# Patient Record
Sex: FEMALE | Race: WHITE | NOT HISPANIC OR LATINO
[De-identification: names, ages, dates, MRNs, and addresses within clinical notes are randomized per-mention and may not be internally consistent; named-entity substitution may affect disease eponyms.]

---

## 2017-01-03 ENCOUNTER — LABORATORY RESULT (OUTPATIENT)
Age: 82
End: 2017-01-03

## 2017-01-04 ENCOUNTER — APPOINTMENT (OUTPATIENT)
Dept: FAMILY MEDICINE | Facility: CLINIC | Age: 82
End: 2017-01-04

## 2017-01-11 ENCOUNTER — APPOINTMENT (OUTPATIENT)
Dept: FAMILY MEDICINE | Facility: CLINIC | Age: 82
End: 2017-01-11

## 2017-01-11 VITALS
OXYGEN SATURATION: 98 % | WEIGHT: 150 LBS | HEART RATE: 90 BPM | HEIGHT: 62 IN | RESPIRATION RATE: 18 BRPM | DIASTOLIC BLOOD PRESSURE: 76 MMHG | TEMPERATURE: 98.4 F | SYSTOLIC BLOOD PRESSURE: 130 MMHG | BODY MASS INDEX: 27.6 KG/M2

## 2017-01-17 ENCOUNTER — LABORATORY RESULT (OUTPATIENT)
Age: 82
End: 2017-01-17

## 2017-01-22 ENCOUNTER — INPATIENT (INPATIENT)
Facility: HOSPITAL | Age: 82
LOS: 8 days | Discharge: NURSING, REHAB OR LONG-TERM CARE | DRG: 481 | End: 2017-01-31
Attending: ORTHOPAEDIC SURGERY | Admitting: FAMILY MEDICINE
Payer: MEDICARE

## 2017-01-22 DIAGNOSIS — Z79.01 LONG TERM (CURRENT) USE OF ANTICOAGULANTS: ICD-10-CM

## 2017-01-22 DIAGNOSIS — I25.10 ATHEROSCLEROTIC HEART DISEASE OF NATIVE CORONARY ARTERY WITHOUT ANGINA PECTORIS: ICD-10-CM

## 2017-01-22 DIAGNOSIS — I95.9 HYPOTENSION, UNSPECIFIED: ICD-10-CM

## 2017-01-22 DIAGNOSIS — F51.04 PSYCHOPHYSIOLOGIC INSOMNIA: ICD-10-CM

## 2017-01-22 DIAGNOSIS — M80.852A OTHER OSTEOPOROSIS WITH CURRENT PATHOLOGICAL FRACTURE, LEFT FEMUR, INITIAL ENCOUNTER FOR FRACTURE: ICD-10-CM

## 2017-01-22 DIAGNOSIS — E87.6 HYPOKALEMIA: ICD-10-CM

## 2017-01-22 DIAGNOSIS — E87.1 HYPO-OSMOLALITY AND HYPONATREMIA: ICD-10-CM

## 2017-01-22 DIAGNOSIS — E03.9 HYPOTHYROIDISM, UNSPECIFIED: ICD-10-CM

## 2017-01-22 DIAGNOSIS — D72.829 ELEVATED WHITE BLOOD CELL COUNT, UNSPECIFIED: ICD-10-CM

## 2017-01-22 DIAGNOSIS — F41.9 ANXIETY DISORDER, UNSPECIFIED: ICD-10-CM

## 2017-01-22 DIAGNOSIS — S72.142A DISPLACED INTERTROCHANTERIC FRACTURE OF LEFT FEMUR, INITIAL ENCOUNTER FOR CLOSED FRACTURE: ICD-10-CM

## 2017-01-22 DIAGNOSIS — I48.0 PAROXYSMAL ATRIAL FIBRILLATION: ICD-10-CM

## 2017-01-22 DIAGNOSIS — M06.9 RHEUMATOID ARTHRITIS, UNSPECIFIED: ICD-10-CM

## 2017-01-22 DIAGNOSIS — I10 ESSENTIAL (PRIMARY) HYPERTENSION: ICD-10-CM

## 2017-01-22 DIAGNOSIS — F32.9 MAJOR DEPRESSIVE DISORDER, SINGLE EPISODE, UNSPECIFIED: ICD-10-CM

## 2017-01-22 PROCEDURE — 71010: CPT | Mod: 26

## 2017-01-22 PROCEDURE — 93010 ELECTROCARDIOGRAM REPORT: CPT

## 2017-01-22 PROCEDURE — 99223 1ST HOSP IP/OBS HIGH 75: CPT

## 2017-01-22 PROCEDURE — 73552 X-RAY EXAM OF FEMUR 2/>: CPT | Mod: 26,LT

## 2017-01-22 PROCEDURE — 72170 X-RAY EXAM OF PELVIS: CPT | Mod: 26

## 2017-01-23 PROCEDURE — 99233 SBSQ HOSP IP/OBS HIGH 50: CPT

## 2017-01-23 PROCEDURE — 99222 1ST HOSP IP/OBS MODERATE 55: CPT

## 2017-01-23 PROCEDURE — 93306 TTE W/DOPPLER COMPLETE: CPT | Mod: 26

## 2017-01-24 PROCEDURE — 99233 SBSQ HOSP IP/OBS HIGH 50: CPT

## 2017-01-25 ENCOUNTER — APPOINTMENT (OUTPATIENT)
Dept: FAMILY MEDICINE | Facility: CLINIC | Age: 82
End: 2017-01-25

## 2017-01-25 PROCEDURE — 99233 SBSQ HOSP IP/OBS HIGH 50: CPT

## 2017-01-26 PROCEDURE — 99232 SBSQ HOSP IP/OBS MODERATE 35: CPT

## 2017-01-27 PROCEDURE — 99233 SBSQ HOSP IP/OBS HIGH 50: CPT

## 2017-01-27 PROCEDURE — 99232 SBSQ HOSP IP/OBS MODERATE 35: CPT

## 2017-01-28 PROCEDURE — 99232 SBSQ HOSP IP/OBS MODERATE 35: CPT

## 2017-01-29 PROCEDURE — 99233 SBSQ HOSP IP/OBS HIGH 50: CPT

## 2017-01-29 PROCEDURE — 99232 SBSQ HOSP IP/OBS MODERATE 35: CPT

## 2017-01-30 PROCEDURE — 99232 SBSQ HOSP IP/OBS MODERATE 35: CPT

## 2017-01-31 ENCOUNTER — INPATIENT (INPATIENT)
Facility: HOSPITAL | Age: 82
LOS: 14 days | Discharge: HOME | DRG: 949 | End: 2017-02-15
Attending: PSYCHIATRY & NEUROLOGY | Admitting: PSYCHIATRY & NEUROLOGY
Payer: MEDICARE

## 2017-01-31 DIAGNOSIS — F32.9 MAJOR DEPRESSIVE DISORDER, SINGLE EPISODE, UNSPECIFIED: ICD-10-CM

## 2017-01-31 DIAGNOSIS — Z51.89 ENCOUNTER FOR OTHER SPECIFIED AFTERCARE: ICD-10-CM

## 2017-01-31 DIAGNOSIS — Z79.01 LONG TERM (CURRENT) USE OF ANTICOAGULANTS: ICD-10-CM

## 2017-01-31 DIAGNOSIS — R60.9 EDEMA, UNSPECIFIED: ICD-10-CM

## 2017-01-31 DIAGNOSIS — D72.829 ELEVATED WHITE BLOOD CELL COUNT, UNSPECIFIED: ICD-10-CM

## 2017-01-31 DIAGNOSIS — E03.9 HYPOTHYROIDISM, UNSPECIFIED: ICD-10-CM

## 2017-01-31 DIAGNOSIS — M80.052A AGE-RELATED OSTEOPOROSIS WITH CURRENT PATHOLOGICAL FRACTURE, LEFT FEMUR, INITIAL ENCOUNTER FOR FRACTURE: ICD-10-CM

## 2017-01-31 DIAGNOSIS — M80.052D AGE-RELATED OSTEOPOROSIS WITH CURRENT PATHOLOGICAL FRACTURE, LEFT FEMUR, SUBSEQUENT ENCOUNTER FOR FRACTURE WITH ROUTINE HEALING: ICD-10-CM

## 2017-01-31 DIAGNOSIS — I10 ESSENTIAL (PRIMARY) HYPERTENSION: ICD-10-CM

## 2017-01-31 DIAGNOSIS — Z91.81 HISTORY OF FALLING: ICD-10-CM

## 2017-01-31 DIAGNOSIS — E87.1 HYPO-OSMOLALITY AND HYPONATREMIA: ICD-10-CM

## 2017-01-31 DIAGNOSIS — F41.9 ANXIETY DISORDER, UNSPECIFIED: ICD-10-CM

## 2017-01-31 DIAGNOSIS — I48.91 UNSPECIFIED ATRIAL FIBRILLATION: ICD-10-CM

## 2017-01-31 PROCEDURE — 81003 URINALYSIS AUTO W/O SCOPE: CPT

## 2017-01-31 PROCEDURE — 97165 OT EVAL LOW COMPLEX 30 MIN: CPT

## 2017-01-31 PROCEDURE — 73552 X-RAY EXAM OF FEMUR 2/>: CPT

## 2017-01-31 PROCEDURE — 96376 TX/PRO/DX INJ SAME DRUG ADON: CPT | Mod: XU

## 2017-01-31 PROCEDURE — C1889: CPT

## 2017-01-31 PROCEDURE — 97162 PT EVAL MOD COMPLEX 30 MIN: CPT

## 2017-01-31 PROCEDURE — 96375 TX/PRO/DX INJ NEW DRUG ADDON: CPT | Mod: XU

## 2017-01-31 PROCEDURE — 97116 GAIT TRAINING THERAPY: CPT

## 2017-01-31 PROCEDURE — 87040 BLOOD CULTURE FOR BACTERIA: CPT

## 2017-01-31 PROCEDURE — 72170 X-RAY EXAM OF PELVIS: CPT

## 2017-01-31 PROCEDURE — 86922 COMPATIBILITY TEST ANTIGLOB: CPT

## 2017-01-31 PROCEDURE — 51702 INSERT TEMP BLADDER CATH: CPT

## 2017-01-31 PROCEDURE — 76000 FLUOROSCOPY <1 HR PHYS/QHP: CPT

## 2017-01-31 PROCEDURE — 80069 RENAL FUNCTION PANEL: CPT

## 2017-01-31 PROCEDURE — 85610 PROTHROMBIN TIME: CPT

## 2017-01-31 PROCEDURE — 96374 THER/PROPH/DIAG INJ IV PUSH: CPT | Mod: XU

## 2017-01-31 PROCEDURE — 83735 ASSAY OF MAGNESIUM: CPT

## 2017-01-31 PROCEDURE — 97530 THERAPEUTIC ACTIVITIES: CPT

## 2017-01-31 PROCEDURE — 87086 URINE CULTURE/COLONY COUNT: CPT

## 2017-01-31 PROCEDURE — 80048 BASIC METABOLIC PNL TOTAL CA: CPT

## 2017-01-31 PROCEDURE — 99232 SBSQ HOSP IP/OBS MODERATE 35: CPT

## 2017-01-31 PROCEDURE — 86850 RBC ANTIBODY SCREEN: CPT

## 2017-01-31 PROCEDURE — 85027 COMPLETE CBC AUTOMATED: CPT

## 2017-01-31 PROCEDURE — 97110 THERAPEUTIC EXERCISES: CPT

## 2017-01-31 PROCEDURE — 85025 COMPLETE CBC W/AUTO DIFF WBC: CPT

## 2017-01-31 PROCEDURE — 83036 HEMOGLOBIN GLYCOSYLATED A1C: CPT

## 2017-01-31 PROCEDURE — 86900 BLOOD TYPING SEROLOGIC ABO: CPT

## 2017-01-31 PROCEDURE — 99285 EMERGENCY DEPT VISIT HI MDM: CPT | Mod: 25

## 2017-01-31 PROCEDURE — 93005 ELECTROCARDIOGRAM TRACING: CPT

## 2017-01-31 PROCEDURE — 85730 THROMBOPLASTIN TIME PARTIAL: CPT

## 2017-01-31 PROCEDURE — 71045 X-RAY EXAM CHEST 1 VIEW: CPT

## 2017-01-31 PROCEDURE — 84100 ASSAY OF PHOSPHORUS: CPT

## 2017-01-31 PROCEDURE — 93306 TTE W/DOPPLER COMPLETE: CPT

## 2017-01-31 PROCEDURE — C1713: CPT

## 2017-02-01 PROCEDURE — 93010 ELECTROCARDIOGRAM REPORT: CPT

## 2017-02-01 PROCEDURE — 99232 SBSQ HOSP IP/OBS MODERATE 35: CPT

## 2017-02-01 PROCEDURE — 99223 1ST HOSP IP/OBS HIGH 75: CPT

## 2017-02-02 PROCEDURE — 99232 SBSQ HOSP IP/OBS MODERATE 35: CPT

## 2017-02-02 PROCEDURE — 93971 EXTREMITY STUDY: CPT | Mod: 26,LT

## 2017-02-03 PROCEDURE — 99232 SBSQ HOSP IP/OBS MODERATE 35: CPT

## 2017-02-04 PROCEDURE — 99232 SBSQ HOSP IP/OBS MODERATE 35: CPT

## 2017-02-05 PROCEDURE — 99232 SBSQ HOSP IP/OBS MODERATE 35: CPT

## 2017-02-06 PROCEDURE — 99232 SBSQ HOSP IP/OBS MODERATE 35: CPT

## 2017-02-07 PROCEDURE — 99232 SBSQ HOSP IP/OBS MODERATE 35: CPT

## 2017-02-08 PROCEDURE — 99232 SBSQ HOSP IP/OBS MODERATE 35: CPT

## 2017-02-09 PROCEDURE — 99232 SBSQ HOSP IP/OBS MODERATE 35: CPT

## 2017-02-10 PROCEDURE — 99232 SBSQ HOSP IP/OBS MODERATE 35: CPT

## 2017-02-11 PROCEDURE — 99232 SBSQ HOSP IP/OBS MODERATE 35: CPT | Mod: GC

## 2017-02-12 PROCEDURE — 99232 SBSQ HOSP IP/OBS MODERATE 35: CPT | Mod: GC

## 2017-02-13 PROCEDURE — 99232 SBSQ HOSP IP/OBS MODERATE 35: CPT

## 2017-02-14 PROCEDURE — 99232 SBSQ HOSP IP/OBS MODERATE 35: CPT

## 2017-02-14 PROCEDURE — 90792 PSYCH DIAG EVAL W/MED SRVCS: CPT

## 2017-02-15 PROCEDURE — 99238 HOSP IP/OBS DSCHRG MGMT 30/<: CPT

## 2017-02-16 ENCOUNTER — LABORATORY RESULT (OUTPATIENT)
Age: 82
End: 2017-02-16

## 2017-02-22 ENCOUNTER — APPOINTMENT (OUTPATIENT)
Dept: FAMILY MEDICINE | Facility: CLINIC | Age: 82
End: 2017-02-22

## 2017-02-22 VITALS
BODY MASS INDEX: 27.05 KG/M2 | TEMPERATURE: 98.7 F | HEART RATE: 78 BPM | RESPIRATION RATE: 15 BRPM | HEIGHT: 62 IN | DIASTOLIC BLOOD PRESSURE: 78 MMHG | WEIGHT: 147 LBS | SYSTOLIC BLOOD PRESSURE: 120 MMHG | OXYGEN SATURATION: 98 %

## 2017-02-27 PROCEDURE — 80053 COMPREHEN METABOLIC PANEL: CPT

## 2017-02-27 PROCEDURE — 97163 PT EVAL HIGH COMPLEX 45 MIN: CPT

## 2017-02-27 PROCEDURE — 93971 EXTREMITY STUDY: CPT

## 2017-02-27 PROCEDURE — 76000 FLUOROSCOPY <1 HR PHYS/QHP: CPT

## 2017-02-27 PROCEDURE — 97530 THERAPEUTIC ACTIVITIES: CPT

## 2017-02-27 PROCEDURE — 80048 BASIC METABOLIC PNL TOTAL CA: CPT

## 2017-02-27 PROCEDURE — 85610 PROTHROMBIN TIME: CPT

## 2017-02-27 PROCEDURE — 97535 SELF CARE MNGMENT TRAINING: CPT

## 2017-02-27 PROCEDURE — 97167 OT EVAL HIGH COMPLEX 60 MIN: CPT

## 2017-02-27 PROCEDURE — 80069 RENAL FUNCTION PANEL: CPT

## 2017-02-27 PROCEDURE — 85027 COMPLETE CBC AUTOMATED: CPT

## 2017-02-27 PROCEDURE — 93005 ELECTROCARDIOGRAM TRACING: CPT

## 2017-02-27 PROCEDURE — 97110 THERAPEUTIC EXERCISES: CPT

## 2017-02-27 PROCEDURE — 85025 COMPLETE CBC W/AUTO DIFF WBC: CPT

## 2017-02-27 PROCEDURE — 97116 GAIT TRAINING THERAPY: CPT

## 2017-02-28 ENCOUNTER — LABORATORY RESULT (OUTPATIENT)
Age: 82
End: 2017-02-28

## 2017-02-28 ENCOUNTER — OTHER (OUTPATIENT)
Age: 82
End: 2017-02-28

## 2017-03-01 ENCOUNTER — RX RENEWAL (OUTPATIENT)
Age: 82
End: 2017-03-01

## 2017-03-08 ENCOUNTER — RX RENEWAL (OUTPATIENT)
Age: 82
End: 2017-03-08

## 2017-03-09 ENCOUNTER — APPOINTMENT (OUTPATIENT)
Dept: CARDIOLOGY | Facility: CLINIC | Age: 82
End: 2017-03-09

## 2017-03-09 ENCOUNTER — NON-APPOINTMENT (OUTPATIENT)
Age: 82
End: 2017-03-09

## 2017-03-09 VITALS
HEART RATE: 67 BPM | SYSTOLIC BLOOD PRESSURE: 121 MMHG | HEIGHT: 62 IN | DIASTOLIC BLOOD PRESSURE: 73 MMHG | WEIGHT: 144 LBS | RESPIRATION RATE: 17 BRPM | OXYGEN SATURATION: 98 % | BODY MASS INDEX: 26.5 KG/M2

## 2017-03-09 LAB
INR PPP: 2.3 RATIO
POCT-PROTHROMBIN TIME: 27.8 SECS
QUALITY CONTROL: YES

## 2017-03-16 ENCOUNTER — APPOINTMENT (OUTPATIENT)
Dept: PHYSICAL MEDICINE AND REHAB | Facility: CLINIC | Age: 82
End: 2017-03-16

## 2017-03-16 ENCOUNTER — LABORATORY RESULT (OUTPATIENT)
Age: 82
End: 2017-03-16

## 2017-03-16 VITALS
DIASTOLIC BLOOD PRESSURE: 81 MMHG | OXYGEN SATURATION: 98 % | RESPIRATION RATE: 17 BRPM | HEIGHT: 62 IN | SYSTOLIC BLOOD PRESSURE: 124 MMHG | BODY MASS INDEX: 26.87 KG/M2 | HEART RATE: 51 BPM | WEIGHT: 146 LBS

## 2017-03-20 ENCOUNTER — RX RENEWAL (OUTPATIENT)
Age: 82
End: 2017-03-20

## 2017-03-26 ENCOUNTER — EMERGENCY (EMERGENCY)
Facility: HOSPITAL | Age: 82
LOS: 1 days | Discharge: HOME | End: 2017-03-26
Admitting: EMERGENCY MEDICINE
Payer: MEDICARE

## 2017-03-26 DIAGNOSIS — R60.0 LOCALIZED EDEMA: ICD-10-CM

## 2017-03-26 DIAGNOSIS — E78.00 PURE HYPERCHOLESTEROLEMIA, UNSPECIFIED: ICD-10-CM

## 2017-03-26 DIAGNOSIS — R50.9 FEVER, UNSPECIFIED: ICD-10-CM

## 2017-03-26 DIAGNOSIS — Z79.01 LONG TERM (CURRENT) USE OF ANTICOAGULANTS: ICD-10-CM

## 2017-03-26 DIAGNOSIS — Z88.0 ALLERGY STATUS TO PENICILLIN: ICD-10-CM

## 2017-03-26 DIAGNOSIS — I10 ESSENTIAL (PRIMARY) HYPERTENSION: ICD-10-CM

## 2017-03-26 DIAGNOSIS — R06.00 DYSPNEA, UNSPECIFIED: ICD-10-CM

## 2017-03-26 DIAGNOSIS — Z79.899 OTHER LONG TERM (CURRENT) DRUG THERAPY: ICD-10-CM

## 2017-03-26 PROCEDURE — 93010 ELECTROCARDIOGRAM REPORT: CPT

## 2017-03-26 PROCEDURE — 99285 EMERGENCY DEPT VISIT HI MDM: CPT

## 2017-03-26 PROCEDURE — 71020: CPT | Mod: 26

## 2017-03-27 ENCOUNTER — APPOINTMENT (OUTPATIENT)
Dept: CARDIOLOGY | Facility: CLINIC | Age: 82
End: 2017-03-27

## 2017-03-27 ENCOUNTER — NON-APPOINTMENT (OUTPATIENT)
Age: 82
End: 2017-03-27

## 2017-03-27 VITALS
BODY MASS INDEX: 26.87 KG/M2 | DIASTOLIC BLOOD PRESSURE: 85 MMHG | RESPIRATION RATE: 17 BRPM | WEIGHT: 146 LBS | HEART RATE: 85 BPM | HEIGHT: 62 IN | SYSTOLIC BLOOD PRESSURE: 137 MMHG | OXYGEN SATURATION: 92 %

## 2017-03-27 PROCEDURE — 84484 ASSAY OF TROPONIN QUANT: CPT

## 2017-03-27 PROCEDURE — 83880 ASSAY OF NATRIURETIC PEPTIDE: CPT

## 2017-03-27 PROCEDURE — 99284 EMERGENCY DEPT VISIT MOD MDM: CPT | Mod: 25

## 2017-03-27 PROCEDURE — 87040 BLOOD CULTURE FOR BACTERIA: CPT

## 2017-03-27 PROCEDURE — 71046 X-RAY EXAM CHEST 2 VIEWS: CPT

## 2017-03-27 PROCEDURE — 93005 ELECTROCARDIOGRAM TRACING: CPT

## 2017-03-27 PROCEDURE — 81003 URINALYSIS AUTO W/O SCOPE: CPT

## 2017-03-27 PROCEDURE — 96374 THER/PROPH/DIAG INJ IV PUSH: CPT

## 2017-03-27 PROCEDURE — 85027 COMPLETE CBC AUTOMATED: CPT

## 2017-03-27 PROCEDURE — 80048 BASIC METABOLIC PNL TOTAL CA: CPT

## 2017-03-28 LAB
INR PPP: 1.5 RATIO
QUALITY CONTROL: YES

## 2017-03-29 ENCOUNTER — APPOINTMENT (OUTPATIENT)
Dept: FAMILY MEDICINE | Facility: CLINIC | Age: 82
End: 2017-03-29

## 2017-03-29 VITALS
DIASTOLIC BLOOD PRESSURE: 60 MMHG | HEART RATE: 58 BPM | OXYGEN SATURATION: 92 % | SYSTOLIC BLOOD PRESSURE: 90 MMHG | TEMPERATURE: 98.2 F

## 2017-03-30 ENCOUNTER — APPOINTMENT (OUTPATIENT)
Dept: PULMONOLOGY | Facility: CLINIC | Age: 82
End: 2017-03-30

## 2017-03-30 VITALS
HEIGHT: 62 IN | DIASTOLIC BLOOD PRESSURE: 60 MMHG | SYSTOLIC BLOOD PRESSURE: 108 MMHG | OXYGEN SATURATION: 96 % | HEART RATE: 64 BPM | BODY MASS INDEX: 26.87 KG/M2 | WEIGHT: 146 LBS

## 2017-04-05 ENCOUNTER — RX RENEWAL (OUTPATIENT)
Age: 82
End: 2017-04-05

## 2017-04-06 ENCOUNTER — APPOINTMENT (OUTPATIENT)
Dept: CARDIOLOGY | Facility: CLINIC | Age: 82
End: 2017-04-06

## 2017-04-06 ENCOUNTER — RX RENEWAL (OUTPATIENT)
Age: 82
End: 2017-04-06

## 2017-04-06 LAB
INR PPP: 2.3 RATIO
POCT-PROTHROMBIN TIME: 27.7 SECS
QUALITY CONTROL: YES

## 2017-04-11 ENCOUNTER — LABORATORY RESULT (OUTPATIENT)
Age: 82
End: 2017-04-11

## 2017-04-18 ENCOUNTER — LABORATORY RESULT (OUTPATIENT)
Age: 82
End: 2017-04-18

## 2017-05-08 ENCOUNTER — APPOINTMENT (OUTPATIENT)
Dept: PULMONOLOGY | Facility: CLINIC | Age: 82
End: 2017-05-08

## 2017-05-08 VITALS
SYSTOLIC BLOOD PRESSURE: 110 MMHG | WEIGHT: 147 LBS | BODY MASS INDEX: 27.05 KG/M2 | OXYGEN SATURATION: 98 % | HEART RATE: 74 BPM | DIASTOLIC BLOOD PRESSURE: 78 MMHG | HEIGHT: 62 IN

## 2017-05-09 ENCOUNTER — LABORATORY RESULT (OUTPATIENT)
Age: 82
End: 2017-05-09

## 2017-05-10 ENCOUNTER — APPOINTMENT (OUTPATIENT)
Dept: FAMILY MEDICINE | Facility: CLINIC | Age: 82
End: 2017-05-10

## 2017-05-10 VITALS
SYSTOLIC BLOOD PRESSURE: 138 MMHG | BODY MASS INDEX: 27.05 KG/M2 | WEIGHT: 147 LBS | HEART RATE: 68 BPM | TEMPERATURE: 98.2 F | HEIGHT: 62 IN | DIASTOLIC BLOOD PRESSURE: 80 MMHG | OXYGEN SATURATION: 97 %

## 2017-05-10 RX ORDER — PREDNISONE 10 MG/1
10 TABLET ORAL
Qty: 21 | Refills: 0 | Status: COMPLETED | COMMUNITY
Start: 2017-03-29 | End: 2017-05-10

## 2017-05-10 RX ORDER — TEMAZEPAM 7.5 MG/1
7.5 CAPSULE ORAL
Qty: 30 | Refills: 0 | Status: COMPLETED | COMMUNITY
Start: 2017-03-20 | End: 2017-05-10

## 2017-05-10 RX ORDER — LEVOFLOXACIN 500 MG/1
500 TABLET, FILM COATED ORAL DAILY
Qty: 7 | Refills: 1 | Status: COMPLETED | COMMUNITY
Start: 2017-03-29 | End: 2017-05-10

## 2017-05-10 RX ORDER — AZELASTINE HYDROCHLORIDE 0.5 MG/ML
0.05 SOLUTION/ DROPS OPHTHALMIC
Qty: 6 | Refills: 0 | Status: COMPLETED | COMMUNITY
Start: 2017-01-10 | End: 2017-05-10

## 2017-05-10 RX ORDER — VALSARTAN 320 MG/1
320 TABLET ORAL
Refills: 0 | Status: COMPLETED | COMMUNITY
End: 2017-05-10

## 2017-05-10 RX ORDER — TEMAZEPAM 22.5 MG/1
CAPSULE ORAL
Refills: 0 | Status: COMPLETED | COMMUNITY
End: 2017-05-10

## 2017-05-10 RX ORDER — MIRTAZAPINE 15 MG/1
15 TABLET, FILM COATED ORAL
Refills: 0 | Status: COMPLETED | COMMUNITY
Start: 2017-01-11 | End: 2017-05-10

## 2017-05-10 RX ORDER — PROMETHAZINE HYDROCHLORIDE AND DEXTROMETHORPHAN HYDROBROMIDE ORAL SOLUTION 15; 6.25 MG/5ML; MG/5ML
6.25-15 SOLUTION ORAL
Qty: 240 | Refills: 1 | Status: COMPLETED | COMMUNITY
Start: 2017-03-29 | End: 2017-05-10

## 2017-05-16 ENCOUNTER — LABORATORY RESULT (OUTPATIENT)
Age: 82
End: 2017-05-16

## 2017-05-18 ENCOUNTER — RX RENEWAL (OUTPATIENT)
Age: 82
End: 2017-05-18

## 2017-05-23 ENCOUNTER — LABORATORY RESULT (OUTPATIENT)
Age: 82
End: 2017-05-23

## 2017-05-29 ENCOUNTER — FORM ENCOUNTER (OUTPATIENT)
Age: 82
End: 2017-05-29

## 2017-05-30 ENCOUNTER — OUTPATIENT (OUTPATIENT)
Dept: OUTPATIENT SERVICES | Facility: HOSPITAL | Age: 82
LOS: 1 days | End: 2017-05-30
Payer: MEDICARE

## 2017-05-30 ENCOUNTER — APPOINTMENT (OUTPATIENT)
Dept: PHYSICAL MEDICINE AND REHAB | Facility: CLINIC | Age: 82
End: 2017-05-30

## 2017-05-30 VITALS
BODY MASS INDEX: 28.52 KG/M2 | HEIGHT: 62 IN | HEART RATE: 64 BPM | DIASTOLIC BLOOD PRESSURE: 91 MMHG | OXYGEN SATURATION: 99 % | SYSTOLIC BLOOD PRESSURE: 149 MMHG | RESPIRATION RATE: 17 BRPM | WEIGHT: 155 LBS

## 2017-05-30 DIAGNOSIS — S72.002A FRACTURE OF UNSPECIFIED PART OF NECK OF LEFT FEMUR, INITIAL ENCOUNTER FOR CLOSED FRACTURE: ICD-10-CM

## 2017-05-30 PROCEDURE — 73502 X-RAY EXAM HIP UNI 2-3 VIEWS: CPT

## 2017-05-30 PROCEDURE — 73502 X-RAY EXAM HIP UNI 2-3 VIEWS: CPT | Mod: 26,LT

## 2017-05-31 ENCOUNTER — RX RENEWAL (OUTPATIENT)
Age: 82
End: 2017-05-31

## 2017-05-31 ENCOUNTER — LABORATORY RESULT (OUTPATIENT)
Age: 82
End: 2017-05-31

## 2017-06-08 ENCOUNTER — LABORATORY RESULT (OUTPATIENT)
Age: 82
End: 2017-06-08

## 2017-06-13 ENCOUNTER — LABORATORY RESULT (OUTPATIENT)
Age: 82
End: 2017-06-13

## 2017-06-14 ENCOUNTER — RX RENEWAL (OUTPATIENT)
Age: 82
End: 2017-06-14

## 2017-06-21 ENCOUNTER — LABORATORY RESULT (OUTPATIENT)
Age: 82
End: 2017-06-21

## 2017-06-21 ENCOUNTER — MEDICATION RENEWAL (OUTPATIENT)
Age: 82
End: 2017-06-21

## 2017-06-21 ENCOUNTER — APPOINTMENT (OUTPATIENT)
Dept: FAMILY MEDICINE | Facility: CLINIC | Age: 82
End: 2017-06-21

## 2017-06-21 VITALS — TEMPERATURE: 98 F | RESPIRATION RATE: 16 BRPM

## 2017-06-21 VITALS — OXYGEN SATURATION: 98 % | SYSTOLIC BLOOD PRESSURE: 130 MMHG | HEART RATE: 61 BPM | DIASTOLIC BLOOD PRESSURE: 94 MMHG

## 2017-06-22 ENCOUNTER — LABORATORY RESULT (OUTPATIENT)
Age: 82
End: 2017-06-22

## 2017-06-23 LAB
ALBUMIN SERPL ELPH-MCNC: 4.3 G/DL
ALP BLD-CCNC: 96 U/L
ALT SERPL-CCNC: 21 U/L
ANION GAP SERPL CALC-SCNC: 14 MMOL/L
AST SERPL-CCNC: 25 U/L
BASOPHILS # BLD AUTO: 0.15 K/UL
BASOPHILS NFR BLD AUTO: 1.7 %
BILIRUB SERPL-MCNC: 0.5 MG/DL
BUN SERPL-MCNC: 13 MG/DL
CALCIUM SERPL-MCNC: 9.6 MG/DL
CHLORIDE SERPL-SCNC: 95 MMOL/L
CHOLEST SERPL-MCNC: 148 MG/DL
CHOLEST/HDLC SERPL: 2.2 RATIO
CO2 SERPL-SCNC: 28 MMOL/L
CREAT SERPL-MCNC: 0.61 MG/DL
CRP SERPL-MCNC: 0.2 MG/DL
EOSINOPHIL # BLD AUTO: 0.3 K/UL
EOSINOPHIL NFR BLD AUTO: 3.5 %
GLUCOSE SERPL-MCNC: 114 MG/DL
HBA1C MFR BLD HPLC: 5.9 %
HCT VFR BLD CALC: 41.7 %
HDLC SERPL-MCNC: 67 MG/DL
HGB BLD-MCNC: 13.3 G/DL
LDLC SERPL CALC-MCNC: 56 MG/DL
LYMPHOCYTES # BLD AUTO: 0.97 K/UL
LYMPHOCYTES NFR BLD AUTO: 11.3 %
MAN DIFF?: NORMAL
MCHC RBC-ENTMCNC: 26.8 PG
MCHC RBC-ENTMCNC: 31.9 GM/DL
MCV RBC AUTO: 83.9 FL
MONOCYTES # BLD AUTO: 0.89 K/UL
MONOCYTES NFR BLD AUTO: 10.4 %
NEUTROPHILS # BLD AUTO: 6.21 K/UL
NEUTROPHILS NFR BLD AUTO: 72.2 %
PLATELET # BLD AUTO: 264 K/UL
POTASSIUM SERPL-SCNC: 3.7 MMOL/L
PROT SERPL-MCNC: 6.6 G/DL
RBC # BLD: 4.97 M/UL
RBC # FLD: 16.2 %
SODIUM SERPL-SCNC: 137 MMOL/L
TRIGL SERPL-MCNC: 127 MG/DL
WBC # FLD AUTO: 8.6 K/UL

## 2017-06-27 ENCOUNTER — LABORATORY RESULT (OUTPATIENT)
Age: 82
End: 2017-06-27

## 2017-06-28 ENCOUNTER — RX RENEWAL (OUTPATIENT)
Age: 82
End: 2017-06-28

## 2017-06-28 ENCOUNTER — OUTPATIENT (OUTPATIENT)
Dept: OUTPATIENT SERVICES | Facility: HOSPITAL | Age: 82
LOS: 1 days | End: 2017-06-28
Payer: MEDICARE

## 2017-06-28 DIAGNOSIS — M79.641 PAIN IN RIGHT HAND: ICD-10-CM

## 2017-06-28 DIAGNOSIS — M79.642 PAIN IN LEFT HAND: ICD-10-CM

## 2017-07-01 ENCOUNTER — OTHER (OUTPATIENT)
Age: 82
End: 2017-07-01

## 2017-07-06 ENCOUNTER — LABORATORY RESULT (OUTPATIENT)
Age: 82
End: 2017-07-06

## 2017-07-11 ENCOUNTER — LABORATORY RESULT (OUTPATIENT)
Age: 82
End: 2017-07-11

## 2017-07-17 ENCOUNTER — RX RENEWAL (OUTPATIENT)
Age: 82
End: 2017-07-17

## 2017-07-18 ENCOUNTER — LABORATORY RESULT (OUTPATIENT)
Age: 82
End: 2017-07-18

## 2017-07-24 ENCOUNTER — APPOINTMENT (OUTPATIENT)
Dept: CARDIOLOGY | Facility: CLINIC | Age: 82
End: 2017-07-24

## 2017-07-24 VITALS — OXYGEN SATURATION: 96 % | HEART RATE: 60 BPM | DIASTOLIC BLOOD PRESSURE: 85 MMHG | SYSTOLIC BLOOD PRESSURE: 130 MMHG

## 2017-07-25 LAB
INR PPP: 2.4 RATIO
POCT-PROTHROMBIN TIME: 28.4 SECS
QUALITY CONTROL: YES

## 2017-08-03 ENCOUNTER — APPOINTMENT (OUTPATIENT)
Dept: CARDIOLOGY | Facility: CLINIC | Age: 82
End: 2017-08-03
Payer: MEDICARE

## 2017-08-03 ENCOUNTER — NON-APPOINTMENT (OUTPATIENT)
Age: 82
End: 2017-08-03

## 2017-08-03 VITALS
OXYGEN SATURATION: 97 % | SYSTOLIC BLOOD PRESSURE: 147 MMHG | DIASTOLIC BLOOD PRESSURE: 85 MMHG | BODY MASS INDEX: 28.34 KG/M2 | HEIGHT: 62 IN | RESPIRATION RATE: 17 BRPM | WEIGHT: 154 LBS | HEART RATE: 66 BPM

## 2017-08-03 LAB
INR PPP: 1.3 RATIO
POCT-PROTHROMBIN TIME: 15.3 SECS
QUALITY CONTROL: YES

## 2017-08-03 PROCEDURE — 93000 ELECTROCARDIOGRAM COMPLETE: CPT

## 2017-08-03 PROCEDURE — 85610 PROTHROMBIN TIME: CPT | Mod: QW

## 2017-08-03 PROCEDURE — 99214 OFFICE O/P EST MOD 30 MIN: CPT

## 2017-08-08 ENCOUNTER — LABORATORY RESULT (OUTPATIENT)
Age: 82
End: 2017-08-08

## 2017-08-14 ENCOUNTER — RX RENEWAL (OUTPATIENT)
Age: 82
End: 2017-08-14

## 2017-08-15 ENCOUNTER — LABORATORY RESULT (OUTPATIENT)
Age: 82
End: 2017-08-15

## 2017-08-17 PROCEDURE — G8984: CPT | Mod: CK

## 2017-08-17 PROCEDURE — 97124 MASSAGE THERAPY: CPT

## 2017-08-17 PROCEDURE — 97110 THERAPEUTIC EXERCISES: CPT

## 2017-08-17 PROCEDURE — 97140 MANUAL THERAPY 1/> REGIONS: CPT

## 2017-08-17 PROCEDURE — G8986: CPT | Mod: CK

## 2017-08-17 PROCEDURE — G8985: CPT | Mod: CJ

## 2017-08-17 PROCEDURE — 97166 OT EVAL MOD COMPLEX 45 MIN: CPT

## 2017-08-17 PROCEDURE — 97535 SELF CARE MNGMENT TRAINING: CPT

## 2017-08-17 PROCEDURE — 97530 THERAPEUTIC ACTIVITIES: CPT

## 2017-08-24 ENCOUNTER — APPOINTMENT (OUTPATIENT)
Dept: FAMILY MEDICINE | Facility: CLINIC | Age: 82
End: 2017-08-24
Payer: MEDICARE

## 2017-08-24 VITALS
BODY MASS INDEX: 28.52 KG/M2 | RESPIRATION RATE: 16 BRPM | WEIGHT: 155 LBS | TEMPERATURE: 98 F | HEART RATE: 68 BPM | SYSTOLIC BLOOD PRESSURE: 130 MMHG | OXYGEN SATURATION: 97 % | DIASTOLIC BLOOD PRESSURE: 70 MMHG | HEIGHT: 62 IN

## 2017-08-24 PROCEDURE — 99214 OFFICE O/P EST MOD 30 MIN: CPT

## 2017-08-25 ENCOUNTER — LABORATORY RESULT (OUTPATIENT)
Age: 82
End: 2017-08-25

## 2017-08-28 ENCOUNTER — RX RENEWAL (OUTPATIENT)
Age: 82
End: 2017-08-28

## 2017-08-30 ENCOUNTER — LABORATORY RESULT (OUTPATIENT)
Age: 82
End: 2017-08-30

## 2017-08-30 ENCOUNTER — RX RENEWAL (OUTPATIENT)
Age: 82
End: 2017-08-30

## 2017-09-05 ENCOUNTER — LABORATORY RESULT (OUTPATIENT)
Age: 82
End: 2017-09-05

## 2017-09-06 ENCOUNTER — APPOINTMENT (OUTPATIENT)
Dept: ORTHOPEDIC SURGERY | Facility: CLINIC | Age: 82
End: 2017-09-06
Payer: MEDICARE

## 2017-09-06 ENCOUNTER — RX RENEWAL (OUTPATIENT)
Age: 82
End: 2017-09-06

## 2017-09-06 VITALS
HEIGHT: 60 IN | WEIGHT: 158 LBS | BODY MASS INDEX: 31.02 KG/M2 | DIASTOLIC BLOOD PRESSURE: 77 MMHG | SYSTOLIC BLOOD PRESSURE: 133 MMHG | HEART RATE: 48 BPM

## 2017-09-06 DIAGNOSIS — M79.641 PAIN IN RIGHT HAND: ICD-10-CM

## 2017-09-06 DIAGNOSIS — M79.642 PAIN IN RIGHT HAND: ICD-10-CM

## 2017-09-06 DIAGNOSIS — F41.8 OTHER SPECIFIED ANXIETY DISORDERS: ICD-10-CM

## 2017-09-06 DIAGNOSIS — Z00.00 ENCOUNTER FOR GENERAL ADULT MEDICAL EXAMINATION W/OUT ABNORMAL FINDINGS: ICD-10-CM

## 2017-09-06 DIAGNOSIS — I34.0 NONRHEUMATIC MITRAL (VALVE) INSUFFICIENCY: ICD-10-CM

## 2017-09-06 DIAGNOSIS — M65.30 TRIGGER FINGER, UNSPECIFIED FINGER: ICD-10-CM

## 2017-09-06 PROCEDURE — 99214 OFFICE O/P EST MOD 30 MIN: CPT | Mod: 25

## 2017-09-06 PROCEDURE — 73110 X-RAY EXAM OF WRIST: CPT | Mod: LT

## 2017-09-06 PROCEDURE — 73130 X-RAY EXAM OF HAND: CPT | Mod: LT

## 2017-09-06 PROCEDURE — 99204 OFFICE O/P NEW MOD 45 MIN: CPT | Mod: 25

## 2017-09-06 PROCEDURE — 20612 ASPIRATE/INJ GANGLION CYST: CPT | Mod: RT

## 2017-09-12 ENCOUNTER — RX RENEWAL (OUTPATIENT)
Age: 82
End: 2017-09-12

## 2017-09-12 ENCOUNTER — LABORATORY RESULT (OUTPATIENT)
Age: 82
End: 2017-09-12

## 2017-09-13 ENCOUNTER — MEDICATION RENEWAL (OUTPATIENT)
Age: 82
End: 2017-09-13

## 2017-09-13 ENCOUNTER — RX RENEWAL (OUTPATIENT)
Age: 82
End: 2017-09-13

## 2017-09-19 ENCOUNTER — LABORATORY RESULT (OUTPATIENT)
Age: 82
End: 2017-09-19

## 2017-09-27 ENCOUNTER — FORM ENCOUNTER (OUTPATIENT)
Age: 82
End: 2017-09-27

## 2017-09-27 ENCOUNTER — RX RENEWAL (OUTPATIENT)
Age: 82
End: 2017-09-27

## 2017-09-27 ENCOUNTER — APPOINTMENT (OUTPATIENT)
Dept: FAMILY MEDICINE | Facility: CLINIC | Age: 82
End: 2017-09-27
Payer: MEDICARE

## 2017-09-27 VITALS
TEMPERATURE: 99.5 F | SYSTOLIC BLOOD PRESSURE: 153 MMHG | WEIGHT: 154 LBS | HEART RATE: 69 BPM | HEIGHT: 60 IN | DIASTOLIC BLOOD PRESSURE: 97 MMHG | BODY MASS INDEX: 30.23 KG/M2

## 2017-09-27 PROCEDURE — 99214 OFFICE O/P EST MOD 30 MIN: CPT

## 2017-09-27 RX ORDER — IPRATROPIUM BROMIDE AND ALBUTEROL SULFATE 2.5; .5 MG/3ML; MG/3ML
0.5-2.5 (3) SOLUTION RESPIRATORY (INHALATION) 4 TIMES DAILY
Qty: 1 | Refills: 0 | Status: ACTIVE | COMMUNITY
Start: 2017-09-27 | End: 1900-01-01

## 2017-09-28 ENCOUNTER — APPOINTMENT (OUTPATIENT)
Dept: ULTRASOUND IMAGING | Facility: HOSPITAL | Age: 82
End: 2017-09-28
Payer: MEDICARE

## 2017-09-28 ENCOUNTER — OUTPATIENT (OUTPATIENT)
Dept: OUTPATIENT SERVICES | Facility: HOSPITAL | Age: 82
LOS: 1 days | End: 2017-09-28
Payer: MEDICARE

## 2017-09-28 ENCOUNTER — RX RENEWAL (OUTPATIENT)
Age: 82
End: 2017-09-28

## 2017-09-28 DIAGNOSIS — M79.89 OTHER SPECIFIED SOFT TISSUE DISORDERS: ICD-10-CM

## 2017-09-28 DIAGNOSIS — L53.9 ERYTHEMATOUS CONDITION, UNSPECIFIED: ICD-10-CM

## 2017-09-28 PROCEDURE — 93971 EXTREMITY STUDY: CPT

## 2017-09-28 PROCEDURE — 93971 EXTREMITY STUDY: CPT | Mod: 26,LT

## 2017-10-04 ENCOUNTER — APPOINTMENT (OUTPATIENT)
Dept: FAMILY MEDICINE | Facility: CLINIC | Age: 82
End: 2017-10-04
Payer: MEDICARE

## 2017-10-04 VITALS
WEIGHT: 154 LBS | DIASTOLIC BLOOD PRESSURE: 78 MMHG | BODY MASS INDEX: 30.23 KG/M2 | SYSTOLIC BLOOD PRESSURE: 116 MMHG | HEART RATE: 64 BPM | HEIGHT: 60 IN

## 2017-10-04 PROCEDURE — 99214 OFFICE O/P EST MOD 30 MIN: CPT

## 2017-10-05 ENCOUNTER — LABORATORY RESULT (OUTPATIENT)
Age: 82
End: 2017-10-05

## 2017-10-08 ENCOUNTER — FORM ENCOUNTER (OUTPATIENT)
Age: 82
End: 2017-10-08

## 2017-10-09 ENCOUNTER — APPOINTMENT (OUTPATIENT)
Dept: PULMONOLOGY | Facility: CLINIC | Age: 82
End: 2017-10-09
Payer: MEDICARE

## 2017-10-09 ENCOUNTER — APPOINTMENT (OUTPATIENT)
Dept: RADIOLOGY | Facility: HOSPITAL | Age: 82
End: 2017-10-09
Payer: MEDICARE

## 2017-10-09 ENCOUNTER — LABORATORY RESULT (OUTPATIENT)
Age: 82
End: 2017-10-09

## 2017-10-09 ENCOUNTER — OUTPATIENT (OUTPATIENT)
Dept: OUTPATIENT SERVICES | Facility: HOSPITAL | Age: 82
LOS: 1 days | End: 2017-10-09
Payer: MEDICARE

## 2017-10-09 ENCOUNTER — RX RENEWAL (OUTPATIENT)
Age: 82
End: 2017-10-09

## 2017-10-09 VITALS
HEIGHT: 60 IN | WEIGHT: 151 LBS | HEART RATE: 65 BPM | OXYGEN SATURATION: 97 % | SYSTOLIC BLOOD PRESSURE: 126 MMHG | DIASTOLIC BLOOD PRESSURE: 78 MMHG | BODY MASS INDEX: 29.64 KG/M2

## 2017-10-09 DIAGNOSIS — R05 COUGH: ICD-10-CM

## 2017-10-09 DIAGNOSIS — J98.8 OTHER SPECIFIED RESPIRATORY DISORDERS: ICD-10-CM

## 2017-10-09 LAB
INR PPP: 1.97 RATIO
PT BLD: 22.6 SEC

## 2017-10-09 PROCEDURE — 71046 X-RAY EXAM CHEST 2 VIEWS: CPT

## 2017-10-09 PROCEDURE — 99214 OFFICE O/P EST MOD 30 MIN: CPT

## 2017-10-09 PROCEDURE — 71020: CPT | Mod: 26

## 2017-10-10 ENCOUNTER — RX RENEWAL (OUTPATIENT)
Age: 82
End: 2017-10-10

## 2017-10-10 ENCOUNTER — LABORATORY RESULT (OUTPATIENT)
Age: 82
End: 2017-10-10

## 2017-10-12 ENCOUNTER — NON-APPOINTMENT (OUTPATIENT)
Age: 82
End: 2017-10-12

## 2017-10-12 ENCOUNTER — APPOINTMENT (OUTPATIENT)
Dept: CARDIOLOGY | Facility: CLINIC | Age: 82
End: 2017-10-12
Payer: MEDICARE

## 2017-10-12 VITALS
SYSTOLIC BLOOD PRESSURE: 160 MMHG | DIASTOLIC BLOOD PRESSURE: 100 MMHG | RESPIRATION RATE: 17 BRPM | WEIGHT: 154 LBS | HEART RATE: 93 BPM | HEIGHT: 60 IN | BODY MASS INDEX: 30.23 KG/M2 | OXYGEN SATURATION: 96 %

## 2017-10-12 VITALS — SYSTOLIC BLOOD PRESSURE: 180 MMHG | DIASTOLIC BLOOD PRESSURE: 100 MMHG

## 2017-10-12 PROCEDURE — 93306 TTE W/DOPPLER COMPLETE: CPT

## 2017-10-12 PROCEDURE — 99214 OFFICE O/P EST MOD 30 MIN: CPT

## 2017-10-12 PROCEDURE — 93000 ELECTROCARDIOGRAM COMPLETE: CPT

## 2017-10-13 LAB
25(OH)D3 SERPL-MCNC: 27.9 NG/ML
ALBUMIN SERPL ELPH-MCNC: 4.1 G/DL
ALP BLD-CCNC: 101 U/L
ALT SERPL-CCNC: 31 U/L
ANION GAP SERPL CALC-SCNC: 14 MMOL/L
APPEARANCE: CLEAR
AST SERPL-CCNC: 39 U/L
BASOPHILS # BLD AUTO: 0.08 K/UL
BASOPHILS NFR BLD AUTO: 0.9 %
BILIRUB SERPL-MCNC: 0.4 MG/DL
BILIRUBIN URINE: NEGATIVE
BLOOD URINE: ABNORMAL
BUN SERPL-MCNC: 12 MG/DL
CALCIUM SERPL-MCNC: 9.2 MG/DL
CHLORIDE SERPL-SCNC: 97 MMOL/L
CHOLEST SERPL-MCNC: 159 MG/DL
CHOLEST/HDLC SERPL: 2.5 RATIO
CO2 SERPL-SCNC: 26 MMOL/L
COLOR: YELLOW
CREAT SERPL-MCNC: 0.52 MG/DL
CREAT SPEC-SCNC: 74 MG/DL
EOSINOPHIL # BLD AUTO: 0.08 K/UL
EOSINOPHIL NFR BLD AUTO: 0.9 %
FERRITIN SERPL-MCNC: 106 NG/ML
FOLATE SERPL-MCNC: 12.3 NG/ML
GLUCOSE QUALITATIVE U: NEGATIVE MG/DL
GLUCOSE SERPL-MCNC: 74 MG/DL
HBA1C MFR BLD HPLC: 6 %
HCT VFR BLD CALC: 39.7 %
HDLC SERPL-MCNC: 64 MG/DL
HGB BLD-MCNC: 12.9 G/DL
IRON SATN MFR SERPL: 21 %
IRON SERPL-MCNC: 68 UG/DL
KETONES URINE: NEGATIVE
LDLC SERPL CALC-MCNC: 67 MG/DL
LEUKOCYTE ESTERASE URINE: NEGATIVE
LYMPHOCYTES # BLD AUTO: 1.09 K/UL
LYMPHOCYTES NFR BLD AUTO: 12.1 %
MAGNESIUM SERPL-MCNC: 1.8 MG/DL
MAN DIFF?: NORMAL
MCHC RBC-ENTMCNC: 27 PG
MCHC RBC-ENTMCNC: 32.5 GM/DL
MCV RBC AUTO: 83.1 FL
MICROALBUMIN 24H UR DL<=1MG/L-MCNC: 25.2 MG/DL
MICROALBUMIN/CREAT 24H UR-RTO: 341 MG/G
MONOCYTES # BLD AUTO: 1.32 K/UL
MONOCYTES NFR BLD AUTO: 14.7 %
NEUTROPHILS # BLD AUTO: 6.41 K/UL
NEUTROPHILS NFR BLD AUTO: 71.4 %
NITRITE URINE: NEGATIVE
NT-PROBNP SERPL-MCNC: 1476 PG/ML
PH URINE: 7
PLATELET # BLD AUTO: 329 K/UL
POTASSIUM SERPL-SCNC: 3.9 MMOL/L
PROT SERPL-MCNC: 7.1 G/DL
PROTEIN URINE: 30 MG/DL
RBC # BLD: 4.78 M/UL
RBC # FLD: 15.9 %
SODIUM SERPL-SCNC: 137 MMOL/L
SPECIFIC GRAVITY URINE: 1.01
TIBC SERPL-MCNC: 317 UG/DL
TRIGL SERPL-MCNC: 142 MG/DL
TSH SERPL-ACNC: 1.86 UIU/ML
UIBC SERPL-MCNC: 249 UG/DL
URATE SERPL-MCNC: 4.2 MG/DL
UROBILINOGEN URINE: NEGATIVE MG/DL
VIT B12 SERPL-MCNC: 476 PG/ML
WBC # FLD AUTO: 8.98 K/UL

## 2017-10-17 ENCOUNTER — RX RENEWAL (OUTPATIENT)
Age: 82
End: 2017-10-17

## 2017-10-17 ENCOUNTER — LABORATORY RESULT (OUTPATIENT)
Age: 82
End: 2017-10-17

## 2017-10-18 ENCOUNTER — RX RENEWAL (OUTPATIENT)
Age: 82
End: 2017-10-18

## 2017-10-24 ENCOUNTER — APPOINTMENT (OUTPATIENT)
Dept: HEMATOLOGY ONCOLOGY | Facility: CLINIC | Age: 82
End: 2017-10-24
Payer: MEDICARE

## 2017-10-24 ENCOUNTER — LABORATORY RESULT (OUTPATIENT)
Age: 82
End: 2017-10-24

## 2017-10-24 VITALS
SYSTOLIC BLOOD PRESSURE: 144 MMHG | HEART RATE: 60 BPM | TEMPERATURE: 98.9 F | WEIGHT: 160 LBS | BODY MASS INDEX: 31.25 KG/M2 | DIASTOLIC BLOOD PRESSURE: 80 MMHG

## 2017-10-24 DIAGNOSIS — D72.810 LYMPHOCYTOPENIA: ICD-10-CM

## 2017-10-24 DIAGNOSIS — Z63.4 DISAPPEARANCE AND DEATH OF FAMILY MEMBER: ICD-10-CM

## 2017-10-24 PROCEDURE — 99205 OFFICE O/P NEW HI 60 MIN: CPT

## 2017-10-24 SDOH — SOCIAL STABILITY - SOCIAL INSECURITY: DISSAPEARANCE AND DEATH OF FAMILY MEMBER: Z63.4

## 2017-10-25 ENCOUNTER — RX RENEWAL (OUTPATIENT)
Age: 82
End: 2017-10-25

## 2017-10-26 PROBLEM — Z63.4 WIDOWED: Status: ACTIVE | Noted: 2017-10-26

## 2017-11-01 ENCOUNTER — LABORATORY RESULT (OUTPATIENT)
Age: 82
End: 2017-11-01

## 2017-11-01 ENCOUNTER — RX RENEWAL (OUTPATIENT)
Age: 82
End: 2017-11-01

## 2017-11-06 ENCOUNTER — APPOINTMENT (OUTPATIENT)
Dept: PULMONOLOGY | Facility: CLINIC | Age: 82
End: 2017-11-06
Payer: MEDICARE

## 2017-11-06 VITALS
BODY MASS INDEX: 30.63 KG/M2 | WEIGHT: 156 LBS | HEIGHT: 60 IN | OXYGEN SATURATION: 97 % | HEART RATE: 77 BPM | DIASTOLIC BLOOD PRESSURE: 80 MMHG | SYSTOLIC BLOOD PRESSURE: 130 MMHG

## 2017-11-06 PROCEDURE — 99213 OFFICE O/P EST LOW 20 MIN: CPT

## 2017-11-09 ENCOUNTER — LABORATORY RESULT (OUTPATIENT)
Age: 82
End: 2017-11-09

## 2017-11-13 ENCOUNTER — RX RENEWAL (OUTPATIENT)
Age: 82
End: 2017-11-13

## 2017-11-13 ENCOUNTER — OUTPATIENT (OUTPATIENT)
Dept: OUTPATIENT SERVICES | Facility: HOSPITAL | Age: 82
LOS: 1 days | End: 2017-11-13
Payer: MEDICARE

## 2017-11-13 ENCOUNTER — APPOINTMENT (OUTPATIENT)
Dept: ULTRASOUND IMAGING | Facility: HOSPITAL | Age: 82
End: 2017-11-13
Payer: MEDICARE

## 2017-11-13 DIAGNOSIS — N28.1 CYST OF KIDNEY, ACQUIRED: ICD-10-CM

## 2017-11-13 DIAGNOSIS — N32.89 OTHER SPECIFIED DISORDERS OF BLADDER: ICD-10-CM

## 2017-11-13 DIAGNOSIS — R80.9 PROTEINURIA, UNSPECIFIED: ICD-10-CM

## 2017-11-13 PROCEDURE — 76770 US EXAM ABDO BACK WALL COMP: CPT

## 2017-11-13 PROCEDURE — 76770 US EXAM ABDO BACK WALL COMP: CPT | Mod: 26

## 2017-11-14 ENCOUNTER — LABORATORY RESULT (OUTPATIENT)
Age: 82
End: 2017-11-14

## 2017-11-16 ENCOUNTER — RX RENEWAL (OUTPATIENT)
Age: 82
End: 2017-11-16

## 2017-11-21 ENCOUNTER — LABORATORY RESULT (OUTPATIENT)
Age: 82
End: 2017-11-21

## 2017-11-28 ENCOUNTER — LABORATORY RESULT (OUTPATIENT)
Age: 82
End: 2017-11-28

## 2017-11-28 ENCOUNTER — RX RENEWAL (OUTPATIENT)
Age: 82
End: 2017-11-28

## 2017-11-29 ENCOUNTER — RX RENEWAL (OUTPATIENT)
Age: 82
End: 2017-11-29

## 2017-12-04 ENCOUNTER — APPOINTMENT (OUTPATIENT)
Dept: FAMILY MEDICINE | Facility: CLINIC | Age: 82
End: 2017-12-04
Payer: MEDICARE

## 2017-12-04 VITALS
OXYGEN SATURATION: 98 % | HEART RATE: 68 BPM | TEMPERATURE: 98.4 F | RESPIRATION RATE: 16 BRPM | SYSTOLIC BLOOD PRESSURE: 128 MMHG | DIASTOLIC BLOOD PRESSURE: 72 MMHG

## 2017-12-04 VITALS
BODY MASS INDEX: 30.63 KG/M2 | WEIGHT: 156 LBS | SYSTOLIC BLOOD PRESSURE: 118 MMHG | HEART RATE: 51 BPM | DIASTOLIC BLOOD PRESSURE: 72 MMHG | HEIGHT: 60 IN | OXYGEN SATURATION: 90 %

## 2017-12-04 DIAGNOSIS — E55.9 VITAMIN D DEFICIENCY, UNSPECIFIED: ICD-10-CM

## 2017-12-04 PROCEDURE — G0008: CPT

## 2017-12-04 PROCEDURE — 99214 OFFICE O/P EST MOD 30 MIN: CPT | Mod: 25

## 2017-12-04 PROCEDURE — 90688 IIV4 VACCINE SPLT 0.5 ML IM: CPT

## 2017-12-04 RX ORDER — LEVOFLOXACIN 250 MG/1
250 TABLET, FILM COATED ORAL DAILY
Qty: 10 | Refills: 0 | Status: COMPLETED | COMMUNITY
Start: 2017-09-27 | End: 2017-12-04

## 2017-12-05 ENCOUNTER — LABORATORY RESULT (OUTPATIENT)
Age: 82
End: 2017-12-05

## 2017-12-07 ENCOUNTER — APPOINTMENT (OUTPATIENT)
Dept: CARDIOLOGY | Facility: CLINIC | Age: 82
End: 2017-12-07
Payer: MEDICARE

## 2017-12-07 ENCOUNTER — NON-APPOINTMENT (OUTPATIENT)
Age: 82
End: 2017-12-07

## 2017-12-07 VITALS
DIASTOLIC BLOOD PRESSURE: 83 MMHG | WEIGHT: 159 LBS | OXYGEN SATURATION: 94 % | BODY MASS INDEX: 31.22 KG/M2 | HEART RATE: 56 BPM | HEIGHT: 60 IN | SYSTOLIC BLOOD PRESSURE: 133 MMHG | RESPIRATION RATE: 17 BRPM

## 2017-12-07 DIAGNOSIS — K21.9 GASTRO-ESOPHAGEAL REFLUX DISEASE W/OUT ESOPHAGITIS: ICD-10-CM

## 2017-12-07 DIAGNOSIS — M81.0 AGE-RELATED OSTEOPOROSIS W/OUT CURRENT PATHOLOGICAL FRACTURE: ICD-10-CM

## 2017-12-07 PROCEDURE — 99215 OFFICE O/P EST HI 40 MIN: CPT | Mod: 25

## 2017-12-07 PROCEDURE — 93000 ELECTROCARDIOGRAM COMPLETE: CPT | Mod: 59

## 2017-12-07 PROCEDURE — 93224 XTRNL ECG REC UP TO 48 HRS: CPT

## 2017-12-12 ENCOUNTER — LABORATORY RESULT (OUTPATIENT)
Age: 82
End: 2017-12-12

## 2017-12-13 ENCOUNTER — RX RENEWAL (OUTPATIENT)
Age: 82
End: 2017-12-13

## 2017-12-19 ENCOUNTER — LABORATORY RESULT (OUTPATIENT)
Age: 82
End: 2017-12-19

## 2017-12-19 ENCOUNTER — NON-APPOINTMENT (OUTPATIENT)
Age: 82
End: 2017-12-19

## 2017-12-21 ENCOUNTER — RX RENEWAL (OUTPATIENT)
Age: 82
End: 2017-12-21

## 2017-12-26 ENCOUNTER — LABORATORY RESULT (OUTPATIENT)
Age: 82
End: 2017-12-26

## 2018-01-01 ENCOUNTER — RX RENEWAL (OUTPATIENT)
Age: 83
End: 2018-01-01

## 2018-01-01 ENCOUNTER — APPOINTMENT (OUTPATIENT)
Dept: FAMILY MEDICINE | Facility: CLINIC | Age: 83
End: 2018-01-01
Payer: MEDICARE

## 2018-01-01 ENCOUNTER — OUTPATIENT (OUTPATIENT)
Dept: OUTPATIENT SERVICES | Facility: HOSPITAL | Age: 83
LOS: 1 days | End: 2018-01-01
Payer: MEDICARE

## 2018-01-01 ENCOUNTER — FORM ENCOUNTER (OUTPATIENT)
Age: 83
End: 2018-01-01

## 2018-01-01 ENCOUNTER — APPOINTMENT (OUTPATIENT)
Dept: HEMATOLOGY ONCOLOGY | Facility: CLINIC | Age: 83
End: 2018-01-01

## 2018-01-01 ENCOUNTER — APPOINTMENT (OUTPATIENT)
Dept: SURGERY | Facility: CLINIC | Age: 83
End: 2018-01-01
Payer: MEDICARE

## 2018-01-01 ENCOUNTER — APPOINTMENT (OUTPATIENT)
Dept: CARDIOLOGY | Facility: CLINIC | Age: 83
End: 2018-01-01
Payer: MEDICARE

## 2018-01-01 ENCOUNTER — LABORATORY RESULT (OUTPATIENT)
Age: 83
End: 2018-01-01

## 2018-01-01 ENCOUNTER — MEDICATION RENEWAL (OUTPATIENT)
Age: 83
End: 2018-01-01

## 2018-01-01 ENCOUNTER — APPOINTMENT (OUTPATIENT)
Dept: CT IMAGING | Facility: HOSPITAL | Age: 83
End: 2018-01-01
Payer: MEDICARE

## 2018-01-01 ENCOUNTER — APPOINTMENT (OUTPATIENT)
Dept: HEMATOLOGY ONCOLOGY | Facility: CLINIC | Age: 83
End: 2018-01-01
Payer: MEDICARE

## 2018-01-01 ENCOUNTER — APPOINTMENT (OUTPATIENT)
Dept: SURGERY | Facility: CLINIC | Age: 83
End: 2018-01-01
Payer: MEDICAID

## 2018-01-01 ENCOUNTER — TRANSCRIPTION ENCOUNTER (OUTPATIENT)
Age: 83
End: 2018-01-01

## 2018-01-01 ENCOUNTER — APPOINTMENT (OUTPATIENT)
Dept: PULMONOLOGY | Facility: CLINIC | Age: 83
End: 2018-01-01
Payer: MEDICARE

## 2018-01-01 VITALS
SYSTOLIC BLOOD PRESSURE: 171 MMHG | HEART RATE: 71 BPM | OXYGEN SATURATION: 94 % | RESPIRATION RATE: 14 BRPM | DIASTOLIC BLOOD PRESSURE: 85 MMHG | HEIGHT: 61.25 IN | WEIGHT: 165.35 LBS | TEMPERATURE: 98 F

## 2018-01-01 VITALS
OXYGEN SATURATION: 95 % | WEIGHT: 158 LBS | BODY MASS INDEX: 31.02 KG/M2 | HEIGHT: 60 IN | DIASTOLIC BLOOD PRESSURE: 111 MMHG | SYSTOLIC BLOOD PRESSURE: 163 MMHG | TEMPERATURE: 97.7 F | HEART RATE: 68 BPM | RESPIRATION RATE: 16 BRPM

## 2018-01-01 VITALS
SYSTOLIC BLOOD PRESSURE: 128 MMHG | WEIGHT: 154 LBS | DIASTOLIC BLOOD PRESSURE: 64 MMHG | BODY MASS INDEX: 30.08 KG/M2 | RESPIRATION RATE: 16 BRPM | TEMPERATURE: 98 F | HEART RATE: 64 BPM

## 2018-01-01 VITALS
HEIGHT: 60 IN | BODY MASS INDEX: 30.23 KG/M2 | DIASTOLIC BLOOD PRESSURE: 80 MMHG | SYSTOLIC BLOOD PRESSURE: 130 MMHG | HEART RATE: 75 BPM | WEIGHT: 154 LBS | OXYGEN SATURATION: 96 % | TEMPERATURE: 98.3 F

## 2018-01-01 VITALS
TEMPERATURE: 98.2 F | RESPIRATION RATE: 16 BRPM | OXYGEN SATURATION: 97 % | DIASTOLIC BLOOD PRESSURE: 80 MMHG | HEIGHT: 63 IN | BODY MASS INDEX: 29.77 KG/M2 | WEIGHT: 168 LBS | HEART RATE: 72 BPM | SYSTOLIC BLOOD PRESSURE: 142 MMHG

## 2018-01-01 VITALS
RESPIRATION RATE: 17 BRPM | WEIGHT: 168 LBS | OXYGEN SATURATION: 96 % | SYSTOLIC BLOOD PRESSURE: 154 MMHG | HEIGHT: 63 IN | DIASTOLIC BLOOD PRESSURE: 100 MMHG | BODY MASS INDEX: 29.77 KG/M2 | HEART RATE: 70 BPM

## 2018-01-01 VITALS
RESPIRATION RATE: 16 BRPM | SYSTOLIC BLOOD PRESSURE: 141 MMHG | OXYGEN SATURATION: 98 % | TEMPERATURE: 97 F | HEART RATE: 60 BPM | DIASTOLIC BLOOD PRESSURE: 81 MMHG

## 2018-01-01 VITALS — RESPIRATION RATE: 16 BRPM

## 2018-01-01 VITALS
TEMPERATURE: 99 F | OXYGEN SATURATION: 98 % | HEIGHT: 61 IN | RESPIRATION RATE: 16 BRPM | DIASTOLIC BLOOD PRESSURE: 74 MMHG | HEART RATE: 66 BPM | WEIGHT: 165.35 LBS | SYSTOLIC BLOOD PRESSURE: 159 MMHG

## 2018-01-01 VITALS
SYSTOLIC BLOOD PRESSURE: 132 MMHG | HEIGHT: 60 IN | WEIGHT: 154 LBS | DIASTOLIC BLOOD PRESSURE: 82 MMHG | TEMPERATURE: 98.1 F | OXYGEN SATURATION: 96 % | HEART RATE: 61 BPM | BODY MASS INDEX: 30.23 KG/M2

## 2018-01-01 VITALS — WEIGHT: 154 LBS | BODY MASS INDEX: 27.29 KG/M2 | HEIGHT: 63 IN

## 2018-01-01 VITALS — WEIGHT: 165.35 LBS | HEIGHT: 61 IN

## 2018-01-01 DIAGNOSIS — Z96.642 PRESENCE OF LEFT ARTIFICIAL HIP JOINT: Chronic | ICD-10-CM

## 2018-01-01 DIAGNOSIS — Z01.818 ENCOUNTER FOR OTHER PREPROCEDURAL EXAMINATION: ICD-10-CM

## 2018-01-01 DIAGNOSIS — K40.90 UNILATERAL INGUINAL HERNIA, WITHOUT OBSTRUCTION OR GANGRENE, NOT SPECIFIED AS RECURRENT: ICD-10-CM

## 2018-01-01 DIAGNOSIS — R31.29 OTHER MICROSCOPIC HEMATURIA: ICD-10-CM

## 2018-01-01 DIAGNOSIS — Z96.653 PRESENCE OF ARTIFICIAL KNEE JOINT, BILATERAL: Chronic | ICD-10-CM

## 2018-01-01 DIAGNOSIS — R10.9 UNSPECIFIED ABDOMINAL PAIN: ICD-10-CM

## 2018-01-01 DIAGNOSIS — J45.21 MILD INTERMITTENT ASTHMA WITH (ACUTE) EXACERBATION: ICD-10-CM

## 2018-01-01 LAB
ALBUMIN SERPL ELPH-MCNC: 4.2 G/DL
ALP BLD-CCNC: 87 U/L
ALP BLD-CCNC: 95 U/L
ALP BLD-CCNC: 99 U/L
ALT SERPL-CCNC: 16 U/L
ALT SERPL-CCNC: 18 U/L
ALT SERPL-CCNC: 25 U/L
AMYLASE/CREAT SERPL: 41 U/L
ANION GAP SERPL CALC-SCNC: 13 MMOL/L
ANION GAP SERPL CALC-SCNC: 14 MMOL/L
ANION GAP SERPL CALC-SCNC: 15 MMOL/L
APPEARANCE: CLEAR
AST SERPL-CCNC: 23 U/L
AST SERPL-CCNC: 26 U/L
AST SERPL-CCNC: 32 U/L
BACTERIA UR CULT: NORMAL
BACTERIA: NEGATIVE
BASOPHILS # BLD AUTO: 0.04 K/UL
BASOPHILS # BLD AUTO: 0.05 K/UL
BASOPHILS # BLD AUTO: 0.06 K/UL
BASOPHILS NFR BLD AUTO: 0.5 %
BASOPHILS NFR BLD AUTO: 0.5 %
BASOPHILS NFR BLD AUTO: 0.6 %
BILIRUB SERPL-MCNC: 0.4 MG/DL
BILIRUB SERPL-MCNC: 0.6 MG/DL
BILIRUB SERPL-MCNC: 0.7 MG/DL
BILIRUBIN URINE: NEGATIVE
BLOOD URINE: ABNORMAL
BUN SERPL-MCNC: 10 MG/DL
BUN SERPL-MCNC: 9 MG/DL
BUN SERPL-MCNC: 9 MG/DL
CALCIUM SERPL-MCNC: 9.1 MG/DL
CALCIUM SERPL-MCNC: 9.2 MG/DL
CALCIUM SERPL-MCNC: 9.5 MG/DL
CHLORIDE SERPL-SCNC: 95 MMOL/L
CHLORIDE SERPL-SCNC: 96 MMOL/L
CHLORIDE SERPL-SCNC: 97 MMOL/L
CHOLEST SERPL-MCNC: 146 MG/DL
CHOLEST/HDLC SERPL: 2.1 RATIO
CO2 SERPL-SCNC: 28 MMOL/L
COLOR: YELLOW
CREAT SERPL-MCNC: 0.55 MG/DL
CREAT SERPL-MCNC: 0.55 MG/DL
CREAT SERPL-MCNC: 0.61 MG/DL
EOSINOPHIL # BLD AUTO: 0.1 K/UL
EOSINOPHIL # BLD AUTO: 0.14 K/UL
EOSINOPHIL # BLD AUTO: 0.14 K/UL
EOSINOPHIL NFR BLD AUTO: 1.1 %
EOSINOPHIL NFR BLD AUTO: 1.4 %
EOSINOPHIL NFR BLD AUTO: 1.4 %
GLUCOSE QUALITATIVE U: NEGATIVE MG/DL
GLUCOSE SERPL-MCNC: 111 MG/DL
GLUCOSE SERPL-MCNC: 165 MG/DL
GLUCOSE SERPL-MCNC: 96 MG/DL
HCT VFR BLD CALC: 39.2 %
HCT VFR BLD CALC: 39.6 %
HCT VFR BLD CALC: 42.3 %
HDLC SERPL-MCNC: 68 MG/DL
HGB BLD-MCNC: 12.1 G/DL
HGB BLD-MCNC: 12.2 G/DL
HGB BLD-MCNC: 13.4 G/DL
HYALINE CASTS: 0 /LPF
IMM GRANULOCYTES NFR BLD AUTO: 0.2 %
IMM GRANULOCYTES NFR BLD AUTO: 0.3 %
IMM GRANULOCYTES NFR BLD AUTO: 0.3 %
INR BLD: 1.48 RATIO — HIGH (ref 0.88–1.16)
INR BLD: 2.71 RATIO — HIGH (ref 0.88–1.16)
KETONES URINE: NEGATIVE
LDLC SERPL CALC-MCNC: 58 MG/DL
LEUKOCYTE ESTERASE URINE: NEGATIVE
LPL SERPL-CCNC: 22 U/L
LYMPHOCYTES # BLD AUTO: 0.9 K/UL
LYMPHOCYTES # BLD AUTO: 0.95 K/UL
LYMPHOCYTES # BLD AUTO: 1.62 K/UL
LYMPHOCYTES NFR BLD AUTO: 10.9 %
LYMPHOCYTES NFR BLD AUTO: 16 %
LYMPHOCYTES NFR BLD AUTO: 8.7 %
MAN DIFF?: NORMAL
MCHC RBC-ENTMCNC: 25.9 PG
MCHC RBC-ENTMCNC: 26.1 PG
MCHC RBC-ENTMCNC: 26.1 PG
MCHC RBC-ENTMCNC: 30.8 GM/DL
MCHC RBC-ENTMCNC: 30.9 GM/DL
MCHC RBC-ENTMCNC: 31.7 GM/DL
MCV RBC AUTO: 81.8 FL
MCV RBC AUTO: 84.5 FL
MCV RBC AUTO: 84.6 FL
MICROSCOPIC-UA: NORMAL
MONOCYTES # BLD AUTO: 1.6 K/UL
MONOCYTES # BLD AUTO: 1.7 K/UL
MONOCYTES # BLD AUTO: 1.9 K/UL
MONOCYTES NFR BLD AUTO: 16.8 %
MONOCYTES NFR BLD AUTO: 18.3 %
MONOCYTES NFR BLD AUTO: 18.4 %
NEUTROPHILS # BLD AUTO: 6.03 K/UL
NEUTROPHILS # BLD AUTO: 6.57 K/UL
NEUTROPHILS # BLD AUTO: 7.29 K/UL
NEUTROPHILS NFR BLD AUTO: 65 %
NEUTROPHILS NFR BLD AUTO: 68.9 %
NEUTROPHILS NFR BLD AUTO: 70.7 %
NITRITE URINE: NEGATIVE
PH URINE: 8
PLATELET # BLD AUTO: 259 K/UL
PLATELET # BLD AUTO: 269 K/UL
PLATELET # BLD AUTO: 317 K/UL
POTASSIUM SERPL-SCNC: 3.7 MMOL/L
POTASSIUM SERPL-SCNC: 4 MMOL/L
POTASSIUM SERPL-SCNC: 4 MMOL/L
PROT SERPL-MCNC: 7.1 G/DL
PROT SERPL-MCNC: 7.1 G/DL
PROT SERPL-MCNC: 7.5 G/DL
PROTEIN URINE: 30 MG/DL
PROTHROM AB SERPL-ACNC: 16.8 SEC — HIGH (ref 10–12.9)
PROTHROM AB SERPL-ACNC: 31.3 SEC — HIGH (ref 10–12.9)
RBC # BLD: 4.64 M/UL
RBC # BLD: 4.68 M/UL
RBC # BLD: 5.17 M/UL
RBC # FLD: 15.6 %
RBC # FLD: 16.1 %
RBC # FLD: 16.5 %
RED BLOOD CELLS URINE: 6 /HPF
SODIUM SERPL-SCNC: 136 MMOL/L
SODIUM SERPL-SCNC: 139 MMOL/L
SODIUM SERPL-SCNC: 139 MMOL/L
SPECIFIC GRAVITY URINE: 1.03
SQUAMOUS EPITHELIAL CELLS: 1 /HPF
TRIGL SERPL-MCNC: 100 MG/DL
UROBILINOGEN URINE: NEGATIVE MG/DL
WBC # FLD AUTO: 10.11 K/UL
WBC # FLD AUTO: 10.31 K/UL
WBC # FLD AUTO: 8.75 K/UL
WHITE BLOOD CELLS URINE: 1 /HPF

## 2018-01-01 PROCEDURE — 90670 PCV13 VACCINE IM: CPT

## 2018-01-01 PROCEDURE — 99214 OFFICE O/P EST MOD 30 MIN: CPT

## 2018-01-01 PROCEDURE — 85610 PROTHROMBIN TIME: CPT

## 2018-01-01 PROCEDURE — G0463: CPT

## 2018-01-01 PROCEDURE — 49507 PRP I/HERN INIT BLOCK >5 YR: CPT | Mod: RT

## 2018-01-01 PROCEDURE — 99203 OFFICE O/P NEW LOW 30 MIN: CPT

## 2018-01-01 PROCEDURE — 99215 OFFICE O/P EST HI 40 MIN: CPT | Mod: 25

## 2018-01-01 PROCEDURE — G0008: CPT

## 2018-01-01 PROCEDURE — 74177 CT ABD & PELVIS W/CONTRAST: CPT

## 2018-01-01 PROCEDURE — 36415 COLL VENOUS BLD VENIPUNCTURE: CPT

## 2018-01-01 PROCEDURE — 90686 IIV4 VACC NO PRSV 0.5 ML IM: CPT

## 2018-01-01 PROCEDURE — 93000 ELECTROCARDIOGRAM COMPLETE: CPT

## 2018-01-01 PROCEDURE — 99215 OFFICE O/P EST HI 40 MIN: CPT

## 2018-01-01 PROCEDURE — C1781: CPT

## 2018-01-01 PROCEDURE — 99214 OFFICE O/P EST MOD 30 MIN: CPT | Mod: 25

## 2018-01-01 PROCEDURE — G0009: CPT

## 2018-01-01 PROCEDURE — 74177 CT ABD & PELVIS W/CONTRAST: CPT | Mod: 26

## 2018-01-01 PROCEDURE — 49505 PRP I/HERN INIT REDUC >5 YR: CPT | Mod: AS

## 2018-01-01 PROCEDURE — 49505 PRP I/HERN INIT REDUC >5 YR: CPT

## 2018-01-01 PROCEDURE — 99024 POSTOP FOLLOW-UP VISIT: CPT

## 2018-01-01 RX ORDER — OXYCODONE 5 MG/1
5 TABLET ORAL
Qty: 60 | Refills: 0 | Status: DISCONTINUED | COMMUNITY
Start: 2017-02-22 | End: 2018-01-01

## 2018-01-01 RX ORDER — OMEPRAZOLE 40 MG/1
40 CAPSULE, DELAYED RELEASE ORAL
Qty: 30 | Refills: 3 | Status: DISCONTINUED | COMMUNITY
Start: 2018-07-16 | End: 2018-01-01

## 2018-01-01 RX ORDER — SODIUM CHLORIDE 9 MG/ML
1000 INJECTION, SOLUTION INTRAVENOUS
Qty: 0 | Refills: 0 | Status: DISCONTINUED | OUTPATIENT
Start: 2018-01-01 | End: 2018-01-01

## 2018-01-01 RX ORDER — HYDROMORPHONE HYDROCHLORIDE 2 MG/ML
0.2 INJECTION INTRAMUSCULAR; INTRAVENOUS; SUBCUTANEOUS
Qty: 0 | Refills: 0 | Status: DISCONTINUED | OUTPATIENT
Start: 2018-01-01 | End: 2018-01-01

## 2018-01-01 RX ORDER — MONTELUKAST 4 MG/1
0.5 TABLET, CHEWABLE ORAL
Qty: 0 | Refills: 0 | COMMUNITY

## 2018-01-01 RX ORDER — ONDANSETRON 8 MG/1
4 TABLET, FILM COATED ORAL ONCE
Qty: 0 | Refills: 0 | Status: DISCONTINUED | OUTPATIENT
Start: 2018-01-01 | End: 2018-01-01

## 2018-01-01 RX ORDER — TRAMADOL HYDROCHLORIDE 50 MG/1
50 TABLET ORAL EVERY 4 HOURS
Qty: 0 | Refills: 0 | Status: DISCONTINUED | OUTPATIENT
Start: 2018-01-01 | End: 2018-01-01

## 2018-01-01 RX ORDER — ASPIRIN/CALCIUM CARB/MAGNESIUM 324 MG
1 TABLET ORAL
Qty: 0 | Refills: 0 | COMMUNITY

## 2018-01-01 RX ORDER — TRAMADOL HYDROCHLORIDE 50 MG/1
1 TABLET ORAL
Qty: 15 | Refills: 0
Start: 2018-01-01

## 2018-01-01 RX ORDER — ACETAMINOPHEN 500 MG
650 TABLET ORAL EVERY 6 HOURS
Qty: 0 | Refills: 0 | Status: DISCONTINUED | OUTPATIENT
Start: 2018-01-01 | End: 2018-01-01

## 2018-01-01 RX ADMIN — SODIUM CHLORIDE 50 MILLILITER(S): 9 INJECTION, SOLUTION INTRAVENOUS at 07:07

## 2018-01-01 RX ADMIN — TRAMADOL HYDROCHLORIDE 50 MILLIGRAM(S): 50 TABLET ORAL at 12:31

## 2018-01-01 RX ADMIN — TRAMADOL HYDROCHLORIDE 50 MILLIGRAM(S): 50 TABLET ORAL at 13:01

## 2018-01-02 ENCOUNTER — LABORATORY RESULT (OUTPATIENT)
Age: 83
End: 2018-01-02

## 2018-01-09 ENCOUNTER — LABORATORY RESULT (OUTPATIENT)
Age: 83
End: 2018-01-09

## 2018-01-11 ENCOUNTER — RX RENEWAL (OUTPATIENT)
Age: 83
End: 2018-01-11

## 2018-01-16 ENCOUNTER — LABORATORY RESULT (OUTPATIENT)
Age: 83
End: 2018-01-16

## 2018-01-16 ENCOUNTER — RX RENEWAL (OUTPATIENT)
Age: 83
End: 2018-01-16

## 2018-01-23 ENCOUNTER — LABORATORY RESULT (OUTPATIENT)
Age: 83
End: 2018-01-23

## 2018-02-01 ENCOUNTER — LABORATORY RESULT (OUTPATIENT)
Age: 83
End: 2018-02-01

## 2018-02-07 ENCOUNTER — LABORATORY RESULT (OUTPATIENT)
Age: 83
End: 2018-02-07

## 2018-02-12 ENCOUNTER — APPOINTMENT (OUTPATIENT)
Dept: CARDIOLOGY | Facility: CLINIC | Age: 83
End: 2018-02-12
Payer: MEDICARE

## 2018-02-12 ENCOUNTER — MEDICATION RENEWAL (OUTPATIENT)
Age: 83
End: 2018-02-12

## 2018-02-12 ENCOUNTER — NON-APPOINTMENT (OUTPATIENT)
Age: 83
End: 2018-02-12

## 2018-02-12 VITALS
DIASTOLIC BLOOD PRESSURE: 100 MMHG | OXYGEN SATURATION: 94 % | HEART RATE: 83 BPM | HEIGHT: 60 IN | RESPIRATION RATE: 17 BRPM | BODY MASS INDEX: 30.82 KG/M2 | SYSTOLIC BLOOD PRESSURE: 164 MMHG | WEIGHT: 157 LBS

## 2018-02-12 PROCEDURE — 93000 ELECTROCARDIOGRAM COMPLETE: CPT

## 2018-02-12 PROCEDURE — 99214 OFFICE O/P EST MOD 30 MIN: CPT

## 2018-02-13 ENCOUNTER — LABORATORY RESULT (OUTPATIENT)
Age: 83
End: 2018-02-13

## 2018-02-14 ENCOUNTER — RX RENEWAL (OUTPATIENT)
Age: 83
End: 2018-02-14

## 2018-02-20 ENCOUNTER — LABORATORY RESULT (OUTPATIENT)
Age: 83
End: 2018-02-20

## 2018-02-20 ENCOUNTER — RX RENEWAL (OUTPATIENT)
Age: 83
End: 2018-02-20

## 2018-02-21 ENCOUNTER — APPOINTMENT (OUTPATIENT)
Dept: PULMONOLOGY | Facility: CLINIC | Age: 83
End: 2018-02-21
Payer: MEDICARE

## 2018-02-21 VITALS
DIASTOLIC BLOOD PRESSURE: 92 MMHG | TEMPERATURE: 99 F | OXYGEN SATURATION: 97 % | BODY MASS INDEX: 29.45 KG/M2 | WEIGHT: 150 LBS | HEART RATE: 70 BPM | SYSTOLIC BLOOD PRESSURE: 136 MMHG | HEIGHT: 60 IN

## 2018-02-21 PROCEDURE — 99213 OFFICE O/P EST LOW 20 MIN: CPT

## 2018-02-22 ENCOUNTER — RX RENEWAL (OUTPATIENT)
Age: 83
End: 2018-02-22

## 2018-02-23 ENCOUNTER — RX RENEWAL (OUTPATIENT)
Age: 83
End: 2018-02-23

## 2018-02-27 ENCOUNTER — LABORATORY RESULT (OUTPATIENT)
Age: 83
End: 2018-02-27

## 2018-03-05 ENCOUNTER — RX RENEWAL (OUTPATIENT)
Age: 83
End: 2018-03-05

## 2018-03-05 ENCOUNTER — APPOINTMENT (OUTPATIENT)
Dept: FAMILY MEDICINE | Facility: CLINIC | Age: 83
End: 2018-03-05
Payer: MEDICARE

## 2018-03-05 VITALS
BODY MASS INDEX: 29.45 KG/M2 | SYSTOLIC BLOOD PRESSURE: 132 MMHG | WEIGHT: 150 LBS | HEART RATE: 65 BPM | DIASTOLIC BLOOD PRESSURE: 80 MMHG | TEMPERATURE: 98.2 F | HEIGHT: 60 IN | OXYGEN SATURATION: 90 %

## 2018-03-05 VITALS — RESPIRATION RATE: 16 BRPM

## 2018-03-05 PROCEDURE — 99213 OFFICE O/P EST LOW 20 MIN: CPT

## 2018-03-06 ENCOUNTER — LABORATORY RESULT (OUTPATIENT)
Age: 83
End: 2018-03-06

## 2018-03-12 ENCOUNTER — OUTPATIENT (OUTPATIENT)
Dept: OUTPATIENT SERVICES | Facility: HOSPITAL | Age: 83
LOS: 1 days | End: 2018-03-12
Payer: MEDICARE

## 2018-03-12 DIAGNOSIS — J45.909 UNSPECIFIED ASTHMA, UNCOMPLICATED: ICD-10-CM

## 2018-03-12 PROCEDURE — 94729 DIFFUSING CAPACITY: CPT

## 2018-03-12 PROCEDURE — 94060 EVALUATION OF WHEEZING: CPT | Mod: 26

## 2018-03-12 PROCEDURE — 94726 PLETHYSMOGRAPHY LUNG VOLUMES: CPT

## 2018-03-12 PROCEDURE — 94729 DIFFUSING CAPACITY: CPT | Mod: 26

## 2018-03-12 PROCEDURE — 94726 PLETHYSMOGRAPHY LUNG VOLUMES: CPT | Mod: 26

## 2018-03-12 PROCEDURE — 94060 EVALUATION OF WHEEZING: CPT

## 2018-03-13 ENCOUNTER — LABORATORY RESULT (OUTPATIENT)
Age: 83
End: 2018-03-13

## 2018-03-14 ENCOUNTER — NON-APPOINTMENT (OUTPATIENT)
Age: 83
End: 2018-03-14

## 2018-03-14 ENCOUNTER — APPOINTMENT (OUTPATIENT)
Dept: CARDIOLOGY | Facility: CLINIC | Age: 83
End: 2018-03-14
Payer: MEDICARE

## 2018-03-14 VITALS
DIASTOLIC BLOOD PRESSURE: 99 MMHG | HEIGHT: 60 IN | OXYGEN SATURATION: 98 % | HEART RATE: 72 BPM | SYSTOLIC BLOOD PRESSURE: 159 MMHG | BODY MASS INDEX: 30.43 KG/M2 | RESPIRATION RATE: 17 BRPM | WEIGHT: 155 LBS

## 2018-03-14 VITALS — DIASTOLIC BLOOD PRESSURE: 110 MMHG | SYSTOLIC BLOOD PRESSURE: 180 MMHG

## 2018-03-14 PROCEDURE — 99215 OFFICE O/P EST HI 40 MIN: CPT

## 2018-03-14 PROCEDURE — 93000 ELECTROCARDIOGRAM COMPLETE: CPT

## 2018-03-16 ENCOUNTER — MED ADMIN CHARGE (OUTPATIENT)
Age: 83
End: 2018-03-16

## 2018-03-16 ENCOUNTER — CLINICAL ADVICE (OUTPATIENT)
Age: 83
End: 2018-03-16

## 2018-03-16 ENCOUNTER — APPOINTMENT (OUTPATIENT)
Dept: CARDIOLOGY | Facility: CLINIC | Age: 83
End: 2018-03-16
Payer: MEDICARE

## 2018-03-16 VITALS — DIASTOLIC BLOOD PRESSURE: 78 MMHG | SYSTOLIC BLOOD PRESSURE: 138 MMHG | OXYGEN SATURATION: 98 % | HEART RATE: 68 BPM

## 2018-03-16 PROCEDURE — 99211 OFF/OP EST MAY X REQ PHY/QHP: CPT

## 2018-03-28 ENCOUNTER — APPOINTMENT (OUTPATIENT)
Dept: PULMONOLOGY | Facility: CLINIC | Age: 83
End: 2018-03-28
Payer: MEDICARE

## 2018-03-28 ENCOUNTER — LABORATORY RESULT (OUTPATIENT)
Age: 83
End: 2018-03-28

## 2018-03-28 VITALS
HEIGHT: 60 IN | SYSTOLIC BLOOD PRESSURE: 130 MMHG | BODY MASS INDEX: 30.43 KG/M2 | HEART RATE: 72 BPM | TEMPERATURE: 98.1 F | DIASTOLIC BLOOD PRESSURE: 80 MMHG | OXYGEN SATURATION: 98 % | WEIGHT: 155 LBS

## 2018-03-28 DIAGNOSIS — R07.9 CHEST PAIN, UNSPECIFIED: ICD-10-CM

## 2018-03-28 PROCEDURE — 99213 OFFICE O/P EST LOW 20 MIN: CPT

## 2018-03-28 RX ORDER — CALCIPOTRIENE 50 UG/G
0.01 CREAM TOPICAL
Qty: 270 | Refills: 0 | Status: DISCONTINUED | COMMUNITY
Start: 2018-01-10 | End: 2018-03-28

## 2018-03-28 RX ORDER — OXYCODONE AND ACETAMINOPHEN 5; 325 MG/1; MG/1
5-325 TABLET ORAL
Refills: 0 | Status: DISCONTINUED | COMMUNITY
End: 2018-03-28

## 2018-03-28 RX ORDER — CHROMIUM 200 MCG
1000 TABLET ORAL DAILY
Qty: 30 | Refills: 3 | Status: DISCONTINUED | COMMUNITY
Start: 2017-10-17 | End: 2018-03-28

## 2018-03-28 RX ORDER — FUROSEMIDE 20 MG/1
20 TABLET ORAL DAILY
Qty: 5 | Refills: 0 | Status: DISCONTINUED | COMMUNITY
Start: 2017-09-29 | End: 2018-03-28

## 2018-03-28 RX ORDER — WHEAT DEXTRIN 3 G/4 G
POWDER (GRAM) ORAL DAILY
Qty: 1 | Refills: 0 | Status: DISCONTINUED | COMMUNITY
Start: 2017-02-22 | End: 2018-03-28

## 2018-03-28 RX ORDER — MOMETASONE 50 UG/1
50 SPRAY, METERED NASAL
Qty: 1 | Refills: 2 | Status: DISCONTINUED | COMMUNITY
Start: 2017-10-11 | End: 2018-03-28

## 2018-03-28 RX ORDER — PROMETHAZINE HYDROCHLORIDE AND DEXTROMETHORPHAN HYDROBROMIDE ORAL SOLUTION 15; 6.25 MG/5ML; MG/5ML
6.25-15 SOLUTION ORAL
Qty: 50 | Refills: 0 | Status: DISCONTINUED | COMMUNITY
Start: 2017-09-27 | End: 2018-03-28

## 2018-03-28 RX ORDER — OLOPATADINE HYDROCHLORIDE 2 MG/ML
0.2 SOLUTION OPHTHALMIC
Qty: 2 | Refills: 0 | Status: DISCONTINUED | COMMUNITY
Start: 2017-11-08 | End: 2018-03-28

## 2018-03-28 RX ORDER — BUDESONIDE 0.5 MG/2ML
0.5 INHALANT ORAL TWICE DAILY
Qty: 1 | Refills: 0 | Status: DISCONTINUED | COMMUNITY
Start: 2017-09-27 | End: 2018-03-28

## 2018-04-04 ENCOUNTER — LABORATORY RESULT (OUTPATIENT)
Age: 83
End: 2018-04-04

## 2018-04-05 ENCOUNTER — EMERGENCY (EMERGENCY)
Facility: HOSPITAL | Age: 83
LOS: 1 days | Discharge: HOME | End: 2018-04-05
Admitting: EMERGENCY MEDICINE
Payer: MEDICARE

## 2018-04-05 DIAGNOSIS — S09.90XA UNSPECIFIED INJURY OF HEAD, INITIAL ENCOUNTER: ICD-10-CM

## 2018-04-05 DIAGNOSIS — I48.91 UNSPECIFIED ATRIAL FIBRILLATION: ICD-10-CM
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
## 2018-04-05 DIAGNOSIS — Z79.899 OTHER LONG TERM (CURRENT) DRUG THERAPY: ICD-10-CM

## 2018-04-05 DIAGNOSIS — Z79.01 LONG TERM (CURRENT) USE OF ANTICOAGULANTS: ICD-10-CM
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
## 2018-04-05 DIAGNOSIS — Z88.0 ALLERGY STATUS TO PENICILLIN: ICD-10-CM

## 2018-04-05 DIAGNOSIS — R51 HEADACHE: ICD-10-CM

## 2018-04-05 DIAGNOSIS — S00.83XA CONTUSION OF OTHER PART OF HEAD, INITIAL ENCOUNTER: ICD-10-CM

## 2018-04-05 DIAGNOSIS — I10 ESSENTIAL (PRIMARY) HYPERTENSION: ICD-10-CM
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
## 2018-04-05 DIAGNOSIS — E78.00 PURE HYPERCHOLESTEROLEMIA, UNSPECIFIED: ICD-10-CM

## 2018-04-05 PROCEDURE — 99284 EMERGENCY DEPT VISIT MOD MDM: CPT

## 2018-04-05 PROCEDURE — 70450 CT HEAD/BRAIN W/O DYE: CPT | Mod: 26

## 2018-04-05 PROCEDURE — 99284 EMERGENCY DEPT VISIT MOD MDM: CPT | Mod: 25

## 2018-04-05 PROCEDURE — 70450 CT HEAD/BRAIN W/O DYE: CPT

## 2018-04-10 ENCOUNTER — LABORATORY RESULT (OUTPATIENT)
Age: 83
End: 2018-04-10

## 2018-04-11 ENCOUNTER — NON-APPOINTMENT (OUTPATIENT)
Age: 83
End: 2018-04-11

## 2018-04-11 ENCOUNTER — APPOINTMENT (OUTPATIENT)
Dept: CARDIOLOGY | Facility: CLINIC | Age: 83
End: 2018-04-11
Payer: MEDICARE

## 2018-04-11 ENCOUNTER — RX RENEWAL (OUTPATIENT)
Age: 83
End: 2018-04-11

## 2018-04-11 VITALS
WEIGHT: 159 LBS | RESPIRATION RATE: 16 BRPM | DIASTOLIC BLOOD PRESSURE: 87 MMHG | SYSTOLIC BLOOD PRESSURE: 136 MMHG | BODY MASS INDEX: 31.22 KG/M2 | HEART RATE: 67 BPM | OXYGEN SATURATION: 97 % | HEIGHT: 60 IN

## 2018-04-11 PROCEDURE — 99215 OFFICE O/P EST HI 40 MIN: CPT

## 2018-04-11 PROCEDURE — 93000 ELECTROCARDIOGRAM COMPLETE: CPT

## 2018-04-16 ENCOUNTER — LABORATORY RESULT (OUTPATIENT)
Age: 83
End: 2018-04-16

## 2018-04-16 ENCOUNTER — OUTPATIENT (OUTPATIENT)
Dept: OUTPATIENT SERVICES | Facility: HOSPITAL | Age: 83
LOS: 1 days | End: 2018-04-16
Payer: MEDICARE

## 2018-04-16 ENCOUNTER — APPOINTMENT (OUTPATIENT)
Dept: MRI IMAGING | Facility: HOSPITAL | Age: 83
End: 2018-04-16
Payer: MEDICARE

## 2018-04-16 DIAGNOSIS — M79.81 NONTRAUMATIC HEMATOMA OF SOFT TISSUE: ICD-10-CM

## 2018-04-16 DIAGNOSIS — R22.1 LOCALIZED SWELLING, MASS AND LUMP, NECK: ICD-10-CM

## 2018-04-16 DIAGNOSIS — Z00.8 ENCOUNTER FOR OTHER GENERAL EXAMINATION: ICD-10-CM

## 2018-04-16 PROCEDURE — A9579: CPT

## 2018-04-16 PROCEDURE — 70543 MRI ORBT/FAC/NCK W/O &W/DYE: CPT

## 2018-04-16 PROCEDURE — 70543 MRI ORBT/FAC/NCK W/O &W/DYE: CPT | Mod: 26

## 2018-04-17 ENCOUNTER — RX RENEWAL (OUTPATIENT)
Age: 83
End: 2018-04-17

## 2018-04-17 LAB
INR PPP: 1.46 RATIO
PT BLD: 16.6 SEC

## 2018-04-18 ENCOUNTER — RX RENEWAL (OUTPATIENT)
Age: 83
End: 2018-04-18

## 2018-04-24 ENCOUNTER — APPOINTMENT (OUTPATIENT)
Dept: HEMATOLOGY ONCOLOGY | Facility: CLINIC | Age: 83
End: 2018-04-24
Payer: MEDICARE

## 2018-04-24 VITALS
SYSTOLIC BLOOD PRESSURE: 136 MMHG | DIASTOLIC BLOOD PRESSURE: 80 MMHG | WEIGHT: 159 LBS | TEMPERATURE: 98.1 F | BODY MASS INDEX: 31.05 KG/M2 | HEART RATE: 60 BPM

## 2018-04-24 PROCEDURE — 99214 OFFICE O/P EST MOD 30 MIN: CPT

## 2018-04-26 ENCOUNTER — LABORATORY RESULT (OUTPATIENT)
Age: 83
End: 2018-04-26

## 2018-05-01 ENCOUNTER — LABORATORY RESULT (OUTPATIENT)
Age: 83
End: 2018-05-01

## 2018-05-06 ENCOUNTER — FORM ENCOUNTER (OUTPATIENT)
Age: 83
End: 2018-05-06

## 2018-05-07 ENCOUNTER — OUTPATIENT (OUTPATIENT)
Dept: OUTPATIENT SERVICES | Facility: HOSPITAL | Age: 83
LOS: 1 days | End: 2018-05-07
Payer: MEDICARE

## 2018-05-07 ENCOUNTER — APPOINTMENT (OUTPATIENT)
Dept: RADIOLOGY | Facility: HOSPITAL | Age: 83
End: 2018-05-07
Payer: MEDICARE

## 2018-05-07 DIAGNOSIS — M85.9 DISORDER OF BONE DENSITY AND STRUCTURE, UNSPECIFIED: ICD-10-CM

## 2018-05-07 DIAGNOSIS — M25.78 OSTEOPHYTE, VERTEBRAE: ICD-10-CM

## 2018-05-07 DIAGNOSIS — Z00.8 ENCOUNTER FOR OTHER GENERAL EXAMINATION: ICD-10-CM

## 2018-05-07 PROCEDURE — 77074 RADEX OSSEOUS SURVEY LMTD: CPT

## 2018-05-07 PROCEDURE — 77074 RADEX OSSEOUS SURVEY LMTD: CPT | Mod: 26

## 2018-05-08 ENCOUNTER — LABORATORY RESULT (OUTPATIENT)
Age: 83
End: 2018-05-08

## 2018-05-14 ENCOUNTER — APPOINTMENT (OUTPATIENT)
Dept: FAMILY MEDICINE | Facility: CLINIC | Age: 83
End: 2018-05-14
Payer: MEDICARE

## 2018-05-14 VITALS
DIASTOLIC BLOOD PRESSURE: 80 MMHG | TEMPERATURE: 98.2 F | HEIGHT: 60 IN | SYSTOLIC BLOOD PRESSURE: 130 MMHG | WEIGHT: 159 LBS | HEART RATE: 77 BPM | BODY MASS INDEX: 31.22 KG/M2 | OXYGEN SATURATION: 97 %

## 2018-05-14 DIAGNOSIS — G89.29 PAIN IN UNSPECIFIED SHOULDER: ICD-10-CM

## 2018-05-14 DIAGNOSIS — M25.519 PAIN IN UNSPECIFIED SHOULDER: ICD-10-CM

## 2018-05-14 PROCEDURE — 99214 OFFICE O/P EST MOD 30 MIN: CPT

## 2018-05-14 RX ORDER — TRAMADOL HYDROCHLORIDE 50 MG/1
50 TABLET, COATED ORAL
Refills: 0 | Status: DISCONTINUED | COMMUNITY
End: 2018-05-14

## 2018-05-15 ENCOUNTER — LABORATORY RESULT (OUTPATIENT)
Age: 83
End: 2018-05-15

## 2018-05-15 ENCOUNTER — APPOINTMENT (OUTPATIENT)
Dept: HEMATOLOGY ONCOLOGY | Facility: CLINIC | Age: 83
End: 2018-05-15
Payer: MEDICARE

## 2018-05-15 VITALS — DIASTOLIC BLOOD PRESSURE: 76 MMHG | TEMPERATURE: 98.2 F | SYSTOLIC BLOOD PRESSURE: 130 MMHG

## 2018-05-15 PROCEDURE — 38222 DX BONE MARROW BX & ASPIR: CPT

## 2018-05-16 ENCOUNTER — FORM ENCOUNTER (OUTPATIENT)
Age: 83
End: 2018-05-16

## 2018-05-16 ENCOUNTER — RX RENEWAL (OUTPATIENT)
Age: 83
End: 2018-05-16

## 2018-05-17 ENCOUNTER — OUTPATIENT (OUTPATIENT)
Dept: OUTPATIENT SERVICES | Facility: HOSPITAL | Age: 83
LOS: 1 days | End: 2018-05-17
Payer: MEDICARE

## 2018-05-17 ENCOUNTER — APPOINTMENT (OUTPATIENT)
Dept: ULTRASOUND IMAGING | Facility: HOSPITAL | Age: 83
End: 2018-05-17
Payer: MEDICARE

## 2018-05-17 DIAGNOSIS — Z00.8 ENCOUNTER FOR OTHER GENERAL EXAMINATION: ICD-10-CM

## 2018-05-17 DIAGNOSIS — M79.632 PAIN IN LEFT FOREARM: ICD-10-CM

## 2018-05-17 DIAGNOSIS — M79.89 OTHER SPECIFIED SOFT TISSUE DISORDERS: ICD-10-CM

## 2018-05-17 PROCEDURE — 93971 EXTREMITY STUDY: CPT | Mod: 26

## 2018-05-17 PROCEDURE — 93971 EXTREMITY STUDY: CPT

## 2018-05-18 ENCOUNTER — RESULT REVIEW (OUTPATIENT)
Age: 83
End: 2018-05-18

## 2018-05-18 ENCOUNTER — LABORATORY RESULT (OUTPATIENT)
Age: 83
End: 2018-05-18

## 2018-05-18 ENCOUNTER — MESSAGE (OUTPATIENT)
Age: 83
End: 2018-05-18

## 2018-05-21 ENCOUNTER — APPOINTMENT (OUTPATIENT)
Dept: CARDIOLOGY | Facility: CLINIC | Age: 83
End: 2018-05-21
Payer: MEDICARE

## 2018-05-21 LAB — INR PPP: 1.7 RATIO

## 2018-05-21 PROCEDURE — 99211 OFF/OP EST MAY X REQ PHY/QHP: CPT

## 2018-05-21 PROCEDURE — 85610 PROTHROMBIN TIME: CPT | Mod: QW

## 2018-05-23 ENCOUNTER — RESULT REVIEW (OUTPATIENT)
Age: 83
End: 2018-05-23

## 2018-05-24 ENCOUNTER — APPOINTMENT (OUTPATIENT)
Dept: HEMATOLOGY ONCOLOGY | Facility: CLINIC | Age: 83
End: 2018-05-24
Payer: MEDICARE

## 2018-05-24 ENCOUNTER — APPOINTMENT (OUTPATIENT)
Dept: CARDIOLOGY | Facility: CLINIC | Age: 83
End: 2018-05-24
Payer: MEDICARE

## 2018-05-24 VITALS — DIASTOLIC BLOOD PRESSURE: 92 MMHG | HEART RATE: 81 BPM | OXYGEN SATURATION: 96 % | SYSTOLIC BLOOD PRESSURE: 138 MMHG

## 2018-05-24 VITALS
DIASTOLIC BLOOD PRESSURE: 70 MMHG | HEART RATE: 80 BPM | RESPIRATION RATE: 16 BRPM | TEMPERATURE: 98.5 F | SYSTOLIC BLOOD PRESSURE: 128 MMHG

## 2018-05-24 DIAGNOSIS — E88.09 OTHER DISORDERS OF PLASMA-PROTEIN METABOLISM, NOT ELSEWHERE CLASSIFIED: ICD-10-CM

## 2018-05-24 DIAGNOSIS — D72.821 MONOCYTOSIS (SYMPTOMATIC): ICD-10-CM

## 2018-05-24 LAB
INR PPP: 1.5 RATIO
POCT-PROTHROMBIN TIME: 17.9 SECS
QUALITY CONTROL: YES

## 2018-05-24 PROCEDURE — 85610 PROTHROMBIN TIME: CPT | Mod: QW

## 2018-05-24 PROCEDURE — 99211 OFF/OP EST MAY X REQ PHY/QHP: CPT

## 2018-05-24 PROCEDURE — 99215 OFFICE O/P EST HI 40 MIN: CPT

## 2018-05-29 ENCOUNTER — LABORATORY RESULT (OUTPATIENT)
Age: 83
End: 2018-05-29

## 2018-05-29 ENCOUNTER — RESULT REVIEW (OUTPATIENT)
Age: 83
End: 2018-05-29

## 2018-05-31 ENCOUNTER — APPOINTMENT (OUTPATIENT)
Dept: CARDIOLOGY | Facility: CLINIC | Age: 83
End: 2018-05-31

## 2018-06-05 ENCOUNTER — LABORATORY RESULT (OUTPATIENT)
Age: 83
End: 2018-06-05

## 2018-06-11 ENCOUNTER — APPOINTMENT (OUTPATIENT)
Dept: CARDIOLOGY | Facility: CLINIC | Age: 83
End: 2018-06-11

## 2018-06-12 ENCOUNTER — LABORATORY RESULT (OUTPATIENT)
Age: 83
End: 2018-06-12

## 2018-06-20 ENCOUNTER — LABORATORY RESULT (OUTPATIENT)
Age: 83
End: 2018-06-20

## 2018-06-26 ENCOUNTER — LABORATORY RESULT (OUTPATIENT)
Age: 83
End: 2018-06-26

## 2018-07-03 ENCOUNTER — LABORATORY RESULT (OUTPATIENT)
Age: 83
End: 2018-07-03

## 2018-07-10 ENCOUNTER — INPATIENT (INPATIENT)
Facility: HOSPITAL | Age: 83
LOS: 1 days | Discharge: HOME | DRG: 555 | End: 2018-07-12
Attending: INTERNAL MEDICINE | Admitting: INTERNAL MEDICINE
Payer: MEDICARE

## 2018-07-10 VITALS
HEART RATE: 77 BPM | RESPIRATION RATE: 20 BRPM | SYSTOLIC BLOOD PRESSURE: 167 MMHG | TEMPERATURE: 98 F | WEIGHT: 130.07 LBS | DIASTOLIC BLOOD PRESSURE: 98 MMHG

## 2018-07-10 LAB
ALBUMIN SERPL ELPH-MCNC: 3.8 G/DL — SIGNIFICANT CHANGE UP (ref 3.3–5)
ALP SERPL-CCNC: 111 U/L — SIGNIFICANT CHANGE UP (ref 40–120)
ALT FLD-CCNC: 37 U/L DA — SIGNIFICANT CHANGE UP (ref 10–45)
ANION GAP SERPL CALC-SCNC: 9 MMOL/L — SIGNIFICANT CHANGE UP (ref 5–17)
AST SERPL-CCNC: 35 U/L — SIGNIFICANT CHANGE UP (ref 10–40)
BASOPHILS # BLD AUTO: 0.1 K/UL — SIGNIFICANT CHANGE UP (ref 0–0.2)
BASOPHILS NFR BLD AUTO: 1.3 % — SIGNIFICANT CHANGE UP (ref 0–2)
BILIRUB SERPL-MCNC: 0.5 MG/DL — SIGNIFICANT CHANGE UP (ref 0.2–1.2)
BUN SERPL-MCNC: 10 MG/DL — SIGNIFICANT CHANGE UP (ref 7–23)
CALCIUM SERPL-MCNC: 9.6 MG/DL — SIGNIFICANT CHANGE UP (ref 8.4–10.5)
CHLORIDE SERPL-SCNC: 96 MMOL/L — SIGNIFICANT CHANGE UP (ref 96–108)
CO2 SERPL-SCNC: 27 MMOL/L — SIGNIFICANT CHANGE UP (ref 22–31)
CREAT SERPL-MCNC: 0.49 MG/DL — LOW (ref 0.5–1.3)
EOSINOPHIL # BLD AUTO: 0.1 K/UL — SIGNIFICANT CHANGE UP (ref 0–0.5)
EOSINOPHIL NFR BLD AUTO: 0.9 % — SIGNIFICANT CHANGE UP (ref 0–6)
GLUCOSE SERPL-MCNC: 114 MG/DL — HIGH (ref 70–99)
HCT VFR BLD CALC: 40.8 % — SIGNIFICANT CHANGE UP (ref 34.5–45)
HGB BLD-MCNC: 13.2 G/DL — SIGNIFICANT CHANGE UP (ref 11.5–15.5)
LYMPHOCYTES # BLD AUTO: 1.3 K/UL — SIGNIFICANT CHANGE UP (ref 1–3.3)
LYMPHOCYTES # BLD AUTO: 12.1 % — LOW (ref 13–44)
MCHC RBC-ENTMCNC: 26.9 PG — LOW (ref 27–34)
MCHC RBC-ENTMCNC: 32.4 GM/DL — SIGNIFICANT CHANGE UP (ref 32–36)
MCV RBC AUTO: 83.2 FL — SIGNIFICANT CHANGE UP (ref 80–100)
MONOCYTES # BLD AUTO: 1.6 K/UL — HIGH (ref 0–0.9)
MONOCYTES NFR BLD AUTO: 15.2 % — HIGH (ref 1–9)
NEUTROPHILS # BLD AUTO: 7.4 K/UL — SIGNIFICANT CHANGE UP (ref 1.8–7.4)
NEUTROPHILS NFR BLD AUTO: 70.6 % — SIGNIFICANT CHANGE UP (ref 43–77)
NT-PROBNP SERPL-SCNC: 1287 PG/ML — HIGH (ref 0–300)
PLATELET # BLD AUTO: 271 K/UL — SIGNIFICANT CHANGE UP (ref 150–400)
POTASSIUM SERPL-MCNC: 3.3 MMOL/L — LOW (ref 3.5–5.3)
POTASSIUM SERPL-SCNC: 3.3 MMOL/L — LOW (ref 3.5–5.3)
PROT SERPL-MCNC: 8 G/DL — SIGNIFICANT CHANGE UP (ref 6–8.3)
RBC # BLD: 4.91 M/UL — SIGNIFICANT CHANGE UP (ref 3.8–5.2)
RBC # FLD: 14.1 % — SIGNIFICANT CHANGE UP (ref 10.3–14.5)
SODIUM SERPL-SCNC: 132 MMOL/L — LOW (ref 135–145)
WBC # BLD: 10.5 K/UL — SIGNIFICANT CHANGE UP (ref 3.8–10.5)
WBC # FLD AUTO: 10.5 K/UL — SIGNIFICANT CHANGE UP (ref 3.8–10.5)

## 2018-07-10 PROCEDURE — 73502 X-RAY EXAM HIP UNI 2-3 VIEWS: CPT | Mod: 26,LT

## 2018-07-10 PROCEDURE — 93970 EXTREMITY STUDY: CPT | Mod: 26

## 2018-07-10 PROCEDURE — 71045 X-RAY EXAM CHEST 1 VIEW: CPT | Mod: 26

## 2018-07-10 PROCEDURE — 99285 EMERGENCY DEPT VISIT HI MDM: CPT

## 2018-07-10 PROCEDURE — 99223 1ST HOSP IP/OBS HIGH 75: CPT

## 2018-07-10 PROCEDURE — 93010 ELECTROCARDIOGRAM REPORT: CPT

## 2018-07-10 RX ORDER — SERTRALINE 25 MG/1
50 TABLET, FILM COATED ORAL DAILY
Qty: 0 | Refills: 0 | Status: DISCONTINUED | OUTPATIENT
Start: 2018-07-10 | End: 2018-07-12

## 2018-07-10 RX ORDER — MONTELUKAST 4 MG/1
5 TABLET, CHEWABLE ORAL DAILY
Qty: 0 | Refills: 0 | Status: DISCONTINUED | OUTPATIENT
Start: 2018-07-10 | End: 2018-07-12

## 2018-07-10 RX ORDER — FUROSEMIDE 40 MG
20 TABLET ORAL ONCE
Qty: 0 | Refills: 0 | Status: COMPLETED | OUTPATIENT
Start: 2018-07-10 | End: 2018-07-10

## 2018-07-10 RX ORDER — VALSARTAN 80 MG/1
320 TABLET ORAL DAILY
Qty: 0 | Refills: 0 | Status: DISCONTINUED | OUTPATIENT
Start: 2018-07-10 | End: 2018-07-12

## 2018-07-10 RX ORDER — POTASSIUM CHLORIDE 20 MEQ
40 PACKET (EA) ORAL ONCE
Qty: 0 | Refills: 0 | Status: COMPLETED | OUTPATIENT
Start: 2018-07-10 | End: 2018-07-10

## 2018-07-10 RX ORDER — ATORVASTATIN CALCIUM 80 MG/1
10 TABLET, FILM COATED ORAL AT BEDTIME
Qty: 0 | Refills: 0 | Status: DISCONTINUED | OUTPATIENT
Start: 2018-07-10 | End: 2018-07-12

## 2018-07-10 RX ORDER — HYDROCHLOROTHIAZIDE 25 MG
25 TABLET ORAL DAILY
Qty: 0 | Refills: 0 | Status: DISCONTINUED | OUTPATIENT
Start: 2018-07-10 | End: 2018-07-12

## 2018-07-10 RX ORDER — MORPHINE SULFATE 50 MG/1
2 CAPSULE, EXTENDED RELEASE ORAL ONCE
Qty: 0 | Refills: 0 | Status: DISCONTINUED | OUTPATIENT
Start: 2018-07-10 | End: 2018-07-10

## 2018-07-10 RX ORDER — ENOXAPARIN SODIUM 100 MG/ML
40 INJECTION SUBCUTANEOUS EVERY 24 HOURS
Qty: 0 | Refills: 0 | Status: DISCONTINUED | OUTPATIENT
Start: 2018-07-10 | End: 2018-07-12

## 2018-07-10 RX ORDER — HYDRALAZINE HCL 50 MG
25 TABLET ORAL
Qty: 0 | Refills: 0 | Status: DISCONTINUED | OUTPATIENT
Start: 2018-07-10 | End: 2018-07-12

## 2018-07-10 RX ORDER — HYDRALAZINE HCL 50 MG
50 TABLET ORAL
Qty: 0 | Refills: 0 | Status: DISCONTINUED | OUTPATIENT
Start: 2018-07-10 | End: 2018-07-12

## 2018-07-10 RX ORDER — ATENOLOL 25 MG/1
25 TABLET ORAL DAILY
Qty: 0 | Refills: 0 | Status: DISCONTINUED | OUTPATIENT
Start: 2018-07-10 | End: 2018-07-12

## 2018-07-10 RX ADMIN — MORPHINE SULFATE 2 MILLIGRAM(S): 50 CAPSULE, EXTENDED RELEASE ORAL at 20:26

## 2018-07-10 RX ADMIN — MORPHINE SULFATE 2 MILLIGRAM(S): 50 CAPSULE, EXTENDED RELEASE ORAL at 20:59

## 2018-07-10 RX ADMIN — Medication 20 MILLIGRAM(S): at 20:26

## 2018-07-10 NOTE — H&P ADULT - NSHPPHYSICALEXAM_GEN_ALL_CORE
Vital Signs Last 24 Hrs  T(C): 36.9 (10 Jul 2018 19:16), Max: 36.9 (10 Jul 2018 19:16)  T(F): 98.5 (10 Jul 2018 19:16), Max: 98.5 (10 Jul 2018 19:16)  HR: 77 (10 Jul 2018 19:16) (77 - 77)  BP: 167/98 (10 Jul 2018 19:16) (167/98 - 167/98)  BP(mean): --  RR: 20 (10 Jul 2018 19:16) (20 - 20)  SpO2: --      GENERAL: NAD  HEAD:  Normocephalic  EYES: EOMI, PERRLA, conjunctiva and sclera clear  ENMT: No tonsillar erythema, exudates, or enlargement; Moist mucous membranes, No lesions  NECK: Supple, + JVD, no bruit, normal thyroid  NERVOUS SYSTEM:  Alert & Oriented X3, grossly moves all fours including L hip.   CHEST/LUNG: bibasilar rales L>R. no rhonchi, wheezing, or rubs  HEART: irRegular rate and rhythm; +systolic murmur, rubs, or gallops  ABDOMEN: Soft, Nontender, Nondistended; Bowel sounds present  EXTREMITIES:  2+ Peripheral Pulses, No clubbing, cyanosis, or edema, 2+ edema bl LE  LYMPH: No lymphadenopathy noted  SKIN: No rashes or lesions Vital Signs Last 24 Hrs  T(C): 36.9 (10 Jul 2018 19:16), Max: 36.9 (10 Jul 2018 19:16)  T(F): 98.5 (10 Jul 2018 19:16), Max: 98.5 (10 Jul 2018 19:16)  HR: 77 (10 Jul 2018 19:16) (77 - 77)  BP: 167/98 (10 Jul 2018 19:16) (167/98 - 167/98)  BP(mean): --  RR: 20 (10 Jul 2018 19:16) (20 - 20)  SpO2: 97% on room air (my reading)      GENERAL: NAD  HEAD:  Normocephalic  EYES: EOMI, PERRLA, conjunctiva and sclera clear  ENMT: No tonsillar erythema, exudates, or enlargement; Moist mucous membranes, No lesions  NECK: Supple, + JVD, no bruit, normal thyroid  NERVOUS SYSTEM:  Alert & Oriented X3, grossly moves all fours including L hip.   CHEST/LUNG: bibasilar rales L>R. no rhonchi, wheezing, or rubs  HEART: irRegular rate and rhythm; +systolic murmur, rubs, or gallops  ABDOMEN: Soft, Nontender, Nondistended; Bowel sounds present  EXTREMITIES:  2+ Peripheral Pulses, No clubbing, cyanosis, or edema, 2+ edema bl LE  LYMPH: No lymphadenopathy noted  SKIN: No rashes or lesions

## 2018-07-10 NOTE — H&P ADULT - ASSESSMENT
83F hx of afib on coumadin, HTN, HLD pw L hip pain and with chronic SOB and worsening LE edema with no reported hx of CHF in the past.     - CHF  check ECHO, card consult  trops x 2 to be complete.   IV diuresis    - chronic afib  check PT/INR stat, dose coumadin, cont diltiazem and atenolol for rate control with pulse parameters    - HTN  cont clonidine, hydralazine, HCTZ, valsartan    - HLD  cont statin.     - L hip pain  check Xray of L hip

## 2018-07-10 NOTE — ED PROVIDER NOTE - OBJECTIVE STATEMENT
84 yo f c/o b/l LE swelling, left upper thigh pain and worsening SOB 82 yo f c/o b/l LE swelling, left upper thigh pain and worsening SOB  pt on coumadin but pt not sure why. pt has afib on EKG 84 yo f c/o b/l LE swelling, left upper thigh pain and worsening SOB  pt on coumadin but pt not sure why. pt has afib on EKG  pt declined  services. told me hx in English and son filled in the rest

## 2018-07-10 NOTE — ED PROVIDER NOTE - CARE PLAN
Principal Discharge DX:	Bilateral leg edema  Secondary Diagnosis:	Acute congestive heart failure, unspecified heart failure type

## 2018-07-10 NOTE — H&P ADULT - HISTORY OF PRESENT ILLNESS
83F hx of chronic afib on coumadin, HTN, HLD pw acute onset of worsening L hip pain in the absence of any injury. Related has chronic L hip pain since L hip sx a year ago but worse today. Also relates chronic bl lower extremity swelling for several months and associated SOB for about a year. Bl LE swelling may be slightly worse over last several days as well and has more pain in the lower calves and ankle regions.  (was to follow up with pulm for workup, no hx of CHF). Denied any chest pain. SOB about baseline. No fevers, no chills, no cough, no NVD no dysuria.

## 2018-07-10 NOTE — H&P ADULT - NSHPLABSRESULTS_GEN_ALL_CORE
13.2   10.5  )-----------( 271      ( 10 Jul 2018 20:20 )             40.8       07-10    132<L>  |  96  |  10  ----------------------------<  114<H>  3.3<L>   |  27  |  0.49<L>    Ca    9.6      10 Jul 2018 20:20    TPro  8.0  /  Alb  3.8  /  TBili  0.5  /  DBili  x   /  AST  35  /  ALT  37  /  AlkPhos  111  07-10      LIVER FUNCTIONS - ( 10 Jul 2018 20:20 )  Alb: 3.8 g/dL / Pro: 8.0 g/dL / ALK PHOS: 111 U/L / ALT: 37 U/L DA / AST: 35 U/L / GGT: x         Serum Pro-Brain Natriuretic Peptide: 1287 pg/mL (07-10-18 @ 20:20)      EKG: afib at 74bpm, inc RBBB, unchanged from 2017      < from: US Duplex Venous Lower Ext Complete, Bilateral (07.10.18 @ 22:57) >    IMPRESSION:     No evidence of bilateral lower extremity deep venous thrombosis.    < end of copied text >  < from: Xray Chest 1 View- PORTABLE-Routine (07.10.18 @ 20:07) >    The lungs are clear of infiltrates and effusions. There is moderate   cardiomegaly The cardiac and mediastinal contours appear otherwise   unremarkable.     Impression:  1. No evidence of acute pulmonary disease.          < end of copied text >

## 2018-07-10 NOTE — H&P ADULT - NSHPREVIEWOFSYSTEMS_GEN_ALL_CORE
CONSTITUTIONAL: No fever, weight loss, or fatigue  EYES: No eye pain, visual disturbances, or discharge  ENMT:  No difficulty hearing, tinnitus, vertigo; No sinus or throat pain  NECK: No pain or stiffness  RESPIRATORY: No cough, wheezing, chills or hemoptysis; + chronic shortness of breath  CARDIOVASCULAR: No chest pain, palpitations, dizziness, +chronic leg swelling bl   GASTROINTESTINAL: No abdominal or epigastric pain. No nausea, vomiting, or hematemesis; No diarrhea or constipation. No melena or hematochezia.  GENITOURINARY: No dysuria, frequency, hematuria, or incontinence  NEUROLOGICAL: No headaches, memory loss, loss of strength, numbness, or tremors  SKIN: No itching, burning, rashes, or lesions   LYMPH NODES: No enlarged glands  ENDOCRINE: No heat or cold intolerance; No hair loss  MUSCULOSKELETAL: + L hip  pain , bl LE swelling and pain .   PSYCHIATRIC: No depression, anxiety, mood swings, or difficulty sleeping  HEME/LYMPH: No easy bruising, or bleeding gums  ALLERY AND IMMUNOLOGIC: No hives or eczema

## 2018-07-10 NOTE — ED PROVIDER NOTE - ATTENDING CONTRIBUTION TO CARE
I have personally seen and examined this patient. I have fully participated in the care of this patient. I have reviewed all pertinent clinical information, including history physical exam, plan and the Resident's note and agree except as noted  HPi: elderly pt with H/O HTN , BIB family c/o worsening leg edema, and pain for weeks. No chest pain or SOB  HEENt : WNL,   RS: bi basilar rale   CVS: S1S2  Ext : severe edema of both legs. pedal pulse 2+  A/P leg edema like dependant edema  check renal function , r/o dvt and admit

## 2018-07-11 ENCOUNTER — APPOINTMENT (OUTPATIENT)
Dept: CARDIOLOGY | Facility: CLINIC | Age: 83
End: 2018-07-11

## 2018-07-11 DIAGNOSIS — I10 ESSENTIAL (PRIMARY) HYPERTENSION: ICD-10-CM

## 2018-07-11 DIAGNOSIS — E78.5 HYPERLIPIDEMIA, UNSPECIFIED: ICD-10-CM

## 2018-07-11 DIAGNOSIS — Z79.01 LONG TERM (CURRENT) USE OF ANTICOAGULANTS: ICD-10-CM

## 2018-07-11 DIAGNOSIS — I48.2 CHRONIC ATRIAL FIBRILLATION: ICD-10-CM

## 2018-07-11 DIAGNOSIS — I50.9 HEART FAILURE, UNSPECIFIED: ICD-10-CM

## 2018-07-11 DIAGNOSIS — M25.552 PAIN IN LEFT HIP: ICD-10-CM

## 2018-07-11 LAB
ALBUMIN SERPL ELPH-MCNC: 3.5 G/DL — SIGNIFICANT CHANGE UP (ref 3.3–5)
ALP SERPL-CCNC: 108 U/L — SIGNIFICANT CHANGE UP (ref 40–120)
ALT FLD-CCNC: 31 U/L DA — SIGNIFICANT CHANGE UP (ref 10–45)
ANION GAP SERPL CALC-SCNC: 6 MMOL/L — SIGNIFICANT CHANGE UP (ref 5–17)
AST SERPL-CCNC: 31 U/L — SIGNIFICANT CHANGE UP (ref 10–40)
BILIRUB SERPL-MCNC: 0.8 MG/DL — SIGNIFICANT CHANGE UP (ref 0.2–1.2)
BUN SERPL-MCNC: 11 MG/DL — SIGNIFICANT CHANGE UP (ref 7–23)
CALCIUM SERPL-MCNC: 9.1 MG/DL — SIGNIFICANT CHANGE UP (ref 8.4–10.5)
CHLORIDE SERPL-SCNC: 97 MMOL/L — SIGNIFICANT CHANGE UP (ref 96–108)
CO2 SERPL-SCNC: 34 MMOL/L — HIGH (ref 22–31)
CREAT SERPL-MCNC: 0.5 MG/DL — SIGNIFICANT CHANGE UP (ref 0.5–1.3)
D DIMER BLD IA.RAPID-MCNC: 221 NG/ML DDU — SIGNIFICANT CHANGE UP
GLUCOSE SERPL-MCNC: 105 MG/DL — HIGH (ref 70–99)
HCT VFR BLD CALC: 40.6 % — SIGNIFICANT CHANGE UP (ref 34.5–45)
HGB BLD-MCNC: 13.5 G/DL — SIGNIFICANT CHANGE UP (ref 11.5–15.5)
INR BLD: 4.17 RATIO — HIGH (ref 0.88–1.16)
MAGNESIUM SERPL-MCNC: 1.7 MG/DL — SIGNIFICANT CHANGE UP (ref 1.6–2.6)
MCHC RBC-ENTMCNC: 27.8 PG — SIGNIFICANT CHANGE UP (ref 27–34)
MCHC RBC-ENTMCNC: 33.3 GM/DL — SIGNIFICANT CHANGE UP (ref 32–36)
MCV RBC AUTO: 83.4 FL — SIGNIFICANT CHANGE UP (ref 80–100)
NT-PROBNP SERPL-SCNC: 2120 PG/ML — HIGH (ref 0–300)
PLATELET # BLD AUTO: 265 K/UL — SIGNIFICANT CHANGE UP (ref 150–400)
POTASSIUM SERPL-MCNC: 3.1 MMOL/L — LOW (ref 3.5–5.3)
POTASSIUM SERPL-SCNC: 3.1 MMOL/L — LOW (ref 3.5–5.3)
PROT SERPL-MCNC: 7.5 G/DL — SIGNIFICANT CHANGE UP (ref 6–8.3)
PROTHROM AB SERPL-ACNC: 47.7 SEC — HIGH (ref 9.8–12.7)
RBC # BLD: 4.87 M/UL — SIGNIFICANT CHANGE UP (ref 3.8–5.2)
RBC # FLD: 14.2 % — SIGNIFICANT CHANGE UP (ref 10.3–14.5)
SODIUM SERPL-SCNC: 137 MMOL/L — SIGNIFICANT CHANGE UP (ref 135–145)
TROPONIN I SERPL-MCNC: <.017 NG/ML — LOW (ref 0.02–0.06)
TSH SERPL-MCNC: 2.5 UIU/ML — SIGNIFICANT CHANGE UP (ref 0.27–4.2)
WBC # BLD: 11.4 K/UL — HIGH (ref 3.8–10.5)
WBC # FLD AUTO: 11.4 K/UL — HIGH (ref 3.8–10.5)

## 2018-07-11 PROCEDURE — 93306 TTE W/DOPPLER COMPLETE: CPT | Mod: 26

## 2018-07-11 PROCEDURE — 99222 1ST HOSP IP/OBS MODERATE 55: CPT

## 2018-07-11 PROCEDURE — 99233 SBSQ HOSP IP/OBS HIGH 50: CPT

## 2018-07-11 RX ORDER — ZOLPIDEM TARTRATE 10 MG/1
5 TABLET ORAL AT BEDTIME
Qty: 0 | Refills: 0 | Status: DISCONTINUED | OUTPATIENT
Start: 2018-07-11 | End: 2018-07-12

## 2018-07-11 RX ORDER — HYDRALAZINE HCL 50 MG
1 TABLET ORAL
Qty: 0 | Refills: 0 | COMMUNITY

## 2018-07-11 RX ORDER — ALPRAZOLAM 0.25 MG
0.25 TABLET ORAL ONCE
Qty: 0 | Refills: 0 | Status: DISCONTINUED | OUTPATIENT
Start: 2018-07-11 | End: 2018-07-11

## 2018-07-11 RX ORDER — POTASSIUM CHLORIDE 20 MEQ
40 PACKET (EA) ORAL EVERY 4 HOURS
Qty: 0 | Refills: 0 | Status: COMPLETED | OUTPATIENT
Start: 2018-07-11 | End: 2018-07-11

## 2018-07-11 RX ORDER — HYDRALAZINE HCL 50 MG
25 TABLET ORAL ONCE
Qty: 0 | Refills: 0 | Status: COMPLETED | OUTPATIENT
Start: 2018-07-11 | End: 2018-07-11

## 2018-07-11 RX ORDER — FUROSEMIDE 40 MG
20 TABLET ORAL DAILY
Qty: 0 | Refills: 0 | Status: DISCONTINUED | OUTPATIENT
Start: 2018-07-11 | End: 2018-07-12

## 2018-07-11 RX ADMIN — Medication 50 MILLIGRAM(S): at 08:05

## 2018-07-11 RX ADMIN — Medication 50 MILLIGRAM(S): at 14:33

## 2018-07-11 RX ADMIN — VALSARTAN 320 MILLIGRAM(S): 80 TABLET ORAL at 06:49

## 2018-07-11 RX ADMIN — Medication 0.1 MILLIGRAM(S): at 06:23

## 2018-07-11 RX ADMIN — Medication 20 MILLIGRAM(S): at 06:23

## 2018-07-11 RX ADMIN — Medication 0.1 MILLIGRAM(S): at 21:09

## 2018-07-11 RX ADMIN — Medication 40 MILLIEQUIVALENT(S): at 21:10

## 2018-07-11 RX ADMIN — Medication 40 MILLIEQUIVALENT(S): at 00:25

## 2018-07-11 RX ADMIN — ENOXAPARIN SODIUM 40 MILLIGRAM(S): 100 INJECTION SUBCUTANEOUS at 06:49

## 2018-07-11 RX ADMIN — ZOLPIDEM TARTRATE 5 MILLIGRAM(S): 10 TABLET ORAL at 01:33

## 2018-07-11 RX ADMIN — SERTRALINE 50 MILLIGRAM(S): 25 TABLET, FILM COATED ORAL at 14:34

## 2018-07-11 RX ADMIN — Medication 25 MILLIGRAM(S): at 01:34

## 2018-07-11 RX ADMIN — Medication 0.25 MILLIGRAM(S): at 00:25

## 2018-07-11 RX ADMIN — Medication 25 MILLIGRAM(S): at 06:22

## 2018-07-11 RX ADMIN — Medication 25 MILLIGRAM(S): at 21:09

## 2018-07-11 RX ADMIN — ATENOLOL 25 MILLIGRAM(S): 25 TABLET ORAL at 06:22

## 2018-07-11 RX ADMIN — ATORVASTATIN CALCIUM 10 MILLIGRAM(S): 80 TABLET, FILM COATED ORAL at 21:09

## 2018-07-11 RX ADMIN — Medication 0.1 MILLIGRAM(S): at 14:33

## 2018-07-11 RX ADMIN — Medication 40 MILLIEQUIVALENT(S): at 18:51

## 2018-07-11 NOTE — PHYSICAL THERAPY INITIAL EVALUATION ADULT - ADDITIONAL COMMENTS
pt lives in private house w/son, 3 steps w/handrail to enter, 12 steps to bathroom. pt uses rw to ambulate, has home health aide 6 hrs per day, 5 days per wk

## 2018-07-11 NOTE — CONSULT NOTE ADULT - ASSESSMENT
83 year old woman admitted with complaints of lower extremity edema and shortness of breath.  She has permanent AF and is on long term anticoagulation with warfarin.

## 2018-07-11 NOTE — CONSULT NOTE ADULT - SUBJECTIVE AND OBJECTIVE BOX
[facility] Consultation    CHIEF COMPLAINT: Patient is a 83y old  Female who presents with a chief complaint of L hip pain and new onset CHF (10 Jul 2018 23:47)  complaints of left hip pain and bilateral lower extremity edema    HPI:  The patient is an 83 year old woman presented to ED with complaints of increasing bilateral lower extremtity edema and left hip pain.  She has been having mild dyspnea, especially with acitvity.  No complaints of chest pain or chest pressure.  No orthopnea      PAST MEDICAL & SURGICAL HISTORY:  HTN (hypertension)  AF, on anticoagulation  No CAD, MI   denies previous CHF    SOCIAL HISTORY:  non smoker     FAMILY HISTORY:  non contributory     MEDICATIONS  (STANDING):  ATENolol  Tablet 25 milliGRAM(s) Oral daily  atorvastatin 10 milliGRAM(s) Oral at bedtime  cloNIDine 0.1 milliGRAM(s) Oral three times a day  diltiazem    Tablet 30 milliGRAM(s) Oral three times a day  enoxaparin Injectable 40 milliGRAM(s) SubCutaneous every 24 hours  furosemide   Injectable 20 milliGRAM(s) IV Push daily  hydrALAZINE 50 milliGRAM(s) Oral <User Schedule>  hydrALAZINE 25 milliGRAM(s) Oral <User Schedule>  hydrochlorothiazide 25 milliGRAM(s) Oral daily  montelukast 5 milliGRAM(s) Oral daily  sertraline 50 milliGRAM(s) Oral daily  valsartan 320 milliGRAM(s) Oral daily    MEDICATIONS  (PRN):  zolpidem 5 milliGRAM(s) Oral at bedtime PRN Insomnia      Allergies:  penicillin (Rash)    REVIEW OF SYSTEMS:    CONSTITUTIONAL: fatigue, weakness   EYES: No visual changes, No diplopia  ENMT: No throat pain , No exudate  NECK: No pain or stiffness  RESPIRATORY: No cough, wheezing, hemoptysis  CARDIOVASCULAR: No chest pain or chest pressure.    No palpitations, dizziness, light headedness, syncope or near syncope.  No orthopnea.   GASTROINTESTINAL: No abdominal pain. No nausea, vomiting, or hematemesis; No diarrhea or constipation. No melena or hematochezia.  GENITOURINARY: No dysuria, frequency or hematuria  NEUROLOGICAL: No numbness or weakness  SKIN: No itching or rash  All other review of systems is negative unless indicated above    VITAL SIGNS:   Vital Signs Last 24 Hrs  T(C): 36.6 (11 Jul 2018 08:07), Max: 37.2 (11 Jul 2018 01:00)  T(F): 97.8 (11 Jul 2018 08:07), Max: 98.9 (11 Jul 2018 01:00)  HR: 70 (11 Jul 2018 08:07) (70 - 89)  BP: 126/55 (11 Jul 2018 08:07) (126/55 - 178/98)  BP(mean): --  RR: 14 (11 Jul 2018 08:07) (14 - 20)  SpO2: 100% (11 Jul 2018 08:07) (94% - 100%)    I&O's Summary    10 Jul 2018 07:01  -  11 Jul 2018 07:00  --------------------------------------------------------  IN: 0 mL / OUT: 700 mL / NET: -700 mL        PHYSICAL EXAM:    Constitutional: elderly woman NAD, awake and alert, well-developed  Eyes:  EOMI,  Pupils round, no lesions  ENMT: no exudate or erythema  Pulmonary: Non-labored, breath sounds are clear bilaterally, No wheezing, rales or rhonchi  Cardiovascular: PMI not palpable non-displaced irregular S1 and S2. no S3. II/VI systolic murmur.   Gastrointestinal: Bowel Sounds present, soft, nontender.   Lymph: mild LE edema   Neurological: Alert, no focal deficits  Skin: No rashes. Changes of chronic venous stasis. No cyanosis.  Psych:  Mood & affect appropriate    LABS: All Labs Reviewed:                        13.5   11.4  )-----------( 265      ( 11 Jul 2018 07:05 )             40.6                         13.2   10.5  )-----------( 271      ( 10 Jul 2018 20:20 )             40.8     11 Jul 2018 07:05    137    |  97     |  11     ----------------------------<  105    3.1     |  34     |  0.50   10 Jul 2018 20:20    132    |  96     |  10     ----------------------------<  114    3.3     |  27     |  0.49     Ca    9.1        11 Jul 2018 07:05  Ca    9.6        10 Jul 2018 20:20  Mg     1.7       11 Jul 2018 07:05    TPro  7.5    /  Alb  3.5    /  TBili  0.8    /  DBili  x      /  AST  31     /  ALT  31     /  AlkPhos  108    11 Jul 2018 07:05  TPro  8.0    /  Alb  3.8    /  TBili  0.5    /  DBili  x      /  AST  35     /  ALT  37     /  AlkPhos  111    10 Jul 2018 20:20    PT/INR - ( 11 Jul 2018 01:15 )   PT: 47.7 sec;   INR: 4.17 ratio           CARDIAC MARKERS ( 11 Jul 2018 00:10 )  <.017 ng/mL / x     / x     / x     / x            07-11 @ 07:05  Pro Bnp 2120  07-10 @ 20:20  Pro Bnp 1287        RADIOLOGY:  < from: Xray Chest 1 View- PORTABLE-Routine (07.10.18 @ 20:07) >  No evidence of acute pulmonary disease.      < end of copied text >      EKG:  < from: 12 Lead ECG (07.10.18 @ 20:12) >  Atrial fibrillation  Incomplete right bundle branch block  Nonspecific ST and T wave abnormality  Abnormal ECG  When compared with ECG of 26-MAR-2017 21:30,  there does not appear to be a significant interval change     < end of copied text >

## 2018-07-11 NOTE — CONSULT NOTE ADULT - PROBLEM SELECTOR RECOMMENDATION 9
Continue diuresis.  Follow up echocardiogram done earlier today.... if significant LV dysfunction, will need to consider ACE-I or ARB.

## 2018-07-11 NOTE — PROGRESS NOTE ADULT - SUBJECTIVE AND OBJECTIVE BOX
Patient is a 83y old  Female who presents with a chief complaint of L hip pain and new onset CHF (10 Jul 2018 23:47)      Patient seen and examined at bedside.  pt offers no acute complaints    ALLERGIES:  penicillin (Rash)        Vital Signs Last 24 Hrs  T(F): 97.8 (11 Jul 2018 08:07), Max: 98.9 (11 Jul 2018 01:00)  HR: 70 (11 Jul 2018 08:07) (70 - 89)  BP: 126/55 (11 Jul 2018 08:07) (126/55 - 178/98)  RR: 14 (11 Jul 2018 08:07) (14 - 20)  SpO2: 100% (11 Jul 2018 08:07) (94% - 100%)  I&O's Summary    10 Jul 2018 07:01  -  11 Jul 2018 07:00  --------------------------------------------------------  IN: 0 mL / OUT: 700 mL / NET: -700 mL      MEDICATIONS:  ATENolol  Tablet 25 milliGRAM(s) Oral daily  atorvastatin 10 milliGRAM(s) Oral at bedtime  cloNIDine 0.1 milliGRAM(s) Oral three times a day  diltiazem    Tablet 30 milliGRAM(s) Oral three times a day  enoxaparin Injectable 40 milliGRAM(s) SubCutaneous every 24 hours  furosemide   Injectable 20 milliGRAM(s) IV Push daily  hydrALAZINE 50 milliGRAM(s) Oral <User Schedule>  hydrALAZINE 25 milliGRAM(s) Oral <User Schedule>  hydrochlorothiazide 25 milliGRAM(s) Oral daily  montelukast 5 milliGRAM(s) Oral daily  sertraline 50 milliGRAM(s) Oral daily  valsartan 320 milliGRAM(s) Oral daily  zolpidem 5 milliGRAM(s) Oral at bedtime PRN      PHYSICAL EXAM:  General: NAD, A/O x 3  ENT: MMM  Neck: Supple, No JVD  Lungs: rales on left  Cardio: S1S2 irregular +murmur  Abdomen: Soft, Nontender, Nondistended; Bowel sounds present  Extremities: +1 bilateral lower extremity edema    LABS:                        13.5   11.4  )-----------( 265      ( 11 Jul 2018 07:05 )             40.6     07-11    137  |  97  |  11  ----------------------------<  105  3.1   |  34  |  0.50    Ca    9.1      11 Jul 2018 07:05  Mg     1.7     07-11    TPro  7.5  /  Alb  3.5  /  TBili  0.8  /  DBili  x   /  AST  31  /  ALT  31  /  AlkPhos  108  07-11    eGFR if Non African American: 90 mL/min/1.73M2 (07-11-18 @ 07:05)  eGFR if African American: 104 mL/min/1.73M2 (07-11-18 @ 07:05)    PT/INR - ( 11 Jul 2018 01:15 )   PT: 47.7 sec;   INR: 4.17 ratio             CARDIAC MARKERS ( 11 Jul 2018 00:10 )  <.017 ng/mL / x     / x     / x     / x                  CAPILLARY BLOOD GLUCOSE                RADIOLOGY & ADDITIONAL TESTS:    Care Discussed with Consultants/Other Providers:

## 2018-07-11 NOTE — PROGRESS NOTE ADULT - PROBLEM SELECTOR PLAN 1
echo done  cards following  trops negative  continue diuresis await echo results   cards following  trops negative  continue diuresis for now

## 2018-07-11 NOTE — PROGRESS NOTE ADULT - ASSESSMENT
83F hx of afib on coumadin, HTN, HLD pw L hip pain and with chronic SOB and worsening LE edema with no reported hx of CHF in the past.

## 2018-07-12 ENCOUNTER — TRANSCRIPTION ENCOUNTER (OUTPATIENT)
Age: 83
End: 2018-07-12

## 2018-07-12 VITALS — WEIGHT: 164.46 LBS

## 2018-07-12 PROBLEM — I10 ESSENTIAL (PRIMARY) HYPERTENSION: Chronic | Status: ACTIVE | Noted: 2018-07-10

## 2018-07-12 LAB
ALBUMIN SERPL ELPH-MCNC: 3.1 G/DL — LOW (ref 3.3–5)
ALP SERPL-CCNC: 94 U/L — SIGNIFICANT CHANGE UP (ref 40–120)
ALT FLD-CCNC: 27 U/L DA — SIGNIFICANT CHANGE UP (ref 10–45)
ANION GAP SERPL CALC-SCNC: 5 MMOL/L — SIGNIFICANT CHANGE UP (ref 5–17)
AST SERPL-CCNC: 26 U/L — SIGNIFICANT CHANGE UP (ref 10–40)
BILIRUB SERPL-MCNC: 0.8 MG/DL — SIGNIFICANT CHANGE UP (ref 0.2–1.2)
BUN SERPL-MCNC: 20 MG/DL — SIGNIFICANT CHANGE UP (ref 7–23)
CALCIUM SERPL-MCNC: 8.7 MG/DL — SIGNIFICANT CHANGE UP (ref 8.4–10.5)
CHLORIDE SERPL-SCNC: 99 MMOL/L — SIGNIFICANT CHANGE UP (ref 96–108)
CO2 SERPL-SCNC: 33 MMOL/L — HIGH (ref 22–31)
CREAT SERPL-MCNC: 0.52 MG/DL — SIGNIFICANT CHANGE UP (ref 0.5–1.3)
GLUCOSE SERPL-MCNC: 100 MG/DL — HIGH (ref 70–99)
HCT VFR BLD CALC: 37.4 % — SIGNIFICANT CHANGE UP (ref 34.5–45)
HGB BLD-MCNC: 12.6 G/DL — SIGNIFICANT CHANGE UP (ref 11.5–15.5)
INR BLD: 2.78 RATIO — HIGH (ref 0.88–1.16)
MCHC RBC-ENTMCNC: 28.2 PG — SIGNIFICANT CHANGE UP (ref 27–34)
MCHC RBC-ENTMCNC: 33.6 GM/DL — SIGNIFICANT CHANGE UP (ref 32–36)
MCV RBC AUTO: 83.8 FL — SIGNIFICANT CHANGE UP (ref 80–100)
PLATELET # BLD AUTO: 225 K/UL — SIGNIFICANT CHANGE UP (ref 150–400)
POTASSIUM SERPL-MCNC: 4 MMOL/L — SIGNIFICANT CHANGE UP (ref 3.5–5.3)
POTASSIUM SERPL-SCNC: 4 MMOL/L — SIGNIFICANT CHANGE UP (ref 3.5–5.3)
PROT SERPL-MCNC: 6.8 G/DL — SIGNIFICANT CHANGE UP (ref 6–8.3)
PROTHROM AB SERPL-ACNC: 31.5 SEC — HIGH (ref 9.8–12.7)
RBC # BLD: 4.47 M/UL — SIGNIFICANT CHANGE UP (ref 3.8–5.2)
RBC # FLD: 14.2 % — SIGNIFICANT CHANGE UP (ref 10.3–14.5)
SODIUM SERPL-SCNC: 137 MMOL/L — SIGNIFICANT CHANGE UP (ref 135–145)
WBC # BLD: 10.7 K/UL — HIGH (ref 3.8–10.5)
WBC # FLD AUTO: 10.7 K/UL — HIGH (ref 3.8–10.5)

## 2018-07-12 PROCEDURE — 97162 PT EVAL MOD COMPLEX 30 MIN: CPT

## 2018-07-12 PROCEDURE — 71045 X-RAY EXAM CHEST 1 VIEW: CPT

## 2018-07-12 PROCEDURE — 85027 COMPLETE CBC AUTOMATED: CPT

## 2018-07-12 PROCEDURE — 84484 ASSAY OF TROPONIN QUANT: CPT

## 2018-07-12 PROCEDURE — 83735 ASSAY OF MAGNESIUM: CPT

## 2018-07-12 PROCEDURE — 96375 TX/PRO/DX INJ NEW DRUG ADDON: CPT

## 2018-07-12 PROCEDURE — 83880 ASSAY OF NATRIURETIC PEPTIDE: CPT

## 2018-07-12 PROCEDURE — 80053 COMPREHEN METABOLIC PANEL: CPT

## 2018-07-12 PROCEDURE — 93970 EXTREMITY STUDY: CPT

## 2018-07-12 PROCEDURE — 93306 TTE W/DOPPLER COMPLETE: CPT

## 2018-07-12 PROCEDURE — 93005 ELECTROCARDIOGRAM TRACING: CPT

## 2018-07-12 PROCEDURE — 73502 X-RAY EXAM HIP UNI 2-3 VIEWS: CPT

## 2018-07-12 PROCEDURE — 99232 SBSQ HOSP IP/OBS MODERATE 35: CPT

## 2018-07-12 PROCEDURE — 84443 ASSAY THYROID STIM HORMONE: CPT

## 2018-07-12 PROCEDURE — 99285 EMERGENCY DEPT VISIT HI MDM: CPT | Mod: 25

## 2018-07-12 PROCEDURE — 85610 PROTHROMBIN TIME: CPT

## 2018-07-12 PROCEDURE — 96374 THER/PROPH/DIAG INJ IV PUSH: CPT

## 2018-07-12 PROCEDURE — 99239 HOSP IP/OBS DSCHRG MGMT >30: CPT

## 2018-07-12 PROCEDURE — 85379 FIBRIN DEGRADATION QUANT: CPT

## 2018-07-12 RX ADMIN — ATENOLOL 25 MILLIGRAM(S): 25 TABLET ORAL at 06:02

## 2018-07-12 RX ADMIN — ZOLPIDEM TARTRATE 5 MILLIGRAM(S): 10 TABLET ORAL at 00:23

## 2018-07-12 RX ADMIN — Medication 0.1 MILLIGRAM(S): at 06:01

## 2018-07-12 RX ADMIN — Medication 20 MILLIGRAM(S): at 06:01

## 2018-07-12 RX ADMIN — Medication 50 MILLIGRAM(S): at 11:11

## 2018-07-12 RX ADMIN — Medication 25 MILLIGRAM(S): at 06:01

## 2018-07-12 RX ADMIN — MONTELUKAST 5 MILLIGRAM(S): 4 TABLET, CHEWABLE ORAL at 11:10

## 2018-07-12 RX ADMIN — ENOXAPARIN SODIUM 40 MILLIGRAM(S): 100 INJECTION SUBCUTANEOUS at 06:02

## 2018-07-12 RX ADMIN — SERTRALINE 50 MILLIGRAM(S): 25 TABLET, FILM COATED ORAL at 11:11

## 2018-07-12 RX ADMIN — VALSARTAN 320 MILLIGRAM(S): 80 TABLET ORAL at 06:01

## 2018-07-12 NOTE — DISCHARGE NOTE ADULT - PATIENT PORTAL LINK FT
You can access the [email or web address] Patient Portal, offered by [facility], by registering with the following website: [email or web address]

## 2018-07-12 NOTE — DISCHARGE NOTE ADULT - CARE PROVIDER_API CALL
Jonathan Salazar), Medicine  [address]  [address]  [place], [place] [id number]  Phone: [phone]  Fax: [phone]    Etienne Dobbins), Cardiovascular Disease  [address]  [address]  [place] [address]  Phone: [phone]  Fax: [phone]

## 2018-07-12 NOTE — PROGRESS NOTE ADULT - SUBJECTIVE AND OBJECTIVE BOX
Follow up for heart failure     SUBJ:  no chest pains or shortness of breath    PMH  HTN (hypertension)  No pertinent past medical history      MEDICATIONS  (STANDING):  ATENolol  Tablet 25 milliGRAM(s) Oral daily  atorvastatin 10 milliGRAM(s) Oral at bedtime  cloNIDine 0.1 milliGRAM(s) Oral three times a day  diltiazem    Tablet 30 milliGRAM(s) Oral three times a day  enoxaparin Injectable 40 milliGRAM(s) SubCutaneous every 24 hours  furosemide   Injectable 20 milliGRAM(s) IV Push daily  hydrALAZINE 50 milliGRAM(s) Oral <User Schedule>  hydrALAZINE 25 milliGRAM(s) Oral <User Schedule>  hydrochlorothiazide 25 milliGRAM(s) Oral daily  montelukast 5 milliGRAM(s) Oral daily  sertraline 50 milliGRAM(s) Oral daily  valsartan 320 milliGRAM(s) Oral daily    MEDICATIONS  (PRN):  zolpidem 5 milliGRAM(s) Oral at bedtime PRN Insomnia        PHYSICAL EXAM:  Vital Signs Last 24 Hrs  T(C): 36.8 (12 Jul 2018 10:02), Max: 36.9 (11 Jul 2018 12:36)  T(F): 98.3 (12 Jul 2018 10:02), Max: 98.4 (11 Jul 2018 12:36)  HR: 64 (12 Jul 2018 10:02) (64 - 86)  BP: 110/67 (12 Jul 2018 10:02) (92/63 - 151/89)  BP(mean): --  RR: 16 (12 Jul 2018 10:02) (14 - 16)  SpO2: 99% (12 Jul 2018 10:02) (95% - 99%)    GENERAL: NAD, elderly woman appears stated age  HEAD:  Atraumatic, Normocephalic  EYES: EOMI, PERRLA, conjunctiva and sclera clear  ENT: Moist mucous membranes,  NECK: Supple, No JVD, no bruits  CHEST/LUNG: Clear to percussion bilaterally; No rales, rhonchi, wheezing, or rubs  HEART: Regular rate and rhythm; 11/vi murmur no rubs, or gallops PMI non displaced.  ABDOMEN: Soft, Nontender, Nondistended; Bowel sounds present  EXTREMITIES:  2+ Peripheral Pulses, No clubbing, cyanosis, or edema  SKIN: No rashes or lesions  NERVOUS SYSTEM:  Cranial Nerves II-XII intact      TELEMETRY:  afib    ECG:    < from: 12 Lead ECG (07.10.18 @ 20:12) >  Ventricular Rate 74 BPM    Atrial Rate 90 BPM    QRS Duration 110 ms    Q-T Interval 434 ms    QTC Calculation(Bezet) 481 ms    R Axis -14 degrees    T Axis -10 degrees    Diagnosis Line Atrial fibrillation  Incomplete right bundle branch block  Nonspecific ST and T wave abnormality  Abnormal ECG  When compared with ECG of 26-MAR-2017 21:30,  there does not appear to be a significant interval change   Confirmed by ALEKSANDR PLUMMER, JONI ANDERSON ([id number]) on 7/11/2018 8:18:46 AM    < end of copied text >    ECHO:    < from: TTE Echo Complete w/Doppler (07.11.18 @ 10:19) >  Summary:   1. Left ventricular ejection fraction, by visual estimation, is 60 to   65%.   2. Normal global left ventricular systolic function.   3. Spectral Doppler shows restrictive pattern of left ventricular   myocardial filling (Grade III diastolic dysfunction).   4. Moderately enlarged right ventricle.   5. Mild mitral annular calcification.   6. Thickening and calcification of the anterior and posterior mitral   valve leaflets.   7. Moderate tricuspid regurgitation.   8. Mild aortic regurgitation.   9. Sclerotic aortic valve with normal opening.  10. Estimated pulmonary artery systolic pressure is 58.4 mmHg assuming a   right atrial pressure of 10 mmHg, which is consistent with moderate   pulmonary hypertension.  11. LA volume Index is 66.0 ml/m² ml/m2.  12. The aortic valve mean gradient is 7.6 mmHg consistent with normally   opening aortic valve.    [id number] Joni Woody MD, [facility] , Electronically signed on 7/11/2018 at   3:00:05 PM    < end of copied text >      LABS:                        12.6   10.7  )-----------( 225      ( 12 Jul 2018 05:50 )             37.4     07-12    137  |  99  |  20  ----------------------------<  100<H>  4.0   |  33<H>  |  0.52    Ca    8.7      12 Jul 2018 05:50  Mg     1.7     07-11    TPro  6.8  /  Alb  3.1<L>  /  TBili  0.8  /  DBili  x   /  AST  26  /  ALT  27  /  AlkPhos  94  07-12    CARDIAC MARKERS ( 11 Jul 2018 00:10 )  <.017 ng/mL / x     / x     / x     / x          PT/INR - ( 12 Jul 2018 05:50 )   PT: 31.5 sec;   INR: 2.78 ratio             I&O's Summary    11 Jul 2018 07:01  -  12 Jul 2018 07:00  --------------------------------------------------------  IN: 900 mL / OUT: 306 mL / NET: 594 mL    12 Jul 2018 07:01  -  12 Jul 2018 11:22  --------------------------------------------------------  IN: 300 mL / OUT: 200 mL / NET: 100 mL      BNP    RADIOLOGY & ADDITIONAL STUDIES:    < from: Xray Chest 1 View- PORTABLE-Routine (07.10.18 @ 20:07) >  EXAM:  XR CHEST PORTABLE ROUTINE 1V      PROCEDURE DATE:  07/10/2018        INTERPRETATION:  Views:1  Comparison: 10/09/17  History: Rales, shortness of breath    The lungs are clear of infiltrates and effusions. There is moderate   cardiomegaly The cardiac and mediastinal contours appear otherwise   unremarkable.     Impression:  1. No evidence of acute pulmonary disease    < end of copied text >

## 2018-07-12 NOTE — PROGRESS NOTE ADULT - ASSESSMENT
chf  acute on chronic diastolic heart failure is compensated with non cardiogenic contribution as well with a borderline low albumin 3.1.   ACE ARB ARNI long acting beta blocker and aldactone  are discretionary with preserved left ventricular function.  patient is clinically stable and may be considered for discharge from a cardiac standpoint . discussed with dr peterson.

## 2018-07-12 NOTE — DISCHARGE NOTE ADULT - MEDICATION SUMMARY - MEDICATIONS TO TAKE
I will START or STAY ON the medications listed below when I get home from the hospital:    valsartan 320 mg oral tablet  -- 1 tab(s) by mouth once a day  -- Indication: For HTN (hypertension)    cloNIDine 0.1 mg oral tablet  -- 1 tab(s) by mouth 3 times a day  -- Indication: For HTN (hypertension)    dilTIAZem 30 mg oral tablet  -- 1 tab(s) by mouth 4 times a day but only takes it TID.   -- Indication: For Chronic atrial fibrillation    Coumadin 5 mg oral tablet  -- 1 tab(s) by mouth once a day (at bedtime)  -- Indication: For Chronic atrial fibrillation    sertraline 50 mg oral tablet  -- 1 tab(s) by mouth once a day  -- Indication: For depression    Lipitor 10 mg oral tablet  -- 1 tab(s) by mouth once a day  -- Indication: For Hyperlipidemia    ALPRAZolam 0.25 mg oral tablet  -- 1 tab(s) by mouth once a day, As Needed  -- Indication: For Anxiety    zolpidem 5 mg oral tablet  -- 1 tab(s) by mouth once a day (at bedtime), As Needed  -- Indication: For sleep    atenolol 25 mg oral tablet  -- 1 tab(s) by mouth once a day  -- Indication: For HTN (hypertension)    hydroCHLOROthiazide 25 mg oral tablet  -- 1 tab(s) by mouth once a day  -- Indication: For HTN (hypertension)    Singulair 10 mg oral tablet  -- 0.5 tab(s) by mouth once a day  -- Indication: For Allergy    hydrALAZINE 25 mg oral tablet  -- 2 tab(s) by mouth 2 times a day  -- Indication: For HTN (hypertension)    hydrALAZINE 25 mg oral tablet  -- 1 tab(s) by mouth once a day (at bedtime)  -- Indication: For HTN (hypertension)

## 2018-07-12 NOTE — DISCHARGE NOTE ADULT - CARE PLAN
Principal Discharge DX:	Left hip pain  Goal:	Pt at home  Assessment and plan of treatment:	PT at home  pain control  no fracture  Secondary Diagnosis:	Chronic diastolic congestive heart failure  Secondary Diagnosis:	Chronic atrial fibrillation  Secondary Diagnosis:	Essential hypertension  Secondary Diagnosis:	Pure hypercholesterolemia  Secondary Diagnosis:	Anticoagulated

## 2018-07-12 NOTE — DISCHARGE NOTE ADULT - SECONDARY DIAGNOSIS.
Chronic diastolic congestive heart failure Chronic atrial fibrillation Essential hypertension Pure hypercholesterolemia Anticoagulated

## 2018-07-16 ENCOUNTER — RX RENEWAL (OUTPATIENT)
Age: 83
End: 2018-07-16

## 2018-07-16 ENCOUNTER — APPOINTMENT (OUTPATIENT)
Dept: FAMILY MEDICINE | Facility: CLINIC | Age: 83
End: 2018-07-16
Payer: MEDICARE

## 2018-07-16 VITALS
RESPIRATION RATE: 16 BRPM | SYSTOLIC BLOOD PRESSURE: 110 MMHG | WEIGHT: 155 LBS | HEIGHT: 60 IN | BODY MASS INDEX: 30.43 KG/M2 | HEART RATE: 61 BPM | OXYGEN SATURATION: 97 % | DIASTOLIC BLOOD PRESSURE: 60 MMHG

## 2018-07-16 DIAGNOSIS — K21.9 GASTRO-ESOPHAGEAL REFLUX DISEASE W/OUT ESOPHAGITIS: ICD-10-CM

## 2018-07-16 PROCEDURE — 99496 TRANSJ CARE MGMT HIGH F2F 7D: CPT

## 2018-07-16 NOTE — HEALTH RISK ASSESSMENT
[No falls in past year] : Patient reported no falls in the past year [0] : 2) Feeling down, depressed, or hopeless: Not at all (0) [] : No [[id number]] : 0

## 2018-07-16 NOTE — ASSESSMENT
[FreeTextEntry1] : Assessment and plan:\par \par Patient is on multiple medications reviewed entire list of medications we will continue present medical management without change. I will add omeprazole 40 mg daily for reflux, physical therapy to evaluate and treat regarding hip pain. Patient with chronic renal insufficiency. Therefore, no medication that is metabolized by kidney therefore, no renal toxic drugs should the patient take. For the severe hip pain. We will attempt oxycodone 5 mg to take only twice a day only as needed for severe pain. Side effects discussed with patient and caretaker since the patient is high risk of falls.

## 2018-07-16 NOTE — REVIEW OF SYSTEMS
[Fatigue] : fatigue [Hearing Loss] : hearing loss [Palpitations] : palpitations [Lower Ext Edema] : lower extremity edema [Dyspnea on Exertion] : dyspnea on exertion [Joint Pain] : joint pain [Joint Stiffness] : joint stiffness [Muscle Weakness] : muscle weakness [Muscle Pain] : muscle pain [Back Pain] : back pain [Insomnia] : insomnia [Anxiety] : anxiety [Depression] : depression [Negative] : Heme/Lymph [Earache] : no earache [Nosebleed] : no nosebleeds [Hoarseness] : no hoarseness [Nasal Discharge] : no nasal discharge [Sore Throat] : no sore throat [Shortness Of Breath] : no shortness of breath [Wheezing] : no wheezing [Cough] : no cough [Joint Swelling] : no joint swelling [Headache] : no headache [Fainting] : no fainting [Confusion] : no confusion [Memory Loss] : no memory loss [Suicidal] : not suicidal [Easy Bleeding] : no easy bleeding [Easy Bruising] : no easy bruising [Swollen Glands] : no swollen glands

## 2018-07-16 NOTE — PHYSICAL EXAM
[No Acute Distress] : no acute distress [Well Nourished] : well nourished [Well Developed] : well developed [Well-Appearing] : well-appearing [Normal Voice/Communication] : normal voice/communication [Normal Sclera/Conjunctiva] : normal sclera/conjunctiva [PERRL] : pupils equal round and reactive to light [EOMI] : extraocular movements intact [Normal Outer Ear/Nose] : the outer ears and nose were normal in appearance [Normal Oropharynx] : the oropharynx was normal [No JVD] : no jugular venous distention [Supple] : supple [No Lymphadenopathy] : no lymphadenopathy [Thyroid Normal, No Nodules] : the thyroid was normal and there were no nodules present [No Respiratory Distress] : no respiratory distress  [Clear to Auscultation] : lungs were clear to auscultation bilaterally [No Accessory Muscle Use] : no accessory muscle use [Normal Rate] : normal rate  [No Murmur] : no murmur heard [No Carotid Bruits] : no carotid bruits [No Abdominal Bruit] : a ~M bruit was not heard ~T in the abdomen [No Varicosities] : no varicosities [Pedal Pulses Present] : the pedal pulses are present [No Extremity Clubbing/Cyanosis] : no extremity clubbing/cyanosis [No Palpable Aorta] : no palpable aorta [Soft] : abdomen soft [Non Tender] : non-tender [Non-distended] : non-distended [No Masses] : no abdominal mass palpated [No HSM] : no HSM [Normal Bowel Sounds] : normal bowel sounds [Normal Posterior Cervical Nodes] : no posterior cervical lymphadenopathy [Normal Anterior Cervical Nodes] : no anterior cervical lymphadenopathy [No CVA Tenderness] : no CVA  tenderness [No Spinal Tenderness] : no spinal tenderness [No Joint Swelling] : no joint swelling [Grossly Normal Strength/Tone] : grossly normal strength/tone [No Rash] : no rash [Coordination Grossly Intact] : coordination grossly intact [No Focal Deficits] : no focal deficits [Normal Affect] : the affect was normal [Normal Insight/Judgement] : insight and judgment were intact [High Complexity requires an extensive number of possible diagnoses and/or the management options, extensive complexity of the medical data (tests, etc.) to be reviewed, and a high risk of significant complications, morbidity, and/or mortality as well as c] : High Complexity  [de-identified] : Impacted cerumen bilaterally [de-identified] : irregularly irregular [de-identified] : Trace pretibial edema [de-identified] : Unsteady gait secondary to severe joint pain,Patient ambulates with cane

## 2018-07-16 NOTE — HISTORY OF PRESENT ILLNESS
[Post-hospitalization from ___ Hospital] : Post-hospitalization from [unfilled] Hospital [Admitted on: ___] : The patient was admitted on [unfilled] [Discharged on ___] : discharged on [unfilled] [Discharge Summary] : discharge summary [Pertinent Labs] : pertinent labs [Radiology Findings] : radiology findings [Discharge Med List] : discharge medication list [Med Reconciliation] : medication reconciliation has been completed [Patient Contacted By: ____] : and contacted by [unfilled] [FreeTextEntry2] : Patient is an 83-year-old female, who presents today for followup status post admission to hospital. Patient had presented to the hospital on July 10 with the chief complaint of left hip pain. Subsequently , the patient was found to be in congestive heart failure. Patient has history of chronic diastolic congestive heart failure, chronic atrial fibrillation, essential hypertension, hyperlipidemia, and chronic anticoagulation. Patient was discharged on July 12 on valsartan 320 mg daily, clonidine 0.1 mg 3 times a day, diltiazem, 30 mg p.o. 4 times a day, Coumadin, 5 mg p.o. daily, sertraline 50 mg p.o. daily, Lipitor 10 mg p.o. daily, alprazolam 0.25 mg as needed, Ambien 5 mg at bedtime, atenolol 25 mg daily, hydrochlorothiazide 25 mg daily, and hydralazine 25 mg p.o. twice a day, and Singulair 10 mg p.o. daily.\par \par Patient presently complaining of heartburn described by the patient as reflux. She she does be an acidity going from the epigastric area to her throat to

## 2018-07-23 ENCOUNTER — LABORATORY RESULT (OUTPATIENT)
Age: 83
End: 2018-07-23

## 2018-07-23 ENCOUNTER — APPOINTMENT (OUTPATIENT)
Dept: PULMONOLOGY | Facility: CLINIC | Age: 83
End: 2018-07-23
Payer: MEDICARE

## 2018-07-23 VITALS
RESPIRATION RATE: 16 BRPM | DIASTOLIC BLOOD PRESSURE: 90 MMHG | BODY MASS INDEX: 29.45 KG/M2 | SYSTOLIC BLOOD PRESSURE: 130 MMHG | HEIGHT: 60 IN | OXYGEN SATURATION: 97 % | WEIGHT: 150 LBS | TEMPERATURE: 100.6 F | HEART RATE: 79 BPM

## 2018-07-23 PROCEDURE — 99205 OFFICE O/P NEW HI 60 MIN: CPT | Mod: 25

## 2018-07-23 PROCEDURE — 36415 COLL VENOUS BLD VENIPUNCTURE: CPT

## 2018-07-24 LAB
25(OH)D3 SERPL-MCNC: 28 NG/ML
A1AT SERPL-MCNC: 162 MG/DL
ALBUMIN SERPL ELPH-MCNC: 4.6 G/DL
ALP BLD-CCNC: 108 U/L
ALT SERPL-CCNC: 26 U/L
ANION GAP SERPL CALC-SCNC: 17 MMOL/L
APTT BLD: 70.6 SEC
AST SERPL-CCNC: 35 U/L
BASOPHILS # BLD AUTO: 0 K/UL
BASOPHILS NFR BLD AUTO: 0 %
BILIRUB SERPL-MCNC: 0.5 MG/DL
BUN SERPL-MCNC: 7 MG/DL
CALCIUM SERPL-MCNC: 10.2 MG/DL
CALCIUM SERPL-MCNC: 10.2 MG/DL
CARDIOLIPIN AB SER IA-ACNC: POSITIVE
CHLORIDE SERPL-SCNC: 91 MMOL/L
CO2 SERPL-SCNC: 27 MMOL/L
CREAT SERPL-MCNC: 0.63 MG/DL
DEPRECATED KAPPA LC FREE/LAMBDA SER: 0.75 RATIO
EOSINOPHIL # BLD AUTO: 0.18 K/UL
EOSINOPHIL NFR BLD AUTO: 1.7 %
ERYTHROCYTE [SEDIMENTATION RATE] IN BLOOD BY WESTERGREN METHOD: 48 MM/HR
GLUCOSE SERPL-MCNC: 94 MG/DL
HCT VFR BLD CALC: 44.4 %
HGB BLD-MCNC: 14.3 G/DL
IGA SER QL IEP: 365 MG/DL
IGG SER QL IEP: 1161 MG/DL
IGM SER QL IEP: 333 MG/DL
KAPPA LC CSF-MCNC: 1.9 MG/DL
KAPPA LC SERPL-MCNC: 1.42 MG/DL
LYMPHOCYTES # BLD AUTO: 0.97 K/UL
LYMPHOCYTES NFR BLD AUTO: 9.1 %
MAN DIFF?: NORMAL
MCHC RBC-ENTMCNC: 27.2 PG
MCHC RBC-ENTMCNC: 32.2 GM/DL
MCV RBC AUTO: 84.6 FL
MONOCYTES # BLD AUTO: 1.23 K/UL
MONOCYTES NFR BLD AUTO: 11.6 %
NEUTROPHILS # BLD AUTO: 8.23 K/UL
NEUTROPHILS NFR BLD AUTO: 77.6 %
NT-PROBNP SERPL-MCNC: 1668 PG/ML
PARATHYROID HORMONE INTACT: 69 PG/ML
PHOSPHATE SERPL-MCNC: 3.9 MG/DL
PLATELET # BLD AUTO: 332 K/UL
POTASSIUM SERPL-SCNC: 3.7 MMOL/L
PROT SERPL-MCNC: 8.2 G/DL
RBC # BLD: 5.25 M/UL
RBC # FLD: 16.3 %
RHEUMATOID FACT SER QL: <10 IU/ML
SODIUM SERPL-SCNC: 135 MMOL/L
TSH SERPL-ACNC: 1.64 UIU/ML
URATE SERPL-MCNC: 4 MG/DL
VIT B12 SERPL-MCNC: 417 PG/ML
WBC # FLD AUTO: 10.61 K/UL

## 2018-07-25 LAB
ADJUSTED MITOGEN: 0.19 IU/ML
ADJUSTED TB AG: 0.04 IU/ML
CCP AB SER IA-ACNC: <8 UNITS
INR PPP: 3.04 RATIO
M TB IFN-G BLD-IMP: ABNORMAL
PT BLD: 35.1 SEC
QUANTIFERON GOLD NIL: 0.02 IU/ML
RF+CCP IGG SER-IMP: NEGATIVE
TOTAL IGE SMQN RAST: 13 KU/L

## 2018-07-26 LAB
ANA PAT FLD IF-IMP: ABNORMAL
ANACR T: ABNORMAL
ENA SCL70 IGG SER IA-ACNC: <0.2 AL
ENA SS-A AB SER IA-ACNC: <0.2 AL
ENA SS-B AB SER IA-ACNC: <0.2 AL

## 2018-07-30 ENCOUNTER — APPOINTMENT (OUTPATIENT)
Dept: PULMONOLOGY | Facility: CLINIC | Age: 83
End: 2018-07-30
Payer: MEDICARE

## 2018-07-30 PROCEDURE — 94729 DIFFUSING CAPACITY: CPT

## 2018-07-30 PROCEDURE — 94010 BREATHING CAPACITY TEST: CPT | Mod: 59

## 2018-08-01 ENCOUNTER — RX RENEWAL (OUTPATIENT)
Age: 83
End: 2018-08-01

## 2018-08-01 ENCOUNTER — NON-APPOINTMENT (OUTPATIENT)
Age: 83
End: 2018-08-01

## 2018-08-01 ENCOUNTER — APPOINTMENT (OUTPATIENT)
Dept: CARDIOLOGY | Facility: CLINIC | Age: 83
End: 2018-08-01
Payer: MEDICARE

## 2018-08-01 VITALS
WEIGHT: 158 LBS | BODY MASS INDEX: 31.02 KG/M2 | HEIGHT: 60 IN | HEART RATE: 64 BPM | DIASTOLIC BLOOD PRESSURE: 85 MMHG | SYSTOLIC BLOOD PRESSURE: 126 MMHG | RESPIRATION RATE: 17 BRPM | OXYGEN SATURATION: 94 %

## 2018-08-01 LAB
INR PPP: 5.1 RATIO
POCT-PROTHROMBIN TIME: 60.6 SECS
QUALITY CONTROL: YES

## 2018-08-01 PROCEDURE — 85610 PROTHROMBIN TIME: CPT | Mod: QW

## 2018-08-01 PROCEDURE — 93000 ELECTROCARDIOGRAM COMPLETE: CPT

## 2018-08-01 PROCEDURE — 99214 OFFICE O/P EST MOD 30 MIN: CPT

## 2018-08-13 ENCOUNTER — APPOINTMENT (OUTPATIENT)
Dept: FAMILY MEDICINE | Facility: CLINIC | Age: 83
End: 2018-08-13
Payer: MEDICARE

## 2018-08-13 VITALS
SYSTOLIC BLOOD PRESSURE: 132 MMHG | OXYGEN SATURATION: 95 % | HEART RATE: 75 BPM | WEIGHT: 158 LBS | DIASTOLIC BLOOD PRESSURE: 82 MMHG | HEIGHT: 60 IN | BODY MASS INDEX: 31.02 KG/M2 | TEMPERATURE: 98.3 F

## 2018-08-13 VITALS — RESPIRATION RATE: 16 BRPM

## 2018-08-13 PROCEDURE — 99214 OFFICE O/P EST MOD 30 MIN: CPT

## 2018-08-13 NOTE — ASSESSMENT
[FreeTextEntry1] : Assessment and plan:\par \par Patient does have polypharmacy, but she does have multiple medical problems. Presently, 6 of the medications, that she's taken and if we include Coumadin 7 of the medications are wall for cardiac reasons. Our recommendations are to continue present medical management would not change serial PT/INR to make certain that the Coumadin is therapeutic. Continue alprazolam for anxiety and Ambien for insomnia. We have attempted to stop Ambien, which was almost impossible. The patient will not sleep unless she takes Ambien. The family and the patient is aware that Ambien can cause false so she has to be extremely careful especially at night and it can cause nightmares, which the patient is not complaining of.\par \par Comprehensive followup blood work will be drawn at next visit. Cardiology is adjusting Coumadin.

## 2018-08-13 NOTE — HEALTH RISK ASSESSMENT
[No falls in past year] : Patient reported no falls in the past year [0] : 2) Feeling down, depressed, or hopeless: Not at all (0) [] : No [[id number]] : 0

## 2018-08-13 NOTE — PHYSICAL EXAM
[No Acute Distress] : no acute distress [Well Nourished] : well nourished [Well Developed] : well developed [Well-Appearing] : well-appearing [Normal Voice/Communication] : normal voice/communication [Normal Sclera/Conjunctiva] : normal sclera/conjunctiva [PERRL] : pupils equal round and reactive to light [EOMI] : extraocular movements intact [Normal Outer Ear/Nose] : the outer ears and nose were normal in appearance [Normal Oropharynx] : the oropharynx was normal [No JVD] : no jugular venous distention [Supple] : supple [No Lymphadenopathy] : no lymphadenopathy [Thyroid Normal, No Nodules] : the thyroid was normal and there were no nodules present [No Respiratory Distress] : no respiratory distress  [Clear to Auscultation] : lungs were clear to auscultation bilaterally [No Accessory Muscle Use] : no accessory muscle use [Normal Rate] : normal rate  [No Murmur] : no murmur heard [No Carotid Bruits] : no carotid bruits [No Abdominal Bruit] : a ~M bruit was not heard ~T in the abdomen [No Varicosities] : no varicosities [Pedal Pulses Present] : the pedal pulses are present [No Extremity Clubbing/Cyanosis] : no extremity clubbing/cyanosis [No Palpable Aorta] : no palpable aorta [Soft] : abdomen soft [Non Tender] : non-tender [Non-distended] : non-distended [No Masses] : no abdominal mass palpated [No HSM] : no HSM [Normal Bowel Sounds] : normal bowel sounds [Normal Posterior Cervical Nodes] : no posterior cervical lymphadenopathy [Normal Anterior Cervical Nodes] : no anterior cervical lymphadenopathy [No CVA Tenderness] : no CVA  tenderness [No Spinal Tenderness] : no spinal tenderness [No Joint Swelling] : no joint swelling [Grossly Normal Strength/Tone] : grossly normal strength/tone [No Rash] : no rash [Coordination Grossly Intact] : coordination grossly intact [No Focal Deficits] : no focal deficits [Normal Affect] : the affect was normal [Normal Insight/Judgement] : insight and judgment were intact [de-identified] : Impacted cerumen bilaterally [de-identified] : irregularly irregular [de-identified] : Trace pretibial edema [de-identified] : Unsteady gait secondary to severe joint pain,Patient ambulates with cane

## 2018-08-13 NOTE — HISTORY OF PRESENT ILLNESS
[Congestive Heart Failure] : Congestive Heart Failure [Coronary Artery Disease] : Coronary Artery Disease [Hyperlipidemia] : Hyperlipidemia [Hypertension] : Hypertension [Other: ___] : [unfilled] [Checks BP Sporadically] : The patient checks ~his/her~ blood pressure sporadically [<130/90] : Target blood pressure is  <130/90 [Target goal met] : BP target goal met [Doing Well] : Patient is doing well [Moderate Intensity Therapy] : Patient is currently on moderate intensity statin  therapy [Systolic] : systolic [Weight Gain] : weight gain [Edema] : edema [With minimal physical activity] : Shortness of breath with minimal physical activity [Checks Weight Regularly] : The patient checks ~his/her~ weight regularly [Stable] : The condition is stable [Stable] : Overall status has been stable [FreeTextEntry6] : Patient is an 83-year-old female, who presents today for followup disease. Management, accompanied by her aide. Patient has multiple medical problems and also polypharmacy. Medical history is significant for chronic congestive heart failure, presently well controlled, chronic renal insufficiency, anemia, due to chronic kidney disease, anemia, hypertension, and chronic insomnia.\par \par Patient is followed very closely by hematology, oncology, for marginal zone B-cell lymphoma, and MGUS. Patient's bone pain is also secondary to osteoporosis.\par \par Patient presently is awake, alert, and oriented x3 in no acute distress. She is calm and cooperative. [Symptoms Limit Activities] : Symptoms do not limit ~his/her~ activities

## 2018-08-13 NOTE — COUNSELING
[Weight management counseling provided] : Weight management [Healthy eating counseling provided] : healthy eating [Activity counseling provided] : activity [Disease Management counseling provided] : disease management  [Behavioral health counseling provided] : behavioral health  [Take medications as directed] : Take medications as directed [Maintain symptoms  log] : Maintain symptoms log [Take your weight daily] : Take your weight daily [Check BP at home ___ x/week] : Check blood pressure at home [unfilled] times per week [Keep BP log to bring to next visit] : Keep BP log to bring to next visit [Avoid Social Isolation] : Avoid social isolation [Sleep ___ hours/day] : Sleep [unfilled] hours/day [Engage in a relaxing activity] : Engage in a relaxing activity [Plan in advance] : Plan in advance [None] : None [Good understanding] : Patient has a good understanding of lifestyle changes and the steps needed to achieve self management goals

## 2018-08-14 LAB
INR PPP: 1.08
PT BLD: 12.2

## 2018-08-15 ENCOUNTER — RX RENEWAL (OUTPATIENT)
Age: 83
End: 2018-08-15

## 2018-08-16 ENCOUNTER — APPOINTMENT (OUTPATIENT)
Dept: CT IMAGING | Facility: HOSPITAL | Age: 83
End: 2018-08-16
Payer: MEDICARE

## 2018-08-16 ENCOUNTER — OUTPATIENT (OUTPATIENT)
Dept: OUTPATIENT SERVICES | Facility: HOSPITAL | Age: 83
LOS: 1 days | End: 2018-08-16
Payer: MEDICARE

## 2018-08-16 DIAGNOSIS — R06.09 OTHER FORMS OF DYSPNEA: ICD-10-CM

## 2018-08-16 PROCEDURE — 71250 CT THORAX DX C-: CPT | Mod: 26

## 2018-08-16 PROCEDURE — 71250 CT THORAX DX C-: CPT

## 2018-08-17 LAB — INR PPP: 1.07

## 2018-08-20 ENCOUNTER — RX RENEWAL (OUTPATIENT)
Age: 83
End: 2018-08-20

## 2018-08-22 LAB
INR PPP: 1.69
PT BLD: 19.3

## 2018-08-30 LAB — INR PPP: 4.5

## 2018-09-07 LAB
INR PPP: 1.7
PT BLD: 19.4

## 2018-09-26 ENCOUNTER — APPOINTMENT (OUTPATIENT)
Dept: PULMONOLOGY | Facility: CLINIC | Age: 83
End: 2018-09-26

## 2018-09-27 LAB
INR PPP: 1.47
PT BLD: 16.8

## 2018-09-28 ENCOUNTER — RX RENEWAL (OUTPATIENT)
Age: 83
End: 2018-09-28

## 2018-10-04 ENCOUNTER — RX RENEWAL (OUTPATIENT)
Age: 83
End: 2018-10-04

## 2018-10-04 LAB
INR PPP: 2.89
PT BLD: 33.4

## 2018-10-11 LAB
INR PPP: 3.36
PT BLD: 38.9

## 2018-11-06 NOTE — PLAN
[FreeTextEntry1] : spoke with aide who was present during encounter and spoke to daughter Karlee over the phone. abdominal imaging an labs. if worsening s/s, go to ER.

## 2018-11-06 NOTE — PHYSICAL EXAM
[No Acute Distress] : no acute distress [Well Nourished] : well nourished [Well Developed] : well developed [Well-Appearing] : well-appearing [Normal Sclera/Conjunctiva] : normal sclera/conjunctiva [PERRL] : pupils equal round and reactive to light [EOMI] : extraocular movements intact [Normal Outer Ear/Nose] : the outer ears and nose were normal in appearance [Normal Oropharynx] : the oropharynx was normal [No JVD] : no jugular venous distention [Supple] : supple [No Lymphadenopathy] : no lymphadenopathy [Thyroid Normal, No Nodules] : the thyroid was normal and there were no nodules present [No Respiratory Distress] : no respiratory distress  [Clear to Auscultation] : lungs were clear to auscultation bilaterally [No Accessory Muscle Use] : no accessory muscle use [Normal Rate] : normal rate  [Regular Rhythm] : with a regular rhythm [Normal S1, S2] : normal S1 and S2 [No Murmur] : no murmur heard [No Extremity Clubbing/Cyanosis] : no extremity clubbing/cyanosis [Soft] : abdomen soft [Non Tender] : non-tender [Non-distended] : non-distended [No Masses] : no abdominal mass palpated [No HSM] : no HSM [Normal Bowel Sounds] : normal bowel sounds [Normal Posterior Cervical Nodes] : no posterior cervical lymphadenopathy [Normal Anterior Cervical Nodes] : no anterior cervical lymphadenopathy [No CVA Tenderness] : no CVA  tenderness [No Spinal Tenderness] : no spinal tenderness [No Joint Swelling] : no joint swelling [Grossly Normal Strength/Tone] : grossly normal strength/tone [No Rash] : no rash [Normal Gait] : normal gait [Coordination Grossly Intact] : coordination grossly intact [No Focal Deficits] : no focal deficits [Deep Tendon Reflexes (DTR)] : deep tendon reflexes were 2+ and symmetric [Normal Affect] : the affect was normal [Normal Insight/Judgement] : insight and judgment were intact [de-identified] : pedal edema 1-2 plus

## 2018-11-06 NOTE — HEALTH RISK ASSESSMENT
[No falls in past year] : Patient reported no falls in the past year [0] : 2) Feeling down, depressed, or hopeless: Not at all (0) [] : No [[id number]] : 0

## 2018-11-06 NOTE — HISTORY OF PRESENT ILLNESS
[Moderate] : moderate [___ Weeks ago] : [unfilled] weeks ago [Constant] : constant [Nausea] : nausea [Vomiting] : vomiting [Abdominal Pain] : abdominal pain [At Night] : at night [Worsening] : worsening [Diarrhea] : no diarrhea [Constipation] : no constipation [Anorexia] : no anorexia [Sore Throat] : no sore throat [FreeTextEntry7] : lower abdomen non specific location [FreeTextEntry8] : Here for evaluation of abdominal pain and vomiting for 2 weeks, no sick contacts, recent travel, no usual food consumed, no blood in stool.

## 2018-11-06 NOTE — COUNSELING
[Activity counseling provided] : activity [Behavioral health counseling provided] : behavioral health  [Engage in a relaxing activity] : Engage in a relaxing activity [None] : None [Needs reinforcement, provided] : Patient needs reinforcement on understanding lifestyle changes and  the steps needed to achieve self management goals and reinforcement was provided

## 2018-11-14 NOTE — PHYSICAL EXAM
[Capable of only limited self care, confined to bed or chair more than 50% of waking hours] : Status 3- Capable of only limited self care, confined to bed or chair more than 50% of waking hours [Normal] : well developed, well nourished, in no acute distress [de-identified] : frail appearinf elderly female

## 2018-11-14 NOTE — ASSESSMENT
[FreeTextEntry1] : Ms. BRANHAM and his son 's questions were answered to their satisfaction. She  expressed her  understanding and willingness to comply with the above recommendations, and  will return to the office in 6 months.\par \par \par

## 2018-11-14 NOTE — REVIEW OF SYSTEMS
[Fatigue] : fatigue [Joint Pain] : joint pain [Negative] : Integumentary [Night Sweats] : no night sweats [Recent Change In Weight] : ~T no recent weight change [Abdominal Pain] : abdominal pain [FreeTextEntry7] : lower abdominal pain with palpation. [FreeTextEntry9] : left hip discomfort with weight bearing

## 2018-11-14 NOTE — HISTORY OF PRESENT ILLNESS
[de-identified] : 84 years old female referred with normochromic, normocytic anemia, and abnormal serum protein electrophoresis. [FreeTextEntry1] : Bone marrow - May 15, 2018 [de-identified] : Patient returning for followup 6 months after bone marrow studies (May 15, 2018) which showed MGUS and possible marginal zone lymphoma. In addition she underwent CT of the abdomen on November 8, 2018 due to abdominal pain; results were unremarkable for lymphadenopathy.She was evaluated by GI (Dr. Ward) today; possible endoscopic studies to be done in near future. Patient is feeling weaker, appetite and weight preserved. Medication list reviewed. Patient denies falls, syncopal episodes.Accompanied by provate aid.

## 2018-11-14 NOTE — REASON FOR VISIT
[Follow-Up Visit] : a follow-up visit for [Formal Caregiver] : formal caregiver [FreeTextEntry2] : plasma cell dyscrasia

## 2018-11-15 PROBLEM — R31.29 MICROSCOPIC HEMATURIA: Status: ACTIVE | Noted: 2018-01-01

## 2018-11-15 NOTE — PHYSICAL EXAM
[No Acute Distress] : no acute distress [Well Nourished] : well nourished [Well Developed] : well developed [Well-Appearing] : well-appearing [Normal Voice/Communication] : normal voice/communication [Normal Sclera/Conjunctiva] : normal sclera/conjunctiva [PERRL] : pupils equal round and reactive to light [EOMI] : extraocular movements intact [Normal Outer Ear/Nose] : the outer ears and nose were normal in appearance [Normal Oropharynx] : the oropharynx was normal [No JVD] : no jugular venous distention [Supple] : supple [No Lymphadenopathy] : no lymphadenopathy [Thyroid Normal, No Nodules] : the thyroid was normal and there were no nodules present [No Respiratory Distress] : no respiratory distress  [Clear to Auscultation] : lungs were clear to auscultation bilaterally [No Accessory Muscle Use] : no accessory muscle use [Normal Rate] : normal rate  [No Murmur] : no murmur heard [No Carotid Bruits] : no carotid bruits [No Abdominal Bruit] : a ~M bruit was not heard ~T in the abdomen [No Varicosities] : no varicosities [Pedal Pulses Present] : the pedal pulses are present [No Extremity Clubbing/Cyanosis] : no extremity clubbing/cyanosis [No Palpable Aorta] : no palpable aorta [Soft] : abdomen soft [Non Tender] : non-tender [Non-distended] : non-distended [No Masses] : no abdominal mass palpated [Normal Bowel Sounds] : normal bowel sounds [Normal Posterior Cervical Nodes] : no posterior cervical lymphadenopathy [Normal Anterior Cervical Nodes] : no anterior cervical lymphadenopathy [No CVA Tenderness] : no CVA  tenderness [No Spinal Tenderness] : no spinal tenderness [No Joint Swelling] : no joint swelling [Grossly Normal Strength/Tone] : grossly normal strength/tone [No Rash] : no rash [Coordination Grossly Intact] : coordination grossly intact [No Focal Deficits] : no focal deficits [Normal Affect] : the affect was normal [Normal Insight/Judgement] : insight and judgment were intact [de-identified] : irregularly irregular [de-identified] : no guarding,Right lower cautery pain, secondary to inguinal hernia [de-identified] : Unsteady gait secondary to severe joint pain,Patient ambulates with cane

## 2018-11-15 NOTE — HISTORY OF PRESENT ILLNESS
[Formal Caregiver] : formal caregiver [FreeTextEntry1] : See history of present illness [de-identified] : Patient is an 84-year-old female, who presents today for followup appointment. Chief complaint is abdominal discomfort. Recent CT scan shows that she has a right inguinal hernia. She was seen by gastroenterology is scheduled for colonoscopy and also scheduled with surgery to have right inguinal hernia repair. Recently patient was also diagnosed with microscopic hematuria. Referral for urology will be given. Patient does have multiple medical problems which include hypertension, hyperlipidemia, rheumatoid arthritis, chronic renal insufficiency with anemia of chronic disease, insomnia, and anxiety.\par \par Patient presently denies any chest pain, but it is a recurrent problem and admits to exertional dyspnea. Patient denies any palpitations. She has a history of atrial fibrillation and rate is well controlled.

## 2018-11-15 NOTE — HEALTH RISK ASSESSMENT
[Patient declined PAP Smear] : Patient declined PAP Smear [HIV test declined] : HIV test declined [None] : None [With Family] : lives with family [Retired] : retired [Less Than High School] : less than high school [] :  [Feels Safe at Home] : Feels safe at home [Independent] : using telephone [Full assistance needed] : managing finances [No falls in past year] : Patient reported no falls in the past year [0] : 2) Feeling down, depressed, or hopeless: Not at all (0) [Change in mental status noted] : No change in mental status noted [Language] : denies difficulty with language [Behavior] : denies difficulty with behavior [Learning/Retaining New Information] : denies difficulty learning/retaining new information [Handling Complex Tasks] : denies difficulty handling complex tasks [Reasoning] : denies difficulty with reasoning [Spatial Ability and Orientation] : denies difficulty with spatial ability and orientation [Sexually Active] : not sexually active [High Risk Behavior] : no high risk behavior [ColonoscopyComments] : Scheduled [] : No [[id number]] : 0

## 2018-11-15 NOTE — REVIEW OF SYSTEMS
[Fatigue] : fatigue [Hearing Loss] : hearing loss [Palpitations] : palpitations [Lower Ext Edema] : lower extremity edema [Dyspnea on Exertion] : dyspnea on exertion [Abdominal Pain] : abdominal pain [Joint Pain] : joint pain [Joint Stiffness] : joint stiffness [Muscle Weakness] : muscle weakness [Muscle Pain] : muscle pain [Back Pain] : back pain [Insomnia] : insomnia [Anxiety] : anxiety [Depression] : depression [Negative] : Heme/Lymph [Earache] : no earache [Nosebleed] : no nosebleeds [Hoarseness] : no hoarseness [Nasal Discharge] : no nasal discharge [Sore Throat] : no sore throat [Shortness Of Breath] : no shortness of breath [Wheezing] : no wheezing [Cough] : no cough [Joint Swelling] : no joint swelling [Headache] : no headache [Fainting] : no fainting [Confusion] : no confusion [Memory Loss] : no memory loss [Suicidal] : not suicidal [Easy Bleeding] : no easy bleeding [Easy Bruising] : no easy bruising [Swollen Glands] : no swollen glands

## 2018-11-15 NOTE — ASSESSMENT
[FreeTextEntry1] : Assessment and plan:\par \par 1. Continue present medical management without change.\par \par 2. Microscopic hematuria. Urology referral.\par \par 3. Abdominal pain, secondary to right inguinal hernia. Followup with gastroenterology for colonoscopy and general surgeon for possible repair of hernia.\par \par 4. Health maintenance issues addressed influenza vaccine administered, and Prevnar 13.

## 2018-11-22 PROBLEM — J45.21 MILD INTERMITTENT ASTHMATIC BRONCHITIS WITH ACUTE EXACERBATION: Status: RESOLVED | Noted: 2017-03-30 | Resolved: 2018-01-01

## 2018-12-05 NOTE — CARDIOLOGY SUMMARY
[___] : [unfilled] [No Ischemia] : no Ischemia [None] : normal LV function [Moderate] : moderate pulmonary hypertension [LVEF ___%] : LVEF [unfilled]%

## 2018-12-05 NOTE — DISCUSSION/SUMMARY
[Non-specific ECG Changes] : abnormal ECG [Coronary Artery Disease] : coronary artery disease [Echocardiogram] : an echocardiogram [Atrial Fibrillation] : atrial fibrillation [Controlled Ventricular Response] : controlled ventricular response [Rate Control] : rate control [Anticoagulation] : anticoagulation [Continue] : continuing beta blockers [Hyperlipidemia] : hyperlipidemia [Lipids Test Panel] : a fasting lipid profile [Hypertension] : hypertension [Not Responding to Treatment] : not responding to treatment [Outpatient Evaluation] : outpatient evaluation [Ambulatory BP Monitoring] : ambulatory blood pressure monitoring [Medication Changes Per Orders] : as documented in orders [Sodium Restriction] : sodium restriction [Pulmonary Hypertension (PH)] : pulmonary hypertension (PH) [COPD] : chronic obstructive pulmonary disease [Stable] : stable [Venous Insufficiency] : venous insufficiency [None] : none [de-identified] : 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 Would favor cardiac monitoring intra-operatively.pt will stop coumadin 3 days prior per surgery, prefer asa 81mg bridge periop.

## 2018-12-05 NOTE — PHYSICAL EXAM
[General Appearance - Well Developed] : well developed [Normal Appearance] : normal appearance [Well Groomed] : well groomed [General Appearance - Well Nourished] : well nourished [No Deformities] : no deformities [General Appearance - In No Acute Distress] : no acute distress [Normal Conjunctiva] : the conjunctiva exhibited no abnormalities [Eyelids - No Xanthelasma] : the eyelids demonstrated no xanthelasmas [Normal Oral Mucosa] : normal oral mucosa [No Oral Pallor] : no oral pallor [No Oral Cyanosis] : no oral cyanosis [Normal Jugular Venous A Waves Present] : normal jugular venous A waves present [Normal Jugular Venous V Waves Present] : normal jugular venous V waves present [No Jugular Venous Flores A Waves] : no jugular venous flores A waves [Respiration, Rhythm And Depth] : normal respiratory rhythm and effort [Exaggerated Use Of Accessory Muscles For Inspiration] : no accessory muscle use [Auscultation Breath Sounds / Voice Sounds] : lungs were clear to auscultation bilaterally [Abdomen Soft] : soft [Abdomen Tenderness] : non-tender [Abdomen Mass (___ Cm)] : no abdominal mass palpated [Abnormal Walk] : normal gait [Gait - Sufficient For Exercise Testing] : the gait was sufficient for exercise testing [Nail Clubbing] : no clubbing of the fingernails [Cyanosis, Localized] : no localized cyanosis [Petechial Hemorrhages (___cm)] : no petechial hemorrhages [Skin Color & Pigmentation] : normal skin color and pigmentation [] : no rash [No Venous Stasis] : no venous stasis [Skin Lesions] : no skin lesions [No Skin Ulcers] : no skin ulcer [No Xanthoma] : no  xanthoma was observed [Oriented To Time, Place, And Person] : oriented to person, place, and time [Affect] : the affect was normal [Mood] : the mood was normal [No Anxiety] : not feeling anxious [Normal Rate] : normal [Normal S1] : normal S1 [Normal S2] : normal S2 [No Murmur] : no murmurs heard [2+] : left 2+ [No Abnormalities] : the abdominal aorta was not enlarged and no bruit was heard [S3] : no S3 [S4] : no S4 [Right Carotid Bruit] : no bruit heard over the right carotid [Left Carotid Bruit] : no bruit heard over the left carotid [Right Femoral Bruit] : no bruit heard over the right femoral artery [Left Femoral Bruit] : no bruit heard over the left femoral artery [___ +] : bilateral [unfilled]U+ pitting edema to the ankles

## 2018-12-05 NOTE — REASON FOR VISIT
[Follow-Up - Clinic] : a clinic follow-up of [Atrial Fibrillation] : atrial fibrillation [Hypertension] : hypertension [FreeTextEntry2] : pt c/o sob,hogan with minimal activity, no sscp [FreeTextEntry1] : no cp, or palps.or sob

## 2018-12-06 NOTE — H&P PST ADULT - PMH
AF (atrial fibrillation)    Anemia in CKD (chronic kidney disease)    CHF (congestive heart failure)    Chronic GERD    Chronic kidney disease    H/O lymphoma    HTN (hypertension)    Hyperlipidemia    Mitral regurgitation AF (atrial fibrillation)    Anemia in CKD (chronic kidney disease)    CHF (congestive heart failure)    Chronic GERD    Chronic kidney disease  proteinuria  COPD (chronic obstructive pulmonary disease)    H/O lymphoma    HTN (hypertension)    Hyperlipidemia    Mitral regurgitation    Osteoarthritis    Peripheral vascular disease    Pulmonary hypertension    Rheumatoid arthritis

## 2018-12-06 NOTE — H&P PST ADULT - ASSESSMENT
85y/o female with muliple medical issues c/o increasing RLQ abdominal discomfort with visible bulge, increased with cough and movement; denies N/V, diarrhea/constipation, fever or chills; found to have right inguinal hernia; pt scheduled for right inguinal hernia repair with mesh to be performed by   MAINE Carrillo M.D. on 12/10/18.    Cleared by Cardiology (Dr. Dobbins) on 12/05/18;advised pt to D/C Coumadin 12/07/18 and start bridge with ASA 81mg po daily until day of surgery 12/10/18; this was discussed with and approved by Dr. Carrillo; pt scheduled for Medical clearance 12/07/18 with Dr. Salazar. 85y/o female with muliple medical issues c/o increasing RLQ abdominal discomfort with visible bulge, increased with cough and movement; denies N/V, diarrhea/constipation, fever or chills; found to have right inguinal hernia; pt scheduled for right inguinal hernia repair with mesh to be performed by   MAINE Carrillo M.D. on 12/10/18.    Cleared by Cardiology (Dr. Dobbins) on 12/05/18;advised pt to D/C Coumadin 12/07/18 and start bridge with ASA 81mg po daily until day of surgery 12/10/18; this was discussed with and approved by Dr. Carrillo; pt scheduled for Medical clearance 12/07/18 with Dr. Salazar.   STAT PT/INR to be drawn morning of surgery.

## 2018-12-06 NOTE — H&P PST ADULT - NSANTHOSAYNRD_GEN_A_CORE
No. YARED screening performed.  STOP BANG Legend: 0-2 = LOW Risk; 3-4 = INTERMEDIATE Risk; 5-8 = HIGH Risk

## 2018-12-07 PROBLEM — N18.9 CHRONIC KIDNEY DISEASE, UNSPECIFIED: Chronic | Status: ACTIVE | Noted: 2018-01-01

## 2018-12-07 NOTE — HISTORY OF PRESENT ILLNESS
[Atrial Fibrillation] : atrial fibrillation [Coronary Artery Disease] : coronary artery disease [Chronic Anticoagulation] : chronic anticoagulation [Recent Myocardial Infarction] : no recent myocardial infarction [Implantable Device/Pacemaker] : no implantable device/pacemaker [Asthma] : no asthma [COPD] : no COPD [Sleep Apnea] : no sleep apnea [Smoker] : not a smoker [Family Member] : no family member with adverse anesthesia reaction/sudden death [Self] : no previous adverse anesthesia reaction [Chronic Kidney Disease] : no chronic kidney disease [Diabetes] : no diabetes [FreeTextEntry1] : Right inguinal hernia repair [FreeTextEntry2] : December 10, 2018 [FreeTextEntry3] : Dr. Mauri Carrillo [FreeTextEntry4] : Patient is an 84-year-old female, who presents today for preoperative clearance, repair of right inguinal hernia. Patient is symptomatic and in pain. Patient has multiple medical problems which include atrial fibrillation, rheumatoid arthritis, hypertension, hyperlipidemia, peripheral vascular disease, chronic lower extremity edema. Patient presently awake, alert, and oriented x3. She is accompanied by her son presently denies any chest pain, shortness of breath, nausea, or vomiting, or only complaint is right lower quadrant pain radiating down right leg. [FreeTextEntry6] : Patient [place] recently seen by cardiology, Dr. Etienne Dobbins who has cleared. Patient cardiac-wise for procedure recommendations hold Coumadin 3 days prior to surgery and the bridge with aspirin 81 mg daily.

## 2018-12-07 NOTE — ASSESSMENT
[Patient Optimized for Surgery] : Patient optimized for surgery [No Further Testing Recommended] : no further testing recommended [FreeTextEntry4] : Patient is at increased risk, patient has not had an acute cardiac event, patient is in nature, fibrillation, cleared by cardiology, Coumadin on hold for 3 days breech with aspirin. There is no medical contraindication for the proposed procedure.

## 2018-12-07 NOTE — PHYSICAL EXAM
[No Acute Distress] : no acute distress [Well Nourished] : well nourished [Well Developed] : well developed [Well-Appearing] : well-appearing [Normal Voice/Communication] : normal voice/communication [Normal Sclera/Conjunctiva] : normal sclera/conjunctiva [PERRL] : pupils equal round and reactive to light [EOMI] : extraocular movements intact [Normal Outer Ear/Nose] : the outer ears and nose were normal in appearance [Normal Oropharynx] : the oropharynx was normal [No JVD] : no jugular venous distention [Supple] : supple [No Lymphadenopathy] : no lymphadenopathy [Thyroid Normal, No Nodules] : the thyroid was normal and there were no nodules present [No Respiratory Distress] : no respiratory distress  [Clear to Auscultation] : lungs were clear to auscultation bilaterally [No Accessory Muscle Use] : no accessory muscle use [Normal Rate] : normal rate  [No Murmur] : no murmur heard [No Carotid Bruits] : no carotid bruits [No Abdominal Bruit] : a ~M bruit was not heard ~T in the abdomen [No Varicosities] : no varicosities [Pedal Pulses Present] : the pedal pulses are present [No Extremity Clubbing/Cyanosis] : no extremity clubbing/cyanosis [No Palpable Aorta] : no palpable aorta [Soft] : abdomen soft [Non Tender] : non-tender [Non-distended] : non-distended [No Masses] : no abdominal mass palpated [Normal Bowel Sounds] : normal bowel sounds [Normal Posterior Cervical Nodes] : no posterior cervical lymphadenopathy [Normal Anterior Cervical Nodes] : no anterior cervical lymphadenopathy [No CVA Tenderness] : no CVA  tenderness [No Spinal Tenderness] : no spinal tenderness [No Joint Swelling] : no joint swelling [Grossly Normal Strength/Tone] : grossly normal strength/tone [No Rash] : no rash [Coordination Grossly Intact] : coordination grossly intact [No Focal Deficits] : no focal deficits [Normal Affect] : the affect was normal [Normal Insight/Judgement] : insight and judgment were intact [de-identified] : irregularly irregular [de-identified] : Bilateral lower extremity edema, chronic [de-identified] : no guarding,Right lower quadrant  pain, secondary to inguinal hernia [de-identified] : Unsteady gait secondary to severe joint pain,Patient ambulates with cane

## 2018-12-07 NOTE — RESULTS/DATA
[] : results reviewed [de-identified] : Most recent PT/INR 31.3, PT, INR 2.71 patient's Coumadin on hold for 3 days prior to surgery.Patient's blood work is stable for patient

## 2018-12-07 NOTE — PLAN
[FreeTextEntry1] : Assessment and plan:\par \par Cardiac risk index class II, procedure, low risk. There is no medical contraindication for the proposed procedure.

## 2018-12-10 NOTE — BRIEF OPERATIVE NOTE - PROCEDURE
<<-----Click on this checkbox to enter Procedure Repair of right inguinal hernia with mesh  12/10/2018    Active  [id number]

## 2018-12-10 NOTE — ASU PATIENT PROFILE, ADULT - PMH
AF (atrial fibrillation)    Anemia in CKD (chronic kidney disease)    CHF (congestive heart failure)    Chronic GERD    Chronic kidney disease  proteinuria  COPD (chronic obstructive pulmonary disease)    H/O lymphoma    HTN (hypertension)    Hyperlipidemia    Mitral regurgitation    Osteoarthritis    Peripheral vascular disease    Pulmonary hypertension    Rheumatoid arthritis

## 2018-12-10 NOTE — ASU DISCHARGE PLAN (ADULT/PEDIATRIC). - SPECIAL INSTRUCTIONS
Tylenol (325mg) 2 tabs by mouth every 6hours if needed for mild pain (over the counter)  Tramadol (50mg) 1 tab by mouth every 6hrs if needed for moderate-severe pain (Rx sent to pharmacy)  Metamucil 1 packet by mouth daily x 2 weeks (over the counter)  Start COUMADIN tonight; follow-up with PMD (Dr. Salazar) or Cardiologist (Dr. Dobbins) for INR as advised Tylenol (325mg) 2 tabs by mouth every 6hours if needed for mild pain (over the counter)  Tramadol (50mg) 1 tab by mouth every 6hrs if needed for moderate-severe pain (Rx sent to [facility])  Metamucil 1 packet by mouth daily x 2 weeks (over the counter)  Start COUMADIN tonight; follow-up with PMD (Dr. Salazar) or Cardiologist (Dr. Dobbins) for INR as advised

## 2018-12-10 NOTE — ASU DISCHARGE PLAN (ADULT/PEDIATRIC). - NOTIFY
Pain not relieved by Medications/Persistent Nausea and Vomiting/Unable to Urinate/Bleeding that does not stop/Inability to Tolerate Liquids or Foods/Fever greater than 101

## 2018-12-10 NOTE — ASU DISCHARGE PLAN (ADULT/PEDIATRIC). - NURSING INSTRUCTIONS
all discharge safety follow up care to MD. Care of OP site. proper hand hygiene and pain management.

## 2018-12-10 NOTE — ASU DISCHARGE PLAN (ADULT/PEDIATRIC). - MEDICATION SUMMARY - MEDICATIONS TO TAKE
I will START or STAY ON the medications listed below when I get home from the hospital:    valsartan 320 mg oral tablet  -- 1 tab(s) by mouth once a day  -- Indication: For HTN    cloNIDine 0.1 mg oral tablet  -- 1 tab(s) by mouth 3 times a day  -- Indication: For Anxiety    dilTIAZem 30 mg oral tablet  -- 1 tab(s) by mouth 4 times a day but only takes it TID.   -- Indication: For HTN/heart rate control    Coumadin 5 mg oral tablet  -- 1 tab(s) by mouth once a day (at bedtime)  -- Indication: For Blood thinner    sertraline 50 mg oral tablet  -- 1 tab(s) by mouth once a day  -- Indication: For Anti-depressant    Lipitor 10 mg oral tablet  -- 1 tab(s) by mouth once a day  -- Indication: For Cholesterol    ALPRAZolam 0.25 mg oral tablet  -- 1 tab(s) by mouth once a day, As Needed  -- Indication: For Anxiety    zolpidem 5 mg oral tablet  -- 1 tab(s) by mouth once a day (at bedtime), As Needed  -- Indication: For Sleep aid    atenolol 25 mg oral tablet  -- 1 tab(s) by mouth once a day  -- Indication: For HTN/ heart rate control    hydroCHLOROthiazide 25 mg oral tablet  -- 1 tab(s) by mouth once a day  -- Indication: For HTN/diuretic    hydrALAZINE 25 mg oral tablet  -- 2 tab(s) by mouth 2 times a day  -- Indication: For HTN    hydrALAZINE 25 mg oral tablet  -- 1 tab(s) by mouth once a day (at bedtime)  -- Indication: For HTN

## 2018-12-10 NOTE — ASU DISCHARGE PLAN (ADULT/PEDIATRIC). - FOLLOWUP APPOINTMENT CLINIC/PHYSICIAN
Follow-up Dr. Carrillo in 2 weeks; call office for appointment  Follow-up Dr. Salazar and/or Dr. Dobbins for PT/INR this week

## 2018-12-18 PROBLEM — M06.9 RHEUMATOID ARTHRITIS, UNSPECIFIED: Chronic | Status: ACTIVE | Noted: 2018-01-01

## 2018-12-18 PROBLEM — I34.0 NONRHEUMATIC MITRAL (VALVE) INSUFFICIENCY: Chronic | Status: ACTIVE | Noted: 2018-01-01

## 2018-12-18 PROBLEM — I27.20 PULMONARY HYPERTENSION, UNSPECIFIED: Chronic | Status: ACTIVE | Noted: 2018-01-01

## 2018-12-18 PROBLEM — I48.91 UNSPECIFIED ATRIAL FIBRILLATION: Chronic | Status: ACTIVE | Noted: 2018-01-01

## 2018-12-18 PROBLEM — J44.9 CHRONIC OBSTRUCTIVE PULMONARY DISEASE, UNSPECIFIED: Chronic | Status: ACTIVE | Noted: 2018-01-01

## 2018-12-18 PROBLEM — K21.9 GASTRO-ESOPHAGEAL REFLUX DISEASE WITHOUT ESOPHAGITIS: Chronic | Status: ACTIVE | Noted: 2018-01-01

## 2018-12-18 PROBLEM — M19.90 UNSPECIFIED OSTEOARTHRITIS, UNSPECIFIED SITE: Chronic | Status: ACTIVE | Noted: 2018-01-01

## 2018-12-18 PROBLEM — I50.9 HEART FAILURE, UNSPECIFIED: Chronic | Status: ACTIVE | Noted: 2018-01-01

## 2018-12-18 PROBLEM — Z85.79 PERSONAL HISTORY OF OTHER MALIGNANT NEOPLASMS OF LYMPHOID, HEMATOPOIETIC AND RELATED TISSUES: Chronic | Status: ACTIVE | Noted: 2018-01-01

## 2018-12-18 PROBLEM — I73.9 PERIPHERAL VASCULAR DISEASE, UNSPECIFIED: Chronic | Status: ACTIVE | Noted: 2018-01-01

## 2018-12-18 PROBLEM — E78.5 HYPERLIPIDEMIA, UNSPECIFIED: Chronic | Status: ACTIVE | Noted: 2018-01-01

## 2018-12-18 PROBLEM — N18.9 CHRONIC KIDNEY DISEASE, UNSPECIFIED: Chronic | Status: ACTIVE | Noted: 2018-01-01

## 2019-01-01 ENCOUNTER — LABORATORY RESULT (OUTPATIENT)
Age: 84
End: 2019-01-01

## 2019-01-01 ENCOUNTER — APPOINTMENT (OUTPATIENT)
Dept: FAMILY MEDICINE | Facility: CLINIC | Age: 84
End: 2019-01-01

## 2019-01-01 ENCOUNTER — APPOINTMENT (OUTPATIENT)
Dept: RHEUMATOLOGY | Facility: CLINIC | Age: 84
End: 2019-01-01

## 2019-01-01 ENCOUNTER — INPATIENT (INPATIENT)
Facility: HOSPITAL | Age: 84
LOS: 11 days | Discharge: HOME HEALTH | End: 2019-08-25
Attending: HOSPITALIST | Admitting: HOSPITALIST
Payer: MEDICARE

## 2019-01-01 ENCOUNTER — OUTPATIENT (OUTPATIENT)
Dept: OUTPATIENT SERVICES | Facility: HOSPITAL | Age: 84
LOS: 1 days | Discharge: HOME | End: 2019-01-01

## 2019-01-01 ENCOUNTER — INPATIENT (INPATIENT)
Facility: HOSPITAL | Age: 84
LOS: 0 days | Discharge: HOME | DRG: 394 | End: 2019-09-06
Attending: INTERNAL MEDICINE | Admitting: INTERNAL MEDICINE
Payer: COMMERCIAL

## 2019-01-01 ENCOUNTER — MED ADMIN CHARGE (OUTPATIENT)
Age: 84
End: 2019-01-01

## 2019-01-01 ENCOUNTER — APPOINTMENT (OUTPATIENT)
Dept: CARDIOLOGY | Facility: CLINIC | Age: 84
End: 2019-01-01

## 2019-01-01 ENCOUNTER — APPOINTMENT (OUTPATIENT)
Dept: SLEEP CENTER | Facility: CLINIC | Age: 84
End: 2019-01-01
Payer: MEDICAID

## 2019-01-01 ENCOUNTER — RESULT REVIEW (OUTPATIENT)
Age: 84
End: 2019-01-01

## 2019-01-01 ENCOUNTER — APPOINTMENT (OUTPATIENT)
Dept: PULMONOLOGY | Facility: CLINIC | Age: 84
End: 2019-01-01
Payer: MEDICARE

## 2019-01-01 ENCOUNTER — APPOINTMENT (OUTPATIENT)
Dept: CARDIOLOGY | Facility: CLINIC | Age: 84
End: 2019-01-01
Payer: MEDICARE

## 2019-01-01 ENCOUNTER — RX RENEWAL (OUTPATIENT)
Age: 84
End: 2019-01-01

## 2019-01-01 ENCOUNTER — MEDICATION RENEWAL (OUTPATIENT)
Age: 84
End: 2019-01-01

## 2019-01-01 ENCOUNTER — APPOINTMENT (OUTPATIENT)
Dept: HEMATOLOGY ONCOLOGY | Facility: CLINIC | Age: 84
End: 2019-01-01
Payer: MEDICARE

## 2019-01-01 ENCOUNTER — APPOINTMENT (OUTPATIENT)
Dept: FAMILY MEDICINE | Facility: CLINIC | Age: 84
End: 2019-01-01
Payer: MEDICARE

## 2019-01-01 ENCOUNTER — OTHER (OUTPATIENT)
Age: 84
End: 2019-01-01

## 2019-01-01 ENCOUNTER — INPATIENT (INPATIENT)
Facility: HOSPITAL | Age: 84
LOS: 11 days | DRG: 871 | End: 2019-10-15
Attending: FAMILY MEDICINE | Admitting: HOSPITALIST
Payer: MEDICARE

## 2019-01-01 ENCOUNTER — OUTPATIENT (OUTPATIENT)
Dept: OUTPATIENT SERVICES | Facility: HOSPITAL | Age: 84
LOS: 1 days | End: 2019-01-01
Payer: MEDICARE

## 2019-01-01 ENCOUNTER — APPOINTMENT (OUTPATIENT)
Dept: ULTRASOUND IMAGING | Facility: HOSPITAL | Age: 84
End: 2019-01-01

## 2019-01-01 ENCOUNTER — APPOINTMENT (OUTPATIENT)
Dept: SURGERY | Facility: CLINIC | Age: 84
End: 2019-01-01
Payer: MEDICAID

## 2019-01-01 ENCOUNTER — APPOINTMENT (OUTPATIENT)
Dept: ULTRASOUND IMAGING | Facility: HOSPITAL | Age: 84
End: 2019-01-01
Payer: MEDICARE

## 2019-01-01 ENCOUNTER — CHART COPY (OUTPATIENT)
Age: 84
End: 2019-01-01

## 2019-01-01 ENCOUNTER — TRANSCRIPTION ENCOUNTER (OUTPATIENT)
Age: 84
End: 2019-01-01

## 2019-01-01 ENCOUNTER — NON-APPOINTMENT (OUTPATIENT)
Age: 84
End: 2019-01-01

## 2019-01-01 ENCOUNTER — APPOINTMENT (OUTPATIENT)
Dept: ORTHOPEDIC SURGERY | Facility: CLINIC | Age: 84
End: 2019-01-01

## 2019-01-01 ENCOUNTER — INBOUND DOCUMENT (OUTPATIENT)
Age: 84
End: 2019-01-01

## 2019-01-01 ENCOUNTER — APPOINTMENT (OUTPATIENT)
Dept: RHEUMATOLOGY | Facility: CLINIC | Age: 84
End: 2019-01-01
Payer: MEDICARE

## 2019-01-01 ENCOUNTER — APPOINTMENT (OUTPATIENT)
Dept: RADIOLOGY | Facility: HOSPITAL | Age: 84
End: 2019-01-01
Payer: MEDICARE

## 2019-01-01 ENCOUNTER — APPOINTMENT (OUTPATIENT)
Dept: HEMATOLOGY ONCOLOGY | Facility: CLINIC | Age: 84
End: 2019-01-01

## 2019-01-01 ENCOUNTER — INPATIENT (INPATIENT)
Facility: HOSPITAL | Age: 84
LOS: 20 days | Discharge: HOME | DRG: 813 | End: 2019-09-27
Attending: HOSPITALIST | Admitting: SURGERY
Payer: MEDICARE

## 2019-01-01 ENCOUNTER — INPATIENT (INPATIENT)
Facility: HOSPITAL | Age: 84
LOS: 2 days | Discharge: ANOTHER HOSPITAL | DRG: 871 | End: 2019-08-13
Attending: INTERNAL MEDICINE | Admitting: INTERNAL MEDICINE
Payer: MEDICARE

## 2019-01-01 ENCOUNTER — EMERGENCY (EMERGENCY)
Facility: HOSPITAL | Age: 84
LOS: 1 days | Discharge: HOME | End: 2019-01-01
Attending: EMERGENCY MEDICINE
Payer: MEDICARE

## 2019-01-01 VITALS
OXYGEN SATURATION: 96 % | HEART RATE: 95 BPM | TEMPERATURE: 98.2 F | RESPIRATION RATE: 16 BRPM | SYSTOLIC BLOOD PRESSURE: 112 MMHG | DIASTOLIC BLOOD PRESSURE: 70 MMHG | DIASTOLIC BLOOD PRESSURE: 71 MMHG | HEIGHT: 63 IN | SYSTOLIC BLOOD PRESSURE: 125 MMHG | HEART RATE: 63 BPM

## 2019-01-01 VITALS
DIASTOLIC BLOOD PRESSURE: 85 MMHG | HEART RATE: 78 BPM | SYSTOLIC BLOOD PRESSURE: 134 MMHG | RESPIRATION RATE: 14 BRPM | OXYGEN SATURATION: 99 %

## 2019-01-01 VITALS
RESPIRATION RATE: 17 BRPM | HEIGHT: 63 IN | HEART RATE: 95 BPM | OXYGEN SATURATION: 97 % | BODY MASS INDEX: 26.58 KG/M2 | DIASTOLIC BLOOD PRESSURE: 68 MMHG | SYSTOLIC BLOOD PRESSURE: 108 MMHG | WEIGHT: 150 LBS

## 2019-01-01 VITALS
RESPIRATION RATE: 14 BRPM | SYSTOLIC BLOOD PRESSURE: 130 MMHG | HEART RATE: 71 BPM | OXYGEN SATURATION: 98 % | DIASTOLIC BLOOD PRESSURE: 80 MMHG | TEMPERATURE: 98.2 F

## 2019-01-01 VITALS
OXYGEN SATURATION: 95 % | TEMPERATURE: 98 F | SYSTOLIC BLOOD PRESSURE: 102 MMHG | DIASTOLIC BLOOD PRESSURE: 73 MMHG | RESPIRATION RATE: 17 BRPM | HEART RATE: 76 BPM

## 2019-01-01 VITALS
HEART RATE: 108 BPM | HEIGHT: 60 IN | WEIGHT: 149.25 LBS | DIASTOLIC BLOOD PRESSURE: 68 MMHG | SYSTOLIC BLOOD PRESSURE: 118 MMHG | RESPIRATION RATE: 25 BRPM | TEMPERATURE: 99 F | OXYGEN SATURATION: 92 %

## 2019-01-01 VITALS
WEIGHT: 160 LBS | RESPIRATION RATE: 16 BRPM | HEIGHT: 63 IN | BODY MASS INDEX: 28.35 KG/M2 | HEART RATE: 85 BPM | DIASTOLIC BLOOD PRESSURE: 86 MMHG | TEMPERATURE: 98.1 F | SYSTOLIC BLOOD PRESSURE: 150 MMHG

## 2019-01-01 VITALS
WEIGHT: 150.8 LBS | TEMPERATURE: 99 F | HEIGHT: 63 IN | OXYGEN SATURATION: 100 % | HEART RATE: 87 BPM | RESPIRATION RATE: 22 BRPM | SYSTOLIC BLOOD PRESSURE: 153 MMHG | DIASTOLIC BLOOD PRESSURE: 113 MMHG

## 2019-01-01 VITALS
HEART RATE: 98 BPM | TEMPERATURE: 98 F | RESPIRATION RATE: 17 BRPM | DIASTOLIC BLOOD PRESSURE: 86 MMHG | SYSTOLIC BLOOD PRESSURE: 122 MMHG | OXYGEN SATURATION: 94 %

## 2019-01-01 VITALS
TEMPERATURE: 98.2 F | BODY MASS INDEX: 26.58 KG/M2 | HEIGHT: 63 IN | SYSTOLIC BLOOD PRESSURE: 154 MMHG | WEIGHT: 150 LBS | DIASTOLIC BLOOD PRESSURE: 87 MMHG | RESPIRATION RATE: 16 BRPM | HEART RATE: 78 BPM

## 2019-01-01 VITALS
RESPIRATION RATE: 28 BRPM | HEART RATE: 111 BPM | DIASTOLIC BLOOD PRESSURE: 84 MMHG | SYSTOLIC BLOOD PRESSURE: 123 MMHG | OXYGEN SATURATION: 98 %

## 2019-01-01 VITALS
WEIGHT: 157 LBS | HEIGHT: 63 IN | DIASTOLIC BLOOD PRESSURE: 90 MMHG | BODY MASS INDEX: 27.82 KG/M2 | TEMPERATURE: 97.8 F | RESPIRATION RATE: 18 BRPM | HEART RATE: 98 BPM | SYSTOLIC BLOOD PRESSURE: 160 MMHG | OXYGEN SATURATION: 97 %

## 2019-01-01 VITALS
TEMPERATURE: 98.2 F | HEART RATE: 90 BPM | OXYGEN SATURATION: 95 % | DIASTOLIC BLOOD PRESSURE: 72 MMHG | SYSTOLIC BLOOD PRESSURE: 128 MMHG | RESPIRATION RATE: 14 BRPM

## 2019-01-01 VITALS
BODY MASS INDEX: 26.93 KG/M2 | HEIGHT: 63 IN | TEMPERATURE: 98.3 F | DIASTOLIC BLOOD PRESSURE: 64 MMHG | HEART RATE: 83 BPM | WEIGHT: 152 LBS | SYSTOLIC BLOOD PRESSURE: 115 MMHG

## 2019-01-01 VITALS
SYSTOLIC BLOOD PRESSURE: 140 MMHG | TEMPERATURE: 98.8 F | HEART RATE: 88 BPM | DIASTOLIC BLOOD PRESSURE: 100 MMHG | RESPIRATION RATE: 16 BRPM | BODY MASS INDEX: 26.93 KG/M2 | WEIGHT: 152 LBS | HEIGHT: 63 IN

## 2019-01-01 VITALS
HEART RATE: 85 BPM | RESPIRATION RATE: 22 BRPM | TEMPERATURE: 99 F | SYSTOLIC BLOOD PRESSURE: 153 MMHG | WEIGHT: 150.8 LBS | OXYGEN SATURATION: 99 % | DIASTOLIC BLOOD PRESSURE: 113 MMHG

## 2019-01-01 VITALS
SYSTOLIC BLOOD PRESSURE: 133 MMHG | HEART RATE: 80 BPM | RESPIRATION RATE: 14 BRPM | OXYGEN SATURATION: 99 % | TEMPERATURE: 97.9 F | DIASTOLIC BLOOD PRESSURE: 90 MMHG

## 2019-01-01 VITALS
RESPIRATION RATE: 20 BRPM | HEART RATE: 91 BPM | TEMPERATURE: 98 F | WEIGHT: 149.91 LBS | SYSTOLIC BLOOD PRESSURE: 179 MMHG | DIASTOLIC BLOOD PRESSURE: 107 MMHG | HEIGHT: 64 IN | OXYGEN SATURATION: 97 %

## 2019-01-01 VITALS
WEIGHT: 100.09 LBS | TEMPERATURE: 99 F | HEART RATE: 114 BPM | SYSTOLIC BLOOD PRESSURE: 111 MMHG | RESPIRATION RATE: 20 BRPM | DIASTOLIC BLOOD PRESSURE: 76 MMHG | HEIGHT: 66 IN | OXYGEN SATURATION: 74 %

## 2019-01-01 VITALS
DIASTOLIC BLOOD PRESSURE: 85 MMHG | TEMPERATURE: 98 F | SYSTOLIC BLOOD PRESSURE: 124 MMHG | OXYGEN SATURATION: 95 % | HEART RATE: 112 BPM | RESPIRATION RATE: 20 BRPM

## 2019-01-01 VITALS
BODY MASS INDEX: 27.11 KG/M2 | HEART RATE: 80 BPM | SYSTOLIC BLOOD PRESSURE: 130 MMHG | HEIGHT: 63 IN | OXYGEN SATURATION: 96 % | TEMPERATURE: 98.6 F | DIASTOLIC BLOOD PRESSURE: 74 MMHG | WEIGHT: 153 LBS

## 2019-01-01 VITALS
SYSTOLIC BLOOD PRESSURE: 142 MMHG | OXYGEN SATURATION: 97 % | HEART RATE: 97 BPM | DIASTOLIC BLOOD PRESSURE: 88 MMHG | TEMPERATURE: 98.2 F | RESPIRATION RATE: 14 BRPM

## 2019-01-01 VITALS
OXYGEN SATURATION: 98 % | TEMPERATURE: 98.8 F | DIASTOLIC BLOOD PRESSURE: 80 MMHG | HEIGHT: 63 IN | SYSTOLIC BLOOD PRESSURE: 130 MMHG | WEIGHT: 150 LBS | RESPIRATION RATE: 15 BRPM | BODY MASS INDEX: 26.58 KG/M2 | HEART RATE: 64 BPM

## 2019-01-01 VITALS
OXYGEN SATURATION: 94 % | SYSTOLIC BLOOD PRESSURE: 140 MMHG | BODY MASS INDEX: 26.58 KG/M2 | RESPIRATION RATE: 14 BRPM | HEART RATE: 84 BPM | DIASTOLIC BLOOD PRESSURE: 70 MMHG | WEIGHT: 150 LBS | HEIGHT: 63 IN

## 2019-01-01 VITALS
SYSTOLIC BLOOD PRESSURE: 127 MMHG | HEART RATE: 79 BPM | TEMPERATURE: 97.4 F | DIASTOLIC BLOOD PRESSURE: 92 MMHG | OXYGEN SATURATION: 98 % | RESPIRATION RATE: 14 BRPM

## 2019-01-01 VITALS
RESPIRATION RATE: 14 BRPM | TEMPERATURE: 97.6 F | SYSTOLIC BLOOD PRESSURE: 150 MMHG | DIASTOLIC BLOOD PRESSURE: 88 MMHG | SYSTOLIC BLOOD PRESSURE: 130 MMHG | OXYGEN SATURATION: 98 % | HEART RATE: 80 BPM | DIASTOLIC BLOOD PRESSURE: 85 MMHG

## 2019-01-01 VITALS — BODY MASS INDEX: 29.23 KG/M2 | WEIGHT: 165 LBS

## 2019-01-01 VITALS
SYSTOLIC BLOOD PRESSURE: 152 MMHG | HEART RATE: 81 BPM | RESPIRATION RATE: 18 BRPM | DIASTOLIC BLOOD PRESSURE: 98 MMHG | OXYGEN SATURATION: 95 %

## 2019-01-01 VITALS — RESPIRATION RATE: 16 BRPM

## 2019-01-01 VITALS
HEART RATE: 81 BPM | HEIGHT: 63 IN | BODY MASS INDEX: 28.7 KG/M2 | OXYGEN SATURATION: 94 % | WEIGHT: 162 LBS | SYSTOLIC BLOOD PRESSURE: 177 MMHG | DIASTOLIC BLOOD PRESSURE: 95 MMHG | RESPIRATION RATE: 14 BRPM

## 2019-01-01 VITALS
HEART RATE: 120 BPM | TEMPERATURE: 99 F | RESPIRATION RATE: 20 BRPM | OXYGEN SATURATION: 92 % | SYSTOLIC BLOOD PRESSURE: 109 MMHG | DIASTOLIC BLOOD PRESSURE: 77 MMHG | WEIGHT: 151.9 LBS

## 2019-01-01 DIAGNOSIS — Z96.642 PRESENCE OF LEFT ARTIFICIAL HIP JOINT: Chronic | ICD-10-CM

## 2019-01-01 DIAGNOSIS — K92.2 GASTROINTESTINAL HEMORRHAGE, UNSPECIFIED: ICD-10-CM

## 2019-01-01 DIAGNOSIS — D50.0 IRON DEFICIENCY ANEMIA SECONDARY TO BLOOD LOSS (CHRONIC): ICD-10-CM

## 2019-01-01 DIAGNOSIS — Z87.898 PERSONAL HISTORY OF OTHER SPECIFIED CONDITIONS: ICD-10-CM

## 2019-01-01 DIAGNOSIS — J96.90 RESPIRATORY FAILURE, UNSPECIFIED, UNSPECIFIED WHETHER WITH HYPOXIA OR HYPERCAPNIA: ICD-10-CM

## 2019-01-01 DIAGNOSIS — E87.3 ALKALOSIS: ICD-10-CM

## 2019-01-01 DIAGNOSIS — I48.91 UNSPECIFIED ATRIAL FIBRILLATION: ICD-10-CM

## 2019-01-01 DIAGNOSIS — G47.00 INSOMNIA, UNSPECIFIED: ICD-10-CM

## 2019-01-01 DIAGNOSIS — I27.20 PULMONARY HYPERTENSION, UNSPECIFIED: ICD-10-CM

## 2019-01-01 DIAGNOSIS — N17.9 ACUTE KIDNEY FAILURE, UNSPECIFIED: ICD-10-CM

## 2019-01-01 DIAGNOSIS — N18.9 CHRONIC KIDNEY DISEASE, UNSPECIFIED: ICD-10-CM

## 2019-01-01 DIAGNOSIS — E87.70 FLUID OVERLOAD, UNSPECIFIED: ICD-10-CM

## 2019-01-01 DIAGNOSIS — R35.0 FREQUENCY OF MICTURITION: ICD-10-CM

## 2019-01-01 DIAGNOSIS — I77.6 ARTERITIS, UNSPECIFIED: ICD-10-CM

## 2019-01-01 DIAGNOSIS — R39.15 URGENCY OF URINATION: ICD-10-CM

## 2019-01-01 DIAGNOSIS — B19.10 UNSPECIFIED VIRAL HEPATITIS B WITHOUT HEPATIC COMA: ICD-10-CM

## 2019-01-01 DIAGNOSIS — J18.9 PNEUMONIA, UNSPECIFIED ORGANISM: ICD-10-CM

## 2019-01-01 DIAGNOSIS — Z12.11 ENCOUNTER FOR SCREENING FOR MALIGNANT NEOPLASM OF COLON: ICD-10-CM

## 2019-01-01 DIAGNOSIS — R53.81 OTHER MALAISE: ICD-10-CM

## 2019-01-01 DIAGNOSIS — R60.0 LOCALIZED EDEMA: ICD-10-CM

## 2019-01-01 DIAGNOSIS — D64.9 ANEMIA, UNSPECIFIED: ICD-10-CM

## 2019-01-01 DIAGNOSIS — Z87.09 PERSONAL HISTORY OF OTHER DISEASES OF THE RESPIRATORY SYSTEM: ICD-10-CM

## 2019-01-01 DIAGNOSIS — R60.9 EDEMA, UNSPECIFIED: ICD-10-CM

## 2019-01-01 DIAGNOSIS — Z01.818 ENCOUNTER FOR OTHER PREPROCEDURAL EXAMINATION: ICD-10-CM

## 2019-01-01 DIAGNOSIS — D72.829 ELEVATED WHITE BLOOD CELL COUNT, UNSPECIFIED: ICD-10-CM

## 2019-01-01 DIAGNOSIS — M79.81 NONTRAUMATIC HEMATOMA OF SOFT TISSUE: ICD-10-CM

## 2019-01-01 DIAGNOSIS — E11.9 TYPE 2 DIABETES MELLITUS WITHOUT COMPLICATIONS: ICD-10-CM

## 2019-01-01 DIAGNOSIS — J44.9 CHRONIC OBSTRUCTIVE PULMONARY DISEASE, UNSPECIFIED: ICD-10-CM

## 2019-01-01 DIAGNOSIS — Z96.653 PRESENCE OF ARTIFICIAL KNEE JOINT, BILATERAL: Chronic | ICD-10-CM

## 2019-01-01 DIAGNOSIS — E78.5 HYPERLIPIDEMIA, UNSPECIFIED: ICD-10-CM

## 2019-01-01 DIAGNOSIS — M05.20 RHEUMATOID VASCULITIS WITH RHEUMATOID ARTHRITIS OF UNSPECIFIED SITE: ICD-10-CM

## 2019-01-01 DIAGNOSIS — Z87.19 PERSONAL HISTORY OF OTHER DISEASES OF THE DIGESTIVE SYSTEM: ICD-10-CM

## 2019-01-01 DIAGNOSIS — Z00.8 ENCOUNTER FOR OTHER GENERAL EXAMINATION: ICD-10-CM

## 2019-01-01 DIAGNOSIS — J96.91 RESPIRATORY FAILURE, UNSPECIFIED WITH HYPOXIA: ICD-10-CM

## 2019-01-01 DIAGNOSIS — J06.9 ACUTE UPPER RESPIRATORY INFECTION, UNSPECIFIED: ICD-10-CM

## 2019-01-01 DIAGNOSIS — M25.559 PAIN IN UNSPECIFIED HIP: ICD-10-CM

## 2019-01-01 DIAGNOSIS — F41.9 ANXIETY DISORDER, UNSPECIFIED: ICD-10-CM

## 2019-01-01 DIAGNOSIS — B97.89 ACUTE UPPER RESPIRATORY INFECTION, UNSPECIFIED: ICD-10-CM

## 2019-01-01 DIAGNOSIS — J98.8 OTHER SPECIFIED RESPIRATORY DISORDERS: ICD-10-CM

## 2019-01-01 DIAGNOSIS — R78.81 BACTEREMIA: ICD-10-CM

## 2019-01-01 DIAGNOSIS — Z29.9 ENCOUNTER FOR PROPHYLACTIC MEASURES, UNSPECIFIED: ICD-10-CM

## 2019-01-01 DIAGNOSIS — D47.2 MONOCLONAL GAMMOPATHY: ICD-10-CM

## 2019-01-01 DIAGNOSIS — M79.89 OTHER SPECIFIED SOFT TISSUE DISORDERS: ICD-10-CM

## 2019-01-01 DIAGNOSIS — R58 HEMORRHAGE, NOT ELSEWHERE CLASSIFIED: ICD-10-CM

## 2019-01-01 DIAGNOSIS — H61.21 IMPACTED CERUMEN, RIGHT EAR: ICD-10-CM

## 2019-01-01 DIAGNOSIS — I50.9 HEART FAILURE, UNSPECIFIED: ICD-10-CM

## 2019-01-01 DIAGNOSIS — M79.601 PAIN IN RIGHT ARM: ICD-10-CM

## 2019-01-01 DIAGNOSIS — Z87.81 PERSONAL HISTORY OF (HEALED) TRAUMATIC FRACTURE: ICD-10-CM

## 2019-01-01 DIAGNOSIS — Z23 ENCOUNTER FOR IMMUNIZATION: ICD-10-CM

## 2019-01-01 DIAGNOSIS — R73.9 HYPERGLYCEMIA, UNSPECIFIED: ICD-10-CM

## 2019-01-01 DIAGNOSIS — R50.9 FEVER, UNSPECIFIED: ICD-10-CM

## 2019-01-01 DIAGNOSIS — Z86.69 PERSONAL HISTORY OF OTHER DISEASES OF THE NERVOUS SYSTEM AND SENSE ORGANS: ICD-10-CM

## 2019-01-01 DIAGNOSIS — Z12.31 ENCOUNTER FOR SCREENING MAMMOGRAM FOR MALIGNANT NEOPLASM OF BREAST: ICD-10-CM

## 2019-01-01 DIAGNOSIS — N05.9 UNSPECIFIED NEPHRITIC SYNDROME WITH UNSPECIFIED MORPHOLOGIC CHANGES: ICD-10-CM

## 2019-01-01 DIAGNOSIS — I50.33 ACUTE ON CHRONIC DIASTOLIC (CONGESTIVE) HEART FAILURE: ICD-10-CM

## 2019-01-01 DIAGNOSIS — R79.1 ABNORMAL COAGULATION PROFILE: ICD-10-CM

## 2019-01-01 DIAGNOSIS — R53.2 FUNCTIONAL QUADRIPLEGIA: ICD-10-CM

## 2019-01-01 DIAGNOSIS — I10 ESSENTIAL (PRIMARY) HYPERTENSION: ICD-10-CM

## 2019-01-01 DIAGNOSIS — Z71.89 OTHER SPECIFIED COUNSELING: ICD-10-CM

## 2019-01-01 DIAGNOSIS — A41.9 SEPSIS, UNSPECIFIED ORGANISM: ICD-10-CM

## 2019-01-01 DIAGNOSIS — R06.09 OTHER FORMS OF DYSPNEA: ICD-10-CM

## 2019-01-01 DIAGNOSIS — C85.88 OTHER SPECIFIED TYPES OF NON-HODGKIN LYMPHOMA, LYMPH NODES OF MULTIPLE SITES: ICD-10-CM

## 2019-01-01 DIAGNOSIS — J96.01 ACUTE RESPIRATORY FAILURE WITH HYPOXIA: ICD-10-CM
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
## 2019-01-01 DIAGNOSIS — J45.909 UNSPECIFIED ASTHMA, UNCOMPLICATED: ICD-10-CM

## 2019-01-01 DIAGNOSIS — C85.80 OTHER SPECIFIED TYPES OF NON-HODGKIN LYMPHOMA, UNSPECIFIED SITE: ICD-10-CM

## 2019-01-01 DIAGNOSIS — D63.1 CHRONIC KIDNEY DISEASE, UNSPECIFIED: ICD-10-CM

## 2019-01-01 DIAGNOSIS — Z51.5 ENCOUNTER FOR PALLIATIVE CARE: ICD-10-CM

## 2019-01-01 DIAGNOSIS — L53.8 OTHER SPECIFIED ERYTHEMATOUS CONDITIONS: ICD-10-CM

## 2019-01-01 DIAGNOSIS — T14.8XXA OTHER INJURY OF UNSPECIFIED BODY REGION, INITIAL ENCOUNTER: ICD-10-CM

## 2019-01-01 DIAGNOSIS — D62 ACUTE POSTHEMORRHAGIC ANEMIA: ICD-10-CM

## 2019-01-01 DIAGNOSIS — N39.0 URINARY TRACT INFECTION, SITE NOT SPECIFIED: ICD-10-CM

## 2019-01-01 DIAGNOSIS — B34.8 OTHER VIRAL INFECTIONS OF UNSPECIFIED SITE: ICD-10-CM

## 2019-01-01 DIAGNOSIS — M67.431 GANGLION, RIGHT WRIST: ICD-10-CM

## 2019-01-01 DIAGNOSIS — K40.90 UNILATERAL INGUINAL HERNIA, W/OUT OBSTRUCTION OR GANGRENE, NOT SPECIFIED AS RECURRENT: ICD-10-CM

## 2019-01-01 DIAGNOSIS — D68.9 COAGULATION DEFECT, UNSPECIFIED: ICD-10-CM

## 2019-01-01 DIAGNOSIS — A41.51 SEPSIS DUE TO ESCHERICHIA COLI [E. COLI]: ICD-10-CM

## 2019-01-01 DIAGNOSIS — G93.49 OTHER ENCEPHALOPATHY: ICD-10-CM

## 2019-01-01 DIAGNOSIS — R26.81 UNSTEADINESS ON FEET: ICD-10-CM

## 2019-01-01 DIAGNOSIS — Z86.79 PERSONAL HISTORY OF OTHER DISEASES OF THE CIRCULATORY SYSTEM: ICD-10-CM

## 2019-01-01 DIAGNOSIS — L53.9 ERYTHEMATOUS CONDITION, UNSPECIFIED: ICD-10-CM

## 2019-01-01 DIAGNOSIS — Z87.440 PERSONAL HISTORY OF URINARY (TRACT) INFECTIONS: ICD-10-CM

## 2019-01-01 LAB
% ALBUMIN: 47.6 % — SIGNIFICANT CHANGE UP
% ALPHA 1: 8.2 % — SIGNIFICANT CHANGE UP
% ALPHA 2: 10.7 % — SIGNIFICANT CHANGE UP
% BETA: 14.8 % — SIGNIFICANT CHANGE UP
% GAMMA: 18.7 % — SIGNIFICANT CHANGE UP
% M SPIKE: SIGNIFICANT CHANGE UP
-  AMIKACIN: SIGNIFICANT CHANGE UP
-  AMPICILLIN/SULBACTAM: SIGNIFICANT CHANGE UP
-  AMPICILLIN: SIGNIFICANT CHANGE UP
-  AZTREONAM: SIGNIFICANT CHANGE UP
-  CEFAZOLIN: SIGNIFICANT CHANGE UP
-  CEFEPIME: SIGNIFICANT CHANGE UP
-  CEFOXITIN: SIGNIFICANT CHANGE UP
-  CEFTRIAXONE: SIGNIFICANT CHANGE UP
-  CIPROFLOXACIN: SIGNIFICANT CHANGE UP
-  COAGULASE NEGATIVE STAPHYLOCOCCUS: SIGNIFICANT CHANGE UP
-  ERTAPENEM: SIGNIFICANT CHANGE UP
-  ERTAPENEM: SIGNIFICANT CHANGE UP
-  GENTAMICIN: SIGNIFICANT CHANGE UP
-  IMIPENEM: SIGNIFICANT CHANGE UP
-  LEVOFLOXACIN: SIGNIFICANT CHANGE UP
-  MEROPENEM: SIGNIFICANT CHANGE UP
-  NITROFURANTOIN: SIGNIFICANT CHANGE UP
-  PIPERACILLIN/TAZOBACTAM: SIGNIFICANT CHANGE UP
-  TIGECYCLINE: SIGNIFICANT CHANGE UP
-  TOBRAMYCIN: SIGNIFICANT CHANGE UP
-  TRIMETHOPRIM/SULFAMETHOXAZOLE: SIGNIFICANT CHANGE UP
24R-OH-CALCIDIOL SERPL-MCNC: 23.3 NG/ML — LOW (ref 30–80)
4/8 RATIO: 6.44 CELLS/UL — HIGH (ref 1.69–2.84)
ABS CD8: 35 CELLS/UL — LOW (ref 291–576)
ALBUMIN MFR SERPL ELPH: 46.3 %
ALBUMIN SERPL ELPH-MCNC: 1.8 G/DL — LOW (ref 3.3–5)
ALBUMIN SERPL ELPH-MCNC: 2.1 G/DL — LOW (ref 3.3–5)
ALBUMIN SERPL ELPH-MCNC: 2.4 G/DL — LOW (ref 3.3–5)
ALBUMIN SERPL ELPH-MCNC: 2.5 G/DL — LOW (ref 3.3–5)
ALBUMIN SERPL ELPH-MCNC: 2.7 G/DL — LOW (ref 3.3–5)
ALBUMIN SERPL ELPH-MCNC: 2.7 G/DL — LOW (ref 3.3–5)
ALBUMIN SERPL ELPH-MCNC: 2.8 G/DL — LOW (ref 3.3–5)
ALBUMIN SERPL ELPH-MCNC: 2.9 G/DL — LOW (ref 3.3–5)
ALBUMIN SERPL ELPH-MCNC: 2.9 G/DL — LOW (ref 3.3–5)
ALBUMIN SERPL ELPH-MCNC: 3 G/DL — LOW (ref 3.6–5.5)
ALBUMIN SERPL ELPH-MCNC: 3.1 G/DL — LOW (ref 3.3–5)
ALBUMIN SERPL ELPH-MCNC: 3.3 G/DL
ALBUMIN SERPL ELPH-MCNC: 3.3 G/DL — SIGNIFICANT CHANGE UP (ref 3.3–5)
ALBUMIN SERPL ELPH-MCNC: 3.5 G/DL — SIGNIFICANT CHANGE UP (ref 3.3–5)
ALBUMIN SERPL ELPH-MCNC: 3.5 G/DL — SIGNIFICANT CHANGE UP (ref 3.3–5)
ALBUMIN SERPL ELPH-MCNC: 3.7 G/DL — SIGNIFICANT CHANGE UP (ref 3.3–5)
ALBUMIN SERPL ELPH-MCNC: 3.8 G/DL
ALBUMIN SERPL ELPH-MCNC: 4 G/DL
ALBUMIN SERPL-MCNC: 3.6 G/DL
ALBUMIN/GLOB SERPL ELPH: 0.9 RATIO — SIGNIFICANT CHANGE UP
ALBUMIN/GLOB SERPL: 0.9 RATIO
ALP BLD-CCNC: 114 U/L
ALP BLD-CCNC: 92 U/L
ALP BLD-CCNC: 95 U/L
ALP SERPL-CCNC: 104 U/L — SIGNIFICANT CHANGE UP (ref 40–120)
ALP SERPL-CCNC: 105 U/L — SIGNIFICANT CHANGE UP (ref 40–120)
ALP SERPL-CCNC: 106 U/L — SIGNIFICANT CHANGE UP (ref 40–120)
ALP SERPL-CCNC: 59 U/L — SIGNIFICANT CHANGE UP (ref 40–120)
ALP SERPL-CCNC: 61 U/L — SIGNIFICANT CHANGE UP (ref 40–120)
ALP SERPL-CCNC: 61 U/L — SIGNIFICANT CHANGE UP (ref 40–120)
ALP SERPL-CCNC: 64 U/L — SIGNIFICANT CHANGE UP (ref 40–120)
ALP SERPL-CCNC: 68 U/L — SIGNIFICANT CHANGE UP (ref 40–120)
ALP SERPL-CCNC: 68 U/L — SIGNIFICANT CHANGE UP (ref 40–120)
ALP SERPL-CCNC: 73 U/L — SIGNIFICANT CHANGE UP (ref 40–120)
ALP SERPL-CCNC: 75 U/L — SIGNIFICANT CHANGE UP (ref 40–120)
ALP SERPL-CCNC: 78 U/L — SIGNIFICANT CHANGE UP (ref 40–120)
ALP SERPL-CCNC: 82 U/L — SIGNIFICANT CHANGE UP (ref 40–120)
ALP SERPL-CCNC: 82 U/L — SIGNIFICANT CHANGE UP (ref 40–120)
ALP SERPL-CCNC: 85 U/L — SIGNIFICANT CHANGE UP (ref 40–120)
ALP SERPL-CCNC: 92 U/L — SIGNIFICANT CHANGE UP (ref 40–120)
ALP SERPL-CCNC: 93 U/L — SIGNIFICANT CHANGE UP (ref 40–120)
ALPHA1 GLOB MFR SERPL ELPH: 4.6 %
ALPHA1 GLOB SERPL ELPH-MCNC: 0.4 G/DL
ALPHA1 GLOB SERPL ELPH-MCNC: 0.5 G/DL — HIGH (ref 0.1–0.4)
ALPHA2 GLOB MFR SERPL ELPH: 11.3 %
ALPHA2 GLOB SERPL ELPH-MCNC: 0.7 G/DL — SIGNIFICANT CHANGE UP (ref 0.5–1)
ALPHA2 GLOB SERPL ELPH-MCNC: 0.9 G/DL
ALT FLD-CCNC: 18 U/L — SIGNIFICANT CHANGE UP (ref 10–45)
ALT FLD-CCNC: 18 U/L — SIGNIFICANT CHANGE UP (ref 10–45)
ALT FLD-CCNC: 18 U/L — SIGNIFICANT CHANGE UP (ref 4–33)
ALT FLD-CCNC: 18 U/L — SIGNIFICANT CHANGE UP (ref 4–33)
ALT FLD-CCNC: 19 U/L — SIGNIFICANT CHANGE UP (ref 4–33)
ALT FLD-CCNC: 22 U/L — SIGNIFICANT CHANGE UP (ref 10–45)
ALT FLD-CCNC: 22 U/L — SIGNIFICANT CHANGE UP (ref 10–45)
ALT FLD-CCNC: 23 U/L — SIGNIFICANT CHANGE UP (ref 4–33)
ALT FLD-CCNC: 24 U/L — SIGNIFICANT CHANGE UP (ref 10–45)
ALT FLD-CCNC: 25 U/L — SIGNIFICANT CHANGE UP (ref 10–45)
ALT FLD-CCNC: 25 U/L — SIGNIFICANT CHANGE UP (ref 10–45)
ALT FLD-CCNC: 28 U/L — SIGNIFICANT CHANGE UP (ref 10–45)
ALT FLD-CCNC: 29 U/L DA — SIGNIFICANT CHANGE UP (ref 10–45)
ALT FLD-CCNC: 31 U/L — SIGNIFICANT CHANGE UP (ref 4–33)
ALT FLD-CCNC: 33 U/L — SIGNIFICANT CHANGE UP (ref 4–33)
ALT FLD-CCNC: 34 U/L — HIGH (ref 4–33)
ALT FLD-CCNC: 7 U/L DA — LOW (ref 10–45)
ALT SERPL-CCNC: 15 U/L
ALT SERPL-CCNC: 16 U/L
ALT SERPL-CCNC: 27 U/L
ANA PAT FLD IF-IMP: ABNORMAL
ANA TITR SER: ABNORMAL
ANION GAP SERPL CALC-SCNC: 10 MMO/L — SIGNIFICANT CHANGE UP (ref 7–14)
ANION GAP SERPL CALC-SCNC: 10 MMO/L — SIGNIFICANT CHANGE UP (ref 7–14)
ANION GAP SERPL CALC-SCNC: 10 MMOL/L — SIGNIFICANT CHANGE UP (ref 5–17)
ANION GAP SERPL CALC-SCNC: 11 MMO/L — SIGNIFICANT CHANGE UP (ref 7–14)
ANION GAP SERPL CALC-SCNC: 11 MMO/L — SIGNIFICANT CHANGE UP (ref 7–14)
ANION GAP SERPL CALC-SCNC: 11 MMOL/L — SIGNIFICANT CHANGE UP (ref 5–17)
ANION GAP SERPL CALC-SCNC: 12 MMO/L — SIGNIFICANT CHANGE UP (ref 7–14)
ANION GAP SERPL CALC-SCNC: 12 MMOL/L — SIGNIFICANT CHANGE UP (ref 5–17)
ANION GAP SERPL CALC-SCNC: 13 MMO/L — SIGNIFICANT CHANGE UP (ref 7–14)
ANION GAP SERPL CALC-SCNC: 13 MMOL/L
ANION GAP SERPL CALC-SCNC: 13 MMOL/L — SIGNIFICANT CHANGE UP (ref 5–17)
ANION GAP SERPL CALC-SCNC: 14 MMO/L — SIGNIFICANT CHANGE UP (ref 7–14)
ANION GAP SERPL CALC-SCNC: 14 MMOL/L
ANION GAP SERPL CALC-SCNC: 14 MMOL/L — SIGNIFICANT CHANGE UP (ref 5–17)
ANION GAP SERPL CALC-SCNC: 15 MMO/L — HIGH (ref 7–14)
ANION GAP SERPL CALC-SCNC: 15 MMOL/L
ANION GAP SERPL CALC-SCNC: 15 MMOL/L — SIGNIFICANT CHANGE UP (ref 5–17)
ANION GAP SERPL CALC-SCNC: 16 MMOL/L — SIGNIFICANT CHANGE UP (ref 5–17)
ANION GAP SERPL CALC-SCNC: 16 MMOL/L — SIGNIFICANT CHANGE UP (ref 5–17)
ANION GAP SERPL CALC-SCNC: 17 MMOL/L — SIGNIFICANT CHANGE UP (ref 5–17)
ANION GAP SERPL CALC-SCNC: 4 MMOL/L — LOW (ref 5–17)
ANION GAP SERPL CALC-SCNC: 6 MMOL/L — SIGNIFICANT CHANGE UP (ref 5–17)
ANION GAP SERPL CALC-SCNC: 7 MMOL/L — SIGNIFICANT CHANGE UP (ref 5–17)
ANION GAP SERPL CALC-SCNC: 8 MMOL/L — SIGNIFICANT CHANGE UP (ref 5–17)
ANION GAP SERPL CALC-SCNC: 8 MMOL/L — SIGNIFICANT CHANGE UP (ref 5–17)
ANION GAP SERPL CALC-SCNC: 9 MMO/L — SIGNIFICANT CHANGE UP (ref 7–14)
ANION GAP SERPL CALC-SCNC: 9 MMOL/L — SIGNIFICANT CHANGE UP (ref 5–17)
ANISOCYTOSIS BLD QL: SLIGHT — SIGNIFICANT CHANGE UP
APPEARANCE UR: ABNORMAL
APPEARANCE UR: ABNORMAL
APPEARANCE UR: CLEAR — SIGNIFICANT CHANGE UP
APPEARANCE: ABNORMAL
APPEARANCE: CLEAR
APTT BLD: 28.8 SEC — SIGNIFICANT CHANGE UP (ref 27.5–36.3)
APTT BLD: 29.3 SEC — SIGNIFICANT CHANGE UP (ref 27.5–36.3)
APTT BLD: 29.5 SEC — SIGNIFICANT CHANGE UP (ref 27.5–36.3)
APTT BLD: 32.3 SEC — SIGNIFICANT CHANGE UP (ref 27.5–36.3)
APTT BLD: 32.5 SEC — SIGNIFICANT CHANGE UP (ref 27.5–36.3)
APTT BLD: 32.7 SEC — SIGNIFICANT CHANGE UP (ref 27.5–36.3)
APTT BLD: 34.9 SEC — SIGNIFICANT CHANGE UP (ref 27.5–36.3)
APTT BLD: 35.6 SEC — SIGNIFICANT CHANGE UP (ref 27.5–36.3)
APTT BLD: 35.8 SEC — SIGNIFICANT CHANGE UP (ref 27.5–36.3)
APTT BLD: 38 SEC — HIGH (ref 27.5–36.3)
APTT BLD: 38.4 SEC — HIGH (ref 27.5–36.3)
APTT BLD: 38.9 SEC — HIGH (ref 27.5–36.3)
APTT BLD: 41.9 SEC — HIGH (ref 27.5–36.3)
APTT BLD: 42.2 SEC — HIGH (ref 27.5–36.3)
APTT BLD: 42.6 SEC — HIGH (ref 27.5–36.3)
APTT BLD: 42.8 SEC — HIGH (ref 27.5–36.3)
APTT BLD: 43.5 SEC — HIGH (ref 27.5–36.3)
APTT BLD: 45.9 SEC — HIGH (ref 27.5–36.3)
APTT BLD: 46.5 SEC — HIGH (ref 27.5–36.3)
APTT BLD: 61.3 SEC — HIGH (ref 27.5–36.3)
APTT BLD: 63 SEC — HIGH (ref 27.5–36.3)
AST SERPL-CCNC: 12 U/L — SIGNIFICANT CHANGE UP (ref 4–32)
AST SERPL-CCNC: 13 U/L — SIGNIFICANT CHANGE UP (ref 10–40)
AST SERPL-CCNC: 14 U/L — SIGNIFICANT CHANGE UP (ref 10–40)
AST SERPL-CCNC: 14 U/L — SIGNIFICANT CHANGE UP (ref 10–40)
AST SERPL-CCNC: 14 U/L — SIGNIFICANT CHANGE UP (ref 4–32)
AST SERPL-CCNC: 16 U/L — SIGNIFICANT CHANGE UP (ref 4–32)
AST SERPL-CCNC: 17 U/L — SIGNIFICANT CHANGE UP (ref 10–40)
AST SERPL-CCNC: 19 U/L — SIGNIFICANT CHANGE UP (ref 10–40)
AST SERPL-CCNC: 21 U/L — SIGNIFICANT CHANGE UP (ref 10–40)
AST SERPL-CCNC: 22 U/L
AST SERPL-CCNC: 23 U/L
AST SERPL-CCNC: 23 U/L — SIGNIFICANT CHANGE UP (ref 4–32)
AST SERPL-CCNC: 24 U/L — SIGNIFICANT CHANGE UP (ref 4–32)
AST SERPL-CCNC: 25 U/L
AST SERPL-CCNC: 29 U/L — SIGNIFICANT CHANGE UP (ref 10–40)
AST SERPL-CCNC: 32 U/L — SIGNIFICANT CHANGE UP (ref 10–40)
AST SERPL-CCNC: 34 U/L — HIGH (ref 4–32)
AST SERPL-CCNC: 35 U/L — HIGH (ref 4–32)
AST SERPL-CCNC: 46 U/L — HIGH (ref 10–40)
AST SERPL-CCNC: 59 U/L — HIGH (ref 10–40)
AUTO DIFF PNL BLD: NEGATIVE — SIGNIFICANT CHANGE UP
B-GLOBULIN MFR SERPL ELPH: 10.9 %
B-GLOBULIN SERPL ELPH-MCNC: 0.8 G/DL
B-GLOBULIN SERPL ELPH-MCNC: 0.9 G/DL — SIGNIFICANT CHANGE UP (ref 0.5–1)
B2 GLYCOPROT1 AB SER QL: POSITIVE — SIGNIFICANT CHANGE UP
B2 MICROGLOB SERPL-MCNC: 3.5 MG/L
BACTERIA # UR AUTO: ABNORMAL
BACTERIA # UR AUTO: ABNORMAL
BACTERIA # UR AUTO: ABNORMAL /HPF
BACTERIA # UR AUTO: NEGATIVE — SIGNIFICANT CHANGE UP
BACTERIA # UR AUTO: NEGATIVE — SIGNIFICANT CHANGE UP
BACTERIA UR CULT: ABNORMAL
BACTERIA: ABNORMAL
BASE EXCESS BLDV CALC-SCNC: 0.6 MMOL/L — SIGNIFICANT CHANGE UP
BASE EXCESS BLDV CALC-SCNC: 2.7 MMOL/L — HIGH (ref -2–2)
BASE EXCESS BLDV CALC-SCNC: 4.7 MMOL/L — HIGH (ref -2–2)
BASOPHILS # BLD AUTO: 0 K/UL — SIGNIFICANT CHANGE UP (ref 0–0.2)
BASOPHILS # BLD AUTO: 0.01 K/UL — SIGNIFICANT CHANGE UP (ref 0–0.2)
BASOPHILS # BLD AUTO: 0.01 K/UL — SIGNIFICANT CHANGE UP (ref 0–0.2)
BASOPHILS # BLD AUTO: 0.02 K/UL — SIGNIFICANT CHANGE UP (ref 0–0.2)
BASOPHILS # BLD AUTO: 0.03 K/UL — SIGNIFICANT CHANGE UP (ref 0–0.2)
BASOPHILS # BLD AUTO: 0.04 K/UL
BASOPHILS # BLD AUTO: 0.04 K/UL — SIGNIFICANT CHANGE UP (ref 0–0.2)
BASOPHILS # BLD AUTO: 0.08 K/UL
BASOPHILS # BLD AUTO: 0.09 K/UL — SIGNIFICANT CHANGE UP (ref 0–0.2)
BASOPHILS # BLD AUTO: 0.15 K/UL — SIGNIFICANT CHANGE UP (ref 0–0.2)
BASOPHILS NFR BLD AUTO: 0 % — SIGNIFICANT CHANGE UP (ref 0–2)
BASOPHILS NFR BLD AUTO: 0.1 % — SIGNIFICANT CHANGE UP (ref 0–2)
BASOPHILS NFR BLD AUTO: 0.2 %
BASOPHILS NFR BLD AUTO: 0.2 % — SIGNIFICANT CHANGE UP (ref 0–2)
BASOPHILS NFR BLD AUTO: 0.4 % — SIGNIFICANT CHANGE UP (ref 0–2)
BASOPHILS NFR BLD AUTO: 0.7 %
BASOPHILS NFR SPEC: 0 % — SIGNIFICANT CHANGE UP (ref 0–2)
BILIRUB SERPL-MCNC: 0.3 MG/DL
BILIRUB SERPL-MCNC: 0.3 MG/DL — SIGNIFICANT CHANGE UP (ref 0.2–1.2)
BILIRUB SERPL-MCNC: 0.4 MG/DL — SIGNIFICANT CHANGE UP (ref 0.2–1.2)
BILIRUB SERPL-MCNC: 0.5 MG/DL
BILIRUB SERPL-MCNC: 0.5 MG/DL — SIGNIFICANT CHANGE UP (ref 0.2–1.2)
BILIRUB SERPL-MCNC: 0.6 MG/DL
BILIRUB SERPL-MCNC: 0.6 MG/DL — SIGNIFICANT CHANGE UP (ref 0.2–1.2)
BILIRUB SERPL-MCNC: 0.6 MG/DL — SIGNIFICANT CHANGE UP (ref 0.2–1.2)
BILIRUB SERPL-MCNC: 0.7 MG/DL — SIGNIFICANT CHANGE UP (ref 0.2–1.2)
BILIRUB SERPL-MCNC: 1 MG/DL — SIGNIFICANT CHANGE UP (ref 0.2–1.2)
BILIRUB SERPL-MCNC: 1.1 MG/DL — SIGNIFICANT CHANGE UP (ref 0.2–1.2)
BILIRUB SERPL-MCNC: 1.2 MG/DL — SIGNIFICANT CHANGE UP (ref 0.2–1.2)
BILIRUB SERPL-MCNC: 1.4 MG/DL — HIGH (ref 0.2–1.2)
BILIRUB SERPL-MCNC: 1.5 MG/DL — HIGH (ref 0.2–1.2)
BILIRUB UR-MCNC: NEGATIVE — SIGNIFICANT CHANGE UP
BILIRUBIN URINE: NEGATIVE
BILIRUBIN URINE: NEGATIVE
BLASTS # FLD: 0 % — SIGNIFICANT CHANGE UP (ref 0–0)
BLD GP AB SCN SERPL QL: NEGATIVE — SIGNIFICANT CHANGE UP
BLD GP AB SCN SERPL QL: SIGNIFICANT CHANGE UP
BLD GP AB SCN SERPL QL: SIGNIFICANT CHANGE UP
BLOOD GAS COMMENTS ARTERIAL: SIGNIFICANT CHANGE UP
BLOOD UR QL VISUAL: HIGH
BLOOD URINE: ABNORMAL
BLOOD URINE: ABNORMAL
BUN SERPL-MCNC: 11 MG/DL
BUN SERPL-MCNC: 16 MG/DL
BUN SERPL-MCNC: 33 MG/DL — HIGH (ref 7–23)
BUN SERPL-MCNC: 33 MG/DL — HIGH (ref 7–23)
BUN SERPL-MCNC: 36 MG/DL — HIGH (ref 7–23)
BUN SERPL-MCNC: 37 MG/DL
BUN SERPL-MCNC: 37 MG/DL — HIGH (ref 7–23)
BUN SERPL-MCNC: 38 MG/DL — HIGH (ref 7–23)
BUN SERPL-MCNC: 39 MG/DL — HIGH (ref 7–23)
BUN SERPL-MCNC: 39 MG/DL — HIGH (ref 7–23)
BUN SERPL-MCNC: 40 MG/DL — HIGH (ref 7–23)
BUN SERPL-MCNC: 41 MG/DL — HIGH (ref 7–23)
BUN SERPL-MCNC: 41 MG/DL — HIGH (ref 7–23)
BUN SERPL-MCNC: 42 MG/DL — HIGH (ref 7–23)
BUN SERPL-MCNC: 43 MG/DL — HIGH (ref 7–23)
BUN SERPL-MCNC: 43 MG/DL — HIGH (ref 7–23)
BUN SERPL-MCNC: 44 MG/DL — HIGH (ref 7–23)
BUN SERPL-MCNC: 46 MG/DL — HIGH (ref 7–23)
BUN SERPL-MCNC: 47 MG/DL — HIGH (ref 7–23)
BUN SERPL-MCNC: 48 MG/DL — HIGH (ref 7–23)
BUN SERPL-MCNC: 50 MG/DL — HIGH (ref 7–23)
BUN SERPL-MCNC: 51 MG/DL — HIGH (ref 7–23)
BUN SERPL-MCNC: 52 MG/DL — HIGH (ref 7–23)
BUN SERPL-MCNC: 53 MG/DL — HIGH (ref 7–23)
BUN SERPL-MCNC: 53 MG/DL — HIGH (ref 7–23)
BUN SERPL-MCNC: 54 MG/DL — HIGH (ref 7–23)
BUN SERPL-MCNC: 54 MG/DL — HIGH (ref 7–23)
BUN SERPL-MCNC: 57 MG/DL — HIGH (ref 7–23)
BUN SERPL-MCNC: 58 MG/DL — HIGH (ref 7–23)
BUN SERPL-MCNC: 59 MG/DL — HIGH (ref 7–23)
BUN SERPL-MCNC: 61 MG/DL — HIGH (ref 7–23)
BUN SERPL-MCNC: 62 MG/DL — HIGH (ref 7–23)
BUN SERPL-MCNC: 62 MG/DL — HIGH (ref 7–23)
BUN SERPL-MCNC: 65 MG/DL — HIGH (ref 7–23)
BUN SERPL-MCNC: 65 MG/DL — HIGH (ref 7–23)
C-ANCA SER-ACNC: NEGATIVE — SIGNIFICANT CHANGE UP
C3 SERPL-MCNC: 76 MG/DL — LOW (ref 81–157)
C4 SERPL-MCNC: 10 MG/DL — LOW (ref 13–39)
CA-I SERPL-SCNC: 1.08 MMOL/L — LOW (ref 1.12–1.3)
CA-I SERPL-SCNC: 1.14 MMOL/L — SIGNIFICANT CHANGE UP (ref 1.12–1.3)
CALCIUM SERPL-MCNC: 7.6 MG/DL — LOW (ref 8.4–10.5)
CALCIUM SERPL-MCNC: 7.7 MG/DL — LOW (ref 8.4–10.5)
CALCIUM SERPL-MCNC: 7.8 MG/DL — LOW (ref 8.4–10.5)
CALCIUM SERPL-MCNC: 7.9 MG/DL — LOW (ref 8.4–10.5)
CALCIUM SERPL-MCNC: 8 MG/DL — LOW (ref 8.4–10.5)
CALCIUM SERPL-MCNC: 8 MG/DL — LOW (ref 8.4–10.5)
CALCIUM SERPL-MCNC: 8.1 MG/DL — LOW (ref 8.4–10.5)
CALCIUM SERPL-MCNC: 8.2 MG/DL — LOW (ref 8.4–10.5)
CALCIUM SERPL-MCNC: 8.3 MG/DL — LOW (ref 8.4–10.5)
CALCIUM SERPL-MCNC: 8.4 MG/DL — SIGNIFICANT CHANGE UP (ref 8.4–10.5)
CALCIUM SERPL-MCNC: 8.5 MG/DL — SIGNIFICANT CHANGE UP (ref 8.4–10.5)
CALCIUM SERPL-MCNC: 8.6 MG/DL — SIGNIFICANT CHANGE UP (ref 8.4–10.5)
CALCIUM SERPL-MCNC: 8.7 MG/DL — SIGNIFICANT CHANGE UP (ref 8.4–10.5)
CALCIUM SERPL-MCNC: 8.8 MG/DL — SIGNIFICANT CHANGE UP (ref 8.4–10.5)
CALCIUM SERPL-MCNC: 8.9 MG/DL — SIGNIFICANT CHANGE UP (ref 8.4–10.5)
CALCIUM SERPL-MCNC: 9 MG/DL
CALCIUM SERPL-MCNC: 9 MG/DL — SIGNIFICANT CHANGE UP (ref 8.4–10.5)
CALCIUM SERPL-MCNC: 9 MG/DL — SIGNIFICANT CHANGE UP (ref 8.4–10.5)
CALCIUM SERPL-MCNC: 9.1 MG/DL
CALCIUM SERPL-MCNC: 9.1 MG/DL — SIGNIFICANT CHANGE UP (ref 8.4–10.5)
CALCIUM SERPL-MCNC: 9.2 MG/DL — SIGNIFICANT CHANGE UP (ref 8.4–10.5)
CALCIUM SERPL-MCNC: 9.3 MG/DL
CALCIUM SERPL-MCNC: 9.3 MG/DL — SIGNIFICANT CHANGE UP (ref 8.4–10.5)
CALCIUM SERPL-MCNC: 9.3 MG/DL — SIGNIFICANT CHANGE UP (ref 8.4–10.5)
CALCIUM SERPL-MCNC: 9.4 MG/DL — SIGNIFICANT CHANGE UP (ref 8.4–10.5)
CALCIUM SERPL-MCNC: 9.4 MG/DL — SIGNIFICANT CHANGE UP (ref 8.4–10.5)
CALCIUM SERPL-MCNC: 9.7 MG/DL — SIGNIFICANT CHANGE UP (ref 8.4–10.5)
CARDIOLIPIN IGA SER-MCNC: SIGNIFICANT CHANGE UP
CARDIOLIPIN IGM SER-MCNC: 15.8 MPL — HIGH (ref 0–11)
CARDIOLIPIN IGM SER-MCNC: 15.8 MPL — HIGH (ref 0–11)
CARDIOLIPIN IGM SER-MCNC: 5.78 GPL — SIGNIFICANT CHANGE UP (ref 0–23)
CARDIOLIPIN IGM SER-MCNC: 5.78 GPL — SIGNIFICANT CHANGE UP (ref 0–23)
CD16+CD56+ CELLS NFR BLD: 10 % — SIGNIFICANT CHANGE UP (ref 6–22)
CD16+CD56+ CELLS NFR SPEC: 36 CELL/UL — LOW (ref 59–453)
CD19 BLASTS SPEC-ACNC: 20 % — SIGNIFICANT CHANGE UP (ref 8–22)
CD19 BLASTS SPEC-ACNC: 75 CELL/UL — LOW (ref 105–430)
CD3 BLASTS SPEC-ACNC: 262 CELLS/UL — LOW (ref 678–2144)
CD3 BLASTS SPEC-ACNC: 70 % — SIGNIFICANT CHANGE UP (ref 62–83)
CD4 %: 60 % — HIGH (ref 43–52)
CD8 %: 9 % — LOW (ref 17–28)
CENTROMERE AB SER-ACNC: <0.2 — SIGNIFICANT CHANGE UP
CHLORIDE BLDV-SCNC: 101 MMOL/L — SIGNIFICANT CHANGE UP (ref 96–108)
CHLORIDE BLDV-SCNC: 103 MMOL/L — SIGNIFICANT CHANGE UP (ref 96–108)
CHLORIDE SERPL-SCNC: 100 MMOL/L — SIGNIFICANT CHANGE UP (ref 96–108)
CHLORIDE SERPL-SCNC: 100 MMOL/L — SIGNIFICANT CHANGE UP (ref 98–107)
CHLORIDE SERPL-SCNC: 101 MMOL/L — SIGNIFICANT CHANGE UP (ref 96–108)
CHLORIDE SERPL-SCNC: 101 MMOL/L — SIGNIFICANT CHANGE UP (ref 96–108)
CHLORIDE SERPL-SCNC: 102 MMOL/L — SIGNIFICANT CHANGE UP (ref 96–108)
CHLORIDE SERPL-SCNC: 102 MMOL/L — SIGNIFICANT CHANGE UP (ref 98–107)
CHLORIDE SERPL-SCNC: 103 MMOL/L — SIGNIFICANT CHANGE UP (ref 96–108)
CHLORIDE SERPL-SCNC: 103 MMOL/L — SIGNIFICANT CHANGE UP (ref 96–108)
CHLORIDE SERPL-SCNC: 105 MMOL/L — SIGNIFICANT CHANGE UP (ref 96–108)
CHLORIDE SERPL-SCNC: 105 MMOL/L — SIGNIFICANT CHANGE UP (ref 96–108)
CHLORIDE SERPL-SCNC: 92 MMOL/L — LOW (ref 96–108)
CHLORIDE SERPL-SCNC: 92 MMOL/L — LOW (ref 98–107)
CHLORIDE SERPL-SCNC: 93 MMOL/L — LOW (ref 96–108)
CHLORIDE SERPL-SCNC: 93 MMOL/L — LOW (ref 98–107)
CHLORIDE SERPL-SCNC: 94 MMOL/L
CHLORIDE SERPL-SCNC: 94 MMOL/L — LOW (ref 96–108)
CHLORIDE SERPL-SCNC: 94 MMOL/L — LOW (ref 98–107)
CHLORIDE SERPL-SCNC: 95 MMOL/L — LOW (ref 96–108)
CHLORIDE SERPL-SCNC: 95 MMOL/L — LOW (ref 98–107)
CHLORIDE SERPL-SCNC: 95 MMOL/L — LOW (ref 98–107)
CHLORIDE SERPL-SCNC: 96 MMOL/L
CHLORIDE SERPL-SCNC: 96 MMOL/L — LOW (ref 98–107)
CHLORIDE SERPL-SCNC: 96 MMOL/L — LOW (ref 98–107)
CHLORIDE SERPL-SCNC: 96 MMOL/L — SIGNIFICANT CHANGE UP (ref 96–108)
CHLORIDE SERPL-SCNC: 97 MMOL/L
CHLORIDE SERPL-SCNC: 97 MMOL/L — SIGNIFICANT CHANGE UP (ref 96–108)
CHLORIDE SERPL-SCNC: 98 MMOL/L — SIGNIFICANT CHANGE UP (ref 96–108)
CHLORIDE SERPL-SCNC: 98 MMOL/L — SIGNIFICANT CHANGE UP (ref 98–107)
CHLORIDE SERPL-SCNC: 99 MMOL/L — SIGNIFICANT CHANGE UP (ref 96–108)
CHLORIDE SERPL-SCNC: 99 MMOL/L — SIGNIFICANT CHANGE UP (ref 98–107)
CHLORIDE SERPL-SCNC: 99 MMOL/L — SIGNIFICANT CHANGE UP (ref 98–107)
CHOLEST SERPL-MCNC: 115 MG/DL
CHOLEST/HDLC SERPL: 2.2 RATIO
CO2 BLDA-SCNC: 22 MMOL/L — SIGNIFICANT CHANGE UP (ref 22–30)
CO2 BLDA-SCNC: 23 MMOL/L — SIGNIFICANT CHANGE UP (ref 22–30)
CO2 BLDA-SCNC: 23 MMOL/L — SIGNIFICANT CHANGE UP (ref 22–30)
CO2 BLDV-SCNC: 29 MMOL/L — SIGNIFICANT CHANGE UP (ref 22–30)
CO2 BLDV-SCNC: 34 MMOL/L — HIGH (ref 22–30)
CO2 SERPL-SCNC: 20 MMOL/L — LOW (ref 22–31)
CO2 SERPL-SCNC: 21 MMOL/L — LOW (ref 22–31)
CO2 SERPL-SCNC: 23 MMOL/L — SIGNIFICANT CHANGE UP (ref 22–31)
CO2 SERPL-SCNC: 23 MMOL/L — SIGNIFICANT CHANGE UP (ref 22–31)
CO2 SERPL-SCNC: 24 MMOL/L — SIGNIFICANT CHANGE UP (ref 22–31)
CO2 SERPL-SCNC: 25 MMOL/L
CO2 SERPL-SCNC: 25 MMOL/L — SIGNIFICANT CHANGE UP (ref 22–31)
CO2 SERPL-SCNC: 26 MMOL/L — SIGNIFICANT CHANGE UP (ref 22–31)
CO2 SERPL-SCNC: 27 MMOL/L
CO2 SERPL-SCNC: 27 MMOL/L — SIGNIFICANT CHANGE UP (ref 22–31)
CO2 SERPL-SCNC: 28 MMOL/L — SIGNIFICANT CHANGE UP (ref 22–31)
CO2 SERPL-SCNC: 28 MMOL/L — SIGNIFICANT CHANGE UP (ref 22–31)
CO2 SERPL-SCNC: 29 MMOL/L — SIGNIFICANT CHANGE UP (ref 22–31)
CO2 SERPL-SCNC: 30 MMOL/L
CO2 SERPL-SCNC: 30 MMOL/L — SIGNIFICANT CHANGE UP (ref 22–31)
CO2 SERPL-SCNC: 30 MMOL/L — SIGNIFICANT CHANGE UP (ref 22–31)
CO2 SERPL-SCNC: 31 MMOL/L — SIGNIFICANT CHANGE UP (ref 22–31)
CO2 SERPL-SCNC: 32 MMOL/L — HIGH (ref 22–31)
CO2 SERPL-SCNC: 33 MMOL/L — HIGH (ref 22–31)
CO2 SERPL-SCNC: 34 MMOL/L — HIGH (ref 22–31)
CO2 SERPL-SCNC: 35 MMOL/L — HIGH (ref 22–31)
CO2 SERPL-SCNC: 37 MMOL/L — HIGH (ref 22–31)
CO2 SERPL-SCNC: 38 MMOL/L — HIGH (ref 22–31)
CO2 SERPL-SCNC: 39 MMOL/L — HIGH (ref 22–31)
CO2 SERPL-SCNC: 40 MMOL/L — HIGH (ref 22–31)
CO2 SERPL-SCNC: 42 MMOL/L — HIGH (ref 22–31)
CO2 SERPL-SCNC: 43 MMOL/L — HIGH (ref 22–31)
COLOR SPEC: SIGNIFICANT CHANGE UP
COLOR SPEC: YELLOW — SIGNIFICANT CHANGE UP
COLOR: ABNORMAL
COLOR: YELLOW
CREAT ?TM UR-MCNC: 35.6 MG/DL — SIGNIFICANT CHANGE UP
CREAT SERPL-MCNC: 0.51 MG/DL
CREAT SERPL-MCNC: 0.65 MG/DL — SIGNIFICANT CHANGE UP (ref 0.5–1.3)
CREAT SERPL-MCNC: 0.66 MG/DL
CREAT SERPL-MCNC: 0.68 MG/DL — SIGNIFICANT CHANGE UP (ref 0.5–1.3)
CREAT SERPL-MCNC: 0.7 MG/DL — SIGNIFICANT CHANGE UP (ref 0.5–1.3)
CREAT SERPL-MCNC: 0.72 MG/DL — SIGNIFICANT CHANGE UP (ref 0.5–1.3)
CREAT SERPL-MCNC: 0.82 MG/DL — SIGNIFICANT CHANGE UP (ref 0.5–1.3)
CREAT SERPL-MCNC: 0.82 MG/DL — SIGNIFICANT CHANGE UP (ref 0.5–1.3)
CREAT SERPL-MCNC: 0.84 MG/DL — SIGNIFICANT CHANGE UP (ref 0.5–1.3)
CREAT SERPL-MCNC: 0.84 MG/DL — SIGNIFICANT CHANGE UP (ref 0.5–1.3)
CREAT SERPL-MCNC: 0.88 MG/DL — SIGNIFICANT CHANGE UP (ref 0.5–1.3)
CREAT SERPL-MCNC: 0.93 MG/DL — SIGNIFICANT CHANGE UP (ref 0.5–1.3)
CREAT SERPL-MCNC: 0.93 MG/DL — SIGNIFICANT CHANGE UP (ref 0.5–1.3)
CREAT SERPL-MCNC: 0.94 MG/DL — SIGNIFICANT CHANGE UP (ref 0.5–1.3)
CREAT SERPL-MCNC: 1.01 MG/DL — SIGNIFICANT CHANGE UP (ref 0.5–1.3)
CREAT SERPL-MCNC: 1.05 MG/DL — SIGNIFICANT CHANGE UP (ref 0.5–1.3)
CREAT SERPL-MCNC: 1.06 MG/DL — SIGNIFICANT CHANGE UP (ref 0.5–1.3)
CREAT SERPL-MCNC: 1.07 MG/DL — SIGNIFICANT CHANGE UP (ref 0.5–1.3)
CREAT SERPL-MCNC: 1.1 MG/DL — SIGNIFICANT CHANGE UP (ref 0.5–1.3)
CREAT SERPL-MCNC: 1.11 MG/DL — SIGNIFICANT CHANGE UP (ref 0.5–1.3)
CREAT SERPL-MCNC: 1.11 MG/DL — SIGNIFICANT CHANGE UP (ref 0.5–1.3)
CREAT SERPL-MCNC: 1.14 MG/DL
CREAT SERPL-MCNC: 1.14 MG/DL — SIGNIFICANT CHANGE UP (ref 0.5–1.3)
CREAT SERPL-MCNC: 1.15 MG/DL — SIGNIFICANT CHANGE UP (ref 0.5–1.3)
CREAT SERPL-MCNC: 1.16 MG/DL — SIGNIFICANT CHANGE UP (ref 0.5–1.3)
CREAT SERPL-MCNC: 1.18 MG/DL — SIGNIFICANT CHANGE UP (ref 0.5–1.3)
CREAT SERPL-MCNC: 1.18 MG/DL — SIGNIFICANT CHANGE UP (ref 0.5–1.3)
CREAT SERPL-MCNC: 1.2 MG/DL — SIGNIFICANT CHANGE UP (ref 0.5–1.3)
CREAT SERPL-MCNC: 1.2 MG/DL — SIGNIFICANT CHANGE UP (ref 0.5–1.3)
CREAT SERPL-MCNC: 1.23 MG/DL — SIGNIFICANT CHANGE UP (ref 0.5–1.3)
CREAT SERPL-MCNC: 1.28 MG/DL — SIGNIFICANT CHANGE UP (ref 0.5–1.3)
CREAT SERPL-MCNC: 1.28 MG/DL — SIGNIFICANT CHANGE UP (ref 0.5–1.3)
CREAT SERPL-MCNC: 1.31 MG/DL — HIGH (ref 0.5–1.3)
CREAT SERPL-MCNC: 1.31 MG/DL — HIGH (ref 0.5–1.3)
CREAT SERPL-MCNC: 1.37 MG/DL — HIGH (ref 0.5–1.3)
CREAT SERPL-MCNC: 1.39 MG/DL — HIGH (ref 0.5–1.3)
CREAT SERPL-MCNC: 1.4 MG/DL — HIGH (ref 0.5–1.3)
CREAT SERPL-MCNC: 1.42 MG/DL — HIGH (ref 0.5–1.3)
CREAT SERPL-MCNC: 1.44 MG/DL — HIGH (ref 0.5–1.3)
CREAT SERPL-MCNC: 1.48 MG/DL — HIGH (ref 0.5–1.3)
CREAT SERPL-MCNC: 1.51 MG/DL — HIGH (ref 0.5–1.3)
CREAT SERPL-MCNC: 1.6 MG/DL — HIGH (ref 0.5–1.3)
CREAT SERPL-MCNC: 1.66 MG/DL — HIGH (ref 0.5–1.3)
CREAT SERPL-MCNC: 1.68 MG/DL — HIGH (ref 0.5–1.3)
CREAT SERPL-MCNC: 1.69 MG/DL — HIGH (ref 0.5–1.3)
CREAT SERPL-MCNC: 1.7 MG/DL — HIGH (ref 0.5–1.3)
CREAT SERPL-MCNC: 1.71 MG/DL — HIGH (ref 0.5–1.3)
CREAT SERPL-MCNC: 1.72 MG/DL — HIGH (ref 0.5–1.3)
CREAT SERPL-MCNC: 1.73 MG/DL — HIGH (ref 0.5–1.3)
CREAT SERPL-MCNC: 1.74 MG/DL — HIGH (ref 0.5–1.3)
CREAT SERPL-MCNC: 1.74 MG/DL — HIGH (ref 0.5–1.3)
CREAT SERPL-MCNC: 1.76 MG/DL — HIGH (ref 0.5–1.3)
CREAT SERPL-MCNC: 1.76 MG/DL — HIGH (ref 0.5–1.3)
CREAT SERPL-MCNC: 1.81 MG/DL — HIGH (ref 0.5–1.3)
CREAT SERPL-MCNC: 1.82 MG/DL — HIGH (ref 0.5–1.3)
CREAT SERPL-MCNC: 1.85 MG/DL — HIGH (ref 0.5–1.3)
CREAT SERPL-MCNC: 1.89 MG/DL — HIGH (ref 0.5–1.3)
CREAT SERPL-MCNC: 1.9 MG/DL — HIGH (ref 0.5–1.3)
CREAT SERPL-MCNC: 1.95 MG/DL — HIGH (ref 0.5–1.3)
CREAT SERPL-MCNC: 1.95 MG/DL — HIGH (ref 0.5–1.3)
CREAT SERPL-MCNC: 2.03 MG/DL — HIGH (ref 0.5–1.3)
CREAT SERPL-MCNC: 2.07 MG/DL — HIGH (ref 0.5–1.3)
CREAT SERPL-MCNC: 2.16 MG/DL — HIGH (ref 0.5–1.3)
CRP SERPL-MCNC: 18.14 MG/DL — HIGH (ref 0–0.4)
CRYOFIB BLD-MCNC: 0 — SIGNIFICANT CHANGE UP
CRYOGLOB SERPL-MCNC: 0 — SIGNIFICANT CHANGE UP
CRYPTOC AG FLD QL: NEGATIVE — SIGNIFICANT CHANGE UP
CULTURE RESULTS: NO GROWTH — SIGNIFICANT CHANGE UP
CULTURE RESULTS: SIGNIFICANT CHANGE UP
D DIMER BLD IA.RAPID-MCNC: 611 NG/ML DDU — HIGH
DEPRECATED KAPPA LC FREE/LAMBDA SER: 1.56 RATIO
DEPRECATED KAPPA LC FREE/LAMBDA SER: 1.56 RATIO
DIFF PNL FLD: ABNORMAL
DIGOXIN SERPL-MCNC: 1.4 NG/ML — SIGNIFICANT CHANGE UP (ref 0.8–2)
DIGOXIN SERPL-MCNC: 1.9 NG/ML — SIGNIFICANT CHANGE UP (ref 0.8–2)
DRVVT SCREEN TO CONFIRM RATIO: 1.46 — HIGH (ref 0–1.2)
DSDNA AB FLD-ACNC: <0.2 — SIGNIFICANT CHANGE UP
DSDNA AB SER-ACNC: 435 IU/ML — HIGH
E COLI DNA BLD POS QL NAA+NON-PROBE: SIGNIFICANT CHANGE UP
E COLI DNA BLD POS QL NAA+NON-PROBE: SIGNIFICANT CHANGE UP
ENA RNP IGG SER-ACNC: < 0.2 — SIGNIFICANT CHANGE UP
ENA SCL70 AB SER-ACNC: 0.3 — SIGNIFICANT CHANGE UP
ENA SM AB TITR SER: < 0.2 — SIGNIFICANT CHANGE UP
ENA SS-A AB FLD IA-ACNC: <0.2 — SIGNIFICANT CHANGE UP
EOSINOPHIL # BLD AUTO: 0 K/UL
EOSINOPHIL # BLD AUTO: 0 K/UL — SIGNIFICANT CHANGE UP (ref 0–0.5)
EOSINOPHIL # BLD AUTO: 0.01 K/UL — SIGNIFICANT CHANGE UP (ref 0–0.5)
EOSINOPHIL # BLD AUTO: 0.02 K/UL — SIGNIFICANT CHANGE UP (ref 0–0.5)
EOSINOPHIL # BLD AUTO: 0.03 K/UL — SIGNIFICANT CHANGE UP (ref 0–0.5)
EOSINOPHIL # BLD AUTO: 0.04 K/UL — SIGNIFICANT CHANGE UP (ref 0–0.5)
EOSINOPHIL # BLD AUTO: 0.1 K/UL — SIGNIFICANT CHANGE UP (ref 0–0.5)
EOSINOPHIL # BLD AUTO: 0.1 K/UL — SIGNIFICANT CHANGE UP (ref 0–0.5)
EOSINOPHIL # BLD AUTO: 0.12 K/UL — SIGNIFICANT CHANGE UP (ref 0–0.5)
EOSINOPHIL # BLD AUTO: 0.17 K/UL — SIGNIFICANT CHANGE UP (ref 0–0.5)
EOSINOPHIL # BLD AUTO: 0.19 K/UL
EOSINOPHIL NFR BLD AUTO: 0 %
EOSINOPHIL NFR BLD AUTO: 0 % — SIGNIFICANT CHANGE UP (ref 0–6)
EOSINOPHIL NFR BLD AUTO: 0.1 % — SIGNIFICANT CHANGE UP (ref 0–6)
EOSINOPHIL NFR BLD AUTO: 0.1 % — SIGNIFICANT CHANGE UP (ref 0–6)
EOSINOPHIL NFR BLD AUTO: 0.6 % — SIGNIFICANT CHANGE UP (ref 0–6)
EOSINOPHIL NFR BLD AUTO: 0.7 % — SIGNIFICANT CHANGE UP (ref 0–6)
EOSINOPHIL NFR BLD AUTO: 1 % — SIGNIFICANT CHANGE UP (ref 0–6)
EOSINOPHIL NFR BLD AUTO: 1 % — SIGNIFICANT CHANGE UP (ref 0–6)
EOSINOPHIL NFR BLD AUTO: 1.6 %
EOSINOPHIL NFR FLD: 0 % — SIGNIFICANT CHANGE UP (ref 0–6)
EOSINOPHIL NFR FLD: 0 % — SIGNIFICANT CHANGE UP (ref 0–6)
EOSINOPHIL NFR FLD: 0.9 % — SIGNIFICANT CHANGE UP (ref 0–6)
EPI CELLS # UR: 0 /HPF — SIGNIFICANT CHANGE UP
EPI CELLS # UR: ABNORMAL
ERYTHROCYTE [SEDIMENTATION RATE] IN BLOOD: 96 MM/HR — HIGH (ref 0–20)
ESTIMATED AVERAGE GLUCOSE: 117 MG/DL
FACT II CIRC INHIB PPP QL: 13.8 SEC — HIGH (ref 9.8–13.1)
FACT II CIRC INHIB PPP QL: 42.5 SEC — HIGH (ref 27.5–37.4)
FERRITIN SERPL-MCNC: 687 NG/ML — HIGH (ref 15–150)
FERRITIN SERPL-MCNC: 75 NG/ML
FERRITIN SERPL-MCNC: 87 NG/ML
FIBRINOGEN PPP-MCNC: 680 MG/DL — HIGH (ref 350–510)
FLUAV SPEC QL CULT: NEGATIVE — SIGNIFICANT CHANGE UP
FLUBV AG SPEC QL IA: NEGATIVE — SIGNIFICANT CHANGE UP
FOLATE SERPL-MCNC: 10.4 NG/ML
FOLATE SERPL-MCNC: 6.7 NG/ML — SIGNIFICANT CHANGE UP
GAMMA GLOB FLD ELPH-MCNC: 2.1 G/DL
GAMMA GLOB MFR SERPL ELPH: 26.9 %
GAMMA GLOBULIN: 1.2 G/DL — SIGNIFICANT CHANGE UP (ref 0.6–1.6)
GAMMA INTERFERON BACKGROUND BLD IA-ACNC: 0.01 IU/ML — SIGNIFICANT CHANGE UP
GAS PNL BLDA: SIGNIFICANT CHANGE UP
GAS PNL BLDMV: SIGNIFICANT CHANGE UP
GAS PNL BLDV: 130 MMOL/L — LOW (ref 135–145)
GAS PNL BLDV: 133 MMOL/L — LOW (ref 136–146)
GAS PNL BLDV: 137 MMOL/L — SIGNIFICANT CHANGE UP (ref 135–145)
GAS PNL BLDV: SIGNIFICANT CHANGE UP
GBM IGG SER-ACNC: <1 AI — SIGNIFICANT CHANGE UP
GBM IGG SER-ACNC: <1 — SIGNIFICANT CHANGE UP
GIANT PLATELETS BLD QL SMEAR: PRESENT — SIGNIFICANT CHANGE UP
GLUCOSE BLDC GLUCOMTR-MCNC: 100 MG/DL — HIGH (ref 70–99)
GLUCOSE BLDC GLUCOMTR-MCNC: 104 MG/DL — HIGH (ref 70–99)
GLUCOSE BLDC GLUCOMTR-MCNC: 110 MG/DL — HIGH (ref 70–99)
GLUCOSE BLDC GLUCOMTR-MCNC: 112 MG/DL — HIGH (ref 70–99)
GLUCOSE BLDC GLUCOMTR-MCNC: 115 MG/DL — HIGH (ref 70–99)
GLUCOSE BLDC GLUCOMTR-MCNC: 115 MG/DL — HIGH (ref 70–99)
GLUCOSE BLDC GLUCOMTR-MCNC: 118 MG/DL — HIGH (ref 70–99)
GLUCOSE BLDC GLUCOMTR-MCNC: 118 MG/DL — HIGH (ref 70–99)
GLUCOSE BLDC GLUCOMTR-MCNC: 119 MG/DL — HIGH (ref 70–99)
GLUCOSE BLDC GLUCOMTR-MCNC: 121 MG/DL — HIGH (ref 70–99)
GLUCOSE BLDC GLUCOMTR-MCNC: 122 MG/DL — HIGH (ref 70–99)
GLUCOSE BLDC GLUCOMTR-MCNC: 123 MG/DL — HIGH (ref 70–99)
GLUCOSE BLDC GLUCOMTR-MCNC: 123 MG/DL — HIGH (ref 70–99)
GLUCOSE BLDC GLUCOMTR-MCNC: 124 MG/DL — HIGH (ref 70–99)
GLUCOSE BLDC GLUCOMTR-MCNC: 124 MG/DL — HIGH (ref 70–99)
GLUCOSE BLDC GLUCOMTR-MCNC: 126 MG/DL — HIGH (ref 70–99)
GLUCOSE BLDC GLUCOMTR-MCNC: 127 MG/DL — HIGH (ref 70–99)
GLUCOSE BLDC GLUCOMTR-MCNC: 131 MG/DL — HIGH (ref 70–99)
GLUCOSE BLDC GLUCOMTR-MCNC: 133 MG/DL — HIGH (ref 70–99)
GLUCOSE BLDC GLUCOMTR-MCNC: 133 MG/DL — HIGH (ref 70–99)
GLUCOSE BLDC GLUCOMTR-MCNC: 135 MG/DL — HIGH (ref 70–99)
GLUCOSE BLDC GLUCOMTR-MCNC: 136 MG/DL — HIGH (ref 70–99)
GLUCOSE BLDC GLUCOMTR-MCNC: 136 MG/DL — HIGH (ref 70–99)
GLUCOSE BLDC GLUCOMTR-MCNC: 138 MG/DL — HIGH (ref 70–99)
GLUCOSE BLDC GLUCOMTR-MCNC: 141 MG/DL — HIGH (ref 70–99)
GLUCOSE BLDC GLUCOMTR-MCNC: 142 MG/DL — HIGH (ref 70–99)
GLUCOSE BLDC GLUCOMTR-MCNC: 142 MG/DL — HIGH (ref 70–99)
GLUCOSE BLDC GLUCOMTR-MCNC: 143 MG/DL — HIGH (ref 70–99)
GLUCOSE BLDC GLUCOMTR-MCNC: 144 MG/DL — HIGH (ref 70–99)
GLUCOSE BLDC GLUCOMTR-MCNC: 144 MG/DL — HIGH (ref 70–99)
GLUCOSE BLDC GLUCOMTR-MCNC: 146 MG/DL — HIGH (ref 70–99)
GLUCOSE BLDC GLUCOMTR-MCNC: 147 MG/DL — HIGH (ref 70–99)
GLUCOSE BLDC GLUCOMTR-MCNC: 147 MG/DL — HIGH (ref 70–99)
GLUCOSE BLDC GLUCOMTR-MCNC: 148 MG/DL — HIGH (ref 70–99)
GLUCOSE BLDC GLUCOMTR-MCNC: 151 MG/DL — HIGH (ref 70–99)
GLUCOSE BLDC GLUCOMTR-MCNC: 156 MG/DL — HIGH (ref 70–99)
GLUCOSE BLDC GLUCOMTR-MCNC: 158 MG/DL — HIGH (ref 70–99)
GLUCOSE BLDC GLUCOMTR-MCNC: 162 MG/DL — HIGH (ref 70–99)
GLUCOSE BLDC GLUCOMTR-MCNC: 162 MG/DL — HIGH (ref 70–99)
GLUCOSE BLDC GLUCOMTR-MCNC: 163 MG/DL — HIGH (ref 70–99)
GLUCOSE BLDC GLUCOMTR-MCNC: 163 MG/DL — HIGH (ref 70–99)
GLUCOSE BLDC GLUCOMTR-MCNC: 164 MG/DL — HIGH (ref 70–99)
GLUCOSE BLDC GLUCOMTR-MCNC: 165 MG/DL — HIGH (ref 70–99)
GLUCOSE BLDC GLUCOMTR-MCNC: 166 MG/DL — HIGH (ref 70–99)
GLUCOSE BLDC GLUCOMTR-MCNC: 166 MG/DL — HIGH (ref 70–99)
GLUCOSE BLDC GLUCOMTR-MCNC: 167 MG/DL — HIGH (ref 70–99)
GLUCOSE BLDC GLUCOMTR-MCNC: 167 MG/DL — HIGH (ref 70–99)
GLUCOSE BLDC GLUCOMTR-MCNC: 168 MG/DL — HIGH (ref 70–99)
GLUCOSE BLDC GLUCOMTR-MCNC: 169 MG/DL — HIGH (ref 70–99)
GLUCOSE BLDC GLUCOMTR-MCNC: 170 MG/DL — HIGH (ref 70–99)
GLUCOSE BLDC GLUCOMTR-MCNC: 170 MG/DL — HIGH (ref 70–99)
GLUCOSE BLDC GLUCOMTR-MCNC: 171 MG/DL — HIGH (ref 70–99)
GLUCOSE BLDC GLUCOMTR-MCNC: 175 MG/DL — HIGH (ref 70–99)
GLUCOSE BLDC GLUCOMTR-MCNC: 176 MG/DL — HIGH (ref 70–99)
GLUCOSE BLDC GLUCOMTR-MCNC: 176 MG/DL — HIGH (ref 70–99)
GLUCOSE BLDC GLUCOMTR-MCNC: 177 MG/DL — HIGH (ref 70–99)
GLUCOSE BLDC GLUCOMTR-MCNC: 178 MG/DL — HIGH (ref 70–99)
GLUCOSE BLDC GLUCOMTR-MCNC: 183 MG/DL — HIGH (ref 70–99)
GLUCOSE BLDC GLUCOMTR-MCNC: 187 MG/DL — HIGH (ref 70–99)
GLUCOSE BLDC GLUCOMTR-MCNC: 188 MG/DL — HIGH (ref 70–99)
GLUCOSE BLDC GLUCOMTR-MCNC: 188 MG/DL — HIGH (ref 70–99)
GLUCOSE BLDC GLUCOMTR-MCNC: 189 MG/DL — HIGH (ref 70–99)
GLUCOSE BLDC GLUCOMTR-MCNC: 191 MG/DL — HIGH (ref 70–99)
GLUCOSE BLDC GLUCOMTR-MCNC: 193 MG/DL — HIGH (ref 70–99)
GLUCOSE BLDC GLUCOMTR-MCNC: 193 MG/DL — HIGH (ref 70–99)
GLUCOSE BLDC GLUCOMTR-MCNC: 194 MG/DL — HIGH (ref 70–99)
GLUCOSE BLDC GLUCOMTR-MCNC: 195 MG/DL — HIGH (ref 70–99)
GLUCOSE BLDC GLUCOMTR-MCNC: 196 MG/DL — HIGH (ref 70–99)
GLUCOSE BLDC GLUCOMTR-MCNC: 197 MG/DL — HIGH (ref 70–99)
GLUCOSE BLDC GLUCOMTR-MCNC: 197 MG/DL — HIGH (ref 70–99)
GLUCOSE BLDC GLUCOMTR-MCNC: 200 MG/DL — HIGH (ref 70–99)
GLUCOSE BLDC GLUCOMTR-MCNC: 201 MG/DL — HIGH (ref 70–99)
GLUCOSE BLDC GLUCOMTR-MCNC: 202 MG/DL — HIGH (ref 70–99)
GLUCOSE BLDC GLUCOMTR-MCNC: 202 MG/DL — HIGH (ref 70–99)
GLUCOSE BLDC GLUCOMTR-MCNC: 204 MG/DL — HIGH (ref 70–99)
GLUCOSE BLDC GLUCOMTR-MCNC: 206 MG/DL — HIGH (ref 70–99)
GLUCOSE BLDC GLUCOMTR-MCNC: 207 MG/DL — HIGH (ref 70–99)
GLUCOSE BLDC GLUCOMTR-MCNC: 209 MG/DL — HIGH (ref 70–99)
GLUCOSE BLDC GLUCOMTR-MCNC: 209 MG/DL — HIGH (ref 70–99)
GLUCOSE BLDC GLUCOMTR-MCNC: 210 MG/DL — HIGH (ref 70–99)
GLUCOSE BLDC GLUCOMTR-MCNC: 213 MG/DL — HIGH (ref 70–99)
GLUCOSE BLDC GLUCOMTR-MCNC: 215 MG/DL — HIGH (ref 70–99)
GLUCOSE BLDC GLUCOMTR-MCNC: 216 MG/DL — HIGH (ref 70–99)
GLUCOSE BLDC GLUCOMTR-MCNC: 216 MG/DL — HIGH (ref 70–99)
GLUCOSE BLDC GLUCOMTR-MCNC: 219 MG/DL — HIGH (ref 70–99)
GLUCOSE BLDC GLUCOMTR-MCNC: 220 MG/DL — HIGH (ref 70–99)
GLUCOSE BLDC GLUCOMTR-MCNC: 221 MG/DL — HIGH (ref 70–99)
GLUCOSE BLDC GLUCOMTR-MCNC: 221 MG/DL — HIGH (ref 70–99)
GLUCOSE BLDC GLUCOMTR-MCNC: 222 MG/DL — HIGH (ref 70–99)
GLUCOSE BLDC GLUCOMTR-MCNC: 223 MG/DL — HIGH (ref 70–99)
GLUCOSE BLDC GLUCOMTR-MCNC: 224 MG/DL — HIGH (ref 70–99)
GLUCOSE BLDC GLUCOMTR-MCNC: 225 MG/DL — HIGH (ref 70–99)
GLUCOSE BLDC GLUCOMTR-MCNC: 227 MG/DL — HIGH (ref 70–99)
GLUCOSE BLDC GLUCOMTR-MCNC: 228 MG/DL — HIGH (ref 70–99)
GLUCOSE BLDC GLUCOMTR-MCNC: 228 MG/DL — HIGH (ref 70–99)
GLUCOSE BLDC GLUCOMTR-MCNC: 232 MG/DL — HIGH (ref 70–99)
GLUCOSE BLDC GLUCOMTR-MCNC: 233 MG/DL — HIGH (ref 70–99)
GLUCOSE BLDC GLUCOMTR-MCNC: 233 MG/DL — HIGH (ref 70–99)
GLUCOSE BLDC GLUCOMTR-MCNC: 234 MG/DL — HIGH (ref 70–99)
GLUCOSE BLDC GLUCOMTR-MCNC: 236 MG/DL — HIGH (ref 70–99)
GLUCOSE BLDC GLUCOMTR-MCNC: 238 MG/DL — HIGH (ref 70–99)
GLUCOSE BLDC GLUCOMTR-MCNC: 239 MG/DL — HIGH (ref 70–99)
GLUCOSE BLDC GLUCOMTR-MCNC: 240 MG/DL — HIGH (ref 70–99)
GLUCOSE BLDC GLUCOMTR-MCNC: 242 MG/DL — HIGH (ref 70–99)
GLUCOSE BLDC GLUCOMTR-MCNC: 244 MG/DL — HIGH (ref 70–99)
GLUCOSE BLDC GLUCOMTR-MCNC: 245 MG/DL — HIGH (ref 70–99)
GLUCOSE BLDC GLUCOMTR-MCNC: 247 MG/DL — HIGH (ref 70–99)
GLUCOSE BLDC GLUCOMTR-MCNC: 249 MG/DL — HIGH (ref 70–99)
GLUCOSE BLDC GLUCOMTR-MCNC: 250 MG/DL — HIGH (ref 70–99)
GLUCOSE BLDC GLUCOMTR-MCNC: 252 MG/DL — HIGH (ref 70–99)
GLUCOSE BLDC GLUCOMTR-MCNC: 252 MG/DL — HIGH (ref 70–99)
GLUCOSE BLDC GLUCOMTR-MCNC: 253 MG/DL — HIGH (ref 70–99)
GLUCOSE BLDC GLUCOMTR-MCNC: 254 MG/DL — HIGH (ref 70–99)
GLUCOSE BLDC GLUCOMTR-MCNC: 256 MG/DL — HIGH (ref 70–99)
GLUCOSE BLDC GLUCOMTR-MCNC: 262 MG/DL — HIGH (ref 70–99)
GLUCOSE BLDC GLUCOMTR-MCNC: 263 MG/DL — HIGH (ref 70–99)
GLUCOSE BLDC GLUCOMTR-MCNC: 265 MG/DL — HIGH (ref 70–99)
GLUCOSE BLDC GLUCOMTR-MCNC: 266 MG/DL — HIGH (ref 70–99)
GLUCOSE BLDC GLUCOMTR-MCNC: 272 MG/DL — HIGH (ref 70–99)
GLUCOSE BLDC GLUCOMTR-MCNC: 273 MG/DL — HIGH (ref 70–99)
GLUCOSE BLDC GLUCOMTR-MCNC: 273 MG/DL — HIGH (ref 70–99)
GLUCOSE BLDC GLUCOMTR-MCNC: 274 MG/DL — HIGH (ref 70–99)
GLUCOSE BLDC GLUCOMTR-MCNC: 275 MG/DL — HIGH (ref 70–99)
GLUCOSE BLDC GLUCOMTR-MCNC: 275 MG/DL — HIGH (ref 70–99)
GLUCOSE BLDC GLUCOMTR-MCNC: 280 MG/DL — HIGH (ref 70–99)
GLUCOSE BLDC GLUCOMTR-MCNC: 302 MG/DL — HIGH (ref 70–99)
GLUCOSE BLDC GLUCOMTR-MCNC: 303 MG/DL — HIGH (ref 70–99)
GLUCOSE BLDC GLUCOMTR-MCNC: 317 MG/DL — HIGH (ref 70–99)
GLUCOSE BLDC GLUCOMTR-MCNC: 327 MG/DL — HIGH (ref 70–99)
GLUCOSE BLDC GLUCOMTR-MCNC: 348 MG/DL — HIGH (ref 70–99)
GLUCOSE BLDC GLUCOMTR-MCNC: 357 MG/DL — HIGH (ref 70–99)
GLUCOSE BLDC GLUCOMTR-MCNC: 388 MG/DL — HIGH (ref 70–99)
GLUCOSE BLDC GLUCOMTR-MCNC: 413 MG/DL — HIGH (ref 70–99)
GLUCOSE BLDC GLUCOMTR-MCNC: 468 MG/DL — CRITICAL HIGH (ref 70–99)
GLUCOSE BLDC GLUCOMTR-MCNC: 83 MG/DL — SIGNIFICANT CHANGE UP (ref 70–99)
GLUCOSE BLDC GLUCOMTR-MCNC: 84 MG/DL — SIGNIFICANT CHANGE UP (ref 70–99)
GLUCOSE BLDC GLUCOMTR-MCNC: 87 MG/DL — SIGNIFICANT CHANGE UP (ref 70–99)
GLUCOSE BLDC GLUCOMTR-MCNC: 87 MG/DL — SIGNIFICANT CHANGE UP (ref 70–99)
GLUCOSE BLDC GLUCOMTR-MCNC: 95 MG/DL — SIGNIFICANT CHANGE UP (ref 70–99)
GLUCOSE BLDV-MCNC: 117 MG/DL — HIGH (ref 70–99)
GLUCOSE BLDV-MCNC: 151 MG/DL — HIGH (ref 70–99)
GLUCOSE BLDV-MCNC: 182 MG/DL — HIGH (ref 70–99)
GLUCOSE QUALITATIVE U: NEGATIVE
GLUCOSE QUALITATIVE U: NEGATIVE MG/DL
GLUCOSE SERPL-MCNC: 106 MG/DL — HIGH (ref 70–99)
GLUCOSE SERPL-MCNC: 107 MG/DL
GLUCOSE SERPL-MCNC: 108 MG/DL — HIGH (ref 70–99)
GLUCOSE SERPL-MCNC: 113 MG/DL — HIGH (ref 70–99)
GLUCOSE SERPL-MCNC: 116 MG/DL — HIGH (ref 70–99)
GLUCOSE SERPL-MCNC: 117 MG/DL — HIGH (ref 70–99)
GLUCOSE SERPL-MCNC: 117 MG/DL — HIGH (ref 70–99)
GLUCOSE SERPL-MCNC: 119 MG/DL — HIGH (ref 70–99)
GLUCOSE SERPL-MCNC: 119 MG/DL — HIGH (ref 70–99)
GLUCOSE SERPL-MCNC: 120 MG/DL — HIGH (ref 70–99)
GLUCOSE SERPL-MCNC: 121 MG/DL — HIGH (ref 70–99)
GLUCOSE SERPL-MCNC: 122 MG/DL — HIGH (ref 70–99)
GLUCOSE SERPL-MCNC: 125 MG/DL — HIGH (ref 70–99)
GLUCOSE SERPL-MCNC: 127 MG/DL — HIGH (ref 70–99)
GLUCOSE SERPL-MCNC: 128 MG/DL — HIGH (ref 70–99)
GLUCOSE SERPL-MCNC: 128 MG/DL — HIGH (ref 70–99)
GLUCOSE SERPL-MCNC: 132 MG/DL
GLUCOSE SERPL-MCNC: 134 MG/DL — HIGH (ref 70–99)
GLUCOSE SERPL-MCNC: 137 MG/DL — HIGH (ref 70–99)
GLUCOSE SERPL-MCNC: 143 MG/DL — HIGH (ref 70–99)
GLUCOSE SERPL-MCNC: 146 MG/DL — HIGH (ref 70–99)
GLUCOSE SERPL-MCNC: 146 MG/DL — HIGH (ref 70–99)
GLUCOSE SERPL-MCNC: 147 MG/DL — HIGH (ref 70–99)
GLUCOSE SERPL-MCNC: 152 MG/DL — HIGH (ref 70–99)
GLUCOSE SERPL-MCNC: 155 MG/DL — HIGH (ref 70–99)
GLUCOSE SERPL-MCNC: 161 MG/DL — HIGH (ref 70–99)
GLUCOSE SERPL-MCNC: 164 MG/DL — HIGH (ref 70–99)
GLUCOSE SERPL-MCNC: 167 MG/DL — HIGH (ref 70–99)
GLUCOSE SERPL-MCNC: 168 MG/DL — HIGH (ref 70–99)
GLUCOSE SERPL-MCNC: 168 MG/DL — HIGH (ref 70–99)
GLUCOSE SERPL-MCNC: 170 MG/DL — HIGH (ref 70–99)
GLUCOSE SERPL-MCNC: 173 MG/DL — HIGH (ref 70–99)
GLUCOSE SERPL-MCNC: 173 MG/DL — HIGH (ref 70–99)
GLUCOSE SERPL-MCNC: 176 MG/DL — HIGH (ref 70–99)
GLUCOSE SERPL-MCNC: 179 MG/DL — HIGH (ref 70–99)
GLUCOSE SERPL-MCNC: 182 MG/DL — HIGH (ref 70–99)
GLUCOSE SERPL-MCNC: 183 MG/DL — HIGH (ref 70–99)
GLUCOSE SERPL-MCNC: 185 MG/DL — HIGH (ref 70–99)
GLUCOSE SERPL-MCNC: 186 MG/DL — HIGH (ref 70–99)
GLUCOSE SERPL-MCNC: 191 MG/DL — HIGH (ref 70–99)
GLUCOSE SERPL-MCNC: 196 MG/DL — HIGH (ref 70–99)
GLUCOSE SERPL-MCNC: 196 MG/DL — HIGH (ref 70–99)
GLUCOSE SERPL-MCNC: 202 MG/DL — HIGH (ref 70–99)
GLUCOSE SERPL-MCNC: 204 MG/DL — HIGH (ref 70–99)
GLUCOSE SERPL-MCNC: 209 MG/DL — HIGH (ref 70–99)
GLUCOSE SERPL-MCNC: 216 MG/DL — HIGH (ref 70–99)
GLUCOSE SERPL-MCNC: 224 MG/DL — HIGH (ref 70–99)
GLUCOSE SERPL-MCNC: 244 MG/DL — HIGH (ref 70–99)
GLUCOSE SERPL-MCNC: 265 MG/DL — HIGH (ref 70–99)
GLUCOSE SERPL-MCNC: 324 MG/DL — HIGH (ref 70–99)
GLUCOSE SERPL-MCNC: 333 MG/DL — HIGH (ref 70–99)
GLUCOSE SERPL-MCNC: 68 MG/DL — LOW (ref 70–99)
GLUCOSE SERPL-MCNC: 72 MG/DL — SIGNIFICANT CHANGE UP (ref 70–99)
GLUCOSE SERPL-MCNC: 75 MG/DL — SIGNIFICANT CHANGE UP (ref 70–99)
GLUCOSE SERPL-MCNC: 77 MG/DL — SIGNIFICANT CHANGE UP (ref 70–99)
GLUCOSE SERPL-MCNC: 77 MG/DL — SIGNIFICANT CHANGE UP (ref 70–99)
GLUCOSE SERPL-MCNC: 80 MG/DL — SIGNIFICANT CHANGE UP (ref 70–99)
GLUCOSE SERPL-MCNC: 82 MG/DL — SIGNIFICANT CHANGE UP (ref 70–99)
GLUCOSE SERPL-MCNC: 90 MG/DL
GLUCOSE SERPL-MCNC: 93 MG/DL — SIGNIFICANT CHANGE UP (ref 70–99)
GLUCOSE SERPL-MCNC: 93 MG/DL — SIGNIFICANT CHANGE UP (ref 70–99)
GLUCOSE UR QL: ABNORMAL
GLUCOSE UR QL: NEGATIVE — SIGNIFICANT CHANGE UP
GLUCOSE UR-MCNC: 50 — SIGNIFICANT CHANGE UP
GRAM STN FLD: SIGNIFICANT CHANGE UP
H CAPSUL AG SPEC-ACNC: SIGNIFICANT CHANGE UP
H CAPSUL AG UR QL IA: SIGNIFICANT CHANGE UP
HAPTOGLOB SERPL-MCNC: 131 MG/DL — SIGNIFICANT CHANGE UP (ref 34–200)
HAV IGM SER-ACNC: NONREACTIVE — SIGNIFICANT CHANGE UP
HBA1C BLD-MCNC: 5.6 % — SIGNIFICANT CHANGE UP (ref 4–5.6)
HBA1C BLD-MCNC: 6 % — HIGH (ref 4–5.6)
HBA1C BLD-MCNC: 6.2 % — HIGH (ref 4–5.6)
HBA1C MFR BLD HPLC: 5.7 %
HBV CORE AB SER-ACNC: REACTIVE
HBV CORE AB SER-ACNC: REACTIVE — SIGNIFICANT CHANGE UP
HBV CORE IGM SER-ACNC: NONREACTIVE — SIGNIFICANT CHANGE UP
HBV DNA # SERPL NAA+PROBE: SIGNIFICANT CHANGE UP IU/ML
HBV DNA SERPL NAA+PROBE-LOG#: SIGNIFICANT CHANGE UP LOGIU/ML
HBV SURFACE AB SER-ACNC: ABNORMAL
HBV SURFACE AG SER-ACNC: NONREACTIVE — SIGNIFICANT CHANGE UP
HBV SURFACE AG SER-ACNC: SIGNIFICANT CHANGE UP
HCO3 BLDV-SCNC: 25 MMOL/L — SIGNIFICANT CHANGE UP (ref 20–27)
HCO3 BLDV-SCNC: 27 MMOL/L — SIGNIFICANT CHANGE UP (ref 21–29)
HCO3 BLDV-SCNC: 32 MMOL/L — HIGH (ref 21–29)
HCT VFR BLD CALC: 20 % — CRITICAL LOW (ref 34.5–45)
HCT VFR BLD CALC: 22.7 % — LOW (ref 34.5–45)
HCT VFR BLD CALC: 22.9 % — LOW (ref 34.5–45)
HCT VFR BLD CALC: 23 % — LOW (ref 34.5–45)
HCT VFR BLD CALC: 23.6 % — LOW (ref 34.5–45)
HCT VFR BLD CALC: 24.2 % — LOW (ref 34.5–45)
HCT VFR BLD CALC: 25.1 % — LOW (ref 34.5–45)
HCT VFR BLD CALC: 25.9 % — LOW (ref 34.5–45)
HCT VFR BLD CALC: 26.1 % — LOW (ref 34.5–45)
HCT VFR BLD CALC: 26.4 % — LOW (ref 34.5–45)
HCT VFR BLD CALC: 26.5 % — LOW (ref 34.5–45)
HCT VFR BLD CALC: 26.5 % — LOW (ref 34.5–45)
HCT VFR BLD CALC: 26.7 % — LOW (ref 34.5–45)
HCT VFR BLD CALC: 27 % — LOW (ref 34.5–45)
HCT VFR BLD CALC: 27.1 % — LOW (ref 34.5–45)
HCT VFR BLD CALC: 27.3 % — LOW (ref 34.5–45)
HCT VFR BLD CALC: 27.7 % — LOW (ref 34.5–45)
HCT VFR BLD CALC: 27.7 % — LOW (ref 34.5–45)
HCT VFR BLD CALC: 27.9 % — LOW (ref 34.5–45)
HCT VFR BLD CALC: 28.2 % — LOW (ref 34.5–45)
HCT VFR BLD CALC: 28.3 % — LOW (ref 34.5–45)
HCT VFR BLD CALC: 28.6 % — LOW (ref 34.5–45)
HCT VFR BLD CALC: 28.9 % — LOW (ref 34.5–45)
HCT VFR BLD CALC: 29 % — LOW (ref 34.5–45)
HCT VFR BLD CALC: 29.6 % — LOW (ref 34.5–45)
HCT VFR BLD CALC: 29.7 % — LOW (ref 34.5–45)
HCT VFR BLD CALC: 29.9 % — LOW (ref 34.5–45)
HCT VFR BLD CALC: 29.9 % — LOW (ref 34.5–45)
HCT VFR BLD CALC: 30 % — LOW (ref 34.5–45)
HCT VFR BLD CALC: 30 % — LOW (ref 34.5–45)
HCT VFR BLD CALC: 30.1 % — LOW (ref 34.5–45)
HCT VFR BLD CALC: 30.2 % — LOW (ref 34.5–45)
HCT VFR BLD CALC: 30.2 % — LOW (ref 34.5–45)
HCT VFR BLD CALC: 30.4 % — LOW (ref 34.5–45)
HCT VFR BLD CALC: 30.5 % — LOW (ref 34.5–45)
HCT VFR BLD CALC: 30.5 % — LOW (ref 34.5–45)
HCT VFR BLD CALC: 30.7 % — LOW (ref 34.5–45)
HCT VFR BLD CALC: 30.9 % — LOW (ref 34.5–45)
HCT VFR BLD CALC: 31 % — LOW (ref 34.5–45)
HCT VFR BLD CALC: 31 % — LOW (ref 34.5–45)
HCT VFR BLD CALC: 31.2 % — LOW (ref 34.5–45)
HCT VFR BLD CALC: 31.3 % — LOW (ref 34.5–45)
HCT VFR BLD CALC: 31.4 % — LOW (ref 34.5–45)
HCT VFR BLD CALC: 31.5 % — LOW (ref 34.5–45)
HCT VFR BLD CALC: 31.6 % — LOW (ref 34.5–45)
HCT VFR BLD CALC: 31.8 % — LOW (ref 34.5–45)
HCT VFR BLD CALC: 31.8 % — LOW (ref 34.5–45)
HCT VFR BLD CALC: 31.9 % — LOW (ref 34.5–45)
HCT VFR BLD CALC: 32 % — LOW (ref 34.5–45)
HCT VFR BLD CALC: 32.1 % — LOW (ref 34.5–45)
HCT VFR BLD CALC: 32.4 % — LOW (ref 34.5–45)
HCT VFR BLD CALC: 32.6 % — LOW (ref 34.5–45)
HCT VFR BLD CALC: 33.1 % — LOW (ref 34.5–45)
HCT VFR BLD CALC: 33.3 % — LOW (ref 34.5–45)
HCT VFR BLD CALC: 33.4 % — LOW (ref 34.5–45)
HCT VFR BLD CALC: 33.4 % — LOW (ref 34.5–45)
HCT VFR BLD CALC: 34.1 %
HCT VFR BLD CALC: 34.2 % — LOW (ref 34.5–45)
HCT VFR BLD CALC: 35.2 % — SIGNIFICANT CHANGE UP (ref 34.5–45)
HCT VFR BLD CALC: 35.4 % — SIGNIFICANT CHANGE UP (ref 34.5–45)
HCT VFR BLD CALC: 35.8 % — SIGNIFICANT CHANGE UP (ref 34.5–45)
HCT VFR BLD CALC: 36 % — SIGNIFICANT CHANGE UP (ref 34.5–45)
HCT VFR BLD CALC: 36.4 % — SIGNIFICANT CHANGE UP (ref 34.5–45)
HCT VFR BLD CALC: 37.1 %
HCT VFR BLD CALC: 39.6 %
HCT VFR BLDA CALC: 23 % — LOW (ref 39–50)
HCT VFR BLDA CALC: 28 % — LOW (ref 39–50)
HCT VFR BLDV CALC: 26.7 % — LOW (ref 34.5–45)
HCV AB S/CO SERPL IA: 0.28 S/CO — SIGNIFICANT CHANGE UP (ref 0–0.99)
HCV AB SERPL-IMP: SIGNIFICANT CHANGE UP
HDLC SERPL-MCNC: 52 MG/DL
HGB BLD CALC-MCNC: 7.3 G/DL — LOW (ref 11.5–15.5)
HGB BLD CALC-MCNC: 9 G/DL — LOW (ref 11.5–15.5)
HGB BLD-MCNC: 10 G/DL — LOW (ref 11.5–15.5)
HGB BLD-MCNC: 10 G/DL — LOW (ref 11.5–15.5)
HGB BLD-MCNC: 10.1 G/DL — LOW (ref 11.5–15.5)
HGB BLD-MCNC: 10.2 G/DL — LOW (ref 11.5–15.5)
HGB BLD-MCNC: 10.2 G/DL — LOW (ref 11.5–15.5)
HGB BLD-MCNC: 10.3 G/DL — LOW (ref 11.5–15.5)
HGB BLD-MCNC: 10.3 G/DL — LOW (ref 11.5–15.5)
HGB BLD-MCNC: 10.6 G/DL — LOW (ref 11.5–15.5)
HGB BLD-MCNC: 10.8 G/DL
HGB BLD-MCNC: 10.8 G/DL — LOW (ref 11.5–15.5)
HGB BLD-MCNC: 11 G/DL
HGB BLD-MCNC: 11.1 G/DL — LOW (ref 11.5–15.5)
HGB BLD-MCNC: 11.2 G/DL — LOW (ref 11.5–15.5)
HGB BLD-MCNC: 11.8 G/DL
HGB BLD-MCNC: 6.2 G/DL — CRITICAL LOW (ref 11.5–15.5)
HGB BLD-MCNC: 7.2 G/DL — LOW (ref 11.5–15.5)
HGB BLD-MCNC: 7.3 G/DL — LOW (ref 11.5–15.5)
HGB BLD-MCNC: 7.4 G/DL — LOW (ref 11.5–15.5)
HGB BLD-MCNC: 7.6 G/DL — LOW (ref 11.5–15.5)
HGB BLD-MCNC: 7.9 G/DL — LOW (ref 11.5–15.5)
HGB BLD-MCNC: 8.1 G/DL — LOW (ref 11.5–15.5)
HGB BLD-MCNC: 8.2 G/DL — LOW (ref 11.5–15.5)
HGB BLD-MCNC: 8.3 G/DL — LOW (ref 11.5–15.5)
HGB BLD-MCNC: 8.3 G/DL — LOW (ref 11.5–15.5)
HGB BLD-MCNC: 8.4 G/DL — LOW (ref 11.5–15.5)
HGB BLD-MCNC: 8.5 G/DL — LOW (ref 11.5–15.5)
HGB BLD-MCNC: 8.5 G/DL — LOW (ref 11.5–15.5)
HGB BLD-MCNC: 8.6 G/DL — LOW (ref 11.5–15.5)
HGB BLD-MCNC: 8.7 G/DL — LOW (ref 11.5–15.5)
HGB BLD-MCNC: 8.8 G/DL — LOW (ref 11.5–15.5)
HGB BLD-MCNC: 8.8 G/DL — LOW (ref 11.5–15.5)
HGB BLD-MCNC: 8.9 G/DL — LOW (ref 11.5–15.5)
HGB BLD-MCNC: 9 G/DL — LOW (ref 11.5–15.5)
HGB BLD-MCNC: 9 G/DL — LOW (ref 11.5–15.5)
HGB BLD-MCNC: 9.1 G/DL — LOW (ref 11.5–15.5)
HGB BLD-MCNC: 9.2 G/DL — LOW (ref 11.5–15.5)
HGB BLD-MCNC: 9.2 G/DL — LOW (ref 11.5–15.5)
HGB BLD-MCNC: 9.3 G/DL — LOW (ref 11.5–15.5)
HGB BLD-MCNC: 9.3 G/DL — LOW (ref 11.5–15.5)
HGB BLD-MCNC: 9.4 G/DL — LOW (ref 11.5–15.5)
HGB BLD-MCNC: 9.5 G/DL — LOW (ref 11.5–15.5)
HGB BLD-MCNC: 9.5 G/DL — LOW (ref 11.5–15.5)
HGB BLD-MCNC: 9.6 G/DL — LOW (ref 11.5–15.5)
HGB BLD-MCNC: 9.7 G/DL — LOW (ref 11.5–15.5)
HGB BLD-MCNC: 9.7 G/DL — LOW (ref 11.5–15.5)
HGB BLD-MCNC: 9.8 G/DL — LOW (ref 11.5–15.5)
HGB BLD-MCNC: 9.9 G/DL — LOW (ref 11.5–15.5)
HGB BLDV-MCNC: 8.6 G/DL — LOW (ref 11.5–15.5)
HISTONE AB SER-ACNC: SIGNIFICANT CHANGE UP
HIV 1 & 2 AB SERPL IA.RAPID: SIGNIFICANT CHANGE UP
HOROWITZ INDEX BLDA+IHG-RTO: SIGNIFICANT CHANGE UP
HOROWITZ INDEX BLDV+IHG-RTO: 21 — SIGNIFICANT CHANGE UP
HPIV1 RNA SPEC QL NAA+PROBE: DETECTED
HYALINE CASTS # UR AUTO: 1 /LPF — SIGNIFICANT CHANGE UP (ref 0–2)
HYALINE CASTS # UR AUTO: 3 /LPF — HIGH (ref 0–2)
HYALINE CASTS # UR AUTO: 3 /LPF — HIGH (ref 0–2)
HYALINE CASTS # UR AUTO: NEGATIVE — SIGNIFICANT CHANGE UP
HYALINE CASTS: 12 /LPF
HYPOCHROMIA BLD QL: SLIGHT — SIGNIFICANT CHANGE UP
IGA FLD-MCNC: 171 MG/DL — SIGNIFICANT CHANGE UP (ref 70–400)
IGA SER QL IEP: 345 MG/DL
IGG FLD-MCNC: 947 MG/DL — SIGNIFICANT CHANGE UP (ref 700–1600)
IGG SER QL IEP: 1630 MG/DL
IGM SER QL IEP: 356 MG/DL
IGM SERPL-MCNC: 263 MG/DL — HIGH (ref 40–230)
IMM GRANULOCYTES NFR BLD AUTO: 0.4 %
IMM GRANULOCYTES NFR BLD AUTO: 0.9 % — SIGNIFICANT CHANGE UP (ref 0–1.5)
IMM GRANULOCYTES NFR BLD AUTO: 1 % — SIGNIFICANT CHANGE UP (ref 0–1.5)
IMM GRANULOCYTES NFR BLD AUTO: 1.1 % — SIGNIFICANT CHANGE UP (ref 0–1.5)
IMM GRANULOCYTES NFR BLD AUTO: 1.2 % — SIGNIFICANT CHANGE UP (ref 0–1.5)
IMM GRANULOCYTES NFR BLD AUTO: 1.4 % — SIGNIFICANT CHANGE UP (ref 0–1.5)
IMM GRANULOCYTES NFR BLD AUTO: 1.5 % — SIGNIFICANT CHANGE UP (ref 0–1.5)
IMM GRANULOCYTES NFR BLD AUTO: 1.6 % — HIGH (ref 0–1.5)
IMM GRANULOCYTES NFR BLD AUTO: 1.8 % — HIGH (ref 0–1.5)
IMM GRANULOCYTES NFR BLD AUTO: 2.1 % — HIGH (ref 0–1.5)
IMM GRANULOCYTES NFR BLD AUTO: 2.2 % — HIGH (ref 0–1.5)
IMM GRANULOCYTES NFR BLD AUTO: 2.3 %
IMM GRANULOCYTES NFR BLD AUTO: 2.3 % — HIGH (ref 0–1.5)
IMM GRANULOCYTES NFR BLD AUTO: 2.4 % — HIGH (ref 0–1.5)
IMM GRANULOCYTES NFR BLD AUTO: 2.4 % — HIGH (ref 0–1.5)
IMM GRANULOCYTES NFR BLD AUTO: 3.4 % — HIGH (ref 0–1.5)
INR BLD: 0.92 RATIO — SIGNIFICANT CHANGE UP (ref 0.88–1.16)
INR BLD: 0.95 RATIO — SIGNIFICANT CHANGE UP (ref 0.88–1.16)
INR BLD: 0.96 RATIO — SIGNIFICANT CHANGE UP (ref 0.88–1.16)
INR BLD: 0.98 RATIO — SIGNIFICANT CHANGE UP (ref 0.88–1.16)
INR BLD: 0.99 RATIO — SIGNIFICANT CHANGE UP (ref 0.88–1.16)
INR BLD: 1.08 RATIO — SIGNIFICANT CHANGE UP (ref 0.88–1.16)
INR BLD: 1.08 RATIO — SIGNIFICANT CHANGE UP (ref 0.88–1.16)
INR BLD: 1.08 — SIGNIFICANT CHANGE UP (ref 0.88–1.17)
INR BLD: 1.12 RATIO — SIGNIFICANT CHANGE UP (ref 0.88–1.16)
INR BLD: 1.13 RATIO — SIGNIFICANT CHANGE UP (ref 0.88–1.16)
INR BLD: 1.13 RATIO — SIGNIFICANT CHANGE UP (ref 0.88–1.16)
INR BLD: 1.14 RATIO — SIGNIFICANT CHANGE UP (ref 0.88–1.16)
INR BLD: 1.14 RATIO — SIGNIFICANT CHANGE UP (ref 0.88–1.16)
INR BLD: 1.15 — SIGNIFICANT CHANGE UP (ref 0.88–1.17)
INR BLD: 1.17 RATIO — HIGH (ref 0.88–1.16)
INR BLD: 1.17 — SIGNIFICANT CHANGE UP (ref 0.88–1.17)
INR BLD: 1.24 — HIGH (ref 0.88–1.17)
INR BLD: 1.37 — HIGH (ref 0.88–1.17)
INR BLD: 1.54 RATIO — HIGH (ref 0.88–1.16)
INR BLD: 1.56 — HIGH (ref 0.88–1.17)
INR BLD: 1.67 — HIGH (ref 0.88–1.17)
INR BLD: 1.67 — HIGH (ref 0.88–1.17)
INR BLD: 1.73 RATIO — HIGH (ref 0.88–1.16)
INR BLD: 1.75 RATIO — HIGH (ref 0.88–1.16)
INR BLD: 2.12 — HIGH (ref 0.88–1.17)
INR BLD: 2.18 — HIGH (ref 0.88–1.17)
INR BLD: 2.18 — HIGH (ref 0.88–1.17)
INR BLD: 2.23 — HIGH (ref 0.88–1.17)
INR BLD: 2.25 RATIO — HIGH (ref 0.88–1.16)
INR BLD: 3.11 — HIGH (ref 0.88–1.17)
INR BLD: 3.37 RATIO — HIGH (ref 0.88–1.16)
INR BLD: 3.64 — HIGH (ref 0.88–1.17)
INR BLD: 4.03 RATIO — HIGH (ref 0.88–1.16)
INR BLD: 6.18 RATIO — CRITICAL HIGH (ref 0.88–1.16)
INR BLD: 6.28 RATIO — CRITICAL HIGH (ref 0.88–1.16)
INR PPP: 1.47
INR PPP: 1.79 RATIO
INTERPRETATION SERPL IEP-IMP: NORMAL
INTERPRETATION SERPL IFE-IMP: SIGNIFICANT CHANGE UP
IRON SATN MFR SERPL: 14 %
IRON SATN MFR SERPL: 25 UG/DL — LOW (ref 30–160)
IRON SATN MFR SERPL: 8 % — LOW (ref 14–50)
IRON SERPL-MCNC: 42 UG/DL
KAPPA FREE LIGHT CHAINS, SERUM: 2.67 MG/DL — HIGH (ref 0.33–1.94)
KAPPA LC CSF-MCNC: 3.82 MG/DL
KAPPA LC CSF-MCNC: 3.82 MG/DL
KAPPA LC SERPL-MCNC: 5.95 MG/DL
KAPPA LC SERPL-MCNC: 5.95 MG/DL
KAPPA/LAMBDA FREE LIGHT CHAIN RATIO, SERUM: 0.97 — SIGNIFICANT CHANGE UP
KETONES UR-MCNC: NEGATIVE — SIGNIFICANT CHANGE UP
KETONES URINE: NEGATIVE
KETONES URINE: NEGATIVE
LACTATE BLDV-MCNC: 1.8 MMOL/L — SIGNIFICANT CHANGE UP (ref 0.7–2)
LACTATE BLDV-MCNC: 1.8 MMOL/L — SIGNIFICANT CHANGE UP (ref 0.7–2)
LACTATE BLDV-MCNC: 2 MMOL/L — SIGNIFICANT CHANGE UP (ref 0.7–2)
LACTATE BLDV-MCNC: 2.1 MMOL/L — HIGH (ref 0.7–2)
LACTATE BLDV-MCNC: 3 MMOL/L — HIGH (ref 0.7–2)
LACTATE BLDV-MCNC: 3.4 MMOL/L — HIGH (ref 0.7–2)
LACTATE BLDV-MCNC: 4.2 MMOL/L — CRITICAL HIGH (ref 0.7–2)
LACTATE SERPL-SCNC: 1.9 MMOL/L — SIGNIFICANT CHANGE UP (ref 0.7–2)
LAMBDA FREE LIGHT CHAINS, SERUM: 2.76 MG/DL — HIGH (ref 0.57–2.63)
LDH SERPL L TO P-CCNC: 614 U/L — HIGH (ref 50–242)
LDH SERPL-CCNC: 177 U/L
LDLC SERPL CALC-MCNC: 44 MG/DL
LEGIONELLA AG UR QL: NEGATIVE — SIGNIFICANT CHANGE UP
LEGIONELLA AG UR QL: NEGATIVE — SIGNIFICANT CHANGE UP
LEUKOCYTE ESTERASE UR-ACNC: ABNORMAL
LEUKOCYTE ESTERASE UR-ACNC: NEGATIVE — SIGNIFICANT CHANGE UP
LEUKOCYTE ESTERASE UR-ACNC: NEGATIVE — SIGNIFICANT CHANGE UP
LEUKOCYTE ESTERASE URINE: ABNORMAL
LEUKOCYTE ESTERASE URINE: ABNORMAL
LYMPHOCYTES # BLD AUTO: 0.22 K/UL — LOW (ref 1–3.3)
LYMPHOCYTES # BLD AUTO: 0.28 K/UL — LOW (ref 1–3.3)
LYMPHOCYTES # BLD AUTO: 0.32 K/UL — LOW (ref 1–3.3)
LYMPHOCYTES # BLD AUTO: 0.42 K/UL — LOW (ref 1–3.3)
LYMPHOCYTES # BLD AUTO: 0.43 K/UL — LOW (ref 1–3.3)
LYMPHOCYTES # BLD AUTO: 0.45 K/UL — LOW (ref 1–3.3)
LYMPHOCYTES # BLD AUTO: 0.47 K/UL — LOW (ref 1–3.3)
LYMPHOCYTES # BLD AUTO: 0.56 K/UL — LOW (ref 1–3.3)
LYMPHOCYTES # BLD AUTO: 0.58 K/UL — LOW (ref 1–3.3)
LYMPHOCYTES # BLD AUTO: 0.66 K/UL — LOW (ref 1–3.3)
LYMPHOCYTES # BLD AUTO: 0.79 K/UL — LOW (ref 1–3.3)
LYMPHOCYTES # BLD AUTO: 0.8 % — LOW (ref 13–44)
LYMPHOCYTES # BLD AUTO: 0.8 K/UL — LOW (ref 1–3.3)
LYMPHOCYTES # BLD AUTO: 0.91 K/UL — LOW (ref 1–3.3)
LYMPHOCYTES # BLD AUTO: 1.02 K/UL — SIGNIFICANT CHANGE UP (ref 1–3.3)
LYMPHOCYTES # BLD AUTO: 1.08 K/UL — SIGNIFICANT CHANGE UP (ref 1–3.3)
LYMPHOCYTES # BLD AUTO: 1.18 K/UL
LYMPHOCYTES # BLD AUTO: 1.22 K/UL
LYMPHOCYTES # BLD AUTO: 1.3 K/UL — SIGNIFICANT CHANGE UP (ref 1–3.3)
LYMPHOCYTES # BLD AUTO: 1.56 K/UL — SIGNIFICANT CHANGE UP (ref 1–3.3)
LYMPHOCYTES # BLD AUTO: 1.64 K/UL — SIGNIFICANT CHANGE UP (ref 1–3.3)
LYMPHOCYTES # BLD AUTO: 1.7 % — LOW (ref 13–44)
LYMPHOCYTES # BLD AUTO: 1.9 % — LOW (ref 13–44)
LYMPHOCYTES # BLD AUTO: 2 % — LOW (ref 13–44)
LYMPHOCYTES # BLD AUTO: 2.1 % — LOW (ref 13–44)
LYMPHOCYTES # BLD AUTO: 2.2 K/UL — SIGNIFICANT CHANGE UP (ref 1–3.3)
LYMPHOCYTES # BLD AUTO: 2.4 % — LOW (ref 13–44)
LYMPHOCYTES # BLD AUTO: 3.2 % — LOW (ref 13–44)
LYMPHOCYTES # BLD AUTO: 3.3 % — LOW (ref 13–44)
LYMPHOCYTES # BLD AUTO: 3.5 % — LOW (ref 13–44)
LYMPHOCYTES # BLD AUTO: 3.8 % — LOW (ref 13–44)
LYMPHOCYTES # BLD AUTO: 3.8 % — LOW (ref 13–44)
LYMPHOCYTES # BLD AUTO: 3.9 % — LOW (ref 13–44)
LYMPHOCYTES # BLD AUTO: 4 % — LOW (ref 13–44)
LYMPHOCYTES # BLD AUTO: 5 % — LOW (ref 13–44)
LYMPHOCYTES # BLD AUTO: 5.2 % — LOW (ref 13–44)
LYMPHOCYTES # BLD AUTO: 6.9 % — LOW (ref 13–44)
LYMPHOCYTES # BLD AUTO: 7.7 % — LOW (ref 13–44)
LYMPHOCYTES # BLD AUTO: 8.1 % — LOW (ref 13–44)
LYMPHOCYTES # BLD AUTO: 9 % — LOW (ref 13–44)
LYMPHOCYTES NFR BLD AUTO: 7.3 %
LYMPHOCYTES NFR BLD AUTO: 9.7 %
LYMPHOCYTES NFR SPEC AUTO: 2.6 % — LOW (ref 13–44)
LYMPHOCYTES NFR SPEC AUTO: 2.6 % — LOW (ref 13–44)
LYMPHOCYTES NFR SPEC AUTO: 8.8 % — LOW (ref 13–44)
M PNEUMO IGG SER IA-ACNC: 0.46 INDEX — SIGNIFICANT CHANGE UP
M PNEUMO IGG SER IA-ACNC: NEGATIVE — SIGNIFICANT CHANGE UP
M PNEUMO IGM SER-ACNC: <20 UNITS/ML — SIGNIFICANT CHANGE UP
M PROTEIN MFR SERPL ELPH: 2.3 %
M PROTEIN SPEC IFE-MCNC: NORMAL
M TB IFN-G BLD-IMP: SIGNIFICANT CHANGE UP
M TB IFN-G CD4+ BCKGRND COR BLD-ACNC: 0 IU/ML — SIGNIFICANT CHANGE UP
M TB IFN-G CD4+CD8+ BCKGRND COR BLD-ACNC: 0 IU/ML — SIGNIFICANT CHANGE UP
M-SPIKE: SIGNIFICANT CHANGE UP (ref 0–0)
MACROCYTES BLD QL: SLIGHT — SIGNIFICANT CHANGE UP
MAGNESIUM SERPL-MCNC: 1.5 MG/DL — LOW (ref 1.6–2.6)
MAGNESIUM SERPL-MCNC: 1.5 MG/DL — LOW (ref 1.6–2.6)
MAGNESIUM SERPL-MCNC: 1.6 MG/DL — SIGNIFICANT CHANGE UP (ref 1.6–2.6)
MAGNESIUM SERPL-MCNC: 1.7 MG/DL — SIGNIFICANT CHANGE UP (ref 1.6–2.6)
MAGNESIUM SERPL-MCNC: 1.8 MG/DL — SIGNIFICANT CHANGE UP (ref 1.6–2.6)
MAGNESIUM SERPL-MCNC: 1.9 MG/DL — SIGNIFICANT CHANGE UP (ref 1.6–2.6)
MAGNESIUM SERPL-MCNC: 2 MG/DL — SIGNIFICANT CHANGE UP (ref 1.6–2.6)
MAGNESIUM SERPL-MCNC: 2.1 MG/DL — SIGNIFICANT CHANGE UP (ref 1.6–2.6)
MAGNESIUM SERPL-MCNC: 2.2 MG/DL — SIGNIFICANT CHANGE UP (ref 1.6–2.6)
MAGNESIUM SERPL-MCNC: 2.2 MG/DL — SIGNIFICANT CHANGE UP (ref 1.6–2.6)
MAGNESIUM SERPL-MCNC: 2.3 MG/DL — SIGNIFICANT CHANGE UP (ref 1.6–2.6)
MAGNESIUM SERPL-MCNC: 2.3 MG/DL — SIGNIFICANT CHANGE UP (ref 1.6–2.6)
MAGNESIUM SERPL-MCNC: 2.4 MG/DL — SIGNIFICANT CHANGE UP (ref 1.6–2.6)
MAGNESIUM SERPL-MCNC: 2.4 MG/DL — SIGNIFICANT CHANGE UP (ref 1.6–2.6)
MAN DIFF?: NORMAL
MAN DIFF?: NORMAL
MANUAL SMEAR VERIFICATION: SIGNIFICANT CHANGE UP
MCHC RBC-ENTMCNC: 23.8 PG
MCHC RBC-ENTMCNC: 24.6 PG
MCHC RBC-ENTMCNC: 24.7 PG — LOW (ref 27–34)
MCHC RBC-ENTMCNC: 25 PG — LOW (ref 27–34)
MCHC RBC-ENTMCNC: 25.1 PG — LOW (ref 27–34)
MCHC RBC-ENTMCNC: 25.1 PG — LOW (ref 27–34)
MCHC RBC-ENTMCNC: 25.2 PG — LOW (ref 27–34)
MCHC RBC-ENTMCNC: 25.3 PG — LOW (ref 27–34)
MCHC RBC-ENTMCNC: 25.4 PG — LOW (ref 27–34)
MCHC RBC-ENTMCNC: 25.5 PG — LOW (ref 27–34)
MCHC RBC-ENTMCNC: 25.6 PG — LOW (ref 27–34)
MCHC RBC-ENTMCNC: 25.8 PG — LOW (ref 27–34)
MCHC RBC-ENTMCNC: 25.9 PG — LOW (ref 27–34)
MCHC RBC-ENTMCNC: 26.2 PG — LOW (ref 27–34)
MCHC RBC-ENTMCNC: 26.2 PG — LOW (ref 27–34)
MCHC RBC-ENTMCNC: 26.4 PG — LOW (ref 27–34)
MCHC RBC-ENTMCNC: 26.4 PG — LOW (ref 27–34)
MCHC RBC-ENTMCNC: 26.5 PG — LOW (ref 27–34)
MCHC RBC-ENTMCNC: 26.5 PG — LOW (ref 27–34)
MCHC RBC-ENTMCNC: 26.8 PG — LOW (ref 27–34)
MCHC RBC-ENTMCNC: 26.9 PG — LOW (ref 27–34)
MCHC RBC-ENTMCNC: 26.9 PG — LOW (ref 27–34)
MCHC RBC-ENTMCNC: 27 PG — SIGNIFICANT CHANGE UP (ref 27–34)
MCHC RBC-ENTMCNC: 27 PG — SIGNIFICANT CHANGE UP (ref 27–34)
MCHC RBC-ENTMCNC: 27.3 PG
MCHC RBC-ENTMCNC: 27.3 PG — SIGNIFICANT CHANGE UP (ref 27–34)
MCHC RBC-ENTMCNC: 27.4 PG — SIGNIFICANT CHANGE UP (ref 27–34)
MCHC RBC-ENTMCNC: 27.4 PG — SIGNIFICANT CHANGE UP (ref 27–34)
MCHC RBC-ENTMCNC: 27.5 PG — SIGNIFICANT CHANGE UP (ref 27–34)
MCHC RBC-ENTMCNC: 27.6 PG — SIGNIFICANT CHANGE UP (ref 27–34)
MCHC RBC-ENTMCNC: 27.7 PG — SIGNIFICANT CHANGE UP (ref 27–34)
MCHC RBC-ENTMCNC: 27.8 PG — SIGNIFICANT CHANGE UP (ref 27–34)
MCHC RBC-ENTMCNC: 27.9 PG — SIGNIFICANT CHANGE UP (ref 27–34)
MCHC RBC-ENTMCNC: 27.9 PG — SIGNIFICANT CHANGE UP (ref 27–34)
MCHC RBC-ENTMCNC: 28.2 PG — SIGNIFICANT CHANGE UP (ref 27–34)
MCHC RBC-ENTMCNC: 28.4 PG — SIGNIFICANT CHANGE UP (ref 27–34)
MCHC RBC-ENTMCNC: 28.5 PG — SIGNIFICANT CHANGE UP (ref 27–34)
MCHC RBC-ENTMCNC: 28.8 PG — SIGNIFICANT CHANGE UP (ref 27–34)
MCHC RBC-ENTMCNC: 28.9 PG — SIGNIFICANT CHANGE UP (ref 27–34)
MCHC RBC-ENTMCNC: 29 GM/DL — LOW (ref 32–36)
MCHC RBC-ENTMCNC: 29 PG — SIGNIFICANT CHANGE UP (ref 27–34)
MCHC RBC-ENTMCNC: 29 PG — SIGNIFICANT CHANGE UP (ref 27–34)
MCHC RBC-ENTMCNC: 29.1 PG — SIGNIFICANT CHANGE UP (ref 27–34)
MCHC RBC-ENTMCNC: 29.2 PG — SIGNIFICANT CHANGE UP (ref 27–34)
MCHC RBC-ENTMCNC: 29.3 GM/DL — LOW (ref 32–36)
MCHC RBC-ENTMCNC: 29.4 % — LOW (ref 32–36)
MCHC RBC-ENTMCNC: 29.4 % — LOW (ref 32–36)
MCHC RBC-ENTMCNC: 29.4 GM/DL — LOW (ref 32–36)
MCHC RBC-ENTMCNC: 29.5 GM/DL — LOW (ref 32–36)
MCHC RBC-ENTMCNC: 29.6 % — LOW (ref 32–36)
MCHC RBC-ENTMCNC: 29.6 GM/DL
MCHC RBC-ENTMCNC: 29.7 GM/DL — LOW (ref 32–36)
MCHC RBC-ENTMCNC: 29.7 GM/DL — LOW (ref 32–36)
MCHC RBC-ENTMCNC: 29.8 % — LOW (ref 32–36)
MCHC RBC-ENTMCNC: 29.8 GM/DL
MCHC RBC-ENTMCNC: 29.8 GM/DL — LOW (ref 32–36)
MCHC RBC-ENTMCNC: 29.8 PG — SIGNIFICANT CHANGE UP (ref 27–34)
MCHC RBC-ENTMCNC: 29.8 PG — SIGNIFICANT CHANGE UP (ref 27–34)
MCHC RBC-ENTMCNC: 29.9 % — LOW (ref 32–36)
MCHC RBC-ENTMCNC: 29.9 GM/DL — LOW (ref 32–36)
MCHC RBC-ENTMCNC: 30 % — LOW (ref 32–36)
MCHC RBC-ENTMCNC: 30 % — LOW (ref 32–36)
MCHC RBC-ENTMCNC: 30.1 GM/DL — LOW (ref 32–36)
MCHC RBC-ENTMCNC: 30.2 % — LOW (ref 32–36)
MCHC RBC-ENTMCNC: 30.3 % — LOW (ref 32–36)
MCHC RBC-ENTMCNC: 30.3 % — LOW (ref 32–36)
MCHC RBC-ENTMCNC: 30.3 GM/DL — LOW (ref 32–36)
MCHC RBC-ENTMCNC: 30.3 GM/DL — LOW (ref 32–36)
MCHC RBC-ENTMCNC: 30.4 GM/DL — LOW (ref 32–36)
MCHC RBC-ENTMCNC: 30.5 % — LOW (ref 32–36)
MCHC RBC-ENTMCNC: 30.5 GM/DL — LOW (ref 32–36)
MCHC RBC-ENTMCNC: 30.6 % — LOW (ref 32–36)
MCHC RBC-ENTMCNC: 30.6 GM/DL — LOW (ref 32–36)
MCHC RBC-ENTMCNC: 30.6 GM/DL — LOW (ref 32–36)
MCHC RBC-ENTMCNC: 30.7 % — LOW (ref 32–36)
MCHC RBC-ENTMCNC: 30.7 % — LOW (ref 32–36)
MCHC RBC-ENTMCNC: 30.7 GM/DL — LOW (ref 32–36)
MCHC RBC-ENTMCNC: 30.8 % — LOW (ref 32–36)
MCHC RBC-ENTMCNC: 30.8 GM/DL — LOW (ref 32–36)
MCHC RBC-ENTMCNC: 30.9 GM/DL — LOW (ref 32–36)
MCHC RBC-ENTMCNC: 30.9 GM/DL — LOW (ref 32–36)
MCHC RBC-ENTMCNC: 31 GM/DL — LOW (ref 32–36)
MCHC RBC-ENTMCNC: 31.1 GM/DL — LOW (ref 32–36)
MCHC RBC-ENTMCNC: 31.3 GM/DL — LOW (ref 32–36)
MCHC RBC-ENTMCNC: 31.4 GM/DL — LOW (ref 32–36)
MCHC RBC-ENTMCNC: 31.5 GM/DL — LOW (ref 32–36)
MCHC RBC-ENTMCNC: 31.6 GM/DL
MCHC RBC-ENTMCNC: 31.7 GM/DL — LOW (ref 32–36)
MCHC RBC-ENTMCNC: 31.8 GM/DL — LOW (ref 32–36)
MCHC RBC-ENTMCNC: 31.9 GM/DL — LOW (ref 32–36)
MCHC RBC-ENTMCNC: 32.1 GM/DL — SIGNIFICANT CHANGE UP (ref 32–36)
MCHC RBC-ENTMCNC: 32.2 GM/DL — SIGNIFICANT CHANGE UP (ref 32–36)
MCHC RBC-ENTMCNC: 32.2 GM/DL — SIGNIFICANT CHANGE UP (ref 32–36)
MCHC RBC-ENTMCNC: 32.4 GM/DL — SIGNIFICANT CHANGE UP (ref 32–36)
MCHC RBC-ENTMCNC: 32.5 GM/DL — SIGNIFICANT CHANGE UP (ref 32–36)
MCHC RBC-ENTMCNC: 32.5 GM/DL — SIGNIFICANT CHANGE UP (ref 32–36)
MCHC RBC-ENTMCNC: 32.6 GM/DL — SIGNIFICANT CHANGE UP (ref 32–36)
MCHC RBC-ENTMCNC: 32.8 GM/DL — SIGNIFICANT CHANGE UP (ref 32–36)
MCHC RBC-ENTMCNC: 33.2 GM/DL — SIGNIFICANT CHANGE UP (ref 32–36)
MCHC RBC-ENTMCNC: 33.5 GM/DL — SIGNIFICANT CHANGE UP (ref 32–36)
MCHC RBC-ENTMCNC: 33.8 GM/DL — SIGNIFICANT CHANGE UP (ref 32–36)
MCHC RBC-ENTMCNC: 34 GM/DL — SIGNIFICANT CHANGE UP (ref 32–36)
MCV RBC AUTO: 75.5 FL
MCV RBC AUTO: 79.7 FL — LOW (ref 80–100)
MCV RBC AUTO: 81.2 FL — SIGNIFICANT CHANGE UP (ref 80–100)
MCV RBC AUTO: 82.4 FL — SIGNIFICANT CHANGE UP (ref 80–100)
MCV RBC AUTO: 82.5 FL — SIGNIFICANT CHANGE UP (ref 80–100)
MCV RBC AUTO: 82.7 FL — SIGNIFICANT CHANGE UP (ref 80–100)
MCV RBC AUTO: 82.9 FL — SIGNIFICANT CHANGE UP (ref 80–100)
MCV RBC AUTO: 82.9 FL — SIGNIFICANT CHANGE UP (ref 80–100)
MCV RBC AUTO: 83 FL
MCV RBC AUTO: 83 FL — SIGNIFICANT CHANGE UP (ref 80–100)
MCV RBC AUTO: 83.1 FL — SIGNIFICANT CHANGE UP (ref 80–100)
MCV RBC AUTO: 83.3 FL — SIGNIFICANT CHANGE UP (ref 80–100)
MCV RBC AUTO: 83.5 FL — SIGNIFICANT CHANGE UP (ref 80–100)
MCV RBC AUTO: 83.6 FL — SIGNIFICANT CHANGE UP (ref 80–100)
MCV RBC AUTO: 84.1 FL — SIGNIFICANT CHANGE UP (ref 80–100)
MCV RBC AUTO: 84.1 FL — SIGNIFICANT CHANGE UP (ref 80–100)
MCV RBC AUTO: 84.2 FL — SIGNIFICANT CHANGE UP (ref 80–100)
MCV RBC AUTO: 84.3 FL — SIGNIFICANT CHANGE UP (ref 80–100)
MCV RBC AUTO: 84.6 FL — SIGNIFICANT CHANGE UP (ref 80–100)
MCV RBC AUTO: 84.7 FL — SIGNIFICANT CHANGE UP (ref 80–100)
MCV RBC AUTO: 84.9 FL — SIGNIFICANT CHANGE UP (ref 80–100)
MCV RBC AUTO: 85.4 FL — SIGNIFICANT CHANGE UP (ref 80–100)
MCV RBC AUTO: 85.4 FL — SIGNIFICANT CHANGE UP (ref 80–100)
MCV RBC AUTO: 85.5 FL — SIGNIFICANT CHANGE UP (ref 80–100)
MCV RBC AUTO: 85.5 FL — SIGNIFICANT CHANGE UP (ref 80–100)
MCV RBC AUTO: 85.6 FL — SIGNIFICANT CHANGE UP (ref 80–100)
MCV RBC AUTO: 85.6 FL — SIGNIFICANT CHANGE UP (ref 80–100)
MCV RBC AUTO: 86.6 FL — SIGNIFICANT CHANGE UP (ref 80–100)
MCV RBC AUTO: 86.9 FL — SIGNIFICANT CHANGE UP (ref 80–100)
MCV RBC AUTO: 87.3 FL — SIGNIFICANT CHANGE UP (ref 80–100)
MCV RBC AUTO: 87.6 FL — SIGNIFICANT CHANGE UP (ref 80–100)
MCV RBC AUTO: 87.6 FL — SIGNIFICANT CHANGE UP (ref 80–100)
MCV RBC AUTO: 87.7 FL — SIGNIFICANT CHANGE UP (ref 80–100)
MCV RBC AUTO: 88 FL — SIGNIFICANT CHANGE UP (ref 80–100)
MCV RBC AUTO: 88.2 FL — SIGNIFICANT CHANGE UP (ref 80–100)
MCV RBC AUTO: 88.5 FL — SIGNIFICANT CHANGE UP (ref 80–100)
MCV RBC AUTO: 88.6 FL — SIGNIFICANT CHANGE UP (ref 80–100)
MCV RBC AUTO: 88.8 FL — SIGNIFICANT CHANGE UP (ref 80–100)
MCV RBC AUTO: 88.9 FL — SIGNIFICANT CHANGE UP (ref 80–100)
MCV RBC AUTO: 89.2 FL — SIGNIFICANT CHANGE UP (ref 80–100)
MCV RBC AUTO: 89.3 FL — SIGNIFICANT CHANGE UP (ref 80–100)
MCV RBC AUTO: 89.3 FL — SIGNIFICANT CHANGE UP (ref 80–100)
MCV RBC AUTO: 89.4 FL — SIGNIFICANT CHANGE UP (ref 80–100)
MCV RBC AUTO: 89.6 FL — SIGNIFICANT CHANGE UP (ref 80–100)
MCV RBC AUTO: 89.7 FL — SIGNIFICANT CHANGE UP (ref 80–100)
MCV RBC AUTO: 89.8 FL — SIGNIFICANT CHANGE UP (ref 80–100)
MCV RBC AUTO: 90 FL — SIGNIFICANT CHANGE UP (ref 80–100)
MCV RBC AUTO: 90.1 FL — SIGNIFICANT CHANGE UP (ref 80–100)
MCV RBC AUTO: 90.2 FL — SIGNIFICANT CHANGE UP (ref 80–100)
MCV RBC AUTO: 90.2 FL — SIGNIFICANT CHANGE UP (ref 80–100)
MCV RBC AUTO: 90.3 FL — SIGNIFICANT CHANGE UP (ref 80–100)
MCV RBC AUTO: 90.3 FL — SIGNIFICANT CHANGE UP (ref 80–100)
MCV RBC AUTO: 90.6 FL — SIGNIFICANT CHANGE UP (ref 80–100)
MCV RBC AUTO: 90.7 FL — SIGNIFICANT CHANGE UP (ref 80–100)
MCV RBC AUTO: 90.9 FL — SIGNIFICANT CHANGE UP (ref 80–100)
MCV RBC AUTO: 91.2 FL — SIGNIFICANT CHANGE UP (ref 80–100)
MCV RBC AUTO: 91.3 FL — SIGNIFICANT CHANGE UP (ref 80–100)
MCV RBC AUTO: 91.4 FL — SIGNIFICANT CHANGE UP (ref 80–100)
MCV RBC AUTO: 91.4 FL — SIGNIFICANT CHANGE UP (ref 80–100)
MCV RBC AUTO: 91.5 FL — SIGNIFICANT CHANGE UP (ref 80–100)
MCV RBC AUTO: 91.7 FL
MCV RBC AUTO: 91.9 FL — SIGNIFICANT CHANGE UP (ref 80–100)
MCV RBC AUTO: 92.3 FL — SIGNIFICANT CHANGE UP (ref 80–100)
MCV RBC AUTO: 92.9 FL — SIGNIFICANT CHANGE UP (ref 80–100)
MCV RBC AUTO: 93.3 FL — SIGNIFICANT CHANGE UP (ref 80–100)
METAMYELOCYTES # FLD: 0 % — SIGNIFICANT CHANGE UP (ref 0–1)
METHOD TYPE: SIGNIFICANT CHANGE UP
MICROCYTES BLD QL: SLIGHT — SIGNIFICANT CHANGE UP
MICROSCOPIC-UA: NORMAL
MONOCLON BAND OBS SERPL: 0.2 G/DL
MONOCYTES # BLD AUTO: 0.35 K/UL — SIGNIFICANT CHANGE UP (ref 0–0.9)
MONOCYTES # BLD AUTO: 0.64 K/UL — SIGNIFICANT CHANGE UP (ref 0–0.9)
MONOCYTES # BLD AUTO: 1.11 K/UL — HIGH (ref 0–0.9)
MONOCYTES # BLD AUTO: 1.21 K/UL — HIGH (ref 0–0.9)
MONOCYTES # BLD AUTO: 1.21 K/UL — HIGH (ref 0–0.9)
MONOCYTES # BLD AUTO: 1.26 K/UL — HIGH (ref 0–0.9)
MONOCYTES # BLD AUTO: 1.75 K/UL — HIGH (ref 0–0.9)
MONOCYTES # BLD AUTO: 2.12 K/UL — HIGH (ref 0–0.9)
MONOCYTES # BLD AUTO: 2.29 K/UL
MONOCYTES # BLD AUTO: 2.5 K/UL — HIGH (ref 0–0.9)
MONOCYTES # BLD AUTO: 2.5 K/UL — HIGH (ref 0–0.9)
MONOCYTES # BLD AUTO: 2.56 K/UL
MONOCYTES # BLD AUTO: 2.57 K/UL — HIGH (ref 0–0.9)
MONOCYTES # BLD AUTO: 2.69 K/UL — HIGH (ref 0–0.9)
MONOCYTES # BLD AUTO: 3.36 K/UL — HIGH (ref 0–0.9)
MONOCYTES # BLD AUTO: 3.86 K/UL — HIGH (ref 0–0.9)
MONOCYTES # BLD AUTO: 4.92 K/UL — HIGH (ref 0–0.9)
MONOCYTES # BLD AUTO: 4.93 K/UL — HIGH (ref 0–0.9)
MONOCYTES # BLD AUTO: 5.36 K/UL — HIGH (ref 0–0.9)
MONOCYTES # BLD AUTO: 6.79 K/UL — HIGH (ref 0–0.9)
MONOCYTES # BLD AUTO: 7.09 K/UL — HIGH (ref 0–0.9)
MONOCYTES NFR BLD AUTO: 10.2 % — SIGNIFICANT CHANGE UP (ref 2–14)
MONOCYTES NFR BLD AUTO: 10.5 % — SIGNIFICANT CHANGE UP (ref 2–14)
MONOCYTES NFR BLD AUTO: 11.3 % — SIGNIFICANT CHANGE UP (ref 2–14)
MONOCYTES NFR BLD AUTO: 11.8 % — SIGNIFICANT CHANGE UP (ref 2–14)
MONOCYTES NFR BLD AUTO: 13.2 % — SIGNIFICANT CHANGE UP (ref 2–14)
MONOCYTES NFR BLD AUTO: 13.7 %
MONOCYTES NFR BLD AUTO: 14.3 % — HIGH (ref 2–14)
MONOCYTES NFR BLD AUTO: 14.4 % — HIGH (ref 2–14)
MONOCYTES NFR BLD AUTO: 16.2 % — HIGH (ref 2–14)
MONOCYTES NFR BLD AUTO: 20.7 % — HIGH (ref 2–14)
MONOCYTES NFR BLD AUTO: 21 %
MONOCYTES NFR BLD AUTO: 23.7 % — HIGH (ref 2–14)
MONOCYTES NFR BLD AUTO: 23.7 % — HIGH (ref 2–14)
MONOCYTES NFR BLD AUTO: 24.9 % — HIGH (ref 2–14)
MONOCYTES NFR BLD AUTO: 3 % — SIGNIFICANT CHANGE UP (ref 2–14)
MONOCYTES NFR BLD AUTO: 4.3 % — SIGNIFICANT CHANGE UP (ref 2–14)
MONOCYTES NFR BLD AUTO: 4.6 % — SIGNIFICANT CHANGE UP (ref 2–14)
MONOCYTES NFR BLD AUTO: 5.8 % — SIGNIFICANT CHANGE UP (ref 2–14)
MONOCYTES NFR BLD AUTO: 6.1 % — SIGNIFICANT CHANGE UP (ref 2–14)
MONOCYTES NFR BLD AUTO: 8 % — SIGNIFICANT CHANGE UP (ref 2–14)
MONOCYTES NFR BLD AUTO: 9 % — SIGNIFICANT CHANGE UP (ref 2–14)
MONOCYTES NFR BLD: 12.4 % — HIGH (ref 2–9)
MONOCYTES NFR BLD: 12.4 % — HIGH (ref 2–9)
MONOCYTES NFR BLD: 15.8 % — HIGH (ref 2–9)
MYCOPLASMA AG SPEC QL: NEGATIVE — SIGNIFICANT CHANGE UP
MYELOCYTES NFR BLD: 0 % — SIGNIFICANT CHANGE UP (ref 0–0)
MYELOCYTES NFR BLD: 0.9 % — HIGH (ref 0–0)
MYELOCYTES NFR BLD: 0.9 % — HIGH (ref 0–0)
MYELOPEROXIDASE AB SER-ACNC: 96.2 UNITS — HIGH
MYELOPEROXIDASE CELLS FLD QL: POSITIVE
NEUTROPHIL AB SER-ACNC: 72.8 % — SIGNIFICANT CHANGE UP (ref 43–77)
NEUTROPHIL AB SER-ACNC: 85 % — HIGH (ref 43–77)
NEUTROPHIL AB SER-ACNC: 85 % — HIGH (ref 43–77)
NEUTROPHILS # BLD AUTO: 10.58 K/UL — HIGH (ref 1.8–7.4)
NEUTROPHILS # BLD AUTO: 11.03 K/UL — HIGH (ref 1.8–7.4)
NEUTROPHILS # BLD AUTO: 12.44 K/UL — HIGH (ref 1.8–7.4)
NEUTROPHILS # BLD AUTO: 12.73 K/UL
NEUTROPHILS # BLD AUTO: 13.4 K/UL — HIGH (ref 1.8–7.4)
NEUTROPHILS # BLD AUTO: 14.59 K/UL — HIGH (ref 1.8–7.4)
NEUTROPHILS # BLD AUTO: 15.42 K/UL — HIGH (ref 1.8–7.4)
NEUTROPHILS # BLD AUTO: 16.01 K/UL — HIGH (ref 1.8–7.4)
NEUTROPHILS # BLD AUTO: 17.63 K/UL — HIGH (ref 1.8–7.4)
NEUTROPHILS # BLD AUTO: 17.76 K/UL — HIGH (ref 1.8–7.4)
NEUTROPHILS # BLD AUTO: 18.11 K/UL — HIGH (ref 1.8–7.4)
NEUTROPHILS # BLD AUTO: 18.79 K/UL — HIGH (ref 1.8–7.4)
NEUTROPHILS # BLD AUTO: 18.82 K/UL — HIGH (ref 1.8–7.4)
NEUTROPHILS # BLD AUTO: 19.17 K/UL — HIGH (ref 1.8–7.4)
NEUTROPHILS # BLD AUTO: 20.71 K/UL — HIGH (ref 1.8–7.4)
NEUTROPHILS # BLD AUTO: 25.6 K/UL — HIGH (ref 1.8–7.4)
NEUTROPHILS # BLD AUTO: 26.42 K/UL — HIGH (ref 1.8–7.4)
NEUTROPHILS # BLD AUTO: 28.25 K/UL — HIGH (ref 1.8–7.4)
NEUTROPHILS # BLD AUTO: 33.91 K/UL — HIGH (ref 1.8–7.4)
NEUTROPHILS # BLD AUTO: 8.13 K/UL
NEUTROPHILS # BLD AUTO: 9.1 K/UL — HIGH (ref 1.8–7.4)
NEUTROPHILS NFR BLD AUTO: 65 % — SIGNIFICANT CHANGE UP (ref 43–77)
NEUTROPHILS NFR BLD AUTO: 65.4 % — SIGNIFICANT CHANGE UP (ref 43–77)
NEUTROPHILS NFR BLD AUTO: 66.6 %
NEUTROPHILS NFR BLD AUTO: 68.3 % — SIGNIFICANT CHANGE UP (ref 43–77)
NEUTROPHILS NFR BLD AUTO: 70.1 % — SIGNIFICANT CHANGE UP (ref 43–77)
NEUTROPHILS NFR BLD AUTO: 73.9 % — SIGNIFICANT CHANGE UP (ref 43–77)
NEUTROPHILS NFR BLD AUTO: 76.5 %
NEUTROPHILS NFR BLD AUTO: 77.4 % — HIGH (ref 43–77)
NEUTROPHILS NFR BLD AUTO: 77.7 % — HIGH (ref 43–77)
NEUTROPHILS NFR BLD AUTO: 81.9 % — HIGH (ref 43–77)
NEUTROPHILS NFR BLD AUTO: 82 % — HIGH (ref 43–77)
NEUTROPHILS NFR BLD AUTO: 82.7 % — HIGH (ref 43–77)
NEUTROPHILS NFR BLD AUTO: 82.9 % — HIGH (ref 43–77)
NEUTROPHILS NFR BLD AUTO: 83.6 % — HIGH (ref 43–77)
NEUTROPHILS NFR BLD AUTO: 85.1 % — HIGH (ref 43–77)
NEUTROPHILS NFR BLD AUTO: 85.2 % — HIGH (ref 43–77)
NEUTROPHILS NFR BLD AUTO: 87 % — HIGH (ref 43–77)
NEUTROPHILS NFR BLD AUTO: 88 % — HIGH (ref 43–77)
NEUTROPHILS NFR BLD AUTO: 89.8 % — HIGH (ref 43–77)
NEUTROPHILS NFR BLD AUTO: 90.2 % — HIGH (ref 43–77)
NEUTROPHILS NFR BLD AUTO: 94 % — HIGH (ref 43–77)
NEUTS BAND # BLD: 0 % — SIGNIFICANT CHANGE UP (ref 0–6)
NITRITE UR-MCNC: NEGATIVE — SIGNIFICANT CHANGE UP
NITRITE UR-MCNC: POSITIVE
NITRITE URINE: NEGATIVE
NITRITE URINE: POSITIVE
NORMALIZED SCT PPP-RTO: 1 — SIGNIFICANT CHANGE UP (ref 0.85–1.33)
NRBC # BLD: 0 /100 WBCS — SIGNIFICANT CHANGE UP (ref 0–0)
NRBC # FLD: 0 K/UL — SIGNIFICANT CHANGE UP (ref 0–0)
NRBC # FLD: 0.02 K/UL — SIGNIFICANT CHANGE UP (ref 0–0)
NRBC # FLD: 0.02 K/UL — SIGNIFICANT CHANGE UP (ref 0–0)
NRBC # FLD: 0.03 K/UL — SIGNIFICANT CHANGE UP (ref 0–0)
NRBC # FLD: 0.05 K/UL — SIGNIFICANT CHANGE UP (ref 0–0)
NRBC # FLD: 0.07 K/UL — SIGNIFICANT CHANGE UP (ref 0–0)
NRBC # FLD: 0.08 K/UL — SIGNIFICANT CHANGE UP (ref 0–0)
NT-PROBNP SERPL-MCNC: 6761 PG/ML
NT-PROBNP SERPL-SCNC: HIGH PG/ML (ref 0–300)
OB PNL STL: NEGATIVE — SIGNIFICANT CHANGE UP
ORGANISM # SPEC MICROSCOPIC CNT: SIGNIFICANT CHANGE UP
OTHER - HEMATOLOGY %: 0 — SIGNIFICANT CHANGE UP
OTHER CELLS CSF MANUAL: 12 ML/DL — LOW (ref 18–22)
OTHER CELLS CSF MANUAL: 6 ML/DL — LOW (ref 18–22)
OVALOCYTES BLD QL SMEAR: SLIGHT — SIGNIFICANT CHANGE UP
P-ANCA SER-ACNC: ABNORMAL
P-ANCA SER-ACNC: POSITIVE
PCO2 BLDA: 34 MMHG — SIGNIFICANT CHANGE UP (ref 32–46)
PCO2 BLDA: 37 MMHG — SIGNIFICANT CHANGE UP (ref 32–46)
PCO2 BLDA: 39 MMHG — SIGNIFICANT CHANGE UP (ref 32–46)
PCO2 BLDV: 44 MMHG — SIGNIFICANT CHANGE UP (ref 41–51)
PCO2 BLDV: 46 MMHG — SIGNIFICANT CHANGE UP (ref 35–50)
PCO2 BLDV: 66 MMHG — HIGH (ref 35–50)
PH BLDA: 7.38 — SIGNIFICANT CHANGE UP (ref 7.35–7.45)
PH BLDA: 7.38 — SIGNIFICANT CHANGE UP (ref 7.35–7.45)
PH BLDA: 7.42 — SIGNIFICANT CHANGE UP (ref 7.35–7.45)
PH BLDV: 7.3 — LOW (ref 7.35–7.45)
PH BLDV: 7.38 PH — SIGNIFICANT CHANGE UP (ref 7.32–7.43)
PH BLDV: 7.39 — SIGNIFICANT CHANGE UP (ref 7.35–7.45)
PH UR: 5.5 — SIGNIFICANT CHANGE UP (ref 5–8)
PH UR: 5.5 — SIGNIFICANT CHANGE UP (ref 5–8)
PH UR: 6 — SIGNIFICANT CHANGE UP (ref 5–8)
PH UR: 6.5 — SIGNIFICANT CHANGE UP (ref 5–8)
PH URINE: 6.5
PH URINE: 7
PHOSPHATE SERPL-MCNC: 2.8 MG/DL — SIGNIFICANT CHANGE UP (ref 2.5–4.5)
PHOSPHATE SERPL-MCNC: 2.9 MG/DL — SIGNIFICANT CHANGE UP (ref 2.5–4.5)
PHOSPHATE SERPL-MCNC: 3 MG/DL — SIGNIFICANT CHANGE UP (ref 2.5–4.5)
PHOSPHATE SERPL-MCNC: 3.1 MG/DL — SIGNIFICANT CHANGE UP (ref 2.5–4.5)
PHOSPHATE SERPL-MCNC: 3.2 MG/DL — SIGNIFICANT CHANGE UP (ref 2.5–4.5)
PHOSPHATE SERPL-MCNC: 3.3 MG/DL — SIGNIFICANT CHANGE UP (ref 2.5–4.5)
PHOSPHATE SERPL-MCNC: 3.3 MG/DL — SIGNIFICANT CHANGE UP (ref 2.5–4.5)
PHOSPHATE SERPL-MCNC: 3.4 MG/DL — SIGNIFICANT CHANGE UP (ref 2.5–4.5)
PHOSPHATE SERPL-MCNC: 3.5 MG/DL — SIGNIFICANT CHANGE UP (ref 2.5–4.5)
PHOSPHATE SERPL-MCNC: 3.5 MG/DL — SIGNIFICANT CHANGE UP (ref 2.5–4.5)
PHOSPHATE SERPL-MCNC: 3.6 MG/DL — SIGNIFICANT CHANGE UP (ref 2.5–4.5)
PHOSPHATE SERPL-MCNC: 3.7 MG/DL — SIGNIFICANT CHANGE UP (ref 2.5–4.5)
PHOSPHATE SERPL-MCNC: 3.8 MG/DL — SIGNIFICANT CHANGE UP (ref 2.5–4.5)
PHOSPHATE SERPL-MCNC: 3.8 MG/DL — SIGNIFICANT CHANGE UP (ref 2.5–4.5)
PHOSPHATE SERPL-MCNC: 3.9 MG/DL — SIGNIFICANT CHANGE UP (ref 2.5–4.5)
PHOSPHATE SERPL-MCNC: 4 MG/DL — SIGNIFICANT CHANGE UP (ref 2.5–4.5)
PHOSPHATE SERPL-MCNC: 4 MG/DL — SIGNIFICANT CHANGE UP (ref 2.5–4.5)
PHOSPHATE SERPL-MCNC: 4.1 MG/DL — SIGNIFICANT CHANGE UP (ref 2.5–4.5)
PHOSPHATE SERPL-MCNC: 4.2 MG/DL — SIGNIFICANT CHANGE UP (ref 2.5–4.5)
PHOSPHATE SERPL-MCNC: 4.2 MG/DL — SIGNIFICANT CHANGE UP (ref 2.5–4.5)
PHOSPHATE SERPL-MCNC: 4.3 MG/DL — SIGNIFICANT CHANGE UP (ref 2.5–4.5)
PHOSPHATE SERPL-MCNC: 4.4 MG/DL — SIGNIFICANT CHANGE UP (ref 2.5–4.5)
PHOSPHATE SERPL-MCNC: 4.4 MG/DL — SIGNIFICANT CHANGE UP (ref 2.5–4.5)
PHOSPHATE SERPL-MCNC: 4.5 MG/DL — SIGNIFICANT CHANGE UP (ref 2.5–4.5)
PHOSPHATE SERPL-MCNC: 4.5 MG/DL — SIGNIFICANT CHANGE UP (ref 2.5–4.5)
PHOSPHATE SERPL-MCNC: 4.6 MG/DL — HIGH (ref 2.5–4.5)
PHOSPHATE SERPL-MCNC: 4.7 MG/DL — HIGH (ref 2.5–4.5)
PHOSPHATE SERPL-MCNC: 4.8 MG/DL — HIGH (ref 2.5–4.5)
PHOSPHATE SERPL-MCNC: 5 MG/DL — HIGH (ref 2.5–4.5)
PHOSPHATE SERPL-MCNC: 5 MG/DL — HIGH (ref 2.5–4.5)
PHOSPHATE SERPL-MCNC: 5.3 MG/DL — HIGH (ref 2.5–4.5)
PHOSPHATE SERPL-MCNC: 5.5 MG/DL — HIGH (ref 2.5–4.5)
PHOSPHATE SERPL-MCNC: 5.9 MG/DL — HIGH (ref 2.5–4.5)
PLAT MORPH BLD: NORMAL — SIGNIFICANT CHANGE UP
PLATELET # BLD AUTO: 137 K/UL — LOW (ref 150–400)
PLATELET # BLD AUTO: 139 K/UL — LOW (ref 150–400)
PLATELET # BLD AUTO: 144 K/UL — LOW (ref 150–400)
PLATELET # BLD AUTO: 151 K/UL — SIGNIFICANT CHANGE UP (ref 150–400)
PLATELET # BLD AUTO: 153 K/UL — SIGNIFICANT CHANGE UP (ref 150–400)
PLATELET # BLD AUTO: 156 K/UL — SIGNIFICANT CHANGE UP (ref 150–400)
PLATELET # BLD AUTO: 161 K/UL — SIGNIFICANT CHANGE UP (ref 150–400)
PLATELET # BLD AUTO: 163 K/UL — SIGNIFICANT CHANGE UP (ref 150–400)
PLATELET # BLD AUTO: 173 K/UL — SIGNIFICANT CHANGE UP (ref 150–400)
PLATELET # BLD AUTO: 173 K/UL — SIGNIFICANT CHANGE UP (ref 150–400)
PLATELET # BLD AUTO: 174 K/UL — SIGNIFICANT CHANGE UP (ref 150–400)
PLATELET # BLD AUTO: 192 K/UL — SIGNIFICANT CHANGE UP (ref 150–400)
PLATELET # BLD AUTO: 193 K/UL — SIGNIFICANT CHANGE UP (ref 150–400)
PLATELET # BLD AUTO: 195 K/UL — SIGNIFICANT CHANGE UP (ref 150–400)
PLATELET # BLD AUTO: 200 K/UL — SIGNIFICANT CHANGE UP (ref 150–400)
PLATELET # BLD AUTO: 206 K/UL — SIGNIFICANT CHANGE UP (ref 150–400)
PLATELET # BLD AUTO: 207 K/UL — SIGNIFICANT CHANGE UP (ref 150–400)
PLATELET # BLD AUTO: 207 K/UL — SIGNIFICANT CHANGE UP (ref 150–400)
PLATELET # BLD AUTO: 210 K/UL — SIGNIFICANT CHANGE UP (ref 150–400)
PLATELET # BLD AUTO: 217 K/UL — SIGNIFICANT CHANGE UP (ref 150–400)
PLATELET # BLD AUTO: 222 K/UL — SIGNIFICANT CHANGE UP (ref 150–400)
PLATELET # BLD AUTO: 231 K/UL — SIGNIFICANT CHANGE UP (ref 150–400)
PLATELET # BLD AUTO: 233 K/UL — SIGNIFICANT CHANGE UP (ref 150–400)
PLATELET # BLD AUTO: 234 K/UL — SIGNIFICANT CHANGE UP (ref 150–400)
PLATELET # BLD AUTO: 234 K/UL — SIGNIFICANT CHANGE UP (ref 150–400)
PLATELET # BLD AUTO: 242 K/UL — SIGNIFICANT CHANGE UP (ref 150–400)
PLATELET # BLD AUTO: 242 K/UL — SIGNIFICANT CHANGE UP (ref 150–400)
PLATELET # BLD AUTO: 247 K/UL — SIGNIFICANT CHANGE UP (ref 150–400)
PLATELET # BLD AUTO: 250 K/UL — SIGNIFICANT CHANGE UP (ref 150–400)
PLATELET # BLD AUTO: 251 K/UL — SIGNIFICANT CHANGE UP (ref 150–400)
PLATELET # BLD AUTO: 251 K/UL — SIGNIFICANT CHANGE UP (ref 150–400)
PLATELET # BLD AUTO: 253 K/UL — SIGNIFICANT CHANGE UP (ref 150–400)
PLATELET # BLD AUTO: 259 K/UL — SIGNIFICANT CHANGE UP (ref 150–400)
PLATELET # BLD AUTO: 263 K/UL — SIGNIFICANT CHANGE UP (ref 150–400)
PLATELET # BLD AUTO: 266 K/UL — SIGNIFICANT CHANGE UP (ref 150–400)
PLATELET # BLD AUTO: 267 K/UL — SIGNIFICANT CHANGE UP (ref 150–400)
PLATELET # BLD AUTO: 271 K/UL — SIGNIFICANT CHANGE UP (ref 150–400)
PLATELET # BLD AUTO: 273 K/UL — SIGNIFICANT CHANGE UP (ref 150–400)
PLATELET # BLD AUTO: 273 K/UL — SIGNIFICANT CHANGE UP (ref 150–400)
PLATELET # BLD AUTO: 274 K/UL — SIGNIFICANT CHANGE UP (ref 150–400)
PLATELET # BLD AUTO: 275 K/UL — SIGNIFICANT CHANGE UP (ref 150–400)
PLATELET # BLD AUTO: 286 K/UL
PLATELET # BLD AUTO: 286 K/UL — SIGNIFICANT CHANGE UP (ref 150–400)
PLATELET # BLD AUTO: 288 K/UL — SIGNIFICANT CHANGE UP (ref 150–400)
PLATELET # BLD AUTO: 293 K/UL — SIGNIFICANT CHANGE UP (ref 150–400)
PLATELET # BLD AUTO: 299 K/UL — SIGNIFICANT CHANGE UP (ref 150–400)
PLATELET # BLD AUTO: 303 K/UL — SIGNIFICANT CHANGE UP (ref 150–400)
PLATELET # BLD AUTO: 303 K/UL — SIGNIFICANT CHANGE UP (ref 150–400)
PLATELET # BLD AUTO: 304 K/UL
PLATELET # BLD AUTO: 306 K/UL — SIGNIFICANT CHANGE UP (ref 150–400)
PLATELET # BLD AUTO: 306 K/UL — SIGNIFICANT CHANGE UP (ref 150–400)
PLATELET # BLD AUTO: 309 K/UL — SIGNIFICANT CHANGE UP (ref 150–400)
PLATELET # BLD AUTO: 314 K/UL — SIGNIFICANT CHANGE UP (ref 150–400)
PLATELET # BLD AUTO: 316 K/UL — SIGNIFICANT CHANGE UP (ref 150–400)
PLATELET # BLD AUTO: 317 K/UL — SIGNIFICANT CHANGE UP (ref 150–400)
PLATELET # BLD AUTO: 317 K/UL — SIGNIFICANT CHANGE UP (ref 150–400)
PLATELET # BLD AUTO: 326 K/UL — SIGNIFICANT CHANGE UP (ref 150–400)
PLATELET # BLD AUTO: 340 K/UL — SIGNIFICANT CHANGE UP (ref 150–400)
PLATELET # BLD AUTO: 341 K/UL — SIGNIFICANT CHANGE UP (ref 150–400)
PLATELET # BLD AUTO: 342 K/UL — SIGNIFICANT CHANGE UP (ref 150–400)
PLATELET # BLD AUTO: 343 K/UL — SIGNIFICANT CHANGE UP (ref 150–400)
PLATELET # BLD AUTO: 349 K/UL
PLATELET # BLD AUTO: 357 K/UL — SIGNIFICANT CHANGE UP (ref 150–400)
PLATELET # BLD AUTO: 364 K/UL — SIGNIFICANT CHANGE UP (ref 150–400)
PLATELET # BLD AUTO: 380 K/UL — SIGNIFICANT CHANGE UP (ref 150–400)
PLATELET # BLD AUTO: 386 K/UL — SIGNIFICANT CHANGE UP (ref 150–400)
PLATELET # BLD AUTO: 389 K/UL — SIGNIFICANT CHANGE UP (ref 150–400)
PLATELET # BLD AUTO: 390 K/UL — SIGNIFICANT CHANGE UP (ref 150–400)
PLATELET # BLD AUTO: 399 K/UL — SIGNIFICANT CHANGE UP (ref 150–400)
PLATELET # BLD AUTO: 408 K/UL — HIGH (ref 150–400)
PLATELET # BLD AUTO: 414 K/UL — HIGH (ref 150–400)
PLATELET COUNT - ESTIMATE: NORMAL — SIGNIFICANT CHANGE UP
PMV BLD: 10.1 FL — SIGNIFICANT CHANGE UP (ref 7–13)
PMV BLD: 10.4 FL — SIGNIFICANT CHANGE UP (ref 7–13)
PMV BLD: 10.5 FL — SIGNIFICANT CHANGE UP (ref 7–13)
PMV BLD: 10.6 FL — SIGNIFICANT CHANGE UP (ref 7–13)
PMV BLD: 10.7 FL — SIGNIFICANT CHANGE UP (ref 7–13)
PMV BLD: 10.9 FL — SIGNIFICANT CHANGE UP (ref 7–13)
PMV BLD: 10.9 FL — SIGNIFICANT CHANGE UP (ref 7–13)
PMV BLD: 11 FL — SIGNIFICANT CHANGE UP (ref 7–13)
PMV BLD: 11 FL — SIGNIFICANT CHANGE UP (ref 7–13)
PMV BLD: 11.1 FL — SIGNIFICANT CHANGE UP (ref 7–13)
PO2 BLDA: 114 MMHG — HIGH (ref 74–108)
PO2 BLDA: 413 MMHG — HIGH (ref 74–108)
PO2 BLDA: <47 MMHG — CRITICAL LOW (ref 74–108)
PO2 BLDV: 163 MMHG — HIGH (ref 25–45)
PO2 BLDV: 35 MMHG — SIGNIFICANT CHANGE UP (ref 25–45)
PO2 BLDV: 40 MMHG — SIGNIFICANT CHANGE UP (ref 35–40)
POIKILOCYTOSIS BLD QL AUTO: SLIGHT — SIGNIFICANT CHANGE UP
POLYCHROMASIA BLD QL SMEAR: SLIGHT — SIGNIFICANT CHANGE UP
POTASSIUM BLDV-SCNC: 3.7 MMOL/L — SIGNIFICANT CHANGE UP (ref 3.5–5.3)
POTASSIUM BLDV-SCNC: 3.9 MMOL/L — SIGNIFICANT CHANGE UP (ref 3.4–4.5)
POTASSIUM BLDV-SCNC: 4.5 MMOL/L — SIGNIFICANT CHANGE UP (ref 3.5–5.3)
POTASSIUM SERPL-MCNC: 3.4 MMOL/L — LOW (ref 3.5–5.3)
POTASSIUM SERPL-MCNC: 3.6 MMOL/L — SIGNIFICANT CHANGE UP (ref 3.5–5.3)
POTASSIUM SERPL-MCNC: 3.7 MMOL/L — SIGNIFICANT CHANGE UP (ref 3.5–5.3)
POTASSIUM SERPL-MCNC: 3.7 MMOL/L — SIGNIFICANT CHANGE UP (ref 3.5–5.3)
POTASSIUM SERPL-MCNC: 3.8 MMOL/L — SIGNIFICANT CHANGE UP (ref 3.5–5.3)
POTASSIUM SERPL-MCNC: 3.9 MMOL/L — SIGNIFICANT CHANGE UP (ref 3.5–5.3)
POTASSIUM SERPL-MCNC: 4 MMOL/L — SIGNIFICANT CHANGE UP (ref 3.5–5.3)
POTASSIUM SERPL-MCNC: 4.1 MMOL/L — SIGNIFICANT CHANGE UP (ref 3.5–5.3)
POTASSIUM SERPL-MCNC: 4.2 MMOL/L — SIGNIFICANT CHANGE UP (ref 3.5–5.3)
POTASSIUM SERPL-MCNC: 4.2 MMOL/L — SIGNIFICANT CHANGE UP (ref 3.5–5.3)
POTASSIUM SERPL-MCNC: 4.3 MMOL/L — SIGNIFICANT CHANGE UP (ref 3.5–5.3)
POTASSIUM SERPL-MCNC: 4.4 MMOL/L — SIGNIFICANT CHANGE UP (ref 3.5–5.3)
POTASSIUM SERPL-MCNC: 4.5 MMOL/L — SIGNIFICANT CHANGE UP (ref 3.5–5.3)
POTASSIUM SERPL-MCNC: 4.6 MMOL/L — SIGNIFICANT CHANGE UP (ref 3.5–5.3)
POTASSIUM SERPL-MCNC: 4.6 MMOL/L — SIGNIFICANT CHANGE UP (ref 3.5–5.3)
POTASSIUM SERPL-MCNC: 4.7 MMOL/L — SIGNIFICANT CHANGE UP (ref 3.5–5.3)
POTASSIUM SERPL-MCNC: 4.8 MMOL/L — SIGNIFICANT CHANGE UP (ref 3.5–5.3)
POTASSIUM SERPL-MCNC: 4.9 MMOL/L — SIGNIFICANT CHANGE UP (ref 3.5–5.3)
POTASSIUM SERPL-MCNC: 5.1 MMOL/L — SIGNIFICANT CHANGE UP (ref 3.5–5.3)
POTASSIUM SERPL-MCNC: 5.2 MMOL/L — SIGNIFICANT CHANGE UP (ref 3.5–5.3)
POTASSIUM SERPL-MCNC: 5.3 MMOL/L — SIGNIFICANT CHANGE UP (ref 3.5–5.3)
POTASSIUM SERPL-MCNC: 5.4 MMOL/L — HIGH (ref 3.5–5.3)
POTASSIUM SERPL-MCNC: 6.6 MMOL/L — CRITICAL HIGH (ref 3.5–5.3)
POTASSIUM SERPL-SCNC: 3.3 MMOL/L
POTASSIUM SERPL-SCNC: 3.4 MMOL/L — LOW (ref 3.5–5.3)
POTASSIUM SERPL-SCNC: 3.6 MMOL/L — SIGNIFICANT CHANGE UP (ref 3.5–5.3)
POTASSIUM SERPL-SCNC: 3.7 MMOL/L — SIGNIFICANT CHANGE UP (ref 3.5–5.3)
POTASSIUM SERPL-SCNC: 3.7 MMOL/L — SIGNIFICANT CHANGE UP (ref 3.5–5.3)
POTASSIUM SERPL-SCNC: 3.8 MMOL/L — SIGNIFICANT CHANGE UP (ref 3.5–5.3)
POTASSIUM SERPL-SCNC: 3.9 MMOL/L — SIGNIFICANT CHANGE UP (ref 3.5–5.3)
POTASSIUM SERPL-SCNC: 4 MMOL/L
POTASSIUM SERPL-SCNC: 4 MMOL/L — SIGNIFICANT CHANGE UP (ref 3.5–5.3)
POTASSIUM SERPL-SCNC: 4.1 MMOL/L — SIGNIFICANT CHANGE UP (ref 3.5–5.3)
POTASSIUM SERPL-SCNC: 4.2 MMOL/L — SIGNIFICANT CHANGE UP (ref 3.5–5.3)
POTASSIUM SERPL-SCNC: 4.2 MMOL/L — SIGNIFICANT CHANGE UP (ref 3.5–5.3)
POTASSIUM SERPL-SCNC: 4.3 MMOL/L
POTASSIUM SERPL-SCNC: 4.3 MMOL/L — SIGNIFICANT CHANGE UP (ref 3.5–5.3)
POTASSIUM SERPL-SCNC: 4.4 MMOL/L — SIGNIFICANT CHANGE UP (ref 3.5–5.3)
POTASSIUM SERPL-SCNC: 4.5 MMOL/L — SIGNIFICANT CHANGE UP (ref 3.5–5.3)
POTASSIUM SERPL-SCNC: 4.6 MMOL/L — SIGNIFICANT CHANGE UP (ref 3.5–5.3)
POTASSIUM SERPL-SCNC: 4.6 MMOL/L — SIGNIFICANT CHANGE UP (ref 3.5–5.3)
POTASSIUM SERPL-SCNC: 4.7 MMOL/L — SIGNIFICANT CHANGE UP (ref 3.5–5.3)
POTASSIUM SERPL-SCNC: 4.8 MMOL/L — SIGNIFICANT CHANGE UP (ref 3.5–5.3)
POTASSIUM SERPL-SCNC: 4.9 MMOL/L — SIGNIFICANT CHANGE UP (ref 3.5–5.3)
POTASSIUM SERPL-SCNC: 5.1 MMOL/L — SIGNIFICANT CHANGE UP (ref 3.5–5.3)
POTASSIUM SERPL-SCNC: 5.2 MMOL/L — SIGNIFICANT CHANGE UP (ref 3.5–5.3)
POTASSIUM SERPL-SCNC: 5.3 MMOL/L — SIGNIFICANT CHANGE UP (ref 3.5–5.3)
POTASSIUM SERPL-SCNC: 5.4 MMOL/L — HIGH (ref 3.5–5.3)
POTASSIUM SERPL-SCNC: 6.6 MMOL/L — CRITICAL HIGH (ref 3.5–5.3)
PROMYELOCYTES # FLD: 0 % — SIGNIFICANT CHANGE UP (ref 0–0)
PROT PATTERN SERPL ELPH-IMP: SIGNIFICANT CHANGE UP
PROT SERPL-MCNC: 5.2 G/DL
PROT SERPL-MCNC: 5.3 G/DL — LOW (ref 6–8.3)
PROT SERPL-MCNC: 5.4 G/DL — LOW (ref 6–8.3)
PROT SERPL-MCNC: 5.6 G/DL — LOW (ref 6–8.3)
PROT SERPL-MCNC: 5.6 G/DL — LOW (ref 6–8.3)
PROT SERPL-MCNC: 5.7 G/DL — LOW (ref 6–8.3)
PROT SERPL-MCNC: 6.1 G/DL — SIGNIFICANT CHANGE UP (ref 6–8.3)
PROT SERPL-MCNC: 6.4 G/DL — SIGNIFICANT CHANGE UP (ref 6–8.3)
PROT SERPL-MCNC: 6.9 G/DL — SIGNIFICANT CHANGE UP (ref 6–8.3)
PROT SERPL-MCNC: 7 G/DL — SIGNIFICANT CHANGE UP (ref 6–8.3)
PROT SERPL-MCNC: 7 G/DL — SIGNIFICANT CHANGE UP (ref 6–8.3)
PROT SERPL-MCNC: 7.2 G/DL
PROT SERPL-MCNC: 7.3 G/DL — SIGNIFICANT CHANGE UP (ref 6–8.3)
PROT SERPL-MCNC: 7.5 G/DL — SIGNIFICANT CHANGE UP (ref 6–8.3)
PROT SERPL-MCNC: 7.6 G/DL — SIGNIFICANT CHANGE UP (ref 6–8.3)
PROT SERPL-MCNC: 7.7 G/DL
PROT UR-MCNC: 100
PROT UR-MCNC: 100 — SIGNIFICANT CHANGE UP
PROT UR-MCNC: 200 — HIGH
PROT UR-MCNC: 201.6 MG/DL — SIGNIFICANT CHANGE UP
PROT UR-MCNC: ABNORMAL
PROTEIN URINE: 100 MG/DL
PROTEIN URINE: ABNORMAL
PROTEINASE3 AB FLD-ACNC: 5.1 UNITS — SIGNIFICANT CHANGE UP
PROTEINASE3 AB SER-ACNC: NEGATIVE — SIGNIFICANT CHANGE UP
PROTHROM AB SERPL-ACNC: 10.6 SEC — SIGNIFICANT CHANGE UP (ref 10–12.9)
PROTHROM AB SERPL-ACNC: 10.9 SEC — SIGNIFICANT CHANGE UP (ref 10–13.1)
PROTHROM AB SERPL-ACNC: 10.9 SEC — SIGNIFICANT CHANGE UP (ref 10–13.1)
PROTHROM AB SERPL-ACNC: 11.2 SEC — SIGNIFICANT CHANGE UP (ref 10–13.1)
PROTHROM AB SERPL-ACNC: 11.2 SEC — SIGNIFICANT CHANGE UP (ref 10–13.1)
PROTHROM AB SERPL-ACNC: 12.3 SEC — SIGNIFICANT CHANGE UP (ref 10–12.9)
PROTHROM AB SERPL-ACNC: 12.4 SEC — SIGNIFICANT CHANGE UP (ref 10–12.9)
PROTHROM AB SERPL-ACNC: 12.4 SEC — SIGNIFICANT CHANGE UP (ref 9.8–13.1)
PROTHROM AB SERPL-ACNC: 12.8 SEC — SIGNIFICANT CHANGE UP (ref 9.8–13.1)
PROTHROM AB SERPL-ACNC: 12.9 SEC — SIGNIFICANT CHANGE UP (ref 10–12.9)
PROTHROM AB SERPL-ACNC: 12.9 SEC — SIGNIFICANT CHANGE UP (ref 10–13.1)
PROTHROM AB SERPL-ACNC: 13 SEC — HIGH (ref 10–12.9)
PROTHROM AB SERPL-ACNC: 13 SEC — HIGH (ref 10–12.9)
PROTHROM AB SERPL-ACNC: 13 SEC — SIGNIFICANT CHANGE UP (ref 10–13.1)
PROTHROM AB SERPL-ACNC: 13 SEC — SIGNIFICANT CHANGE UP (ref 9.8–13.1)
PROTHROM AB SERPL-ACNC: 13.5 SEC — HIGH (ref 10–12.9)
PROTHROM AB SERPL-ACNC: 14.2 SEC — HIGH (ref 9.8–13.1)
PROTHROM AB SERPL-ACNC: 15.3 SEC — HIGH (ref 9.8–13.1)
PROTHROM AB SERPL-ACNC: 17.5 SEC — HIGH (ref 10–12.9)
PROTHROM AB SERPL-ACNC: 17.6 SEC — HIGH (ref 9.8–13.1)
PROTHROM AB SERPL-ACNC: 19.4 SEC — HIGH (ref 9.8–13.1)
PROTHROM AB SERPL-ACNC: 19.4 SEC — HIGH (ref 9.8–13.1)
PROTHROM AB SERPL-ACNC: 19.7 SEC — HIGH (ref 10–12.9)
PROTHROM AB SERPL-ACNC: 20 SEC — HIGH (ref 10–12.9)
PROTHROM AB SERPL-ACNC: 24.1 SEC — HIGH (ref 9.8–13.1)
PROTHROM AB SERPL-ACNC: 24.8 SEC — HIGH (ref 9.8–13.1)
PROTHROM AB SERPL-ACNC: 25.4 SEC — HIGH (ref 9.8–13.1)
PROTHROM AB SERPL-ACNC: 25.4 SEC — HIGH (ref 9.8–13.1)
PROTHROM AB SERPL-ACNC: 25.8 SEC — HIGH (ref 10–12.9)
PROTHROM AB SERPL-ACNC: 36.7 SEC — HIGH (ref 9.8–13.1)
PROTHROM AB SERPL-ACNC: 39.2 SEC — HIGH (ref 10–12.9)
PROTHROM AB SERPL-ACNC: 42.1 SEC — HIGH (ref 9.8–13.1)
PROTHROM AB SERPL-ACNC: 47.1 SEC — HIGH (ref 10–12.9)
PROTHROM AB SERPL-ACNC: 73.2 SEC — HIGH (ref 10–12.9)
PROTHROM AB SERPL-ACNC: 74.5 SEC — HIGH (ref 10–12.9)
PROTHROMBIN TIME/NOMAL: 11.8 SEC — SIGNIFICANT CHANGE UP (ref 9.8–13.1)
PROTHROMBIN TIME/NOMAL: 35.5 SEC — SIGNIFICANT CHANGE UP (ref 27.5–37.4)
PT BLD: 17
PT BLD: 20.6 SEC
PT INHIB SC 2 HR: 15.2 SEC — HIGH (ref 9.8–13.1)
PTT INHIB SC 2 HR: 47.9 SEC — HIGH (ref 27.5–37.4)
QUANT TB PLUS MITOGEN MINUS NIL: 0 IU/ML — SIGNIFICANT CHANGE UP
R RICKETTSI AB SER-ACNC: NEGATIVE — SIGNIFICANT CHANGE UP
R RICKETTSI IGM SER-ACNC: 2.91 INDEX — HIGH (ref 0–0.89)
RAPID RVP RESULT: DETECTED
RAPID RVP RESULT: SIGNIFICANT CHANGE UP
RBC # BLD: 2.51 M/UL — LOW (ref 3.8–5.2)
RBC # BLD: 2.6 M/UL — LOW (ref 3.8–5.2)
RBC # BLD: 2.72 M/UL — LOW (ref 3.8–5.2)
RBC # BLD: 2.73 M/UL — LOW (ref 3.8–5.2)
RBC # BLD: 2.76 M/UL — LOW (ref 3.8–5.2)
RBC # BLD: 2.82 M/UL — LOW (ref 3.8–5.2)
RBC # BLD: 2.86 M/UL — LOW (ref 3.8–5.2)
RBC # BLD: 2.9 M/UL — LOW (ref 3.8–5.2)
RBC # BLD: 2.96 M/UL — LOW (ref 3.8–5.2)
RBC # BLD: 2.98 M/UL — LOW (ref 3.8–5.2)
RBC # BLD: 3 M/UL — LOW (ref 3.8–5.2)
RBC # BLD: 3.03 M/UL — LOW (ref 3.8–5.2)
RBC # BLD: 3.03 M/UL — LOW (ref 3.8–5.2)
RBC # BLD: 3.05 M/UL — LOW (ref 3.8–5.2)
RBC # BLD: 3.07 M/UL — LOW (ref 3.8–5.2)
RBC # BLD: 3.11 M/UL — LOW (ref 3.8–5.2)
RBC # BLD: 3.16 M/UL — LOW (ref 3.8–5.2)
RBC # BLD: 3.17 M/UL — LOW (ref 3.8–5.2)
RBC # BLD: 3.22 M/UL — LOW (ref 3.8–5.2)
RBC # BLD: 3.23 M/UL — LOW (ref 3.8–5.2)
RBC # BLD: 3.26 M/UL — LOW (ref 3.8–5.2)
RBC # BLD: 3.29 M/UL — LOW (ref 3.8–5.2)
RBC # BLD: 3.3 M/UL — LOW (ref 3.8–5.2)
RBC # BLD: 3.31 M/UL — LOW (ref 3.8–5.2)
RBC # BLD: 3.33 M/UL — LOW (ref 3.8–5.2)
RBC # BLD: 3.34 M/UL — LOW (ref 3.8–5.2)
RBC # BLD: 3.36 M/UL — LOW (ref 3.8–5.2)
RBC # BLD: 3.38 M/UL — LOW (ref 3.8–5.2)
RBC # BLD: 3.39 M/UL — LOW (ref 3.8–5.2)
RBC # BLD: 3.4 M/UL — LOW (ref 3.8–5.2)
RBC # BLD: 3.41 M/UL — LOW (ref 3.8–5.2)
RBC # BLD: 3.43 M/UL — LOW (ref 3.8–5.2)
RBC # BLD: 3.44 M/UL — LOW (ref 3.8–5.2)
RBC # BLD: 3.45 M/UL — LOW (ref 3.8–5.2)
RBC # BLD: 3.45 M/UL — LOW (ref 3.8–5.2)
RBC # BLD: 3.46 M/UL — LOW (ref 3.8–5.2)
RBC # BLD: 3.46 M/UL — LOW (ref 3.8–5.2)
RBC # BLD: 3.48 M/UL — LOW (ref 3.8–5.2)
RBC # BLD: 3.5 M/UL — LOW (ref 3.8–5.2)
RBC # BLD: 3.5 M/UL — LOW (ref 3.8–5.2)
RBC # BLD: 3.52 M/UL — LOW (ref 3.8–5.2)
RBC # BLD: 3.52 M/UL — LOW (ref 3.8–5.2)
RBC # BLD: 3.55 M/UL — LOW (ref 3.8–5.2)
RBC # BLD: 3.56 M/UL — LOW (ref 3.8–5.2)
RBC # BLD: 3.58 M/UL — LOW (ref 3.8–5.2)
RBC # BLD: 3.58 M/UL — LOW (ref 3.8–5.2)
RBC # BLD: 3.6 M/UL — LOW (ref 3.8–5.2)
RBC # BLD: 3.61 M/UL — LOW (ref 3.8–5.2)
RBC # BLD: 3.62 M/UL — LOW (ref 3.8–5.2)
RBC # BLD: 3.62 M/UL — LOW (ref 3.8–5.2)
RBC # BLD: 3.64 M/UL — LOW (ref 3.8–5.2)
RBC # BLD: 3.64 M/UL — LOW (ref 3.8–5.2)
RBC # BLD: 3.65 M/UL — LOW (ref 3.8–5.2)
RBC # BLD: 3.65 M/UL — LOW (ref 3.8–5.2)
RBC # BLD: 3.67 M/UL — LOW (ref 3.8–5.2)
RBC # BLD: 3.7 M/UL — LOW (ref 3.8–5.2)
RBC # BLD: 3.71 M/UL — LOW (ref 3.8–5.2)
RBC # BLD: 3.74 M/UL — LOW (ref 3.8–5.2)
RBC # BLD: 3.81 M/UL — SIGNIFICANT CHANGE UP (ref 3.8–5.2)
RBC # BLD: 3.88 M/UL — SIGNIFICANT CHANGE UP (ref 3.8–5.2)
RBC # BLD: 3.93 M/UL — SIGNIFICANT CHANGE UP (ref 3.8–5.2)
RBC # BLD: 3.94 M/UL — SIGNIFICANT CHANGE UP (ref 3.8–5.2)
RBC # BLD: 4.01 M/UL — SIGNIFICANT CHANGE UP (ref 3.8–5.2)
RBC # BLD: 4.02 M/UL — SIGNIFICANT CHANGE UP (ref 3.8–5.2)
RBC # BLD: 4.03 M/UL — SIGNIFICANT CHANGE UP (ref 3.8–5.2)
RBC # BLD: 4.04 M/UL — SIGNIFICANT CHANGE UP (ref 3.8–5.2)
RBC # BLD: 4.21 M/UL — SIGNIFICANT CHANGE UP (ref 3.8–5.2)
RBC # BLD: 4.32 M/UL
RBC # BLD: 4.47 M/UL
RBC # BLD: 4.51 M/UL
RBC # FLD: 15.6 %
RBC # FLD: 16.4 % — HIGH (ref 10.3–14.5)
RBC # FLD: 16.4 % — HIGH (ref 10.3–14.5)
RBC # FLD: 16.6 % — HIGH (ref 10.3–14.5)
RBC # FLD: 16.6 % — HIGH (ref 10.3–14.5)
RBC # FLD: 16.8 % — HIGH (ref 10.3–14.5)
RBC # FLD: 16.9 % — HIGH (ref 10.3–14.5)
RBC # FLD: 17 % — HIGH (ref 10.3–14.5)
RBC # FLD: 17.1 % — HIGH (ref 10.3–14.5)
RBC # FLD: 17.1 % — HIGH (ref 10.3–14.5)
RBC # FLD: 17.2 % — HIGH (ref 10.3–14.5)
RBC # FLD: 17.2 % — HIGH (ref 10.3–14.5)
RBC # FLD: 17.3 % — HIGH (ref 10.3–14.5)
RBC # FLD: 17.4 % — HIGH (ref 10.3–14.5)
RBC # FLD: 17.5 %
RBC # FLD: 17.5 % — HIGH (ref 10.3–14.5)
RBC # FLD: 17.6 % — HIGH (ref 10.3–14.5)
RBC # FLD: 17.7 % — HIGH (ref 10.3–14.5)
RBC # FLD: 17.7 % — HIGH (ref 10.3–14.5)
RBC # FLD: 17.8 % — HIGH (ref 10.3–14.5)
RBC # FLD: 17.9 % — HIGH (ref 10.3–14.5)
RBC # FLD: 18 % — HIGH (ref 10.3–14.5)
RBC # FLD: 18.1 % — HIGH (ref 10.3–14.5)
RBC # FLD: 18.1 % — HIGH (ref 10.3–14.5)
RBC # FLD: 18.2 % — HIGH (ref 10.3–14.5)
RBC # FLD: 18.3 % — HIGH (ref 10.3–14.5)
RBC # FLD: 18.4 % — HIGH (ref 10.3–14.5)
RBC # FLD: 18.4 % — HIGH (ref 10.3–14.5)
RBC # FLD: 18.5 %
RBC # FLD: 18.5 % — HIGH (ref 10.3–14.5)
RBC # FLD: 18.6 % — HIGH (ref 10.3–14.5)
RBC # FLD: 18.7 % — HIGH (ref 10.3–14.5)
RBC # FLD: 18.9 % — HIGH (ref 10.3–14.5)
RBC # FLD: 18.9 % — HIGH (ref 10.3–14.5)
RBC # FLD: 19.1 % — HIGH (ref 10.3–14.5)
RBC # FLD: 19.2 % — HIGH (ref 10.3–14.5)
RBC # FLD: 19.3 % — HIGH (ref 10.3–14.5)
RBC # FLD: 19.6 % — HIGH (ref 10.3–14.5)
RBC # FLD: 19.7 % — HIGH (ref 10.3–14.5)
RBC # FLD: 19.7 % — HIGH (ref 10.3–14.5)
RBC # FLD: 19.8 % — HIGH (ref 10.3–14.5)
RBC # FLD: 19.9 % — HIGH (ref 10.3–14.5)
RBC # FLD: 20 % — HIGH (ref 10.3–14.5)
RBC # FLD: 20.1 % — HIGH (ref 10.3–14.5)
RBC # FLD: 20.1 % — HIGH (ref 10.3–14.5)
RBC # FLD: 20.3 % — HIGH (ref 10.3–14.5)
RBC # FLD: 20.4 % — HIGH (ref 10.3–14.5)
RBC # FLD: 20.8 % — HIGH (ref 10.3–14.5)
RBC BLD AUTO: ABNORMAL
RBC BLD AUTO: SIGNIFICANT CHANGE UP
RBC BLD AUTO: SIGNIFICANT CHANGE UP
RBC CASTS # UR COMP ASSIST: 22 /HPF — HIGH (ref 0–4)
RBC CASTS # UR COMP ASSIST: 36 /HPF — HIGH (ref 0–4)
RBC CASTS # UR COMP ASSIST: 51 /HPF — HIGH (ref 0–4)
RBC CASTS # UR COMP ASSIST: >50 /HPF (ref 0–4)
RBC CASTS # UR COMP ASSIST: >50 — HIGH (ref 0–?)
RED BLOOD CELLS URINE: 136 /HPF
RETICS #: 127.9 K/UL — HIGH (ref 25–125)
RETICS/RBC NFR: 3.7 % — HIGH (ref 0.5–2.5)
RH IG SCN BLD-IMP: POSITIVE — SIGNIFICANT CHANGE UP
RICK SF IGG TITR SER IF: NEGATIVE — SIGNIFICANT CHANGE UP
RICK SF IGM TITR SER IF: 2.91 INDEX — HIGH (ref 0–0.89)
S PNEUM AG SER QL: SIGNIFICANT CHANGE UP
SAO2 % BLDA: 80 % — LOW (ref 92–96)
SAO2 % BLDA: 99 % — HIGH (ref 92–96)
SAO2 % BLDA: >99 % — HIGH (ref 92–96)
SAO2 % BLDV: 60 % — LOW (ref 67–88)
SAO2 % BLDV: 73.1 % — SIGNIFICANT CHANGE UP (ref 60–85)
SAO2 % BLDV: 99 % — HIGH (ref 67–88)
SCHISTOCYTES BLD QL AUTO: SLIGHT — SIGNIFICANT CHANGE UP
SMUDGE CELLS # BLD: PRESENT — SIGNIFICANT CHANGE UP
SODIUM SERPL-SCNC: 131 MMOL/L — LOW (ref 135–145)
SODIUM SERPL-SCNC: 134 MMOL/L
SODIUM SERPL-SCNC: 134 MMOL/L — LOW (ref 135–145)
SODIUM SERPL-SCNC: 135 MMOL/L — SIGNIFICANT CHANGE UP (ref 135–145)
SODIUM SERPL-SCNC: 136 MMOL/L — SIGNIFICANT CHANGE UP (ref 135–145)
SODIUM SERPL-SCNC: 137 MMOL/L
SODIUM SERPL-SCNC: 137 MMOL/L — SIGNIFICANT CHANGE UP (ref 135–145)
SODIUM SERPL-SCNC: 138 MMOL/L — SIGNIFICANT CHANGE UP (ref 135–145)
SODIUM SERPL-SCNC: 139 MMOL/L — SIGNIFICANT CHANGE UP (ref 135–145)
SODIUM SERPL-SCNC: 140 MMOL/L
SODIUM SERPL-SCNC: 140 MMOL/L — SIGNIFICANT CHANGE UP (ref 135–145)
SODIUM SERPL-SCNC: 141 MMOL/L — SIGNIFICANT CHANGE UP (ref 135–145)
SODIUM SERPL-SCNC: 142 MMOL/L — SIGNIFICANT CHANGE UP (ref 135–145)
SODIUM SERPL-SCNC: 142 MMOL/L — SIGNIFICANT CHANGE UP (ref 135–145)
SODIUM SERPL-SCNC: 143 MMOL/L — SIGNIFICANT CHANGE UP (ref 135–145)
SODIUM SERPL-SCNC: 143 MMOL/L — SIGNIFICANT CHANGE UP (ref 135–145)
SODIUM SERPL-SCNC: 144 MMOL/L — SIGNIFICANT CHANGE UP (ref 135–145)
SODIUM SERPL-SCNC: 145 MMOL/L — SIGNIFICANT CHANGE UP (ref 135–145)
SODIUM SERPL-SCNC: 145 MMOL/L — SIGNIFICANT CHANGE UP (ref 135–145)
SP GR SPEC: 1.01 — SIGNIFICANT CHANGE UP (ref 1.01–1.02)
SP GR SPEC: 1.02 — SIGNIFICANT CHANGE UP (ref 1.01–1.02)
SP GR SPEC: 1.02 — SIGNIFICANT CHANGE UP (ref 1–1.04)
SPECIFIC GRAVITY URINE: 1.01
SPECIFIC GRAVITY URINE: 1.02
SPECIMEN SOURCE: SIGNIFICANT CHANGE UP
SQUAMOUS # UR AUTO: SIGNIFICANT CHANGE UP
SQUAMOUS EPITHELIAL CELLS: 2 /HPF
T-CELL CD4 SUBSET PNL BLD: 227 CELL/UL — LOW (ref 431–1434)
TARGETS BLD QL SMEAR: SLIGHT — SIGNIFICANT CHANGE UP
THROMBIN TIME: 29.3 SEC — HIGH (ref 16–25)
TIBC SERPL-MCNC: 300 UG/DL
TIBC SERPL-MCNC: 302 UG/DL — SIGNIFICANT CHANGE UP (ref 220–430)
TRIGL SERPL-MCNC: 94 MG/DL
TSH SERPL-ACNC: 1.55 UIU/ML
UIBC SERPL-MCNC: 258 UG/DL
UIBC SERPL-MCNC: 277 UG/DL — SIGNIFICANT CHANGE UP (ref 110–370)
UROBILINOGEN FLD QL: NEGATIVE — SIGNIFICANT CHANGE UP
UROBILINOGEN FLD QL: NORMAL — SIGNIFICANT CHANGE UP
UROBILINOGEN URINE: 1 MG/DL
UROBILINOGEN URINE: NORMAL
VARIANT LYMPHS # BLD: 0 % — SIGNIFICANT CHANGE UP
VARIANT LYMPHS # BLD: 0 % — SIGNIFICANT CHANGE UP
VARIANT LYMPHS # BLD: 0.9 % — SIGNIFICANT CHANGE UP (ref 0–6)
VARIANT LYMPHS # BLD: 1.7 % — SIGNIFICANT CHANGE UP
VIT B12 SERPL-MCNC: 297 PG/ML
VIT B12 SERPL-MCNC: 404 PG/ML — SIGNIFICANT CHANGE UP (ref 232–1245)
WBC # BLD: 10.89 K/UL — HIGH (ref 3.8–10.5)
WBC # BLD: 11.63 K/UL — HIGH (ref 3.8–10.5)
WBC # BLD: 13.29 K/UL — HIGH (ref 3.8–10.5)
WBC # BLD: 13.79 K/UL — HIGH (ref 3.8–10.5)
WBC # BLD: 15.1 K/UL — HIGH (ref 3.8–10.5)
WBC # BLD: 15.4 K/UL — HIGH (ref 3.8–10.5)
WBC # BLD: 15.7 K/UL — HIGH (ref 3.8–10.5)
WBC # BLD: 16 K/UL — HIGH (ref 3.8–10.5)
WBC # BLD: 16.06 K/UL — HIGH (ref 3.8–10.5)
WBC # BLD: 16.13 K/UL — HIGH (ref 3.8–10.5)
WBC # BLD: 16.38 K/UL — HIGH (ref 3.8–10.5)
WBC # BLD: 16.59 K/UL — HIGH (ref 3.8–10.5)
WBC # BLD: 16.77 K/UL — HIGH (ref 3.8–10.5)
WBC # BLD: 16.83 K/UL — HIGH (ref 3.8–10.5)
WBC # BLD: 17.3 K/UL — HIGH (ref 3.8–10.5)
WBC # BLD: 17.93 K/UL — HIGH (ref 3.8–10.5)
WBC # BLD: 18.3 K/UL — HIGH (ref 3.8–10.5)
WBC # BLD: 18.3 K/UL — HIGH (ref 3.8–10.5)
WBC # BLD: 18.35 K/UL — HIGH (ref 3.8–10.5)
WBC # BLD: 18.5 K/UL — HIGH (ref 3.8–10.5)
WBC # BLD: 18.6 K/UL — HIGH (ref 3.8–10.5)
WBC # BLD: 18.79 K/UL — HIGH (ref 3.8–10.5)
WBC # BLD: 19 K/UL — HIGH (ref 3.8–10.5)
WBC # BLD: 19.48 K/UL — HIGH (ref 3.8–10.5)
WBC # BLD: 19.7 K/UL — HIGH (ref 3.8–10.5)
WBC # BLD: 19.8 K/UL — HIGH (ref 3.8–10.5)
WBC # BLD: 19.83 K/UL — HIGH (ref 3.8–10.5)
WBC # BLD: 20 K/UL — HIGH (ref 3.8–10.5)
WBC # BLD: 20.2 K/UL — HIGH (ref 3.8–10.5)
WBC # BLD: 20.6 K/UL — HIGH (ref 3.8–10.5)
WBC # BLD: 20.8 K/UL — HIGH (ref 3.8–10.5)
WBC # BLD: 20.8 K/UL — HIGH (ref 3.8–10.5)
WBC # BLD: 20.87 K/UL — HIGH (ref 3.8–10.5)
WBC # BLD: 20.92 K/UL — HIGH (ref 3.8–10.5)
WBC # BLD: 21.22 K/UL — HIGH (ref 3.8–10.5)
WBC # BLD: 21.41 K/UL — HIGH (ref 3.8–10.5)
WBC # BLD: 21.47 K/UL — HIGH (ref 3.8–10.5)
WBC # BLD: 22.05 K/UL — HIGH (ref 3.8–10.5)
WBC # BLD: 22.06 K/UL — HIGH (ref 3.8–10.5)
WBC # BLD: 22.24 K/UL — HIGH (ref 3.8–10.5)
WBC # BLD: 22.53 K/UL — HIGH (ref 3.8–10.5)
WBC # BLD: 22.6 K/UL — HIGH (ref 3.8–10.5)
WBC # BLD: 22.6 K/UL — HIGH (ref 3.8–10.5)
WBC # BLD: 22.7 K/UL — HIGH (ref 3.8–10.5)
WBC # BLD: 22.78 K/UL — HIGH (ref 3.8–10.5)
WBC # BLD: 22.8 K/UL — HIGH (ref 3.8–10.5)
WBC # BLD: 22.98 K/UL — HIGH (ref 3.8–10.5)
WBC # BLD: 23.25 K/UL — HIGH (ref 3.8–10.5)
WBC # BLD: 23.38 K/UL — HIGH (ref 3.8–10.5)
WBC # BLD: 23.6 K/UL — HIGH (ref 3.8–10.5)
WBC # BLD: 23.86 K/UL — HIGH (ref 3.8–10.5)
WBC # BLD: 24 K/UL — HIGH (ref 3.8–10.5)
WBC # BLD: 24.37 K/UL — HIGH (ref 3.8–10.5)
WBC # BLD: 24.64 K/UL — HIGH (ref 3.8–10.5)
WBC # BLD: 24.95 K/UL — HIGH (ref 3.8–10.5)
WBC # BLD: 27.06 K/UL — HIGH (ref 3.8–10.5)
WBC # BLD: 27.22 K/UL — HIGH (ref 3.8–10.5)
WBC # BLD: 27.34 K/UL — HIGH (ref 3.8–10.5)
WBC # BLD: 28.11 K/UL — HIGH (ref 3.8–10.5)
WBC # BLD: 28.9 K/UL — HIGH (ref 3.8–10.5)
WBC # BLD: 29.62 K/UL — HIGH (ref 3.8–10.5)
WBC # BLD: 30.97 K/UL — HIGH (ref 3.8–10.5)
WBC # BLD: 31.91 K/UL — HIGH (ref 3.8–10.5)
WBC # BLD: 33.16 K/UL — HIGH (ref 3.8–10.5)
WBC # BLD: 34.46 K/UL — HIGH (ref 3.8–10.5)
WBC # BLD: 36.1 K/UL — HIGH (ref 3.8–10.5)
WBC # BLD: 38.01 K/UL — HIGH (ref 3.8–10.5)
WBC # BLD: 43.66 K/UL — CRITICAL HIGH (ref 3.8–10.5)
WBC # FLD AUTO: 10.89 K/UL — HIGH (ref 3.8–10.5)
WBC # FLD AUTO: 11.63 K/UL — HIGH (ref 3.8–10.5)
WBC # FLD AUTO: 12.19 K/UL
WBC # FLD AUTO: 13.1 K/UL
WBC # FLD AUTO: 13.29 K/UL — HIGH (ref 3.8–10.5)
WBC # FLD AUTO: 13.79 K/UL — HIGH (ref 3.8–10.5)
WBC # FLD AUTO: 15.1 K/UL — HIGH (ref 3.8–10.5)
WBC # FLD AUTO: 15.4 K/UL — HIGH (ref 3.8–10.5)
WBC # FLD AUTO: 15.7 K/UL — HIGH (ref 3.8–10.5)
WBC # FLD AUTO: 16 K/UL — HIGH (ref 3.8–10.5)
WBC # FLD AUTO: 16.06 K/UL — HIGH (ref 3.8–10.5)
WBC # FLD AUTO: 16.13 K/UL — HIGH (ref 3.8–10.5)
WBC # FLD AUTO: 16.38 K/UL — HIGH (ref 3.8–10.5)
WBC # FLD AUTO: 16.59 K/UL — HIGH (ref 3.8–10.5)
WBC # FLD AUTO: 16.66 K/UL
WBC # FLD AUTO: 16.77 K/UL — HIGH (ref 3.8–10.5)
WBC # FLD AUTO: 16.83 K/UL — HIGH (ref 3.8–10.5)
WBC # FLD AUTO: 17.3 K/UL — HIGH (ref 3.8–10.5)
WBC # FLD AUTO: 17.93 K/UL — HIGH (ref 3.8–10.5)
WBC # FLD AUTO: 18.3 K/UL — HIGH (ref 3.8–10.5)
WBC # FLD AUTO: 18.3 K/UL — HIGH (ref 3.8–10.5)
WBC # FLD AUTO: 18.35 K/UL — HIGH (ref 3.8–10.5)
WBC # FLD AUTO: 18.5 K/UL — HIGH (ref 3.8–10.5)
WBC # FLD AUTO: 18.6 K/UL — HIGH (ref 3.8–10.5)
WBC # FLD AUTO: 18.79 K/UL — HIGH (ref 3.8–10.5)
WBC # FLD AUTO: 19 K/UL — HIGH (ref 3.8–10.5)
WBC # FLD AUTO: 19.48 K/UL — HIGH (ref 3.8–10.5)
WBC # FLD AUTO: 19.7 K/UL — HIGH (ref 3.8–10.5)
WBC # FLD AUTO: 19.8 K/UL — HIGH (ref 3.8–10.5)
WBC # FLD AUTO: 19.83 K/UL — HIGH (ref 3.8–10.5)
WBC # FLD AUTO: 20 K/UL — HIGH (ref 3.8–10.5)
WBC # FLD AUTO: 20.2 K/UL — HIGH (ref 3.8–10.5)
WBC # FLD AUTO: 20.6 K/UL — HIGH (ref 3.8–10.5)
WBC # FLD AUTO: 20.8 K/UL — HIGH (ref 3.8–10.5)
WBC # FLD AUTO: 20.8 K/UL — HIGH (ref 3.8–10.5)
WBC # FLD AUTO: 20.87 K/UL — HIGH (ref 3.8–10.5)
WBC # FLD AUTO: 20.92 K/UL — HIGH (ref 3.8–10.5)
WBC # FLD AUTO: 21.22 K/UL — HIGH (ref 3.8–10.5)
WBC # FLD AUTO: 21.41 K/UL — HIGH (ref 3.8–10.5)
WBC # FLD AUTO: 21.47 K/UL — HIGH (ref 3.8–10.5)
WBC # FLD AUTO: 22.05 K/UL — HIGH (ref 3.8–10.5)
WBC # FLD AUTO: 22.06 K/UL — HIGH (ref 3.8–10.5)
WBC # FLD AUTO: 22.24 K/UL — HIGH (ref 3.8–10.5)
WBC # FLD AUTO: 22.53 K/UL — HIGH (ref 3.8–10.5)
WBC # FLD AUTO: 22.6 K/UL — HIGH (ref 3.8–10.5)
WBC # FLD AUTO: 22.6 K/UL — HIGH (ref 3.8–10.5)
WBC # FLD AUTO: 22.7 K/UL — HIGH (ref 3.8–10.5)
WBC # FLD AUTO: 22.78 K/UL — HIGH (ref 3.8–10.5)
WBC # FLD AUTO: 22.8 K/UL — HIGH (ref 3.8–10.5)
WBC # FLD AUTO: 22.98 K/UL — HIGH (ref 3.8–10.5)
WBC # FLD AUTO: 23.25 K/UL — HIGH (ref 3.8–10.5)
WBC # FLD AUTO: 23.38 K/UL — HIGH (ref 3.8–10.5)
WBC # FLD AUTO: 23.6 K/UL — HIGH (ref 3.8–10.5)
WBC # FLD AUTO: 23.86 K/UL — HIGH (ref 3.8–10.5)
WBC # FLD AUTO: 24 K/UL — HIGH (ref 3.8–10.5)
WBC # FLD AUTO: 24.37 K/UL — HIGH (ref 3.8–10.5)
WBC # FLD AUTO: 24.64 K/UL — HIGH (ref 3.8–10.5)
WBC # FLD AUTO: 24.95 K/UL — HIGH (ref 3.8–10.5)
WBC # FLD AUTO: 27.06 K/UL — HIGH (ref 3.8–10.5)
WBC # FLD AUTO: 27.22 K/UL — HIGH (ref 3.8–10.5)
WBC # FLD AUTO: 27.34 K/UL — HIGH (ref 3.8–10.5)
WBC # FLD AUTO: 28.11 K/UL — HIGH (ref 3.8–10.5)
WBC # FLD AUTO: 28.9 K/UL — HIGH (ref 3.8–10.5)
WBC # FLD AUTO: 29.62 K/UL — HIGH (ref 3.8–10.5)
WBC # FLD AUTO: 30.97 K/UL — HIGH (ref 3.8–10.5)
WBC # FLD AUTO: 31.91 K/UL — HIGH (ref 3.8–10.5)
WBC # FLD AUTO: 33.16 K/UL — HIGH (ref 3.8–10.5)
WBC # FLD AUTO: 34.46 K/UL — HIGH (ref 3.8–10.5)
WBC # FLD AUTO: 36.1 K/UL — HIGH (ref 3.8–10.5)
WBC # FLD AUTO: 38.01 K/UL — HIGH (ref 3.8–10.5)
WBC # FLD AUTO: 43.66 K/UL — CRITICAL HIGH (ref 3.8–10.5)
WBC UR QL: 37 /HPF — HIGH (ref 0–5)
WBC UR QL: 39 /HPF — HIGH (ref 0–5)
WBC UR QL: 6 /HPF — HIGH (ref 0–5)
WBC UR QL: ABNORMAL /HPF (ref 0–5)
WBC UR QL: HIGH (ref 0–?)
WHITE BLOOD CELLS URINE: 49 /HPF

## 2019-01-01 PROCEDURE — 99233 SBSQ HOSP IP/OBS HIGH 50: CPT

## 2019-01-01 PROCEDURE — 99232 SBSQ HOSP IP/OBS MODERATE 35: CPT | Mod: GC

## 2019-01-01 PROCEDURE — 73020 X-RAY EXAM OF SHOULDER: CPT | Mod: 26,59,RT

## 2019-01-01 PROCEDURE — 83615 LACTATE (LD) (LDH) ENZYME: CPT

## 2019-01-01 PROCEDURE — 93005 ELECTROCARDIOGRAM TRACING: CPT

## 2019-01-01 PROCEDURE — 84155 ASSAY OF PROTEIN SERUM: CPT

## 2019-01-01 PROCEDURE — 99233 SBSQ HOSP IP/OBS HIGH 50: CPT | Mod: GC

## 2019-01-01 PROCEDURE — 82803 BLOOD GASES ANY COMBINATION: CPT

## 2019-01-01 PROCEDURE — 93010 ELECTROCARDIOGRAM REPORT: CPT

## 2019-01-01 PROCEDURE — 82272 OCCULT BLD FECES 1-3 TESTS: CPT

## 2019-01-01 PROCEDURE — 99223 1ST HOSP IP/OBS HIGH 75: CPT

## 2019-01-01 PROCEDURE — 70450 CT HEAD/BRAIN W/O DYE: CPT | Mod: 26

## 2019-01-01 PROCEDURE — 86922 COMPATIBILITY TEST ANTIGLOB: CPT

## 2019-01-01 PROCEDURE — 97161 PT EVAL LOW COMPLEX 20 MIN: CPT

## 2019-01-01 PROCEDURE — 85027 COMPLETE CBC AUTOMATED: CPT

## 2019-01-01 PROCEDURE — 71045 X-RAY EXAM CHEST 1 VIEW: CPT | Mod: 26

## 2019-01-01 PROCEDURE — 86403 PARTICLE AGGLUT ANTBDY SCRN: CPT

## 2019-01-01 PROCEDURE — P9016: CPT

## 2019-01-01 PROCEDURE — 87186 SC STD MICRODIL/AGAR DIL: CPT

## 2019-01-01 PROCEDURE — 86901 BLOOD TYPING SEROLOGIC RH(D): CPT

## 2019-01-01 PROCEDURE — 74176 CT ABD & PELVIS W/O CONTRAST: CPT

## 2019-01-01 PROCEDURE — 71045 X-RAY EXAM CHEST 1 VIEW: CPT

## 2019-01-01 PROCEDURE — 99223 1ST HOSP IP/OBS HIGH 75: CPT | Mod: GC

## 2019-01-01 PROCEDURE — 99232 SBSQ HOSP IP/OBS MODERATE 35: CPT

## 2019-01-01 PROCEDURE — 36430 TRANSFUSION BLD/BLD COMPNT: CPT

## 2019-01-01 PROCEDURE — 86335 IMMUNFIX E-PHORSIS/URINE/CSF: CPT | Mod: 26

## 2019-01-01 PROCEDURE — 86140 C-REACTIVE PROTEIN: CPT

## 2019-01-01 PROCEDURE — 88305 TISSUE EXAM BY PATHOLOGIST: CPT | Mod: 26

## 2019-01-01 PROCEDURE — 36415 COLL VENOUS BLD VENIPUNCTURE: CPT

## 2019-01-01 PROCEDURE — 86036 ANCA SCREEN EACH ANTIBODY: CPT

## 2019-01-01 PROCEDURE — 99231 SBSQ HOSP IP/OBS SF/LOW 25: CPT | Mod: GC

## 2019-01-01 PROCEDURE — 99024 POSTOP FOLLOW-UP VISIT: CPT

## 2019-01-01 PROCEDURE — 74177 CT ABD & PELVIS W/CONTRAST: CPT | Mod: 26

## 2019-01-01 PROCEDURE — 73000 X-RAY EXAM OF COLLAR BONE: CPT | Mod: 26,RT

## 2019-01-01 PROCEDURE — 76700 US EXAM ABDOM COMPLETE: CPT | Mod: 26

## 2019-01-01 PROCEDURE — 84295 ASSAY OF SERUM SODIUM: CPT

## 2019-01-01 PROCEDURE — 82746 ASSAY OF FOLIC ACID SERUM: CPT

## 2019-01-01 PROCEDURE — 94729 DIFFUSING CAPACITY: CPT

## 2019-01-01 PROCEDURE — 81001 URINALYSIS AUTO W/SCOPE: CPT

## 2019-01-01 PROCEDURE — 86902 BLOOD TYPE ANTIGEN DONOR EA: CPT

## 2019-01-01 PROCEDURE — 99497 ADVNCD CARE PLAN 30 MIN: CPT

## 2019-01-01 PROCEDURE — ZZZZZ: CPT

## 2019-01-01 PROCEDURE — 97110 THERAPEUTIC EXERCISES: CPT

## 2019-01-01 PROCEDURE — 72125 CT NECK SPINE W/O DYE: CPT

## 2019-01-01 PROCEDURE — 86334 IMMUNOFIX E-PHORESIS SERUM: CPT

## 2019-01-01 PROCEDURE — 80048 BASIC METABOLIC PNL TOTAL CA: CPT

## 2019-01-01 PROCEDURE — 86334 IMMUNOFIX E-PHORESIS SERUM: CPT | Mod: 26

## 2019-01-01 PROCEDURE — 96375 TX/PRO/DX INJ NEW DRUG ADDON: CPT

## 2019-01-01 PROCEDURE — 77080 DXA BONE DENSITY AXIAL: CPT

## 2019-01-01 PROCEDURE — 87581 M.PNEUMON DNA AMP PROBE: CPT

## 2019-01-01 PROCEDURE — 94640 AIRWAY INHALATION TREATMENT: CPT

## 2019-01-01 PROCEDURE — 86900 BLOOD TYPING SEROLOGIC ABO: CPT

## 2019-01-01 PROCEDURE — 96367 TX/PROPH/DG ADDL SEQ IV INF: CPT

## 2019-01-01 PROCEDURE — 96360 HYDRATION IV INFUSION INIT: CPT

## 2019-01-01 PROCEDURE — 92526 ORAL FUNCTION THERAPY: CPT

## 2019-01-01 PROCEDURE — 85384 FIBRINOGEN ACTIVITY: CPT

## 2019-01-01 PROCEDURE — 88350 IMFLUOR EA ADDL 1ANTB STN PX: CPT | Mod: 26

## 2019-01-01 PROCEDURE — 93010 ELECTROCARDIOGRAM REPORT: CPT | Mod: GC

## 2019-01-01 PROCEDURE — 36569 INSJ PICC 5 YR+ W/O IMAGING: CPT

## 2019-01-01 PROCEDURE — 12345: CPT | Mod: NC,GC

## 2019-01-01 PROCEDURE — 74176 CT ABD & PELVIS W/O CONTRAST: CPT | Mod: 26

## 2019-01-01 PROCEDURE — 36415 COLL VENOUS BLD VENIPUNCTURE: CPT | Mod: PD

## 2019-01-01 PROCEDURE — 99285 EMERGENCY DEPT VISIT HI MDM: CPT | Mod: 25

## 2019-01-01 PROCEDURE — 36600 WITHDRAWAL OF ARTERIAL BLOOD: CPT

## 2019-01-01 PROCEDURE — 72125 CT NECK SPINE W/O DYE: CPT | Mod: 26

## 2019-01-01 PROCEDURE — 99222 1ST HOSP IP/OBS MODERATE 55: CPT

## 2019-01-01 PROCEDURE — 86923 COMPATIBILITY TEST ELECTRIC: CPT

## 2019-01-01 PROCEDURE — 96415 CHEMO IV INFUSION ADDL HR: CPT

## 2019-01-01 PROCEDURE — 86850 RBC ANTIBODY SCREEN: CPT

## 2019-01-01 PROCEDURE — 84166 PROTEIN E-PHORESIS/URINE/CSF: CPT | Mod: 26

## 2019-01-01 PROCEDURE — 99231 SBSQ HOSP IP/OBS SF/LOW 25: CPT

## 2019-01-01 PROCEDURE — 99215 OFFICE O/P EST HI 40 MIN: CPT

## 2019-01-01 PROCEDURE — 86757 RICKETTSIA ANTIBODY: CPT

## 2019-01-01 PROCEDURE — 76770 US EXAM ABDO BACK WALL COMP: CPT | Mod: 26

## 2019-01-01 PROCEDURE — 99239 HOSP IP/OBS DSCHRG MGMT >30: CPT

## 2019-01-01 PROCEDURE — 88346 IMFLUOR 1ST 1ANTB STAIN PX: CPT | Mod: 26

## 2019-01-01 PROCEDURE — 73502 X-RAY EXAM HIP UNI 2-3 VIEWS: CPT

## 2019-01-01 PROCEDURE — 99213 OFFICE O/P EST LOW 20 MIN: CPT

## 2019-01-01 PROCEDURE — 71045 X-RAY EXAM CHEST 1 VIEW: CPT | Mod: 26,77

## 2019-01-01 PROCEDURE — 83735 ASSAY OF MAGNESIUM: CPT

## 2019-01-01 PROCEDURE — 83550 IRON BINDING TEST: CPT

## 2019-01-01 PROCEDURE — 80053 COMPREHEN METABOLIC PANEL: CPT

## 2019-01-01 PROCEDURE — 99214 OFFICE O/P EST MOD 30 MIN: CPT

## 2019-01-01 PROCEDURE — 99291 CRITICAL CARE FIRST HOUR: CPT | Mod: 25

## 2019-01-01 PROCEDURE — 93306 TTE W/DOPPLER COMPLETE: CPT | Mod: 26

## 2019-01-01 PROCEDURE — 82947 ASSAY GLUCOSE BLOOD QUANT: CPT

## 2019-01-01 PROCEDURE — 82435 ASSAY OF BLOOD CHLORIDE: CPT

## 2019-01-01 PROCEDURE — 73030 X-RAY EXAM OF SHOULDER: CPT | Mod: 26,RT

## 2019-01-01 PROCEDURE — 82330 ASSAY OF CALCIUM: CPT

## 2019-01-01 PROCEDURE — 82607 VITAMIN B-12: CPT

## 2019-01-01 PROCEDURE — 83036 HEMOGLOBIN GLYCOSYLATED A1C: CPT

## 2019-01-01 PROCEDURE — 99215 OFFICE O/P EST HI 40 MIN: CPT | Mod: 25

## 2019-01-01 PROCEDURE — 70450 CT HEAD/BRAIN W/O DYE: CPT

## 2019-01-01 PROCEDURE — 85652 RBC SED RATE AUTOMATED: CPT

## 2019-01-01 PROCEDURE — 99222 1ST HOSP IP/OBS MODERATE 55: CPT | Mod: GC

## 2019-01-01 PROCEDURE — 88312 SPECIAL STAINS GROUP 1: CPT | Mod: 26

## 2019-01-01 PROCEDURE — 70486 CT MAXILLOFACIAL W/O DYE: CPT | Mod: 26

## 2019-01-01 PROCEDURE — 83516 IMMUNOASSAY NONANTIBODY: CPT

## 2019-01-01 PROCEDURE — 50200 RENAL BIOPSY PERQ: CPT | Mod: LT

## 2019-01-01 PROCEDURE — P9059: CPT

## 2019-01-01 PROCEDURE — 99223 1ST HOSP IP/OBS HIGH 75: CPT | Mod: AI

## 2019-01-01 PROCEDURE — C8929: CPT

## 2019-01-01 PROCEDURE — 84100 ASSAY OF PHOSPHORUS: CPT

## 2019-01-01 PROCEDURE — 93306 TTE W/DOPPLER COMPLETE: CPT

## 2019-01-01 PROCEDURE — 85045 AUTOMATED RETICULOCYTE COUNT: CPT

## 2019-01-01 PROCEDURE — 84165 PROTEIN E-PHORESIS SERUM: CPT | Mod: 26

## 2019-01-01 PROCEDURE — 84165 PROTEIN E-PHORESIS SERUM: CPT

## 2019-01-01 PROCEDURE — 87150 DNA/RNA AMPLIFIED PROBE: CPT

## 2019-01-01 PROCEDURE — 85610 PROTHROMBIN TIME: CPT

## 2019-01-01 PROCEDURE — 94660 CPAP INITIATION&MGMT: CPT

## 2019-01-01 PROCEDURE — 99221 1ST HOSP IP/OBS SF/LOW 40: CPT

## 2019-01-01 PROCEDURE — 87040 BLOOD CULTURE FOR BACTERIA: CPT

## 2019-01-01 PROCEDURE — 86703 HIV-1/HIV-2 1 RESULT ANTBDY: CPT

## 2019-01-01 PROCEDURE — 71250 CT THORAX DX C-: CPT

## 2019-01-01 PROCEDURE — 76775 US EXAM ABDO BACK WALL LIM: CPT

## 2019-01-01 PROCEDURE — 93970 EXTREMITY STUDY: CPT

## 2019-01-01 PROCEDURE — 85379 FIBRIN DEGRADATION QUANT: CPT

## 2019-01-01 PROCEDURE — 96365 THER/PROPH/DIAG IV INF INIT: CPT

## 2019-01-01 PROCEDURE — 87633 RESP VIRUS 12-25 TARGETS: CPT

## 2019-01-01 PROCEDURE — 87798 DETECT AGENT NOS DNA AMP: CPT

## 2019-01-01 PROCEDURE — 73060 X-RAY EXAM OF HUMERUS: CPT | Mod: 26,RT

## 2019-01-01 PROCEDURE — 99496 TRANSJ CARE MGMT HIGH F2F 7D: CPT | Mod: PD

## 2019-01-01 PROCEDURE — 83605 ASSAY OF LACTIC ACID: CPT

## 2019-01-01 PROCEDURE — 86038 ANTINUCLEAR ANTIBODIES: CPT

## 2019-01-01 PROCEDURE — 86160 COMPLEMENT ANTIGEN: CPT

## 2019-01-01 PROCEDURE — 87449 NOS EACH ORGANISM AG IA: CPT

## 2019-01-01 PROCEDURE — 94760 N-INVAS EAR/PLS OXIMETRY 1: CPT

## 2019-01-01 PROCEDURE — 82962 GLUCOSE BLOOD TEST: CPT

## 2019-01-01 PROCEDURE — 76604 US EXAM CHEST: CPT | Mod: 26

## 2019-01-01 PROCEDURE — 93308 TTE F-UP OR LMTD: CPT | Mod: 26

## 2019-01-01 PROCEDURE — 76775 US EXAM ABDO BACK WALL LIM: CPT | Mod: 26

## 2019-01-01 PROCEDURE — 96413 CHEMO IV INFUSION 1 HR: CPT

## 2019-01-01 PROCEDURE — 84132 ASSAY OF SERUM POTASSIUM: CPT

## 2019-01-01 PROCEDURE — 76770 US EXAM ABDO BACK WALL COMP: CPT

## 2019-01-01 PROCEDURE — 99285 EMERGENCY DEPT VISIT HI MDM: CPT

## 2019-01-01 PROCEDURE — 99284 EMERGENCY DEPT VISIT MOD MDM: CPT | Mod: GC

## 2019-01-01 PROCEDURE — 87517 HEPATITIS B DNA QUANT: CPT

## 2019-01-01 PROCEDURE — 87486 CHLMYD PNEUM DNA AMP PROBE: CPT

## 2019-01-01 PROCEDURE — 93010 ELECTROCARDIOGRAM REPORT: CPT | Mod: 76

## 2019-01-01 PROCEDURE — 82728 ASSAY OF FERRITIN: CPT

## 2019-01-01 PROCEDURE — 88313 SPECIAL STAINS GROUP 2: CPT | Mod: 26

## 2019-01-01 PROCEDURE — 97116 GAIT TRAINING THERAPY: CPT

## 2019-01-01 PROCEDURE — 76377 3D RENDER W/INTRP POSTPROCES: CPT | Mod: 26

## 2019-01-01 PROCEDURE — 94010 BREATHING CAPACITY TEST: CPT

## 2019-01-01 PROCEDURE — 88348 ELECTRON MICROSCOPY DX: CPT | Mod: 26

## 2019-01-01 PROCEDURE — 76942 ECHO GUIDE FOR BIOPSY: CPT | Mod: 26

## 2019-01-01 PROCEDURE — 93970 EXTREMITY STUDY: CPT | Mod: 26

## 2019-01-01 PROCEDURE — 83010 ASSAY OF HAPTOGLOBIN QUANT: CPT

## 2019-01-01 PROCEDURE — 87899 AGENT NOS ASSAY W/OPTIC: CPT

## 2019-01-01 PROCEDURE — 85730 THROMBOPLASTIN TIME PARTIAL: CPT

## 2019-01-01 PROCEDURE — 99285 EMERGENCY DEPT VISIT HI MDM: CPT | Mod: GC

## 2019-01-01 PROCEDURE — 72170 X-RAY EXAM OF PELVIS: CPT | Mod: 26

## 2019-01-01 PROCEDURE — 73552 X-RAY EXAM OF FEMUR 2/>: CPT | Mod: 26,LT

## 2019-01-01 PROCEDURE — 83880 ASSAY OF NATRIURETIC PEPTIDE: CPT

## 2019-01-01 PROCEDURE — 83540 ASSAY OF IRON: CPT

## 2019-01-01 PROCEDURE — 99291 CRITICAL CARE FIRST HOUR: CPT

## 2019-01-01 PROCEDURE — C1751: CPT

## 2019-01-01 PROCEDURE — 76700 US EXAM ABDOM COMPLETE: CPT

## 2019-01-01 PROCEDURE — 87086 URINE CULTURE/COLONY COUNT: CPT

## 2019-01-01 PROCEDURE — 99215 OFFICE O/P EST HI 40 MIN: CPT | Mod: 25,PD

## 2019-01-01 PROCEDURE — 94726 PLETHYSMOGRAPHY LUNG VOLUMES: CPT

## 2019-01-01 PROCEDURE — 74176 CT ABD & PELVIS W/O CONTRAST: CPT | Mod: 26,77

## 2019-01-01 PROCEDURE — 86738 MYCOPLASMA ANTIBODY: CPT

## 2019-01-01 PROCEDURE — 73552 X-RAY EXAM OF FEMUR 2/>: CPT

## 2019-01-01 PROCEDURE — 73502 X-RAY EXAM HIP UNI 2-3 VIEWS: CPT | Mod: 26,LT

## 2019-01-01 PROCEDURE — 93000 ELECTROCARDIOGRAM COMPLETE: CPT

## 2019-01-01 PROCEDURE — 77080 DXA BONE DENSITY AXIAL: CPT | Mod: 26

## 2019-01-01 PROCEDURE — 85014 HEMATOCRIT: CPT

## 2019-01-01 PROCEDURE — 92610 EVALUATE SWALLOWING FUNCTION: CPT

## 2019-01-01 PROCEDURE — 99223 1ST HOSP IP/OBS HIGH 75: CPT | Mod: GC,AI

## 2019-01-01 PROCEDURE — 87400 INFLUENZA A/B EACH AG IA: CPT

## 2019-01-01 PROCEDURE — 87385 HISTOPLASMA CAPSUL AG IA: CPT

## 2019-01-01 PROCEDURE — 71250 CT THORAX DX C-: CPT | Mod: 26

## 2019-01-01 RX ORDER — DEXTROSE 50 % IN WATER 50 %
25 SYRINGE (ML) INTRAVENOUS ONCE
Refills: 0 | Status: DISCONTINUED | OUTPATIENT
Start: 2019-01-01 | End: 2019-01-01

## 2019-01-01 RX ORDER — PIPERACILLIN AND TAZOBACTAM 4; .5 G/20ML; G/20ML
3.38 INJECTION, POWDER, LYOPHILIZED, FOR SOLUTION INTRAVENOUS ONCE
Refills: 0 | Status: COMPLETED | OUTPATIENT
Start: 2019-01-01 | End: 2019-01-01

## 2019-01-01 RX ORDER — POLYETHYLENE GLYCOL 3350 17 G/17G
17 POWDER, FOR SOLUTION ORAL DAILY
Refills: 0 | Status: DISCONTINUED | OUTPATIENT
Start: 2019-01-01 | End: 2019-01-01

## 2019-01-01 RX ORDER — ZOLPIDEM TARTRATE 10 MG/1
1 TABLET ORAL
Qty: 0 | Refills: 0 | DISCHARGE

## 2019-01-01 RX ORDER — FUROSEMIDE 40 MG
40 TABLET ORAL EVERY 24 HOURS
Refills: 0 | Status: DISCONTINUED | OUTPATIENT
Start: 2019-01-01 | End: 2019-01-01

## 2019-01-01 RX ORDER — FLUTICASONE PROPIONATE 220 MCG
1 AEROSOL WITH ADAPTER (GRAM) INHALATION
Qty: 0 | Refills: 0 | DISCHARGE

## 2019-01-01 RX ORDER — ENTECAVIR 0.5 MG/1
0.5 TABLET ORAL
Refills: 0 | Status: DISCONTINUED | OUTPATIENT
Start: 2019-01-01 | End: 2019-01-01

## 2019-01-01 RX ORDER — PHYTONADIONE (VIT K1) 5 MG
2.5 TABLET ORAL ONCE
Refills: 0 | Status: COMPLETED | OUTPATIENT
Start: 2019-01-01 | End: 2019-01-01

## 2019-01-01 RX ORDER — FUROSEMIDE 40 MG
20 TABLET ORAL DAILY
Refills: 0 | Status: DISCONTINUED | OUTPATIENT
Start: 2019-01-01 | End: 2019-01-01

## 2019-01-01 RX ORDER — ACETAMINOPHEN 500 MG
650 TABLET ORAL EVERY 6 HOURS
Refills: 0 | Status: DISCONTINUED | OUTPATIENT
Start: 2019-01-01 | End: 2019-01-01

## 2019-01-01 RX ORDER — SENNA PLUS 8.6 MG/1
2 TABLET ORAL AT BEDTIME
Refills: 0 | Status: DISCONTINUED | OUTPATIENT
Start: 2019-01-01 | End: 2019-01-01

## 2019-01-01 RX ORDER — ERTAPENEM SODIUM 1 G/1
1000 INJECTION, POWDER, LYOPHILIZED, FOR SOLUTION INTRAMUSCULAR; INTRAVENOUS EVERY 24 HOURS
Refills: 0 | Status: DISCONTINUED | OUTPATIENT
Start: 2019-01-01 | End: 2019-01-01

## 2019-01-01 RX ORDER — METOPROLOL TARTRATE 50 MG
25 TABLET ORAL
Refills: 0 | Status: DISCONTINUED | OUTPATIENT
Start: 2019-01-01 | End: 2019-01-01

## 2019-01-01 RX ORDER — FUROSEMIDE 40 MG
40 TABLET ORAL ONCE
Refills: 0 | Status: COMPLETED | OUTPATIENT
Start: 2019-01-01 | End: 2019-01-01

## 2019-01-01 RX ORDER — FUROSEMIDE 40 MG
20 TABLET ORAL
Refills: 0 | Status: DISCONTINUED | OUTPATIENT
Start: 2019-01-01 | End: 2019-01-01

## 2019-01-01 RX ORDER — INSULIN LISPRO 100/ML
VIAL (ML) SUBCUTANEOUS
Refills: 0 | Status: DISCONTINUED | OUTPATIENT
Start: 2019-01-01 | End: 2019-01-01

## 2019-01-01 RX ORDER — MORPHINE SULFATE 50 MG/1
2 CAPSULE, EXTENDED RELEASE ORAL ONCE
Refills: 0 | Status: DISCONTINUED | OUTPATIENT
Start: 2019-01-01 | End: 2019-01-01

## 2019-01-01 RX ORDER — FUROSEMIDE 40 MG
80 TABLET ORAL ONCE
Refills: 0 | Status: COMPLETED | OUTPATIENT
Start: 2019-01-01 | End: 2019-01-01

## 2019-01-01 RX ORDER — ATENOLOL 25 MG/1
1 TABLET ORAL
Qty: 0 | Refills: 0 | DISCHARGE

## 2019-01-01 RX ORDER — PANTOPRAZOLE SODIUM 20 MG/1
40 TABLET, DELAYED RELEASE ORAL
Refills: 0 | Status: DISCONTINUED | OUTPATIENT
Start: 2019-01-01 | End: 2019-01-01

## 2019-01-01 RX ORDER — INSULIN GLARGINE 100 [IU]/ML
5 INJECTION, SOLUTION SUBCUTANEOUS AT BEDTIME
Refills: 0 | Status: DISCONTINUED | OUTPATIENT
Start: 2019-01-01 | End: 2019-01-01

## 2019-01-01 RX ORDER — APIXABAN 2.5 MG/1
1 TABLET, FILM COATED ORAL
Qty: 60 | Refills: 0
Start: 2019-01-01 | End: 2019-10-26

## 2019-01-01 RX ORDER — HYDROMORPHONE HYDROCHLORIDE 2 MG/ML
0.2 INJECTION INTRAMUSCULAR; INTRAVENOUS; SUBCUTANEOUS
Refills: 0 | Status: DISCONTINUED | OUTPATIENT
Start: 2019-01-01 | End: 2019-01-01

## 2019-01-01 RX ORDER — RITUXIMAB 10 MG/ML
500 INJECTION, SOLUTION INTRAVENOUS
Qty: 0 | Refills: 0 | Status: COMPLETED | OUTPATIENT
Start: 2019-01-01

## 2019-01-01 RX ORDER — METOPROLOL TARTRATE 50 MG
5 TABLET ORAL ONCE
Refills: 0 | Status: COMPLETED | OUTPATIENT
Start: 2019-01-01 | End: 2019-01-01

## 2019-01-01 RX ORDER — HYDRALAZINE HCL 50 MG
50 TABLET ORAL
Refills: 0 | Status: DISCONTINUED | OUTPATIENT
Start: 2019-01-01 | End: 2019-01-01

## 2019-01-01 RX ORDER — MEPERIDINE HYDROCHLORIDE 50 MG/ML
25 INJECTION INTRAMUSCULAR; INTRAVENOUS; SUBCUTANEOUS ONCE
Refills: 0 | Status: DISCONTINUED | OUTPATIENT
Start: 2019-01-01 | End: 2019-01-01

## 2019-01-01 RX ORDER — ACETAMINOPHEN 500 MG
650 TABLET ORAL ONCE
Refills: 0 | Status: COMPLETED | OUTPATIENT
Start: 2019-01-01 | End: 2019-01-01

## 2019-01-01 RX ORDER — METOPROLOL TARTRATE 50 MG
37.5 TABLET ORAL
Refills: 0 | Status: DISCONTINUED | OUTPATIENT
Start: 2019-01-01 | End: 2019-01-01

## 2019-01-01 RX ORDER — LIDOCAINE 5 G/100G
5 OINTMENT TOPICAL
Qty: 360 | Refills: 0 | Status: DISCONTINUED | COMMUNITY
Start: 2018-03-29 | End: 2019-01-01

## 2019-01-01 RX ORDER — RITUXIMAB 10 MG/ML
600 INJECTION, SOLUTION INTRAVENOUS ONCE
Refills: 0 | Status: COMPLETED | OUTPATIENT
Start: 2019-01-01 | End: 2019-01-01

## 2019-01-01 RX ORDER — NITROFURANTOIN (MONOHYDRATE/MACROCRYSTALS) 25; 75 MG/1; MG/1
100 CAPSULE ORAL
Qty: 10 | Refills: 0 | Status: DISCONTINUED | COMMUNITY
Start: 2019-01-01 | End: 2019-01-01

## 2019-01-01 RX ORDER — MAGNESIUM SULFATE 500 MG/ML
2 VIAL (ML) INJECTION ONCE
Refills: 0 | Status: COMPLETED | OUTPATIENT
Start: 2019-01-01 | End: 2019-01-01

## 2019-01-01 RX ORDER — MAGNESIUM SULFATE 500 MG/ML
1 VIAL (ML) INJECTION ONCE
Refills: 0 | Status: COMPLETED | OUTPATIENT
Start: 2019-01-01 | End: 2019-01-01

## 2019-01-01 RX ORDER — DEXTROSE 50 % IN WATER 50 %
25 SYRINGE (ML) INTRAVENOUS
Refills: 0 | Status: DISCONTINUED | OUTPATIENT
Start: 2019-01-01 | End: 2019-01-01

## 2019-01-01 RX ORDER — LANOLIN ALCOHOL/MO/W.PET/CERES
3 CREAM (GRAM) TOPICAL ONCE
Refills: 0 | Status: DISCONTINUED | OUTPATIENT
Start: 2019-01-01 | End: 2019-01-01

## 2019-01-01 RX ORDER — ZOLPIDEM TARTRATE 5 MG/1
5 TABLET ORAL
Qty: 30 | Refills: 1 | Status: ACTIVE | COMMUNITY
Start: 2018-05-14 | End: 1900-01-01

## 2019-01-01 RX ORDER — METOPROLOL TARTRATE 50 MG
1 TABLET ORAL
Qty: 60 | Refills: 0
Start: 2019-01-01 | End: 2019-10-26

## 2019-01-01 RX ORDER — INSULIN LISPRO 100/ML
VIAL (ML) SUBCUTANEOUS AT BEDTIME
Refills: 0 | Status: DISCONTINUED | OUTPATIENT
Start: 2019-01-01 | End: 2019-01-01

## 2019-01-01 RX ORDER — ATORVASTATIN CALCIUM 80 MG/1
10 TABLET, FILM COATED ORAL AT BEDTIME
Refills: 0 | Status: DISCONTINUED | OUTPATIENT
Start: 2019-01-01 | End: 2019-01-01

## 2019-01-01 RX ORDER — ATENOLOL 25 MG/1
25 TABLET ORAL
Qty: 90 | Refills: 0 | Status: ACTIVE | COMMUNITY
Start: 2018-02-14 | End: 1900-01-01

## 2019-01-01 RX ORDER — INSULIN LISPRO 100/ML
12 VIAL (ML) SUBCUTANEOUS
Refills: 0 | Status: DISCONTINUED | OUTPATIENT
Start: 2019-01-01 | End: 2019-01-01

## 2019-01-01 RX ORDER — CIPROFLOXACIN HYDROCHLORIDE 250 MG/1
250 TABLET, FILM COATED ORAL
Qty: 10 | Refills: 1 | Status: DISCONTINUED | COMMUNITY
Start: 2019-01-01 | End: 2019-01-01

## 2019-01-01 RX ORDER — CLONIDINE HYDROCHLORIDE 0.1 MG/1
0.1 TABLET ORAL
Qty: 90 | Refills: 0 | Status: ACTIVE | COMMUNITY
Start: 2019-01-01 | End: 1900-01-01

## 2019-01-01 RX ORDER — PHYTONADIONE (VIT K1) 5 MG
5 TABLET ORAL ONCE
Refills: 0 | Status: COMPLETED | OUTPATIENT
Start: 2019-01-01 | End: 2019-01-01

## 2019-01-01 RX ORDER — OXYCODONE AND ACETAMINOPHEN 5; 325 MG/1; MG/1
1 TABLET ORAL ONCE
Refills: 0 | Status: DISCONTINUED | OUTPATIENT
Start: 2019-01-01 | End: 2019-01-01

## 2019-01-01 RX ORDER — POLYETHYLENE GLYCOL 3350 17 G/17G
17 POWDER, FOR SOLUTION ORAL ONCE
Refills: 0 | Status: COMPLETED | OUTPATIENT
Start: 2019-01-01 | End: 2019-01-01

## 2019-01-01 RX ORDER — INSULIN LISPRO 100/ML
6 VIAL (ML) SUBCUTANEOUS
Refills: 0 | Status: DISCONTINUED | OUTPATIENT
Start: 2019-01-01 | End: 2019-01-01

## 2019-01-01 RX ORDER — LANOLIN ALCOHOL/MO/W.PET/CERES
5 CREAM (GRAM) TOPICAL AT BEDTIME
Refills: 0 | Status: DISCONTINUED | OUTPATIENT
Start: 2019-01-01 | End: 2019-01-01

## 2019-01-01 RX ORDER — ERTAPENEM SODIUM 1 G/1
1000 INJECTION, POWDER, LYOPHILIZED, FOR SOLUTION INTRAMUSCULAR; INTRAVENOUS ONCE
Refills: 0 | Status: COMPLETED | OUTPATIENT
Start: 2019-01-01 | End: 2019-01-01

## 2019-01-01 RX ORDER — MOMETASONE 50 UG/1
50 SPRAY, METERED NASAL
Qty: 1 | Refills: 2 | Status: COMPLETED | COMMUNITY
Start: 2019-01-01 | End: 2019-01-01

## 2019-01-01 RX ORDER — SERTRALINE 25 MG/1
1 TABLET, FILM COATED ORAL
Qty: 0 | Refills: 0 | DISCHARGE

## 2019-01-01 RX ORDER — ACETAMINOPHEN 500 MG
1000 TABLET ORAL ONCE
Refills: 0 | Status: COMPLETED | OUTPATIENT
Start: 2019-01-01 | End: 2019-01-01

## 2019-01-01 RX ORDER — SERTRALINE HYDROCHLORIDE 50 MG/1
50 TABLET, FILM COATED ORAL
Qty: 30 | Refills: 4 | Status: ACTIVE | COMMUNITY
Start: 2017-01-11 | End: 1900-01-01

## 2019-01-01 RX ORDER — DEXTROSE 50 % IN WATER 50 %
12.5 SYRINGE (ML) INTRAVENOUS ONCE
Refills: 0 | Status: DISCONTINUED | OUTPATIENT
Start: 2019-01-01 | End: 2019-01-01

## 2019-01-01 RX ORDER — VALSARTAN 80 MG/1
1 TABLET ORAL
Qty: 0 | Refills: 0 | DISCHARGE

## 2019-01-01 RX ORDER — METHYLPREDNISOLONE 125 MG/2ML
125 INJECTION, POWDER, LYOPHILIZED, FOR SOLUTION INTRAMUSCULAR; INTRAVENOUS
Qty: 0 | Refills: 0 | Status: COMPLETED | OUTPATIENT
Start: 2019-01-01

## 2019-01-01 RX ORDER — SODIUM CHLORIDE 9 MG/ML
1000 INJECTION, SOLUTION INTRAVENOUS
Refills: 0 | Status: DISCONTINUED | OUTPATIENT
Start: 2019-01-01 | End: 2019-01-01

## 2019-01-01 RX ORDER — WARFARIN SODIUM 2.5 MG/1
5 TABLET ORAL ONCE
Refills: 0 | Status: COMPLETED | OUTPATIENT
Start: 2019-01-01 | End: 2019-01-01

## 2019-01-01 RX ORDER — IPRATROPIUM/ALBUTEROL SULFATE 18-103MCG
3 AEROSOL WITH ADAPTER (GRAM) INHALATION ONCE
Refills: 0 | Status: COMPLETED | OUTPATIENT
Start: 2019-01-01 | End: 2019-01-01

## 2019-01-01 RX ORDER — HYDRALAZINE HCL 50 MG
2 TABLET ORAL
Qty: 0 | Refills: 0 | DISCHARGE

## 2019-01-01 RX ORDER — DEXTROSE 50 % IN WATER 50 %
15 SYRINGE (ML) INTRAVENOUS ONCE
Refills: 0 | Status: DISCONTINUED | OUTPATIENT
Start: 2019-01-01 | End: 2019-01-01

## 2019-01-01 RX ORDER — OXYCODONE AND ACETAMINOPHEN 5; 325 MG/1; MG/1
1 TABLET ORAL EVERY 6 HOURS
Refills: 0 | Status: DISCONTINUED | OUTPATIENT
Start: 2019-01-01 | End: 2019-01-01

## 2019-01-01 RX ORDER — IPRATROPIUM/ALBUTEROL SULFATE 18-103MCG
3 AEROSOL WITH ADAPTER (GRAM) INHALATION
Qty: 0 | Refills: 0 | DISCHARGE
Start: 2019-01-01

## 2019-01-01 RX ORDER — PHENAZOPYRIDINE HCL 95 MG
95 TABLET ORAL 3 TIMES DAILY
Qty: 1 | Refills: 0 | Status: DISCONTINUED | COMMUNITY
Start: 2019-01-01 | End: 2019-01-01

## 2019-01-01 RX ORDER — SERTRALINE 25 MG/1
25 TABLET, FILM COATED ORAL DAILY
Refills: 0 | Status: DISCONTINUED | OUTPATIENT
Start: 2019-01-01 | End: 2019-01-01

## 2019-01-01 RX ORDER — INSULIN ASPART 100 [IU]/ML
8 INJECTION, SOLUTION SUBCUTANEOUS
Qty: 1 | Refills: 0
Start: 2019-01-01 | End: 2019-10-26

## 2019-01-01 RX ORDER — POTASSIUM CHLORIDE 20 MEQ
40 PACKET (EA) ORAL ONCE
Refills: 0 | Status: COMPLETED | OUTPATIENT
Start: 2019-01-01 | End: 2019-01-01

## 2019-01-01 RX ORDER — HYDROCHLOROTHIAZIDE 25 MG/1
25 TABLET ORAL
Qty: 90 | Refills: 3 | Status: ACTIVE | COMMUNITY
Start: 2017-03-20 | End: 1900-01-01

## 2019-01-01 RX ORDER — METOPROLOL TARTRATE 50 MG
50 TABLET ORAL
Refills: 0 | Status: DISCONTINUED | OUTPATIENT
Start: 2019-01-01 | End: 2019-01-01

## 2019-01-01 RX ORDER — SERTRALINE 25 MG/1
50 TABLET, FILM COATED ORAL DAILY
Refills: 0 | Status: DISCONTINUED | OUTPATIENT
Start: 2019-01-01 | End: 2019-01-01

## 2019-01-01 RX ORDER — HYDRALAZINE HCL 50 MG
25 TABLET ORAL
Refills: 0 | Status: DISCONTINUED | OUTPATIENT
Start: 2019-01-01 | End: 2019-01-01

## 2019-01-01 RX ORDER — HYDRALAZINE HCL 50 MG
0 TABLET ORAL
Qty: 0 | Refills: 0 | DISCHARGE

## 2019-01-01 RX ORDER — DIGOXIN 250 MCG
0.06 TABLET ORAL DAILY
Refills: 0 | Status: DISCONTINUED | OUTPATIENT
Start: 2019-01-01 | End: 2019-01-01

## 2019-01-01 RX ORDER — ATENOLOL 25 MG/1
25 TABLET ORAL DAILY
Refills: 0 | Status: DISCONTINUED | OUTPATIENT
Start: 2019-01-01 | End: 2019-01-01

## 2019-01-01 RX ORDER — DOCUSATE SODIUM 100 MG
100 CAPSULE ORAL THREE TIMES A DAY
Refills: 0 | Status: DISCONTINUED | OUTPATIENT
Start: 2019-01-01 | End: 2019-01-01

## 2019-01-01 RX ORDER — FUROSEMIDE 40 MG
80 TABLET ORAL DAILY
Refills: 0 | Status: DISCONTINUED | OUTPATIENT
Start: 2019-01-01 | End: 2019-01-01

## 2019-01-01 RX ORDER — INSULIN GLARGINE 100 [IU]/ML
10 INJECTION, SOLUTION SUBCUTANEOUS AT BEDTIME
Refills: 0 | Status: DISCONTINUED | OUTPATIENT
Start: 2019-01-01 | End: 2019-01-01

## 2019-01-01 RX ORDER — ZOLPIDEM TARTRATE 10 MG/1
5 TABLET ORAL AT BEDTIME
Refills: 0 | Status: DISCONTINUED | OUTPATIENT
Start: 2019-01-01 | End: 2019-01-01

## 2019-01-01 RX ORDER — HYDROCORTISONE 20 MG
75 TABLET ORAL THREE TIMES A DAY
Refills: 0 | Status: DISCONTINUED | OUTPATIENT
Start: 2019-01-01 | End: 2019-01-01

## 2019-01-01 RX ORDER — FUROSEMIDE 40 MG
60 TABLET ORAL ONCE
Refills: 0 | Status: DISCONTINUED | OUTPATIENT
Start: 2019-01-01 | End: 2019-01-01

## 2019-01-01 RX ORDER — HYDRALAZINE HCL 50 MG
1 TABLET ORAL
Qty: 0 | Refills: 0 | DISCHARGE

## 2019-01-01 RX ORDER — WARFARIN SODIUM 2.5 MG/1
1 TABLET ORAL
Qty: 0 | Refills: 0 | DISCHARGE

## 2019-01-01 RX ORDER — RITUXIMAB 10 MG/ML
100 INJECTION, SOLUTION INTRAVENOUS
Qty: 0 | Refills: 0 | Status: COMPLETED | OUTPATIENT
Start: 2019-01-01

## 2019-01-01 RX ORDER — MEROPENEM 1 G/30ML
1000 INJECTION INTRAVENOUS EVERY 12 HOURS
Refills: 0 | Status: DISCONTINUED | OUTPATIENT
Start: 2019-01-01 | End: 2019-01-01

## 2019-01-01 RX ORDER — MONTELUKAST 4 MG/1
10 TABLET, CHEWABLE ORAL DAILY
Refills: 0 | Status: DISCONTINUED | OUTPATIENT
Start: 2019-01-01 | End: 2019-01-01

## 2019-01-01 RX ORDER — INSULIN LISPRO 100/ML
10 VIAL (ML) SUBCUTANEOUS
Refills: 0 | Status: DISCONTINUED | OUTPATIENT
Start: 2019-01-01 | End: 2019-01-01

## 2019-01-01 RX ORDER — MORPHINE SULFATE 50 MG/1
4 CAPSULE, EXTENDED RELEASE ORAL ONCE
Refills: 0 | Status: DISCONTINUED | OUTPATIENT
Start: 2019-01-01 | End: 2019-01-01

## 2019-01-01 RX ORDER — RITUXIMAB 10 MG/ML
100 INJECTION, SOLUTION INTRAVENOUS
Qty: 6 | Refills: 0 | Status: ACTIVE | COMMUNITY
Start: 2019-01-01

## 2019-01-01 RX ORDER — MEROPENEM 1 G/30ML
500 INJECTION INTRAVENOUS EVERY 12 HOURS
Refills: 0 | Status: DISCONTINUED | OUTPATIENT
Start: 2019-01-01 | End: 2019-01-01

## 2019-01-01 RX ORDER — ONDANSETRON 8 MG/1
4 TABLET, FILM COATED ORAL ONCE
Refills: 0 | Status: COMPLETED | OUTPATIENT
Start: 2019-01-01 | End: 2019-01-01

## 2019-01-01 RX ORDER — DILTIAZEM HCL 120 MG
1 CAPSULE, EXT RELEASE 24 HR ORAL
Qty: 30 | Refills: 0
Start: 2019-01-01 | End: 2019-10-26

## 2019-01-01 RX ORDER — PANTOPRAZOLE SODIUM 20 MG/1
40 TABLET, DELAYED RELEASE ORAL DAILY
Refills: 0 | Status: DISCONTINUED | OUTPATIENT
Start: 2019-01-01 | End: 2019-01-01

## 2019-01-01 RX ORDER — ACETAMINOPHEN 500 MG
975 TABLET ORAL ONCE
Refills: 0 | Status: DISCONTINUED | OUTPATIENT
Start: 2019-01-01 | End: 2019-01-01

## 2019-01-01 RX ORDER — ALPRAZOLAM 0.25 MG
1 TABLET ORAL
Qty: 0 | Refills: 0 | DISCHARGE
Start: 2019-01-01

## 2019-01-01 RX ORDER — ISOPROPYL ALCOHOL, BENZOCAINE .7; .06 ML/ML; ML/ML
1 SWAB TOPICAL
Qty: 100 | Refills: 1
Start: 2019-01-01 | End: 2019-01-01

## 2019-01-01 RX ORDER — MEROPENEM 1 G/30ML
1000 INJECTION INTRAVENOUS EVERY 12 HOURS
Refills: 0 | Status: COMPLETED | OUTPATIENT
Start: 2019-01-01 | End: 2019-01-01

## 2019-01-01 RX ORDER — BLOOD SUGAR DIAGNOSTIC
STRIP MISCELLANEOUS
Qty: 30 | Refills: 5 | Status: ACTIVE | COMMUNITY
Start: 2019-01-01 | End: 1900-01-01

## 2019-01-01 RX ORDER — HEPARIN SODIUM 5000 [USP'U]/ML
5000 INJECTION INTRAVENOUS; SUBCUTANEOUS EVERY 12 HOURS
Refills: 0 | Status: DISCONTINUED | OUTPATIENT
Start: 2019-01-01 | End: 2019-01-01

## 2019-01-01 RX ORDER — PHYTONADIONE (VIT K1) 5 MG
5 TABLET ORAL ONCE
Refills: 0 | Status: DISCONTINUED | OUTPATIENT
Start: 2019-01-01 | End: 2019-01-01

## 2019-01-01 RX ORDER — SPIRONOLACTONE 25 MG/1
1 TABLET, FILM COATED ORAL
Qty: 0 | Refills: 0 | DISCHARGE

## 2019-01-01 RX ORDER — NIFEDIPINE 30 MG
30 TABLET, EXTENDED RELEASE 24 HR ORAL DAILY
Refills: 0 | Status: DISCONTINUED | OUTPATIENT
Start: 2019-01-01 | End: 2019-01-01

## 2019-01-01 RX ORDER — DIGOXIN 250 MCG
0.25 TABLET ORAL EVERY 8 HOURS
Refills: 0 | Status: COMPLETED | OUTPATIENT
Start: 2019-01-01 | End: 2019-01-01

## 2019-01-01 RX ORDER — AZITHROMYCIN 250 MG/1
250 TABLET, FILM COATED ORAL
Qty: 1 | Refills: 0 | Status: DISCONTINUED | COMMUNITY
Start: 2019-01-01 | End: 2019-01-01

## 2019-01-01 RX ORDER — CHLORHEXIDINE GLUCONATE 213 G/1000ML
1 SOLUTION TOPICAL EVERY 24 HOURS
Refills: 0 | Status: DISCONTINUED | OUTPATIENT
Start: 2019-01-01 | End: 2019-01-01

## 2019-01-01 RX ORDER — IPRATROPIUM/ALBUTEROL SULFATE 18-103MCG
3 AEROSOL WITH ADAPTER (GRAM) INHALATION EVERY 6 HOURS
Refills: 0 | Status: DISCONTINUED | OUTPATIENT
Start: 2019-01-01 | End: 2019-01-01

## 2019-01-01 RX ORDER — MONTELUKAST 10 MG/1
10 TABLET, FILM COATED ORAL
Qty: 30 | Refills: 3 | Status: ACTIVE | COMMUNITY
Start: 2017-10-09 | End: 1900-01-01

## 2019-01-01 RX ORDER — MIDODRINE HYDROCHLORIDE 2.5 MG/1
10 TABLET ORAL THREE TIMES A DAY
Refills: 0 | Status: DISCONTINUED | OUTPATIENT
Start: 2019-01-01 | End: 2019-01-01

## 2019-01-01 RX ORDER — ALPRAZOLAM 0.25 MG
0.5 TABLET ORAL
Refills: 0 | Status: DISCONTINUED | OUTPATIENT
Start: 2019-01-01 | End: 2019-01-01

## 2019-01-01 RX ORDER — INSULIN LISPRO 100/ML
VIAL (ML) SUBCUTANEOUS EVERY 6 HOURS
Refills: 0 | Status: DISCONTINUED | OUTPATIENT
Start: 2019-01-01 | End: 2019-01-01

## 2019-01-01 RX ORDER — SENNA PLUS 8.6 MG/1
2 TABLET ORAL
Qty: 0 | Refills: 0 | DISCHARGE
Start: 2019-01-01

## 2019-01-01 RX ORDER — VANCOMYCIN HCL 1 G
1000 VIAL (EA) INTRAVENOUS ONCE
Refills: 0 | Status: COMPLETED | OUTPATIENT
Start: 2019-01-01 | End: 2019-01-01

## 2019-01-01 RX ORDER — FUROSEMIDE 40 MG
20 TABLET ORAL ONCE
Refills: 0 | Status: COMPLETED | OUTPATIENT
Start: 2019-01-01 | End: 2019-01-01

## 2019-01-01 RX ORDER — POTASSIUM CHLORIDE 20 MEQ
20 PACKET (EA) ORAL ONCE
Refills: 0 | Status: COMPLETED | OUTPATIENT
Start: 2019-01-01 | End: 2019-01-01

## 2019-01-01 RX ORDER — SODIUM ZIRCONIUM CYCLOSILICATE 10 G/10G
10 POWDER, FOR SUSPENSION ORAL ONCE
Refills: 0 | Status: COMPLETED | OUTPATIENT
Start: 2019-01-01 | End: 2019-01-01

## 2019-01-01 RX ORDER — FUROSEMIDE 40 MG
1 TABLET ORAL
Qty: 30 | Refills: 0
Start: 2019-01-01 | End: 2019-10-26

## 2019-01-01 RX ORDER — SODIUM CHLORIDE 9 MG/ML
500 INJECTION INTRAMUSCULAR; INTRAVENOUS; SUBCUTANEOUS ONCE
Refills: 0 | Status: COMPLETED | OUTPATIENT
Start: 2019-01-01 | End: 2019-01-01

## 2019-01-01 RX ORDER — INSULIN GLARGINE 100 [IU]/ML
8 INJECTION, SOLUTION SUBCUTANEOUS AT BEDTIME
Refills: 0 | Status: DISCONTINUED | OUTPATIENT
Start: 2019-01-01 | End: 2019-01-01

## 2019-01-01 RX ORDER — ALBUTEROL 90 UG/1
2 AEROSOL, METERED ORAL EVERY 6 HOURS
Refills: 0 | Status: DISCONTINUED | OUTPATIENT
Start: 2019-01-01 | End: 2019-01-01

## 2019-01-01 RX ORDER — CHLORHEXIDINE GLUCONATE 213 G/1000ML
1 SOLUTION TOPICAL
Refills: 0 | Status: DISCONTINUED | OUTPATIENT
Start: 2019-01-01 | End: 2019-01-01

## 2019-01-01 RX ORDER — DIGOXIN 250 MCG
0.25 TABLET ORAL ONCE
Refills: 0 | Status: DISCONTINUED | OUTPATIENT
Start: 2019-01-01 | End: 2019-01-01

## 2019-01-01 RX ORDER — MONTELUKAST 4 MG/1
1 TABLET, CHEWABLE ORAL
Qty: 0 | Refills: 0 | DISCHARGE
Start: 2019-01-01

## 2019-01-01 RX ORDER — ALBUTEROL 90 UG/1
1.25 AEROSOL, METERED ORAL EVERY 6 HOURS
Refills: 0 | Status: DISCONTINUED | OUTPATIENT
Start: 2019-01-01 | End: 2019-01-01

## 2019-01-01 RX ORDER — ALPRAZOLAM 0.25 MG
0.5 TABLET ORAL ONCE
Refills: 0 | Status: DISCONTINUED | OUTPATIENT
Start: 2019-01-01 | End: 2019-01-01

## 2019-01-01 RX ORDER — ENOXAPARIN SODIUM 100 MG/ML
70 INJECTION SUBCUTANEOUS DAILY
Refills: 0 | Status: DISCONTINUED | OUTPATIENT
Start: 2019-01-01 | End: 2019-01-01

## 2019-01-01 RX ORDER — METOPROLOL TARTRATE 50 MG
12.5 TABLET ORAL
Refills: 0 | Status: DISCONTINUED | OUTPATIENT
Start: 2019-01-01 | End: 2019-01-01

## 2019-01-01 RX ORDER — POLYETHYLENE GLYCOL 3350 17 G/17G
17 POWDER, FOR SOLUTION ORAL
Qty: 0 | Refills: 0 | DISCHARGE
Start: 2019-01-01

## 2019-01-01 RX ORDER — FUROSEMIDE 20 MG/1
20 TABLET ORAL
Qty: 12 | Refills: 1 | Status: ACTIVE | COMMUNITY
Start: 2019-01-01 | End: 1900-01-01

## 2019-01-01 RX ORDER — LANOLIN ALCOHOL/MO/W.PET/CERES
3 CREAM (GRAM) TOPICAL AT BEDTIME
Refills: 0 | Status: DISCONTINUED | OUTPATIENT
Start: 2019-01-01 | End: 2019-01-01

## 2019-01-01 RX ORDER — ENTECAVIR 0.5 MG/1
0.5 TABLET ORAL DAILY
Refills: 0 | Status: DISCONTINUED | OUTPATIENT
Start: 2019-01-01 | End: 2019-01-01

## 2019-01-01 RX ORDER — INSULIN LISPRO 100/ML
8 VIAL (ML) SUBCUTANEOUS ONCE
Refills: 0 | Status: COMPLETED | OUTPATIENT
Start: 2019-01-01 | End: 2019-01-01

## 2019-01-01 RX ORDER — CHLORHEXIDINE GLUCONATE 213 G/1000ML
1 SOLUTION TOPICAL DAILY
Refills: 0 | Status: DISCONTINUED | OUTPATIENT
Start: 2019-01-01 | End: 2019-01-01

## 2019-01-01 RX ORDER — ZOLPIDEM TARTRATE 10 MG/1
5 TABLET ORAL ONCE
Refills: 0 | Status: DISCONTINUED | OUTPATIENT
Start: 2019-01-01 | End: 2019-01-01

## 2019-01-01 RX ORDER — FUROSEMIDE 40 MG
40 TABLET ORAL DAILY
Refills: 0 | Status: DISCONTINUED | OUTPATIENT
Start: 2019-01-01 | End: 2019-01-01

## 2019-01-01 RX ORDER — TIOTROPIUM BROMIDE 18 UG/1
1 CAPSULE ORAL; RESPIRATORY (INHALATION) DAILY
Refills: 0 | Status: DISCONTINUED | OUTPATIENT
Start: 2019-01-01 | End: 2019-01-01

## 2019-01-01 RX ORDER — PANTOPRAZOLE SODIUM 20 MG/1
1 TABLET, DELAYED RELEASE ORAL
Qty: 0 | Refills: 0 | DISCHARGE
Start: 2019-01-01

## 2019-01-01 RX ORDER — IPRATROPIUM/ALBUTEROL SULFATE 18-103MCG
3 AEROSOL WITH ADAPTER (GRAM) INHALATION
Qty: 0 | Refills: 0 | DISCHARGE

## 2019-01-01 RX ORDER — INSULIN LISPRO 100/ML
8 VIAL (ML) SUBCUTANEOUS
Refills: 0 | Status: DISCONTINUED | OUTPATIENT
Start: 2019-01-01 | End: 2019-01-01

## 2019-01-01 RX ORDER — INSULIN HUMAN 100 [IU]/ML
6 INJECTION, SOLUTION SUBCUTANEOUS
Qty: 0 | Refills: 0 | DISCHARGE
Start: 2019-01-01

## 2019-01-01 RX ORDER — DIPHENHYDRAMINE HCL 50 MG
50 CAPSULE ORAL ONCE
Refills: 0 | Status: DISCONTINUED | OUTPATIENT
Start: 2019-01-01 | End: 2019-01-01

## 2019-01-01 RX ORDER — ENTECAVIR 0.5 MG/1
1 TABLET ORAL
Qty: 30 | Refills: 0
Start: 2019-01-01 | End: 2019-01-01

## 2019-01-01 RX ORDER — BLOOD-GLUCOSE METER
W/DEVICE EACH MISCELLANEOUS
Qty: 1 | Refills: 1 | Status: ACTIVE | COMMUNITY
Start: 2019-01-01 | End: 1900-01-01

## 2019-01-01 RX ORDER — LANOLIN ALCOHOL/MO/W.PET/CERES
3 CREAM (GRAM) TOPICAL ONCE
Refills: 0 | Status: COMPLETED | OUTPATIENT
Start: 2019-01-01 | End: 2019-01-01

## 2019-01-01 RX ORDER — ENOXAPARIN SODIUM 100 MG/ML
30 INJECTION SUBCUTANEOUS DAILY
Refills: 0 | Status: DISCONTINUED | OUTPATIENT
Start: 2019-01-01 | End: 2019-01-01

## 2019-01-01 RX ORDER — APIXABAN 2.5 MG/1
2.5 TABLET, FILM COATED ORAL
Refills: 0 | Status: DISCONTINUED | OUTPATIENT
Start: 2019-01-01 | End: 2019-01-01

## 2019-01-01 RX ORDER — VALSARTAN 320 MG/1
320 TABLET, COATED ORAL DAILY
Qty: 1 | Refills: 3 | Status: ACTIVE | COMMUNITY
Start: 2017-03-20 | End: 1900-01-01

## 2019-01-01 RX ORDER — HYDROCORTISONE 20 MG
100 TABLET ORAL EVERY 8 HOURS
Refills: 0 | Status: DISCONTINUED | OUTPATIENT
Start: 2019-01-01 | End: 2019-01-01

## 2019-01-01 RX ORDER — DIPHENHYDRAMINE HYDROCHLORIDE 50 MG/ML
50 INJECTION, SOLUTION INTRAMUSCULAR; INTRAVENOUS
Qty: 1 | Refills: 0 | Status: COMPLETED | OUTPATIENT
Start: 2019-01-01

## 2019-01-01 RX ORDER — AZTREONAM 2 G
2000 VIAL (EA) INJECTION ONCE
Refills: 0 | Status: COMPLETED | OUTPATIENT
Start: 2019-01-01 | End: 2019-01-01

## 2019-01-01 RX ORDER — WARFARIN SODIUM 5 MG/1
5 TABLET ORAL
Refills: 0 | Status: DISCONTINUED | COMMUNITY
Start: 2017-05-10 | End: 2019-01-01

## 2019-01-01 RX ORDER — DILTIAZEM HCL 120 MG
180 CAPSULE, EXT RELEASE 24 HR ORAL DAILY
Refills: 0 | Status: DISCONTINUED | OUTPATIENT
Start: 2019-01-01 | End: 2019-01-01

## 2019-01-01 RX ORDER — RITUXIMAB 10 MG/ML
500 INJECTION, SOLUTION INTRAVENOUS
Qty: 0 | Refills: 0 | Status: COMPLETED | OUTPATIENT
Start: 2019-01-01 | End: 1900-01-01

## 2019-01-01 RX ORDER — SERTRALINE 25 MG/1
1 TABLET, FILM COATED ORAL
Qty: 0 | Refills: 0 | DISCHARGE
Start: 2019-01-01

## 2019-01-01 RX ORDER — ALPRAZOLAM 0.25 MG
0.5 TABLET ORAL THREE TIMES A DAY
Refills: 0 | Status: DISCONTINUED | OUTPATIENT
Start: 2019-01-01 | End: 2019-01-01

## 2019-01-01 RX ORDER — PIPERACILLIN AND TAZOBACTAM 4; .5 G/20ML; G/20ML
3.38 INJECTION, POWDER, LYOPHILIZED, FOR SOLUTION INTRAVENOUS EVERY 12 HOURS
Refills: 0 | Status: DISCONTINUED | OUTPATIENT
Start: 2019-01-01 | End: 2019-01-01

## 2019-01-01 RX ORDER — HYDROCORTISONE 20 MG
50 TABLET ORAL EVERY 8 HOURS
Refills: 0 | Status: DISCONTINUED | OUTPATIENT
Start: 2019-01-01 | End: 2019-01-01

## 2019-01-01 RX ORDER — ATORVASTATIN CALCIUM 80 MG/1
1 TABLET, FILM COATED ORAL
Qty: 0 | Refills: 0 | DISCHARGE
Start: 2019-01-01

## 2019-01-01 RX ORDER — ACETAMINOPHEN 500 MG
2 TABLET ORAL
Qty: 0 | Refills: 0 | DISCHARGE
Start: 2019-01-01

## 2019-01-01 RX ORDER — ATORVASTATIN CALCIUM 80 MG/1
1 TABLET, FILM COATED ORAL
Qty: 0 | Refills: 0 | DISCHARGE

## 2019-01-01 RX ORDER — GLUCAGON INJECTION, SOLUTION 0.5 MG/.1ML
1 INJECTION, SOLUTION SUBCUTANEOUS ONCE
Refills: 0 | Status: DISCONTINUED | OUTPATIENT
Start: 2019-01-01 | End: 2019-01-01

## 2019-01-01 RX ORDER — DEXMEDETOMIDINE HYDROCHLORIDE IN 0.9% SODIUM CHLORIDE 4 UG/ML
0.5 INJECTION INTRAVENOUS
Qty: 200 | Refills: 0 | Status: DISCONTINUED | OUTPATIENT
Start: 2019-01-01 | End: 2019-01-01

## 2019-01-01 RX ORDER — AZTREONAM 2 G
VIAL (EA) INJECTION
Refills: 0 | Status: DISCONTINUED | OUTPATIENT
Start: 2019-01-01 | End: 2019-01-01

## 2019-01-01 RX ORDER — INSULIN ASPART 100 [IU]/ML
6 INJECTION, SOLUTION SUBCUTANEOUS
Qty: 0 | Refills: 0 | DISCHARGE

## 2019-01-01 RX ORDER — RITUXIMAB 10 MG/ML
100 INJECTION, SOLUTION INTRAVENOUS
Qty: 0 | Refills: 0 | Status: COMPLETED | OUTPATIENT
Start: 2019-01-01 | End: 1900-01-01

## 2019-01-01 RX ORDER — WARFARIN SODIUM 2.5 MG/1
3 TABLET ORAL
Refills: 0 | Status: COMPLETED | OUTPATIENT
Start: 2019-01-01 | End: 2019-01-01

## 2019-01-01 RX ORDER — ERTAPENEM SODIUM 1 G/1
500 INJECTION, POWDER, LYOPHILIZED, FOR SOLUTION INTRAMUSCULAR; INTRAVENOUS EVERY 24 HOURS
Refills: 0 | Status: DISCONTINUED | OUTPATIENT
Start: 2019-01-01 | End: 2019-01-01

## 2019-01-01 RX ORDER — METOPROLOL TARTRATE 50 MG
5 TABLET ORAL EVERY 4 HOURS
Refills: 0 | Status: DISCONTINUED | OUTPATIENT
Start: 2019-01-01 | End: 2019-01-01

## 2019-01-01 RX ORDER — PROMETHAZINE HYDROCHLORIDE AND DEXTROMETHORPHAN HYDROBROMIDE ORAL SOLUTION 15; 6.25 MG/5ML; MG/5ML
6.25-15 SOLUTION ORAL
Qty: 1 | Refills: 0 | Status: ACTIVE | COMMUNITY
Start: 2019-01-01 | End: 1900-01-01

## 2019-01-01 RX ORDER — SODIUM CHLORIDE 9 MG/ML
500 INJECTION, SOLUTION INTRAVENOUS ONCE
Refills: 0 | Status: COMPLETED | OUTPATIENT
Start: 2019-01-01 | End: 2019-01-01

## 2019-01-01 RX ORDER — DILTIAZEM HCL 120 MG
1 CAPSULE, EXT RELEASE 24 HR ORAL
Qty: 0 | Refills: 0 | DISCHARGE
Start: 2019-01-01

## 2019-01-01 RX ORDER — INSULIN LISPRO 100/ML
6 VIAL (ML) SUBCUTANEOUS
Qty: 3 | Refills: 0
Start: 2019-01-01 | End: 2019-01-01

## 2019-01-01 RX ORDER — METOPROLOL TARTRATE 50 MG
25 TABLET ORAL ONCE
Refills: 0 | Status: DISCONTINUED | OUTPATIENT
Start: 2019-01-01 | End: 2019-01-01

## 2019-01-01 RX ORDER — ENOXAPARIN SODIUM 100 MG/ML
70 INJECTION SUBCUTANEOUS
Refills: 0 | Status: DISCONTINUED | OUTPATIENT
Start: 2019-01-01 | End: 2019-01-01

## 2019-01-01 RX ORDER — HYDROMORPHONE HYDROCHLORIDE 2 MG/ML
0.2 INJECTION INTRAMUSCULAR; INTRAVENOUS; SUBCUTANEOUS EVERY 6 HOURS
Refills: 0 | Status: DISCONTINUED | OUTPATIENT
Start: 2019-01-01 | End: 2019-01-01

## 2019-01-01 RX ORDER — MONTELUKAST 4 MG/1
1 TABLET, CHEWABLE ORAL
Qty: 0 | Refills: 0 | DISCHARGE

## 2019-01-01 RX ORDER — HEPARIN SODIUM 5000 [USP'U]/ML
5000 INJECTION INTRAVENOUS; SUBCUTANEOUS EVERY 12 HOURS
Refills: 0 | Status: COMPLETED | OUTPATIENT
Start: 2019-01-01 | End: 2019-01-01

## 2019-01-01 RX ORDER — DILTIAZEM HCL 120 MG
30 CAPSULE, EXT RELEASE 24 HR ORAL EVERY 6 HOURS
Refills: 0 | Status: DISCONTINUED | OUTPATIENT
Start: 2019-01-01 | End: 2019-01-01

## 2019-01-01 RX ORDER — METOPROLOL TARTRATE 50 MG
50 TABLET ORAL ONCE
Refills: 0 | Status: COMPLETED | OUTPATIENT
Start: 2019-01-01 | End: 2019-01-01

## 2019-01-01 RX ORDER — FUROSEMIDE 40 MG
1 TABLET ORAL
Qty: 0 | Refills: 0 | DISCHARGE

## 2019-01-01 RX ORDER — METHYLPREDNISOLONE 125 MG/2ML
125 INJECTION, POWDER, LYOPHILIZED, FOR SOLUTION INTRAMUSCULAR; INTRAVENOUS
Qty: 0 | Refills: 0 | Status: COMPLETED | OUTPATIENT
Start: 2019-01-01 | End: 1900-01-01

## 2019-01-01 RX ORDER — SPIRONOLACTONE 25 MG/1
25 TABLET ORAL
Qty: 12 | Refills: 3 | Status: ACTIVE | COMMUNITY
Start: 2019-01-01 | End: 1900-01-01

## 2019-01-01 RX ORDER — PHENAZOPYRIDINE HYDROCHLORIDE 100 MG/1
100 TABLET ORAL 3 TIMES DAILY
Qty: 15 | Refills: 0 | Status: COMPLETED | COMMUNITY
Start: 2019-01-01 | End: 2019-01-01

## 2019-01-01 RX ORDER — ASCORBIC ACID 60 MG
500 TABLET,CHEWABLE ORAL DAILY
Refills: 0 | Status: DISCONTINUED | OUTPATIENT
Start: 2019-01-01 | End: 2019-01-01

## 2019-01-01 RX ORDER — ATENOLOL 25 MG/1
1 TABLET ORAL
Qty: 0 | Refills: 0 | DISCHARGE
Start: 2019-01-01

## 2019-01-01 RX ORDER — APIXABAN 2.5 MG/1
5 TABLET, FILM COATED ORAL
Refills: 0 | Status: DISCONTINUED | OUTPATIENT
Start: 2019-01-01 | End: 2019-01-01

## 2019-01-01 RX ORDER — MAGNESIUM OXIDE 400 MG ORAL TABLET 241.3 MG
400 TABLET ORAL
Refills: 0 | Status: COMPLETED | OUTPATIENT
Start: 2019-01-01 | End: 2019-01-01

## 2019-01-01 RX ORDER — DOCUSATE SODIUM 100 MG
1 CAPSULE ORAL
Qty: 0 | Refills: 0 | DISCHARGE
Start: 2019-01-01

## 2019-01-01 RX ORDER — METOPROLOL TARTRATE 50 MG
1 TABLET ORAL
Qty: 0 | Refills: 0 | DISCHARGE

## 2019-01-01 RX ORDER — DEXTROSE 50 % IN WATER 50 %
15 SYRINGE (ML) INTRAVENOUS ONCE
Refills: 0 | Status: COMPLETED | OUTPATIENT
Start: 2019-01-01 | End: 2019-01-01

## 2019-01-01 RX ORDER — PROTHROMBIN COMPLEX CONCENTRATE (HUMAN) 25.5; 16.5; 24; 22; 22; 26 [IU]/ML; [IU]/ML; [IU]/ML; [IU]/ML; [IU]/ML; [IU]/ML
1500 POWDER, FOR SOLUTION INTRAVENOUS ONCE
Refills: 0 | Status: COMPLETED | OUTPATIENT
Start: 2019-01-01 | End: 2019-01-01

## 2019-01-01 RX ORDER — ALPRAZOLAM 0.25 MG
1 TABLET ORAL
Qty: 0 | Refills: 0 | DISCHARGE

## 2019-01-01 RX ORDER — INSULIN LISPRO 100/ML
0 VIAL (ML) SUBCUTANEOUS
Qty: 0 | Refills: 0 | DISCHARGE

## 2019-01-01 RX ORDER — DILTIAZEM HCL 120 MG
30 CAPSULE, EXT RELEASE 24 HR ORAL EVERY 8 HOURS
Refills: 0 | Status: DISCONTINUED | OUTPATIENT
Start: 2019-01-01 | End: 2019-01-01

## 2019-01-01 RX ORDER — FUROSEMIDE 20 MG/1
20 TABLET ORAL
Qty: 12 | Refills: 2 | Status: ACTIVE | COMMUNITY
Start: 1900-01-01 | End: 1900-01-01

## 2019-01-01 RX ORDER — FUROSEMIDE 40 MG
1 TABLET ORAL
Qty: 0 | Refills: 0 | DISCHARGE
Start: 2019-01-01

## 2019-01-01 RX ORDER — SODIUM CHLORIDE 9 MG/ML
1400 INJECTION INTRAMUSCULAR; INTRAVENOUS; SUBCUTANEOUS ONCE
Refills: 0 | Status: COMPLETED | OUTPATIENT
Start: 2019-01-01 | End: 2019-01-01

## 2019-01-01 RX ORDER — SODIUM CHLORIDE 9 MG/ML
1000 INJECTION, SOLUTION INTRAVENOUS ONCE
Refills: 0 | Status: COMPLETED | OUTPATIENT
Start: 2019-01-01 | End: 2019-01-01

## 2019-01-01 RX ORDER — PANTOPRAZOLE SODIUM 20 MG/1
40 TABLET, DELAYED RELEASE ORAL ONCE
Refills: 0 | Status: COMPLETED | OUTPATIENT
Start: 2019-01-01 | End: 2019-01-01

## 2019-01-01 RX ORDER — DILTIAZEM HCL 120 MG
1 CAPSULE, EXT RELEASE 24 HR ORAL
Qty: 0 | Refills: 0 | DISCHARGE

## 2019-01-01 RX ORDER — MEROPENEM 1 G/30ML
500 INJECTION INTRAVENOUS
Qty: 0 | Refills: 0 | DISCHARGE
Start: 2019-01-01

## 2019-01-01 RX ORDER — METOPROLOL TARTRATE 50 MG
100 TABLET ORAL
Refills: 0 | Status: DISCONTINUED | OUTPATIENT
Start: 2019-01-01 | End: 2019-01-01

## 2019-01-01 RX ORDER — AZTREONAM 2 G
500 VIAL (EA) INJECTION ONCE
Refills: 0 | Status: DISCONTINUED | OUTPATIENT
Start: 2019-01-01 | End: 2019-01-01

## 2019-01-01 RX ORDER — METOPROLOL TARTRATE 50 MG
5 TABLET ORAL EVERY 6 HOURS
Refills: 0 | Status: DISCONTINUED | OUTPATIENT
Start: 2019-01-01 | End: 2019-01-01

## 2019-01-01 RX ORDER — ALPRAZOLAM 0.25 MG
0.25 TABLET ORAL ONCE
Refills: 0 | Status: DISCONTINUED | OUTPATIENT
Start: 2019-01-01 | End: 2019-01-01

## 2019-01-01 RX ORDER — FUROSEMIDE 40 MG
60 TABLET ORAL ONCE
Refills: 0 | Status: COMPLETED | OUTPATIENT
Start: 2019-01-01 | End: 2019-01-01

## 2019-01-01 RX ORDER — INSULIN ASPART 100 [IU]/ML
12 INJECTION, SOLUTION SUBCUTANEOUS
Qty: 3 | Refills: 0
Start: 2019-01-01 | End: 2019-01-01

## 2019-01-01 RX ORDER — HYDRALAZINE HCL 50 MG
10 TABLET ORAL ONCE
Refills: 0 | Status: COMPLETED | OUTPATIENT
Start: 2019-01-01 | End: 2019-01-01

## 2019-01-01 RX ORDER — ACETAMINOPHEN 325 MG/1
325 TABLET ORAL
Qty: 0 | Refills: 0 | Status: COMPLETED | OUTPATIENT
Start: 2019-01-01

## 2019-01-01 RX ORDER — INSULIN LISPRO 100/ML
3 VIAL (ML) SUBCUTANEOUS
Refills: 0 | Status: DISCONTINUED | OUTPATIENT
Start: 2019-01-01 | End: 2019-01-01

## 2019-01-01 RX ORDER — INSULIN HUMAN 100 [IU]/ML
6 INJECTION, SOLUTION SUBCUTANEOUS
Qty: 100 | Refills: 0 | Status: DISCONTINUED | OUTPATIENT
Start: 2019-01-01 | End: 2019-01-01

## 2019-01-01 RX ORDER — FUROSEMIDE 40 MG
0 TABLET ORAL
Qty: 0 | Refills: 0 | DISCHARGE

## 2019-01-01 RX ORDER — METOPROLOL TARTRATE 50 MG
50 TABLET ORAL EVERY 12 HOURS
Refills: 0 | Status: DISCONTINUED | OUTPATIENT
Start: 2019-01-01 | End: 2019-01-01

## 2019-01-01 RX ORDER — NITROFURANTOIN MACROCRYSTALS 100 MG/1
100 CAPSULE ORAL
Qty: 10 | Refills: 0 | Status: COMPLETED | COMMUNITY
End: 2019-01-01

## 2019-01-01 RX ORDER — FLUTICASONE PROPIONATE 50 UG/1
50 SPRAY, METERED NASAL TWICE DAILY
Qty: 1 | Refills: 3 | Status: ACTIVE | COMMUNITY
Start: 2019-01-01 | End: 1900-01-01

## 2019-01-01 RX ORDER — POTASSIUM CHLORIDE 750 MG/1
10 TABLET, FILM COATED, EXTENDED RELEASE ORAL DAILY
Qty: 30 | Refills: 2 | Status: ACTIVE | COMMUNITY
Start: 2019-01-01 | End: 1900-01-01

## 2019-01-01 RX ORDER — GLYCERIN ADULT
1 SUPPOSITORY, RECTAL RECTAL ONCE
Refills: 0 | Status: COMPLETED | OUTPATIENT
Start: 2019-01-01 | End: 2019-01-01

## 2019-01-01 RX ORDER — PANTOPRAZOLE SODIUM 20 MG/1
40 TABLET, DELAYED RELEASE ORAL EVERY 12 HOURS
Refills: 0 | Status: DISCONTINUED | OUTPATIENT
Start: 2019-01-01 | End: 2019-01-01

## 2019-01-01 RX ORDER — DEXTROSE 50 % IN WATER 50 %
50 SYRINGE (ML) INTRAVENOUS
Refills: 0 | Status: DISCONTINUED | OUTPATIENT
Start: 2019-01-01 | End: 2019-01-01

## 2019-01-01 RX ORDER — INSULIN GLARGINE 100 [IU]/ML
12 INJECTION, SOLUTION SUBCUTANEOUS AT BEDTIME
Refills: 0 | Status: DISCONTINUED | OUTPATIENT
Start: 2019-01-01 | End: 2019-01-01

## 2019-01-01 RX ORDER — DILTIAZEM HCL 120 MG
120 CAPSULE, EXT RELEASE 24 HR ORAL DAILY
Refills: 0 | Status: DISCONTINUED | OUTPATIENT
Start: 2019-01-01 | End: 2019-01-01

## 2019-01-01 RX ORDER — HYDROCORTISONE 20 MG
100 TABLET ORAL ONCE
Refills: 0 | Status: DISCONTINUED | OUTPATIENT
Start: 2019-01-01 | End: 2019-01-01

## 2019-01-01 RX ORDER — ALPRAZOLAM 0.25 MG
1 TABLET ORAL
Qty: 10 | Refills: 0
Start: 2019-01-01 | End: 2019-01-01

## 2019-01-01 RX ORDER — FUROSEMIDE 40 MG
80 TABLET ORAL ONCE
Refills: 0 | Status: DISCONTINUED | OUTPATIENT
Start: 2019-01-01 | End: 2019-01-01

## 2019-01-01 RX ORDER — DILTIAZEM HCL 120 MG
10 CAPSULE, EXT RELEASE 24 HR ORAL ONCE
Refills: 0 | Status: COMPLETED | OUTPATIENT
Start: 2019-01-01 | End: 2019-01-01

## 2019-01-01 RX ORDER — AZTREONAM 2 G
500 VIAL (EA) INJECTION EVERY 8 HOURS
Refills: 0 | Status: DISCONTINUED | OUTPATIENT
Start: 2019-01-01 | End: 2019-01-01

## 2019-01-01 RX ORDER — TRIAMCINOLONE ACETONIDE 1 MG/G
0.1 CREAM TOPICAL 3 TIMES DAILY
Qty: 1 | Refills: 1 | Status: ACTIVE | COMMUNITY
Start: 2019-01-01 | End: 1900-01-01

## 2019-01-01 RX ORDER — DIGOXIN 250 MCG
0.25 TABLET ORAL ONCE
Refills: 0 | Status: COMPLETED | OUTPATIENT
Start: 2019-01-01 | End: 2019-01-01

## 2019-01-01 RX ORDER — DIPHENHYDRAMINE HCL 50 MG
50 CAPSULE ORAL ONCE
Refills: 0 | Status: COMPLETED | OUTPATIENT
Start: 2019-01-01 | End: 2019-01-01

## 2019-01-01 RX ORDER — DIPHENHYDRAMINE HYDROCHLORIDE 50 MG/ML
50 INJECTION, SOLUTION INTRAMUSCULAR; INTRAVENOUS
Qty: 1 | Refills: 0 | Status: COMPLETED | OUTPATIENT
Start: 2019-01-01 | End: 1900-01-01

## 2019-01-01 RX ORDER — METOPROLOL TARTRATE 50 MG
1 TABLET ORAL
Qty: 60 | Refills: 0
Start: 2019-01-01 | End: 2019-01-01

## 2019-01-01 RX ORDER — SODIUM CHLORIDE 9 MG/ML
1000 INJECTION INTRAMUSCULAR; INTRAVENOUS; SUBCUTANEOUS ONCE
Refills: 0 | Status: COMPLETED | OUTPATIENT
Start: 2019-01-01 | End: 2019-01-01

## 2019-01-01 RX ORDER — ACETAMINOPHEN 325 MG/1
325 TABLET ORAL
Qty: 0 | Refills: 0 | Status: COMPLETED | OUTPATIENT
Start: 2019-01-01 | End: 1900-01-01

## 2019-01-01 RX ORDER — ALPRAZOLAM 0.25 MG
0.5 TABLET ORAL EVERY 12 HOURS
Refills: 0 | Status: DISCONTINUED | OUTPATIENT
Start: 2019-01-01 | End: 2019-01-01

## 2019-01-01 RX ORDER — MONTELUKAST 4 MG/1
10 TABLET, CHEWABLE ORAL AT BEDTIME
Refills: 0 | Status: DISCONTINUED | OUTPATIENT
Start: 2019-01-01 | End: 2019-01-01

## 2019-01-01 RX ORDER — FAMOTIDINE 10 MG/ML
20 INJECTION INTRAVENOUS ONCE
Refills: 0 | Status: COMPLETED | OUTPATIENT
Start: 2019-01-01 | End: 2019-01-01

## 2019-01-01 RX ORDER — ATORVASTATIN CALCIUM 20 MG/1
20 TABLET, FILM COATED ORAL
Refills: 0 | Status: DISCONTINUED | COMMUNITY
End: 2019-01-01

## 2019-01-01 RX ADMIN — Medication 50 MILLIGRAM(S): at 17:21

## 2019-01-01 RX ADMIN — Medication 0.2 MILLIGRAM(S): at 22:16

## 2019-01-01 RX ADMIN — Medication 3 MILLILITER(S): at 18:42

## 2019-01-01 RX ADMIN — Medication 20 MILLIGRAM(S): at 23:16

## 2019-01-01 RX ADMIN — ALBUTEROL 2 PUFF(S): 90 AEROSOL, METERED ORAL at 23:59

## 2019-01-01 RX ADMIN — Medication 100 MILLIGRAM(S): at 05:06

## 2019-01-01 RX ADMIN — Medication 4: at 17:32

## 2019-01-01 RX ADMIN — Medication 0.5 MILLIGRAM(S): at 02:29

## 2019-01-01 RX ADMIN — Medication 100 MILLIGRAM(S): at 18:20

## 2019-01-01 RX ADMIN — TIOTROPIUM BROMIDE 1 CAPSULE(S): 18 CAPSULE ORAL; RESPIRATORY (INHALATION) at 12:59

## 2019-01-01 RX ADMIN — SERTRALINE 50 MILLIGRAM(S): 25 TABLET, FILM COATED ORAL at 11:51

## 2019-01-01 RX ADMIN — Medication 50 MILLIGRAM(S): at 17:20

## 2019-01-01 RX ADMIN — Medication 25 MILLIGRAM(S): at 18:14

## 2019-01-01 RX ADMIN — Medication 5 MILLIGRAM(S): at 22:05

## 2019-01-01 RX ADMIN — ATORVASTATIN CALCIUM 10 MILLIGRAM(S): 80 TABLET, FILM COATED ORAL at 21:48

## 2019-01-01 RX ADMIN — Medication 10: at 12:53

## 2019-01-01 RX ADMIN — MONTELUKAST 10 MILLIGRAM(S): 4 TABLET, CHEWABLE ORAL at 11:28

## 2019-01-01 RX ADMIN — Medication 500 MILLIGRAM(S): at 12:58

## 2019-01-01 RX ADMIN — Medication 3 MILLILITER(S): at 08:13

## 2019-01-01 RX ADMIN — Medication 2: at 18:32

## 2019-01-01 RX ADMIN — INSULIN GLARGINE 10 UNIT(S): 100 INJECTION, SOLUTION SUBCUTANEOUS at 22:36

## 2019-01-01 RX ADMIN — Medication 3 MILLILITER(S): at 21:41

## 2019-01-01 RX ADMIN — Medication 6 UNIT(S): at 12:09

## 2019-01-01 RX ADMIN — INSULIN GLARGINE 8 UNIT(S): 100 INJECTION, SOLUTION SUBCUTANEOUS at 21:34

## 2019-01-01 RX ADMIN — Medication 650 MILLIGRAM(S): at 12:00

## 2019-01-01 RX ADMIN — Medication 50 MILLIGRAM(S): at 06:04

## 2019-01-01 RX ADMIN — MONTELUKAST 10 MILLIGRAM(S): 4 TABLET, CHEWABLE ORAL at 12:37

## 2019-01-01 RX ADMIN — Medication 40 MILLIGRAM(S): at 12:23

## 2019-01-01 RX ADMIN — Medication 100 MILLIGRAM(S): at 13:04

## 2019-01-01 RX ADMIN — INSULIN GLARGINE 12 UNIT(S): 100 INJECTION, SOLUTION SUBCUTANEOUS at 21:59

## 2019-01-01 RX ADMIN — ENTECAVIR 0.5 MILLIGRAM(S): 0.5 TABLET ORAL at 06:10

## 2019-01-01 RX ADMIN — Medication 6: at 12:54

## 2019-01-01 RX ADMIN — Medication 0.5 MILLIGRAM(S): at 15:57

## 2019-01-01 RX ADMIN — PANTOPRAZOLE SODIUM 40 MILLIGRAM(S): 20 TABLET, DELAYED RELEASE ORAL at 09:44

## 2019-01-01 RX ADMIN — Medication 0.5 MILLIGRAM(S): at 23:01

## 2019-01-01 RX ADMIN — ENTECAVIR 0.5 MILLIGRAM(S): 0.5 TABLET ORAL at 18:13

## 2019-01-01 RX ADMIN — ATENOLOL 25 MILLIGRAM(S): 25 TABLET ORAL at 05:17

## 2019-01-01 RX ADMIN — SERTRALINE 50 MILLIGRAM(S): 25 TABLET, FILM COATED ORAL at 12:17

## 2019-01-01 RX ADMIN — Medication 120 MILLIGRAM(S): at 02:30

## 2019-01-01 RX ADMIN — Medication 8 UNIT(S): at 12:32

## 2019-01-01 RX ADMIN — ZOLPIDEM TARTRATE 5 MILLIGRAM(S): 10 TABLET ORAL at 22:08

## 2019-01-01 RX ADMIN — Medication 50 MILLIGRAM(S): at 05:02

## 2019-01-01 RX ADMIN — SERTRALINE 50 MILLIGRAM(S): 25 TABLET, FILM COATED ORAL at 11:28

## 2019-01-01 RX ADMIN — CHLORHEXIDINE GLUCONATE 1 APPLICATION(S): 213 SOLUTION TOPICAL at 12:18

## 2019-01-01 RX ADMIN — Medication 6: at 18:08

## 2019-01-01 RX ADMIN — ENTECAVIR 0.5 MILLIGRAM(S): 0.5 TABLET ORAL at 12:00

## 2019-01-01 RX ADMIN — Medication 5 MILLIGRAM(S): at 02:55

## 2019-01-01 RX ADMIN — ALBUTEROL 2 PUFF(S): 90 AEROSOL, METERED ORAL at 11:16

## 2019-01-01 RX ADMIN — TIOTROPIUM BROMIDE 1 CAPSULE(S): 18 CAPSULE ORAL; RESPIRATORY (INHALATION) at 11:18

## 2019-01-01 RX ADMIN — Medication 50 MILLIGRAM(S): at 17:43

## 2019-01-01 RX ADMIN — Medication 8 UNIT(S): at 17:30

## 2019-01-01 RX ADMIN — ERTAPENEM SODIUM 120 MILLIGRAM(S): 1 INJECTION, POWDER, LYOPHILIZED, FOR SOLUTION INTRAMUSCULAR; INTRAVENOUS at 17:55

## 2019-01-01 RX ADMIN — ALBUTEROL 2 PUFF(S): 90 AEROSOL, METERED ORAL at 06:10

## 2019-01-01 RX ADMIN — Medication 50 MILLIGRAM(S): at 18:34

## 2019-01-01 RX ADMIN — Medication 4: at 17:06

## 2019-01-01 RX ADMIN — Medication 8 UNIT(S): at 12:41

## 2019-01-01 RX ADMIN — Medication 110 MILLIGRAM(S): at 11:50

## 2019-01-01 RX ADMIN — Medication 3: at 12:57

## 2019-01-01 RX ADMIN — TIOTROPIUM BROMIDE 1 CAPSULE(S): 18 CAPSULE ORAL; RESPIRATORY (INHALATION) at 12:16

## 2019-01-01 RX ADMIN — ALBUTEROL 2 PUFF(S): 90 AEROSOL, METERED ORAL at 06:31

## 2019-01-01 RX ADMIN — ATORVASTATIN CALCIUM 10 MILLIGRAM(S): 80 TABLET, FILM COATED ORAL at 21:35

## 2019-01-01 RX ADMIN — Medication 30 MILLIGRAM(S): at 23:04

## 2019-01-01 RX ADMIN — Medication 0.1 MILLIGRAM(S): at 18:27

## 2019-01-01 RX ADMIN — Medication 100 MILLIGRAM(S): at 15:29

## 2019-01-01 RX ADMIN — Medication 100 MILLIGRAM(S): at 22:43

## 2019-01-01 RX ADMIN — Medication 100 MILLIGRAM(S): at 11:10

## 2019-01-01 RX ADMIN — RITUXIMAB 600 MILLIGRAM(S): 10 INJECTION, SOLUTION INTRAVENOUS at 14:39

## 2019-01-01 RX ADMIN — Medication 40 MILLIGRAM(S): at 05:16

## 2019-01-01 RX ADMIN — ATORVASTATIN CALCIUM 10 MILLIGRAM(S): 80 TABLET, FILM COATED ORAL at 23:00

## 2019-01-01 RX ADMIN — Medication 6 UNIT(S): at 09:48

## 2019-01-01 RX ADMIN — Medication 30 MILLIGRAM(S): at 05:48

## 2019-01-01 RX ADMIN — Medication 6 UNIT(S): at 14:18

## 2019-01-01 RX ADMIN — Medication 50 MILLIGRAM(S): at 13:42

## 2019-01-01 RX ADMIN — Medication 2: at 12:48

## 2019-01-01 RX ADMIN — SERTRALINE 50 MILLIGRAM(S): 25 TABLET, FILM COATED ORAL at 12:46

## 2019-01-01 RX ADMIN — Medication 0.1 MILLIGRAM(S): at 17:36

## 2019-01-01 RX ADMIN — ENOXAPARIN SODIUM 30 MILLIGRAM(S): 100 INJECTION SUBCUTANEOUS at 11:00

## 2019-01-01 RX ADMIN — Medication 0.1 MILLIGRAM(S): at 06:04

## 2019-01-01 RX ADMIN — Medication 100 MILLIGRAM(S): at 05:08

## 2019-01-01 RX ADMIN — Medication 3 MILLILITER(S): at 11:03

## 2019-01-01 RX ADMIN — Medication 3 MILLILITER(S): at 06:37

## 2019-01-01 RX ADMIN — Medication 60 MILLIGRAM(S): at 06:26

## 2019-01-01 RX ADMIN — Medication 8 UNIT(S): at 17:17

## 2019-01-01 RX ADMIN — Medication 8 UNIT(S): at 18:12

## 2019-01-01 RX ADMIN — ENTECAVIR 0.5 MILLIGRAM(S): 0.5 TABLET ORAL at 11:17

## 2019-01-01 RX ADMIN — Medication 200 MILLIGRAM(S): at 22:05

## 2019-01-01 RX ADMIN — ZOLPIDEM TARTRATE 5 MILLIGRAM(S): 10 TABLET ORAL at 23:05

## 2019-01-01 RX ADMIN — ATORVASTATIN CALCIUM 10 MILLIGRAM(S): 80 TABLET, FILM COATED ORAL at 22:05

## 2019-01-01 RX ADMIN — Medication 6 UNIT(S): at 18:40

## 2019-01-01 RX ADMIN — Medication 0.5 MILLIGRAM(S): at 10:45

## 2019-01-01 RX ADMIN — MEROPENEM 100 MILLIGRAM(S): 1 INJECTION INTRAVENOUS at 05:49

## 2019-01-01 RX ADMIN — Medication 1 TABLET(S): at 11:26

## 2019-01-01 RX ADMIN — Medication 1 TABLET(S): at 08:49

## 2019-01-01 RX ADMIN — SERTRALINE 50 MILLIGRAM(S): 25 TABLET, FILM COATED ORAL at 11:26

## 2019-01-01 RX ADMIN — Medication 0.5 MILLIGRAM(S): at 09:47

## 2019-01-01 RX ADMIN — Medication 8 UNIT(S): at 08:47

## 2019-01-01 RX ADMIN — ENTECAVIR 0.5 MILLIGRAM(S): 0.5 TABLET ORAL at 11:34

## 2019-01-01 RX ADMIN — SENNA PLUS 2 TABLET(S): 8.6 TABLET ORAL at 23:15

## 2019-01-01 RX ADMIN — ENTECAVIR 0.5 MILLIGRAM(S): 0.5 TABLET ORAL at 11:26

## 2019-01-01 RX ADMIN — INSULIN HUMAN 6 UNIT(S)/HR: 100 INJECTION, SOLUTION SUBCUTANEOUS at 12:51

## 2019-01-01 RX ADMIN — ERTAPENEM SODIUM 120 MILLIGRAM(S): 1 INJECTION, POWDER, LYOPHILIZED, FOR SOLUTION INTRAMUSCULAR; INTRAVENOUS at 13:47

## 2019-01-01 RX ADMIN — Medication 70 MILLIGRAM(S): at 06:36

## 2019-01-01 RX ADMIN — WARFARIN SODIUM 5 MILLIGRAM(S): 2.5 TABLET ORAL at 17:44

## 2019-01-01 RX ADMIN — Medication 0.1 MILLIGRAM(S): at 05:17

## 2019-01-01 RX ADMIN — Medication 0.1 MILLIGRAM(S): at 05:05

## 2019-01-01 RX ADMIN — Medication 4: at 17:38

## 2019-01-01 RX ADMIN — Medication 100 MILLIGRAM(S): at 17:06

## 2019-01-01 RX ADMIN — Medication 50 MILLIGRAM(S): at 07:01

## 2019-01-01 RX ADMIN — Medication 100 MILLIGRAM(S): at 06:39

## 2019-01-01 RX ADMIN — PIPERACILLIN AND TAZOBACTAM 200 GRAM(S): 4; .5 INJECTION, POWDER, LYOPHILIZED, FOR SOLUTION INTRAVENOUS at 20:28

## 2019-01-01 RX ADMIN — Medication 0.5 MILLIGRAM(S): at 21:29

## 2019-01-01 RX ADMIN — Medication 40 MILLIGRAM(S): at 13:40

## 2019-01-01 RX ADMIN — Medication 100 MILLIGRAM(S): at 11:49

## 2019-01-01 RX ADMIN — Medication 2: at 08:30

## 2019-01-01 RX ADMIN — Medication 100 MILLIGRAM(S): at 14:54

## 2019-01-01 RX ADMIN — Medication 0.5 MILLIGRAM(S): at 22:03

## 2019-01-01 RX ADMIN — MAGNESIUM OXIDE 400 MG ORAL TABLET 400 MILLIGRAM(S): 241.3 TABLET ORAL at 09:33

## 2019-01-01 RX ADMIN — Medication 4: at 12:38

## 2019-01-01 RX ADMIN — Medication 8 UNIT(S): at 08:43

## 2019-01-01 RX ADMIN — Medication 60 MILLIGRAM(S): at 05:23

## 2019-01-01 RX ADMIN — HEPARIN SODIUM 5000 UNIT(S): 5000 INJECTION INTRAVENOUS; SUBCUTANEOUS at 17:43

## 2019-01-01 RX ADMIN — TIOTROPIUM BROMIDE 1 CAPSULE(S): 18 CAPSULE ORAL; RESPIRATORY (INHALATION) at 11:36

## 2019-01-01 RX ADMIN — Medication 60 MILLIGRAM(S): at 04:26

## 2019-01-01 RX ADMIN — Medication 5 MILLIGRAM(S): at 21:22

## 2019-01-01 RX ADMIN — MONTELUKAST 10 MILLIGRAM(S): 4 TABLET, CHEWABLE ORAL at 11:57

## 2019-01-01 RX ADMIN — Medication 40 MILLIGRAM(S): at 05:08

## 2019-01-01 RX ADMIN — Medication 100 MILLIGRAM(S): at 13:06

## 2019-01-01 RX ADMIN — Medication 108 MILLIGRAM(S): at 12:51

## 2019-01-01 RX ADMIN — PANTOPRAZOLE SODIUM 40 MILLIGRAM(S): 20 TABLET, DELAYED RELEASE ORAL at 05:21

## 2019-01-01 RX ADMIN — Medication 0.5 MILLIGRAM(S): at 05:12

## 2019-01-01 RX ADMIN — Medication 8 UNIT(S): at 08:59

## 2019-01-01 RX ADMIN — Medication 100 MILLIGRAM(S): at 23:15

## 2019-01-01 RX ADMIN — MIDODRINE HYDROCHLORIDE 10 MILLIGRAM(S): 2.5 TABLET ORAL at 18:11

## 2019-01-01 RX ADMIN — MONTELUKAST 10 MILLIGRAM(S): 4 TABLET, CHEWABLE ORAL at 11:32

## 2019-01-01 RX ADMIN — Medication 75 MILLIGRAM(S): at 15:50

## 2019-01-01 RX ADMIN — Medication 6 UNIT(S): at 08:19

## 2019-01-01 RX ADMIN — ATORVASTATIN CALCIUM 10 MILLIGRAM(S): 80 TABLET, FILM COATED ORAL at 21:15

## 2019-01-01 RX ADMIN — SERTRALINE 50 MILLIGRAM(S): 25 TABLET, FILM COATED ORAL at 12:58

## 2019-01-01 RX ADMIN — Medication 500 MILLIGRAM(S): at 11:26

## 2019-01-01 RX ADMIN — Medication 0.1 MILLIGRAM(S): at 00:14

## 2019-01-01 RX ADMIN — Medication 60 MILLIGRAM(S): at 06:16

## 2019-01-01 RX ADMIN — Medication 30 MILLIGRAM(S): at 21:10

## 2019-01-01 RX ADMIN — Medication 0.5 MILLIGRAM(S): at 11:34

## 2019-01-01 RX ADMIN — Medication 3 MILLILITER(S): at 09:47

## 2019-01-01 RX ADMIN — Medication 50 MILLIGRAM(S): at 05:08

## 2019-01-01 RX ADMIN — ENOXAPARIN SODIUM 30 MILLIGRAM(S): 100 INJECTION SUBCUTANEOUS at 12:10

## 2019-01-01 RX ADMIN — ATORVASTATIN CALCIUM 10 MILLIGRAM(S): 80 TABLET, FILM COATED ORAL at 21:54

## 2019-01-01 RX ADMIN — Medication 1 TABLET(S): at 13:15

## 2019-01-01 RX ADMIN — Medication 650 MILLIGRAM(S): at 21:35

## 2019-01-01 RX ADMIN — Medication 0.06 MILLIGRAM(S): at 05:27

## 2019-01-01 RX ADMIN — MONTELUKAST 10 MILLIGRAM(S): 4 TABLET, CHEWABLE ORAL at 21:34

## 2019-01-01 RX ADMIN — MEROPENEM 100 MILLIGRAM(S): 1 INJECTION INTRAVENOUS at 12:31

## 2019-01-01 RX ADMIN — Medication 15 GRAM(S): at 06:54

## 2019-01-01 RX ADMIN — Medication 600 MILLIGRAM(S): at 00:38

## 2019-01-01 RX ADMIN — Medication 650 MILLIGRAM(S): at 00:04

## 2019-01-01 RX ADMIN — Medication 30 MILLIGRAM(S): at 21:23

## 2019-01-01 RX ADMIN — Medication 100 MILLIGRAM(S): at 05:34

## 2019-01-01 RX ADMIN — Medication 100 MILLIGRAM(S): at 21:15

## 2019-01-01 RX ADMIN — Medication 1000 MILLIGRAM(S): at 23:30

## 2019-01-01 RX ADMIN — Medication 50 MILLIGRAM(S): at 07:14

## 2019-01-01 RX ADMIN — Medication 3 MILLILITER(S): at 15:40

## 2019-01-01 RX ADMIN — Medication 0.5 MILLIGRAM(S): at 22:14

## 2019-01-01 RX ADMIN — APIXABAN 2.5 MILLIGRAM(S): 2.5 TABLET, FILM COATED ORAL at 06:39

## 2019-01-01 RX ADMIN — Medication 8 UNIT(S): at 12:54

## 2019-01-01 RX ADMIN — Medication 100 MILLIGRAM(S): at 22:00

## 2019-01-01 RX ADMIN — INSULIN GLARGINE 10 UNIT(S): 100 INJECTION, SOLUTION SUBCUTANEOUS at 21:33

## 2019-01-01 RX ADMIN — Medication 100 MILLIGRAM(S): at 15:15

## 2019-01-01 RX ADMIN — Medication 100 MILLIGRAM(S): at 05:13

## 2019-01-01 RX ADMIN — Medication 110 MILLIGRAM(S): at 17:16

## 2019-01-01 RX ADMIN — PANTOPRAZOLE SODIUM 40 MILLIGRAM(S): 20 TABLET, DELAYED RELEASE ORAL at 05:23

## 2019-01-01 RX ADMIN — Medication 30 MILLIGRAM(S): at 23:05

## 2019-01-01 RX ADMIN — Medication 0.25 MILLIGRAM(S): at 19:54

## 2019-01-01 RX ADMIN — Medication 250 MILLIGRAM(S): at 12:10

## 2019-01-01 RX ADMIN — Medication 100 MILLIGRAM(S): at 17:50

## 2019-01-01 RX ADMIN — Medication 0.5 MILLIGRAM(S): at 11:05

## 2019-01-01 RX ADMIN — Medication 650 MILLIGRAM(S): at 11:15

## 2019-01-01 RX ADMIN — Medication 4: at 11:56

## 2019-01-01 RX ADMIN — Medication 1 TABLET(S): at 12:58

## 2019-01-01 RX ADMIN — Medication 0.25 MILLIGRAM(S): at 12:38

## 2019-01-01 RX ADMIN — ALBUTEROL 2 PUFF(S): 90 AEROSOL, METERED ORAL at 05:42

## 2019-01-01 RX ADMIN — Medication 60 MILLIGRAM(S): at 05:47

## 2019-01-01 RX ADMIN — Medication 100 MILLIGRAM(S): at 11:27

## 2019-01-01 RX ADMIN — Medication 2: at 17:07

## 2019-01-01 RX ADMIN — ENOXAPARIN SODIUM 70 MILLIGRAM(S): 100 INJECTION SUBCUTANEOUS at 09:05

## 2019-01-01 RX ADMIN — Medication 3 MILLIGRAM(S): at 21:29

## 2019-01-01 RX ADMIN — Medication 100 MILLIGRAM(S): at 13:31

## 2019-01-01 RX ADMIN — Medication 0.5 MILLIGRAM(S): at 23:31

## 2019-01-01 RX ADMIN — Medication 110 MILLIGRAM(S): at 12:49

## 2019-01-01 RX ADMIN — Medication 0.1 MILLIGRAM(S): at 17:18

## 2019-01-01 RX ADMIN — Medication 2: at 14:29

## 2019-01-01 RX ADMIN — Medication 4: at 18:13

## 2019-01-01 RX ADMIN — Medication 37.5 MILLIGRAM(S): at 05:42

## 2019-01-01 RX ADMIN — Medication 0.5 MILLIGRAM(S): at 23:00

## 2019-01-01 RX ADMIN — Medication 180 MILLIGRAM(S): at 06:26

## 2019-01-01 RX ADMIN — Medication 650 MILLIGRAM(S): at 12:32

## 2019-01-01 RX ADMIN — Medication 30 MILLIGRAM(S): at 05:08

## 2019-01-01 RX ADMIN — Medication 650 MILLIGRAM(S): at 12:15

## 2019-01-01 RX ADMIN — PANTOPRAZOLE SODIUM 40 MILLIGRAM(S): 20 TABLET, DELAYED RELEASE ORAL at 05:13

## 2019-01-01 RX ADMIN — Medication 650 MILLIGRAM(S): at 05:08

## 2019-01-01 RX ADMIN — INSULIN GLARGINE 5 UNIT(S): 100 INJECTION, SOLUTION SUBCUTANEOUS at 22:01

## 2019-01-01 RX ADMIN — Medication 2: at 09:18

## 2019-01-01 RX ADMIN — Medication 60 MILLIGRAM(S): at 06:39

## 2019-01-01 RX ADMIN — PANTOPRAZOLE SODIUM 40 MILLIGRAM(S): 20 TABLET, DELAYED RELEASE ORAL at 05:30

## 2019-01-01 RX ADMIN — SERTRALINE 50 MILLIGRAM(S): 25 TABLET, FILM COATED ORAL at 13:32

## 2019-01-01 RX ADMIN — Medication 50 MILLIGRAM(S): at 18:17

## 2019-01-01 RX ADMIN — Medication 3: at 13:04

## 2019-01-01 RX ADMIN — Medication 4: at 17:20

## 2019-01-01 RX ADMIN — ALBUTEROL 2 PUFF(S): 90 AEROSOL, METERED ORAL at 05:44

## 2019-01-01 RX ADMIN — Medication 60 MILLIGRAM(S): at 05:13

## 2019-01-01 RX ADMIN — Medication 8: at 12:43

## 2019-01-01 RX ADMIN — Medication 50 GRAM(S): at 01:49

## 2019-01-01 RX ADMIN — MONTELUKAST 10 MILLIGRAM(S): 4 TABLET, CHEWABLE ORAL at 13:31

## 2019-01-01 RX ADMIN — Medication 500 MILLIGRAM(S): at 11:17

## 2019-01-01 RX ADMIN — APIXABAN 2.5 MILLIGRAM(S): 2.5 TABLET, FILM COATED ORAL at 05:13

## 2019-01-01 RX ADMIN — ENOXAPARIN SODIUM 30 MILLIGRAM(S): 100 INJECTION SUBCUTANEOUS at 12:23

## 2019-01-01 RX ADMIN — Medication 40 MILLIGRAM(S): at 05:46

## 2019-01-01 RX ADMIN — Medication 500 MILLIGRAM(S): at 12:16

## 2019-01-01 RX ADMIN — MEROPENEM 100 MILLIGRAM(S): 1 INJECTION INTRAVENOUS at 17:54

## 2019-01-01 RX ADMIN — Medication 0.5 MILLIGRAM(S): at 03:09

## 2019-01-01 RX ADMIN — Medication 2: at 08:35

## 2019-01-01 RX ADMIN — Medication 3 MILLILITER(S): at 09:44

## 2019-01-01 RX ADMIN — MONTELUKAST 10 MILLIGRAM(S): 4 TABLET, CHEWABLE ORAL at 21:12

## 2019-01-01 RX ADMIN — Medication 4: at 12:01

## 2019-01-01 RX ADMIN — CHLORHEXIDINE GLUCONATE 1 APPLICATION(S): 213 SOLUTION TOPICAL at 11:47

## 2019-01-01 RX ADMIN — SENNA PLUS 2 TABLET(S): 8.6 TABLET ORAL at 22:05

## 2019-01-01 RX ADMIN — MIDODRINE HYDROCHLORIDE 10 MILLIGRAM(S): 2.5 TABLET ORAL at 05:33

## 2019-01-01 RX ADMIN — Medication 80 MILLIGRAM(S): at 13:38

## 2019-01-01 RX ADMIN — ALBUTEROL 2 PUFF(S): 90 AEROSOL, METERED ORAL at 17:37

## 2019-01-01 RX ADMIN — Medication 3 MILLILITER(S): at 11:58

## 2019-01-01 RX ADMIN — Medication 0.5 MILLIGRAM(S): at 22:39

## 2019-01-01 RX ADMIN — Medication 40 MILLIGRAM(S): at 12:45

## 2019-01-01 RX ADMIN — Medication 1: at 08:38

## 2019-01-01 RX ADMIN — SERTRALINE 50 MILLIGRAM(S): 25 TABLET, FILM COATED ORAL at 12:50

## 2019-01-01 RX ADMIN — Medication 2: at 12:41

## 2019-01-01 RX ADMIN — Medication 50 MILLIGRAM(S): at 17:35

## 2019-01-01 RX ADMIN — Medication 8 UNIT(S): at 08:18

## 2019-01-01 RX ADMIN — ENOXAPARIN SODIUM 30 MILLIGRAM(S): 100 INJECTION SUBCUTANEOUS at 12:38

## 2019-01-01 RX ADMIN — Medication 20 MILLIGRAM(S): at 18:33

## 2019-01-01 RX ADMIN — Medication 4: at 13:26

## 2019-01-01 RX ADMIN — Medication 2: at 13:54

## 2019-01-01 RX ADMIN — ERTAPENEM SODIUM 120 MILLIGRAM(S): 1 INJECTION, POWDER, LYOPHILIZED, FOR SOLUTION INTRAMUSCULAR; INTRAVENOUS at 13:38

## 2019-01-01 RX ADMIN — INSULIN GLARGINE 5 UNIT(S): 100 INJECTION, SOLUTION SUBCUTANEOUS at 21:55

## 2019-01-01 RX ADMIN — MEROPENEM 100 MILLIGRAM(S): 1 INJECTION INTRAVENOUS at 18:35

## 2019-01-01 RX ADMIN — Medication 6 UNIT(S): at 17:48

## 2019-01-01 RX ADMIN — SERTRALINE 25 MILLIGRAM(S): 25 TABLET, FILM COATED ORAL at 12:35

## 2019-01-01 RX ADMIN — Medication 100 MILLIGRAM(S): at 17:55

## 2019-01-01 RX ADMIN — Medication 200 MILLIGRAM(S): at 22:01

## 2019-01-01 RX ADMIN — Medication 650 MILLIGRAM(S): at 06:20

## 2019-01-01 RX ADMIN — ERTAPENEM SODIUM 120 MILLIGRAM(S): 1 INJECTION, POWDER, LYOPHILIZED, FOR SOLUTION INTRAMUSCULAR; INTRAVENOUS at 11:59

## 2019-01-01 RX ADMIN — Medication 100 MILLIGRAM(S): at 17:36

## 2019-01-01 RX ADMIN — Medication 50 MILLIGRAM(S): at 17:11

## 2019-01-01 RX ADMIN — Medication 4: at 12:41

## 2019-01-01 RX ADMIN — Medication 4: at 12:53

## 2019-01-01 RX ADMIN — TIOTROPIUM BROMIDE 1 CAPSULE(S): 18 CAPSULE ORAL; RESPIRATORY (INHALATION) at 11:27

## 2019-01-01 RX ADMIN — Medication 400 MILLIGRAM(S): at 23:16

## 2019-01-01 RX ADMIN — Medication 0.5 MILLIGRAM(S): at 21:50

## 2019-01-01 RX ADMIN — Medication 40 MILLIGRAM(S): at 06:25

## 2019-01-01 RX ADMIN — Medication 3 MILLILITER(S): at 06:10

## 2019-01-01 RX ADMIN — ALBUTEROL 2 PUFF(S): 90 AEROSOL, METERED ORAL at 18:15

## 2019-01-01 RX ADMIN — MIDODRINE HYDROCHLORIDE 10 MILLIGRAM(S): 2.5 TABLET ORAL at 17:37

## 2019-01-01 RX ADMIN — MONTELUKAST 10 MILLIGRAM(S): 4 TABLET, CHEWABLE ORAL at 12:24

## 2019-01-01 RX ADMIN — ALBUTEROL 2 PUFF(S): 90 AEROSOL, METERED ORAL at 05:13

## 2019-01-01 RX ADMIN — MEROPENEM 100 MILLIGRAM(S): 1 INJECTION INTRAVENOUS at 01:05

## 2019-01-01 RX ADMIN — SERTRALINE 50 MILLIGRAM(S): 25 TABLET, FILM COATED ORAL at 14:00

## 2019-01-01 RX ADMIN — HEPARIN SODIUM 5000 UNIT(S): 5000 INJECTION INTRAVENOUS; SUBCUTANEOUS at 17:11

## 2019-01-01 RX ADMIN — OXYCODONE AND ACETAMINOPHEN 1 TABLET(S): 5; 325 TABLET ORAL at 14:35

## 2019-01-01 RX ADMIN — Medication 3 UNIT(S): at 09:13

## 2019-01-01 RX ADMIN — SENNA PLUS 2 TABLET(S): 8.6 TABLET ORAL at 21:15

## 2019-01-01 RX ADMIN — ALBUTEROL 2 PUFF(S): 90 AEROSOL, METERED ORAL at 12:58

## 2019-01-01 RX ADMIN — ERTAPENEM SODIUM 100 MILLIGRAM(S): 1 INJECTION, POWDER, LYOPHILIZED, FOR SOLUTION INTRAMUSCULAR; INTRAVENOUS at 13:39

## 2019-01-01 RX ADMIN — Medication 0.5 MILLIGRAM(S): at 05:08

## 2019-01-01 RX ADMIN — Medication 100 MILLIGRAM(S): at 17:24

## 2019-01-01 RX ADMIN — Medication 650 MILLIGRAM(S): at 13:15

## 2019-01-01 RX ADMIN — Medication 50 MILLIGRAM(S): at 11:49

## 2019-01-01 RX ADMIN — FAMOTIDINE 104 MILLIGRAM(S): 10 INJECTION INTRAVENOUS at 13:23

## 2019-01-01 RX ADMIN — MEROPENEM 100 MILLIGRAM(S): 1 INJECTION INTRAVENOUS at 17:16

## 2019-01-01 RX ADMIN — Medication 2: at 18:09

## 2019-01-01 RX ADMIN — Medication 0.1 MILLIGRAM(S): at 04:42

## 2019-01-01 RX ADMIN — Medication 0.1 MILLIGRAM(S): at 17:42

## 2019-01-01 RX ADMIN — Medication 0.5 MILLIGRAM(S): at 05:21

## 2019-01-01 RX ADMIN — MONTELUKAST 10 MILLIGRAM(S): 4 TABLET, CHEWABLE ORAL at 11:44

## 2019-01-01 RX ADMIN — ATORVASTATIN CALCIUM 10 MILLIGRAM(S): 80 TABLET, FILM COATED ORAL at 22:00

## 2019-01-01 RX ADMIN — Medication 650 MILLIGRAM(S): at 13:23

## 2019-01-01 RX ADMIN — PANTOPRAZOLE SODIUM 40 MILLIGRAM(S): 20 TABLET, DELAYED RELEASE ORAL at 05:42

## 2019-01-01 RX ADMIN — ZOLPIDEM TARTRATE 5 MILLIGRAM(S): 10 TABLET ORAL at 22:15

## 2019-01-01 RX ADMIN — SERTRALINE 50 MILLIGRAM(S): 25 TABLET, FILM COATED ORAL at 11:22

## 2019-01-01 RX ADMIN — Medication 80 MILLIGRAM(S): at 14:00

## 2019-01-01 RX ADMIN — Medication 50 MILLIGRAM(S): at 05:46

## 2019-01-01 RX ADMIN — MONTELUKAST 10 MILLIGRAM(S): 4 TABLET, CHEWABLE ORAL at 12:08

## 2019-01-01 RX ADMIN — Medication 101 MILLIGRAM(S): at 15:24

## 2019-01-01 RX ADMIN — Medication 650 MILLIGRAM(S): at 23:04

## 2019-01-01 RX ADMIN — SODIUM CHLORIDE 1000 MILLILITER(S): 9 INJECTION INTRAMUSCULAR; INTRAVENOUS; SUBCUTANEOUS at 03:30

## 2019-01-01 RX ADMIN — Medication 5 MILLIGRAM(S): at 22:00

## 2019-01-01 RX ADMIN — Medication 2: at 12:54

## 2019-01-01 RX ADMIN — Medication 5 MILLIGRAM(S): at 03:19

## 2019-01-01 RX ADMIN — APIXABAN 2.5 MILLIGRAM(S): 2.5 TABLET, FILM COATED ORAL at 18:10

## 2019-01-01 RX ADMIN — Medication 6 UNIT(S): at 17:31

## 2019-01-01 RX ADMIN — Medication 0.5 MILLIGRAM(S): at 10:39

## 2019-01-01 RX ADMIN — MEROPENEM 100 MILLIGRAM(S): 1 INJECTION INTRAVENOUS at 06:34

## 2019-01-01 RX ADMIN — SODIUM CHLORIDE 75 MILLILITER(S): 9 INJECTION, SOLUTION INTRAVENOUS at 05:15

## 2019-01-01 RX ADMIN — Medication 3 MILLILITER(S): at 17:36

## 2019-01-01 RX ADMIN — Medication 8 UNIT(S): at 08:41

## 2019-01-01 RX ADMIN — ALBUTEROL 2 PUFF(S): 90 AEROSOL, METERED ORAL at 17:54

## 2019-01-01 RX ADMIN — Medication 3 MILLILITER(S): at 12:07

## 2019-01-01 RX ADMIN — CHLORHEXIDINE GLUCONATE 1 APPLICATION(S): 213 SOLUTION TOPICAL at 07:51

## 2019-01-01 RX ADMIN — Medication 4: at 18:26

## 2019-01-01 RX ADMIN — MEROPENEM 100 MILLIGRAM(S): 1 INJECTION INTRAVENOUS at 19:39

## 2019-01-01 RX ADMIN — Medication 6 UNIT(S): at 17:44

## 2019-01-01 RX ADMIN — Medication 180 MILLIGRAM(S): at 06:38

## 2019-01-01 RX ADMIN — APIXABAN 2.5 MILLIGRAM(S): 2.5 TABLET, FILM COATED ORAL at 17:13

## 2019-01-01 RX ADMIN — Medication 200 MILLIGRAM(S): at 22:00

## 2019-01-01 RX ADMIN — Medication 3 MILLILITER(S): at 05:34

## 2019-01-01 RX ADMIN — Medication 25 MILLIGRAM(S): at 21:34

## 2019-01-01 RX ADMIN — ENTECAVIR 0.5 MILLIGRAM(S): 0.5 TABLET ORAL at 12:35

## 2019-01-01 RX ADMIN — Medication 40 MILLIGRAM(S): at 21:34

## 2019-01-01 RX ADMIN — Medication 100 MILLIGRAM(S): at 06:16

## 2019-01-01 RX ADMIN — PANTOPRAZOLE SODIUM 40 MILLIGRAM(S): 20 TABLET, DELAYED RELEASE ORAL at 06:37

## 2019-01-01 RX ADMIN — Medication 1: at 08:49

## 2019-01-01 RX ADMIN — Medication 500 MILLIGRAM(S): at 13:43

## 2019-01-01 RX ADMIN — PANTOPRAZOLE SODIUM 40 MILLIGRAM(S): 20 TABLET, DELAYED RELEASE ORAL at 04:24

## 2019-01-01 RX ADMIN — Medication 4: at 08:43

## 2019-01-01 RX ADMIN — Medication 2: at 12:30

## 2019-01-01 RX ADMIN — Medication 3 MILLILITER(S): at 17:31

## 2019-01-01 RX ADMIN — Medication 8 UNIT(S): at 13:24

## 2019-01-01 RX ADMIN — MEROPENEM 100 MILLIGRAM(S): 1 INJECTION INTRAVENOUS at 05:17

## 2019-01-01 RX ADMIN — Medication 8 UNIT(S): at 17:04

## 2019-01-01 RX ADMIN — PANTOPRAZOLE SODIUM 40 MILLIGRAM(S): 20 TABLET, DELAYED RELEASE ORAL at 06:18

## 2019-01-01 RX ADMIN — Medication 0.5 MILLIGRAM(S): at 16:17

## 2019-01-01 RX ADMIN — MONTELUKAST 10 MILLIGRAM(S): 4 TABLET, CHEWABLE ORAL at 12:23

## 2019-01-01 RX ADMIN — Medication 0.5 MILLIGRAM(S): at 16:45

## 2019-01-01 RX ADMIN — Medication 100 MILLIGRAM(S): at 05:35

## 2019-01-01 RX ADMIN — Medication 100 MILLIGRAM(S): at 13:03

## 2019-01-01 RX ADMIN — Medication 40 MILLIGRAM(S): at 05:43

## 2019-01-01 RX ADMIN — ENTECAVIR 0.5 MILLIGRAM(S): 0.5 TABLET ORAL at 11:41

## 2019-01-01 RX ADMIN — Medication 30 MILLIGRAM(S): at 06:24

## 2019-01-01 RX ADMIN — Medication 12.5 MILLIGRAM(S): at 17:37

## 2019-01-01 RX ADMIN — MONTELUKAST 10 MILLIGRAM(S): 4 TABLET, CHEWABLE ORAL at 12:32

## 2019-01-01 RX ADMIN — MIDODRINE HYDROCHLORIDE 10 MILLIGRAM(S): 2.5 TABLET ORAL at 12:04

## 2019-01-01 RX ADMIN — Medication 50 MILLIGRAM(S): at 04:24

## 2019-01-01 RX ADMIN — MIDODRINE HYDROCHLORIDE 10 MILLIGRAM(S): 2.5 TABLET ORAL at 11:35

## 2019-01-01 RX ADMIN — MONTELUKAST 10 MILLIGRAM(S): 4 TABLET, CHEWABLE ORAL at 14:01

## 2019-01-01 RX ADMIN — SERTRALINE 50 MILLIGRAM(S): 25 TABLET, FILM COATED ORAL at 13:17

## 2019-01-01 RX ADMIN — Medication 0.1 MILLIGRAM(S): at 19:57

## 2019-01-01 RX ADMIN — PANTOPRAZOLE SODIUM 40 MILLIGRAM(S): 20 TABLET, DELAYED RELEASE ORAL at 06:10

## 2019-01-01 RX ADMIN — Medication 100 MILLIGRAM(S): at 21:56

## 2019-01-01 RX ADMIN — Medication 0.5 MILLIGRAM(S): at 19:47

## 2019-01-01 RX ADMIN — MONTELUKAST 10 MILLIGRAM(S): 4 TABLET, CHEWABLE ORAL at 12:46

## 2019-01-01 RX ADMIN — Medication 3 MILLILITER(S): at 22:31

## 2019-01-01 RX ADMIN — MONTELUKAST 10 MILLIGRAM(S): 4 TABLET, CHEWABLE ORAL at 11:56

## 2019-01-01 RX ADMIN — Medication 180 MILLIGRAM(S): at 05:08

## 2019-01-01 RX ADMIN — Medication 6: at 12:11

## 2019-01-01 RX ADMIN — MAGNESIUM OXIDE 400 MG ORAL TABLET 400 MILLIGRAM(S): 241.3 TABLET ORAL at 09:02

## 2019-01-01 RX ADMIN — ALBUTEROL 2 PUFF(S): 90 AEROSOL, METERED ORAL at 11:17

## 2019-01-01 RX ADMIN — Medication 0.5 MILLIGRAM(S): at 17:36

## 2019-01-01 RX ADMIN — Medication 3 MILLILITER(S): at 00:25

## 2019-01-01 RX ADMIN — Medication 200 MILLIGRAM(S): at 22:08

## 2019-01-01 RX ADMIN — Medication 0.5 MILLIGRAM(S): at 20:58

## 2019-01-01 RX ADMIN — SODIUM CHLORIDE 1400 MILLILITER(S): 9 INJECTION INTRAMUSCULAR; INTRAVENOUS; SUBCUTANEOUS at 13:30

## 2019-01-01 RX ADMIN — ALBUTEROL 2 PUFF(S): 90 AEROSOL, METERED ORAL at 20:30

## 2019-01-01 RX ADMIN — Medication 650 MILLIGRAM(S): at 12:08

## 2019-01-01 RX ADMIN — PANTOPRAZOLE SODIUM 40 MILLIGRAM(S): 20 TABLET, DELAYED RELEASE ORAL at 06:26

## 2019-01-01 RX ADMIN — Medication 200 MILLIGRAM(S): at 23:00

## 2019-01-01 RX ADMIN — Medication 0.5 MILLIGRAM(S): at 21:21

## 2019-01-01 RX ADMIN — Medication 1 TABLET(S): at 13:24

## 2019-01-01 RX ADMIN — Medication 50 MILLIGRAM(S): at 06:24

## 2019-01-01 RX ADMIN — SERTRALINE 50 MILLIGRAM(S): 25 TABLET, FILM COATED ORAL at 13:26

## 2019-01-01 RX ADMIN — ATORVASTATIN CALCIUM 10 MILLIGRAM(S): 80 TABLET, FILM COATED ORAL at 22:13

## 2019-01-01 RX ADMIN — PANTOPRAZOLE SODIUM 40 MILLIGRAM(S): 20 TABLET, DELAYED RELEASE ORAL at 00:30

## 2019-01-01 RX ADMIN — Medication 0.2 MILLIGRAM(S): at 21:48

## 2019-01-01 RX ADMIN — Medication 8 UNIT(S): at 12:44

## 2019-01-01 RX ADMIN — Medication 100 MILLIGRAM(S): at 17:30

## 2019-01-01 RX ADMIN — ATORVASTATIN CALCIUM 10 MILLIGRAM(S): 80 TABLET, FILM COATED ORAL at 21:33

## 2019-01-01 RX ADMIN — MEROPENEM 100 MILLIGRAM(S): 1 INJECTION INTRAVENOUS at 05:35

## 2019-01-01 RX ADMIN — Medication 125 MILLIGRAM(S): at 20:28

## 2019-01-01 RX ADMIN — Medication 50 MILLIGRAM(S): at 18:32

## 2019-01-01 RX ADMIN — PANTOPRAZOLE SODIUM 40 MILLIGRAM(S): 20 TABLET, DELAYED RELEASE ORAL at 05:08

## 2019-01-01 RX ADMIN — Medication 3 MILLIGRAM(S): at 21:16

## 2019-01-01 RX ADMIN — Medication 3 MILLILITER(S): at 17:14

## 2019-01-01 RX ADMIN — Medication 4: at 17:13

## 2019-01-01 RX ADMIN — Medication 0.1 MILLIGRAM(S): at 18:45

## 2019-01-01 RX ADMIN — Medication 650 MILLIGRAM(S): at 11:32

## 2019-01-01 RX ADMIN — Medication 0.2 MILLIGRAM(S): at 07:01

## 2019-01-01 RX ADMIN — Medication 100 MILLIGRAM(S): at 05:22

## 2019-01-01 RX ADMIN — WARFARIN SODIUM 3 MILLIGRAM(S): 2.5 TABLET ORAL at 18:13

## 2019-01-01 RX ADMIN — Medication 100 MILLIGRAM(S): at 00:38

## 2019-01-01 RX ADMIN — MONTELUKAST 10 MILLIGRAM(S): 4 TABLET, CHEWABLE ORAL at 13:45

## 2019-01-01 RX ADMIN — ATORVASTATIN CALCIUM 10 MILLIGRAM(S): 80 TABLET, FILM COATED ORAL at 21:36

## 2019-01-01 RX ADMIN — Medication 0.5 MILLIGRAM(S): at 18:30

## 2019-01-01 RX ADMIN — Medication 3 MILLILITER(S): at 12:01

## 2019-01-01 RX ADMIN — Medication 50 MILLIGRAM(S): at 17:12

## 2019-01-01 RX ADMIN — ENTECAVIR 0.5 MILLIGRAM(S): 0.5 TABLET ORAL at 21:30

## 2019-01-01 RX ADMIN — Medication 2: at 12:44

## 2019-01-01 RX ADMIN — Medication 3 MILLIGRAM(S): at 21:51

## 2019-01-01 RX ADMIN — ATORVASTATIN CALCIUM 10 MILLIGRAM(S): 80 TABLET, FILM COATED ORAL at 21:51

## 2019-01-01 RX ADMIN — MONTELUKAST 10 MILLIGRAM(S): 4 TABLET, CHEWABLE ORAL at 11:18

## 2019-01-01 RX ADMIN — ENTECAVIR 0.5 MILLIGRAM(S): 0.5 TABLET ORAL at 14:22

## 2019-01-01 RX ADMIN — HEPARIN SODIUM 5000 UNIT(S): 5000 INJECTION INTRAVENOUS; SUBCUTANEOUS at 18:33

## 2019-01-01 RX ADMIN — SERTRALINE 50 MILLIGRAM(S): 25 TABLET, FILM COATED ORAL at 11:59

## 2019-01-01 RX ADMIN — Medication 20 MILLIGRAM(S): at 06:04

## 2019-01-01 RX ADMIN — Medication 3 MILLILITER(S): at 11:59

## 2019-01-01 RX ADMIN — Medication 8 UNIT(S): at 17:07

## 2019-01-01 RX ADMIN — ALBUTEROL 2 PUFF(S): 90 AEROSOL, METERED ORAL at 11:35

## 2019-01-01 RX ADMIN — SERTRALINE 50 MILLIGRAM(S): 25 TABLET, FILM COATED ORAL at 11:50

## 2019-01-01 RX ADMIN — MONTELUKAST 10 MILLIGRAM(S): 4 TABLET, CHEWABLE ORAL at 13:17

## 2019-01-01 RX ADMIN — HEPARIN SODIUM 5000 UNIT(S): 5000 INJECTION INTRAVENOUS; SUBCUTANEOUS at 05:18

## 2019-01-01 RX ADMIN — Medication 4: at 17:17

## 2019-01-01 RX ADMIN — ERTAPENEM SODIUM 120 MILLIGRAM(S): 1 INJECTION, POWDER, LYOPHILIZED, FOR SOLUTION INTRAMUSCULAR; INTRAVENOUS at 12:07

## 2019-01-01 RX ADMIN — Medication 0.5 MILLIGRAM(S): at 12:57

## 2019-01-01 RX ADMIN — Medication 4: at 18:35

## 2019-01-01 RX ADMIN — ERTAPENEM SODIUM 120 MILLIGRAM(S): 1 INJECTION, POWDER, LYOPHILIZED, FOR SOLUTION INTRAMUSCULAR; INTRAVENOUS at 14:15

## 2019-01-01 RX ADMIN — Medication 8 UNIT(S): at 17:23

## 2019-01-01 RX ADMIN — TIOTROPIUM BROMIDE 1 CAPSULE(S): 18 CAPSULE ORAL; RESPIRATORY (INHALATION) at 12:24

## 2019-01-01 RX ADMIN — ALBUTEROL 2 PUFF(S): 90 AEROSOL, METERED ORAL at 05:47

## 2019-01-01 RX ADMIN — SERTRALINE 50 MILLIGRAM(S): 25 TABLET, FILM COATED ORAL at 12:37

## 2019-01-01 RX ADMIN — Medication 0.1 MILLIGRAM(S): at 17:12

## 2019-01-01 RX ADMIN — Medication 0.5 MILLIGRAM(S): at 08:49

## 2019-01-01 RX ADMIN — Medication 8 UNIT(S): at 12:38

## 2019-01-01 RX ADMIN — Medication 0.5 MILLIGRAM(S): at 05:45

## 2019-01-01 RX ADMIN — Medication 3 MILLIGRAM(S): at 22:02

## 2019-01-01 RX ADMIN — Medication 0.5 MILLIGRAM(S): at 14:14

## 2019-01-01 RX ADMIN — Medication 100 MILLIGRAM(S): at 05:30

## 2019-01-01 RX ADMIN — Medication 1000 MILLIGRAM(S): at 13:10

## 2019-01-01 RX ADMIN — MONTELUKAST 10 MILLIGRAM(S): 4 TABLET, CHEWABLE ORAL at 13:43

## 2019-01-01 RX ADMIN — SENNA PLUS 2 TABLET(S): 8.6 TABLET ORAL at 22:39

## 2019-01-01 RX ADMIN — Medication 180 MILLIGRAM(S): at 06:16

## 2019-01-01 RX ADMIN — Medication 20 MILLIGRAM(S): at 17:40

## 2019-01-01 RX ADMIN — PANTOPRAZOLE SODIUM 40 MILLIGRAM(S): 20 TABLET, DELAYED RELEASE ORAL at 07:47

## 2019-01-01 RX ADMIN — Medication 100 MILLIGRAM(S): at 12:47

## 2019-01-01 RX ADMIN — ATORVASTATIN CALCIUM 10 MILLIGRAM(S): 80 TABLET, FILM COATED ORAL at 22:01

## 2019-01-01 RX ADMIN — Medication 40 MILLIGRAM(S): at 11:32

## 2019-01-01 RX ADMIN — PANTOPRAZOLE SODIUM 40 MILLIGRAM(S): 20 TABLET, DELAYED RELEASE ORAL at 14:28

## 2019-01-01 RX ADMIN — SENNA PLUS 2 TABLET(S): 8.6 TABLET ORAL at 21:59

## 2019-01-01 RX ADMIN — SERTRALINE 50 MILLIGRAM(S): 25 TABLET, FILM COATED ORAL at 13:31

## 2019-01-01 RX ADMIN — Medication 1 TABLET(S): at 12:24

## 2019-01-01 RX ADMIN — POLYETHYLENE GLYCOL 3350 17 GRAM(S): 17 POWDER, FOR SOLUTION ORAL at 17:47

## 2019-01-01 RX ADMIN — Medication 30 MILLIGRAM(S): at 22:12

## 2019-01-01 RX ADMIN — Medication 4: at 17:31

## 2019-01-01 RX ADMIN — ENTECAVIR 0.5 MILLIGRAM(S): 0.5 TABLET ORAL at 11:28

## 2019-01-01 RX ADMIN — INSULIN GLARGINE 12 UNIT(S): 100 INJECTION, SOLUTION SUBCUTANEOUS at 22:44

## 2019-01-01 RX ADMIN — Medication 4: at 18:33

## 2019-01-01 RX ADMIN — MAGNESIUM OXIDE 400 MG ORAL TABLET 400 MILLIGRAM(S): 241.3 TABLET ORAL at 12:35

## 2019-01-01 RX ADMIN — Medication 70 MILLIGRAM(S): at 05:18

## 2019-01-01 RX ADMIN — ERTAPENEM SODIUM 120 MILLIGRAM(S): 1 INJECTION, POWDER, LYOPHILIZED, FOR SOLUTION INTRAMUSCULAR; INTRAVENOUS at 11:43

## 2019-01-01 RX ADMIN — Medication 10 UNIT(S): at 18:45

## 2019-01-01 RX ADMIN — Medication 6: at 13:07

## 2019-01-01 RX ADMIN — Medication 0.1 MILLIGRAM(S): at 05:22

## 2019-01-01 RX ADMIN — Medication 100 MILLIGRAM(S): at 17:31

## 2019-01-01 RX ADMIN — Medication 100 MILLIGRAM(S): at 22:30

## 2019-01-01 RX ADMIN — Medication 0.5 MILLIGRAM(S): at 10:56

## 2019-01-01 RX ADMIN — Medication 100 MILLIGRAM(S): at 21:45

## 2019-01-01 RX ADMIN — Medication 3 MILLILITER(S): at 17:18

## 2019-01-01 RX ADMIN — INSULIN GLARGINE 12 UNIT(S): 100 INJECTION, SOLUTION SUBCUTANEOUS at 21:54

## 2019-01-01 RX ADMIN — HEPARIN SODIUM 5000 UNIT(S): 5000 INJECTION INTRAVENOUS; SUBCUTANEOUS at 06:05

## 2019-01-01 RX ADMIN — Medication 30 MILLIGRAM(S): at 05:43

## 2019-01-01 RX ADMIN — Medication 3 MILLILITER(S): at 23:14

## 2019-01-01 RX ADMIN — Medication 180 MILLIGRAM(S): at 05:34

## 2019-01-01 RX ADMIN — Medication 100 MILLIGRAM(S): at 22:05

## 2019-01-01 RX ADMIN — Medication 3: at 09:00

## 2019-01-01 RX ADMIN — SERTRALINE 50 MILLIGRAM(S): 25 TABLET, FILM COATED ORAL at 22:13

## 2019-01-01 RX ADMIN — Medication 0.25 MILLIGRAM(S): at 21:25

## 2019-01-01 RX ADMIN — Medication 30 MILLIGRAM(S): at 15:22

## 2019-01-01 RX ADMIN — Medication 3 MILLILITER(S): at 05:33

## 2019-01-01 RX ADMIN — Medication 2: at 17:55

## 2019-01-01 RX ADMIN — Medication 3 MILLILITER(S): at 06:18

## 2019-01-01 RX ADMIN — Medication 0.2 MILLIGRAM(S): at 06:59

## 2019-01-01 RX ADMIN — Medication 100 MILLIGRAM(S): at 13:15

## 2019-01-01 RX ADMIN — SERTRALINE 50 MILLIGRAM(S): 25 TABLET, FILM COATED ORAL at 11:57

## 2019-01-01 RX ADMIN — Medication 30 MILLIGRAM(S): at 14:16

## 2019-01-01 RX ADMIN — PANTOPRAZOLE SODIUM 40 MILLIGRAM(S): 20 TABLET, DELAYED RELEASE ORAL at 06:48

## 2019-01-01 RX ADMIN — MONTELUKAST 10 MILLIGRAM(S): 4 TABLET, CHEWABLE ORAL at 11:59

## 2019-01-01 RX ADMIN — Medication 3 MILLILITER(S): at 17:23

## 2019-01-01 RX ADMIN — Medication 50 MILLIGRAM(S): at 05:18

## 2019-01-01 RX ADMIN — Medication 180 MILLIGRAM(S): at 13:39

## 2019-01-01 RX ADMIN — Medication 5 MILLIGRAM(S): at 15:11

## 2019-01-01 RX ADMIN — Medication 1: at 10:07

## 2019-01-01 RX ADMIN — ENTECAVIR 0.5 MILLIGRAM(S): 0.5 TABLET ORAL at 11:18

## 2019-01-01 RX ADMIN — Medication 2: at 13:24

## 2019-01-01 RX ADMIN — ALBUTEROL 2 PUFF(S): 90 AEROSOL, METERED ORAL at 22:06

## 2019-01-01 RX ADMIN — Medication 60 MILLIGRAM(S): at 05:41

## 2019-01-01 RX ADMIN — PANTOPRAZOLE SODIUM 40 MILLIGRAM(S): 20 TABLET, DELAYED RELEASE ORAL at 06:12

## 2019-01-01 RX ADMIN — SERTRALINE 50 MILLIGRAM(S): 25 TABLET, FILM COATED ORAL at 11:16

## 2019-01-01 RX ADMIN — Medication 10 UNIT(S): at 12:44

## 2019-01-01 RX ADMIN — Medication 4: at 17:44

## 2019-01-01 RX ADMIN — Medication 0.1 MILLIGRAM(S): at 06:33

## 2019-01-01 RX ADMIN — Medication 0.1 MILLIGRAM(S): at 05:48

## 2019-01-01 RX ADMIN — Medication 70 MILLIGRAM(S): at 06:21

## 2019-01-01 RX ADMIN — Medication 2: at 08:19

## 2019-01-01 RX ADMIN — Medication 3 MILLILITER(S): at 23:13

## 2019-01-01 RX ADMIN — ALBUTEROL 2 PUFF(S): 90 AEROSOL, METERED ORAL at 12:43

## 2019-01-01 RX ADMIN — INSULIN GLARGINE 5 UNIT(S): 100 INJECTION, SOLUTION SUBCUTANEOUS at 00:01

## 2019-01-01 RX ADMIN — MONTELUKAST 10 MILLIGRAM(S): 4 TABLET, CHEWABLE ORAL at 12:19

## 2019-01-01 RX ADMIN — Medication 40 MILLIGRAM(S): at 05:35

## 2019-01-01 RX ADMIN — Medication 0.1 MILLIGRAM(S): at 06:36

## 2019-01-01 RX ADMIN — ALBUTEROL 2 PUFF(S): 90 AEROSOL, METERED ORAL at 17:55

## 2019-01-01 RX ADMIN — Medication 20 MILLIGRAM(S): at 05:19

## 2019-01-01 RX ADMIN — Medication 60 MILLIGRAM(S): at 06:33

## 2019-01-01 RX ADMIN — Medication 30 MILLIGRAM(S): at 05:17

## 2019-01-01 RX ADMIN — Medication 8 UNIT(S): at 09:20

## 2019-01-01 RX ADMIN — Medication 180 MILLIGRAM(S): at 06:20

## 2019-01-01 RX ADMIN — MEROPENEM 100 MILLIGRAM(S): 1 INJECTION INTRAVENOUS at 05:26

## 2019-01-01 RX ADMIN — Medication 0.2 MILLIGRAM(S): at 14:01

## 2019-01-01 RX ADMIN — TIOTROPIUM BROMIDE 1 CAPSULE(S): 18 CAPSULE ORAL; RESPIRATORY (INHALATION) at 11:28

## 2019-01-01 RX ADMIN — Medication 60 MILLIGRAM(S): at 05:30

## 2019-01-01 RX ADMIN — ENTECAVIR 0.5 MILLIGRAM(S): 0.5 TABLET ORAL at 12:16

## 2019-01-01 RX ADMIN — Medication 650 MILLIGRAM(S): at 13:06

## 2019-01-01 RX ADMIN — ERTAPENEM SODIUM 120 MILLIGRAM(S): 1 INJECTION, POWDER, LYOPHILIZED, FOR SOLUTION INTRAMUSCULAR; INTRAVENOUS at 16:15

## 2019-01-01 RX ADMIN — Medication 60 MILLIGRAM(S): at 06:37

## 2019-01-01 RX ADMIN — Medication 0.1 MILLIGRAM(S): at 18:36

## 2019-01-01 RX ADMIN — SERTRALINE 50 MILLIGRAM(S): 25 TABLET, FILM COATED ORAL at 14:28

## 2019-01-01 RX ADMIN — Medication 75 MILLIGRAM(S): at 05:48

## 2019-01-01 RX ADMIN — Medication 0.2 MILLIGRAM(S): at 17:28

## 2019-01-01 RX ADMIN — PANTOPRAZOLE SODIUM 40 MILLIGRAM(S): 20 TABLET, DELAYED RELEASE ORAL at 05:49

## 2019-01-01 RX ADMIN — Medication 3 MILLILITER(S): at 11:45

## 2019-01-01 RX ADMIN — MONTELUKAST 10 MILLIGRAM(S): 4 TABLET, CHEWABLE ORAL at 12:26

## 2019-01-01 RX ADMIN — ATORVASTATIN CALCIUM 10 MILLIGRAM(S): 80 TABLET, FILM COATED ORAL at 21:34

## 2019-01-01 RX ADMIN — ERTAPENEM SODIUM 1000 MILLIGRAM(S): 1 INJECTION, POWDER, LYOPHILIZED, FOR SOLUTION INTRAMUSCULAR; INTRAVENOUS at 14:45

## 2019-01-01 RX ADMIN — Medication 1: at 08:47

## 2019-01-01 RX ADMIN — Medication 0.5 MILLIGRAM(S): at 19:59

## 2019-01-01 RX ADMIN — PANTOPRAZOLE SODIUM 40 MILLIGRAM(S): 20 TABLET, DELAYED RELEASE ORAL at 06:04

## 2019-01-01 RX ADMIN — Medication 0.1 MILLIGRAM(S): at 06:26

## 2019-01-01 RX ADMIN — Medication 50 MILLIGRAM(S): at 17:07

## 2019-01-01 RX ADMIN — Medication 60 MILLIGRAM(S): at 09:05

## 2019-01-01 RX ADMIN — ENTECAVIR 0.5 MILLIGRAM(S): 0.5 TABLET ORAL at 13:43

## 2019-01-01 RX ADMIN — Medication 5 MILLIGRAM(S): at 21:54

## 2019-01-01 RX ADMIN — Medication 100 MILLIGRAM(S): at 17:16

## 2019-01-01 RX ADMIN — ATORVASTATIN CALCIUM 10 MILLIGRAM(S): 80 TABLET, FILM COATED ORAL at 21:29

## 2019-01-01 RX ADMIN — Medication 20 MILLIGRAM(S): at 06:36

## 2019-01-01 RX ADMIN — SERTRALINE 50 MILLIGRAM(S): 25 TABLET, FILM COATED ORAL at 11:56

## 2019-01-01 RX ADMIN — Medication 6: at 17:17

## 2019-01-01 RX ADMIN — Medication 2: at 09:37

## 2019-01-01 RX ADMIN — CHLORHEXIDINE GLUCONATE 1 APPLICATION(S): 213 SOLUTION TOPICAL at 12:56

## 2019-01-01 RX ADMIN — PANTOPRAZOLE SODIUM 40 MILLIGRAM(S): 20 TABLET, DELAYED RELEASE ORAL at 05:33

## 2019-01-01 RX ADMIN — SODIUM ZIRCONIUM CYCLOSILICATE 10 GRAM(S): 10 POWDER, FOR SUSPENSION ORAL at 19:58

## 2019-01-01 RX ADMIN — MIDODRINE HYDROCHLORIDE 10 MILLIGRAM(S): 2.5 TABLET ORAL at 05:27

## 2019-01-01 RX ADMIN — Medication 70 MILLIGRAM(S): at 06:24

## 2019-01-01 RX ADMIN — Medication 40 MILLIGRAM(S): at 05:13

## 2019-01-01 RX ADMIN — ATORVASTATIN CALCIUM 10 MILLIGRAM(S): 80 TABLET, FILM COATED ORAL at 21:20

## 2019-01-01 RX ADMIN — SERTRALINE 50 MILLIGRAM(S): 25 TABLET, FILM COATED ORAL at 13:52

## 2019-01-01 RX ADMIN — Medication 100 MILLIGRAM(S): at 06:38

## 2019-01-01 RX ADMIN — APIXABAN 2.5 MILLIGRAM(S): 2.5 TABLET, FILM COATED ORAL at 05:30

## 2019-01-01 RX ADMIN — Medication 25 MILLIGRAM(S): at 06:18

## 2019-01-01 RX ADMIN — Medication 0.1 MILLIGRAM(S): at 21:34

## 2019-01-01 RX ADMIN — Medication 0.5 MILLIGRAM(S): at 01:49

## 2019-01-01 RX ADMIN — Medication 400 MILLIGRAM(S): at 16:21

## 2019-01-01 RX ADMIN — Medication 40 MILLIEQUIVALENT(S): at 18:33

## 2019-01-01 RX ADMIN — Medication 3 UNIT(S): at 08:59

## 2019-01-01 RX ADMIN — ALBUTEROL 2 PUFF(S): 90 AEROSOL, METERED ORAL at 17:17

## 2019-01-01 RX ADMIN — Medication 120 MILLIGRAM(S): at 13:13

## 2019-01-01 RX ADMIN — SODIUM CHLORIDE 1000 MILLILITER(S): 9 INJECTION INTRAMUSCULAR; INTRAVENOUS; SUBCUTANEOUS at 02:05

## 2019-01-01 RX ADMIN — HYDROMORPHONE HYDROCHLORIDE 0.2 MILLIGRAM(S): 2 INJECTION INTRAMUSCULAR; INTRAVENOUS; SUBCUTANEOUS at 16:15

## 2019-01-01 RX ADMIN — Medication 30 MILLIGRAM(S): at 12:58

## 2019-01-01 RX ADMIN — ALBUTEROL 2 PUFF(S): 90 AEROSOL, METERED ORAL at 05:32

## 2019-01-01 RX ADMIN — ALBUTEROL 2 PUFF(S): 90 AEROSOL, METERED ORAL at 22:26

## 2019-01-01 RX ADMIN — Medication 0.2 MILLIGRAM(S): at 21:51

## 2019-01-01 RX ADMIN — MONTELUKAST 10 MILLIGRAM(S): 4 TABLET, CHEWABLE ORAL at 18:27

## 2019-01-01 RX ADMIN — Medication 30 MILLIGRAM(S): at 21:33

## 2019-01-01 RX ADMIN — INSULIN GLARGINE 12 UNIT(S): 100 INJECTION, SOLUTION SUBCUTANEOUS at 23:06

## 2019-01-01 RX ADMIN — Medication 100 GRAM(S): at 02:06

## 2019-01-01 RX ADMIN — SERTRALINE 50 MILLIGRAM(S): 25 TABLET, FILM COATED ORAL at 11:34

## 2019-01-01 RX ADMIN — Medication 3 MILLILITER(S): at 12:27

## 2019-01-01 RX ADMIN — Medication 60 MILLIGRAM(S): at 05:05

## 2019-01-01 RX ADMIN — MONTELUKAST 10 MILLIGRAM(S): 4 TABLET, CHEWABLE ORAL at 11:16

## 2019-01-01 RX ADMIN — Medication 1 TABLET(S): at 09:00

## 2019-01-01 RX ADMIN — HYDROMORPHONE HYDROCHLORIDE 0.2 MILLIGRAM(S): 2 INJECTION INTRAMUSCULAR; INTRAVENOUS; SUBCUTANEOUS at 10:45

## 2019-01-01 RX ADMIN — Medication 100 MILLIGRAM(S): at 05:48

## 2019-01-01 RX ADMIN — Medication 100 MILLIGRAM(S): at 05:28

## 2019-01-01 RX ADMIN — Medication 100 MILLIGRAM(S): at 22:39

## 2019-01-01 RX ADMIN — Medication 2: at 13:00

## 2019-01-01 RX ADMIN — Medication 0.5 MILLIGRAM(S): at 13:33

## 2019-01-01 RX ADMIN — Medication 0.5 MILLIGRAM(S): at 22:08

## 2019-01-01 RX ADMIN — SERTRALINE 25 MILLIGRAM(S): 25 TABLET, FILM COATED ORAL at 17:13

## 2019-01-01 RX ADMIN — Medication 6 UNIT(S): at 08:07

## 2019-01-01 RX ADMIN — Medication 8 UNIT(S): at 08:34

## 2019-01-01 RX ADMIN — Medication 60 MILLIGRAM(S): at 05:49

## 2019-01-01 RX ADMIN — Medication 8 UNIT(S): at 12:12

## 2019-01-01 RX ADMIN — PANTOPRAZOLE SODIUM 40 MILLIGRAM(S): 20 TABLET, DELAYED RELEASE ORAL at 05:43

## 2019-01-01 RX ADMIN — PANTOPRAZOLE SODIUM 40 MILLIGRAM(S): 20 TABLET, DELAYED RELEASE ORAL at 06:16

## 2019-01-01 RX ADMIN — Medication 3 MILLILITER(S): at 18:51

## 2019-01-01 RX ADMIN — Medication 0.1 MILLIGRAM(S): at 18:54

## 2019-01-01 RX ADMIN — Medication 6 UNIT(S): at 13:35

## 2019-01-01 RX ADMIN — Medication 30 MILLIGRAM(S): at 18:37

## 2019-01-01 RX ADMIN — Medication 8 UNIT(S): at 08:05

## 2019-01-01 RX ADMIN — Medication 8 UNIT(S): at 08:24

## 2019-01-01 RX ADMIN — SERTRALINE 50 MILLIGRAM(S): 25 TABLET, FILM COATED ORAL at 12:00

## 2019-01-01 RX ADMIN — Medication 3 MILLIGRAM(S): at 21:21

## 2019-01-01 RX ADMIN — SERTRALINE 50 MILLIGRAM(S): 25 TABLET, FILM COATED ORAL at 11:18

## 2019-01-01 RX ADMIN — Medication 6 UNIT(S): at 17:39

## 2019-01-01 RX ADMIN — Medication 0.1 MILLIGRAM(S): at 05:19

## 2019-01-01 RX ADMIN — Medication 0.5 MILLIGRAM(S): at 18:09

## 2019-01-01 RX ADMIN — SENNA PLUS 2 TABLET(S): 8.6 TABLET ORAL at 21:38

## 2019-01-01 RX ADMIN — CHLORHEXIDINE GLUCONATE 1 APPLICATION(S): 213 SOLUTION TOPICAL at 06:27

## 2019-01-01 RX ADMIN — CHLORHEXIDINE GLUCONATE 1 APPLICATION(S): 213 SOLUTION TOPICAL at 12:13

## 2019-01-01 RX ADMIN — MAGNESIUM OXIDE 400 MG ORAL TABLET 400 MILLIGRAM(S): 241.3 TABLET ORAL at 13:25

## 2019-01-01 RX ADMIN — Medication 100 MILLIGRAM(S): at 06:34

## 2019-01-01 RX ADMIN — Medication 0.1 MILLIGRAM(S): at 18:20

## 2019-01-01 RX ADMIN — Medication 6 UNIT(S): at 08:49

## 2019-01-01 RX ADMIN — Medication 8 UNIT(S): at 18:05

## 2019-01-01 RX ADMIN — Medication 4: at 13:02

## 2019-01-01 RX ADMIN — HEPARIN SODIUM 5000 UNIT(S): 5000 INJECTION INTRAVENOUS; SUBCUTANEOUS at 18:45

## 2019-01-01 RX ADMIN — MEROPENEM 100 MILLIGRAM(S): 1 INJECTION INTRAVENOUS at 14:10

## 2019-01-01 RX ADMIN — Medication 3: at 17:53

## 2019-01-01 RX ADMIN — Medication 20 MILLIGRAM(S): at 13:56

## 2019-01-01 RX ADMIN — Medication 75 MILLIGRAM(S): at 22:25

## 2019-01-01 RX ADMIN — HEPARIN SODIUM 5000 UNIT(S): 5000 INJECTION INTRAVENOUS; SUBCUTANEOUS at 05:20

## 2019-01-01 RX ADMIN — Medication 50 MILLIGRAM(S): at 06:36

## 2019-01-01 RX ADMIN — MONTELUKAST 10 MILLIGRAM(S): 4 TABLET, CHEWABLE ORAL at 12:35

## 2019-01-01 RX ADMIN — Medication 8 UNIT(S): at 18:30

## 2019-01-01 RX ADMIN — Medication 650 MILLIGRAM(S): at 12:36

## 2019-01-01 RX ADMIN — Medication 100 MILLIGRAM(S): at 14:01

## 2019-01-01 RX ADMIN — Medication 0.2 MILLIGRAM(S): at 13:52

## 2019-01-01 RX ADMIN — ENTECAVIR 0.5 MILLIGRAM(S): 0.5 TABLET ORAL at 11:59

## 2019-01-01 RX ADMIN — Medication 6: at 12:59

## 2019-01-01 RX ADMIN — Medication 20 MILLIGRAM(S): at 06:44

## 2019-01-01 RX ADMIN — Medication 50 MILLIGRAM(S): at 06:44

## 2019-01-01 RX ADMIN — ENOXAPARIN SODIUM 30 MILLIGRAM(S): 100 INJECTION SUBCUTANEOUS at 13:31

## 2019-01-01 RX ADMIN — Medication 30 MILLIGRAM(S): at 05:02

## 2019-01-01 RX ADMIN — Medication 200 MILLIGRAM(S): at 21:41

## 2019-01-01 RX ADMIN — ATORVASTATIN CALCIUM 10 MILLIGRAM(S): 80 TABLET, FILM COATED ORAL at 21:56

## 2019-01-01 RX ADMIN — HEPARIN SODIUM 5000 UNIT(S): 5000 INJECTION INTRAVENOUS; SUBCUTANEOUS at 17:41

## 2019-01-01 RX ADMIN — Medication 2: at 14:16

## 2019-01-01 RX ADMIN — SENNA PLUS 2 TABLET(S): 8.6 TABLET ORAL at 21:56

## 2019-01-01 RX ADMIN — Medication 6: at 14:18

## 2019-01-01 RX ADMIN — Medication 60 MILLIGRAM(S): at 06:06

## 2019-01-01 RX ADMIN — Medication 0.5 MILLIGRAM(S): at 09:03

## 2019-01-01 RX ADMIN — Medication 3: at 18:44

## 2019-01-01 RX ADMIN — Medication 2: at 17:48

## 2019-01-01 RX ADMIN — Medication 3 MILLILITER(S): at 11:32

## 2019-01-01 RX ADMIN — Medication 0.5 MILLIGRAM(S): at 22:04

## 2019-01-01 RX ADMIN — Medication 3 MILLILITER(S): at 23:16

## 2019-01-01 RX ADMIN — Medication 100 MILLIGRAM(S): at 19:39

## 2019-01-01 RX ADMIN — Medication 1 TABLET(S): at 08:26

## 2019-01-01 RX ADMIN — Medication 180 MILLIGRAM(S): at 06:48

## 2019-01-01 RX ADMIN — ALBUTEROL 2 PUFF(S): 90 AEROSOL, METERED ORAL at 13:53

## 2019-01-01 RX ADMIN — Medication 2: at 08:49

## 2019-01-01 RX ADMIN — Medication 5 MILLIGRAM(S): at 21:27

## 2019-01-01 RX ADMIN — Medication 1 TABLET(S): at 09:34

## 2019-01-01 RX ADMIN — SODIUM CHLORIDE 100 MILLILITER(S): 9 INJECTION, SOLUTION INTRAVENOUS at 05:02

## 2019-01-01 RX ADMIN — OXYCODONE AND ACETAMINOPHEN 1 TABLET(S): 5; 325 TABLET ORAL at 12:19

## 2019-01-01 RX ADMIN — Medication 50 MILLIGRAM(S): at 06:06

## 2019-01-01 RX ADMIN — PIPERACILLIN AND TAZOBACTAM 25 GRAM(S): 4; .5 INJECTION, POWDER, LYOPHILIZED, FOR SOLUTION INTRAVENOUS at 07:47

## 2019-01-01 RX ADMIN — Medication 40 MILLIGRAM(S): at 05:06

## 2019-01-01 RX ADMIN — Medication 20 MILLIGRAM(S): at 21:53

## 2019-01-01 RX ADMIN — Medication 0.5 MILLIGRAM(S): at 22:13

## 2019-01-01 RX ADMIN — MEROPENEM 100 MILLIGRAM(S): 1 INJECTION INTRAVENOUS at 22:25

## 2019-01-01 RX ADMIN — MEROPENEM 100 MILLIGRAM(S): 1 INJECTION INTRAVENOUS at 00:34

## 2019-01-01 RX ADMIN — Medication 1 TABLET(S): at 08:13

## 2019-01-01 RX ADMIN — ENOXAPARIN SODIUM 30 MILLIGRAM(S): 100 INJECTION SUBCUTANEOUS at 13:14

## 2019-01-01 RX ADMIN — Medication 40 MILLIGRAM(S): at 11:46

## 2019-01-01 RX ADMIN — Medication 3 MILLILITER(S): at 00:30

## 2019-01-01 RX ADMIN — HYDROMORPHONE HYDROCHLORIDE 0.2 MILLIGRAM(S): 2 INJECTION INTRAMUSCULAR; INTRAVENOUS; SUBCUTANEOUS at 16:30

## 2019-01-01 RX ADMIN — Medication 650 MILLIGRAM(S): at 22:30

## 2019-01-01 RX ADMIN — Medication 650 MILLIGRAM(S): at 05:39

## 2019-01-01 RX ADMIN — ERTAPENEM SODIUM 120 MILLIGRAM(S): 1 INJECTION, POWDER, LYOPHILIZED, FOR SOLUTION INTRAMUSCULAR; INTRAVENOUS at 12:57

## 2019-01-01 RX ADMIN — Medication 3 MILLILITER(S): at 23:31

## 2019-01-01 RX ADMIN — Medication 0.1 MILLIGRAM(S): at 06:44

## 2019-01-01 RX ADMIN — Medication 3 MILLILITER(S): at 23:04

## 2019-01-01 RX ADMIN — Medication 100 MILLIGRAM(S): at 18:27

## 2019-01-01 RX ADMIN — Medication 8 UNIT(S): at 08:11

## 2019-01-01 RX ADMIN — PANTOPRAZOLE SODIUM 40 MILLIGRAM(S): 20 TABLET, DELAYED RELEASE ORAL at 18:33

## 2019-01-01 RX ADMIN — Medication 50 MILLIGRAM(S): at 13:23

## 2019-01-01 RX ADMIN — Medication 8: at 12:32

## 2019-01-01 RX ADMIN — Medication 6: at 13:34

## 2019-01-01 RX ADMIN — Medication 50 MILLIGRAM(S): at 17:38

## 2019-01-01 RX ADMIN — PANTOPRAZOLE SODIUM 40 MILLIGRAM(S): 20 TABLET, DELAYED RELEASE ORAL at 06:24

## 2019-01-01 RX ADMIN — Medication 100 MILLIGRAM(S): at 22:01

## 2019-01-01 RX ADMIN — PANTOPRAZOLE SODIUM 40 MILLIGRAM(S): 20 TABLET, DELAYED RELEASE ORAL at 05:04

## 2019-01-01 RX ADMIN — SODIUM CHLORIDE 1400 MILLILITER(S): 9 INJECTION INTRAMUSCULAR; INTRAVENOUS; SUBCUTANEOUS at 12:05

## 2019-01-01 RX ADMIN — MONTELUKAST 10 MILLIGRAM(S): 4 TABLET, CHEWABLE ORAL at 13:15

## 2019-01-01 RX ADMIN — Medication 4: at 12:33

## 2019-01-01 RX ADMIN — Medication 0.5 MILLIGRAM(S): at 10:03

## 2019-01-01 RX ADMIN — Medication 40 MILLIGRAM(S): at 06:24

## 2019-01-01 RX ADMIN — ATORVASTATIN CALCIUM 10 MILLIGRAM(S): 80 TABLET, FILM COATED ORAL at 21:53

## 2019-01-01 RX ADMIN — Medication 110 MILLIGRAM(S): at 05:17

## 2019-01-01 RX ADMIN — MORPHINE SULFATE 2 MILLIGRAM(S): 50 CAPSULE, EXTENDED RELEASE ORAL at 08:49

## 2019-01-01 RX ADMIN — PANTOPRAZOLE SODIUM 40 MILLIGRAM(S): 20 TABLET, DELAYED RELEASE ORAL at 06:39

## 2019-01-01 RX ADMIN — Medication 60 MILLIGRAM(S): at 05:35

## 2019-01-01 RX ADMIN — Medication 8 UNIT(S): at 18:32

## 2019-01-01 RX ADMIN — SERTRALINE 50 MILLIGRAM(S): 25 TABLET, FILM COATED ORAL at 13:43

## 2019-01-01 RX ADMIN — Medication 1 TABLET(S): at 08:34

## 2019-01-01 RX ADMIN — MONTELUKAST 10 MILLIGRAM(S): 4 TABLET, CHEWABLE ORAL at 13:32

## 2019-01-01 RX ADMIN — Medication 2.5 MILLIGRAM(S): at 18:32

## 2019-01-01 RX ADMIN — Medication 75 MILLIGRAM(S): at 21:42

## 2019-01-01 RX ADMIN — Medication 1 SUPPOSITORY(S): at 12:00

## 2019-01-01 RX ADMIN — Medication 6: at 17:36

## 2019-01-01 RX ADMIN — MEROPENEM 100 MILLIGRAM(S): 1 INJECTION INTRAVENOUS at 18:11

## 2019-01-01 RX ADMIN — Medication 30 MILLIGRAM(S): at 05:04

## 2019-01-01 RX ADMIN — Medication 0.1 MILLIGRAM(S): at 18:10

## 2019-01-01 RX ADMIN — Medication 5 MILLIGRAM(S): at 05:15

## 2019-01-01 RX ADMIN — Medication 3 MILLILITER(S): at 15:08

## 2019-01-01 RX ADMIN — MONTELUKAST 10 MILLIGRAM(S): 4 TABLET, CHEWABLE ORAL at 11:00

## 2019-01-01 RX ADMIN — Medication 0.5 MILLIGRAM(S): at 17:34

## 2019-01-01 RX ADMIN — Medication 4: at 22:23

## 2019-01-01 RX ADMIN — ZOLPIDEM TARTRATE 5 MILLIGRAM(S): 10 TABLET ORAL at 23:24

## 2019-01-01 RX ADMIN — Medication 1 TABLET(S): at 09:22

## 2019-01-01 RX ADMIN — Medication 180 MILLIGRAM(S): at 05:05

## 2019-01-01 RX ADMIN — ALBUTEROL 2 PUFF(S): 90 AEROSOL, METERED ORAL at 23:00

## 2019-01-01 RX ADMIN — SODIUM CHLORIDE 1000 MILLILITER(S): 9 INJECTION, SOLUTION INTRAVENOUS at 15:36

## 2019-01-01 RX ADMIN — Medication 100 MILLIGRAM(S): at 05:36

## 2019-01-01 RX ADMIN — Medication 100 MILLIGRAM(S): at 21:38

## 2019-01-01 RX ADMIN — ATORVASTATIN CALCIUM 10 MILLIGRAM(S): 80 TABLET, FILM COATED ORAL at 21:27

## 2019-01-01 RX ADMIN — Medication 0.5 MILLIGRAM(S): at 21:59

## 2019-01-01 RX ADMIN — PROTHROMBIN COMPLEX CONCENTRATE (HUMAN) 400 INTERNATIONAL UNIT(S): 25.5; 16.5; 24; 22; 22; 26 POWDER, FOR SOLUTION INTRAVENOUS at 20:42

## 2019-01-01 RX ADMIN — Medication 6 UNIT(S): at 12:54

## 2019-01-01 RX ADMIN — CHLORHEXIDINE GLUCONATE 1 APPLICATION(S): 213 SOLUTION TOPICAL at 05:02

## 2019-01-01 RX ADMIN — Medication 100 MILLIGRAM(S): at 21:51

## 2019-01-01 RX ADMIN — Medication 0.5 MILLIGRAM(S): at 21:53

## 2019-01-01 RX ADMIN — Medication 40 MILLIGRAM(S): at 13:45

## 2019-01-01 RX ADMIN — Medication 110 MILLIGRAM(S): at 18:37

## 2019-01-01 RX ADMIN — SODIUM CHLORIDE 75 MILLILITER(S): 9 INJECTION, SOLUTION INTRAVENOUS at 00:30

## 2019-01-01 RX ADMIN — Medication 60 MILLIGRAM(S): at 13:26

## 2019-01-01 RX ADMIN — Medication 8 UNIT(S): at 12:37

## 2019-01-01 RX ADMIN — Medication 3 MILLILITER(S): at 13:31

## 2019-01-01 RX ADMIN — Medication 0.1 MILLIGRAM(S): at 06:39

## 2019-01-01 RX ADMIN — Medication 2: at 17:37

## 2019-01-01 RX ADMIN — ZOLPIDEM TARTRATE 5 MILLIGRAM(S): 10 TABLET ORAL at 23:00

## 2019-01-01 RX ADMIN — Medication 8 UNIT(S): at 11:56

## 2019-01-01 RX ADMIN — ENTECAVIR 0.5 MILLIGRAM(S): 0.5 TABLET ORAL at 12:19

## 2019-01-01 RX ADMIN — ALBUTEROL 2 PUFF(S): 90 AEROSOL, METERED ORAL at 05:08

## 2019-01-01 RX ADMIN — ENTECAVIR 0.5 MILLIGRAM(S): 0.5 TABLET ORAL at 18:07

## 2019-01-01 RX ADMIN — TIOTROPIUM BROMIDE 1 CAPSULE(S): 18 CAPSULE ORAL; RESPIRATORY (INHALATION) at 15:00

## 2019-01-01 RX ADMIN — Medication 0.1 MILLIGRAM(S): at 05:08

## 2019-01-01 RX ADMIN — Medication 0.06 MILLIGRAM(S): at 06:18

## 2019-01-01 RX ADMIN — INSULIN GLARGINE 5 UNIT(S): 100 INJECTION, SOLUTION SUBCUTANEOUS at 22:42

## 2019-01-01 RX ADMIN — Medication 20 MILLIGRAM(S): at 13:12

## 2019-01-01 RX ADMIN — Medication 60 MILLIGRAM(S): at 05:08

## 2019-01-01 RX ADMIN — Medication 6 UNIT(S): at 09:34

## 2019-01-01 RX ADMIN — ENOXAPARIN SODIUM 70 MILLIGRAM(S): 100 INJECTION SUBCUTANEOUS at 21:51

## 2019-01-01 RX ADMIN — Medication 0.06 MILLIGRAM(S): at 05:42

## 2019-01-01 RX ADMIN — ATENOLOL 25 MILLIGRAM(S): 25 TABLET ORAL at 06:23

## 2019-01-01 RX ADMIN — SERTRALINE 50 MILLIGRAM(S): 25 TABLET, FILM COATED ORAL at 12:08

## 2019-01-01 RX ADMIN — Medication 100 MILLIGRAM(S): at 05:46

## 2019-01-01 RX ADMIN — Medication 100 MILLIGRAM(S): at 22:13

## 2019-01-01 RX ADMIN — Medication 0.5 MILLIGRAM(S): at 18:36

## 2019-01-01 RX ADMIN — Medication 60 MILLIGRAM(S): at 05:26

## 2019-01-01 RX ADMIN — ENTECAVIR 0.5 MILLIGRAM(S): 0.5 TABLET ORAL at 12:58

## 2019-01-01 RX ADMIN — Medication 30 MILLIGRAM(S): at 04:24

## 2019-01-01 RX ADMIN — Medication 8 UNIT(S): at 07:57

## 2019-01-01 RX ADMIN — Medication 0.1 MILLIGRAM(S): at 06:16

## 2019-01-01 RX ADMIN — Medication 3 MILLILITER(S): at 12:38

## 2019-01-01 RX ADMIN — OXYCODONE AND ACETAMINOPHEN 1 TABLET(S): 5; 325 TABLET ORAL at 15:05

## 2019-01-01 RX ADMIN — Medication 0.5 MILLIGRAM(S): at 22:05

## 2019-01-01 RX ADMIN — INSULIN GLARGINE 5 UNIT(S): 100 INJECTION, SOLUTION SUBCUTANEOUS at 22:54

## 2019-01-01 RX ADMIN — PANTOPRAZOLE SODIUM 40 MILLIGRAM(S): 20 TABLET, DELAYED RELEASE ORAL at 05:46

## 2019-01-01 RX ADMIN — Medication 0.1 MILLIGRAM(S): at 05:34

## 2019-01-01 RX ADMIN — Medication 30 MILLIGRAM(S): at 11:22

## 2019-01-01 RX ADMIN — Medication 30 MILLIGRAM(S): at 14:39

## 2019-01-01 RX ADMIN — ERTAPENEM SODIUM 100 MILLIGRAM(S): 1 INJECTION, POWDER, LYOPHILIZED, FOR SOLUTION INTRAMUSCULAR; INTRAVENOUS at 12:32

## 2019-01-01 RX ADMIN — Medication 3 MILLILITER(S): at 05:31

## 2019-01-01 RX ADMIN — Medication 3 MILLILITER(S): at 04:21

## 2019-01-01 RX ADMIN — MONTELUKAST 10 MILLIGRAM(S): 4 TABLET, CHEWABLE ORAL at 12:07

## 2019-01-01 RX ADMIN — Medication 0.5 MILLIGRAM(S): at 02:38

## 2019-01-01 RX ADMIN — Medication 6 UNIT(S): at 17:56

## 2019-01-01 RX ADMIN — ALBUTEROL 2 PUFF(S): 90 AEROSOL, METERED ORAL at 18:09

## 2019-01-01 RX ADMIN — SENNA PLUS 2 TABLET(S): 8.6 TABLET ORAL at 22:09

## 2019-01-01 RX ADMIN — ZOLPIDEM TARTRATE 5 MILLIGRAM(S): 10 TABLET ORAL at 22:14

## 2019-01-01 RX ADMIN — Medication 1 TABLET(S): at 13:25

## 2019-01-01 RX ADMIN — Medication 100 MILLIGRAM(S): at 06:49

## 2019-01-01 RX ADMIN — Medication 3 MILLILITER(S): at 06:48

## 2019-01-01 RX ADMIN — Medication 100 MILLIGRAM(S): at 18:36

## 2019-01-01 RX ADMIN — Medication 100 MILLIGRAM(S): at 21:20

## 2019-01-01 RX ADMIN — SERTRALINE 50 MILLIGRAM(S): 25 TABLET, FILM COATED ORAL at 18:34

## 2019-01-01 RX ADMIN — ATORVASTATIN CALCIUM 10 MILLIGRAM(S): 80 TABLET, FILM COATED ORAL at 22:15

## 2019-01-01 RX ADMIN — Medication 3 UNIT(S): at 09:18

## 2019-01-01 RX ADMIN — Medication 180 MILLIGRAM(S): at 05:13

## 2019-01-01 RX ADMIN — Medication 20 MILLIGRAM(S): at 05:21

## 2019-01-01 RX ADMIN — Medication 100 MILLIGRAM(S): at 21:59

## 2019-01-01 RX ADMIN — PANTOPRAZOLE SODIUM 40 MILLIGRAM(S): 20 TABLET, DELAYED RELEASE ORAL at 09:19

## 2019-01-01 RX ADMIN — ALBUTEROL 2 PUFF(S): 90 AEROSOL, METERED ORAL at 00:06

## 2019-01-01 RX ADMIN — Medication 500 MILLIGRAM(S): at 15:29

## 2019-01-01 RX ADMIN — WARFARIN SODIUM 5 MILLIGRAM(S): 2.5 TABLET ORAL at 17:10

## 2019-01-01 RX ADMIN — Medication 0.5 MILLIGRAM(S): at 13:13

## 2019-01-01 RX ADMIN — Medication 20 MILLIGRAM(S): at 06:09

## 2019-01-01 RX ADMIN — MONTELUKAST 10 MILLIGRAM(S): 4 TABLET, CHEWABLE ORAL at 12:16

## 2019-01-01 RX ADMIN — Medication 110 MILLIGRAM(S): at 05:47

## 2019-01-01 RX ADMIN — Medication 0.1 MILLIGRAM(S): at 13:24

## 2019-01-01 RX ADMIN — TIOTROPIUM BROMIDE 1 CAPSULE(S): 18 CAPSULE ORAL; RESPIRATORY (INHALATION) at 11:17

## 2019-01-01 RX ADMIN — Medication 0.5 MILLIGRAM(S): at 21:25

## 2019-01-01 RX ADMIN — Medication 50 MILLIGRAM(S): at 18:20

## 2019-01-01 RX ADMIN — ENOXAPARIN SODIUM 30 MILLIGRAM(S): 100 INJECTION SUBCUTANEOUS at 13:17

## 2019-01-01 RX ADMIN — ATORVASTATIN CALCIUM 10 MILLIGRAM(S): 80 TABLET, FILM COATED ORAL at 22:26

## 2019-01-01 RX ADMIN — OXYCODONE AND ACETAMINOPHEN 1 TABLET(S): 5; 325 TABLET ORAL at 16:19

## 2019-01-01 RX ADMIN — Medication 3 MILLILITER(S): at 12:31

## 2019-01-01 RX ADMIN — ENOXAPARIN SODIUM 30 MILLIGRAM(S): 100 INJECTION SUBCUTANEOUS at 13:00

## 2019-01-01 RX ADMIN — Medication 1 TABLET(S): at 11:18

## 2019-01-01 RX ADMIN — Medication 2: at 09:49

## 2019-01-01 RX ADMIN — Medication 3 MILLILITER(S): at 05:35

## 2019-01-01 RX ADMIN — Medication 2: at 17:30

## 2019-01-01 RX ADMIN — MAGNESIUM OXIDE 400 MG ORAL TABLET 400 MILLIGRAM(S): 241.3 TABLET ORAL at 18:33

## 2019-01-01 RX ADMIN — Medication 8 UNIT(S): at 12:01

## 2019-01-01 RX ADMIN — Medication 1 TABLET(S): at 10:26

## 2019-01-01 RX ADMIN — Medication 8 UNIT(S): at 17:35

## 2019-01-01 RX ADMIN — Medication 8 UNIT(S): at 12:57

## 2019-01-01 RX ADMIN — APIXABAN 2.5 MILLIGRAM(S): 2.5 TABLET, FILM COATED ORAL at 17:08

## 2019-01-01 RX ADMIN — Medication 20 MILLIEQUIVALENT(S): at 05:47

## 2019-01-01 RX ADMIN — TIOTROPIUM BROMIDE 1 CAPSULE(S): 18 CAPSULE ORAL; RESPIRATORY (INHALATION) at 13:52

## 2019-01-01 RX ADMIN — ONDANSETRON 4 MILLIGRAM(S): 8 TABLET, FILM COATED ORAL at 08:04

## 2019-01-01 RX ADMIN — SERTRALINE 50 MILLIGRAM(S): 25 TABLET, FILM COATED ORAL at 11:44

## 2019-01-01 RX ADMIN — Medication 650 MILLIGRAM(S): at 19:39

## 2019-01-01 RX ADMIN — Medication 1 TABLET(S): at 15:29

## 2019-01-01 RX ADMIN — MONTELUKAST 10 MILLIGRAM(S): 4 TABLET, CHEWABLE ORAL at 13:26

## 2019-01-01 RX ADMIN — ATORVASTATIN CALCIUM 10 MILLIGRAM(S): 80 TABLET, FILM COATED ORAL at 22:31

## 2019-01-01 RX ADMIN — SERTRALINE 25 MILLIGRAM(S): 25 TABLET, FILM COATED ORAL at 13:25

## 2019-01-01 RX ADMIN — Medication 100 MILLIGRAM(S): at 12:38

## 2019-01-01 RX ADMIN — Medication 5 MILLIGRAM(S): at 03:47

## 2019-01-01 RX ADMIN — Medication 100 MILLIGRAM(S): at 04:13

## 2019-01-01 RX ADMIN — ENOXAPARIN SODIUM 30 MILLIGRAM(S): 100 INJECTION SUBCUTANEOUS at 12:46

## 2019-01-01 RX ADMIN — Medication 0.5 MILLIGRAM(S): at 20:37

## 2019-01-01 RX ADMIN — Medication 0.5 MILLIGRAM(S): at 11:23

## 2019-01-01 RX ADMIN — Medication 30 MILLIGRAM(S): at 21:30

## 2019-01-01 RX ADMIN — Medication 8 UNIT(S): at 12:14

## 2019-01-01 RX ADMIN — Medication 0.5 MILLIGRAM(S): at 06:24

## 2019-01-01 RX ADMIN — Medication 2000 MILLIGRAM(S): at 14:15

## 2019-01-01 RX ADMIN — Medication 100 MILLIGRAM(S): at 17:12

## 2019-01-01 RX ADMIN — ZOLPIDEM TARTRATE 5 MILLIGRAM(S): 10 TABLET ORAL at 21:15

## 2019-01-01 RX ADMIN — Medication 8: at 13:38

## 2019-01-01 RX ADMIN — Medication 500 MILLIGRAM(S): at 13:16

## 2019-01-01 RX ADMIN — ATORVASTATIN CALCIUM 10 MILLIGRAM(S): 80 TABLET, FILM COATED ORAL at 23:06

## 2019-01-01 RX ADMIN — PANTOPRAZOLE SODIUM 40 MILLIGRAM(S): 20 TABLET, DELAYED RELEASE ORAL at 08:49

## 2019-01-01 RX ADMIN — ALBUTEROL 2 PUFF(S): 90 AEROSOL, METERED ORAL at 11:29

## 2019-01-01 RX ADMIN — ATORVASTATIN CALCIUM 10 MILLIGRAM(S): 80 TABLET, FILM COATED ORAL at 22:39

## 2019-01-01 RX ADMIN — MONTELUKAST 10 MILLIGRAM(S): 4 TABLET, CHEWABLE ORAL at 12:50

## 2019-01-01 RX ADMIN — Medication 10 MILLIGRAM(S): at 07:40

## 2019-01-01 RX ADMIN — Medication 100 MILLIGRAM(S): at 06:10

## 2019-01-01 RX ADMIN — ATORVASTATIN CALCIUM 10 MILLIGRAM(S): 80 TABLET, FILM COATED ORAL at 22:09

## 2019-01-01 RX ADMIN — ATORVASTATIN CALCIUM 10 MILLIGRAM(S): 80 TABLET, FILM COATED ORAL at 21:22

## 2019-01-01 RX ADMIN — HEPARIN SODIUM 5000 UNIT(S): 5000 INJECTION INTRAVENOUS; SUBCUTANEOUS at 06:24

## 2019-01-01 RX ADMIN — ENOXAPARIN SODIUM 70 MILLIGRAM(S): 100 INJECTION SUBCUTANEOUS at 12:31

## 2019-01-01 RX ADMIN — Medication 30 MILLIGRAM(S): at 05:13

## 2019-01-01 RX ADMIN — Medication 100 MILLIGRAM(S): at 17:18

## 2019-01-01 RX ADMIN — Medication 40 MILLIGRAM(S): at 06:48

## 2019-01-01 RX ADMIN — MORPHINE SULFATE 2 MILLIGRAM(S): 50 CAPSULE, EXTENDED RELEASE ORAL at 14:02

## 2019-01-01 RX ADMIN — Medication 0.5 MILLIGRAM(S): at 13:08

## 2019-01-01 RX ADMIN — Medication 3 MILLILITER(S): at 04:25

## 2019-01-01 RX ADMIN — ATORVASTATIN CALCIUM 10 MILLIGRAM(S): 80 TABLET, FILM COATED ORAL at 21:59

## 2019-01-01 RX ADMIN — Medication 20 MILLIGRAM(S): at 18:13

## 2019-01-01 RX ADMIN — Medication 50 MILLIGRAM(S): at 05:04

## 2019-01-01 RX ADMIN — Medication 20 MILLIGRAM(S): at 20:09

## 2019-01-01 RX ADMIN — PANTOPRAZOLE SODIUM 40 MILLIGRAM(S): 20 TABLET, DELAYED RELEASE ORAL at 06:06

## 2019-01-01 RX ADMIN — PANTOPRAZOLE SODIUM 40 MILLIGRAM(S): 20 TABLET, DELAYED RELEASE ORAL at 05:06

## 2019-01-01 RX ADMIN — Medication 0.2 MILLIGRAM(S): at 21:59

## 2019-01-01 RX ADMIN — Medication 50 MILLIGRAM(S): at 18:33

## 2019-01-01 RX ADMIN — Medication 0.2 MILLIGRAM(S): at 21:35

## 2019-01-01 RX ADMIN — ATENOLOL 25 MILLIGRAM(S): 25 TABLET ORAL at 06:30

## 2019-01-01 RX ADMIN — ATORVASTATIN CALCIUM 10 MILLIGRAM(S): 80 TABLET, FILM COATED ORAL at 22:12

## 2019-01-01 RX ADMIN — Medication 50 MILLIGRAM(S): at 05:13

## 2019-01-01 RX ADMIN — APIXABAN 2.5 MILLIGRAM(S): 2.5 TABLET, FILM COATED ORAL at 06:26

## 2019-01-01 RX ADMIN — Medication 0.5 MILLIGRAM(S): at 02:05

## 2019-01-01 RX ADMIN — Medication 100 MILLIGRAM(S): at 05:33

## 2019-01-01 RX ADMIN — Medication 110 MILLIGRAM(S): at 00:47

## 2019-01-01 RX ADMIN — SERTRALINE 50 MILLIGRAM(S): 25 TABLET, FILM COATED ORAL at 11:32

## 2019-01-01 RX ADMIN — Medication 0.2 MILLIGRAM(S): at 13:26

## 2019-01-01 RX ADMIN — Medication 200 MILLIGRAM(S): at 21:51

## 2019-01-01 RX ADMIN — Medication 60 MILLIGRAM(S): at 05:02

## 2019-01-01 RX ADMIN — Medication 1 TABLET(S): at 06:48

## 2019-01-01 RX ADMIN — Medication 8 UNIT(S): at 09:08

## 2019-01-01 RX ADMIN — Medication 2: at 09:11

## 2019-01-01 RX ADMIN — Medication 70 MILLIGRAM(S): at 07:01

## 2019-01-01 RX ADMIN — Medication 100 MILLIGRAM(S): at 18:10

## 2019-01-01 RX ADMIN — ALBUTEROL 2 PUFF(S): 90 AEROSOL, METERED ORAL at 00:48

## 2019-01-01 RX ADMIN — Medication 50 MILLIGRAM(S): at 06:59

## 2019-01-01 RX ADMIN — ALBUTEROL 2 PUFF(S): 90 AEROSOL, METERED ORAL at 05:58

## 2019-01-01 RX ADMIN — ERTAPENEM SODIUM 100 MILLIGRAM(S): 1 INJECTION, POWDER, LYOPHILIZED, FOR SOLUTION INTRAMUSCULAR; INTRAVENOUS at 15:21

## 2019-01-01 RX ADMIN — Medication 3 MILLILITER(S): at 06:39

## 2019-01-01 RX ADMIN — PANTOPRAZOLE SODIUM 40 MILLIGRAM(S): 20 TABLET, DELAYED RELEASE ORAL at 06:14

## 2019-01-01 RX ADMIN — Medication 1 TABLET(S): at 11:16

## 2019-01-01 RX ADMIN — Medication 1 TABLET(S): at 12:16

## 2019-01-01 RX ADMIN — MONTELUKAST 10 MILLIGRAM(S): 4 TABLET, CHEWABLE ORAL at 11:35

## 2019-01-01 RX ADMIN — CHLORHEXIDINE GLUCONATE 1 APPLICATION(S): 213 SOLUTION TOPICAL at 11:31

## 2019-01-01 RX ADMIN — Medication 3 MILLILITER(S): at 07:19

## 2019-01-01 RX ADMIN — SERTRALINE 50 MILLIGRAM(S): 25 TABLET, FILM COATED ORAL at 12:32

## 2019-01-01 RX ADMIN — Medication 5 MILLIGRAM(S): at 03:31

## 2019-01-01 RX ADMIN — Medication 120 MILLIGRAM(S): at 06:34

## 2019-01-01 RX ADMIN — Medication 40 MILLIGRAM(S): at 05:34

## 2019-01-01 RX ADMIN — Medication 4: at 17:35

## 2019-01-01 RX ADMIN — Medication 180 MILLIGRAM(S): at 06:39

## 2019-01-01 RX ADMIN — PANTOPRAZOLE SODIUM 40 MILLIGRAM(S): 20 TABLET, DELAYED RELEASE ORAL at 06:23

## 2019-01-01 RX ADMIN — HEPARIN SODIUM 5000 UNIT(S): 5000 INJECTION INTRAVENOUS; SUBCUTANEOUS at 06:36

## 2019-01-01 RX ADMIN — Medication 5 MILLIGRAM(S): at 17:08

## 2019-01-01 RX ADMIN — Medication 0.5 MILLIGRAM(S): at 09:34

## 2019-01-01 RX ADMIN — ZOLPIDEM TARTRATE 5 MILLIGRAM(S): 10 TABLET ORAL at 22:06

## 2019-01-01 RX ADMIN — Medication 6 UNIT(S): at 14:15

## 2019-01-01 RX ADMIN — INSULIN GLARGINE 5 UNIT(S): 100 INJECTION, SOLUTION SUBCUTANEOUS at 22:05

## 2019-01-01 RX ADMIN — Medication 650 MILLIGRAM(S): at 02:24

## 2019-01-01 RX ADMIN — Medication 80 MILLIGRAM(S): at 13:21

## 2019-01-01 RX ADMIN — PANTOPRAZOLE SODIUM 40 MILLIGRAM(S): 20 TABLET, DELAYED RELEASE ORAL at 05:26

## 2019-01-01 RX ADMIN — Medication 3 MILLILITER(S): at 17:08

## 2019-01-01 RX ADMIN — Medication 0.06 MILLIGRAM(S): at 05:46

## 2019-01-01 RX ADMIN — SERTRALINE 50 MILLIGRAM(S): 25 TABLET, FILM COATED ORAL at 12:24

## 2019-01-01 RX ADMIN — Medication 3 MILLILITER(S): at 22:09

## 2019-01-01 RX ADMIN — MONTELUKAST 10 MILLIGRAM(S): 4 TABLET, CHEWABLE ORAL at 12:38

## 2019-01-01 RX ADMIN — Medication 75 MILLIGRAM(S): at 05:57

## 2019-01-01 RX ADMIN — MAGNESIUM OXIDE 400 MG ORAL TABLET 400 MILLIGRAM(S): 241.3 TABLET ORAL at 17:21

## 2019-01-01 RX ADMIN — Medication 40 MILLIEQUIVALENT(S): at 13:24

## 2019-01-01 RX ADMIN — Medication 2: at 07:56

## 2019-01-01 RX ADMIN — Medication 30 MILLIGRAM(S): at 13:42

## 2019-01-01 RX ADMIN — OXYCODONE AND ACETAMINOPHEN 1 TABLET(S): 5; 325 TABLET ORAL at 15:28

## 2019-01-01 RX ADMIN — Medication 40 MILLIGRAM(S): at 06:39

## 2019-01-01 RX ADMIN — SERTRALINE 50 MILLIGRAM(S): 25 TABLET, FILM COATED ORAL at 11:00

## 2019-01-01 RX ADMIN — Medication 20 MILLIGRAM(S): at 06:48

## 2019-01-01 RX ADMIN — SERTRALINE 25 MILLIGRAM(S): 25 TABLET, FILM COATED ORAL at 13:24

## 2019-01-01 RX ADMIN — INSULIN GLARGINE 8 UNIT(S): 100 INJECTION, SOLUTION SUBCUTANEOUS at 21:29

## 2019-01-01 RX ADMIN — MEROPENEM 100 MILLIGRAM(S): 1 INJECTION INTRAVENOUS at 01:48

## 2019-01-01 RX ADMIN — Medication 5 MILLIGRAM(S): at 21:43

## 2019-01-01 RX ADMIN — Medication 50 MILLIGRAM(S): at 05:21

## 2019-01-01 RX ADMIN — MAGNESIUM OXIDE 400 MG ORAL TABLET 400 MILLIGRAM(S): 241.3 TABLET ORAL at 08:59

## 2019-01-01 RX ADMIN — Medication 100 MILLIGRAM(S): at 17:42

## 2019-01-01 RX ADMIN — Medication 180 MILLIGRAM(S): at 05:30

## 2019-01-01 RX ADMIN — ATORVASTATIN CALCIUM 10 MILLIGRAM(S): 80 TABLET, FILM COATED ORAL at 21:52

## 2019-01-01 RX ADMIN — Medication 3 MILLILITER(S): at 17:40

## 2019-01-01 RX ADMIN — Medication 50 MILLIGRAM(S): at 18:45

## 2019-01-01 RX ADMIN — Medication 0.1 MILLIGRAM(S): at 17:55

## 2019-01-01 RX ADMIN — Medication 37.5 MILLIGRAM(S): at 17:42

## 2019-01-01 RX ADMIN — Medication 100 MILLIGRAM(S): at 22:28

## 2019-01-01 RX ADMIN — ENTECAVIR 0.5 MILLIGRAM(S): 0.5 TABLET ORAL at 12:24

## 2019-01-01 RX ADMIN — Medication 1: at 22:38

## 2019-01-01 RX ADMIN — Medication 100 MILLIGRAM(S): at 06:26

## 2019-01-01 RX ADMIN — Medication 0.2 MILLIGRAM(S): at 06:06

## 2019-01-01 RX ADMIN — Medication 0.5 MILLIGRAM(S): at 17:11

## 2019-01-01 RX ADMIN — Medication 4: at 17:39

## 2019-01-01 RX ADMIN — ZOLPIDEM TARTRATE 5 MILLIGRAM(S): 10 TABLET ORAL at 22:13

## 2019-01-01 RX ADMIN — Medication 3 MILLIGRAM(S): at 22:09

## 2019-01-01 RX ADMIN — ATORVASTATIN CALCIUM 10 MILLIGRAM(S): 80 TABLET, FILM COATED ORAL at 21:38

## 2019-01-01 RX ADMIN — ERTAPENEM SODIUM 120 MILLIGRAM(S): 1 INJECTION, POWDER, LYOPHILIZED, FOR SOLUTION INTRAMUSCULAR; INTRAVENOUS at 13:22

## 2019-01-01 RX ADMIN — Medication 40 MILLIGRAM(S): at 05:30

## 2019-01-01 RX ADMIN — MORPHINE SULFATE 2 MILLIGRAM(S): 50 CAPSULE, EXTENDED RELEASE ORAL at 14:17

## 2019-01-01 RX ADMIN — Medication 650 MILLIGRAM(S): at 14:47

## 2019-01-01 RX ADMIN — Medication 500 MILLIGRAM(S): at 12:24

## 2019-01-01 RX ADMIN — INSULIN GLARGINE 5 UNIT(S): 100 INJECTION, SOLUTION SUBCUTANEOUS at 22:04

## 2019-01-01 RX ADMIN — Medication 2: at 11:28

## 2019-01-01 RX ADMIN — Medication 50 MILLIGRAM(S): at 05:47

## 2019-01-01 RX ADMIN — ALBUTEROL 2 PUFF(S): 90 AEROSOL, METERED ORAL at 17:38

## 2019-01-01 RX ADMIN — ENTECAVIR 0.5 MILLIGRAM(S): 0.5 TABLET ORAL at 12:37

## 2019-01-01 RX ADMIN — Medication 200 MILLIGRAM(S): at 22:13

## 2019-01-01 RX ADMIN — MONTELUKAST 10 MILLIGRAM(S): 4 TABLET, CHEWABLE ORAL at 12:57

## 2019-01-01 RX ADMIN — Medication 650 MILLIGRAM(S): at 02:54

## 2019-01-01 RX ADMIN — Medication 650 MILLIGRAM(S): at 14:17

## 2019-01-01 RX ADMIN — Medication 0.25 MILLIGRAM(S): at 05:34

## 2019-01-01 RX ADMIN — Medication 0.5 MILLIGRAM(S): at 23:10

## 2019-01-01 RX ADMIN — Medication 3 MILLILITER(S): at 17:55

## 2019-01-01 RX ADMIN — MEROPENEM 100 MILLIGRAM(S): 1 INJECTION INTRAVENOUS at 05:47

## 2019-01-01 RX ADMIN — MEROPENEM 100 MILLIGRAM(S): 1 INJECTION INTRAVENOUS at 11:28

## 2019-01-01 RX ADMIN — Medication 40 MILLIGRAM(S): at 18:10

## 2019-01-01 RX ADMIN — SENNA PLUS 2 TABLET(S): 8.6 TABLET ORAL at 21:23

## 2019-01-01 RX ADMIN — Medication 0.5 MILLIGRAM(S): at 23:24

## 2019-01-01 RX ADMIN — PANTOPRAZOLE SODIUM 40 MILLIGRAM(S): 20 TABLET, DELAYED RELEASE ORAL at 06:33

## 2019-01-01 RX ADMIN — MAGNESIUM OXIDE 400 MG ORAL TABLET 400 MILLIGRAM(S): 241.3 TABLET ORAL at 13:24

## 2019-01-01 RX ADMIN — Medication 60 MILLIGRAM(S): at 09:03

## 2019-01-01 RX ADMIN — Medication 0.1 MILLIGRAM(S): at 23:03

## 2019-01-01 RX ADMIN — Medication 0.5 MILLIGRAM(S): at 23:13

## 2019-01-01 RX ADMIN — Medication 0.2 MILLIGRAM(S): at 06:24

## 2019-01-01 RX ADMIN — Medication 1: at 22:32

## 2019-01-01 RX ADMIN — Medication 60 MILLIGRAM(S): at 05:33

## 2019-01-01 RX ADMIN — MONTELUKAST 10 MILLIGRAM(S): 4 TABLET, CHEWABLE ORAL at 13:24

## 2019-01-01 RX ADMIN — Medication 50 MILLIGRAM(S): at 05:43

## 2019-01-01 RX ADMIN — Medication 4: at 18:31

## 2019-01-01 RX ADMIN — ALBUTEROL 2 PUFF(S): 90 AEROSOL, METERED ORAL at 18:12

## 2019-01-01 RX ADMIN — Medication 3 MILLILITER(S): at 12:39

## 2019-01-01 RX ADMIN — ATORVASTATIN CALCIUM 10 MILLIGRAM(S): 80 TABLET, FILM COATED ORAL at 21:10

## 2019-01-01 RX ADMIN — ERTAPENEM SODIUM 120 MILLIGRAM(S): 1 INJECTION, POWDER, LYOPHILIZED, FOR SOLUTION INTRAMUSCULAR; INTRAVENOUS at 11:56

## 2019-01-01 RX ADMIN — Medication 8 UNIT(S): at 10:07

## 2019-01-01 RX ADMIN — Medication 12.5 MILLIGRAM(S): at 05:46

## 2019-01-01 RX ADMIN — Medication 0.1 MILLIGRAM(S): at 05:21

## 2019-01-01 RX ADMIN — ATORVASTATIN CALCIUM 10 MILLIGRAM(S): 80 TABLET, FILM COATED ORAL at 22:43

## 2019-01-01 RX ADMIN — Medication 6 UNIT(S): at 13:00

## 2019-01-01 RX ADMIN — Medication 0.1 MILLIGRAM(S): at 15:53

## 2019-01-01 RX ADMIN — Medication 0.2 MILLIGRAM(S): at 07:15

## 2019-01-01 RX ADMIN — WARFARIN SODIUM 5 MILLIGRAM(S): 2.5 TABLET ORAL at 17:41

## 2019-01-01 RX ADMIN — Medication 30 MILLIGRAM(S): at 05:46

## 2019-01-01 RX ADMIN — Medication 0: at 08:24

## 2019-01-01 RX ADMIN — Medication 50 MILLIGRAM(S): at 17:55

## 2019-01-01 RX ADMIN — SERTRALINE 50 MILLIGRAM(S): 25 TABLET, FILM COATED ORAL at 12:35

## 2019-01-01 RX ADMIN — Medication 3 MILLILITER(S): at 18:20

## 2019-01-01 RX ADMIN — Medication 5 MILLIGRAM(S): at 13:19

## 2019-01-01 RX ADMIN — Medication 120 MILLIGRAM(S): at 05:35

## 2019-01-01 RX ADMIN — Medication 6: at 12:14

## 2019-01-01 RX ADMIN — Medication 40 MILLIGRAM(S): at 11:30

## 2019-01-01 RX ADMIN — Medication 4: at 12:10

## 2019-01-01 RX ADMIN — HEPARIN SODIUM 5000 UNIT(S): 5000 INJECTION INTRAVENOUS; SUBCUTANEOUS at 06:04

## 2019-01-01 RX ADMIN — ENTECAVIR 0.5 MILLIGRAM(S): 0.5 TABLET ORAL at 12:07

## 2019-01-01 RX ADMIN — Medication 650 MILLIGRAM(S): at 13:08

## 2019-01-01 RX ADMIN — Medication 0.25 MILLIGRAM(S): at 23:30

## 2019-01-01 RX ADMIN — ALBUTEROL 2 PUFF(S): 90 AEROSOL, METERED ORAL at 12:25

## 2019-01-01 RX ADMIN — APIXABAN 2.5 MILLIGRAM(S): 2.5 TABLET, FILM COATED ORAL at 17:36

## 2019-01-01 RX ADMIN — Medication 110 MILLIGRAM(S): at 23:07

## 2019-01-01 RX ADMIN — Medication 5 MILLIGRAM(S): at 13:32

## 2019-01-01 RX ADMIN — Medication 1 TABLET(S): at 08:22

## 2019-01-01 RX ADMIN — Medication 3 MILLILITER(S): at 05:12

## 2019-01-01 RX ADMIN — SERTRALINE 50 MILLIGRAM(S): 25 TABLET, FILM COATED ORAL at 12:07

## 2019-01-01 RX ADMIN — Medication 4: at 18:12

## 2019-01-01 RX ADMIN — Medication 6 UNIT(S): at 08:20

## 2019-01-01 RX ADMIN — TIOTROPIUM BROMIDE 1 CAPSULE(S): 18 CAPSULE ORAL; RESPIRATORY (INHALATION) at 13:43

## 2019-01-01 RX ADMIN — Medication 58 MILLIGRAM(S): at 20:17

## 2019-01-01 RX ADMIN — Medication 0.1 MILLIGRAM(S): at 07:40

## 2019-01-01 RX ADMIN — Medication 30 MILLIGRAM(S): at 06:23

## 2019-01-01 RX ADMIN — ALBUTEROL 2 PUFF(S): 90 AEROSOL, METERED ORAL at 00:38

## 2019-01-01 RX ADMIN — MONTELUKAST 10 MILLIGRAM(S): 4 TABLET, CHEWABLE ORAL at 13:25

## 2019-01-01 RX ADMIN — ENTECAVIR 0.5 MILLIGRAM(S): 0.5 TABLET ORAL at 13:31

## 2019-01-01 RX ADMIN — Medication 1 TABLET(S): at 13:42

## 2019-01-01 RX ADMIN — Medication 0.5 MILLIGRAM(S): at 21:56

## 2019-01-01 RX ADMIN — Medication 10 UNIT(S): at 09:32

## 2019-01-01 RX ADMIN — ENTECAVIR 0.5 MILLIGRAM(S): 0.5 TABLET ORAL at 13:16

## 2019-01-01 RX ADMIN — Medication 0.1 MILLIGRAM(S): at 05:30

## 2019-01-01 RX ADMIN — Medication 3 MILLILITER(S): at 12:10

## 2019-01-01 RX ADMIN — Medication 650 MILLIGRAM(S): at 06:16

## 2019-01-01 RX ADMIN — PANTOPRAZOLE SODIUM 40 MILLIGRAM(S): 20 TABLET, DELAYED RELEASE ORAL at 05:36

## 2019-01-01 RX ADMIN — Medication 60 MILLIGRAM(S): at 05:04

## 2019-01-01 RX ADMIN — ATORVASTATIN CALCIUM 10 MILLIGRAM(S): 80 TABLET, FILM COATED ORAL at 21:23

## 2019-01-01 RX ADMIN — Medication 30 MILLIGRAM(S): at 11:10

## 2019-01-01 RX ADMIN — Medication 0.2 MILLIGRAM(S): at 23:06

## 2019-01-01 RX ADMIN — DEXMEDETOMIDINE HYDROCHLORIDE IN 0.9% SODIUM CHLORIDE 8.2 MICROGRAM(S)/KG/HR: 4 INJECTION INTRAVENOUS at 23:39

## 2019-01-01 RX ADMIN — Medication 4: at 12:37

## 2019-01-01 RX ADMIN — Medication 200 MILLIGRAM(S): at 21:16

## 2019-01-01 RX ADMIN — MONTELUKAST 10 MILLIGRAM(S): 4 TABLET, CHEWABLE ORAL at 11:26

## 2019-01-01 RX ADMIN — Medication 5 MILLIGRAM(S): at 20:42

## 2019-01-01 RX ADMIN — INSULIN GLARGINE 12 UNIT(S): 100 INJECTION, SOLUTION SUBCUTANEOUS at 21:48

## 2019-01-01 RX ADMIN — Medication 0.5 MILLIGRAM(S): at 16:00

## 2019-01-01 RX ADMIN — ZOLPIDEM TARTRATE 5 MILLIGRAM(S): 10 TABLET ORAL at 00:57

## 2019-01-01 RX ADMIN — Medication 50 MILLIGRAM(S): at 06:22

## 2019-01-01 RX ADMIN — Medication 0.1 MILLIGRAM(S): at 05:33

## 2019-01-01 RX ADMIN — Medication 0.1 MILLIGRAM(S): at 17:06

## 2019-01-01 RX ADMIN — Medication 30 MILLIGRAM(S): at 05:26

## 2019-01-01 RX ADMIN — INSULIN GLARGINE 5 UNIT(S): 100 INJECTION, SOLUTION SUBCUTANEOUS at 22:25

## 2019-01-01 RX ADMIN — ALBUTEROL 2 PUFF(S): 90 AEROSOL, METERED ORAL at 11:27

## 2019-01-01 RX ADMIN — Medication 70 MILLIGRAM(S): at 07:15

## 2019-01-01 RX ADMIN — HEPARIN SODIUM 5000 UNIT(S): 5000 INJECTION INTRAVENOUS; SUBCUTANEOUS at 06:44

## 2019-01-01 RX ADMIN — INSULIN GLARGINE 5 UNIT(S): 100 INJECTION, SOLUTION SUBCUTANEOUS at 22:00

## 2019-01-01 RX ADMIN — ENOXAPARIN SODIUM 30 MILLIGRAM(S): 100 INJECTION SUBCUTANEOUS at 11:32

## 2019-01-01 RX ADMIN — PANTOPRAZOLE SODIUM 40 MILLIGRAM(S): 20 TABLET, DELAYED RELEASE ORAL at 09:33

## 2019-01-01 RX ADMIN — HYDROMORPHONE HYDROCHLORIDE 0.2 MILLIGRAM(S): 2 INJECTION INTRAMUSCULAR; INTRAVENOUS; SUBCUTANEOUS at 10:29

## 2019-01-01 RX ADMIN — Medication 8 UNIT(S): at 17:32

## 2019-01-01 RX ADMIN — Medication 0.1 MILLIGRAM(S): at 17:50

## 2019-01-01 RX ADMIN — MONTELUKAST 10 MILLIGRAM(S): 4 TABLET, CHEWABLE ORAL at 12:58

## 2019-01-01 RX ADMIN — Medication 40 MILLIGRAM(S): at 06:16

## 2019-01-01 RX ADMIN — ENOXAPARIN SODIUM 30 MILLIGRAM(S): 100 INJECTION SUBCUTANEOUS at 21:10

## 2019-01-01 RX ADMIN — Medication 3 MILLILITER(S): at 06:34

## 2019-01-01 RX ADMIN — Medication 8 UNIT(S): at 08:37

## 2019-01-01 RX ADMIN — Medication 3 MILLILITER(S): at 05:22

## 2019-01-01 RX ADMIN — Medication 100 MILLIGRAM(S): at 06:33

## 2019-01-01 RX ADMIN — Medication 0.5 MILLIGRAM(S): at 05:56

## 2019-01-01 RX ADMIN — Medication 3 MILLILITER(S): at 12:22

## 2019-01-01 RX ADMIN — Medication 20 MILLIGRAM(S): at 06:27

## 2019-01-01 RX ADMIN — Medication 0.1 MILLIGRAM(S): at 17:41

## 2019-01-01 RX ADMIN — Medication 0.1 MILLIGRAM(S): at 05:13

## 2019-01-01 RX ADMIN — Medication 0.1 MILLIGRAM(S): at 05:26

## 2019-01-01 RX ADMIN — MEROPENEM 100 MILLIGRAM(S): 1 INJECTION INTRAVENOUS at 18:37

## 2019-01-01 RX ADMIN — HEPARIN SODIUM 5000 UNIT(S): 5000 INJECTION INTRAVENOUS; SUBCUTANEOUS at 18:13

## 2019-01-01 RX ADMIN — Medication 40 MILLIGRAM(S): at 10:10

## 2019-01-01 RX ADMIN — Medication 80 MILLIGRAM(S): at 01:24

## 2019-01-01 RX ADMIN — Medication 4: at 17:30

## 2019-01-01 RX ADMIN — Medication 50 MILLIGRAM(S): at 06:48

## 2019-01-01 RX ADMIN — Medication 0.5 MILLIGRAM(S): at 18:14

## 2019-01-01 RX ADMIN — SODIUM CHLORIDE 2.5 MILLILITER(S): 9 INJECTION, SOLUTION INTRAVENOUS at 19:51

## 2019-01-01 RX ADMIN — Medication 3 UNIT(S): at 09:02

## 2019-01-01 RX ADMIN — Medication 5 MILLIGRAM(S): at 23:41

## 2019-01-01 RX ADMIN — Medication 1 TABLET(S): at 11:28

## 2019-01-01 RX ADMIN — Medication 4: at 12:44

## 2019-01-01 RX ADMIN — Medication 100 MILLIGRAM(S): at 06:23

## 2019-01-01 RX ADMIN — SERTRALINE 50 MILLIGRAM(S): 25 TABLET, FILM COATED ORAL at 18:27

## 2019-01-01 RX ADMIN — Medication 0.5 MILLIGRAM(S): at 17:55

## 2019-01-01 RX ADMIN — Medication 0.1 MILLIGRAM(S): at 17:30

## 2019-01-01 RX ADMIN — Medication 0.25 MILLIGRAM(S): at 04:02

## 2019-01-01 RX ADMIN — Medication 3 MILLILITER(S): at 09:39

## 2019-01-01 RX ADMIN — ATORVASTATIN CALCIUM 10 MILLIGRAM(S): 80 TABLET, FILM COATED ORAL at 21:30

## 2019-01-01 RX ADMIN — PANTOPRAZOLE SODIUM 40 MILLIGRAM(S): 20 TABLET, DELAYED RELEASE ORAL at 05:35

## 2019-01-01 RX ADMIN — ERTAPENEM SODIUM 120 MILLIGRAM(S): 1 INJECTION, POWDER, LYOPHILIZED, FOR SOLUTION INTRAMUSCULAR; INTRAVENOUS at 13:11

## 2019-01-01 RX ADMIN — OXYCODONE AND ACETAMINOPHEN 1 TABLET(S): 5; 325 TABLET ORAL at 11:58

## 2019-01-01 RX ADMIN — Medication 0.5 MILLIGRAM(S): at 12:36

## 2019-01-01 RX ADMIN — Medication 1: at 08:21

## 2019-01-01 RX ADMIN — Medication 40 MILLIGRAM(S): at 17:20

## 2019-01-01 RX ADMIN — Medication 40 MILLIGRAM(S): at 09:13

## 2019-01-01 RX ADMIN — Medication 3 MILLILITER(S): at 17:05

## 2019-01-01 RX ADMIN — Medication 4: at 13:19

## 2019-01-01 RX ADMIN — MAGNESIUM OXIDE 400 MG ORAL TABLET 400 MILLIGRAM(S): 241.3 TABLET ORAL at 17:43

## 2019-01-01 RX ADMIN — Medication 0.1 MILLIGRAM(S): at 17:32

## 2019-01-01 RX ADMIN — ATENOLOL 25 MILLIGRAM(S): 25 TABLET ORAL at 20:28

## 2019-01-01 RX ADMIN — Medication 0.1 MILLIGRAM(S): at 06:48

## 2019-01-01 RX ADMIN — Medication 60 MILLIGRAM(S): at 05:21

## 2019-01-01 RX ADMIN — Medication 0.5 MILLIGRAM(S): at 05:32

## 2019-01-01 RX ADMIN — Medication 100 MILLIGRAM(S): at 19:22

## 2019-01-01 RX ADMIN — Medication 30 MILLIGRAM(S): at 13:18

## 2019-01-01 RX ADMIN — Medication 200 MILLIGRAM(S): at 21:20

## 2019-01-01 RX ADMIN — Medication 1: at 12:35

## 2019-01-01 RX ADMIN — SENNA PLUS 2 TABLET(S): 8.6 TABLET ORAL at 21:20

## 2019-01-01 RX ADMIN — ENOXAPARIN SODIUM 70 MILLIGRAM(S): 100 INJECTION SUBCUTANEOUS at 12:37

## 2019-01-01 RX ADMIN — Medication 50 MILLIGRAM(S): at 13:50

## 2019-01-01 RX ADMIN — Medication 0.1 MILLIGRAM(S): at 18:13

## 2019-01-01 RX ADMIN — SENNA PLUS 2 TABLET(S): 8.6 TABLET ORAL at 21:51

## 2019-01-01 RX ADMIN — APIXABAN 2.5 MILLIGRAM(S): 2.5 TABLET, FILM COATED ORAL at 17:24

## 2019-01-01 RX ADMIN — Medication 3 MILLILITER(S): at 00:35

## 2019-01-01 RX ADMIN — Medication 4: at 12:32

## 2019-01-01 RX ADMIN — Medication 0.5 MILLIGRAM(S): at 18:55

## 2019-01-01 RX ADMIN — Medication 10 MILLIGRAM(S): at 02:05

## 2019-01-01 RX ADMIN — Medication 2: at 12:11

## 2019-01-01 RX ADMIN — ERTAPENEM SODIUM 120 MILLIGRAM(S): 1 INJECTION, POWDER, LYOPHILIZED, FOR SOLUTION INTRAMUSCULAR; INTRAVENOUS at 11:48

## 2019-01-01 RX ADMIN — ALBUTEROL 2 PUFF(S): 90 AEROSOL, METERED ORAL at 18:22

## 2019-01-01 RX ADMIN — ATORVASTATIN CALCIUM 10 MILLIGRAM(S): 80 TABLET, FILM COATED ORAL at 21:42

## 2019-01-01 RX ADMIN — Medication 0.2 MILLIGRAM(S): at 06:21

## 2019-01-01 RX ADMIN — ENTECAVIR 0.5 MILLIGRAM(S): 0.5 TABLET ORAL at 12:26

## 2019-01-01 RX ADMIN — Medication 5 MILLIGRAM(S): at 22:30

## 2019-01-01 RX ADMIN — Medication 100 MILLIGRAM(S): at 22:09

## 2019-01-01 RX ADMIN — SERTRALINE 25 MILLIGRAM(S): 25 TABLET, FILM COATED ORAL at 12:26

## 2019-01-01 RX ADMIN — SERTRALINE 50 MILLIGRAM(S): 25 TABLET, FILM COATED ORAL at 13:15

## 2019-01-01 RX ADMIN — PANTOPRAZOLE SODIUM 40 MILLIGRAM(S): 20 TABLET, DELAYED RELEASE ORAL at 05:12

## 2019-01-01 RX ADMIN — Medication 650 MILLIGRAM(S): at 18:39

## 2019-01-01 RX ADMIN — Medication 2: at 17:34

## 2019-01-01 RX ADMIN — MONTELUKAST 10 MILLIGRAM(S): 4 TABLET, CHEWABLE ORAL at 14:28

## 2019-01-01 RX ADMIN — ERTAPENEM SODIUM 120 MILLIGRAM(S): 1 INJECTION, POWDER, LYOPHILIZED, FOR SOLUTION INTRAMUSCULAR; INTRAVENOUS at 12:31

## 2019-01-01 RX ADMIN — Medication 50 MILLIGRAM(S): at 18:07

## 2019-01-01 RX ADMIN — Medication 80 MILLIGRAM(S): at 22:22

## 2019-01-01 RX ADMIN — Medication 8 UNIT(S): at 17:08

## 2019-01-01 RX ADMIN — Medication 5 MILLIGRAM(S): at 01:48

## 2019-01-01 RX ADMIN — SERTRALINE 25 MILLIGRAM(S): 25 TABLET, FILM COATED ORAL at 12:38

## 2019-01-01 RX ADMIN — Medication 100 MILLIGRAM(S): at 05:32

## 2019-01-01 RX ADMIN — Medication 60 MILLIGRAM(S): at 05:37

## 2019-01-01 RX ADMIN — Medication 101 MILLIGRAM(S): at 04:55

## 2019-01-01 RX ADMIN — Medication 50 MILLIGRAM(S): at 17:28

## 2019-01-01 RX ADMIN — Medication 3 MILLILITER(S): at 05:06

## 2019-01-01 RX ADMIN — HEPARIN SODIUM 5000 UNIT(S): 5000 INJECTION INTRAVENOUS; SUBCUTANEOUS at 07:16

## 2019-01-01 RX ADMIN — Medication 70 MILLIGRAM(S): at 06:06

## 2019-01-01 RX ADMIN — Medication 0.1 MILLIGRAM(S): at 17:24

## 2019-01-01 RX ADMIN — MONTELUKAST 10 MILLIGRAM(S): 4 TABLET, CHEWABLE ORAL at 13:52

## 2019-01-01 RX ADMIN — MEROPENEM 100 MILLIGRAM(S): 1 INJECTION INTRAVENOUS at 11:35

## 2019-01-01 RX ADMIN — Medication 0.25 MILLIGRAM(S): at 12:37

## 2019-01-01 RX ADMIN — Medication 3 MILLILITER(S): at 23:36

## 2019-01-01 RX ADMIN — ATORVASTATIN CALCIUM 10 MILLIGRAM(S): 80 TABLET, FILM COATED ORAL at 23:15

## 2019-01-01 RX ADMIN — Medication 2: at 08:57

## 2019-01-01 RX ADMIN — Medication 100 MILLIGRAM(S): at 13:23

## 2019-01-01 RX ADMIN — Medication 50 MILLIGRAM(S): at 18:13

## 2019-01-01 RX ADMIN — ALBUTEROL 2 PUFF(S): 90 AEROSOL, METERED ORAL at 06:38

## 2019-01-01 RX ADMIN — Medication 3 MILLILITER(S): at 12:45

## 2019-01-01 RX ADMIN — Medication 4: at 12:48

## 2019-01-01 RX ADMIN — Medication 0.5 MILLIGRAM(S): at 15:22

## 2019-01-01 RX ADMIN — Medication 4: at 11:29

## 2019-01-01 RX ADMIN — Medication 0.5 MILLIGRAM(S): at 21:33

## 2019-01-01 RX ADMIN — Medication 20 MILLIGRAM(S): at 12:04

## 2019-01-01 RX ADMIN — Medication 3 MILLILITER(S): at 00:44

## 2019-01-01 RX ADMIN — Medication 40 MILLIEQUIVALENT(S): at 09:33

## 2019-01-01 RX ADMIN — ALBUTEROL 2 PUFF(S): 90 AEROSOL, METERED ORAL at 13:42

## 2019-01-01 RX ADMIN — Medication 8 UNIT(S): at 08:48

## 2019-01-01 RX ADMIN — Medication 70 MILLIGRAM(S): at 06:44

## 2019-01-01 RX ADMIN — Medication 80 MILLIGRAM(S): at 12:17

## 2019-01-01 RX ADMIN — Medication 100 MILLIGRAM(S): at 18:55

## 2019-01-01 RX ADMIN — Medication 6 UNIT(S): at 09:33

## 2019-01-01 RX ADMIN — Medication 650 MILLIGRAM(S): at 05:05

## 2019-01-01 RX ADMIN — SODIUM CHLORIDE 500 MILLILITER(S): 9 INJECTION INTRAMUSCULAR; INTRAVENOUS; SUBCUTANEOUS at 15:49

## 2019-01-01 RX ADMIN — MONTELUKAST 10 MILLIGRAM(S): 4 TABLET, CHEWABLE ORAL at 11:50

## 2019-01-01 RX ADMIN — Medication 100 GRAM(S): at 09:05

## 2019-01-01 RX ADMIN — Medication 30 MILLIGRAM(S): at 17:17

## 2019-01-01 RX ADMIN — Medication 3 MILLILITER(S): at 06:25

## 2019-01-01 RX ADMIN — Medication 100 MILLIGRAM(S): at 15:57

## 2019-01-01 RX ADMIN — ERTAPENEM SODIUM 120 MILLIGRAM(S): 1 INJECTION, POWDER, LYOPHILIZED, FOR SOLUTION INTRAMUSCULAR; INTRAVENOUS at 12:38

## 2019-01-01 RX ADMIN — Medication 8 UNIT(S): at 12:30

## 2019-01-01 RX ADMIN — ERTAPENEM SODIUM 120 MILLIGRAM(S): 1 INJECTION, POWDER, LYOPHILIZED, FOR SOLUTION INTRAMUSCULAR; INTRAVENOUS at 13:30

## 2019-01-01 RX ADMIN — Medication 20 MILLIGRAM(S): at 10:03

## 2019-01-01 RX ADMIN — HEPARIN SODIUM 5000 UNIT(S): 5000 INJECTION INTRAVENOUS; SUBCUTANEOUS at 17:20

## 2019-01-01 RX ADMIN — SENNA PLUS 2 TABLET(S): 8.6 TABLET ORAL at 22:43

## 2019-01-01 RX ADMIN — Medication 60 MILLIGRAM(S): at 06:48

## 2019-01-01 RX ADMIN — ATORVASTATIN CALCIUM 10 MILLIGRAM(S): 80 TABLET, FILM COATED ORAL at 21:12

## 2019-01-01 RX ADMIN — MONTELUKAST 10 MILLIGRAM(S): 4 TABLET, CHEWABLE ORAL at 11:51

## 2019-01-01 RX ADMIN — SERTRALINE 50 MILLIGRAM(S): 25 TABLET, FILM COATED ORAL at 12:23

## 2019-01-01 RX ADMIN — PANTOPRAZOLE SODIUM 40 MILLIGRAM(S): 20 TABLET, DELAYED RELEASE ORAL at 06:36

## 2019-01-01 RX ADMIN — Medication 0.5 MILLIGRAM(S): at 12:23

## 2019-01-01 RX ADMIN — ALBUTEROL 2 PUFF(S): 90 AEROSOL, METERED ORAL at 14:59

## 2019-01-01 RX ADMIN — ERTAPENEM SODIUM 100 MILLIGRAM(S): 1 INJECTION, POWDER, LYOPHILIZED, FOR SOLUTION INTRAMUSCULAR; INTRAVENOUS at 13:32

## 2019-01-01 RX ADMIN — Medication 48 MILLIGRAM(S): at 05:16

## 2019-01-01 RX ADMIN — Medication 4: at 17:28

## 2019-01-01 RX ADMIN — Medication 1: at 13:11

## 2019-01-01 RX ADMIN — Medication 0.1 MILLIGRAM(S): at 05:35

## 2019-01-01 RX ADMIN — Medication 500 MILLIGRAM(S): at 11:28

## 2019-01-01 RX ADMIN — MONTELUKAST 10 MILLIGRAM(S): 4 TABLET, CHEWABLE ORAL at 22:13

## 2019-01-10 NOTE — HISTORY OF PRESENT ILLNESS
[de-identified] : The surgery area is less swollen and continues to ooze  old blood. The patient denies fever or chills

## 2019-01-10 NOTE — PHYSICAL EXAM
[Abdominal Masses] : No abdominal masses [Abdomen Tenderness] : ~T ~M No abdominal tenderness [de-identified] : decrease hematoma RIH site

## 2019-01-24 PROBLEM — K40.90 RIGHT INGUINAL HERNIA: Status: ACTIVE | Noted: 2018-01-01

## 2019-01-24 NOTE — PLAN
[FreeTextEntry1] : Increase fluids. \par Cranberry juice.\par Will fu with daughter. \par \par If symptoms persist- follow up in office.

## 2019-01-24 NOTE — PHYSICAL EXAM
[Abdominal Masses] : No abdominal masses [Abdomen Tenderness] : ~T ~M No abdominal tenderness [de-identified] : healing RIH incision

## 2019-01-24 NOTE — HISTORY OF PRESENT ILLNESS
[FreeTextEntry8] : Noted burning with urination since Monday. Denies frequency, nocturia or noted odor to urine. Denies fever, chills nausea or vomiting. Denies abdominal pain. Reports chronic back pain.\par \par No recent UTI

## 2019-02-04 NOTE — PHYSICAL EXAM
[General Appearance - Well Developed] : well developed [Normal Appearance] : normal appearance [Well Groomed] : well groomed [General Appearance - Well Nourished] : well nourished [No Deformities] : no deformities [General Appearance - In No Acute Distress] : no acute distress [Normal Conjunctiva] : the conjunctiva exhibited no abnormalities [Eyelids - No Xanthelasma] : the eyelids demonstrated no xanthelasmas [Normal Oral Mucosa] : normal oral mucosa [No Oral Pallor] : no oral pallor [No Oral Cyanosis] : no oral cyanosis [Normal Jugular Venous A Waves Present] : normal jugular venous A waves present [Normal Jugular Venous V Waves Present] : normal jugular venous V waves present [No Jugular Venous Flores A Waves] : no jugular venous flores A waves [Respiration, Rhythm And Depth] : normal respiratory rhythm and effort [Exaggerated Use Of Accessory Muscles For Inspiration] : no accessory muscle use [Auscultation Breath Sounds / Voice Sounds] : lungs were clear to auscultation bilaterally [Abdomen Soft] : soft [Abdomen Tenderness] : non-tender [Abdomen Mass (___ Cm)] : no abdominal mass palpated [Abnormal Walk] : normal gait [Gait - Sufficient For Exercise Testing] : the gait was sufficient for exercise testing [Nail Clubbing] : no clubbing of the fingernails [Cyanosis, Localized] : no localized cyanosis [Petechial Hemorrhages (___cm)] : no petechial hemorrhages [Skin Color & Pigmentation] : normal skin color and pigmentation [] : no rash [No Venous Stasis] : no venous stasis [Skin Lesions] : no skin lesions [No Skin Ulcers] : no skin ulcer [No Xanthoma] : no  xanthoma was observed [Oriented To Time, Place, And Person] : oriented to person, place, and time [Affect] : the affect was normal [Mood] : the mood was normal [No Anxiety] : not feeling anxious [Normal Rate] : normal [Normal S1] : normal S1 [Normal S2] : normal S2 [No Murmur] : no murmurs heard [2+] : left 2+ [No Abnormalities] : the abdominal aorta was not enlarged and no bruit was heard [___ +] : bilateral [unfilled]U+ pitting edema to the ankles [S3] : no S3 [S4] : no S4 [Right Carotid Bruit] : no bruit heard over the right carotid [Left Carotid Bruit] : no bruit heard over the left carotid [Right Femoral Bruit] : no bruit heard over the right femoral artery [Left Femoral Bruit] : no bruit heard over the left femoral artery

## 2019-02-04 NOTE — REASON FOR VISIT
[Follow-Up - Clinic] : a clinic follow-up of [Atrial Fibrillation] : atrial fibrillation [Hypertension] : hypertension [FreeTextEntry2] : pt c/o sob,hogan with minimal activity, andf when eating, no sscp [FreeTextEntry1] : no cp, or palps.or sob

## 2019-02-04 NOTE — DISCUSSION/SUMMARY
[Non-specific ECG Changes] : abnormal ECG [Coronary Artery Disease] : coronary artery disease [Echocardiogram] : an echocardiogram [Atrial Fibrillation] : atrial fibrillation [Controlled Ventricular Response] : controlled ventricular response [Rate Control] : rate control [Anticoagulation] : anticoagulation [Continue] : continuing beta blockers [Hyperlipidemia] : hyperlipidemia [Lipids Test Panel] : a fasting lipid profile [Hypertension] : hypertension [Outpatient Evaluation] : outpatient evaluation [Ambulatory BP Monitoring] : ambulatory blood pressure monitoring [Sodium Restriction] : sodium restriction [Venous Insufficiency] : venous insufficiency [Pulmonary Hypertension (PH)] : pulmonary hypertension (PH) [COPD] : chronic obstructive pulmonary disease [Stable] : stable [Responding to Treatment] : responding to treatment [None] : none [de-identified] : 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

## 2019-02-04 NOTE — CARDIOLOGY SUMMARY
[___] : [unfilled] [No Ischemia] : no Ischemia [LVEF ___%] : LVEF [unfilled]% [None] : normal LV function [Moderate] : moderate pulmonary hypertension

## 2019-02-11 NOTE — PHYSICAL THERAPY INITIAL EVALUATION ADULT - GAIT DISTANCE, PT EVAL
PROVIDER:[TOKEN:[[id number]]],PROVIDER:[TOKEN:[[id number]]] 50 feet PROVIDER:[TOKEN:[[id number]]],PROVIDER:[TOKEN:[[id number]]],PROVIDER:[TOKEN:[[id number]]] PROVIDER:[TOKEN:[[id number]]],PROVIDER:[TOKEN:[[id number]]],PROVIDER:[TOKEN:[[id number]]],PROVIDER:[TOKEN:[[id number]]]

## 2019-02-14 PROBLEM — R26.81 UNSTEADY GAIT: Status: ACTIVE | Noted: 2017-06-21

## 2019-02-14 NOTE — REVIEW OF SYSTEMS
[Fatigue] : fatigue [Earache] : no earache [Hearing Loss] : hearing loss [Nosebleed] : no nosebleeds [Hoarseness] : no hoarseness [Nasal Discharge] : no nasal discharge [Sore Throat] : no sore throat [Palpitations] : palpitations [Lower Ext Edema] : lower extremity edema [Shortness Of Breath] : no shortness of breath [Wheezing] : no wheezing [Cough] : no cough [Dyspnea on Exertion] : dyspnea on exertion [Joint Pain] : joint pain [Joint Stiffness] : joint stiffness [Joint Swelling] : no joint swelling [Muscle Weakness] : muscle weakness [Muscle Pain] : muscle pain [Back Pain] : back pain [Headache] : no headache [Fainting] : no fainting [Confusion] : no confusion [Memory Loss] : no memory loss [Suicidal] : not suicidal [Insomnia] : insomnia [Anxiety] : anxiety [Depression] : depression [Easy Bleeding] : no easy bleeding [Easy Bruising] : no easy bruising [Swollen Glands] : no swollen glands [Negative] : Heme/Lymph

## 2019-02-14 NOTE — PHYSICAL EXAM
[No Acute Distress] : no acute distress [Well Nourished] : well nourished [Well Developed] : well developed [Well-Appearing] : well-appearing [Normal Voice/Communication] : normal voice/communication [Normal Sclera/Conjunctiva] : normal sclera/conjunctiva [PERRL] : pupils equal round and reactive to light [EOMI] : extraocular movements intact [Normal Outer Ear/Nose] : the outer ears and nose were normal in appearance [Normal Oropharynx] : the oropharynx was normal [No JVD] : no jugular venous distention [Supple] : supple [No Lymphadenopathy] : no lymphadenopathy [Thyroid Normal, No Nodules] : the thyroid was normal and there were no nodules present [No Respiratory Distress] : no respiratory distress  [Clear to Auscultation] : lungs were clear to auscultation bilaterally [No Accessory Muscle Use] : no accessory muscle use [Normal Rate] : normal rate  [No Murmur] : no murmur heard [No Carotid Bruits] : no carotid bruits [No Abdominal Bruit] : a ~M bruit was not heard ~T in the abdomen [No Varicosities] : no varicosities [Pedal Pulses Present] : the pedal pulses are present [No Extremity Clubbing/Cyanosis] : no extremity clubbing/cyanosis [No Palpable Aorta] : no palpable aorta [Soft] : abdomen soft [Non Tender] : non-tender [Non-distended] : non-distended [No Masses] : no abdominal mass palpated [Normal Bowel Sounds] : normal bowel sounds [Normal Posterior Cervical Nodes] : no posterior cervical lymphadenopathy [Normal Anterior Cervical Nodes] : no anterior cervical lymphadenopathy [No CVA Tenderness] : no CVA  tenderness [No Spinal Tenderness] : no spinal tenderness [No Joint Swelling] : no joint swelling [Grossly Normal Strength/Tone] : grossly normal strength/tone [No Rash] : no rash [Coordination Grossly Intact] : coordination grossly intact [No Focal Deficits] : no focal deficits [Memory Grossly Normal] : memory grossly normal [Normal Affect] : the affect was normal [Alert and Oriented x3] : oriented to person, place, and time [Normal Mood] : the mood was normal [Normal Insight/Judgement] : insight and judgment were intact [de-identified] : irregularly irregular [de-identified] : Bilateral lower extremity edema, chronic [de-identified] : no guarding,Right lower quadrant  pain, secondary to inguinal hernia [de-identified] : Unsteady gait secondary to severe joint pain,Patient ambulates with cane

## 2019-02-14 NOTE — HISTORY OF PRESENT ILLNESS
[Formal Caregiver] : formal caregiver [FreeTextEntry1] : Please see history of present illness [de-identified] : Patient is an 84-year-old female, who presents today for followup appointment with multiple medical problems. Patient is accompanied by her caretaker. Presently, patient is awake, alert, and oriented x3. She is in no acute distress. Medical history is long and complicated with history of atrial fibrillation, diastolic congestive heart failure, chronic renal insufficiency, anemia of chronic disease, hyperlipidemia, hypertension, patient has marginal zone B-cell lymphoma, but doing well.\par \par Patient presently denies any chest pain admits to diffuse joint discomfort and also complaining of neck pain secondary to degenerative joint disease. Patient also has history of rheumatoid arthritis.

## 2019-02-14 NOTE — ASSESSMENT
[FreeTextEntry1] : Assessment and plan:\par \par 1. Hypertension excellent blood pressure control. Today, compared to previous. No change in present medical management. Patient is on multiple medications.\par \par 2. Hyperlipidemia. Continue atorvastatin 10 mg p.o. at bedtime.\par \par 3. Diastolic congestive heart failure. Continue present medical management presently stable without exacerbation.\par \par 4. Insomnia. Ambien as needed.\par \par 5. Anxiety well controlled with sertraline 50 mg p.o. daily.\par \par 6. Atrial fibrillation. Continue warfarin continue present dose of 5mg

## 2019-03-11 NOTE — HISTORY OF PRESENT ILLNESS
[FreeTextEntry1] : This letter  is regarding your patient  who  attended pulmonary out patient office today.  I have reviewed  patient's  past history, social history, family history and medication list. I also  reviewed nurse practitioners/ and fellows  notes and assessment and agree with it.  \par The patient was referred by , 83 yo w/CHF- requiring recent admission. c/o CHILEL and edema of extremities (on diuretics), CHF [ BIVENTRICULAR FAILURE] , HTN,  afib- on coumadin- managed by Dr Dobbins. 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 Renal dys/elevated urine protein follows renal. PMH HTN, Anemia\par \par ------No history of , fever, chills , rigors, chest pain, or hemoptysis. Questionable history of Raynaud's phenomenon. No h/o significant weight loss in last few months. No history of liver dysfunction , collagen vascular disorder or chronic thromboembolic disease. I would classify the patient's dyspnea as WHO  FUNCTIONAL CLASS II--------NON SMOKER\par \par ----Echo  date------july 2018\par Summary:  1. Left ventricular ejection fraction, by visual estimation, is 60 to  65%.  2. Normal global left ventricular systolic function.  3. Spectral Doppler shows restrictive pattern of left ventricular  myocardial filling (Grade III diastolic dysfunction).  4. Moderately enlarged right ventricle.  5. Mild mitral annular calcification.  6. Thickening and calcification of the anterior and posterior mitral  valve leaflets.  7. Moderate tricuspid regurgitation.  8. Mild aortic regurgitation.  9. Sclerotic aortic valve with normal opening. 10. Estimated pulmonary artery systolic pressure is 58.4 mmHg assuming a  right atrial pressure of 10 mmHg, which is consistent with moderate  pulmonary hypertension. 11. LA volume Index is 66.0 ml/m? ml/m2. 12. The aortic valve mean gradient is 7.6 mmHg consistent with normally  opening aortic valve.\par \par \par ----JAN 2019-------DEXA SCAN----Findings are osteopenia in the lumbar spine and osteopenia in the right hip and osteoporosis in the left forearm. \par \par ----Pft date---MAR 2019------- FVC 63%, FEV 1 69, TLC 75, DLCO 69---- [RESTRICTIVE DEFECT ]\par \par ---cxr c/w cardiomegaly----\par \par CT CHEST 2018   CLEAR LUNGS \par ---\par oct 2018----FEELS BETTER---\par \par ------MAR 2019------accompanied by HHA-----feels better------no shortness of breath------on coumadin for Afib-----unable to complete a sleep study---- as per pt she is complaint to meds  --is on furosemide --\par \par \par \par

## 2019-03-11 NOTE — DISCUSSION/SUMMARY
[FreeTextEntry1] : ---Assessment plan----------The patient has been referred here for further opinion regarding pulmonary problem, -----  83 yo w/CHF- biventricular failure- . c/o CHILEL and edema of extremities (on diuretics), CHF [ BIVENTRICULAR FAILURE] , HTN,  afib- on coumadin- managed by Dr Dobbins. Lifelong nonsmoker --HAS MARGINAL ZONE  B CELL LYMPHOMA-- --F/U BY HEME\par \par \par I DOUBT SHE HAS SIGNIFICANT PULMONARY ISSUES--------pulmonary hypertension secondary to her left heart dysfunction--[ DIASTOLIC DYSFUNCTION, A FIB] -- ---WE CAN CERTAINLY DO RIGHT HEART CATH  BUT  I FEEL SHE IS  NOT A CANDIDATE FOR ANY SELECTIVE PULMONARY VASODILATOR TREATMENT  IN VIEW OF HER LEFT HEART DISEASE ]--\par \par 1 ] DYSPNEA  MUTIFACTORIAL DUE TO UNDERLYING CARDIAC CONDITION AND ANEMIA\par --PT ALREADY F/U WITH CARDIOLOGY AND HEMATOLOGY \par \par 2) she has features of YARED including snoring- -- UNABLE TO DO SABRINA  HOME SLEEP STUDY  TO r/o YARED\par \par 4) osteoporosis---------she will need to follow up with PCP [Dr. Salazar] ---\par 5-OBTAIN PFT\par 6 HAD DETAILED DISCISSION WITH PMD DR SALAZAR----  explained the issues of her secondary pulmonary hypertension-IN THE SETTING OF RENAL INSUFFICIENCY --will keep her euvolemic [ f/u with nephrology  dr edy reagan] --- \par 7- -   MARGINAL ZONE  B CELL LYMPHOMA --  F/U ONCOLOGY\par \par -f/u in 3 months-----------Thanks for allowing  me to participate  in the care of this patient.  Patient at this time  will follow  the above mentioned recommendations and return back for follow up visit. If you have any questions  I can be reached  at #[phone] (beeper)  or  [phone] (office).\par \par Lian Whaley MD, FCCP \par Director, Pulmonary Hypertension Program \par [facility] \par Division of Pulmonary, Critical Care and Sleep Medicine \par  Professor of Medicine \par [facility]\par

## 2019-03-11 NOTE — PHYSICAL EXAM
[General Appearance - Well Developed] : well developed [Normal Appearance] : normal appearance [Well Groomed] : well groomed [General Appearance - Well Nourished] : well nourished [No Deformities] : no deformities [General Appearance - In No Acute Distress] : no acute distress [Normal Conjunctiva] : the conjunctiva exhibited no abnormalities [Eyelids - No Xanthelasma] : the eyelids demonstrated no xanthelasmas [Normal Oropharynx] : normal oropharynx [Neck Appearance] : the appearance of the neck was normal [Neck Cervical Mass (___cm)] : no neck mass was observed [Jugular Venous Distention Increased] : there was no jugular-venous distention [Thyroid Diffuse Enlargement] : the thyroid was not enlarged [Thyroid Nodule] : there were no palpable thyroid nodules [Heart Rate And Rhythm] : heart rate and rhythm were normal [Heart Sounds] : normal S1 and S2 [Murmurs] : no murmurs present [Respiration, Rhythm And Depth] : normal respiratory rhythm and effort [Exaggerated Use Of Accessory Muscles For Inspiration] : no accessory muscle use [Auscultation Breath Sounds / Voice Sounds] : lungs were clear to auscultation bilaterally [Tenderness: ____] : tenderness [unfilled] [Abdomen Soft] : soft [Abdomen Tenderness] : non-tender [Abdomen Mass (___ Cm)] : no abdominal mass palpated [Cyanosis, Localized] : no localized cyanosis [1+ Pitting] : 1+  pitting [Deep Tendon Reflexes (DTR)] : deep tendon reflexes were 2+ and symmetric [Sensation] : the sensory exam was normal to light touch and pinprick [No Focal Deficits] : no focal deficits [Oriented To Time, Place, And Person] : oriented to person, place, and time [Impaired Insight] : insight and judgment were intact [Affect] : the affect was normal [Skin Color & Pigmentation] : normal skin color and pigmentation [Skin Turgor] : normal skin turgor [] : no rash [FreeTextEntry1] : uses cane

## 2019-04-16 NOTE — REVIEW OF SYSTEMS
[Shortness Of Breath] : shortness of breath [Joint Pain] : joint pain [Joint Stiffness] : joint stiffness [Back Pain] : back pain [Muscle Pain] : muscle pain [Headache] : headache [Negative] : Heme/Lymph [Chills] : no chills [Fever] : no fever [Fatigue] : no fatigue [Vision Problems] : no vision problems [Nasal Discharge] : no nasal discharge [Chest Pain] : no chest pain [Palpitations] : no palpitations [Wheezing] : no wheezing [Cough] : no cough [Dyspnea on Exertion] : no dyspnea on exertion [Abdominal Pain] : no abdominal pain [Nausea] : no nausea [Constipation] : no constipation [Diarrhea] : diarrhea [Vomiting] : no vomiting [Dysuria] : no dysuria [Incontinence] : no incontinence [Frequency] : no frequency [Itching] : no itching [Dizziness] : no dizziness [Fainting] : no fainting [Insomnia] : no insomnia [Suicidal] : not suicidal [Anxiety] : no anxiety [Depression] : no depression

## 2019-04-16 NOTE — HEALTH RISK ASSESSMENT
[No falls in past year] : Patient reported no falls in the past year [0] : 2) Feeling down, depressed, or hopeless: Not at all (0) [] : No [[id number]] : 0

## 2019-04-16 NOTE — HISTORY OF PRESENT ILLNESS
[FreeTextEntry8] : cc: leg swelling b/l and pain\par \par Patient is an 83 yo female, who presents today with caretaker and son for issues of b/l leg swelling and pain at night b/l. Pt has multiple medical problems. Medical history is long and complicated with history of atrial fibrillation, diastolic congestive heart failure, chronic renal insufficiency, anemia of chronic disease, hyperlipidemia, hypertension, patient has marginal zone B-cell lymphoma. \par \par Pt's caretaker states her legs have been swollen more than before. Pt also having pain in b/l legs at night. Pt denies any fever, chills, nausea, vomiting, diarrhea. Pt currently Lasix 40mg daily on Mon, Wed, Friday, and 20mg rest of the week. Pt also takes HCTZ for HTN, which she takes 50mg in AM, 50mg in PM, 25mg at bedtime. Pt does use compression stocking. \par \par

## 2019-04-16 NOTE — REVIEW OF SYSTEMS
[Chills] : chills [Fatigue] : fatigue [Nasal Discharge] : nasal discharge [Postnasal Drip] : postnasal drip [Shortness Of Breath] : shortness of breath [Cough] : cough [Dyspnea on Exertion] : dyspnea on exertion [Headache] : headache [Fever] : no fever [Vision Problems] : no vision problems [Earache] : no earache [Hearing Loss] : no hearing loss [Sore Throat] : no sore throat [Chest Pain] : no chest pain [Palpitations] : no palpitations [Wheezing] : no wheezing [Abdominal Pain] : no abdominal pain [Nausea] : no nausea [Dysuria] : no dysuria [Incontinence] : no incontinence [Frequency] : no frequency [Joint Pain] : no joint pain [Skin Rash] : no skin rash [Dizziness] : no dizziness

## 2019-04-16 NOTE — HEALTH RISK ASSESSMENT
[No falls in past year] : Patient reported no falls in the past year [0] : 2) Feeling down, depressed, or hopeless: Not at all (0) [] : No [[id number]] : 0

## 2019-04-16 NOTE — PHYSICAL EXAM
[Ill-Appearing] : ill-appearing [PERRL] : pupils equal round and reactive to light [EOMI] : extraocular movements intact [Normal Oropharynx] : the oropharynx was normal [Supple] : supple [No Accessory Muscle Use] : no accessory muscle use [Normal Rate] : normal rate  [Normal S1, S2] : normal S1 and S2 [No Edema] : there was no peripheral edema [Soft] : abdomen soft [Non Tender] : non-tender [Normal Anterior Cervical Nodes] : no anterior cervical lymphadenopathy [No Joint Swelling] : no joint swelling [No Rash] : no rash [Normal Gait] : normal gait [de-identified] : fine crackles left lower lung

## 2019-05-03 PROBLEM — Z01.818 PREOPERATIVE CLEARANCE: Status: RESOLVED | Noted: 2018-01-01 | Resolved: 2019-01-01

## 2019-05-03 PROBLEM — Z87.19 HISTORY OF CONSTIPATION: Status: RESOLVED | Noted: 2017-02-22 | Resolved: 2019-01-01

## 2019-05-03 PROBLEM — J45.909 ASTHMATIC BRONCHITIS: Status: RESOLVED | Noted: 2017-03-27 | Resolved: 2019-01-01

## 2019-05-03 PROBLEM — M79.89 LEFT ARM SWELLING: Status: RESOLVED | Noted: 2018-05-14 | Resolved: 2019-01-01

## 2019-05-03 PROBLEM — L53.9 FOOT ERYTHEMA: Status: RESOLVED | Noted: 2017-09-27 | Resolved: 2019-01-01

## 2019-05-03 PROBLEM — N39.0 ACUTE UTI: Status: RESOLVED | Noted: 2019-01-01 | Resolved: 2019-01-01

## 2019-05-03 PROBLEM — J06.9 VIRAL URI WITH COUGH: Status: RESOLVED | Noted: 2019-01-01 | Resolved: 2019-01-01

## 2019-05-03 PROBLEM — M79.601 PAIN OF RIGHT UPPER EXTREMITY: Status: RESOLVED | Noted: 2017-01-04 | Resolved: 2019-01-01

## 2019-05-03 PROBLEM — Z87.898 HISTORY OF SHORTNESS OF BREATH: Status: RESOLVED | Noted: 2018-04-11 | Resolved: 2019-01-01

## 2019-05-03 PROBLEM — Z87.09 HISTORY OF NASAL CONGESTION: Status: RESOLVED | Noted: 2019-01-01 | Resolved: 2019-01-01

## 2019-05-03 PROBLEM — Z87.898 HISTORY OF VOMITING: Status: RESOLVED | Noted: 2018-01-01 | Resolved: 2019-01-01

## 2019-05-03 PROBLEM — R60.0 LOWER LEG EDEMA: Status: RESOLVED | Noted: 2017-06-21 | Resolved: 2019-01-01

## 2019-05-03 PROBLEM — Z86.79 HISTORY OF ABNORMAL ELECTROCARDIOGRAPHY: Status: RESOLVED | Noted: 2017-03-09 | Resolved: 2019-01-01

## 2019-05-03 PROBLEM — Z87.09 HISTORY OF ACUTE BRONCHITIS: Status: RESOLVED | Noted: 2019-01-01 | Resolved: 2019-01-01

## 2019-05-03 PROBLEM — Z12.11 SCREEN FOR COLON CANCER: Status: RESOLVED | Noted: 2017-10-04 | Resolved: 2019-01-01

## 2019-05-03 PROBLEM — M67.431 GANGLION OF RIGHT WRIST: Status: RESOLVED | Noted: 2017-08-24 | Resolved: 2019-01-01

## 2019-05-03 PROBLEM — Z87.81 HISTORY OF FRACTURE OF LEFT HIP: Status: RESOLVED | Noted: 2017-02-22 | Resolved: 2019-01-01

## 2019-05-03 PROBLEM — Z12.31 SCREENING FOR BREAST CANCER: Status: RESOLVED | Noted: 2017-10-04 | Resolved: 2019-01-01

## 2019-05-03 PROBLEM — M79.89 LEFT LEG SWELLING: Status: RESOLVED | Noted: 2017-09-27 | Resolved: 2019-01-01

## 2019-05-09 NOTE — HISTORY OF PRESENT ILLNESS
[de-identified] : 84 years old female referred with normochromic, normocytic anemia, and abnormal serum protein electrophoresis. [FreeTextEntry1] : Bone marrow - May 15, 2018 [de-identified] : Returning for follow up; last seen in office in November 2018. 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 Since last visit patient was treated for UTI, was admitted at hospital for CHF exacerbation. She is feeling weak. Appears non-toxic.Continues treatment with Coumadin; medication list reviewed. Appetite and weight unchanged.

## 2019-05-09 NOTE — PHYSICAL EXAM
[Capable of only limited self care, confined to bed or chair more than 50% of waking hours] : Status 3- Capable of only limited self care, confined to bed or chair more than 50% of waking hours [Normal] : normal appearance, no rash, nodules, vesicles, ulcers, erythema [de-identified] : frail appearinf elderly female

## 2019-05-09 NOTE — REVIEW OF SYSTEMS
[Fatigue] : fatigue [Abdominal Pain] : abdominal pain [Joint Pain] : joint pain [Negative] : Integumentary [Night Sweats] : no night sweats [Recent Change In Weight] : ~T no recent weight change [FreeTextEntry7] : lower abdominal pain with palpation. [FreeTextEntry9] : left hip discomfort with weight bearing

## 2019-05-09 NOTE — REASON FOR VISIT
[Formal Caregiver] : formal caregiver [Follow-Up Visit] : a follow-up visit for [FreeTextEntry2] : plasma cell dyscrasia

## 2019-05-16 PROBLEM — R35.0 URINARY FREQUENCY: Status: ACTIVE | Noted: 2019-01-01

## 2019-05-16 PROBLEM — R39.15 URINARY URGENCY: Status: ACTIVE | Noted: 2019-01-01

## 2019-05-16 NOTE — ASSESSMENT
[FreeTextEntry1] : Assessment and plan:\par \par 1. It appears that the patient has urinary tract infection. I will treat empirically until cultures and sensitivities are back. Ciprofloxacin 250 mg twice a day for 5 days and peridium 100 mg 3 times a day in the intern. Patient will also increase fluid intake.\par \par 2. Atrial fibrillation. Rate is well-controlled. Continue diltiazem and add anticoagulation.\par \par 3. Hypertension. Continue atenolol 25 mg p.o. daily, clonidine 0.1 mg 3 times a day, diltiazem, 30 mg every 6 hours furosemide 20 mg p.o. twice a day.\par \par 4. Generalized anxiety/depression. Continue sertraline 50 mg p.o. daily.\par \par Urine dipstick shows blood, leukocytes, nitrates.Detailed discussion with patient and caregiver regarding Coumadin and antibiotics.

## 2019-05-16 NOTE — REVIEW OF SYSTEMS
[Fatigue] : fatigue [Hearing Loss] : hearing loss [Palpitations] : palpitations [Lower Ext Edema] : lower extremity edema [Dyspnea on Exertion] : dyspnea on exertion [Dysuria] : dysuria [Nocturia] : nocturia [Frequency] : frequency [Joint Pain] : joint pain [Joint Stiffness] : joint stiffness [Muscle Pain] : muscle pain [Back Pain] : back pain [Muscle Weakness] : muscle weakness [Insomnia] : insomnia [Anxiety] : anxiety [Depression] : depression [Negative] : Heme/Lymph [Earache] : no earache [Nosebleed] : no nosebleeds [Hoarseness] : no hoarseness [Nasal Discharge] : no nasal discharge [Sore Throat] : no sore throat [Shortness Of Breath] : no shortness of breath [Wheezing] : no wheezing [Cough] : no cough [Incontinence] : no incontinence [Hematuria] : no hematuria [Vaginal Discharge] : no vaginal discharge [Headache] : no headache [Joint Swelling] : no joint swelling [Fainting] : no fainting [Confusion] : no confusion [Suicidal] : not suicidal [Memory Loss] : no memory loss [Easy Bleeding] : no easy bleeding [Easy Bruising] : no easy bruising [Swollen Glands] : no swollen glands

## 2019-05-16 NOTE — HISTORY OF PRESENT ILLNESS
[Formal Caregiver] : formal caregiver [FreeTextEntry1] : Urinary frequency, burning, painful urination, and urgency ongoing for the past 3 days, worsening each day [de-identified] : Patient is an 84-year-old female, who presents today for followup appointment. Patient has multiple medical problems, which include chronic renal insufficiency, hyperlipidemia, hypertension, chronic insomnia, atrial fibrillation.\par \par Patient does have a chief complaint urinary frequency, urgency, painful urination and burning. She was seen by urology yesterday. Urinalysis showed bacteriuria. Unfortunately, the patient was not placed on any antibiotic. Presently, the patient is in mild to moderate distress secondary to  urinary symptoms\par \par Patient is accompanied by her formal caretaker.

## 2019-05-16 NOTE — PHYSICAL EXAM
[No Acute Distress] : no acute distress [Well Nourished] : well nourished [Well-Appearing] : well-appearing [Well Developed] : well developed [Normal Voice/Communication] : normal voice/communication [Normal Sclera/Conjunctiva] : normal sclera/conjunctiva [PERRL] : pupils equal round and reactive to light [EOMI] : extraocular movements intact [Normal Outer Ear/Nose] : the outer ears and nose were normal in appearance [Normal Oropharynx] : the oropharynx was normal [No JVD] : no jugular venous distention [Supple] : supple [No Lymphadenopathy] : no lymphadenopathy [Thyroid Normal, No Nodules] : the thyroid was normal and there were no nodules present [No Respiratory Distress] : no respiratory distress  [Clear to Auscultation] : lungs were clear to auscultation bilaterally [No Accessory Muscle Use] : no accessory muscle use [Normal Rate] : normal rate  [No Murmur] : no murmur heard [No Carotid Bruits] : no carotid bruits [No Abdominal Bruit] : a ~M bruit was not heard ~T in the abdomen [No Varicosities] : no varicosities [No Extremity Clubbing/Cyanosis] : no extremity clubbing/cyanosis [Pedal Pulses Present] : the pedal pulses are present [No Palpable Aorta] : no palpable aorta [Soft] : abdomen soft [Non Tender] : non-tender [No Masses] : no abdominal mass palpated [Non-distended] : non-distended [Normal Bowel Sounds] : normal bowel sounds [Normal Posterior Cervical Nodes] : no posterior cervical lymphadenopathy [Normal Anterior Cervical Nodes] : no anterior cervical lymphadenopathy [No CVA Tenderness] : no CVA  tenderness [No Spinal Tenderness] : no spinal tenderness [No Joint Swelling] : no joint swelling [Grossly Normal Strength/Tone] : grossly normal strength/tone [No Rash] : no rash [Coordination Grossly Intact] : coordination grossly intact [No Focal Deficits] : no focal deficits [Memory Grossly Normal] : memory grossly normal [Normal Affect] : the affect was normal [Alert and Oriented x3] : oriented to person, place, and time [Normal Mood] : the mood was normal [Normal Insight/Judgement] : insight and judgment were intact [de-identified] : irregularly irregular [de-identified] : Bilateral lower extremity edema, chronic [de-identified] : Suprapubic discomfort with deep palpation [de-identified] : Unsteady gait secondary to severe joint pain,Patient ambulates with cane

## 2019-06-11 PROBLEM — R06.09 DYSPNEA ON EFFORT: Status: ACTIVE | Noted: 2018-02-21

## 2019-06-11 PROBLEM — N18.9 ANEMIA DUE TO CHRONIC KIDNEY DISEASE: Status: ACTIVE | Noted: 2017-10-24

## 2019-06-11 NOTE — HISTORY OF PRESENT ILLNESS
[FreeTextEntry1] : This letter  is regarding your patient  who  attended pulmonary out patient office today.  I have reviewed  patient's  past history, social history, family history and medication list. I also  reviewed nurse practitioners/ and fellows  notes and assessment and agree with it.  \par The patient was referred by , 83 yo w/CHF- requiring recent admission. c/o CHILEL and edema of extremities (on diuretics), CHF [ BIVENTRICULAR FAILURE] , HTN,  afib- on coumadin- managed by Dr Dobbins. 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 Renal dys/elevated urine protein follows renal. PMH HTN, Anemia\par \par ------No history of , fever, chills , rigors, chest pain, or hemoptysis. Questionable history of Raynaud's phenomenon. No h/o significant weight loss in last few months. No history of liver dysfunction , collagen vascular disorder or chronic thromboembolic disease. I would classify the patient's dyspnea as WHO  FUNCTIONAL CLASS II--------NON SMOKER\par \par ----Echo  date------july 2018\par Summary:  1. Left ventricular ejection fraction, by visual estimation, is 60 to  65%.  2. Normal global left ventricular systolic function.  3. Spectral Doppler shows restrictive pattern of left ventricular  myocardial filling (Grade III diastolic dysfunction).  4. Moderately enlarged right ventricle.  5. Mild mitral annular calcification.  6. Thickening and calcification of the anterior and posterior mitral  valve leaflets.  7. Moderate tricuspid regurgitation.  8. Mild aortic regurgitation.  9. Sclerotic aortic valve with normal opening. 10. Estimated pulmonary artery systolic pressure is 58.4 mmHg assuming a  right atrial pressure of 10 mmHg, which is consistent with moderate  pulmonary hypertension. 11. LA volume Index is 66.0 ml/m? ml/m2. 12. The aortic valve mean gradient is 7.6 mmHg consistent with normally  opening aortic valve.\par \par \par ----JAN 2019-------DEXA SCAN----Findings are osteopenia in the lumbar spine and osteopenia in the right hip and osteoporosis in the left forearm. \par \par ----Pft date---MAR 2019------- FVC 63%, FEV 1 69, TLC 75, DLCO 69---- [RESTRICTIVE DEFECT ]\par \par ---cxr c/w cardiomegaly----\par \par CT CHEST 2018   CLEAR LUNGS \par ---\par oct 2018----FEELS BETTER---\par \par june 2019--accompanied by HHA-----feels better------no shortness of breath------on coumadin for Afib-----unable to complete a sleep study---- as per pt she is complaint to meds  --is on furosemide --\par \par \par \par

## 2019-06-11 NOTE — DISCUSSION/SUMMARY
[FreeTextEntry1] : ---Assessment plan----------The patient has been referred here for further opinion regarding pulmonary problem, -----  85 yo w/CHF- biventricular failure- . c/o CHILEL and edema of extremities (on diuretics), CHF [ BIVENTRICULAR FAILURE] , HTN,  afib- on coumadin- managed by Dr Dobbins. Lifelong nonsmoker --HAS MARGINAL ZONE  B CELL LYMPHOMA-- --F/U BY HEME\par \par \par I DOUBT SHE HAS SIGNIFICANT PULMONARY ISSUES--------pulmonary hypertension secondary to her left heart dysfunction--[ DIASTOLIC DYSFUNCTION, A FIB] -- ---WE CAN CERTAINLY DO RIGHT HEART CATH  BUT  I FEEL SHE IS  NOT A CANDIDATE FOR ANY SELECTIVE PULMONARY VASODILATOR TREATMENT  IN VIEW OF HER LEFT HEART DISEASE ]--\par \par 1 ] DYSPNEA  MUTIFACTORIAL DUE TO UNDERLYING CARDIAC CONDITION AND ANEMIA\par --PT ALREADY F/U WITH CARDIOLOGY AND HEMATOLOGY \par \par 2) she has features of YARED including snoring- -- UNABLE TO DO SABRINA  HOME SLEEP STUDY  TO r/o YARED\par \par 4) osteoporosis---------she will need to follow up with PCP [Dr. Salazar] ---\par 5-OBTAIN PFT\par 6 secondary pulmonary hypertension-IN THE SETTING OF RENAL INSUFFICIENCY --will keep her euvolemic [ f/u with nephrology  dr edy reagan] --Lasix 20mg 3x week w/aldactone 25mg 3x weekly. - Consider cardiac cath w/Dr Kati Zuniga\par 7- -   MARGINAL ZONE  B CELL LYMPHOMA --  F/U ONCOLOGY\par \par -f/u in 3 months-----------Thanks for allowing  me to participate  in the care of this patient.  Patient at this time  will follow  the above mentioned recommendations and return back for follow up visit. If you have any questions  I can be reached  at #[phone] (beeper)  or  [phone] (office).\par \par Lian Whaley MD, FCCP \par Director, Pulmonary Hypertension Program \par [facility] \par Division of Pulmonary, Critical Care and Sleep Medicine \par  Professor of Medicine \par [facility]\par

## 2019-06-11 NOTE — PHYSICAL EXAM
[Normal Appearance] : normal appearance [General Appearance - Well Developed] : well developed [General Appearance - Well Nourished] : well nourished [No Deformities] : no deformities [Well Groomed] : well groomed [Eyelids - No Xanthelasma] : the eyelids demonstrated no xanthelasmas [Normal Conjunctiva] : the conjunctiva exhibited no abnormalities [General Appearance - In No Acute Distress] : no acute distress [Normal Oropharynx] : normal oropharynx [Neck Appearance] : the appearance of the neck was normal [Jugular Venous Distention Increased] : there was no jugular-venous distention [Neck Cervical Mass (___cm)] : no neck mass was observed [Thyroid Diffuse Enlargement] : the thyroid was not enlarged [Thyroid Nodule] : there were no palpable thyroid nodules [Heart Sounds] : normal S1 and S2 [Heart Rate And Rhythm] : heart rate and rhythm were normal [Murmurs] : no murmurs present [Respiration, Rhythm And Depth] : normal respiratory rhythm and effort [Auscultation Breath Sounds / Voice Sounds] : lungs were clear to auscultation bilaterally [Tenderness: ____] : tenderness [unfilled] [Exaggerated Use Of Accessory Muscles For Inspiration] : no accessory muscle use [Abdomen Tenderness] : non-tender [Abdomen Soft] : soft [Abdomen Mass (___ Cm)] : no abdominal mass palpated [Cyanosis, Localized] : no localized cyanosis [1+ Pitting] : 1+  pitting [Sensation] : the sensory exam was normal to light touch and pinprick [Deep Tendon Reflexes (DTR)] : deep tendon reflexes were 2+ and symmetric [No Focal Deficits] : no focal deficits [Oriented To Time, Place, And Person] : oriented to person, place, and time [Impaired Insight] : insight and judgment were intact [Affect] : the affect was normal [] : no rash [Skin Color & Pigmentation] : normal skin color and pigmentation [Skin Turgor] : normal skin turgor [FreeTextEntry1] : uses cane

## 2019-06-13 NOTE — REASON FOR VISIT
[Atrial Fibrillation] : atrial fibrillation [Follow-Up - Clinic] : a clinic follow-up of [Hypertension] : hypertension [FreeTextEntry2] : pt c/o sob,[place] with minimal activity, andf when eating, no sscp, saw dr nguyen [FreeTextEntry1] : no cp, or palps.or sob

## 2019-06-13 NOTE — DISCUSSION/SUMMARY
[Non-specific ECG Changes] : abnormal ECG [Coronary Artery Disease] : coronary artery disease [Echocardiogram] : an echocardiogram [Atrial Fibrillation] : atrial fibrillation [Controlled Ventricular Response] : controlled ventricular response [Rate Control] : rate control [Anticoagulation] : anticoagulation [Continue] : continuing beta blockers [Hyperlipidemia] : hyperlipidemia [Lipids Test Panel] : a fasting lipid profile [Hypertension] : hypertension [Outpatient Evaluation] : outpatient evaluation [Responding to Treatment] : responding to treatment [Ambulatory BP Monitoring] : ambulatory blood pressure monitoring [Sodium Restriction] : sodium restriction [Venous Insufficiency] : venous insufficiency [COPD] : chronic obstructive pulmonary disease [Pulmonary Hypertension (PH)] : pulmonary hypertension (PH) [Stable] : stable [None] : none [de-identified] : 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

## 2019-06-13 NOTE — PHYSICAL EXAM
[General Appearance - Well Developed] : well developed [Normal Appearance] : normal appearance [General Appearance - Well Nourished] : well nourished [Well Groomed] : well groomed [No Deformities] : no deformities [Normal Conjunctiva] : the conjunctiva exhibited no abnormalities [General Appearance - In No Acute Distress] : no acute distress [Eyelids - No Xanthelasma] : the eyelids demonstrated no xanthelasmas [Normal Oral Mucosa] : normal oral mucosa [No Oral Pallor] : no oral pallor [No Oral Cyanosis] : no oral cyanosis [Normal Jugular Venous A Waves Present] : normal jugular venous A waves present [Normal Jugular Venous V Waves Present] : normal jugular venous V waves present [No Jugular Venous Flores A Waves] : no jugular venous flores A waves [Respiration, Rhythm And Depth] : normal respiratory rhythm and effort [Exaggerated Use Of Accessory Muscles For Inspiration] : no accessory muscle use [Auscultation Breath Sounds / Voice Sounds] : lungs were clear to auscultation bilaterally [Abdomen Soft] : soft [Abdomen Tenderness] : non-tender [Abdomen Mass (___ Cm)] : no abdominal mass palpated [Abnormal Walk] : normal gait [Gait - Sufficient For Exercise Testing] : the gait was sufficient for exercise testing [Nail Clubbing] : no clubbing of the fingernails [Cyanosis, Localized] : no localized cyanosis [Petechial Hemorrhages (___cm)] : no petechial hemorrhages [Skin Color & Pigmentation] : normal skin color and pigmentation [] : no rash [No Venous Stasis] : no venous stasis [Skin Lesions] : no skin lesions [No Skin Ulcers] : no skin ulcer [No Xanthoma] : no  xanthoma was observed [Oriented To Time, Place, And Person] : oriented to person, place, and time [Affect] : the affect was normal [Mood] : the mood was normal [No Anxiety] : not feeling anxious [Normal Rate] : normal [Normal S1] : normal S1 [Normal S2] : normal S2 [No Murmur] : no murmurs heard [No Abnormalities] : the abdominal aorta was not enlarged and no bruit was heard [___ +] : bilateral [unfilled]U+ pitting edema to the ankles [2+] : left 2+ [S3] : no S3 [S4] : no S4 [Right Carotid Bruit] : no bruit heard over the right carotid [Left Carotid Bruit] : no bruit heard over the left carotid [Right Femoral Bruit] : no bruit heard over the right femoral artery [Left Femoral Bruit] : no bruit heard over the left femoral artery

## 2019-07-10 NOTE — ED ADULT NURSE NOTE - [id number]_ED_A_ED
No Ear Wedge Repair Text: A wedge excision was completed by carrying down an excision through the full thickness of the ear and cartilage with an inward facing Burow's triangle. The wound was then closed in a layered fashion.

## 2019-07-12 PROBLEM — C85.80 MARGINAL ZONE B-CELL LYMPHOMA: Status: ACTIVE | Noted: 2018-05-24

## 2019-07-12 PROBLEM — L53.8 MACULAR ERYTHEMATOUS RASH: Status: ACTIVE | Noted: 2019-01-01

## 2019-07-12 NOTE — HISTORY OF PRESENT ILLNESS
[FreeTextEntry8] : cc: rash on back\par Ms Melissa Slater is a 83 yo female present with son for a rash on the back that was discovered yesterday. Pt states rash is itchy, however, non painful, non blistering. Pt states its has some redness but unsure of spread. Pt states this is the first time occurrence. Pt has multiple medical comorbidities as listed below, and also take Coumadin for A.fib for which she follows up with cardiologist. Pt advised can also be attributed due to microvascular bleeding under skin.

## 2019-07-12 NOTE — PHYSICAL EXAM
[No Acute Distress] : no acute distress [Normal Oropharynx] : the oropharynx was normal [EOMI] : extraocular movements intact [PERRL] : pupils equal round and reactive to light [Supple] : supple [Clear to Auscultation] : lungs were clear to auscultation bilaterally [Soft] : abdomen soft [Normal S1, S2] : normal S1 and S2 [Non-distended] : non-distended [Non Tender] : non-tender [Normal Bowel Sounds] : normal bowel sounds [de-identified] : elderly female [Normal Affect] : the affect was normal [de-identified] : irregular rate [de-identified] : peripheral edema b/l lower ext [de-identified] : erythematous and areas of hyperpigmented macular rash on right upper back [de-identified] : ambulates with cane

## 2019-08-10 NOTE — ED PROVIDER NOTE - CARE PLAN
Principal Discharge DX:	Multifocal pneumonia  Secondary Diagnosis:	Anemia  Secondary Diagnosis:	Anemia in CKD (chronic kidney disease) Principal Discharge DX:	Multifocal pneumonia  Secondary Diagnosis:	Anemia  Secondary Diagnosis:	Acute renal failure, unspecified acute renal failure type  Secondary Diagnosis:	Coagulopathy

## 2019-08-10 NOTE — H&P ADULT - PROBLEM SELECTOR PLAN 5
will hold standing diuretic for now  lasix between each unit PRBC  cardiology consult  TTE  careful hydration if necessary

## 2019-08-10 NOTE — ED PROVIDER NOTE - OBJECTIVE STATEMENT
Pt is 85 y/o female with Pmhx of HTN, a-fib (on Coumadin), HTN, HLD, CHF (preserved EF), COPD (not on home oxygen) here for eval of generalized weakness and  shortness of breath. Hx via pt's son. As per son pt has been feeling "very weak and out of it" for the past 2 wks, (+) non-productive cough which pt has been using nebulizer for (ALbuterol? - family not sure), today pt in obvious resp distress, upon arrival of ambulance pt was noted to hypoxic with the O2 sat in 70's, also upon arrival to the ER, sating in low 90's on 5L of oxygen. (+) chills but no fever at home, febrile to 100.9 in the ER. PCP - Dr walls, cardio - Dr Dobbins;

## 2019-08-10 NOTE — ED ADULT TRIAGE NOTE - CHIEF COMPLAINT QUOTE
Pt present with c/o weakness/shivers/decreased appetite for 2 weeks.  Pt O2 sat found to be 74%-taken to room immediately and placed on oxygen.

## 2019-08-10 NOTE — PROGRESS NOTE ADULT - ASSESSMENT
83 y/o female PMHx AFib on Coumadin, dHFpEF, mod pHTN, HLD, HTN, COPD, anxiety, depression, admitted with acute hypoxemic respiratory failure and severe sepsis secondary to multifocal pneumonia, also found to have KANIKA secondary to acute blood loss anemia in the setting of coagulopathy.    Neuro  - Mentation improving with resuscitation, continue to monitor closely 83 y/o female PMHx AFib on Coumadin, dHFpEF, mod pHTN, HLD, HTN, COPD, anxiety, depression, admitted with acute hypoxemic respiratory failure and severe sepsis secondary to multifocal pneumonia, also found to have end-organ damage as evidenced by KANIKA secondary to severe sepsis vs hypovolemia due to acute blood loss anemia in the setting of coagulopathy.    Neuro  - Mentation improving with resuscitation, continue to monitor closely    Pulm  - BiPAP weaned to 8/4 for comfort and FiO2 30%  - Started Precedex gtt for compliance/medical management and anxiety  - If patient fails BiPAP therapy she will require urgent intubation  - Will continue to augment BiPAP as needed to support her tenuous respiratory status    Cardiac  - AFib rate controlled at present, continue regimen  - Diuresis as needed with administration of blood products to prevent component of decompensated dHF from contributing to respiratory failure.    GI  - NPO while on BiPAP    Renal  - KANIKA related to sepsis vs ? hypovolemia  - Will perform bedside POCUS to further assess volume status  - Monitor renal function with resuscitation  - Monitor electrolytes, replace as needed to maintain K >4, Mg >2, Phos >3 for optimal arrhythmia suppression    ID  - Empiric antibiotics for CAP on Levaquin  - Send Legionella Ag and Strep pneumo Ag  - Influenza negative, pending RVP  - Blood cultures pending    Heme  - Supratherapeutic INR on Coumadin >6. Given acute anemia with possibility of bleeding, received Vitamin K for reversal. No overt signs of active hemorrhage to warrant more urgent reversal. Will repeat INR and treat coagulopathy as indicated  - Unclear etiology of anemia, FOB negative. No other signs of active bleeding. Receiving 2u PRBC. Will continue to monitor closely, trend H/H q6h. If continues to drop, will need CT abd/pelv to r/o RP bleed  - SCDs for DVT ppx

## 2019-08-10 NOTE — ED PROVIDER NOTE - CRITICAL CARE PROVIDED
consultation with other physicians/additional history taking/conducted a detailed discussion of DNR status/direct patient care (not related to procedure)/documentation/consult w/ pt's family directly relating to pts condition/interpretation of diagnostic studies

## 2019-08-10 NOTE — ED ADULT NURSE REASSESSMENT NOTE - NS ED NURSE REASSESS COMMENT FT1
MD hernandez at bedside to admit pt to ICU. Pt currently receiving aztreonam as per MD mckeon orders. MD hernandez stated pt should go up to ICU as soon as possible and that the blood transfusion and other orders medications should be started in ICU after pt is sent up. Type and screen drawn and sent prior to pt being brought to ICU. ICU RN rosi made aware on report. pt with son and aide at bedside. Pt son took pt wheelchair and pt clothes to put in his car to bring home. Pt sent up to ICU with aid at bedside but no belongings as son took them home.

## 2019-08-10 NOTE — H&P ADULT - PROBLEM SELECTOR PLAN 3
continue beta blocker and ca channel blocker with holds for low BP  hold coumadin for now  trend INR

## 2019-08-10 NOTE — ED PROVIDER NOTE - ATTENDING CONTRIBUTION TO CARE
Dr. Harvey: I performed a face to face bedside interview with patient regarding history of present illness, review of symptoms and past medical history. I completed an independent physical exam.  I have discussed patient's plan of care with PA.   I agree with note as stated above, having amended the EMR as needed to reflect my findings.   This includes HISTORY OF PRESENT ILLNESS, HIV, PAST MEDICAL/SURGICAL/FAMILY/SOCIAL HISTORY, ALLERGIES AND HOME MEDICATIONS, REVIEW OF SYSTEMS, PHYSICAL EXAM, and any PROGRESS NOTES during the time I functioned as the attending physician for this patient.    see mdm

## 2019-08-10 NOTE — ED PROVIDER NOTE - CLINICAL SUMMARY MEDICAL DECISION MAKING FREE TEXT BOX
Dr. Harvey: 84F h/o Afib on coumadin, HTN, HLD, CHF, COPD not on home O2 brought ni by son for 2 weeks of progressive AMS, diff breathing. Pt has h/o rash over BLE. On exam pt appears pale, extremely ill appearing, hypoxic to 80% on RA, +diffuse rhonchi, irreg irreg, abdo soft/nt/nd, +purpura on BLE. Had detailed GOC with pt and son and pt is full code. Labs and imaging c/w multifocal PNA, broad spectrum abx given, PRBCs ordered for anemia and vitamin K for coagulopathy. Pt not actively bleeding. TTP was in the differential however unlikely given no thrombocytopenia. Pt admitted to ICU. Dr. Harvey: 84F h/o Afib on coumadin, HTN, HLD, CHF, COPD not on home O2 brought ni by son for 2 weeks of progressive AMS, diff breathing. Pt has h/o rash over BLE. On exam pt appears pale, extremely ill appearing, hypoxic to 80% on RA, +diffuse rhonchi, irreg irreg, abdo soft/nt/nd, +purpura on BLE. Had detailed GOC with pt and son and pt is full code. Labs and imaging c/w multifocal PNA, broad spectrum abx given, PRBCs ordered for anemia and vitamin K for coagulopathy. Pt not actively bleeding. Pt required BIPAP. TTP was in the differential however unlikely given no thrombocytopenia. Pt admitted to ICU.

## 2019-08-10 NOTE — H&P ADULT - HISTORY OF PRESENT ILLNESS
83 y/o female   PMHx: A fib 9on coumadin), CHF, HLD, HTN, COPD, anxiety, depression From home with 2 week history of increasing weakness, decreased appetite (family states 9 lb weight loss in last 2 months), some nausea and dizziness. Today she seemed even weaker so family called EMS. For EMS O2 sat was in 70s she received albuterol in route and in ED O2 sat was 75%. In ED she was initially placed on 5 L with increase in saturation to low 90's. However work of breathing continued to be increased so she was placed on bi-pap. Work up in ED revealed multifocal PNA, supratherapeutic INR and KANIKA, stool for occult blood was negative despite low h/h

## 2019-08-10 NOTE — CHART NOTE - NSCHARTNOTEFT_GEN_A_CORE
patient seen and examined in ED and ICU  case d/w patient and son in [facility].    son states patient complain of 2 weeks of illness, sob, weakness, dizziness not herself.  this am did not take her daily coffee so came into ED for further management.      patient on NIV comfortable  CT scan reviewed consistent with diffuse multifocal pna vs CHF.       LABS:                        6.2    34.46 )-----------( 414      ( 10 Aug 2019 12:00 )             20.0     08-10    134<L>  |  100  |  36<H>  ----------------------------<  161<H>  4.2   |  25  |  1.95<H>    Ca    7.7<L>      10 Aug 2019 15:04    TPro  7.5  /  Alb  2.9<L>  /  TBili  1.5<H>  /  DBili  x   /  AST  21  /  ALT  7<L>  /  AlkPhos  68  08-10            PT/INR - ( 10 Aug 2019 15:04 )   PT: 74.5 sec;   INR: 6.28 ratio         PTT - ( 10 Aug 2019 15:04 )  PTT:63.0 sec            CULTURES: (if applicable)        ABG - ( 10 Aug 2019 12:37 )  pH, Arterial: 7.42  pH, Blood: x     /  pCO2: 34    /  pO2: 413   / HCO3: x     / Base Excess: x     /  SaO2: >99               CAPILLARY BLOOD GLUCOSE          RADIOLOGY  CXR:      CT:  < from: CT Chest No Cont (08.10.19 @ 13:08) >    IMPRESSION:     Diffuse bilateral peribronchial airspace consolidation may reflect   multilobar bronchopneumonia.    Small right-sided pleural effusion.    < end of copied text >    < from: CT Head No Cont (08.10.19 @ 13:08) >    IMPRESSION:    1)  involutional changes and volume loss. No acute infarct or hemorrhage   is suggested.  2)  clear sinuses and mastoids..        < end of copied text >          ECHO:      VITALS:  T(C): 37.3 (08-10-19 @ 13:55), Max: 38.3 (08-10-19 @ 11:50)  T(F): 99.1 (08-10-19 @ 13:55), Max: 100.9 (08-10-19 @ 11:50)  HR: 89 (08-10-19 @ 15:00) (88 - 114)  BP: 123/81 (08-10-19 @ 15:00) (110/68 - 134/92)  BP(mean): 93 (08-10-19 @ 15:00) (76 - 106)  ABP: --  ABP(mean): --  RR: 30 (08-10-19 @ 15:00) (18 - 30)  SpO2: 96% (08-10-19 @ 15:00) (74% - 100%)  CVP(mm Hg): --  CVP(cm H2O): --    Ins and Outs     08-10-19 @ 07:01  -  08-10-19 @ 15:43  --------------------------------------------------------  IN: 300 mL / OUT: 0 mL / NET: 300 mL        Height (cm): 167.64 (08-10-19 @ 11:44)  Weight (kg): 65.6 (08-10-19 @ 13:55)  BMI (kg/m2): 23.3 (08-10-19 @ 13:55)        I&O's Detail    10 Aug 2019 07:01  -  10 Aug 2019 15:43  --------------------------------------------------------  IN:    Solution: 250 mL    Solution: 50 mL  Total IN: 300 mL    OUT:  Total OUT: 0 mL    Total NET: 300 mL          Physical Examination:  GENERAL:               Alert, Oriented, Mild distress.    HEENT:                    No JVD, dry MM  PULM:                     Bilateral air entry,  no significant sputum production, diffuse Rales, No Rhonchi, No Wheezing  CVS:                         S1, S2,  +Murmur  ABD:                        Soft, nondistended, nontender, normoactive bowel sounds,   EXT:                         + LE mild edema, nontender, No Cyanosis or Clubbing   Vascular:                Warm Extremities, Normal Capillary refill, Normal Distal Pulses  SKIN:                      multiple patriciate that present in LE   NEURO:                  Alert, oriented, interactive, nonfocal, follows commands  PSYC:                      Calm, + Insight.        Assessment  Severe Sepsis due to PNA multifocal with respiratory distress and KANIKA and coagulopathy currently on NIV   Anemia stool occult negative   KANIKA with normal baseline  Coagulopathy likely due to chronic coumadin use +/- coumadin use  Chronic Diastolic chf with pulmonary htn  HTN  RA  PVD    Plan  Admit to ICU  bipap for support  taper fio2 as tolerated  Abx change to levaquin due to allergy and coming from home will treat for cap  transfuse prbc repeat labs  lasix after prbc  vitamin k to revers coagulopathy  no active bleeding suspected at this time based on vitals, will monitor labs  trend labs  ID eval  Cardio f/u - d/w PMD Mu  case d/w family        PMD:		Dr. Saalzar		                   Notified(Date):  PMD:		Dr. Dobbins		                   Notified(Date): 8/10  Family Updated: 		Son                                 Date:  8/10      Sedation & Analgesia:	N/a  Diet/Nutrition:		NPO while on niv  GI PPx:			PPI BID    DVT Ppx:		Venodynes due to coagulopathy  	RISK                                                          Points  	[ ] Previous VTE                                           	3  	[ ] Thrombophilia                                        	2  	[ ] Lower limb paralysis                              	2   	[ ] Current Cancer                                       	2   	[ ] Immobilization > 24 hrs                        	1  	[ x] ICU/CCU stay > 24 hours                       	1  	[ x] Age > 60                                                   	1  		Total:[ 2]      Activity:		        Bedrest   Head of Bed:               35-45  Glycemic Control:        n/a    Lines: PIV  CENTRAL LINE: 	[ ] YES [ x] NO	                    LOCATION:   	                       DATE INSERTED:   	                    REMOVE:  [ ] YES [ ] NO    A-LINE:  	                [ ] YES [x ] NO                      LOCATION:   	                       DATE INSERTED: 		            REMOVE:  [ ] YES [ ] NO    MOMIN: 		        [ ] YES [x ] NO  		                                       DATE INSERTED:		            REMOVE:  [ ] YES [ ] NO      Restraints were deemed necessary to prevent removal of life-sustaining devices [  ] YES   [  x  ]  NO     Disposition: ICU monitoring    Goals of Care: Full code d/w Son patient seen and examined in ED and ICU  case d/w patient and son in [facility].    son states patient complain of 2 weeks of illness, sob, weakness, dizziness not herself.  this am did not take her daily coffee so came into ED for further management.      patient on NIV comfortable  CT scan reviewed consistent with diffuse multifocal pna vs CHF.       LABS:                        6.2    34.46 )-----------( 414      ( 10 Aug 2019 12:00 )             20.0     08-10    134<L>  |  100  |  36<H>  ----------------------------<  161<H>  4.2   |  25  |  1.95<H>    Ca    7.7<L>      10 Aug 2019 15:04    TPro  7.5  /  Alb  2.9<L>  /  TBili  1.5<H>  /  DBili  x   /  AST  21  /  ALT  7<L>  /  AlkPhos  68  08-10            PT/INR - ( 10 Aug 2019 15:04 )   PT: 74.5 sec;   INR: 6.28 ratio         PTT - ( 10 Aug 2019 15:04 )  PTT:63.0 sec            CULTURES: (if applicable)        ABG - ( 10 Aug 2019 12:37 )  pH, Arterial: 7.42  pH, Blood: x     /  pCO2: 34    /  pO2: 413   / HCO3: x     / Base Excess: x     /  SaO2: >99               CAPILLARY BLOOD GLUCOSE          RADIOLOGY  CXR:      CT:  < from: CT Chest No Cont (08.10.19 @ 13:08) >    IMPRESSION:     Diffuse bilateral peribronchial airspace consolidation may reflect   multilobar bronchopneumonia.    Small right-sided pleural effusion.    < end of copied text >    < from: CT Head No Cont (08.10.19 @ 13:08) >    IMPRESSION:    1)  involutional changes and volume loss. No acute infarct or hemorrhage   is suggested.  2)  clear sinuses and mastoids..        < end of copied text >          ECHO:      VITALS:  T(C): 37.3 (08-10-19 @ 13:55), Max: 38.3 (08-10-19 @ 11:50)  T(F): 99.1 (08-10-19 @ 13:55), Max: 100.9 (08-10-19 @ 11:50)  HR: 89 (08-10-19 @ 15:00) (88 - 114)  BP: 123/81 (08-10-19 @ 15:00) (110/68 - 134/92)  BP(mean): 93 (08-10-19 @ 15:00) (76 - 106)  ABP: --  ABP(mean): --  RR: 30 (08-10-19 @ 15:00) (18 - 30)  SpO2: 96% (08-10-19 @ 15:00) (74% - 100%)  CVP(mm Hg): --  CVP(cm H2O): --    Ins and Outs     08-10-19 @ 07:01  -  08-10-19 @ 15:43  --------------------------------------------------------  IN: 300 mL / OUT: 0 mL / NET: 300 mL        Height (cm): 167.64 (08-10-19 @ 11:44)  Weight (kg): 65.6 (08-10-19 @ 13:55)  BMI (kg/m2): 23.3 (08-10-19 @ 13:55)        I&O's Detail    10 Aug 2019 07:01  -  10 Aug 2019 15:43  --------------------------------------------------------  IN:    Solution: 250 mL    Solution: 50 mL  Total IN: 300 mL    OUT:  Total OUT: 0 mL    Total NET: 300 mL          Physical Examination:  GENERAL:               Alert, Oriented, Mild distress.    HEENT:                    No JVD, dry MM  PULM:                     Bilateral air entry,  no significant sputum production, diffuse Rales, No Rhonchi, No Wheezing  CVS:                         S1, S2,  +Murmur  ABD:                        Soft, nondistended, nontender, normoactive bowel sounds,   EXT:                         + LE mild edema, nontender, No Cyanosis or Clubbing   Vascular:                Warm Extremities, Normal Capillary refill, Normal Distal Pulses  SKIN:                      multiple patriciate that present in LE   NEURO:                  Alert, oriented, interactive, nonfocal, follows commands  PSYC:                      Calm, + Insight.        Assessment  Severe Sepsis due to PNA multifocal with respiratory distress and KANIKA and coagulopathy currently on NIV   Anemia stool occult negative   KANIKA with normal baseline  Coagulopathy likely due to chronic coumadin use +/- coumadin use  Afib on coumadin  Chronic Diastolic chf with pulmonary htn  HTN  RA  PVD    Plan  Admit to ICU  bipap for support  taper fio2 as tolerated  Abx change to levaquin due to allergy and coming from home will treat for cap  transfuse prbc repeat labs  lasix after prbc  vitamin k to revers coagulopathy  no active bleeding suspected at this time based on vitals, will monitor labs  trend labs  ID clementinaal  Cardio f/u - d/w PMD Mu  case d/w family        PMD:		Dr. Salazar		                   Notified(Date):  PMD:		Dr. Dobbins		                   Notified(Date): 8/10  Family Updated: 		Son                                 Date:  8/10      Sedation & Analgesia:	N/a  Diet/Nutrition:		NPO while on niv  GI PPx:			PPI BID    DVT Ppx:		Venodynes due to coagulopathy  	RISK                                                          Points  	[ ] Previous VTE                                           	3  	[ ] Thrombophilia                                        	2  	[ ] Lower limb paralysis                              	2   	[ ] Current Cancer                                       	2   	[ ] Immobilization > 24 hrs                        	1  	[ x] ICU/CCU stay > 24 hours                       	1  	[ x] Age > 60                                                   	1  		Total:[ 2]      Activity:		        Bedrest   Head of Bed:               35-45  Glycemic Control:        n/a    Lines: PIV  CENTRAL LINE: 	[ ] YES [ x] NO	                    LOCATION:   	                       DATE INSERTED:   	                    REMOVE:  [ ] YES [ ] NO    A-LINE:  	                [ ] YES [x ] NO                      LOCATION:   	                       DATE INSERTED: 		            REMOVE:  [ ] YES [ ] NO    MOMIN: 		        [ ] YES [x ] NO  		                                       DATE INSERTED:		            REMOVE:  [ ] YES [ ] NO      Restraints were deemed necessary to prevent removal of life-sustaining devices [  ] YES   [  x  ]  NO     Disposition: ICU monitoring    Goals of Care: Full code d/w Son patient seen and examined in ED and ICU  case d/w patient and son in [facility].    son states patient complain of 2 weeks of illness, sob, weakness, dizziness not herself.  this am did not take her daily coffee so came into ED for further management.      patient on NIV comfortable  CT scan reviewed consistent with diffuse multifocal pna vs CHF.       LABS:                        6.2    34.46 )-----------( 414      ( 10 Aug 2019 12:00 )             20.0     08-10    134<L>  |  100  |  36<H>  ----------------------------<  161<H>  4.2   |  25  |  1.95<H>    Ca    7.7<L>      10 Aug 2019 15:04    TPro  7.5  /  Alb  2.9<L>  /  TBili  1.5<H>  /  DBili  x   /  AST  21  /  ALT  7<L>  /  AlkPhos  68  08-10            PT/INR - ( 10 Aug 2019 15:04 )   PT: 74.5 sec;   INR: 6.28 ratio         PTT - ( 10 Aug 2019 15:04 )  PTT:63.0 sec            CULTURES: (if applicable)        ABG - ( 10 Aug 2019 12:37 )  pH, Arterial: 7.42  pH, Blood: x     /  pCO2: 34    /  pO2: 413   / HCO3: x     / Base Excess: x     /  SaO2: >99               CAPILLARY BLOOD GLUCOSE          RADIOLOGY  CXR:      CT:  < from: CT Chest No Cont (08.10.19 @ 13:08) >    IMPRESSION:     Diffuse bilateral peribronchial airspace consolidation may reflect   multilobar bronchopneumonia.    Small right-sided pleural effusion.    < end of copied text >    < from: CT Head No Cont (08.10.19 @ 13:08) >    IMPRESSION:    1)  involutional changes and volume loss. No acute infarct or hemorrhage   is suggested.  2)  clear sinuses and mastoids..        < end of copied text >          ECHO:      VITALS:  T(C): 37.3 (08-10-19 @ 13:55), Max: 38.3 (08-10-19 @ 11:50)  T(F): 99.1 (08-10-19 @ 13:55), Max: 100.9 (08-10-19 @ 11:50)  HR: 89 (08-10-19 @ 15:00) (88 - 114)  BP: 123/81 (08-10-19 @ 15:00) (110/68 - 134/92)  BP(mean): 93 (08-10-19 @ 15:00) (76 - 106)  ABP: --  ABP(mean): --  RR: 30 (08-10-19 @ 15:00) (18 - 30)  SpO2: 96% (08-10-19 @ 15:00) (74% - 100%)  CVP(mm Hg): --  CVP(cm H2O): --    Ins and Outs     08-10-19 @ 07:01  -  08-10-19 @ 15:43  --------------------------------------------------------  IN: 300 mL / OUT: 0 mL / NET: 300 mL        Height (cm): 167.64 (08-10-19 @ 11:44)  Weight (kg): 65.6 (08-10-19 @ 13:55)  BMI (kg/m2): 23.3 (08-10-19 @ 13:55)        I&O's Detail    10 Aug 2019 07:01  -  10 Aug 2019 15:43  --------------------------------------------------------  IN:    Solution: 250 mL    Solution: 50 mL  Total IN: 300 mL    OUT:  Total OUT: 0 mL    Total NET: 300 mL          Physical Examination:  GENERAL:               Alert, Oriented, Mild distress.    HEENT:                    No JVD, dry MM  PULM:                     Bilateral air entry,  no significant sputum production, diffuse Rales, No Rhonchi, No Wheezing  CVS:                         S1, S2,  +Murmur  ABD:                        Soft, nondistended, nontender, normoactive bowel sounds,   EXT:                         + LE mild edema, nontender, No Cyanosis or Clubbing   Vascular:                Warm Extremities, Normal Capillary refill, Normal Distal Pulses  SKIN:                      multiple patriciate that present in LE   NEURO:                  Alert, oriented, interactive, nonfocal, follows commands  PSYC:                      Calm, + Insight.        Assessment  Severe Sepsis due to PNA multifocal with respiratory distress and KANIKA and coagulopathy currently on NIV   Anemia stool occult negative   KANIKA with normal baseline  Coagulopathy likely due to chronic coumadin use +/- coumadin use  Afib on coumadin  Chronic Diastolic chf with pulmonary htn  HTN  RA  PVD    Plan  Admit to ICU  bipap for support  taper fio2 as tolerated  Abx change to levaquin due to allergy and coming from home will treat for cap  transfuse prbc repeat labs  lasix after prbc  vitamin k to revers coagulopathy  no active bleeding suspected at this time based on vitals, will monitor labs  trend labs  ID eval  Cardio f/u - d/w PMD Mu  case d/w family  full H&P to follow   > 35 min spent in eval / management of patient    PMD:		Dr. Salazar		                   Notified(Date):  PMD:		Dr. Dobbins		                   Notified(Date): 8/10  Family Updated: 		Son                                 Date:  8/10      Sedation & Analgesia:	N/a  Diet/Nutrition:		NPO while on niv  GI PPx:			PPI BID    DVT Ppx:		Venodynes due to coagulopathy  	RISK                                                          Points  	[ ] Previous VTE                                           	3  	[ ] Thrombophilia                                        	2  	[ ] Lower limb paralysis                              	2   	[ ] Current Cancer                                       	2   	[ ] Immobilization > 24 hrs                        	1  	[ x] ICU/CCU stay > 24 hours                       	1  	[ x] Age > 60                                                   	1  		Total:[ 2]      Activity:		        Bedrest   Head of Bed:               35-45  Glycemic Control:        n/a    Lines: PIV  CENTRAL LINE: 	[ ] YES [ x] NO	                    LOCATION:   	                       DATE INSERTED:   	                    REMOVE:  [ ] YES [ ] NO    A-LINE:  	                [ ] YES [x ] NO                      LOCATION:   	                       DATE INSERTED: 		            REMOVE:  [ ] YES [ ] NO    MOMIN: 		        [ ] YES [x ] NO  		                                       DATE INSERTED:		            REMOVE:  [ ] YES [ ] NO      Restraints were deemed necessary to prevent removal of life-sustaining devices [  ] YES   [  x  ]  NO     Disposition: ICU monitoring    Goals of Care: Full code d/w Son

## 2019-08-10 NOTE — H&P ADULT - NSICDXPASTMEDICALHX_GEN_ALL_CORE_FT
PAST MEDICAL HISTORY:  AF (atrial fibrillation)     Anemia in CKD (chronic kidney disease)     CHF (congestive heart failure)     Chronic GERD     Chronic kidney disease proteinuria    COPD (chronic obstructive pulmonary disease)     H/O lymphoma     HTN (hypertension)     Hyperlipidemia     Mitral regurgitation     Osteoarthritis     Peripheral vascular disease     Pulmonary hypertension     Rheumatoid arthritis

## 2019-08-10 NOTE — ED ADULT NURSE NOTE - OBJECTIVE STATEMENT
Pt BIB son from home for complaints of weakness, chills, decreased appetite for 2 weeks, difficulty breathing for 2 days. As per pt son pt with some change in mental status also for the past 2 days.  Pt O2 sat found to be 74% on room air, pt immediately placed on O2 5LNC. pt presents as alert and orientedx4 and denies any oxygen use at home. Pt with PMH of afib. Pt denies any n/v/d, chest pain, SOB, blurred vision, headache, dizziness, fever, chills or any other complaint at this time.

## 2019-08-10 NOTE — H&P ADULT - NSHPREVIEWOFSYSTEMS_GEN_ALL_CORE
Review of Systems:  CONSTITUTIONAL: No fever, chills, or fatigue  EYES: No eye pain, visual disturbances, or discharge  ENMT:  No difficulty hearing, tinnitus, vertigo; No sinus or throat pain  NECK: No pain or stiffness  RESPIRATORY: No SOB, cough  CARDIOVASCULAR:  + dizziness,  mild leg swelling No chest pain, palpitations  GASTROINTESTINAL: + nauseaNo abdominal or epigastric pain.  no vomiting, or hematemesis; No diarrhea or constipation. No melena or hematochezia.  GENITOURINARY: No dysuria, frequency, hematuria, or incontinence  NEUROLOGICAL:  = generalized weaknessNo headaches, memory loss, numbness, or tremors  SKIN: No itching, burning, rashes, or lesions   MUSCULOSKELETAL: No joint pain or swelling; No muscle, back, or extremity pain  PSYCHIATRIC: No depression, anxiety, mood swings, or difficulty sleeping

## 2019-08-10 NOTE — PROGRESS NOTE ADULT - SUBJECTIVE AND OBJECTIVE BOX
Patient is a 84y old  Female who presents with a chief complaint of Weakness (10 Aug 2019 16:01)      BRIEF HOSPITAL COURSE: 83 y/o female PMHx AFib on Coumadin, dHFpEF, mod pHTN, HLD, HTN, COPD, anxiety, depression, who resides at home with [facility], who presented with 2 week history of increasing weakness, decreased appetite (family states 9 lb weight loss in last 2 months), some nausea and dizziness. Today she seemed even weaker so family called EMS. Upon EMS arrival, SpO2 70s she received albuterol in route. Upon arrival to ED, SpO2 75% and was placed on 5lpm NC with improvement in SpO2 to low 90's. She subsequently developed increased work of breathing and was started on NIV. CT chest revealed multifocal PNA. Labs remarkable for supratherapeutic INR 6.18, leukocytosis WBC 34.5, thrombocytosis, H/H 6.2/20, and KANIKA with Cr 2.03. GUAIAC negative.      Events last 24 hours: s/p transfusion of 1u PRBC thus far. Remains on NIV weaned to 8/4, and FiO2 weaned to 30%.       PAST MEDICAL & SURGICAL HISTORY:  COPD (chronic obstructive pulmonary disease)  Peripheral vascular disease  Pulmonary hypertension  Rheumatoid arthritis  Osteoarthritis  Mitral regurgitation  H/O lymphoma  Hyperlipidemia  Chronic GERD  Chronic kidney disease: proteinuria  AF (atrial fibrillation)  Anemia in CKD (chronic kidney disease)  CHF (congestive heart failure)  HTN (hypertension)  History of total hip replacement, left  History of total knee replacement, bilateral      SOCIAL HISTORY: , lives alone w/aide, son close by and very involved in care      Review of Systems: Only complains of discomfort due to BiPAP mask. Due to BiPAP/lethargy, subjective information was not able to be obtained from the patient. History was obtained, to the extent possible, from review of the chart and collateral sources of information.      Medications:  levoFLOXacin IVPB      ATENolol  Tablet 25 milliGRAM(s) Oral daily  cloNIDine 0.1 milliGRAM(s) Oral every 8 hours  diltiazem    Tablet 30 milliGRAM(s) Oral every 6 hours  furosemide   Injectable 20 milliGRAM(s) IV Push Once  hydrALAZINE 50 milliGRAM(s) Oral <User Schedule>  hydrALAZINE 50 milliGRAM(s) Oral <User Schedule>  hydrALAZINE 25 milliGRAM(s) Oral <User Schedule>  montelukast 10 milliGRAM(s) Oral at bedtime  atorvastatin 10 milliGRAM(s) Oral at bedtime  chlorhexidine 4% Liquid 1 Application(s) Topical <User Schedule>      ICU Vital Signs Last 24 Hrs  T(C): 37.3 (10 Aug 2019 16:30), Max: 38.3 (10 Aug 2019 11:50)  T(F): 99.1 (10 Aug 2019 16:30), Max: 100.9 (10 Aug 2019 11:50)  HR: 84 (10 Aug 2019 19:32) (79 - 114)  BP: 129/86 (10 Aug 2019 19:32) (110/68 - 134/92)  BP(mean): 98 (10 Aug 2019 19:32) (76 - 106)  ABP: --  ABP(mean): --  RR: 35 (10 Aug 2019 19:32) (18 - 35)  SpO2: 100% (10 Aug 2019 19:32) (74% - 100%)      ABG - ( 10 Aug 2019 12:37 )  pH, Arterial: 7.42  pH, Blood: x     /  pCO2: 34    /  pO2: 413   / HCO3: x     / Base Excess: x     /  SaO2: >99         I&O's Detail    10 Aug 2019 07:01  -  10 Aug 2019 19:54  --------------------------------------------------------  IN:    Packed Red Blood Cells: 320 mL    Solution: 250 mL    Solution: 50 mL  Total IN: 620 mL    OUT:  Total OUT: 0 mL    Total NET: 620 mL            LABS:                        6.2    34.46 )-----------( 414      ( 10 Aug 2019 12:00 )             20.0     08-10    134<L>  |  100  |  36<H>  ----------------------------<  161<H>  4.2   |  25  |  1.95<H>    Ca    7.7<L>      10 Aug 2019 15:04    TPro  7.5  /  Alb  2.9<L>  /  TBili  1.5<H>  /  DBili  x   /  AST  21  /  ALT  7<L>  /  AlkPhos  68  08-10          CAPILLARY BLOOD GLUCOSE        PT/INR - ( 10 Aug 2019 15:04 )   PT: 74.5 sec;   INR: 6.28 ratio         PTT - ( 10 Aug 2019 15:04 )  PTT:63.0 sec    CULTURES:        Physical Examination:    General: No acute distress.      HEENT: Pupils equal, reactive to light.  Symmetric.    PULM: On BiPAP. Crackles left base, no significant sputum production    CVS: Regular rate and rhythm, no murmurs, rubs, or gallops    ABD: Soft, nondistended, nontender, hypoactive bowel sounds    EXT: No edema, nontender    SKIN: Warm and well perfused. Nonblanching erythematous plaques to bilateral LE    NEURO: Alert, interactive, nonfocal        RADIOLOGY: < from: CT Chest No Cont (08.10.19 @ 13:08) >  EXAM:  CT CHEST      PROCEDURE DATE:  08/10/2019      INTERPRETATION:  CLINICAL INFORMATION: Shortness of breath.    COMPARISON: Chest radiograph 8/10/2019 and CT scan chest 8/16/2018.    PROCEDURE:   CT of the Chest was performed without intravenous contrast.  Sagittal and coronal reformats were performed.    FINDINGS:    LUNGS AND AIRWAYS: PLEURA:   There is diffuse bilateral peribronchial airspace consolidation involving   both upper and lower lung zones.  The central airways remain patent.    There is a small calcified granuloma right upper lobe.    There is a very small right-sided pleural effusion.    MEDIASTINUM AND MEHREEN: No enlarged mediastinal lymphadenopathy.  Calcified right paratracheal lymph node.    VESSELS: Atherosclerotic calcification of the thoracic aorta. Coronary   artery calcifications.    HEART: The heart is enlarged.   No pericardial effusion.    CHEST WALL AND LOWER NECK: Heterogeneous appearance of the bilateral   lobes of the thyroid gland, stable in appearance.    VISUALIZED UPPER ABDOMEN: Evaluation is limited due to streak artifact.    BONES: Multilevel degenerative changes of thoracic spine.  Chronic compression deformity of the T8 and T12 vertebral body.    IMPRESSION:     Diffuse bilateral peribronchial airspace consolidation may reflect   multilobar bronchopneumonia.    Small right-sided pleural effusion.    PRASHANTH AZEVEDO M.D., ATTENDING RADIOLOGIST  This document has been electronically signed. Aug 10 2019  1:31PM          INVASIVE LINES: X   INDWELLING MOMIN: X   VTE PROPHYLAXIS: N/A   CAM ICU: -   CODE STATUS: FULL    TIME SPENT: 42 minutes spent performing frequent bedside reassessments and augmenting plan of care to address problems of acute critical illness that pose high probability of life threatening deterioration and/or end organ damage/dysfunction and discussing goals of care with patient's family, non-inclusive of time spent on procedures performed. Patient is a 84y old  Female who presents with a chief complaint of Weakness (10 Aug 2019 16:01)      BRIEF HOSPITAL COURSE: 83 y/o female PMHx AFib on Coumadin, dHFpEF, mod pHTN, HLD, HTN, COPD, anxiety, depression, who resides at home with [facility], who presented with 2 week history of increasing weakness, decreased appetite (family states 9 lb weight loss in last 2 months), some nausea and dizziness. Today she seemed even weaker so family called EMS. Upon EMS arrival, SpO2 70s she received albuterol in route. Upon arrival to ED, SpO2 75% and was placed on 5lpm NC with improvement in SpO2 to low 90's. She subsequently developed increased work of breathing and was started on NIV. CT chest revealed multifocal PNA. Labs remarkable for supratherapeutic INR 6.18, leukocytosis WBC 34.5, thrombocytosis, H/H 6.2/20, and KANIKA with Cr 2.03. GUAIAC negative.      Events last 24 hours: s/p transfusion of 1u PRBC thus far. Remains on NIV weaned to 8/4, FiO2 weaned to 30%, noted to have high leak changed mask size. Patient became acutely anxious, removed her BiPAP, was tachypneic with RR 40's and increased work of breathing, dyspneic to one word sentences. BiPAP was emergently replaced, given Ativan 0.5mg IV and started on Precedex gtt. Discussed with family at bedside, she is high risk for acute decompensation given her tenuous respiratory status and inability to comply with BiPAP and may necessitate urgent intubation. They wish for her to remain as full code.      PAST MEDICAL & SURGICAL HISTORY:  COPD (chronic obstructive pulmonary disease)  Peripheral vascular disease  Pulmonary hypertension  Rheumatoid arthritis  Osteoarthritis  Mitral regurgitation  H/O lymphoma  Hyperlipidemia  Chronic GERD  Chronic kidney disease: proteinuria  AF (atrial fibrillation)  Anemia in CKD (chronic kidney disease)  CHF (congestive heart failure)  HTN (hypertension)  History of total hip replacement, left  History of total knee replacement, bilateral      SOCIAL HISTORY: , lives alone w/aide, son close by and very involved in care      Review of Systems: Only complains of discomfort due to BiPAP mask. Due to BiPAP/lethargy, subjective information was not able to be obtained from the patient. History was obtained, to the extent possible, from review of the chart and collateral sources of information.      Medications:  levoFLOXacin IVPB      ATENolol  Tablet 25 milliGRAM(s) Oral daily  cloNIDine 0.1 milliGRAM(s) Oral every 8 hours  diltiazem    Tablet 30 milliGRAM(s) Oral every 6 hours  furosemide   Injectable 20 milliGRAM(s) IV Push Once  hydrALAZINE 50 milliGRAM(s) Oral <User Schedule>  hydrALAZINE 50 milliGRAM(s) Oral <User Schedule>  hydrALAZINE 25 milliGRAM(s) Oral <User Schedule>  montelukast 10 milliGRAM(s) Oral at bedtime  atorvastatin 10 milliGRAM(s) Oral at bedtime  chlorhexidine 4% Liquid 1 Application(s) Topical <User Schedule>      ICU Vital Signs Last 24 Hrs  T(C): 37.3 (10 Aug 2019 16:30), Max: 38.3 (10 Aug 2019 11:50)  T(F): 99.1 (10 Aug 2019 16:30), Max: 100.9 (10 Aug 2019 11:50)  HR: 84 (10 Aug 2019 19:32) (79 - 114)  BP: 129/86 (10 Aug 2019 19:32) (110/68 - 134/92)  BP(mean): 98 (10 Aug 2019 19:32) (76 - 106)  ABP: --  ABP(mean): --  RR: 35 (10 Aug 2019 19:32) (18 - 35)  SpO2: 100% (10 Aug 2019 19:32) (74% - 100%)      ABG - ( 10 Aug 2019 12:37 )  pH, Arterial: 7.42  pH, Blood: x     /  pCO2: 34    /  pO2: 413   / HCO3: x     / Base Excess: x     /  SaO2: >99         I&O's Detail    10 Aug 2019 07:01  -  10 Aug 2019 19:54  --------------------------------------------------------  IN:    Packed Red Blood Cells: 320 mL    Solution: 250 mL    Solution: 50 mL  Total IN: 620 mL    OUT:  Total OUT: 0 mL    Total NET: 620 mL            LABS:                        6.2    34.46 )-----------( 414      ( 10 Aug 2019 12:00 )             20.0     08-10    134<L>  |  100  |  36<H>  ----------------------------<  161<H>  4.2   |  25  |  1.95<H>    Ca    7.7<L>      10 Aug 2019 15:04    TPro  7.5  /  Alb  2.9<L>  /  TBili  1.5<H>  /  DBili  x   /  AST  21  /  ALT  7<L>  /  AlkPhos  68  08-10          CAPILLARY BLOOD GLUCOSE        PT/INR - ( 10 Aug 2019 15:04 )   PT: 74.5 sec;   INR: 6.28 ratio         PTT - ( 10 Aug 2019 15:04 )  PTT:63.0 sec    CULTURES:        Physical Examination:    General: No acute distress.      HEENT: Pupils equal, reactive to light.  Symmetric.    PULM: On BiPAP. Crackles left base, no significant sputum production    CVS: Regular rate and rhythm, no murmurs, rubs, or gallops    ABD: Soft, nondistended, nontender, hypoactive bowel sounds    EXT: No edema, nontender    SKIN: Warm and well perfused. Nonblanching erythematous plaques to bilateral LE    NEURO: Alert, interactive, nonfocal        RADIOLOGY: < from: CT Chest No Cont (08.10.19 @ 13:08) >  EXAM:  CT CHEST      PROCEDURE DATE:  08/10/2019      INTERPRETATION:  CLINICAL INFORMATION: Shortness of breath.    COMPARISON: Chest radiograph 8/10/2019 and CT scan chest 8/16/2018.    PROCEDURE:   CT of the Chest was performed without intravenous contrast.  Sagittal and coronal reformats were performed.    FINDINGS:    LUNGS AND AIRWAYS: PLEURA:   There is diffuse bilateral peribronchial airspace consolidation involving   both upper and lower lung zones.  The central airways remain patent.    There is a small calcified granuloma right upper lobe.    There is a very small right-sided pleural effusion.    MEDIASTINUM AND MEHREEN: No enlarged mediastinal lymphadenopathy.  Calcified right paratracheal lymph node.    VESSELS: Atherosclerotic calcification of the thoracic aorta. Coronary   artery calcifications.    HEART: The heart is enlarged.   No pericardial effusion.    CHEST WALL AND LOWER NECK: Heterogeneous appearance of the bilateral   lobes of the thyroid gland, stable in appearance.    VISUALIZED UPPER ABDOMEN: Evaluation is limited due to streak artifact.    BONES: Multilevel degenerative changes of thoracic spine.  Chronic compression deformity of the T8 and T12 vertebral body.    IMPRESSION:     Diffuse bilateral peribronchial airspace consolidation may reflect   multilobar bronchopneumonia.    Small right-sided pleural effusion.    PRASHANTH AZEVEDO M.D., ATTENDING RADIOLOGIST  This document has been electronically signed. Aug 10 2019  1:31PM          INVASIVE LINES: X   INDWELLING MOMIN: X   VTE PROPHYLAXIS: N/A   CAM ICU: -   CODE STATUS: FULL    CRITICAL CARE TIME SPENT: 42 minutes spent performing frequent bedside reassessments and augmenting plan of care to address problems of acute critical illness that pose high probability of life threatening deterioration and/or end organ damage/dysfunction and discussing goals of care with patient's family, non-inclusive of time spent on procedures performed.

## 2019-08-10 NOTE — ED PROVIDER NOTE - SECONDARY DIAGNOSIS.
Anemia Anemia in CKD (chronic kidney disease) Acute renal failure, unspecified acute renal failure type Coagulopathy

## 2019-08-10 NOTE — ED PROVIDER NOTE - PROGRESS NOTE DETAILS
ALEKSANDRA Vazquez - seen by icu Dr Baig, accepted to ICU, abx, transfusion started, Vit k given to reverse supratherapeutic INR;

## 2019-08-10 NOTE — ED PROVIDER NOTE - PHYSICAL EXAMINATION
pt in resp distress - very sick appearing, hypoxic on 5 L, using all accessory muscles to breath; resp called immediately and placed on bipap

## 2019-08-10 NOTE — H&P ADULT - NSICDXPASTSURGICALHX_GEN_ALL_CORE_FT
PAST SURGICAL HISTORY:  History of total hip replacement, left     History of total knee replacement, bilateral

## 2019-08-10 NOTE — H&P ADULT - NSHPPHYSICALEXAM_GEN_ALL_CORE
GENERAL:   Frail, Well developed, well groomed    NEURO:  A X O x 2 PERR  PULM:   B/L rales  CVS: irr S1, S2,  no murmurs/rub   +  pulses X4 cap refill < 3 sec  GI:  Soft, nondistended, nontender,  +bowel sounds x4    EXT:  1+ edema b/l lower extremities, cyanosis or clubbing     : Voiding  SKIN: Intact no rashes, bruising noted   PSYC: Calm, answers simple questions

## 2019-08-10 NOTE — H&P ADULT - ASSESSMENT
85 y/o female   PMHx: A fib 9on coumadin), CHF, HLD, HTN, COPD, anxiety, depression From home with 2 week history of increasing weakness, decreased appetite (family states 9 lb weight loss in last 2 months), some nausea and dizziness. Today she seemed even weaker so family called EMS. For EMS O2 sat was in 70s she received albuterol in route and in ED O2 sat was 75%. In ED she was initially placed on 5 L with increase in saturation to low 90's. However work of breathing continued to be increased so she was placed on bi-pap. Work up in ED revealed multifocal PNA, supratherapeutic INR and KANIKA, stool for occult blood was negative despite low h/h    Will admit to ICU, plan 2 units PRBC, Vit K 5 mg, abx for CAP PNA, trend H/H, and BMP, wean bipap after transfused. Will consult cardiology and ID, check RVP and TTE

## 2019-08-11 NOTE — DIETITIAN INITIAL EVALUATION ADULT. - ADD RECOMMEND
Advance diet as tolerated + supplements. Provide 1:1 feeding assistance. Trend weights, labs, po intake

## 2019-08-11 NOTE — CHART NOTE - NSCHARTNOTEFT_GEN_A_CORE
Upon Nutritional Assessment by the Registered Dietitian your patient was determined to meet criteria / has evidence of the following diagnosis/diagnoses:          [ ]  Mild Protein Calorie Malnutrition        [X]  Moderate Protein Calorie Malnutrition        [ ] Severe Protein Calorie Malnutrition        [ ] Unspecified Protein Calorie Malnutrition        [ ] Underweight / BMI <19        [ ] Morbid Obesity / BMI > 40      Findings as based on:  [X] Comprehensive nutrition assessment   [X] Nutrition Focused Physical Exam: moderate/severe muscle (temporal, clavicle, shoulder) and fat depletion (orbital) per NFPE  [X] Other: 5% weight loss within 2 months       Nutrition Plan/Recommendations:  When medically feasible, recommend advance diet to mechanical soft c thins, low Na + Ensure Enlive 8oz BID        PROVIDER Section:     By signing this assessment you are acknowledging and agree with the diagnosis/diagnoses assigned by the Registered Dietitian    Comments:

## 2019-08-11 NOTE — DIETITIAN INITIAL EVALUATION ADULT. - DIET TYPE
When medically feasible, recommend advance diet to mechanical soft c thins, low Na + Ensure Enlive 8oz BID (700kcals, 40g protein)

## 2019-08-11 NOTE — PROGRESS NOTE ADULT - ASSESSMENT
Physical Examination:  GENERAL:               Alert, Oriented, No distress.    HEENT:                    No JVD, dry MM  PULM:                     Bilateral air entry,  no significant sputum production, diffuse Rales, No Rhonchi, No Wheezing  CVS:                         S1, S2,  +Murmur  ABD:                        Soft, nondistended, nontender, normoactive bowel sounds,   EXT:                         + LE mild edema, nontender, No Cyanosis or Clubbing   Vascular:                Warm Extremities, Normal Capillary refill, Normal Distal Pulses  SKIN:                      multiple patriciate that present in LE   NEURO:                  Alert, oriented, interactive, nonfocal, follows commands  PSYC:                      Calm, + Insight.        Assessment  Severe Sepsis due to PNA multifocal   Hypoxic respiratory failure requiring NIV/High flow o2  Coagulopathy improved s/p vit k  Anemia with downtrending h/h, stool occult negative   KANIKA with normal baseline  Coagulopathy likely due to chronic coumadin use +/- coumadin use  Afib on coumadin  Chronic Diastolic chf with pulmonary htn  HTN  RA  PVD    Plan  High flow alternate with bipap  ID Eval   bipap for support  taper fio2 as tolerated  Abx change to levaquin due to allergy and coming from home will treat for cap  transfuse prbc repeat labs  lasix after prbc  vitamin k to revers coagulopathy  no active bleeding suspected at this time based on vitals, will monitor labs  trend labs  ID eval  Cardio f/u - d/w PMD Mu  case d/w family  full H&P to follow   > 35 min spent in eval / management of patient    PMD:		Dr. Salazar		                   Notified(Date):  PMD:		Dr. Dobbins		                   Notified(Date): 8/10  Family Updated: 		Son                                 Date:  8/10      Sedation & Analgesia:	N/a  Diet/Nutrition:		NPO while on niv  GI PPx:			PPI BID    DVT Ppx:		Venodynes due to coagulopathy  	RISK                                                          Points  	[ ] Previous VTE                                           	3  	[ ] Thrombophilia                                        	2  	[ ] Lower limb paralysis                              	2   	[ ] Current Cancer                                       	2   	[ ] Immobilization > 24 hrs                        	1  	[ x] ICU/CCU stay > 24 hours                       	1  	[ x] Age > 60                                                   	1  		Total:[ 2]      Activity:		        Bedrest   Head of Bed:               35-45  Glycemic Control:        n/a    Lines: PIV  CENTRAL LINE: 	[ ] YES [ x] NO	                    LOCATION:   	                       DATE INSERTED:   	                    REMOVE:  [ ] YES [ ] NO    A-LINE:  	                [ ] YES [x ] NO                      LOCATION:   	                       DATE INSERTED: 		            REMOVE:  [ ] YES [ ] NO    MOMIN: 		        [ ] YES [x ] NO  		                                       DATE INSERTED:		            REMOVE:  [ ] YES [ ] NO      Restraints were deemed necessary to prevent removal of life-sustaining devices [  ] YES   [  x  ]  NO     Disposition: ICU monitoring    Goals of Care: Full code d/w Son. Physical Examination:  GENERAL:               Alert, Oriented, No distress.    HEENT:                    No JVD, dry MM  PULM:                     Bilateral air entry,  no significant sputum production, diffuse Rales, No Rhonchi, No Wheezing  CVS:                         S1, S2,  +Murmur  ABD:                        Soft, nondistended, nontender, normoactive bowel sounds,   EXT:                         + LE mild edema, nontender, No Cyanosis or Clubbing   Vascular:                Warm Extremities, Normal Capillary refill, Normal Distal Pulses  SKIN:                      multiple patriciate that present in LE   NEURO:                  Alert, oriented, interactive, nonfocal, follows commands  PSYC:                      Calm, + Insight.        Assessment  Severe Sepsis due to PNA multifocal   Hypoxic respiratory failure requiring NIV/High flow o2  Coagulopathy improved s/p vit k  Anemia with downtrending h/h, stool occult negative   KANIKA with normal baseline  Coagulopathy likely due to chronic coumadin use +/- coumadin use  Afib on coumadin  Chronic Diastolic chf with pulmonary htn  HTN  RA  PVD    Plan  High flow alternate with bipap  D/W ID will escalate abx to doxy and julissa  f/u cultures    Heme Eval for anemia  Renal Eval for KANIKA   taper fio2 as tolerated  transfuse 1 U prbc, 1 FFP    trend labs  ID eval  Cardio f/u - d/w PMD Mu  case d/w family  full H&P to follow   > 35 min spent in eval / management of patient    PMD:		Dr. Salazar		                   Notified(Date):  PMD:		Dr. Dobbins		                   Notified(Date): 8/10  Family Updated: 		Son                                 Date:  8/10      Sedation & Analgesia:	N/a  Diet/Nutrition:		NPO while on niv  GI PPx:			PPI BID    DVT Ppx:		Venodynes due to coagulopathy  	RISK                                                          Points  	[ ] Previous VTE                                           	3  	[ ] Thrombophilia                                        	2  	[ ] Lower limb paralysis                              	2   	[ ] Current Cancer                                       	2   	[ ] Immobilization > 24 hrs                        	1  	[ x] ICU/CCU stay > 24 hours                       	1  	[ x] Age > 60                                                   	1  		Total:[ 2]      Activity:		        Bedrest   Head of Bed:               35-45  Glycemic Control:        n/a    Lines: PIV  CENTRAL LINE: 	[ ] YES [ x] NO	                    LOCATION:   	                       DATE INSERTED:   	                    REMOVE:  [ ] YES [ ] NO    A-LINE:  	                [ ] YES [x ] NO                      LOCATION:   	                       DATE INSERTED: 		            REMOVE:  [ ] YES [ ] NO    MOMIN: 		        [ ] YES [x ] NO  		                                       DATE INSERTED:		            REMOVE:  [ ] YES [ ] NO      Restraints were deemed necessary to prevent removal of life-sustaining devices [  ] YES   [  x  ]  NO     Disposition: ICU monitoring    Goals of Care: Full code d/w Son. Physical Examination:  GENERAL:               Alert, Oriented, No distress.    HEENT:                    No JVD, dry MM  PULM:                     Bilateral air entry,  no significant sputum production, diffuse Rales, No Rhonchi, No Wheezing  CVS:                         S1, S2,  +Murmur  ABD:                        Soft, nondistended, nontender, normoactive bowel sounds,   EXT:                         + LE mild edema, nontender, No Cyanosis or Clubbing   Vascular:                Warm Extremities, Normal Capillary refill, Normal Distal Pulses  SKIN:                      multiple patriciate that present in LE   NEURO:                  Alert, oriented, interactive, nonfocal, follows commands  PSYC:                      Calm, + Insight.        Assessment  Severe Sepsis due to PNA multifocal   Hypoxic respiratory failure requiring NIV/High flow o2  Coagulopathy improved s/p vit k  Anemia with downtrending h/h, stool occult negative   KANIKA with normal baseline  Coagulopathy likely due to chronic coumadin use +/- coumadin use  Afib on coumadin  Chronic Diastolic chf with pulmonary htn  HTN  RA  PVD    Plan  High flow alternate with bipap  D/W ID will escalate abx to doxy and julissa  f/u cultures    Heme Eval for anemia  Renal Eval for AKNIKA   taper fio2 as tolerated  transfuse 1 U prbc, 1 FFP  vasculitis workup   trend labs  ID eval  Cardio f/u - d/w PMD Mu  case d/w family    PMD:		Dr. Salazar		                   Notified(Date): 8/11 - state family history of Inclusion body myositis   PMD:		Dr. Dobbins		                   Notified(Date): 8/10  Family Updated: 		Son                                 Date:  8/11      Sedation & Analgesia:	N/a  Diet/Nutrition:		Advance diet  GI PPx:			PPI BID    DVT Ppx:		Venodynes due to coagulopathy    Activity:		        Bedrest   Head of Bed:               35-45  Glycemic Control:        n/a    Lines: PIV  CENTRAL LINE: 	[ ] YES [ x] NO	                    LOCATION:   	                       DATE INSERTED:   	                    REMOVE:  [ ] YES [ ] NO    A-LINE:  	                [ ] YES [x ] NO                      LOCATION:   	                       DATE INSERTED: 		            REMOVE:  [ ] YES [ ] NO    MOMIN: 		        [ ] YES [x ] NO  		                                       DATE INSERTED:		            REMOVE:  [ ] YES [ ] NO      Restraints were deemed necessary to prevent removal of life-sustaining devices [  ] YES   [  x  ]  NO     Disposition: ICU monitoring    Goals of Care:DNR/I d/w Son. MOLST form to be filled today.

## 2019-08-11 NOTE — PROGRESS NOTE ADULT - SUBJECTIVE AND OBJECTIVE BOX
85 y/o female PMHx AFib on Coumadin, dHFpEF, mod pHTN, HLD, HTN, COPD, anxiety, depression, who resides at home with A, who presented with 2 week history of increasing weakness, decreased appetite (family states 9 lb weight loss in last 2 months), some nausea and dizziness. Today she seemed even weaker so family called EMS. Upon EMS arrival, SpO2 70s she received albuterol in route. Upon arrival to ED, SpO2 75% and was placed on 5lpm NC with improvement in SpO2 to low 90's. She subsequently developed increased work of breathing and was started on NIV. CT chest revealed multifocal PNA. Labs remarkable for supratherapeutic INR 6.18, leukocytosis WBC 34.5, thrombocytosis, H/H 6.2/20, and KANIKA with Cr 2.03. GUAIAC negative.    Events:  Severely anxious removing BiPAP  Off BiPAP becomes severely tachypneic with increased work of breathing  Requiring Precedex for BiPAP compliance/medical management  Worsening hypoxemia overnight, SpO2 78-88% on FiO2 35%, though intermittently poor waveform  ABG obtained, paO2 <47  BiPAP titrated to 12/7 and FiO2 titrated up to 70%  SpO2 improved to 100%    Concerns for impending intubation discussed with patient's son at bedside, who stated he is not sure if he would want her intubated.  For now, remains full code    Plan:  Continue NIV  Will repeat ABG and augment NIV settings to maintain pH >7.25 and paO2 >70  Add steroids and bronchodilators  Empiric antibiotics, cultures pending  Ongoing GOC conversation    CRITICAL CARE TIME SPENT: 30 minutes assessing presenting problems of acute illness requiring frequent bedside reassessments and adjustments to plan of care, which pose high probability of life threatening deterioration or end organ damage/dysfunction, as well as medical decision making including initiating plan of care, reviewing data, reviewing radiologic exams, discussing with multidisciplinary team, non-inclusive of procedures performed, discussing goals of care with patient's family. 83 y/o female PMHx AFib on Coumadin, dHFpEF, mod pHTN, HLD, HTN, COPD, anxiety, depression, who resides at home with [facility], who presented with 2 week history of increasing weakness, decreased appetite (family states 9 lb weight loss in last 2 months), some nausea and dizziness. Today she seemed even weaker so family called EMS. Upon EMS arrival, SpO2 70s she received albuterol in route. Upon arrival to ED, SpO2 75% and was placed on 5lpm NC with improvement in SpO2 to low 90's. She subsequently developed increased work of breathing and was started on NIV. CT chest revealed multifocal PNA. Labs remarkable for supratherapeutic INR 6.18, leukocytosis WBC 34.5, thrombocytosis, H/H 6.2/20, and KANIKA with Cr 2.03. GUAIAC negative.     Events:  Severely anxious removing BiPAP  Off BiPAP becomes severely tachypneic with increased work of breathing  Requiring Precedex for BiPAP compliance/medical management  Worsening hypoxemia overnight, SpO2 78-88% on FiO2 35%, though intermittently poor waveform  ABG obtained, paO2 <47  BiPAP titrated to 12/7 and FiO2 titrated up to 70%  SpO2 improved to 100%    Concerns for impending intubation discussed with patient's son at bedside, who stated he is not sure if he would want her intubated.  For now, remains full code    Assessment:  83 y/o female PMHx AFib on Coumadin, dHFpEF, mod pHTN, HLD, HTN, COPD, anxiety, depression, admitted with acute hypoxemic respiratory failure and severe sepsis secondary to multifocal pneumonia, also found to have end-organ damage as evidenced by KANIKA secondary to severe sepsis vs hypovolemia due to acute blood loss anemia in the setting of coagulopathy.    Plan:  Continue NIV  Will repeat ABG and augment NIV settings to maintain pH >7.25 and paO2 >70  Add steroids and bronchodilators  Empiric antibiotics, cultures pending  H/H with precipitous drop this AM, awaiting INR, will transfuse 1u PRBC and repeat H/H post-transfusion  Ongoing GOC conversation    CRITICAL CARE TIME SPENT: 30 minutes assessing presenting problems of acute illness requiring frequent bedside reassessments and adjustments to plan of care, which pose high probability of life threatening deterioration or end organ damage/dysfunction, as well as medical decision making including initiating plan of care, reviewing data, reviewing radiologic exams, discussing with multidisciplinary team, non-inclusive of procedures performed, discussing goals of care with patient's family.

## 2019-08-11 NOTE — DIETITIAN INITIAL EVALUATION ADULT. - OTHER INFO
83 y/o female   PMHx: A fib 9on coumadin), CHF, HLD, HTN, COPD, anxiety, depression From home with 2 week history of increasing weakness, decreased appetite (family states 9 lb weight loss in last 2 months), some nausea and dizziness. Work up in ED revealed multifocal PNA, supratherapeutic INR and KANIKA, stool for occult blood was negative despite low h/h.   Pt on NIV. Spoke with family at bedside- they report that pt was tolerating a regular diet PTA. Decreased appetite with ~9lbs weight loss over the last 2 months. NKFA. No c/o GI distress. Discussed with MD- plan to advance diet. Pt missing upper dentition. Recommend mechanical soft + Ensure Enlive 8oz BID.

## 2019-08-11 NOTE — PROVIDER CONTACT NOTE (CRITICAL VALUE NOTIFICATION) - ACTION/TREATMENT ORDERED:
Monitor for possible intubation
Vitamin K, Blood transfusion of 2 units of PRBC's.  Repeat CBC w/ diff and APTT after blood transfusion given

## 2019-08-11 NOTE — DIETITIAN INITIAL EVALUATION ADULT. - ENERGY NEEDS
Height: 5'6", Weight: (8/10) 144.6lbs, BMI: 23.3  Skin: no pressure ulcers, Edema: +2 generalized edema  IBW range: 117-143lbs  Last BM: 8/9

## 2019-08-11 NOTE — DIETITIAN INITIAL EVALUATION ADULT. - PHYSICAL APPEARANCE
Pt with evidence of moderate/severe muscle depletion (temporal, clavicle, shoulder) + subcutaneous fat loss (orbital)

## 2019-08-11 NOTE — PROGRESS NOTE ADULT - SUBJECTIVE AND OBJECTIVE BOX
Follow-up Critical Care Progress Note  Chief Complaint : Pneumonia    pt remains on NIV, episodes of hypoxia, o2 requirments increased  required precedex for agitation. while on niv    this am now on 60% high flow  son bedside state pt is strong but would not want intubation or resuscitation    bedside Echo tech present, evaluated IVC, noted collapsibility , Note severe pulm htn        Allergies :Keflex (Unknown)  penicillin (Rash)      PAST MEDICAL & SURGICAL HISTORY:  COPD (chronic obstructive pulmonary disease)  Peripheral vascular disease  Pulmonary hypertension  Rheumatoid arthritis  Osteoarthritis  Mitral regurgitation  H/O lymphoma  Hyperlipidemia  Chronic GERD  Chronic kidney disease: proteinuria  AF (atrial fibrillation)  Anemia in CKD (chronic kidney disease)  CHF (congestive heart failure)  HTN (hypertension)  History of total hip replacement, left  History of total knee replacement, bilateral      Medications:  MEDICATIONS  (STANDING):  ALBUTerol/ipratropium for Nebulization 3 milliLiter(s) Nebulizer every 6 hours  ATENolol  Tablet 25 milliGRAM(s) Oral daily  atorvastatin 10 milliGRAM(s) Oral at bedtime  chlorhexidine 4% Liquid 1 Application(s) Topical <User Schedule>  cloNIDine 0.1 milliGRAM(s) Oral every 8 hours  dexmedetomidine Infusion 0.5 MICROgram(s)/kG/Hr (8.2 mL/Hr) IV Continuous <Continuous>  diltiazem    Tablet 30 milliGRAM(s) Oral every 6 hours  hydrALAZINE 50 milliGRAM(s) Oral <User Schedule>  hydrALAZINE 50 milliGRAM(s) Oral <User Schedule>  hydrALAZINE 25 milliGRAM(s) Oral <User Schedule>  levoFLOXacin IVPB 250 milliGRAM(s) IV Intermittent every 24 hours  levoFLOXacin IVPB      methylPREDNISolone sodium succinate Injectable 40 milliGRAM(s) IV Push every 8 hours  montelukast 10 milliGRAM(s) Oral at bedtime    MEDICATIONS  (PRN):      LABS:                        7.3    33.16 )-----------( 326      ( 11 Aug 2019 05:15 )             22.9     08-11    137  |  105  |  38<H>  ----------------------------<  125<H>  4.0   |  25  |  1.66<H>    Ca    7.8<L>      11 Aug 2019 05:15  Phos  3.9     08-11  Mg     1.8     08    TPro  7.5  /  Alb  2.9<L>  /  TBili  1.5<H>  /  DBili  x   /  AST  21  /  ALT  7<L>  /  AlkPhos  68  08-10    HIT ab -- -11 @ 05:15  HIT ab EIA --  D Dimer -611          PT/INR - ( 11 Aug 2019 05:15 )   PT: 20.0 sec;   INR: 1.75 ratio         PTT - ( 10 Aug 2019 15:04 )  PTT:63.0 sec            CULTURES: (if applicable)        ABG - ( 11 Aug 2019 06:28 )  pH, Arterial: 7.38  pH, Blood: x     /  pCO2: 39    /  pO2: 114   / HCO3: x     / Base Excess: x     /  SaO2: 99          WBC Trend  19 @ 05:15   -  33.16<H>  08-10-19 @ 23:00   -  30.97<H>  08-10-19 @ 12:00   -  34.46<H>    Trend Bun/Cr  19 @ 05:15  BUN/CR -  38<H> / 1.66<H>  08-10-19 @ 23:00  BUN/CR -  40<H> / 1.72<H>  08-10-19 @ 15:04  BUN/CR -  36<H> / 1.95<H>  08-10-19 @ 12:00  BUN/CR -  48<H> / 2.03<H>    H/H Trend  19 @ 05:15   -  7.3<L> / 22.9<L>  08-10-19 @ 23:00   -  8.8<L> / 28.3<L>  08-10-19 @ 12:00   -  6.2<LL> / 20.0<LL>    Platelet Trend  19 @ 05:15   -  326  08-10-19 @ 23:00   -  386  08-10-19 @ 12:00   -  414<H>    ABG Trend  19 @ 06:28   - 7.38/39/114<H>/99<H>  19 @ 00:15   - 7.38/37/<47<LL>/80<L>  08-10-19 @ 12:37   - 7.42/34/413<H>/>99<H>      CAPILLARY BLOOD GLUCOSE          RADIOLOGY  CXR:  unchanged from yesterday      ECHO:  pending official results    VITALS:  T(C): 36.8 (19 @ 04:00), Max: 38.3 (08-10-19 @ 11:50)  T(F): 98.2 (19 @ 04:00), Max: 100.9 (08-10-19 @ 11:50)  HR: 85 (19 07:00) (75 - 114)  BP: 99/69 (19 07:00) (95/63 - 138/91)  BP(mean): 79 (19 07:00) (74 - 106)  ABP: --  ABP(mean): --  RR: 23 (19 07:00) (18 - 35)  SpO2: 99% (19:00) (74% - 100%)  CVP(mm Hg): --  CVP(cm H2O): --    Ins and Outs     08-10-19 @ 07:01  -  19 @ 07:00  --------------------------------------------------------  IN: 904.8 mL / OUT: 1390 mL / NET: -485.2 mL        Height (cm): 167.64 (08-10-19 @ 11:44)  Weight (kg): 65.6 (08-10-19 @ 13:55)  BMI (kg/m2): 23.3 (08-10-19 @ 13:55)        I&O's Detail    10 Aug 2019 07:01  -  11 Aug 2019 07:00  --------------------------------------------------------  IN:    dexmedetomidine Infusion: 8.8 mL    Packed Red Blood Cells: 596 mL    Solution: 250 mL    Solution: 50 mL  Total IN: 904.8 mL    OUT:    Incontinent per Collection Ba mL  Total OUT: 1390 mL    Total NET: -485.2 mL

## 2019-08-12 NOTE — PROGRESS NOTE ADULT - SUBJECTIVE AND OBJECTIVE BOX
Seen in ICU, on high flow O2, denies CP, + cough, SOB, no N/V/D/C, dysuria    Vital Signs Last 24 Hrs  T(C): 36.6 (19 @ 18:00), Max: 37.1 (19 @ 07:28)  T(F): 97.8 (19 @ 18:00), Max: 98.7 (19 @ 07:28)  HR: 95 (19 @ 18:00) (72 - 111)  BP: 144/84 (19 @ 18:00) (81/52 - 144/84)  BP(mean): 102 (19 @ 18:00) (62 - 108)  RR: 35 (19 @ 18:00) (11 - 35)  SpO2: 97% (19 @ 18:00) (73% - 99%)    PERRLA, pink conjunctivae, no ptosis  Neck non tender, supple  Lungs b/l rhonchi  Heart S1S2  Abdomen soft, NT, ND  Appropriate affect, AO x 3                        8.3    31.91 )-----------( 340      ( 12 Aug 2019 05:45 )             26.4     12 Aug 2019 05:45    136    |  103    |  50     ----------------------------<  209    4.1     |  25     |  1.70     Ca    8.4        12 Aug 2019 05:45  Phos  3.9       12 Aug 2019 05:45  Mg     2.0       12 Aug 2019 05:45    PT/INR - ( 12 Aug 2019 05:45 )   PT: 17.5 sec;   INR: 1.54 ratio      Urinalysis Basic - ( 11 Aug 2019 08:30 )    Color: Yellow / Appearance: Clear / S.010 / pH: x  Gluc: x / Ketone: Negative  / Bili: Negative / Urobili: Negative   Blood: x / Protein: 100 / Nitrite: Positive   Leuk Esterase: Moderate / RBC: >50 /HPF / WBC 11-25 /HPF   Sq Epi: x / Non Sq Epi: Moderate / Bacteria: Moderate /HPF    acetaminophen   Tablet .. 650 milliGRAM(s) Oral every 6 hours PRN  ALBUTerol/ipratropium for Nebulization 3 milliLiter(s) Nebulizer every 6 hours  ALPRAZolam 0.5 milliGRAM(s) Oral two times a day PRN  ATENolol  Tablet 25 milliGRAM(s) Oral daily  atorvastatin 10 milliGRAM(s) Oral at bedtime  chlorhexidine 4% Liquid 1 Application(s) Topical <User Schedule>  diltiazem    Tablet 30 milliGRAM(s) Oral every 6 hours  doxycycline IVPB 100 milliGRAM(s) IV Intermittent every 12 hours  meropenem  IVPB 500 milliGRAM(s) IV Intermittent every 12 hours  methylPREDNISolone sodium succinate IVPB 500 milliGRAM(s) IV Intermittent every 12 hours  montelukast 10 milliGRAM(s) Oral at bedtime  zolpidem 5 milliGRAM(s) Oral at bedtime PRN    ASSESSMENT/PLAN:    Resp distress, rash, KANIKA, low complements  R/o GN/vasculitis/inf process  Continue steroids  Abx per ID  Will follow serologies  Favor derm eval and skin bx, as pt presently is not a candidate for renal biopsy due to tenuous resp status  F/u CBC, BMP, UO  Prognosis is guarded

## 2019-08-12 NOTE — PROGRESS NOTE ADULT - SUBJECTIVE AND OBJECTIVE BOX
CC:  Patient is a 84y old  Female who presents with a chief complaint of Weakness (12 Aug 2019 19:10)      HPI/BRIEF HOSPITAL COURSE: ***    Events last 24 hours: ***    PAST MEDICAL & SURGICAL HISTORY:  COPD (chronic obstructive pulmonary disease)  Peripheral vascular disease  Pulmonary hypertension  Rheumatoid arthritis  Osteoarthritis  Mitral regurgitation  H/O lymphoma  Hyperlipidemia  Chronic GERD  Chronic kidney disease: proteinuria  AF (atrial fibrillation)  Anemia in CKD (chronic kidney disease)  CHF (congestive heart failure)  HTN (hypertension)  History of total hip replacement, left  History of total knee replacement, bilateral    Allergies    Keflex (Unknown)  penicillin (Rash)    Intolerances      FAMILY HISTORY:  Family history unknown      Review of Systems:  CONSTITUTIONAL: No fever, chills, or fatigue  EYES: No eye pain, visual disturbances, or discharge  ENMT:  No difficulty hearing, tinnitus, vertigo; No sinus or throat pain  NECK: No pain or stiffness  RESPIRATORY: No cough, wheezing, chills or hemoptysis; No shortness of breath  CARDIOVASCULAR: No chest pain, palpitations, dizziness, or leg swelling  GASTROINTESTINAL: No abdominal or epigastric pain. No nausea, vomiting, or hematemesis; No diarrhea or constipation. No melena or hematochezia.  GENITOURINARY: No dysuria, frequency, hematuria, or incontinence  NEUROLOGICAL: No headaches, memory loss, loss of strength, numbness, or tremors  SKIN: No itching, burning, rashes, or lesions   MUSCULOSKELETAL: No joint pain or swelling; No muscle, back, or extremity pain  PSYCHIATRIC: No depression, anxiety, mood swings, or difficulty sleeping      Medications:  doxycycline IVPB 100 milliGRAM(s) IV Intermittent every 12 hours  meropenem  IVPB 500 milliGRAM(s) IV Intermittent every 12 hours    ATENolol  Tablet 25 milliGRAM(s) Oral daily  diltiazem    Tablet 30 milliGRAM(s) Oral every 6 hours    ALBUTerol/ipratropium for Nebulization 3 milliLiter(s) Nebulizer every 6 hours  montelukast 10 milliGRAM(s) Oral at bedtime    acetaminophen   Tablet .. 650 milliGRAM(s) Oral every 6 hours PRN  ALPRAZolam 0.5 milliGRAM(s) Oral two times a day PRN  zolpidem 5 milliGRAM(s) Oral at bedtime PRN            atorvastatin 10 milliGRAM(s) Oral at bedtime  methylPREDNISolone sodium succinate IVPB 500 milliGRAM(s) IV Intermittent every 12 hours        chlorhexidine 4% Liquid 1 Application(s) Topical <User Schedule>            ICU Vital Signs Last 24 Hrs  T(C): 36.3 (12 Aug 2019 21:00), Max: 37.1 (12 Aug 2019 07:28)  T(F): 97.4 (12 Aug 2019 21:00), Max: 98.7 (12 Aug 2019 07:28)  HR: 83 (12 Aug 2019 22:00) (72 - 111)  BP: 125/83 (12 Aug 2019 22:00) (81/52 - 172/83)  BP(mean): 97 (12 Aug 2019 22:00) (62 - 108)  ABP: --  ABP(mean): --  RR: 18 (12 Aug 2019 22:00) (11 - 35)  SpO2: 100% (12 Aug 2019 22:00) (73% - 100%)    Vital Signs Last 24 Hrs  T(C): 36.3 (12 Aug 2019 21:00), Max: 37.1 (12 Aug 2019 07:28)  T(F): 97.4 (12 Aug 2019 21:00), Max: 98.7 (12 Aug 2019 07:28)  HR: 83 (12 Aug 2019 22:00) (72 - 111)  BP: 125/83 (12 Aug 2019 22:00) (81/52 - 172/83)  BP(mean): 97 (12 Aug 2019 22:00) (62 - 108)  RR: 18 (12 Aug 2019 22:00) (11 - 35)  SpO2: 100% (12 Aug 2019 22:00) (73% - 100%)    ABG - ( 11 Aug 2019 06:28 )  pH, Arterial: 7.38  pH, Blood: x     /  pCO2: 39    /  pO2: 114   / HCO3: x     / Base Excess: x     /  SaO2: 99                  I&O's Detail    11 Aug 2019 07:01  -  12 Aug 2019 07:00  --------------------------------------------------------  IN:    dexmedetomidine Infusion: 8.2 mL    Packed Red Blood Cells: 280 mL    Plasma: 283 mL    Solution: 50 mL    Solution: 200 mL  Total IN: 821.2 mL    OUT:    Incontinent per Collection Ba mL  Total OUT: 300 mL    Total NET: 521.2 mL      12 Aug 2019 07:01  -  12 Aug 2019 22:22  --------------------------------------------------------  IN:    Oral Fluid: 340 mL    Solution: 100 mL    Solution: 50 mL    Solution: 100 mL  Total IN: 590 mL    OUT:    Incontinent per Collection Ba mL  Total OUT: 350 mL    Total NET: 240 mL            LABS:                        8.3    31.91 )-----------( 340      ( 12 Aug 2019 05:45 )             26.4         136  |  103  |  50<H>  ----------------------------<  209<H>  4.1   |  25  |  1.70<H>    Ca    8.4      12 Aug 2019 05:45  Phos  3.9       Mg     2.0                 CAPILLARY BLOOD GLUCOSE      POCT Blood Glucose.: 228 mg/dL (12 Aug 2019 21:17)    PT/INR - ( 12 Aug 2019 05:45 )   PT: 17.5 sec;   INR: 1.54 ratio           Urinalysis Basic - ( 11 Aug 2019 08:30 )    Color: Yellow / Appearance: Clear / S.010 / pH: x  Gluc: x / Ketone: Negative  / Bili: Negative / Urobili: Negative   Blood: x / Protein: 100 / Nitrite: Positive   Leuk Esterase: Moderate / RBC: >50 /HPF / WBC 11-25 /HPF   Sq Epi: x / Non Sq Epi: Moderate / Bacteria: Moderate /HPF      CULTURES:  Culture Results:   No growth to date. (08-10 @ 12:00)  Culture Results:   No growth to date. (08-10 @ 12:00)    doxycycline IVPB 100 milliGRAM(s) IV Intermittent every 12 hours  meropenem  IVPB 500 milliGRAM(s) IV Intermittent every 12 hours      Physical Examination:  General: No acute distress.  Alert, oriented, interactive, nonfocal  NEURO: A&O X3, motor function 5/5 BL UE/LE  HEENT: Pupils equal, reactive to light.  Symmetric.  PULM: CTA BL, no significant sputum production, no wheezes, rales, rhonchi  CVS: Regular rate and rhythm, no murmurs, rubs, or gallops  ABD: Soft, nondistended, nontender, normoactive bowel sounds, no masses  EXT: No edema, nontender  SKIN: Warm and well perfused, no rashes noted      EKG: ***    RADIOLOGY: ***    POCUS:          LUNG:               (+/-) A-line  (+/-) B-line predominantly anteriorly              (+/-) Effusions, (+/-) infiltrate, (+/-) atelectasis, (+/-) B-lines at bases, (+/-) curtain's sign               (+/-)  Lung sliding          CARDIAC:               LV contractility/EF WNL, (+/-)LVH              (+/-) atrial enlargement               parasternal short view concentric w/o wall motion abnormality              (+/-) signs of RV strain, (+/-) septal flattening/D'ing, (+/-) Day's sign               (+/-) pericardial effusion               (+/-) valvular dz or notable vegetations               5 chamber VTI ***              Subcostal view IVC ***cm             ABDOMEN:              (+/-) abdominal ascites              (+/-) hydronephrosis          DVT STUDY: (+/-) noted thrombus [X] common femoral [X] deep femoral [X] popliteal       CENTRAL LINE: N          DATE INSERTED:                  MOMIN: N                        DATE INSERTED:                  A-LINE: N                       DATE INSERTED:                  GLOBAL ISSUE/BEST PRACTICE:  Analgesia: N/A  Sedation: N/A  HOB elevation: yes  Stress ulcer prophylaxis: protonix   VTE prophylaxis: SCD/Heparin SQ/Lovenox SQ  Glycemic control: N/A  Nutrition: NPO/Diet/TF CC:  Patient is a 84y old  Female who presents with a chief complaint of Weakness (12 Aug 2019 19:10)      HPI/BRIEF HOSPITAL COURSE:  85 y/o female PMHx AFib on Coumadin, dHFpEF, mod pHTN, HLD, HTN, COPD, anxiety, depression, who resides at home with [facility], who presented with 2 week history of increasing weakness, decreased appetite (family states 9 lb weight loss in last 2 months), some nausea and dizziness. Today she seemed even weaker so family called EMS. Upon EMS arrival, SpO2 70s she received albuterol in route. Upon arrival to ED, SpO2 75% and was placed on 5lpm NC with improvement in SpO2 to low 90's. She subsequently developed increased work of breathing and was started on NIV. CT chest revealed multifocal PNA. Labs remarkable for supratherapeutic INR 6.18, leukocytosis WBC 34.5, thrombocytosis, H/H 6.2/20, and KANIKA with Cr 2.03. GUAIAC negative.     Events last 24 hours:   Pt off BiPAP ON and on HFNC, FiO2 titrated down ON from 50 to 40. Pt has pending serologies, in the setting of multifocal PNA vs. vasculitis given underlying anemia, KANIKA, rash and hemoptysis. Started on high dose steroids     PAST MEDICAL & SURGICAL HISTORY:  COPD (chronic obstructive pulmonary disease)  Peripheral vascular disease  Pulmonary hypertension  Rheumatoid arthritis  Osteoarthritis  Mitral regurgitation  H/O lymphoma  Hyperlipidemia  Chronic GERD  Chronic kidney disease: proteinuria  AF (atrial fibrillation)  Anemia in CKD (chronic kidney disease)  CHF (congestive heart failure)  HTN (hypertension)  History of total hip replacement, left  History of total knee replacement, bilateral    Allergies    Keflex (Unknown)  penicillin (Rash)    Intolerances      FAMILY HISTORY:  Family history unknown      Review of Systems:  CONSTITUTIONAL: No fever, chills, or fatigue  EYES: No eye pain, visual disturbances, or discharge  ENMT:  No difficulty hearing, tinnitus, vertigo; No sinus or throat pain  NECK: No pain or stiffness  RESPIRATORY: No cough, wheezing, chills or hemoptysis; No shortness of breath  CARDIOVASCULAR: No chest pain, palpitations, dizziness, or leg swelling  GASTROINTESTINAL: No abdominal or epigastric pain. No nausea, vomiting, or hematemesis; No diarrhea or constipation. No melena or hematochezia.  GENITOURINARY: No dysuria, frequency, hematuria, or incontinence  NEUROLOGICAL: No headaches, memory loss, loss of strength, numbness, or tremors  SKIN: No itching, burning, rashes, or lesions   MUSCULOSKELETAL: No joint pain or swelling; No muscle, back, or extremity pain  PSYCHIATRIC: No depression, anxiety, mood swings, or difficulty sleeping      Medications:  doxycycline IVPB 100 milliGRAM(s) IV Intermittent every 12 hours  meropenem  IVPB 500 milliGRAM(s) IV Intermittent every 12 hours  ATENolol  Tablet 25 milliGRAM(s) Oral daily  diltiazem    Tablet 30 milliGRAM(s) Oral every 6 hours  ALBUTerol/ipratropium for Nebulization 3 milliLiter(s) Nebulizer every 6 hours  montelukast 10 milliGRAM(s) Oral at bedtime  acetaminophen   Tablet .. 650 milliGRAM(s) Oral every 6 hours PRN  ALPRAZolam 0.5 milliGRAM(s) Oral two times a day PRN  zolpidem 5 milliGRAM(s) Oral at bedtime PRN  atorvastatin 10 milliGRAM(s) Oral at bedtime  methylPREDNISolone sodium succinate IVPB 500 milliGRAM(s) IV Intermittent every 12 hours  chlorhexidine 4% Liquid 1 Application(s) Topical <User Schedule>            ICU Vital Signs Last 24 Hrs  T(C): 36.3 (12 Aug 2019 21:00), Max: 37.1 (12 Aug 2019 07:28)  T(F): 97.4 (12 Aug 2019 21:00), Max: 98.7 (12 Aug 2019 07:28)  HR: 83 (12 Aug 2019 22:00) (72 - 111)  BP: 125/83 (12 Aug 2019 22:00) (81/52 - 172/83)  BP(mean): 97 (12 Aug 2019 22:00) (62 - 108)  ABP: --  ABP(mean): --  RR: 18 (12 Aug 2019 22:00) (11 - 35)  SpO2: 100% (12 Aug 2019 22:00) (73% - 100%)    Vital Signs Last 24 Hrs  T(C): 36.3 (12 Aug 2019 21:00), Max: 37.1 (12 Aug 2019 07:28)  T(F): 97.4 (12 Aug 2019 21:00), Max: 98.7 (12 Aug 2019 07:28)  HR: 83 (12 Aug 2019 22:00) (72 - 111)  BP: 125/83 (12 Aug 2019 22:00) (81/52 - 172/83)  BP(mean): 97 (12 Aug 2019 22:00) (62 - 108)  RR: 18 (12 Aug 2019 22:00) (11 - 35)  SpO2: 100% (12 Aug 2019 22:00) (73% - 100%)    ABG - ( 11 Aug 2019 06:28 )  pH, Arterial: 7.38  pH, Blood: x     /  pCO2: 39    /  pO2: 114   / HCO3: x     / Base Excess: x     /  SaO2: 99                  I&O's Detail    11 Aug 2019 07:01  -  12 Aug 2019 07:00  --------------------------------------------------------  IN:    dexmedetomidine Infusion: 8.2 mL    Packed Red Blood Cells: 280 mL    Plasma: 283 mL    Solution: 50 mL    Solution: 200 mL  Total IN: 821.2 mL    OUT:    Incontinent per Collection Ba mL  Total OUT: 300 mL    Total NET: 521.2 mL      12 Aug 2019 07:01  -  12 Aug 2019 22:22  --------------------------------------------------------  IN:    Oral Fluid: 340 mL    Solution: 100 mL    Solution: 50 mL    Solution: 100 mL  Total IN: 590 mL    OUT:    Incontinent per Collection Ba mL  Total OUT: 350 mL    Total NET: 240 mL            LABS:                        8.3    31.91 )-----------( 340      ( 12 Aug 2019 05:45 )             26.4     08-12    136  |  103  |  50<H>  ----------------------------<  209<H>  4.1   |  25  |  1.70<H>    Ca    8.4      12 Aug 2019 05:45  Phos  3.9     08-12  Mg     2.0     08-12            CAPILLARY BLOOD GLUCOSE      POCT Blood Glucose.: 228 mg/dL (12 Aug 2019 21:17)    PT/INR - ( 12 Aug 2019 05:45 )   PT: 17.5 sec;   INR: 1.54 ratio           Urinalysis Basic - ( 11 Aug 2019 08:30 )    Color: Yellow / Appearance: Clear / S.010 / pH: x  Gluc: x / Ketone: Negative  / Bili: Negative / Urobili: Negative   Blood: x / Protein: 100 / Nitrite: Positive   Leuk Esterase: Moderate / RBC: >50 /HPF / WBC 11-25 /HPF   Sq Epi: x / Non Sq Epi: Moderate / Bacteria: Moderate /HPF      CULTURES:  Culture Results:   No growth to date. (08-10 @ 12:00)  Culture Results:   No growth to date. (08-10 @ 12:00)    doxycycline IVPB 100 milliGRAM(s) IV Intermittent every 12 hours  meropenem  IVPB 500 milliGRAM(s) IV Intermittent every 12 hours      Physical Examination:  General: No acute distress.  Alert, oriented, interactive, nonfocal  NEURO: A&O X3, motor function 5/5 BL UE/LE  HEENT: Pupils equal, reactive to light.  Symmetric.  PULM: CTA BL, no significant sputum production, no wheezes, rales, rhonchi  CVS: Regular rate and rhythm, no murmurs, rubs, or gallops  ABD: Soft, nondistended, nontender, normoactive bowel sounds, no masses  EXT: No edema, nontender  SKIN: Warm and well perfused, no rashes noted      EKG: ***    RADIOLOGY: ***    POCUS:          LUNG:               (+/-) A-line  (+/-) B-line predominantly anteriorly              (+/-) Effusions, (+/-) infiltrate, (+/-) atelectasis, (+/-) B-lines at bases, (+/-) curtain's sign               (+/-)  Lung sliding          CARDIAC:               LV contractility/EF WNL, (+/-)LVH              (+/-) atrial enlargement               parasternal short view concentric w/o wall motion abnormality              (+/-) signs of RV strain, (+/-) septal flattening/D'ing, (+/-) Day's sign               (+/-) pericardial effusion               (+/-) valvular dz or notable vegetations               5 chamber VTI ***              Subcostal view IVC ***cm             ABDOMEN:              (+/-) abdominal ascites              (+/-) hydronephrosis          DVT STUDY: (+/-) noted thrombus [X] common femoral [X] deep femoral [X] popliteal       CENTRAL LINE: N          DATE INSERTED:                  MOMIN: N                        DATE INSERTED:                  A-LINE: N                       DATE INSERTED:                  GLOBAL ISSUE/BEST PRACTICE:  Analgesia: N/A  Sedation: N/A  HOB elevation: yes  Stress ulcer prophylaxis: protonix   VTE prophylaxis: SCD/Heparin SQ/Lovenox SQ  Glycemic control: N/A  Nutrition: NPO/Diet/TF CC:  Patient is a 84y old  Female who presents with a chief complaint of Weakness (12 Aug 2019 19:10)      HPI/BRIEF HOSPITAL COURSE:  83 y/o female PMHx AFib on Coumadin, dHFpEF, mod pHTN, HLD, HTN, COPD, anxiety, depression, who resides at home with [facility], who presented with 2 week history of increasing weakness, decreased appetite (family states 9 lb weight loss in last 2 months), some nausea and dizziness. Today she seemed even weaker so family called EMS. Upon EMS arrival, SpO2 70s she received albuterol in route. Upon arrival to ED, SpO2 75% and was placed on 5lpm NC with improvement in SpO2 to low 90's. She subsequently developed increased work of breathing and was started on NIV. CT chest revealed multifocal PNA. Labs remarkable for supratherapeutic INR 6.18, leukocytosis WBC 34.5, thrombocytosis, H/H 6.2/20, and KANIKA with Cr 2.03. GUAIAC negative.     Events last 24 hours:   Pt off BiPAP ON and on HFNC, FiO2 titrated down ON from 50 to 40. Pt has pending serologies, in the setting of multifocal PNA vs. vasculitis given underlying anemia, KANIKA, rash and hemoptysis. Started on high dose steroids     PAST MEDICAL & SURGICAL HISTORY:  COPD (chronic obstructive pulmonary disease)  Peripheral vascular disease  Pulmonary hypertension  Rheumatoid arthritis  Osteoarthritis  Mitral regurgitation  H/O lymphoma  Hyperlipidemia  Chronic GERD  Chronic kidney disease: proteinuria  AF (atrial fibrillation)  Anemia in CKD (chronic kidney disease)  CHF (congestive heart failure)  HTN (hypertension)  History of total hip replacement, left  History of total knee replacement, bilateral    Allergies    Keflex (Unknown)  penicillin (Rash)    Intolerances      FAMILY HISTORY:  Family history unknown      Review of Systems:  CONSTITUTIONAL: No fever, chills, or fatigue  EYES: No eye pain, visual disturbances, or discharge  ENMT:  No difficulty hearing, tinnitus, vertigo; No sinus or throat pain  NECK: No pain or stiffness  RESPIRATORY: No cough, wheezing, chills or hemoptysis; No shortness of breath  CARDIOVASCULAR: No chest pain, palpitations, dizziness, or leg swelling  GASTROINTESTINAL: No abdominal or epigastric pain. No nausea, vomiting, or hematemesis; No diarrhea or constipation. No melena or hematochezia.  GENITOURINARY: No dysuria, frequency, hematuria, or incontinence  NEUROLOGICAL: No headaches, memory loss, loss of strength, numbness, or tremors  SKIN: No itching, burning, rashes, or lesions   MUSCULOSKELETAL: No joint pain or swelling; No muscle, back, or extremity pain  PSYCHIATRIC: No depression, anxiety, mood swings, or difficulty sleeping      Medications:  doxycycline IVPB 100 milliGRAM(s) IV Intermittent every 12 hours  meropenem  IVPB 500 milliGRAM(s) IV Intermittent every 12 hours  ATENolol  Tablet 25 milliGRAM(s) Oral daily  diltiazem    Tablet 30 milliGRAM(s) Oral every 6 hours  ALBUTerol/ipratropium for Nebulization 3 milliLiter(s) Nebulizer every 6 hours  montelukast 10 milliGRAM(s) Oral at bedtime  acetaminophen   Tablet .. 650 milliGRAM(s) Oral every 6 hours PRN  ALPRAZolam 0.5 milliGRAM(s) Oral two times a day PRN  zolpidem 5 milliGRAM(s) Oral at bedtime PRN  atorvastatin 10 milliGRAM(s) Oral at bedtime  methylPREDNISolone sodium succinate IVPB 500 milliGRAM(s) IV Intermittent every 12 hours  chlorhexidine 4% Liquid 1 Application(s) Topical <User Schedule>            ICU Vital Signs Last 24 Hrs  T(C): 36.3 (12 Aug 2019 21:00), Max: 37.1 (12 Aug 2019 07:28)  T(F): 97.4 (12 Aug 2019 21:00), Max: 98.7 (12 Aug 2019 07:28)  HR: 83 (12 Aug 2019 22:00) (72 - 111)  BP: 125/83 (12 Aug 2019 22:00) (81/52 - 172/83)  BP(mean): 97 (12 Aug 2019 22:00) (62 - 108)  ABP: --  ABP(mean): --  RR: 18 (12 Aug 2019 22:00) (11 - 35)  SpO2: 100% (12 Aug 2019 22:00) (73% - 100%)    Vital Signs Last 24 Hrs  T(C): 36.3 (12 Aug 2019 21:00), Max: 37.1 (12 Aug 2019 07:28)  T(F): 97.4 (12 Aug 2019 21:00), Max: 98.7 (12 Aug 2019 07:28)  HR: 83 (12 Aug 2019 22:00) (72 - 111)  BP: 125/83 (12 Aug 2019 22:00) (81/52 - 172/83)  BP(mean): 97 (12 Aug 2019 22:00) (62 - 108)  RR: 18 (12 Aug 2019 22:00) (11 - 35)  SpO2: 100% (12 Aug 2019 22:00) (73% - 100%)    ABG - ( 11 Aug 2019 06:28 )  pH, Arterial: 7.38  pH, Blood: x     /  pCO2: 39    /  pO2: 114   / HCO3: x     / Base Excess: x     /  SaO2: 99                  I&O's Detail    11 Aug 2019 07:01  -  12 Aug 2019 07:00  --------------------------------------------------------  IN:    dexmedetomidine Infusion: 8.2 mL    Packed Red Blood Cells: 280 mL    Plasma: 283 mL    Solution: 50 mL    Solution: 200 mL  Total IN: 821.2 mL    OUT:    Incontinent per Collection Ba mL  Total OUT: 300 mL    Total NET: 521.2 mL      12 Aug 2019 07:01  -  12 Aug 2019 22:22  --------------------------------------------------------  IN:    Oral Fluid: 340 mL    Solution: 100 mL    Solution: 50 mL    Solution: 100 mL  Total IN: 590 mL    OUT:    Incontinent per Collection Ba mL  Total OUT: 350 mL    Total NET: 240 mL            LABS:                        8.3    31.91 )-----------( 340      ( 12 Aug 2019 05:45 )             26.4     08-12    136  |  103  |  50<H>  ----------------------------<  209<H>  4.1   |  25  |  1.70<H>    Ca    8.4      12 Aug 2019 05:45  Phos  3.9     08-12  Mg     2.0     08-12            CAPILLARY BLOOD GLUCOSE      POCT Blood Glucose.: 228 mg/dL (12 Aug 2019 21:17)    PT/INR - ( 12 Aug 2019 05:45 )   PT: 17.5 sec;   INR: 1.54 ratio           Urinalysis Basic - ( 11 Aug 2019 08:30 )    Color: Yellow / Appearance: Clear / S.010 / pH: x  Gluc: x / Ketone: Negative  / Bili: Negative / Urobili: Negative   Blood: x / Protein: 100 / Nitrite: Positive   Leuk Esterase: Moderate / RBC: >50 /HPF / WBC 11-25 /HPF   Sq Epi: x / Non Sq Epi: Moderate / Bacteria: Moderate /HPF      CULTURES:  Culture Results:   No growth to date. (08-10 @ 12:00)  Culture Results:   No growth to date. (08-10 @ 12:00)    doxycycline IVPB 100 milliGRAM(s) IV Intermittent every 12 hours  meropenem  IVPB 500 milliGRAM(s) IV Intermittent every 12 hours      Physical Examination:  General: No acute distress.  Alert, oriented, interactive, nonfocal  NEURO: A&O X3, motor function 5/5 BL UE/LE  HEENT: Pupils equal, reactive to light.  Symmetric.  PULM: coarse BS throughout anterior lung fields and at bases   CVS: Regular rate and rhythm, no murmurs, rubs, or gallops  ABD: Soft, nondistended, nontender, normoactive bowel sounds, no masses  EXT: No edema, nontender  SKIN: Warm and well perfused, no rashes noted      EKG: Afib HR 86     RADIOLOGY:   < from: Xray Chest 1 View- PORTABLE-Urgent (19 @ 08:44) >    IMPRESSION:    Persistent severe bilateral nodular airspace infiltrates.      < end of copied text >        CENTRAL LINE: N          DATE INSERTED:                  MOMIN: N                        DATE INSERTED:                  A-LINE: N                       DATE INSERTED:                  GLOBAL ISSUE/BEST PRACTICE:  Analgesia: N/A  Sedation: N/A  HOB elevation: yes  Stress ulcer prophylaxis: protonix   VTE prophylaxis: SCD  Glycemic control: N/A  Nutrition: Diet CC:  Patient is a 84y old  Female who presents with a chief complaint of Weakness (12 Aug 2019 19:10)      HPI/BRIEF HOSPITAL COURSE:  85 y/o female PMHx AFib on Coumadin, dHFpEF, mod pHTN, HLD, HTN, COPD, anxiety, CKD, depression, who resides at home with [facility], who presented with 2 week history of increasing weakness, decreased appetite (family states 9 lb weight loss in last 2 months), some nausea and dizziness. Today she seemed even weaker so family called EMS. Upon EMS arrival, SpO2 70s she received albuterol in route. Upon arrival to ED, SpO2 75% and was placed on 5lpm NC with improvement in SpO2 to low 90's. She subsequently developed increased work of breathing and was started on NIV. CT chest revealed multifocal PNA. Labs remarkable for supratherapeutic INR 6.18, leukocytosis WBC 34.5, thrombocytosis, H/H 6.2/20, and KANIKA with Cr 2.03. GUAIAC negative. Pt s/p 3PRBC and 1 FFP on  8/10-    Events last 24 hours:   Pt off BiPAP ON and on HFNC, FiO2 titrated down ON from 50 and 60%. Pt has pending serologies, in the setting of multifocal PNA vs. vasculitis given underlying anemia, KANIKA, rash and ?hemoptysis. Started on pulse dose steroids. H/H stable at 8.4/26.7 this AM. WBC slowly trending down, afebrile ON, although CXr this AM appears slightly with worsening infiltrates  Bcx, RVP, influenza, legionella NTD.  Day 2 of 10 day course of meropenem and doxy.  Mycoplasma titers, serologies pending. KANIKA slightly worse this AM Cr increase from 1.7 to 1.85    PAST MEDICAL & SURGICAL HISTORY:  COPD (chronic obstructive pulmonary disease)  Peripheral vascular disease  Pulmonary hypertension  Rheumatoid arthritis  Osteoarthritis  Mitral regurgitation  H/O lymphoma  Hyperlipidemia  Chronic GERD  Chronic kidney disease: proteinuria  AF (atrial fibrillation)  Anemia in CKD (chronic kidney disease)  CHF (congestive heart failure)  HTN (hypertension)  History of total hip replacement, left  History of total knee replacement, bilateral    Allergies    Keflex (Unknown)  penicillin (Rash)    Intolerances      FAMILY HISTORY:  Family history unknown      Review of Systems:  CONSTITUTIONAL: No fever, chills, or fatigue  EYES: No eye pain, visual disturbances, or discharge  ENMT:  No difficulty hearing, tinnitus, vertigo; No sinus or throat pain  NECK: No pain or stiffness  RESPIRATORY: No cough, wheezing, chills or hemoptysis; No shortness of breath  CARDIOVASCULAR: No chest pain, palpitations, dizziness, or leg swelling  GASTROINTESTINAL: No abdominal or epigastric pain. No nausea, vomiting, or hematemesis; No diarrhea or constipation. No melena or hematochezia.  GENITOURINARY: No dysuria, frequency, hematuria, or incontinence  NEUROLOGICAL: No headaches, memory loss, loss of strength, numbness, or tremors  SKIN: No itching, burning, rashes, or lesions   MUSCULOSKELETAL: No joint pain or swelling; No muscle, back, or extremity pain  PSYCHIATRIC: No depression, anxiety, mood swings, or difficulty sleeping      Medications:  doxycycline IVPB 100 milliGRAM(s) IV Intermittent every 12 hours  meropenem  IVPB 500 milliGRAM(s) IV Intermittent every 12 hours  ATENolol  Tablet 25 milliGRAM(s) Oral daily  diltiazem    Tablet 30 milliGRAM(s) Oral every 6 hours  ALBUTerol/ipratropium for Nebulization 3 milliLiter(s) Nebulizer every 6 hours  montelukast 10 milliGRAM(s) Oral at bedtime  acetaminophen   Tablet .. 650 milliGRAM(s) Oral every 6 hours PRN  ALPRAZolam 0.5 milliGRAM(s) Oral two times a day PRN  zolpidem 5 milliGRAM(s) Oral at bedtime PRN  atorvastatin 10 milliGRAM(s) Oral at bedtime  methylPREDNISolone sodium succinate IVPB 500 milliGRAM(s) IV Intermittent every 12 hours  chlorhexidine 4% Liquid 1 Application(s) Topical <User Schedule>            ICU Vital Signs Last 24 Hrs  T(C): 36.3 (12 Aug 2019 21:00), Max: 37.1 (12 Aug 2019 07:28)  T(F): 97.4 (12 Aug 2019 21:00), Max: 98.7 (12 Aug 2019 07:28)  HR: 83 (12 Aug 2019 22:00) (72 - 111)  BP: 125/83 (12 Aug 2019 22:00) (81/52 - 172/83)  BP(mean): 97 (12 Aug 2019 22:00) (62 - 108)  ABP: --  ABP(mean): --  RR: 18 (12 Aug 2019 22:00) (11 - 35)  SpO2: 100% (12 Aug 2019 22:00) (73% - 100%)    Vital Signs Last 24 Hrs  T(C): 36.3 (12 Aug 2019 21:00), Max: 37.1 (12 Aug 2019 07:28)  T(F): 97.4 (12 Aug 2019 21:00), Max: 98.7 (12 Aug 2019 07:28)  HR: 83 (12 Aug 2019 22:00) (72 - 111)  BP: 125/83 (12 Aug 2019 22:00) (81/52 - 172/83)  BP(mean): 97 (12 Aug 2019 22:00) (62 - 108)  RR: 18 (12 Aug 2019 22:00) (11 - 35)  SpO2: 100% (12 Aug 2019 22:00) (73% - 100%)    ABG - ( 11 Aug 2019 06:28 )  pH, Arterial: 7.38  pH, Blood: x     /  pCO2: 39    /  pO2: 114   / HCO3: x     / Base Excess: x     /  SaO2: 99                  I&O's Detail    11 Aug 2019 07:01  -  12 Aug 2019 07:00  --------------------------------------------------------  IN:    dexmedetomidine Infusion: 8.2 mL    Packed Red Blood Cells: 280 mL    Plasma: 283 mL    Solution: 50 mL    Solution: 200 mL  Total IN: 821.2 mL    OUT:    Incontinent per Collection Ba mL  Total OUT: 300 mL    Total NET: 521.2 mL      12 Aug 2019 07:01  -  12 Aug 2019 22:22  --------------------------------------------------------  IN:    Oral Fluid: 340 mL    Solution: 100 mL    Solution: 50 mL    Solution: 100 mL  Total IN: 590 mL    OUT:    Incontinent per Collection Ba mL  Total OUT: 350 mL    Total NET: 240 mL            LABS:                        8.3    31.91 )-----------( 340      ( 12 Aug 2019 05:45 )             26.4     08-12    136  |  103  |  50<H>  ----------------------------<  209<H>  4.1   |  25  |  1.70<H>    Ca    8.4      12 Aug 2019 05:45  Phos  3.9       Mg     2.0     12            CAPILLARY BLOOD GLUCOSE      POCT Blood Glucose.: 228 mg/dL (12 Aug 2019 21:17)    PT/INR - ( 12 Aug 2019 05:45 )   PT: 17.5 sec;   INR: 1.54 ratio           Urinalysis Basic - ( 11 Aug 2019 08:30 )    Color: Yellow / Appearance: Clear / S.010 / pH: x  Gluc: x / Ketone: Negative  / Bili: Negative / Urobili: Negative   Blood: x / Protein: 100 / Nitrite: Positive   Leuk Esterase: Moderate / RBC: >50 /HPF / WBC 11-25 /HPF   Sq Epi: x / Non Sq Epi: Moderate / Bacteria: Moderate /HPF      CULTURES:  Culture Results:   No growth to date. (08-10 @ 12:00)  Culture Results:   No growth to date. (08-10 @ 12:00)    doxycycline IVPB 100 milliGRAM(s) IV Intermittent every 12 hours  meropenem  IVPB 500 milliGRAM(s) IV Intermittent every 12 hours      Physical Examination:  General: No acute distress.  Alert, oriented, interactive, nonfocal  NEURO: A&O X3, motor function 5/5 BL UE/LE  HEENT: Pupils equal, reactive to light.  Symmetric.  PULM: coarse BS throughout anterior lung fields and at bases, breathing comfortably on HFNC   CVS: Regular rate and rhythm, no murmurs, rubs, or gallops  ABD: Soft, nondistended, nontender, normoactive bowel sounds, no masses  EXT: No edema, nontender  SKIN: Warm and well perfused, no rashes noted      EKG: Afib HR 86     RADIOLOGY:   < from: Xray Chest 1 View- PORTABLE-Urgent (19 @ 08:44) >    IMPRESSION:    Persistent severe bilateral nodular airspace infiltrates.      < end of copied text >        CENTRAL LINE: N          DATE INSERTED:                  MOMIN: N                        DATE INSERTED:                  A-LINE: N                       DATE INSERTED:                  GLOBAL ISSUE/BEST PRACTICE:  Analgesia: N/A  Sedation: N/A  HOB elevation: yes  Stress ulcer prophylaxis: protonix   VTE prophylaxis: SCD  Glycemic control: N/A  Nutrition: Diet

## 2019-08-12 NOTE — CONSULT NOTE ADULT - PROBLEM SELECTOR RECOMMENDATION 9
May have chronic disease/renal disease component to anemia. No overt blood loss noted clinically at present. Check iron studies, B12/folate. Haptoglobin within normal limits.  Consider lower extremity skin lesion biopsy-assess further for vasculitis. May have chronic disease/renal disease component to anemia. No gross blood loss noted clinically at present. Check iron studies, B12/folate. Haptoglobin within normal limits.   Consider CT imaging A/P if anemia refractory-r/o possible source of blood loss.  Consider lower extremity skin lesion biopsy-assess further for vasculitis.

## 2019-08-12 NOTE — SWALLOW BEDSIDE ASSESSMENT ADULT - PHARYNGEAL PHASE
Delayed pharyngeal swallow/Multiple swallows/Reduced coordination of respiration and swallowing Delayed pharyngeal swallow/reduced coordination of respiration and swallowing Reduced coordination of respiration and swallowing/Delayed pharyngeal swallow

## 2019-08-12 NOTE — PROGRESS NOTE ADULT - SUBJECTIVE AND OBJECTIVE BOX
Follow-up Critical Care Progress Note  Chief Complaint : Pneumonia      pt states she is feeling better  less sob  fio2 req decreasing    d/w pt in [facility]  d/w family bedside.      Allergies :Keflex (Unknown)  penicillin (Rash)      PAST MEDICAL & SURGICAL HISTORY:  COPD (chronic obstructive pulmonary disease)  Peripheral vascular disease  Pulmonary hypertension  Rheumatoid arthritis  Osteoarthritis  Mitral regurgitation  H/O lymphoma  Hyperlipidemia  Chronic GERD  Chronic kidney disease: proteinuria  AF (atrial fibrillation)  Anemia in CKD (chronic kidney disease)  CHF (congestive heart failure)  HTN (hypertension)  History of total hip replacement, left  History of total knee replacement, bilateral      Medications:  MEDICATIONS  (STANDING):  ALBUTerol/ipratropium for Nebulization 3 milliLiter(s) Nebulizer every 6 hours  ATENolol  Tablet 25 milliGRAM(s) Oral daily  atorvastatin 10 milliGRAM(s) Oral at bedtime  chlorhexidine 4% Liquid 1 Application(s) Topical <User Schedule>  cloNIDine 0.1 milliGRAM(s) Oral every 8 hours  diltiazem    Tablet 30 milliGRAM(s) Oral every 6 hours  doxycycline IVPB 100 milliGRAM(s) IV Intermittent every 12 hours  hydrALAZINE 50 milliGRAM(s) Oral <User Schedule>  hydrALAZINE 50 milliGRAM(s) Oral <User Schedule>  hydrALAZINE 25 milliGRAM(s) Oral <User Schedule>  meropenem  IVPB 500 milliGRAM(s) IV Intermittent every 12 hours  methylPREDNISolone sodium succinate IVPB 500 milliGRAM(s) IV Intermittent every 12 hours  montelukast 10 milliGRAM(s) Oral at bedtime    MEDICATIONS  (PRN):  acetaminophen   Tablet .. 650 milliGRAM(s) Oral every 6 hours PRN Moderate Pain (4 - 6)  ALPRAZolam 0.5 milliGRAM(s) Oral two times a day PRN anxiety  zolpidem 5 milliGRAM(s) Oral at bedtime PRN Insomnia      LABS:                        8.3    31.91 )-----------( 340      ( 12 Aug 2019 05:45 )             26.4     08-12    136  |  103  |  50<H>  ----------------------------<  209<H>  4.1   |  25  |  1.70<H>    Ca    8.4      12 Aug 2019 05:45  Phos  3.9     08-12  Mg     2.0         TPro  7.5  /  Alb  2.9<L>  /  TBili  1.5<H>  /  DBili  x   /  AST  21  /  ALT  7<L>  /  AlkPhos  68  08-10    HIT ab --  @ 05:15  HIT ab EIA --  D Dimer -611          PT/INR - ( 12 Aug 2019 05:45 )   PT: 17.5 sec;   INR: 1.54 ratio         PTT - ( 10 Aug 2019 15:04 )  PTT:63.0 sec  Urinalysis Basic - ( 11 Aug 2019 08:30 )    Color: Yellow / Appearance: Clear / S.010 / pH: x  Gluc: x / Ketone: Negative  / Bili: Negative / Urobili: Negative   Blood: x / Protein: 100 / Nitrite: Positive   Leuk Esterase: Moderate / RBC: >50 /HPF / WBC 11-25 /HPF   Sq Epi: x / Non Sq Epi: Moderate / Bacteria: Moderate /HPF      WBC Trend  19 @ 05:45   -  31.91<H>  19 @ 16:15   -  36.10<H>  19 @ 05:15   -  33.16<H>  08-10-19 @ 23:00   -  30.97<H>  08-10-19 @ 12:00   -  34.46<H>      Platelet Trend  19 @ 05:45   -  340  19 @ 16:15   -  364  19 @ 05:15   -  326  08-10-19 @ 23:00   -  386  08-10-19 @ 12:00   -  414<H>      H/H Trend  19 @ 05:45   -  8.3<L> / 26.4<L>  19 @ 16:15   -  9.1<L> / 28.6<L>  19 @ 05:15   -  7.3<L> / 22.9<L>  08-10-19 @ 23:00   -  8.8<L> / 28.3<L>  08-10-19 @ 12:00   -  6.2<LL> / 20.0<LL>      Sedimentation Rate, Erythrocyte (19 @ 16:15)    Sedimentation Rate, Erythrocyte: 96 mm/hr    C-Reactive Protein, Serum (19 @ 16:15)    C-Reactive Protein, Serum: 18.14 mg/dL          CULTURES: (if applicable)    Culture - Blood (collected 08-10-19 @ 12:00)  Source: .Blood Blood-Peripheral  Preliminary Report (19 @ 02:02):    No growth to date.    Culture - Blood (collected 08-10-19 @ 12:00)  Source: .Blood Blood-Peripheral  Preliminary Report (19 @ 02:02):    No growth to date.          ABG - ( 11 Aug 2019 06:28 )  pH, Arterial: 7.38  pH, Blood: x     /  pCO2: 39    /  pO2: 114   / HCO3: x     / Base Excess: x     /  SaO2: 99                CAPILLARY BLOOD GLUCOSE          RADIOLOGY  CXR:  < from: Xray Chest 1 View- PORTABLE-Urgent (19 @ 08:44) >    IMPRESSION:    Persistent severe bilateral nodular airspace infiltrates.        < end of copied text >   Worsening infiltrates    CT:    ECHO:  < from: TTE Echo w/Cont Complete (19 @ 07:48) >  Summary:   1. Normal global left ventricular systolic function.   2. Spectral Doppler shows restrictive pattern of left ventricular myocardial filling (Grade III diastolic dysfunction).   3. Mild mitral annular calcification.   4. Mild thickening of the anterior and posterior mitral valve leaflets.   5. Moderate-severe tricuspid regurgitation.   6. Mild aortic regurgitation.   7. Estimated pulmonary artery systolic pressure is 72.0 mmHg assuming a right atrial pressure of 10 mmHg, which is consistent with severe pulmonary hypertension.   8. LA volume Index is 67.2 ml/m² ml/m2.   9. Peak transaortic gradient equals 27.9 mmHg, mean transaortic gradient equals 15.2 mmHg, the calculated aortic valve area equals 1.50 cm² by the continuity equation consistent with mild aortic stenosis.    < end of copied text >      VITALS:  T(C): 37.1 (19 @ 07:28), Max: 37.1 (19 @ 07:28)  T(F): 98.7 (19 @ 07:28), Max: 98.7 (19 @ 07:28)  HR: 77 (19 @ 09:39) (72 - 111)  BP: 96/60 (19 @ 09:00) (81/52 - 141/87)  BP(mean): 71 (19 @ 09:00) (62 - 108)  ABP: --  ABP(mean): --  RR: 15 (19 @ 09:00) (11 - 40)  SpO2: 94% (19 @ 09:39) (73% - 99%)  CVP(mm Hg): --  CVP(cm H2O): --    Ins and Outs     19 @ 07:01  -  19 @ 07:00  --------------------------------------------------------  IN: 821.2 mL / OUT: 300 mL / NET: 521.2 mL        Height (cm): 167.64 (08-10-19 @ 11:44)  Weight (kg): 65.6 (08-10-19 @ 13:55)  BMI (kg/m2): 23.3 (08-10-19 @ 13:55)        I&O's Detail    11 Aug 2019 07:01  -  12 Aug 2019 07:00  --------------------------------------------------------  IN:    dexmedetomidine Infusion: 8.2 mL    Packed Red Blood Cells: 280 mL    Plasma: 283 mL    Solution: 50 mL    Solution: 200 mL  Total IN: 821.2 mL    OUT:    Incontinent per Collection Ba mL  Total OUT: 300 mL    Total NET: 521.2 mL

## 2019-08-12 NOTE — PROGRESS NOTE ADULT - ASSESSMENT
Physical Examination:  GENERAL:               Alert, Oriented, No distress.    HEENT:                    No JVD, dry MM  PULM:                     Bilateral air entry,  no significant sputum production, diffuse Rales, No Rhonchi, No Wheezing  CVS:                         S1, S2,  +Murmur  ABD:                        Soft, nondistended, nontender, normoactive bowel sounds,   EXT:                         + LE mild edema, nontender, No Cyanosis or Clubbing   Vascular:                Warm Extremities, Normal Capillary refill, Normal Distal Pulses  SKIN:                      multiple patriciate that present in LE   NEURO:                  Alert, oriented, interactive, nonfocal, follows commands  PSYC:                      Calm, + Insight.        Assessment  Multifocal PNA vs Vasculitis / Pulmonary renal syndrome  as pt has high crp/esr/rash/kanika  Hypoxic respiratory failure requiring NIV/High flow o2  Coagulopathy improved s/p vit k  Anemia with downtrending h/h, stool occult negative, hemolytic workup negative   KANIKA with normal baseline  Afib on coumadin ; a/c held due to anemia  Chronic Diastolic chf with severe pulmonary htn  HTN  RA  PVD    Plan  High flow alternate with bipap PRN  Despite pt stating improvement, worsening of cxr continued luekocytois  and patient too unstable for lung biopsy and or bronch at this time, will treat with pulse dose steroids  Will get skin biopsy of leg rash d/w Dr. Chance  Rheum eval   Renal eval appreciated  heme eval appreciated  ID eval appreciated    f/u cultures    taper fio2 as tolerated  case d/w family    if respiratory status worsens low threshold for intubation.    PMD:		Dr. Salazar		                   Notified(Date): 8/11 - state family history of Inclusion body myositis   PMD:		Dr. Dobbins		                   Notified(Date): 8/10  Family Updated: 		Son                                 Date:  8/12      Sedation & Analgesia:	N/a  Diet/Nutrition:		Advance diet  GI PPx:			PPI BID    DVT Ppx:		Venodynes due to coagulopathy    Activity:		        Bedrest   Head of Bed:               35-45  Glycemic Control:        n/a    Lines: PIV  Restraints were deemed necessary to prevent removal of life-sustaining devices [  ] YES   [  x  ]  NO     Disposition: ICU monitoring    Goals of Care:Full code.

## 2019-08-12 NOTE — SWALLOW BEDSIDE ASSESSMENT ADULT - SLP PERTINENT HISTORY OF CURRENT PROBLEM
Patient with h/o CHF, COPD, A-fib which has been progressively worsening over the last few weeks and admitted to ED with hypoxia and placed on BiPAP. Patient diagnosed with PNA, r/o aspiration. Family reports the patient has been unintentionally losing weight over the last few weeks-months and is frequently tired after eating meals.

## 2019-08-12 NOTE — SWALLOW BEDSIDE ASSESSMENT ADULT - SWALLOW EVAL: RECOMMENDED FEEDING/EATING TECHNIQUES
allow for swallow between intakes/position upright (90 degrees)/crush medication (when feasible)/maintain upright posture during/after eating for 30 mins/Slow rate of PO intake

## 2019-08-12 NOTE — SWALLOW BEDSIDE ASSESSMENT ADULT - COMMENTS
Patient presents with oral-pharyngeal dysphagia secondary to reduced respiratory status and decreased coordination between respiration and swallowing. Oral grading was WNL, with intermittent oral holding, at time patient needs to inhale while bolus still in oral cavity. Suspected swallow delay, laryngeal excursion palpated to be WNL. No clinical s/s penetration or aspiration and O2 remained above 97% during all PO trials. Patient is at increased risk however due to reduced respiratory status

## 2019-08-12 NOTE — PROGRESS NOTE ADULT - SUBJECTIVE AND OBJECTIVE BOX
CC: f/u for pneumonia    Patient reports feeling better, although remains on nasal high flow; Pulse steroids started    REVIEW OF SYSTEMS:  All other review of systems negative (Comprehensive ROS)    Antimicrobials Day #  2  doxycycline IVPB 100 milliGRAM(s) IV Intermittent every 12 hours  meropenem  IVPB 500 milliGRAM(s) IV Intermittent every 12 hours    Other Medications Reviewed    T(F): 97.8 (19 @ 18:00), Max: 98.7 (19 @ 07:28)  HR: 95 (19 @ 18:00)  BP: 144/84 (19 @ 18:00)  RR: 35 (19 @ 18:00)  SpO2: 97% (19 @ 18:00)  Wt(kg): --    PHYSICAL EXAM:  General: alert, no acute distress  Eyes:  anicteric, no conjunctival injection, no discharge  Oropharynx: no lesions or injection 	  Neck: supple, without adenopathy  Lungs: coarse ant BSs  Heart: regular rate and rhythm; no murmur, rubs or gallops  Abdomen: soft, nondistended, nontender, without mass or organomegaly  Skin: nontender, flat papular/petechial lesions legs  Extremities: no edema  Neurologic: alert, oriented, moves all extremities    LAB RESULTS:                        8.3    31.91 )-----------( 340      ( 12 Aug 2019 05:45 )             26.4         136  |  103  |  50<H>  ----------------------------<  209<H>  4.1   |  25  |  1.70<H>    Ca    8.4      12 Aug 2019 05:45  Phos  3.9       Mg     2.0       Urinalysis Basic - ( 11 Aug 2019 08:30 )    Color: Yellow / Appearance: Clear / S.010 / pH: x  Gluc: x / Ketone: Negative  / Bili: Negative / Urobili: Negative   Blood: x / Protein: 100 / Nitrite: Positive   Leuk Esterase: Moderate / RBC: >50 /HPF / WBC 11-25 /HPF   Sq Epi: x / Non Sq Epi: Moderate / Bacteria: Moderate /HPF      MICROBIOLOGY:  RECENT CULTURES:  08-10 @ 12:00 .Blood Blood-Peripheral     No growth to date.    RADIOLOGY REVIEWED:  Xray Chest 1 View- PORTABLE-Urgent (19 @ 08:44) >  Persistent severe bilateral nodular airspace infiltrates.

## 2019-08-12 NOTE — PROGRESS NOTE ADULT - ASSESSMENT
83 yo F, Afib, COPD, RA, h/o lymphoma  Here with acute resp failure, diffuse infiltrates, KANIKA, petechial rash  Broad differential, including primary pneumonia and vasculitis  Afebrile, alert, no distress, remains on increased O2 requirement, persistent severe infiltrates on CXR  Bld Cxs negative thus far  Pulse steroids initiated with plans for skin Bx noted    Plan:  Will follow on [place] and Doxy  Check full RVP to be complete  Check Mycoplasma titers, urine Leg Ag  Follow temps and CBC/diff   O2 support as outlined  D/w PA  D/w pt and family at bedside

## 2019-08-12 NOTE — SWALLOW BEDSIDE ASSESSMENT ADULT - ASR SWALLOW ASPIRATION MONITOR
change of breathing pattern/cough/throat clearing/gurgly voice/pneumonia/upper respiratory infection/fever

## 2019-08-12 NOTE — PROGRESS NOTE ADULT - ASSESSMENT
ASSESSMENT   1.   2.   3.   4.   5.     PLAN     NEURO:     PULM:      CV:     GI:      :     ENDO:     ID:     HEME/DVT PPX:     ETHICS        CODE STATUS: Full Code  GO discussion: Y    Critical Care time: 40 mins assessing presenting problems of acute illness that poses high probability of life threatening deterioration or end organ damage/dysfunction.  Medical decision making including Initiating plan of care, reviewing data, reviewing radiology, direct patient bedside evaluation and interpretation of vital signs, any necessary ventilator management , discussion with multidisciplinary team, discussing goals of care with patient/family, all non inclusive of procedures     Care discussed with bedside provider and eICU attending. If any additional assistance in care/management of patient required by bedside team, provider/RN/family member advised to push "e-alert" button in patient's room, and details will be discussed at that time ASSESSMENT       1.   2.   3.   4.   5.     PLAN     NEURO:     PULM:      CV:     GI:      :     ENDO:     ID:     HEME/DVT PPX:     ETHICS        CODE STATUS: Full Code  GO discussion: Y    Critical Care time: 40 mins assessing presenting problems of acute illness that poses high probability of life threatening deterioration or end organ damage/dysfunction.  Medical decision making including Initiating plan of care, reviewing data, reviewing radiology, direct patient bedside evaluation and interpretation of vital signs, any necessary ventilator management , discussion with multidisciplinary team, discussing goals of care with patient/family, all non inclusive of procedures     Care discussed with bedside provider and eICU attending. If any additional assistance in care/management of patient required by bedside team, provider/RN/family member advised to push "e-alert" button in patient's room, and details will be discussed at that time ASSESSMENT   83 y/o F PMH AFib on Coumadin, dHFpEF, mod pHTN, HLD, HTN, COPD, /h/o lymphoma, anxiety, depression, who resides at home with HHA, admitted to the ICU for acute hypoxemic respiratory failure and severe sepsis 2/2 multifocal pneumonia, acute on chronic kidney injury 2/2 to ischemic ATN in the setting of severe sepsis vs. GN/vasculitis, anemia with hbg 6.2/20 2/2 ?chronic dz vs. hemolytic vs. ABL however no acute blood loss noted, s/p 3 units PRBC (8/10-8/11), thrombocytosis, coagulopathy wiht INR 6.18 s/p 1 unit FFP  (8/10-8/11, petechial rash, ?vasculitis     1. Acute hypoxemic respiratory failure  2. Multifocal PNA   3. Severe sepsis   4. Acute on chronic kidney injury 2/2 to ischemic ATN in the setting of severe sepsis vs. GN/vasculitis,  5. Anemia with hbg 6.2/20 2/2 ?chronic dz vs. hemolytic vs. ABL  6. Coagulopathy  7. ?vasculitis   8. Afib     PLAN     NEURO:   -continue xanax and zoloft    PULM:    -Pt on HFNC ON on 50/60, not requiring BiPAP  -Pt may require lung bx eventually   -vasculitis workup and serologies pending   -Continue singular  -CXR seemingly worse this AM   -Continue pulse dose steroids     CV:   -Continue antihypertensives atenolol and Cardizem for Afib     GI:    -Dyphasia diet   -Protonix daily     :   -Pt incontinent X3 ON,   -KANIKA slightly worse this AM Cr increase from 1.7 to 1.85    ENDO:   -COnt pulse dose steroids     ID:     HEME/DVT PPX:     ETHICS        CODE STATUS: Full Code  GOC discussion: Y    Critical Care time: 40 mins assessing presenting problems of acute illness that poses high probability of life threatening deterioration or end organ damage/dysfunction.  Medical decision making including Initiating plan of care, reviewing data, reviewing radiology, direct patient bedside evaluation and interpretation of vital signs, any necessary ventilator management , discussion with multidisciplinary team, discussing goals of care with patient/family, all non inclusive of procedures     Care discussed with bedside provider and eICU attending. If any additional assistance in care/management of patient required by bedside team, provider/RN/family member advised to push "e-alert" button in patient's room, and details will be discussed at that time ASSESSMENT   83 y/o F PMH AFib on Coumadin, dHFpEF, mod pHTN, HLD, HTN, COPD, /h/o lymphoma, anxiety, depression, who resides at home with HHA, admitted to the ICU for acute hypoxemic respiratory failure and severe sepsis 2/2 multifocal pneumonia, acute on chronic kidney injury 2/2 to ischemic ATN in the setting of severe sepsis vs. GN/vasculitis, anemia with hbg 6.2/20 2/2 ?chronic dz vs. hemolytic vs. ABL however no acute blood loss noted, s/p 3 units PRBC (8/10-8/11), thrombocytosis, coagulopathy wiht INR 6.18 s/p 1 unit FFP  (8/10-8/11, petechial rash, ?vasculitis     1. Acute hypoxemic respiratory failure  2. Multifocal PNA   3. Severe sepsis   4. Acute on chronic kidney injury 2/2 to ischemic ATN in the setting of severe sepsis vs. GN/vasculitis,  5. Anemia with hbg 6.2/20 2/2 ?chronic dz vs. hemolytic vs. ABL  6. Coagulopathy  7. ?vasculitis   8. Afib     PLAN     NEURO:   -continue xanax and zoloft    PULM:    -Pt on HFNC ON on 50/60, not requiring BiPAP  -Pt may require lung bx eventually   -vasculitis workup and serologies pending   -Continue singular  -CXR seemingly worse this AM   -Continue pulse dose steroids     CV:   -Continue antihypertensives atenolol and Cardizem for Afib     GI:    -Dyphasia diet   -Protonix daily     :   -Pt incontinent X3 ON,   -KANIKA slightly worse this AM Cr increase from 1.7 to 1.85    ENDO:   -COnt pulse dose steroids     ID:   -WBC slowly trending down,    Day 2 of 10 day course of meropenem and doxy.   -afebrile ON, although CXR this AM appears slightly worse with worsening infiltrates   -Bcx, RVP, influenza, legionella NTD.  Mycoplasma titers, serologies pending.     HEME/DVT PPX:     ETHICS        CODE STATUS: Full Code  GOC discussion: Y    Critical Care time: 40 mins assessing presenting problems of acute illness that poses high probability of life threatening deterioration or end organ damage/dysfunction.  Medical decision making including Initiating plan of care, reviewing data, reviewing radiology, direct patient bedside evaluation and interpretation of vital signs, any necessary ventilator management , discussion with multidisciplinary team, discussing goals of care with patient/family, all non inclusive of procedures     Care discussed with bedside provider and eICU attending. If any additional assistance in care/management of patient required by bedside team, provider/RN/family member advised to push "e-alert" button in patient's room, and details will be discussed at that time

## 2019-08-12 NOTE — PROGRESS NOTE ADULT - SUBJECTIVE AND OBJECTIVE BOX
Follow up for  SUBJ:  PMH  COPD (chronic obstructive pulmonary disease)  Peripheral vascular disease  Pulmonary hypertension  Rheumatoid arthritis  Osteoarthritis  Mitral regurgitation  H/O lymphoma  Hyperlipidemia  Chronic GERD  Chronic kidney disease  AF (atrial fibrillation)  Anemia in CKD (chronic kidney disease)  CHF (congestive heart failure)  HTN (hypertension)  No pertinent past medical history      MEDICATIONS  (STANDING):  ALBUTerol/ipratropium for Nebulization 3 milliLiter(s) Nebulizer every 6 hours  ATENolol  Tablet 25 milliGRAM(s) Oral daily  atorvastatin 10 milliGRAM(s) Oral at bedtime  chlorhexidine 4% Liquid 1 Application(s) Topical <User Schedule>  diltiazem    Tablet 30 milliGRAM(s) Oral every 6 hours  doxycycline IVPB 100 milliGRAM(s) IV Intermittent every 12 hours  meropenem  IVPB 500 milliGRAM(s) IV Intermittent every 12 hours  methylPREDNISolone sodium succinate IVPB 500 milliGRAM(s) IV Intermittent every 12 hours  montelukast 10 milliGRAM(s) Oral at bedtime    MEDICATIONS  (PRN):  acetaminophen   Tablet .. 650 milliGRAM(s) Oral every 6 hours PRN Moderate Pain (4 - 6)  ALPRAZolam 0.5 milliGRAM(s) Oral two times a day PRN anxiety  zolpidem 5 milliGRAM(s) Oral at bedtime PRN Insomnia        PHYSICAL EXAM:  Vital Signs Last 24 Hrs  T(C): 37.1 (12 Aug 2019 12:00), Max: 37.1 (12 Aug 2019 07:28)  T(F): 98.7 (12 Aug 2019 12:00), Max: 98.7 (12 Aug 2019 07:28)  HR: 85 (12 Aug 2019 15:08) (72 - 111)  BP: 111/71 (12 Aug 2019 14:00) (81/52 - 128/81)  BP(mean): 85 (12 Aug 2019 14:00) (62 - 108)  RR: 22 (12 Aug 2019 14:00) (11 - 33)  SpO2: 95% (12 Aug 2019 15:08) (73% - 99%)    GENERAL: NAD, well-groomed, well-developed  HEAD:  Atraumatic, Normocephalic  EYES: EOMI, PERRLA, conjunctiva and sclera clear  ENT: Moist mucous membranes,  NECK: Supple, No JVD, no bruits  CHEST/LUNG: scattered rhonchi  HEART: irregular  ABDOMEN: Soft, Nontender, Nondistended; Bowel sounds present  EXTREMITIES:  2+ Peripheral Pulses, No clubbing, cyanosis, or edema  SKIN: No rashes or lesions  NERVOUS SYSTEM:  Alert & Oriented X3, Good concentration; Motor Strength 5/5 B/L upper and lower extremities; DTRs 2+ intact and symmetric      TELEMETRY: af with moderate response    ECG:    LABS:                        8.3    31.91 )-----------( 340      ( 12 Aug 2019 05:45 )             26.4     08-12    136  |  103  |  50<H>  ----------------------------<  209<H>  4.1   |  25  |  1.70<H>    Ca    8.4      12 Aug 2019 05:45  Phos  3.9     08-12  Mg     2.0     08-12          PT/INR - ( 12 Aug 2019 05:45 )   PT: 17.5 sec;   INR: 1.54 ratio             I&O's Summary    11 Aug 2019 07:01  -  12 Aug 2019 07:00  --------------------------------------------------------  IN: 821.2 mL / OUT: 300 mL / NET: 521.2 mL      BNP    RADIOLOGY & ADDITIONAL STUDIES:cxr- bilateral infiltrates unchanged    ECHO:

## 2019-08-13 NOTE — PROGRESS NOTE ADULT - ASSESSMENT
Physical Examination:  GENERAL:               Alert, Oriented, No distress.    HEENT:                    No JVD, dry MM  PULM:                     Bilateral air entry,  no significant sputum production, diffuse Rales, No Rhonchi, No Wheezing  CVS:                         S1, S2,  +Murmur  ABD:                        Soft, nondistended, nontender, normoactive bowel sounds,   EXT:                         + LE mild edema, nontender, No Cyanosis or Clubbing   Vascular:                Warm Extremities, Normal Capillary refill, Normal Distal Pulses  SKIN:                      multiple patriciate that present in LE   NEURO:                  Alert, oriented, interactive, nonfocal, follows commands  PSYC:                      Calm, + Insight.        Assessment  Multifocal PNA vs Vasculitis / Pulmonary renal syndrome  / r/o diffuse alveolar Hemorrhage as pt has high crp/esr/rash/kanika  Hypoxic respiratory failure requiring NIV/High flow o2  Coagulopathy improved s/p vit k  Anemia with downtrending h/h, stool occult negative, hemolytic workup negative   KANIKA with normal baseline  Afib on coumadin ; a/c held due to anemia  Chronic Diastolic chf with severe pulmonary htn  HTN  RA  PVD    Plan  High flow alternate with bipap PRN  Despite pt stating improvement, worsening of cxr continued luekocytois  and patient too unstable for lung biopsy and or bronch at this time, will continue with pulse dose steroids  Will get skin biopsy of leg rash d/w Dr. Chance - planned for today,   Rheum eval Pending  Renal eval appreciated  heme eval appreciated  ID eval appreciated  f/u cultures    taper fio2 as tolerated  case d/w family    if respiratory status worsens low threshold for intubation.   as clinically unchanged case d/w Transfer Center at [facility] for closer monitoring and possible lung biopsy.    case d/w CC Fellow Dr. Ramirez who will talk to his attending Dr. Ennis and get back to me    PMD:		Dr. Salazar		                   Notified(Date): 8/11 - state family history of Inclusion body myositis   PMD:		Dr. Dobbins		                   Notified(Date): 8/11  Family Updated: 		Son                                 Date:  8/13      Sedation & Analgesia:	N/a  Diet/Nutrition:		Advance diet  GI PPx:			PPI BID    DVT Ppx:		Venodynes due to coagulopathy    Activity:		        Bedrest   Head of Bed:               35-45  Glycemic Control:        n/a    Lines: PIV  Restraints were deemed necessary to prevent removal of life-sustaining devices [  ] YES   [  x  ]  NO     Disposition: ICU monitoring, possible transfer to [facility] once bed is available.     Goals of Care:Full code.

## 2019-08-13 NOTE — CONSULT NOTE ADULT - ASSESSMENT
84-year-old female with history of atrial fibrillation/CHF/ COPD, who presented with a two-week history of increasing weakness and decreased p.o. intake. She was found to be hypoxemic on admission and is being treated for pneumonia.  Hematology consulted for anemia.
Assessment and Plan:    Assessment:  · Assessment		  83 y/o female   PMHx: A fib 9on coumadin), CHF, HLD, HTN, COPD, anxiety, depression From home with 2 week history of increasing weakness, decreased appetite (family states 9 lb weight loss in last 2 months), some nausea and dizziness. on admission day  she seemed even weaker so family called EMS. For EMS O2 sat was in 70s she received albuterol in route and in ED O2 sat was 75%. In ED she was initially placed on 5 L with increase in saturation to low 90's. However work of breathing continued to be increased so she was placed on bi-pap. Work up in ED revealed multifocal PNA, supratherapeutic INR and KANIKA, stool for occult blood was negative despite low h/h    admitted to ICU, plan 2 units PRBC, Vit K 5 mg, abx for CAP PNA, trend H/H, and BMP, wean bipap after transfused.          Problem/Plan - 1:  ·  Problem: Multifocal pneumonia.  Plan: Start levoquin  f/u RVP  CXR in AM   continue bi-pap with weaning after transfusion  ID consult.      Problem/Plan - 2:  ·  Problem: Anemia.  Plan: stool occult blood neg  2 units PRBC  vit K for increased INR  trend INR.      Problem/Plan - 3:  ·  Problem: AF (atrial fibrillation).  Plan: continue beta blocker and ca channel blocker with holds for low BP  hold coumadin for now  trend INR. echo, icu monitor     Problem/Plan - 4:  ·  Problem: HTN (hypertension).  Plan: continue home meds with hold for low BP   hold ARB for now.      Problem/Plan - 5:  ·  Problem: CHF (congestive heart failure).  Plan: will hold standing diuretic for now  lasix between each unit PRBC  TTE  careful hydration if necessary.      Problem/Plan - 6:  Problem: KANIKA (acute kidney injury). Plan: trend labs  hold ARB for now.    discussed with icu staff, pt amd family
a/p    Dr. Baig would like skin biospy to establish a causative etiology ? vasculitis.      patient is too unstable to transport to OR.    I will come back later this afternoon and perform skin biopsy with local anesthesia after informed consent.      thank you    dr. kris edward  cell#[phone]
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 Etiology of presentation unclear but includes severe pulmonary infection vs inflammatory process vs malignancy though does not have lymphocytic predominance. Inflammatory ddx includes vasculitis (GPA, MPA highest on differential) vs SLE with possible DAH vs RA lung disease. No evidence of myositis on exam and rash not typical of DM rash.   -	Agree with pulse steroids for now, and transfer to [facility] for possible bronch/ lung bx as etiology of lung pathology unclear at present – infection vs bleeding  -	Trend CBC and CMP to monitor counts  -	F/u pending IVAN, ANCAs, GBM – C3/4 low. Please add dsDNA, JAN, sjogrens, RF, CCP, quantitative immunoglobulins, CPK. Check Hepatitis B/C, Quantiferon as pre-immunosuppression labs in case pt requires further immunosuppressive treatment   -	Can repeat Cardiolipin and B2 glycoprotein Antibodies as + in 2018   -	Repeat UA, check protein/Cr ratio  -	Discussed case with Heme-Onc, to get peripheral flow cytometry to evaluate if possibly 2/2 prior ?B cell lymphoma  -	Upon transfer to [facility] please consult [facility] rheumatology team for coverage  -	To have skin bx – please send for both EM and immunofluorescent testing as pt not stable enough to obtain lung or renal bx at present.

## 2019-08-13 NOTE — H&P ADULT - NSHPLABSRESULTS_GEN_ALL_CORE
8.4    27.34 )-----------( 380      ( 13 Aug 2019 05:30 )             26.7       08-13    136  |  103  |  65<H>  ----------------------------<  265<H>  4.4   |  21<L>  |  1.85<H>    Ca    8.6      13 Aug 2019 05:30  Phos  4.1     08-13  Mg     2.3     08-13    TPro  6.4  /  Alb  2.8<L>  /  TBili  1.4<H>  /  DBili  x   /  AST  46<H>  /  ALT  29  /  AlkPhos  78  08-13            PT/INR - ( 12 Aug 2019 05:45 )   PT: 17.5 sec;   INR: 1.54 ratio         Lactate Trend  08-10 @ 12:00 Lactate:1.9       CAPILLARY BLOOD GLUCOSE      POCT Blood Glucose.: 317 mg/dL (13 Aug 2019 14:58)    Culture Results:   No growth to date. (08-10 @ 12:00)  Culture Results:   No growth to date. (08-10 @ 12:00) 8.4    27.34 )-----------( 380      ( 13 Aug 2019 05:30 )             26.7       08-13    136  |  103  |  65<H>  ----------------------------<  265<H>  4.4   |  21<L>  |  1.85<H>    Ca    8.6      13 Aug 2019 05:30  Phos  4.1     08-13  Mg     2.3     08-13    TPro  6.4  /  Alb  2.8<L>  /  TBili  1.4<H>  /  DBili  x   /  AST  46<H>  /  ALT  29  /  AlkPhos  78  08-13            PT/INR - ( 12 Aug 2019 05:45 )   PT: 17.5 sec;   INR: 1.54 ratio         Lactate Trend  08-10 @ 12:00 Lactate:1.9       CAPILLARY BLOOD GLUCOSE      POCT Blood Glucose.: 317 mg/dL (13 Aug 2019 14:58)    Culture Results:   No growth to date. (08-10 @ 12:00)  Culture Results:   No growth to date. (08-10 @ 12:00)    < from: CT Chest No Cont (08.10.19 @ 13:08) >    IMPRESSION:     Diffuse bilateral peribronchial airspace consolidation may reflect   multilobar bronchopneumonia.    Small right-sided pleural effusion.    < end of copied text >

## 2019-08-13 NOTE — H&P ADULT - ASSESSMENT
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 Admitted for management of multifocal PNA vs. vasculitis vs. possible diffuse alveolar hemorrhage.     #Neuro:  AAOx4, no active issues    #CV:  Diastolic heart failure  - TTE 8/11 with mod to severe MR, and mild diastolic dysfunction. EF 61%. pulm HTN up [id number]  - is not in active HF    Afib  - on home warfarin but presented with elevated INR now revered  - hold warfarin in the setting of possible bleed  - [id number] score 5    #Pulm:  - hypoxia in the setting of PNA vs. alveolar hemorrhage from underlying vasculitis  - currently on high flow, with good saturations  - management of vasculitis and infection as below    #GI:  - no active issues  - PPI    #Renal:  KANIKA  - baseline 0.5, now elevated up to 1.8 -> unclear etiology, possibly 2/2 vasculitis vs. prerenal in the setting of sepsis from PNA  - renally dose medications   - daily Cr-  - c/w steroid for now  - positive UA - already on abx for PNA    #Rheum:  - given KANIKA, elevated ESR/CRP, fever at initial admission and LE rash -> ? vasculitis picture  - c/w steroid for now  - formally consult rheum in AM  - possible derm biopsy of the vasculitis in AM    #Endo:  Steroid induced hyperglycemia:  - currently on 1unit/hr insulin ggt  - given that patient will be on steroid, will start weight based & premeals + ISS    #ID:  Multifocal PNA  - c/w  - legionella, RVP and HIV negative  - positive UA at OSH, no UCx sent  - blood culture from 8/10 NGTD    #Heme:  Anemia  - low hg 6.2 (baseline 12-13) at admission s/p 3 unit prbc, unclear source, ?lung hemorrhage vs anemia of chronic dx  - stool occult negative, hemolytic workup negative   - active type and screen  - daily CBC    Supratherapeutic INR  - on warfarin for Afib  - likely 2/2 over-medication  - s/p FFP and vitamin K -> revered to INR 1.54  - daily PT/PTT    #DVT: on hold given recent anemia and supratherapeutic INR as well as concern for alveolar hemorrhage 83 yo F with PMH of diastolic CHF, pulm HTN class II, A fib on coumadin, HLD, HTN, COPD, 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 Admitted for management of multifocal PNA vs. vasculitis vs. possible diffuse alveolar hemorrhage.     #Neuro:  AAOx4, no active issues    #CV:  Diastolic heart failure  - TTE 8/11 with mod to severe MR, and mild diastolic dysfunction. EF 61%. pulm HTN up [id number]  - is not in active HF    Afib  - on home warfarin but presented with elevated INR now reversed  - hold warfarin in the setting of anemia  - [id number] score 5    #Pulm:  - hypoxia in the setting of PNA vs. alveolar hemorrhage from underlying vasculitis  - currently on high flow, with good saturations  - management of vasculitis and infection as below    #GI:  - no active issues  - mechanical soft diet  - PPI    #Renal:  KANIKA  - baseline 0.5, now elevated up to 1.8 -> unclear etiology, possibly 2/2 poor PO over the last 2 months vs. prerenal in the setting of sepsis from PNA vs. vasculitis  - renally dose medications   - daily Cr-  - one more dose of steroid today, will f/u rheum recs in AM  - positive UA - already on abx for PNA    #Rheum:  - given KANIKA, elevated ESR/CRP, fever at initial admission and LE rash -> ? vasculitis picture  - was on 500mg BID solumedrol at OSH  - to give 1 more dose of steroid for now  - formally consult rheum in AM about steroid dosing  - to consult derm for biopsy in AM    #Endo:  Steroid induced hyperglycemia:  - currently on 1unit/hr insulin ggt, last glucose 197  - will start ISS    #ID:  Multifocal PNA  - c/w zosyn for now  - legionella, RVP and HIV negative  - positive UA at OSH, no UCx sent  - blood culture from 8/10 NGTD    #Heme:  Anemia  - low hg 6.2 (baseline 12-13) at admission s/p 3 unit prbc, unclear source, ?lung hemorrhage vs anemia of chronic dx  - stool occult negative, hemolytic workup negative   - active type and screen  - daily CBC    Supratherapeutic INR  - on warfarin for Afib  - likely 2/2 over-medication  - s/p FFP and vitamin K -> revered to INR 1.54  - daily PT/PTT    #DVT: on hold given recent anemia and supratherapeutic INR as well as concern for alveolar hemorrhage 85 yo F with PMH of diastolic CHF, pulm HTN class II, A fib on coumadin, HLD, HTN, COPD, 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 Admitted for management of multifocal PNA vs. vasculitis vs. possible diffuse alveolar hemorrhage.     #Neuro:  AAOx4, no active issues    #CV:  Diastolic heart failure  - TTE 8/11 with mod to severe MR, and mild diastolic dysfunction. EF 61%. pulm HTN up [id number]  - is not in active HF  - c/w home HF meds - see below    Afib  - on home warfarin but presented with elevated INR now reversed  - hold warfarin in the setting of anemia  - [id number] score 5    HTN:  - per outpatient record, patient has all of the medications below:  atenolol, clonidine, HCTZ, valsartan, spironolactone, Lasix and hydralazine  - given relatively well controlled BP, will start Atenolol, and start hydralazine IV PRN  - c/w home statin    #Pulm:  - hypoxia in the setting of PNA vs. alveolar hemorrhage from underlying vasculitis  - currently on high flow, with good saturations  - management of vasculitis and infection as below    #GI:  - no active issues  - mechanical soft diet  - PPI    #Renal:  KANIKA  - baseline 0.5, now elevated up to 1.8 -> unclear etiology, possibly 2/2 poor PO over the last 2 months vs. prerenal in the setting of sepsis from PNA vs. vasculitis  - renally dose medications   - daily Cr-  - one more dose of steroid today, will f/u rheum recs in AM  - positive UA - already on abx for PNA    #Rheum:  - given KANIKA, elevated ESR/CRP, fever at initial admission and LE rash -> ? vasculitis picture  - was on 500mg BID solumedrol at OSH  - to give 1 more dose of steroid for now  - formally consult rheum in AM about steroid dosing  - to consult derm for biopsy in AM    #Endo:  Steroid induced hyperglycemia:  - currently on 1unit/hr insulin ggt, last glucose 197  - will start ISS    #ID:  Multifocal PNA  - c/w zosyn for now  - legionella, RVP and HIV negative  - positive UA at OSH, no UCx sent  - blood culture from 8/10 TD    #Heme:  Anemia  - low hg 6.2 (baseline 12-13) at admission s/p 3 unit prbc, unclear source, ?lung hemorrhage vs anemia of chronic dx  - stool occult negative, hemolytic workup negative   - active type and screen  - daily CBC    Supratherapeutic INR  - on warfarin for Afib  - likely 2/2 over-medication  - s/p FFP and vitamin K -> revered to INR 1.54  - daily PT/PTT    #DVT: on hold given recent anemia and supratherapeutic INR as well as concern for alveolar hemorrhage 83 yo F with PMH of diastolic CHF, pulm HTN class II, A fib on coumadin, HLD, HTN, COPD, 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 Admitted for management of multifocal PNA vs. vasculitis vs. possible diffuse alveolar hemorrhage.     #Neuro:  AAOx4, no active issues    #CV:  Diastolic heart failure  - TTE 8/11 with mod to severe MR, and mild diastolic dysfunction. EF 61%. pulm HTN up [id number]  - is not in active HF  - c/w home HF meds - see below    Afib  - on home warfarin but presented with elevated INR now reversed  - hold warfarin in the setting of anemia  - [id number] score 5    HTN:  - per outpatient record, patient has all of the medications below:  atenolol, clonidine, HCTZ, valsartan, spironolactone, Lasix and hydralazine  - given relatively well controlled BP, will start Atenolol, and start hydralazine IV PRN  - c/w home statin    #Pulm:  - hypoxia in the setting of PNA vs. alveolar hemorrhage from underlying vasculitis  - currently on high flow, with good saturations  - management of vasculitis and infection as below    #GI:  - no active issues  - mechanical soft diet  - PPI    #Renal:  KANIKA  - baseline 0.5, now elevated up to 1.8 -> unclear etiology, possibly 2/2 poor PO over the last 2 months vs. prerenal in the setting of sepsis from PNA vs. vasculitis  - renally dose medications   - daily Cr-  - one more dose of steroid today, will f/u rheum recs in AM  - positive UA - already on abx for PNA    #Rheum:  - given KANIKA, elevated ESR/CRP, fever at initial admission and LE rash -> ? vasculitis picture  - was on 500mg BID solumedrol at OSH  - to give 1 more dose of steroid for now  - formally consult rheum in AM about steroid dosing  - to consult derm for biopsy in AM    #Endo:  Steroid induced hyperglycemia:  - currently on 1unit/hr insulin ggt, last glucose 197  - will start ISS    #ID:  Multifocal PNA  - c/w zosyn for now  - legionella, RVP and HIV negative  - positive UA at OSH, no UCx sent  - blood culture from 8/10 NGTD    #Heme:  Anemia  - low hg 6.2 (baseline 12-13) at admission s/p 3 unit prbc, unclear source, ?lung hemorrhage vs anemia of chronic dx  - stool occult negative, hemolytic workup negative   - active type and screen  - daily CBC    Supratherapeutic INR  - on warfarin for Afib  - likely 2/2 over-medication  - s/p FFP and vitamin K -> revered to INR 1.54  - daily PT/PTT    #DVT: on hold given recent anemia and supratherapeutic INR as well as concern for alveolar hemorrhage    [place]:  Reference #: [id number]     Patient Name:	Melissa Blanchard	YOB: 1934	  Address:	[address] [place] [address]	Sex:	Female	    Rx Written	Rx Dispensed	Drug	Quantity	Days Supply	Prescriber Name			  06/17/2019	07/22/2019	zolpidem tartrate 5 mg tablet 	30	30	Jonathan Salazar MD			  06/17/2019	06/17/2019	zolpidem tartrate 5 mg tablet 	30	30	Jonathan Salazar MD			  04/01/2019	05/14/2019	zolpidem tartrate 5 mg tablet 	30	30	Jonathan Salazar MD

## 2019-08-13 NOTE — DISCHARGE NOTE NURSING/CASE MANAGEMENT/SOCIAL WORK - NSDCDPATPORTLINK_GEN_ALL_CORE
You can access the [facility] Patient Portal, offered by [facility], by registering with the following website: [email or web address]

## 2019-08-13 NOTE — DISCHARGE NOTE NURSING/CASE MANAGEMENT/SOCIAL WORK - NSDCPEWEB_GEN_ALL_CORE
[facility] for Tobacco Control website --- [email or web address] website --- [email or web address]

## 2019-08-13 NOTE — DISCHARGE NOTE PROVIDER - NSDCFUSCHEDAPPT_GEN_ALL_CORE_FT
TONIA BRANHAM ; 09/16/2019 ; NPP Med Pulm [address] [facility]  TONIA BRANHAM ; 10/10/2019 ; NPP Cardio [facility] TONIA BRANHAM ; 09/16/2019 ; NPP Med Pulm [facility]  TONIA BRANHAM ; 10/10/2019 ; NPP Cardio [facility] TONIA BRANHAM ; 09/16/2019 ; NPP Med Pulm [facility]  TONIA BRANHAM ; 10/10/2019 ; NPP Cardio [facility]

## 2019-08-13 NOTE — H&P ADULT - HISTORY OF PRESENT ILLNESS
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 Associated symptoms include generalized malaise, nausea, and pruritic rash on back s/p resolution with topical steroids. Admitted on 8/10.  In the ED at OSH, patient was hypoxic, put on BIPAP and admitted to the MICU. CT chest showing diffuse opacities, possibly due to multifocal PNA. Started on Doxy and [place]. There, also noted to have Hg of 6 s/p 3 units prbc as well as supratherapeutic INR 6.28 s/p 1uFFP & vitamin K, now reversed. Also found to have KANIKA (baseline 0.5).  though appears similar to OP UA when she had a UTI in the past. Pt has chronic ESR elevation to 40s as OP, elevated to 96 here. Also with leukocytosis in 30s, prior baseline was normal. Since admission, pt has also had nontender purpuric rash on b/l LE from knee down to dorsum of feet, sparing soles. No other areas of rash, prior rash on back resolved. Since admission, pt has been on Abx and steroids, now started on pulse solumedrol on 8/12/19. 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 Associated symptoms include generalized malaise, nausea, poor PO with associated weight loss of 10lb over the last two months.   In the ED at OSH, patient was hypoxic, put on BIPAP and admitted to the MICU on 8/10. CT chest showing diffuse opacities, possibly due to multifocal PNA. Started on Doxy and [place]. There, also noted to have Hg of 6 s/p 3 units prbc as well as supratherapeutic INR 6.28 s/p 1uFFP & vitamin K, now reversed. Also found to have KANIKA (baseline 0.5).  Of note, patient had h/o elevated IVAN and dsDNA without SLE symptoms last year. Given concern for possible vasculitis with KANIKA, rash, elevated ESR/CRP, started on Solumedrol 500mg q12, started on 8/12. Patient then became hyperglycemia, and insulin ggt.   Patient was transferred on high flow to [facility] for rheumatologic and derm workup.

## 2019-08-13 NOTE — PROGRESS NOTE ADULT - SUBJECTIVE AND OBJECTIVE BOX
Follow-up Critical Care Progress Note  Chief Complaint : Pneumonia      pt more figity today  increasing today  more confused  Denies secretions, states feeling better  RVP negative no secretions       Allergies :Keflex (Unknown)  penicillin (Rash)      PAST MEDICAL & SURGICAL HISTORY:  COPD (chronic obstructive pulmonary disease)  Peripheral vascular disease  Pulmonary hypertension  Rheumatoid arthritis  Osteoarthritis  Mitral regurgitation  H/O lymphoma  Hyperlipidemia  Chronic GERD  Chronic kidney disease: proteinuria  AF (atrial fibrillation)  Anemia in CKD (chronic kidney disease)  CHF (congestive heart failure)  HTN (hypertension)  History of total hip replacement, left  History of total knee replacement, bilateral      Medications:  MEDICATIONS  (STANDING):  ALBUTerol/ipratropium for Nebulization 3 milliLiter(s) Nebulizer every 6 hours  ATENolol  Tablet 25 milliGRAM(s) Oral daily  atorvastatin 10 milliGRAM(s) Oral at bedtime  dextrose 5%. 1000 milliLiter(s) (50 mL/Hr) IV Continuous <Continuous>  dextrose 50% Injectable 12.5 Gram(s) IV Push once  dextrose 50% Injectable 25 Gram(s) IV Push once  dextrose 50% Injectable 25 Gram(s) IV Push once  diltiazem    Tablet 30 milliGRAM(s) Oral every 6 hours  doxycycline IVPB 100 milliGRAM(s) IV Intermittent every 12 hours  furosemide   Injectable 40 milliGRAM(s) IV Push once  insulin lispro (HumaLOG) corrective regimen sliding scale   SubCutaneous Before meals and at bedtime  meropenem  IVPB 500 milliGRAM(s) IV Intermittent every 12 hours  methylPREDNISolone sodium succinate IVPB 500 milliGRAM(s) IV Intermittent every 12 hours  montelukast 10 milliGRAM(s) Oral at bedtime  pantoprazole    Tablet 40 milliGRAM(s) Oral before breakfast    MEDICATIONS  (PRN):  acetaminophen   Tablet .. 650 milliGRAM(s) Oral every 6 hours PRN Moderate Pain (4 - 6)  ALPRAZolam 0.5 milliGRAM(s) Oral two times a day PRN anxiety  dextrose 40% Gel 15 Gram(s) Oral once PRN Blood Glucose LESS THAN 70 milliGRAM(s)/deciliter      LABS:                        8.4    27.34 )-----------( 380      ( 13 Aug 2019 05:30 )             26.7     08-13    136  |  103  |  65<H>  ----------------------------<  265<H>  4.4   |  21<L>  |  1.85<H>    Ca    8.6      13 Aug 2019 05:30  Phos  4.1       Mg     2.3         TPro  7.3  /  Alb  2.8<L>  /  TBili  1.4<H>  /  DBili  x   /  AST  46<H>  /  ALT  29  /  AlkPhos  78      HIT ab --  @ 05:15  HIT ab EIA --  D Dimer -611          PT/INR - ( 12 Aug 2019 05:45 )   PT: 17.5 sec;   INR: 1.54 ratio       Trend Bun/Cr  19 @ 05:30  BUN/CR -  65<H> / 1.85<H>  19 @ 05:45  BUN/CR -  50<H> / 1.70<H>  19 @ 05:15  BUN/CR -  38<H> / 1.66<H>  08-10-19 @ 23:00  BUN/CR -  40<H> / 1.72<H>  08-10-19 @ 15:04  BUN/CR -  36<H> / 1.95<H>  08-10-19 @ 12:00  BUN/CR -  48<H> / 2.03<H>    H/H Trend  19 @ 05:30   -  8.4<L> / 26.7<L>  19 @ 05:45   -  8.3<L> / 26.4<L>  19 @ 16:15   -  9.1<L> / 28.6<L>  19 @ 05:15   -  7.3<L> / 22.9<L>  08-10-19 @ 23:00   -  8.8<L> / 28.3<L>  08-10-19 @ 12:00   -  6.2<LL> / 20.0<LL>      Glucose Trend  19 @ 07:52   -  -- -- 273<H>  19 @ 05:30   -  -- 265<H> --  19 @ 21:17   -  -- -- 228<H>  19 @ 17:14   -  -- -- 244<H>  19 @ 12:17   -  -- -- 256<H>  19 @ 05:45   -  -- 209<H> --  19 @ 05:15   -  -- 125<H> --  08-10-19 @ 23:00   -  -- 119<H> --  08-10-19 @ 15:04   -  -- 161<H> --  08-10-19 @ 12:00   -  -- 146<H> --            CULTURES: (if applicable)    Culture - Blood (collected 08-10-19 @ 12:00)  Source: .Blood Blood-Peripheral  Preliminary Report (19 @ 02:02):    No growth to date.    Culture - Blood (collected 08-10-19 @ 12:00)  Source: .Blood Blood-Peripheral  Preliminary Report (19 @ 02:02):    No growth to date.      Rapid RVP Result: NotDetec (19 @ 20:54)        CAPILLARY BLOOD GLUCOSE      POCT Blood Glucose.: 273 mg/dL (13 Aug 2019 07:52)      RADIOLOGY  CXR:  < from: Xray Chest 1 View- PORTABLE-Routine (19 @ 06:22) >  IMPRESSION: Unchanged advanced infiltrates.    < end of copied text >        VITALS:  T(C): 36.4 (19 @ 06:00), Max: 37.1 (19 @ 12:00)  T(F): 97.5 (19 @ 06:00), Max: 98.7 (19 @ 12:00)  HR: 87 (19 @ 08:00) (74 - 105)  BP: 129/82 (19 @ 08:00) (102/71 - 172/83)  BP(mean): 97 (19 @ 08:00) (81 - 120)  ABP: --  ABP(mean): --  RR: 41 (19 @ 08:00) (13 - 41)  SpO2: 96% (19 @ 08:00) (75% - 100%)  CVP(mm Hg): --  CVP(cm H2O): --    Ins and Outs     19 @ 07:01  -  19 @ 07:00  --------------------------------------------------------  IN: 790 mL / OUT: 350 mL / NET: 440 mL        Height (cm): 167.64 (08-10-19 @ 11:44)  Weight (kg): 65.6 (08-10-19 @ 13:55)  BMI (kg/m2): 23.3 (08-10-19 @ 13:55)        I&O's Detail    12 Aug 2019 07:  -  13 Aug 2019 07:00  --------------------------------------------------------  IN:    Oral Fluid: 340 mL    Solution: 200 mL    Solution: 100 mL    Solution: 150 mL  Total IN: 790 mL    OUT:    Incontinent per Collection Ba mL  Total OUT: 350 mL    Total NET: 440 mL

## 2019-08-13 NOTE — PROGRESS NOTE ADULT - ASSESSMENT
84-year-old female with history of atrial fibrillation/CHF/ COPD, who presented with a two-week history of increasing weakness and decreased p.o. intake. She was found to be hypoxemic on admission and is being treated for pneumonia.  Hematology consulted for anemia.

## 2019-08-13 NOTE — DISCHARGE NOTE NURSING/CASE MANAGEMENT/SOCIAL WORK - NSDCPEEMAIL_GEN_ALL_CORE
[facility] for Tobacco Control email [email or web address]

## 2019-08-13 NOTE — DISCHARGE NOTE PROVIDER - HOSPITAL COURSE
84 year old female with PMH COPD, peripheral vascular disease, pulmonary hypertension, RA/OA, mitral regurgitation, hyperlipidemia, GERD, CKD, afib on AC, diastolic heart failure, HTN admitted from home 8/10 with 2 week history of increasing weakness, decreased appetite, 9 pound weight loss over the past two months, nausea and dizziness. On day of admission she seemed even weaker so family called EMS. For EMS O2 sat was in 70s she received albuterol in route and in ED O2 sat was 75%. In ED she was initially placed on 5 L with increase in saturation to low 90's. However work of breathing continued to be increased so she was placed on bi-pap.         Work up in ED revealed multifocal PNA, supratherapeutic INR and KANIKA, and guiac negative anemia.  Transferred to ICU on Bipap for hypoxic respiratory failure.  Coumadin anticoaguation held, transfused total of 3 units PRBC's and one unit FFP for anemia.        CT chest with dense bilateral consolidation.  Started high dose steroids and weaned to high flow NC.  Subsequently started on IV insulin for hyperglycemia. Rheumatology consulted with concern for vasculitis, renal consulted for KANIKA on CKD          Now for transfer to [facility].  Despite patient stating improvement, worsening of CXR and continued leukocytosis concerning.  For possible bronchoscopy and/or lung biopsy. 84 year old female with PMH COPD, peripheral vascular disease, pulmonary hypertension, RA/OA, mitral regurgitation, hyperlipidemia, GERD, CKD, afib on AC, diastolic heart failure, HTN admitted from home 8/10 with 2 week history of increasing weakness, decreased appetite, 9 pound weight loss over the past two months, nausea and dizziness. On day of admission she seemed even weaker so family called EMS. For EMS O2 sat was in 70s she received albuterol in route and in ED O2 sat was 75%. In ED she was initially placed on 5 L with increase in saturation to low 90's. However work of breathing continued to be increased so she was placed on bi-pap.         Work up in ED revealed multifocal PNA, supratherapeutic INR and KANIKA, and guiac negative anemia.  Transferred to ICU on Bipap for hypoxic respiratory failure.  Coumadin anticoaguation held, transfused total of 3 units PRBC's and one unit FFP for anemia.        CT chest with dense bilateral consolidation.  Started high dose steroids and weaned to high flow NC.  Subsequently started on IV insulin for hyperglycemia. Rheumatology consulted with concern for vasculitis, renal consulted for KANIKA on CKD          Now for transfer to [facility].  Despite patient stating improvement, worsening of CXR and continued leukocytosis concerning.  For possible bronchoscopy and/or lung biopsy.        This is only a summary of events during admission, please refer to full medical record for details of hospital stay. 84 year old female with PMH COPD, peripheral vascular disease, pulmonary hypertension, RA/OA, mitral regurgitation, hyperlipidemia, GERD, CKD, afib on AC, diastolic heart failure, HTN admitted from home 8/10 with 2 week history of increasing weakness, decreased appetite, 9 pound weight loss over the past two months, nausea and dizziness. On day of admission she seemed even weaker so family called EMS. For EMS O2 sat was in 70s she received albuterol in route and in ED O2 sat was 75%. In ED she was initially placed on 5 L with increase in saturation to low 90's. However work of breathing continued to be increased so she was placed on bi-pap.         Work up in ED revealed multifocal PNA, supratherapeutic INR and KANIKA, and guiac negative anemia.  Transferred to ICU on Bipap for hypoxic respiratory failure.  Coumadin anticoaguation held, transfused total of 3 units PRBC's and one unit FFP for anemia and received vit k.        CT chest with dense bilateral consolidation.  Started high dose steroids and weaned to high flow NC.  Subsequently started on IV insulin for hyperglycemia. Rheumatology consulted with concern for vasculitis, renal consulted for KANIKA         Now for transfer to [facility].  Despite patient stating improvement, worsening of CXR and continued leukocytosis concerning.  For possible bronchoscopy and/or lung biopsy.        This is only a summary of events during admission, please refer to full medical record for details of hospital stay.

## 2019-08-13 NOTE — PROGRESS NOTE ADULT - PROVIDER SPECIALTY LIST ADULT
Cardiology
Critical Care
Heme/Onc
Infectious Disease
Nephrology
Critical Care
Nephrology
Critical Care
Critical Care

## 2019-08-13 NOTE — H&P ADULT - ATTENDING COMMENTS
Patient examined and care reviewed in detail on bedside rounds  Critically ill on NIV with PNA/possible rheum disorder  Frequent bedside visits with therapy change today.   I have personally provided 35+ minutes of critical care time concurrently with the resident/fellow; this excludes time spent on separate procedures.

## 2019-08-13 NOTE — PROGRESS NOTE ADULT - PROBLEM SELECTOR PLAN 1
May have chronic disease/renal disease component to anemia. No gross blood loss noted clinically at present. Hemoglobin currently stable. Iron studies, B12/folate pending. Haptoglobin within normal limits.   Consider CT imaging A/P if anemia refractory-r/o possible source of blood loss.  For lower extremity skin lesion biopsy-assess further for vasculitis.  Son's questions answered at this time.
as above

## 2019-08-13 NOTE — PROGRESS NOTE ADULT - SUBJECTIVE AND OBJECTIVE BOX
Seen in [facility] earlier    Vital Signs Last 24 Hrs  T(C): 37.1 (08-13-19 @ 17:30), Max: 37.1 (08-13-19 @ 17:29)  T(F): 98.8 (08-13-19 @ 17:30), Max: 98.8 (08-13-19 @ 17:29)  HR: 93 (08-13-19 @ 19:00) (74 - 105)  BP: 156/89 (08-13-19 @ 19:00) (114/97 - 156/89)  BP(mean): 107 (08-13-19 @ 19:00) (91 - 143)  RR: 20 (08-13-19 @ 19:00) (13 - 41)  SpO2: 99% (08-13-19 @ 19:00) (75% - 100%)    Lungs b/l rhonchi  Heart S1S2  Abdomen soft, NT, ND  Extr + edema                                 8.7    24.37 )-----------( 408      ( 13 Aug 2019 18:30 )             29.0     13 Aug 2019 18:30    138    |  100    |  65     ----------------------------<  196    4.3     |  24     |  1.60     Ca    9.3        13 Aug 2019 18:30  Phos  3.3       13 Aug 2019 18:30  Mg     2.1       13 Aug 2019 18:30    TPro  7.6    /  Alb  3.7    /  TBili  1.1    /  DBili  x      /  AST  35     /  ALT  31     /  AlkPhos  82     13 Aug 2019 18:30    LIVER FUNCTIONS - ( 13 Aug 2019 18:30 )  Alb: 3.7 g/dL / Pro: 7.6 g/dL / ALK PHOS: 82 u/L / ALT: 31 u/L / AST: 35 u/L / GGT: x           PT/INR - ( 13 Aug 2019 18:30 )   PT: 25.4 SEC;   INR: 2.18       acetaminophen   Tablet .. 650 milliGRAM(s) Oral every 6 hours PRN  ALBUTerol/ipratropium for Nebulization 3 milliLiter(s) Nebulizer every 6 hours  ALPRAZolam 0.5 milliGRAM(s) Oral every 12 hours PRN  ATENolol  Tablet 25 milliGRAM(s) Oral daily  atorvastatin 10 milliGRAM(s) Oral at bedtime  dextrose 50% Injectable 50 milliLiter(s) IV Push every 15 minutes  dextrose 50% Injectable 25 milliLiter(s) IV Push every 15 minutes  diltiazem    Tablet 30 milliGRAM(s) Oral every 6 hours  doxycycline IVPB 100 milliGRAM(s) IV Intermittent every 12 hours  insulin regular Infusion 6 Unit(s)/Hr IV Continuous <Continuous>  meropenem  IVPB 500 milliGRAM(s) IV Intermittent every 12 hours  methylPREDNISolone sodium succinate IVPB 500 milliGRAM(s) IV Intermittent every 12 hours  montelukast 10 milliGRAM(s) Oral at bedtime  pantoprazole    Tablet 40 milliGRAM(s) Oral every 12 hours  sertraline 50 milliGRAM(s) Oral daily    ASSESSMENT/PLAN:    Resp distress, rash, KANIKA, low complements  R/o pulm-renal syndrome/GN/vasculitis/inf process  Continue steroids  Abx per ID  F/u serologies, Cx  F/u CBC, BMP, UO  Tx to [facility] for further w/up/management  Prognosis is guarded

## 2019-08-13 NOTE — PROGRESS NOTE ADULT - SUBJECTIVE AND OBJECTIVE BOX
TONIA BRANHAM   84y   Female    Admitting: DINORA Pylezebarnol  HPI:  84-year-old female with history of atrial fibrillation/CHF/ COPD, who presented with a two-week history of increasing weakness and decreased p.o. intake. She was found to be hypoxemic on admission and is being treated for pneumonia.  Hematology consulted for anemia.    PAST MEDICAL & SURGICAL HISTORY:  COPD (chronic obstructive pulmonary disease)  Peripheral vascular disease  Pulmonary hypertension  Rheumatoid arthritis  Osteoarthritis  Mitral regurgitation  H/O lymphoma  Hyperlipidemia  Chronic GERD  Chronic kidney disease: proteinuria  AF (atrial fibrillation)  Anemia in CKD (chronic kidney disease)  CHF (congestive heart failure)  HTN (hypertension)  History of total hip replacement, left  History of total knee replacement, bilateral    HEALTH ISSUES - PROBLEM Dx:  Anemia, unspecified type: Anemia, unspecified type  Coagulopathy: Coagulopathy  Acute blood loss anemia: Acute blood loss anemia  Severe sepsis: Severe sepsis  Pulmonary hypertension: Pulmonary hypertension  Acute on chronic diastolic congestive heart failure: Acute on chronic diastolic congestive heart failure  Acute respiratory failure with hypoxia: Acute respiratory failure with hypoxia  KANIKA (acute kidney injury): KANIKA (acute kidney injury)  CHF (congestive heart failure): CHF (congestive heart failure)  HTN (hypertension): HTN (hypertension)  AF (atrial fibrillation): AF (atrial fibrillation)  Anemia: Anemia  Multifocal pneumonia: Multifocal pneumonia        MEDICATIONS  (STANDING):  ALBUTerol/ipratropium for Nebulization 3 milliLiter(s) Nebulizer every 6 hours  ATENolol  Tablet 25 milliGRAM(s) Oral daily  atorvastatin 10 milliGRAM(s) Oral at bedtime  diltiazem    Tablet 30 milliGRAM(s) Oral every 6 hours  doxycycline IVPB 100 milliGRAM(s) IV Intermittent every 12 hours  furosemide   Injectable 40 milliGRAM(s) IV Push once  meropenem  IVPB 500 milliGRAM(s) IV Intermittent every 12 hours  methylPREDNISolone sodium succinate IVPB 500 milliGRAM(s) IV Intermittent every 12 hours  montelukast 10 milliGRAM(s) Oral at bedtime  pantoprazole    Tablet 40 milliGRAM(s) Oral before breakfast    MEDICATIONS  (PRN):  acetaminophen   Tablet .. 650 milliGRAM(s) Oral every 6 hours PRN Moderate Pain (4 - 6)  ALPRAZolam 0.5 milliGRAM(s) Oral two times a day PRN anxiety    Allergies    Keflex (Unknown)  penicillin (Rash)    INTERVAL HPI/OVERNIGHT EVENTS:  Patient S&E at bedside. +Intermittent agitation, dyspnea. Son at bedside.     VITAL SIGNS:  T(F): 97.5 (08-13-19 @ 06:00)  HR: 86 (08-13-19 @ 07:00)  BP: 138/78 (08-13-19 @ 07:00)  RR: 16 (08-13-19 @ 07:00)  SpO2: 100% (08-13-19 @ 07:00)    PHYSICAL EXAM:  Constitutional: intermittently agitated  Eyes: sclera non-icteric  Neck: no JVD  Respiratory: decreased BS bases, scattered crackles ant.  Cardiovascular: RRR, no M/R/G  Gastrointestinal: soft, NTND, no masses palpable, no hepatosplenomegaly appreciated  Extremities: no calf tenderness  Neurological: Awake, alert.  Skin: LE purpuric lesions appear slightly less erythematous.    Labs:             8.4    27.34 )-----------( 380      ( 08-13 @ 05:30 )             26.7                8.3    31.91 )-----------( 340      ( 08-12 @ 05:45 )             26.4                9.1    36.10 )-----------( 364      ( 08-11 @ 16:15 )             28.6                7.3    33.16 )-----------( 326      ( 08-11 @ 05:15 )             22.9                8.8    30.97 )-----------( 386      ( 08-10 @ 23:00 )             28.3                6.2    34.46 )-----------( 414      ( 08-10 @ 12:00 )             20.0       08-13    136  |  103  |  65<H>  ----------------------------<  265<H>  4.4   |  21<L>  |  1.85<H>    Ca    8.6      13 Aug 2019 05:30  Phos  4.1     08-13  Mg     2.3     08-13    PT/INR - ( 12 Aug 2019 05:45 )   PT: 17.5 sec;   INR: 1.54 ratio      Haptoglobin, Serum: 131 mg/dL (08-11-19 @ 16:15)  Absolute Reticulocytes: 127.9 K/uL (08-11-19 @ 16:15)      Occult Blood, Feces: Negative (08-10-19 @ 12:15)    RADIOLOGY & ADDITIONAL TESTS:  < from: Xray Chest 1 View- PORTABLE-Urgent (08.12.19 @ 08:44) >  EXAM:  XR CHEST PORTABLE URGENT 1V      PROCEDURE DATE:  08/12/2019        INTERPRETATION:  CLINICAL INDICATION: 84 years  Female with Shortness of   Breath.    COMPARISON: 8/11/2019    AP view of the chest is rotated to the right demonstrates persistent   severe bilateral nodular airspace infiltrates.. There is no pleural   effusion. There is no pneumothorax.    The heart, mediastinum and jesus cannot be assessed.    The pulmonary vasculature is obscured.     Moderate thoracic degenerative changes are present.    IMPRESSION:    Persistent severe bilateral nodular airspace infiltrates.      SYLVIA LERNER M.D., ATTENDING RADIOLOGIST  This document has been electronically signed. Aug 12 2019  9:19AM    < end of copied text >

## 2019-08-13 NOTE — H&P ADULT - NSHPREVIEWOFSYSTEMS_GEN_ALL_CORE
REVIEW OF SYSTEMS:    CONSTITUTIONAL: No weakness, fevers or chills  EYES: No vertigo or throat pain  ENT: No visual changes, eye pain  MOUTH: moist alli mucosal, no mouth ulcers  NECK: No pain or stiffness  RESPIRATORY: No cough, wheezing, hemoptysis; No shortness of breath  CARDIOVASCULAR: No chest pain or palpitations  GASTROINTESTINAL: No abdominal or epigastric pain. No nausea, vomiting, or hematemesis; No diarrhea or constipation. No melena or hematochezia.  GENITOURINARY: No dysuria, frequency or hematuria  NEUROLOGICAL: No numbness or weakness  SKIN: No itching, rashes REVIEW OF SYSTEMS:    CONSTITUTIONAL: + weakness, fevers. No chills  EYES: No vertigo or throat pain  ENT: No visual changes, eye pain  MOUTH: moist alli mucosal, no mouth ulcers  NECK: No pain or stiffness  RESPIRATORY: No cough, wheezing, hemoptysis; + shortness of breath  CARDIOVASCULAR: No chest pain or palpitations  GASTROINTESTINAL: No abdominal or epigastric pain. No nausea, vomiting, or hematemesis; No diarrhea or constipation. No melena or hematochezia.  GENITOURINARY: No dysuria, frequency or hematuria  NEUROLOGICAL: No numbness or weakness  SKIN: No itching, rashes

## 2019-08-13 NOTE — DISCHARGE NOTE PROVIDER - NSDCCPCAREPLAN_GEN_ALL_CORE_FT
PRINCIPAL DISCHARGE DIAGNOSIS  Diagnosis: Multifocal pneumonia  Assessment and Plan of Treatment:       SECONDARY DISCHARGE DIAGNOSES  Diagnosis: Coagulopathy  Assessment and Plan of Treatment:     Diagnosis: Acute renal failure, unspecified acute renal failure type  Assessment and Plan of Treatment:     Diagnosis: Anemia  Assessment and Plan of Treatment:

## 2019-08-13 NOTE — H&P ADULT - NSHPPHYSICALEXAM_GEN_ALL_CORE
Vital Signs Last 24 Hrs  T(C): 36.4 (13 Aug 2019 10:00), Max: 36.6 (12 Aug 2019 18:00)  T(F): 97.6 (13 Aug 2019 10:00), Max: 97.8 (12 Aug 2019 18:00)  HR: 94 (13 Aug 2019 15:00) (74 - 105)  BP: 144/91 (13 Aug 2019 15:00) (114/97 - 172/83)  BP(mean): 108 (13 Aug 2019 15:00) (91 - 120)  RR: 24 (13 Aug 2019 15:00) (13 - 41)  SpO2: 98% (13 Aug 2019 15:00) (75% - 100%)    PHYSICAL EXAM:  GENERAL: NAD, well-developed  HEAD:  Atraumatic, Normocephalic  EYES: EOMI, PERRLA, conjunctiva and sclera clear  NECK: Supple, No JVD  CHEST/LUNG: Clear to auscultation bilaterally; No wheeze  HEART: Regular rate and rhythm; No murmurs, rubs, or gallops  ABDOMEN: Soft, Nontender, Nondistended; Bowel sounds present  EXTREMITIES:  2+ Peripheral Pulses, No clubbing, cyanosis, or edema  PSYCH: AAOx3  NEUROLOGY: non-focal  SKIN: No rashes or lesions Vital Signs Last 24 Hrs  T(C): 36.4 (13 Aug 2019 10:00), Max: 36.6 (12 Aug 2019 18:00)  T(F): 97.6 (13 Aug 2019 10:00), Max: 97.8 (12 Aug 2019 18:00)  HR: 94 (13 Aug 2019 15:00) (74 - 105)  BP: 144/91 (13 Aug 2019 15:00) (114/97 - 172/83)  BP(mean): 108 (13 Aug 2019 15:00) (91 - 120)  RR: 24 (13 Aug 2019 15:00) (13 - 41)  SpO2: 98% (13 Aug 2019 15:00) (75% - 100%)    PHYSICAL EXAM:  GENERAL: NAD, well-developed, appears comfortably on high flow  HEAD:  Atraumatic, Normocephalic  EYES: EOMI, PERRLA, conjunctiva and sclera clear  NECK: Supple  CHEST/LUNG: Clear to auscultation bilaterally; No wheeze  HEART: tachycardic, irregularly irregular No murmurs, rubs, or gallops  ABDOMEN: Soft, Nontender, Nondistended; Bowel sounds present  EXTREMITIES:  2+ Peripheral Pulses, No clubbing, cyanosis, or edema  PSYCH: AAOx3  NEUROLOGY: non-focal  SKIN: bilateral LE non-blanchable patechiea

## 2019-08-13 NOTE — DISCHARGE NOTE NURSING/CASE MANAGEMENT/SOCIAL WORK - NSDCPEPT PROEDHF_GEN_ALL_CORE
Activities as tolerated/Report signs and symptoms to primary care provider/Monitor weight daily/Call primary care provider for follow up after discharge/Low salt diet

## 2019-08-13 NOTE — CONSULT NOTE ADULT - SUBJECTIVE AND OBJECTIVE BOX
Chief Complaint: sob    HPI:   History of Present Illness:  Reason for Admission: Weakness	  History of Present Illness: 	    83 y/o female   PMHx: A fib 9on coumadin), CHF, HLD, HTN, COPD, anxiety, depression From home with 2 week history of increasing weakness, decreased appetite (family states 9 lb weight loss in last 2 months), some nausea and dizziness. Today she seemed even weaker so family called EMS. For EMS O2 sat was in 70s she received albuterol in route and in ED O2 sat was 75%. In ED she was initially placed on 5 L with increase in saturation to low 90's. However work of breathing continued to be increased so she was placed on bi-pap. Work up in ED revealed multifocal PNA, supratherapeutic INR and KANIKA, stool for occult blood was negative despite low h/h      PMH:   COPD (chronic obstructive pulmonary disease)  Peripheral vascular disease  Pulmonary hypertension  Rheumatoid arthritis  Osteoarthritis  Mitral regurgitation  H/O lymphoma  Hyperlipidemia  Chronic GERD  Chronic kidney disease  AF (atrial fibrillation)  Anemia in CKD (chronic kidney disease)  CHF (congestive heart failure)  HTN (hypertension)  No pertinent past medical history    PSH:   History of total hip replacement, left  History of total knee replacement, bilateral    Family History:  FAMILY HISTORY:  Family history unknown      Social History:  Smoking:  Alcohol:  Drugs:    Allergies:  Keflex (Unknown)  penicillin (Rash)      Medications:  ALBUTerol/ipratropium for Nebulization 3 milliLiter(s) Nebulizer every 6 hours  ATENolol  Tablet 25 milliGRAM(s) Oral daily  atorvastatin 10 milliGRAM(s) Oral at bedtime  chlorhexidine 4% Liquid 1 Application(s) Topical <User Schedule>  cloNIDine 0.1 milliGRAM(s) Oral every 8 hours  diltiazem    Tablet 30 milliGRAM(s) Oral every 6 hours  doxycycline IVPB 100 milliGRAM(s) IV Intermittent every 12 hours  hydrALAZINE 50 milliGRAM(s) Oral <User Schedule>  hydrALAZINE 50 milliGRAM(s) Oral <User Schedule>  hydrALAZINE 25 milliGRAM(s) Oral <User Schedule>  meropenem  IVPB 500 milliGRAM(s) IV Intermittent every 12 hours  methylPREDNISolone sodium succinate Injectable 40 milliGRAM(s) IV Push every 8 hours  montelukast 10 milliGRAM(s) Oral at bedtime      REVIEW OF SYSTEMS:  CONSTITUTIONAL: No fever, weight loss, or fatigue  EYES: No eye pain, visual disturbances, or discharge  ENMT:  No difficulty hearing, tinnitus, vertigo; No sinus or throat pain  NECK: No pain or stiffness  BREASTS: No pain, masses, or nipple discharge  RESPIRATORY: No cough, wheezing, chills or hemoptysis; No shortness of breath  CARDIOVASCULAR: No chest pain, palpitations, dizziness, or leg swelling  GASTROINTESTINAL: No abdominal or epigastric pain. No nausea, vomiting, or hematemesis; No diarrhea or constipation. No melena or hematochezia.  GENITOURINARY: No dysuria, frequency, hematuria, or incontinence  NEUROLOGICAL: No headaches, memory loss, loss of strength, numbness, or tremors  SKIN: No itching, burning, rashes, or lesions   LYMPH NODES: No enlarged glands  ENDOCRINE: No heat or cold intolerance; No hair loss  MUSCULOSKELETAL: No joint pain or swelling; No muscle, back, or extremity pain  PSYCHIATRIC: No depression, anxiety, mood swings, or difficulty sleeping  HEME/LYMPH: No easy bruising, or bleeding gums  ALLERY AND IMMUNOLOGIC: No hives or eczema    Physical Exam:  T(C): 37.7 (08-11-19 @ 10:15), Max: 37.7 (08-11-19 @ 08:00)  HR: 91 (08-11-19 @ 13:00) (75 - 100)  BP: 134/81 (08-11-19 @ 12:00) (95/63 - 138/91)  RR: 16 (08-11-19 @ 12:00) (12 - 35)  SpO2: 98% (08-11-19 @ 13:00) (81% - 100%)  Wt(kg): --    GENERAL: NAD, well-groomed, well-developed  HEAD:  Atraumatic, Normocephalic  EYES: EOMI, conjunctiva and sclera clear  ENT: Moist mucous membranes,  NECK: Supple, No JVD, no bruits  CHEST/LUNG: Clear to percussion bilaterally; No rales, rhonchi, wheezing, or rubs  HEART: Regular rate and rhythm; No murmurs, rubs, or gallops PMI non displaced.  ABDOMEN: Soft, Nontender, Nondistended; Bowel sounds present  EXTREMITIES:  2+ Peripheral Pulses, No clubbing, cyanosis, or edema  SKIN: No rashes or lesions  NERVOUS SYSTEM:  Alert & Oriented X3, Good concentration; Motor Strength 5/5 B/L upper and lower extremities; DTRs 2+ intact and symmetric    Cardiovascular Diagnostic Testing:  ECG: < from: 12 Lead ECG (08.10.19 @ 12:23) >  Diagnosis Line Atrial fibrillation  Incomplete right bundle branch block  Nonspecific ST and T wave abnormality  Prolonged QT  Abnormal ECG  When compared with ECG of 10-JUL-2018 20:12,  No significant change was found  Confirmed by CRISTI PLUMMER, LESLIE GARSIA ([id number]) on 8/11/2019 9:52:39 AM    < end of copied text >      Labs:                        7.3    33.16 )-----------( 326      ( 11 Aug 2019 05:15 )             22.9     08-11    137  |  105  |  38<H>  ----------------------------<  125<H>  4.0   |  25  |  1.66<H>    Ca    7.8<L>      11 Aug 2019 05:15  Phos  3.9     08-11  Mg     1.8     08-11    TPro  7.5  /  Alb  2.9<L>  /  TBili  1.5<H>  /  DBili  x   /  AST  21  /  ALT  7<L>  /  AlkPhos  68  08-10    PT/INR - ( 11 Aug 2019 05:15 )   PT: 20.0 sec;   INR: 1.75 ratio         PTT - ( 10 Aug 2019 15:04 )  PTT:63.0 sec                Imaging:  cxr  < from: Xray Chest 1 View- PORTABLE-Routine (08.11.19 @ 06:05) >  IMPRESSION:  Increased interstitial lung markings without significant change.   Bilateral airspace opacities overall improving.    No pleural effusion.    Heart size cannot be accurately assessed in this projection.    < end of copied text >      ASSESMENT AND PLAN:
HPI:   Patient is a 84y female who has sob and poor po intake for about 2 weeks, no fever or cough per family, had refused to come to hospital of md office for evaluation. Yesterday she was confused , much more sob hence family brought her to hospital. Here she had fever to 100.9. She has no recent travel, sick contacts, animal exposures, hospital stays, choking on food, vomiting, bleeding. Patient cannot give us any information right now. Per son she has a rash on her legs recently that she has had before and attributed to coumadin.     REVIEW OF SYSTEMS:  All other review of systems cannot get (Comprehensive ROS)    PAST MEDICAL & SURGICAL HISTORY:  COPD (chronic obstructive pulmonary disease)  Peripheral vascular disease  Pulmonary hypertension  Rheumatoid arthritis  Osteoarthritis  Mitral regurgitation  H/O lymphoma  Hyperlipidemia  Chronic GERD  Chronic kidney disease: proteinuria  AF (atrial fibrillation)  Anemia in CKD (chronic kidney disease)  CHF (congestive heart failure)  HTN (hypertension)  History of total hip replacement, left  History of total knee replacement, bilateral      Allergies    Keflex (Unknown)  penicillin (Rash)    Intolerances        Antimicrobials Day #  :1  doxycycline IVPB 100 milliGRAM(s) IV Intermittent every 12 hours  meropenem  IVPB 500 milliGRAM(s) IV Intermittent every 12 hours    Other Medications:  ALBUTerol/ipratropium for Nebulization 3 milliLiter(s) Nebulizer every 6 hours  ATENolol  Tablet 25 milliGRAM(s) Oral daily  atorvastatin 10 milliGRAM(s) Oral at bedtime  chlorhexidine 4% Liquid 1 Application(s) Topical <User Schedule>  cloNIDine 0.1 milliGRAM(s) Oral every 8 hours  diltiazem    Tablet 30 milliGRAM(s) Oral every 6 hours  hydrALAZINE 50 milliGRAM(s) Oral <User Schedule>  hydrALAZINE 50 milliGRAM(s) Oral <User Schedule>  hydrALAZINE 25 milliGRAM(s) Oral <User Schedule>  methylPREDNISolone sodium succinate Injectable 40 milliGRAM(s) IV Push every 8 hours  montelukast 10 milliGRAM(s) Oral at bedtime      FAMILY HISTORY:  Family history unknown      SOCIAL HISTORY:  Smoking: [ ]Yes [ x]No  ETOH: [ ]Yes [x ]No  Drug Use: [ ]Yes [ x]No   [ x] Single[ ]    T(F): 99.8 (19 @ 08:00), Max: 100.9 (08-10-19 @ 11:50)  HR: 92 (19 @ 09:48)  BP: 116/88 (19 @ 09:00)  RR: 12 (19 @ 09:00)  SpO2: 99% (19 @ 09:48)  Wt(kg): --    PHYSICAL EXAM:  General: agitated in  acute distress on high flow, paradoxical breathing  Eyes:  anicteric, no conjunctival injection, no discharge  Oropharynx: no lesions or injection 	  Neck: supple, without adenopathy  Lungs: diffuse rales to auscultation  Heart: irregular rate and rhythm; no murmur, rubs or gallops  Abdomen: soft, nondistended, nontender, without mass or organomegaly  Skin: macular pertichial and purpuric rash on legs and feet.  Extremities: edema  Neurologic: awake, agitated, moves all extremities    LAB RESULTS:                        7.3    33.16 )-----------( 326      ( 11 Aug 2019 05:15 )             22.9         137  |  105  |  38<H>  ----------------------------<  125<H>  4.0   |  25  |  1.66<H>    Ca    7.8<L>      11 Aug 2019 05:15  Phos  3.9       Mg     1.8         TPro  7.5  /  Alb  2.9<L>  /  TBili  1.5<H>  /  DBili  x   /  AST  21  /  ALT  7<L>  /  AlkPhos  68  08-10    LIVER FUNCTIONS - ( 10 Aug 2019 12:00 )  Alb: 2.9 g/dL / Pro: 7.5 g/dL / ALK PHOS: 68 U/L / ALT: 7 U/L DA / AST: 21 U/L / GGT: x           Urinalysis Basic - ( 11 Aug 2019 08:30 )    Color: Yellow / Appearance: Clear / S.010 / pH: x  Gluc: x / Ketone: Negative  / Bili: Negative / Urobili: Negative   Blood: x / Protein: 100 / Nitrite: Positive   Leuk Esterase: Moderate / RBC: >50 /HPF / WBC 11-25 /HPF   Sq Epi: x / Non Sq Epi: Moderate / Bacteria: Moderate /HPF          RADIOLOGY REVIEWED:    < from: CT Chest No Cont (08.10.19 @ 13:08) >  IMPRESSION:     Diffuse bilateral peribronchial airspace consolidation may reflect   multilobar bronchopneumonia.    Small right-sided pleural effusion.    < end of copied text >  Impression:  Elderly woman with afib on coumadin admitted with 2 weeks of sob, poor po intake, found to be in acute respiratory failure, inr over 6, yasmany, diffuse infiltrates , purpuric patechial rash on legs, She could have a viral pneumonia, severe bacterial pneumonia including atypical that has not been treated for 2 weeks, alveolar hemorrhage, vasculitis or even ricketssial illness. While pneumonia is not a common typical neisseria manifestation, given the rash will do droplet precautions until have further culture data    Recommendations:  cover with meropenem and doxycycline  await rvp, legionella ag  check rmsf titers, histo ag, crypt ag  check anca , esr, oxana , rf  check echo  follow up blood culture  bronch may be helpful but cannot do now unless she is willing to be intubated
Intensivist note:  Assessment  Multifocal PNA vs Vasculitis / Pulmonary renal syndrome  as pt has high crp/esr/rash/kanika  Hypoxic respiratory failure requiring NIV/High flow o2  Coagulopathy improved s/p vit k  Anemia with downtrending h/h, stool occult negative, hemolytic workup negative   KANIKA with normal baseline  Afib on coumadin ; a/c held due to anemia  Chronic Diastolic chf with severe pulmonary htn  HTN  RA  PVD    Plan  High flow alternate with bipap PRN  Despite pt stating improvement, worsening of cxr continued luekocytois  and patient too unstable for lung biopsy and or bronch at this time, will treat with pulse dose steroids  Will get skin biopsy of leg rash d/w Dr. Chance  Rheum eval   Renal eval appreciated  heme eval appreciated  ID eval appreciated          Dr. Baig consulted me to evaluate bilateral lower extremity skin lesions.      patient in ICU on oxygen, very frail appearing      lower extremity-  reddish lesions not raised scattered around lower extremities    no evidence of infection  no evidence of compartment syndrome    labs:  Complete Blood Count in AM (08.13.19 @ 05:30)    Nucleated RBC: 0 /100 WBCs    WBC Count: 27.34 K/uL    RBC Count: 3.17 M/uL    Hemoglobin: 8.4 g/dL    Hematocrit: 26.7 %    Mean Cell Volume: 84.2 fl    Mean Cell Hemoglobin: 26.5 pg    Mean Cell Hemoglobin Conc: 31.5 gm/dL    Red Cell Distrib Width: 19.9 %    Platelet Count - Automated: 380 K/uL      Comprehensive Metabolic Panel in AM (08.13.19 @ 05:30)    Sodium, Serum: 136 mmol/L    Potassium, Serum: 4.4 mmol/L    Chloride, Serum: 103 mmol/L    Carbon Dioxide, Serum: 21 mmol/L    Anion Gap, Serum: 12 mmol/L    Blood Urea Nitrogen, Serum: 65 mg/dL    Creatinine, Serum: 1.85 mg/dL    Glucose, Serum: 265 mg/dL    Calcium, Total Serum: 8.6 mg/dL    Protein Total, Serum: 7.3 g/dL    Albumin, Serum: 2.8 g/dL    Bilirubin Total, Serum: 1.4 mg/dL    Alkaline Phosphatase, Serum: 78 U/L    Aspartate Aminotransferase (AST/SGOT): 46 U/L    Alanine Aminotransferase (ALT/SGPT): 29 U/L DA
NEPHROLOGY CONSULTATION    CHIEF COMPLAINT:  KANIKA    HPI:  Presented with several days of dyspnea, cough, weakness and found in severe respiratory failure requiring high flow oxygen and KANIKA with baseline Cr 0.50 -> 2 but this has improved in 24 hrs with a lot of volume.  She also had 1 fever spike and working dx is pneumonia.  She has history of resistant HTN on many medications as well as autoimmunity (rheumatoid arthritis).      ROS:  denies nausea, vomiting, abdominal pain, paresthesias, visual changes, hemoptysis or much sputum      PAST MEDICAL & SURGICAL HISTORY:  COPD (chronic obstructive pulmonary disease)  Peripheral vascular disease  Pulmonary hypertension  Rheumatoid arthritis  Osteoarthritis  Mitral regurgitation  H/O lymphoma  Hyperlipidemia  Chronic GERD  Chronic kidney disease: proteinuria  AF (atrial fibrillation)  Anemia in CKD (chronic kidney disease)  CHF (congestive heart failure)  HTN (hypertension)  History of total hip replacement, left  History of total knee replacement, bilateral      SOCIAL HISTORY:  NA    FAMILY HISTORY:  no renal disease      MEDICATIONS  (STANDING):  ALBUTerol/ipratropium for Nebulization 3 milliLiter(s) Nebulizer every 6 hours  ATENolol  Tablet 25 milliGRAM(s) Oral daily  atorvastatin 10 milliGRAM(s) Oral at bedtime  chlorhexidine 4% Liquid 1 Application(s) Topical <User Schedule>  cloNIDine 0.1 milliGRAM(s) Oral every 8 hours  diltiazem    Tablet 30 milliGRAM(s) Oral every 6 hours  doxycycline IVPB 100 milliGRAM(s) IV Intermittent every 12 hours  hydrALAZINE 50 milliGRAM(s) Oral <User Schedule>  hydrALAZINE 50 milliGRAM(s) Oral <User Schedule>  hydrALAZINE 25 milliGRAM(s) Oral <User Schedule>  meropenem  IVPB 500 milliGRAM(s) IV Intermittent every 12 hours  methylPREDNISolone sodium succinate Injectable 40 milliGRAM(s) IV Push every 8 hours  montelukast 10 milliGRAM(s) Oral at bedtime      PHYSICAL EXAMINATION:  T(F): 99.8 (19 @ 10:15)  HR: 91 (19 @ 13:00)  BP: 141/87 (19 @ 12:45)  RR: 23 (19 @ 12:45)  SpO2: 98% (19 @ 13:00)  Conversant, no apparent distress  PERRLA, pink conjunctivae, no ptosis  Good dentition, no pharyngeal erythema  Neck non tender, no mass, no thyromegaly or nodules  Normal respiratory effort, lungs clear to auscultation  Heart with RRR, no murmurs or rubs, no peripheral edema  Abdomen soft, no masses, no organomegaly  Skin has vasculitic appearing rash on shins and feetl  no ulcers or lesions, normal turgor and temperature  Appropriate affect, AO x 3    LABS:                        7.3    33.16 )-----------( 326      ( 11 Aug 2019 05:15 )             22.9     08-    137  |  105  |  38<H>  ----------------------------<  125<H>  4.0   |  25  |  1.66<H>    Ca    7.8<L>      11 Aug 2019 05:15  Phos  3.9       Mg     1.8         TPro  7.5  /  Alb  2.9<L>  /  TBili  1.5<H>  /  DBili  x   /  AST  21  /  ALT  7<L>  /  AlkPhos  68  08-10    Urinalysis Basic - ( 11 Aug 2019 08:30 )    Color: Yellow / Appearance: Clear / S.010 / pH: x  Gluc: x / Ketone: Negative  / Bili: Negative / Urobili: Negative   Blood: x / Protein: 100 / Nitrite: Positive   Leuk Esterase: Moderate / RBC: >50 /HPF / WBC 11-25 /HPF   Sq Epi: x / Non Sq Epi: Moderate / Bacteria: Moderate /HPF      RADIOLOGY:  Chest X-Ray personally reviewed and shows diffuse bilateral air space disease      ASSESSMENT:  1.  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
TONIA BRANHAM  84y  Female  Admitting: DINORA Baig    HPI:  84-year-old female with history of atrial fibrillation/CHF/ COPD, who presented with a two-week history of increasing weakness and decreased p.o. intake. She was found to be hypoxemic on admission and is being treated for pneumonia.  Hematology consulted for anemia.    PAST MEDICAL & SURGICAL HISTORY:  COPD (chronic obstructive pulmonary disease)  Peripheral vascular disease  Pulmonary hypertension  Rheumatoid arthritis  Osteoarthritis  Mitral regurgitation  H/O lymphoma  Hyperlipidemia  Chronic GERD  Chronic kidney disease: proteinuria  AF (atrial fibrillation)  Anemia in CKD (chronic kidney disease)  CHF (congestive heart failure)  HTN (hypertension)  History of total hip replacement, left  History of total knee replacement, bilateral    HEALTH ISSUES - PROBLEM Dx:  Coagulopathy: Coagulopathy  Acute blood loss anemia: Acute blood loss anemia  Severe sepsis: Severe sepsis  Pulmonary hypertension: Pulmonary hypertension  Acute on chronic diastolic congestive heart failure: Acute on chronic diastolic congestive heart failure  Acute respiratory failure with hypoxia: Acute respiratory failure with hypoxia  KANIKA (acute kidney injury): KANIKA (acute kidney injury)  CHF (congestive heart failure): CHF (congestive heart failure)  HTN (hypertension): HTN (hypertension)  AF (atrial fibrillation): AF (atrial fibrillation)  Anemia: Anemia  Multifocal pneumonia: Multifocal pneumonia    MEDICATIONS  (STANDING):  ALBUTerol/ipratropium for Nebulization 3 milliLiter(s) Nebulizer every 6 hours  ATENolol  Tablet 25 milliGRAM(s) Oral daily  atorvastatin 10 milliGRAM(s) Oral at bedtime  chlorhexidine 4% Liquid 1 Application(s) Topical <User Schedule>  cloNIDine 0.1 milliGRAM(s) Oral every 8 hours  diltiazem    Tablet 30 milliGRAM(s) Oral every 6 hours  doxycycline IVPB 100 milliGRAM(s) IV Intermittent every 12 hours  hydrALAZINE 50 milliGRAM(s) Oral <User Schedule>  hydrALAZINE 50 milliGRAM(s) Oral <User Schedule>  hydrALAZINE 25 milliGRAM(s) Oral <User Schedule>  meropenem  IVPB 500 milliGRAM(s) IV Intermittent every 12 hours  methylPREDNISolone sodium succinate Injectable 40 milliGRAM(s) IV Push every 8 hours  montelukast 10 milliGRAM(s) Oral at bedtime    MEDICATIONS  (PRN):  acetaminophen   Tablet .. 650 milliGRAM(s) Oral every 6 hours PRN Moderate Pain (4 - 6)  ALPRAZolam 0.5 milliGRAM(s) Oral two times a day PRN anxiety  zolpidem 5 milliGRAM(s) Oral at bedtime PRN Insomnia    Allergies    Keflex (Unknown)  penicillin (Rash)    FAMILY HISTORY:  Family history-non-contributory in first degree relatives      SOCIAL HISTORY: No EtOH, no tobacco    REVIEW OF SYSTEMS:    CONSTITUTIONAL: +weakness  EYES/ENT: No visual changes;  No vertigo or throat pain   NECK: No pain or stiffness  RESPIRATORY: +dyspnea, cough  CARDIOVASCULAR: No chest pain or palpitations  GASTROINTESTINAL: No abdominal or epigastric pain. No nausea, vomiting, or hematemesis; No melena or hematochezia.  GENITOURINARY: No dysuria, frequency or hematuria  NEUROLOGICAL: No numbness   SKIN: +rash on legs   All other review of systems is negative unless indicated above.    T(F): 98.7 (08-12-19 @ 07:28), Max: 99.8 (08-11-19 @ 08:00)  HR: 80 (08-12-19 @ 07:28)  BP: 104/66 (08-12-19 @ 07:28)  RR: 15 (08-12-19 @ 07:28)  SpO2: 94% (08-12-19 @ 07:28)    GENERAL: WDWN  HEAD:  Atraumatic, Normocephalic  EYES: EOMI, PERRLA, conjunctiva and sclera clear  NECK: Supple, No JVD  CHEST/LUNG: decreased BS, scattered crackles ant.  HEART: Regular rate and rhythm; No murmurs, rubs, or gallops  ABDOMEN: Soft, Nontender, Nondistended; Bowel sounds present  EXTREMITIES:  no calf tenderness  NEUROLOGY: awake and alert  SKIN: scattered purpuric skin lesions LE  oropharynx-clear ant.    Labs:             8.3    31.91 )-----------( 340      ( 08-12 @ 05:45 )             26.4                9.1    36.10 )-----------( 364      ( 08-11 @ 16:15 )             28.6                7.3    33.16 )-----------( 326      ( 08-11 @ 05:15 )             22.9                8.8    30.97 )-----------( 386      ( 08-10 @ 23:00 )             28.3                6.2    34.46 )-----------( 414      ( 08-10 @ 12:00 )             20.0       08-12    136  |  103  |  50<H>  ----------------------------<  209<H>  4.1   |  25  |  1.70<H>    Ca    8.4      12 Aug 2019 05:45  Phos  3.9     08-12  Mg     2.0     08-12    TPro  7.5  /  Alb  2.9<L>  /  TBili  1.5<H>  /  DBili  x   /  AST  21  /  ALT  7<L>  /  AlkPhos  68  08-10    Magnesium, Serum: 2.0 mg/dL [1.6 - 2.6] (08-12 @ 05:45)  Phosphorus Level, Serum: 3.9 mg/dL [2.5 - 4.5] (08-12 @ 05:45)  Lactate Dehydrogenase, Serum: 614 U/L [50 - 242] (08-11 @ 16:15)    PT/INR - ( 12 Aug 2019 05:45 )   PT: 17.5 sec;   INR: 1.54 ratio         PTT - ( 10 Aug 2019 15:04 )  PTT:63.0 sec  Haptoglobin, Serum: 131 mg/dL (08-11-19 @ 16:15)  Absolute Reticulocytes: 127.9 K/uL (08-11-19 @ 16:15)    Occult Blood, Feces: Negative (08-10-19 @ 12:15)      Culture - Blood (collected 10 Aug 2019 12:00)  Source: .Blood Blood-Peripheral  Preliminary Report (12 Aug 2019 02:02):    No growth to date.    Culture - Blood (collected 10 Aug 2019 12:00)  Source: .Blood Blood-Peripheral  Preliminary Report (12 Aug 2019 02:02):    No growth to date.    Radiology and additional tests:  < from: Xray Chest 1 View- PORTABLE-Routine (08.11.19 @ 06:05) >  EXAM:  XR CHEST PORTABLE ROUTINE 1V      PROCEDURE DATE:  08/11/2019        INTERPRETATION:  CLINICAL STATEMENT: Chest Pain.    TECHNIQUE: AP view of the chest.    COMPARISON: 8/10/2019    FINDINGS/  IMPRESSION:  Increased interstitial lung markings without significant change.   Bilateral airspace opacities overall improving.    No pleural effusion.    Heart size cannot be accurately assessed in this projection.      ISAIAH KNUTSON M.D., ATTENDING RADIOLOGIST  This document has beenelectronically signed. Aug 11 2019 10:05AM        < end of copied text >
History obtained from son and daughter at bedside. Pt is primarily Farsi speaking.   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 Initially required BIPAP, since transitioned to high flow nasal cannula and has not been able to be weaned off. CT chest showing diffuse opacities, possibly due to multifocal PNA. Intermittent dry cough but no sputum and no hemoptysis. Also with anemia to 6s in setting of supratherapeutic INR, now reversed and Hgb stabilized s/p pRBC x 3. Elevated Cr with + proteinuria/hematuria though appears similar to OP UA when she had a UTI in the past. Pt has chronic ESR elevation to 40s as OP, elevated to 96 here. Also with leukocytosis in 30s, prior baseline was normal. Since admission, pt has also had nontender purpuric rash on b/l LE from knee down to dorsum of feet, sparing soles. No other areas of rash, prior rash on back resolved. Since admission, pt has been on Abx and steroids, now started on pulse solumedrol on 8/12/19.   Family reports hx of long standing RA, dx approx. 20 years ago. Unclear what meds she was on, but reports she took one medication for a month, developed a lump on her neck so stopped taking. Has not been following with a rheumatologist. Has chronic contractures in b/l hands, R> L. Also with OA in knees, s/p TKR b/l. Per family, pt without significant AM joint stiffness, swollen joints, or extraarticular manifestations of eye inflammation, lung involvement, SQ nodules. From review of chart, pt had + IVAN and dsDNA last year but without SLE symptoms at the time. On SLE ROS today, no hx of alopecia, sicca, oral ulcers, pleuritis/pericarditis, malar/photosensitive rash. + ?Raynauds and CKD of unclear etiology, never had renal bx. No muscle weakness, no paresthesias. No blood clots in past.    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 L hand with 3rd digit flexor tendon thickening and contracture, otherwise without synovitis or deformity. Remainder of joints without active synovitis, contracture, erythema, chronic inflammatory deformity. No SQ nodules appreciated. + well healed surgical scars over b/l knees.   Skin – no rash appreciated on back, + resolving purpuric rash on anterior shins b/l, around heels and scattered lesions over dorsum of b/l feet but spares soles – rash is nontender and non-blanching. No rash on abd, chest, arms, face.   Neuro – no focal deficits, strength exam limited by pt’s mobility in hosp bed but appears 5/5 in all muscle groups tested – distal UE, hand , plantar/dorsiflexion, thighs, distal LE  Labs on admission: + leukocytosis to low 30s (compared to 13.1 in 5/2019), microcytic anemia of 6.2 (compared to 10.8 in 5/2019), INR 6.18, Cr 2.03  Today: mild improvement in WBC, remains at 27. Hgb stabilized in 8s s/p pRBC x 3, INR 1.54 yesterday, Cr 1.85 s/p hydration   C3/C4 low, ESR 96 (compared to 48 in 7/2018), CRP 18  UA - + protein, RBCs, WBCs in setting of ? UTI given luekocytesterase, nitrites, bacteria – amount of protein stable compared to UA in May 2019  Strep negative, Influenza negative, Bcx NGTD, HIV negative  Pending: Ferritin, SPEP, IVAN, ANCA, GBM  OP labs from 7/2018: IVAN 1:640 (homogenous), dsDNA 503, + low titer ACL IgM, + high titer B2 IgA. Negative – sjogrens, Sm/RNP, scleroderma  Imaging:  CT head – no acute changes   CT chest 8/10 - Diffuse bilateral peribronchial airspace consolidation may reflect   multilobar bronchopneumonia. Small right-sided pleural effusion. Compression fx T8, T12    TTE – severe pulm HTN  Renal U/S – no hydronephrosis

## 2019-08-14 NOTE — CONSULT NOTE ADULT - SUBJECTIVE AND OBJECTIVE BOX
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 Associated symptoms include generalized malaise, nausea, poor PO with associated weight loss of 10lb over the last two months.     In the ED at OSH, patient was hypoxic, put on BIPAP and admitted to the MICU on 8/10. CT chest showing diffuse opacities, possibly due to multifocal PNA. Started on Doxy and [place]. There, also noted to have Hg of 6 s/p 3 units prbc as well as supratherapeutic INR 6.28 s/p 1uFFP & vitamin K, now reversed. Also found to have KANIKA (baseline 0.5).    Of note, patient had h/o elevated IVAN and dsDNA without SLE symptoms last year. Given concern for possible vasculitis with KANIKA, rash, elevated ESR/CRP, started on Solumedrol 500mg q12, started on 8/12. Patient then became hyperglycemia, and insulin ggt.     Patient was transferred on high flow to [facility] for rheumatologic and derm workup.     Patient was continued on Solumedrol and Doxycycline with quick improvement of her oxygenation, currently on 2 L nasal cannula during interview. Notable labs include Blood cx NGTD, HIV, legionella -, crypto ag -, P-ANCA positive1:1280, IVAN positive 1:640, C3 76, C4 10, elevated CRP and ESR. RMSF IgM positive.     PAST MEDICAL & SURGICAL HISTORY:  COPD (chronic obstructive pulmonary disease)  Peripheral vascular disease  Pulmonary hypertension  Rheumatoid arthritis  Osteoarthritis  Mitral regurgitation  H/O lymphoma  Hyperlipidemia  Chronic GERD  Chronic kidney disease: proteinuria  AF (atrial fibrillation)  Anemia in CKD (chronic kidney disease)  CHF (congestive heart failure)  HTN (hypertension)  History of total hip replacement, left  History of total knee replacement, bilateral      FAMILY HISTORY:  Family history of inclusion body myositis      SOCIAL HISTORY:  Smoking: [X] Never Smoked [ ] Former Smoker (__ packs x ___ years) [ ] Current Smoker  (__ packs x ___ years)  Substance Use: [X] Never Used [ ] Used ____  EtOH Use: None  Marital Status: [ ] Single [ ]  [ ]  [ ]   Sexual History:   Occupation:  Recent Travel:  Country of Birth:  Advance Directives:    Allergies    Keflex (Unknown)  penicillin (Rash)    Intolerances        HOME MEDICATIONS:  Home Medications:  atenolol 25 mg oral tablet: 1 tab(s) orally once a day (13 Aug 2019 19:10)  atorvastatin 10 mg oral tablet: 1 tab(s) orally once a day (13 Aug 2019 19:10)  cloNIDine 0.1 mg oral tablet: orally every 8 hours (13 Aug 2019 19:10)  fluticasone propionate 55 mcg/inh inhalation powder: 1 puff(s) inhaled every 12 hours, As Needed (13 Aug 2019 19:10)  furosemide 20 mg oral tablet: 1 tab mon, wed and friday (13 Aug 2019 19:10)  hydrALAZINE 25 mg oral tablet: 2 tabs in morning, 2 tabs in lunch, and 1 tab at night (13 Aug 2019 19:10)  hydroCHLOROthiazide 25 mg oral tablet: 1 tab(s) orally once a day (13 Aug 2019 19:10)  ipratropium-albuterol 0.5 mg-2.5 mg/3 mLinhalation solution: 3 milliliter(s) inhaled 4 times a day, As Needed (13 Aug 2019 19:10)  montelukast 10 mg oral tablet: 1 tab(s) orally once a day (13 Aug 2019 19:10)  pantoprazole 40 mg oral delayed release tablet: 1 tab(s) orally every 12 hours (13 Aug 2019 19:10)  sertraline 50 mg oral tablet: 1 tab(s) orally once a day (13 Aug 2019 19:10)  spironolactone 25 mg oral tablet: 1 tab(s) orally Tuesday, Thursday and Saturday (13 Aug 2019 19:10)  valsartan 320 mg oral tablet: 1 tab(s) orally once a day (13 Aug 2019 19:10)  warfarin 5 mg oral tablet: 1 tab(s) orally once a day (13 Aug 2019 19:10)  zolpidem 5 mg oral tablet: 1 tab(s) orally once a day (at bedtime), As Needed (13 Aug 2019 19:10)      REVIEW OF SYSTEMS:  Constitutional: [ ] negative [X] fevers [ ] chills [X] weight loss [ ] weight gain  HEENT: [ ] negative [ ] dry eyes [ ] eye irritation [ ] postnasal drip [ ] nasal congestion  CV: [ ] negative  [ ] chest pain [ ] orthopnea [ ] palpitations [ ] murmur  Resp: [ ] negative [ ] cough [X] shortness of breath [X] dyspnea [ ] wheezing [ ] sputum [ ] hemoptysis  GI: [ ] negative [X] nausea [ ] vomiting [ ] diarrhea [ ] constipation [ ] abd pain [ ] dysphagia   : [ ] negative [ ] dysuria [ ] nocturia [ ] hematuria [ ] increased urinary frequency  Musculoskeletal: [ ] negative [ ] back pain [ ] myalgias [ ] arthralgias [ ] fracture  Skin: [ ] negative [X] rash [ ] itch  Neurological: [ ] negative [ ] headache [ ] dizziness [ ] syncope [ ] weakness [ ] numbness  Psychiatric: [ ] negative [ ] anxiety [ ] depression  Endocrine: [ ] negative [ ] diabetes [ ] thyroid problem  Hematologic/Lymphatic: [ ] negative [ ] anemia [ ] bleeding problem  Allergic/Immunologic: [ ] negative [ ] itchy eyes [ ] nasal discharge [ ] hives [ ] angioedema  [X] All other systems negative  [ ] Unable to assess ROS because ________    OBJECTIVE:  ICU Vital Signs Last 24 Hrs  T(C): 36.2 (14 Aug 2019 12:00), Max: 37.1 (13 Aug 2019 17:29)  T(F): 97.2 (14 Aug 2019 12:00), Max: 98.8 (13 Aug 2019 17:29)  HR: 94 (14 Aug 2019 15:00) (78 - 107)  BP: 151/103 (14 Aug 2019 15:00) (131/99 - 172/102)  BP(mean): 118 (14 Aug 2019 15:00) (100 - 144)  ABP: --  ABP(mean): --  RR: 17 (14 Aug 2019 15:00) (14 - 28)  SpO2: 98% (14 Aug 2019 15:00) (90% - 100%)        08-13 @ 07:01  -  08-14 @ 07:00  --------------------------------------------------------  IN: 100 mL / OUT: 600 mL / NET: -500 mL    08-14 @ 07:01 - 08-14 @ 16:09  --------------------------------------------------------  IN: 200 mL / OUT: 675 mL / NET: -475 mL      CAPILLARY BLOOD GLUCOSE      POCT Blood Glucose.: 234 mg/dL (14 Aug 2019 11:05)      PHYSICAL EXAM:  General: Resting Comfortably, NAD  HEENT: EOMI, PERRLA  Respiratory: Diffuse crackles  Cardiovascular: S1S2, RRR, no murmurs  Abdomen: Soft, NTND, BS+  Extremities: No peripheral edema  Skin: Scattered non blanching macular rash on anterior shins  Neurological: No focal deficits    LINES:     HOSPITAL MEDICATIONS:  Standing Meds:  atorvastatin 10 milliGRAM(s) Oral at bedtime  chlorhexidine 4% Liquid 1 Application(s) Topical <User Schedule>  cloNIDine 0.2 milliGRAM(s) Oral three times a day  dextrose 5%. 1000 milliLiter(s) IV Continuous <Continuous>  dextrose 50% Injectable 12.5 Gram(s) IV Push once  dextrose 50% Injectable 25 Gram(s) IV Push once  dextrose 50% Injectable 25 Gram(s) IV Push once  doxycycline IVPB      doxycycline IVPB 100 milliGRAM(s) IV Intermittent every 12 hours  insulin lispro (HumaLOG) corrective regimen sliding scale   SubCutaneous three times a day before meals  metoprolol tartrate 50 milliGRAM(s) Oral two times a day  montelukast 10 milliGRAM(s) Oral daily  sertraline 50 milliGRAM(s) Oral daily      PRN Meds:  ALBUTerol    90 MICROgram(s) HFA Inhaler 2 Puff(s) Inhalation every 6 hours PRN  dextrose 40% Gel 15 Gram(s) Oral once PRN  glucagon  Injectable 1 milliGRAM(s) IntraMuscular once PRN  zolpidem 5 milliGRAM(s) Oral at bedtime PRN      LABS:                        8.9    20.92 )-----------( 399      ( 14 Aug 2019 04:00 )             29.7     Hgb Trend: 8.9<--, 8.7<--, 8.4<--, 8.3<--, 9.1<--  08-14    137  |  100  |  61<H>  ----------------------------<  168<H>  4.0   |  24  |  1.42<H>    Ca    8.8      14 Aug 2019 04:00  Phos  4.3     08-14  Mg     2.1     08-14    TPro  7.0  /  Alb  3.3  /  TBili  1.2  /  DBili  x   /  AST  34<H>  /  ALT  34<H>  /  AlkPhos  75  08-14    Creatinine Trend: 1.42<--, 1.60<--, 1.85<--, 1.70<--, 1.66<--, 1.72<--  PT/INR - ( 13 Aug 2019 18:30 )   PT: 25.4 SEC;   INR: 2.18          PTT - ( 13 Aug 2019 18:30 )  PTT:42.2 SEC      Venous Blood Gas:  08-13 @ 18:30  7.38/44/40/25/73.1  VBG Lactate: --      MICROBIOLOGY:       RADIOLOGY:  [ ] Reviewed and interpreted by me    PULMONARY FUNCTION TESTS:    EKG:

## 2019-08-14 NOTE — CONSULT NOTE ADULT - SUBJECTIVE AND OBJECTIVE BOX
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 Derm c/s for rash. Noticed over LE x 2 days. Started on 500mg BID IV solumedrol by Rheum at OSH. Chest CT  reported as consistent with multifocal PNA, requiring BiPAP. Started on doxy + julissa. UA w/ evidence of UTI.     Patient reports having more joint pain than usual the past week. Had a fever few days ago. Rash Has been resolving on the lower extremities the last few days.     PAST MEDICAL & SURGICAL HISTORY:  COPD (chronic obstructive pulmonary disease)  Peripheral vascular disease  Pulmonary hypertension  Rheumatoid arthritis  Osteoarthritis  Mitral regurgitation  H/O lymphoma  Hyperlipidemia  Chronic GERD  Chronic kidney disease: proteinuria  AF (atrial fibrillation)  Anemia in CKD (chronic kidney disease)  CHF (congestive heart failure)  HTN (hypertension)  History of total hip replacement, left  History of total knee replacement, bilateral      REVIEW OF SYSTEMS    General: see HPI    Skin/Breast: see HPI  	  Ophthalmologic: no eye pain or change in vision  	  ENMT: no dysphagia or change in hearing    Respiratory and Thorax: +SOB  	  Cardiovascular: no palpitations or chest pain    Gastrointestinal: no abdominal pain or blood in stool     Genitourinary: + blood     Musculoskeletal: + joint pains    Neurological: no weakness, numbness , or tingling    MEDICATIONS  (STANDING):  atorvastatin 10 milliGRAM(s) Oral at bedtime  chlorhexidine 4% Liquid 1 Application(s) Topical <User Schedule>  cloNIDine 0.2 milliGRAM(s) Oral three times a day  dextrose 5%. 1000 milliLiter(s) (50 mL/Hr) IV Continuous <Continuous>  dextrose 50% Injectable 12.5 Gram(s) IV Push once  dextrose 50% Injectable 25 Gram(s) IV Push once  dextrose 50% Injectable 25 Gram(s) IV Push once  doxycycline IVPB      doxycycline IVPB 100 milliGRAM(s) IV Intermittent every 12 hours  insulin lispro (HumaLOG) corrective regimen sliding scale   SubCutaneous three times a day before meals  metoprolol tartrate 50 milliGRAM(s) Oral two times a day  montelukast 10 milliGRAM(s) Oral daily  sertraline 50 milliGRAM(s) Oral daily    MEDICATIONS  (PRN):  ALBUTerol    90 MICROgram(s) HFA Inhaler 2 Puff(s) Inhalation every 6 hours PRN Shortness of Breath and/or Wheezing  dextrose 40% Gel 15 Gram(s) Oral once PRN Blood Glucose LESS THAN 70 milliGRAM(s)/deciliter  glucagon  Injectable 1 milliGRAM(s) IntraMuscular once PRN Glucose LESS THAN 70 milligrams/deciliter  zolpidem 5 milliGRAM(s) Oral at bedtime PRN Insomnia      Allergies    Keflex (Unknown)  penicillin (Rash)    Intolerances        SOCIAL HISTORY:    FAMILY HISTORY:  Family history of inclusion body myositis      Vital Signs Last 24 Hrs  T(C): 36.2 (14 Aug 2019 12:00), Max: 37.1 (13 Aug 2019 17:29)  T(F): 97.2 (14 Aug 2019 12:00), Max: 98.8 (13 Aug 2019 17:29)  HR: 94 (14 Aug 2019 15:00) (78 - 107)  BP: 151/103 (14 Aug 2019 15:00) (131/99 - 172/102)  BP(mean): 118 (14 Aug 2019 15:00) (100 - 144)  RR: 17 (14 Aug 2019 15:00) (14 - 28)  SpO2: 98% (14 Aug 2019 15:00) (90% - 100%)    PHYSICAL EXAM:     The patient was alert and oriented X 3, well nourished, and in no  apparent distress.  OP showed no ulcerations  There was no visible lymphadenopathy.  Conjunctiva were non injected  There was no clubbing or edema of extremities.  The scalp, hair, face, eyebrows, lips, OP, neck, chest, back,   extremities X 4, nails were examined.  There was no hyperhidrosis or bromhidrosis.    Of note on skin exam:     non palpable purpuric macules on the lower extremities      LABS:                        8.9    20.92 )-----------( 399      ( 14 Aug 2019 04:00 )             29.7     08-14    137  |  100  |  61<H>  ----------------------------<  168<H>  4.0   |  24  |  1.42<H>    Ca    8.8      14 Aug 2019 04:00  Phos  4.3     08-14  Mg     2.1     08-14    TPro  7.0  /  Alb  3.3  /  TBili  1.2  /  DBili  x   /  AST  34<H>  /  ALT  34<H>  /  AlkPhos  75  08-14    PT/INR - ( 13 Aug 2019 18:30 )   PT: 25.4 SEC;   INR: 2.18          PTT - ( 13 Aug 2019 18:30 )  PTT:42.2 SEC      RADIOLOGY & ADDITIONAL STUDIES: HPI:    85 y/o F h/o CHF, pHTN, Afib, COPD, 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 Derm c/s for rash. Noticed over LE x 2 days. Started on 500mg BID IV solumedrol by Rheum at OSH. Chest CT  reported as consistent with multifocal PNA, requiring BiPAP. Started on doxy + julissa. UA w/ evidence of UTI.     Patient reports having more joint pain than usual the past week. Had a fever few days ago. Rash Has been resolving on the lower extremities the last few days.     PAST MEDICAL & SURGICAL HISTORY:  COPD (chronic obstructive pulmonary disease)  Peripheral vascular disease  Pulmonary hypertension  Rheumatoid arthritis  Osteoarthritis  Mitral regurgitation  H/O lymphoma  Hyperlipidemia  Chronic GERD  Chronic kidney disease: proteinuria  AF (atrial fibrillation)  Anemia in CKD (chronic kidney disease)  CHF (congestive heart failure)  HTN (hypertension)  History of total hip replacement, left  History of total knee replacement, bilateral      REVIEW OF SYSTEMS    General: see HPI    Skin/Breast: see HPI  	  Ophthalmologic: no eye pain or change in vision  	  ENMT: no dysphagia or change in hearing    Respiratory and Thorax: +SOB  	  Cardiovascular: no palpitations or chest pain    Gastrointestinal: no abdominal pain or blood in stool     Genitourinary: + blood     Musculoskeletal: + joint pains    Neurological: no weakness, numbness , or tingling    MEDICATIONS  (STANDING):  atorvastatin 10 milliGRAM(s) Oral at bedtime  chlorhexidine 4% Liquid 1 Application(s) Topical <User Schedule>  cloNIDine 0.2 milliGRAM(s) Oral three times a day  dextrose 5%. 1000 milliLiter(s) (50 mL/Hr) IV Continuous <Continuous>  dextrose 50% Injectable 12.5 Gram(s) IV Push once  dextrose 50% Injectable 25 Gram(s) IV Push once  dextrose 50% Injectable 25 Gram(s) IV Push once  doxycycline IVPB      doxycycline IVPB 100 milliGRAM(s) IV Intermittent every 12 hours  insulin lispro (HumaLOG) corrective regimen sliding scale   SubCutaneous three times a day before meals  metoprolol tartrate 50 milliGRAM(s) Oral two times a day  montelukast 10 milliGRAM(s) Oral daily  sertraline 50 milliGRAM(s) Oral daily    MEDICATIONS  (PRN):  ALBUTerol    90 MICROgram(s) HFA Inhaler 2 Puff(s) Inhalation every 6 hours PRN Shortness of Breath and/or Wheezing  dextrose 40% Gel 15 Gram(s) Oral once PRN Blood Glucose LESS THAN 70 milliGRAM(s)/deciliter  glucagon  Injectable 1 milliGRAM(s) IntraMuscular once PRN Glucose LESS THAN 70 milligrams/deciliter  zolpidem 5 milliGRAM(s) Oral at bedtime PRN Insomnia      Allergies    Keflex (Unknown)  penicillin (Rash)    Intolerances        SOCIAL HISTORY:    FAMILY HISTORY:  Family history of inclusion body myositis      Vital Signs Last 24 Hrs  T(C): 36.2 (14 Aug 2019 12:00), Max: 37.1 (13 Aug 2019 17:29)  T(F): 97.2 (14 Aug 2019 12:00), Max: 98.8 (13 Aug 2019 17:29)  HR: 94 (14 Aug 2019 15:00) (78 - 107)  BP: 151/103 (14 Aug 2019 15:00) (131/99 - 172/102)  BP(mean): 118 (14 Aug 2019 15:00) (100 - 144)  RR: 17 (14 Aug 2019 15:00) (14 - 28)  SpO2: 98% (14 Aug 2019 15:00) (90% - 100%)    PHYSICAL EXAM:     The patient was alert and oriented X 3, well nourished, and in no  apparent distress.  OP showed no ulcerations  There was no visible lymphadenopathy.  Conjunctiva were non injected  There was no clubbing or edema of extremities.  The scalp, hair, face, eyebrows, lips, OP, neck, chest, back,   extremities X 4, nails were examined.  There was no hyperhidrosis or bromhidrosis.    Of note on skin exam:     resolving non-palpable scattered purpuric macules on the lower extremities      LABS:                        8.9    20.92 )-----------( 399      ( 14 Aug 2019 04:00 )             29.7     08-14    137  |  100  |  61<H>  ----------------------------<  168<H>  4.0   |  24  |  1.42<H>    Ca    8.8      14 Aug 2019 04:00  Phos  4.3     08-14  Mg     2.1     08-14    TPro  7.0  /  Alb  3.3  /  TBili  1.2  /  DBili  x   /  AST  34<H>  /  ALT  34<H>  /  AlkPhos  75  08-14    PT/INR - ( 13 Aug 2019 18:30 )   PT: 25.4 SEC;   INR: 2.18          PTT - ( 13 Aug 2019 18:30 )  PTT:42.2 SEC      RADIOLOGY & ADDITIONAL STUDIES: HPI:    83 y/o F h/o CHF, pHTN, Afib, COPD, 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 Derm c/s for rash. Noticed over LE x 2 days. 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 Of note, labs with IVAN 1:640, p-ANCA >1:1280, C3/C4 mildly decreased. CRP elevated to 18. UA with 100 protein, +nitrites, +LE, large blood, >50 RBCs, mod bacteria, 11-25 WBCs. Cr 1.42. RMSF IgM +, started on doxycycline.     Patient reports having more joint pain than usual the past week. Had a fever few days ago. Rash Has been resolving on the lower extremities the last few days. No hematuria, hematemesis, epistaxis.     PAST MEDICAL & SURGICAL HISTORY:  COPD (chronic obstructive pulmonary disease)  Peripheral vascular disease  Pulmonary hypertension  Rheumatoid arthritis  Osteoarthritis  Mitral regurgitation  H/O lymphoma  Hyperlipidemia  Chronic GERD  Chronic kidney disease: proteinuria  AF (atrial fibrillation)  Anemia in CKD (chronic kidney disease)  CHF (congestive heart failure)  HTN (hypertension)  History of total hip replacement, left  History of total knee replacement, bilateral      REVIEW OF SYSTEMS    General: see HPI    Skin/Breast: see HPI  	  Ophthalmologic: no eye pain or change in vision  	  ENMT: no dysphagia or change in hearing    Respiratory and Thorax: +SOB  	  Cardiovascular: no palpitations or chest pain    Gastrointestinal: no abdominal pain or blood in stool     Genitourinary: + blood     Musculoskeletal: + joint pains    Neurological: no weakness, numbness , or tingling    MEDICATIONS  (STANDING):  atorvastatin 10 milliGRAM(s) Oral at bedtime  chlorhexidine 4% Liquid 1 Application(s) Topical <User Schedule>  cloNIDine 0.2 milliGRAM(s) Oral three times a day  dextrose 5%. 1000 milliLiter(s) (50 mL/Hr) IV Continuous <Continuous>  dextrose 50% Injectable 12.5 Gram(s) IV Push once  dextrose 50% Injectable 25 Gram(s) IV Push once  dextrose 50% Injectable 25 Gram(s) IV Push once  doxycycline IVPB      doxycycline IVPB 100 milliGRAM(s) IV Intermittent every 12 hours  insulin lispro (HumaLOG) corrective regimen sliding scale   SubCutaneous three times a day before meals  metoprolol tartrate 50 milliGRAM(s) Oral two times a day  montelukast 10 milliGRAM(s) Oral daily  sertraline 50 milliGRAM(s) Oral daily    MEDICATIONS  (PRN):  ALBUTerol    90 MICROgram(s) HFA Inhaler 2 Puff(s) Inhalation every 6 hours PRN Shortness of Breath and/or Wheezing  dextrose 40% Gel 15 Gram(s) Oral once PRN Blood Glucose LESS THAN 70 milliGRAM(s)/deciliter  glucagon  Injectable 1 milliGRAM(s) IntraMuscular once PRN Glucose LESS THAN 70 milligrams/deciliter  zolpidem 5 milliGRAM(s) Oral at bedtime PRN Insomnia      Allergies    Keflex (Unknown)  penicillin (Rash)    Intolerances        SOCIAL HISTORY:    FAMILY HISTORY:  Family history of inclusion body myositis      Vital Signs Last 24 Hrs  T(C): 36.2 (14 Aug 2019 12:00), Max: 37.1 (13 Aug 2019 17:29)  T(F): 97.2 (14 Aug 2019 12:00), Max: 98.8 (13 Aug 2019 17:29)  HR: 94 (14 Aug 2019 15:00) (78 - 107)  BP: 151/103 (14 Aug 2019 15:00) (131/99 - 172/102)  BP(mean): 118 (14 Aug 2019 15:00) (100 - 144)  RR: 17 (14 Aug 2019 15:00) (14 - 28)  SpO2: 98% (14 Aug 2019 15:00) (90% - 100%)    PHYSICAL EXAM:     The patient was alert and oriented X 3, well nourished, and in no  apparent distress.  OP showed no ulcerations  There was no visible lymphadenopathy.  Conjunctiva were non injected  There was no clubbing or edema of extremities.  The scalp, hair, face, eyebrows, lips, OP, neck, chest, back,   extremities X 4, nails were examined.  There was no hyperhidrosis or bromhidrosis.    Of note on skin exam:     resolving non-palpable scattered purpuric macules on the lower extremities      LABS:                        8.9    20.92 )-----------( 399      ( 14 Aug 2019 04:00 )             29.7     08-14    137  |  100  |  61<H>  ----------------------------<  168<H>  4.0   |  24  |  1.42<H>    Ca    8.8      14 Aug 2019 04:00  Phos  4.3     08-14  Mg     2.1     08-14    TPro  7.0  /  Alb  3.3  /  TBili  1.2  /  DBili  x   /  AST  34<H>  /  ALT  34<H>  /  AlkPhos  75  08-14    PT/INR - ( 13 Aug 2019 18:30 )   PT: 25.4 SEC;   INR: 2.18          PTT - ( 13 Aug 2019 18:30 )  PTT:42.2 SEC      RADIOLOGY & ADDITIONAL STUDIES:

## 2019-08-14 NOTE — CONSULT NOTE ADULT - ASSESSMENT
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 ID consulted d/t positive RMSF serologies    RMSF IgM positive  -given lack of travel to endemic area, tick exposure, lack of typical symptoms or typical rash pattern, this is likely not acute RMSF. Serology could be false positive secondary to autoimmune disease. However given severity of illness caused by RMSF will treat.  -c/w doxycycline 100mg BID for 7 day course, has been recieving doxy since 8/11 ( for pneumonia at the time), can treat 4 more days.  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

## 2019-08-14 NOTE — CONSULT NOTE ADULT - ASSESSMENT
Small Vessel Vasculitis   -Skin exam findings are consistent with a small vessel vasculitis, this does not exclude a medium vessel vasculitis, but we do not see any manifestations of a medium vessel vasculitis on the skin  -will not perform skin biopsy at this time as the purpuric macules are resolving and biopsy would be low yield   -lower extremity lesions not typical of [place] spotted fever   -management per rheumatology for evaluation of systemic vasculitis     Patient can follow-up at our outpatient office as needed:  [address], [place] [address], phone number for appointment: [phone]    If any questions, please page [phone] Small Vessel Vasculitis   -Skin exam findings are consistent with a small vessel vasculitis, this does not exclude a medium vessel vasculitis, but we do not see any manifestations of a medium vessel vasculitis on the skin  -C3,C4 decreased, P ANCA +, Anti Nuclear factor +, increased Cr, blood in urine in setting of UTI  -will not perform skin biopsy at this time as the purpuric macules are resolving and biopsy would be low yield   -lower extremity lesions not typical of [place] spotted fever   -management per rheumatology for evaluation of systemic vasculitis     Patient can follow-up at our outpatient office as needed:  [address], [place] [address], phone number for appointment: [phone]    If any questions, please page [phone] # Resolving purpura  Skin exam findings and older photos are consistent with a cutaneous small vessel vasculitis (CSSV). This dose not exclude a medium vessel vasculitis such as GPA or MPA, but we currently do not see any manifestations of a medium vessel vasculitis on the skin  - given lesions now resolving on high dose solumedrol, yield from skin bx expected to be low  - unclear significance of RMSF titers given no particular exposures and distal lower extr lesions not typical of RMSF, however agree with course of doxycycline   - management per other specialty services and primary team for evaluation of systemic vasculitis     Patient can follow-up at our outpatient office as needed:  [address], [place] [address], phone number for appointment: [phone]    If any questions, please page [phone] # Resolving purpura  Skin exam findings and older photos are consistent with a cutaneous small vessel vasculitis (CSSV).   This dose not exclude a medium vessel vasculitis such as GPA or MPA, but we currently do not see any manifestations of a medium vessel vasculitis on the skin    Unclear significance of RMSF titers given no particular exposures and distal lower extr lesions not typical of RMSF, however agree with course of doxycycline       - given lesions now resolving on high dose solumedrol, yield from skin bx expected to be low  - management per other specialty services and primary team for evaluation of systemic vasculitis     Patient can follow-up at our outpatient office as needed:  [address], [place] [address], phone number for appointment: [phone]    If any questions, please page [phone]

## 2019-08-14 NOTE — CONSULT NOTE ADULT - ASSESSMENT
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 from presents w hypoxic respiratory failure w persistent consolidations on lung imaging, normocytic anemia, KANIKA, lower extremity purpuric rash  In the setting of positive p-ANCA, IVAN, hypocomplementemia and past positivity for DsDNA, and b2gp IgA, cardiolipin IgM  s/p 1 g of solumedrol 8/12, and 625 mg of solumedrol 8/13; w improvement of respiratory status, rash  ddx: drug induced lupus/vasculitis vs paraneoplastic syndrome vs APLS vs AAV vs lupus vs cryoglobulinemia   drug induced lupus and/or vasculitis should be considered given hx of years of hydralazine use which can lead to severe presentation of pulm-renal syndrome as above  paraneoplastic syndrome should also be considered, given hx of marginal B cell lymphoma found on bone marrow and possible MGUS, which can present as vasculitis mimic.   Pt positive for APLS serologies in past, which can lead to pulm hemorrhage TMA in kidneys, but skin lesions typically more distal and not purpuric in nature  SLE or AAV or cryoglobulinemia should also be considered, but unusual for these conditions to cause multiple ab positivity; not unusual in drug induced vasculitis/lupus or paraneoplastic syndrome    Recommend  Would give another dose of solumedrol 1 g today, and transition to solumedrol 1 mg/kg  Can f/u w PR3, MPO from [place], Would obtain Quant Gold or PPD, Hep B, C, DsDNA, RNP, smith, cryoglobulins, b2gp, anticardiolipin, antihistone, LA, scl-70, centromere, SPEP, UPEP, immunofixation, urine protein/cr ratio, CD 19  Would consult heme given hx of B cell lymphoma for input on possibility of etiology of presentation  Would proceed w bronch vs wedge bx per pulm; if found to have pulm hemorrhage, pt likely to need pharesis  Will follow    Gustavo Iniguez MD PGY5  [id number] 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 from presents w hypoxic respiratory failure w persistent consolidations on lung imaging, normocytic anemia, KANIKA, lower extremity purpuric rash  In the setting of positive p-ANCA, IVAN, hypocomplementemia and past positivity for DsDNA, and b2gp IgA, cardiolipin IgM  s/p 1 g of solumedrol 8/12, and 625 mg of solumedrol 8/13; w improvement of respiratory status, rash  ddx: drug induced lupus/vasculitis vs paraneoplastic syndrome vs APLS vs AAV vs lupus vs cryoglobulinemia   drug induced lupus and/or vasculitis should be considered given hx of years of hydralazine use which can lead to severe presentation of pulm-renal syndrome as above  paraneoplastic syndrome should also be considered, given hx of marginal B cell lymphoma found on bone marrow and possible MGUS, which can present as vasculitis mimic.   Pt positive for APLS serologies in past, which can lead to pulm hemorrhage TMA in kidneys, but skin lesions typically more distal and not purpuric in nature  SLE or AAV or cryoglobulinemia should also be considered, but unusual for these conditions to cause multiple ab positivity; not unusual in drug induced vasculitis/lupus or paraneoplastic syndrome    Recommend  Would give another dose of solumedrol 1 g today, and transition to solumedrol 1 mg/kg  Can f/u w PR3, MPO from [place], Would obtain Quant Gold or PPD, Hep B, C, DsDNA, RNP, smith, cryoglobulins, b2gp, anticardiolipin, antihistone, LA, scl-70, centromere, SPEP, UPEP, immunofixation, anti GBM, urine protein/cr ratio, CD 19  Would consult heme given hx of B cell lymphoma for input on possibility of etiology of presentation  Would proceed w bronch vs wedge bx per pulm; if found to have pulm hemorrhage, pt likely to need pharesis  Will follow    Gustavo Iniguez MD PGY5  [id number] 85 yo F with PMH of diastolic CHF, mod pulm HTN, A fib on coumadin, HLD,  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 from presents w hypoxic respiratory failure w persistent consolidations on lung imaging, normocytic anemia, KANIKA, lower extremity purpuric rash  In the setting of positive p-ANCA, IVAN, hypocomplementemia and past positivity for DsDNA, and b2gp IgA, cardiolipin IgM  s/p 1 g of solumedrol 8/12, and 625 mg of solumedrol 8/13; w improvement of respiratory status, rash  ddx: drug induced lupus/vasculitis vs paraneoplastic syndrome vs APLS vs AAV vs lupus vs cryoglobulinemia   drug induced lupus and/or vasculitis should be considered given hx of years of hydralazine use which can lead to severe presentation of pulm-renal syndrome as above  paraneoplastic syndrome should also be considered, given hx of marginal B cell lymphoma found on bone marrow and possible MGUS, which can present as vasculitis mimic.   Pt positive for APLS serologies in past, which can lead to pulm hemorrhage TMA in kidneys, but skin lesions typically more distal and not purpuric in nature  SLE or AAV or cryoglobulinemia should also be considered, but unusual for these conditions to cause multiple ab positivity; not unusual in drug induced vasculitis/lupus or paraneoplastic syndrome    Recommend  Would give another dose of solumedrol 1 g today, and transition to solumedrol 1 mg/kg  Can f/u w PR3, MPO from [place], Would obtain Quant Gold or PPD, Hep B, C, DsDNA, RNP, smith, cryoglobulins, b2gp, anticardiolipin, antihistone, LA, scl-70, centromere, SPEP, UPEP, immunofixation, anti GBM, SSA, SSB, urine protein/cr ratio, CD 19  Would consult heme given hx of B cell lymphoma for input on possibility of etiology of presentation  Would proceed w bronch vs wedge bx per pulm; if found to have pulm hemorrhage, pt likely to need pharesis  Will follow    Gustavo Iniguez MD PGY5  [id number]

## 2019-08-14 NOTE — PROGRESS NOTE ADULT - ASSESSMENT
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 Admitted for management of multifocal PNA vs. vasculitis vs. possible diffuse alveolar hemorrhage.     #Neuro:  AAOx4, no active issues    #CV:  Diastolic heart failure  - TTE 8/11 with mod to severe MR, and mild diastolic dysfunction. EF 61%. pulm HTN up [id number]  - is not in active HF  - c/w home HF meds - see below    Afib  - on home warfarin but presented with elevated INR now reversed  - hold warfarin in the setting of anemia  - [id number] score 5    HTN:  - per outpatient record, patient has all of the medications below:  atenolol, clonidine, HCTZ, valsartan, spironolactone, Lasix and hydralazine  - given relatively well controlled BP, will start Atenolol, and start hydralazine IV PRN  - c/w home statin    #Pulm:  - hypoxia in the setting of PNA vs. alveolar hemorrhage from underlying vasculitis  - currently on high flow, with good saturations  - management of vasculitis and infection as below    #GI:  - no active issues  - mechanical soft diet  - PPI    #Renal:  KANIKA  - baseline 0.5, now elevated up to 1.8 -> unclear etiology, possibly 2/2 poor PO over the last 2 months vs. prerenal in the setting of sepsis from PNA vs. vasculitis  - renally dose medications   - daily Cr-  - one more dose of steroid today, will f/u rheum recs in AM  - positive UA - already on abx for PNA    #Rheum:  - given KANIKA, elevated ESR/CRP, fever at initial admission and LE rash -> ? vasculitis picture  - was on 500mg BID solumedrol at OSH  - to give 1 more dose of steroid for now  - formally consult rheum in AM about steroid dosing  - to consult derm for biopsy in AM    #Endo:  Steroid induced hyperglycemia:  - currently on 1unit/hr insulin ggt, last glucose 197  - will start ISS    #ID:  Multifocal PNA  - c/w zosyn for now  - legionella, RVP and HIV negative  - positive UA at OSH, no UCx sent  - blood culture from 8/10 NGTD    #Heme:  Anemia  - low hg 6.2 (baseline 12-13) at admission s/p 3 unit prbc, unclear source, ?lung hemorrhage vs anemia of chronic dx  - stool occult negative, hemolytic workup negative   - active type and screen  - daily CBC    Supratherapeutic INR  - on warfarin for Afib  - likely 2/2 over-medication  - s/p FFP and vitamin K -> revered to INR 1.54  - daily PT/PTT    #DVT: on hold given recent anemia and supratherapeutic INR as well as concern for alveolar hemorrhage    [place]:  Reference #: [id number]     Patient Name:	Melissa Blanchard	YOB: 1934	  Address:	[address] [place] [address]	Sex:	Female	    Rx Written	Rx Dispensed	Drug	Quantity	Days Supply	Prescriber Name			  06/17/2019	07/22/2019	zolpidem tartrate 5 mg tablet 	30	30	Jonathan Salazar MD			  06/17/2019	06/17/2019	zolpidem tartrate 5 mg tablet 	30	30	Jonathan Salazar MD			  04/01/2019	05/14/2019	zolpidem tartrate 5 mg tablet 	30	30	Jonathan Salazar MD 85 yo F with PMH of diastolic CHF, pulm HTN class II, A fib on coumadin, HLD, HTN, COPD, 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 Admitted for management of multifocal PNA vs. vasculitis vs. possible diffuse alveolar hemorrhage.     #Neuro:  AAOx4, no active issues    #CV:  Diastolic heart failure  - TTE 8/11 with mod to severe MR, and mild diastolic dysfunction. EF 61%. pulm HTN up [id number]  - is not in active HF  - c/w home HF meds - see below    Afib  - on home warfarin but presented with elevated INR now reversed  - hold warfarin in the setting of anemia and possible alveolar hemorrhage  - [id number] score 5    HTN:  - per outpatient record, patient has all of the medications below:  atenolol, clonidine, HCTZ, valsartan, spironolactone, Lasix and hydralazine  - given relatively well controlled BP, will start metoprolol 50mg BID and increase clonidine to 0.2  - c/w home statin    #Pulm:  - hypoxia in the setting of PNA vs. alveolar hemorrhage from underlying vasculitis  - currently on high flow, with good saturations, wean as tolerated  - management of vasculitis and infection as below    #GI:  - no active issues  - mechanical soft diet  - PPI    #Renal:  KANIKA  - baseline 0.5, now elevated up to 1.8 -> unclear etiology, possibly 2/2 poor PO over the last 2 months vs. prerenal in the setting of sepsis from PNA vs. 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 vasculitis picture  - was on 500mg BID solumedrol at OSH  - to give 1 more dose of steroid for now  - rheum and derm consulted, greg recs    #Endo:  Steroid induced hyperglycemia:  - currently on 1unit/hr insulin ggt, last glucose 197  - c/w start ISS    #ID:  Multifocal PNA?  - legionella, RVP and HIV negative  - positive UA at OSH, no UCx sent  - blood culture from 8/10 NGTD  - s/p doxy and julissa and zosyn  - given posive spotted fever antibody IgM - started on doxycycline 100mg BID    #Heme:  Anemia  - low hg 6.2 (baseline 12-13) at admission s/p 3 unit prbc, unclear source, ?lung hemorrhage vs anemia of chronic dx  - stool occult negative, hemolytic workup negative   - active type and screen  - daily CBC    Supratherapeutic INR  - on warfarin for Afib  - likely 2/2 over-medication  - s/p FFP and vitamin K -> revered to INR 2.01  - daily PT/PTT    #DVT: on hold given recent anemia and supratherapeutic INR as well as concern for alveolar hemorrhage

## 2019-08-14 NOTE — PROGRESS NOTE ADULT - ATTENDING COMMENTS
Patient examined and care reviewed in detail on bedside rounds  Critically ill on NIV with unexplained CXR abnormality  Frequent bedside visits with therapy change today.   I have personally provided 35+ minutes of critical care time concurrently with the resident/fellow; this excludes time spent on separate procedures.

## 2019-08-14 NOTE — PROGRESS NOTE ADULT - SUBJECTIVE AND OBJECTIVE BOX
CHIEF COMPLAINT:    Interval Events:    REVIEW OF SYSTEMS:  Constitutional: [ ] negative [ ] fevers [ ] chills [ ] weight loss [ ] weight gain  HEENT: [ ] negative [ ] dry eyes [ ] eye irritation [ ] postnasal drip [ ] nasal congestion  CV: [ ] negative  [ ] chest pain [ ] orthopnea [ ] palpitations [ ] murmur  Resp: [ ] negative [ ] cough [ ] shortness of breath [ ] dyspnea [ ] wheezing [ ] sputum [ ] hemoptysis  GI: [ ] negative [ ] nausea [ ] vomiting [ ] diarrhea [ ] constipation [ ] abd pain [ ] dysphagia   : [ ] negative [ ] dysuria [ ] nocturia [ ] hematuria [ ] increased urinary frequency  Musculoskeletal: [ ] negative [ ] back pain [ ] myalgias [ ] arthralgias [ ] fracture  Skin: [ ] negative [ ] rash [ ] itch  Neurological: [ ] negative [ ] headache [ ] dizziness [ ] syncope [ ] weakness [ ] numbness  Psychiatric: [ ] negative [ ] anxiety [ ] depression  Endocrine: [ ] negative [ ] diabetes [ ] thyroid problem  Hematologic/Lymphatic: [ ] negative [ ] anemia [ ] bleeding problem  Allergic/Immunologic: [ ] negative [ ] itchy eyes [ ] nasal discharge [ ] hives [ ] angioedema  [ ] All other systems negative  [ ] Unable to assess ROS because ________    OBJECTIVE:  ICU Vital Signs Last 24 Hrs  T(C): 36.1 (14 Aug 2019 04:00), Max: 37.1 (13 Aug 2019 17:29)  T(F): 96.9 (14 Aug 2019 04:00), Max: 98.8 (13 Aug 2019 17:29)  HR: 85 (14 Aug 2019 06:00) (78 - 106)  BP: 138/83 (14 Aug 2019 06:00) (129/82 - 161/124)  BP(mean): 100 (14 Aug 2019 06:00) (97 - 143)  ABP: --  ABP(mean): --  RR: 24 (14 Aug 2019 06:00) (17 - 41)  SpO2: 97% (14 Aug 2019 06:00) (93% - 100%)        08-13 @ 07:01  -  08-14 @ 07:00  --------------------------------------------------------  IN: 100 mL / OUT: 600 mL / NET: -500 mL      CAPILLARY BLOOD GLUCOSE      POCT Blood Glucose.: 249 mg/dL (13 Aug 2019 22:18)      PHYSICAL EXAM:  	GENERAL: NAD, well-developed, appears comfortably on high flow  	HEAD:  Atraumatic, Normocephalic  	EYES: EOMI, PERRLA, conjunctiva and sclera clear  	NECK: Supple  	CHEST/LUNG: Clear to auscultation bilaterally; No wheeze  	HEART: tachycardic, irregularly irregular No murmurs, rubs, or gallops  	ABDOMEN: Soft, Nontender, Nondistended; Bowel sounds present  	EXTREMITIES:  2+ Peripheral Pulses, No clubbing, cyanosis, or edema  	PSYCH: AAOx3  	NEUROLOGY: non-focal  SKIN: bilateral LE non-blanchable patechiea    LINES:    HOSPITAL MEDICATIONS:  Standing Meds:  ATENolol  Tablet 25 milliGRAM(s) Oral daily  atorvastatin 10 milliGRAM(s) Oral at bedtime  chlorhexidine 4% Liquid 1 Application(s) Topical <User Schedule>  cloNIDine 0.1 milliGRAM(s) Oral every 8 hours  dextrose 5%. 1000 milliLiter(s) IV Continuous <Continuous>  dextrose 50% Injectable 12.5 Gram(s) IV Push once  dextrose 50% Injectable 25 Gram(s) IV Push once  dextrose 50% Injectable 25 Gram(s) IV Push once  insulin lispro (HumaLOG) corrective regimen sliding scale   SubCutaneous three times a day before meals  montelukast 10 milliGRAM(s) Oral daily  pantoprazole    Tablet 40 milliGRAM(s) Oral before breakfast  piperacillin/tazobactam IVPB.. 3.375 Gram(s) IV Intermittent every 12 hours  sertraline 50 milliGRAM(s) Oral daily      PRN Meds:  ALBUTerol    90 MICROgram(s) HFA Inhaler 2 Puff(s) Inhalation every 6 hours PRN  dextrose 40% Gel 15 Gram(s) Oral once PRN  glucagon  Injectable 1 milliGRAM(s) IntraMuscular once PRN  zolpidem 5 milliGRAM(s) Oral at bedtime PRN      LABS:                        8.9    20.92 )-----------( 399      ( 14 Aug 2019 04:00 )             29.7     Hgb Trend: 8.9<--, 8.7<--, 8.4<--, 8.3<--, 9.1<--  08-14    137  |  100  |  61<H>  ----------------------------<  168<H>  4.0   |  24  |  1.42<H>    Ca    8.8      14 Aug 2019 04:00  Phos  4.3     08-14  Mg     2.1     08-14    TPro  7.0  /  Alb  3.3  /  TBili  1.2  /  DBili  x   /  AST  34<H>  /  ALT  34<H>  /  AlkPhos  75  08-14    Creatinine Trend: 1.42<--, 1.60<--, 1.85<--, 1.70<--, 1.66<--, 1.72<--  PT/INR - ( 13 Aug 2019 18:30 )   PT: 25.4 SEC;   INR: 2.18          PTT - ( 13 Aug 2019 18:30 )  PTT:42.2 SEC      Venous Blood Gas:  08-13 @ 18:30  7.38/44/40/25/73.1  VBG Lactate: --      MICROBIOLOGY:     RADIOLOGY:  [ ] Reviewed and interpreted by me    EKG: CHIEF COMPLAINT: SOB    Interval Events: no event overnight. Reports to be feeling well this AM. Denies CP, SOB.       OBJECTIVE:  ICU Vital Signs Last 24 Hrs  T(C): 36.1 (14 Aug 2019 04:00), Max: 37.1 (13 Aug 2019 17:29)  T(F): 96.9 (14 Aug 2019 04:00), Max: 98.8 (13 Aug 2019 17:29)  HR: 85 (14 Aug 2019 06:00) (78 - 106)  BP: 138/83 (14 Aug 2019 06:00) (129/82 - 161/124)  BP(mean): 100 (14 Aug 2019 06:00) (97 - 143)  ABP: --  ABP(mean): --  RR: 24 (14 Aug 2019 06:00) (17 - 41)  SpO2: 97% (14 Aug 2019 06:00) (93% - 100%)        08-13 @ 07:01  -  08-14 @ 07:00  --------------------------------------------------------  IN: 100 mL / OUT: 600 mL / NET: -500 mL      CAPILLARY BLOOD GLUCOSE      POCT Blood Glucose.: 249 mg/dL (13 Aug 2019 22:18)      PHYSICAL EXAM:  	GENERAL: NAD, well-developed, appears comfortably on high flow  	HEAD:  Atraumatic, Normocephalic  	EYES: EOMI, PERRLA, conjunctiva and sclera clear  	NECK: Supple  	CHEST/LUNG: Clear to auscultation bilaterally; No wheeze  	HEART: tachycardic, irregularly irregular No murmurs, rubs, or gallops  	ABDOMEN: Soft, Nontender, Nondistended; Bowel sounds present  	EXTREMITIES:  2+ Peripheral Pulses, No clubbing, cyanosis, or edema. 5/5 strength bilaterally upper and lower extremity  	PSYCH: AAOx3  	NEUROLOGY: non-focal  SKIN: bilateral LE non-blanchable patechiea    LINES:    HOSPITAL MEDICATIONS:  Standing Meds:  ATENolol  Tablet 25 milliGRAM(s) Oral daily  atorvastatin 10 milliGRAM(s) Oral at bedtime  chlorhexidine 4% Liquid 1 Application(s) Topical <User Schedule>  cloNIDine 0.1 milliGRAM(s) Oral every 8 hours  dextrose 5%. 1000 milliLiter(s) IV Continuous <Continuous>  dextrose 50% Injectable 12.5 Gram(s) IV Push once  dextrose 50% Injectable 25 Gram(s) IV Push once  dextrose 50% Injectable 25 Gram(s) IV Push once  insulin lispro (HumaLOG) corrective regimen sliding scale   SubCutaneous three times a day before meals  montelukast 10 milliGRAM(s) Oral daily  pantoprazole    Tablet 40 milliGRAM(s) Oral before breakfast  piperacillin/tazobactam IVPB.. 3.375 Gram(s) IV Intermittent every 12 hours  sertraline 50 milliGRAM(s) Oral daily      PRN Meds:  ALBUTerol    90 MICROgram(s) HFA Inhaler 2 Puff(s) Inhalation every 6 hours PRN  dextrose 40% Gel 15 Gram(s) Oral once PRN  glucagon  Injectable 1 milliGRAM(s) IntraMuscular once PRN  zolpidem 5 milliGRAM(s) Oral at bedtime PRN      LABS:                        8.9    20.92 )-----------( 399      ( 14 Aug 2019 04:00 )             29.7     Hgb Trend: 8.9<--, 8.7<--, 8.4<--, 8.3<--, 9.1<--  08-14    137  |  100  |  61<H>  ----------------------------<  168<H>  4.0   |  24  |  1.42<H>    Ca    8.8      14 Aug 2019 04:00  Phos  4.3     08-14  Mg     2.1     08-14    TPro  7.0  /  Alb  3.3  /  TBili  1.2  /  DBili  x   /  AST  34<H>  /  ALT  34<H>  /  AlkPhos  75  08-14    Creatinine Trend: 1.42<--, 1.60<--, 1.85<--, 1.70<--, 1.66<--, 1.72<--  PT/INR - ( 13 Aug 2019 18:30 )   PT: 25.4 SEC;   INR: 2.18          PTT - ( 13 Aug 2019 18:30 )  PTT:42.2 SEC      Venous Blood Gas:  08-13 @ 18:30  7.38/44/40/25/73.1  VBG Lactate: --      MICROBIOLOGY:     RADIOLOGY:  [ ] Reviewed and interpreted by me    EKG:

## 2019-08-14 NOTE — CHART NOTE - NSCHARTNOTEFT_GEN_A_CORE
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 Associated symptoms include generalized malaise, nausea, poor PO with associated weight loss of 10lb over the last two months.   In the ED at OSH, patient was hypoxic, put on BIPAP and admitted to the MICU on 8/10. CT chest showing diffuse opacities, possibly due to multifocal PNA. Started on Doxy and Julissa. There, also noted to have Hg of 6 s/p 3 units prbc as well as supratherapeutic INR 6.28 s/p 1uFFP & vitamin K, now reversed. Also found to have KANIKA (baseline 0.5).  Of note, patient had h/o elevated IVAN and dsDNA without SLE symptoms last year. Given concern for possible vasculitis with KANIKA, rash, elevated ESR/CRP, started on Solumedrol 500mg q12, started on 8/12. Patient then became hyperglycemia, and insulin ggt.   Patient was transferred on high flow to [facility] for rheumatologic and derm workup.     MICU COURSE:  Patient admitted to MICU the night of 8/13 on high flow for observation.  Rheumatology consulted for further vasculitis workup  Derm called for skin (LE) rash biopsy  Renal called for evaluation of pulmonary renal syndrome  ID also called given elevated tres MT spotted fever IgM 2.9, so started on Doxycycline.  Patient was monitored on tele, and appears comfortable on high flow, weaned down to NC 2L, stable to transfer to floor for further workup.        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 Admitted for management of multifocal PNA vs. vasculitis vs. possible diffuse alveolar hemorrhage.     #Neuro:  AAOx4, no active issues    #CV:  Diastolic heart failure  - TTE 8/11 with mod to severe MR, and mild diastolic dysfunction. EF 61%. pulm HTN up [id number]  - is not in active HF  - c/w home HF meds - see below    Afib  - on home warfarin but presented with elevated INR now reversed  - hold warfarin in the setting of anemia and possible alveolar hemorrhage  - [id number] score 5    HTN:  - per outpatient record, patient has all of the medications below:  atenolol, clonidine, HCTZ, valsartan, spironolactone, Lasix and hydralazine  - given relatively well controlled BP, will start metoprolol 50mg BID and increase clonidine to 0.2  - c/w home statin    #Pulm:  - hypoxia in the setting of PNA vs. alveolar hemorrhage from underlying vasculitis  - currently on high flow, with good saturations, wean as tolerated  - management of vasculitis and infection as below    #GI:  - no active issues  - mechanical soft diet  - PPI    #Renal:  KANIKA  - baseline 0.5, now elevated up to 1.8 -> unclear etiology, possibly 2/2 poor PO over the last 2 months vs. prerenal in the setting of sepsis from PNA vs. 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 vasculitis picture  - was on 500mg BID solumedrol at OSH  - to give 1 more dose of steroid for now  - rheum and derm consulted, greg recs    #Endo:  Steroid induced hyperglycemia:  - currently on 1unit/hr insulin ggt, last glucose 197  - c/w start ISS    #ID:  Multifocal PNA?  - legionella, RVP and HIV negative  - positive UA at OSH, no UCx sent  - blood culture from 8/10 NGTD  - s/p doxy and julissa and zosyn  - given posive spotted fever antibody IgM - started on doxycycline 100mg BID    #Heme:  Anemia  - low hg 6.2 (baseline 12-13) at admission s/p 3 unit prbc, unclear source, ?lung hemorrhage vs anemia of chronic dx  - stool occult negative, hemolytic workup negative   - active type and screen  - daily CBC    Supratherapeutic INR  - on warfarin for Afib  - likely 2/2 over-medication  - s/p FFP and vitamin K -> revered to INR 2.01  - daily PT/PTT    #DVT: on hold given recent anemia and supratherapeutic INR as well as concern for alveolar hemorrhage    FOR FOLLOW UP:  - f/u rheum, derm, renal, and ID recs for further workup of vasculitis  - monitor O2 sat on continuos pulse ox  - pulm will also follow patient on floor, given concern for possible alveolar hemorrhage  - monitor sugar on ISS - patient has steroid induced hyperglycemia at [facility] MICU Transfer Note    Transfer from: MICU    Transfer to: (  ) Medicine    ( X ) Telemetry     (   ) RCU        (    ) Palliative         (   ) Stroke Unit          (   ) __________________    Accepting Physician:  Signout given to:   HPI:  85 yo F with PMH of diastolic CHF, pulm HTN class II, A fib on coumadin, HLD, HTN, COPD, 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 Associated symptoms include generalized malaise, nausea, poor PO with associated weight loss of 10lb over the last two months.   In the ED at OSH, patient was hypoxic, put on BIPAP and admitted to the MICU on 8/10. CT chest showing diffuse opacities, possibly due to multifocal PNA. Started on Doxy and Julissa. There, also noted to have Hg of 6 s/p 3 units prbc as well as supratherapeutic INR 6.28 s/p 1uFFP & vitamin K, now reversed. Also found to have KANIKA (baseline 0.5).  Of note, patient had h/o elevated IVAN and dsDNA without SLE symptoms last year. Given concern for possible vasculitis with KANIKA, rash, elevated ESR/CRP, started on Solumedrol 500mg q12, started on 8/12. Patient then became hyperglycemia, and insulin ggt.   Patient was transferred on high flow to [facility] for rheumatologic and derm workup.     MICU COURSE:  Patient admitted to MICU the night of 8/13 on high flow for observation.  Rheumatology consulted for further vasculitis workup  Derm called for skin (LE) rash biopsy  Renal called for evaluation of pulmonary renal syndrome - started patient on solu-medrol 500mg BID x 3 days. Also started weight base insulin + ISS while on steroid  ID also called given elevated tres MT spotted fever IgM 2.9, so started on Doxycycline.  Patient was monitored on tele, and appears comfortable on high flow, weaned down to NC 2L, stable to transfer to floor for further workup.        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 Admitted for management of multifocal PNA vs. vasculitis vs. possible diffuse alveolar hemorrhage.     #Neuro:  AAOx4, no active issues    #CV:  Diastolic heart failure  - TTE 8/11 with mod to severe MR, and mild diastolic dysfunction. EF 61%. pulm HTN up [id number]  - is not in active HF  - c/w home HF meds - see below    Afib  - on home warfarin but presented with elevated INR now reversed  - hold warfarin in the setting of anemia and possible alveolar hemorrhage  - [id number] score 5    HTN:  - per outpatient record, patient has all of the medications below:  atenolol, clonidine, HCTZ, valsartan, spironolactone, Lasix and hydralazine  - given relatively well controlled BP, will start metoprolol 50mg BID and increase clonidine to 0.2  - c/w home statin    #Pulm:  - hypoxia in the setting of PNA vs. alveolar hemorrhage from underlying vasculitis  - currently on high flow, with good saturations, wean as tolerated  - management of vasculitis and infection as below    #GI:  - no active issues  - mechanical soft diet  - PPI    #Renal:  KANIKA  - baseline 0.5, now elevated up to 1.8 -> unclear etiology, possibly 2/2 poor PO over the last 2 months vs. prerenal in the setting of sepsis from PNA vs. 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 vasculitis picture  - was on 500mg BID solumedrol at OSH  - to give 1 more dose of steroid for now  - rheum and derm consulted, greg recs    #Endo:  Steroid induced hyperglycemia:  - currently on 1unit/hr insulin ggt, last glucose 197  - c/w start ISS    #ID:  Multifocal PNA?  - legionella, RVP and HIV negative  - positive UA at OSH, no UCx sent  - blood culture from 8/10 NGTD  - s/p doxy and julissa and zosyn  - given posive spotted fever antibody IgM - started on doxycycline 100mg BID    #Heme:  Anemia  - low hg 6.2 (baseline 12-13) at admission s/p 3 unit prbc, unclear source, ?lung hemorrhage vs anemia of chronic dx  - stool occult negative, hemolytic workup negative   - active type and screen  - daily CBC    Supratherapeutic INR  - on warfarin for Afib  - likely 2/2 over-medication  - s/p FFP and vitamin K -> revered to INR 2.01  - daily PT/PTT    #DVT: on hold given recent anemia and supratherapeutic INR as well as concern for alveolar hemorrhage    FOR FOLLOW UP:  - f/u rheum, derm, renal, pulm and ID recs for further workup of vasculitis  - monitor O2 sat on continuos pulse ox  - monitor sugar - patient had steroid induced hyperglycemia at [facility] MICU Transfer Note    Transfer from: MICU    Transfer to: (  ) Medicine    ( X ) Telemetry     (   ) RCU        (    ) Palliative         (   ) Stroke Unit          (   ) __________________    Accepting Physician: Dr. 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 Associated symptoms include generalized malaise, nausea, poor PO with associated weight loss of 10lb over the last two months.   In the ED at OSH, patient was hypoxic, put on BIPAP and admitted to the MICU on 8/10. CT chest showing diffuse opacities, possibly due to multifocal PNA. Started on Doxy and Julissa. There, also noted to have Hg of 6 s/p 3 units prbc as well as supratherapeutic INR 6.28 s/p 1uFFP & vitamin K, now reversed. Also found to have KANIKA (baseline 0.5).  Of note, patient had h/o elevated IVAN and dsDNA without SLE symptoms last year. Given concern for possible vasculitis with KANIKA, rash, elevated ESR/CRP, started on Solumedrol 500mg q12, started on 8/12. Patient then became hyperglycemia, and insulin ggt.   Patient was transferred on high flow to [facility] for rheumatologic and derm workup.     MICU COURSE:  Patient admitted to MICU the night of 8/13 on high flow for observation.  Rheumatology consulted for further vasculitis workup  Derm called for skin (LE) rash biopsy  Renal called for evaluation of pulmonary renal syndrome - started patient on solu-medrol 500mg BID x 3 days. Also started weight base insulin + ISS while on steroid  ID also called given elevated tres MT spotted fever IgM 2.9, so started on Doxycycline.  Patient was monitored on tele, and appears comfortable on high flow, weaned down to NC 2L, stable to transfer to floor for further workup.        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 Admitted for management of multifocal PNA vs. vasculitis vs. possible diffuse alveolar hemorrhage.     #Neuro:  AAOx4, no active issues    #CV:  Diastolic heart failure  - TTE 8/11 with mod to severe MR, and mild diastolic dysfunction. EF 61%. pulm HTN up [id number]  - is not in active HF  - c/w home HF meds - see below    Afib  - on home warfarin but presented with elevated INR now reversed  - hold warfarin in the setting of anemia and possible alveolar hemorrhage  - [id number] score 5    HTN:  - per outpatient record, patient has all of the medications below:  atenolol, clonidine, HCTZ, valsartan, spironolactone, Lasix and hydralazine  - given relatively well controlled BP, will start metoprolol 50mg BID and increase clonidine to 0.2  - c/w home statin    #Pulm:  - hypoxia in the setting of PNA vs. alveolar hemorrhage from underlying vasculitis  - currently on high flow, with good saturations, wean as tolerated  - management of vasculitis and infection as below    #GI:  - no active issues  - mechanical soft diet  - PPI    #Renal:  KANIKA  - baseline 0.5, now elevated up to 1.8 -> unclear etiology, possibly 2/2 poor PO over the last 2 months vs. prerenal in the setting of sepsis from PNA vs. 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 vasculitis picture  - was on 500mg BID solumedrol at OSH  - to give 1 more dose of steroid for now  - rheum and derm consulted, greg recs    #Endo:  Steroid induced hyperglycemia:  - currently on 1unit/hr insulin ggt, last glucose 197  - c/w start ISS    #ID:  Multifocal PNA?  - legionella, RVP and HIV negative  - positive UA at OSH, no UCx sent  - blood culture from 8/10 NGTD  - s/p doxy and julissa and zosyn  - given posive spotted fever antibody IgM - started on doxycycline 100mg BID    #Heme:  Anemia  - low hg 6.2 (baseline 12-13) at admission s/p 3 unit prbc, unclear source, ?lung hemorrhage vs anemia of chronic dx  - stool occult negative, hemolytic workup negative   - active type and screen  - daily CBC    Supratherapeutic INR  - on warfarin for Afib  - likely 2/2 over-medication  - s/p FFP and vitamin K -> revered to INR 2.01  - daily PT/PTT    #DVT: on hold given recent anemia and supratherapeutic INR as well as concern for alveolar hemorrhage    FOR FOLLOW UP:  - f/u rheum, derm, renal, pulm and ID recs for further workup of vasculitis  - monitor O2 sat on continuos pulse ox  - monitor sugar - patient had steroid induced hyperglycemia at [place]  - Daily INR, dose coumadin PRN

## 2019-08-14 NOTE — CHART NOTE - NSCHARTNOTEFT_GEN_A_CORE
MAR MICU TRANSFER NOTE    Please refer to MICU transfer note for full details.    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 In the ED at OSH, patient was hypoxic, put on BIPAP and admitted to the MICU on 8/10. CT chest showing diffuse opacities, possibly due to multifocal PNA. Started on Doxy and Julissa. There, also noted to have Hg of 6 s/p 3 units prbc as well as supratherapeutic INR 6.28 s/p 1uFFP & vitamin K, now reversed. Also found to have KANIKA (baseline 0.5).  Of note, patient had h/o elevated IVAN and dsDNA without SLE symptoms last year. Given concern for possible vasculitis with KANIKA, rash, elevated ESR/CRP, started on Solumedrol 500mg q12, started on 8/12. Patient then became hyperglycemic, and started on insulin ggt.     Patient was transferred on high flow to [facility] for rheumatologic and derm workup 8/13. Rheumatology consulted for further vasculitis workup, Derm called for skin (LE) rash biopsy. Renal called for evaluation of pulmonary renal syndrome - started patient on solu-medrol 500mg BID x 3 days. Also started weight base insulin + ISS while on steroids. ID also called given elevated tres MT spotted fever IgM 2.9, so started on Doxycycline. Patient was monitored on tele, weaned from high flow to NC 2L, stable to transfer to floor for further workup.    Vital Signs Last 24 Hrs  T(C): 36 (14 Aug 2019 16:00), Max: 37.1 (13 Aug 2019 17:29)  T(F): 96.8 (14 Aug 2019 16:00), Max: 98.8 (13 Aug 2019 17:29)  HR: 89 (14 Aug 2019 16:00) (78 - 107)  BP: 150/97 (14 Aug 2019 16:00) (131/99 - 172/102)  BP(mean): 115 (14 Aug 2019 16:00) (100 - 144)  RR: 18 (14 Aug 2019 16:00) (14 - 28)  SpO2: 98% (14 Aug 2019 16:00) (90% - 100%)  I&O's Summary    13 Aug 2019 07:01  -  14 Aug 2019 07:00  --------------------------------------------------------  IN: 100 mL / OUT: 600 mL / NET: -500 mL    14 Aug 2019 07:01  -  14 Aug 2019 17:03  --------------------------------------------------------  IN: 200 mL / OUT: 675 mL / NET: -475 mL      Allergies    Keflex (Unknown)  penicillin (Rash)    Intolerances      MEDICATIONS  (STANDING):  atorvastatin 10 milliGRAM(s) Oral at bedtime  cloNIDine 0.2 milliGRAM(s) Oral three times a day  dextrose 5%. 1000 milliLiter(s) (50 mL/Hr) IV Continuous <Continuous>  dextrose 50% Injectable 12.5 Gram(s) IV Push once  dextrose 50% Injectable 25 Gram(s) IV Push once  dextrose 50% Injectable 25 Gram(s) IV Push once  doxycycline IVPB      doxycycline IVPB 100 milliGRAM(s) IV Intermittent every 12 hours  insulin glargine Injectable (LANTUS) 10 Unit(s) SubCutaneous at bedtime  insulin lispro (HumaLOG) corrective regimen sliding scale   SubCutaneous three times a day before meals  insulin lispro Injectable (HumaLOG) 3 Unit(s) SubCutaneous before breakfast  methylPREDNISolone sodium succinate IVPB 500 milliGRAM(s) IV Intermittent two times a day  metoprolol tartrate 50 milliGRAM(s) Oral two times a day  montelukast 10 milliGRAM(s) Oral daily  sertraline 50 milliGRAM(s) Oral daily    MEDICATIONS  (PRN):  ALBUTerol    90 MICROgram(s) HFA Inhaler 2 Puff(s) Inhalation every 6 hours PRN Shortness of Breath and/or Wheezing  dextrose 40% Gel 15 Gram(s) Oral once PRN Blood Glucose LESS THAN 70 milliGRAM(s)/deciliter  glucagon  Injectable 1 milliGRAM(s) IntraMuscular once PRN Glucose LESS THAN 70 milligrams/deciliter  zolpidem 5 milliGRAM(s) Oral at bedtime PRN Insomnia                 8.9    20.92 )-----------( 399      ( 14 Aug 2019 04:00 )             29.7     08-14    137  |  100  |  61<H>  ----------------------------<  168<H>  4.0   |  24  |  1.42<H>    Ca    8.8      14 Aug 2019 04:00  Phos  4.3     08-14  Mg     2.1     08-14    TPro  7.0  /  Alb  3.3  /  TBili  1.2  /  DBili  x   /  AST  34<H>  /  ALT  34<H>  /  AlkPhos  75  08-14    PT/INR - ( 13 Aug 2019 18:30 )   PT: 25.4 SEC;   INR: 2.18          PTT - ( 13 Aug 2019 18:30 )  PTT:42.2 SEC      ASSESSMENT & PLAN:   83 yo F with PMH of diastolic CHF, pulm HTN class II, A fib on coumadin, HLD, HTN, COPD, 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 Admitted for management of multifocal PNA vs. vasculitis vs. possible diffuse alveolar hemorrhage.     #Neuro:  AAOx4, no active issues    #CV:  Diastolic heart failure  - TTE 8/11 with mod to severe MR, and mild diastolic dysfunction. EF 61%. pulm HTN up [id number]  - is not in active HF  - c/w home HF meds - see below    Afib  - on home warfarin but presented with elevated INR now reversed  - hold warfarin in the setting of anemia and possible alveolar hemorrhage  - [id number] score 5    HTN:  - per outpatient record, patient has all of the medications below:  atenolol, clonidine, HCTZ, valsartan, spironolactone, Lasix and hydralazine  - given relatively well controlled BP, will start metoprolol 50mg BID and increase clonidine to 0.2  - c/w home statin    #Pulm:  - hypoxia in the setting of PNA vs. alveolar hemorrhage from underlying vasculitis  - currently on high flow, with good saturations, wean as tolerated  - management of vasculitis and infection as below    #GI:  - no active issues  - mechanical soft diet  - PPI    #Renal:  KANIKA  - baseline 0.5, now elevated up to 1.8 -> unclear etiology, possibly 2/2 poor PO over the last 2 months vs. prerenal in the setting of sepsis from PNA vs. 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 vasculitis picture  - was on 500mg BID solumedrol at OSH  - to give 1 more dose of steroid for now  - rheum and derm consulted, greg recs    #Endo:  Steroid induced hyperglycemia:  - currently on 1unit/hr insulin ggt, last glucose 197  - c/w start ISS    #ID:  Multifocal PNA?  - legionella, RVP and HIV negative  - positive UA at OSH, no UCx sent  [facility]  - s/p doxy and julissa and zosyn  - given posive spotted fever antibody IgM - started on doxycycline 100mg BID    #Heme:  Anemia  - low hg 6.2 (baseline 12-13) at admission s/p 3 unit prbc, unclear source, ?lung hemorrhage vs anemia of chronic dx  - stool occult negative, hemolytic workup negative   - active type and screen  - daily CBC    Supratherapeutic INR  - on warfarin for Afib  - likely 2/2 over-medication  - s/p FFP and vitamin K -> revered to INR 2.01  - daily PT/PTT    #DVT: on hold given recent anemia and supratherapeutic INR as well as concern for alveolar hemorrhage    FOR FOLLOW UP:  - f/u rheum, derm, renal, pulm and ID recs for further workup of vasculitis  - monitor O2 sat on continuos pulse ox  - monitor sugar - patient had steroid induced hyperglycemia at [place]  - Daily INR, dose coumadin PRN.    A. Afroz-Hossain PGY-3

## 2019-08-14 NOTE — CONSULT NOTE ADULT - SUBJECTIVE AND OBJECTIVE BOX
[facility] Division of Kidney Diseases & Hypertension  INITIAL CONSULT NOTE  [phone]--------------------------------------------------------------------------------  HPI:  Patient is a 84 year Female with significant history of Diastolic Heart Failure, Pulmonary HTN class II, A fib on coumadin, HTN, RA, COPD presents to [facility] on 8/13/19 after being transferred to [facility] for Hypoxemic respiratory failure. On history patient has been having shortness of breath for the past 2-3 months associated with weakness and poor appetite. She had a 9-10lbs weight loss since then. She presented to [facility] on 8/10/19 after brought in by EMS for SOB and hypoxemia with increasing oxygen requirements requiring Non- Invasive ventilation. She had a fever spike on admission and CT of the chest without contrast on 8/10 showed multilobar pneumonia. She had Supratherapeutic INR of 6.28. Patient was treated as a case of severe sepsis from multilobar PNA vs hypovolemia due to acute blood loss anemia. Started on meropenem, Doxycycline and Solumedrol 40mg every 8 hours. Transfused 3 units PRBC and 1 u FFP and Vitamin K. On 8/11/19 patient developed purpuric rash on bilateral lower extremities.    Serologies were sent which showed low C3/C4, Positive p-ANCA, negative ANCA elevated ESR/CRP. Due to concern of possible vasculitis with multilobar PNA, purpuric rash and KANIKA patient was Started on Solumedrol 500mg every 12 hours on 8/12 and transferred to [facility] for further management on 8/13/19. Nephrology team being consulted for elevated creatinine in the setting of vasculitic rash and hypoxemic respiratory failure.     On presentation at [facility] on 8/10/19, patient had a Serum creatinine of 2.03. On Lab review ate [facility], patient has a baseline Serum Creatinine of 0.5 on July 2018. On 8/11/19 Urinalysis positive for protein 100, hematuria with large blood in urine.  On further lab review patient had positive IVAN and anti-dsDNA on 6/23/2018.   On review of systems patient makes urine and has no subjective complaints of decreased urine output. Has a history of blood in the urine in the entire family, no foaming of the urine, no dysuria. No diarrhea or vomiting. No history of use of NSAIDs.  Patient had started to have cough on 8/13/19 which was non-productive.     Patient seen today at bedside on high flow nasal oxygen with FiO2 35% flow 25L. She is able to speak in phrases but with occasional dry cough. She denies any complaints except for shortness of breath.    PAST HISTORY  --------------------------------------------------------------------------------  PAST MEDICAL & SURGICAL HISTORY:  COPD (chronic obstructive pulmonary disease)  Peripheral vascular disease  Pulmonary hypertension  Rheumatoid arthritis  Osteoarthritis  Mitral regurgitation  H/O lymphoma  Hyperlipidemia  Chronic GERD  Chronic kidney disease: proteinuria  AF (atrial fibrillation)  Anemia in CKD (chronic kidney disease)  CHF (congestive heart failure)  HTN (hypertension)  History of total hip replacement, left  History of total knee replacement, bilateral    FAMILY HISTORY:  Family history of inclusion body myositis    PAST SOCIAL HISTORY:  Non, smoker, non alcoholic drinker. History of hematuria in the family    ALLERGIES & MEDICATIONS  --------------------------------------------------------------------------------  Allergies    Keflex (Unknown)  penicillin (Rash)    Intolerances      Standing Inpatient Medications  atorvastatin 10 milliGRAM(s) Oral at bedtime  chlorhexidine 4% Liquid 1 Application(s) Topical <User Schedule>  cloNIDine 0.2 milliGRAM(s) Oral three times a day  dextrose 5%. 1000 milliLiter(s) IV Continuous <Continuous>  dextrose 50% Injectable 12.5 Gram(s) IV Push once  dextrose 50% Injectable 25 Gram(s) IV Push once  dextrose 50% Injectable 25 Gram(s) IV Push once  doxycycline IVPB      doxycycline IVPB 100 milliGRAM(s) IV Intermittent every 12 hours  insulin lispro (HumaLOG) corrective regimen sliding scale   SubCutaneous three times a day before meals  metoprolol tartrate 50 milliGRAM(s) Oral two times a day  montelukast 10 milliGRAM(s) Oral daily  sertraline 50 milliGRAM(s) Oral daily    PRN Inpatient Medications  ALBUTerol    90 MICROgram(s) HFA Inhaler 2 Puff(s) Inhalation every 6 hours PRN  dextrose 40% Gel 15 Gram(s) Oral once PRN  glucagon  Injectable 1 milliGRAM(s) IntraMuscular once PRN  zolpidem 5 milliGRAM(s) Oral at bedtime PRN      REVIEW OF SYSTEMS  --------------------------------------------------------------------------------  Gen: +fever  Skin: + rash  Head/Eyes/Ears/Mouth: no blurring of vision  Respiratory: +SOB  CV: No chest pain,   GI: No abdominal pain, diarrhea, constipation, nausea, vomiting  : +hematuria, No increased frequency, dysuria, nocturia  MSK: + joint pain    All other systems were reviewed and are negative, except as noted.    VITALS/PHYSICAL EXAM  --------------------------------------------------------------------------------  T(C): 36.2 (08-14-19 @ 12:00), Max: 37.1 (08-13-19 @ 17:29)  HR: 96 (08-14-19 @ 14:15) (78 - 107)  BP: 153/107 (08-14-19 @ 14:00) (131/99 - 172/102)  RR: 20 (08-14-19 @ 14:15) (14 - 28)  SpO2: 100% (08-14-19 @ 14:15) (90% - 100%)  Wt(kg): --  Height (cm): 160 (08-13-19 @ 17:30)  Weight (kg): 68.4 (08-13-19 @ 17:30)  BMI (kg/m2): 26.7 (08-13-19 @ 17:30)  BSA (m2): 1.71 (08-13-19 @ 17:30)      08-13-19 @ 07:01  -  08-14-19 @ 07:00  --------------------------------------------------------  IN: 100 mL / OUT: 600 mL / NET: -500 mL    08-14-19 @ 07:01  -  08-14-19 @ 15:05  --------------------------------------------------------  IN: 200 mL / OUT: 675 mL / NET: -475 mL      Physical Exam:  	Gen: awake, in mild respiratory distress on high flow nasal oxygen at FiO2 35% Flow 25L  	HEENT: supple  no LAD  	Pulm: inspiratory wheezing on the right lung  	CV:  S1S2, no murmurs  	Abd: +BS, soft   	Ext: No B/L Lower ext edema  	Neuro: awake, responsive to commands  	Skin: bilateral healing nonblanching purpuric rash on bilateral lower extremities with chronic venous stasis changes  	    LABS/STUDIES  --------------------------------------------------------------------------------              8.9    20.92 >-----------<  399      [08-14-19 @ 04:00]              29.7     137  |  100  |  61  ----------------------------<  168      [08-14-19 @ 04:00]  4.0   |  24  |  1.42        Ca     8.8     [08-14-19 @ 04:00]      Mg     2.1     [08-14-19 @ 04:00]      Phos  4.3     [08-14-19 @ 04:00]    TPro  7.0  /  Alb  3.3  /  TBili  1.2  /  DBili  x   /  AST  34  /  ALT  34  /  AlkPhos  75  [08-14-19 @ 04:00]    PT/INR: PT 25.4 , INR 2.18       [08-13-19 @ 18:30]  PTT: 42.2       [08-13-19 @ 18:30]      Creatinine Trend:  SCr 1.42 [08-14 @ 04:00]  SCr 1.60 [08-13 @ 18:30]  SCr 1.85 [08-13 @ 05:30]  SCr 1.70 [08-12 @ 05:45]  SCr 1.66 [08-11 @ 05:15]    Urinalysis - [08-11-19 @ 08:30]      Color Yellow / Appearance Clear / SG 1.010 / pH 6.5      Gluc Negative / Ketone Negative  / Bili Negative / Urobili Negative       Blood Large / Protein 100 / Leuk Est Moderate / Nitrite Positive      RBC >50 / WBC 11-25 / Hyaline  / Gran  / Sq Epi  / Non Sq Epi Moderate / Bacteria Moderate      Iron 25, TIBC 302, %sat 8      [08-13-19 @ 05:30]  Ferritin 687      [08-13-19 @ 05:30]  HbA1c 5.6      [08-13-19 @ 05:30]    HIV Nonreact      [08-11-19 @ 16:15]    IVAN: titer 1:640, pattern Homogeneous      [08-11-19 @ 16:15]  C3 Complement 76      [08-12-19 @ 11:00]  C4 Complement 10      [08-12-19 @ 11:00]  ANCA: cANCA Negative, pANCA >1:1280, atypical ANCA Negative      [08-11-19 @ 16:15] [facility] Division of Kidney Diseases & Hypertension  INITIAL CONSULT NOTE  [phone]--------------------------------------------------------------------------------  HPI:  Patient is a 84 year Female with significant history of Diastolic Heart Failure, Pulmonary HTN class II, A fib on coumadin, HTN, RA, COPD presents to [facility] on 8/13/19 after being transferred to [facility] for Hypoxemic respiratory failure. On history patient has been having shortness of breath for the past 2-3 months associated with weakness and poor appetite. She had a 9-10lbs weight loss since then. She presented to [facility] on 8/10/19 after brought in by EMS for SOB and hypoxemia with increasing oxygen requirements requiring Non- Invasive ventilation. She had a fever spike on admission and CT of the chest without contrast on 8/10 showed multilobar pneumonia. She had Supratherapeutic INR of 6.28. Patient was treated as a case of severe sepsis from multilobar PNA vs hypovolemia due to acute blood loss anemia. Started on meropenem, Doxycycline and Solumedrol 40mg every 8 hours. Transfused 3 units PRBC and 1 u FFP and Vitamin K. On 8/11/19 patient developed purpuric rash on bilateral lower extremities.    Serologies were sent which showed low C3/C4, Positive p-ANCA, negative ANCA elevated ESR/CRP. Due to concern of possible vasculitis with multilobar PNA, purpuric rash and KANIKA patient was Started on Solumedrol 500mg every 12 hours on 8/12 and transferred to [facility] for further management on 8/13/19. Nephrology team being consulted for elevated creatinine in the setting of vasculitic rash and hypoxemic respiratory failure.     On presentation at [facility] on 8/10/19, patient had a Serum creatinine of 2.03. On Lab review at [facility], patient has a baseline Serum Creatinine of 0.5 on July 2018. On 8/11/19 Urinalysis positive for protein 100, hematuria with large blood in urine.  On further lab review patient had positive IVAN and anti-dsDNA on 6/23/2018.   On review of systems patient makes urine and has no subjective complaints of decreased urine output. Has a history of blood in the urine in the entire family, no foaming of the urine, no dysuria. No diarrhea or vomiting. No history of use of NSAIDs.  Patient had started to have cough on 8/13/19 which was non-productive.     Patient seen today at bedside on high flow nasal oxygen with FiO2 35% flow 25L. She is able to speak in phrases but with occasional dry cough. She denies any complaints except for shortness of breath.    PAST HISTORY  --------------------------------------------------------------------------------  PAST MEDICAL & SURGICAL HISTORY:  COPD (chronic obstructive pulmonary disease)  Peripheral vascular disease  Pulmonary hypertension  Rheumatoid arthritis  Osteoarthritis  Mitral regurgitation  H/O lymphoma  Hyperlipidemia  Chronic GERD  Chronic kidney disease: proteinuria  AF (atrial fibrillation)  Anemia in CKD (chronic kidney disease)  CHF (congestive heart failure)  HTN (hypertension)  History of total hip replacement, left  History of total knee replacement, bilateral    FAMILY HISTORY:  Family history of inclusion body myositis    PAST SOCIAL HISTORY:  Non, smoker, non alcoholic drinker. History of hematuria in the family    ALLERGIES & MEDICATIONS  --------------------------------------------------------------------------------  Allergies    Keflex (Unknown)  penicillin (Rash)    Intolerances      Standing Inpatient Medications  atorvastatin 10 milliGRAM(s) Oral at bedtime  chlorhexidine 4% Liquid 1 Application(s) Topical <User Schedule>  cloNIDine 0.2 milliGRAM(s) Oral three times a day  dextrose 5%. 1000 milliLiter(s) IV Continuous <Continuous>  dextrose 50% Injectable 12.5 Gram(s) IV Push once  dextrose 50% Injectable 25 Gram(s) IV Push once  dextrose 50% Injectable 25 Gram(s) IV Push once  doxycycline IVPB      doxycycline IVPB 100 milliGRAM(s) IV Intermittent every 12 hours  insulin lispro (HumaLOG) corrective regimen sliding scale   SubCutaneous three times a day before meals  metoprolol tartrate 50 milliGRAM(s) Oral two times a day  montelukast 10 milliGRAM(s) Oral daily  sertraline 50 milliGRAM(s) Oral daily    PRN Inpatient Medications  ALBUTerol    90 MICROgram(s) HFA Inhaler 2 Puff(s) Inhalation every 6 hours PRN  dextrose 40% Gel 15 Gram(s) Oral once PRN  glucagon  Injectable 1 milliGRAM(s) IntraMuscular once PRN  zolpidem 5 milliGRAM(s) Oral at bedtime PRN      REVIEW OF SYSTEMS  --------------------------------------------------------------------------------  Gen: +fever  Skin: + rash  Head/Eyes/Ears/Mouth: no blurring of vision  Respiratory: +SOB  CV: No chest pain,   GI: No abdominal pain, diarrhea, constipation, nausea, vomiting  : +hematuria, No increased frequency, dysuria, nocturia  MSK: + joint pain    All other systems were reviewed and are negative, except as noted.    VITALS/PHYSICAL EXAM  --------------------------------------------------------------------------------  T(C): 36.2 (08-14-19 @ 12:00), Max: 37.1 (08-13-19 @ 17:29)  HR: 96 (08-14-19 @ 14:15) (78 - 107)  BP: 153/107 (08-14-19 @ 14:00) (131/99 - 172/102)  RR: 20 (08-14-19 @ 14:15) (14 - 28)  SpO2: 100% (08-14-19 @ 14:15) (90% - 100%)  Wt(kg): --  Height (cm): 160 (08-13-19 @ 17:30)  Weight (kg): 68.4 (08-13-19 @ 17:30)  BMI (kg/m2): 26.7 (08-13-19 @ 17:30)  BSA (m2): 1.71 (08-13-19 @ 17:30)      08-13-19 @ 07:01  -  08-14-19 @ 07:00  --------------------------------------------------------  IN: 100 mL / OUT: 600 mL / NET: -500 mL    08-14-19 @ 07:01  -  08-14-19 @ 15:05  --------------------------------------------------------  IN: 200 mL / OUT: 675 mL / NET: -475 mL      Physical Exam:  	Gen: awake, in mild respiratory distress on high flow nasal oxygen at FiO2 35% Flow 25L  	HEENT: supple  no LAD  	Pulm: inspiratory wheezing on the right lung  	CV:  S1S2, no murmurs  	Abd: +BS, soft   	Ext: No B/L Lower ext edema  	Neuro: awake, responsive to commands  	Skin: bilateral healing nonblanching purpuric rash on bilateral lower extremities with chronic venous stasis changes  	    LABS/STUDIES  --------------------------------------------------------------------------------              8.9    20.92 >-----------<  399      [08-14-19 @ 04:00]              29.7     137  |  100  |  61  ----------------------------<  168      [08-14-19 @ 04:00]  4.0   |  24  |  1.42        Ca     8.8     [08-14-19 @ 04:00]      Mg     2.1     [08-14-19 @ 04:00]      Phos  4.3     [08-14-19 @ 04:00]    TPro  7.0  /  Alb  3.3  /  TBili  1.2  /  DBili  x   /  AST  34  /  ALT  34  /  AlkPhos  75  [08-14-19 @ 04:00]    PT/INR: PT 25.4 , INR 2.18       [08-13-19 @ 18:30]  PTT: 42.2       [08-13-19 @ 18:30]      Creatinine Trend:  SCr 1.42 [08-14 @ 04:00]  SCr 1.60 [08-13 @ 18:30]  SCr 1.85 [08-13 @ 05:30]  SCr 1.70 [08-12 @ 05:45]  SCr 1.66 [08-11 @ 05:15]    Urinalysis - [08-11-19 @ 08:30]      Color Yellow / Appearance Clear / SG 1.010 / pH 6.5      Gluc Negative / Ketone Negative  / Bili Negative / Urobili Negative       Blood Large / Protein 100 / Leuk Est Moderate / Nitrite Positive      RBC >50 / WBC 11-25 / Hyaline  / Gran  / Sq Epi  / Non Sq Epi Moderate / Bacteria Moderate      Iron 25, TIBC 302, %sat 8      [08-13-19 @ 05:30]  Ferritin 687      [08-13-19 @ 05:30]  HbA1c 5.6      [08-13-19 @ 05:30]    HIV Nonreact      [08-11-19 @ 16:15]    IVAN: titer 1:640, pattern Homogeneous      [08-11-19 @ 16:15]  C3 Complement 76      [08-12-19 @ 11:00]  C4 Complement 10      [08-12-19 @ 11:00]  ANCA: cANCA Negative, pANCA >1:1280, atypical ANCA Negative      [08-11-19 @ 16:15] [facility] Division of Kidney Diseases & Hypertension  INITIAL CONSULT NOTE  [phone]--------------------------------------------------------------------------------  HPI:  Patient is a 84 year Female with significant history of Diastolic Heart Failure, Pulmonary HTN class II, A fib on coumadin, HTN, RA, COPD presents to [facility] on 8/13/19 after being transferred to [facility] for Hypoxemic respiratory failure. On history patient has been having shortness of breath for the past 2-3 months associated with weakness and poor appetite. She had a 9-10lbs weight loss since then. She presented to [facility] on 8/10/19 after brought in by EMS for SOB and hypoxemia with increasing oxygen requirements requiring Non- Invasive ventilation. She had a fever spike on admission and CT of the chest without contrast on 8/10 showed multilobar pneumonia. She had Supratherapeutic INR of 6.28. Patient was treated as a case of severe sepsis from multilobar PNA vs hypovolemia due to acute blood loss anemia. Started on meropenem, Doxycycline and Solumedrol 40mg every 8 hours. Transfused 3 units PRBC and 1 u FFP and Vitamin K. On 8/11/19 patient developed purpuric rash on bilateral lower extremities.    Serologies were sent which showed low C3/C4, Positive p-ANCA, negative c-ANCA elevated ESR/CRP. Due to concern of possible vasculitis with multilobar PNA, purpuric rash and KANIKA patient was Started on Solumedrol 500mg every 12 hours on 8/12 and transferred to [facility] for further management on 8/13/19. Nephrology team being consulted for elevated creatinine in the setting of vasculitic rash and hypoxemic respiratory failure.     On presentation at [facility] on 8/10/19, patient had a Serum creatinine of 2.03. On Lab review at [facility], patient has a baseline Serum Creatinine of 0.5 on July 2018. On 8/11/19 Urinalysis positive for protein 100, hematuria with large blood in urine.  On further lab review patient had positive IVAN and anti-dsDNA on 6/23/2018.   On review of systems patient makes urine and has no subjective complaints of decreased urine output. Has a history of blood in the urine in the entire family, no foaming of the urine, no dysuria. No diarrhea or vomiting. No history of use of NSAIDs.  Patient had started to have cough on 8/13/19 which was non-productive.     Patient seen today at bedside on high flow nasal oxygen with FiO2 35% flow 25L. She is able to speak in phrases but with occasional dry cough. She denies any complaints except for shortness of breath.    PAST HISTORY  --------------------------------------------------------------------------------  PAST MEDICAL & SURGICAL HISTORY:  COPD (chronic obstructive pulmonary disease)  Peripheral vascular disease  Pulmonary hypertension  Rheumatoid arthritis  Osteoarthritis  Mitral regurgitation  H/O lymphoma  Hyperlipidemia  Chronic GERD  Chronic kidney disease: proteinuria  AF (atrial fibrillation)  Anemia in CKD (chronic kidney disease)  CHF (congestive heart failure)  HTN (hypertension)  History of total hip replacement, left  History of total knee replacement, bilateral    FAMILY HISTORY:  Family history of inclusion body myositis    PAST SOCIAL HISTORY:  Non, smoker, non alcoholic drinker. History of hematuria in the family    ALLERGIES & MEDICATIONS  --------------------------------------------------------------------------------  Allergies    Keflex (Unknown)  penicillin (Rash)    Intolerances      Standing Inpatient Medications  atorvastatin 10 milliGRAM(s) Oral at bedtime  chlorhexidine 4% Liquid 1 Application(s) Topical <User Schedule>  cloNIDine 0.2 milliGRAM(s) Oral three times a day  dextrose 5%. 1000 milliLiter(s) IV Continuous <Continuous>  dextrose 50% Injectable 12.5 Gram(s) IV Push once  dextrose 50% Injectable 25 Gram(s) IV Push once  dextrose 50% Injectable 25 Gram(s) IV Push once  doxycycline IVPB      doxycycline IVPB 100 milliGRAM(s) IV Intermittent every 12 hours  insulin lispro (HumaLOG) corrective regimen sliding scale   SubCutaneous three times a day before meals  metoprolol tartrate 50 milliGRAM(s) Oral two times a day  montelukast 10 milliGRAM(s) Oral daily  sertraline 50 milliGRAM(s) Oral daily    PRN Inpatient Medications  ALBUTerol    90 MICROgram(s) HFA Inhaler 2 Puff(s) Inhalation every 6 hours PRN  dextrose 40% Gel 15 Gram(s) Oral once PRN  glucagon  Injectable 1 milliGRAM(s) IntraMuscular once PRN  zolpidem 5 milliGRAM(s) Oral at bedtime PRN      REVIEW OF SYSTEMS  --------------------------------------------------------------------------------  Gen: +fever  Skin: + rash  Head/Eyes/Ears/Mouth: no blurring of vision  Respiratory: +SOB  CV: No chest pain,   GI: No abdominal pain, diarrhea, constipation, nausea, vomiting  : - gross hematuria, No increased frequency, dysuria, nocturia  MSK: + joint pain    All other systems were reviewed and are negative, except as noted.    VITALS/PHYSICAL EXAM  --------------------------------------------------------------------------------  T(C): 36.2 (08-14-19 @ 12:00), Max: 37.1 (08-13-19 @ 17:29)  HR: 96 (08-14-19 @ 14:15) (78 - 107)  BP: 153/107 (08-14-19 @ 14:00) (131/99 - 172/102)  RR: 20 (08-14-19 @ 14:15) (14 - 28)  SpO2: 100% (08-14-19 @ 14:15) (90% - 100%)  Wt(kg): --  Height (cm): 160 (08-13-19 @ 17:30)  Weight (kg): 68.4 (08-13-19 @ 17:30)  BMI (kg/m2): 26.7 (08-13-19 @ 17:30)  BSA (m2): 1.71 (08-13-19 @ 17:30)      08-13-19 @ 07:01  -  08-14-19 @ 07:00  --------------------------------------------------------  IN: 100 mL / OUT: 600 mL / NET: -500 mL    08-14-19 @ 07:01  -  08-14-19 @ 15:05  --------------------------------------------------------  IN: 200 mL / OUT: 675 mL / NET: -475 mL      Physical Exam:  	Gen: awake, in mild respiratory distress on high flow nasal oxygen at FiO2 35% Flow 25L  	HEENT: supple  no LAD  	Pulm: inspiratory wheezing on the right lung  	CV:  S1S2, no murmurs  	Abd: +BS, soft   	Ext: No B/L Lower ext edema  	Neuro: awake, responsive to commands  	Skin: bilateral healing nonblanching purpuric rash on bilateral lower extremities with chronic venous stasis changes              Psych: appropriate affect              Vascular: no cyanosis  	    LABS/STUDIES  --------------------------------------------------------------------------------              8.9    20.92 >-----------<  399      [08-14-19 @ 04:00]              29.7     137  |  100  |  61  ----------------------------<  168      [08-14-19 @ 04:00]  4.0   |  24  |  1.42        Ca     8.8     [08-14-19 @ 04:00]      Mg     2.1     [08-14-19 @ 04:00]      Phos  4.3     [08-14-19 @ 04:00]    TPro  7.0  /  Alb  3.3  /  TBili  1.2  /  DBili  x   /  AST  34  /  ALT  34  /  AlkPhos  75  [08-14-19 @ 04:00]    PT/INR: PT 25.4 , INR 2.18       [08-13-19 @ 18:30]  PTT: 42.2       [08-13-19 @ 18:30]      Creatinine Trend:  SCr 1.42 [08-14 @ 04:00]  SCr 1.60 [08-13 @ 18:30]  SCr 1.85 [08-13 @ 05:30]  SCr 1.70 [08-12 @ 05:45]  SCr 1.66 [08-11 @ 05:15]    Urinalysis - [08-11-19 @ 08:30]      Color Yellow / Appearance Clear / SG 1.010 / pH 6.5      Gluc Negative / Ketone Negative  / Bili Negative / Urobili Negative       Blood Large / Protein 100 / Leuk Est Moderate / Nitrite Positive      RBC >50 / WBC 11-25 / Hyaline  / Gran  / Sq Epi  / Non Sq Epi Moderate / Bacteria Moderate      Iron 25, TIBC 302, %sat 8      [08-13-19 @ 05:30]  Ferritin 687      [08-13-19 @ 05:30]  HbA1c 5.6      [08-13-19 @ 05:30]    HIV Nonreact      [08-11-19 @ 16:15]    IVAN: titer 1:640, pattern Homogeneous      [08-11-19 @ 16:15]  C3 Complement 76      [08-12-19 @ 11:00]  C4 Complement 10      [08-12-19 @ 11:00]  ANCA: cANCA Negative, pANCA >1:1280, atypical ANCA Negative      [08-11-19 @ 16:15]

## 2019-08-14 NOTE — CONSULT NOTE ADULT - PROBLEM SELECTOR RECOMMENDATION 9
Patient with elevated serum creatinine of 2.03 on 8/10/19 Now currently at 1.42. She has a baseline creatinine of 0.5 on July 2018. Serologies with low C3/C4, Positive p-ANCA, negative ANCA elevated ESR/CRP. Urinalysis positive for protein 100, hematuria with large blood in urine. Patient with positive IVAN and anti-dsDNA on 6/23/2018 in the setting of chronic Hydralazine use. She presents with purpuric rash on admission which improved after giving IV steroids. 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 Recommend Continue Solumedrol 1gm per day for at least 3 days. Please send anti-GBM, anti-histone antibodies, repeat urinalysis with urine spot/protein creatinine ratio. AVOID Hydralazine. Avoid giving other nephrotoxic medications such as ACE-I/ARBS and MONTEMAYOR. Continue to trend renal function. Monitor intake and output. Patient would need renal biopsy when clinically stable. Patient with elevated serum creatinine of 2.03 on 8/10/19 Now currently at 1.42. She has a baseline creatinine of 0.5 on July 2018. Serologies with low C3/C4, Positive p-ANCA, negative ANCA elevated ESR/CRP. Urinalysis positive for protein 100, hematuria with large blood in urine. Patient with positive IVAN and anti-dsDNA on 6/23/2018 in the setting of chronic Hydralazine use. She presents with purpuric rash on admission which improved after giving IV steroids. 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  with low complement levels. Recommend Continue Solumedrol 1gm per day for at least 3 days. Please send anti-GBM, anti-histone antibodies, repeat urinalysis with urine spot/protein creatinine ratio. AVOID Hydralazine. Avoid giving other nephrotoxic medications such as ACE-I/ARBS and MONTEMAYOR. Continue to trend renal function. Monitor intake and output. Patient would need renal biopsy when clinically stable.

## 2019-08-14 NOTE — CONSULT NOTE ADULT - SUBJECTIVE AND OBJECTIVE BOX
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 Associated symptoms include generalized malaise, nausea, poor PO with associated weight loss of 10lb over the last two months.   In the ED at OSH, patient was hypoxic, put on BIPAP and admitted to the MICU on 8/10. CT chest showing diffuse opacities, possibly due to multifocal PNA. Started on Doxy and [place]. There, also noted to have Hg of 6 s/p 3 units prbc as well as supratherapeutic INR 6.28 s/p 1uFFP & vitamin K, now reversed. Also found to have KANIKA (baseline 0.5).  Of note, patient had h/o elevated IVAN and dsDNA without SLE symptoms last year. Given concern for possible vasculitis with KANIKA, rash, elevated ESR/CRP, started on Solumedrol 500mg q12, started on . Patient then became hyperglycemia, and insulin ggt.   Patient was transferred on high flow to [facility] for rheumatologic and derm workup.     PAST MEDICAL & SURGICAL HISTORY:  COPD (chronic obstructive pulmonary disease)  Peripheral vascular disease  Pulmonary hypertension  Rheumatoid arthritis  Osteoarthritis  Mitral regurgitation  H/O lymphoma  Hyperlipidemia  Chronic GERD  Chronic kidney disease: proteinuria  AF (atrial fibrillation)  Anemia in CKD (chronic kidney disease)  CHF (congestive heart failure)  HTN (hypertension)  History of total hip replacement, left  History of total knee replacement, bilateral      Review of Systems:  Gen:  No fevers/chills, weight loss  HEENT: No blurry vision, no difficulty swallowing  CVS: No chest pain/palpitations  Resp: No SOB/wheezing  GI: No N/V/C/D/abdominal pain  MSK:  Skin: No new rashes  Neuro: No headaches    MEDICATIONS  (STANDING):  atorvastatin 10 milliGRAM(s) Oral at bedtime  chlorhexidine 4% Liquid 1 Application(s) Topical <User Schedule>  cloNIDine 0.2 milliGRAM(s) Oral three times a day  dextrose 5%. 1000 milliLiter(s) (50 mL/Hr) IV Continuous <Continuous>  dextrose 50% Injectable 12.5 Gram(s) IV Push once  dextrose 50% Injectable 25 Gram(s) IV Push once  dextrose 50% Injectable 25 Gram(s) IV Push once  doxycycline IVPB      doxycycline IVPB 100 milliGRAM(s) IV Intermittent every 12 hours  insulin lispro (HumaLOG) corrective regimen sliding scale   SubCutaneous three times a day before meals  metoprolol tartrate 50 milliGRAM(s) Oral two times a day  montelukast 10 milliGRAM(s) Oral daily  sertraline 50 milliGRAM(s) Oral daily    MEDICATIONS  (PRN):  ALBUTerol    90 MICROgram(s) HFA Inhaler 2 Puff(s) Inhalation every 6 hours PRN Shortness of Breath and/or Wheezing  dextrose 40% Gel 15 Gram(s) Oral once PRN Blood Glucose LESS THAN 70 milliGRAM(s)/deciliter  glucagon  Injectable 1 milliGRAM(s) IntraMuscular once PRN Glucose LESS THAN 70 milligrams/deciliter  zolpidem 5 milliGRAM(s) Oral at bedtime PRN Insomnia      Allergies    Keflex (Unknown)  penicillin (Rash)    Intolerances        PERTINENT MEDICATION HISTORY:  · 	warfarin 5 mg oral tablet: Last Dose Taken:  , 1 tab(s) orally once a day  · 	hydrALAZINE 25 mg oral tablet: Last Dose Taken:  , 2 tabs in morning, 2 tabs in lunch, and 1 tab at night  · 	atorvastatin 10 mg oral tablet: Last Dose Taken:  , 1 tab(s) orally once a day      SOCIAL HISTORY:  OCCUPATION:  TRAVEL HISTORY:    FAMILY HISTORY:  Family history of inclusion body myositis      Vital Signs Last 24 Hrs  T(C): 36.2 (14 Aug 2019 08:00), Max: 37.1 (13 Aug 2019 17:29)  T(F): 97.2 (14 Aug 2019 08:00), Max: 98.8 (13 Aug 2019 17:29)  HR: 94 (14 Aug 2019 11:42) (78 - 106)  BP: 154/100 (14 Aug 2019 11:00) (131/99 - 172/102)  BP(mean): 116 (14 Aug 2019 11:00) (100 - 144)  RR: 20 (14 Aug 2019 11:42) (14 - 28)  SpO2: 95% (14 Aug 2019 11:42) (93% - 100%)    Daily Height in cm: 160.02 (13 Aug 2019 17:30)    Daily Weight in k.2 (14 Aug 2019 04:00)    Physical Exam:  General: No apparent distress  HEENT: EOMI, MMM  CVS: +S1/S2, RRR  Resp: CTA b/l  GI: Soft, NT/ND  MSK:    Neuro: AAOx3  muscle strength RUE LUE ; RLE LLE   Skin: no  rashes    LABS:                        8.9    20.92 )-----------( 399      ( 14 Aug 2019 04:00 )             29.7     Auto Neutrophil #: 18.79 K/uL (19 @ 04:00)    Auto Lymphocyte #: 0.42 K/uL (19 @ 04:00)          137  |  100  |  61<H>  ----------------------------<  168<H>  4.0   |  24  |  1.42<H>    Ca    8.8      14 Aug 2019 04:00  Phos  4.3       Mg     2.1         TPro  7.0  /  Alb  3.3  /  TBili  1.2  /  DBili  x   /  AST  34<H>  /  ALT  34<H>  /  AlkPhos  75      PT/INR - ( 13 Aug 2019 18:30 )   PT: 25.4 SEC;   INR: 2.18          PTT - ( 13 Aug 2019 18:30 )  PTT:42.2 SEC    Urinalysis (19 @ 08:30)    Glucose Qualitative, Urine: Negative    Blood, Urine: Large    pH Urine: 6.5    Color: Yellow    Urine Appearance: Clear    Bilirubin: Negative    Ketone - Urine: Negative    Specific Gravity: 1.010    Protein, Urine: 100    Urobilinogen: Negative    Nitrite: Positive    Leukocyte Esterase Concentration: Moderate    Urine Microscopic-Add On (NC) (19 @ 08:30)    Red Blood Cell - Urine: >50 /HPF    White Blood Cell - Urine: 11-25 /HPF    Bacteria: Moderate /HPF    Epithelial Cells: Moderate    Antineutrophil Cytoplasmic Antibody (19 @ 16:15)    Perinuclear (p-ANCA) Antibody: >1:1280    Cytoplasmic (c-ANCA) Antibody: Negative    Atypical ANCA: Negative    Anti-Nuclear Antibody (19 @ 16:15)    Anti Nuclear Factor Titer: 1:640    IVAN Pattern: Homogeneous    [place] Spotted Fever IgG, Serum (19 @ 16:15)    [place] Spotted Fever Antibody: Negative    [place] Spotted Fever Antibody IgM: 2.91: Negative        <0.90                                   Equivocal 0.90 - 1.10                                   Positive        >1.10    Rapid HIV-1/2 Antibody (19 @ 16:15)    Rapid HIV-1/2 Antibody: Nonreact      Legionella pneumophila Antigen, Urine (19 @ 20:54)    Legionella Antigen, Urine: Negative        RADIOLOGY & ADDITIONAL STUDIES: TONIA [facility]  [id number]    HISTORY OF PRESENT ILLNESS:    85 yo F with PMH of diastolic CHF, pulm HTN class II, A fib on coumadin, HLD, HTN, COPD, 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 Associated symptoms include generalized malaise, nausea, poor PO with associated weight loss of 10lb over the last two months.   In the ED at OSH, patient was hypoxic, put on BIPAP and admitted to the MICU on 8/10. CT chest showing diffuse opacities, possibly due to multifocal PNA. Started on Doxy and [place]. There, also noted to have Hg of 6 s/p 3 units prbc as well as supratherapeutic INR 6.28 s/p 1uFFP & vitamin K, now reversed. Also found to have KANIKA (baseline 0.5).  Of note, patient had h/o elevated IVAN and dsDNA without SLE symptoms last year. Given concern for possible vasculitis with KANIKA, rash, elevated ESR/CRP, started on Solumedrol 500mg q12, started on . Patient then became hyperglycemia, and insulin ggt.   Patient was transferred on high flow to [facility] for rheumatologic and derm workup.     PAST MEDICAL & SURGICAL HISTORY:  COPD (chronic obstructive pulmonary disease)  Peripheral vascular disease  Pulmonary hypertension  Rheumatoid arthritis  Osteoarthritis  Mitral regurgitation  H/O lymphoma  Hyperlipidemia  Chronic GERD  Chronic kidney disease: proteinuria  AF (atrial fibrillation)  Anemia in CKD (chronic kidney disease)  CHF (congestive heart failure)  HTN (hypertension)  History of total hip replacement, left  History of total knee replacement, bilateral      Review of Systems:  Gen:  No fevers/chills, weight loss  HEENT: No blurry vision, no difficulty swallowing  CVS: No chest pain/palpitations  Resp: No SOB/wheezing  GI: No N/V/C/D/abdominal pain  MSK:  Skin: No new rashes  Neuro: No headaches    MEDICATIONS  (STANDING):  atorvastatin 10 milliGRAM(s) Oral at bedtime  chlorhexidine 4% Liquid 1 Application(s) Topical <User Schedule>  cloNIDine 0.2 milliGRAM(s) Oral three times a day  dextrose 5%. 1000 milliLiter(s) (50 mL/Hr) IV Continuous <Continuous>  dextrose 50% Injectable 12.5 Gram(s) IV Push once  dextrose 50% Injectable 25 Gram(s) IV Push once  dextrose 50% Injectable 25 Gram(s) IV Push once  doxycycline IVPB      doxycycline IVPB 100 milliGRAM(s) IV Intermittent every 12 hours  insulin lispro (HumaLOG) corrective regimen sliding scale   SubCutaneous three times a day before meals  metoprolol tartrate 50 milliGRAM(s) Oral two times a day  montelukast 10 milliGRAM(s) Oral daily  sertraline 50 milliGRAM(s) Oral daily    MEDICATIONS  (PRN):  ALBUTerol    90 MICROgram(s) HFA Inhaler 2 Puff(s) Inhalation every 6 hours PRN Shortness of Breath and/or Wheezing  dextrose 40% Gel 15 Gram(s) Oral once PRN Blood Glucose LESS THAN 70 milliGRAM(s)/deciliter  glucagon  Injectable 1 milliGRAM(s) IntraMuscular once PRN Glucose LESS THAN 70 milligrams/deciliter  zolpidem 5 milliGRAM(s) Oral at bedtime PRN Insomnia      Allergies    Keflex (Unknown)  penicillin (Rash)    Intolerances        PERTINENT MEDICATION HISTORY:  · 	warfarin 5 mg oral tablet: Last Dose Taken:  , 1 tab(s) orally once a day  · 	hydrALAZINE 25 mg oral tablet: Last Dose Taken:  , 2 tabs in morning, 2 tabs in lunch, and 1 tab at night  · 	atorvastatin 10 mg oral tablet: Last Dose Taken:  , 1 tab(s) orally once a day      SOCIAL HISTORY:  OCCUPATION:  TRAVEL HISTORY:    FAMILY HISTORY:  Family history of inclusion body myositis      Vital Signs Last 24 Hrs  T(C): 36.2 (14 Aug 2019 08:00), Max: 37.1 (13 Aug 2019 17:29)  T(F): 97.2 (14 Aug 2019 08:00), Max: 98.8 (13 Aug 2019 17:29)  HR: 94 (14 Aug 2019 11:42) (78 - 106)  BP: 154/100 (14 Aug 2019 11:00) (131/99 - 172/102)  BP(mean): 116 (14 Aug 2019 11:00) (100 - 144)  RR: 20 (14 Aug 2019 11:42) (14 - 28)  SpO2: 95% (14 Aug 2019 11:42) (93% - 100%)    Daily Height in cm: 160.02 (13 Aug 2019 17:30)    Daily Weight in k.2 (14 Aug 2019 04:00)    Physical Exam:  General: No apparent distress  HEENT: EOMI, MMM  CVS: +S1/S2, RRR  Resp: CTA b/l  GI: Soft, NT/ND  MSK:    Neuro: AAOx3  muscle strength RUE LUE ; RLE LLE   Skin: no  rashes    LABS:                        8.9    20.92 )-----------( 399      ( 14 Aug 2019 04:00 )             29.7     Auto Neutrophil #: 18.79 K/uL (19 @ 04:00)    Auto Lymphocyte #: 0.42 K/uL (19 @ 04:00)    Haptoglobin, Serum (19 @ 16:15)    Haptoglobin, Serum: 131 mg/dL    Lactate Dehydrogenase, Serum (19 @ 16:15)    Lactate Dehydrogenase, Serum: 614 U/L    Iron with Total Binding Capacity in AM (19 @ 05:30)    Iron - Total Binding Capacity.: 302 ug/dL    % Saturation, Iron: 8 %    Iron Total, Serum: 25 ug/dL    Unsaturated Iron Binding Capacity: 277 ug/dL    Ferritin, Serum in AM (19 @ 05:30)    Ferritin, Serum: 687 ng/mL    Folate, Serum in AM (19 @ 05:30)    Folate, Serum: 6.7 ng/mL    Vitamin B12, Serum in AM (19 @ 05:30)    Vitamin B12, Serum: 404 pg/mL            137  |  100  |  61<H>  ----------------------------<  168<H>  4.0   |  24  |  1.42<H>    Ca    8.8      14 Aug 2019 04:00  Phos  4.3       Mg     2.1         TPro  7.0  /  Alb  3.3  /  TBili  1.2  /  DBili  x   /  AST  34<H>  /  ALT  34<H>  /  AlkPhos  75      PT/INR - ( 13 Aug 2019 18:30 )   PT: 25.4 SEC;   INR: 2.18          PTT - ( 13 Aug 2019 18:30 )  PTT:42.2 SEC    Urinalysis (19 @ 08:30)    Glucose Qualitative, Urine: Negative    Blood, Urine: Large    pH Urine: 6.5    Color: Yellow    Urine Appearance: Clear    Bilirubin: Negative    Ketone - Urine: Negative    Specific Gravity: 1.010    Protein, Urine: 100    Urobilinogen: Negative    Nitrite: Positive    Leukocyte Esterase Concentration: Moderate    Urine Microscopic-Add On (NC) (19 @ 08:30)    Red Blood Cell - Urine: >50 /HPF    White Blood Cell - Urine: 11-25 /HPF    Bacteria: Moderate /HPF    Epithelial Cells: Moderate    Antineutrophil Cytoplasmic Antibody (19 @ 16:15)    Perinuclear (p-ANCA) Antibody: >1:1280    Cytoplasmic (c-ANCA) Antibody: Negative    Atypical ANCA: Negative    Anti-Nuclear Antibody (19 @ 16:15)    Anti Nuclear Factor Titer: 1:640    IVAN Pattern: Homogeneous    [place] Spotted Fever IgG, Serum (19 @ 16:15)    [facility] Spotted Fever Antibody: Negative    [facility] Spotted Fever Antibody IgM: 2.91: Negative        <0.90                                   Equivocal 0.90 - 1.10                                   Positive        >1.10    Rapid HIV-1/2 Antibody (19 @ 16:15)    Rapid HIV-1/2 Antibody: Nonreact      Legionella pneumophila Antigen, Urine (19 @ 20:54)    Legionella Antigen, Urine: Negative    C3 Complement, Serum (19 @ 11:00)    C3 Complement, Serum: 76 mg/dL    C4 Complement, Serum (19 @ 11:00)    C4 Complement, Serum: 10 mg/dL        RADIOLOGY & ADDITIONAL STUDIES:    < from: US Renal (19 @ 09:06) >  EXAM:  US KIDNEY(S)      PROCEDURE DATE:  2019        INTERPRETATION:  CLINICAL STATEMENT: Acute renal failure    TECHNIQUE: Ultrasound of the kidneys.    COMPARISON: CT abdomen and pelvis 2018    FINDINGS:  The right kidney measures 10.8 cm in length .    The left kidney measures 10.8 cm in length .    There is no hydronephrosis. Cysts noted in both kidneys    IMPRESSION:  No hydronephrosis.                   ISAIAH KNUTSON M.D., ATTENDING RADIOLOGIST  This document has been electronically signed. Aug 11 2019 10:21AM                < end of copied text >    < from: CT Chest No Cont (08.10.19 @ 13:08) >  EXAM:  CT CHEST      PROCEDURE DATE:  08/10/2019        INTERPRETATION:  CLINICAL INFORMATION: Shortness of breath.    COMPARISON: Chest radiograph 8/10/2019 and CT scan chest 2018.    PROCEDURE:   CT of the Chest was performed without intravenous contrast.  Sagittal and coronal reformats were performed.      FINDINGS:    LUNGS AND AIRWAYS: PLEURA:   There is diffuse bilateral peribronchial airspace consolidation involving   both upper and lower lung zones.  The central airways remain patent.    There is a small calcified granuloma right upper lobe.    There is a very small right-sided pleural effusion.    MEDIASTINUM AND MEHREEN: No enlarged mediastinal lymphadenopathy.  Calcified right paratracheal lymph node.    VESSELS: Atherosclerotic calcification of the thoracic aorta. Coronary   artery calcifications.    HEART: The heart is enlarged.   No pericardial effusion.    CHEST WALL AND LOWER NECK: Heterogeneous appearance of the bilateral   lobes of the thyroid gland, stable in appearance.    VISUALIZED UPPER ABDOMEN: Evaluation is limited due to streak artifact.    BONES: Multilevel degenerative changes of thoracic spine.  Chronic compression deformity of the T8 and T12 vertebral body.    IMPRESSION:     Diffuse bilateral peribronchial airspace consolidation may reflect   multilobar bronchopneumonia.    Small right-sided pleural effusion.                  PRASHANTH AZEVEDO M.D., ATTENDING RADIOLOGIST  This document has been electronically signed. Aug 10 2019  1:31PM              < end of copied text >    < from: TTE Echo w/Cont Complete (19 @ 07:48) >  EXAM:  ECHO TTE W(TOM)CON COMP [id number]      PROCEDURE DATE:  2019        INTERPRETATION:  REPORT:    TRANSTHORACIC ECHOCARDIOGRAM REPORT         Patient Name:   TONIA BRANHAM Patient Location: [address] Rec #:  [id number]      Accession #:     [id number]  Account #:                    Height:           66.0 in 167.6 cm  YOB: 1934     Weight:           100.1 lb 45.40 kg  Patient Age:    84 years      BSA:              1.49 m²  Patient Gender: F             BP:  96/57 mmHg       Date of Exam:        2019 7:48:56 AM  Sonographer:         Justin Stewart  Referring Physician: MARIA DOLORES    Procedure:     2D Echo/Doppler/Color Doppler Complete.  Indications:   Unspecified atrial fibrillation - I48.91  Diagnosis:     Unspecified atrial fibrillation - I48.91  Study Details: Technically fair study.         2D AND M-MODE MEASUREMENTS (normal ranges within parentheses):  Left Ventricle:                  Normal   Aorta/Left Atrium:              Normal  IVSd (2D):              0.90 cm (0.7-1.1) Aortic Root (2D):    3.20 cm   (2.4-3.7)  LVPWd (2D):             0.81 cm (0.7-1.1) AoV Cusp Separation: 1.60 cm   (1.5-2.6)  LVIDd (2D):             4.00 cm (3.4-5.7) Left Atrium (2D):    4.80 cm   (1.9-4.0)  LVIDs (2D):             2.71 cm  LV FS (2D):             32.2 %   (>25%)  LV EF (2D):              61 %    (>55%)  Relative Wall Thickness  0.40    (<0.42)    LV DIASTOLIC FUNCTION:  MV Peak E: 1.25 m/s Decel Time: 177 msec  MV Peak A: 0.35 m/s  E/A Ratio: 3.53    SPECTRAL DOPPLER ANALYSIS (where applicable):  Mitral Valve:  MV P1/2 Time: 51.42 msec  MV Area, PHT: 4.28 cm²    Aortic Valve: AoV Max Gentry: 2.64 m/s AoV Peak P.9 mmHg AoV Mean PG:   15.2 mmHg    LVOT Vmax: 1.10 m/s LVOT VTI: 0.236 m LVOT Diameter: 2.14 cm    AoV Area, Vmax: 1.50 cm² AoV Area, VTI: 1.50 cm² AoV Area, Vmn: 1.45 cm²  Ao VTI: 0.564  Aortic Insufficiency:  AI Half-time:  479 msec  AI Decel Rate: 2.10 m/s²    Tricuspid Valve and PA/RV Systolic Pressure: TR Max Velocity: 3.94 m/s RA   Pressure: 10 mmHg RVSP/PASP: 72.0 mmHg    Pulmonic Valve:  PV Max Velocity: 1.19 m/s PV Max P.6 mmHg PV Mean PG:       PHYSICIAN INTERPRETATION:  Left Ventricle: The left ventricular internal cavity size is normal. Left   ventricular wall thickness is normal.  Global LV systolic function was normal. Spectral Doppler shows   restrictive pattern of left ventricular myocardial filling (Grade III   diastolic dysfunction).  Right Ventricle: The right ventricular size is mildly enlarged.  Left Atrium: Moderate to severe left atrial enlargement. LA volume Index   is 67.2 ml/m² ml/m2.  Right Atrium: Severely enlarged right atrium.  Pericardium: There is no evidence of pericardial effusion.  Mitral Valve: Mild thickening of the anterior and posterior mitral valve   leaflets. There is mild mitral annular calcification. Mild mitral valve   regurgitation is seen.  Tricuspid Valve: Structurally normal tricuspid valve, with normal leaflet   excursion. Moderate-severe tricuspid regurgitation is visualized.   Estimated pulmonary artery systolic pressure is 72.0 mmHg assuming a   right atrial pressure of 10 mmHg, which is consistent with severe   pulmonary hypertension.  Aortic Valve: Peak transaortic gradient equals 27.9 mmHg, mean   transaortic gradient equals 15.2 mmHg, the calculated aortic valve area   equals 1.50 cm² by the continuity equation consistent with mild aortic   stenosis. Mild aortic valve regurgitation is seen.  Pulmonic Valve: Structurally normal pulmonic valve, with normal leaflet   excursion. Mild pulmonic valve regurgitation.  Aorta: The aortic root is normal in size and structure.       Summary:   1. Normal global left ventricular systolic function.   2. Spectral Doppler shows restrictive pattern of left ventricular   myocardial filling (Grade III diastolic dysfunction).   3. Mild mitral annular calcification.   4. Mild thickening of the anterior and posterior mitral valve leaflets.   5. Moderate-severe tricuspid regurgitation.   6. Mild aortic regurgitation.   7. Estimated pulmonary artery systolic pressure is 72.0 mmHg assuming a   right atrial pressure of 10 mmHg, which is consistent with severe   pulmonary hypertension.   8. LA volume Index is 67.2 ml/m² ml/m2.   9. Peak transaortic gradient equals 27.9 mmHg, mean transaortic gradient   equals 15.2 mmHg, the calculated aortic valve area equals 1.50 cm² by the   continuity equation consistent with mild aortic stenosis.    [id number] Leslie Dobbins MD, FACC , Electronically signed on 2019 at   12:23:09 PM              *** Final ***                    LESLIE DOBBINS   This document has been electronically signed. 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 Associated symptoms include generalized malaise, nausea, poor PO with associated weight loss of 10lb over the last two months.   In the ED at OSH, patient was hypoxic, put on BIPAP and admitted to the MICU on 8/10. CT chest showing diffuse opacities, possibly due to multifocal PNA. Started on Doxy and [place]. There, also noted to have Hg of 6 s/p 3 units prbc as well as supratherapeutic INR 6.28 s/p 1uFFP & vitamin K, now reversed. Also found to have KANIKA (baseline 0.5).  Of note, patient had h/o elevated IVAN and dsDNA without SLE symptoms last year. Given concern for possible vasculitis with KANIKA, rash, elevated ESR/CRP, started on Solumedrol 500mg q12, started on . Patient then became hyperglycemia, and insulin ggt.   Patient was transferred on high flow to [facility] for rheumatologic and derm workup.     Family reports hx of long standing RA, dx approx. 20 years ago. Unclear what meds she was on, but reports she took one medication for a month, developed a lump on her neck so stopped taking. Has not been following with a rheumatologist. Has chronic contractures in b/l hands, R> L. Also with OA in knees, s/p TKR b/l. Per family, pt without significant AM joint stiffness, swollen joints, or extraarticular manifestations of eye inflammation, lung involvement, SQ nodules. From review of chart, pt had + IVAN and dsDNA last year but without SLE symptoms at the time. On SLE ROS today, no hx of alopecia, sicca, oral ulcers, pleuritis/pericarditis, malar/photosensitive rash. + ?Raynauds and CKD of unclear etiology, never had renal bx. No muscle weakness, no paresthesias. No blood clots in past.      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 Had colonoscopy - negative study.Will therefore remain on      expectant surveillance biannually. Repeated blood work including LDH, beta-2      microglobulin. Reviewed results consistent with mild leukocytosis, with normal      differential. Of note patient is not on steroids.  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 Discussed risk of progression to myeloma of approximately 1% per year.    Of note, outpat labs showed [id number] IgA in 100s, and cardiolipin IgM 30s, DsDNA in 500s.     PAST MEDICAL & SURGICAL HISTORY:  COPD (chronic obstructive pulmonary disease)  Peripheral vascular disease  Pulmonary hypertension  Rheumatoid arthritis  Osteoarthritis  Mitral regurgitation  H/O lymphoma  Hyperlipidemia  Chronic GERD  Chronic kidney disease: proteinuria  AF (atrial fibrillation)  Anemia in CKD (chronic kidney disease)  CHF (congestive heart failure)  HTN (hypertension)  History of total hip replacement, left  History of total knee replacement, bilateral      Review of Systems:  Gen:  No fevers/chills, weight loss  HEENT: No blurry vision, no difficulty swallowing  CVS: No chest pain/palpitations  Resp: No SOB/wheezing  GI: No N/V/C/D/abdominal pain  MSK:  Skin: No new rashes  Neuro: No headaches    MEDICATIONS  (STANDING):  atorvastatin 10 milliGRAM(s) Oral at bedtime  chlorhexidine 4% Liquid 1 Application(s) Topical <User Schedule>  cloNIDine 0.2 milliGRAM(s) Oral three times a day  dextrose 5%. 1000 milliLiter(s) (50 mL/Hr) IV Continuous <Continuous>  dextrose 50% Injectable 12.5 Gram(s) IV Push once  dextrose 50% Injectable 25 Gram(s) IV Push once  dextrose 50% Injectable 25 Gram(s) IV Push once  doxycycline IVPB      doxycycline IVPB 100 milliGRAM(s) IV Intermittent every 12 hours  insulin lispro (HumaLOG) corrective regimen sliding scale   SubCutaneous three times a day before meals  metoprolol tartrate 50 milliGRAM(s) Oral two times a day  montelukast 10 milliGRAM(s) Oral daily  sertraline 50 milliGRAM(s) Oral daily    MEDICATIONS  (PRN):  ALBUTerol    90 MICROgram(s) HFA Inhaler 2 Puff(s) Inhalation every 6 hours PRN Shortness of Breath and/or Wheezing  dextrose 40% Gel 15 Gram(s) Oral once PRN Blood Glucose LESS THAN 70 milliGRAM(s)/deciliter  glucagon  Injectable 1 milliGRAM(s) IntraMuscular once PRN Glucose LESS THAN 70 milligrams/deciliter  zolpidem 5 milliGRAM(s) Oral at bedtime PRN Insomnia      Allergies    Keflex (Unknown)  penicillin (Rash)    Intolerances        PERTINENT MEDICATION HISTORY:  · 	warfarin 5 mg oral tablet: Last Dose Taken:  , 1 tab(s) orally once a day  · 	hydrALAZINE 25 mg oral tablet: Last Dose Taken:  , 2 tabs in morning, 2 tabs in lunch, and 1 tab at night  · 	atorvastatin 10 mg oral tablet: Last Dose Taken:  , 1 tab(s) orally once a day      SOCIAL HISTORY:  OCCUPATION:  TRAVEL HISTORY:    FAMILY HISTORY:  Family history of inclusion body myositis      Vital Signs Last 24 Hrs  T(C): 36.2 (14 Aug 2019 08:00), Max: 37.1 (13 Aug 2019 17:29)  T(F): 97.2 (14 Aug 2019 08:00), Max: 98.8 (13 Aug 2019 17:29)  HR: 94 (14 Aug 2019 11:42) (78 - 106)  BP: 154/100 (14 Aug 2019 11:00) (131/99 - 172/102)  BP(mean): 116 (14 Aug 2019 11:00) (100 - 144)  RR: 20 (14 Aug 2019 11:42) (14 - 28)  SpO2: 95% (14 Aug 2019 11:42) (93% - 100%)    Daily Height in cm: 160.02 (13 Aug 2019 17:30)    Daily Weight in k.2 (14 Aug 2019 04:00)    Physical Exam:  General: No apparent distress  HEENT: EOMI, MMM  CVS: +S1/S2, RRR  Resp: CTA b/l  GI: Soft, NT/ND  MSK:    Neuro: AAOx3  muscle strength RUE LUE ; RLE LLE   Skin: no  rashes    LABS:                        8.9    20.92 )-----------( 399      ( 14 Aug 2019 04:00 )             29.7     Auto Neutrophil #: 18.79 K/uL (19 @ 04:00)    Auto Lymphocyte #: 0.42 K/uL (19 @ 04:00)    Haptoglobin, Serum (19 @ 16:15)    Haptoglobin, Serum: 131 mg/dL    Lactate Dehydrogenase, Serum (19 @ 16:15)    Lactate Dehydrogenase, Serum: 614 U/L    Iron with Total Binding Capacity in AM (19 @ 05:30)    Iron - Total Binding Capacity.: 302 ug/dL    % Saturation, Iron: 8 %    Iron Total, Serum: 25 ug/dL    Unsaturated Iron Binding Capacity: 277 ug/dL    Ferritin, Serum in AM (19 @ 05:30)    Ferritin, Serum: 687 ng/mL    Folate, Serum in AM (19 @ :30)    Folate, Serum: 6.7 ng/mL    Vitamin B12, Serum in AM (19 @ 05:30)    Vitamin B12, Serum: 404 pg/mL            137  |  100  |  61<H>  ----------------------------<  168<H>  4.0   |  24  |  1.42<H>    Ca    8.8      14 Aug 2019 04:00  Phos  4.3       Mg     2.1         TPro  7.0  /  Alb  3.3  /  TBili  1.2  /  DBili  x   /  AST  34<H>  /  ALT  34<H>  /  AlkPhos  75      PT/INR - ( 13 Aug 2019 18:30 )   PT: 25.4 SEC;   INR: 2.18          PTT - ( 13 Aug 2019 18:30 )  PTT:42.2 SEC    Urinalysis (19 @ 08:30)    Glucose Qualitative, Urine: Negative    Blood, Urine: Large    pH Urine: 6.5    Color: Yellow    Urine Appearance: Clear    Bilirubin: Negative    Ketone - Urine: Negative    Specific Gravity: 1.010    Protein, Urine: 100    Urobilinogen: Negative    Nitrite: Positive    Leukocyte Esterase Concentration: Moderate    Urine Microscopic-Add On (NC) (19 @ 08:30)    Red Blood Cell - Urine: >50 /HPF    White Blood Cell - Urine: 11-25 /HPF    Bacteria: Moderate /HPF    Epithelial Cells: Moderate    Antineutrophil Cytoplasmic Antibody (19 @ 16:15)    Perinuclear (p-ANCA) Antibody: >1:1280    Cytoplasmic (c-ANCA) Antibody: Negative    Atypical ANCA: Negative    Anti-Nuclear Antibody (19 @ 16:15)    Anti Nuclear Factor Titer: 1:640    IVAN Pattern: Homogeneous    [place] Spotted Fever IgG, Serum (19 @ 16:15)    [place] Spotted Fever Antibody: Negative    [facility] Spotted Fever Antibody IgM: 2.91: Negative        <0.90                                   Equivocal 0.90 - 1.10                                   Positive        >1.10    Rapid HIV-1/2 Antibody (19 @ 16:15)    Rapid HIV-1/2 Antibody: Nonreact      Legionella pneumophila Antigen, Urine (19 @ 20:54)    Legionella Antigen, Urine: Negative    C3 Complement, Serum (19 @ 11:00)    C3 Complement, Serum: 76 mg/dL    C4 Complement, Serum (19 @ 11:00)    C4 Complement, Serum: 10 mg/dL        RADIOLOGY & ADDITIONAL STUDIES:    < from: US Renal (19 @ 09:06) >  EXAM:  US KIDNEY(S)      PROCEDURE DATE:  2019        INTERPRETATION:  CLINICAL STATEMENT: Acute renal failure    TECHNIQUE: Ultrasound of the kidneys.    COMPARISON: CT abdomen and pelvis 2018    FINDINGS:  The right kidney measures 10.8 cm in length .    The left kidney measures 10.8 cm in length .    There is no hydronephrosis. Cysts noted in both kidneys    IMPRESSION:  No hydronephrosis.                   ISAIAH KNUTSON M.D., ATTENDING RADIOLOGIST  This document has been electronically signed. Aug 11 2019 10:21AM                < end of copied text >    < from: CT Chest No Cont (08.10.19 @ 13:08) >  EXAM:  CT CHEST      PROCEDURE DATE:  08/10/2019        INTERPRETATION:  CLINICAL INFORMATION: Shortness of breath.    COMPARISON: Chest radiograph 8/10/2019 and CT scan chest 2018.    PROCEDURE:   CT of the Chest was performed without intravenous contrast.  Sagittal and coronal reformats were performed.      FINDINGS:    LUNGS AND AIRWAYS: PLEURA:   There is diffuse bilateral peribronchial airspace consolidation involving   both upper and lower lung zones.  The central airways remain patent.    There is a small calcified granuloma right upper lobe.    There is a very small right-sided pleural effusion.    MEDIASTINUM AND MEHREEN: No enlarged mediastinal lymphadenopathy.  Calcified right paratracheal lymph node.    VESSELS: Atherosclerotic calcification of the thoracic aorta. Coronary   artery calcifications.    HEART: The heart is enlarged.   No pericardial effusion.    CHEST WALL AND LOWER NECK: Heterogeneous appearance of the bilateral   lobes of the thyroid gland, stable in appearance.    VISUALIZED UPPER ABDOMEN: Evaluation is limited due to streak artifact.    BONES: Multilevel degenerative changes of thoracic spine.  Chronic compression deformity of the T8 and T12 vertebral body.    IMPRESSION:     Diffuse bilateral peribronchial airspace consolidation may reflect   multilobar bronchopneumonia.    Small right-sided pleural effusion.                  PRASHANTH AZEVEDO M.D., ATTENDING RADIOLOGIST  This document has been electronically signed. Aug 10 2019  1:31PM              < end of copied text >    < from: TTE Echo w/Cont Complete (19 @ 07:48) >  EXAM:  ECHO TTE W(WO)CON COMP [id number]      PROCEDURE DATE:  2019        INTERPRETATION:  REPORT:    TRANSTHORACIC ECHOCARDIOGRAM REPORT         Patient Name:   TONIA BRANHAM Patient Location: [address] Rec #:  [id number]      Accession #:     [id number]  Account #:                    Height:           66.0 in 167.6 cm  YOB: 1934     Weight:           100.1 lb 45.40 kg  Patient Age:    84 years      BSA:              1.49 m²  Patient Gender: F             BP:  96/57 mmHg       Date of Exam:        2019 7:48:56 AM  Sonographer:         Justin Stewart  Referring Physician: MARIA DOLORES    Procedure:     2D Echo/Doppler/Color Doppler Complete.  Indications:   Unspecified atrial fibrillation - I48.91  Diagnosis:     Unspecified atrial fibrillation - I48.91  Study Details: Technically fair study.         2D AND M-MODE MEASUREMENTS (normal ranges within parentheses):  Left Ventricle:                  Normal   Aorta/Left Atrium:              Normal  IVSd (2D):              0.90 cm (0.7-1.1) Aortic Root (2D):    3.20 cm   (2.4-3.7)  LVPWd (2D):             0.81 cm (0.7-1.1) AoV Cusp Separation: 1.60 cm   (1.5-2.6)  LVIDd (2D):             4.00 cm (3.4-5.7) Left Atrium (2D):    4.80 cm   (1.9-4.0)  LVIDs (2D):             2.71 cm  LV FS (2D):             32.2 %   (>25%)  LV EF (2D):              61 %    (>55%)  Relative Wall Thickness  0.40    (<0.42)    LV DIASTOLIC FUNCTION:  MV Peak E: 1.25 m/s Decel Time: 177 msec  MV Peak A: 0.35 m/s  E/A Ratio: 3.53    SPECTRAL DOPPLER ANALYSIS (where applicable):  Mitral Valve:  MV P1/2 Time: 51.42 msec  MV Area, PHT: 4.28 cm²    Aortic Valve: AoV Max Gentry: 2.64 m/s AoV Peak P.9 mmHg AoV Mean PG:   15.2 mmHg    LVOT Vmax: 1.10 m/s LVOT VTI: 0.236 m LVOT Diameter: 2.14 cm    AoV Area, Vmax: 1.50 cm² AoV Area, VTI: 1.50 cm² AoV Area, Vmn: 1.45 cm²  Ao VTI: 0.564  Aortic Insufficiency:  AI Half-time:  479 msec  AI Decel Rate: 2.10 m/s²    Tricuspid Valve and PA/RV Systolic Pressure: TR Max Velocity: 3.94 m/s RA   Pressure: 10 mmHg RVSP/PASP: 72.0 mmHg    Pulmonic Valve:  PV Max Velocity: 1.19 m/s PV Max P.6 mmHg PV Mean PG:       PHYSICIAN INTERPRETATION:  Left Ventricle: The left ventricular internal cavity size is normal. Left   ventricular wall thickness is normal.  Global LV systolic function was normal. Spectral Doppler shows   restrictive pattern of left ventricular myocardial filling (Grade III   diastolic dysfunction).  Right Ventricle: The right ventricular size is mildly enlarged.  Left Atrium: Moderate to severe left atrial enlargement. LA volume Index   is 67.2 ml/m² ml/m2.  Right Atrium: Severely enlarged right atrium.  Pericardium: There is no evidence of pericardial effusion.  Mitral Valve: Mild thickening of the anterior and posterior mitral valve   leaflets. There is mild mitral annular calcification. Mild mitral valve   regurgitation is seen.  Tricuspid Valve: Structurally normal tricuspid valve, with normal leaflet   excursion. Moderate-severe tricuspid regurgitation is visualized.   Estimated pulmonary artery systolic pressure is 72.0 mmHg assuming a   right atrial pressure of 10 mmHg, which is consistent with severe   pulmonary hypertension.  Aortic Valve: Peak transaortic gradient equals 27.9 mmHg, mean   transaortic gradient equals 15.2 mmHg, the calculated aortic valve area   equals 1.50 cm² by the continuity equation consistent with mild aortic   stenosis. Mild aortic valve regurgitation is seen.  Pulmonic Valve: Structurally normal pulmonic valve, with normal leaflet   excursion. Mild pulmonic valve regurgitation.  Aorta: The aortic root is normal in size and structure.       Summary:   1. Normal global left ventricular systolic function.   2. Spectral Doppler shows restrictive pattern of left ventricular   myocardial filling (Grade III diastolic dysfunction).   3. Mild mitral annular calcification.   4. Mild thickening of the anterior and posterior mitral valve leaflets.   5. Moderate-severe tricuspid regurgitation.   6. Mild aortic regurgitation.   7. Estimated pulmonary artery systolic pressure is 72.0 mmHg assuming a   right atrial pressure of 10 mmHg, which is consistent with severe   pulmonary hypertension.   8. LA volume Index is 67.2 ml/m² ml/m2.   9. Peak transaortic gradient equals 27.9 mmHg, mean transaortic gradient   equals 15.2 mmHg, the calculated aortic valve area equals 1.50 cm² by the   continuity equation consistent with mild aortic stenosis.    [id number] Leslie Dobbins MD, FACC , Electronically signed on 2019 at   12:23:09 PM              *** Final ***                    LESLIE DOBBINS   This document has been electronically signed. 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 Pt presented to [place] on 8/10 w hypoxic respiratory failure, requiring BiPAP. There she was found to have diffuse opacities on CT chest. Tx as pneumonia w no improvement. Pt was also found to have purpuric rash on lower extremities KANIKA. There, also noted to have Hg of 6 s/p 3 units prbc as well as supratherapeutic INR 6.28 s/p 1uFFP & vitamin K, now reversed. Pt was started on steroids on , given solumedrol 40mg X 1 on , then 1 g on , then 625 mg on . Pt says that after steroids, pt improved as far as strength and respiratory symptoms. Pt was then transferred to [facility].  Pt says that 2 weeks before coming to [place] pt had generalized weakness, low appetite, 9 lb weight loss. Denies any fevers, CP, oral ulcers, hair loss, abdominal pain, n/ diarrhea One episode of vomiting. NBNB. No focal weakness, sensory loss. No reported Raynauds, dysphagia. 2 weeks before hospital, pt developed LE rash no painful.     Of note, patient had h/o elevated IVAN, DsDNA, b2gp, and slight elevation in cardilipin ab. Work up was done given pulmonary hypertension.     Family reports hx of long standing RA, dx approx. 20 years ago. Unclear what meds she was on, but reports she took one oral medication for a month, developed a lump on her neck so stopped taking. Has not been following with a rheumatologist. Daugther said she had joint pain in hands/knees, but no swelling, stiffness.  Also with OA in knees, s/p TKR b/l.       OB hx: 5 normal pregnancies. No complications. No thrombotic events in past.     Taking hydralazine for years    Also given anemia, had bone marrow bx done on , which showed marginal B cell lymphoma, and possible MGUS. Pt saw hematologist on . Pt was only put on surveillence, as pt found to be low risk. Skeletal survery negative on , per heme note.     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 Had colonoscopy - negative study.Will therefore remain on      expectant surveillance biannually. Repeated blood work including LDH, beta-2      microglobulin. Reviewed results consistent with mild leukocytosis, with normal      differential. Of note patient is not on steroids.  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 Discussed risk of progression to myeloma of approximately 1% per year.    Of note, outpat labs showed [id number] IgA in 100s, and cardiolipin IgM 30s, DsDNA in 500s.     PAST MEDICAL & SURGICAL HISTORY:  COPD (chronic obstructive pulmonary disease)  Peripheral vascular disease  Pulmonary hypertension  Rheumatoid arthritis  Osteoarthritis  Mitral regurgitation  H/O lymphoma  Hyperlipidemia  Chronic GERD  Chronic kidney disease: proteinuria  AF (atrial fibrillation)  Anemia in CKD (chronic kidney disease)  CHF (congestive heart failure)  HTN (hypertension)  History of total hip replacement, left  History of total knee replacement, bilateral      Review of Systems:  as per HPI otherwise negative     MEDICATIONS  (STANDING):  atorvastatin 10 milliGRAM(s) Oral at bedtime  chlorhexidine 4% Liquid 1 Application(s) Topical <User Schedule>  cloNIDine 0.2 milliGRAM(s) Oral three times a day  dextrose 5%. 1000 milliLiter(s) (50 mL/Hr) IV Continuous <Continuous>  dextrose 50% Injectable 12.5 Gram(s) IV Push once  dextrose 50% Injectable 25 Gram(s) IV Push once  dextrose 50% Injectable 25 Gram(s) IV Push once  doxycycline IVPB      doxycycline IVPB 100 milliGRAM(s) IV Intermittent every 12 hours  insulin lispro (HumaLOG) corrective regimen sliding scale   SubCutaneous three times a day before meals  metoprolol tartrate 50 milliGRAM(s) Oral two times a day  montelukast 10 milliGRAM(s) Oral daily  sertraline 50 milliGRAM(s) Oral daily    MEDICATIONS  (PRN):  ALBUTerol    90 MICROgram(s) HFA Inhaler 2 Puff(s) Inhalation every 6 hours PRN Shortness of Breath and/or Wheezing  dextrose 40% Gel 15 Gram(s) Oral once PRN Blood Glucose LESS THAN 70 milliGRAM(s)/deciliter  glucagon  Injectable 1 milliGRAM(s) IntraMuscular once PRN Glucose LESS THAN 70 milligrams/deciliter  zolpidem 5 milliGRAM(s) Oral at bedtime PRN Insomnia      Allergies    Keflex (Unknown)  penicillin (Rash)    Intolerances        PERTINENT MEDICATION HISTORY:  · 	warfarin 5 mg oral tablet: Last Dose Taken:  , 1 tab(s) orally once a day  · 	hydrALAZINE 25 mg oral tablet: Last Dose Taken:  , 2 tabs in morning, 2 tabs in lunch, and 1 tab at night  · 	atorvastatin 10 mg oral tablet: Last Dose Taken:  , 1 tab(s) orally once a day      SOCIAL HISTORY:  no toxic habits    FAMILY HISTORY:  Family history of inclusion body myositis      Vital Signs Last 24 Hrs  T(C): 36.2 (14 Aug 2019 08:00), Max: 37.1 (13 Aug 2019 17:29)  T(F): 97.2 (14 Aug 2019 08:00), Max: 98.8 (13 Aug 2019 17:29)  HR: 94 (14 Aug 2019 11:42) (78 - 106)  BP: 154/100 (14 Aug 2019 11:00) (131/99 - 172/102)  BP(mean): 116 (14 Aug 2019 11:00) (100 - 144)  RR: 20 (14 Aug 2019 11:42) (14 - 28)  SpO2: 95% (14 Aug 2019 11:42) (93% - 100%)    Daily Height in cm: 160.02 (13 Aug 2019 17:30)    Daily Weight in k.2 (14 Aug 2019 04:00)    Physical Exam:  General: No apparent distress  HEENT: no oral ulcers  CVS: no m,r,g.  Resp: expiratory wheezing, diffuse  GI: Soft, NT/ND  MSK: no synovitis. b/l hand contractures, but no stigmata/subluxations of RA  Skin: faint macular rash on LE, non blanching. On feet, some mild dusky discoloration, but feet warm.     LABS:                        8.9    20.92 )-----------( 399      ( 14 Aug 2019 04:00 )             29.7     Auto Neutrophil #: 18.79 K/uL (19 @ 04:00)    Auto Lymphocyte #: 0.42 K/uL (19 @ 04:00)    Haptoglobin, Serum (19 @ 16:15)    Haptoglobin, Serum: 131 mg/dL    Lactate Dehydrogenase, Serum (19 @ 16:15)    Lactate Dehydrogenase, Serum: 614 U/L    Iron with Total Binding Capacity in AM (19 @ 05:30)    Iron - Total Binding Capacity.: 302 ug/dL    % Saturation, Iron: 8 %    Iron Total, Serum: 25 ug/dL    Unsaturated Iron Binding Capacity: 277 ug/dL    Ferritin, Serum in AM (19 @ 05:30)    Ferritin, Serum: 687 ng/mL    Folate, Serum in AM (19 @ 05:30)    Folate, Serum: 6.7 ng/mL    Vitamin B12, Serum in AM (19 @ 05:30)    Vitamin B12, Serum: 404 pg/mL            137  |  100  |  61<H>  ----------------------------<  168<H>  4.0   |  24  |  1.42<H>    Ca    8.8      14 Aug 2019 04:00  Phos  4.3       Mg     2.1         TPro  7.0  /  Alb  3.3  /  TBili  1.2  /  DBili  x   /  AST  34<H>  /  ALT  34<H>  /  AlkPhos  75      PT/INR - ( 13 Aug 2019 18:30 )   PT: 25.4 SEC;   INR: 2.18          PTT - ( 13 Aug 2019 18:30 )  PTT:42.2 SEC    Urinalysis (19 @ 08:30)    Glucose Qualitative, Urine: Negative    Blood, Urine: Large    pH Urine: 6.5    Color: Yellow    Urine Appearance: Clear    Bilirubin: Negative    Ketone - Urine: Negative    Specific Gravity: 1.010    Protein, Urine: 100    Urobilinogen: Negative    Nitrite: Positive    Leukocyte Esterase Concentration: Moderate    Urine Microscopic-Add On (NC) (19 @ 08:30)    Red Blood Cell - Urine: >50 /HPF    White Blood Cell - Urine: 11-25 /HPF    Bacteria: Moderate /HPF    Epithelial Cells: Moderate    Antineutrophil Cytoplasmic Antibody (19 @ 16:15)    Perinuclear (p-ANCA) Antibody: >1:1280    Cytoplasmic (c-ANCA) Antibody: Negative    Atypical ANCA: Negative    Anti-Nuclear Antibody (19 @ 16:15)    Anti Nuclear Factor Titer: 1:640    IVAN Pattern: Homogeneous    [place] Spotted Fever IgG, Serum (19 @ 16:15)    [facility] Spotted Fever Antibody: Negative    [facility] Spotted Fever Antibody IgM: 2.91: Negative        <0.90                                   Equivocal 0.90 - 1.10                                   Positive        >1.10    Rapid HIV-1/2 Antibody (19 @ 16:15)    Rapid HIV-1/2 Antibody: Nonreact      Legionella pneumophila Antigen, Urine (19 @ 20:54)    Legionella Antigen, Urine: Negative    C3 Complement, Serum (19 @ 11:00)    C3 Complement, Serum: 76 mg/dL    C4 Complement, Serum (19 @ 11:00)    C4 Complement, Serum: 10 mg/dL        RADIOLOGY & ADDITIONAL STUDIES:    < from: US Renal (19 @ 09:06) >  EXAM:  US KIDNEY(S)      PROCEDURE DATE:  2019        INTERPRETATION:  CLINICAL STATEMENT: Acute renal failure    TECHNIQUE: Ultrasound of the kidneys.    COMPARISON: CT abdomen and pelvis 2018    FINDINGS:  The right kidney measures 10.8 cm in length .    The left kidney measures 10.8 cm in length .    There is no hydronephrosis. Cysts noted in both kidneys    IMPRESSION:  No hydronephrosis.                   ISAIAH KNUTSON M.D., ATTENDING RADIOLOGIST  This document has been electronically signed. Aug 11 2019 10:21AM                < end of copied text >    < from: CT Chest No Cont (08.10.19 @ 13:08) >  EXAM:  CT CHEST      PROCEDURE DATE:  08/10/2019        INTERPRETATION:  CLINICAL INFORMATION: Shortness of breath.    COMPARISON: Chest radiograph 8/10/2019 and CT scan chest 2018.    PROCEDURE:   CT of the Chest was performed without intravenous contrast.  Sagittal and coronal reformats were performed.      FINDINGS:    LUNGS AND AIRWAYS: PLEURA:   There is diffuse bilateral peribronchial airspace consolidation involving   both upper and lower lung zones.  The central airways remain patent.    There is a small calcified granuloma right upper lobe.    There is a very small right-sided pleural effusion.    MEDIASTINUM AND MEHREEN: No enlarged mediastinal lymphadenopathy.  Calcified right paratracheal lymph node.    VESSELS: Atherosclerotic calcification of the thoracic aorta. Coronary   artery calcifications.    HEART: The heart is enlarged.   No pericardial effusion.    CHEST WALL AND LOWER NECK: Heterogeneous appearance of the bilateral   lobes of the thyroid gland, stable in appearance.    VISUALIZED UPPER ABDOMEN: Evaluation is limited due to streak artifact.    BONES: Multilevel degenerative changes of thoracic spine.  Chronic compression deformity of the T8 and T12 vertebral body.    IMPRESSION:     Diffuse bilateral peribronchial airspace consolidation may reflect   multilobar bronchopneumonia.    Small right-sided pleural effusion.                  PRASHANTH AZEVEDO M.D., ATTENDING RADIOLOGIST  This document has been electronically signed. Aug 10 2019  1:31PM              < end of copied text >    < from: TTE Echo w/Cont Complete (19 @ 07:48) >  EXAM:  ECHO TTE W(WO)CON COMP [id number]      PROCEDURE DATE:  2019        INTERPRETATION:  REPORT:    TRANSTHORACIC ECHOCARDIOGRAM REPORT         Patient Name:   TONIA BRANHAM Patient Location: [address] Rec #:  [id number]      Accession #:     [id number]  Account #:                    Height:           66.0 in 167.6 cm  YOB: 1934     Weight:           100.1 lb 45.40 kg  Patient Age:    84 years      BSA:              1.49 m²  Patient Gender: F             BP:  96/57 mmHg       Date of Exam:        2019 7:48:56 AM  Sonographer:         Justin Stewart  Referring Physician: MARIA DOLORES    Procedure:     2D Echo/Doppler/Color Doppler Complete.  Indications:   Unspecified atrial fibrillation - I48.91  Diagnosis:     Unspecified atrial fibrillation - I48.91  Study Details: Technically fair study.         2D AND M-MODE MEASUREMENTS (normal ranges within parentheses):  Left Ventricle:                  Normal   Aorta/Left Atrium:              Normal  IVSd (2D):              0.90 cm (0.7-1.1) Aortic Root (2D):    3.20 cm   (2.4-3.7)  LVPWd (2D):             0.81 cm (0.7-1.1) AoV Cusp Separation: 1.60 cm   (1.5-2.6)  LVIDd (2D):             4.00 cm (3.4-5.7) Left Atrium (2D):    4.80 cm   (1.9-4.0)  LVIDs (2D):             2.71 cm  LV FS (2D):             32.2 %   (>25%)  LV EF (2D):              61 %    (>55%)  Relative Wall Thickness  0.40    (<0.42)    LV DIASTOLIC FUNCTION:  MV Peak E: 1.25 m/s Decel Time: 177 msec  MV Peak A: 0.35 m/s  E/A Ratio: 3.53    SPECTRAL DOPPLER ANALYSIS (where applicable):  Mitral Valve:  MV P1/2 Time: 51.42 msec  MV Area, PHT: 4.28 cm²    Aortic Valve: AoV Max Gentry: 2.64 m/s AoV Peak P.9 mmHg AoV Mean PG:   15.2 mmHg    LVOT Vmax: 1.10 m/s LVOT VTI: 0.236 m LVOT Diameter: 2.14 cm    AoV Area, Vmax: 1.50 cm² AoV Area, VTI: 1.50 cm² AoV Area, Vmn: 1.45 cm²  Ao VTI: 0.564  Aortic Insufficiency:  AI Half-time:  479 msec  AI Decel Rate: 2.10 m/s²    Tricuspid Valve and PA/RV Systolic Pressure: TR Max Velocity: 3.94 m/s RA   Pressure: 10 mmHg RVSP/PASP: 72.0 mmHg    Pulmonic Valve:  PV Max Velocity: 1.19 m/s PV Max P.6 mmHg PV Mean PG:       PHYSICIAN INTERPRETATION:  Left Ventricle: The left ventricular internal cavity size is normal. Left   ventricular wall thickness is normal.  Global LV systolic function was normal. Spectral Doppler shows   restrictive pattern of left ventricular myocardial filling (Grade III   diastolic dysfunction).  Right Ventricle: The right ventricular size is mildly enlarged.  Left Atrium: Moderate to severe left atrial enlargement. LA volume Index   is 67.2 ml/m² ml/m2.  Right Atrium: Severely enlarged right atrium.  Pericardium: There is no evidence of pericardial effusion.  Mitral Valve: Mild thickening of the anterior and posterior mitral valve   leaflets. There is mild mitral annular calcification. Mild mitral valve   regurgitation is seen.  Tricuspid Valve: Structurally normal tricuspid valve, with normal leaflet   excursion. Moderate-severe tricuspid regurgitation is visualized.   Estimated pulmonary artery systolic pressure is 72.0 mmHg assuming a   right atrial pressure of 10 mmHg, which is consistent with severe   pulmonary hypertension.  Aortic Valve: Peak transaortic gradient equals 27.9 mmHg, mean   transaortic gradient equals 15.2 mmHg, the calculated aortic valve area   equals 1.50 cm² by the continuity equation consistent with mild aortic   stenosis. Mild aortic valve regurgitation is seen.  Pulmonic Valve: Structurally normal pulmonic valve, with normal leaflet   excursion. Mild pulmonic valve regurgitation.  Aorta: The aortic root is normal in size and structure.       Summary:   1. Normal global left ventricular systolic function.   2. Spectral Doppler shows restrictive pattern of left ventricular   myocardial filling (Grade III diastolic dysfunction).   3. Mild mitral annular calcification.   4. Mild thickening of the anterior and posterior mitral valve leaflets.   5. Moderate-severe tricuspid regurgitation.   6. Mild aortic regurgitation.   7. Estimated pulmonary artery systolic pressure is 72.0 mmHg assuming a   right atrial pressure of 10 mmHg, which is consistent with severe   pulmonary hypertension.   8. LA volume Index is 67.2 ml/m² ml/m2.   9. Peak transaortic gradient equals 27.9 mmHg, mean transaortic gradient   equals 15.2 mmHg, the calculated aortic valve area equals 1.50 cm² by the   continuity equation consistent with mild aortic stenosis.    [id number] Leslie Dobbins MD, FACC , Electronically signed on 2019 at   12:23:09 PM              *** Final ***                    LESLIE DOBBINS   This document has been electronically signed. Aug 11 2019  7:48AM                < end of copied text >

## 2019-08-14 NOTE — CONSULT NOTE ADULT - SUBJECTIVE AND OBJECTIVE BOX
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 Associated symptoms include generalized malaise, nausea, poor PO with associated weight loss of 10lb over the last two months.   In the ED at OSH, patient was hypoxic, put on BIPAP and admitted to the MICU on 8/10. CT chest showing diffuse opacities, possibly due to multifocal PNA. Started on Doxy and [place]a. There, also noted to have Hg of 6 s/p 3 units prbc as well as supratherapeutic INR 6.28 s/p 1uFFP & vitamin K, now reversed. Also found to have KANIKA (baseline 0.5).  Of note, patient had h/o elevated IVAN and dsDNA without SLE symptoms last year. Given concern for possible vasculitis with KANIKA, rash, elevated ESR/CRP, started on Solumedrol 500mg q12, started on 8/12. Patient then became hyperglycemia, and insulin ggt.   Patient was transferred on high flow to [facility] for rheumatologic and derm workup. (13 Aug 2019 17:27)    HPI and hospital course reviewed. Pt with significant PMHx listed above, has had chronic SOB for 2-3 months acutely worsened starting last monday, approximately 9 days ago. No associated with fevers, productive cough, myalgias, arthralgias, dysuria, N/V. Pt went to [facility] this past saturday, found to have acute hypoxic respiratory failure, imaging c/f possible multifocal pneumonia. Pt was also noted to have macular rash of b/ LEs startint this past friday. Was treated with augusta/doxy d/t PCN allergy. D/t c/f vasculitis pt was treated with solumedrol with improvement in symptoms. Transferred to [facility] MICU on 8/12. She has had leukocytosis > 30, Bcx NTD, HIV, legionella, crypto ag negative. P-ANCA returned positive. UA positive. RMSF IgM positive, pt started on doxycyline and ID consulted for further w/u.    Pt improving since starting steroids, was previously on BIPAP and HFNC, now on NC. Denies cough, fever, dysuria, abdominal pain, N/V. No recent tick or mosquito exposure no travel to [facility] on [facility]     prior hospital charts reviewed [  ]  primary team notes reviewed [  ]  other consultant notes reviewed [  ]  PAST MEDICAL & SURGICAL HISTORY:  COPD (chronic obstructive pulmonary disease)  Peripheral vascular disease  Pulmonary hypertension  Rheumatoid arthritis  Osteoarthritis  Mitral regurgitation  H/O lymphoma  Hyperlipidemia  Chronic GERD  Chronic kidney disease: proteinuria  AF (atrial fibrillation)  Anemia in CKD (chronic kidney disease)  CHF (congestive heart failure)  HTN (hypertension)  History of total hip replacement, left  History of total knee replacement, bilateral    Allergies  Keflex (Unknown)  penicillin (Rash)    ANTIMICROBIALS (past 90 days)  MEDICATIONS  (STANDING):    doxycycline IVPB   110 mL/Hr IV Intermittent (08-14-19 @ 11:50)    piperacillin/tazobactam IVPB.   200 mL/Hr IV Intermittent (08-13-19 @ 20:28)    piperacillin/tazobactam IVPB..   25 mL/Hr IV Intermittent (08-14-19 @ 07:47)      ANTIMICROBIALS:    doxycycline IVPB    doxycycline IVPB 100 every 12 hours    OTHER MEDS: MEDICATIONS  (STANDING):  ALBUTerol    90 MICROgram(s) HFA Inhaler 2 every 6 hours PRN  atorvastatin 10 at bedtime  cloNIDine 0.2 three times a day  dextrose 40% Gel 15 once PRN  dextrose 50% Injectable 12.5 once  dextrose 50% Injectable 25 once  dextrose 50% Injectable 25 once  glucagon  Injectable 1 once PRN  insulin lispro (HumaLOG) corrective regimen sliding scale  three times a day before meals  metoprolol tartrate 50 two times a day  montelukast 10 daily  sertraline 50 daily  zolpidem 5 at bedtime PRN    SOCIAL HISTORY:   hx smoking  non-smoker    FAMILY HISTORY:  Family history of inclusion body myositis    REVIEW OF SYSTEMS  [  ] ROS unobtainable because:    [  ] All other systems negative except as noted below:	    Constitutional:  [ ] fever [ ] chills  [ ] weight loss  [ ] weakness  Skin:  [ ] rash [ ] phlebitis	  Eyes: [ ] icterus [ ] pain  [ ] discharge	  ENMT: [ ] sore throat  [ ] thrush [ ] ulcers [ ] exudates  Respiratory: [ ] dyspnea [ ] hemoptysis [ ] cough [ ] sputum	  Cardiovascular:  [ ] chest pain [ ] palpitations [ ] edema	  Gastrointestinal:  [ ] nausea [ ] vomiting [ ] diarrhea [ ] constipation [ ] pain	  Genitourinary:  [ ] dysuria [ ] frequency [ ] hematuria [ ] discharge [ ] flank pain  [ ] incontinence  Musculoskeletal:  [ ] myalgias [ ] arthralgias [ ] arthritis  [ ] back pain  Neurological:  [ ] headache [ ] seizures  [ ] confusion/altered mental status  Psychiatric:  [ ] anxiety [ ] depression	  Hematology/Lymphatics:  [ ] lymphadenopathy  Endocrine:  [ ] adrenal [ ] thyroid  Allergic/Immunologic:	 [ ] transplant [ ] seasonal    Vital Signs Last 24 Hrs  T(F): 97.2 (08-14-19 @ 12:00), Max: 100.9 (08-10-19 @ 11:50)  Vital Signs Last 24 Hrs  HR: 96 (08-14-19 @ 14:15) (78 - 107)  BP: 153/107 (08-14-19 @ 14:00) (131/99 - 172/102)  RR: 20 (08-14-19 @ 14:15)  SpO2: 100% (08-14-19 @ 14:15) (90% - 100%)  Wt(kg): --    PHYSICAL EXAM:  General: non-toxic  HEAD/EYES: anicteric, PERRL  ENT:  supple  Cardiovascular:   S1, S2  Respiratory:  clear bilaterally  GI:  soft, non-tender, normal bowel sounds  :  no CVA tenderness   Musculoskeletal:  no synovitis  Neurologic:  grossly non-focal  Skin:  no rash  Lymph: no lymphadenopathy  Psychiatric:  appropriate affect  Vascular:  no phlebitis                            8.9    20.92 )-----------( 399      ( 14 Aug 2019 04:00 )             29.7     08-14    137  |  100  |  61<H>  ----------------------------<  168<H>  4.0   |  24  |  1.42<H>    Ca    8.8      14 Aug 2019 04:00  Phos  4.3     08-14  Mg     2.1     08-14    TPro  7.0  /  Alb  3.3  /  TBili  1.2  /  DBili  x   /  AST  34<H>  /  ALT  34<H>  /  AlkPhos  75  08-14    MICROBIOLOGY:  Culture - Blood (collected 10 Aug 2019 12:00)  Source: .Blood Blood-Peripheral  Preliminary Report (12 Aug 2019 02:02):    No growth to date.    Culture - Blood (collected 10 Aug 2019 12:00)  Source: .Blood Blood-Peripheral  Preliminary Report (12 Aug 2019 02:02):    No growth to date.              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 Associated symptoms include generalized malaise, nausea, poor PO with associated weight loss of 10lb over the last two months.   In the ED at OSH, patient was hypoxic, put on BIPAP and admitted to the MICU on 8/10. CT chest showing diffuse opacities, possibly due to multifocal PNA. Started on Doxy and [place]. There, also noted to have Hg of 6 s/p 3 units prbc as well as supratherapeutic INR 6.28 s/p 1uFFP & vitamin K, now reversed. Also found to have KANIKA (baseline 0.5).  Of note, patient had h/o elevated IVAN and dsDNA without SLE symptoms last year. Given concern for possible vasculitis with KANIKA, rash, elevated ESR/CRP, started on Solumedrol 500mg q12, started on 8/12. Patient then became hyperglycemia, and insulin ggt.   Patient was transferred on high flow to [facility] for rheumatologic and derm workup. (13 Aug 2019 17:27)    HPI and hospital course reviewed. Pt with significant PMHx listed above, has had chronic SOB for 2-3 months acutely worsened starting last monday, approximately 9 days ago. No associated with fevers, productive cough, myalgias, arthralgias, dysuria, N/V. Pt went to [facility] this past saturday, found to have acute hypoxic respiratory failure, imaging c/f possible multifocal pneumonia. Pt was also noted to have macular rash of b/ LEs startint this past friday. Was treated with augusta/doxy d/t PCN allergy. D/t c/f vasculitis pt was treated with solumedrol with improvement in symptoms. Transferred to [facility] MICU on 8/12. She has had leukocytosis > 30, Bcx NTD, HIV, legionella, crypto ag negative. P-ANCA returned positive. UA positive. RMSF IgM positive, pt started on doxycyline and ID consulted for further w/u.    Pt improving since starting steroids, was previously on BIPAP and HFNC, now on NC. Denies cough, fever, dysuria, abdominal pain, N/V. No recent tick or mosquito exposure no travel to [facility].     prior hospital charts reviewed [  x]  primary team notes reviewed [ x ]  other consultant notes reviewed [ x ]  PAST MEDICAL & SURGICAL HISTORY:  COPD (chronic obstructive pulmonary disease)  Peripheral vascular disease  Pulmonary hypertension  Rheumatoid arthritis  Osteoarthritis  Mitral regurgitation  H/O lymphoma  Hyperlipidemia  Chronic GERD  Chronic kidney disease: proteinuria  AF (atrial fibrillation)  Anemia in CKD (chronic kidney disease)  CHF (congestive heart failure)  HTN (hypertension)  History of total hip replacement, left  History of total knee replacement, bilateral    Allergies  Keflex (Unknown)  penicillin (Rash)    ANTIMICROBIALS (past 90 days)  MEDICATIONS  (STANDING):    doxycycline IVPB   110 mL/Hr IV Intermittent (08-14-19 @ 11:50)    piperacillin/tazobactam IVPB.   200 mL/Hr IV Intermittent (08-13-19 @ 20:28)    piperacillin/tazobactam IVPB..   25 mL/Hr IV Intermittent (08-14-19 @ 07:47)      ANTIMICROBIALS:    doxycycline IVPB    doxycycline IVPB 100 every 12 hours    OTHER MEDS: MEDICATIONS  (STANDING):  ALBUTerol    90 MICROgram(s) HFA Inhaler 2 every 6 hours PRN  atorvastatin 10 at bedtime  cloNIDine 0.2 three times a day  dextrose 40% Gel 15 once PRN  dextrose 50% Injectable 12.5 once  dextrose 50% Injectable 25 once  dextrose 50% Injectable 25 once  glucagon  Injectable 1 once PRN  insulin lispro (HumaLOG) corrective regimen sliding scale  three times a day before meals  metoprolol tartrate 50 two times a day  montelukast 10 daily  sertraline 50 daily  zolpidem 5 at bedtime PRN    SOCIAL HISTORY:  non-smoker, non-drinker, no recreational drug use    FAMILY HISTORY:  Family history of inclusion body myositis    REVIEW OF SYSTEMS  [  ] ROS unobtainable because:    [  ] All other systems negative except as noted below:	    Constitutional:  [ ] fever [ ] chills  [ ] weight loss  [ ] weakness  Skin:  [ ] rash [ ] phlebitis	  Eyes: [ ] icterus [ ] pain  [ ] discharge	  ENMT: [ ] sore throat  [ ] thrush [ ] ulcers [ ] exudates  Respiratory: [x ] dyspnea [ ] hemoptysis [ ] cough [ ] sputum	  Cardiovascular:  [ ] chest pain [ ] palpitations [ ] edema	  Gastrointestinal:  [ ] nausea [ ] vomiting [ ] diarrhea [ ] constipation [ ] pain	  Genitourinary:  [ ] dysuria [ ] frequency [ ] hematuria [ ] discharge [ ] flank pain  [ ] incontinence  Musculoskeletal:  [ ] myalgias [ ] arthralgias [ ] arthritis  [ ] back pain  Neurological:  [ ] headache [ ] seizures  [ ] confusion/altered mental status  Psychiatric:  [ ] anxiety [ ] depression	  Hematology/Lymphatics:  [ ] lymphadenopathy  Endocrine:  [ ] adrenal [ ] thyroid  Allergic/Immunologic:	 [ ] transplant [ ] seasonal    Vital Signs Last 24 Hrs  T(F): 97.2 (08-14-19 @ 12:00), Max: 100.9 (08-10-19 @ 11:50)  Vital Signs Last 24 Hrs  HR: 96 (08-14-19 @ 14:15) (78 - 107)  BP: 153/107 (08-14-19 @ 14:00) (131/99 - 172/102)  RR: 20 (08-14-19 @ 14:15)  SpO2: 100% (08-14-19 @ 14:15) (90% - 100%)  Wt(kg): --    PHYSICAL EXAM:  General: non-toxic  HEAD/EYES: anicteric, PERRL  ENT:  supple  Cardiovascular:   S1, S2  Respiratory:  diffuse crackles and wheezing  GI:  soft, non-tender, normal bowel sounds  :  no CVA tenderness   Musculoskeletal:  hands contracted  Neurologic:  grossly non-focal  Skin: LE rash fading  Lymph: no lymphadenopathy  Psychiatric:  appropriate affect  Vascular:  no phlebitis                            8.9    20.92 )-----------( 399      ( 14 Aug 2019 04:00 )             29.7     08-14    137  |  100  |  61<H>  ----------------------------<  168<H>  4.0   |  24  |  1.42<H>    Ca    8.8      14 Aug 2019 04:00  Phos  4.3     08-14  Mg     2.1     08-14    TPro  7.0  /  Alb  3.3  /  TBili  1.2  /  DBili  x   /  AST  34<H>  /  ALT  34<H>  /  AlkPhos  75  08-14    MICROBIOLOGY:  Culture - Blood (collected 10 Aug 2019 12:00)  Source: .Blood Blood-Peripheral  Preliminary Report (12 Aug 2019 02:02):    No growth to date.    Culture - Blood (collected 10 Aug 2019 12:00)  Source: .Blood Blood-Peripheral  Preliminary Report (12 Aug 2019 02:02):    No growth to date.              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 Associated symptoms include generalized malaise, nausea, poor PO with associated weight loss of 10lb over the last two months.   In the ED at OSH, patient was hypoxic, put on BIPAP and admitted to the MICU on 8/10. CT chest showing diffuse opacities, possibly due to multifocal PNA. Started on Doxy and [place]a. There, also noted to have Hg of 6 s/p 3 units prbc as well as supratherapeutic INR 6.28 s/p 1uFFP & vitamin K, now reversed. Also found to have KANIKA (baseline 0.5).  Of note, patient had h/o elevated IVAN and dsDNA without SLE symptoms last year. Given concern for possible vasculitis with KANIKA, rash, elevated ESR/CRP, started on Solumedrol 500mg q12, started on 8/12. Patient then became hyperglycemia, and insulin ggt.   Patient was transferred on high flow to [facility] for rheumatologic and derm workup. (13 Aug 2019 17:27)    HPI and hospital course reviewed. Pt with significant PMHx listed above, has had chronic SOB for 2-3 months acutely worsened starting last monday, approximately 9 days ago. No associated with fevers, productive cough, myalgias, arthralgias, dysuria, N/V. Pt went to [facility] this past saturday, found to have acute hypoxic respiratory failure, imaging c/f possible multifocal pneumonia. Pt was also noted to have macular rash of b/ LEs startint this past friday. Was treated with augusta/doxy d/t PCN allergy. D/t c/f vasculitis pt was treated with solumedrol with improvement in symptoms. Transferred to [facility] MICU on 8/12. She has had leukocytosis > 30, Bcx NTD, HIV, legionella, crypto ag negative. P-ANCA returned positive. UA positive. RMSF IgM positive, pt started on doxycyline and ID consulted for further w/u.    Pt improving since starting steroids, was previously on BIPAP and HFNC, now on NC. Denies cough, fever, dysuria, abdominal pain, N/V. No recent tick or mosquito exposure no travel to [facility] on [facility]. Daughter showed picture of rash, macular, isolated to LE's.     prior hospital charts reviewed [  x]  primary team notes reviewed [ x ]  other consultant notes reviewed [ x ]  PAST MEDICAL & SURGICAL HISTORY:  COPD (chronic obstructive pulmonary disease)  Peripheral vascular disease  Pulmonary hypertension  Rheumatoid arthritis  Osteoarthritis  Mitral regurgitation  H/O lymphoma  Hyperlipidemia  Chronic GERD  Chronic kidney disease: proteinuria  AF (atrial fibrillation)  Anemia in CKD (chronic kidney disease)  CHF (congestive heart failure)  HTN (hypertension)  History of total hip replacement, left  History of total knee replacement, bilateral    Allergies  Keflex (Unknown)  penicillin (Rash)    ANTIMICROBIALS (past 90 days)  MEDICATIONS  (STANDING):    doxycycline IVPB   110 mL/Hr IV Intermittent (08-14-19 @ 11:50)    piperacillin/tazobactam IVPB.   200 mL/Hr IV Intermittent (08-13-19 @ 20:28)    piperacillin/tazobactam IVPB..   25 mL/Hr IV Intermittent (08-14-19 @ 07:47)      ANTIMICROBIALS:    doxycycline IVPB    doxycycline IVPB 100 every 12 hours    OTHER MEDS: MEDICATIONS  (STANDING):  ALBUTerol    90 MICROgram(s) HFA Inhaler 2 every 6 hours PRN  atorvastatin 10 at bedtime  cloNIDine 0.2 three times a day  dextrose 40% Gel 15 once PRN  dextrose 50% Injectable 12.5 once  dextrose 50% Injectable 25 once  dextrose 50% Injectable 25 once  glucagon  Injectable 1 once PRN  insulin lispro (HumaLOG) corrective regimen sliding scale  three times a day before meals  metoprolol tartrate 50 two times a day  montelukast 10 daily  sertraline 50 daily  zolpidem 5 at bedtime PRN    SOCIAL HISTORY:  non-smoker, non-drinker, no recreational drug use    FAMILY HISTORY:  Family history of inclusion body myositis    REVIEW OF SYSTEMS  [  ] ROS unobtainable because:    [  ] All other systems negative except as noted below:	    Constitutional:  [ ] fever [ ] chills  [ ] weight loss  [ ] weakness  Skin:  [ ] rash [ ] phlebitis	  Eyes: [ ] icterus [ ] pain  [ ] discharge	  ENMT: [ ] sore throat  [ ] thrush [ ] ulcers [ ] exudates  Respiratory: [x ] dyspnea [ ] hemoptysis [ ] cough [ ] sputum	  Cardiovascular:  [ ] chest pain [ ] palpitations [ ] edema	  Gastrointestinal:  [ ] nausea [ ] vomiting [ ] diarrhea [ ] constipation [ ] pain	  Genitourinary:  [ ] dysuria [ ] frequency [ ] hematuria [ ] discharge [ ] flank pain  [ ] incontinence  Musculoskeletal:  [ ] myalgias [ ] arthralgias [ ] arthritis  [ ] back pain  Neurological:  [ ] headache [ ] seizures  [ ] confusion/altered mental status  Psychiatric:  [ ] anxiety [ ] depression	  Hematology/Lymphatics:  [ ] lymphadenopathy  Endocrine:  [ ] adrenal [ ] thyroid  Allergic/Immunologic:	 [ ] transplant [ ] seasonal    Vital Signs Last 24 Hrs  T(F): 97.2 (08-14-19 @ 12:00), Max: 100.9 (08-10-19 @ 11:50)  Vital Signs Last 24 Hrs  HR: 96 (08-14-19 @ 14:15) (78 - 107)  BP: 153/107 (08-14-19 @ 14:00) (131/99 - 172/102)  RR: 20 (08-14-19 @ 14:15)  SpO2: 100% (08-14-19 @ 14:15) (90% - 100%)  Wt(kg): --    PHYSICAL EXAM:  General: non-toxic  HEAD/EYES: anicteric, PERRL  ENT:  supple  Cardiovascular:   S1, S2  Respiratory:  diffuse crackles and wheezing  GI:  soft, non-tender, normal bowel sounds  :  no CVA tenderness   Musculoskeletal:  hands contracted  Neurologic:  grossly non-focal  Skin: LE rash fading  Lymph: no lymphadenopathy  Psychiatric:  appropriate affect  Vascular:  no phlebitis                            8.9    20.92 )-----------( 399      ( 14 Aug 2019 04:00 )             29.7     08-14    137  |  100  |  61<H>  ----------------------------<  168<H>  4.0   |  24  |  1.42<H>    Ca    8.8      14 Aug 2019 04:00  Phos  4.3     08-14  Mg     2.1     08-14    TPro  7.0  /  Alb  3.3  /  TBili  1.2  /  DBili  x   /  AST  34<H>  /  ALT  34<H>  /  AlkPhos  75  08-14    MICROBIOLOGY:  Culture - Blood (collected 10 Aug 2019 12:00)  Source: .Blood Blood-Peripheral  Preliminary Report (12 Aug 2019 02:02):    No growth to date.    Culture - Blood (collected 10 Aug 2019 12:00)  Source: .Blood Blood-Peripheral  Preliminary Report (12 Aug 2019 02:02):    No growth to date.              Rapid RVP Result: NotDetec (08-12 @ 20:54)      RADIOLOGY:  < from: Xray Chest 1 View- PORTABLE-Routine (08.13.19 @ 06:22) >  IMPRESSION: Unchanged advanced infiltrates.        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 Associated symptoms include generalized malaise, nausea, poor PO with associated weight loss of 10lb over the last two months.   In the ED at OSH, patient was hypoxic, put on BIPAP and admitted to the MICU on 8/10. CT chest showing diffuse opacities, possibly due to multifocal PNA. Started on Doxy and [place]. There, also noted to have Hg of 6 s/p 3 units prbc as well as supratherapeutic INR 6.28 s/p 1uFFP & vitamin K, now reversed. Also found to have KANIKA (baseline 0.5).  Of note, patient had h/o elevated IVAN and dsDNA without SLE symptoms last year. Given concern for possible vasculitis with KANIKA, rash, elevated ESR/CRP, started on Solumedrol 500mg q12, started on 8/12. Patient then became hyperglycemia, and insulin ggt.   Patient was transferred on high flow to [facility] for rheumatologic and derm workup. (13 Aug 2019 17:27)    HPI and hospital course reviewed. Pt with significant PMHx listed above, has had chronic SOB for 2-3 months acutely worsened starting last monday, approximately 9 days ago. No associated with fevers, productive cough, myalgias, arthralgias, dysuria, N/V. Pt went to [facility] this past saturday, found to have acute hypoxic respiratory failure, imaging c/f possible multifocal pneumonia. Pt was also noted to have macular rash of b/ LEs startint this past friday. Was treated with augusta/doxy d/t PCN allergy. D/t c/f vasculitis pt was treated with solumedrol with improvement in symptoms. Transferred to [facility] MICU on 8/12. She has had leukocytosis > 30, Bcx NTD, HIV, legionella, crypto ag negative. P-ANCA returned positive. UA positive. RMSF IgM positive, pt started on doxycyline and ID consulted for further w/u.    Pt improving since starting steroids, was previously on BIPAP and HFNC, now on NC. Denies cough, fever, dysuria, abdominal pain, N/V. No recent tick or mosquito exposure no travel to [facility] on [facility]. Daughter showed picture of rash, macular, isolated to LE's.     prior hospital charts reviewed [  x]  primary team notes reviewed [ x ]  other consultant notes reviewed [ x ]  PAST MEDICAL & SURGICAL HISTORY:  COPD (chronic obstructive pulmonary disease)  Peripheral vascular disease  Pulmonary hypertension  Rheumatoid arthritis  Osteoarthritis  Mitral regurgitation  H/O lymphoma  Hyperlipidemia  Chronic GERD  Chronic kidney disease: proteinuria  AF (atrial fibrillation)  Anemia in CKD (chronic kidney disease)  CHF (congestive heart failure)  HTN (hypertension)  History of total hip replacement, left  History of total knee replacement, bilateral    Allergies  Keflex (Unknown)  penicillin (Rash)    ANTIMICROBIALS (past 90 days)  MEDICATIONS  (STANDING):    doxycycline IVPB   110 mL/Hr IV Intermittent (08-14-19 @ 11:50)    piperacillin/tazobactam IVPB.   200 mL/Hr IV Intermittent (08-13-19 @ 20:28)    piperacillin/tazobactam IVPB..   25 mL/Hr IV Intermittent (08-14-19 @ 07:47)      ANTIMICROBIALS:    doxycycline IVPB    doxycycline IVPB 100 every 12 hours    OTHER MEDS: MEDICATIONS  (STANDING):  ALBUTerol    90 MICROgram(s) HFA Inhaler 2 every 6 hours PRN  atorvastatin 10 at bedtime  cloNIDine 0.2 three times a day  dextrose 40% Gel 15 once PRN  dextrose 50% Injectable 12.5 once  dextrose 50% Injectable 25 once  dextrose 50% Injectable 25 once  glucagon  Injectable 1 once PRN  insulin lispro (HumaLOG) corrective regimen sliding scale  three times a day before meals  metoprolol tartrate 50 two times a day  montelukast 10 daily  sertraline 50 daily  zolpidem 5 at bedtime PRN    SOCIAL HISTORY:  from [place], non-smoker, non-drinker, no recreational drug use    FAMILY HISTORY:  Family history of inclusion body myositis    REVIEW OF SYSTEMS  [  ] ROS unobtainable because:    [  ] All other systems negative except as noted below:	    Constitutional:  [ ] fever [ ] chills  [ ] weight loss  [ ] weakness  Skin:  [ ] rash [ ] phlebitis	  Eyes: [ ] icterus [ ] pain  [ ] discharge	  ENMT: [ ] sore throat  [ ] thrush [ ] ulcers [ ] exudates  Respiratory: [x ] dyspnea [ ] hemoptysis [ ] cough [ ] sputum	  Cardiovascular:  [ ] chest pain [ ] palpitations [ ] edema	  Gastrointestinal:  [ ] nausea [ ] vomiting [ ] diarrhea [ ] constipation [ ] pain	  Genitourinary:  [ ] dysuria [ ] frequency [ ] hematuria [ ] discharge [ ] flank pain  [ ] incontinence  Musculoskeletal:  [ ] myalgias [ ] arthralgias [ ] arthritis  [ ] back pain  Neurological:  [ ] headache [ ] seizures  [ ] confusion/altered mental status  Psychiatric:  [ ] anxiety [ ] depression	  Hematology/Lymphatics:  [ ] lymphadenopathy  Endocrine:  [ ] adrenal [ ] thyroid  Allergic/Immunologic:	 [ ] transplant [ ] seasonal    Vital Signs Last 24 Hrs  T(F): 97.2 (08-14-19 @ 12:00), Max: 100.9 (08-10-19 @ 11:50)  Vital Signs Last 24 Hrs  HR: 96 (08-14-19 @ 14:15) (78 - 107)  BP: 153/107 (08-14-19 @ 14:00) (131/99 - 172/102)  RR: 20 (08-14-19 @ 14:15)  SpO2: 100% (08-14-19 @ 14:15) (90% - 100%)  Wt(kg): --    PHYSICAL EXAM:  General: non-toxic  HEAD: atraumatic, normocephalic  EYES: anicteric, PERRL  ENT:  no oropharyngeal lesions, supple  Cardiovascular:  irregular  S1, S2  Respiratory:  diffuse crackles  GI:  soft, non-tender, normal bowel sounds  :  no CVA tenderness   Musculoskeletal:  no edema  Neurologic:  grossly non-focal  Skin: LE rash fading  Lymph: no lymphadenopathy  Psychiatric:  appropriate affect  Vascular:  no phlebitis                            8.9    20.92 )-----------( 399      ( 14 Aug 2019 04:00 )             29.7     08-14    137  |  100  |  61<H>  ----------------------------<  168<H>  4.0   |  24  |  1.42<H>    Ca    8.8      14 Aug 2019 04:00  Phos  4.3     08-14  Mg     2.1     08-14    TPro  7.0  /  Alb  3.3  /  TBili  1.2  /  DBili  x   /  AST  34<H>  /  ALT  34<H>  /  AlkPhos  75  08-14    MICROBIOLOGY:  Culture - Blood (collected 10 Aug 2019 12:00)  Source: .Blood Blood-Peripheral  Preliminary Report (12 Aug 2019 02:02):    No growth to date.    Culture - Blood (collected 10 Aug 2019 12:00)  Source: .Blood Blood-Peripheral  Preliminary Report (12 Aug 2019 02:02):    No growth to date.              Rapid RVP Result: NotDetec (08-12 @ 20:54)      RADIOLOGY:  < from: Xray Chest 1 View- PORTABLE-Routine (08.13.19 @ 06:22) >  IMPRESSION: Unchanged advanced infiltrates.        < end of copied text >

## 2019-08-14 NOTE — CHART NOTE - NSCHARTNOTEFT_GEN_A_CORE
: MD Arlen    INDICATION: Resp. Failure    PROCEDURE:  [x ] LIMITED ECHO  [ x] LIMITED CHEST  [ ] LIMITED RETROPERITONEAL  [ ] LIMITED ABDOMINAL  [ ] LIMITED DVT  [ ] NEEDLE GUIDANCE VASCULAR  [ ] NEEDLE GUIDANCE THORACENTESIS  [ ] NEEDLE GUIDANCE PARACENTESIS  [ ] NEEDLE GUIDANCE PERICARDIOCENTESIS  [ ] OTHER    FINDINGS:  Lung: Multiple B-line w/ irregular pleural surface throughout  Cardiac:  Normal LVF, No PEF, RV size and function not well visualized    INTERPRETATION: Generalized AIS pattern consistent w/ primary lung injury. No cardiac limitation, however RV not well visualized.

## 2019-08-14 NOTE — CONSULT NOTE ADULT - ASSESSMENT
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 Associated symptoms include generalized malaise, nausea, poor PO with associated weight loss of 10lb over the last two months. Patient has demonstrated marked improvement in respiratory status on Solumedrol and Doxycycline with quick improvement of her oxygenation, currently on 2 L nasal cannula during interview. Notable labs include Blood cx NGTD, HIV, legionella -, crypto ag -, P-ANCA positive1:1280, IVAN positive 1:640, C3 76, C4 10, elevated CRP and ESR. RMSF IgM positive. Concern for underlying rheumatalogic/autoimmune disease process.

## 2019-08-14 NOTE — CONSULT NOTE ADULT - ASSESSMENT
Patient is an 84 year old female presenting with rash, hypoxemia and elevated serum creatinine levels

## 2019-08-15 NOTE — PROGRESS NOTE ADULT - PROBLEM SELECTOR PLAN 1
Diffuse bilateral peribronchial airspace consolidation may reflect multilobar bronchopneumonia. Notable labs include Blood cx NGTD, HIV, legionella -, crypto ag -, P-ANCA positive1:1280, IVAN positive 1:640, C3 76, C4 10, elevated CRP and ESR. RMSF IgM positive. Concern for underlying rheumatalogic/autoimmune disease process. 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

## 2019-08-15 NOTE — PROGRESS NOTE ADULT - PROBLEM SELECTOR PLAN 2
- Improving with steroids and current treatment   - Likely 2/2 rheumatological etiology such as hydralazine induced SLE, small vessel vasculitis, or other complex depositing glomerulopathies  - Will follow-up rheum serologies as above  - Avoid ACEis, ARBS, NSAIDs, and other nephrotoxic drugs  - Will renally dose all meds  - Will discuss with renal about Bx tomorrow as INR still supratherapeutic

## 2019-08-15 NOTE — PROGRESS NOTE ADULT - SUBJECTIVE AND OBJECTIVE BOX
TONIA ADRIANA  [id number]    INTERVAL HPI/OVERNIGHT EVENTS:    Feels better today, on 2L NC oxygen, says her breathing has improved, was able to walk to the bathroom. Seen by pulmonary, possible bronchoscopy tomorrow.    MEDICATIONS  (STANDING):  atorvastatin 10 milliGRAM(s) Oral at bedtime  cloNIDine 0.2 milliGRAM(s) Oral three times a day  dextrose 5%. 1000 milliLiter(s) (50 mL/Hr) IV Continuous <Continuous>  dextrose 50% Injectable 12.5 Gram(s) IV Push once  dextrose 50% Injectable 25 Gram(s) IV Push once  dextrose 50% Injectable 25 Gram(s) IV Push once  doxycycline IVPB      doxycycline IVPB 100 milliGRAM(s) IV Intermittent every 12 hours  insulin glargine Injectable (LANTUS) 10 Unit(s) SubCutaneous at bedtime  insulin lispro (HumaLOG) corrective regimen sliding scale   SubCutaneous at bedtime  insulin lispro (HumaLOG) corrective regimen sliding scale   SubCutaneous three times a day before meals  insulin lispro Injectable (HumaLOG) 3 Unit(s) SubCutaneous before breakfast  methylPREDNISolone sodium succinate IVPB 500 milliGRAM(s) IV Intermittent once  metoprolol tartrate 50 milliGRAM(s) Oral two times a day  montelukast 10 milliGRAM(s) Oral daily  sertraline 50 milliGRAM(s) Oral daily    MEDICATIONS  (PRN):  ALBUTerol    90 MICROgram(s) HFA Inhaler 2 Puff(s) Inhalation every 6 hours PRN Shortness of Breath and/or Wheezing  dextrose 40% Gel 15 Gram(s) Oral once PRN Blood Glucose LESS THAN 70 milliGRAM(s)/deciliter  glucagon  Injectable 1 milliGRAM(s) IntraMuscular once PRN Glucose LESS THAN 70 milligrams/deciliter  zolpidem 5 milliGRAM(s) Oral at bedtime PRN Insomnia      Allergies    Keflex (Unknown)  penicillin (Rash)    Intolerances        Review of Systems:   per HPI      Vital Signs Last 24 Hrs  T(C): 36.9 (15 Aug 2019 10:02), Max: 36.9 (15 Aug 2019 10:02)  T(F): 98.5 (15 Aug 2019 10:02), Max: 98.5 (15 Aug 2019 10:02)  HR: 82 (15 Aug 2019 10:02) (80 - 107)  BP: 136/80 (15 Aug 2019 10:02) (136/80 - 164/101)  BP(mean): 115 (14 Aug 2019 16:00) (115 - 125)  RR: 18 (15 Aug 2019 10:02) (17 - 28)  SpO2: 97% (15 Aug 2019 10:02) (90% - 100%)    Physical Exam:  General: NAD. on 2L NC  HEENT: EOMI, MMM  Cardio: +S1/S2, RRR  Resp: wheezing  GI: +BS, soft, NT/ND  MSK: no synovitis  Neuro: AAOx3  Psych: wnl  Skin: faint rash on LE    LABS:                        9.3    13.79 )-----------( 389      ( 15 Aug 2019 06:45 )             31.4     08-15    140  |  102  |  62<H>  ----------------------------<  191<H>  4.5   |  25  |  1.16    Ca    9.4      15 Aug 2019 06:45  Phos  4.7     08-15  Mg     2.1     08-15    TPro  6.9  /  Alb  3.5  /  TBili  1.0  /  DBili  x   /  AST  23  /  ALT  33  /  AlkPhos  68  0815    PT/INR - ( 15 Aug 2019 06:45 )   PT: 25.4 SEC;   INR: 2.23          PTT - ( 15 Aug 2019 06:45 )  PTT:38.9 SEC  Urinalysis Basic - ( 14 Aug 2019 19:32 )    Color: YELLOW / Appearance: CLEAR / S.019 / pH: 6.0  Gluc: 50 / Ketone: NEGATIVE  / Bili: NEGATIVE / Urobili: NORMAL   Blood: MODERATE / Protein: 200 / Nitrite: NEGATIVE   Leuk Esterase: NEGATIVE / RBC: >50 / WBC 11-25   Sq Epi: OCC / Non Sq Epi: x / Bacteria: NEGATIVE        Urine Microscopic-Add On (NC) (19 @ 08:30)    Red Blood Cell - Urine: >50 /HPF    White Blood Cell - Urine: 11-25 /HPF    Bacteria: Moderate /HPF    Epithelial Cells: Moderate    Antineutrophil Cytoplasmic Antibody (19 @ 16:15)    Perinuclear (p-ANCA) Antibody: >1:1280    Cytoplasmic (c-ANCA) Antibody: Negative    Atypical ANCA: Negative    Myeloperoxidase Antibody Assay (19 @ 16:15)    Myeloperoxidase Antibody Assay: 96.2 Units    Myeloperoxidase Ab Interpretation: Positive: Method: EIA  Interpretation                                     Units                                <= 20.0          Negative                                20.1 - 30.0   Weak Positive                                 > 30.0          Positive    Proteinase 3 Antibody Assay (19 @ 16:15)    Proteinase 3 Antibody Assay: 5.1 Units    Proteinase 3 Antibody Interpretation: Negative: Method: EIA  Interpretation                                     Units                                <= 20.0          Negative                                20.1 - 30.0   Weak Positive                                 > 30.0          Positive      Anti-Nuclear Antibody (19 @ 16:15)    Anti Nuclear Factor Titer: 1:640    IVAN Pattern: Homogeneous    [place] Spotted Fever IgG, Serum (19 @ 16:15)    [facility] Spotted Fever Antibody: Negative    [facility] Spotted Fever Antibody IgM: 2.91: Negative        <0.90                                   Equivocal 0.90 - 1.10                                   Positive        >1.10    Rapid HIV-1/2 Antibody (19 @ 16:15)    Rapid HIV-1/2 Antibody: Nonreact      Legionella pneumophila Antigen, Urine (19 @ 20:54)    Legionella Antigen, Urine: Negative    C3 Complement, Serum (19 @ 11:00)    C3 Complement, Serum: 76 mg/dL    C4 Complement, Serum (19 @ 11:00)    C4 Complement, Serum: 10 mg/dL        RADIOLOGY & ADDITIONAL STUDIES:    < from: US Renal (19 @ 09:06) >  EXAM:  US KIDNEY(S)      PROCEDURE DATE:  2019        INTERPRETATION:  CLINICAL STATEMENT: Acute renal failure    TECHNIQUE: Ultrasound of the kidneys.    COMPARISON: CT abdomen and pelvis 2018    FINDINGS:  The right kidney measures 10.8 cm in length .    The left kidney measures 10.8 cm in length .    There is no hydronephrosis. Cysts noted in both kidneys    IMPRESSION:  No hydronephrosis.                   ISAIAH KNUTSON M.D., ATTENDING RADIOLOGIST  This document has been electronically signed. Aug 11 2019 10:21AM                < end of copied text >    < from: CT Chest No Cont (08.10.19 @ 13:08) >  EXAM:  CT CHEST      PROCEDURE DATE:  08/10/2019        INTERPRETATION:  CLINICAL INFORMATION: Shortness of breath.    COMPARISON: Chest radiograph 8/10/2019 and CT scan chest 2018.    PROCEDURE:   CT of the Chest was performed without intravenous contrast.  Sagittal and coronal reformats were performed.      FINDINGS:    LUNGS AND AIRWAYS: PLEURA:   There is diffuse bilateral peribronchial airspace consolidation involving   both upper and lower lung zones.  The central airways remain patent.    There is a small calcified granuloma right upper lobe.    There is a very small right-sided pleural effusion.    MEDIASTINUM AND MEHREEN: No enlarged mediastinal lymphadenopathy.  Calcified right paratracheal lymph node.    VESSELS: Atherosclerotic calcification of the thoracic aorta. Coronary   artery calcifications.    HEART: The heart is enlarged.   No pericardial effusion.    CHEST WALL AND LOWER NECK: Heterogeneous appearance of the bilateral   lobes of the thyroid gland, stable in appearance.    VISUALIZED UPPER ABDOMEN: Evaluation is limited due to streak artifact.    BONES: Multilevel degenerative changes of thoracic spine.  Chronic compression deformity of the T8 and T12 vertebral body.    IMPRESSION:     Diffuse bilateral peribronchial airspace consolidation may reflect   multilobar bronchopneumonia.    Small right-sided pleural effusion.                  PRASHANTH AZEVEDO M.D., ATTENDING RADIOLOGIST  This document has been electronically signed. Aug 10 2019  1:31PM              < end of copied text >    < from: TTE Echo w/Cont Complete (19 @ 07:48) >  EXAM:  ECHO TTE W(WO)CON COMP [id number]      PROCEDURE DATE:  2019        INTERPRETATION:  REPORT:    TRANSTHORACIC ECHOCARDIOGRAM REPORT         Patient Name:   TONIA BRANHAM Patient Location: [address] Rec #:  [id number]      Accession #:     [id number]  Account #:                    Height:           66.0 in 167.6 cm  YOB: 1934     Weight:           100.1 lb 45.40 kg  Patient Age:    84 years      BSA:              1.49 m²  Patient Gender: F             BP:  96/57 mmHg       Date of Exam:        2019 7:48:56 AM  Sonographer:         Justin Stewart  Referring Physician: MARIA DOLORES    Procedure:     2D Echo/Doppler/Color Doppler Complete.  Indications:   Unspecified atrial fibrillation - I48.91  Diagnosis:     Unspecified atrial fibrillation - I48.91  Study Details: Technically fair study.         2D AND M-MODE MEASUREMENTS (normal ranges within parentheses):  Left Ventricle:                  Normal   Aorta/Left Atrium:              Normal  IVSd (2D):              0.90 cm (0.7-1.1) Aortic Root (2D):    3.20 cm   (2.4-3.7)  LVPWd (2D):             0.81 cm (0.7-1.1) AoV Cusp Separation: 1.60 cm   (1.5-2.6)  LVIDd (2D):             4.00 cm (3.4-5.7) Left Atrium (2D):    4.80 cm   (1.9-4.0)  LVIDs (2D):             2.71 cm  LV FS (2D):             32.2 %   (>25%)  LV EF (2D):              61 %    (>55%)  Relative Wall Thickness  0.40    (<0.42)    LV DIASTOLIC FUNCTION:  MV Peak E: 1.25 m/s Decel Time: 177 msec  MV Peak A: 0.35 m/s  E/A Ratio: 3.53    SPECTRAL DOPPLER ANALYSIS (where applicable):  Mitral Valve:  MV P1/2 Time: 51.42 msec  MV Area, PHT: 4.28 cm²    Aortic Valve: AoV Max Gentry: 2.64 m/s AoV Peak P.9 mmHg AoV Mean PG:   15.2 mmHg    LVOT Vmax: 1.10 m/s LVOT VTI: 0.236 m LVOT Diameter: 2.14 cm    AoV Area, Vmax: 1.50 cm² AoV Area, VTI: 1.50 cm² AoV Area, Vmn: 1.45 cm²  Ao VTI: 0.564  Aortic Insufficiency:  AI Half-time:  479 msec  AI Decel Rate: 2.10 m/s²    Tricuspid Valve and PA/RV Systolic Pressure: TR Max Velocity: 3.94 m/s RA   Pressure: 10 mmHg RVSP/PASP: 72.0 mmHg    Pulmonic Valve:  PV Max Velocity: 1.19 m/s PV Max P.6 mmHg PV Mean PG:       PHYSICIAN INTERPRETATION:  Left Ventricle: The left ventricular internal cavity size is normal. Left   ventricular wall thickness is normal.  Global LV systolic function was normal. Spectral Doppler shows   restrictive pattern of left ventricular myocardial filling (Grade III   diastolic dysfunction).  Right Ventricle: The right ventricular size is mildly enlarged.  Left Atrium: Moderate to severe left atrial enlargement. LA volume Index   is 67.2 ml/m² ml/m2.  Right Atrium: Severely enlarged right atrium.  Pericardium: There is no evidence of pericardial effusion.  Mitral Valve: Mild thickening of the anterior and posterior mitral valve   leaflets. There is mild mitral annular calcification. Mild mitral valve   regurgitation is seen.  Tricuspid Valve: Structurally normal tricuspid valve, with normal leaflet   excursion. Moderate-severe tricuspid regurgitation is visualized.   Estimated pulmonary artery systolic pressure is 72.0 mmHg assuming a   right atrial pressure of 10 mmHg, which is consistent with severe   pulmonary hypertension.  Aortic Valve: Peak transaortic gradient equals 27.9 mmHg, mean   transaortic gradient equals 15.2 mmHg, the calculated aortic valve area   equals 1.50 cm² by the continuity equation consistent with mild aortic   stenosis. Mild aortic valve regurgitation is seen.  Pulmonic Valve: Structurally normal pulmonic valve, with normal leaflet   excursion. Mild pulmonic valve regurgitation.  Aorta: The aortic root is normal in size and structure.       Summary:   1. Normal global left ventricular systolic function.   2. Spectral Doppler shows restrictive pattern of left ventricular   myocardial filling (Grade III diastolic dysfunction).   3. Mild mitral annular calcification.   4. Mild thickening of the anterior and posterior mitral valve leaflets.   5. Moderate-severe tricuspid regurgitation.   6. Mild aortic regurgitation.   7. Estimated pulmonary artery systolic pressure is 72.0 mmHg assuming a   right atrial pressure of 10 mmHg, which is consistent with severe   pulmonary hypertension.   8. LA volume Index is 67.2 ml/m² ml/m2.   9. Peak transaortic gradient equals 27.9 mmHg, mean transaortic gradient   equals 15.2 mmHg, the calculated aortic valve area equals 1.50 cm² by the   continuity equation consistent with mild aortic stenosis.    [id number] Leslie Dobbins MD, [facility] , Electronically signed on 2019 at   12:23:09 PM              *** Final ***                    LESLIE DOBBINS   This document has been electronically signed. Aug 11 2019  7:48AM                < end of copied text > TONIA ADRIANA  [id number]    INTERVAL HPI/OVERNIGHT EVENTS:    Feels better today, on 2L [place] oxygen, says her breathing has improved, was able to walk to the bathroom. Seen by pulmonary, possible bronchoscopy tomorrow.    MEDICATIONS  (STANDING):  atorvastatin 10 milliGRAM(s) Oral at bedtime  cloNIDine 0.2 milliGRAM(s) Oral three times a day  dextrose 5%. 1000 milliLiter(s) (50 mL/Hr) IV Continuous <Continuous>  dextrose 50% Injectable 12.5 Gram(s) IV Push once  dextrose 50% Injectable 25 Gram(s) IV Push once  dextrose 50% Injectable 25 Gram(s) IV Push once  doxycycline IVPB      doxycycline IVPB 100 milliGRAM(s) IV Intermittent every 12 hours  insulin glargine Injectable (LANTUS) 10 Unit(s) SubCutaneous at bedtime  insulin lispro (HumaLOG) corrective regimen sliding scale   SubCutaneous at bedtime  insulin lispro (HumaLOG) corrective regimen sliding scale   SubCutaneous three times a day before meals  insulin lispro Injectable (HumaLOG) 3 Unit(s) SubCutaneous before breakfast  methylPREDNISolone sodium succinate IVPB 500 milliGRAM(s) IV Intermittent once  metoprolol tartrate 50 milliGRAM(s) Oral two times a day  montelukast 10 milliGRAM(s) Oral daily  sertraline 50 milliGRAM(s) Oral daily    MEDICATIONS  (PRN):  ALBUTerol    90 MICROgram(s) HFA Inhaler 2 Puff(s) Inhalation every 6 hours PRN Shortness of Breath and/or Wheezing  dextrose 40% Gel 15 Gram(s) Oral once PRN Blood Glucose LESS THAN 70 milliGRAM(s)/deciliter  glucagon  Injectable 1 milliGRAM(s) IntraMuscular once PRN Glucose LESS THAN 70 milligrams/deciliter  zolpidem 5 milliGRAM(s) Oral at bedtime PRN Insomnia      Allergies    Keflex (Unknown)  penicillin (Rash)    Intolerances        Review of Systems:   per HPI, but pertinents include improving SOB, no chest pain, cough, hemoptysis, hematuria, fevers, joint pains, muscle aches, new worsening rashes.      Vital Signs Last 24 Hrs  T(C): 36.9 (15 Aug 2019 10:02), Max: 36.9 (15 Aug 2019 10:02)  T(F): 98.5 (15 Aug 2019 10:02), Max: 98.5 (15 Aug 2019 10:02)  HR: 82 (15 Aug 2019 10:02) (80 - 107)  BP: 136/80 (15 Aug 2019 10:02) (136/80 - 164/101)  BP(mean): 115 (14 Aug 2019 16:00) (115 - 125)  RR: 18 (15 Aug 2019 10:02) (17 - 28)  SpO2: 97% (15 Aug 2019 10:02) (90% - 100%)    Physical Exam:  General: NAD. on 2L NC  HEENT: EOMI, MMM  Cardio: +S1/S2, RRR  Resp: wheezing  GI: +BS, soft, NT/ND  MSK: no synovitis  Neuro: AAOx3  Psych: wnl  Skin: faint rash on LE    LABS:                        9.3    13.79 )-----------( 389      ( 15 Aug 2019 06:45 )             31.4     08-15    140  |  102  |  62<H>  ----------------------------<  191<H>  4.5   |  25  |  1.16    Ca    9.4      15 Aug 2019 06:45  Phos  4.7     08-15  Mg     2.1     08-15    TPro  6.9  /  Alb  3.5  /  TBili  1.0  /  DBili  x   /  AST  23  /  ALT  33  /  AlkPhos  68  08-15    PT/INR - ( 15 Aug 2019 06:45 )   PT: 25.4 SEC;   INR: 2.23          PTT - ( 15 Aug 2019 06:45 )  PTT:38.9 SEC  Urinalysis Basic - ( 14 Aug 2019 19:32 )    Color: YELLOW / Appearance: CLEAR / S.019 / pH: 6.0  Gluc: 50 / Ketone: NEGATIVE  / Bili: NEGATIVE / Urobili: NORMAL   Blood: MODERATE / Protein: 200 / Nitrite: NEGATIVE   Leuk Esterase: NEGATIVE / RBC: >50 / WBC 11-25   Sq Epi: OCC / Non Sq Epi: x / Bacteria: NEGATIVE        Urine Microscopic-Add On (NC) (19 @ 08:30)    Red Blood Cell - Urine: >50 /HPF    White Blood Cell - Urine: 11-25 /HPF    Bacteria: Moderate /HPF    Epithelial Cells: Moderate    Antineutrophil Cytoplasmic Antibody (19 @ 16:15)    Perinuclear (p-ANCA) Antibody: >1:1280    Cytoplasmic (c-ANCA) Antibody: Negative    Atypical ANCA: Negative    Myeloperoxidase Antibody Assay (19 @ 16:15)    Myeloperoxidase Antibody Assay: 96.2 Units    Myeloperoxidase Ab Interpretation: Positive: Method: EIA  Interpretation                                     Units                                <= 20.0          Negative                                20.1 - 30.0   Weak Positive                                 > 30.0          Positive    Proteinase 3 Antibody Assay (19 @ 16:15)    Proteinase 3 Antibody Assay: 5.1 Units    Proteinase 3 Antibody Interpretation: Negative: Method: EIA  Interpretation                                     Units                                <= 20.0          Negative                                20.1 - 30.0   Weak Positive                                 > 30.0          Positive      Anti-Nuclear Antibody (19 @ 16:15)    Anti Nuclear Factor Titer: 1:640    IVAN Pattern: Homogeneous    [place] Spotted Fever IgG, Serum (19 @ 16:15)    [place] Spotted Fever Antibody: Negative    [facility] Spotted Fever Antibody IgM: 2.91: Negative        <0.90                                   Equivocal 0.90 - 1.10                                   Positive        >1.10    Rapid HIV-1/2 Antibody (19 @ 16:15)    Rapid HIV-1/2 Antibody: Nonreact      Legionella pneumophila Antigen, Urine (19 @ 20:54)    Legionella Antigen, Urine: Negative    C3 Complement, Serum (19 @ 11:00)    C3 Complement, Serum: 76 mg/dL    C4 Complement, Serum (19 @ 11:00)    C4 Complement, Serum: 10 mg/dL        RADIOLOGY & ADDITIONAL STUDIES:    < from: US Renal (19 @ 09:06) >  EXAM:  US KIDNEY(S)      PROCEDURE DATE:  2019        INTERPRETATION:  CLINICAL STATEMENT: Acute renal failure    TECHNIQUE: Ultrasound of the kidneys.    COMPARISON: CT abdomen and pelvis 2018    FINDINGS:  The right kidney measures 10.8 cm in length .    The left kidney measures 10.8 cm in length .    There is no hydronephrosis. Cysts noted in both kidneys    IMPRESSION:  No hydronephrosis.                   ISAIAH KNUTSON M.D., ATTENDING RADIOLOGIST  This document has been electronically signed. Aug 11 2019 10:21AM                < end of copied text >    < from: CT Chest No Cont (08.10.19 @ 13:08) >  EXAM:  CT CHEST      PROCEDURE DATE:  08/10/2019        INTERPRETATION:  CLINICAL INFORMATION: Shortness of breath.    COMPARISON: Chest radiograph 8/10/2019 and CT scan chest 2018.    PROCEDURE:   CT of the Chest was performed without intravenous contrast.  Sagittal and coronal reformats were performed.      FINDINGS:    LUNGS AND AIRWAYS: PLEURA:   There is diffuse bilateral peribronchial airspace consolidation involving   both upper and lower lung zones.  The central airways remain patent.    There is a small calcified granuloma right upper lobe.    There is a very small right-sided pleural effusion.    MEDIASTINUM AND MEHREEN: No enlarged mediastinal lymphadenopathy.  Calcified right paratracheal lymph node.    VESSELS: Atherosclerotic calcification of the thoracic aorta. Coronary   artery calcifications.    HEART: The heart is enlarged.   No pericardial effusion.    CHEST WALL AND LOWER NECK: Heterogeneous appearance of the bilateral   lobes of the thyroid gland, stable in appearance.    VISUALIZED UPPER ABDOMEN: Evaluation is limited due to streak artifact.    BONES: Multilevel degenerative changes of thoracic spine.  Chronic compression deformity of the T8 and T12 vertebral body.    IMPRESSION:     Diffuse bilateral peribronchial airspace consolidation may reflect   multilobar bronchopneumonia.    Small right-sided pleural effusion.                  PRASHANTH AZEVEDO M.D., ATTENDING RADIOLOGIST  This document has been electronically signed. Aug 10 2019  1:31PM              < end of copied text >    < from: TTE Echo w/Cont Complete (19 @ 07:48) >  EXAM:  ECHO TTE W(WO)CON COMP [id number]      PROCEDURE DATE:  2019        INTERPRETATION:  REPORT:    TRANSTHORACIC ECHOCARDIOGRAM REPORT         Patient Name:   TONIA BRANHAM Patient Location: [address] Rec #:  [id number]      Accession #:     [id number]  Account #:                    Height:           66.0 in 167.6 cm  YOB: 1934     Weight:           100.1 lb 45.40 kg  Patient Age:    84 years      BSA:              1.49 m²  Patient Gender: F             BP:  96/57 mmHg       Date of Exam:        2019 7:48:56 AM  Sonographer:         Justin Stewart  Referring Physician: MARIA DOLORES    Procedure:     2D Echo/Doppler/Color Doppler Complete.  Indications:   Unspecified atrial fibrillation - I48.91  Diagnosis:     Unspecified atrial fibrillation - I48.91  Study Details: Technically fair study.         2D AND M-MODE MEASUREMENTS (normal ranges within parentheses):  Left Ventricle:                  Normal   Aorta/Left Atrium:              Normal  IVSd (2D):              0.90 cm (0.7-1.1) Aortic Root (2D):    3.20 cm   (2.4-3.7)  LVPWd (2D):             0.81 cm (0.7-1.1) AoV Cusp Separation: 1.60 cm   (1.5-2.6)  LVIDd (2D):             4.00 cm (3.4-5.7) Left Atrium (2D):    4.80 cm   (1.9-4.0)  LVIDs (2D):             2.71 cm  LV FS (2D):             32.2 %   (>25%)  LV EF (2D):              61 %    (>55%)  Relative Wall Thickness  0.40    (<0.42)    LV DIASTOLIC FUNCTION:  MV Peak E: 1.25 m/s Decel Time: 177 msec  MV Peak A: 0.35 m/s  E/A Ratio: 3.53    SPECTRAL DOPPLER ANALYSIS (where applicable):  Mitral Valve:  MV P1/2 Time: 51.42 msec  MV Area, PHT: 4.28 cm²    Aortic Valve: AoV Max Gentry: 2.64 m/s AoV Peak P.9 mmHg AoV Mean PG:   15.2 mmHg    LVOT Vmax: 1.10 m/s LVOT VTI: 0.236 m LVOT Diameter: 2.14 cm    AoV Area, Vmax: 1.50 cm² AoV Area, VTI: 1.50 cm² AoV Area, Vmn: 1.45 cm²  Ao VTI: 0.564  Aortic Insufficiency:  AI Half-time:  479 msec  AI Decel Rate: 2.10 m/s²    Tricuspid Valve and PA/RV Systolic Pressure: TR Max Velocity: 3.94 m/s RA   Pressure: 10 mmHg RVSP/PASP: 72.0 mmHg    Pulmonic Valve:  PV Max Velocity: 1.19 m/s PV Max P.6 mmHg PV Mean PG:       PHYSICIAN INTERPRETATION:  Left Ventricle: The left ventricular internal cavity size is normal. Left   ventricular wall thickness is normal.  Global LV systolic function was normal. Spectral Doppler shows   restrictive pattern of left ventricular myocardial filling (Grade III   diastolic dysfunction).  Right Ventricle: The right ventricular size is mildly enlarged.  Left Atrium: Moderate to severe left atrial enlargement. LA volume Index   is 67.2 ml/m² ml/m2.  Right Atrium: Severely enlarged right atrium.  Pericardium: There is no evidence of pericardial effusion.  Mitral Valve: Mild thickening of the anterior and posterior mitral valve   leaflets. There is mild mitral annular calcification. Mild mitral valve   regurgitation is seen.  Tricuspid Valve: Structurally normal tricuspid valve, with normal leaflet   excursion. Moderate-severe tricuspid regurgitation is visualized.   Estimated pulmonary artery systolic pressure is 72.0 mmHg assuming a   right atrial pressure of 10 mmHg, which is consistent with severe   pulmonary hypertension.  Aortic Valve: Peak transaortic gradient equals 27.9 mmHg, mean   transaortic gradient equals 15.2 mmHg, the calculated aortic valve area   equals 1.50 cm² by the continuity equation consistent with mild aortic   stenosis. Mild aortic valve regurgitation is seen.  Pulmonic Valve: Structurally normal pulmonic valve, with normal leaflet   excursion. Mild pulmonic valve regurgitation.  Aorta: The aortic root is normal in size and structure.       Summary:   1. Normal global left ventricular systolic function.   2. Spectral Doppler shows restrictive pattern of left ventricular   myocardial filling (Grade III diastolic dysfunction).   3. Mild mitral annular calcification.   4. Mild thickening of the anterior and posterior mitral valve leaflets.   5. Moderate-severe tricuspid regurgitation.   6. Mild aortic regurgitation.   7. Estimated pulmonary artery systolic pressure is 72.0 mmHg assuming a   right atrial pressure of 10 mmHg, which is consistent with severe   pulmonary hypertension.   8. LA volume Index is 67.2 ml/m² ml/m2.   9. Peak transaortic gradient equals 27.9 mmHg, mean transaortic gradient   equals 15.2 mmHg, the calculated aortic valve area equals 1.50 cm² by the   continuity equation consistent with mild aortic stenosis.    [id number] Leslie Dobbins MD, FACC , Electronically signed on 2019 at   12:23:09 PM              *** Final ***                    LESLIE DOBBINS   This document has been electronically signed. Aug 11 2019  7:48AM                < end of copied text >

## 2019-08-15 NOTE — DIETITIAN INITIAL EVALUATION ADULT. - PERTINENT LABORATORY DATA
(8/15) WBC 13.79 H, H/H 9.3/31.4 L, BUN 62 H, Glu 191 H, Albumin 3.5, phosphorus 4.7 H;             (8/13) Albumin 1.4 L, AST 46 H

## 2019-08-15 NOTE — PROGRESS NOTE ADULT - ATTENDING COMMENTS
pt seen and examined by me personally   events/data reviewed   agree with above   continue current dose of steroids

## 2019-08-15 NOTE — PROGRESS NOTE ADULT - SUBJECTIVE AND OBJECTIVE BOX
****Patient seen and examined. Note in progress please follow-up in afternoon for completed note and plan.   Medicine Accept Note    Patient is a 84y old  Female who presents with a chief complaint of MICU transfer - hypoxic respiratory failure (15 Aug 2019 12:15)    INTERVAL HPI/OVERNIGHT EVENTS:  Pt is an 85 y/o F w/ a PMHx of A fib on coumadin, CHF, HLD, HTN, COPD, anxiety, depression who originally presented from home for 2 week history of increasing weakness, decreased appetite and 9 lb jorge loss over the last few months, some nausea and dizziness. Pt states the biggest reason she came to [place] was shortness of breath. Patient was found to be hypoxic on room air to 70s, with KANIKA, and supratherapeutic INR and was admitted to [facility] ICU.     T(C): 36.9 (08-15-19 @ 10:02), Max: 36.9 (08-15-19 @ 10:02)  HR: 82 (08-15-19 @ 10:02) (80 - 94)  BP: 136/80 (08-15-19 @ 10:02) (136/80 - 152/86)  RR: 18 (08-15-19 @ 10:02) (17 - 18)  SpO2: 97% (08-15-19 @ 10:02) (97% - 99%)    14 Aug 2019 07:01  -  15 Aug 2019 07:00  --------------------------------------------------------  IN: 200 mL / OUT: 675 mL / NET: -475 mL    PHYSICAL EXAM:  GENERAL: Lying in bed comfortably in NAD  HEAD:  Atraumatic, Normocephalic  EYES: PERRL, EOMs intact b/l,   ENMT: Moist Mucus membranes  NERVOUS SYSTEM:  A&Ox4, Normal Motor strenght in b/l Upper and lower extremities, gross sensation to light tough intact in b/l upper and lower extremities, CNs II-XII intact b/l w/out focal defcitis, EOMs intact, and PERRL  CHEST/LUNG: Diffuse wheezing  HEART: RRR no m/g/r  ABDOMEN: +BS, soft, NT, ND  EXTREMITIES:  +2 radial pulses b/l, no LE edema  LYMPH: No anterior/Posterior or supraclavicular LAD  SKIN: No new rashes or DTIs, warm, dry, and intact, small circular nonpalpable purpura in b/l LEs    PAST MEDICAL & SURGICAL HISTORY:  COPD (chronic obstructive pulmonary disease)  Peripheral vascular disease  Pulmonary hypertension  Rheumatoid arthritis  Osteoarthritis  Mitral regurgitation  H/O lymphoma  Hyperlipidemia  Chronic GERD  Chronic kidney disease: proteinuria  AF (atrial fibrillation)  Anemia in CKD (chronic kidney disease)  CHF (congestive heart failure)  HTN (hypertension)  History of total hip replacement, left  History of total knee replacement, bilateral    MEDICATIONS  (STANDING):  atorvastatin 10 milliGRAM(s) Oral at bedtime  cloNIDine 0.2 milliGRAM(s) Oral three times a day  dextrose 5%. 1000 milliLiter(s) (50 mL/Hr) IV Continuous <Continuous>  dextrose 50% Injectable 12.5 Gram(s) IV Push once  dextrose 50% Injectable 25 Gram(s) IV Push once  dextrose 50% Injectable 25 Gram(s) IV Push once  doxycycline IVPB      doxycycline IVPB 100 milliGRAM(s) IV Intermittent every 12 hours  insulin glargine Injectable (LANTUS) 10 Unit(s) SubCutaneous at bedtime  insulin lispro (HumaLOG) corrective regimen sliding scale   SubCutaneous at bedtime  insulin lispro (HumaLOG) corrective regimen sliding scale   SubCutaneous three times a day before meals  insulin lispro Injectable (HumaLOG) 3 Unit(s) SubCutaneous before breakfast  metoprolol tartrate 50 milliGRAM(s) Oral two times a day  montelukast 10 milliGRAM(s) Oral daily  sertraline 50 milliGRAM(s) Oral daily    MEDICATIONS  (PRN):  ALBUTerol    90 MICROgram(s) HFA Inhaler 2 Puff(s) Inhalation every 6 hours PRN Shortness of Breath and/or Wheezing  dextrose 40% Gel 15 Gram(s) Oral once PRN Blood Glucose LESS THAN 70 milliGRAM(s)/deciliter  glucagon  Injectable 1 milliGRAM(s) IntraMuscular once PRN Glucose LESS THAN 70 milligrams/deciliter  zolpidem 5 milliGRAM(s) Oral at bedtime PRN Insomnia    REVIEW OF SYSTEMS:  ROS: Denies fevers, chills, CP, Abdominal pain, rhinorrhea, new rashes, new LE edema, new visual changes, confusion, headaches, blood in stools, N/V, dysuria, and hematuria.     LABS:                        9.3    13.79 )-----------( 389      ( 15 Aug 2019 06:45 )             31.4     08-15    140  |  102  |  62<H>  ----------------------------<  191<H>  4.5   |  25  |  1.16    Ca    9.4      15 Aug 2019 06:45  Phos  4.7     08-15  Mg     2.1     08-15    TPro  6.9  /  Alb  3.5  /  TBili  1.0  /  DBili  x   /  AST  23  /  ALT  33  /  AlkPhos  68  08-15    LIVER FUNCTIONS - ( 15 Aug 2019 06:45 )  Alb: 3.5 g/dL / Pro: 6.9 g/dL / ALK PHOS: 68 u/L / ALT: 33 u/L / AST: 23 u/L / GGT: x     / T. Bili 1.0 mg/dL / D. Bili x         PT/INR - ( 15 Aug 2019 06:45 )   PT: 25.4 SEC;   INR: 2.23     PTT - ( 15 Aug 2019 06:45 )  PTT:38.9 SEC    CAPILLARY BLOOD GLUCOSE  POCT Blood Glucose.: 188 mg/dL (15 Aug 2019 12:22)  POCT Blood Glucose.: 189 mg/dL (15 Aug 2019 08:31)  POCT Blood Glucose.: 252 mg/dL (14 Aug 2019 22:21)  POCT Blood Glucose.: 220 mg/dL (14 Aug 2019 16:58)    VBG      Urinalysis Basic - ( 14 Aug 2019 19:32 )  Color: YELLOW / Appearance: CLEAR / S.019 / pH: 6.0  Gluc: 50 / Ketone: NEGATIVE  / Bili: NEGATIVE / Urobili: NORMAL   Blood: MODERATE / Protein: 200 / Nitrite: NEGATIVE   Leuk Esterase: NEGATIVE / RBC: >50 / WBC 11-25   Sq Epi: OCC / Non Sq Epi: x / Bacteria: NEGATIVE    Blood Cx - No growth to date - 8/10  urine Leg - negative  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    Rheum:  +IVAN  +p-anca  -c-anca  C3 -   C4 -     RADIOLOGY & ADDITIONAL TESTS:  Chest xray   Non-Con Chest CT -    Imaging Personally Reviewed:  [x] YES  [ ] NO    Consultant(s) Notes Reviewed:  [x] YES  [ ] NO - Nephro, Pulm, Rheum, & ID    Care Discussed with Consultants/Other Providers [x] YES  [ ] NO Medicine Accept Note    Patient is a 84y old  Female who presents with a chief complaint of MICU transfer - hypoxic respiratory failure (15 Aug 2019 12:15)    INTERVAL HPI/OVERNIGHT EVENTS:  Pt is an 83 y/o F w/ a PMHx of A fib on coumadin, CHF, HLD, HTN, COPD, anxiety, depression who originally presented from home for 2 week history of increasing weakness, decreased appetite and 9 lb jorge loss over the last few months, some nausea and dizziness. Pt states the biggest reason she came to [place] was shortness of breath. Patient was found to be hypoxic on room air to 70s, anemia 20 6 (s/p 3U PRBCs) with KANIKA, and supratherapeutic INR given 1U FFB and Vit K for reversal, and was admitted to [facility] ICU.    Since being at [facility] Patient was on BiPAP transitioned to NC, continued on Doxycycline for Multifocal PNA and +Javed Mountain spotted Fever IgM, given 1g of solumedrol x 2 days now in day 3 and with +IVAN, +p-anca, Myeloperoxidase Ab +, C3 - 76 (L), and C4 - 10 (L) s/p treatment with 1g IV solumedrol with improvement in symptoms and lab work.     T(C): 36.9 (08-15-19 @ 10:02), Max: 36.9 (08-15-19 @ 10:02)  HR: 82 (08-15-19 @ 10:02) (80 - 94)  BP: 136/80 (08-15-19 @ 10:02) (136/80 - 152/86)  RR: 18 (08-15-19 @ 10:02) (17 - 18)  SpO2: 97% (08-15-19 @ 10:02) (97% - 99%)    14 Aug 2019 07:01  -  15 Aug 2019 07:00  --------------------------------------------------------  IN: 200 mL / OUT: 675 mL / NET: -475 mL    PHYSICAL EXAM:  GENERAL: Lying in bed comfortably in NAD  HEAD:  Atraumatic, Normocephalic  EYES: PERRL, EOMs intact b/l,   ENMT: Moist Mucus membranes  NERVOUS SYSTEM:  A&Ox4, Normal Motor strength in b/l Upper and lower extremities, gross sensation to light tough intact in b/l upper and lower extremities, CNs II-XII intact b/l w/out focal defcitis, EOMs intact, and PERRL  CHEST/LUNG: Diffuse wheezing  HEART: RRR no m/g/r  ABDOMEN: +BS, soft, NT, ND  EXTREMITIES:  +2 radial pulses b/l, no LE edema  LYMPH: No anterior/Posterior or supraclavicular LAD  SKIN: No new rashes or DTIs, warm, dry, and intact, small circular nonpalpable purpura in b/l LEs    PAST MEDICAL & SURGICAL HISTORY:  COPD (chronic obstructive pulmonary disease)  Peripheral vascular disease  Pulmonary hypertension  Rheumatoid arthritis  Osteoarthritis  Mitral regurgitation  H/O lymphoma  Hyperlipidemia  Chronic GERD  Chronic kidney disease: proteinuria  AF (atrial fibrillation)  Anemia in CKD (chronic kidney disease)  CHF (congestive heart failure)  HTN (hypertension)  History of total hip replacement, left  History of total knee replacement, bilateral    MEDICATIONS  (STANDING):  atorvastatin 10 milliGRAM(s) Oral at bedtime  cloNIDine 0.2 milliGRAM(s) Oral three times a day  dextrose 5%. 1000 milliLiter(s) (50 mL/Hr) IV Continuous <Continuous>  dextrose 50% Injectable 12.5 Gram(s) IV Push once  dextrose 50% Injectable 25 Gram(s) IV Push once  dextrose 50% Injectable 25 Gram(s) IV Push once  doxycycline IVPB      doxycycline IVPB 100 milliGRAM(s) IV Intermittent every 12 hours  insulin glargine Injectable (LANTUS) 10 Unit(s) SubCutaneous at bedtime  insulin lispro (HumaLOG) corrective regimen sliding scale   SubCutaneous at bedtime  insulin lispro (HumaLOG) corrective regimen sliding scale   SubCutaneous three times a day before meals  insulin lispro Injectable (HumaLOG) 3 Unit(s) SubCutaneous before breakfast  metoprolol tartrate 50 milliGRAM(s) Oral two times a day  montelukast 10 milliGRAM(s) Oral daily  sertraline 50 milliGRAM(s) Oral daily    MEDICATIONS  (PRN):  ALBUTerol    90 MICROgram(s) HFA Inhaler 2 Puff(s) Inhalation every 6 hours PRN Shortness of Breath and/or Wheezing  dextrose 40% Gel 15 Gram(s) Oral once PRN Blood Glucose LESS THAN 70 milliGRAM(s)/deciliter  glucagon  Injectable 1 milliGRAM(s) IntraMuscular once PRN Glucose LESS THAN 70 milligrams/deciliter  zolpidem 5 milliGRAM(s) Oral at bedtime PRN Insomnia    REVIEW OF SYSTEMS:  ROS: Denies fevers, chills, CP, Abdominal pain, rhinorrhea, new rashes, new LE edema, new visual changes, confusion, headaches, blood in stools, N/V, dysuria, and hematuria.     LABS:                        9.3    13.79 )-----------( 389      ( 15 Aug 2019 06:45 )             31.4     08-15    140  |  102  |  62<H>  ----------------------------<  191<H>  4.5   |  25  |  1.16    Ca    9.4      15 Aug 2019 06:45  Phos  4.7     08-15  Mg     2.1     08-15    TPro  6.9  /  Alb  3.5  /  TBili  1.0  /  DBili  x   /  AST  23  /  ALT  33  /  AlkPhos  68  08-15    LIVER FUNCTIONS - ( 15 Aug 2019 06:45 )  Alb: 3.5 g/dL / Pro: 6.9 g/dL / ALK PHOS: 68 u/L / ALT: 33 u/L / AST: 23 u/L / GGT: x     / T. Bili 1.0 mg/dL / D. Bili x         PT/INR - ( 15 Aug 2019 06:45 )   PT: 25.4 SEC;   INR: 2.23     PTT - ( 15 Aug 2019 06:45 )  PTT:38.9 SEC    CAPILLARY BLOOD GLUCOSE  POCT Blood Glucose.: 188 mg/dL (15 Aug 2019 12:22)  POCT Blood Glucose.: 189 mg/dL (15 Aug 2019 08:31)  POCT Blood Glucose.: 252 mg/dL (14 Aug 2019 22:21)  POCT Blood Glucose.: 220 mg/dL (14 Aug 2019 16:58)    VBG      Urinalysis Basic - ( 14 Aug 2019 19:32 )  Color: YELLOW / Appearance: CLEAR / S.019 / pH: 6.0  Gluc: 50 / Ketone: NEGATIVE  / Bili: NEGATIVE / Urobili: NORMAL   Blood: MODERATE / Protein: 200 / Nitrite: NEGATIVE   Leuk Esterase: NEGATIVE / RBC: >50 / WBC 11-25   Sq Epi: OCC / Non Sq Epi: x / Bacteria: NEGATIVE    Blood Cx - No growth to date - 8/10  urine Leg - negative  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 Small right-sided pleural effusion    CT Head - Normal with no acute pathology    Renal US - No hydro    Imaging Personally Reviewed:  [x] YES  [ ] NO    Consultant(s) Notes Reviewed:  [x] YES  [ ] NO - Nephro, Pulm, Rheum, & ID    Care Discussed with Consultants/Other Providers [x] YES  [ ] NO Medicine Accept Note    Patient is a 84y old  Female who presents with a chief complaint of MICU transfer - hypoxic respiratory failure (15 Aug 2019 12:15)    INTERVAL HPI/OVERNIGHT EVENTS:  Pt is an 83 y/o F w/ a PMHx of A fib on coumadin, CHF, HLD, HTN, COPD, anxiety, depression who originally presented from home for 2 week history of increasing weakness, decreased appetite and 9 lb jorge loss over the last few months, some nausea and dizziness. Pt states the biggest reason she came to [place] was shortness of breath. Patient was found to be hypoxic on room air to 70s, anemia 20 6 (s/p 3U PRBCs) with KANIKA, and supratherapeutic INR given 1U FFB and Vit K for reversal, and was admitted to [facility] ICU.    Since being at [facility] Patient was on BiPAP transitioned to NC, continued on Doxycycline for Multifocal PNA and +Javed Mountain spotted Fever IgM, given 1g of solumedrol x 2 days now in day 3 and with +IVAN, +p-anca, Myeloperoxidase Ab +, C3 - 76 (L), and C4 - 10 (L) s/p treatment with 1g IV solumedrol with improvement in symptoms and lab work.     T(C): 36.9 (08-15-19 @ 10:02), Max: 36.9 (08-15-19 @ 10:02)  HR: 82 (08-15-19 @ 10:02) (80 - 94)  BP: 136/80 (08-15-19 @ 10:02) (136/80 - 152/86)  RR: 18 (08-15-19 @ 10:02) (17 - 18)  SpO2: 97% (08-15-19 @ 10:02) (97% - 99%)    14 Aug 2019 07:01  -  15 Aug 2019 07:00  --------------------------------------------------------  IN: 200 mL / OUT: 675 mL / NET: -475 mL    PHYSICAL EXAM:  GENERAL: Lying in bed comfortably in NAD  HEAD:  Atraumatic, Normocephalic  EYES: PERRL, EOMs intact b/l,   ENMT: Moist Mucus membranes  NERVOUS SYSTEM:  A&Ox4, Normal Motor strength in b/l Upper and lower extremities, gross sensation to light tough intact in b/l upper and lower extremities, CNs II-XII intact b/l w/out focal defcitis, EOMs intact, and PERRL  CHEST/LUNG: Diffuse wheezing  HEART: RRR no m/g/r  ABDOMEN: +BS, soft, NT, ND  EXTREMITIES:  +2 radial pulses b/l, no LE edema  LYMPH: No anterior/Posterior or supraclavicular LAD  SKIN: No new rashes or DTIs, warm, dry, and intact, small circular nonpalpable purpura in b/l LEs    PAST MEDICAL & SURGICAL HISTORY:  COPD (chronic obstructive pulmonary disease)  Peripheral vascular disease  Pulmonary hypertension  Rheumatoid arthritis  Osteoarthritis  Mitral regurgitation  H/O lymphoma  Hyperlipidemia  Chronic GERD  Chronic kidney disease: proteinuria  AF (atrial fibrillation)  Anemia in CKD (chronic kidney disease)  CHF (congestive heart failure)  HTN (hypertension)  History of total hip replacement, left  History of total knee replacement, bilateral    MEDICATIONS  (STANDING):  atorvastatin 10 milliGRAM(s) Oral at bedtime  cloNIDine 0.2 milliGRAM(s) Oral three times a day  dextrose 5%. 1000 milliLiter(s) (50 mL/Hr) IV Continuous <Continuous>  dextrose 50% Injectable 12.5 Gram(s) IV Push once  dextrose 50% Injectable 25 Gram(s) IV Push once  dextrose 50% Injectable 25 Gram(s) IV Push once  doxycycline IVPB      doxycycline IVPB 100 milliGRAM(s) IV Intermittent every 12 hours  insulin glargine Injectable (LANTUS) 10 Unit(s) SubCutaneous at bedtime  insulin lispro (HumaLOG) corrective regimen sliding scale   SubCutaneous at bedtime  insulin lispro (HumaLOG) corrective regimen sliding scale   SubCutaneous three times a day before meals  insulin lispro Injectable (HumaLOG) 3 Unit(s) SubCutaneous before breakfast  metoprolol tartrate 50 milliGRAM(s) Oral two times a day  montelukast 10 milliGRAM(s) Oral daily  sertraline 50 milliGRAM(s) Oral daily    MEDICATIONS  (PRN):  ALBUTerol    90 MICROgram(s) HFA Inhaler 2 Puff(s) Inhalation every 6 hours PRN Shortness of Breath and/or Wheezing  dextrose 40% Gel 15 Gram(s) Oral once PRN Blood Glucose LESS THAN 70 milliGRAM(s)/deciliter  glucagon  Injectable 1 milliGRAM(s) IntraMuscular once PRN Glucose LESS THAN 70 milligrams/deciliter  zolpidem 5 milliGRAM(s) Oral at bedtime PRN Insomnia    REVIEW OF SYSTEMS:  ROS: Denies fevers, chills, CP, Abdominal pain, rhinorrhea, new rashes, new LE edema, new visual changes, confusion, headaches, blood in stools, N/V, dysuria, and hematuria.     LABS:                        9.3    13.79 )-----------( 389      ( 15 Aug 2019 06:45 )             31.4     08-15    140  |  102  |  62<H>  ----------------------------<  191<H>  4.5   |  25  |  1.16    Ca    9.4      15 Aug 2019 06:45  Phos  4.7     08-15  Mg     2.1     08-15    TPro  6.9  /  Alb  3.5  /  TBili  1.0  /  DBili  x   /  AST  23  /  ALT  33  /  AlkPhos  68  08-15    LIVER FUNCTIONS - ( 15 Aug 2019 06:45 )  Alb: 3.5 g/dL / Pro: 6.9 g/dL / ALK PHOS: 68 u/L / ALT: 33 u/L / AST: 23 u/L / GGT: x     / T. Bili 1.0 mg/dL / D. Bili x         PT/INR - ( 15 Aug 2019 06:45 )   PT: 25.4 SEC;   INR: 2.23     PTT - ( 15 Aug 2019 06:45 )  PTT:38.9 SEC    CAPILLARY BLOOD GLUCOSE  POCT Blood Glucose.: 188 mg/dL (15 Aug 2019 12:22)  POCT Blood Glucose.: 189 mg/dL (15 Aug 2019 08:31)  POCT Blood Glucose.: 252 mg/dL (14 Aug 2019 22:21)  POCT Blood Glucose.: 220 mg/dL (14 Aug 2019 16:58)    VBG      Urinalysis Basic - ( 14 Aug 2019 19:32 )  Color: YELLOW / Appearance: CLEAR / S.019 / pH: 6.0  Gluc: 50 / Ketone: NEGATIVE  / Bili: NEGATIVE / Urobili: NORMAL   Blood: MODERATE / Protein: 200 / Nitrite: NEGATIVE   Leuk Esterase: NEGATIVE / RBC: >50 / WBC 11-25   Sq Epi: OCC / Non Sq Epi: x / Bacteria: NEGATIVE    Blood Cx - No growth to date - 8/10  urine Leg - negative  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 Small right-sided pleural effusion    CT Head - Normal with no acute pathology    Renal US - No hydro    TTE - Severe PulmHTN with moderate to severe Mitral Regurg; normal LV Function    Imaging Personally Reviewed:  [x] YES  [ ] NO    Consultant(s) Notes Reviewed:  [x] YES  [ ] NO - Nephro, Pulm, Rheum, & ID    Care Discussed with Consultants/Other Providers [x] YES  [ ] NO

## 2019-08-15 NOTE — PROGRESS NOTE ADULT - PROBLEM SELECTOR PLAN 1
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 either immune complex disease such as lupus vs vasculitis such as p-ANCA with good response to IV high dose steroids and improved O2 requirements  - Will get last dose of 1g IV solumedrol today  - Start 1mg/kg Solumedrol tomorrow  - Will follow-up DsDNA, RNP, anticardiolipin Ab, Anti-GBM, HepB, HepC, immunofixation, Anti-histone, Anti-centromere Ab, beta2-glycoprotein  - Will continue Doxy and F/U ID on Tx duration  - Patient will be started on high dose steroids and will likely need started tomorrow on PCP PPx and will clarify with ID and rheum  - AVOID Hydralazine  - Pulm planning for possible bronch tomorrow  - Will need renal biopsy once INR normalized  - Appreciated renal, pulm, rheum, and ID recs

## 2019-08-15 NOTE — PROGRESS NOTE ADULT - ATTENDING COMMENTS
Spoke to Pt and daughter at bedside about plan of care.  Pt scheduled for bronch with lung bx tomorrow, will need INR <1.5 for the procedure. Will give FFP 2 units to be given post MN and check INR in AM.

## 2019-08-15 NOTE — PROGRESS NOTE ADULT - ATTENDING COMMENTS
Discussed case with pulmonary attending.  Most likely will not do BAL or transbronchial biopsy given clinical improvement.  Would plan on renal biopsy early next week but will need to be off anticoagualiation during procedure.  recommend IR consult.  BP needs to be controlled ideally less than 160 systolic prior to renal biopsy.

## 2019-08-15 NOTE — PROGRESS NOTE ADULT - PROBLEM SELECTOR PLAN 1
Patient with elevated serum creatinine of 2.03 on 8/10/19 baseline 0.5.  Now currently improved to 1.16. She presents with purpuric rash on admission which improved after giving IV steroids. Renal function and rash seem to be improving with steroid therapy.  Serologies with low C3/C4, Positive p-ANCA, negative ANCA elevated ESR/CRP. Urinalysis positive for protein 100, hematuria with large blood in urine. Patient with positive IVAN and anti-dsDNA on 6/23/2018 in the setting of chronic Hydralazine use. She has 5gm of proteinuria. KANIKA with RPGN pattern of injury. 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 Agree to continue Solumedrol 1gm per day for 1 day more today. Follow-up anti-GBM, anti-histone antibodies, AVOID Hydralazine. Avoid giving other nephrotoxic medications such as ACE-I/ARBS and MONTEMAYOR. Continue to trend renal function. Monitor intake and output. Patient would need renal biopsy when clinically stable to decide on further treatment course. If patient has significant pulmonary hemorrhage on bronchoscopy, she will need plasma exchange. Patient with elevated serum creatinine of 2.03 on 8/10/19 baseline 0.5.  Now currently improved to 1.16. She presents with purpuric rash on admission which improved after giving IV steroids. Renal function and rash seem to be improving with steroid therapy.  Serologies with low C3/C4, Positive p-ANCA, negative ANCA elevated ESR/CRP. Urinalysis positive for protein 100, hematuria with large blood in urine. Patient with positive IVAN and anti-dsDNA on 6/23/2018 in the setting of chronic Hydralazine use. She has 5gm of proteinuria. 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 Agree to continue Solumedrol 1gm per day for 1 day more today. Follow-up anti-GBM, anti-histone antibodies, AVOID Hydralazine. Avoid giving other nephrotoxic medications such as ACE-I/ARBS and MONTEMAYOR. Continue to trend renal function. Monitor intake and output. Patient would need renal biopsy when clinically stable to decide on further treatment course. If patient has significant pulmonary hemorrhage on bronchoscopy, she will need plasma exchange. Patient with elevated serum creatinine of 2.03 on 8/10/19 baseline 0.5.  Now currently improved to 1.16. She presents with purpuric rash on admission which improved after giving IV steroids. Renal function and rash seem to be improving with steroid therapy.  Serologies with low C3/C4, Positive p-ANCA, negative ANCA elevated ESR/CRP. Urinalysis positive for protein 100, hematuria with large blood in urine. Patient with positive IVAN and anti-dsDNA on 6/23/2018 in the setting of chronic Hydralazine use. She has 5gm of proteinuria. 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 Agree to continue Solumedrol now at 70 mg daily.. Follow-up anti-GBM, anti-histone antibodies, AVOID Hydralazine. Avoid giving other nephrotoxic medications such as ACE-I/ARBS and MONTEMAYOR. Continue to trend renal function. Monitor intake and output. Patient would need renal biopsy when clinically stable to decide on further treatment course. If patient has significant pulmonary hemorrhage on bronchoscopy, she will need plasma exchange.

## 2019-08-15 NOTE — PROGRESS NOTE ADULT - ASSESSMENT
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

## 2019-08-15 NOTE — PROGRESS NOTE ADULT - PROBLEM SELECTOR PLAN 4
- Likely in the setting of chronic disease but difficult to interpret iron studies given done after PRBC transfusions   - No active bleeding  - Transfuse if Hgb<7

## 2019-08-15 NOTE — PROGRESS NOTE ADULT - PROBLEM SELECTOR PLAN 3
- Now in therapeutic range for A-fib after FFP and vit K but not normal (last dose of coumadin 8/12?   - Will hold in anticipation of renal Bx an risk of CVA with Afib in 3 days low  - Will likely need to give vitamin K PO as patient likely nutritionally deficient

## 2019-08-15 NOTE — DIETITIAN INITIAL EVALUATION ADULT. - OTHER INFO
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 At time of visit Pt appears alert, oriented. Of note Pt speaks Farsi; Pt communicates in English as well. No family @ bed side @ time of visit. Per Pt her appetite good now ->> confirmed with Nurse & PCA. Of note Pt passed Swallow Bedside assessment Adult (8/12); SLP rec: Mechanical Soft with Thin Liquids. No report of nausea, vomiting or diarrhea @ this time. Per H & P Pt with poor PO with associated weight loss of 10lb over the last two months. Food preferences discussed; Pt may benefit from adding PO supplement: Ensure Enlive - BID to Pt's diet rx. Case discussed with nurse. RDN remains available, Pt made aware.

## 2019-08-15 NOTE — PROGRESS NOTE ADULT - SUBJECTIVE AND OBJECTIVE BOX
____________________________________________________________                                INTERVAL HISTORY AND SUBJECTIVE   (2d)      ____________________________________________________________                                                         VITALS      I&O's Summary    14 Aug 2019 07:01  -  15 Aug 2019 07:00  --------------------------------------------------------  IN: 200 mL / OUT: 675 mL / NET: -475 mL      T(F): 97.9, Max: 97.9 (- @ 22:13)   HR: 84 (80 - 107)  BP: 152/86 (136/84 - 172/102)     RR: 18 (14 - 28)   SpO2: 99%   Weight (kg): 68.4 (), 65.6 (08-10)    ____________________________________________________________                                             PHYSICAL EXAMINATION  General: Awake, alert, conversant  HEENT: PERRLA, MMM. No cervical/supraclavicular LAD. No scleral icterus. No thyromegaly.  Pulm: LCTAB. No increased work of breathing on [id number]. No cyanosis.   CV: RRR. No m/r/g.   Abdomen: Soft. Non tender. Non distended. No HSM.   : Voiding freely. No CVAT.  Extremities: Symmetric. Warm. No edema. No rashes.   Neuro: Face symmetric.  AOx3. Moves all four extremities on command. Mentating well.   Psych: Linear though process. Normal cognition. Good insight.   ____________________________________________________________                                             LABORATORY STUDIES  WBC Count: 13.79 <H> (08-15 @ 06:45)  WBC Count: 20.92 <H> ( @ 04:00)  WBC Count: 24.37 <H> ( 18:30)  Hemoglobin: 9.3 <L> (08-15 @ 06:45)  Hemoglobin: 8.9 <L> ( 04:00)  Hemoglobin: 8.7 <L> ( 18:30)  Platelet Count - Automated: 389 (08-15 @ 06:45)  Platelet Count - Automated: 399 ( 04:00)  Platelet Count - Automated: 408 <H> ( 18:30)  Sodium, Serum: 140 (08-15 @ 06:45)  Sodium, Serum: 137 ( 04:00)  Sodium, Serum: 138 ( 18:30)  Potassium, Serum: 4.5 (08-15 @ 06:45)  Potassium, Serum: 4.0 ( 04:00)  Chloride, Serum: 102 mmol/L (08-15 @ 06:45)  Chloride, Serum: 100 mmol/L ( 04:00)  Carbon Dioxide, Serum: 25 (08-15 @ 06:45)  Carbon Dioxide, Serum: 24 ( 04:00)  Carbon Dioxide, Serum: 24 ( 18:30    Blood Urea Nitrogen, Serum: 62 <H> (08-15 @ 06:45)  Blood Urea Nitrogen, Serum: 61 <H> ( 04:00)  Blood Urea Nitrogen, Serum: 65 <H> ( 18:30)    Creatinine, Serum: 1.16 (08-15 @ 06:45)  Creatinine, Serum: 1.42 <H> ( 04:00)  Creatinine, Serum: 1.60 <H> ( 18:30)  Albumin, Serum: 3.5 (08-15 @ 06:45)  Albumin, Serum: 3.3 ( 04:00)  Albumin, Serum: 3.7 ( 18:30)  Bilirubin Total, Serum: 1.0 (08-15 @ 06:45)  Bilirubin Total, Serum: 1.2 ( 04:00)  Bilirubin Total, Serum: 1.1 ( 18:30)  Aspartate Aminotransferase (AST/SGOT): 23 (08-15 @ 06:45)  Aspartate Aminotransferase (AST/SGOT): 34 <H> ( 04:00)  Aspartate Aminotransferase (AST/SGOT): 35 <H> ( 18:30)  Alanine Aminotransferase (ALT/SGPT): 33 (08-15 @ 06:45)  Alanine Aminotransferase (ALT/SGPT): 34 <H> ( 04:00)  Alanine Aminotransferase (ALT/SGPT): 31 ( 18:30)    5743 mg/g Urine protein/creatinine ratio    Other Labs      Insulin            08-14  -  08-15  --------------------------------------------------------  IN: 200 mL / NET: -475 mL          insulin glargine Injectable (LANTUS)   10 Unit(s) SubCutaneous ( @ 22:36)    insulin lispro (HumaLOG) corrective regimen sliding scale   1 Unit(s) SubCutaneous ( 22:38)    insulin lispro (HumaLOG) corrective regimen sliding scale   4 Unit(s) SubCutaneous ( @ 17:06)   4 Unit(s) SubCutaneous ( @ 11:29)      POCT Blood Glucose.: 189 <H> (08-15 @ 08:31)  POCT Blood Glucose.: 252 <H> ( @ 22:21)  POCT Blood Glucose.: 220 <H> ( @ 16:58)  POCT Blood Glucose.: 234 <H> ( @ 11:05)    CAPILLARY BLOOD GLUCOSE      POCT Blood Glucose.: 189 mg/dL (15 Aug 2019 08:31)  POCT Blood Glucose.: 252 mg/dL (14 Aug 2019 22:21)  POCT Blood Glucose.: 220 mg/dL (14 Aug 2019 16:58)  POCT Blood Glucose.: 234 mg/dL (14 Aug 2019 11:05)      Micro    Urinalysis Basic - ( 14 Aug 2019 19:32 )    Color: YELLOW / Appearance: CLEAR / S.019 / pH: 6.0  Gluc: 50 / Ketone: NEGATIVE  / Bili: NEGATIVE / Urobili: NORMAL   Blood: MODERATE / Protein: 200 / Nitrite: NEGATIVE   Leuk Esterase: NEGATIVE / RBC: >50 / WBC 11-25   Sq Epi: OCC / Non Sq Epi: x / Bacteria: NEGATIVE      Troponin    Cultures    Culture - Blood (collected 08-10 @ 12:00)  Source: .Blood Blood-Peripheral  Preliminary Report ( @ 02:02):    No growth to date.    Culture - Blood (collected 08-10 @ 12:00)  Source: .Blood Blood-Peripheral  Preliminary Report ( @ 02:02):    No growth to date.     12 Lead ECG:   Ventricular Rate 92 BPM    Atrial Rate 107 BPM    QRS Duration 96 ms    Q-T Interval 398 ms    QTC Calculation(Bezet) 492 ms    R Axis 3 degrees    T Axis 3 degrees    Diagnosis Line Atrial fibrillation  Incomplete right bundle branch block  Nonspecific ST and T wave abnormality  Prolonged QT  Abnormal ECG  When compared with ECG of 10-JUL-2018 20:12,  No significant change was found  Confirmed by CRISTI PLUMMER, LESLIE GARSIA () on 2019 9:52:39 AM (08-10 @ 12:23)        CT CHEST      PROCEDURE DATE:  08/10/2019      IMPRESSION:     Diffuse bilateral peribronchial airspace consolidation may reflect   multilobar bronchopneumonia.    Small right-sided pleural effusion.

## 2019-08-15 NOTE — PROGRESS NOTE ADULT - SUBJECTIVE AND OBJECTIVE BOX
[facility] Division of Kidney Diseases & Hypertension  FOLLOW UP NOTE  [phone]--------------------------------------------------------------------------------  Chief Complaint:Pulmonary hypertension    Charts reviewed. Patient is no on 3L of oxygen via NC. Currently breathing well and states that her condition is overall improved from yesterday. She still appears minimally short of breath compared to yesterday but is now able to speak with more words and phrases. Serum Cr: 1.16 today improved. She has been making good urine output. Planned for bronchoscopy/BAL on 8/16 per pulm pending.      PAST HISTORY  --------------------------------------------------------------------------------  No significant changes to PMH, PSH, FHx, SHx, unless otherwise noted    ALLERGIES & MEDICATIONS  --------------------------------------------------------------------------------  Allergies    Keflex (Unknown)  penicillin (Rash)    Intolerances      Standing Inpatient Medications  atorvastatin 10 milliGRAM(s) Oral at bedtime  cloNIDine 0.2 milliGRAM(s) Oral three times a day  dextrose 5%. 1000 milliLiter(s) IV Continuous <Continuous>  dextrose 50% Injectable 12.5 Gram(s) IV Push once  dextrose 50% Injectable 25 Gram(s) IV Push once  dextrose 50% Injectable 25 Gram(s) IV Push once  doxycycline IVPB      doxycycline IVPB 100 milliGRAM(s) IV Intermittent every 12 hours  insulin glargine Injectable (LANTUS) 10 Unit(s) SubCutaneous at bedtime  insulin lispro (HumaLOG) corrective regimen sliding scale   SubCutaneous at bedtime  insulin lispro (HumaLOG) corrective regimen sliding scale   SubCutaneous three times a day before meals  insulin lispro Injectable (HumaLOG) 3 Unit(s) SubCutaneous before breakfast  methylPREDNISolone sodium succinate IVPB 500 milliGRAM(s) IV Intermittent once  metoprolol tartrate 50 milliGRAM(s) Oral two times a day  montelukast 10 milliGRAM(s) Oral daily  sertraline 50 milliGRAM(s) Oral daily    PRN Inpatient Medications  ALBUTerol    90 MICROgram(s) HFA Inhaler 2 Puff(s) Inhalation every 6 hours PRN  dextrose 40% Gel 15 Gram(s) Oral once PRN  glucagon  Injectable 1 milliGRAM(s) IntraMuscular once PRN  zolpidem 5 milliGRAM(s) Oral at bedtime PRN      REVIEW OF SYSTEMS  --------------------------------------------------------------------------------  Gen: +fever  Skin: + rash  Head/Eyes/Ears/Mouth: no blurring of vision  Respiratory: +SOB  CV: No chest pain,   GI: No abdominal pain, diarrhea, constipation, nausea, vomiting  : - gross hematuria, No increased frequency, dysuria, nocturia  MSK: + joint pain    All other systems were reviewed and are negative, except as noted.    VITALS/PHYSICAL EXAM  --------------------------------------------------------------------------------  T(C): 36.9 (08-15-19 @ 10:02), Max: 36.9 (08-15-19 @ 10:02)  HR: 82 (08-15-19 @ 10:02) (80 - 107)  BP: 136/80 (08-15-19 @ 10:02) (136/80 - 164/101)  RR: 18 (08-15-19 @ 10:02) (17 - 28)  SpO2: 97% (08-15-19 @ 10:02) (90% - 100%)  Wt(kg): --  Height (cm): 160 (08-13-19 @ 17:30)  Weight (kg): 68.4 (08-13-19 @ 17:30)  BMI (kg/m2): 26.7 (08-13-19 @ 17:30)  BSA (m2): 1.71 (08-13-19 @ 17:30)      08-14-19 @ 07:01  -  08-15-19 @ 07:00  --------------------------------------------------------  IN: 200 mL / OUT: 675 mL / NET: -475 mL      Physical Exam:  	Gen: awake, on NC  	HEENT: supple  no LAD  	Pulm: minimal rales bilaterally   	CV:  S1S2, no murmurs  	Abd: +BS, soft   	Ext: No B/L Lower ext edema  	Neuro: awake, responsive to commands  	Skin: bilateral healing nonblanching purpuric rash on bilateral lower extremities with chronic venous stasis changes. Skin lesions appears improved from yesterday              Psych: appropriate affect              Vascular: no cyanosis  	    LABS/STUDIES  --------------------------------------------------------------------------------              9.3    13.79 >-----------<  389      [08-15-19 @ 06:45]              31.4     140  |  102  |  62  ----------------------------<  191      [08-15-19 @ 06:45]  4.5   |  25  |  1.16        Ca     9.4     [08-15-19 @ 06:45]      Mg     2.1     [08-15-19 @ 06:45]      Phos  4.7     [08-15-19 @ 06:45]    TPro  6.9  /  Alb  3.5  /  TBili  1.0  /  DBili  x   /  AST  23  /  ALT  33  /  AlkPhos  68  [08-15-19 @ 06:45]    PT/INR: PT 25.4 , INR 2.23       [08-15-19 @ 06:45]  PTT: 38.9       [08-15-19 @ 06:45]      Creatinine Trend:  SCr 1.16 [08-15 @ 06:45]  SCr 1.42 [08-14 @ 04:00]  SCr 1.60 [08-13 @ 18:30]  SCr 1.85 [08-13 @ 05:30]  SCr 1.70 [08-12 @ 05:45]    Urinalysis - [08-14-19 @ 19:32]      Color YELLOW / Appearance CLEAR / SG 1.019 / pH 6.0      Gluc 50 / Ketone NEGATIVE  / Bili NEGATIVE / Urobili NORMAL       Blood MODERATE / Protein 200 / Leuk Est NEGATIVE / Nitrite NEGATIVE      RBC >50 / WBC 11-25 / Hyaline NEGATIVE / Gran  / Sq Epi OCC / Non Sq Epi  / Bacteria NEGATIVE    Urine Creatinine 35.60      [08-14-19 @ 19:30]  Urine Protein 201.6      [08-14-19 @ 19:30]    Iron 25, TIBC 302, %sat 8      [08-13-19 @ 05:30]  Ferritin 687      [08-13-19 @ 05:30]  HbA1c 5.6      [08-13-19 @ 05:30]    HIV Nonreact      [08-11-19 @ 16:15]    IVAN: titer 1:640, pattern Homogeneous      [08-11-19 @ 16:15]  C3 Complement 76      [08-12-19 @ 11:00]  C4 Complement 10      [08-12-19 @ 11:00]  ANCA: cANCA Negative, pANCA >1:1280, atypical ANCA Negative      [08-11-19 @ 16:15]  MPO-ANCA 96.2, interpretation: Positive      [08-11-19 @ 16:15]  PR3-ANCA 5.1, interpretation: Negative      [08-11-19 @ 16:15] [facility] Division of Kidney Diseases & Hypertension  FOLLOW UP NOTE  [phone]--------------------------------------------------------------------------------  Chief Complaint:Pulmonary hypertension    Charts reviewed. Patient is no on 3L of oxygen via NC. Currently breathing well and states that her condition is overall improved from yesterday. She still appears minimally short of breath compared to yesterday but is now able to speak with more words and phrases. Serum Cr: 1.16 today improved. She has been making good urine output.     PAST HISTORY  --------------------------------------------------------------------------------  No significant changes to PMH, PSH, FHx, SHx, unless otherwise noted    ALLERGIES & MEDICATIONS  --------------------------------------------------------------------------------  Allergies    Keflex (Unknown)  penicillin (Rash)    Intolerances      Standing Inpatient Medications  atorvastatin 10 milliGRAM(s) Oral at bedtime  cloNIDine 0.2 milliGRAM(s) Oral three times a day  dextrose 5%. 1000 milliLiter(s) IV Continuous <Continuous>  dextrose 50% Injectable 12.5 Gram(s) IV Push once  dextrose 50% Injectable 25 Gram(s) IV Push once  dextrose 50% Injectable 25 Gram(s) IV Push once  doxycycline IVPB      doxycycline IVPB 100 milliGRAM(s) IV Intermittent every 12 hours  insulin glargine Injectable (LANTUS) 10 Unit(s) SubCutaneous at bedtime  insulin lispro (HumaLOG) corrective regimen sliding scale   SubCutaneous at bedtime  insulin lispro (HumaLOG) corrective regimen sliding scale   SubCutaneous three times a day before meals  insulin lispro Injectable (HumaLOG) 3 Unit(s) SubCutaneous before breakfast  methylPREDNISolone sodium succinate IVPB 500 milliGRAM(s) IV Intermittent once  metoprolol tartrate 50 milliGRAM(s) Oral two times a day  montelukast 10 milliGRAM(s) Oral daily  sertraline 50 milliGRAM(s) Oral daily    PRN Inpatient Medications  ALBUTerol    90 MICROgram(s) HFA Inhaler 2 Puff(s) Inhalation every 6 hours PRN  dextrose 40% Gel 15 Gram(s) Oral once PRN  glucagon  Injectable 1 milliGRAM(s) IntraMuscular once PRN  zolpidem 5 milliGRAM(s) Oral at bedtime PRN      REVIEW OF SYSTEMS  --------------------------------------------------------------------------------  Gen: +fever  Skin: + rash  Head/Eyes/Ears/Mouth: no blurring of vision  Respiratory: +SOB  CV: No chest pain,   GI: No abdominal pain, diarrhea, constipation, nausea, vomiting  : - gross hematuria, No increased frequency, dysuria, nocturia  MSK: + joint pain    All other systems were reviewed and are negative, except as noted.    VITALS/PHYSICAL EXAM  --------------------------------------------------------------------------------  T(C): 36.9 (08-15-19 @ 10:02), Max: 36.9 (08-15-19 @ 10:02)  HR: 82 (08-15-19 @ 10:02) (80 - 107)  BP: 136/80 (08-15-19 @ 10:02) (136/80 - 164/101)  RR: 18 (08-15-19 @ 10:02) (17 - 28)  SpO2: 97% (08-15-19 @ 10:02) (90% - 100%)  Wt(kg): --  Height (cm): 160 (08-13-19 @ 17:30)  Weight (kg): 68.4 (08-13-19 @ 17:30)  BMI (kg/m2): 26.7 (08-13-19 @ 17:30)  BSA (m2): 1.71 (08-13-19 @ 17:30)      08-14-19 @ 07:01  -  08-15-19 @ 07:00  --------------------------------------------------------  IN: 200 mL / OUT: 675 mL / NET: -475 mL      Physical Exam:  	Gen: awake, on NC  	HEENT: supple  no LAD  	Pulm: minimal rales bilaterally   	CV:  S1S2, no murmurs  	Abd: +BS, soft   	Ext: No B/L Lower ext edema  	Neuro: awake, responsive to commands  	Skin: bilateral healing nonblanching purpuric rash on bilateral lower extremities with chronic venous stasis changes. Skin lesions appears improved from yesterday              Psych: appropriate affect              Vascular: no cyanosis  	    LABS/STUDIES  --------------------------------------------------------------------------------              9.3    13.79 >-----------<  389      [08-15-19 @ 06:45]              31.4     140  |  102  |  62  ----------------------------<  191      [08-15-19 @ 06:45]  4.5   |  25  |  1.16        Ca     9.4     [08-15-19 @ 06:45]      Mg     2.1     [08-15-19 @ 06:45]      Phos  4.7     [08-15-19 @ 06:45]    TPro  6.9  /  Alb  3.5  /  TBili  1.0  /  DBili  x   /  AST  23  /  ALT  33  /  AlkPhos  68  [08-15-19 @ 06:45]    PT/INR: PT 25.4 , INR 2.23       [08-15-19 @ 06:45]  PTT: 38.9       [08-15-19 @ 06:45]      Creatinine Trend:  SCr 1.16 [08-15 @ 06:45]  SCr 1.42 [08-14 @ 04:00]  SCr 1.60 [08-13 @ 18:30]  SCr 1.85 [08-13 @ 05:30]  SCr 1.70 [08-12 @ 05:45]    Urinalysis - [08-14-19 @ 19:32]      Color YELLOW / Appearance CLEAR / SG 1.019 / pH 6.0      Gluc 50 / Ketone NEGATIVE  / Bili NEGATIVE / Urobili NORMAL       Blood MODERATE / Protein 200 / Leuk Est NEGATIVE / Nitrite NEGATIVE      RBC >50 / WBC 11-25 / Hyaline NEGATIVE / Gran  / Sq Epi OCC / Non Sq Epi  / Bacteria NEGATIVE    Urine Creatinine 35.60      [08-14-19 @ 19:30]  Urine Protein 201.6      [08-14-19 @ 19:30]    Iron 25, TIBC 302, %sat 8      [08-13-19 @ 05:30]  Ferritin 687      [08-13-19 @ 05:30]  HbA1c 5.6      [08-13-19 @ 05:30]    HIV Nonreact      [08-11-19 @ 16:15]    IVAN: titer 1:640, pattern Homogeneous      [08-11-19 @ 16:15]  C3 Complement 76      [08-12-19 @ 11:00]  C4 Complement 10      [08-12-19 @ 11:00]  ANCA: cANCA Negative, pANCA >1:1280, atypical ANCA Negative      [08-11-19 @ 16:15]  MPO-ANCA 96.2, interpretation: Positive      [08-11-19 @ 16:15]  PR3-ANCA 5.1, interpretation: Negative      [08-11-19 @ 16:15]

## 2019-08-15 NOTE — DIETITIAN INITIAL EVALUATION ADULT. - ADD RECOMMEND
1. Encourage & assist Pt with meals; Monitor PO diet tolerance; Honor food preferences;           2. Monitor labs, hydration status;

## 2019-08-15 NOTE — PROGRESS NOTE ADULT - ASSESSMENT
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 Associated symptoms include generalized malaise, nausea, poor PO with associated weight loss of 10lb over the last two months. Patient has demonstrated marked improvement in respiratory status and KANIKA on Solumedrol with quick improvement of her oxygenation, currently on 2 L nasal cannula. Notable labs include Blood cx NGTD, HIV, legionella -, crypto ag -, P-ANCA positive1:1280, IVAN positive 1:640, C3 76, C4 10, elevated CRP and ESR. RMSF IgM positive. Concern for underlying rheumatalogic/autoimmune disease process.    Anti-cardiolipin Ab, Beta 2 glycoprotein, Centromere Ab, Scleroderma Ab, immunifixation serum, cryofibronigen serum, JAN Ab, dsDNA Ab, Sjorner’s Ab, Lupus profile.     HFNC 30 LPM       Problem/Plan - 1:  ·  Problem: Respiratory failure.  Plan: Diffuse bilateral peribronchial airspace consolidation may reflect multilobar bronchopneumonia. Notable labs include Blood cx NGTD, HIV, legionella -, crypto ag -, P-ANCA positive1:1280, IVAN positive 1:640, C3 76, C4 10, elevated CRP and ESR. RMSF IgM positive. Concern for underlying rheumatalogic/autoimmune disease process. 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

## 2019-08-15 NOTE — PROGRESS NOTE ADULT - ATTENDING COMMENTS
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 Associated symptoms include generalized malaise, nausea, poor PO with associated weight loss of 10lb over the last two months. Patient has demonstrated marked improvement in respiratory status on Solumedrol and Doxycycline with quick improvement of her oxygenation, currently on 2 L nasal cannula during interview. Notable labs include Blood cx NGTD, HIV, legionella -, crypto ag -, P-ANCA positive1:1280, IVAN positive 1:640, C3 76, C4 10, elevated CRP and ESR. RMSF IgM positive.     Repeat CXR shows improvement of underlying lung pathology and the patient is currently on 2L NC. She continues to have improvement with her speak and breathing and off oxygen she only desaturated down to 86%. Ultrasound shows improvement of B-line patterns bilaterally. Given her improvement and her INR being >2 it is unlikely that we will be able to do the procedure. Please check INR in the AM and if it is still >2 then we will be unable to do the procedure.

## 2019-08-15 NOTE — PROGRESS NOTE ADULT - SUBJECTIVE AND OBJECTIVE BOX
CHIEF COMPLAINT:    Interval Events: No acute events overnight. Patient endorses improved breathing. Endorses improvement in nonproductive cough. Currently comfortable on 2L nasal cannula.    REVIEW OF SYSTEMS:  Constitutional: [X] negative [ ] fevers [ ] chills [ ] weight loss [ ] weight gain  HEENT: [ ] negative [ ] dry eyes [ ] eye irritation [ ] postnasal drip [ ] nasal congestion  CV: [X] negative  [ ] chest pain [ ] orthopnea [ ] palpitations [ ] murmur  Resp: [ ] negative [X] cough [X] shortness of breath [X] dyspnea [ ] wheezing [ ] sputum [ ] hemoptysis  GI: [ ] negative [ ] nausea [ ] vomiting [ ] diarrhea [ ] constipation [ ] abd pain [ ] dysphagia   : [ ] negative [ ] dysuria [ ] nocturia [ ] hematuria [ ] increased urinary frequency  Musculoskeletal: [ ] negative [ ] back pain [ ] myalgias [ ] arthralgias [ ] fracture  Skin: [ ] negative [X] rash [ ] itch  Neurological: [ ] negative [ ] headache [ ] dizziness [ ] syncope [ ] weakness [ ] numbness  Psychiatric: [ ] negative [ ] anxiety [ ] depression  Endocrine: [ ] negative [ ] diabetes [ ] thyroid problem  Hematologic/Lymphatic: [ ] negative [ ] anemia [ ] bleeding problem  Allergic/Immunologic: [ ] negative [ ] itchy eyes [ ] nasal discharge [ ] hives [ ] angioedema  [X] All other systems negative  [ ] Unable to assess ROS because ________    OBJECTIVE:  ICU Vital Signs Last 24 Hrs  T(C): 36.6 (14 Aug 2019 22:13), Max: 36.6 (14 Aug 2019 22:13)  T(F): 97.9 (14 Aug 2019 22:13), Max: 97.9 (14 Aug 2019 22:13)  HR: 84 (14 Aug 2019 22:13) (80 - 107)  BP: 152/86 (14 Aug 2019 22:13) (136/84 - 172/102)  BP(mean): 115 (14 Aug 2019 16:00) (115 - 125)  ABP: --  ABP(mean): --  RR: 18 (14 Aug 2019 22:13) (14 - 28)  SpO2: 99% (14 Aug 2019 22:13) (90% - 100%)        - @ 07:01  -  08-15 @ 07:00  --------------------------------------------------------  IN: 200 mL / OUT: 675 mL / NET: -475 mL      CAPILLARY BLOOD GLUCOSE      POCT Blood Glucose.: 189 mg/dL (15 Aug 2019 08:31)      PHYSICAL EXAM:  General: NAD, resting comfortably on 2L nc  HEENT: EOMI, PERRLA  Respiratory: Diffuse inspiratory crackles  Cardiovascular: S1S2, RRR, no murmurs  Abdomen: Soft, NTND BS+  Extremities: No peripheral edema  Skin: Macular, non blanching rash on shin resolving    HOSPITAL MEDICATIONS:  MEDICATIONS  (STANDING):  atorvastatin 10 milliGRAM(s) Oral at bedtime  cloNIDine 0.2 milliGRAM(s) Oral three times a day  dextrose 5%. 1000 milliLiter(s) (50 mL/Hr) IV Continuous <Continuous>  dextrose 50% Injectable 12.5 Gram(s) IV Push once  dextrose 50% Injectable 25 Gram(s) IV Push once  dextrose 50% Injectable 25 Gram(s) IV Push once  doxycycline IVPB      doxycycline IVPB 100 milliGRAM(s) IV Intermittent every 12 hours  insulin glargine Injectable (LANTUS) 10 Unit(s) SubCutaneous at bedtime  insulin lispro (HumaLOG) corrective regimen sliding scale   SubCutaneous at bedtime  insulin lispro (HumaLOG) corrective regimen sliding scale   SubCutaneous three times a day before meals  insulin lispro Injectable (HumaLOG) 3 Unit(s) SubCutaneous before breakfast  methylPREDNISolone sodium succinate IVPB 500 milliGRAM(s) IV Intermittent once  metoprolol tartrate 50 milliGRAM(s) Oral two times a day  montelukast 10 milliGRAM(s) Oral daily  sertraline 50 milliGRAM(s) Oral daily    MEDICATIONS  (PRN):  ALBUTerol    90 MICROgram(s) HFA Inhaler 2 Puff(s) Inhalation every 6 hours PRN Shortness of Breath and/or Wheezing  dextrose 40% Gel 15 Gram(s) Oral once PRN Blood Glucose LESS THAN 70 milliGRAM(s)/deciliter  glucagon  Injectable 1 milliGRAM(s) IntraMuscular once PRN Glucose LESS THAN 70 milligrams/deciliter  zolpidem 5 milliGRAM(s) Oral at bedtime PRN Insomnia      LABS:                        9.3    13.79 )-----------( 389      ( 15 Aug 2019 06:45 )             31.4     Hgb Trend: 9.3<--, 8.9<--, 8.7<--, 8.4<--, 8.3<--  08-15    140  |  102  |  62<H>  ----------------------------<  191<H>  4.5   |  25  |  1.16    Ca    9.4      15 Aug 2019 06:45  Phos  4.7     08-15  Mg     2.1     08-15    TPro  6.9  /  Alb  3.5  /  TBili  1.0  /  DBili  x   /  AST  23  /  ALT  33  /  AlkPhos  68  08-15    Creatinine Trend: 1.16<--, 1.42<--, 1.60<--, 1.85<--, 1.70<--, 1.66<--  PT/INR - ( 15 Aug 2019 06:45 )   PT: 25.4 SEC;   INR: 2.23          PTT - ( 15 Aug 2019 06:45 )  PTT:38.9 SEC  Urinalysis Basic - ( 14 Aug 2019 19:32 )    Color: YELLOW / Appearance: CLEAR / S.019 / pH: 6.0  Gluc: 50 / Ketone: NEGATIVE  / Bili: NEGATIVE / Urobili: NORMAL   Blood: MODERATE / Protein: 200 / Nitrite: NEGATIVE   Leuk Esterase: NEGATIVE / RBC: >50 / WBC 11-25   Sq Epi: OCC / Non Sq Epi: x / Bacteria: NEGATIVE        Venous Blood Gas:   @ 18:30  7.38/44/40/25/73.1  VBG Lactate: --      MICROBIOLOGY:       RADIOLOGY:  [ ] Reviewed and interpreted by me    PULMONARY FUNCTION TESTS:    EKG:

## 2019-08-15 NOTE — PROGRESS NOTE ADULT - PROBLEM SELECTOR PLAN 5
- C/W metoprolol  - INR now in therapeutic range for A-fib after FFP and vit K but not normal (last dose of coumadin 8/12?  - Will hold in anticipation of renal Bx an risk of CVA with Afib in 3 days low

## 2019-08-15 NOTE — DIETITIAN INITIAL EVALUATION ADULT. - DIET TYPE
PO diet Ensure Enlive 8oz. 2x daily (will provide additional ~700 Kcal, ~40 gm Protein);/mechanical soft/supplement (specify)

## 2019-08-15 NOTE — PROGRESS NOTE ADULT - ASSESSMENT
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 Associated symptoms include generalized malaise, nausea, poor PO with associated weight loss of 10lb over the last two months. Patient has demonstrated marked improvement in respiratory status and KANIKA on Solumedrol with quick improvement of her oxygenation, currently on 2 L nasal cannula. Notable labs include Blood cx NGTD, HIV, legionella -, crypto ag -, P-ANCA positive1:1280, IVAN positive 1:640, C3 76, C4 10, elevated CRP and ESR. RMSF IgM positive. Concern for underlying rheumatalogic/autoimmune disease process.

## 2019-08-15 NOTE — PROGRESS NOTE ADULT - SUBJECTIVE AND OBJECTIVE BOX
CC: f/u for  pneumonitis  Patient reports she is feeling much better today    REVIEW OF SYSTEMS:  All other review of systems negative (Comprehensive ROS)    Antimicrobials Day #  :5  doxycycline IVPB      doxycycline IVPB 100 milliGRAM(s) IV Intermittent every 12 hours    Other Medications Reviewed    T(F): 98.5 (08-15-19 @ 10:02), Max: 98.5 (08-15-19 @ 10:02)  HR: 82 (08-15-19 @ 10:02)  BP: 136/80 (08-15-19 @ 10:02)  RR: 18 (08-15-19 @ 10:02)  SpO2: 97% (08-15-19 @ 10:02)  Wt(kg): --    PHYSICAL EXAM:  General: alert, no acute distress  Eyes:  anicteric, no conjunctival injection, no discharge  Oropharynx: no lesions or injection 	  Neck: supple, without adenopathy  Lungs: rales to auscultation  Heart: regular rate and rhythm; no murmur, rubs or gallops  Abdomen: soft, nondistended, nontender, without mass or organomegaly  Skin: fading purupura  Extremities: no clubbing, cyanosis, or edema  Neurologic: alert, oriented, moves all extremities    LAB RESULTS:                        9.3    13.79 )-----------( 389      ( 15 Aug 2019 06:45 )             31.4     08-15    140  |  102  |  62<H>  ----------------------------<  191<H>  4.5   |  25  |  1.16    Ca    9.4      15 Aug 2019 06:45  Phos  4.7     08-15  Mg     2.1     08-15    TPro  6.9  /  Alb  3.5  /  TBili  1.0  /  DBili  x   /  AST  23  /  ALT  33  /  AlkPhos  68  08-15    LIVER FUNCTIONS - ( 15 Aug 2019 06:45 )  Alb: 3.5 g/dL / Pro: 6.9 g/dL / ALK PHOS: 68 u/L / ALT: 33 u/L / AST: 23 u/L / GGT: x           Urinalysis Basic - ( 14 Aug 2019 19:32 )    Color: YELLOW / Appearance: CLEAR / S.019 / pH: 6.0  Gluc: 50 / Ketone: NEGATIVE  / Bili: NEGATIVE / Urobili: NORMAL   Blood: MODERATE / Protein: 200 / Nitrite: NEGATIVE   Leuk Esterase: NEGATIVE / RBC: >50 / WBC 11-25   Sq Epi: OCC / Non Sq Epi: x / Bacteria: NEGATIVE      MICROBIOLOGY:  RECENT CULTURES:  08-10 @ 12:00 .Blood Blood-Peripheral     No growth to date.          RADIOLOGY REVIEWED:    < from: CT Chest No Cont (08.10.19 @ 13:08) >  EXAM:  CT CHEST      PROCEDURE DATE:  08/10/2019        INTERPRETATION:  CLINICAL INFORMATION: Shortness of breath.    COMPARISON: Chest radiograph 8/10/2019 and CT scan chest 2018.    PROCEDURE:   CT of the Chest was performed without intravenous contrast.  Sagittal and coronal reformats were performed.      FINDINGS:    LUNGS AND AIRWAYS: PLEURA:   There is diffuse bilateral peribronchial airspace consolidation involving   both upper and lower lung zones.  The central airways remain patent.    There is a small calcified granuloma right upper lobe.    There is a very small right-sided pleural effusion.    MEDIASTINUM AND MEHREEN: No enlarged mediastinal lymphadenopathy.  Calcified right paratracheal lymph node.    VESSELS: Atherosclerotic calcification of the thoracic aorta. Coronary   artery calcifications.    HEART: The heart is enlarged.   No pericardial effusion.    CHEST WALL AND LOWER NECK: Heterogeneous appearance of the bilateral   lobes of the thyroid gland, stable in appearance.    VISUALIZED UPPER ABDOMEN: Evaluation is limited due to streak artifact.    BONES: Multilevel degenerative changes of thoracic spine.  Chronic compression deformity of the T8 and T12 vertebral body.    IMPRESSION:     Diffuse bilateral peribronchial airspace consolidation may reflect   multilobar bronchopneumonia.    Small right-sided pleural effusion.    < end of copied text >        Assessment:  Patient with RA on no tx of late, afib on coumadin. mgus/lymphoma just watched admitted to [facility] almost a week ago with 2 weeks history of weakness, sob, poor po intake. She was found to be in respiratory failure with diffuse infiltrates, yasmany, nonpalpable purpuric rash, got meropenem and doxycycline then pulse steroids for concern of vasculitis. Cultures are neg, crypt ag, legionella and rvp are neg. She is highly anca positive. RMSF titer likely just cross reacting antibiodies but will treat just in case. Suspect vasculitis syndrome +/- hemorrhage but all relate  Plan: CC: f/u for  pneumonitis  Patient reports she is feeling much better today    REVIEW OF SYSTEMS:  All other review of systems negative (Comprehensive ROS)    Antimicrobials Day #  :5  doxycycline IVPB      doxycycline IVPB 100 milliGRAM(s) IV Intermittent every 12 hours    Other Medications Reviewed    T(F): 98.5 (08-15-19 @ 10:02), Max: 98.5 (08-15-19 @ 10:02)  HR: 82 (08-15-19 @ 10:02)  BP: 136/80 (08-15-19 @ 10:02)  RR: 18 (08-15-19 @ 10:02)  SpO2: 97% (08-15-19 @ 10:02)  Wt(kg): --    PHYSICAL EXAM:  General: alert, no acute distress  Eyes:  anicteric, no conjunctival injection, no discharge  Oropharynx: no lesions or injection 	  Neck: supple, without adenopathy  Lungs: rales to auscultation  Heart: regular rate and rhythm; no murmur, rubs or gallops  Abdomen: soft, nondistended, nontender, without mass or organomegaly  Skin: fading purupura  Extremities: no clubbing, cyanosis, or edema  Neurologic: alert, oriented, moves all extremities    LAB RESULTS:                        9.3    13.79 )-----------( 389      ( 15 Aug 2019 06:45 )             31.4     08-15    140  |  102  |  62<H>  ----------------------------<  191<H>  4.5   |  25  |  1.16    Ca    9.4      15 Aug 2019 06:45  Phos  4.7     08-15  Mg     2.1     08-15    TPro  6.9  /  Alb  3.5  /  TBili  1.0  /  DBili  x   /  AST  23  /  ALT  33  /  AlkPhos  68  08-15    LIVER FUNCTIONS - ( 15 Aug 2019 06:45 )  Alb: 3.5 g/dL / Pro: 6.9 g/dL / ALK PHOS: 68 u/L / ALT: 33 u/L / AST: 23 u/L / GGT: x           Urinalysis Basic - ( 14 Aug 2019 19:32 )    Color: YELLOW / Appearance: CLEAR / S.019 / pH: 6.0  Gluc: 50 / Ketone: NEGATIVE  / Bili: NEGATIVE / Urobili: NORMAL   Blood: MODERATE / Protein: 200 / Nitrite: NEGATIVE   Leuk Esterase: NEGATIVE / RBC: >50 / WBC 11-25   Sq Epi: OCC / Non Sq Epi: x / Bacteria: NEGATIVE      MICROBIOLOGY:  RECENT CULTURES:  08-10 @ 12:00 .Blood Blood-Peripheral     No growth to date.          RADIOLOGY REVIEWED:    < from: CT Chest No Cont (08.10.19 @ 13:08) >  EXAM:  CT CHEST      PROCEDURE DATE:  08/10/2019        INTERPRETATION:  CLINICAL INFORMATION: Shortness of breath.    COMPARISON: Chest radiograph 8/10/2019 and CT scan chest 2018.    PROCEDURE:   CT of the Chest was performed without intravenous contrast.  Sagittal and coronal reformats were performed.      FINDINGS:    LUNGS AND AIRWAYS: PLEURA:   There is diffuse bilateral peribronchial airspace consolidation involving   both upper and lower lung zones.  The central airways remain patent.    There is a small calcified granuloma right upper lobe.    There is a very small right-sided pleural effusion.    MEDIASTINUM AND MEHREEN: No enlarged mediastinal lymphadenopathy.  Calcified right paratracheal lymph node.    VESSELS: Atherosclerotic calcification of the thoracic aorta. Coronary   artery calcifications.    HEART: The heart is enlarged.   No pericardial effusion.    CHEST WALL AND LOWER NECK: Heterogeneous appearance of the bilateral   lobes of the thyroid gland, stable in appearance.    VISUALIZED UPPER ABDOMEN: Evaluation is limited due to streak artifact.    BONES: Multilevel degenerative changes of thoracic spine.  Chronic compression deformity of the T8 and T12 vertebral body.    IMPRESSION:     Diffuse bilateral peribronchial airspace consolidation may reflect   multilobar bronchopneumonia.    Small right-sided pleural effusion.    < end of copied text >        Assessment:  Patient with RA on no tx of late, afib on coumadin. mgus/lymphoma just watched admitted to [facility] almost a week ago with 2 weeks history of weakness, sob, poor po intake. She was found to be in respiratory failure with diffuse infiltrates, yasmany, nonpalpable purpuric rash, got meropenem and doxycycline then pulse steroids for concern of vasculitis. Cultures are neg, crypt ag, legionella and rvp are neg. She is highly anca positive. RMSF titer likely just cross reacting antibiodies but will treat just in case. Suspect vasculitis syndrome +/- hemorrhage but all relate to autoimmune/inflammatory . Doubt primary infection as cause for pulmonary renal syndrome.   Plan:  continue doxycycline  on pulse steroids.   await planned lung bx  if to stay on high dose steroid will start pcp prophylaxis  suspect quant gold will be indeterminant but could check, hep b ag,coreab and surface ab in prep for possible biologic

## 2019-08-15 NOTE — PROGRESS NOTE ADULT - ASSESSMENT
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 presents w hypoxic respiratory failure w persistent consolidations on lung imaging, normocytic anemia, KANIKA, lower extremity purpuric rash  In the setting of positive p-ANCA, MPO, IVAN, hypocomplementemia and past positivity for DsDNA, and b2gp IgA, cardiolipin IgM  s/p 1 g of solumedrol 8/12, and 625 mg of solumedrol 8/13; w improvement of respiratory status, rash    ddx: drug induced lupus/vasculitis vs paraneoplastic syndrome vs APLS vs AAV vs lupus vs cryoglobulinemia   drug induced lupus and/or vasculitis should be considered given hx of years of hydralazine use which can lead to severe presentation of pulm-renal syndrome as above    paraneoplastic syndrome should also be considered, given hx of marginal B cell lymphoma found on bone marrow and possible MGUS, which can present as vasculitis mimic.   Pt positive for APLS serologies in past, which can lead to pulm hemorrhage TMA in kidneys, but skin lesions typically more distal and not purpuric in nature  SLE or AAV or cryoglobulinemia should also be considered, but unusual for these conditions to cause multiple ab positivity; not unusual in drug induced vasculitis/lupus or paraneoplastic syndrome    Recommend  Would give another dose of solumedrol 1 g today, and transition to solumedrol 1 mg/kg  Can f/u w PR3, MPO from [place], Would obtain Quant Gold or PPD, Hep B, C, DsDNA, RNP, smith, cryoglobulins, b2gp, anticardiolipin, antihistone, LA, scl-70, centromere, SPEP, UPEP, immunofixation, anti GBM, SSA, SSB, urine protein/cr ratio, CD 19  Would consult heme given hx of B cell lymphoma for input on possibility of etiology of presentation  Would proceed w bronch vs wedge bx per pulm; if found to have pulm hemorrhage, pt likely to need pharesis  Will follow 85 yo F with PMH of diastolic CHF, mod pulm HTN, A fib on coumadin, HLD, 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  Improving SOB, decreasing oxygen requirements while on steroids and duiretics.  Also found to have LE purpuric rash, anemia, KANIKA, proteinuria, consolidations on chest imaging.  Serologies + include P-ANCA, MPO, IVAN, hypocomplementemia, prior positive dsDNA, beta-2 glycoprotein IgA, ACl IgM.    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    Pt positive for APLS serologies in past, which can lead to pulm hemorrhage TMA in kidneys, but skin lesions typically more distal and not purpuric in nature  SLE or AAV or cryoglobulinemia should also be considered, but unusual for these conditions to cause multiple ab positivity; not unusual in drug induced vasculitis/lupus or paraneoplastic syndrome    - transition patient to solumedrol 1mg/kg 70mg  - f/u remaining serologies  - heme f/u given hx of lymphoma  - pulmonary considering bronch tomorrow, continue to monitor respiratory status  - nephro on board as well  - will follow with you    Mao Pryor MD  Rheumatology Fellow PGY V 85 yo F with PMH of diastolic CHF, mod pulm HTN, A fib on coumadin, HLD, 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  Improving SOB, decreasing oxygen requirements while on steroids and duiretics.  Also found to have LE purpuric rash, anemia, KANIKA, proteinuria, consolidations on chest imaging.  Serologies + include P-ANCA, MPO, IVAN, hypocomplementemia, prior positive dsDNA, beta-2 glycoprotein IgA, ACl IgM.    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    Pt positive for APLS serologies in past, which can lead to pulm hemorrhage TMA in kidneys, but skin lesions typically more distal and not purpuric in nature  SLE or AAV or cryoglobulinemia should also be considered, but unusual for these conditions to cause multiple ab positivity; not unusual in drug induced vasculitis/lupus or paraneoplastic syndrome    - transition patient to solumedrol 1mg/kg 70mg daily starting tomorrow AM 8/16/19  - f/u remaining serologies  - heme f/u given hx of lymphoma  - pulmonary considering bronch tomorrow, continue to monitor respiratory status. if patient does not end up getting a bronch then will need to consider pursuing a renal biopsy to help differentiate between drug induced SLE vs drug induced vasculitis  - nephro on board as well  - would recommend PT  - please also check vitamin D level  - will follow with you    Mao Pryor MD  Rheumatology Fellow PGY V

## 2019-08-15 NOTE — DIETITIAN INITIAL EVALUATION ADULT. - ENERGY NEEDS
Pt's height: 63"           IBW: 115#+/-10%   Pt's recorded weights: 150.7# (8/13/19);     163.1# (7/11/18)    ->> indicating weight loss of > 12# in > 1 year

## 2019-08-15 NOTE — PROGRESS NOTE ADULT - PROBLEM SELECTOR PLAN 7
- DVT PPx: INR therapeutic but will need HSQ when subtherapeutic  - PT eval pending but likely will need rehab once medically stable    -Sherif Nicole PGY5 EMIM Pager#[phone]

## 2019-08-16 NOTE — PHYSICAL THERAPY INITIAL EVALUATION ADULT - PERTINENT HX OF CURRENT PROBLEM, REHAB EVAL
Patient presents to [facility] with SOB, hypoxic respiratory failure, and KANIKA.

## 2019-08-16 NOTE — PROGRESS NOTE ADULT - ASSESSMENT
85 yo F, Afib, COPD, RA, h/o MGUS/lymphoma  Acute resp failure, diffuse infiltrates, KANIKA, petechial rash- primary pneumonia vs vasculitis  Bld Cxs negative   Crypt Ag, Legionella, RVP negative  Strongly ANCA +  Pulse steroids initiated, prior [place]->Zosyn, ongoing Doxy  CXR improved; resp status improved; vasculitis syndrome, +/- hemorrhage, inflammatory process most likely here; doubt primary infection  RMSF IgM is false +, regardless, > 5 days Doxy sufficient    Plan:  D/c Doxy  Steroids as outlined  Further serology, possible kidney Bx, as outlined  Follow temps and CBC/diff   d/w pt and son at bedside

## 2019-08-16 NOTE — PROGRESS NOTE ADULT - PROBLEM SELECTOR PLAN 1
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 either immune complex disease such as lupus vs vasculitis such as p-ANCA with good response to IV high dose steroids and improved O2 requirements  - Will get last dose of 1g IV solumedrol today  - Start 1mg/kg Solumedrol tomorrow  - Will follow-up DsDNA, RNP, anticardiolipin Ab, Anti-GBM, HepB, HepC, immunofixation, Anti-histone, Anti-centromere Ab, beta2-glycoprotein  - Will continue Doxy and F/U ID on Tx duration  - Patient will be started on high dose steroids and will likely need started tomorrow on PCP PPx and will clarify with ID and rheum  - AVOID Hydralazine  - Pulm planning for possible bronch tomorrow  - Will need renal biopsy once INR normalized  - Appreciated renal, pulm, rheum, and ID recs 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 either immune complex disease such as lupus vs vasculitis such as p-ANCA with good response to IV high dose steroids and improved O2 requirements  - c/w IV methylpred 70mg daily  - f/u DsDNA, RNP, anticardiolipin Ab, Anti-GBM, HepB, HepC, immunofixation, Anti-histone, Anti-centromere Ab, beta2-glycoprotein  - Will continue Doxy and F/U ID on Tx duration given low suspicion for RMSF and likely false positive as patient with no risk factors and no travel or outdoor exposure  - Will need PCP PPx pt on high dose steroids and will clarify with ID and rheum  - AVOID Hydralazine  - Pulm planning for possible bronch today but spoke in AM and will repeat CXR and discuss utility of yield of bronch with biopsy  - If done patient may need 2 more units of FFP as INR is 1.67 today s/p 2U FFP and 2.5mg Vit K last night  - Will need renal biopsy once INR normalized and will discuss with renal today about timing for next week in order to optimize INR  - Appreciated renal, pulm, rheum, and ID recs

## 2019-08-16 NOTE — PHYSICAL THERAPY INITIAL EVALUATION ADULT - DISCHARGE DISPOSITION, PT EVAL
home with skilled home PT for strengthening, gait training, transfer training, and balance training.

## 2019-08-16 NOTE — PHYSICAL THERAPY INITIAL EVALUATION ADULT - ADDITIONAL COMMENTS
Patient lives in a home with a ramp to enter. She has a HHA from 7am-7pm everyday at which point her son is home to care for her. Prior to admission, patient was independent ambulator with a rolling walker.    Pt. was left seated in chair post PT Evaluation, NAD, all lines intact, daughter present. RN made aware of pt. status and participation in PT.

## 2019-08-16 NOTE — PROGRESS NOTE ADULT - SUBJECTIVE AND OBJECTIVE BOX
____________________________________________________________                                INTERVAL HISTORY AND SUBJECTIVE  08-13 (3d)    ____________________________________________________________                                                         VITALS  I&O's Summary    15 Aug 2019 07:01  -  16 Aug 2019 07:00  --------------------------------------------------------  IN: 586 mL / OUT: 400 mL / NET: 186 mL  T(F): 97.8, Max: 98.5 (08-15-19 @ 10:02)   HR: 81 (69 - 88)  BP: 160/104 (136/80 - 160/104)     RR: 16 (16 - 19)   SpO2: 99%   Weight (kg): 68.4 (08-13)  ____________________________________________________________                                             PHYSICAL EXAMINATION  General: Awake, alert, conversant  HEENT: PERRLA, MMM. No cervical/supraclavicular LAD. No scleral icterus. No thyromegaly.  Pulm: LCTAB. No increased work of breathing on [id number]. No cyanosis.   CV: RRR. No m/r/g.   Abdomen: Soft. Non tender. Non distended. No HSM.   : Voiding freely. No CVAT.  Extremities: Symmetric. Warm. No edema. No rashes.   Neuro: Face symmetric.  AOx3. Moves all four extremities on command. Mentating well.   Psych: Linear though process. Normal cognition. Good insight.   ____________________________________________________________                                             LABORATORY STUDIES  INR: 1.67 (08.16.19 @ 06:50)  PT/INR - ( 16 Aug 2019 06:50 )   PT: 19.4 SEC;   INR: 1.67      PTT - ( 16 Aug 2019 06:50 )  PTT:38.0 SEC    WBC Count: 10.89 <H> (08-16 @ 06:50)  WBC Count: 13.79 <H> (08-15 @ 06:45)  WBC Count: 20.92 <H> (08-14 @ 04:00)  Hemoglobin: 8.5 <L> (08-16 @ 06:50)  Hemoglobin: 9.3 <L> (08-15 @ 06:45)  Hemoglobin: 8.9 <L> (08-14 @ 04:00)  Platelet Count - Automated: 306 (08-16 @ 06:50)  Platelet Count - Automated: 389 (08-15 @ 06:45)  Sodium, Serum: 137 (08-16 @ 06:50)  Sodium, Serum: 140 (08-15 @ 06:45)  Sodium, Serum: 137 (08-14 @ 04:00)  Potassium, Serum: 4.1 (08-16 @ 06:50)  Potassium, Serum: 4.5 (08-15 @ 06:45)  Chloride, Serum: 98 mmol/L (08-16 @ 06:50)  Chloride, Serum: 102 mmol/L (08-15 @ 06:45)  Carbon Dioxide, Serum: 26 (08-16 @ 06:50)  Carbon Dioxide, Serum: 25 (08-15 @ 06:45)  Carbon Dioxide, Serum: 24 (08-14 @ 04:00)  Blood Urea Nitrogen, Serum: 62 <H> (08-16 @ 06:50)  Blood Urea Nitrogen, Serum: 62 <H> (08-15 @ 06:45)  Blood Urea Nitrogen, Serum: 61 <H> (08-14 @ 04:00)  Creatinine, Serum: 1.11 (08-16 @ 06:50)  Creatinine, Serum: 1.16 (08-15 @ 06:45)  Creatinine, Serum: 1.42 <H> (08-14 @ 04:00)  Albumin, Serum: 3.5 (08-15 @ 06:45)  Albumin, Serum: 3.3 (08-14 @ 04:00)  Albumin, Serum: 3.7 (08-13 @ 18:30)  Bilirubin Total, Serum: 1.0 (08-15 @ 06:45)  Bilirubin Total, Serum: 1.2 (08-14 @ 04:00)  Bilirubin Total, Serum: 1.1 (08-13 @ 18:30)  Aspartate Aminotransferase (AST/SGOT): 23 (08-15 @ 06:45)  Aspartate Aminotransferase (AST/SGOT): 34 <H> (08-14 @ 04:00)  Aspartate Aminotransferase (AST/SGOT): 35 <H> (08-13 @ 18:30  Alanine Aminotransferase (ALT/SGPT): 33 (08-15 @ 06:45)  Alanine Aminotransferase (ALT/SGPT): 34 <H> (08-14 @ 04:00)  Alanine Aminotransferase (ALT/SGPT): 31 (08-13 @ 18:30)    Other Labs  Mixing Study - PT/APTT (08.16.19 @ 06:50)    Prothrombin Time, Plasma: 19.4 SEC    INR: 1.67    Activated Partial Thromboplastin Time: 38.0: Recommended therapeutic heparin range (full dose) for APTT  is 58-99 seconds.  Recommended therapeutic argatroban range is 1.5 to 3.0 times  the baseline APTT value, not to exceed 100 seconds.  Recommended therapeutic refludan range is 1.5 to 2.5 times  the baseline APTT. SEC    PT Inhib SC 0 Hr: 13.8 SEC    PTT Inhib SC 0 Hr: 42.5 SEC    PT Normal Pool Plasma: 11.8 SEC    PTT Normal Pool Plasma: 35.5 SEC    Acute Hepatitis Panel (08.15.19 @ 06:45)    Hepatitis C Virus Interpretation: Nonreactive Hepatitis C AB  S/CO Ratio                        Interpretation  < 1.00                                   Non-Reactive  1.00 - 4.99                         Weakly-Reactive  >= 5.00                                Reactive  Non-Reactive: Aperson with a non-reactive HCV antibody  result is considered uninfected.  No further action is  needed unless recent infection is suspected.  In these  cases, consider repeat testing later to detect  seroconversion..  Weakly-Reactive: HCV antibody test is abnormal, HCV RNA  Qualitative test will follow.  Reactive: HCV antibody test is abnormal, HCV RNA  Qualitative test will follow.  Note: HCV antibody testing is performed on the Abbott   system.    Hepatitis C Virus S/CO Ratio: 0.28 S/CO    Hepatitis B Core IgM Antibody: Nonreactive    Hepatitis B Surface Antigen: Nonreactive    Hepatitis A IgM Antibody: Nonreactive    Hepatitis B Core Antibody, Total (08.15.19 @ 06:45)    Hepatitis B Core Antibody, Total: Reactive: Test repeated.    Lupus Profile (08.15.19 @ 06:45)    Prothrombin Time, Plasma: 24.8 SEC    INR: 2.18    Activated Partial Thromboplastin Time: 42.8: Recommended therapeutic heparin range (full dose) for APTT  is 58-99 seconds.  Recommended therapeutic argatroban range is 1.5 to 3.0 times  the baseline APTT value, not to exceed 100 seconds.  Recommended therapeutic refludan range is 1.5 to 2.5 times  the baseline APTT. SEC    Thrombin Time Assay: 29.3 SEC    DRVVT S/C Ratio: 1.46: RATIO > 2.0 LA IS STRONGLY PRESENT  RATIO 1.5 - 2.0 LA IS MODERATELY PRESENT  RATIO 1.2 - 1.5 LA IS WEAKLY PRESENT    Silica Clotting Time S/C Ratio: 1.00: NOTE: THE PRESENCE OF DIRECT THROMBIN INHIBITORS (SUCH AS  ARGATROBAN, REFLUDAN), UF HEPARIN >0.5 U/ml OR LMW HEPARIN  >1.0 U/ml MAY AFFECT RESULTS.  RATIO > 1.27 IS POSITIVE FOR LUPUS.  RATIO <= 1.27 IS NEGATIVE FOR LUPUS.          	    Insulin            08-15  -  08-16  --------------------------------------------------------  IN: 586 mL / NET: 186 mL    Cultures    Culture - Blood (collected 08-10 @ 12:00)  Source: .Blood Blood-Peripheral  Final Report (08-16 @ 02:00):    No growth at 5 days.    Culture - Blood (collected 08-10 @ 12:00)  Source: .Blood Blood-Peripheral  Final Report (08-16 @ 02:00):    No growth at 5 days. ____________________________________________________________                                INTERVAL HISTORY AND SUBJECTIVE  08-13 (3d)  Felt anxious and stated "I can't breathe please open the window." Was found to be satting well on RA but with HTN and asked for home benzos.    Otherwise, denies any increase WOB. Continues to have a mild productive cough with clear sputum   Denies hematuria.  Son states that rash on legs are less red.    ____________________________________________________________                                                         VITALS  I&O's Summary    15 Aug 2019 07:01  -  16 Aug 2019 07:00  --------------------------------------------------------  IN: 586 mL / OUT: 400 mL / NET: 186 mL  T(F): 97.8, Max: 98.5 (08-15-19 @ 10:02)   HR: 81 (69 - 88)  BP: 160/104 (136/80 - 160/104)     RR: 16 (16 - 19)   SpO2: 99%   Weight (kg): 68.4 (08-13)  ____________________________________________________________                                             PHYSICAL EXAMINATION  General: Awake, alert, conversant  Pulm: +crackles in anterior lung fields. No wheezing. No increased work of breathing on 2L. No cyanosis.   CV: RRR. No m/r/g.   Abdomen: Soft. Non tender. Non distended. No HSM.   : Voiding freely. No CVAT.  Extremities: Symmetric. Warm. No edema. b/l LE palpable purpura.   ____________________________________________________________                                             LABORATORY STUDIES  INR: 1.67 (08.16.19 @ 06:50)  PT/INR - ( 16 Aug 2019 06:50 )   PT: 19.4 SEC;   INR: 1.67      PTT - ( 16 Aug 2019 06:50 )  PTT:38.0 SEC    WBC Count: 10.89 <H> (08-16 @ 06:50)  WBC Count: 13.79 <H> (08-15 @ 06:45)  WBC Count: 20.92 <H> (08-14 @ 04:00)  Hemoglobin: 8.5 <L> (08-16 @ 06:50)  Hemoglobin: 9.3 <L> (08-15 @ 06:45)  Hemoglobin: 8.9 <L> (08-14 @ 04:00)  Platelet Count - Automated: 306 (08-16 @ 06:50)  Platelet Count - Automated: 389 (08-15 @ 06:45)  Sodium, Serum: 137 (08-16 @ 06:50)  Sodium, Serum: 140 (08-15 @ 06:45)  Sodium, Serum: 137 (08-14 @ 04:00)  Potassium, Serum: 4.1 (08-16 @ 06:50)  Potassium, Serum: 4.5 (08-15 @ 06:45)  Chloride, Serum: 98 mmol/L (08-16 @ 06:50)  Chloride, Serum: 102 mmol/L (08-15 @ 06:45)  Carbon Dioxide, Serum: 26 (08-16 @ 06:50)  Carbon Dioxide, Serum: 25 (08-15 @ 06:45)  Carbon Dioxide, Serum: 24 (08-14 @ 04:00)  Blood Urea Nitrogen, Serum: 62 <H> (08-16 @ 06:50)  Blood Urea Nitrogen, Serum: 62 <H> (08-15 @ 06:45)  Blood Urea Nitrogen, Serum: 61 <H> (08-14 @ 04:00)  Creatinine, Serum: 1.11 (08-16 @ 06:50)  Creatinine, Serum: 1.16 (08-15 @ 06:45)  Creatinine, Serum: 1.42 <H> (08-14 @ 04:00)  Albumin, Serum: 3.5 (08-15 @ 06:45)  Albumin, Serum: 3.3 (08-14 @ 04:00)  Albumin, Serum: 3.7 (08-13 @ 18:30)  Bilirubin Total, Serum: 1.0 (08-15 @ 06:45)  Bilirubin Total, Serum: 1.2 (08-14 @ 04:00)  Bilirubin Total, Serum: 1.1 (08-13 @ 18:30)  Aspartate Aminotransferase (AST/SGOT): 23 (08-15 @ 06:45)  Aspartate Aminotransferase (AST/SGOT): 34 <H> (08-14 @ 04:00)  Aspartate Aminotransferase (AST/SGOT): 35 <H> (08-13 @ 18:30  Alanine Aminotransferase (ALT/SGPT): 33 (08-15 @ 06:45)  Alanine Aminotransferase (ALT/SGPT): 34 <H> (08-14 @ 04:00)  Alanine Aminotransferase (ALT/SGPT): 31 (08-13 @ 18:30)    Other Labs  Mixing Study - PT/APTT (08.16.19 @ 06:50)    Prothrombin Time, Plasma: 19.4 SEC    INR: 1.67    Activated Partial Thromboplastin Time: 38.0: Recommended therapeutic heparin range (full dose) for APTT  is 58-99 seconds.  Recommended therapeutic argatroban range is 1.5 to 3.0 times  the baseline APTT value, not to exceed 100 seconds.  Recommended therapeutic refludan range is 1.5 to 2.5 times  the baseline APTT. SEC    PT Inhib SC 0 Hr: 13.8 SEC    PTT Inhib SC 0 Hr: 42.5 SEC    PT Normal Pool Plasma: 11.8 SEC    PTT Normal Pool Plasma: 35.5 SEC    Acute Hepatitis Panel (08.15.19 @ 06:45)    Hepatitis C Virus Interpretation: Nonreactive Hepatitis C AB  S/CO Ratio                        Interpretation  < 1.00                                   Non-Reactive  1.00 - 4.99                         Weakly-Reactive  >= 5.00                                Reactive  Non-Reactive: Aperson with a non-reactive HCV antibody  result is considered uninfected.  No further action is  needed unless recent infection is suspected.  In these  cases, consider repeat testing later to detect  seroconversion..  Weakly-Reactive: HCV antibody test is abnormal, HCV RNA  Qualitative test will follow.  Reactive: HCV antibody test is abnormal, HCV RNA  Qualitative test will follow.  Note: HCV antibody testing is performed on the Abbott   system.    Hepatitis C Virus S/CO Ratio: 0.28 S/CO    Hepatitis B Core IgM Antibody: Nonreactive    Hepatitis B Surface Antigen: Nonreactive    Hepatitis A IgM Antibody: Nonreactive    Hepatitis B Core Antibody, Total (08.15.19 @ 06:45)    Hepatitis B Core Antibody, Total: Reactive: Test repeated.    Lupus Profile (08.15.19 @ 06:45)    Prothrombin Time, Plasma: 24.8 SEC    INR: 2.18    Activated Partial Thromboplastin Time: 42.8: Recommended therapeutic heparin range (full dose) for APTT  is 58-99 seconds.  Recommended therapeutic argatroban range is 1.5 to 3.0 times  the baseline APTT value, not to exceed 100 seconds.  Recommended therapeutic refludan range is 1.5 to 2.5 times  the baseline APTT. SEC    Thrombin Time Assay: 29.3 SEC    DRVVT S/C Ratio: 1.46: RATIO > 2.0 LA IS STRONGLY PRESENT  RATIO 1.5 - 2.0 LA IS MODERATELY PRESENT  RATIO 1.2 - 1.5 LA IS WEAKLY PRESENT    Silica Clotting Time S/C Ratio: 1.00: NOTE: THE PRESENCE OF DIRECT THROMBIN INHIBITORS (SUCH AS  ARGATROBAN, REFLUDAN), UF HEPARIN >0.5 U/ml OR LMW HEPARIN  >1.0 U/ml MAY AFFECT RESULTS.  RATIO > 1.27 IS POSITIVE FOR LUPUS.  RATIO <= 1.27 IS NEGATIVE FOR LUPUS.          	    Insulin            08-15  -  08-16  --------------------------------------------------------  IN: 586 mL / NET: 186 mL    Cultures    Culture - Blood (collected 08-10 @ 12:00)  Source: .Blood Blood-Peripheral  Final Report (08-16 @ 02:00):    No growth at 5 days.    Culture - Blood (collected 08-10 @ 12:00)  Source: .Blood Blood-Peripheral  Final Report (08-16 @ 02:00):    No growth at 5 days.

## 2019-08-16 NOTE — DISCHARGE NOTE PROVIDER - NSDCFUSCHEDAPPT_GEN_ALL_CORE_FT
TONIA BRANHAM ; 09/16/2019 ; NPP Med Pulm [facility]  TONIA BRANHAM ; 10/10/2019 ; NPP Cardio 70 [facility]  TONIA BRANHAM ; 11/14/2019 ; NPP HemOnc 7[facility] TONIA BRANHAM ; 09/16/2019 ; NPP Med Pulm 410 [facility]  TONIA BRANHAM ; 10/10/2019 ; NPP Cardio 70 [facility]  TONIA BRANHAM ; 11/14/2019 ; NPP HemOnc 75 [facility] TONIA BRANHAM ; 09/16/2019 ; NPP Med Pulm 410 [facility]  TONIA BRANHAM ; 10/10/2019 ; NPP Cardio 70 [facility]  TONIA BRANHAM ; 11/14/2019 ; NPP HemOnc 7[facility] TONIA YOUNG ; 09/06/2019 ; NPP [facility]  TONIA YOUNG ; 09/16/2019 ; NPP Med Pulm 410 [facility]  TONIA YOUNG ; 10/10/2019 ; NPP Cardio 70 [facility]  TONIA YOUNG ; 11/14/2019 ; NPP HemOnc 758 [facility] TONIA YOUNG ; 09/06/2019 ; NPP [facility]  TONIA YOUNG ; 09/16/2019 ; NPP Med Pulm 410 [facility]  TONIA YOUNG ; 10/10/2019 ; NPP Cardio 70 [facility]  TONIA YOUNG ; 11/14/2019 ; NPP HemOnc [facility] TONIA YOUNG ; 09/06/2019 ; NPP [facility]  TONIA YOUNG ; 09/16/2019 ; NPP Med Pulm 410 [facility]  TONIA YOUNG ; 10/10/2019 ; NPP Cardio 70 [facility]  TONIA YOUNG ; 11/14/2019 ; NPP HemOnc 750 [facility] TONIA YOUNG ; 09/06/2019 ; NPP [place] [facility]  TONIA YOUNG ; 09/16/2019 ; NPP Med Pulm [facility]  TONIA YOUNG ; 10/10/2019 ; NPP Cardio 70 [facility]  TONIA YOUNG ; 11/14/2019 ; NPP HemOnc 7[address] [facility] TONIA YOUNG ; 09/06/2019 ; NPP [place] [facility]  TONIA YOUNG ; 09/16/2019 ; NPP Med Pulm 410 [facility]  TONIA YOUNG ; 10/10/2019 ; NPP Cardio 70 [facility]  TONIA YOUNG ; 11/14/2019 ; NPP HemOnc 759 [facility] TONIA YOUNG ; 09/06/2019 ; NPP [place] [facility]  TONIA YOUNG ; 09/16/2019 ; NPP Med Pulm 410 [facility]  TONIA YOUNG ; 10/10/2019 ; NPP Cardio 70 [facility]  TONIA YOUNG ; 11/14/2019 ; NPP HemOnc 753 [facility] TONIA YOUNG ; 09/06/2019 ; NPP [place] [facility]  TONIA YOUNG ; 09/16/2019 ; NPP Med Pulm 410 [facility]  TONIA YOUNG ; 10/10/2019 ; NPP Cardio 70 [facility]  TONIA YOUNG ; 11/14/2019 ; NPP HemOnc 751 [facility] TONIA BRANHAM ; 09/04/2019 ; NPP [facility] [address]  TONIA BRANHAM ; 09/06/2019 ; NPP [facility]  TONIA BRANHAM ; 09/16/2019 ; NPP Med Pulm 410 [facility]  TONIA BRANHAM ; 10/10/2019 ; NPP Cardio 70 [facility]  TONIA BRANHAM ; 11/14/2019 ; NPP HemOnc [address]

## 2019-08-16 NOTE — PROGRESS NOTE ADULT - SUBJECTIVE AND OBJECTIVE BOX
CC: f/u for resp failure, +RMSF IgM    Patient reports weak in general, fair appetite, no SOB at rest    REVIEW OF SYSTEMS:  All other review of systems negative (Comprehensive ROS)    Antimicrobials Day # 6  doxycycline IVPB      doxycycline IVPB 100 milliGRAM(s) IV Intermittent every 12 hours    Other Medications Reviewed    T(F): 97.8 (19 @ 06:19), Max: 98 (08-15-19 @ 21:10)  HR: 81 (19 @ 06:19)  BP: 160/104 (19 @ 06:19)  RR: 16 (19 @ 06:19)  SpO2: 99% (19 @ 06:19)  Wt(kg): --    PHYSICAL EXAM:  General: no acute distress  Eyes:  anicteric, no conjunctival injection, no discharge  Oropharynx: no lesions or injection 	  Neck: supple, without adenopathy  Lungs: clear to auscultation anteriorly  Heart: irregular rhythm; systolic murmur  Abdomen: soft, nondistended, nontender, without mass or organomegaly  Skin: faint papules legs  Extremities: no edema  Neurologic: alert, moves all extremities    LAB RESULTS:                        8.5    10.89 )-----------( 306      ( 16 Aug 2019 06:50 )             27.7     08-16    137  |  98  |  62<H>  ----------------------------<  185<H>  4.1   |  26  |  1.11    Ca    9.7      16 Aug 2019 06:50  Phos  5.0     08-  Mg     1.9     08-16    TPro  6.9  /  Alb  3.5  /  TBili  1.0  /  DBili  x   /  AST  23  /  ALT  33  /  AlkPhos  68  08-15    LIVER FUNCTIONS - ( 15 Aug 2019 06:45 )  Alb: 3.5 g/dL / Pro: 6.9 g/dL / ALK PHOS: 68 u/L / ALT: 33 u/L / AST: 23 u/L / GGT: x           Urinalysis Basic - ( 14 Aug 2019 19:32 )    Color: YELLOW / Appearance: CLEAR / S.019 / pH: 6.0  Gluc: 50 / Ketone: NEGATIVE  / Bili: NEGATIVE / Urobili: NORMAL   Blood: MODERATE / Protein: 200 / Nitrite: NEGATIVE   Leuk Esterase: NEGATIVE / RBC: >50 / WBC 11-25   Sq Epi: OCC / Non Sq Epi: x / Bacteria: NEGATIVE      MICROBIOLOGY:  RECENT CULTURES:  Culture - Blood (08.10.19 @ 12:00)    Specimen Source: .Blood Blood-Peripheral    Culture Results:   No growth at 5 days.    RADIOLOGY REVIEWED:  Xray Chest 1 View- PORTABLE-Urgent (19 @ 16:00) >  ollow-up with resolving bilateral pulmonary opacities   either multifocal pneumonia and/or pulmonary edema.

## 2019-08-16 NOTE — PROGRESS NOTE ADULT - ASSESSMENT
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 Associated symptoms include generalized malaise, nausea, poor PO with associated weight loss of 10lb over the last two months. Patient has demonstrated marked improvement in respiratory status on Solumedrol and Doxycycline with quick improvement of her oxygenation, currently on 2 L nasal cannula during interview. Notable labs include Blood cx NGTD, HIV, legionella -, crypto ag -, P-ANCA positive1:1280, IVAN positive 1:640, C3 76, C4 10, elevated CRP and ESR. RMSF IgM positive.     Repeat CXR shows improvement of underlying lung pathology and the patient is currently on 2L NC. She continues to have improvement with her speak and breathing and off oxygen she only desaturated down to 86%. Ultrasound shows improvement of B-line patterns bilaterally. Given her improvement and her INR being >2 it is unlikely that we will be able to do the procedure. Please check INR in the AM and if it is still >2 then we will be unable to do the procedure.    Serologies + include P-ANCA, MPO, IVAN, hypocomplementemia, prior positive dsDNA, beta-2 glycoprotein IgA, ACl IgM.    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 Associated symptoms include generalized malaise, nausea, poor PO with associated weight loss of 10lb over the last two months. Patient has demonstrated marked improvement in respiratory status on Solumedrol and Doxycycline with quick improvement of her oxygenation, currently on 2 L nasal cannula during interview. Notable labs include Blood cx NGTD, HIV, legionella -, crypto ag -, P-ANCA positive1:1280, IVAN positive 1:640, C3 76, C4 10, elevated CRP and ESR. RMSF IgM positive.     Repeat CXR shows improvement of underlying lung pathology and the patient is currently on 2L NC. She continues to have improvement with her speak and breathing and off oxygen she only desaturated down to 86%. Ultrasound shows improvement of B-line patterns bilaterally. Given her improvement  of pulmonary symptoms, the diagnostic value of a pulmonary biopsy is low     - Continue solumedrol 1mg/kg 70mg daily   - f/u remaining serologies  - Obtain TB Quantiferon

## 2019-08-16 NOTE — PROGRESS NOTE ADULT - PROBLEM SELECTOR PLAN 1
Patient with elevated serum creatinine of 2.03 on 8/10/19 baseline 0.5.  Now currently improved to 1.16. She presents with purpuric rash on admission which improved after giving IV steroids. Renal function and rash seem to be improving with steroid therapy.  Serologies with low C3/C4, Positive p-ANCA, negative ANCA elevated ESR/CRP. Urinalysis positive for protein 100, hematuria with large blood in urine. Patient with positive IVAN and anti-dsDNA on 6/23/2018 in the setting of chronic Hydralazine use. She has 5gm of proteinuria. 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 Continue Solumedrol now at 70 mg daily. AVOID Hydralazine. Avoid giving other nephrotoxic medications such as ACE-I/ARBS and MONTEMAYOR. Continue to trend renal function. Monitor intake and output. Patient would need renal biopsy when clinically stable to decide on further treatment course. Would consult IR regarding renal biopsy possible to be planned by early next week. Patient needs to be off anticoagulation with INR reversed. Would ideally need SBP no higher than 160mmHg and patient is able to tolerate procedure well with minimal oxygen requirements.

## 2019-08-16 NOTE — PROGRESS NOTE ADULT - ATTENDING COMMENTS
Will f/u pulm rec re: broch with bx.  Spoke to Dr Verdin, renal  on the phone about renal biopsy. Plan for renal bx next week if Pulm decided against bronch with bx as per renal rec.. Will consult IR for renal bx.

## 2019-08-16 NOTE — DISCHARGE NOTE PROVIDER - PROVIDER TOKENS
FREE:[LAST:[[place] [facility] [place]ONE:[(   )    -],FAX:[(   )    -]] FREE:[LAST:[[place]. Levin [place] an [place] [facility] [place]PHONE:[(   )    -],FAX:[(   )    -]],PROVIDER:[TOKEN:[[id number]]]

## 2019-08-16 NOTE — PROGRESS NOTE ADULT - PROBLEM SELECTOR PLAN 3
- Now in therapeutic range for A-fib after FFP and vit K but not normal (last dose of coumadin 8/12?   - Will hold in anticipation of renal Bx an risk of CVA with Afib in 3 days low  - Will likely need to give vitamin K PO as patient likely nutritionally deficient - Currently subtherapeutic in prep for possible bronch for A-fib after FFP and vit K  - Will hold coumadin in anticipation of renal Bx and risk of CVA with Afib in 3 days low (CHADS Vasc 5 = 7.2% risk per year = 0.019% risk daily

## 2019-08-16 NOTE — PROGRESS NOTE ADULT - ATTENDING COMMENTS
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 Associated symptoms include generalized malaise, nausea, poor PO with associated weight loss of 10lb over the last two months. Patient has demonstrated marked improvement in respiratory status on Solumedrol and Doxycycline with quick improvement of her oxygenation, currently on 2 L nasal cannula during interview. Notable labs include Blood cx NGTD, HIV, legionella -, crypto ag -, P-ANCA positive1:1280, IVAN positive 1:640, C3 76, C4 10, elevated CRP and ESR. RMSF IgM positive.     The patient continues to exhibit remarkable recovery given high-dose steroids. Chest x-ray today shows almost complete resolution of her pulmonary infiltrates. Evaluation at bedside shows that her oxygen levels are almost back to normal on room air. Given the fact of her remarkable recovery, the yield of  bronchoscopy at this time is low. Therefore the risks do not outweigh the benefits and the patient will not undergo bronchoscopy. The patient should continue to receive immunosuppression as per rheumatology, and perhaps receive a kidney biopsy as per nephrology. Our review of the patient's laboratory findings suggest vasculitis.    Please call if there are further questions over the weekend. The pulmonary consult service will follow on Monday if she is still in the hospital.

## 2019-08-16 NOTE — PROGRESS NOTE ADULT - SUBJECTIVE AND OBJECTIVE BOX
****Patient seen and examined. Note in progress please follow-up in afternoon for completed note and plan.     Patient is a 84y old  Female who presents with a chief complaint of MICU transfer - hypoxic respiratory failure (16 Aug 2019 08:10)    INTERVAL HPI/OVERNIGHT EVENTS:  patient received FFP and Vitamin k overnight in preparation for possible bronchoscopy today. Patient with no other acute events over night. Denies chest pain, feels as iff breathing improved, was NPO after MN last night but otherwise was eating normally yesterday, normal BMS and urinary habits, denies fevers.     T(C): 36.6 (19 @ 06:19), Max: 36.7 (08-15-19 @ 21:10)  HR: 81 (19 @ 06:19) (69 - 88)  BP: 160/104 (19 @ 06:19) (149/88 - 160/104)  RR: 16 (19 @ 06:19) (16 - 19)  SpO2: 99% (19 @ 06:19) (90% - 99%)    15 Aug 2019 07:01  -  16 Aug 2019 07:00  --------------------------------------------------------  IN: 586 mL / OUT: 400 mL / NET: 186 mL    PHYSICAL EXAM:  GENERAL: Lying in bed comfortably in NAD  HEAD:  Atraumatic, Normocephalic  EYES: PERRL, EOMs intact b/l,   ENMT: Moist Mucus membranes  NERVOUS SYSTEM:  A&Ox4, Normal Motor strength in b/l Upper and lower extremities, gross sensation to light tough intact in b/l upper and lower extremities, CNs II-XII intact b/l w/out focal defcitis, EOMs intact, and PERRL  CHEST/LUNG: Diffuse wheezing  HEART: RRR no m/g/r  ABDOMEN: +BS, soft, NT, ND  EXTREMITIES:  +2 radial pulses b/l, no LE edema  LYMPH: No anterior/Posterior or supraclavicular LAD  SKIN: No new rashes or DTIs, warm, dry, and intact, small circular nonpalpable purpura in b/l LEs    PAST MEDICAL & SURGICAL HISTORY:  COPD (chronic obstructive pulmonary disease)  Peripheral vascular disease  Pulmonary hypertension  Rheumatoid arthritis  Osteoarthritis  Mitral regurgitation  H/O lymphoma  Hyperlipidemia  Chronic GERD  Chronic kidney disease: proteinuria  AF (atrial fibrillation)  Anemia in CKD (chronic kidney disease)  CHF (congestive heart failure)  HTN (hypertension)  History of total hip replacement, left  History of total knee replacement, bilateral    MEDICATIONS  (STANDING):  atorvastatin 10 milliGRAM(s) Oral at bedtime  cloNIDine 0.2 milliGRAM(s) Oral three times a day  dextrose 5%. 1000 milliLiter(s) (50 mL/Hr) IV Continuous <Continuous>  dextrose 50% Injectable 12.5 Gram(s) IV Push once  dextrose 50% Injectable 25 Gram(s) IV Push once  dextrose 50% Injectable 25 Gram(s) IV Push once  doxycycline IVPB      doxycycline IVPB 100 milliGRAM(s) IV Intermittent every 12 hours  insulin glargine Injectable (LANTUS) 10 Unit(s) SubCutaneous at bedtime  insulin lispro (HumaLOG) corrective regimen sliding scale   SubCutaneous at bedtime  insulin lispro (HumaLOG) corrective regimen sliding scale   SubCutaneous three times a day before meals  insulin lispro Injectable (HumaLOG) 3 Unit(s) SubCutaneous before breakfast  methylPREDNISolone sodium succinate Injectable 70 milliGRAM(s) IV Push daily  metoprolol tartrate 50 milliGRAM(s) Oral two times a day  montelukast 10 milliGRAM(s) Oral daily  sertraline 50 milliGRAM(s) Oral daily    MEDICATIONS  (PRN):  ALBUTerol    90 MICROgram(s) HFA Inhaler 2 Puff(s) Inhalation every 6 hours PRN Shortness of Breath and/or Wheezing  dextrose 40% Gel 15 Gram(s) Oral once PRN Blood Glucose LESS THAN 70 milliGRAM(s)/deciliter  glucagon  Injectable 1 milliGRAM(s) IntraMuscular once PRN Glucose LESS THAN 70 milligrams/deciliter  zolpidem 5 milliGRAM(s) Oral at bedtime PRN Insomnia    REVIEW OF SYSTEMS:  pt denies fevers, chills, CP, Abdominal pain, rhinorrhea, new rashes, new LE edema, new visual changes, confusion, headaches, blood in stools, N/V, dysuria, and hematuria.     LABS:                        8.5    10.89 )-----------( 306      ( 16 Aug 2019 06:50 )             27.7     08-16    137  |  98  |  62<H>  ----------------------------<  185<H>  4.1   |  26  |  1.11    Ca    9.7      16 Aug 2019 06:50  Phos  5.0     08-16  Mg     1.9     08-16    TPro  6.9  /  Alb  3.5  /  TBili  1.0  /  DBili  x   /  AST  23  /  ALT  33  /  AlkPhos  68  08-15    LIVER FUNCTIONS - ( 15 Aug 2019 06:45 )  Alb: 3.5 g/dL / Pro: 6.9 g/dL / ALK PHOS: 68 u/L / ALT: 33 u/L / AST: 23 u/L / GGT: x     / T. Bili 1.0 mg/dL / D. Bili x         PT/INR - ( 16 Aug 2019 06:50 )   PT: 19.4 SEC;   INR: 1.67     PTT - ( 16 Aug 2019 06:50 )  PTT:38.0 SEC    CAPILLARY BLOOD GLUCOSE  POCT Blood Glucose.: 187 mg/dL (16 Aug 2019 08:55)  POCT Blood Glucose.: 215 mg/dL (15 Aug 2019 21:06)  POCT Blood Glucose.: 207 mg/dL (15 Aug 2019 17:43)  POCT Blood Glucose.: 188 mg/dL (15 Aug 2019 12:22)    Urinalysis Basic - ( 14 Aug 2019 19:32 )  Color: YELLOW / Appearance: CLEAR / S.019 / pH: 6.0  Gluc: 50 / Ketone: NEGATIVE  / Bili: NEGATIVE / Urobili: NORMAL   Blood: MODERATE / Protein: 200 / Nitrite: NEGATIVE   Leuk Esterase: NEGATIVE / RBC: >50 / WBC 11-25   Sq Epi: OCC / Non Sq Epi: x / Bacteria: NEGATIVE    RADIOLOGY & ADDITIONAL TESTS:  None new    Imaging Personally Reviewed:  [x] YES  [ ] NO    Consultant(s) Notes Reviewed:  [x] YES  [ ] NO - Nephro, Rheum, Pulm, & ID    Care Discussed with Consultants/Other Providers [x] YES  [ ] NO - Rheum and Pulm Patient is a 84y old  Female who presents with a chief complaint of MICU transfer - hypoxic respiratory failure (16 Aug 2019 08:10)    INTERVAL HPI/OVERNIGHT EVENTS:  patient received FFP and Vitamin k overnight in preparation for possible bronchoscopy today. Patient with no other acute events over night. Denies chest pain, feels as iff breathing improved, was NPO after MN last night but otherwise was eating normally yesterday, normal BMS and urinary habits, denies fevers.     T(C): 36.6 (19 @ 06:19), Max: 36.7 (08-15-19 @ 21:10)  HR: 81 (19 @ 06:19) (69 - 88)  BP: 160/104 (19 @ 06:19) (149/88 - 160/104)  RR: 16 (19 @ 06:19) (16 - 19)  SpO2: 99% (19 @ 06:19) (90% - 99%)    15 Aug 2019 07:01  -  16 Aug 2019 07:00  --------------------------------------------------------  IN: 586 mL / OUT: 400 mL / NET: 186 mL    PHYSICAL EXAM:  GENERAL: Lying in bed comfortably in NAD  HEAD:  Atraumatic, Normocephalic  EYES: PERRL, EOMs intact b/l,   ENMT: Moist Mucus membranes  NERVOUS SYSTEM:  A&Ox4, Normal Motor strength in b/l Upper and lower extremities, gross sensation to light tough intact in b/l upper and lower extremities, CNs II-XII intact b/l w/out focal defcitis, EOMs intact, and PERRL  CHEST/LUNG: Diffuse wheezing  HEART: RRR no m/g/r  ABDOMEN: +BS, soft, NT, ND  EXTREMITIES:  +2 radial pulses b/l, no LE edema  LYMPH: No anterior/Posterior or supraclavicular LAD  SKIN: No new rashes or DTIs, warm, dry, and intact, small circular nonpalpable purpura in b/l LEs    PAST MEDICAL & SURGICAL HISTORY:  COPD (chronic obstructive pulmonary disease)  Peripheral vascular disease  Pulmonary hypertension  Rheumatoid arthritis  Osteoarthritis  Mitral regurgitation  H/O lymphoma  Hyperlipidemia  Chronic GERD  Chronic kidney disease: proteinuria  AF (atrial fibrillation)  Anemia in CKD (chronic kidney disease)  CHF (congestive heart failure)  HTN (hypertension)  History of total hip replacement, left  History of total knee replacement, bilateral    MEDICATIONS  (STANDING):  atorvastatin 10 milliGRAM(s) Oral at bedtime  cloNIDine 0.2 milliGRAM(s) Oral three times a day  dextrose 5%. 1000 milliLiter(s) (50 mL/Hr) IV Continuous <Continuous>  dextrose 50% Injectable 12.5 Gram(s) IV Push once  dextrose 50% Injectable 25 Gram(s) IV Push once  dextrose 50% Injectable 25 Gram(s) IV Push once  doxycycline IVPB      doxycycline IVPB 100 milliGRAM(s) IV Intermittent every 12 hours  insulin glargine Injectable (LANTUS) 10 Unit(s) SubCutaneous at bedtime  insulin lispro (HumaLOG) corrective regimen sliding scale   SubCutaneous at bedtime  insulin lispro (HumaLOG) corrective regimen sliding scale   SubCutaneous three times a day before meals  insulin lispro Injectable (HumaLOG) 3 Unit(s) SubCutaneous before breakfast  methylPREDNISolone sodium succinate Injectable 70 milliGRAM(s) IV Push daily  metoprolol tartrate 50 milliGRAM(s) Oral two times a day  montelukast 10 milliGRAM(s) Oral daily  sertraline 50 milliGRAM(s) Oral daily    MEDICATIONS  (PRN):  ALBUTerol    90 MICROgram(s) HFA Inhaler 2 Puff(s) Inhalation every 6 hours PRN Shortness of Breath and/or Wheezing  dextrose 40% Gel 15 Gram(s) Oral once PRN Blood Glucose LESS THAN 70 milliGRAM(s)/deciliter  glucagon  Injectable 1 milliGRAM(s) IntraMuscular once PRN Glucose LESS THAN 70 milligrams/deciliter  zolpidem 5 milliGRAM(s) Oral at bedtime PRN Insomnia    REVIEW OF SYSTEMS:  pt denies fevers, chills, CP, Abdominal pain, rhinorrhea, new rashes, new LE edema, new visual changes, confusion, headaches, blood in stools, N/V, dysuria, and hematuria.     LABS:                        8.5    10.89 )-----------( 306      ( 16 Aug 2019 06:50 )             27.7     08-16    137  |  98  |  62<H>  ----------------------------<  185<H>  4.1   |  26  |  1.11    Ca    9.7      16 Aug 2019 06:50  Phos  5.0     08-16  Mg     1.9     08-16    TPro  6.9  /  Alb  3.5  /  TBili  1.0  /  DBili  x   /  AST  23  /  ALT  33  /  AlkPhos  68  08-15    LIVER FUNCTIONS - ( 15 Aug 2019 06:45 )  Alb: 3.5 g/dL / Pro: 6.9 g/dL / ALK PHOS: 68 u/L / ALT: 33 u/L / AST: 23 u/L / GGT: x     / T. Bili 1.0 mg/dL / D. Bili x         PT/INR - ( 16 Aug 2019 06:50 )   PT: 19.4 SEC;   INR: 1.67     PTT - ( 16 Aug 2019 06:50 )  PTT:38.0 SEC    CAPILLARY BLOOD GLUCOSE  POCT Blood Glucose.: 187 mg/dL (16 Aug 2019 08:55)  POCT Blood Glucose.: 215 mg/dL (15 Aug 2019 21:06)  POCT Blood Glucose.: 207 mg/dL (15 Aug 2019 17:43)  POCT Blood Glucose.: 188 mg/dL (15 Aug 2019 12:22)    Urinalysis Basic - ( 14 Aug 2019 19:32 )  Color: YELLOW / Appearance: CLEAR / S.019 / pH: 6.0  Gluc: 50 / Ketone: NEGATIVE  / Bili: NEGATIVE / Urobili: NORMAL   Blood: MODERATE / Protein: 200 / Nitrite: NEGATIVE   Leuk Esterase: NEGATIVE / RBC: >50 / WBC 11-25   Sq Epi: OCC / Non Sq Epi: x / Bacteria: NEGATIVE    RADIOLOGY & ADDITIONAL TESTS:  None new    Imaging Personally Reviewed:  [x] YES  [ ] NO    Consultant(s) Notes Reviewed:  [x] YES  [ ] NO - Nephro, Rheum, Pulm, & ID    Care Discussed with Consultants/Other Providers [x] YES  [ ] NO - Rheum and Pulm

## 2019-08-16 NOTE — PROGRESS NOTE ADULT - SUBJECTIVE AND OBJECTIVE BOX
[facility] Division of Kidney Diseases & Hypertension  FOLLOW UP NOTE  [phone]--------------------------------------------------------------------------------  Chief Complaint:Pulmonary hypertension    Charts reviewed. 24 hour events noted. Patient still remains on oxygen via NC now on 2L of oxygen. She denies any subjective complaints except that she wants to go to the bathroom immediately to pee. She still appears minimally short of breath but appears improved. Serum Cr: 1.1 today.         PAST HISTORY  --------------------------------------------------------------------------------  No significant changes to PMH, PSH, FHx, SHx, unless otherwise noted    ALLERGIES & MEDICATIONS  --------------------------------------------------------------------------------  Allergies    Keflex (Unknown)  penicillin (Rash)    Intolerances      Standing Inpatient Medications  atorvastatin 10 milliGRAM(s) Oral at bedtime  cloNIDine 0.2 milliGRAM(s) Oral three times a day  dextrose 5%. 1000 milliLiter(s) IV Continuous <Continuous>  dextrose 50% Injectable 12.5 Gram(s) IV Push once  dextrose 50% Injectable 25 Gram(s) IV Push once  dextrose 50% Injectable 25 Gram(s) IV Push once  insulin glargine Injectable (LANTUS) 10 Unit(s) SubCutaneous at bedtime  insulin lispro (HumaLOG) corrective regimen sliding scale   SubCutaneous at bedtime  insulin lispro (HumaLOG) corrective regimen sliding scale   SubCutaneous three times a day before meals  insulin lispro Injectable (HumaLOG) 3 Unit(s) SubCutaneous before breakfast  methylPREDNISolone sodium succinate Injectable 70 milliGRAM(s) IV Push daily  metoprolol tartrate 50 milliGRAM(s) Oral two times a day  montelukast 10 milliGRAM(s) Oral daily  sertraline 50 milliGRAM(s) Oral daily    PRN Inpatient Medications  ALBUTerol    90 MICROgram(s) HFA Inhaler 2 Puff(s) Inhalation every 6 hours PRN  dextrose 40% Gel 15 Gram(s) Oral once PRN  glucagon  Injectable 1 milliGRAM(s) IntraMuscular once PRN  zolpidem 5 milliGRAM(s) Oral at bedtime PRN        REVIEW OF SYSTEMS  --------------------------------------------------------------------------------  Gen: +fever  Skin: + rash  Head/Eyes/Ears/Mouth: no blurring of vision  Respiratory: +SOB  CV: No chest pain,   GI: No abdominal pain, diarrhea, constipation, nausea, vomiting  : - gross hematuria, No increased frequency, dysuria, nocturia  MSK: + joint pain    All other systems were reviewed and are negative, except as noted.  VITALS/PHYSICAL EXAM  --------------------------------------------------------------------------------  T(C): 36.6 (08-16-19 @ 06:19), Max: 36.7 (08-15-19 @ 21:10)  HR: 81 (08-16-19 @ 06:19) (69 - 88)  BP: 160/104 (08-16-19 @ 06:19) (149/88 - 160/104)  RR: 16 (08-16-19 @ 06:19) (16 - 19)  SpO2: 99% (08-16-19 @ 06:19) (90% - 99%)  Wt(kg): --        08-15-19 @ 07:01  -  08-16-19 @ 07:00  --------------------------------------------------------  IN: 586 mL / OUT: 400 mL / NET: 186 mL    Physical Exam:  	Gen: awake, on NC  	HEENT: supple  no LAD  	Pulm: inspiratory squeek bilateral lung fields  	CV:  S1S2, no murmurs  	Abd: +BS, soft   	Ext: No B/L Lower ext edema  	Neuro: awake, responsive to commands  	Skin: bilateral healing nonblanching purpuric rash on  lower extremities with chronic venous stasis changes. Skin lesions significantly improving and clearing              Psych: appropriate affect              Vascular: no cyanosis    LABS/STUDIES  --------------------------------------------------------------------------------              8.5    10.89 >-----------<  306      [08-16-19 @ 06:50]              27.7     137  |  98  |  62  ----------------------------<  185      [08-16-19 @ 06:50]  4.1   |  26  |  1.11        Ca     9.7     [08-16-19 @ 06:50]      Mg     1.9     [08-16-19 @ 06:50]      Phos  5.0     [08-16-19 @ 06:50]    TPro  6.9  /  Alb  3.5  /  TBili  1.0  /  DBili  x   /  AST  23  /  ALT  33  /  AlkPhos  68  [08-15-19 @ 06:45]    PT/INR: PT 19.4 , INR 1.67       [08-16-19 @ 06:50]  PTT: 38.0       [08-16-19 @ 06:50]      Creatinine Trend:  SCr 1.11 [08-16 @ 06:50]  SCr 1.16 [08-15 @ 06:45]  SCr 1.42 [08-14 @ 04:00]  SCr 1.60 [08-13 @ 18:30]  SCr 1.85 [08-13 @ 05:30]    Urinalysis - [08-14-19 @ 19:32]      Color YELLOW / Appearance CLEAR / SG 1.019 / pH 6.0      Gluc 50 / Ketone NEGATIVE  / Bili NEGATIVE / Urobili NORMAL       Blood MODERATE / Protein 200 / Leuk Est NEGATIVE / Nitrite NEGATIVE      RBC >50 / WBC 11-25 / Hyaline NEGATIVE / Gran  / Sq Epi OCC / Non Sq Epi  / Bacteria NEGATIVE    Urine Creatinine 35.60      [08-14-19 @ 19:30]  Urine Protein 201.6      [08-14-19 @ 19:30]    Iron 25, TIBC 302, %sat 8      [08-13-19 @ 05:30]  Ferritin 687      [08-13-19 @ 05:30]  HbA1c 5.6      [08-13-19 @ 05:30]    HBsAg Nonreactive      [08-15-19 @ 06:45]  HBcAb Reactive      [08-15-19 @ 06:45]  HCV 0.28, Nonreactive Hepatitis C AB  S/CO Ratio                        Interpretation  < 1.00                                   Non-Reactive  1.00 - 4.99                         Weakly-Reactive  >= 5.00                                Reactive  Non-Reactive: Aperson with a non-reactive HCV antibody  result is considered uninfected.  No further action is  needed unless recent infection is suspected.  In these  cases, consider repeat testing later to detect  seroconversion..  Weakly-Reactive: HCV antibody test is abnormal, HCV RNA  Qualitative test will follow.  Reactive: HCV antibody test is abnormal, HCV RNA  Qualitative test will follow.  Note: HCV antibody testing is performed on the [employer]   system.      [08-15-19 @ 06:45]  HIV Nonreact      [08-11-19 @ 16:15]    IVAN: titer 1:640, pattern Homogeneous      [08-11-19 @ 16:15]  C3 Complement 76      [08-12-19 @ 11:00]  C4 Complement 10      [08-12-19 @ 11:00]  ANCA: cANCA Negative, pANCA >1:1280, atypical ANCA Negative      [08-11-19 @ 16:15]  MPO-ANCA 96.2, interpretation: Positive      [08-11-19 @ 16:15]  PR3-ANCA 5.1, interpretation: Negative      [08-11-19 @ 16:15]  Immunofixation Serum:   Weak IgM Lambda Band Identified    Reference Range: None Detected      [08-13-19 @ 05:30]  SPEP Interpretation: Weak Gamma-Migrating Paraprotein Identified      [08-13-19 @ 05:30]

## 2019-08-16 NOTE — DISCHARGE NOTE PROVIDER - CARE PROVIDER_API CALL
Dr. Levin as an out pt at [facility] [place],   Phone: (   )    -  Fax: (   )    -  Follow Up Time: [place] [facility] [place]: (   )    -  Fax: (   )    -  Follow Up Time:     Luciana Diaz)  Internal Medicine; Rheumatology  [address] [place] [address]  Phone: [phone]  Fax: [phone]  Follow Up Time:

## 2019-08-16 NOTE — PROGRESS NOTE ADULT - PROBLEM SELECTOR PLAN 7
- DVT PPx: INR therapeutic but will need HSQ when subtherapeutic  - PT eval pending but likely will need rehab once medically stable    -Sherif Nicole PGY5 EMIM Pager#[phone] - Will start HSQ after bronch today  - PT eval pending but likely will need rehab once medically stable  - Discussed management plans with daughter at bedside at length about diagnosis, course of treatment, testing (such as biopsies), and further treatment after biopsies. Daughter extremely grateful and I answered all questions as completely as possible.     -Sherif Nicole PGY5 EMIM Pager#[phone]

## 2019-08-16 NOTE — DISCHARGE NOTE PROVIDER - HOSPITAL COURSE
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 lupus). Hospital course c/b KANIKA, anemia, proteinuria, supratherapeutic INR, LE purpuric rash. Admitted for management of multifocal PNA vs. vasculitis vs. possible diffuse alveolar hemorrhage. Of note, pt with FMHx inclusion body myositis.         Respiratory failure with hypoxia.      -Likely 2/2 pulmonary-renal syndrome 2/2 rheumatological etiology (e.g. immune complex disease such as lupus vs vasculitis)    -MPO Ab, p-ANCA, IVAN positive. C3/C4 complement low.     -c-ANCA, proteinase 3 Ab negative.     -Immunofixation with weak IgM lambda band     -IV Solumedrol     -Rheum Cx     -Pulm Cx     -AVOID Hydralazine        PNA     -CT chest 8/10 - diffuse B/L peribronchial airspace consolidation may reflect multilobar bronchopneumonia. Small Rt sided pleural effusion.     -S/P Meropenem/Zosyn (completed 8/13)     -Urine Legionella negative. RVP negative. S. pneumonia negative.         Javed mountain fever    -RM Ab IgM positive 8/11    -Doxycycline 8/11-8/16 (d'alexi 8/16 as per ID)    -ID Cx     -Crypotococcal, Mycoplasma, HIV serology negative.      -HBcAb positive, HBV IgM Ab, HBsAg negative.         KANIKA     -Likely 2/2 rheumatological etiology such as Hydralazine induced SLE, small vessel vasculitis, or other complex depositing glomerulopathies    -Avoid nephrotoxins, renally dose meds     -Renal US 8/11 w/o hydronephrosis         Afib    -CHADSVASC 5    -BB    -HOLD Coumadin (last dose 8/12?) given elevated INR and pending bronchoscopy/renal biopsy        Elevated INR    -S/P vitamin K 5 mg 8/10    -Vitamin K 2.5 mg 8/15 & FFP 2 U 8/15 to optimize for bronchoscopy/renal biopsy         Anemia    -Likely in setting of chronic disease, unable to interpret iron studies - done after RBC transfusion     -S/P 3 U PRBC (last 8/11) 84 F PMHx of COPD, RA, dCHF, pHTN (class II), A-fib (on Coumadin), HLD, HTN, anxiety, depression, 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 lupus). Hospital course c/b KANIKA, anemia, proteinuria, supratherapeutic INR, LE purpuric rash. Admitted for management of respiratory failure with hypoxia         Respiratory failure with hypoxia.      -Likely 2/2 pulmonary-renal syndrome 2/2 rheumatological etiology (e.g. immune complex disease such as lupus vs vasculitis)    -MPO Ab, p-ANCA, IVAN positive. C3/C4 complement low.     -c-ANCA, proteinase 3 Ab negative.     -Immunofixation with weak IgM lambda band     -S/p IV Solumedrol     -PCP PPx Bactrim DS M/W/F started    -Continue PO lasix 20mg BID     -Rheum, Pulmonary, Heme/Onc, and Renal followed         KANIKA     -Likely 2/2 rheumatological etiology such as Hydralazine induced SLE, small vessel vasculitis, or other complex depositing glomerulopathies    -Avoided nephrotoxins, renally dosed meds     -Renal US 8/11 w/o hydronephrosis     -s/p left renal biopsy 8/20-  Active crescentic glomerulonephritis, pauci-immune        Hx marginal zone lymphoma    -last bone marrow in 5/2018    -has history of IgM lambda, SPEP done on 8-13-19 M spike unable to quantify, immunofixation showing weak IgM lambda band    -Plan for follow up with Dr. Levin after discharge        Afib    -CHADSVASC 5    -Monitored INR & dosed coumadin daily    -Continued metoprolol        Elevated INR    -S/P vitamin K 5 mg 8/10, 2.5 mg 8/15    - S/p FFP 2 U on 8/15 to optimize for bronchoscopy/renal biopsy     - coumadin resumed 8/20- dosed daily per INR        Anemia    -Likely in setting of chronic disease    -S/P 3 U PRBC (last 8/11)             HTN    -Decreased clonidine to 0.1mg BID. 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 lupus). Hospital course c/b KANIKA, anemia, proteinuria, supratherapeutic INR, LE purpuric rash. Admitted for management of respiratory failure with hypoxia         Respiratory failure with hypoxia.      -Likely 2/2 pulmonary-renal syndrome 2/2 rheumatological etiology (e.g. immune complex disease such as lupus vs vasculitis)    -MPO Ab, p-ANCA, IVAN positive. C3/C4 complement low.     -c-ANCA, proteinase 3 Ab negative.     -Immunofixation with weak IgM lambda band     -S/p IV Solumedrol. Home PO steriod dose TBD per Rheum-----    -PCP PPx Bactrim DS M/W/F started    -Continue PO lasix 20mg BID     -Rheum, Pulmonary, Heme/Onc, and Renal followed     -S/p Rituxan induction on 8/23- will need weekly dosing for next 4 weeks. Specific details to be given to pt per Rheum-----        KANIKA     -Likely 2/2 rheumatological etiology such as Hydralazine induced SLE, small vessel vasculitis, or other complex depositing glomerulopathies    -Avoided nephrotoxins, renally dosed meds     -Renal US 8/11 w/o hydronephrosis     -s/p left renal biopsy 8/20-  Active crescentic glomerulonephritis, pauci-immune        Hx marginal zone lymphoma    -last bone marrow in 5/2018    -has history of IgM lambda, SPEP done on 8-13-19 M spike unable to quantify, immunofixation showing weak IgM lambda band    -Plan for follow up with Dr. Levin after discharge        [facility]    -[facility]R & dosed coumadin daily    -Continued metoprolol        Elevated INR    -S/P vitamin K 5 mg 8/10, 2.5 mg 8/15    - S/p FFP 2 U on 8/15 to optimize for bronchoscopy/renal biopsy     - coumadin resumed 8/20- dosed daily per INR        Anemia    -Likely in setting of chronic disease    -S/P 3 U PRBC (last 8/11)             HTN    -Decreased clonidine to 0.1mg BID. 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 lupus). Hospital course c/b KANIKA, anemia, proteinuria, supratherapeutic INR, LE purpuric rash. Admitted for management of respiratory failure with hypoxia         Respiratory failure with hypoxia.      -Likely 2/2 pulmonary-renal syndrome 2/2 rheumatological etiology (e.g. immune complex disease such as lupus vs vasculitis)    -MPO Ab, p-ANCA, IVAN positive. C3/C4 complement low.     -c-ANCA, proteinase 3 Ab negative.     -Immunofixation with weak IgM lambda band     -S/p IV Solumedrol. To be discharged with Prednisone 60, further dosing TBD by rheum as OP     -PCP PPx Bactrim DS M/W/F started    -Continue PO lasix 20mg BID     -Rheum, Pulmonary, Heme/Onc, and Renal followed     -S/p Rituxan induction on 8/23- will need weekly dosing for next 4 weeks. Specific details to be given to pt per Rheum as OP        KANIKA     -Likely 2/2 rheumatological etiology such as Hydralazine induced SLE, small vessel vasculitis, or other complex depositing glomerulopathies    -Avoided nephrotoxins, renally dosed meds     -Renal US 8/11 w/o hydronephrosis     -s/p left renal biopsy 8/20-  Active crescentic glomerulonephritis, pauci-immune        Hx marginal zone lymphoma    -last bone marrow in 5/2018    -has history of IgM lambda, SPEP done on 8-13-19 M spike unable to quantify, immunofixation showing weak IgM lambda band    -Plan for follow up with Dr. Levin after discharge        Afib    -CHADSVAS 5    -Monitored INR & dosed coumadin daily    -Continued metoprolol        Elevated INR    -S/P vitamin K 5 mg 8/10, 2.5 mg 8/15    -S/p FFP 2 U on 8/15 to optimize for bronchoscopy/renal biopsy     -coumadin resumed 8/20- dosed daily per INR        Anemia    -Likely in setting of chronic disease    -S/P 3 U PRBC (last 8/11)             HTN    -Decreased clonidine to 0.1mg BID. 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 lupus). Hospital course c/b KANIKA, anemia, proteinuria, supratherapeutic INR, LE purpuric rash. Admitted for management of respiratory failure with hypoxia         Respiratory failure with hypoxia.      -Likely 2/2 pulmonary-renal syndrome 2/2 rheumatological etiology (e.g. immune complex disease such as lupus vs vasculitis)    -MPO Ab, p-ANCA, IVAN positive. C3/C4 complement low.     -c-ANCA, proteinase 3 Ab negative.     -Immunofixation with weak IgM lambda band     -S/p IV Solumedrol. To be discharged with Prednisone 60, further dosing TBD by rheum as OP     -PCP PPx Bactrim DS M/W/F started    -Continue PO lasix 20mg BID     -Rheum, Pulmonary, Heme/Onc, and Renal followed     -S/p Rituxan induction on 8/23- will need weekly dosing for next 4 weeks. Specific details to be given to pt per Rheum as OP        KANIKA     -Likely 2/2 rheumatological etiology such as Hydralazine induced SLE, small vessel vasculitis, or other complex depositing glomerulopathies    -Avoided nephrotoxins, renally dosed meds     -Renal US 8/11 w/o hydronephrosis     -s/p left renal biopsy 8/20-  Active crescentic glomerulonephritis, pauci-immune        Hx marginal zone lymphoma    -last bone marrow in 5/2018    -has history of IgM lambda, SPEP done on 8-13-19 M spike unable to quantify, immunofixation showing weak IgM lambda band    -Plan for follow up with Dr. Levin after discharge        Afib    -CHADSVAS 5    -Monitored INR & dosed coumadin daily    -Continued metoprolol        Elevated INR    -S/P vitamin K 5 mg 8/10, 2.5 mg 8/15    -S/p FFP 2 U on 8/15 to optimize for bronchoscopy/renal biopsy     -coumadin resumed 8/20- dosed daily per INR        Anemia    -Likely in setting of chronic disease    -S/P 3 U PRBC (last 8/11)             HTN    -Decreased clonidine to 0.1mg BID. 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 lupus). Hospital course c/b KANIKA, anemia, proteinuria, supratherapeutic INR, LE purpuric rash. Admitted for management of respiratory failure with hypoxia         Respiratory failure with hypoxia.      -Likely 2/2 pulmonary-renal syndrome 2/2 rheumatological etiology (e.g. immune complex disease such as lupus vs vasculitis)    -MPO Ab, p-ANCA, IVAN positive. C3/C4 complement low.     -c-ANCA, proteinase 3 Ab negative.     -Immunofixation with weak IgM lambda band     -S/p IV Solumedrol. To be discharged with Prednisone 60, further dosing TBD by rheum as OP     -PCP PPx Bactrim DS M/W/F started    -Continue PO lasix 20mg BID     -Rheum, Pulmonary, Heme/Onc, and Renal followed     -S/p Rituxan induction on 8/23- will need weekly dosing for next 4 weeks. Specific details to be given to pt per Rheum as OP        Hyperglycemia    -likely 2/2 steroid usage    -lantus d/c'd due to hypoglycemia    -pt to be discharged with sliding scale         KANIKA     -Likely 2/2 rheumatological etiology such as Hydralazine induced SLE, small vessel vasculitis, or other complex depositing glomerulopathies    -Avoided nephrotoxins, renally dosed meds     -Renal US 8/11 w/o hydronephrosis     -s/p left renal biopsy 8/20-  Active crescentic glomerulonephritis, pauci-immune        Hx marginal zone lymphoma    -last bone marrow in 5/2018    -has history of IgM lambda, SPEP done on 8-13-19 M spike unable to quantify, immunofixation showing weak IgM lambda band    -Plan for follow up with Dr. Levin after discharge        Af    -[facility] 5    -Monitored INR & dosed coumadin daily    -Continued metoprolol        Elevated INR    -S/P vitamin K 5 mg 8/10, 2.5 mg 8/15    -S/p FFP 2 U on 8/15 to optimize for bronchoscopy/renal biopsy     -coumadin resumed 8/20- dosed daily per INR        Anemia    -Likely in setting of chronic disease    -S/P 3 U PRBC (last 8/11)             HTN    -Decreased clonidine to 0.1mg BID. 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 She is now s/p given low s/p renal Bx 8/20/19 showing active crescentic glomerulonephritis, pauci-immune suspected to be 2/2 hydralazine. Now started on rituxan. First dose was given on 8/23/19.            Respiratory failure with hypoxia and KANIKA. -Likely 2/2 pulmonary-renal syndrome 2/2 rheumatological etiology (e.g. immune complex disease such as lupus vs vasculitis). Patient was diuresed during her course with lasix. She is S/p IV Solumedrol then transitioned to Prednisone 60, further dosing TBD by rheum as OP. S/p L renal Bx 8/20/19 showing active crescentic glomerulonephritis, pauci-immune suspected to be 2/2 hydralazine. Now started on rituxan. First dose was given on 8/23/19 w/ weekly doses planned as outpatient for the next 4 weeks. 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 Rheum, Pulmonary, Heme/Onc, ID and Renal followed         -MPO Ab, p-ANCA, IVAN positive. C3/C4 complement low.     -c-ANCA, proteinase 3 Ab negative.     -Immunofixation with weak IgM lambda band         Renal US 8/11 w/o hydronephrosis             Hyperglycemia- Course was c/b hyperglycemia in setting or steroids. She was treated w/ insulin during course. Discharged with premeal insulin as am FS were at goal. Her son who lives with her was given insulin teaching on 8/25.         x marginal zone lymphoma    -last bone marrow in 5/2018    -has history of IgM lambda, SPEP done on 8-13-19 M spike unable to quantify, immunofixation showing weak IgM lambda band    -Plan for follow up with Dr. Levin after discharge        Afib- Initially w/ elevated INR on admission s/p  S/P vitamin K 5 mg 8/10, 2.5 mg and FFP 2 U on 8/15 to optimize for renal biopsy. Coumadin the resumed 8/20. On 8/24 and 8/25 her INR was greater than 3 and her coumadin was held those two days. Pt family arranged for pt to see outpatient doctor on 8/26.         Anemia- Likely in setting of chronic disease. S/P 3 U PRBC (last 8/11)         HTN- Pt on multiple antihypertensive medications at home. During course tried to reduce polypharmacy and her BPs were acceptable. No more hydralazine given likely cause of her vasculitis.  Was on home clonidine which was tapered down to 0.1mg BID. C/w BB. All other home antihypertensives was stopped.         Of Note pt had unwitnessed fall in her room on the am of 8/24. She descreibed it as mechanical and stated she slipped when she was trying to get up out of bed. She denies CP, palpittaions, LOC or dizziness but she did report hitting her head with a subsequential H/A. Her neuro exam was wnl. S/p CTH that was negative. Her H/A resolved.

## 2019-08-16 NOTE — PROGRESS NOTE ADULT - PROBLEM SELECTOR PLAN 5
- C/W metoprolol  - INR now in therapeutic range for A-fib after FFP and vit K but not normal (last dose of coumadin 8/12?  - Will hold in anticipation of renal Bx an risk of CVA with Afib in 3 days low - C/W metoprolol  - Currently subtherapeutic in prep for possible bronch for A-fib after FFP and vit K  - Will hold in anticipation of renal Bx an risk of CVA with Afib in 3 days low

## 2019-08-16 NOTE — PROGRESS NOTE ADULT - PROBLEM SELECTOR PLAN 2
- Improving with steroids and current treatment   - Likely 2/2 rheumatological etiology such as hydralazine induced SLE, small vessel vasculitis, or other complex depositing glomerulopathies  - Will follow-up rheum serologies as above  - Avoid ACEis, ARBS, NSAIDs, and other nephrotoxic drugs  - Will renally dose all meds  - Will discuss with renal about Bx tomorrow as INR still supratherapeutic - Improved and nearing baseline  - Discussed care with Rheum and other disease modifying treatment medications such as Cytoxan or Rituxan will be determined after biopsies and tissue diagnosis. Will continue steroids for now as per rheum.  - Likely 2/2 rheumatological etiology such as hydralazine induced SLE, small vessel vasculitis, or other complex depositing glomerulopathies  - Will follow-up rheum serologies as above  - Avoid ACEis, ARBS, NSAIDs, and other nephrotoxic drugs  - Will renally dose all meds  - Will need renal biopsy once INR normalized and will discuss with renal today about timing for next week in order to optimize INR

## 2019-08-16 NOTE — DISCHARGE NOTE PROVIDER - NSFOLLOWUPCLINICS_GEN_ALL_ED_FT
[facility] [place]  Internal Medicine  [address] [place] [address]  Phone: [phone]  Fax: [phone]  Follow Up Time:

## 2019-08-16 NOTE — PROGRESS NOTE ADULT - SUBJECTIVE AND OBJECTIVE BOX
INTERVAL HPI/OVERNIGHT EVENTS:    MEDICATIONS  (STANDING):  atorvastatin 10 milliGRAM(s) Oral at bedtime  cloNIDine 0.2 milliGRAM(s) Oral three times a day  dextrose 5%. 1000 milliLiter(s) (50 mL/Hr) IV Continuous <Continuous>  dextrose 50% Injectable 12.5 Gram(s) IV Push once  dextrose 50% Injectable 25 Gram(s) IV Push once  dextrose 50% Injectable 25 Gram(s) IV Push once  heparin  Injectable 5000 Unit(s) SubCutaneous every 12 hours  insulin glargine Injectable (LANTUS) 12 Unit(s) SubCutaneous at bedtime  insulin lispro (HumaLOG) corrective regimen sliding scale   SubCutaneous at bedtime  insulin lispro (HumaLOG) corrective regimen sliding scale   SubCutaneous three times a day before meals  insulin lispro Injectable (HumaLOG) 3 Unit(s) SubCutaneous before breakfast  methylPREDNISolone sodium succinate Injectable 70 milliGRAM(s) IV Push daily  metoprolol tartrate 50 milliGRAM(s) Oral two times a day  montelukast 10 milliGRAM(s) Oral daily  sertraline 50 milliGRAM(s) Oral daily    MEDICATIONS  (PRN):  ALBUTerol    90 MICROgram(s) HFA Inhaler 2 Puff(s) Inhalation every 6 hours PRN Shortness of Breath and/or Wheezing  ALPRAZolam 0.5 milliGRAM(s) Oral two times a day PRN Anxiety and Insomnia  dextrose 40% Gel 15 Gram(s) Oral once PRN Blood Glucose LESS THAN 70 milliGRAM(s)/deciliter  glucagon  Injectable 1 milliGRAM(s) IntraMuscular once PRN Glucose LESS THAN 70 milligrams/deciliter      Allergies    Keflex (Unknown)  penicillin (Rash)    Intolerances        REVIEW OF SYSTEMS      General:	    Skin/Breast:  	  Ophthalmologic:  	  ENMT:	    Respiratory and Thorax:  	  Cardiovascular:	    Gastrointestinal:	    Genitourinary:	    Musculoskeletal:	    Neurological:	    Psychiatric:	    Hematology/Lymphatics:	    Endocrine:	    Allergic/Immunologic:	    Vital Signs Last 24 Hrs  T(C): 36.6 (16 Aug 2019 06:19), Max: 36.7 (15 Aug 2019 21:10)  T(F): 97.8 (16 Aug 2019 06:19), Max: 98 (15 Aug 2019 21:10)  HR: 81 (16 Aug 2019 06:19) (69 - 88)  BP: 160/104 (16 Aug 2019 06:19) (149/88 - 160/104)  BP(mean): --  RR: 16 (16 Aug 2019 06:19) (16 - 19)  SpO2: 99% (16 Aug 2019 06:19) (90% - 99%)    PHYSICAL EXAM:      Constitutional:    Eyes:    ENMT:    Neck:    Breasts:    Back:    Respiratory:    Cardiovascular:    Gastrointestinal:    Genitourinary:    Rectal:    Extremities:    Vascular:    Neurological:    Skin:    Lymph Nodes:    Musculoskeletal:    Psychiatric:        LABS:                        8.5    10.89 )-----------( 306      ( 16 Aug 2019 06:50 )             27.7     08-16    137  |  98  |  62<H>  ----------------------------<  185<H>  4.1   |  26  |  1.11    Ca    9.7      16 Aug 2019 06:50  Phos  5.0     08-16  Mg     1.9     08-16    TPro  6.9  /  Alb  3.5  /  TBili  1.0  /  DBili  x   /  AST  23  /  ALT  33  /  AlkPhos  68  08-15    PT/INR - ( 16 Aug 2019 06:50 )   PT: 19.4 SEC;   INR: 1.67          PTT - ( 16 Aug 2019 06:50 )  PTT:38.0 SEC  Urinalysis Basic - ( 14 Aug 2019 19:32 )    Color: YELLOW / Appearance: CLEAR / S.019 / pH: 6.0  Gluc: 50 / Ketone: NEGATIVE  / Bili: NEGATIVE / Urobili: NORMAL   Blood: MODERATE / Protein: 200 / Nitrite: NEGATIVE   Leuk Esterase: NEGATIVE / RBC: >50 / WBC 11-25   Sq Epi: OCC / Non Sq Epi: x / Bacteria: NEGATIVE          RADIOLOGY & ADDITIONAL TESTS:

## 2019-08-17 NOTE — PROGRESS NOTE ADULT - SUBJECTIVE AND OBJECTIVE BOX
Patient is a 84y old  Female who presents with a chief complaint of MICU transfer - hypoxic respiratory failure (16 Aug 2019 16:11)      SUBJECTIVE / OVERNIGHT EVENTS: Pt was tachypnea, BR 30's, Sat 100% on 3L, denies any CP. Pt was  very anxious.      MEDICATIONS  (STANDING):  atorvastatin 10 milliGRAM(s) Oral at bedtime  cloNIDine 0.2 milliGRAM(s) Oral three times a day  dextrose 5%. 1000 milliLiter(s) (50 mL/Hr) IV Continuous <Continuous>  dextrose 50% Injectable 12.5 Gram(s) IV Push once  dextrose 50% Injectable 25 Gram(s) IV Push once  dextrose 50% Injectable 25 Gram(s) IV Push once  heparin  Injectable 5000 Unit(s) SubCutaneous every 12 hours  insulin glargine Injectable (LANTUS) 12 Unit(s) SubCutaneous at bedtime  insulin lispro (HumaLOG) corrective regimen sliding scale   SubCutaneous at bedtime  insulin lispro (HumaLOG) corrective regimen sliding scale   SubCutaneous three times a day before meals  insulin lispro Injectable (HumaLOG) 3 Unit(s) SubCutaneous before breakfast  methylPREDNISolone sodium succinate Injectable 70 milliGRAM(s) IV Push daily  metoprolol tartrate 50 milliGRAM(s) Oral two times a day  montelukast 10 milliGRAM(s) Oral daily  pantoprazole    Tablet 40 milliGRAM(s) Oral before breakfast  potassium chloride   Powder 40 milliEquivalent(s) Oral once  sertraline 50 milliGRAM(s) Oral daily    MEDICATIONS  (PRN):  ALBUTerol    90 MICROgram(s) HFA Inhaler 2 Puff(s) Inhalation every 6 hours PRN Shortness of Breath and/or Wheezing  ALPRAZolam 0.5 milliGRAM(s) Oral two times a day PRN Anxiety and Insomnia  dextrose 40% Gel 15 Gram(s) Oral once PRN Blood Glucose LESS THAN 70 milliGRAM(s)/deciliter  glucagon  Injectable 1 milliGRAM(s) IntraMuscular once PRN Glucose LESS THAN 70 milligrams/deciliter      Vital Signs Last 24 Hrs  T(C): 36.8 (17 Aug 2019 09:02), Max: 36.8 (17 Aug 2019 09:02)  T(F): 98.3 (17 Aug 2019 09:02), Max: 98.3 (17 Aug 2019 09:02)  HR: 90 (17 Aug 2019 09:02) (78 - 94)  BP: 121/91 (17 Aug 2019 09:02) (121/91 - 159/100)  BP(mean): --  RR: 20 (17 Aug 2019 09:02) (16 - 22)  SpO2: 96% (17 Aug 2019 09:02) (88% - 100%)  CAPILLARY BLOOD GLUCOSE      POCT Blood Glucose.: 87 mg/dL (17 Aug 2019 08:36)  POCT Blood Glucose.: 200 mg/dL (16 Aug 2019 22:15)  POCT Blood Glucose.: 327 mg/dL (16 Aug 2019 18:14)  POCT Blood Glucose.: 166 mg/dL (16 Aug 2019 13:08)    I&O's Summary    16 Aug 2019 07:01  -  17 Aug 2019 07:00  --------------------------------------------------------  IN: 0 mL / OUT: 300 mL / NET: -300 mL        PHYSICAL EXAM:  GENERAL: NAD, well-developed  HEAD:  Atraumatic, Normocephalic  EYES: EOMI, PERRLA, conjunctiva and sclera clear  NECK: Supple, No JVD  CHEST/LUNG: (+) scattered crackles  bilaterally; No wheeze  HEART: Irregular at 70; No murmurs, rubs, or gallops  ABDOMEN: Soft, Nontender, Nondistended; Bowel sounds present  EXTREMITIES:  2+ Peripheral Pulses, No clubbing, cyanosis, or edema  PSYCH: AAOx3  NEUROLOGY: non-focal  SKIN: No rashes or lesions    LABS:                        10.2   24.00 )-----------( 343      ( 17 Aug 2019 06:00 )             33.4     08-17    141  |  99  |  58<H>  ----------------------------<  93  3.6   |  31  |  0.93    Ca    9.3      17 Aug 2019 06:00  Phos  4.1     08-17  Mg     1.6     08-17      PT/INR - ( 17 Aug 2019 06:00 )   PT: 15.3 SEC;   INR: 1.37          PTT - ( 16 Aug 2019 06:50 )  PTT:38.0 SEC          RADIOLOGY & ADDITIONAL TESTS:    Imaging Personally Reviewed: Portable CXR: (-) pulmonary edema, (+) B/L reticular opacities (Prelim)    Consultant(s) Notes Reviewed:  Renal    Care Discussed with Consultants/Other Providers: Renal, (+) B-lines on POC US suggestive of volume overload as per Renal.

## 2019-08-17 NOTE — CHART NOTE - NSCHARTNOTEFT_GEN_A_CORE
events of the day noted  agree with Renal notes - also please note that having vasculitis is an increased risk of blood clot  would r/o PE, AMI as well as DAH - as etiology for acute SOB.  Will follow

## 2019-08-17 NOTE — PROGRESS NOTE ADULT - PROBLEM SELECTOR PLAN 4
- Currently subtherapeutic in prep for possible renal bx after FFP and vit K  - Will hold coumadin in anticipation of renal Bx and risk of CVA with Afib in 3 days low (CHADS Vasc 5 = 7.2% risk per year = 0.019% risk daily  -Pt has high bleeding risk from pulmonary hemorrhages. Risk of bleeding outweighs the benefits of bridging with heparin drip.

## 2019-08-17 NOTE — PROGRESS NOTE ADULT - PROBLEM SELECTOR PLAN 1
Unclear etiology, ? anxiety vs volume overload.   -Agree with Lasix 60mg IVP X1  -Xanax PRN  -f/u official CXR report  -Pulmonary consult evaluation

## 2019-08-17 NOTE — PROGRESS NOTE ADULT - SUBJECTIVE AND OBJECTIVE BOX
[facility] Division of Kidney Diseases & Hypertension  FOLLOW UP NOTE  --------------------------------------------------------------------------------  Chief Complaint: I can't breath    24 hour events/subjective: The patient was seen and evaluated this morning approximately 8:45.  At that time the patient was noted to be quite tachypneic and complaining that she could not catch her breath.  Otherwise review of systems difficult to obtain due to tachypnea.    PAST HISTORY  --------------------------------------------------------------------------------  No significant changes to PMH, PSH, FHx, SHx, unless otherwise noted    ALLERGIES & MEDICATIONS  --------------------------------------------------------------------------------  Allergies    Keflex (Unknown)  penicillin (Rash)    Intolerances      Standing Inpatient Medications  atorvastatin 10 milliGRAM(s) Oral at bedtime  cloNIDine 0.2 milliGRAM(s) Oral three times a day  dextrose 5%. 1000 milliLiter(s) IV Continuous <Continuous>  dextrose 50% Injectable 12.5 Gram(s) IV Push once  dextrose 50% Injectable 25 Gram(s) IV Push once  dextrose 50% Injectable 25 Gram(s) IV Push once  heparin  Injectable 5000 Unit(s) SubCutaneous every 12 hours  insulin glargine Injectable (LANTUS) 12 Unit(s) SubCutaneous at bedtime  insulin lispro (HumaLOG) corrective regimen sliding scale   SubCutaneous at bedtime  insulin lispro (HumaLOG) corrective regimen sliding scale   SubCutaneous three times a day before meals  insulin lispro Injectable (HumaLOG) 3 Unit(s) SubCutaneous before breakfast  methylPREDNISolone sodium succinate Injectable 70 milliGRAM(s) IV Push daily  metoprolol tartrate 50 milliGRAM(s) Oral two times a day  montelukast 10 milliGRAM(s) Oral daily  pantoprazole    Tablet 40 milliGRAM(s) Oral before breakfast  potassium chloride   Powder 40 milliEquivalent(s) Oral once  sertraline 50 milliGRAM(s) Oral daily    PRN Inpatient Medications  ALBUTerol    90 MICROgram(s) HFA Inhaler 2 Puff(s) Inhalation every 6 hours PRN  ALPRAZolam 0.5 milliGRAM(s) Oral two times a day PRN  dextrose 40% Gel 15 Gram(s) Oral once PRN  glucagon  Injectable 1 milliGRAM(s) IntraMuscular once PRN      REVIEW OF SYSTEMS  --------------------------------------------------------------------------------  Gen: no fever  Respiratory: +sob  CV: no cp  GI: no abdominal pain  : still urinating    VITALS/PHYSICAL EXAM  --------------------------------------------------------------------------------  T(C): 36.8 (08-17-19 @ 09:02), Max: 36.8 (08-17-19 @ 09:02)  HR: 90 (08-17-19 @ 09:02) (78 - 94)  BP: 121/91 (08-17-19 @ 09:02) (121/91 - 159/100)  RR: 20 (08-17-19 @ 09:02) (16 - 22)  SpO2: 96% (08-17-19 @ 09:02) (88% - 100%)  CVP(mm Hg): --        08-16-19 @ 07:01  -  08-17-19 @ 07:00  --------------------------------------------------------  IN: 0 mL / OUT: 300 mL / NET: -300 mL      Physical Exam:  	Gen: awake, on NC, appears to be in respiratory distress  	HEENT: supple  no LAD  	Pulm: bilateral rales  	CV:  S1S2, no murmurs  	Abd: +BS, soft   	Ext: No B/L Lower ext edema  	Neuro: awake, responsive to commands  	Skin: bilateral healing nonblanching purpuric rash on  lower extremities with chronic venous stasis changes. Skin lesions significantly improving and clearing              Psych: appropriate affect              Vascular: no cyanosis    LABS/STUDIES  --------------------------------------------------------------------------------              10.2   24.00 >-----------<  343      [08-17-19 @ 06:00]              33.4     141  |  99  |  58  ----------------------------<  93      [08-17-19 @ 06:00]  3.6   |  31  |  0.93        Ca     9.3     [08-17-19 @ 06:00]      Mg     1.6     [08-17-19 @ 06:00]      Phos  4.1     [08-17-19 @ 06:00]      PT/INR: PT 15.3 , INR 1.37       [08-17-19 @ 06:00]  PTT: 38.0       [08-16-19 @ 06:50]      Creatinine Trend:  SCr 0.93 [08-17 @ 06:00]  SCr 1.11 [08-16 @ 06:50]  SCr 1.16 [08-15 @ 06:45]  SCr 1.42 [08-14 @ 04:00]  SCr 1.60 [08-13 @ 18:30]    Urinalysis - [08-14-19 @ 19:32]      Color YELLOW / Appearance CLEAR / SG 1.019 / pH 6.0      Gluc 50 / Ketone NEGATIVE  / Bili NEGATIVE / Urobili NORMAL       Blood MODERATE / Protein 200 / Leuk Est NEGATIVE / Nitrite NEGATIVE      RBC >50 / WBC 11-25 / Hyaline NEGATIVE / Gran  / Sq Epi OCC / Non Sq Epi  / Bacteria NEGATIVE    Urine Creatinine 35.60      [08-14-19 @ 19:30]  Urine Protein 201.6      [08-14-19 @ 19:30]    Iron 25, TIBC 302, %sat 8      [08-13-19 @ 05:30]  Ferritin 687      [08-13-19 @ 05:30]  Vitamin D (25OH) 23.3      [08-16-19 @ 06:50]  HbA1c 5.6      [08-13-19 @ 05:30]    HBsAg Nonreactive      [08-15-19 @ 06:45]  HBcAb Reactive      [08-15-19 @ 06:45]  HCV 0.28, Nonreactive Hepatitis C AB  S/CO Ratio                        Interpretation  < 1.00                                   Non-Reactive  1.00 - 4.99                         Weakly-Reactive  >= 5.00                                Reactive  Non-Reactive: Aperson with a non-reactive HCV antibody  result is considered uninfected.  No further action is  needed unless recent infection is suspected.  In these  cases, consider repeat testing later to detect  seroconversion..  Weakly-Reactive: HCV antibody test is abnormal, HCV RNA  Qualitative test will follow.  Reactive: HCV antibody test is abnormal, HCV RNA  Qualitative test will follow.  Note: HCV antibody testing is performed on the [employer]   system.      [08-15-19 @ 06:45]  HIV Nonreact      [08-11-19 @ 16:15]    IVAN: titer 1:640, pattern Homogeneous      [08-11-19 @ 16:15]  C3 Complement 76      [08-12-19 @ 11:00]  C4 Complement 10      [08-12-19 @ 11:00]  ANCA: cANCA Negative, pANCA >1:1280, atypical ANCA Negative      [08-11-19 @ 16:15]  MPO-ANCA 96.2, interpretation: Positive      [08-11-19 @ 16:15]  PR3-ANCA 5.1, interpretation: Negative      [08-11-19 @ 16:15]  anti-GBM <1.0      [08-11-19 @ 16:15]  Immunofixation Serum:   Weak IgM Lambda Band Identified    Reference Range: None Detected      [08-13-19 @ 05:30]  SPEP Interpretation: Weak Gamma-Migrating Paraprotein Identified      [08-13-19 @ 05:30]

## 2019-08-17 NOTE — PROGRESS NOTE ADULT - PROBLEM SELECTOR PLAN 3
- Improved and nearing baseline  - Discussed care with Rheum and other disease modifying treatment medications such as Cytoxan or Rituxan will be determined after biopsies and tissue diagnosis. Will continue steroids for now as per rheum.  - Likely 2/2 rheumatological etiology such as hydralazine induced SLE, small vessel vasculitis, or other complex depositing glomerulopathies  - Will follow-up rheum serologies as above  - Avoid ACEis, ARBS, NSAIDs, and other nephrotoxic drugs  - Will renally dose all meds  - Will need renal biopsy as per renal

## 2019-08-17 NOTE — PROGRESS NOTE ADULT - SUBJECTIVE AND OBJECTIVE BOX
CHIEF COMPLAINT: SOB    Interval Events: Patient and family report increased anxiety and increased WOB this morning which has resolved. No current complaints.     REVIEW OF SYSTEMS:  [x] All other systems negative  [ ] Unable to assess ROS because ________    OBJECTIVE:  ICU Vital Signs Last 24 Hrs  T(C): 37.2 (17 Aug 2019 13:44), Max: 37.2 (17 Aug 2019 13:44)  T(F): 98.9 (17 Aug 2019 13:44), Max: 98.9 (17 Aug 2019 13:44)  HR: 98 (17 Aug 2019 13:44) (78 - 98)  BP: 101/626 (17 Aug 2019 13:44) (101/626 - 159/100)  BP(mean): --  ABP: --  ABP(mean): --  RR: 18 (17 Aug 2019 13:44) (16 - 22)  SpO2: 95% (17 Aug 2019 13:44) (88% - 100%)        08-16 @ 07:01  -  08-17 @ 07:00  --------------------------------------------------------  IN: 0 mL / OUT: 300 mL / NET: -300 mL      CAPILLARY BLOOD GLUCOSE      POCT Blood Glucose.: 83 mg/dL (17 Aug 2019 12:33)      PHYSICAL EXAM:  General: Awake, alert, conversant  Pulm: minimal crackles, inspiratory squeek posteriorly  CV: RRR. No m/r/g.   Abdomen: Soft. Non tender. Non distended. No HSM.   : Voiding freely. No CVAT.  Extremities: Symmetric. Warm. No edema. b/l LE palpable purpura, markedly decreased

## 2019-08-17 NOTE — PROGRESS NOTE ADULT - PROBLEM SELECTOR PLAN 6
- C/W metoprolol  - Currently subtherapeutic in prep for possible bronch for A-fib after FFP and vit K  - Will hold in anticipation of renal Bx an risk of CVA with Afib in 3 days low

## 2019-08-17 NOTE — PROGRESS NOTE ADULT - PROBLEM SELECTOR PLAN 2
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 either immune complex disease such as lupus vs vasculitis such as p-ANCA with good response to IV high dose steroids and improved O2 requirements  - c/w IV methylpred 70mg daily  - f/u DsDNA, RNP, anticardiolipin Ab, Anti-GBM, HepB, HepC, immunofixation, Anti-histone, Anti-centromere Ab, beta2-glycoprotein  - Will continue Doxy and F/U ID on Tx duration given low suspicion for RMSF and likely false positive as patient with no risk factors and no travel or outdoor exposure  - Will need PCP PPx pt on high dose steroids and will clarify with ID and rheum  - AVOID Hydralazine  - Pulm planning for possible bronch today but spoke in AM and will repeat CXR and discuss utility of yield of bronch with biopsy  - If done patient may need 2 more units of FFP as INR is 1.67 today s/p 2U FFP and 2.5mg Vit K, now INR 1.36  - Will need renal biopsy  as per renal rec.  - Appreciated renal, pulm, rheum, and ID recs

## 2019-08-17 NOTE — PROGRESS NOTE ADULT - PROBLEM SELECTOR PLAN 1
Patient with elevated serum creatinine of 2.03 on 8/10/19 baseline 0.5.  Now currently improved. She presents with purpuric rash on admission which improved after giving IV steroids. Renal function and rash seem to be improving with steroid therapy.  Serologies with low C3/C4, Positive p-ANCA, negative ANCA elevated ESR/CRP. Urinalysis positive for protein 100, hematuria with large blood in urine. Patient with positive IVAN and anti-dsDNA on 6/23/2018 in the setting of chronic Hydralazine use. She has 5gm of proteinuria. 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  AVOID Hydralazine. Avoid giving other nephrotoxic medications such as ACE-I/ARBS and MONTEMAYOR. Continue to trend renal function. Monitor intake and output. Patient would need renal biopsy when clinically stable to decide on further treatment course. Would consult IR regarding renal biopsy possible to be planned by early next week.  Would ideally need SBP no higher than 160mmHg and patient is able to tolerate procedure well with minimal oxygen requirements.

## 2019-08-17 NOTE — PROGRESS NOTE ADULT - PROBLEM SELECTOR PLAN 2
Patient has worsening this dyspnea this morning.  Differential diagnosis includes fluid overload (perhaps in the setting of steroids) vs pulmonary hemorrhage / worsening small vessel vasculitis.  My lung ultrasound this morning showed diffuse bilateral B line pattern mostly with smooth pleura but some areas of irregular pleura.  Recommended 60 IV lasix X 1 now and chest x ray.  Spoke with Dr. Johnson at bedside.  Also spoke with pulmonary fellow to come evaluate.  If there is a concern for pulmonary hemorrhage risk vs benefit would favor giving another 500 mg of solumedrol and should also consider plasma exchange.

## 2019-08-17 NOTE — PROGRESS NOTE ADULT - PROBLEM SELECTOR PLAN 8
- Will start HSQ   - PT eval pending but likely will need rehab once medically stable  - Discussed management plans with daughter at bedside at length about diagnosis, course of treatment, testing (such as biopsies), and further treatment after biopsies. Daughter extremely grateful and I answered all questions as completely as possible.     -Sherif Nicole PGY5 EMIM Pager#[phone]

## 2019-08-17 NOTE — PROGRESS NOTE ADULT - ATTENDING COMMENTS
84 F with diastolic HF, pulm htn, afib on a/c, COPD, RA who presents with acute hypoxic respiratory failure, found to have serologies consistent with a vasculitis (p-anca positive, IVAN positive, MPO positive), tx to [facility] for further management.  She had episode of tachypnea this AM.  Family at bedside states that she looks improved this AM and closer to herself, though she is weak.  On RA at rest, she is saturating 95%.  Cxr reviewed, relatively unchanged.  Her hypoxia could be due to fluid overload or DAH, but she is currently hemodynamically stable.  She needs a biopsy to confirm vasculitis; bronchoscopy may show DAH, but she is already getting steroids, so unclear how that would  at this time if her overall O2 sats have improved.    Family and patient agree that she was anxious this AM, which may explain some of her tachypnea at the time.  Agree with diuretics.  Would continue with steroids as ordered for now.  Check ABG on RA if further episodes of tachypnea to evaluate hypoxia. 84 F with diastolic HF, pulm htn, afib on a/c, COPD, RA who presents with acute hypoxic respiratory failure, found to have serologies consistent with a vasculitis (p-anca positive, IVAN positive, MPO positive), tx to [facility] for further management.  She had episode of tachypnea this AM.  Family at bedside states that she looks improved this AM and closer to herself, though she is weak.  On RA at rest, she is saturating 95%.  Cxr reviewed, relatively unchanged.  Her hypoxia could be due to fluid overload or DAH, but she is currently hemodynamically stable.  She needs a biopsy to confirm vasculitis; bronchoscopy may show DAH, but she is already getting steroids, so unclear how that would  at this time if her overall O2 sats have improved.    Family and patient agree that she was anxious this AM, which may explain some of her tachypnea at the time.  Agree with diuretics.  Would continue with steroids as ordered for now.  Check ABG on RA if further episodes of tachypnea to evaluate hypoxia.    PMH, PSH, family history, social history, and medication history all reviewed.

## 2019-08-17 NOTE — PROGRESS NOTE ADULT - SUBJECTIVE AND OBJECTIVE BOX
CC: f/u for respiratory insufficiency    Patient reports: no complaints today, she is comfortable on nasal oxygen    REVIEW OF SYSTEMS:  All other review of systems negative (Comprehensive ROS)    Antimicrobials Day #  :s/p meropenem, zosyn, and  and doxy    Other Medications Reviewed  MEDICATIONS  (STANDING):  atorvastatin 10 milliGRAM(s) Oral at bedtime  cloNIDine 0.2 milliGRAM(s) Oral three times a day  dextrose 5%. 1000 milliLiter(s) (50 mL/Hr) IV Continuous <Continuous>  dextrose 50% Injectable 12.5 Gram(s) IV Push once  dextrose 50% Injectable 25 Gram(s) IV Push once  dextrose 50% Injectable 25 Gram(s) IV Push once  heparin  Injectable 5000 Unit(s) SubCutaneous every 12 hours  insulin glargine Injectable (LANTUS) 12 Unit(s) SubCutaneous at bedtime  insulin lispro (HumaLOG) corrective regimen sliding scale   SubCutaneous at bedtime  insulin lispro (HumaLOG) corrective regimen sliding scale   SubCutaneous three times a day before meals  insulin lispro Injectable (HumaLOG) 3 Unit(s) SubCutaneous before breakfast  methylPREDNISolone sodium succinate Injectable 70 milliGRAM(s) IV Push daily  metoprolol tartrate 50 milliGRAM(s) Oral two times a day  montelukast 10 milliGRAM(s) Oral daily  pantoprazole    Tablet 40 milliGRAM(s) Oral before breakfast  potassium chloride   Powder 40 milliEquivalent(s) Oral once  sertraline 50 milliGRAM(s) Oral daily    T(F): 98.3 (08-17-19 @ 09:02), Max: 98.3 (08-17-19 @ 09:02)  HR: 90 (08-17-19 @ 09:02)  BP: 121/91 (08-17-19 @ 09:02)  RR: 20 (08-17-19 @ 09:02)  SpO2: 96% (08-17-19 @ 09:02)  Wt(kg): --    PHYSICAL EXAM:  General: alert, no acute distress  Eyes:  anicteric, no conjunctival injection, no discharge  Oropharynx: no lesions or injection 	  Neck: supple, without adenopathy  Lungs: coarse BS  Heart: irregular rate and rhythm; no murmur, rubs or gallops  Abdomen: soft, nondistended, nontender, without mass or organomegaly  Skin: no lesions  Extremities: no clubbing, cyanosis, trace edema  Neurologic: alert, oriented, moves all extremities    LAB RESULTS:                        10.2   24.00 )-----------( 343      ( 17 Aug 2019 06:00 )             33.4     08-17    141  |  99  |  58<H>  ----------------------------<  93  3.6   |  31  |  0.93    Ca    9.3      17 Aug 2019 06:00  Phos  4.1     08-17  Mg     1.6     08-17          MICROBIOLOGY:  RECENT CULTURES:      RADIOLOGY REVIEWED:    < from: Xray Chest 1 View-PORTABLE IMMEDIATE (08.17.19 @ 09:05) >    INTERPRETATION:  TIME OF EXAM: August 17, 2019 at 8:57 AM    CLINICAL INFORMATION: Dyspnea    TECHNIQUE:   Portable chest    INTERPRETATION:     Lungs are free of focal abnormalities. Increased interstitial opacities   unchanged from the last exam consistent with mild interstitial edema.   There are No effusions or pneumothorax.      COMPARISON:  August 16      IMPRESSION:  Mild interstitial edema but no focal consolidations.    < end of copied text >

## 2019-08-17 NOTE — PROGRESS NOTE ADULT - ASSESSMENT
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 Had acute worsening of her respiratory status this morning in the setting of possible anxiety but also with possible volume overload per renal team ultrasound. Received diuretics this AM and now appears comfortable  - Continue solumedrol 1mg/kg 70mg daily   - f/u remaining serologies  - Agree with diuretics  - If worsening hypoxemia or tachypnea obtain room air ABG  - O2 sat on room air 94%  - Maintain SpO2 >92%, no need to target 100%  - Agree with plans to obtain bx to confirm vasculitis    Valentin Ramirez MD, PGY5  Pulmonary and Critical Care Fellow  [phone]

## 2019-08-17 NOTE — PROGRESS NOTE ADULT - ASSESSMENT
83 yo F, Afib, COPD, RA, h/o MGUS/lymphoma  Acute resp failure, diffuse infiltrates, KANIKA, petechial rash- primary pneumonia vs vasculitis  Bld Cxs negative   Crypt Ag, Legionella, RVP negative  Strongly ANCA +  Pulse steroids initiated, prior [place]->Zosyn,  Doxy  CXR improved; resp status improved; vasculitis syndrome, +/- hemorrhage, inflammatory process most likely here; doubt primary infection  RMSF IgM is false +, regardless, > 5 days Doxy sufficient  She is now off antibiotics, prior rash resolved  Plan:  Observe off antibiotics  eventual PCP prophylaxis  Steroids as outlined per rheum/renal/pulmonary  Further serology, possible kidney Bx, as outlined  Follow temps and CBC/diff

## 2019-08-18 NOTE — PROGRESS NOTE ADULT - ATTENDING COMMENTS
agree with above.  Acute hypoxic respiratory failure in setting of fluid overload and likely vasculitis.  Feels much improved today, can wean off oxygen as tolerated.  Already down to 2L today compared to 3L yesterday.  C/w steroids daily, diuresis prn.

## 2019-08-18 NOTE — PROGRESS NOTE ADULT - ASSESSMENT
83 yo F, Afib, COPD, RA, h/o MGUS/lymphoma  Acute resp failure, diffuse infiltrates, KANIKA, petechial rash- primary pneumonia vs vasculitis  Bld Cxs negative   Crypt Ag, Legionella, RVP negative  Strongly ANCA +  Pulse steroids initiated, prior [place]->Zosyn,  Doxy  CXR improved; resp status improved; vasculitis syndrome, +/- hemorrhage, inflammatory process most likely here; doubt primary infection  RMSF IgM is false +, regardless, > 5 days Doxy sufficient  She is now off antibiotics, prior rash resolved  Plan:  Observe off antibiotics  eventual PCP prophylaxis, my first choice is generally bactrim 1 DS tab 3x/week  Steroids as outlined per rheum/renal/pulmonary  Further serology, possible kidney Bx, as outlined  Follow temps and CBC/diff

## 2019-08-18 NOTE — PROGRESS NOTE ADULT - SUBJECTIVE AND OBJECTIVE BOX
prelim    CHIEF COMPLAINT:    Interval Events:    REVIEW OF SYSTEMS:  [x] All other systems negative  [ ] Unable to assess ROS because ________    OBJECTIVE:  ICU Vital Signs Last 24 Hrs  T(C): 36.4 (18 Aug 2019 06:21), Max: 37.2 (17 Aug 2019 13:44)  T(F): 97.6 (18 Aug 2019 06:21), Max: 98.9 (17 Aug 2019 13:44)  HR: 85 (18 Aug 2019 06:21) (85 - 98)  BP: 123/85 (18 Aug 2019 06:21) (101/626 - 123/85)  BP(mean): --  ABP: --  ABP(mean): --  RR: 18 (18 Aug 2019 06:21) (18 - 22)  SpO2: 100% (18 Aug 2019 06:21) (88% - 100%)        08-17 @ 07:01  -  08-18 @ 07:00  --------------------------------------------------------  IN: 0 mL / OUT: 900 mL / NET: -900 mL      CAPILLARY BLOOD GLUCOSE      POCT Blood Glucose.: 232 mg/dL (17 Aug 2019 22:07)      PHYSICAL EXAM:  General: Awake, alert, conversant  Pulm: minimal crackles, inspiratory squeek posteriorly  CV: RRR. No m/r/g.   Abdomen: Soft. Non tender. Non distended. No HSM.   : Voiding freely. No CVAT.  Extremities: Symmetric. Warm. No edema. b/l LE palpable purpura, markedly decreased    HOSPITAL MEDICATIONS:  MEDICATIONS  (STANDING):  atorvastatin 10 milliGRAM(s) Oral at bedtime  cloNIDine 0.2 milliGRAM(s) Oral three times a day  dextrose 5%. 1000 milliLiter(s) (50 mL/Hr) IV Continuous <Continuous>  dextrose 50% Injectable 12.5 Gram(s) IV Push once  dextrose 50% Injectable 25 Gram(s) IV Push once  dextrose 50% Injectable 25 Gram(s) IV Push once  heparin  Injectable 5000 Unit(s) SubCutaneous every 12 hours  insulin glargine Injectable (LANTUS) 12 Unit(s) SubCutaneous at bedtime  insulin lispro (HumaLOG) corrective regimen sliding scale   SubCutaneous at bedtime  insulin lispro (HumaLOG) corrective regimen sliding scale   SubCutaneous three times a day before meals  insulin lispro Injectable (HumaLOG) 3 Unit(s) SubCutaneous before breakfast  methylPREDNISolone sodium succinate Injectable 70 milliGRAM(s) IV Push daily  metoprolol tartrate 50 milliGRAM(s) Oral two times a day  montelukast 10 milliGRAM(s) Oral daily  pantoprazole    Tablet 40 milliGRAM(s) Oral before breakfast  sertraline 50 milliGRAM(s) Oral daily    MEDICATIONS  (PRN):  ALBUTerol    90 MICROgram(s) HFA Inhaler 2 Puff(s) Inhalation every 6 hours PRN Shortness of Breath and/or Wheezing  ALPRAZolam 0.5 milliGRAM(s) Oral two times a day PRN Anxiety and Insomnia  dextrose 40% Gel 15 Gram(s) Oral once PRN Blood Glucose LESS THAN 70 milliGRAM(s)/deciliter  glucagon  Injectable 1 milliGRAM(s) IntraMuscular once PRN Glucose LESS THAN 70 milligrams/deciliter      LABS:                        10.2   24.00 )-----------( 343      ( 17 Aug 2019 06:00 )             33.4     08-17    141  |  99  |  58<H>  ----------------------------<  93  3.6   |  31  |  0.93    Ca    9.3      17 Aug 2019 06:00  Phos  4.1     08-17  Mg     1.6     08-17      PT/INR - ( 17 Aug 2019 06:00 )   PT: 15.3 SEC;   INR: 1.37                    MICROBIOLOGY:     =============================================================================================  RADIOLOGY:  [ ] Reviewed and interpreted by me    ============================================================================================  Point of Care Ultrasound Findings: CHIEF COMPLAINT: SOB    Interval Events: Feeling much better today. Denies current SOB    REVIEW OF SYSTEMS:  [x] All other systems negative  [ ] Unable to assess ROS because ________    OBJECTIVE:  ICU Vital Signs Last 24 Hrs  T(C): 36.4 (18 Aug 2019 06:21), Max: 37.2 (17 Aug 2019 13:44)  T(F): 97.6 (18 Aug 2019 06:21), Max: 98.9 (17 Aug 2019 13:44)  HR: 85 (18 Aug 2019 06:21) (85 - 98)  BP: 123/85 (18 Aug 2019 06:21) (101/626 - 123/85)  BP(mean): --  ABP: --  ABP(mean): --  RR: 18 (18 Aug 2019 06:21) (18 - 22)  SpO2: 100% (18 Aug 2019 06:21) (88% - 100%)        08-17 @ 07:01  -  08-18 @ 07:00  --------------------------------------------------------  IN: 0 mL / OUT: 900 mL / NET: -900 mL      CAPILLARY BLOOD GLUCOSE      POCT Blood Glucose.: 232 mg/dL (17 Aug 2019 22:07)      PHYSICAL EXAM:  General: Awake, alert, conversant  Pulm: no crackles, minimal inspiratory squeek   CV: RRR. No m/r/g.   Abdomen: Soft. Non tender. Non distended. No HSM.   : Voiding freely. No CVAT.  Extremities: Symmetric. Warm. No edema. b/l LE palpable purpura, markedly decreased    HOSPITAL MEDICATIONS:  MEDICATIONS  (STANDING):  atorvastatin 10 milliGRAM(s) Oral at bedtime  cloNIDine 0.2 milliGRAM(s) Oral three times a day  dextrose 5%. 1000 milliLiter(s) (50 mL/Hr) IV Continuous <Continuous>  dextrose 50% Injectable 12.5 Gram(s) IV Push once  dextrose 50% Injectable 25 Gram(s) IV Push once  dextrose 50% Injectable 25 Gram(s) IV Push once  heparin  Injectable 5000 Unit(s) SubCutaneous every 12 hours  insulin glargine Injectable (LANTUS) 12 Unit(s) SubCutaneous at bedtime  insulin lispro (HumaLOG) corrective regimen sliding scale   SubCutaneous at bedtime  insulin lispro (HumaLOG) corrective regimen sliding scale   SubCutaneous three times a day before meals  insulin lispro Injectable (HumaLOG) 3 Unit(s) SubCutaneous before breakfast  methylPREDNISolone sodium succinate Injectable 70 milliGRAM(s) IV Push daily  metoprolol tartrate 50 milliGRAM(s) Oral two times a day  montelukast 10 milliGRAM(s) Oral daily  pantoprazole    Tablet 40 milliGRAM(s) Oral before breakfast  sertraline 50 milliGRAM(s) Oral daily    MEDICATIONS  (PRN):  ALBUTerol    90 MICROgram(s) HFA Inhaler 2 Puff(s) Inhalation every 6 hours PRN Shortness of Breath and/or Wheezing  ALPRAZolam 0.5 milliGRAM(s) Oral two times a day PRN Anxiety and Insomnia  dextrose 40% Gel 15 Gram(s) Oral once PRN Blood Glucose LESS THAN 70 milliGRAM(s)/deciliter  glucagon  Injectable 1 milliGRAM(s) IntraMuscular once PRN Glucose LESS THAN 70 milligrams/deciliter      LABS:                        10.2   24.00 )-----------( 343      ( 17 Aug 2019 06:00 )             33.4     08-17    141  |  99  |  58<H>  ----------------------------<  93  3.6   |  31  |  0.93    Ca    9.3      17 Aug 2019 06:00  Phos  4.1     08-17  Mg     1.6     08-17      PT/INR - ( 17 Aug 2019 06:00 )   PT: 15.3 SEC;   INR: 1.37              MICROBIOLOGY:     =============================================================================================  RADIOLOGY:  [ ] Reviewed and interpreted by me    ============================================================================================  Point of Care Ultrasound Findings:  A line predominant posterior lung ultrasound CHIEF COMPLAINT: SOB    Interval Events: Feeling much better today. Denies current SOB    REVIEW OF SYSTEMS:  [x] All other systems negative  [ ] Unable to assess ROS because ________    OBJECTIVE:  ICU Vital Signs Last 24 Hrs  T(C): 36.4 (18 Aug 2019 06:21), Max: 37.2 (17 Aug 2019 13:44)  T(F): 97.6 (18 Aug 2019 06:21), Max: 98.9 (17 Aug 2019 13:44)  HR: 85 (18 Aug 2019 06:21) (85 - 98)  BP: 123/85 (18 Aug 2019 06:21) (101/626 - 123/85)  BP(mean): --  ABP: --  ABP(mean): --  RR: 18 (18 Aug 2019 06:21) (18 - 22)  SpO2: 100% (18 Aug 2019 06:21) (88% - 100%)        08-17 @ 07:01  -  08-18 @ 07:00  --------------------------------------------------------  IN: 0 mL / OUT: 900 mL / NET: -900 mL      CAPILLARY BLOOD GLUCOSE      POCT Blood Glucose.: 232 mg/dL (17 Aug 2019 22:07)      PHYSICAL EXAM:  General: Awake, alert, conversant  Pulm: no crackles, minimal inspiratory squeak   CV: RRR. No m/r/g.   Abdomen: Soft. Non tender. Non distended. No HSM.   : Voiding freely. No CVAT.  Extremities: Symmetric. Warm. No edema. b/l LE palpable purpura, markedly decreased  Neuro: non focal exam  Psych: normal affect    HOSPITAL MEDICATIONS:  MEDICATIONS  (STANDING):  atorvastatin 10 milliGRAM(s) Oral at bedtime  cloNIDine 0.2 milliGRAM(s) Oral three times a day  dextrose 5%. 1000 milliLiter(s) (50 mL/Hr) IV Continuous <Continuous>  dextrose 50% Injectable 12.5 Gram(s) IV Push once  dextrose 50% Injectable 25 Gram(s) IV Push once  dextrose 50% Injectable 25 Gram(s) IV Push once  heparin  Injectable 5000 Unit(s) SubCutaneous every 12 hours  insulin glargine Injectable (LANTUS) 12 Unit(s) SubCutaneous at bedtime  insulin lispro (HumaLOG) corrective regimen sliding scale   SubCutaneous at bedtime  insulin lispro (HumaLOG) corrective regimen sliding scale   SubCutaneous three times a day before meals  insulin lispro Injectable (HumaLOG) 3 Unit(s) SubCutaneous before breakfast  methylPREDNISolone sodium succinate Injectable 70 milliGRAM(s) IV Push daily  metoprolol tartrate 50 milliGRAM(s) Oral two times a day  montelukast 10 milliGRAM(s) Oral daily  pantoprazole    Tablet 40 milliGRAM(s) Oral before breakfast  sertraline 50 milliGRAM(s) Oral daily    MEDICATIONS  (PRN):  ALBUTerol    90 MICROgram(s) HFA Inhaler 2 Puff(s) Inhalation every 6 hours PRN Shortness of Breath and/or Wheezing  ALPRAZolam 0.5 milliGRAM(s) Oral two times a day PRN Anxiety and Insomnia  dextrose 40% Gel 15 Gram(s) Oral once PRN Blood Glucose LESS THAN 70 milliGRAM(s)/deciliter  glucagon  Injectable 1 milliGRAM(s) IntraMuscular once PRN Glucose LESS THAN 70 milligrams/deciliter      LABS:                        10.2   24.00 )-----------( 343      ( 17 Aug 2019 06:00 )             33.4     08-17    141  |  99  |  58<H>  ----------------------------<  93  3.6   |  31  |  0.93    Ca    9.3      17 Aug 2019 06:00  Phos  4.1     08-17  Mg     1.6     08-17      PT/INR - ( 17 Aug 2019 06:00 )   PT: 15.3 SEC;   INR: 1.37              MICROBIOLOGY:     =============================================================================================  RADIOLOGY:  [ ] Reviewed and interpreted by me    ============================================================================================  Point of Care Ultrasound Findings:  A line predominant posterior lung ultrasound

## 2019-08-18 NOTE — PROGRESS NOTE ADULT - SUBJECTIVE AND OBJECTIVE BOX
[facility] Division of Kidney Diseases & Hypertension  FOLLOW UP NOTE  [phone]--------------------------------------------------------------------------------  Chief Complaint:Pulmonary hypertension      Charts reviewed 24 hour events noted. Patient seen today with breathing improved compared to yesterday after giving Lasix 60mg IV. Today she feels better. She still remains on oxygen via NC. No other subjective complaints. rashes improving.         PAST HISTORY  --------------------------------------------------------------------------------  No significant changes to PMH, PSH, FHx, SHx, unless otherwise noted    ALLERGIES & MEDICATIONS  --------------------------------------------------------------------------------  Allergies    Keflex (Unknown)  penicillin (Rash)    Intolerances      Standing Inpatient Medications  atorvastatin 10 milliGRAM(s) Oral at bedtime  cloNIDine 0.2 milliGRAM(s) Oral three times a day  dextrose 5%. 1000 milliLiter(s) IV Continuous <Continuous>  dextrose 50% Injectable 12.5 Gram(s) IV Push once  dextrose 50% Injectable 25 Gram(s) IV Push once  dextrose 50% Injectable 25 Gram(s) IV Push once  heparin  Injectable 5000 Unit(s) SubCutaneous every 12 hours  insulin glargine Injectable (LANTUS) 12 Unit(s) SubCutaneous at bedtime  insulin lispro (HumaLOG) corrective regimen sliding scale   SubCutaneous at bedtime  insulin lispro (HumaLOG) corrective regimen sliding scale   SubCutaneous three times a day before meals  insulin lispro Injectable (HumaLOG) 3 Unit(s) SubCutaneous before breakfast  magnesium oxide 400 milliGRAM(s) Oral three times a day with meals  methylPREDNISolone sodium succinate Injectable 70 milliGRAM(s) IV Push daily  metoprolol tartrate 50 milliGRAM(s) Oral two times a day  montelukast 10 milliGRAM(s) Oral daily  pantoprazole    Tablet 40 milliGRAM(s) Oral before breakfast  sertraline 50 milliGRAM(s) Oral daily    PRN Inpatient Medications  ALBUTerol    90 MICROgram(s) HFA Inhaler 2 Puff(s) Inhalation every 6 hours PRN  ALPRAZolam 0.5 milliGRAM(s) Oral two times a day PRN  dextrose 40% Gel 15 Gram(s) Oral once PRN  glucagon  Injectable 1 milliGRAM(s) IntraMuscular once PRN        REVIEW OF SYSTEMS  --------------------------------------------------------------------------------  Gen: no fever  Respiratory: +sob  CV: no cp  GI: no abdominal pain  : still urinating    VITALS/PHYSICAL EXAM  --------------------------------------------------------------------------------  T(C): 36.7 (08-18-19 @ 10:36), Max: 37.2 (08-17-19 @ 13:44)  HR: 84 (08-18-19 @ 10:36) (84 - 98)  BP: 119/90 (08-18-19 @ 10:36) (101/626 - 123/85)  RR: 18 (08-18-19 @ 10:36) (18 - 20)  SpO2: 99% (08-18-19 @ 10:36) (92% - 100%)  Wt(kg): --        08-17-19 @ 07:01 - 08-18-19 @ 07:00  --------------------------------------------------------  IN: 0 mL / OUT: 900 mL / NET: -900 mL    08-18-19 @ 07:01  -  08-18-19 @ 12:13  --------------------------------------------------------  IN: 0 mL / OUT: 300 mL / NET: -300 mL      Physical Exam:  	Gen: awake, on NC, breathing comfortably  	HEENT: supple  no LAD  	Pulm: minimal rales bilaterally improved from yesterday  	CV:  S1S2, no murmurs  	Abd: +BS, soft   	Ext: No B/L Lower ext edema  	Neuro: awake, responsive to commands  	Skin: bilateral healing nonblanching purpuric rash on  lower extremities with chronic venous stasis changes. Skin lesions significantly improving and clearing              Psych: appropriate affect              Vascular: no cyanosis    LABS/STUDIES  --------------------------------------------------------------------------------              9.2    24.64 >-----------<  306      [08-18-19 @ 06:41]              30.9     142  |  99  |  57  ----------------------------<  106      [08-18-19 @ 06:41]  3.6   |  31  |  0.94        Ca     9.0     [08-18-19 @ 06:41]      Mg     1.5     [08-18-19 @ 06:41]      Phos  4.5     [08-18-19 @ 06:41]      PT/INR: PT 14.2 , INR 1.24       [08-18-19 @ 06:41]      Creatinine Trend:  SCr 0.94 [08-18 @ 06:41]  SCr 0.93 [08-17 @ 06:00]  SCr 1.11 [08-16 @ 06:50]  SCr 1.16 [08-15 @ 06:45]  SCr 1.42 [08-14 @ 04:00]    Urinalysis - [08-14-19 @ 19:32]      Color YELLOW / Appearance CLEAR / SG 1.019 / pH 6.0      Gluc 50 / Ketone NEGATIVE  / Bili NEGATIVE / Urobili NORMAL       Blood MODERATE / Protein 200 / Leuk Est NEGATIVE / Nitrite NEGATIVE      RBC >50 / WBC 11-25 / Hyaline NEGATIVE / Gran  / Sq Epi OCC / Non Sq Epi  / Bacteria NEGATIVE    Urine Creatinine 35.60      [08-14-19 @ 19:30]  Urine Protein 201.6      [08-14-19 @ 19:30]    Iron 25, TIBC 302, %sat 8      [08-13-19 @ 05:30]  Ferritin 687      [08-13-19 @ 05:30]  Vitamin D (25OH) 23.3      [08-16-19 @ 06:50]  HbA1c 5.6      [08-13-19 @ 05:30]    HBsAg Nonreactive      [08-15-19 @ 06:45]  HBcAb Reactive      [08-15-19 @ 06:45]  HCV 0.28, Nonreactive Hepatitis C AB  S/CO Ratio                        Interpretation  < 1.00                                   Non-Reactive  1.00 - 4.99                         Weakly-Reactive  >= 5.00                                Reactive  Non-Reactive: [place] with a non-reactive HCV antibody  result is considered uninfected.  No further action is  needed unless recent infection is suspected.  In these  cases, consider repeat testing later to detect  seroconversion..  Weakly-Reactive: HCV antibody test is abnormal, HCV RNA  Qualitative test will follow.  Reactive: HCV antibody test is abnormal, HCV RNA  Qualitative test will follow.  Note: HCV antibody testing is performed on the [employer]   system.      [08-15-19 @ 06:45]  HIV Nonreact      [08-11-19 @ 16:15]    IVAN: titer 1:640, pattern Homogeneous      [08-11-19 @ 16:15]  C3 Complement 76      [08-12-19 @ 11:00]  C4 Complement 10      [08-12-19 @ 11:00]  ANCA: cANCA Negative, pANCA >1:1280, atypical ANCA Negative      [08-11-19 @ 16:15]  MPO-ANCA 96.2, interpretation: Positive      [08-11-19 @ 16:15]  PR3-ANCA 5.1, interpretation: Negative      [08-11-19 @ 16:15]  anti-GBM <1.0      [08-11-19 @ 16:15]  Immunofixation Serum:   Weak IgM Lambda Band Identified    Reference Range: None Detected      [08-13-19 @ 05:30]  SPEP Interpretation: Weak Gamma-Migrating Paraprotein Identified      [08-13-19 @ 05:30]

## 2019-08-18 NOTE — PROGRESS NOTE ADULT - PROBLEM SELECTOR PLAN 2
Hypervolemia improved after giving lasix. This could be just pulmonary edema in the setting of steroid use however  pulmonary hemorrhage / worsening small vessel vasculitis is also of consideration.

## 2019-08-18 NOTE — PROGRESS NOTE ADULT - PROBLEM SELECTOR PLAN 1
Unclear etiology, ? anxiety vs volume overload. (+) mild pulm edema on CXR  -S/P Lasix 60mg IVP X1, much improved  -Xanax PRN  -continue to monitor, Lasix PRN for SOB  -Pulmonary consult f/u

## 2019-08-18 NOTE — PROGRESS NOTE ADULT - SUBJECTIVE AND OBJECTIVE BOX
CC: f/u for respiratory distress    Patient reports: no complaints, comfortable on supplemental oxygen    REVIEW OF SYSTEMS:  All other review of systems negative (Comprehensive ROS)    Antimicrobials Day #  :    Other Medications Reviewed  MEDICATIONS  (STANDING):  atorvastatin 10 milliGRAM(s) Oral at bedtime  cloNIDine 0.2 milliGRAM(s) Oral three times a day  dextrose 5%. 1000 milliLiter(s) (50 mL/Hr) IV Continuous <Continuous>  dextrose 50% Injectable 12.5 Gram(s) IV Push once  dextrose 50% Injectable 25 Gram(s) IV Push once  dextrose 50% Injectable 25 Gram(s) IV Push once  heparin  Injectable 5000 Unit(s) SubCutaneous every 12 hours  insulin glargine Injectable (LANTUS) 12 Unit(s) SubCutaneous at bedtime  insulin lispro (HumaLOG) corrective regimen sliding scale   SubCutaneous at bedtime  insulin lispro (HumaLOG) corrective regimen sliding scale   SubCutaneous three times a day before meals  insulin lispro Injectable (HumaLOG) 3 Unit(s) SubCutaneous before breakfast  magnesium oxide 400 milliGRAM(s) Oral three times a day with meals  methylPREDNISolone sodium succinate Injectable 70 milliGRAM(s) IV Push daily  metoprolol tartrate 50 milliGRAM(s) Oral two times a day  montelukast 10 milliGRAM(s) Oral daily  pantoprazole    Tablet 40 milliGRAM(s) Oral before breakfast  sertraline 50 milliGRAM(s) Oral daily    T(F): 98 (08-18-19 @ 10:36), Max: 98.9 (08-17-19 @ 13:44)  HR: 84 (08-18-19 @ 10:36)  BP: 119/90 (08-18-19 @ 10:36)  RR: 18 (08-18-19 @ 10:36)  SpO2: 99% (08-18-19 @ 10:36)  Wt(kg): --    PHYSICAL EXAM:  General: alert, no acute distress  Eyes:  anicteric, no conjunctival injection, no discharge  Oropharynx: no lesions or injection 	  Neck: supple, without adenopathy  Lungs: coarse BS  Heart: regular rate and rhythm; no murmur, rubs or gallops  Abdomen: soft, nondistended, nontender, without mass or organomegaly  Skin: no lesions  Extremities: no clubbing, cyanosis, or edema  Neurologic: alert, oriented, moves all extremities    LAB RESULTS:                        9.2    24.64 )-----------( 306      ( 18 Aug 2019 06:41 )             30.9     08-18    142  |  99  |  57<H>  ----------------------------<  106<H>  3.6   |  31  |  0.94    Ca    9.0      18 Aug 2019 06:41  Phos  4.5     08-18  Mg     1.5     08-18          MICROBIOLOGY:  RECENT CULTURES:      RADIOLOGY REVIEWED:  < from: Xray Chest 1 View-PORTABLE IMMEDIATE (08.17.19 @ 09:05) >  INTERPRETATION:  TIME OF EXAM: August 17, 2019 at 8:57 AM    CLINICAL INFORMATION: Dyspnea    TECHNIQUE:   Portable chest    INTERPRETATION:     Lungs are free of focal abnormalities. Increased interstitial opacities   unchanged from the last exam consistent with mild interstitial edema.   There are No effusions or pneumothorax.      COMPARISON:  August 16      IMPRESSION:  Mild interstitial edema but no focal consolidations.    < end of copied text >

## 2019-08-18 NOTE — PROGRESS NOTE ADULT - PROBLEM SELECTOR PLAN 8
- Will start HSQ   - PT eval pending but likely will need rehab once medically stable  - Discussed management plans with daughter at bedside at length about diagnosis, course of treatment, testing (such as biopsies), and further treatment after biopsies. Daughter extremely grateful and I answered all questions as completely as possible.

## 2019-08-18 NOTE — PROGRESS NOTE ADULT - PROBLEM SELECTOR PLAN 1
Patient with elevated serum creatinine of 2.03 on 8/10/19 baseline 0.5.  Now currently improved. She presents with purpuric rash on admission which improved after giving IV steroids. Renal function and rash seem to be improving with steroid therapy.  Serologies with low C3/C4, Positive p-ANCA, negative ANCA elevated ESR/CRP. Urinalysis positive for protein 100, hematuria with large blood in urine. Patient with positive IVAN and anti-dsDNA on 6/23/2018 in the setting of chronic Hydralazine use. She has 5gm of proteinuria. 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  AVOID Hydralazine. Avoid giving other nephrotoxic medications such as ACE-I/ARBS and MONTEMAYOR. Continue to trend renal function. Monitor intake and output. Patient would need renal biopsy when clinically stable to decide on further treatment course. Would consult IR regarding renal biopsy possible to be planned by early next week.  Would ideally need SBP no higher than 160mmHg and patient is able to tolerate procedure well with minimal oxygen requirements. Patient with elevated serum creatinine of 2.03 on 8/10/19 baseline 0.5.  Now currently improved. She presents with purpuric rash on admission which improved after giving IV steroids. Renal function and rash seem to be improving with steroid therapy.  Serologies with low C3/C4, Positive p-ANCA, negative ANCA elevated ESR/CRP. Urinalysis positive for protein 100, hematuria with large blood in urine. Patient with positive IVAN and anti-dsDNA on 6/23/2018 in the setting of chronic Hydralazine use. She has 5gm of proteinuria. 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  AVOID Hydralazine. Avoid giving other nephrotoxic medications such as ACE-I/ARBS and MONTEMAYOR. Continue to trend renal function. Monitor intake and output. Patient would need renal biopsy when clinically stable to decide on further treatment course. Would consult IR regarding renal biopsy possible to be planned by early next week.

## 2019-08-18 NOTE — PROGRESS NOTE ADULT - SUBJECTIVE AND OBJECTIVE BOX
Patient is a 84y old  Female who presents with a chief complaint of MICU transfer - hypoxic respiratory failure (18 Aug 2019 07:14)      SUBJECTIVE / OVERNIGHT EVENTS: feels better. No SOB    MEDICATIONS  (STANDING):  atorvastatin 10 milliGRAM(s) Oral at bedtime  cloNIDine 0.2 milliGRAM(s) Oral three times a day  dextrose 5%. 1000 milliLiter(s) (50 mL/Hr) IV Continuous <Continuous>  dextrose 50% Injectable 12.5 Gram(s) IV Push once  dextrose 50% Injectable 25 Gram(s) IV Push once  dextrose 50% Injectable 25 Gram(s) IV Push once  heparin  Injectable 5000 Unit(s) SubCutaneous every 12 hours  insulin glargine Injectable (LANTUS) 12 Unit(s) SubCutaneous at bedtime  insulin lispro (HumaLOG) corrective regimen sliding scale   SubCutaneous at bedtime  insulin lispro (HumaLOG) corrective regimen sliding scale   SubCutaneous three times a day before meals  insulin lispro Injectable (HumaLOG) 3 Unit(s) SubCutaneous before breakfast  magnesium oxide 400 milliGRAM(s) Oral three times a day with meals  methylPREDNISolone sodium succinate Injectable 70 milliGRAM(s) IV Push daily  metoprolol tartrate 50 milliGRAM(s) Oral two times a day  montelukast 10 milliGRAM(s) Oral daily  pantoprazole    Tablet 40 milliGRAM(s) Oral before breakfast  sertraline 50 milliGRAM(s) Oral daily    MEDICATIONS  (PRN):  ALBUTerol    90 MICROgram(s) HFA Inhaler 2 Puff(s) Inhalation every 6 hours PRN Shortness of Breath and/or Wheezing  ALPRAZolam 0.5 milliGRAM(s) Oral two times a day PRN Anxiety and Insomnia  dextrose 40% Gel 15 Gram(s) Oral once PRN Blood Glucose LESS THAN 70 milliGRAM(s)/deciliter  glucagon  Injectable 1 milliGRAM(s) IntraMuscular once PRN Glucose LESS THAN 70 milligrams/deciliter      Vital Signs Last 24 Hrs  T(C): 36.4 (18 Aug 2019 06:21), Max: 37.2 (17 Aug 2019 13:44)  T(F): 97.6 (18 Aug 2019 06:21), Max: 98.9 (17 Aug 2019 13:44)  HR: 85 (18 Aug 2019 06:21) (85 - 98)  BP: 123/85 (18 Aug 2019 06:21) (101/626 - 123/85)  BP(mean): --  RR: 18 (18 Aug 2019 06:21) (18 - 20)  SpO2: 100% (18 Aug 2019 06:21) (92% - 100%)  CAPILLARY BLOOD GLUCOSE      POCT Blood Glucose.: 126 mg/dL (18 Aug 2019 08:45)  POCT Blood Glucose.: 232 mg/dL (17 Aug 2019 22:07)  POCT Blood Glucose.: 224 mg/dL (17 Aug 2019 17:51)  POCT Blood Glucose.: 83 mg/dL (17 Aug 2019 12:33)    I&O's Summary    17 Aug 2019 07:01  -  18 Aug 2019 07:00  --------------------------------------------------------  IN: 0 mL / OUT: 900 mL / NET: -900 mL        PHYSICAL EXAM:  GENERAL: NAD, well-developed  HEAD:  Atraumatic, Normocephalic  EYES: EOMI, PERRLA, conjunctiva and sclera clear  NECK: Supple, No JVD  CHEST/LUNG: (+) a few crackles bilaterally; No wheeze  HEART: Irregular at 70's; No murmurs, rubs, or gallops  ABDOMEN: Soft, Nontender, Nondistended; Bowel sounds present  EXTREMITIES:  2+ Peripheral Pulses, No clubbing, cyanosis, or edema  PSYCH: AAOx3  NEUROLOGY: non-focal  SKIN: No rashes or lesions    LABS:                        9.2    24.64 )-----------( 306      ( 18 Aug 2019 06:41 )             30.9     08-18    142  |  99  |  57<H>  ----------------------------<  106<H>  3.6   |  31  |  0.94    Ca    9.0      18 Aug 2019 06:41  Phos  4.5     08-18  Mg     1.5     08-18      PT/INR - ( 18 Aug 2019 06:41 )   PT: 14.2 SEC;   INR: 1.24                    RADIOLOGY & ADDITIONAL TESTS:  < from: Xray Chest 1 View-PORTABLE IMMEDIATE (08.17.19 @ 09:05) >  IMPRESSION:  Mild interstitial edema but no focal consolidations.    < end of copied text >    Imaging Personally Reviewed:    Consultant(s) Notes Reviewed:  Pulm, renal    Care Discussed with Consultants/Other Providers:

## 2019-08-18 NOTE — PROGRESS NOTE ADULT - ASSESSMENT
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 Had acute worsening of her respiratory status this morning in the setting of possible anxiety but also with possible volume overload per renal team ultrasound. 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 Had acute worsening of her respiratory status this morning in the setting of possible anxiety but also with possible volume overload per renal team ultrasound. Received diuretics and now appears comfortable  - Continue solumedrol 1mg/kg 70mg daily   - f/u remaining serologies  - If worsening hypoxemia or tachypnea obtain room air ABG  - Maintain SpO2 >92%, no need to target 100%  - Agree with plans to obtain bx to confirm vasculitis  - Pulmonary to follow    Valentin Ramirez MD, PGY5  Pulmonary and Critical Care Fellow  [phone]

## 2019-08-18 NOTE — PROGRESS NOTE ADULT - PROBLEM SELECTOR PLAN 2
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 either immune complex disease such as lupus vs vasculitis such as p-ANCA with good response to IV high dose steroids and improved O2 requirements  - c/w IV methylpred 70mg daily  - f/u DsDNA, RNP, anticardiolipin Ab, Anti-GBM, HepB, HepC, immunofixation, Anti-histone, Anti-centromere Ab, beta2-glycoprotein  - Will continue Doxy and F/U ID on Tx duration given low suspicion for RMSF and likely false positive as patient with no risk factors and no travel or outdoor exposure  - Will need PCP PPx pt on high dose steroids and will clarify with ID and rheum  - AVOID Hydralazine  -  s/p 2U FFP and 2.5mg Vit K, now INR 1.36  - Will need renal biopsy  as per renal rec.  - Appreciated renal, pulm, rheum, and ID recs

## 2019-08-19 NOTE — PROGRESS NOTE ADULT - PROBLEM SELECTOR PLAN 4
- Currently subtherapeutic in prep for possible renal bx after FFP and vit K last week  - Will hold coumadin in anticipation of renal Bx as risk of CVA with Afib in 4 days low (CHADS Vasc 5 = 7.2% risk per year = 0.019% risk daily)  - Pt has high bleeding risk from pulmonary hemorrhages. Risk of bleeding outweighs the benefits of bridging with heparin drip Initially supratherapeutic. Now Currently subtherapeutic in prep for possible renal bx after FFP and vit K last week  - Will hold coumadin in anticipation of renal Bx as risk of CVA with Afib in 4 days low (CHADS Vasc 5 = 7.2% risk per year = 0.019% risk daily)  - Pt has high bleeding risk from pulmonary hemorrhages and bx. Risk of bleeding outweighs the benefits of bridging with heparin drip

## 2019-08-19 NOTE — PROGRESS NOTE ADULT - SUBJECTIVE AND OBJECTIVE BOX
[facility], [place] Division of Kidney Diseases & Hypertension  FOLLOW UP NOTE  [phone]--------------------------------------------------------------------------------  Chief Complaint:Pulmonary hypertension    Events noted. Charts reviewed. Patient planned for renal biopsy. Seen this morning laying comfortably in bed without complaints. No shortness of breath or chest pain. Denies any new rashes, no diarrhea or vomiting. On tapered dose of steroids.      PAST HISTORY  --------------------------------------------------------------------------------  No significant changes to PMH, PSH, FHx, SHx, unless otherwise noted    ALLERGIES & MEDICATIONS  --------------------------------------------------------------------------------  Allergies    Keflex (Unknown)  penicillin (Rash)    Intolerances      Standing Inpatient Medications  atorvastatin 10 milliGRAM(s) Oral at bedtime  cloNIDine 0.2 milliGRAM(s) Oral three times a day  dextrose 5%. 1000 milliLiter(s) IV Continuous <Continuous>  dextrose 50% Injectable 12.5 Gram(s) IV Push once  dextrose 50% Injectable 25 Gram(s) IV Push once  dextrose 50% Injectable 25 Gram(s) IV Push once  heparin  Injectable 5000 Unit(s) SubCutaneous every 12 hours  insulin glargine Injectable (LANTUS) 12 Unit(s) SubCutaneous at bedtime  insulin lispro (HumaLOG) corrective regimen sliding scale   SubCutaneous at bedtime  insulin lispro (HumaLOG) corrective regimen sliding scale   SubCutaneous three times a day before meals  insulin lispro Injectable (HumaLOG) 3 Unit(s) SubCutaneous before breakfast  magnesium oxide 400 milliGRAM(s) Oral three times a day with meals  methylPREDNISolone sodium succinate Injectable 70 milliGRAM(s) IV Push daily  metoprolol tartrate 50 milliGRAM(s) Oral two times a day  montelukast 10 milliGRAM(s) Oral daily  pantoprazole    Tablet 40 milliGRAM(s) Oral before breakfast  sertraline 50 milliGRAM(s) Oral daily    PRN Inpatient Medications  ALBUTerol    90 MICROgram(s) HFA Inhaler 2 Puff(s) Inhalation every 6 hours PRN  ALPRAZolam 0.5 milliGRAM(s) Oral two times a day PRN  dextrose 40% Gel 15 Gram(s) Oral once PRN  glucagon  Injectable 1 milliGRAM(s) IntraMuscular once PRN      REVIEW OF SYSTEMS  --------------------------------------------------------------------------------  Gen: no fever  Respiratory: +sob  CV: no cp  GI: no abdominal pain  : still urinating    VITALS/PHYSICAL EXAM  --------------------------------------------------------------------------------  T(C): 36.5 (08-19-19 @ 10:14), Max: 36.7 (08-18-19 @ 21:44)  HR: 78 (08-19-19 @ 10:14) (75 - 92)  BP: 112/82 (08-19-19 @ 10:14) (109/75 - 147/88)  RR: 19 (08-19-19 @ 10:14) (14 - 19)  SpO2: 97% (08-19-19 @ 10:14) (94% - 100%)  Wt(kg): --        08-18-19 @ 07:01 - 08-19-19 @ 07:00  --------------------------------------------------------  IN: 0 mL / OUT: 1550 mL / NET: -1550 mL    08-19-19 @ 07:01  -  08-19-19 @ 12:56  --------------------------------------------------------  IN: 200 mL / OUT: 400 mL / NET: -200 mL      Physical Exam:  	Gen: awake, on NC, breathing comfortably  	HEENT: supple  no LAD  	Pulm: rales bilaterally  	CV:  S1S2, no murmurs  	Abd: +BS, soft   	Ext: No B/L Lower ext edema  	Neuro: awake, responsive to commands  	Skin: bilateral healing nonblanching purpuric rash on  lower extremities with chronic venous stasis changes. Rashes clearing              Psych: appropriate affect              Vascular: no cyanosis    LABS/STUDIES  --------------------------------------------------------------------------------              9.3    22.24 >-----------<  299      [08-19-19 @ 05:39]              30.7     141  |  100  |  52  ----------------------------<  72      [08-19-19 @ 05:39]  3.7   |  31  |  1.06        Ca     9.2     [08-19-19 @ 05:39]      Mg     1.8     [08-19-19 @ 05:39]      Phos  3.3     [08-19-19 @ 05:39]      PT/INR: PT 13.0 , INR 1.17       [08-19-19 @ 05:39]      Creatinine Trend:  SCr 1.06 [08-19 @ 05:39]  SCr 0.94 [08-18 @ 06:41]  SCr 0.93 [08-17 @ 06:00]  SCr 1.11 [08-16 @ 06:50]  SCr 1.16 [08-15 @ 06:45]    Urinalysis - [08-14-19 @ 19:32]      Color YELLOW / Appearance CLEAR / SG 1.019 / pH 6.0      Gluc 50 / Ketone NEGATIVE  / Bili NEGATIVE / Urobili NORMAL       Blood MODERATE / Protein 200 / Leuk Est NEGATIVE / Nitrite NEGATIVE      RBC >50 / WBC 11-25 / Hyaline NEGATIVE / Gran  / Sq Epi OCC / Non Sq Epi  / Bacteria NEGATIVE    Urine Creatinine 35.60      [08-14-19 @ 19:30]  Urine Protein 201.6      [08-14-19 @ 19:30]    Iron 25, TIBC 302, %sat 8      [08-13-19 @ 05:30]  Ferritin 687      [08-13-19 @ 05:30]  Vitamin D (25OH) 23.3      [08-16-19 @ 06:50]  HbA1c 5.6      [08-13-19 @ 05:30]    HBsAg Nonreactive      [08-15-19 @ 06:45]  HBcAb Reactive      [08-15-19 @ 06:45]  HCV 0.28, Nonreactive Hepatitis C AB  S/CO Ratio                        Interpretation  < 1.00                                   Non-Reactive  1.00 - 4.99                         Weakly-Reactive  >= 5.00                                Reactive  Non-Reactive: Aperson with a non-reactive HCV antibody  result is considered uninfected.  No further action is  needed unless recent infection is suspected.  In these  cases, consider repeat testing later to detect  seroconversion..  Weakly-Reactive: HCV antibody test is abnormal, HCV RNA  Qualitative test will follow.  Reactive: HCV antibody test is abnormal, HCV RNA  Qualitative test will follow.  Note: HCV antibody testing is performed on the [employer]   system.      [08-15-19 @ 06:45]    dsDNA 435      [08-15-19 @ 06:45]  Immunofixation Serum:   Weak IgM Lambda Band Identified    Reference Range: None Detected      [08-13-19 @ 05:30]  SPEP Interpretation: Weak Gamma-Migrating Paraprotein Identified      [08-13-19 @ 05:30]

## 2019-08-19 NOTE — PROGRESS NOTE ADULT - PROBLEM SELECTOR PLAN 6
- C/W metoprolol  - Currently subtherapeutic in prep for renal Bx  - Will hold in anticipation of renal Bx and risk of CVA with Afib in 4 days low

## 2019-08-19 NOTE — PROGRESS NOTE ADULT - ASSESSMENT
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 Acute worsening of respiratory status over weekend now resolved after diuretics and now appears comfortable

## 2019-08-19 NOTE — PROGRESS NOTE ADULT - PROBLEM SELECTOR PLAN 1
- Continue solumedrol 1mg/kg 70mg daily   - f/u remaining serologies  - Possible IR renal biopsy  - If worsening hypoxemia or tachypnea obtain room air ABG  - Maintain SpO2 >92%, titrate down O2 as possible    Clovis Thomason MD  Pulmonary & Critical Care Fellow  [phone]

## 2019-08-19 NOTE — PROGRESS NOTE ADULT - ATTENDING COMMENTS
Pt was very responsive to corticosteroids in the setting of positive serologies s/o pulm-renal vasculitis. No biopsy was performed and skin, lung infiltrates and KANIKA are resolving. Rheum following in regards to steroid taper and possible steroid-sparing agent. hydralazine was stopped in case this was drug induced lupus. Pt is improved from respiratory standpoint.

## 2019-08-19 NOTE — PROGRESS NOTE ADULT - ATTENDING COMMENTS
hypervolemia on exam. lung crackles and edema   would start Lasix 20mg po. daily.  give 20mg IV today

## 2019-08-19 NOTE — CHART NOTE - NSCHARTNOTEFT_GEN_A_CORE
PRE-INTERVENTIONAL RADIOLOGY PROCEDURE NOTE    Patient: 85 y/o Female     Procedure:   Bx for DMT    Diagnosis / Indication: h/o pulmonary renal syndrome.    Interventional Radiology Attending Physician: Dr Hartley     Ordering Attending Physician:     Pertinent medical history:     Pertinent labs:                       9.3    22.24 )-----------( 299      ( 19 Aug 2019 05:39 )             30.7       08-19    141  |  100  |  52<H>  ----------------------------<  72  3.7   |  31  |  1.06    Ca    9.2      19 Aug 2019 05:39  Phos  3.3     08-19  Mg     1.8     08-19      PT/INR - ( 19 Aug 2019 05:39 )   PT: 13.0 SEC;   INR: 1.17              Patient and Family aware? Yes PRE-INTERVENTIONAL RADIOLOGY PROCEDURE NOTE    Patient: 85 y/o Female     Procedure:   Renal biopsy     Diagnosis / Indication: h/o pulmonary renal syndrome.    Interventional Radiology Attending Physician: Dr Hartley     Ordering Attending Physician: Dr Nicole     Pertinent medical history:  PMHx of A fib on coumadin (held for Bx), CHF, HLD, HTN, COPD, anxiety, depression    Pertinent labs:                       9.3    22.24 )-----------( 299      ( 19 Aug 2019 05:39 )             30.7       08-19    141  |  100  |  52<H>  ----------------------------<  72  3.7   |  31  |  1.06    Ca    9.2      19 Aug 2019 05:39  Phos  3.3     08-19  Mg     1.8     08-19      PT/INR - ( 19 Aug 2019 05:39 )   PT: 13.0 SEC;   INR: 1.17              Patient and Family aware? Yes

## 2019-08-19 NOTE — PROGRESS NOTE ADULT - ASSESSMENT
Pt is an 83 y/o F w/ a PMHx of A fib on coumadin, CHF, HLD, HTN, COPD, anxiety, depression who p/w weakness and shortness of breath in the setting of hypoxic respiratory failure likely 2/2 pulmonary renal syndrome of rheumatologic etiology of vasculitis vs immune complex disease such as lupus with lower suspicion for infectious etiology at this time with improvement of O2 requirement on IV steroids with normalization of renal function and anemia currently stable but not planned for bronch given low yield but planned for renal Bx tomorrow to definitive path and to dictate Disease Modifying treatment (DMT) currently stable and in no respiratory distress but slight pulmonary edema on exam but not hypoxic on RA.

## 2019-08-19 NOTE — PROGRESS NOTE ADULT - PROBLEM SELECTOR PLAN 3
- Improved and at baseline  - Discussed care with Rheum and other disease modifying treatment medications such as Cytoxan or Rituxan will be determined after biopsy and tissue diagnosis. Will continue steroids for now as per rheum.  - Likely 2/2 rheumatological etiology such as hydralazine induced SLE, small vessel vasculitis, or other complex depositing glomerulopathies  - Will follow-up pending rheum serologies  - Avoid ACEis, ARBS, NSAIDs, and other nephrotoxic drugs  - Will renally dose all meds  - Renal Biopsy planned for tomorrow Improved and at baseline  - Discussed care with Rheum and other disease modifying treatment medications such as Cytoxan or Rituxan will be determined after biopsy and tissue diagnosis. Will continue steroids for now as per rheum.  - Likely 2/2 rheumatological etiology such as hydralazine induced SLE, small vessel vasculitis, or other complex depositing glomerulopathies  - Will follow-up pending rheum serologies  - Avoid ACEis, ARBS, NSAIDs, and other nephrotoxic drugs  - Will renally dose all meds  - Renal Biopsy planned for tomorrow.

## 2019-08-19 NOTE — PROGRESS NOTE ADULT - SUBJECTIVE AND OBJECTIVE BOX
[facility]  [id number]    INTERVAL HPI/OVERNIGHT EVENTS:    Reports improvement of SOB with diuresis. Denies chest pain, cough, hemoptysis, hematuria, fevers, joint pains, muscle aches, new worsening rashes.    MEDICATIONS  (STANDING):  atorvastatin 10 milliGRAM(s) Oral at bedtime  cloNIDine 0.2 milliGRAM(s) Oral three times a day  dextrose 5%. 1000 milliLiter(s) (50 mL/Hr) IV Continuous <Continuous>  dextrose 50% Injectable 12.5 Gram(s) IV Push once  dextrose 50% Injectable 25 Gram(s) IV Push once  dextrose 50% Injectable 25 Gram(s) IV Push once  insulin glargine Injectable (LANTUS) 12 Unit(s) SubCutaneous at bedtime  insulin lispro (HumaLOG) corrective regimen sliding scale   SubCutaneous at bedtime  insulin lispro (HumaLOG) corrective regimen sliding scale   SubCutaneous three times a day before meals  insulin lispro Injectable (HumaLOG) 3 Unit(s) SubCutaneous before breakfast  methylPREDNISolone sodium succinate Injectable 70 milliGRAM(s) IV Push daily  metoprolol tartrate 50 milliGRAM(s) Oral two times a day  montelukast 10 milliGRAM(s) Oral daily  pantoprazole    Tablet 40 milliGRAM(s) Oral before breakfast  sertraline 50 milliGRAM(s) Oral daily    MEDICATIONS  (PRN):  ALBUTerol    90 MICROgram(s) HFA Inhaler 2 Puff(s) Inhalation every 6 hours PRN Shortness of Breath and/or Wheezing  ALPRAZolam 0.5 milliGRAM(s) Oral two times a day PRN Anxiety and Insomnia  dextrose 40% Gel 15 Gram(s) Oral once PRN Blood Glucose LESS THAN 70 milliGRAM(s)/deciliter  glucagon  Injectable 1 milliGRAM(s) IntraMuscular once PRN Glucose LESS THAN 70 milligrams/deciliter    Allergies  Keflex (Unknown)  penicillin (Rash)  Intolerances    Vital Signs Last 24 Hrs  T(C): 36.9 (19 Aug 2019 17:40), Max: 36.9 (19 Aug 2019 17:40)  T(F): 98.4 (19 Aug 2019 17:40), Max: 98.4 (19 Aug 2019 17:40)  HR: 90 (19 Aug 2019 17:40) (75 - 90)  BP: 120/81 (19 Aug 2019 17:40) (112/82 - 147/88)  BP(mean): --  RR: 17 (19 Aug 2019 17:40) (16 - 19)  SpO2: 95% (19 Aug 2019 17:40) (94% - 100%)    Physical Exam:  General: NAD. Wearing NC  HEENT: EOMI, MMM  Cardio: +S1/S2, RRR  Resp: wheezing  GI: +BS, soft, NT/ND  MSK: no synovitis  Skin: no rash    LABS:                        9.3    22.24 )-----------( 299      ( 19 Aug 2019 05:39 )             30.7     08-19    141  |  100  |  52<H>  ----------------------------<  72  3.7   |  31  |  1.06    Ca    9.2      19 Aug 2019 05:39  Phos  3.3     08-19  Mg     1.8     08-19      PT/INR - ( 19 Aug 2019 05:39 )   PT: 13.0 SEC;   INR: 1.17       Urinalysis (08.14.19 @ 19:32)    Color: YELLOW    Urine Appearance: CLEAR    Glucose: 50    Bilirubin: NEGATIVE    Ketone - Urine: NEGATIVE    Specific Gravity: 1.019    Blood: MODERATE    pH - Urine: 6.0    Protein, Urine: 200    Urobilinogen: NORMAL    Nitrite: NEGATIVE    Leukocyte Esterase Concentration: NEGATIVE    Red Blood Cell - Urine: >50    White Blood Cell - Urine: 11-25    Hyaline Casts: NEGATIVE    Bacteria: NEGATIVE    Squamous Epithelial: OCC      RADIOLOGY & ADDITIONAL TESTS:      Anti-Nuclear Antibody (08.11.19 @ 16:15)    IVAN Pattern: Homogeneous    Antineutrophil Cytoplasmic Antibody (08.11.19 @ 16:15)    Perinuclear (p-ANCA) Antibody: >1:1280    Cytoplasmic (c-ANCA) Antibody: Negative    Atypical ANCA: Negative    Myeloperoxidase Antibody Assay (08.11.19 @ 16:15)    Myeloperoxidase Antibody Assay: 96.2 Units    Myeloperoxidase Ab Interpretation: Positive: Method: EIA  Interpretation                                     Units                                <= 20.0          Negative                                20.1 - 30.0   Weak Positive                                 > 30.0          Positive    C3 Complement, Serum (08.12.19 @ 11:00)    C3 Complement, Serum: 76 mg/dL    C4 Complement, Serum (08.12.19 @ 11:00)    C4 Complement, Serum: 10 mg/dL    Vitamin D, 25-Hydroxy (08.16.19 @ 06:50)    Vitamin D, 25-Hydroxy: 23.3: 30 - 80 ng/mL  Optimum Levels (Reference range)  > 80 ng/mL  Toxicity possible  20 - 29 ng/mL  Insufficiency  10 - 19 ng/mL                                        Mild  to Moderate Deficiency  < 10 ng/mL  Severe Deficiency  Optimum levels for 25-Hydroxy vitamin D are 30 ng/mL and  above based on the Endocrine Society guidelines 2011.  However, there is a lack of consensus on this and the  [employer] [employer] recommends 20 ng/mL and above as  optimum levels. Vitamin D results may vary depending on  the method of analysis. The current DiaSorin XL  chemiluminescent immunoassay method measures both D2 and  D3 and was introduced in 2010. ng/mL

## 2019-08-19 NOTE — PROGRESS NOTE ADULT - SUBJECTIVE AND OBJECTIVE BOX
CHIEF COMPLAINT:    Interval Events: No acute events overnight. Endorses feeling pretty well and stable breathing.     REVIEW OF SYSTEMS:  Constitutional: [ ] negative [ ] fevers [ ] chills [ ] weight loss [ ] weight gain  HEENT: [ ] negative [ ] dry eyes [ ] eye irritation [ ] postnasal drip [ ] nasal congestion  CV: [ ] negative  [ ] chest pain [ ] orthopnea [ ] palpitations [ ] murmur  Resp: [ ] negative [ ] cough [ ] shortness of breath [ ] dyspnea [ ] wheezing [ ] sputum [ ] hemoptysis  GI: [ ] negative [ ] nausea [ ] vomiting [ ] diarrhea [ ] constipation [ ] abd pain [ ] dysphagia   : [ ] negative [ ] dysuria [ ] nocturia [ ] hematuria [ ] increased urinary frequency  Musculoskeletal: [ ] negative [ ] back pain [ ] myalgias [ ] arthralgias [ ] fracture  Skin: [ ] negative [ ] rash [ ] itch  Neurological: [ ] negative [ ] headache [ ] dizziness [ ] syncope [ ] weakness [ ] numbness  Psychiatric: [ ] negative [ ] anxiety [ ] depression  Endocrine: [ ] negative [ ] diabetes [ ] thyroid problem  Hematologic/Lymphatic: [ ] negative [ ] anemia [ ] bleeding problem  Allergic/Immunologic: [ ] negative [ ] itchy eyes [ ] nasal discharge [ ] hives [ ] angioedema  [X] All other systems negative  [ ] Unable to assess ROS because ________    OBJECTIVE:  ICU Vital Signs Last 24 Hrs  T(C): 36.7 (19 Aug 2019 13:24), Max: 36.7 (18 Aug 2019 21:44)  T(F): 98.1 (19 Aug 2019 13:24), Max: 98.1 (19 Aug 2019 13:24)  HR: 78 (19 Aug 2019 13:24) (75 - 90)  BP: 124/81 (19 Aug 2019 13:24) (112/82 - 147/88)  BP(mean): --  ABP: --  ABP(mean): --  RR: 18 (19 Aug 2019 13:24) (16 - 19)  SpO2: 94% (19 Aug 2019 13:24) (94% - 100%)        08-18 @ 07:01  -  08-19 @ 07:00  --------------------------------------------------------  IN: 0 mL / OUT: 1550 mL / NET: -1550 mL    08-19 @ 07:01  - 08-19 @ 17:42  --------------------------------------------------------  IN: 200 mL / OUT: 400 mL / NET: -200 mL      CAPILLARY BLOOD GLUCOSE      POCT Blood Glucose.: 212 mg/dL (19 Aug 2019 12:48)      PHYSICAL EXAM:  General: Awake, alert, NAD  Pulm: Mild inspiratory crackles at bases   CV: S1, S1 RRR. No m/r/g.   Abdomen: Soft. NTND. BS+  Extremities: Improved non blanching macular rash on shins. No peripheral edema  Neuro: non focal exam  Psych: normal affect    HOSPITAL MEDICATIONS:  MEDICATIONS  (STANDING):  atorvastatin 10 milliGRAM(s) Oral at bedtime  cloNIDine 0.2 milliGRAM(s) Oral three times a day  dextrose 5%. 1000 milliLiter(s) (50 mL/Hr) IV Continuous <Continuous>  dextrose 50% Injectable 12.5 Gram(s) IV Push once  dextrose 50% Injectable 25 Gram(s) IV Push once  dextrose 50% Injectable 25 Gram(s) IV Push once  heparin  Injectable 5000 Unit(s) SubCutaneous every 12 hours  insulin glargine Injectable (LANTUS) 12 Unit(s) SubCutaneous at bedtime  insulin lispro (HumaLOG) corrective regimen sliding scale   SubCutaneous at bedtime  insulin lispro (HumaLOG) corrective regimen sliding scale   SubCutaneous three times a day before meals  insulin lispro Injectable (HumaLOG) 3 Unit(s) SubCutaneous before breakfast  magnesium oxide 400 milliGRAM(s) Oral three times a day with meals  methylPREDNISolone sodium succinate Injectable 70 milliGRAM(s) IV Push daily  metoprolol tartrate 50 milliGRAM(s) Oral two times a day  montelukast 10 milliGRAM(s) Oral daily  pantoprazole    Tablet 40 milliGRAM(s) Oral before breakfast  sertraline 50 milliGRAM(s) Oral daily    MEDICATIONS  (PRN):  ALBUTerol    90 MICROgram(s) HFA Inhaler 2 Puff(s) Inhalation every 6 hours PRN Shortness of Breath and/or Wheezing  ALPRAZolam 0.5 milliGRAM(s) Oral two times a day PRN Anxiety and Insomnia  dextrose 40% Gel 15 Gram(s) Oral once PRN Blood Glucose LESS THAN 70 milliGRAM(s)/deciliter  glucagon  Injectable 1 milliGRAM(s) IntraMuscular once PRN Glucose LESS THAN 70 milligrams/deciliter      LABS:                        9.3    22.24 )-----------( 299      ( 19 Aug 2019 05:39 )             30.7     Hgb Trend: 9.3<--, 9.2<--, 10.2<--, 8.5<--, 9.3<--  08-19    141  |  100  |  52<H>  ----------------------------<  72  3.7   |  31  |  1.06    Ca    9.2      19 Aug 2019 05:39  Phos  3.3     08-19  Mg     1.8     08-19      Creatinine Trend: 1.06<--, 0.94<--, 0.93<--, 1.11<--, 1.16<--, 1.42<--  PT/INR - ( 19 Aug 2019 05:39 )   PT: 13.0 SEC;   INR: 1.17                    MICROBIOLOGY:       RADIOLOGY:  [ ] Reviewed and interpreted by me    PULMONARY FUNCTION TESTS:    EKG:

## 2019-08-19 NOTE — PROGRESS NOTE ADULT - SUBJECTIVE AND OBJECTIVE BOX
Patient is a 84y old  Female who presents with a chief complaint of MICU transfer - hypoxic respiratory failure (18 Aug 2019 12:49)    INTERVAL HPI/OVERNIGHT EVENTS:  Patient with no acute events overnight. Patient states she has no complaints and son at bedside. Patient denies feeling short of breath, no chest pain, no fevers, no new rashes, is eating and drinking normally, having normal urination, normal bowel movements, and denies any pain or new edema in upper or lower extremities.     T(C): 36.6 (08-19-19 @ 06:04), Max: 36.7 (08-18-19 @ 10:36)  HR: 90 (08-19-19 @ 06:04) (75 - 92)  BP: 147/88 (08-19-19 @ 06:04) (109/75 - 147/88)  RR: 16 (08-19-19 @ 06:04) (14 - 18)  SpO2: 100% (08-19-19 @ 06:04) (94% - 100%)    18 Aug 2019 07:01  -  19 Aug 2019 07:00  --------------------------------------------------------  IN: 0 mL / OUT: 1550 mL / NET: -1550 mL    PHYSICAL EXAM:  GENERAL: NAD, well-developed  HEAD:  Atraumatic, Normocephalic  EYES: EOMI, PERRLA, conjunctiva and sclera clear  NECK: Supple, No JVD  CHEST/LUNG: (+) a few crackles bilaterally; No wheeze and in no respiratory distress  HEART: Irregular at 70's; No murmurs, rubs, or gallops  ABDOMEN: Soft, Nontender, Nondistended; Bowel sounds present  EXTREMITIES:  2+ Peripheral Pulses, No clubbing, cyanosis, or edema  PSYCH: AAOx3  NEUROLOGY: non-focal  SKIN: No rashes or lesions    PAST MEDICAL & SURGICAL HISTORY:  COPD (chronic obstructive pulmonary disease)  Peripheral vascular disease  Pulmonary hypertension  Rheumatoid arthritis  Osteoarthritis  Mitral regurgitation  H/O lymphoma  Hyperlipidemia  Chronic GERD  Chronic kidney disease: proteinuria  AF (atrial fibrillation)  Anemia in CKD (chronic kidney disease)  CHF (congestive heart failure)  HTN (hypertension)  History of total hip replacement, left  History of total knee replacement, bilateral    MEDICATIONS  (STANDING):  atorvastatin 10 milliGRAM(s) Oral at bedtime  cloNIDine 0.2 milliGRAM(s) Oral three times a day  dextrose 5%. 1000 milliLiter(s) (50 mL/Hr) IV Continuous <Continuous>  dextrose 50% Injectable 12.5 Gram(s) IV Push once  dextrose 50% Injectable 25 Gram(s) IV Push once  dextrose 50% Injectable 25 Gram(s) IV Push once  furosemide   Injectable 40 milliGRAM(s) IV Push once  heparin  Injectable 5000 Unit(s) SubCutaneous every 12 hours  insulin glargine Injectable (LANTUS) 12 Unit(s) SubCutaneous at bedtime  insulin lispro (HumaLOG) corrective regimen sliding scale   SubCutaneous at bedtime  insulin lispro (HumaLOG) corrective regimen sliding scale   SubCutaneous three times a day before meals  insulin lispro Injectable (HumaLOG) 3 Unit(s) SubCutaneous before breakfast  magnesium oxide 400 milliGRAM(s) Oral three times a day with meals  methylPREDNISolone sodium succinate Injectable 70 milliGRAM(s) IV Push daily  metoprolol tartrate 50 milliGRAM(s) Oral two times a day  montelukast 10 milliGRAM(s) Oral daily  pantoprazole    Tablet 40 milliGRAM(s) Oral before breakfast  sertraline 50 milliGRAM(s) Oral daily    MEDICATIONS  (PRN):  ALBUTerol    90 MICROgram(s) HFA Inhaler 2 Puff(s) Inhalation every 6 hours PRN Shortness of Breath and/or Wheezing  ALPRAZolam 0.5 milliGRAM(s) Oral two times a day PRN Anxiety and Insomnia  dextrose 40% Gel 15 Gram(s) Oral once PRN Blood Glucose LESS THAN 70 milliGRAM(s)/deciliter  glucagon  Injectable 1 milliGRAM(s) IntraMuscular once PRN Glucose LESS THAN 70 milligrams/deciliter    REVIEW OF SYSTEMS:  ROS: Denies fevers, chills, CP, Abdominal pain, rhinorrhea, new rashes, new LE edema, new visual changes, confusion, headaches, blood in stools, N/V, dysuria, and hematuria.     LABS:                        9.3    22.24 )-----------( 299      ( 19 Aug 2019 05:39 )             30.7     08-19    141  |  100  |  52<H>  ----------------------------<  72  3.7   |  31  |  1.06    Ca    9.2      19 Aug 2019 05:39  Phos  3.3     08-19  Mg     1.8     08-19    LIVER FUNCTIONS - ( 15 Aug 2019 06:45 )  Alb: 3.5 g/dL / Pro: 6.9 g/dL / ALK PHOS: 68 u/L / ALT: 33 u/L / AST: 23 u/L / GGT: x     / T. Bili 1.0 mg/dL / D. 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  RVP and FLU negatvie  HIV negative  Mucoplasma IgG and IgM negative  Crypto Ag negative  Strep pneumo Ag negative  Histoplama Antigen negative  QuantGold Pending result    Rheum:  +IVAN  +p-anca  -c-anca  Myeloperoxidase Ab +  GBM Ab Negative  C3 - 76 (L)  C4 - 10 (L)  Proteinase 3 Ab negative  DsDNA - 435 (H)  Protein electrop[haresis - weak Cameron-migrating paraprotein   SPEP - negative  UPEP - negative  Immunofixation - SMALL MONOCLONAL IGM LAMBDA AND POLYCLONAL PROTEIN  Anti Histone, Anti Centromere, Sjogren Abs, Anticardiolipin Ab, Antiglycoprotein - pending results    RADIOLOGY & ADDITIONAL TESTS:  None new today     Imaging Personally Reviewed:  [x] YES  [ ] NO    Consultant(s) Notes Reviewed:  [x] YES  [ ] NO - Rheum, ID, Pulm, Nephro    Care Discussed with Consultants/Other Providers [x] YES  [ ] NO - Interventional Radiology Patient is a 84y old  Female who presents with a chief complaint of MICU transfer - hypoxic respiratory failure (18 Aug 2019 12:49)    INTERVAL HPI/OVERNIGHT EVENTS:  Patient with no acute events overnight. Patient states she has no complaints and son at bedside. Patient denies feeling short of breath, no chest pain, no fevers, no new rashes, is eating and drinking normally, having normal urination, normal bowel movements, and denies any pain or new edema in upper or lower extremities.     T(C): 36.6 (08-19-19 @ 06:04), Max: 36.7 (08-18-19 @ 10:36)  HR: 90 (08-19-19 @ 06:04) (75 - 92)  BP: 147/88 (08-19-19 @ 06:04) (109/75 - 147/88)  RR: 16 (08-19-19 @ 06:04) (14 - 18)  SpO2: 100% (08-19-19 @ 06:04) (94% - 100%)    18 Aug 2019 07:01  -  19 Aug 2019 07:00  --------------------------------------------------------  IN: 0 mL / OUT: 1550 mL / NET: -1550 mL    PHYSICAL EXAM:  GENERAL: NAD, well-developed  HEAD:  Atraumatic, Normocephalic  EYES: EOMI, conjunctiva and sclera clear  NECK: Supple, No JVD  CHEST/LUNG: (+) a few crackles bilaterally; No wheeze and in no respiratory distress  HEART: Irregular at 70's; No murmurs, rubs, or gallops  ABDOMEN: Soft, Nontender, Nondistended; Bowel sounds present  EXTREMITIES:  2+ Peripheral Pulses, No clubbing, cyanosis, or edema  PSYCH: AAOx3  NEUROLOGY: non-focal  SKIN: No rashes or lesions    PAST MEDICAL & SURGICAL HISTORY:  COPD (chronic obstructive pulmonary disease)  Peripheral vascular disease  Pulmonary hypertension  Rheumatoid arthritis  Osteoarthritis  Mitral regurgitation  H/O lymphoma  Hyperlipidemia  Chronic GERD  Chronic kidney disease: proteinuria  AF (atrial fibrillation)  Anemia in CKD (chronic kidney disease)  CHF (congestive heart failure)  HTN (hypertension)  History of total hip replacement, left  History of total knee replacement, bilateral    MEDICATIONS  (STANDING):  atorvastatin 10 milliGRAM(s) Oral at bedtime  cloNIDine 0.2 milliGRAM(s) Oral three times a day  dextrose 5%. 1000 milliLiter(s) (50 mL/Hr) IV Continuous <Continuous>  dextrose 50% Injectable 12.5 Gram(s) IV Push once  dextrose 50% Injectable 25 Gram(s) IV Push once  dextrose 50% Injectable 25 Gram(s) IV Push once  furosemide   Injectable 40 milliGRAM(s) IV Push once  heparin  Injectable 5000 Unit(s) SubCutaneous every 12 hours  insulin glargine Injectable (LANTUS) 12 Unit(s) SubCutaneous at bedtime  insulin lispro (HumaLOG) corrective regimen sliding scale   SubCutaneous at bedtime  insulin lispro (HumaLOG) corrective regimen sliding scale   SubCutaneous three times a day before meals  insulin lispro Injectable (HumaLOG) 3 Unit(s) SubCutaneous before breakfast  magnesium oxide 400 milliGRAM(s) Oral three times a day with meals  methylPREDNISolone sodium succinate Injectable 70 milliGRAM(s) IV Push daily  metoprolol tartrate 50 milliGRAM(s) Oral two times a day  montelukast 10 milliGRAM(s) Oral daily  pantoprazole    Tablet 40 milliGRAM(s) Oral before breakfast  sertraline 50 milliGRAM(s) Oral daily    MEDICATIONS  (PRN):  ALBUTerol    90 MICROgram(s) HFA Inhaler 2 Puff(s) Inhalation every 6 hours PRN Shortness of Breath and/or Wheezing  ALPRAZolam 0.5 milliGRAM(s) Oral two times a day PRN Anxiety and Insomnia  dextrose 40% Gel 15 Gram(s) Oral once PRN Blood Glucose LESS THAN 70 milliGRAM(s)/deciliter  glucagon  Injectable 1 milliGRAM(s) IntraMuscular once PRN Glucose LESS THAN 70 milligrams/deciliter    REVIEW OF SYSTEMS:  ROS: Denies fevers, chills, CP, Abdominal pain, rhinorrhea, new rashes, new LE edema, new visual changes, confusion, headaches, blood in stools, N/V, dysuria, and hematuria.     LABS:                        9.3    22.24 )-----------( 299      ( 19 Aug 2019 05:39 )             30.7     08-19    141  |  100  |  52<H>  ----------------------------<  72  3.7   |  31  |  1.06    Ca    9.2      19 Aug 2019 05:39  Phos  3.3     08-19  Mg     1.8     08-19    LIVER FUNCTIONS - ( 15 Aug 2019 06:45 )  Alb: 3.5 g/dL / Pro: 6.9 g/dL / ALK PHOS: 68 u/L / ALT: 33 u/L / AST: 23 u/L / GGT: x     / T. Bili 1.0 mg/dL / D. 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  RVP and FLU negatvie  HIV negative  Mucoplasma IgG and IgM negative  Crypto Ag negative  Strep pneumo Ag negative  Histoplama Antigen negative  QuantGold Pending result    Rheum:  +IVAN  +p-anca  -c-anca  Myeloperoxidase Ab +  GBM Ab Negative  C3 - 76 (L)  C4 - 10 (L)  Proteinase 3 Ab negative  DsDNA - 435 (H)  Protein electrop[haresis - weak Cameron-migrating paraprotein   SPEP - negative  UPEP - negative  Immunofixation - SMALL MONOCLONAL IGM LAMBDA AND POLYCLONAL PROTEIN  Anti Histone, Anti Centromere, Sjogren Abs, Anticardiolipin Ab, Antiglycoprotein - pending results    RADIOLOGY & ADDITIONAL TESTS:  None new today     Imaging Personally Reviewed:  [x] YES  [ ] NO    Consultant(s) Notes Reviewed:  [x] YES  [ ] NO - Rheum, ID, Pulm, Nephro    Care Discussed with Consultants/Other Providers [x] YES  [ ] NO - Interventional Radiology negative...

## 2019-08-19 NOTE — PROGRESS NOTE ADULT - PROBLEM SELECTOR PLAN 1
?Resolved but was likely due to pulm edema as she appears and feels improved s/p lasix  - mild pulm edema today on exam and will give IV lasix 40mg x 1 to optimize for tomorrows surgery  - c/w Xanax PRN  - continue to monitor, Lasix PRN for SOB  - Will follow-up any further pulm recs today mild pulm edema today on exam  -will give IV lasix 40mg x 1 to optimize for tomorrows surgery  - continue to monitor o2 sat and resp status   - Will follow-up any further pulm recs today

## 2019-08-19 NOTE — PROGRESS NOTE ADULT - PROBLEM SELECTOR PLAN 2
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 either immune complex disease such as lupus vs vasculitis such as p-ANCA with good response to IV steroids and improved O2 requirements  - c/w IV methylpred 70mg daily  - +DsDNA - possible hydralazine mediated immune-complex disease vs vasculitis  - Continue to observe off Doxy per ID as RMSF Ab likely false positive as patient with no risk factors and no travel or outdoor exposure  - Will need PCP PPx pt on high dose steroids and will clarify with ID today as will likely need Bactrim DS 3 x week  - AVOID Hydralazine  -  s/p 2U FFP and 2.5mg Vit K, now INR 1.17  - Spoke to Interventional radiology this morning and will plan for renal biopsy tomorrow  - Appreciated renal, pulm, rheum, and ID recs 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 either immune complex disease such as lupus vs vasculitis such as p-ANCA with good response to IV steroids and improved O2 requirements  - c/w IV methylpred 70mg daily  - +DsDNA - possible hydralazine mediated immune-complex disease vs vasculitis  - Continue to observe off Doxy per ID as RMSF Ab likely false positive as patient with no risk factors and no travel or outdoor exposure  - Will need PCP PPx pt on high dose steroids and will clarify with ID today as will likely need Bactrim DS 3 x week  - AVOID Hydralazine  -  s/p 2U FFP and 2.5mg Vit K, now INR 1.17  - Spoke to Interventional radiology this morning and will plan for renal biopsy tomorrow  - Appreciated renal, pulm, rheum, and ID recs

## 2019-08-19 NOTE — PROGRESS NOTE ADULT - PROBLEM SELECTOR PLAN 1
Patient with elevated serum creatinine of 2.03 on 8/10/19 baseline 0.5.  Now currently improved. She presents with purpuric rash on admission which improved after giving IV steroids. Renal function and rash seem to be improving with steroid therapy.  Serologies with low C3/C4, Positive p-ANCA, negative ANCA elevated ESR/CRP. Patient with positive IVAN and anti-dsDNA on 6/23/2018  and again elevated on this hospital stay in the setting of chronic Hydralazine use. She has 5gm of proteinuria. 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  AVOID Hydralazine. Avoid giving other nephrotoxic medications such as ACE-I/ARBS and MONTEMAYOR. Continue to trend renal function. Planned for renal biopsy. Would need BP <160mmHg and INR controlled prior to biopsy due to increased bleeding risk. Agree with continuing tapered dose steroids

## 2019-08-19 NOTE — PROGRESS NOTE ADULT - PROBLEM SELECTOR PLAN 5
- Likely in the setting of chronic disease but difficult to interpret iron studies given done after PRBC transfusions   - No active bleeding  - Transfuse if Hgb<7 Likely in the setting of chronic disease but difficult to interpret iron studies given done after PRBC transfusions   - No active bleeding  - Transfuse if Hgb<7

## 2019-08-19 NOTE — PROGRESS NOTE ADULT - ASSESSMENT
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  Improving SOB, decreasing oxygen requirements while on steroids and duiretics.  Also found to have LE purpuric rash, anemia, KANIKA, proteinuria, consolidations on chest imaging. s/p renal bx on 8/19/19.  Serologies + include P-ANCA, MPO, IVAN, hypocomplementemia, prior positive dsDNA, beta-2 glycoprotein IgA, ACl IgM.    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    Pt positive for APLS serologies in past, which can lead to pulm hemorrhage TMA in kidneys, but skin lesions typically more distal and not purpuric in nature  SLE or AAV or cryoglobulinemia should also be considered, but unusual for these conditions to cause multiple ab positivity; not unusual in drug induced vasculitis/lupus or paraneoplastic syndrome    - c/w solumedrol 1mg/kg 70mg daily (8/16/19 -)  - f/u remaining serologies (cryo, quantiferon)  - f/U renal bx results ; to help differentiate between drug induced SLE vs drug induced vasculitis  - heme f/u given hx of lymphoma  - vitamin D deficiency - please start ergocalciferol 50,000 units qwkly    Wendy Pacheco MD  Rheumatology Fellow, PGY4    s/d/w Dr. 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  Improving SOB, decreasing oxygen requirements while on steroids and duiretics.  Also found to have LE purpuric rash, anemia, KANIKA, proteinuria, consolidations on chest imaging. s/p renal bx on 8/19/19.  Serologies + include P-ANCA, MPO, IVAN, hypocomplementemia, prior positive dsDNA, beta-2 glycoprotein IgA, ACl IgM.    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    Pt positive for APLS serologies in past, which can lead to pulm hemorrhage TMA in kidneys, but skin lesions typically more distal and not purpuric in nature  SLE or AAV or cryoglobulinemia should also be considered, but unusual for these conditions to cause multiple ab positivity; not unusual in drug induced vasculitis/lupus or paraneoplastic syndrome    - c/w solumedrol 1mg/kg 70mg daily (8/16/19 -)   - please start atovaquone for pcp ppx while on high dose steroids   - f/u remaining serologies (cryo, quantiferon)  - to have renal bx tomorrow, f/u results to help differentiate between drug induced SLE vs drug induced vasculitis -- will determine treatment pending results   - please obtain heme consult given hx of lymphoma and initial high WBC to evaluate for possible paraneoplastic vasculitis   - vitamin D deficiency - please start ergocalciferol 50,000 units qwkly    Wendy Pacheco MD  Rheumatology Fellow, PGY4    s/d/w Dr. Diaz

## 2019-08-19 NOTE — PROGRESS NOTE ADULT - PROBLEM SELECTOR PLAN 8
- Will c/w HSQ today and stop at Midnight in anticipation for renal Bx  - NPO after Midnight for renal Bx  - PT = home with home PT once medically optimized  - Encouraged out of bed to chair and increased ambulation to prevent deconditioning  - Discussed management plans with Son at bedside at length about diagnosis, course of treatment, testing (such as biopsies), and further treatment after biopsies.     -Sherif Nicole PGY5 [facility] Pager#[phone] - Will c/w HSQ today and stop at Midnight in anticipation for renal Bx  - NPO after Midnight for renal Bx  - PT rec home with home PT once medically optimized.   - Encouraged out of bed to chair and increased ambulation to prevent deconditioning  - Discussed management plans with Son at bedside at length about diagnosis, course of treatment, testing (such as biopsies), and further treatment after biopsies.     -Sherif Nicole PGY5 [facility] Pager#[phone]

## 2019-08-20 NOTE — CONSULT NOTE ADULT - SUBJECTIVE AND OBJECTIVE BOX
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 Hematology was consulted at [facility] and patient was seen by Dr. Levin     CT chest showing diffuse opacities, possibly due to multifocal PNA. Started on Doxy and [place]. There, also noted to have Hg of 6 s/p 3 units prbc as well as supratherapeutic INR 6.28 s/p 1uFFP & vitamin K, now reversed. Also found to have KANIKA (baseline 0.5).  Of note, patient had h/o elevated IVAN and dsDNA without SLE symptoms last year. Given concern for possible vasculitis with KANIKA, rash, elevated ESR/CRP, started on Solumedrol 500mg q12, started on 8/12. Patient was transferred to [facility] for further management.      PAST MEDICAL & SURGICAL HISTORY:  COPD (chronic obstructive pulmonary disease)  Peripheral vascular disease  Pulmonary hypertension  Rheumatoid arthritis  Osteoarthritis  Mitral regurgitation  H/O lymphoma  Hyperlipidemia  Chronic GERD  Chronic kidney disease: proteinuria  AF (atrial fibrillation)  Anemia in CKD (chronic kidney disease)  CHF (congestive heart failure)  HTN (hypertension)  History of total hip replacement, left  History of total knee replacement, bilateral      Allergies    Keflex (Unknown)  penicillin (Rash)    Intolerances        MEDICATIONS  (STANDING):  atorvastatin 10 milliGRAM(s) Oral at bedtime  cloNIDine 0.1 milliGRAM(s) Oral three times a day  dextrose 5%. 1000 milliLiter(s) (50 mL/Hr) IV Continuous <Continuous>  dextrose 50% Injectable 12.5 Gram(s) IV Push once  dextrose 50% Injectable 25 Gram(s) IV Push once  dextrose 50% Injectable 25 Gram(s) IV Push once  furosemide    Tablet 20 milliGRAM(s) Oral two times a day  heparin  Injectable 5000 Unit(s) SubCutaneous every 12 hours  insulin glargine Injectable (LANTUS) 12 Unit(s) SubCutaneous at bedtime  insulin lispro (HumaLOG) corrective regimen sliding scale   SubCutaneous every 6 hours  insulin lispro (HumaLOG) corrective regimen sliding scale   SubCutaneous at bedtime  insulin lispro Injectable (HumaLOG) 6 Unit(s) SubCutaneous three times a day before meals  methylPREDNISolone sodium succinate Injectable 70 milliGRAM(s) IV Push daily  metoprolol tartrate 50 milliGRAM(s) Oral two times a day  montelukast 10 milliGRAM(s) Oral daily  pantoprazole    Tablet 40 milliGRAM(s) Oral before breakfast  sertraline 25 milliGRAM(s) Oral daily  trimethoprim  160 mG/sulfamethoxazole 800 mG 1 Tablet(s) Oral <User Schedule>  warfarin 5 milliGRAM(s) Oral once    MEDICATIONS  (PRN):  ALBUTerol    90 MICROgram(s) HFA Inhaler 2 Puff(s) Inhalation every 6 hours PRN Shortness of Breath and/or Wheezing  ALPRAZolam 0.5 milliGRAM(s) Oral two times a day PRN Anxiety and Insomnia  dextrose 40% Gel 15 Gram(s) Oral once PRN Blood Glucose LESS THAN 70 milliGRAM(s)/deciliter  glucagon  Injectable 1 milliGRAM(s) IntraMuscular once PRN Glucose LESS THAN 70 milligrams/deciliter      FAMILY HISTORY:  Family history of inclusion body myositis      SOCIAL HISTORY: No EtOH, no tobacco    REVIEW OF SYSTEMS:    CONSTITUTIONAL: No weakness, fevers or chills  EYES/ENT: No visual changes;  No vertigo or throat pain   NECK: No pain or stiffness  RESPIRATORY: No cough, wheezing, hemoptysis; No shortness of breath  CARDIOVASCULAR: No chest pain or palpitations  GASTROINTESTINAL: No abdominal or epigastric pain. No nausea, vomiting, or hematemesis; No diarrhea or constipation. No melena or hematochezia.  GENITOURINARY: No dysuria, frequency or hematuria  NEUROLOGICAL: No numbness or weakness  SKIN: No itching, burning, rashes, or lesions   All other review of systems is negative unless indicated above.        T(F): 97.6 (08-20-19 @ 14:14), Max: 98.4 (08-19-19 @ 17:40)  HR: 84 (08-20-19 @ 14:14)  BP: 105/72 (08-20-19 @ 14:14)  RR: 17 (08-20-19 @ 14:14)  SpO2: 96% (08-20-19 @ 14:14)  Wt(kg): --    GENERAL: NAD, well-developed  HEAD:  Atraumatic, Normocephalic  EYES: EOMI, PERRLA, conjunctiva and sclera clear  NECK: Supple, No JVD  CHEST/LUNG: Clear to auscultation bilaterally; No wheeze  HEART: Regular rate and rhythm; No murmurs, rubs, or gallops  ABDOMEN: Soft, Nontender, Nondistended; Bowel sounds present  EXTREMITIES:  2+ Peripheral Pulses, No clubbing, cyanosis, or edema  NEUROLOGY: non-focal  SKIN: No rashes or lesions                          9.1    20.80 )-----------( 317      ( 20 Aug 2019 06:00 )             30.9       08-20    139  |  96<L>  |  44<H>  ----------------------------<  77  3.6   |  34<H>  |  0.84    Ca    8.9      20 Aug 2019 06:00  Phos  3.6     08-20  Mg     1.7     08-20    TPro  5.7<L>  /  Alb  2.8<L>  /  TBili  0.5  /  DBili  x   /  AST  12  /  ALT  18  /  AlkPhos  61  08-20      Phosphorus Level, Serum: 3.6 mg/dL (08-20 @ 06:00)  Magnesium, Serum: 1.7 mg/dL (08-20 @ 06:00)  Phosphorus Level, Serum: 3.6 mg/dL (08-20 @ 06:00)  Magnesium, Serum: 1.7 mg/dL (08-20 @ 06:00)      PT/INR - ( 20 Aug 2019 06:00 )   PT: 12.4 SEC;   INR: 1.08              .Blood Blood-Peripheral  08-10 @ 12:00   No growth at 5 days.  --  --

## 2019-08-20 NOTE — PROGRESS NOTE ADULT - PROBLEM SELECTOR PLAN 6
- C/W metoprolol  - Currently subtherapeutic in prep for renal Bx  - Will hold in anticipation of renal Bx and risk of CVA with Afib in 4 days low Likely in the setting of chronic disease but difficult to interpret iron studies given done after PRBC transfusions   - No active bleeding  - Transfuse if Hgb<7

## 2019-08-20 NOTE — CHART NOTE - NSCHARTNOTEFT_GEN_A_CORE
Spoke w/ Dr Ordazf (ID) re PCP prophylaxis recommend bactrim DS 3 x week M/W/F, will place the order.   Also spoke with heme->will see the pt in AM, no objection to dose Coumadin. Pt will need abd CT w/c will d/w renal if no objection for contrast   ADSNP [id number]

## 2019-08-20 NOTE — PROGRESS NOTE ADULT - PROBLEM SELECTOR PLAN 5
Likely in the setting of chronic disease but difficult to interpret iron studies given done after PRBC transfusions   - No active bleeding  - Transfuse if Hgb<7 Likely in the setting of steroids. A1c 5.6 on 8/13  -will increase premal to 6U qac  -will c/w lantus 12 Uphs for now  as am FS at goal

## 2019-08-20 NOTE — PROGRESS NOTE ADULT - SUBJECTIVE AND OBJECTIVE BOX
[facility]  [id number]    INTERVAL HPI/OVERNIGHT EVENTS:    Patient seen and examined at bedside. Eating without wearing oxygen, reports some improvement with SOB. Denies CP, palpitations, rash, dysuria.  s/p Kidney biopsy today, awaiting results.    MEDICATIONS  (STANDING):  atorvastatin 10 milliGRAM(s) Oral at bedtime  cloNIDine 0.1 milliGRAM(s) Oral three times a day  dextrose 5%. 1000 milliLiter(s) (50 mL/Hr) IV Continuous <Continuous>  dextrose 50% Injectable 12.5 Gram(s) IV Push once  dextrose 50% Injectable 25 Gram(s) IV Push once  dextrose 50% Injectable 25 Gram(s) IV Push once  furosemide    Tablet 20 milliGRAM(s) Oral two times a day  heparin  Injectable 5000 Unit(s) SubCutaneous every 12 hours  insulin glargine Injectable (LANTUS) 12 Unit(s) SubCutaneous at bedtime  insulin lispro (HumaLOG) corrective regimen sliding scale   SubCutaneous every 6 hours  insulin lispro (HumaLOG) corrective regimen sliding scale   SubCutaneous at bedtime  insulin lispro Injectable (HumaLOG) 6 Unit(s) SubCutaneous three times a day before meals  methylPREDNISolone sodium succinate Injectable 70 milliGRAM(s) IV Push daily  metoprolol tartrate 50 milliGRAM(s) Oral two times a day  montelukast 10 milliGRAM(s) Oral daily  pantoprazole    Tablet 40 milliGRAM(s) Oral before breakfast  sertraline 25 milliGRAM(s) Oral daily  trimethoprim  160 mG/sulfamethoxazole 800 mG 1 Tablet(s) Oral <User Schedule>  warfarin 5 milliGRAM(s) Oral once    MEDICATIONS  (PRN):  ALBUTerol    90 MICROgram(s) HFA Inhaler 2 Puff(s) Inhalation every 6 hours PRN Shortness of Breath and/or Wheezing  ALPRAZolam 0.5 milliGRAM(s) Oral two times a day PRN Anxiety and Insomnia  dextrose 40% Gel 15 Gram(s) Oral once PRN Blood Glucose LESS THAN 70 milliGRAM(s)/deciliter  glucagon  Injectable 1 milliGRAM(s) IntraMuscular once PRN Glucose LESS THAN 70 milligrams/deciliter    Allergies  Keflex (Unknown)  penicillin (Rash)  Intolerances    Vital Signs Last 24 Hrs  T(C): 36.4 (20 Aug 2019 14:14), Max: 36.9 (19 Aug 2019 17:40)  T(F): 97.6 (20 Aug 2019 14:14), Max: 98.4 (19 Aug 2019 17:40)  HR: 84 (20 Aug 2019 14:14) (71 - 90)  BP: 105/72 (20 Aug 2019 14:14) (99/77 - 143/91)  BP(mean): --  RR: 17 (20 Aug 2019 14:14) (17 - 20)  SpO2: 96% (20 Aug 2019 14:14) (95% - 100%)    Physical Exam:  General: NAD. Wearing NC  HEENT: EOMI, MMM  Cardio: +S1/S2, RRR  Resp: bibasilar crackles, no wheezing  GI: +BS, soft, NT/ND  MSK: no synovitis  Skin: no rash    LABS:                        9.1    20.80 )-----------( 317      ( 20 Aug 2019 06:00 )             30.9     08-20    139  |  96<L>  |  44<H>  ----------------------------<  77  3.6   |  34<H>  |  0.84    Ca    8.9      20 Aug 2019 06:00  Phos  3.6     08-20  Mg     1.7     08-20    TPro  5.7<L>  /  Alb  2.8<L>  /  TBili  0.5  /  DBili  x   /  AST  12  /  ALT  18  /  AlkPhos  61  08-20    PT/INR - ( 20 Aug 2019 06:00 )   PT: 12.4 SEC;   INR: 1.08         RADIOLOGY & ADDITIONAL TESTS:

## 2019-08-20 NOTE — PROGRESS NOTE ADULT - PROBLEM SELECTOR PLAN 1
- Continue solumedrol 1mg/kg 70mg daily   - f/u remaining serologies  - IR renal biopsy today  - Maintain SpO2 >92%, titrate down O2 as possible  - Given prior improvement with lasix and history of Grade III diastolic dysfunction, would trial Lasix 20mg PO daily    Clovis Thomason MD  Pulmonary & Critical Care Fellow  [phone]

## 2019-08-20 NOTE — PROGRESS NOTE ADULT - SUBJECTIVE AND OBJECTIVE BOX
[facility] DIVISION OF KIDNEY DISEASES AND HYPERTENSION -- FOLLOW UP NOTE  --------------------------------------------------------------------------------    Chief Complaint: s/p renal biopsy. less tachypnea           PAST HISTORY  --------------------------------------------------------------------------------  No significant changes to PMH, PSH, FHx, SHx, unless otherwise noted    ALLERGIES & MEDICATIONS  --------------------------------------------------------------------------------  Allergies    Keflex (Unknown)  penicillin (Rash)    Intolerances      Standing Inpatient Medications  atorvastatin 10 milliGRAM(s) Oral at bedtime  cloNIDine 0.2 milliGRAM(s) Oral three times a day  dextrose 5%. 1000 milliLiter(s) IV Continuous <Continuous>  dextrose 50% Injectable 12.5 Gram(s) IV Push once  dextrose 50% Injectable 25 Gram(s) IV Push once  dextrose 50% Injectable 25 Gram(s) IV Push once  furosemide    Tablet 20 milliGRAM(s) Oral daily  heparin  Injectable 5000 Unit(s) SubCutaneous every 12 hours  insulin glargine Injectable (LANTUS) 12 Unit(s) SubCutaneous at bedtime  insulin lispro (HumaLOG) corrective regimen sliding scale   SubCutaneous every 6 hours  insulin lispro (HumaLOG) corrective regimen sliding scale   SubCutaneous at bedtime  insulin lispro Injectable (HumaLOG) 6 Unit(s) SubCutaneous three times a day before meals  methylPREDNISolone sodium succinate Injectable 70 milliGRAM(s) IV Push daily  metoprolol tartrate 50 milliGRAM(s) Oral two times a day  montelukast 10 milliGRAM(s) Oral daily  pantoprazole    Tablet 40 milliGRAM(s) Oral before breakfast  sertraline 25 milliGRAM(s) Oral daily  warfarin 5 milliGRAM(s) Oral once    PRN Inpatient Medications  ALBUTerol    90 MICROgram(s) HFA Inhaler 2 Puff(s) Inhalation every 6 hours PRN  ALPRAZolam 0.5 milliGRAM(s) Oral two times a day PRN  dextrose 40% Gel 15 Gram(s) Oral once PRN  glucagon  Injectable 1 milliGRAM(s) IntraMuscular once PRN      REVIEW OF SYSTEMS  --------------------------------------------------------------------------------  Gen: + weakness  Skin: improving  rashes  Head/Eyes/Ears/Mouth: No headache  Respiratory: No dyspnea,   CV: No chest pain,     + edema  Neuro:  no seizures,  Psych: No anxiety  All other systems were reviewed and are negative, except as noted.        VITALS/PHYSICAL EXAM  --------------------------------------------------------------------------------  T(C): 36.4 (08-20-19 @ 14:14), Max: 36.9 (08-19-19 @ 17:40)  HR: 84 (08-20-19 @ 14:14) (71 - 90)  BP: 105/72 (08-20-19 @ 14:14) (99/77 - 143/91)  RR: 17 (08-20-19 @ 14:14) (17 - 20)  SpO2: 96% (08-20-19 @ 14:14) (95% - 100%)  Wt(kg): --        08-19-19 @ 07:01  -  08-20-19 @ 07:00  --------------------------------------------------------  IN: 430 mL / OUT: 1450 mL / NET: -1020 mL    08-20-19 @ 07:01  -  08-20-19 @ 15:10  --------------------------------------------------------  IN: 0 mL / OUT: 0 mL / NET: 0 mL      Physical Exam:          Gen: NAD, thin   	HEENT: supple neck, NO JVD   	Pulm: decreased BS at bases               CV: RRR, S1S2; no rub  	Abd: +BS, soft, nontender/nondistended, Back :  Left back  dressing, no tenderness at Biopsy site   	UE:  trace edema;   	LE: 2+ edema  	Neuro: No focal deficits,   	Psych: Normal affect and mood          	>>> <<<    LABS/STUDIES  --------------------------------------------------------------------------------              9.1    20.80 >-----------<  317      [08-20-19 @ 06:00]              30.9     139  |  96  |  44  ----------------------------<  77      [08-20-19 @ 06:00]  3.6   |  34  |  0.84        Ca     8.9     [08-20-19 @ 06:00]      Mg     1.7     [08-20-19 @ 06:00]      Phos  3.6     [08-20-19 @ 06:00]    TPro  5.7  /  Alb  2.8  /  TBili  0.5  /  DBili  x   /  AST  12  /  ALT  18  /  AlkPhos  61  [08-20-19 @ 06:00]    PT/INR: PT 12.4 , INR 1.08       [08-20-19 @ 06:00]      Creatinine Trend:  SCr 0.84 [08-20 @ 06:00]  SCr 1.06 [08-19 @ 05:39]  SCr 0.94 [08-18 @ 06:41]  SCr 0.93 [08-17 @ 06:00]  SCr 1.11 [08-16 @ 06:50]    Sodium trend:    Urinalysis - [08-14-19 @ 19:32]      Color YELLOW / Appearance CLEAR / SG 1.019 / pH 6.0      Gluc 50 / Ketone NEGATIVE  / Bili NEGATIVE / Urobili NORMAL       Blood MODERATE / Protein 200 / Leuk Est NEGATIVE / Nitrite NEGATIVE      RBC >50 / WBC 11-25 / Hyaline NEGATIVE / Gran  / Sq Epi OCC / Non Sq Epi  / Bacteria NEGATIVE    Urine Creatinine 35.60      [08-14-19 @ 19:30]  Urine Protein 201.6      [08-14-19 @ 19:30]    Iron 25, TIBC 302, %sat 8      [08-13-19 @ 05:30]  Ferritin 687      [08-13-19 @ 05:30]  Vitamin D (25OH) 23.3      [08-16-19 @ 06:50]  HbA1c 5.6      [08-13-19 @ 05:30]    HBsAg Nonreactive      [08-15-19 @ 06:45]  HBcAb Reactive      [08-15-19 @ 06:45]  HCV 0.28, Nonreactive Hepatitis C AB  S/CO Ratio                        Interpretation  < 1.00                                   Non-Reactive  1.00 - 4.99                         Weakly-Reactive  >= 5.00                                Reactive  Non-Reactive: [place] with a non-reactive HCV antibody  result is considered uninfected.  No further action is  needed unless recent infection is suspected.  In these  cases, consider repeat testing later to detect  seroconversion..  Weakly-Reactive: HCV antibody test is abnormal, HCV RNA  Qualitative test will follow.  Reactive: HCV antibody test is abnormal, HCV RNA  Qualitative test will follow.  Note: HCV antibody testing is performed on the [employer]   system.      [08-15-19 @ 06:45]  HIV Nonreact      [08-11-19 @ 16:15]    IVAN: titer 1:640, pattern Homogeneous      [08-11-19 @ 16:15]  dsDNA 435      [08-15-19 @ 06:45]  C3 Complement 76      [08-12-19 @ 11:00]  C4 Complement 10      [08-12-19 @ 11:00]  ANCA: cANCA Negative, pANCA >1:1280, atypical ANCA Negative      [08-11-19 @ 16:15]  MPO-ANCA 96.2, interpretation: Positive      [08-11-19 @ 16:15]  PR3-ANCA 5.1, interpretation: Negative      [08-11-19 @ 16:15]  anti-GBM <1.0      [08-11-19 @ 16:15]  Immunofixation Serum:   Weak IgM Lambda Band Identified    Reference Range: None Detected      [08-13-19 @ 05:30]  SPEP Interpretation: Weak Gamma-Migrating Paraprotein Identified      [08-13-19 @ 05:30]

## 2019-08-20 NOTE — PROGRESS NOTE ADULT - PROBLEM SELECTOR PLAN 1
Patient with elevated serum creatinine of 2.03 on 8/10/19 baseline 0.5.  Now currently improved. She presents with purpuric rash on admission which improved after giving IV steroids. Renal function and rash seem to be improving with steroid therapy.  Serologies with low C3/C4, Positive p-ANCA, negative ANCA elevated ESR/CRP. Patient with positive IVAN and anti-dsDNA on 6/23/2018  and again elevated on this hospital stay in the setting of chronic Hydralazine use. She has 5gm of proteinuria. 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  AVOID Hydralazine. Avoid giving other nephrotoxic medications such as ACE-I/ARBS and MONTEMAYOR. Continue to trend renal function. s/p  renal biopsy.

## 2019-08-20 NOTE — PROGRESS NOTE ADULT - SUBJECTIVE AND OBJECTIVE BOX
CHIEF COMPLAINT:    Interval Events: No acute events overnight. Stable respiratory status. Patient about to be taken for IR renal biopsy.    REVIEW OF SYSTEMS:  Constitutional: [ ] negative [ ] fevers [ ] chills [ ] weight loss [ ] weight gain  HEENT: [ ] negative [ ] dry eyes [ ] eye irritation [ ] postnasal drip [ ] nasal congestion  CV: [ ] negative  [ ] chest pain [ ] orthopnea [ ] palpitations [ ] murmur  Resp: [ ] negative [ ] cough [ ] shortness of breath [ ] dyspnea [ ] wheezing [ ] sputum [ ] hemoptysis  GI: [ ] negative [ ] nausea [ ] vomiting [ ] diarrhea [ ] constipation [ ] abd pain [ ] dysphagia   : [ ] negative [ ] dysuria [ ] nocturia [ ] hematuria [ ] increased urinary frequency  Musculoskeletal: [ ] negative [ ] back pain [ ] myalgias [ ] arthralgias [ ] fracture  Skin: [ ] negative [ ] rash [ ] itch  Neurological: [ ] negative [ ] headache [ ] dizziness [ ] syncope [ ] weakness [ ] numbness  Psychiatric: [ ] negative [ ] anxiety [ ] depression  Endocrine: [ ] negative [ ] diabetes [ ] thyroid problem  Hematologic/Lymphatic: [ ] negative [ ] anemia [ ] bleeding problem  Allergic/Immunologic: [ ] negative [ ] itchy eyes [ ] nasal discharge [ ] hives [ ] angioedema  [X] All other systems negative  [ ] Unable to assess ROS because ________    OBJECTIVE:  ICU Vital Signs Last 24 Hrs  T(C): 36.6 (20 Aug 2019 06:55), Max: 36.9 (19 Aug 2019 17:40)  T(F): 97.8 (20 Aug 2019 06:55), Max: 98.4 (19 Aug 2019 17:40)  HR: 72 (20 Aug 2019 06:55) (71 - 90)  BP: 136/89 (20 Aug 2019 06:55) (112/82 - 143/91)  BP(mean): --  ABP: --  ABP(mean): --  RR: 18 (20 Aug 2019 06:55) (17 - 20)  SpO2: 100% (20 Aug 2019 06:55) (94% - 100%)        08-19 @ 07:01  -  08-20 @ 07:00  --------------------------------------------------------  IN: 430 mL / OUT: 1450 mL / NET: -1020 mL    08-20 @ 07:01  - 08-20 @ 08:53  --------------------------------------------------------  IN: 0 mL / OUT: 0 mL / NET: 0 mL      CAPILLARY BLOOD GLUCOSE      POCT Blood Glucose.: 96 mg/dL (20 Aug 2019 06:32)      PHYSICAL EXAM:  General: Awake, alert, NAD  Pulm: Mild inspiratory crackles at bases   CV: S1, S1 RRR. No m/r/g.   Abdomen: Soft. NTND. BS+  Extremities: Improved non blanching macular rash on shins. No peripheral edema  Neuro: non focal exam  Psych: normal affect    HOSPITAL MEDICATIONS:  MEDICATIONS  (STANDING):  atorvastatin 10 milliGRAM(s) Oral at bedtime  cloNIDine 0.2 milliGRAM(s) Oral three times a day  dextrose 5%. 1000 milliLiter(s) (50 mL/Hr) IV Continuous <Continuous>  dextrose 50% Injectable 12.5 Gram(s) IV Push once  dextrose 50% Injectable 25 Gram(s) IV Push once  dextrose 50% Injectable 25 Gram(s) IV Push once  insulin glargine Injectable (LANTUS) 12 Unit(s) SubCutaneous at bedtime  insulin lispro (HumaLOG) corrective regimen sliding scale   SubCutaneous every 6 hours  insulin lispro (HumaLOG) corrective regimen sliding scale   SubCutaneous at bedtime  insulin lispro Injectable (HumaLOG) 3 Unit(s) SubCutaneous before breakfast  methylPREDNISolone sodium succinate Injectable 70 milliGRAM(s) IV Push daily  metoprolol tartrate 50 milliGRAM(s) Oral two times a day  montelukast 10 milliGRAM(s) Oral daily  pantoprazole    Tablet 40 milliGRAM(s) Oral before breakfast  sertraline 50 milliGRAM(s) Oral daily    MEDICATIONS  (PRN):  ALBUTerol    90 MICROgram(s) HFA Inhaler 2 Puff(s) Inhalation every 6 hours PRN Shortness of Breath and/or Wheezing  ALPRAZolam 0.5 milliGRAM(s) Oral two times a day PRN Anxiety and Insomnia  dextrose 40% Gel 15 Gram(s) Oral once PRN Blood Glucose LESS THAN 70 milliGRAM(s)/deciliter  glucagon  Injectable 1 milliGRAM(s) IntraMuscular once PRN Glucose LESS THAN 70 milligrams/deciliter      LABS:                        9.1    20.80 )-----------( 317      ( 20 Aug 2019 06:00 )             30.9     Hgb Trend: 9.1<--, 9.3<--, 9.2<--, 10.2<--, 8.5<--  08-20    139  |  96<L>  |  44<H>  ----------------------------<  77  3.6   |  34<H>  |  0.84    Ca    8.9      20 Aug 2019 06:00  Phos  3.6     08-20  Mg     1.7     08-20    TPro  5.7<L>  /  Alb  2.8<L>  /  TBili  0.5  /  DBili  x   /  AST  12  /  ALT  18  /  AlkPhos  61  08-20    Creatinine Trend: 0.84<--, 1.06<--, 0.94<--, 0.93<--, 1.11<--, 1.16<--  PT/INR - ( 20 Aug 2019 06:00 )   PT: 12.4 SEC;   INR: 1.08                    MICROBIOLOGY:       RADIOLOGY:  [ ] Reviewed and interpreted by me    PULMONARY FUNCTION TESTS:    EKG:

## 2019-08-20 NOTE — PROGRESS NOTE ADULT - ASSESSMENT
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

## 2019-08-20 NOTE — PROGRESS NOTE ADULT - PROBLEM SELECTOR PLAN 1
- Resolved  - will start 20mg PO lasix daily per Renal  - Continue to monitor o2 sat and resp status but will d/c NC as patient 100% on RA  - Will continue to follow-up with pulm recs daily - Improved  - will start 20mg PO lasix daily per Renal  - Continue to monitor o2 sat and resp status but will d/c NC as patient 100% on RA  - Will continue to follow-up with pulm recs daily

## 2019-08-20 NOTE — PROGRESS NOTE ADULT - PROBLEM SELECTOR PLAN 7
- C/W mechanical soft diet - C/W metoprolol  - Currently subtherapeutic in prep for renal Bx  - Will hold in anticipation of renal Bx and risk of CVA with Afib in 4 days low and resume after bx

## 2019-08-20 NOTE — PROGRESS NOTE ADULT - PROBLEM SELECTOR PLAN 2
Hypervolemia improved after giving lasix. This could be just pulmonary edema in the setting of steroid use : pulmonary hemorrhage( less likley)

## 2019-08-20 NOTE — PROGRESS NOTE ADULT - ASSESSMENT
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 To go for IR renal biopsy today.

## 2019-08-20 NOTE — CONSULT NOTE ADULT - ASSESSMENT
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 Levin after discharge

## 2019-08-20 NOTE — PROGRESS NOTE ADULT - PROBLEM SELECTOR PLAN 3
Improved and at baseline  - Discussed care with Rheum and other disease modifying treatment medications such as Cytoxan or Rituxan will be determined after biopsy and tissue diagnosis. - Will continue steroids for now as per rheum.  - Likely 2/2 rheumatological etiology such as hydralazine induced SLE, small vessel vasculitis, or other complex depositing glomerulopathies  - Will follow-up pending rheum serologies  - Avoid ACEis, ARBS, NSAIDs, and other nephrotoxic drugs  - Will renally dose all meds  - s/p renal Bx today  - Will hold off on heme consult as patient with initial leukocytosis in setting of acute stress which trended to normal (10k) but then increased again after giving high dose steroids for three days and now continuing to down trend  - Lower suspicion for paraneoplastic vasculitis at this time and will await renal Bx path prior and will readdress need for Heme C/S at that time

## 2019-08-20 NOTE — PROGRESS NOTE ADULT - PROBLEM SELECTOR PLAN 2
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 either immune complex disease such as lupus vs vasculitis such as p-ANCA with good response to IV steroids and improved O2 requirements  - c/w IV methylpred 70mg daily  - +DsDNA - possible hydralazine mediated immune-complex disease vs vasculitis  - Continue to observe off Doxy per ID as RMSF Ab likely false positive as patient with no risk factors and no travel or outdoor exposure  - Will start PCP prophylaxis today and will speak to ID about dosing as patient elderly and Bactrim with significant GI side effects as so not preferred agent at this time and would likely to start atovaquone (as per rheum recs also)  - AVOID Hydralazine  - F/U Renal Bx path  - Appreciated renal, pulm, rheum, and ID recs

## 2019-08-20 NOTE — PROGRESS NOTE ADULT - ASSESSMENT
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  Improving SOB, decreasing oxygen requirements while on steroids and duiretics.  Also found to have LE purpuric rash, anemia, KANIKA, proteinuria, consolidations on chest imaging. s/p renal bx on 8/19/19.  Serologies + include P-ANCA, MPO, IVAN, hypocomplementemia, prior positive dsDNA, beta-2 glycoprotein IgA, ACl IgM.    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    Pt positive for APLS serologies in past, which can lead to pulm hemorrhage TMA in kidneys, but skin lesions typically more distal and not purpuric in nature  SLE or AAV or cryoglobulinemia should also be considered, but unusual for these conditions to cause multiple ab positivity; not unusual in drug induced vasculitis/lupus or paraneoplastic syndrome    - c/w solumedrol 1mg/kg 70mg daily (8/16/19 -)   - c/w PCP and GI ppx  - f/u remaining serologies (cryo, quantiferon)  - f/u kidney biopsy - to help differentiate between drug induced SLE vs drug induced vasculitis -- will determine treatment pending results   - if kidney biopsy results do not yield a more definitive diagnosis, primary team to obtain heme consult given hx of lymphoma and initial high WBC to evaluate for possible paraneoplastic vasculitis     Wendy Pacheco MD  Rheumatology Fellow, PGY4    s/d/w Dr. 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  Improving SOB, decreasing oxygen requirements while on steroids and duiretics.  Also found to have LE purpuric rash, anemia, KANIKA, proteinuria, consolidations on chest imaging. s/p renal bx on 8/19/19.  Serologies + include P-ANCA, MPO, IVAN, hypocomplementemia, prior positive dsDNA, beta-2 glycoprotein IgA, ACl IgM.    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    Pt positive for APLS serologies in past, which can lead to pulm hemorrhage TMA in kidneys, but skin lesions typically more distal and not purpuric in nature  SLE or AAV or cryoglobulinemia should also be considered, but unusual for these conditions to cause multiple ab positivity; not unusual in drug induced vasculitis/lupus or paraneoplastic syndrome    - c/w solumedrol 1mg/kg 70mg daily (8/16/19 -)   - c/w PCP and GI ppx  - f/u remaining serologies (cryo, antiGCM, APLA, quantiferon)  - f/u kidney biopsy - to help differentiate between drug induced SLE vs drug induced vasculitis -- will determine treatment pending results   - if kidney biopsy results do not yield a more definitive diagnosis, primary team to obtain heme consult given hx of lymphoma and initial high WBC to evaluate for possible paraneoplastic vasculitis     Wendy Pacheco MD  Rheumatology Fellow, PGY4    s/d/w Dr. Diaz

## 2019-08-20 NOTE — PROGRESS NOTE ADULT - PROBLEM SELECTOR PLAN 4
Initially supratherapeutic. Now Currently subtherapeutic in prep for possible renal bx after FFP and vit K last week  - Will dose coumadin tonight 5mg x 3 days  - Monitor INR daily  - Pt has high bleeding risk from pulmonary hemorrhages and bx. Risk of bleeding outweighs the benefits of bridging with heparin drip Initially supratherapeutic. Now Currently subtherapeutic in prep for possible renal bx after FFP and vit K last week  - Will dose coumadin tonight 5mg  - Monitor INR daily  - Pt has high bleeding risk from pulmonary hemorrhages and bx. Risk of bleeding outweighs the benefits of bridging with heparin drip

## 2019-08-20 NOTE — PROGRESS NOTE ADULT - SUBJECTIVE AND OBJECTIVE BOX
****Patient seen and examined. Note in progress please follow-up in afternoon for completed note and plan.    Patient is a 84y old  Female who presents with a chief complaint of MICU transfer - hypoxic respiratory failure (20 Aug 2019 08:52)    INTERVAL HPI/OVERNIGHT EVENTS:  Patient with no acute events overnight and patient already returned from renal Bx. patient states she is feeling well and in no acute distress today. patient states yesterday was eating and drinking normally. Normal urinary habits (urinated a lot yesterday after lasix) and normal BMs. Denies fevers or difficulty breathing or abnormal urine color.     T(C): 36.7 (08-20-19 @ 09:50), Max: 36.9 (08-19-19 @ 17:40)  HR: 71 (08-20-19 @ 09:50) (71 - 90)  BP: 134/76 (08-20-19 @ 09:50) (120/81 - 143/91)  RR: 17 (08-20-19 @ 09:50) (17 - 20)  SpO2: 100% (08-20-19 @ 09:50) (94% - 100%)    19 Aug 2019 07:01  -  20 Aug 2019 07:00  --------------------------------------------------------  IN: 430 mL / OUT: 1450 mL / NET: -1020 mL    20 Aug 2019 07:01  -  20 Aug 2019 10:19  --------------------------------------------------------  IN: 0 mL / OUT: 0 mL / NET: 0 mL    PHYSICAL EXAM:  GENERAL: NAD, well-developed  HEAD:  Atraumatic, Normocephalic  EYES: EOMI, conjunctiva and sclera clear  NECK: Supple, No JVD  CHEST/LUNG: Clear to auscultation b/l no rales. No wheeze and in no respiratory distress. 100% on RA with good waveform on assessment  HEART: Irregular at 70's; No murmurs, rubs, or gallops  ABDOMEN: Soft, Nontender, Nondistended; Bowel sounds present  EXTREMITIES:  2+ Peripheral Pulses, No clubbing, cyanosis, or edema  PSYCH: AAOx3  NEUROLOGY: non-focal  SKIN: No rashes or lesions    PAST MEDICAL & SURGICAL HISTORY:  COPD (chronic obstructive pulmonary disease)  Peripheral vascular disease  Pulmonary hypertension  Rheumatoid arthritis  Osteoarthritis  Mitral regurgitation  H/O lymphoma  Hyperlipidemia  Chronic GERD  Chronic kidney disease: proteinuria  AF (atrial fibrillation)  Anemia in CKD (chronic kidney disease)  CHF (congestive heart failure)  HTN (hypertension)  History of total hip replacement, left  History of total knee replacement, bilateral    MEDICATIONS  (STANDING):  atorvastatin 10 milliGRAM(s) Oral at bedtime  cloNIDine 0.2 milliGRAM(s) Oral three times a day  dextrose 5%. 1000 milliLiter(s) (50 mL/Hr) IV Continuous <Continuous>  dextrose 50% Injectable 12.5 Gram(s) IV Push once  dextrose 50% Injectable 25 Gram(s) IV Push once  dextrose 50% Injectable 25 Gram(s) IV Push once  furosemide    Tablet 20 milliGRAM(s) Oral daily  insulin glargine Injectable (LANTUS) 12 Unit(s) SubCutaneous at bedtime  insulin lispro (HumaLOG) corrective regimen sliding scale   SubCutaneous every 6 hours  insulin lispro (HumaLOG) corrective regimen sliding scale   SubCutaneous at bedtime  insulin lispro Injectable (HumaLOG) 3 Unit(s) SubCutaneous before breakfast  methylPREDNISolone sodium succinate Injectable 70 milliGRAM(s) IV Push daily  metoprolol tartrate 50 milliGRAM(s) Oral two times a day  montelukast 10 milliGRAM(s) Oral daily  pantoprazole    Tablet 40 milliGRAM(s) Oral before breakfast  sertraline 50 milliGRAM(s) Oral daily    MEDICATIONS  (PRN):  ALBUTerol    90 MICROgram(s) HFA Inhaler 2 Puff(s) Inhalation every 6 hours PRN Shortness of Breath and/or Wheezing  ALPRAZolam 0.5 milliGRAM(s) Oral two times a day PRN Anxiety and Insomnia  dextrose 40% Gel 15 Gram(s) Oral once PRN Blood Glucose LESS THAN 70 milliGRAM(s)/deciliter  glucagon  Injectable 1 milliGRAM(s) IntraMuscular once PRN Glucose LESS THAN 70 milligrams/deciliter    REVIEW OF SYSTEMS:  Pt denies fevers, chills, CP, Abdominal pain, rhinorrhea, new rashes, new LE edema, new visual changes, confusion, headaches, blood in stools, N/V, dysuria, and hematuria.     LABS:                        9.1    20.80 )-----------( 317      ( 20 Aug 2019 06:00 )             30.9     08-20    139  |  96<L>  |  44<H>  ----------------------------<  77  3.6   |  34<H>  |  0.84    Ca    8.9      20 Aug 2019 06:00  Phos  3.6     08-20  Mg     1.7     08-20    TPro  5.7<L>  /  Alb  2.8<L>  /  TBili  0.5  /  DBili  x   /  AST  12  /  ALT  18  /  AlkPhos  61  08-20    LIVER FUNCTIONS - ( 20 Aug 2019 06:00 )  Alb: 2.8 g/dL / Pro: 5.7 g/dL / ALK PHOS: 61 u/L / ALT: 18 u/L / AST: 12 u/L / GGT: x     / T. Bili 0.5 mg/dL / D. Bili x         PT/INR - ( 20 Aug 2019 06:00 )   PT: 12.4 SEC;   INR: 1.08       CAPILLARY BLOOD GLUCOSE  POCT Blood Glucose.: 96 mg/dL (20 Aug 2019 06:32)  POCT Blood Glucose.: 201 mg/dL (19 Aug 2019 21:56)  POCT Blood Glucose.: 225 mg/dL (19 Aug 2019 18:08)  POCT Blood Glucose.: 212 mg/dL (19 Aug 2019 12:48)    RADIOLOGY & ADDITIONAL TESTS:  None New    Imaging Personally Reviewed:  [x] YES  [ ] NO    Consultant(s) Notes Reviewed:  [x] YES  [ ] NO - ID & Rheum & Pulm & Nephro Patient is a 84y old  Female who presents with a chief complaint of MICU transfer - hypoxic respiratory failure (20 Aug 2019 08:52)    INTERVAL HPI/OVERNIGHT EVENTS:  Patient with no acute events overnight and patient already returned from renal Bx. patient states she is feeling well and in no acute distress today. patient states yesterday was eating and drinking normally. Normal urinary habits (urinated a lot yesterday after lasix) and normal BMs. Denies fevers or difficulty breathing or abnormal urine color.     T(C): 36.7 (08-20-19 @ 09:50), Max: 36.9 (08-19-19 @ 17:40)  HR: 71 (08-20-19 @ 09:50) (71 - 90)  BP: 134/76 (08-20-19 @ 09:50) (120/81 - 143/91)  RR: 17 (08-20-19 @ 09:50) (17 - 20)  SpO2: 100% (08-20-19 @ 09:50) (94% - 100%)    19 Aug 2019 07:01  -  20 Aug 2019 07:00  --------------------------------------------------------  IN: 430 mL / OUT: 1450 mL / NET: -1020 mL    20 Aug 2019 07:01  -  20 Aug 2019 10:19  --------------------------------------------------------  IN: 0 mL / OUT: 0 mL / NET: 0 mL    PHYSICAL EXAM:  GENERAL: NAD, well-developed  HEAD:  Atraumatic, Normocephalic  EYES: EOMI, conjunctiva and sclera clear  NECK: Supple, No JVD  CHEST/LUNG: Clear to auscultation b/l no rales. No wheeze and in no respiratory distress. 100% on RA with good waveform on assessment  HEART: Irregular at 70's; No murmurs, rubs, or gallops  ABDOMEN: Soft, Nontender, Nondistended; Bowel sounds present  EXTREMITIES:  2+ Peripheral Pulses, No clubbing, cyanosis, or edema  PSYCH: AAOx3  NEUROLOGY: non-focal  SKIN: No rashes or lesions    PAST MEDICAL & SURGICAL HISTORY:  COPD (chronic obstructive pulmonary disease)  Peripheral vascular disease  Pulmonary hypertension  Rheumatoid arthritis  Osteoarthritis  Mitral regurgitation  H/O lymphoma  Hyperlipidemia  Chronic GERD  Chronic kidney disease: proteinuria  AF (atrial fibrillation)  Anemia in CKD (chronic kidney disease)  CHF (congestive heart failure)  HTN (hypertension)  History of total hip replacement, left  History of total knee replacement, bilateral    MEDICATIONS  (STANDING):  atorvastatin 10 milliGRAM(s) Oral at bedtime  cloNIDine 0.2 milliGRAM(s) Oral three times a day  dextrose 5%. 1000 milliLiter(s) (50 mL/Hr) IV Continuous <Continuous>  dextrose 50% Injectable 12.5 Gram(s) IV Push once  dextrose 50% Injectable 25 Gram(s) IV Push once  dextrose 50% Injectable 25 Gram(s) IV Push once  furosemide    Tablet 20 milliGRAM(s) Oral daily  insulin glargine Injectable (LANTUS) 12 Unit(s) SubCutaneous at bedtime  insulin lispro (HumaLOG) corrective regimen sliding scale   SubCutaneous every 6 hours  insulin lispro (HumaLOG) corrective regimen sliding scale   SubCutaneous at bedtime  insulin lispro Injectable (HumaLOG) 3 Unit(s) SubCutaneous before breakfast  methylPREDNISolone sodium succinate Injectable 70 milliGRAM(s) IV Push daily  metoprolol tartrate 50 milliGRAM(s) Oral two times a day  montelukast 10 milliGRAM(s) Oral daily  pantoprazole    Tablet 40 milliGRAM(s) Oral before breakfast  sertraline 50 milliGRAM(s) Oral daily    MEDICATIONS  (PRN):  ALBUTerol    90 MICROgram(s) HFA Inhaler 2 Puff(s) Inhalation every 6 hours PRN Shortness of Breath and/or Wheezing  ALPRAZolam 0.5 milliGRAM(s) Oral two times a day PRN Anxiety and Insomnia  dextrose 40% Gel 15 Gram(s) Oral once PRN Blood Glucose LESS THAN 70 milliGRAM(s)/deciliter  glucagon  Injectable 1 milliGRAM(s) IntraMuscular once PRN Glucose LESS THAN 70 milligrams/deciliter    REVIEW OF SYSTEMS:  Pt denies fevers, chills, CP, Abdominal pain, rhinorrhea, new rashes, new LE edema, new visual changes, confusion, headaches, blood in stools, N/V, dysuria, and hematuria.     LABS:                        9.1    20.80 )-----------( 317      ( 20 Aug 2019 06:00 )             30.9     08-20    139  |  96<L>  |  44<H>  ----------------------------<  77  3.6   |  34<H>  |  0.84    Ca    8.9      20 Aug 2019 06:00  Phos  3.6     08-20  Mg     1.7     08-20    TPro  5.7<L>  /  Alb  2.8<L>  /  TBili  0.5  /  DBili  x   /  AST  12  /  ALT  18  /  AlkPhos  61  08-20    LIVER FUNCTIONS - ( 20 Aug 2019 06:00 )  Alb: 2.8 g/dL / Pro: 5.7 g/dL / ALK PHOS: 61 u/L / ALT: 18 u/L / AST: 12 u/L / GGT: x     / T. Bili 0.5 mg/dL / D. Bili x         PT/INR - ( 20 Aug 2019 06:00 )   PT: 12.4 SEC;   INR: 1.08       CAPILLARY BLOOD GLUCOSE  POCT Blood Glucose.: 96 mg/dL (20 Aug 2019 06:32)  POCT Blood Glucose.: 201 mg/dL (19 Aug 2019 21:56)  POCT Blood Glucose.: 225 mg/dL (19 Aug 2019 18:08)  POCT Blood Glucose.: 212 mg/dL (19 Aug 2019 12:48)    RADIOLOGY & ADDITIONAL TESTS:  None New    Imaging Personally Reviewed:  [x] YES  [ ] NO    Consultant(s) Notes Reviewed:  [x] YES  [ ] NO - ID & Rheum & Pulm & Nephro

## 2019-08-20 NOTE — PROGRESS NOTE ADULT - ATTENDING COMMENTS
pt s/p renal biopsy today. agree with trial of low dose lasix, given persistent pulmonary edema and diastolic dysfunction.

## 2019-08-20 NOTE — PROGRESS NOTE ADULT - PROBLEM SELECTOR PLAN 8
- Will continue HSQ today until therapeutic on coumadin  - Restart diet today and advance to regular with a sodium restriction to avoid fluid retention  - PT rec home with home PT once medically optimized.   - Encouraged out of bed to chair and increased ambulation to prevent deconditioning     -Sherif Nicole PGY5 EMIM Pager#[phone] - C/W mechanical soft diet

## 2019-08-20 NOTE — PROGRESS NOTE ADULT - ATTENDING COMMENTS
Unclear if renal bx would provide comprehensive information if underlining paraneoplastic etiology is being considered. Will d/w heme to evaluate pt and for possible BM bx if indicated.

## 2019-08-21 NOTE — PROGRESS NOTE ADULT - ATTENDING COMMENTS
S/p renal bx on 8/20/19 results personally reviewed and showing active crescentic glomerulonephritis, pauci-immune. Per rheum will plan for rituxan induction. Will continue to appreciate recs. Plan also d/w Dr. Carty from nephrology team.

## 2019-08-21 NOTE — PROGRESS NOTE ADULT - PROBLEM SELECTOR PLAN 5
Initially supratherapeutic. Now Currently subtherapeutic in prep for possible renal bx after FFP and vit K last week  - Will dose coumadin again tonight 5mg  - Monitor INR daily  - Pt has high bleeding risk from pulmonary hemorrhages and bx. Risk of bleeding outweighs the benefits of bridging with heparin drip

## 2019-08-21 NOTE — PROGRESS NOTE ADULT - PROBLEM SELECTOR PLAN 1
- Continue solumedrol 1mg/kg 70mg daily   - Agree with Bactrim for PCP ppx  - f/u remaining serologies  - f/u IR renal biopsy for pathology  - c/w lasix  - Please check Quant Gold or PPD in anticipation of long term immunosuppression    Will sign off, please call back with any issue or questions    Clovis Thomason MD  Pulmonary & Critical Care Fellow  [phone]

## 2019-08-21 NOTE — PROGRESS NOTE ADULT - ASSESSMENT
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 Improving SOB, decreasing oxygen requirements while on steroids and diuretics  Pertinent findings include LE purpuric rash, anemia, KANIKA, UA with active sediment, and multilobar bronchopneumonia. s/p renal bx on 8/20/19 showing active crescentic glomerulonephritis, pauci-immune.  Positive serologies + include IVAN, P-ANCA, MPO, hypocomplementemia, prior positive dsDNA, beta-2 glycoprotein IgA, ACl IgM.   Negative serologies - Cryoglobulin, hepatitis panel, anti-GBM.    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 Improving SOB, decreasing oxygen requirements while on steroids and diuretics  Pertinent findings include LE purpuric rash, anemia, KANIKA, UA with active sediment, and multilobar bronchopneumonia. s/p renal bx on 8/20/19 showing active crescentic glomerulonephritis, pauci-immune.  Positive serologies + include IVAN, P-ANCA, MPO, hypocomplementemia, prior positive dsDNA, beta-2 glycoprotein IgA, ACl IgM.   Negative serologies - Cryoglobulin, hepatitis panel, anti-GBM.    Patient's pulm-renal syndrome c/w ANCA vasculitis vs paraneoplastic ANCA or other process (hx of marginal zone lymphoma and possible MGUS)    Recommendations:  - c/w solumedrol 1mg/kg 70mg daily (8/16/19 -). Please re-weigh the patient, now that she has been diuresed, she likely weighs less and will require the steroid to be redosed going forward.  - will plan for Rituxan induction (inpt vs outpt to be determined)  - c/w PCP and GI ppx  - f/u remaining serologies (quantiferon)  - heme/onc input (inpt vs outpt - she is known to a specific team as outpt) given hx of lymphoma and persistent leukocytosis to evaluate for possible paraneoplastic vasculitis. Cannot attribute such high leukocytosis to steroids, and her infectious concerns have been treated.    Wendy Pacheco MD  Rheumatology Fellow, PGY4    s/d/w Dr. 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 Improving SOB, decreasing oxygen requirements while on steroids and diuretics  Pertinent findings include LE purpuric rash, anemia, KANIKA, UA with active sediment, and multilobar bronchopneumonia. 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    Pertinent serologies:  Positive serologies + include IVAN, P-ANCA, MPO, hypocomplementemia, prior positive dsDNA, beta-2 glycoprotein IgA, ACl IgM.   Negative serologies - Cryoglobulin, hepatitis panel, anti-GBM.    Recommendations:  - c/w solumedrol 1mg/kg 70mg daily (8/16/19 -). Please re-weigh the patient, now that she has been diuresed, she likely weighs less and will require the steroid to be redosed going forward.  - will plan for Rituxan induction (inpt vs outpt to be determined)  - c/w PCP and GI ppx  - f/u remaining serologies (quantiferon)  - heme/onc input (inpt vs outpt - she is known to a specific team as outpt) given hx of lymphoma and persistent leukocytosis to evaluate for possible paraneoplastic vasculitis. Cannot attribute such high leukocytosis to steroids, and her infectious concerns have been treated.    Wendy Pacheco MD  Rheumatology Fellow, PGY4    s/d/w Dr. 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 Improving SOB, decreasing oxygen requirements while on steroids and diuretics  Pertinent findings include LE purpuric rash, anemia, KANIKA, UA with active sediment, and multilobar bronchopneumonia. 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    Pertinent serologies:  Positive serologies + include IVAN, P-ANCA, MPO, hypocomplementemia, prior positive dsDNA, beta-2 glycoprotein IgA, ACl IgM.   Negative serologies - Cryoglobulin, hepatitis panel, anti-GBM.    Recommendations:  - c/w solumedrol 1mg/kg 70mg daily (8/16/19 -). Please re-weigh the patient, now that she has been diuresed, she likely weighs less and will require the steroid to be redosed going forward.  - will plan for Rituxan induction -- will discuss with renal team but would favor 4 doses qweekly at 375mg/m2 dosing, preferably first dose in the hospital   - c/w PCP and GI ppx  - f/u remaining serologies (quantiferon)  - heme/onc input appreciated given hx of lymphoma and persistent leukocytosis to evaluate for possible paraneoplastic vasculitis. Cannot attribute such high leukocytosis to steroids, and her infectious concerns have been treated.     Wendy Pacheco MD  Rheumatology Fellow, PGY4    s/d/w Dr. Diaz

## 2019-08-21 NOTE — PROGRESS NOTE ADULT - PROBLEM SELECTOR PLAN 2
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 either immune complex disease such as lupus vs vasculitis such as p-ANCA with good response to IV steroids and improved O2 requirements  - c/w IV methylpred 70mg daily  - +DsDNA - possible hydralazine mediated immune-complex disease vs vasculitis  - Continue Bactrim for PCP PPx given cost and only needing 3 times a day but if significant side effects for patient will consider changing to atovaquone  - AVOID Hydralazine  - F/U Renal Bx path for final DMT  - Check quant gold prior to starting DMT  - Appreciated renal, pulm, rheum, and ID recs 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 Sating well on RA today  - c/w IV methylpred 70mg daily  - +DsDNA - possible hydralazine mediated immune-complex disease vs vasculitis  - Continue Bactrim for PCP PPx given cost and only needing 3 times a day but if significant side effects for patient will consider changing to atovaquone  - AVOID Hydralazine  - F/U Renal Bx path for final DMT  - Check quant gold prior to starting DMT  - Appreciated renal, pulm, rheum, and ID recs

## 2019-08-21 NOTE — PROGRESS NOTE ADULT - ATTENDING COMMENTS
discussed with Dr. Oneal today regarding the biopsy report.  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  Typically we would do Cytoxan or Rituxan….   in her case Rituxan maybe the best course

## 2019-08-21 NOTE — PROGRESS NOTE ADULT - SUBJECTIVE AND OBJECTIVE BOX
Patient is a 84y old  Female who presents with a chief complaint of MICU transfer - hypoxic respiratory failure (21 Aug 2019 09:23)    INTERVAL HPI/OVERNIGHT EVENTS:  Patient with no acute overnight events. Pt herself states she does not feel short of breath and her breathing feels good. Patient denies chest pain, fevers, abdominal pain, new LE edema, coughing, dysuria hematuria. Patient is eating and drinking slightly less and urinating normally.     T(C): 36.4 (08-21-19 @ 06:41), Max: 36.8 (08-20-19 @ 17:38)  HR: 90 (08-21-19 @ 06:41) (55 - 90)  BP: 137/88 (08-21-19 @ 06:41) (99/77 - 137/88)  RR: 16 (08-21-19 @ 06:41) (15 - 18)  SpO2: 99% (08-21-19 @ 06:41) (96% - 100%)    20 Aug 2019 07:01  -  21 Aug 2019 07:00  --------------------------------------------------------  IN: 500 mL / OUT: 400 mL / NET: 100 mL    PHYSICAL EXAM:  GENERAL: NAD, well-developed  HEAD:  Atraumatic, Normocephalic  EYES: EOMI, conjunctiva and sclera clear  NECK: Supple, No JVD  CHEST/LUNG: +rales in RLL and wheezing in lower lobes. In Mild respiratory distress. 96% on RA with good waveform on assessment  HEART: Irregular at 70's; No murmurs, rubs, or gallops; No LE edema  ABDOMEN: Soft, Nontender, Nondistended; Bowel sounds present  EXTREMITIES:  2+ Peripheral Pulses, No clubbing, cyanosis, or edema  PSYCH: AAOx3  NEUROLOGY: non-focal  SKIN: No rashes or lesions    PAST MEDICAL & SURGICAL HISTORY:  COPD (chronic obstructive pulmonary disease)  Peripheral vascular disease  Pulmonary hypertension  Rheumatoid arthritis  Osteoarthritis  Mitral regurgitation  H/O lymphoma  Hyperlipidemia  Chronic GERD  Chronic kidney disease: proteinuria  AF (atrial fibrillation)  Anemia in CKD (chronic kidney disease)  CHF (congestive heart failure)  HTN (hypertension)  History of total hip replacement, left  History of total knee replacement, bilateral    MEDICATIONS  (STANDING):  atorvastatin 10 milliGRAM(s) Oral at bedtime  cloNIDine 0.1 milliGRAM(s) Oral three times a day  dextrose 5%. 1000 milliLiter(s) (50 mL/Hr) IV Continuous <Continuous>  dextrose 50% Injectable 12.5 Gram(s) IV Push once  dextrose 50% Injectable 25 Gram(s) IV Push once  dextrose 50% Injectable 25 Gram(s) IV Push once  furosemide    Tablet 20 milliGRAM(s) Oral two times a day  heparin  Injectable 5000 Unit(s) SubCutaneous every 12 hours  insulin glargine Injectable (LANTUS) 5 Unit(s) SubCutaneous at bedtime  insulin lispro (HumaLOG) corrective regimen sliding scale   SubCutaneous three times a day before meals  insulin lispro (HumaLOG) corrective regimen sliding scale   SubCutaneous at bedtime  insulin lispro Injectable (HumaLOG) 6 Unit(s) SubCutaneous three times a day before meals  magnesium oxide 400 milliGRAM(s) Oral three times a day with meals  methylPREDNISolone sodium succinate Injectable 70 milliGRAM(s) IV Push daily  metoprolol tartrate 50 milliGRAM(s) Oral two times a day  montelukast 10 milliGRAM(s) Oral daily  pantoprazole    Tablet 40 milliGRAM(s) Oral before breakfast  potassium chloride    Tablet ER 40 milliEquivalent(s) Oral once  sertraline 25 milliGRAM(s) Oral daily  trimethoprim  160 mG/sulfamethoxazole 800 mG 1 Tablet(s) Oral <User Schedule>    MEDICATIONS  (PRN):  ALBUTerol    90 MICROgram(s) HFA Inhaler 2 Puff(s) Inhalation every 6 hours PRN Shortness of Breath and/or Wheezing  ALPRAZolam 0.5 milliGRAM(s) Oral two times a day PRN Anxiety and Insomnia  dextrose 40% Gel 15 Gram(s) Oral once PRN Blood Glucose LESS THAN 70 milliGRAM(s)/deciliter  glucagon  Injectable 1 milliGRAM(s) IntraMuscular once PRN Glucose LESS THAN 70 milligrams/deciliter    REVIEW OF SYSTEMS:  Pt denies fevers, chills, CP, Abdominal pain, rhinorrhea, new rashes, new LE edema, new visual changes, confusion, headaches, blood in stools, N/V, dysuria, and hematuria.    LABS:                        9.6    22.60 )-----------( 357      ( 21 Aug 2019 06:44 )             31.8     08-21    138  |  93<L>  |  44<H>  ----------------------------<  68<L>  3.4<L>   |  35<H>  |  0.82    Ca    9.1      21 Aug 2019 06:44  Phos  3.5     08-21  Mg     1.6     08-21    TPro  5.7<L>  /  Alb  2.8<L>  /  TBili  0.5  /  DBili  x   /  AST  12  /  ALT  18  /  AlkPhos  61  08-20    LIVER FUNCTIONS - ( 20 Aug 2019 06:00 )  Alb: 2.8 g/dL / Pro: 5.7 g/dL / ALK PHOS: 61 u/L / ALT: 18 u/L / AST: 12 u/L / GGT: x     / T. Bili 0.5 mg/dL / D. Bili x         PT/INR - ( 21 Aug 2019 06:44 )   PT: 12.8 SEC;   INR: 1.15       Renal Biopsy Pathology - Pending Results    CAPILLARY BLOOD GLUCOSE  POCT Blood Glucose.: 98 mg/dL (21 Aug 2019 08:47)  POCT Blood Glucose.: 71 mg/dL (21 Aug 2019 08:25)  POCT Blood Glucose.: 159 mg/dL (20 Aug 2019 21:42)  POCT Blood Glucose.: 212 mg/dL (20 Aug 2019 17:34)  POCT Blood Glucose.: 204 mg/dL (20 Aug 2019 12:44)    RADIOLOGY & ADDITIONAL TESTS:  None New    Imaging Personally Reviewed:  [x] YES  [ ] NO    Consultant(s) Notes Reviewed:  [x] YES  [ ] NO - Rheum, Renal, Pulm, & ID    Care Discussed with Consultants/Other Providers [x] YES  [ ] NO - Renal

## 2019-08-21 NOTE — PROGRESS NOTE ADULT - ASSESSMENT
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 S/P IR renal biopsy yesterday.

## 2019-08-21 NOTE — PROGRESS NOTE ADULT - PROBLEM SELECTOR PLAN 4
Improved and at baseline  - Discussed care with Rheum and other disease modifying treatment medications such as Cytoxan or Rituxan will be determined after biopsy and tissue diagnosis.   - Will continue steroids for now as per rheum.  - Likely 2/2 rheumatological etiology such as hydralazine induced SLE, small vessel vasculitis, or other complex depositing glomerulopathies  - Will follow-up pending rheum serologies  - Avoid ACEis, ARBS, NSAIDs, and other nephrotoxic drugs  - Will renally dose all meds  - s/p renal Bx today  - heme consulted and will orger Immunoglobulin levels and Urine Immunofixation; SPEP and UPEP normal; Serum immunofixation with light M band  - Lower suspicion for paraneoplastic vasculitis at this time and will await renal Bx path prior and will readdress; however patient not amenable for BM Bx at this time  - For HTN will paln decrease clonidine to 0.1mg BID as patient with low BP but not hypotensive in next few days Improved and at baseline  - Discussed care with Rheum and other disease modifying treatment medications such as Cytoxan or Rituxan will be determined after biopsy and tissue diagnosis.   - Will continue steroids for now as per rheum.  - Likely 2/2 rheumatological etiology such as hydralazine induced SLE, small vessel vasculitis, or other complex depositing glomerulopathies  - Will follow-up pending rheum serologies  - Avoid ACEis, ARBS, NSAIDs, and other nephrotoxic drugs  - Will renally dose all meds  - s/p renal Bx today  - heme consulted and will order Immunoglobulin levels and Urine Immunofixation; SPEP and UPEP normal; Serum immunofixation with light M band  - Lower suspicion for paraneoplastic vasculitis at this time and will await renal Bx path prior and will readdress; however patient not amenable for BM Bx at this time  - For HTN will paln decrease clonidine to 0.1mg BID as patient with low BP but not hypotensive in next few days

## 2019-08-21 NOTE — PROGRESS NOTE ADULT - ASSESSMENT
Pt is an 83 y/o F w/ a PMHx of lymphoma/MGUS, A fib on coumadin, CHF, HLD, HTN, COPD, anxiety, depression who p/w weakness and shortness of breath in the setting of hypoxic respiratory failure likely 2/2 pulmonary renal syndrome of rheumatologic etiology of vasculitis vs immune complex disease such as lupus with lower suspicion for infectious etiology at this time with improvement of O2 requirement on IV steroids with normalization of renal function and anemia currently stable but not planned for bronch given low yield now s/p renal Bx 8/20/19 or definitive path and to dictate Disease Modifying treatment (DMT) currently with mild to moderate respiratory distress and elevated bicarb likely the setting of fluid retention from steroids c/b alkalosis from diuresis but currently not hypoxic and stable. 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 Not planned for bronch given low yield now s/p renal Bx 8/20/19. Now with mild to moderate respiratory distress and elevated bicarb likely the setting of fluid retention from steroids c/b alkalosis from diuresis but currently not hypoxic

## 2019-08-21 NOTE — PROGRESS NOTE ADULT - PROBLEM SELECTOR PLAN 3
- Likely in the setting of diuresis  - Will replete K+ to 4.0 or slightly higher  - Will check VBG in AM tomorrow  - Will consider Diamox if continues to rise

## 2019-08-21 NOTE — PROGRESS NOTE ADULT - PROBLEM SELECTOR PLAN 1
- Likely retaining fluid due to steroids  - Currently in PO lasizx 20mg BID but will increase to 40mg BID today as per renal  - Check CXR today  - Continue to monitor o2 sat and resp status but will d/c NC as patient 100% on RA  - Will continue to follow-up with pulm recs daily - Likely retaining fluid due to steroids  - Currently in PO lasix 20mg BID but will increase to 40mg BID today as per renal  - Check CXR today  - Continue to monitor o2 sat and resp status but will d/c NC as patient 100% on RA  - Will continue to follow-up with pulm recs daily - Likely retaining fluid due to steroids  - Currently in PO lasix 20mg BID but will increase to 40mg BID today as per renal  - Check CXR today  - Continue to monitor o2 sat and resp status but no need for NC as 96% on RA with good waveform on RA  - Will continue to follow-up with pulm recs daily

## 2019-08-21 NOTE — PROGRESS NOTE ADULT - PROBLEM SELECTOR PLAN 6
Likely in the setting of steroids. A1c 5.6 on 8/13  - Will increase premal to 8U qac  - Will decrease Lantus to 8U QHS due to lower FS in AM

## 2019-08-21 NOTE — PROGRESS NOTE ADULT - SUBJECTIVE AND OBJECTIVE BOX
CHIEF COMPLAINT:    Interval Events: Patient with no complaints except for some pain at biopsy site. Comfortable on RA.    REVIEW OF SYSTEMS:  Constitutional: [ ] negative [ ] fevers [ ] chills [ ] weight loss [ ] weight gain  HEENT: [ ] negative [ ] dry eyes [ ] eye irritation [ ] postnasal drip [ ] nasal congestion  CV: [ ] negative  [ ] chest pain [ ] orthopnea [ ] palpitations [ ] murmur  Resp: [ ] negative [ ] cough [X] shortness of breath [ ] dyspnea [ ] wheezing [ ] sputum [ ] hemoptysis  GI: [ ] negative [ ] nausea [ ] vomiting [ ] diarrhea [ ] constipation [ ] abd pain [ ] dysphagia   : [ ] negative [ ] dysuria [ ] nocturia [ ] hematuria [ ] increased urinary frequency  Musculoskeletal: [ ] negative [ ] back pain [ ] myalgias [ ] arthralgias [ ] fracture  Skin: [ ] negative [ ] rash [ ] itch  Neurological: [ ] negative [ ] headache [ ] dizziness [ ] syncope [ ] weakness [ ] numbness  Psychiatric: [ ] negative [ ] anxiety [ ] depression  Endocrine: [ ] negative [ ] diabetes [ ] thyroid problem  Hematologic/Lymphatic: [ ] negative [ ] anemia [ ] bleeding problem  Allergic/Immunologic: [ ] negative [ ] itchy eyes [ ] nasal discharge [ ] hives [ ] angioedema  [X] All other systems negative  [ ] Unable to assess ROS because ________    OBJECTIVE:  ICU Vital Signs Last 24 Hrs  T(C): 36.4 (21 Aug 2019 06:41), Max: 36.8 (20 Aug 2019 17:38)  T(F): 97.6 (21 Aug 2019 06:41), Max: 98.2 (20 Aug 2019 17:38)  HR: 90 (21 Aug 2019 06:41) (55 - 90)  BP: 137/88 (21 Aug 2019 06:41) (99/77 - 137/88)  BP(mean): --  ABP: --  ABP(mean): --  RR: 16 (21 Aug 2019 06:41) (15 - 18)  SpO2: 99% (21 Aug 2019 06:41) (96% - 100%)        08-20 @ 07:01  -  08-21 @ 07:00  --------------------------------------------------------  IN: 500 mL / OUT: 400 mL / NET: 100 mL      CAPILLARY BLOOD GLUCOSE      POCT Blood Glucose.: 98 mg/dL (21 Aug 2019 08:47)      PHYSICAL EXAM:  General:   HEENT:   Lymph Nodes:  Neck:   Respiratory:   Cardiovascular:   Abdomen:   Extremities:   Skin:   Neurological:  Psychiatry:    HOSPITAL MEDICATIONS:  MEDICATIONS  (STANDING):  atorvastatin 10 milliGRAM(s) Oral at bedtime  cloNIDine 0.1 milliGRAM(s) Oral three times a day  dextrose 5%. 1000 milliLiter(s) (50 mL/Hr) IV Continuous <Continuous>  dextrose 50% Injectable 12.5 Gram(s) IV Push once  dextrose 50% Injectable 25 Gram(s) IV Push once  dextrose 50% Injectable 25 Gram(s) IV Push once  furosemide    Tablet 20 milliGRAM(s) Oral two times a day  heparin  Injectable 5000 Unit(s) SubCutaneous every 12 hours  insulin glargine Injectable (LANTUS) 5 Unit(s) SubCutaneous at bedtime  insulin lispro (HumaLOG) corrective regimen sliding scale   SubCutaneous three times a day before meals  insulin lispro (HumaLOG) corrective regimen sliding scale   SubCutaneous at bedtime  insulin lispro Injectable (HumaLOG) 6 Unit(s) SubCutaneous three times a day before meals  magnesium oxide 400 milliGRAM(s) Oral three times a day with meals  methylPREDNISolone sodium succinate Injectable 70 milliGRAM(s) IV Push daily  metoprolol tartrate 50 milliGRAM(s) Oral two times a day  montelukast 10 milliGRAM(s) Oral daily  pantoprazole    Tablet 40 milliGRAM(s) Oral before breakfast  potassium chloride    Tablet ER 40 milliEquivalent(s) Oral once  sertraline 25 milliGRAM(s) Oral daily  trimethoprim  160 mG/sulfamethoxazole 800 mG 1 Tablet(s) Oral <User Schedule>    MEDICATIONS  (PRN):  ALBUTerol    90 MICROgram(s) HFA Inhaler 2 Puff(s) Inhalation every 6 hours PRN Shortness of Breath and/or Wheezing  ALPRAZolam 0.5 milliGRAM(s) Oral two times a day PRN Anxiety and Insomnia  dextrose 40% Gel 15 Gram(s) Oral once PRN Blood Glucose LESS THAN 70 milliGRAM(s)/deciliter  glucagon  Injectable 1 milliGRAM(s) IntraMuscular once PRN Glucose LESS THAN 70 milligrams/deciliter      LABS:                        9.6    22.60 )-----------( 357      ( 21 Aug 2019 06:44 )             31.8     Hgb Trend: 9.6<--, 9.1<--, 9.3<--, 9.2<--, 10.2<--  08-21    138  |  93<L>  |  44<H>  ----------------------------<  68<L>  3.4<L>   |  35<H>  |  0.82    Ca    9.1      21 Aug 2019 06:44  Phos  3.5     08-21  Mg     1.6     08-21    TPro  5.7<L>  /  Alb  2.8<L>  /  TBili  0.5  /  DBili  x   /  AST  12  /  ALT  18  /  AlkPhos  61  08-20    Creatinine Trend: 0.82<--, 0.84<--, 1.06<--, 0.94<--, 0.93<--, 1.11<--  PT/INR - ( 21 Aug 2019 06:44 )   PT: 12.8 SEC;   INR: 1.15                    MICROBIOLOGY:       RADIOLOGY:  [ ] Reviewed and interpreted by me    PULMONARY FUNCTION TESTS:    EKG:

## 2019-08-21 NOTE — PROGRESS NOTE ADULT - PROBLEM SELECTOR PLAN 8
- C/W metoprolol  - Currently subtherapeutic INR but on DVT PPx and dosing coumadin  - Continue Coumadin dosing 5mg tonight and Monitor ZINR daily - C/W metoprolol  - Currently subtherapeutic INR but on DVT PPx and dosing coumadin  - Continue Coumadin dosing 5mg tonight and Monitor INR daily

## 2019-08-21 NOTE — PROGRESS NOTE ADULT - PROBLEM SELECTOR PLAN 1
Patient presented with KANIKA of unknown etiology Patient with presented with elevated serum creatinine of 2.03 on 8/10/19, baseline 0.5.  Now currently improved. She presents with purpuric rash on admission which improved after giving IV steroids. Renal function and rash seem to be improving with steroid therapy.  Serologies with low C3/C4, Positive p-ANCA, negative ANCA elevated ESR/CRP. Patient with positive IVAN and anti-dsDNA on 6/23/2018  and again elevated on this hospital stay in the setting of chronic Hydralazine use. Preliminary biopsy reports shows more than 50% crescents. Must discuss with Rheumatology regarding immunosuppressive therapies. Avoid potential nephrotoxins. Monitor serum creatinine Patient presented with KANIKA . Patient with presented with elevated serum creatinine of 2.03 on 8/10/19, baseline 0.5.  Now currently improved. She presents with purpuric rash on admission which improved after giving IV steroids. Renal function and rash seem to be improving with steroid therapy.  Serologies with low C3/C4, Positive p-ANCA, negative ANCA elevated ESR/CRP. Patient with positive IVAN and anti-dsDNA on 6/23/2018  and again elevated on this hospital stay in the setting of chronic Hydralazine use. Preliminary biopsy reports shows more than 50% crescents. will discuss with Rheumatology regarding immunosuppressive therapies. Avoid potential nephrotoxins. Monitor serum creatinine

## 2019-08-21 NOTE — PROGRESS NOTE ADULT - SUBJECTIVE AND OBJECTIVE BOX
[facility] DIVISION OF KIDNEY DISEASES AND HYPERTENSION -- FOLLOW UP NOTE  --------------------------------------------------------------------------------  Chief Complaint: Pulmonary hypertension    Events noted. Charts reviewed. Patient went for renal biopsy, preliminary reports showing 50% crescents. Will discuss with Rheumatology regarding Rituxan based on preliminary results.    PAST HISTORY  --------------------------------------------------------------------------------  No significant changes to PMH, PSH, FHx, SHx, unless otherwise noted    ALLERGIES & MEDICATIONS  --------------------------------------------------------------------------------  Allergies    Keflex (Unknown)  penicillin (Rash)    Intolerances    Standing Inpatient Medications  atorvastatin 10 milliGRAM(s) Oral at bedtime  cloNIDine 0.1 milliGRAM(s) Oral three times a day  dextrose 5%. 1000 milliLiter(s) IV Continuous <Continuous>  dextrose 50% Injectable 12.5 Gram(s) IV Push once  dextrose 50% Injectable 25 Gram(s) IV Push once  dextrose 50% Injectable 25 Gram(s) IV Push once  furosemide    Tablet 20 milliGRAM(s) Oral two times a day  heparin  Injectable 5000 Unit(s) SubCutaneous every 12 hours  insulin glargine Injectable (LANTUS) 8 Unit(s) SubCutaneous at bedtime  insulin lispro (HumaLOG) corrective regimen sliding scale   SubCutaneous three times a day before meals  insulin lispro (HumaLOG) corrective regimen sliding scale   SubCutaneous at bedtime  insulin lispro Injectable (HumaLOG) 8 Unit(s) SubCutaneous three times a day before meals  magnesium oxide 400 milliGRAM(s) Oral three times a day with meals  methylPREDNISolone sodium succinate Injectable 70 milliGRAM(s) IV Push daily  metoprolol tartrate 50 milliGRAM(s) Oral two times a day  montelukast 10 milliGRAM(s) Oral daily  pantoprazole    Tablet 40 milliGRAM(s) Oral before breakfast  potassium chloride    Tablet ER 40 milliEquivalent(s) Oral once  sertraline 25 milliGRAM(s) Oral daily  trimethoprim  160 mG/sulfamethoxazole 800 mG 1 Tablet(s) Oral <User Schedule>    REVIEW OF SYSTEMS  --------------------------------------------------------------------------------  Gen: nad  Respiratory: mild dyspnea  CV: No chest pain  GI: No abdominal pain  MSK: + LE edema  Neuro: No dizziness  Heme: No bleeding    All other systems were reviewed and are negative, except as noted.    VITALS/PHYSICAL EXAM  --------------------------------------------------------------------------------  T(C): 36.7 (08-21-19 @ 10:55), Max: 36.8 (08-20-19 @ 17:38)  HR: 79 (08-21-19 @ 10:55) (55 - 90)  BP: 137/67 (08-21-19 @ 10:55) (105/72 - 137/88)  RR: 18 (08-21-19 @ 10:55) (15 - 18)  SpO2: 99% (08-21-19 @ 10:55) (96% - 99%)  Wt(kg): --    08-20-19 @ 07:01  -  08-21-19 @ 07:00  --------------------------------------------------------  IN: 500 mL / OUT: 400 mL / NET: 100 mL    08-21-19 @ 07:01  -  08-21-19 @ 11:47  --------------------------------------------------------  IN: 0 mL / OUT: 100 mL / NET: -100 mL    Physical Exam:  	Gen: awake  	HEENT: supple  no LAD  	Pulm: minimal increased work of breathing, lungs CTA  	CV:  S1S2, no murmurs  	Abd: +BS, soft   	Ext: No B/L Lower ext edema  	Neuro: awake, responsive to commands  	Skin: warm and dry    LABS/STUDIES  --------------------------------------------------------------------------------              9.6    22.60 >-----------<  357      [08-21-19 @ 06:44]              31.8     138  |  93  |  44  ----------------------------<  68      [08-21-19 @ 06:44]  3.4   |  35  |  0.82        Ca     9.1     [08-21-19 @ 06:44]      Mg     1.6     [08-21-19 @ 06:44]      Phos  3.5     [08-21-19 @ 06:44]    TPro  5.7  /  Alb  2.8  /  TBili  0.5  /  DBili  x   /  AST  12  /  ALT  18  /  AlkPhos  61  [08-20-19 @ 06:00]    PT/INR: PT 12.8 , INR 1.15       [08-21-19 @ 06:44]    Creatinine Trend:  SCr 0.82 [08-21 @ 06:44]  SCr 0.84 [08-20 @ 06:00]  SCr 1.06 [08-19 @ 05:39]  SCr 0.94 [08-18 @ 06:41]  SCr 0.93 [08-17 @ 06:00]

## 2019-08-21 NOTE — PROGRESS NOTE ADULT - SUBJECTIVE AND OBJECTIVE BOX
TONIA [facility]  [id number]    INTERVAL HPI/OVERNIGHT EVENTS:    Patient seen and examined at bedside. Eating without wearing oxygen, reports some improvement with SOB. Denies CP, palpitations, rash, dysuria.    MEDICATIONS  (STANDING):  atorvastatin 10 milliGRAM(s) Oral at bedtime  cloNIDine 0.1 milliGRAM(s) Oral three times a day  dextrose 5%. 1000 milliLiter(s) (50 mL/Hr) IV Continuous <Continuous>  dextrose 50% Injectable 12.5 Gram(s) IV Push once  dextrose 50% Injectable 25 Gram(s) IV Push once  dextrose 50% Injectable 25 Gram(s) IV Push once  furosemide    Tablet 20 milliGRAM(s) Oral two times a day  heparin  Injectable 5000 Unit(s) SubCutaneous every 12 hours  insulin glargine Injectable (LANTUS) 8 Unit(s) SubCutaneous at bedtime  insulin lispro (HumaLOG) corrective regimen sliding scale   SubCutaneous three times a day before meals  insulin lispro (HumaLOG) corrective regimen sliding scale   SubCutaneous at bedtime  insulin lispro Injectable (HumaLOG) 8 Unit(s) SubCutaneous three times a day before meals  magnesium oxide 400 milliGRAM(s) Oral three times a day with meals  methylPREDNISolone sodium succinate Injectable 70 milliGRAM(s) IV Push daily  metoprolol tartrate 50 milliGRAM(s) Oral two times a day  montelukast 10 milliGRAM(s) Oral daily  pantoprazole    Tablet 40 milliGRAM(s) Oral before breakfast  potassium chloride    Tablet ER 40 milliEquivalent(s) Oral once  sertraline 25 milliGRAM(s) Oral daily  trimethoprim  160 mG/sulfamethoxazole 800 mG 1 Tablet(s) Oral <User Schedule>    MEDICATIONS  (PRN):  ALBUTerol    90 MICROgram(s) HFA Inhaler 2 Puff(s) Inhalation every 6 hours PRN Shortness of Breath and/or Wheezing  ALPRAZolam 0.5 milliGRAM(s) Oral two times a day PRN Anxiety and Insomnia  dextrose 40% Gel 15 Gram(s) Oral once PRN Blood Glucose LESS THAN 70 milliGRAM(s)/deciliter  glucagon  Injectable 1 milliGRAM(s) IntraMuscular once PRN Glucose LESS THAN 70 milligrams/deciliter    Allergies  Keflex (Unknown)  penicillin (Rash)  Intolerances    Vital Signs Last 24 Hrs  T(C): 36.7 (21 Aug 2019 10:55), Max: 36.8 (20 Aug 2019 17:38)  T(F): 98 (21 Aug 2019 10:55), Max: 98.2 (20 Aug 2019 17:38)  HR: 79 (21 Aug 2019 10:55) (55 - 90)  BP: 137/67 (21 Aug 2019 10:55) (105/72 - 137/88)  BP(mean): --  RR: 18 (21 Aug 2019 10:55) (15 - 18)  SpO2: 99% (21 Aug 2019 10:55) (96% - 99%)    Physical Exam:  General: NAD. Wearing NC  HEENT: EOMI, MMM  Cardio: +S1/S2, RRR  Resp: bibasilar crackles, no wheezing  GI: +BS, soft, NT/ND  MSK: no synovitis  Skin: no rash    LABS:                        9.6    22.60 )-----------( 357      ( 21 Aug 2019 06:44 )             31.8     08-21    138  |  93<L>  |  44<H>  ----------------------------<  68<L>  3.4<L>   |  35<H>  |  0.82    Ca    9.1      21 Aug 2019 06:44  Phos  3.5     08-21  Mg     1.6     08-21    TPro  5.7<L>  /  Alb  2.8<L>  /  TBili  0.5  /  DBili  x   /  AST  12  /  ALT  18  /  AlkPhos  61  08-20    PT/INR - ( 21 Aug 2019 06:44 )   PT: 12.8 SEC;   INR: 1.15      Urinalysis (08.14.19 @ 19:32)    Color: YELLOW    Urine Appearance: CLEAR    Glucose: 50    Bilirubin: NEGATIVE    Ketone - Urine: NEGATIVE    Specific Gravity: 1.019    Blood: MODERATE    pH - Urine: 6.0    Protein, Urine: 200    Urobilinogen: NORMAL    Nitrite: NEGATIVE    Leukocyte Esterase Concentration: NEGATIVE    Red Blood Cell - Urine: >50    White Blood Cell - Urine: 11-25    Hyaline Casts: NEGATIVE    Bacteria: NEGATIVE    Squamous Epithelial: OCC      EXAM:  XR CHEST PORTABLE URGENT 1V    PROCEDURE DATE:  Aug 21 2019     INTERPRETATION:  TIME OF EXAM: August 21, 2019 at 10:38 AM.    CLINICAL INFORMATION: Respiratory distress and rales at the bases.   Evaluate for worsening pulmonary edema.    COMPARISON:  August 17, 2019.    TECHNIQUE:   AP Portable chest x-ray. Rotated.    INTERPRETATION:     Heart size and the mediastinum cannot be accurately evaluated on this   projection. The thoracic aorta is calcified.  No focal lung consolidation, pleural effusion, or pneumothorax seen. Hazy   opacity projecting over the left lower chest is likely due to overlying   soft tissues. Previous mild interstitial edema has resolved.  There is osteoarthritic degenerative change of the spine.      IMPRESSION:  Resolution of previous mild interstitial edema. No focal   lung consolidation seen.      Lupus Profile (08.15.19 @ 06:45)    DRVVT S/C Ratio: 1.46: RATIO > 2.0 LA IS STRONGLY PRESENT  RATIO 1.5 - 2.0 LA IS MODERATELY PRESENT  RATIO 1.2 - 1.5 LA IS WEAKLY PRESENT    Lupus Profile (08.15.19 @ 06:45)    Silica Clotting Time S/C Ratio: 1.00: NOTE: THE PRESENCE OF DIRECT THROMBIN INHIBITORS (SUCH AS  ARGATROBAN, REFLUDAN), UF HEPARIN >0.5 U/ml OR LMW HEPARIN  >1.0 U/ml MAY AFFECT RESULTS.  RATIO > 1.27 IS POSITIVE FOR LUPUS.  RATIO <= 1.27 IS NEGATIVE FOR LUPUS.      PROSPER DALE M.D., ATTENDING RADIOLOGIST  This document has been electronically signed. Aug 21 2019 11:03AM      Acute Hepatitis Panel (08.15.19 @ 06:45)    Hepatitis C Virus Interpretation: Nonreactive Hepatitis C AB  S/CO Ratio                        Interpretation  < 1.00                                   Non-Reactive  1.00 - 4.99                         Weakly-Reactive  >= 5.00                                Reactive  Non-Reactive: Aperson with a non-reactive HCV antibody  result is considered uninfected.  No further action is  needed unless recent infection is suspected.  In these  cases, consider repeat testing later to detect  seroconversion..  Weakly-Reactive: HCV antibody test is abnormal, HCV RNA  Qualitative test will follow.  Reactive: HCV antibody test is abnormal, HCV RNA  Qualitative test will follow.  Note: HCV antibody testing is performed on the [employer]   system.    Hepatitis C Virus S/CO Ratio: 0.28 S/CO    Hepatitis B Core IgM Antibody: Nonreactive    Hepatitis B Surface Antigen: Nonreactive    Hepatitis A IgM Antibody: Nonreactive    Cryofibrinogen, Serum (08.15.19 @ 06:45)    Cryofibrinogen, Serum: 0    Antineutrophil Cytoplasmic Antibody (08.11.19 @ 16:15)    Perinuclear (p-ANCA) Antibody: >1:1280    Atypical ANCA: Negative    Cytoplasmic (c-ANCA) Antibody: Negative    Myeloperoxidase Antibody Assay (08.11.19 @ 16:15)    Myeloperoxidase Antibody Assay: 96.2 Units    Myeloperoxidase Ab Interpretation: Positive: Method: EIA  Interpretation                                     Units                                <= 20.0          Negative                                20.1 - 30.0   Weak Positive                                 > 30.0          Positive    Proteinase 3 Antibody Assay (08.11.19 @ 16:15)    Proteinase 3 Antibody Interpretation: Negative: Method: EIA  Interpretation                                     Units                                <= 20.0          Negative                                20.1 - 30.0   Weak Positive                                 > 30.0          Positive    Proteinase 3 Antibody Assay: 5.1 Units       Glomerular Basement Membrane Ab IgG (08.15.19 @ 06:45)    Glomerular Basement Membrane Ab IgG: <1.0: INFCE Result Units: [id number]  Value   Interpretation  <1.0 AI: No Antibody Detected  >or=1.0 AI: Antibody Detected  Test Performed by [employer],  [facility],  [address] [place] [id number]  Sajan Hale M.D., Ph.D., Director of Laboratories  [phone], [facility] [id number]      Surgical Pathology Report (08.20.19 @ 12:50)    Surgical Pathology Report:   ACCESSION No:  [id number]    TONIA BRANHAM                        3    Surgical Final Report    Final Diagnosis  Kidney, needle core biopsy    Active crescentic glomerulonephritis, pauci-immune (see comment)  - Fibrocellular crescents are present in 57% of non-globally  sclerosed glomeruli  - Cellular crescents are present in 21% of non-globally  sclerosed glomeruli    Summary of chronic changes:  - Global glomerulosclerosis (12.5% of glomeruli)  - Tubular atrophy and interstitial fibrosis (10% of cortex)  - Moderate arterial and arteriolar sclerosis    Verified by: Jennifer Oneal MD  (Electronic Signature)  Reported on: 08/21/19 12:47 EDT, [address] [place] [id number]  _________________________________________________________________    Comment  The preliminary findings were discussed with Dr. Carty on  8/21/2019 at 11AM.    The changes seen in this biopsy indicate an aggressive form of  kidney disease with a focal necrotizing and crescentic pattern of  glomerular injury. Immunofluorescence and electron microscopy  indicate that this process is pauci-immune. Chronic changes are  rather mild, as detailed above.    Pauci-immune focal necrotizing and crescentic  glomerulonephritides are often the expression of a systemic  vasculitis triggered or mediated by anti-neutrophil cytoplasm  antibodies (ANCA). Several drugs have been associated with the  development of ANCA-positive diseases; such drugs include  propylthiouracil, minocycline, hydralazine, allopurinol, and  penicillamine.    Electron microscopy results will be reported in an addendum.    Microscopic Description    1. Light Microscopy:  Sections of formalin-fixed, paraffin embedded tissue were  evaluated using H&E, PAS, JMS, and trichrome stains. An H&E-  stained frozen section taken from the tissue allocated for  immunofluorescence microscopy and semi-thin    TONIA BRANHAM                        3    Surgical Final Report      toluidine blue-stained epoxy sections of thetissue processed for  electron microscopy were also evaluated using light microscopy.    The sample submitted for light microscopy consists of renal  cortex and medulla, with up to 16 glomeruli, of which 2 are  globally sclerosed. There are 3 glomeruli with active cellular  crescents and focal fibrinoid necrosis of the tuft and 8  glomeruli contain fibrocellular crescents. Rosado’s capsule  appears disrupted in affected glomeruli, often in association  with active periglomerular inflammation. Thenon-sclerosed  glomeruli are of normal size, architecture and cellularity, with  normal thickness and texture of the capillary walls. The  mesangium is mildly expanded by extracellular matrix. Tubules  reveal degenerative changes and flattening of the epithelium. Few  tubules contain red blood cell casts. The interstitium contains  mild inflammatory infiltrates composed of mononuclear cells,  mainly in periglomerular areas. Approximately 10% of the renal  cortex shows tubular atrophy and interstitial fibrosis. Arteries  and arterioles show moderate sclerosis.    2. Immunofluorescence Microscopy:  The sections of the sample submitted for immunofluorescence  studies were incubated with antibodies specific for the heavy  chains of IgG, IgA, and IgM, for kappa and lambda light chains,  fibrin, albumin, and complement components C3 and C1q. The  intensity of immunofluorescence reactivity is expressed on a  scale 1-  -4+.    The sample contains 4 glomeruli, of which 1 is globally  sclerosed. There is trace segmental finely granular reactivity  for IgA, IgM, and kappa and lambda light chains in the mesangium.  Tubules contain few intraluminal casts reactive for polyclonal  IgA. Arterioles reveal focal reactivity for C3. There is no  difference in reactivity between kappa and lambda light chains in  the glomeruli, tubular casts, or in the background of the tissue.    3. Electron Microscopy:  Electron microscopy results will be reported in an addendum.    Clinical History  Patient with elevated serum creatinine of 2.03 on 8/10/19  baseline 0.5. Now currently improved. She presents with purpuric  rash on admission which improved after giving IV steroids. Renal  function and rash seem to be improving with steroid therapy.  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 Patient with    AHDOOT, MOLOOK                        3      Surgical Final Report    positive IVAN and anti-dsDNA on 6/23/2018 and again elevated on  this hospital stay in the setting of chronic Hydralazine use. She  has 5gm of proteinuria. 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    Specimen(s) Submitted  Renal biopsy native    Gross Description  The specimen is received unfixed during specimen adequacy  procedure,  labeled as: Renal biopsy native. It consists of two  fragments of cylindrical pink-tan soft tissue measuring 1.0 cm  and 0.3 cm in length and 0.1 cm in diameter. A portion of the  specimen is submitted for electron microscopy and  immunofluorescence microscopy studies, and the rest of the  specimen is entirely submitted in one cassette for paraffin  processing.    In addition to other data that may appear on the specimen  container, the label has been inspected to confirm the presence  of the patient's name and date of birth.  Kristina Ricardo 08/20/19 13:13

## 2019-08-21 NOTE — PROGRESS NOTE ADULT - PROBLEM SELECTOR PLAN 7
Likely in the setting of chronic disease but difficult to interpret iron studies given done after PRBC transfusions   - No active bleeding  - Transfuse if Hgb<7

## 2019-08-22 NOTE — PROGRESS NOTE ADULT - PROBLEM SELECTOR PLAN 1
Likely retaining fluid due to steroids  - Currently in PO lasix 20mg BID   - Continue to monitor o2 sat and resp status but no need for NC as 96% on RA with good waveform on RA  - Will continue to follow-up with pulm and renal recs daily

## 2019-08-22 NOTE — PROGRESS NOTE ADULT - PROBLEM SELECTOR PLAN 2
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  - c/w IV methylpred 70mg daily  - Continue Bactrim for PCP PPx given cost and only needing 3 times a day but if significant side effects for patient will consider changing to atovaquone  - AVOID Hydralazine  - Check quant gold prior to starting DMT  -awaiting pending rheumatological w/u labs   -spoke w/ rheum fellow Dr. Pacheco today in collaboration w/ renal and heme/onc plan is to start rituxan on 8/23  - Appreciated renal, pulm, rheum, and ID recs

## 2019-08-22 NOTE — PROGRESS NOTE ADULT - PROBLEM SELECTOR PLAN 3
Likely in the setting of diuresis  -replete K+ to 4.0 or slightly higher  -check VBG if worsening   - Will consider Diamox if continues to rise

## 2019-08-22 NOTE — PROGRESS NOTE ADULT - ASSESSMENT
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 Improving SOB, decreasing oxygen requirements while on steroids and diuretics  Pertinent findings include LE purpuric rash, anemia, KANIKA, UA with active sediment, and multilobar bronchopneumonia. 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    Pertinent serologies:  Positive serologies + include IVAN, P-ANCA, MPO, hypocomplementemia, prior positive dsDNA, beta-2 glycoprotein IgA, ACl IgM.   Negative serologies - Cryoglobulin, hepatitis panel, anti-GBM.    Recommendations:  - c/w solumedrol 1mg/kg 70mg daily (8/16/19 -).   -Please re-weigh the patient st  - will plan for Rituxan induction -- would favor 4 doses qweekly at 375mg/m2 dosing. Will discuss with pt and daughter, consent to be obtained and plan for first dose today in hospital. Discussed case with nephrology team and are in agreement.   - Will discuss rituxan with primary team and chemo nurse.  - c/w PCP and GI ppx  - f/u remaining serologies (quantiferon)  - heme/onc input appreciated given hx of lymphoma and persistent leukocytosis to evaluate for possible paraneoplastic vasculitis. Cannot attribute such high leukocytosis to steroids, and her infectious concerns have been treated.     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 Improving SOB, decreasing oxygen requirements while on steroids and diuretics  Pertinent findings include LE purpuric rash, anemia, KANIKA, UA with active sediment, and multilobar bronchopneumonia. 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    Pertinent serologies:  Positive serologies + include IVAN, P-ANCA, MPO, hypocomplementemia, prior positive dsDNA, beta-2 glycoprotein IgA, ACl IgM.   Negative serologies - Cryoglobulin, hepatitis panel, anti-GBM    Recommendations:  - c/w solumedrol 1mg/kg 70mg daily (8/16/19 -).   -Please re-weigh the patient st  - will plan for Rituxan induction -- would favor 4 doses qweekly at 375mg/m2 dosing. Will discuss with pt and daughter, consent to be obtained and plan for first dose today in hospital. Discussed case with nephrology team and are in agreement.   - Will discuss rituxan with primary team and chemo nurse.  - c/w PCP and GI ppx  - f/u remaining serologies (quantiferon)  - heme/onc input appreciated given hx of lymphoma and persistent leukocytosis to evaluate for possible paraneoplastic vasculitis. Cannot attribute such high leukocytosis to steroids, and her infectious concerns have been treated.     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 Improving SOB, decreasing oxygen requirements while on steroids and diuretics  Pertinent findings include LE purpuric rash, anemia, KANIKA, UA with active sediment, and multilobar bronchopneumonia. 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    Pertinent serologies:  Positive serologies + include IVAN, P-ANCA, MPO, hypocomplementemia, prior positive dsDNA, beta-2 glycoprotein IgA, ACl IgM.   Negative serologies - Cryoglobulin, hepatitis panel, anti-GBM    Recommendations:  - c/w solumedrol 1mg/kg 70mg daily (8/16/19 -).   -Please re-weigh the patient st  - will plan for Rituxan induction -- would favor 4 doses qweekly at 375mg/m2 dosing. Will discuss with pt and daughter, consent to be obtained and plan for first dose in hospital, today or tomorrow depending on chemo nurse availability. Discussed case with nephrology team and are in agreement.   - Will discuss rituxan with primary team and chemo nurse.  - c/w PCP and GI ppx  - f/u remaining serologies (quantiferon)  - heme/onc input appreciated given hx of lymphoma and persistent leukocytosis to evaluate for possible paraneoplastic vasculitis. Cannot attribute such high leukocytosis to steroids, and her infectious concerns have been treated.     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 Improving SOB, decreasing oxygen requirements while on steroids and diuretics  Pertinent findings include LE purpuric rash, anemia, KANIKA, UA with active sediment, and multilobar bronchopneumonia. 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    Pertinent serologies:  Positive serologies + include IVAN, P-ANCA, MPO, hypocomplementemia, prior positive dsDNA, beta-2 glycoprotein IgA, ACl IgM.   Negative serologies - Cryoglobulin, hepatitis panel, anti-GBM    Recommendations:  - c/w solumedrol 1mg/kg 70mg daily (8/16/19 -).   -Please re-weigh the patient st  - Plan for Rituxan induction on 8/23. We have coordinated with chemo nurse. Nephrology in agreement.  - Will discuss rituxan with primary team and chemo nurse.  - c/w PCP and GI ppx  - f/u remaining serologies (quantiferon)  - heme/onc input appreciated given hx of lymphoma and persistent leukocytosis to evaluate for possible paraneoplastic vasculitis. Cannot attribute such high leukocytosis to steroids, and her infectious concerns have been treated.     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 Improving SOB, decreasing oxygen requirements while on steroids and diuretics  Pertinent findings include LE purpuric rash, anemia, KANIKA, UA with active sediment, and multilobar bronchopneumonia. 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    Pertinent serologies:  Positive serologies + include IVAN, P-ANCA, MPO, hypocomplementemia, prior positive dsDNA, beta-2 glycoprotein IgA, ACl IgM.   Negative serologies - Cryoglobulin, hepatitis panel, anti-GBM    Recommendations:  - c/w solumedrol 1mg/kg 70mg daily (8/16/19 -).   - Plan for Rituxan 600mg IV induction on 8/23. We have coordinated with chemo nurse. Nephrology in agreement.  - c/w PCP and GI ppx  - f/u remaining serologies (quantiferon)  - heme/onc input appreciated given hx of lymphoma and persistent leukocytosis to evaluate for possible paraneoplastic vasculitis. Cannot attribute such high leukocytosis to steroids, and her infectious concerns have been treated.   - Patient's daughter requesting letter for work.    Plan d/w Primary team    Wendy Pacheco MD  Rheumatology Fellow, PGY4    s/d/w Dr. 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 Improving SOB, decreasing oxygen requirements while on steroids and diuretics  Pertinent findings include LE purpuric rash, anemia, KANIKA, UA with active sediment, and multilobar bronchopneumonia. 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    Pertinent serologies:  Positive serologies + include IVAN, P-ANCA, MPO, hypocomplementemia, prior positive dsDNA, beta-2 glycoprotein IgA, ACl IgM.   Negative serologies - Cryoglobulin, hepatitis panel, anti-GBM    Recommendations:  - Pt with Hep B core Ab total positive, d/w ID and treating with entacavir. Hep B ag neg, pending HBV DNA. Will proceed with Ritxuan induction on 8/23.  - c/w solumedrol 1mg/kg 70mg daily (8/16/19 -).   - Plan for Rituxan 600mg IV induction on 8/23. We have coordinated with chemo nurse. Nephrology and ID in agreement.  - c/w PCP and GI ppx  - f/u remaining serologies (quantiferon pending, awaiting result but will proceed with rituxan on 8/23)  - heme/onc input appreciated given hx of lymphoma and persistent leukocytosis to evaluate for possible paraneoplastic vasculitis. Cannot attribute such high leukocytosis to steroids, and her infectious concerns have been treated.   - Patient's daughter requesting letter for work.    Plan d/w Primary team    Wendy Pacheco MD  Rheumatology Fellow, PGY4    s/d/w Dr. 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 Improving SOB, decreasing oxygen requirements while on steroids and diuretics  Pertinent findings include LE purpuric rash, anemia, KANIKA, UA with active sediment, and multilobar bronchopneumonia. 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    Pertinent serologies:  Positive serologies + include IVAN, P-ANCA, MPO, hypocomplementemia, prior positive dsDNA, beta-2 glycoprotein IgA, ACl IgM.   Negative serologies - Cryoglobulin, hepatitis panel, anti-GBM    Recommendations:  - Pt with Hep B core Ab total positive, d/w ID and treating with entacavir. Hep B ag neg, pending HBV DNA. Will proceed with Ritxuan induction on 8/23.  - c/w solumedrol 1mg/kg 70mg daily (8/16/19 -).   - Plan for Rituxan 600mg IV induction on 8/23. We have coordinated with chemo nurse. Nephrology and ID in agreement.  - c/w PCP and GI ppx  - f/u remaining serologies (quantiferon pending, awaiting result but will proceed with rituxan on 8/23, low suspicion for TB given improvement in lung process on steroids)  - heme/onc input appreciated given hx of lymphoma and persistent leukocytosis to evaluate for possible paraneoplastic vasculitis. Cannot attribute such high leukocytosis to steroids, and her infectious concerns have been treated.   - Patient's daughter requesting letter for work.    Plan d/w Primary team    Wendy Pacheco MD  Rheumatology Fellow, PGY4    s/d/w Dr. Diaz

## 2019-08-22 NOTE — PROGRESS NOTE ADULT - PROBLEM SELECTOR PLAN 7
Likely in the setting of steroids. A1c 5.6 on 8/13  - c/w humalog 8U qac  - c/w Lantus to 8U QHS due to lower FS in AM

## 2019-08-22 NOTE — PROGRESS NOTE ADULT - SUBJECTIVE AND OBJECTIVE BOX
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  Patient reports  she is feeling so much better  REVIEW OF SYSTEMS:  All other review of systems negative (Comprehensive ROS)    Antimicrobials Day #  :  trimethoprim  160 mG/sulfamethoxazole 800 mG 1 Tablet(s) Oral <User Schedule>    Other Medications Reviewed    T(F): 98.7 (08-22-19 @ 17:27), Max: 99 (08-22-19 @ 09:26)  HR: 87 (08-22-19 @ 17:27)  BP: 105/64 (08-22-19 @ 17:27)  RR: 19 (08-22-19 @ 17:27)  SpO2: 97% (08-22-19 @ 17:27)  Wt(kg): --    PHYSICAL EXAM:  General: alert, no acute distress  Eyes:  anicteric, no conjunctival injection, no discharge  Oropharynx: no lesions or injection 	  Neck: supple, without adenopathy  Lungs: clear to auscultation  Heart: regular rate and rhythm; no murmur, rubs or gallops  Abdomen: soft, nondistended, nontender, without mass or organomegaly  Skin: no lesions  Extremities: no clubbing, cyanosis, or edema  Neurologic: alert, oriented, moves all extremities    LAB RESULTS:                        9.7    27.22 )-----------( 342      ( 22 Aug 2019 06:14 )             32.0     08-22    137  |  94<L>  |  38<H>  ----------------------------<  75  4.5   |  32<H>  |  1.01    Ca    8.9      22 Aug 2019 06:14  Phos  2.9     08-22  Mg     1.6     08-22    RADIOLOGY REVIEWED:    < from: Xray Chest 1 View- PORTABLE-Urgent (08.21.19 @ 10:51) >  INTERPRETATION:     Heart size and the mediastinum cannot be accurately evaluated on this   projection. The thoracic aorta is calcified.  No focal lung consolidation, pleural effusion, or pneumothorax seen. Hazy   opacity projecting over the left lower chest is likely due to overlying   soft tissues. Previous mild interstitial edema has resolved.  There is osteoarthritic degenerative change of the spine.      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

## 2019-08-22 NOTE — PROGRESS NOTE ADULT - PROBLEM SELECTOR PLAN 6
Likely in the setting of chronic disease but difficult to interpret iron studies given done after PRBC transfusions   - No active bleeding  - Transfuse if Hgb<7.

## 2019-08-22 NOTE — PROGRESS NOTE ADULT - SUBJECTIVE AND OBJECTIVE BOX
[facility] Division of Kidney Diseases & Hypertension  FOLLOW UP NOTE  [phone]--------------------------------------------------------------------------------  Chief Complaint:Pulmonary hypertension      24 hour events noted. Charts reviewed. Had kidney biopsy performed on 8/20/19. Results showing Active crescentic glomerulonephritis, pauci-immune. Fibrocellular crescents are present in 57% of non-globally sclerosed glomeruli. Plans discussed with Rheum and Heme/Onc to start Rituxan given her previous history of "lymphoma"        PAST HISTORY  --------------------------------------------------------------------------------  No significant changes to PMH, PSH, FHx, SHx, unless otherwise noted    ALLERGIES & MEDICATIONS  --------------------------------------------------------------------------------  Allergies    Keflex (Unknown)  penicillin (Rash)    Intolerances      Standing Inpatient Medications  atorvastatin 10 milliGRAM(s) Oral at bedtime  cloNIDine 0.1 milliGRAM(s) Oral two times a day  dextrose 5%. 1000 milliLiter(s) IV Continuous <Continuous>  dextrose 50% Injectable 12.5 Gram(s) IV Push once  dextrose 50% Injectable 25 Gram(s) IV Push once  dextrose 50% Injectable 25 Gram(s) IV Push once  furosemide    Tablet 20 milliGRAM(s) Oral two times a day  heparin  Injectable 5000 Unit(s) SubCutaneous every 12 hours  insulin glargine Injectable (LANTUS) 8 Unit(s) SubCutaneous at bedtime  insulin lispro (HumaLOG) corrective regimen sliding scale   SubCutaneous three times a day before meals  insulin lispro (HumaLOG) corrective regimen sliding scale   SubCutaneous at bedtime  insulin lispro Injectable (HumaLOG) 8 Unit(s) SubCutaneous three times a day before meals  methylPREDNISolone sodium succinate Injectable 70 milliGRAM(s) IV Push daily  metoprolol tartrate 50 milliGRAM(s) Oral two times a day  montelukast 10 milliGRAM(s) Oral daily  pantoprazole    Tablet 40 milliGRAM(s) Oral before breakfast  sertraline 25 milliGRAM(s) Oral daily  trimethoprim  160 mG/sulfamethoxazole 800 mG 1 Tablet(s) Oral <User Schedule>    PRN Inpatient Medications  ALBUTerol    90 MICROgram(s) HFA Inhaler 2 Puff(s) Inhalation every 6 hours PRN  ALPRAZolam 0.5 milliGRAM(s) Oral two times a day PRN  dextrose 40% Gel 15 Gram(s) Oral once PRN  glucagon  Injectable 1 milliGRAM(s) IntraMuscular once PRN      REVIEW OF SYSTEMS  --------------------------------------------------------------------------------  Gen: nad  Respiratory: mild dyspnea  CV: No chest pain  GI: No abdominal pain  MSK: + LE edema  Neuro: No dizziness  Heme: No bleeding    All other systems were reviewed and are negative, except as noted.    VITALS/PHYSICAL EXAM  --------------------------------------------------------------------------------  T(C): 37.2 (08-22-19 @ 09:26), Max: 37.2 (08-21-19 @ 13:21)  HR: 91 (08-22-19 @ 09:26) (83 - 94)  BP: 105/59 (08-22-19 @ 09:26) (105/59 - 146/86)  RR: 24 (08-22-19 @ 09:26) (16 - 24)  SpO2: 97% (08-22-19 @ 09:26) (95% - 99%)  Wt(kg): --    Weight (kg): 64.501 (08-22-19 @ 09:55)      08-21-19 @ 07:01  -  08-22-19 @ 07:00  --------------------------------------------------------  IN: 500 mL / OUT: 1650 mL / NET: -1150 mL      Physical Exam:  	Gen: awake  	HEENT: supple  no LAD  	Pulm: minimal increased work of breathing, lungs CTA  	CV:  S1S2, no murmurs  	Abd: +BS, soft   	Ext: No B/L Lower ext edema  	Neuro: awake, responsive to commands  	Skin: warm and dry    LABS/STUDIES  --------------------------------------------------------------------------------              9.7    27.22 >-----------<  342      [08-22-19 @ 06:14]              32.0     137  |  94  |  38  ----------------------------<  75      [08-22-19 @ 06:14]  4.5   |  32  |  1.01        Ca     8.9     [08-22-19 @ 06:14]      Mg     1.6     [08-22-19 @ 06:14]      Phos  2.9     [08-22-19 @ 06:14]      PT/INR: PT 17.6 , INR 1.56       [08-22-19 @ 10:45]  PTT: 42.6       [08-22-19 @ 10:45]      Creatinine Trend:  SCr 1.01 [08-22 @ 06:14]  SCr 0.82 [08-21 @ 06:44]  SCr 0.84 [08-20 @ 06:00]  SCr 1.06 [08-19 @ 05:39]  SCr 0.94 [08-18 @ 06:41]        Vitamin D (25OH) 23.3      [08-16-19 @ 06:50] [facility] Division of Kidney Diseases & Hypertension  FOLLOW UP NOTE  [phone]--------------------------------------------------------------------------------  Chief Complaint:Pulmonary hypertension      24 hour events noted. Charts reviewed. Had kidney biopsy performed on 8/20/19. Results showing Active crescentic glomerulonephritis, pauci-immune. Fibrocellular crescents are present in 57% of non-globally sclerosed glomeruli. Plans discussed with Rheum and Heme/Onc to start Rituxan given her previous history of "lymphoma" Currently the patient feels well and walking across the room without oxygen requirements. She is minimally short of breath. Denies any other associated complaints.         PAST HISTORY  --------------------------------------------------------------------------------  No significant changes to PMH, PSH, FHx, SHx, unless otherwise noted    ALLERGIES & MEDICATIONS  --------------------------------------------------------------------------------  Allergies    Keflex (Unknown)  penicillin (Rash)    Intolerances      Standing Inpatient Medications  atorvastatin 10 milliGRAM(s) Oral at bedtime  cloNIDine 0.1 milliGRAM(s) Oral two times a day  dextrose 5%. 1000 milliLiter(s) IV Continuous <Continuous>  dextrose 50% Injectable 12.5 Gram(s) IV Push once  dextrose 50% Injectable 25 Gram(s) IV Push once  dextrose 50% Injectable 25 Gram(s) IV Push once  furosemide    Tablet 20 milliGRAM(s) Oral two times a day  heparin  Injectable 5000 Unit(s) SubCutaneous every 12 hours  insulin glargine Injectable (LANTUS) 8 Unit(s) SubCutaneous at bedtime  insulin lispro (HumaLOG) corrective regimen sliding scale   SubCutaneous three times a day before meals  insulin lispro (HumaLOG) corrective regimen sliding scale   SubCutaneous at bedtime  insulin lispro Injectable (HumaLOG) 8 Unit(s) SubCutaneous three times a day before meals  methylPREDNISolone sodium succinate Injectable 70 milliGRAM(s) IV Push daily  metoprolol tartrate 50 milliGRAM(s) Oral two times a day  montelukast 10 milliGRAM(s) Oral daily  pantoprazole    Tablet 40 milliGRAM(s) Oral before breakfast  sertraline 25 milliGRAM(s) Oral daily  trimethoprim  160 mG/sulfamethoxazole 800 mG 1 Tablet(s) Oral <User Schedule>    PRN Inpatient Medications  ALBUTerol    90 MICROgram(s) HFA Inhaler 2 Puff(s) Inhalation every 6 hours PRN  ALPRAZolam 0.5 milliGRAM(s) Oral two times a day PRN  dextrose 40% Gel 15 Gram(s) Oral once PRN  glucagon  Injectable 1 milliGRAM(s) IntraMuscular once PRN      REVIEW OF SYSTEMS  --------------------------------------------------------------------------------  Gen: nad  Respiratory: mild dyspnea  CV: No chest pain  GI: No abdominal pain  MSK: + LE edema  Neuro: No dizziness  Heme: No bleeding    All other systems were reviewed and are negative, except as noted.    VITALS/PHYSICAL EXAM  --------------------------------------------------------------------------------  T(C): 37.2 (08-22-19 @ 09:26), Max: 37.2 (08-21-19 @ 13:21)  HR: 91 (08-22-19 @ 09:26) (83 - 94)  BP: 105/59 (08-22-19 @ 09:26) (105/59 - 146/86)  RR: 24 (08-22-19 @ 09:26) (16 - 24)  SpO2: 97% (08-22-19 @ 09:26) (95% - 99%)  Wt(kg): --    Weight (kg): 64.501 (08-22-19 @ 09:55)      08-21-19 @ 07:01  -  08-22-19 @ 07:00  --------------------------------------------------------  IN: 500 mL / OUT: 1650 mL / NET: -1150 mL      Physical Exam:  	Gen: awake  	HEENT: supple  no LAD  	Pulm: minimal increased work of breathing, lungs CTA  	CV:  S1S2, no murmurs  	Abd: +BS, soft   	Ext: No B/L Lower ext edema  	Neuro: awake, responsive to commands  	Skin: warm and dry    LABS/STUDIES  --------------------------------------------------------------------------------              9.7    27.22 >-----------<  342      [08-22-19 @ 06:14]              32.0     137  |  94  |  38  ----------------------------<  75      [08-22-19 @ 06:14]  4.5   |  32  |  1.01        Ca     8.9     [08-22-19 @ 06:14]      Mg     1.6     [08-22-19 @ 06:14]      Phos  2.9     [08-22-19 @ 06:14]      PT/INR: PT 17.6 , INR 1.56       [08-22-19 @ 10:45]  PTT: 42.6       [08-22-19 @ 10:45]      Creatinine Trend:  SCr 1.01 [08-22 @ 06:14]  SCr 0.82 [08-21 @ 06:44]  SCr 0.84 [08-20 @ 06:00]  SCr 1.06 [08-19 @ 05:39]  SCr 0.94 [08-18 @ 06:41]        Vitamin D (25OH) 23.3      [08-16-19 @ 06:50]

## 2019-08-22 NOTE — PROGRESS NOTE ADULT - PROBLEM SELECTOR PLAN 4
Improved and at baseline. Likely 2/2 rheumatological etiology such as hydralazine induced SLE, small vessel vasculitis, or other complex depositing glomerulopathies  -continue to monitor. 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  - Will follow-up pending rheum serologies  - Avoid ACEis, ARBS, NSAIDs, and other nephrotoxic drugs  - Will renally dose all meds  - heme consulted and ordered Immunoglobulin levels and Urine Immunofixation; SPEP and UPEP normal; Serum immunofixation with light M band. Pending remaining labs  - BP control. For HTN pt w/ low norm BPs. Have been titrating down meds. Decreased clonidine to 0.1mg BID. No more hydralazine. C/w current regimen for now but can likely titrate meds down further  -steroids and rituxan as stated above

## 2019-08-22 NOTE — PROGRESS NOTE ADULT - PROBLEM SELECTOR PLAN 5
INR Initially supratherapeutic. Now Currently subtherapeutic in prep for possible renal bx after FFP and vit K last week  - Will dose coumadin again tonight 5mg  - Monitor INR daily  - Pt has high bleeding risk from pulmonary hemorrhages and bx. Risk of bleeding outweighs the benefits of bridging with heparin drip.   -C/W metoprolol

## 2019-08-22 NOTE — PROGRESS NOTE ADULT - ATTENDING COMMENTS
Hydralazine induced ANCA vasculitis.  consensus treatment between all teams to start Rituxan given its effects on both Vasculitis and Lymphoma

## 2019-08-22 NOTE — PROGRESS NOTE ADULT - SUBJECTIVE AND OBJECTIVE BOX
TON[facility]  [id number]    INTERVAL HPI/OVERNIGHT EVENTS:    Pt reports improvement of SOB, denies new complaints. No rash, dysuria, hematuria.    MEDICATIONS  (STANDING):  atorvastatin 10 milliGRAM(s) Oral at bedtime  cloNIDine 0.1 milliGRAM(s) Oral two times a day  dextrose 5%. 1000 milliLiter(s) (50 mL/Hr) IV Continuous <Continuous>  dextrose 50% Injectable 12.5 Gram(s) IV Push once  dextrose 50% Injectable 25 Gram(s) IV Push once  dextrose 50% Injectable 25 Gram(s) IV Push once  furosemide    Tablet 20 milliGRAM(s) Oral two times a day  heparin  Injectable 5000 Unit(s) SubCutaneous every 12 hours  insulin glargine Injectable (LANTUS) 8 Unit(s) SubCutaneous at bedtime  insulin lispro (HumaLOG) corrective regimen sliding scale   SubCutaneous three times a day before meals  insulin lispro (HumaLOG) corrective regimen sliding scale   SubCutaneous at bedtime  insulin lispro Injectable (HumaLOG) 8 Unit(s) SubCutaneous three times a day before meals  methylPREDNISolone sodium succinate Injectable 70 milliGRAM(s) IV Push daily  metoprolol tartrate 50 milliGRAM(s) Oral two times a day  montelukast 10 milliGRAM(s) Oral daily  pantoprazole    Tablet 40 milliGRAM(s) Oral before breakfast  sertraline 25 milliGRAM(s) Oral daily  trimethoprim  160 mG/sulfamethoxazole 800 mG 1 Tablet(s) Oral <User Schedule>    MEDICATIONS  (PRN):  ALBUTerol    90 MICROgram(s) HFA Inhaler 2 Puff(s) Inhalation every 6 hours PRN Shortness of Breath and/or Wheezing  ALPRAZolam 0.5 milliGRAM(s) Oral two times a day PRN Anxiety and Insomnia  dextrose 40% Gel 15 Gram(s) Oral once PRN Blood Glucose LESS THAN 70 milliGRAM(s)/deciliter  glucagon  Injectable 1 milliGRAM(s) IntraMuscular once PRN Glucose LESS THAN 70 milligrams/deciliter    Allergies  Keflex (Unknown)  penicillin (Rash)  Intolerances    Vital Signs Last 24 Hrs  T(C): 37.2 (22 Aug 2019 09:26), Max: 37.2 (21 Aug 2019 13:21)  T(F): 99 (22 Aug 2019 09:26), Max: 99 (21 Aug 2019 13:21)  HR: 91 (22 Aug 2019 09:26) (79 - 94)  BP: 105/59 (22 Aug 2019 09:26) (105/59 - 146/86)  BP(mean): 76 (22 Aug 2019 09:26) (76 - 76)  RR: 24 (22 Aug 2019 09:26) (16 - 24)  SpO2: 97% (22 Aug 2019 09:26) (95% - 99%)    Physical Exam:  General: NAD. Wearing NC  HEENT: EOMI, MMM  Cardio: +S1/S2, RRR  Resp: bibasilar crackles, no wheezing  GI: +BS, soft, NT/ND  MSK: no synovitis  Skin: no rash    LABS:                        9.7    27.22 )-----------( 342      ( 22 Aug 2019 06:14 )             32.0     08-22    137  |  94<L>  |  38<H>  ----------------------------<  75  4.5   |  32<H>  |  1.01    Ca    8.9      22 Aug 2019 06:14  Phos  2.9     08-22  Mg     1.6     08-22      PT/INR - ( 21 Aug 2019 06:44 )   PT: 12.8 SEC;   INR: 1.15 TON[facility]  [id number]    INTERVAL HPI/OVERNIGHT EVENTS:    Pt reports improvement of SOB, denies new complaints. No rash, dysuria, hematuria.    MEDICATIONS  (STANDING):  atorvastatin 10 milliGRAM(s) Oral at bedtime  cloNIDine 0.1 milliGRAM(s) Oral two times a day  dextrose 5%. 1000 milliLiter(s) (50 mL/Hr) IV Continuous <Continuous>  dextrose 50% Injectable 12.5 Gram(s) IV Push once  dextrose 50% Injectable 25 Gram(s) IV Push once  dextrose 50% Injectable 25 Gram(s) IV Push once  furosemide    Tablet 20 milliGRAM(s) Oral two times a day  heparin  Injectable 5000 Unit(s) SubCutaneous every 12 hours  insulin glargine Injectable (LANTUS) 8 Unit(s) SubCutaneous at bedtime  insulin lispro (HumaLOG) corrective regimen sliding scale   SubCutaneous three times a day before meals  insulin lispro (HumaLOG) corrective regimen sliding scale   SubCutaneous at bedtime  insulin lispro Injectable (HumaLOG) 8 Unit(s) SubCutaneous three times a day before meals  methylPREDNISolone sodium succinate Injectable 70 milliGRAM(s) IV Push daily  metoprolol tartrate 50 milliGRAM(s) Oral two times a day  montelukast 10 milliGRAM(s) Oral daily  pantoprazole    Tablet 40 milliGRAM(s) Oral before breakfast  sertraline 25 milliGRAM(s) Oral daily  trimethoprim  160 mG/sulfamethoxazole 800 mG 1 Tablet(s) Oral <User Schedule>    MEDICATIONS  (PRN):  ALBUTerol    90 MICROgram(s) HFA Inhaler 2 Puff(s) Inhalation every 6 hours PRN Shortness of Breath and/or Wheezing  ALPRAZolam 0.5 milliGRAM(s) Oral two times a day PRN Anxiety and Insomnia  dextrose 40% Gel 15 Gram(s) Oral once PRN Blood Glucose LESS THAN 70 milliGRAM(s)/deciliter  glucagon  Injectable 1 milliGRAM(s) IntraMuscular once PRN Glucose LESS THAN 70 milligrams/deciliter    Allergies  Keflex (Unknown)  penicillin (Rash)  Intolerances    Vital Signs Last 24 Hrs  T(C): 37.2 (22 Aug 2019 09:26), Max: 37.2 (21 Aug 2019 13:21)  T(F): 99 (22 Aug 2019 09:26), Max: 99 (21 Aug 2019 13:21)  HR: 91 (22 Aug 2019 09:26) (79 - 94)  BP: 105/59 (22 Aug 2019 09:26) (105/59 - 146/86)  BP(mean): 76 (22 Aug 2019 09:26) (76 - 76)  RR: 24 (22 Aug 2019 09:26) (16 - 24)  SpO2: 97% (22 Aug 2019 09:26) (95% - 99%)    Physical Exam:  General: NAD. Wearing NC  HEENT: EOMI, MMM  Cardio: +S1/S2, RRR  Resp: bibasilar crackles, no wheezing  GI: +BS, soft, NT/ND  MSK: no synovitis  Skin: no rash    LABS:                        9.7    27.22 )-----------( 342      ( 22 Aug 2019 06:14 )             32.0     08-22    137  |  94<L>  |  38<H>  ----------------------------<  75  4.5   |  32<H>  |  1.01    Ca    8.9      22 Aug 2019 06:14  Phos  2.9     08-22  Mg     1.6     08-22      PT/INR - ( 21 Aug 2019 06:44 )   PT: 12.8 SEC;   INR: 1.15       Protein Electrophoresis, Serum (08.13.19 @ 05:30)    Protein Total, Serum: 6.4 g/dL    Total Protein, Serum: 6.4 g/dL    Albumin, Serum: 3.0 g/dL    Alpha 1: 0.5 g/dL    Alpha 2: 0.7 g/dL    Beta Globulin: 0.9 g/dL    Gamma Globulin: 1.2 g/dL    M-Jacoby: Unable to Quantitate    % Albumin: 47.6 %    % Alpha 1: 8.2 %    % Alpha 2: 10.7 %    % Beta: 14.8 %    % Gamma: 18.7 %    % M Jacoby: Unable to Quantitate    Albumin/Globulin Ratio: 0.9 Ratio    Serum Protein Electrophoresis Interp: Weak Gamma-Migrating Paraprotein Identified    Serum Protein Electrophoresis Interp: Weak Gamma-Migrating Paraprotein Identified (08.13.19 @ 05:30)    Immunofixation, Serum (08.13.19 @ 05:30)    Immunofixation, Serum:   Weak IgM Lambda Band Identified    Reference Range: None Detected TON[facility]  [id number]    INTERVAL HPI/OVERNIGHT EVENTS:    Pt reports improvement of SOB, denies new complaints. No rash, dysuria, hematuria.    MEDICATIONS  (STANDING):  atorvastatin 10 milliGRAM(s) Oral at bedtime  cloNIDine 0.1 milliGRAM(s) Oral two times a day  dextrose 5%. 1000 milliLiter(s) (50 mL/Hr) IV Continuous <Continuous>  dextrose 50% Injectable 12.5 Gram(s) IV Push once  dextrose 50% Injectable 25 Gram(s) IV Push once  dextrose 50% Injectable 25 Gram(s) IV Push once  furosemide    Tablet 20 milliGRAM(s) Oral two times a day  heparin  Injectable 5000 Unit(s) SubCutaneous every 12 hours  insulin glargine Injectable (LANTUS) 8 Unit(s) SubCutaneous at bedtime  insulin lispro (HumaLOG) corrective regimen sliding scale   SubCutaneous three times a day before meals  insulin lispro (HumaLOG) corrective regimen sliding scale   SubCutaneous at bedtime  insulin lispro Injectable (HumaLOG) 8 Unit(s) SubCutaneous three times a day before meals  methylPREDNISolone sodium succinate Injectable 70 milliGRAM(s) IV Push daily  metoprolol tartrate 50 milliGRAM(s) Oral two times a day  montelukast 10 milliGRAM(s) Oral daily  pantoprazole    Tablet 40 milliGRAM(s) Oral before breakfast  sertraline 25 milliGRAM(s) Oral daily  trimethoprim  160 mG/sulfamethoxazole 800 mG 1 Tablet(s) Oral <User Schedule>    MEDICATIONS  (PRN):  ALBUTerol    90 MICROgram(s) HFA Inhaler 2 Puff(s) Inhalation every 6 hours PRN Shortness of Breath and/or Wheezing  ALPRAZolam 0.5 milliGRAM(s) Oral two times a day PRN Anxiety and Insomnia  dextrose 40% Gel 15 Gram(s) Oral once PRN Blood Glucose LESS THAN 70 milliGRAM(s)/deciliter  glucagon  Injectable 1 milliGRAM(s) IntraMuscular once PRN Glucose LESS THAN 70 milligrams/deciliter    Allergies  Keflex (Unknown)  penicillin (Rash)  Intolerances    Vital Signs Last 24 Hrs  T(C): 37.2 (22 Aug 2019 09:26), Max: 37.2 (21 Aug 2019 13:21)  T(F): 99 (22 Aug 2019 09:26), Max: 99 (21 Aug 2019 13:21)  HR: 91 (22 Aug 2019 09:26) (79 - 94)  BP: 105/59 (22 Aug 2019 09:26) (105/59 - 146/86)  BP(mean): 76 (22 Aug 2019 09:26) (76 - 76)  RR: 24 (22 Aug 2019 09:26) (16 - 24)  SpO2: 97% (22 Aug 2019 09:26) (95% - 99%)    Physical Exam:  General: NAD. Wearing NC  HEENT: EOMI, MMM  Cardio: +S1/S2, RRR  Resp: bibasilar crackles, no wheezing  GI: +BS, soft, NT/ND  MSK: no synovitis  Skin: no rash    LABS:                        9.7    27.22 )-----------( 342      ( 22 Aug 2019 06:14 )             32.0     08-22    137  |  94<L>  |  38<H>  ----------------------------<  75  4.5   |  32<H>  |  1.01    Ca    8.9      22 Aug 2019 06:14  Phos  2.9     08-22  Mg     1.6     08-22      PT/INR - ( 21 Aug 2019 06:44 )   PT: 12.8 SEC;   INR: 1.15       Protein Electrophoresis, Serum (08.13.19 @ 05:30)    Protein Total, Serum: 6.4 g/dL    Total Protein, Serum: 6.4 g/dL    Albumin, Serum: 3.0 g/dL    Alpha 1: 0.5 g/dL    Alpha 2: 0.7 g/dL    Beta Globulin: 0.9 g/dL    Gamma Globulin: 1.2 g/dL    M-Jacoby: Unable to Quantitate    % Albumin: 47.6 %    % Alpha 1: 8.2 %    % Alpha 2: 10.7 %    % Beta: 14.8 %    % Gamma: 18.7 %    % M Jacoby: Unable to Quantitate    Albumin/Globulin Ratio: 0.9 Ratio    Serum Protein Electrophoresis Interp: Weak Gamma-Migrating Paraprotein Identified    Serum Protein Electrophoresis Interp: Weak Gamma-Migrating Paraprotein Identified (08.13.19 @ 05:30)    Immunofixation, Serum (08.13.19 @ 05:30)    Immunofixation, Serum:   Weak IgM Lambda Band Identified    Reference Range: None Detected    Acute Hepatitis Panel (08.15.19 @ 06:45)    Hepatitis C Virus Interpretation: Nonreactive Hepatitis C AB  S/CO Ratio                        Interpretation  < 1.00                                   Non-Reactive  1.00 - 4.99                         Weakly-Reactive  >= 5.00                                Reactive  Non-Reactive: [place]erson with a non-reactive HCV antibody  result is considered uninfected.  No further action is  needed unless recent infection is suspected.  In these  cases, consider repeat testing later to detect  seroconversion..  Weakly-Reactive: HCV antibody test is abnormal, HCV RNA  Qualitative test will follow.  Reactive: HCV antibody test is abnormal, HCV RNA  Qualitative test will follow.  Note: HCV antibody testing is performed on the [employer]   system.    Hepatitis C Virus S/CO Ratio: 0.28 S/CO    Hepatitis B Core IgM Antibody: Nonreactive    Hepatitis B Surface Antigen: Nonreactive    Hepatitis A IgM Antibody: Nonreactive    Hepatitis B Core Antibody, Total (08.15.19 @ 06:45)    Hepatitis B Core Antibody, Total: Reactive: Test repeated.

## 2019-08-22 NOTE — PROGRESS NOTE ADULT - SUBJECTIVE AND OBJECTIVE BOX
Patient is a 84y old  Female who presents with a chief complaint of MICU transfer - hypoxic respiratory failure (21 Aug 2019 13:16)      SUBJECTIVE / OVERNIGHT EVENTS:    Review of Systems:     MEDICATIONS  (STANDING):  atorvastatin 10 milliGRAM(s) Oral at bedtime  cloNIDine 0.1 milliGRAM(s) Oral two times a day  dextrose 5%. 1000 milliLiter(s) (50 mL/Hr) IV Continuous <Continuous>  dextrose 50% Injectable 12.5 Gram(s) IV Push once  dextrose 50% Injectable 25 Gram(s) IV Push once  dextrose 50% Injectable 25 Gram(s) IV Push once  furosemide    Tablet 20 milliGRAM(s) Oral two times a day  heparin  Injectable 5000 Unit(s) SubCutaneous every 12 hours  insulin glargine Injectable (LANTUS) 8 Unit(s) SubCutaneous at bedtime  insulin lispro (HumaLOG) corrective regimen sliding scale   SubCutaneous three times a day before meals  insulin lispro (HumaLOG) corrective regimen sliding scale   SubCutaneous at bedtime  insulin lispro Injectable (HumaLOG) 8 Unit(s) SubCutaneous three times a day before meals  methylPREDNISolone sodium succinate Injectable 70 milliGRAM(s) IV Push daily  metoprolol tartrate 50 milliGRAM(s) Oral two times a day  montelukast 10 milliGRAM(s) Oral daily  pantoprazole    Tablet 40 milliGRAM(s) Oral before breakfast  sertraline 25 milliGRAM(s) Oral daily  trimethoprim  160 mG/sulfamethoxazole 800 mG 1 Tablet(s) Oral <User Schedule>    MEDICATIONS  (PRN):  ALBUTerol    90 MICROgram(s) HFA Inhaler 2 Puff(s) Inhalation every 6 hours PRN Shortness of Breath and/or Wheezing  ALPRAZolam 0.5 milliGRAM(s) Oral two times a day PRN Anxiety and Insomnia  dextrose 40% Gel 15 Gram(s) Oral once PRN Blood Glucose LESS THAN 70 milliGRAM(s)/deciliter  glucagon  Injectable 1 milliGRAM(s) IntraMuscular once PRN Glucose LESS THAN 70 milligrams/deciliter      PHYSICAL EXAM:    I&O's Summary    21 Aug 2019 07:01  -  22 Aug 2019 07:00  --------------------------------------------------------  IN: 500 mL / OUT: 1650 mL / NET: -1150 mL    Vital Signs Last 24 Hrs  T(C): 36.5 (22 Aug 2019 05:15), Max: 37.2 (21 Aug 2019 13:21)  T(F): 97.7 (22 Aug 2019 05:15), Max: 99 (21 Aug 2019 13:21)  HR: 86 (22 Aug 2019 05:15) (79 - 94)  BP: 142/90 (22 Aug 2019 05:15) (108/75 - 146/86)  BP(mean): --  RR: 16 (22 Aug 2019 05:15) (16 - 18)  SpO2: 99% (22 Aug 2019 05:15) (95% - 99%)    LABS:  CAPILLARY BLOOD GLUCOSE      POCT Blood Glucose.: 137 mg/dL (21 Aug 2019 21:19)  POCT Blood Glucose.: 214 mg/dL (21 Aug 2019 17:42)  POCT Blood Glucose.: 209 mg/dL (21 Aug 2019 12:43)  POCT Blood Glucose.: 98 mg/dL (21 Aug 2019 08:47)  POCT Blood Glucose.: 71 mg/dL (21 Aug 2019 08:25)                          9.7    27.22 )-----------( 342      ( 22 Aug 2019 06:14 )             32.0     08-22    137  |  94<L>  |  38<H>  ----------------------------<  75  4.5   |  32<H>  |  1.01    Ca    8.9      22 Aug 2019 06:14  Phos  2.9     08-22  Mg     1.6     08-22      PT/INR - ( 21 Aug 2019 06:44 )   PT: 12.8 SEC;   INR: 1.15          Blood Cx - No growth to date - 8/10  urine Leg - negative  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  RVP and FLU negatvie  HIV negative  Mucoplasma IgG and IgM negative  Crypto Ag negative  Strep pneumo Ag negative  Histoplama Antigen negative  QuantGold Pending result    Rheum:  +IVAN  +p-anca  -c-anca  Myeloperoxidase Ab +  GBM Ab Negative  C3 - 76 (L)  C4 - 10 (L)  Proteinase 3 Ab negative  DsDNA - 435 (H)  Protein electrop[haresis - weak Cameron-migrating paraprotein   SPEP - negative  UPEP - negative  Immunofixation - SMALL MONOCLONAL IGM LAMBDA AND POLYCLONAL PROTEIN  Anti Histone, Anti Centromere, Sjogren Abs, Anticardiolipin Ab, Antiglycoprotein - pending results Patient is a 84y old  Female who presents with a chief complaint of MICU transfer - hypoxic respiratory failure (21 Aug 2019 13:16)      SUBJECTIVE / OVERNIGHT EVENTS: Pt reports feeling better today. Son at bedside     Review of Systems:     MEDICATIONS  (STANDING):  atorvastatin 10 milliGRAM(s) Oral at bedtime  cloNIDine 0.1 milliGRAM(s) Oral two times a day  dextrose 5%. 1000 milliLiter(s) (50 mL/Hr) IV Continuous <Continuous>  dextrose 50% Injectable 12.5 Gram(s) IV Push once  dextrose 50% Injectable 25 Gram(s) IV Push once  dextrose 50% Injectable 25 Gram(s) IV Push once  furosemide    Tablet 20 milliGRAM(s) Oral two times a day  heparin  Injectable 5000 Unit(s) SubCutaneous every 12 hours  insulin glargine Injectable (LANTUS) 8 Unit(s) SubCutaneous at bedtime  insulin lispro (HumaLOG) corrective regimen sliding scale   SubCutaneous three times a day before meals  insulin lispro (HumaLOG) corrective regimen sliding scale   SubCutaneous at bedtime  insulin lispro Injectable (HumaLOG) 8 Unit(s) SubCutaneous three times a day before meals  methylPREDNISolone sodium succinate Injectable 70 milliGRAM(s) IV Push daily  metoprolol tartrate 50 milliGRAM(s) Oral two times a day  montelukast 10 milliGRAM(s) Oral daily  pantoprazole    Tablet 40 milliGRAM(s) Oral before breakfast  sertraline 25 milliGRAM(s) Oral daily  trimethoprim  160 mG/sulfamethoxazole 800 mG 1 Tablet(s) Oral <User Schedule>    MEDICATIONS  (PRN):  ALBUTerol    90 MICROgram(s) HFA Inhaler 2 Puff(s) Inhalation every 6 hours PRN Shortness of Breath and/or Wheezing  ALPRAZolam 0.5 milliGRAM(s) Oral two times a day PRN Anxiety and Insomnia  dextrose 40% Gel 15 Gram(s) Oral once PRN Blood Glucose LESS THAN 70 milliGRAM(s)/deciliter  glucagon  Injectable 1 milliGRAM(s) IntraMuscular once PRN Glucose LESS THAN 70 milligrams/deciliter      PHYSICAL EXAM:    I&O's Summary    21 Aug 2019 07:01  -  22 Aug 2019 07:00  --------------------------------------------------------  IN: 500 mL / OUT: 1650 mL / NET: -1150 mL    Vital Signs Last 24 Hrs  T(C): 36.5 (22 Aug 2019 05:15), Max: 37.2 (21 Aug 2019 13:21)  T(F): 97.7 (22 Aug 2019 05:15), Max: 99 (21 Aug 2019 13:21)  HR: 86 (22 Aug 2019 05:15) (79 - 94)  BP: 142/90 (22 Aug 2019 05:15) (108/75 - 146/86)  BP(mean): --  RR: 16 (22 Aug 2019 05:15) (16 - 18)  SpO2: 99% (22 Aug 2019 05:15) (95% - 99%)  PHYSICAL EXAM:  GENERAL: NAD, sitting in chair  HEAD:  Atraumatic, Normocephalic  EYES: EOMI, conjunctiva and sclera clear  NECK: Supple, No JVD  CHEST/LUNG: CTA but poor resp effort   HEART:; No murmurs, rubs, or gallops; No LE edema  ABDOMEN: Soft, Nontender, Nondistended; Bowel sounds present  EXTREMITIES:  2+ Peripheral Pulses, No clubbing, cyanosis, or edema  PSYCH:calm   NEUROLOGY: non-focal      LABS:  CAPILLARY BLOOD GLUCOSE      POCT Blood Glucose.: 137 mg/dL (21 Aug 2019 21:19)  POCT Blood Glucose.: 214 mg/dL (21 Aug 2019 17:42)  POCT Blood Glucose.: 209 mg/dL (21 Aug 2019 12:43)  POCT Blood Glucose.: 98 mg/dL (21 Aug 2019 08:47)  POCT Blood Glucose.: 71 mg/dL (21 Aug 2019 08:25)                          9.7    27.22 )-----------( 342      ( 22 Aug 2019 06:14 )             32.0     08-22    137  |  94<L>  |  38<H>  ----------------------------<  75  4.5   |  32<H>  |  1.01    Ca    8.9      22 Aug 2019 06:14  Phos  2.9     08-22  Mg     1.6     08-22      PT/INR - ( 21 Aug 2019 06:44 )   PT: 12.8 SEC;   INR: 1.15          Blood Cx - No growth to date - 8/10  urine Leg - negative  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

## 2019-08-22 NOTE — PROGRESS NOTE ADULT - ASSESSMENT
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 Not planned for bronch given low yield now s/p renal Bx 8/20/19. Now with mild to moderate respiratory distress and elevated bicarb likely the setting of fluid retention from steroids c/b alkalosis from diuresis but currently not hypoxic

## 2019-08-22 NOTE — PROGRESS NOTE ADULT - PROBLEM SELECTOR PLAN 1
Patient presented with KANIKA. Patient with presented with elevated serum creatinine of 2.03 on 8/10/19, baseline 0.5.  Now currently improved. She presents with purpuric rash on admission which improved after giving IV steroids. Renal function and rash seem to be improving with steroid therapy.  Serology with low C3/C4, Positive p-ANCA, negative ANCA elevated ESR/CRP. Patient with positive IVAN and anti-dsDNA on 6/23/2018 and again elevated on this hospital stay in the setting of chronic Hydralazine use. 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 Agree with starting Rituxan. Continue to monitor labs and renal function.

## 2019-08-23 NOTE — PROGRESS NOTE ADULT - ASSESSMENT
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 Not planned for bronch given low yield now s/p renal Bx 8/20/19 showing active crescentic glomerulonephritis, pauci-immune suspected to be 2/2 hydralazine .

## 2019-08-23 NOTE — PROGRESS NOTE ADULT - SUBJECTIVE AND OBJECTIVE BOX
Patient is a 84y old  Female who presents with a chief complaint of MICU transfer - hypoxic respiratory failure (22 Aug 2019 18:30)      SUBJECTIVE / OVERNIGHT EVENTS:    Review of Systems:     MEDICATIONS  (STANDING):  atorvastatin 10 milliGRAM(s) Oral at bedtime  cloNIDine 0.1 milliGRAM(s) Oral two times a day  dextrose 5%. 1000 milliLiter(s) (50 mL/Hr) IV Continuous <Continuous>  dextrose 50% Injectable 12.5 Gram(s) IV Push once  dextrose 50% Injectable 25 Gram(s) IV Push once  dextrose 50% Injectable 25 Gram(s) IV Push once  entecavir 0.5 milliGRAM(s) Oral daily  furosemide    Tablet 20 milliGRAM(s) Oral two times a day  heparin  Injectable 5000 Unit(s) SubCutaneous every 12 hours  insulin glargine Injectable (LANTUS) 8 Unit(s) SubCutaneous at bedtime  insulin lispro (HumaLOG) corrective regimen sliding scale   SubCutaneous three times a day before meals  insulin lispro (HumaLOG) corrective regimen sliding scale   SubCutaneous at bedtime  insulin lispro Injectable (HumaLOG) 8 Unit(s) SubCutaneous three times a day before meals  methylPREDNISolone sodium succinate Injectable 70 milliGRAM(s) IV Push daily  metoprolol tartrate 50 milliGRAM(s) Oral two times a day  montelukast 10 milliGRAM(s) Oral daily  pantoprazole    Tablet 40 milliGRAM(s) Oral before breakfast  sertraline 25 milliGRAM(s) Oral daily  trimethoprim  160 mG/sulfamethoxazole 800 mG 1 Tablet(s) Oral <User Schedule>    MEDICATIONS  (PRN):  ALBUTerol    90 MICROgram(s) HFA Inhaler 2 Puff(s) Inhalation every 6 hours PRN Shortness of Breath and/or Wheezing  ALPRAZolam 0.5 milliGRAM(s) Oral two times a day PRN Anxiety and Insomnia  dextrose 40% Gel 15 Gram(s) Oral once PRN Blood Glucose LESS THAN 70 milliGRAM(s)/deciliter  glucagon  Injectable 1 milliGRAM(s) IntraMuscular once PRN Glucose LESS THAN 70 milligrams/deciliter      PHYSICAL EXAM:    I&O's Summary  Vital Signs Last 24 Hrs  T(C): 36.7 (23 Aug 2019 06:34), Max: 37.2 (22 Aug 2019 09:26)  T(F): 98 (23 Aug 2019 06:34), Max: 99 (22 Aug 2019 09:26)  HR: 84 (23 Aug 2019 06:34) (84 - 92)  BP: 125/84 (23 Aug 2019 06:34) (105/59 - 125/84)  BP(mean): 76 (22 Aug 2019 09:26) (76 - 76)  RR: 17 (23 Aug 2019 06:34) (17 - 24)  SpO2: 95% (23 Aug 2019 06:34) (95% - 97%)    LABS:  CAPILLARY BLOOD GLUCOSE      POCT Blood Glucose.: 124 mg/dL (23 Aug 2019 07:23)  POCT Blood Glucose.: 61 mg/dL (23 Aug 2019 06:45)  POCT Blood Glucose.: 117 mg/dL (22 Aug 2019 21:25)  POCT Blood Glucose.: 104 mg/dL (22 Aug 2019 17:45)  POCT Blood Glucose.: 133 mg/dL (22 Aug 2019 12:40)                          10.6   43.66 )-----------( 390      ( 23 Aug 2019 07:39 )             35.4     08-22    137  |  94<L>  |  38<H>  ----------------------------<  75  4.5   |  32<H>  |  1.01    Ca    8.9      22 Aug 2019 06:14  Phos  2.9     08-22  Mg     1.6     08-22      PT/INR - ( 23 Aug 2019 07:39 )   PT: 24.1 SEC;   INR: 2.12          PTT - ( 23 Aug 2019 07:39 )  PTT:41.9 SEC          RADIOLOGY & ADDITIONAL TESTS:    Imaging Personally Reviewed:    Consultant(s) Notes Reviewed:      Care Discussed with Consultants/Other Providers: Patient is a 84y old  Female who presents with a chief complaint of MICU transfer - hypoxic respiratory failure (22 Aug 2019 18:30)      SUBJECTIVE / OVERNIGHT EVENTS: Pt reports being anxious about starting rituxan today. Otherwise she denies f/c SOB, CP, pain and bx site. last norm BM yesterday    Review of Systems:     MEDICATIONS  (STANDING):  atorvastatin 10 milliGRAM(s) Oral at bedtime  cloNIDine 0.1 milliGRAM(s) Oral two times a day  dextrose 5%. 1000 milliLiter(s) (50 mL/Hr) IV Continuous <Continuous>  dextrose 50% Injectable 12.5 Gram(s) IV Push once  dextrose 50% Injectable 25 Gram(s) IV Push once  dextrose 50% Injectable 25 Gram(s) IV Push once  entecavir 0.5 milliGRAM(s) Oral daily  furosemide    Tablet 20 milliGRAM(s) Oral two times a day  heparin  Injectable 5000 Unit(s) SubCutaneous every 12 hours  insulin glargine Injectable (LANTUS) 8 Unit(s) SubCutaneous at bedtime  insulin lispro (HumaLOG) corrective regimen sliding scale   SubCutaneous three times a day before meals  insulin lispro (HumaLOG) corrective regimen sliding scale   SubCutaneous at bedtime  insulin lispro Injectable (HumaLOG) 8 Unit(s) SubCutaneous three times a day before meals  methylPREDNISolone sodium succinate Injectable 70 milliGRAM(s) IV Push daily  metoprolol tartrate 50 milliGRAM(s) Oral two times a day  montelukast 10 milliGRAM(s) Oral daily  pantoprazole    Tablet 40 milliGRAM(s) Oral before breakfast  sertraline 25 milliGRAM(s) Oral daily  trimethoprim  160 mG/sulfamethoxazole 800 mG 1 Tablet(s) Oral <User Schedule>    MEDICATIONS  (PRN):  ALBUTerol    90 MICROgram(s) HFA Inhaler 2 Puff(s) Inhalation every 6 hours PRN Shortness of Breath and/or Wheezing  ALPRAZolam 0.5 milliGRAM(s) Oral two times a day PRN Anxiety and Insomnia  dextrose 40% Gel 15 Gram(s) Oral once PRN Blood Glucose LESS THAN 70 milliGRAM(s)/deciliter  glucagon  Injectable 1 milliGRAM(s) IntraMuscular once PRN Glucose LESS THAN 70 milligrams/deciliter      PHYSICAL EXAM:    I&O's Summary  Vital Signs Last 24 Hrs  T(C): 36.7 (23 Aug 2019 06:34), Max: 37.2 (22 Aug 2019 09:26)  T(F): 98 (23 Aug 2019 06:34), Max: 99 (22 Aug 2019 09:26)  HR: 84 (23 Aug 2019 06:34) (84 - 92)  BP: 125/84 (23 Aug 2019 06:34) (105/59 - 125/84)  BP(mean): 76 (22 Aug 2019 09:26) (76 - 76)  RR: 17 (23 Aug 2019 06:34) (17 - 24)  SpO2: 95% (23 Aug 2019 06:34) (95% - 97%)  PHYSICAL EXAM:  GENERAL: NAD, sitting in chair  HEAD:  Atraumatic, Normocephalic  EYES: EOMI, conjunctiva and sclera clear  NECK: Supple, No JVD  CHEST/LUNG: CTA but poor resp effort   HEART:; No murmurs, rubs, or gallops; No LE edema  ABDOMEN: Soft, Nontender, Nondistended; Bowel sounds present  EXTREMITIES:  2+ Peripheral Pulses, No clubbing, cyanosis, or edema  PSYCH:anxious  NEUROLOGY: non-focal    LABS:  CAPILLARY BLOOD GLUCOSE      POCT Blood Glucose.: 124 mg/dL (23 Aug 2019 07:23)  POCT Blood Glucose.: 61 mg/dL (23 Aug 2019 06:45)  POCT Blood Glucose.: 117 mg/dL (22 Aug 2019 21:25)  POCT Blood Glucose.: 104 mg/dL (22 Aug 2019 17:45)  POCT Blood Glucose.: 133 mg/dL (22 Aug 2019 12:40)                          10.6   43.66 )-----------( 390      ( 23 Aug 2019 07:39 )             35.4     08-22    137  |  94<L>  |  38<H>  ----------------------------<  75  4.5   |  32<H>  |  1.01    Ca    8.9      22 Aug 2019 06:14  Phos  2.9     08-22  Mg     1.6     08-22      PT/INR - ( 23 Aug 2019 07:39 )   PT: 24.1 SEC;   INR: 2.12          PTT - ( 23 Aug 2019 07:39 )  PTT:41.9 SEC          RADIOLOGY & ADDITIONAL TESTS:    Imaging Personally Reviewed:    Consultant(s) Notes Reviewed:      Care Discussed with Consultants/Other Providers:

## 2019-08-23 NOTE — PROGRESS NOTE ADULT - PROBLEM SELECTOR PLAN 1
Likely retaining fluid due to steroids. Volume status improved.   - Currently in PO lasix 20mg BID   - Continue to monitor o2 sat and resp status. She has been sating well on RA  - Will continue to follow-up with pulm and renal recs daily

## 2019-08-23 NOTE — PROGRESS NOTE ADULT - PROBLEM SELECTOR PLAN 4
Improved and at baseline. Likely 2/2 rheumatological etiology as described above.  -continue to monitor. 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  - Will follow-up pending rheum/heme w/u labs  - Avoid ACEis, ARBS, NSAIDs, and other nephrotoxic drugs  - Will renally dose all meds  - heme consulted and ordered Immunoglobulin levels and Urine Immunofixation; SPEP and UPEP normal; Serum immunofixation with light M band. Pending remaining labs  - BP control. For HTN pt w/ low norm BPs. Have been titrating down meds. Decreased clonidine to 0.1mg BID. No more hydralazine. C/w current regimen for now but can likely titrate meds down further  -steroids and rituxan as stated above

## 2019-08-23 NOTE — PROGRESS NOTE ADULT - ASSESSMENT
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 Improving SOB, decreasing oxygen requirements while on steroids and diuretics  Pertinent findings include LE purpuric rash, anemia, KANIKA, UA with active sediment, and multilobar bronchopneumonia. 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    Pertinent serologies:  Positive serologies + include IVAN, P-ANCA, MPO, hypocomplementemia, prior positive dsDNA, beta-2 glycoprotein IgA, ACl IgM, +Hep B core Ab total  Negative serologies - Cryoglobulin, rest of hepatitis panel, anti-GBM    Recommendations:  -Pt currently receiving Rituxan 600mg IV induction   - On 8/24, please discontinue solumedrol and start oral prednsione  -On 8/24, start Prednsione 60mg PO daily. c/w PCP ppx with bactrim. Please start GI ppx with PPI.   -c/w entacavir for positive Hep B core serology as per ID  - f/u remaining serologies (quantiferon pending, low suspicion for TB given improvement in lung process on steroids)  - heme/onc input appreciated given hx of lymphoma and persistent leukocytosis to evaluate for possible paraneoplastic vasculitis. Cannot attribute such high leukocytosis to steroids, and her infectious concerns have been treated.   - Patient ok for discharge from rheum perspective if rituxan is well tolerated. Can discharged with above medications.  -Please call Rheumatology at [place] [phone] to schedule an appointment in 1 week with Dr. Diaz.    Lucho d/w Primary team    Wendy Pacheco MD  Rheumatology Fellow, PGY4    s/d/w Dr. 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 Improving SOB, decreasing oxygen requirements while on steroids and diuretics  Pertinent findings include LE purpuric rash, anemia, KANIKA, UA with active sediment, and multilobar bronchopneumonia. 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    Pertinent serologies:  Positive serologies + include IVAN, P-ANCA, MPO, hypocomplementemia, prior positive dsDNA, beta-2 glycoprotein IgA, ACl IgM, +Hep B core Ab total  Negative serologies - Cryoglobulin, rest of hepatitis panel, anti-GBM    Recommendations:  -Pt currently receiving Rituxan 600mg IV induction, will need 3 additional doses qweekly as OP   -On 8/24, change IV solumedrol to Prednsione 60mg PO daily. c/w PCP ppx with bactrim DS MWF. Please start GI ppx with PPI.   -c/w entacavir for positive Hep B core serology as per ID  - f/u remaining serologies (quantiferon pending, low suspicion for TB given improvement in lung process on steroids)  - heme/onc input appreciated given hx of lymphoma and persistent leukocytosis to evaluate for possible paraneoplastic vasculitis. Cannot attribute such high leukocytosis to steroids, and her infectious concerns have been treated.   - Patient ok for discharge from rheum perspective if rituxan is well tolerated and remains hemodynamically stable 12 hours after infusion completed. Can discharged with above medications.  -Please call Rheumatology at [place] [phone] to schedule an appointment in 1 week with Dr. Diaz. Pt's daughter is aware of plan, infusion will be coordinated thru Dr Diaz.    Lucho d/w Primary team    Wendy Pacheco MD  Rheumatology Fellow, PGY4    s/d/w Dr. Diaz

## 2019-08-23 NOTE — PROGRESS NOTE ADULT - SUBJECTIVE AND OBJECTIVE BOX
[facility]  [id number]    INTERVAL HPI/OVERNIGHT EVENTS:    Pt receiving rituxan infusion. Feels ok, denies complaints or pain. Denies rash, oral ulcers, CP, SOB, abdominal pain, dysuria, hematuria.    MEDICATIONS  (STANDING):  atorvastatin 10 milliGRAM(s) Oral at bedtime  cloNIDine 0.1 milliGRAM(s) Oral two times a day  dextrose 5%. 1000 milliLiter(s) (50 mL/Hr) IV Continuous <Continuous>  dextrose 50% Injectable 12.5 Gram(s) IV Push once  dextrose 50% Injectable 25 Gram(s) IV Push once  dextrose 50% Injectable 25 Gram(s) IV Push once  entecavir 0.5 milliGRAM(s) Oral daily  furosemide    Tablet 20 milliGRAM(s) Oral two times a day  heparin  Injectable 5000 Unit(s) SubCutaneous every 12 hours  insulin lispro (HumaLOG) corrective regimen sliding scale   SubCutaneous three times a day before meals  insulin lispro (HumaLOG) corrective regimen sliding scale   SubCutaneous at bedtime  insulin lispro Injectable (HumaLOG) 8 Unit(s) SubCutaneous three times a day before meals  methylPREDNISolone sodium succinate Injectable 70 milliGRAM(s) IV Push daily  metoprolol tartrate 50 milliGRAM(s) Oral two times a day  montelukast 10 milliGRAM(s) Oral daily  pantoprazole    Tablet 40 milliGRAM(s) Oral before breakfast  sertraline 25 milliGRAM(s) Oral daily  trimethoprim  160 mG/sulfamethoxazole 800 mG 1 Tablet(s) Oral <User Schedule>  warfarin 3 milliGRAM(s) Oral <User Schedule>    MEDICATIONS  (PRN):  ALBUTerol    90 MICROgram(s) HFA Inhaler 2 Puff(s) Inhalation every 6 hours PRN Shortness of Breath and/or Wheezing  ALPRAZolam 0.5 milliGRAM(s) Oral two times a day PRN Anxiety and Insomnia  dextrose 40% Gel 15 Gram(s) Oral once PRN Blood Glucose LESS THAN 70 milliGRAM(s)/deciliter  diphenhydrAMINE   Injectable 50 milliGRAM(s) IV Push once PRN PRN Chemotherapy Reaction; 8/23/19  glucagon  Injectable 1 milliGRAM(s) IntraMuscular once PRN Glucose LESS THAN 70 milligrams/deciliter  hydrocortisone sodium succinate Injectable 100 milliGRAM(s) IV Push once PRN PRN Chemotherapy Reaction; 8/23/19  meperidine     Injectable 25 milliGRAM(s) IV Push once PRN PRN Chemotherapy Reaction;(Rigors) 8/23/19  meperidine     Injectable 25 milliGRAM(s) IV Push once PRN PRN Chemotherapy Reaction; (Rigors- Repeat Dose) 8/23/19    Allergies  Keflex (Unknown)  penicillin (Rash)  Intolerances    Vital Signs Last 24 Hrs  T(C): 36.7 (23 Aug 2019 14:41), Max: 37.2 (23 Aug 2019 10:38)  T(F): 98.1 (23 Aug 2019 14:41), Max: 98.9 (23 Aug 2019 10:38)  HR: 79 (23 Aug 2019 16:17) (75 - 92)  BP: 106/75 (23 Aug 2019 16:17) (97/70 - 125/84)  BP(mean): --  RR: 18 (23 Aug 2019 16:17) (17 - 19)  SpO2: 96% (23 Aug 2019 16:17) (93% - 99%)    Physical Exam:  General: NAD  HEENT: MMM  Cardio: +S1/S2, RRR  Resp: minimal bibasilar crackles, no wheezing  GI: +BS, soft, NT/ND  MSK: no synovitis  Skin: no rash    LABS:                        10.0   38.01 )-----------( 341      ( 23 Aug 2019 11:10 )             32.6     08-23    138  |  92<L>  |  33<H>  ----------------------------<  116<H>  4.1   |  31  |  1.20    Ca    9.4      23 Aug 2019 07:39  Phos  4.1     08-23  Mg     1.6     08-23    TPro  6.4  /  Alb  3.5  /  TBili  0.6  /  DBili  x   /  AST  24  /  ALT  23  /  AlkPhos  73  08-23    PT/INR - ( 23 Aug 2019 07:39 )   PT: 24.1 SEC;   INR: 2.12        PTT - ( 23 Aug 2019 07:39 )  PTT:41.9 SEC

## 2019-08-23 NOTE — PROGRESS NOTE ADULT - PROBLEM SELECTOR PLAN 7
Resolved. Likely in the setting of steroids. A1c 5.6 on 8/13. Hypoglycemia noted today  - c/w humalog 8U qac  - will d/c Lantus to 8U QHS for now given hypoglycemia and steroid induced hyperglycemia usually affects post prandial FS.

## 2019-08-23 NOTE — PROGRESS NOTE ADULT - PROBLEM SELECTOR PLAN 2
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  - c/w IV methylpred 70mg daily, will clarify with rheum if can transition to PO  - Continue Bactrim for PCP PPx given cost and dosing but if significant side effects for patient will consider changing to atovaquone  -started on  entacavir for hep b core antibody positive neg hep s ag and on rituxin now per ID  - AVOID Hydralazine  - Quant gold results pending   -awaiting remaining rheumatological/hematological w/u labs   -To start rituxan today 8/23 per rheum  - Will continue to Appreciate renal, pulm, rheum, and ID recs  -Significant increase in WBC this am with no change in regimen. Will repeat cbc STAT and d/w heme/rheum if value is accurate.

## 2019-08-23 NOTE — PROGRESS NOTE ADULT - PROBLEM SELECTOR PLAN 5
- Will dose home dose coumadin tonight  - Monitor INR daily, currently in therapeutic range  -C/W metoprolol

## 2019-08-23 NOTE — PROGRESS NOTE ADULT - SUBJECTIVE AND OBJECTIVE BOX
[facility] Division of Kidney Diseases & Hypertension  FOLLOW UP NOTE  [phone]--------------------------------------------------------------------------------  Chief Complaint:Pulmonary hypertension      Charts reviewed. Patient seen this morning breathing well in room air without any subjective complaints. Patient is planned for first dose of Rituxan today. Serum Cr: 1.2 today.         PAST HISTORY  --------------------------------------------------------------------------------  No significant changes to PMH, PSH, FHx, SHx, unless otherwise noted    ALLERGIES & MEDICATIONS  --------------------------------------------------------------------------------  Allergies    Keflex (Unknown)  penicillin (Rash)    Intolerances      Standing Inpatient Medications  acetaminophen   Tablet .. 650 milliGRAM(s) Oral once  atorvastatin 10 milliGRAM(s) Oral at bedtime  cloNIDine 0.1 milliGRAM(s) Oral two times a day  dextrose 5%. 1000 milliLiter(s) IV Continuous <Continuous>  dextrose 50% Injectable 12.5 Gram(s) IV Push once  dextrose 50% Injectable 25 Gram(s) IV Push once  dextrose 50% Injectable 25 Gram(s) IV Push once  diphenhydrAMINE   Injectable 50 milliGRAM(s) IV Push once  entecavir 0.5 milliGRAM(s) Oral daily  famotidine  IVPB 20 milliGRAM(s) IV Intermittent once  furosemide    Tablet 20 milliGRAM(s) Oral two times a day  heparin  Injectable 5000 Unit(s) SubCutaneous every 12 hours  insulin lispro (HumaLOG) corrective regimen sliding scale   SubCutaneous three times a day before meals  insulin lispro (HumaLOG) corrective regimen sliding scale   SubCutaneous at bedtime  insulin lispro Injectable (HumaLOG) 8 Unit(s) SubCutaneous three times a day before meals  methylPREDNISolone sodium succinate Injectable 70 milliGRAM(s) IV Push daily  metoprolol tartrate 50 milliGRAM(s) Oral two times a day  montelukast 10 milliGRAM(s) Oral daily  pantoprazole    Tablet 40 milliGRAM(s) Oral before breakfast  riTUXimab IVPB (Non - oncologic) 600 milliGRAM(s) IV Intermittent once  sertraline 25 milliGRAM(s) Oral daily  trimethoprim  160 mG/sulfamethoxazole 800 mG 1 Tablet(s) Oral <User Schedule>    PRN Inpatient Medications  ALBUTerol    90 MICROgram(s) HFA Inhaler 2 Puff(s) Inhalation every 6 hours PRN  ALPRAZolam 0.5 milliGRAM(s) Oral two times a day PRN  dextrose 40% Gel 15 Gram(s) Oral once PRN  diphenhydrAMINE   Injectable 50 milliGRAM(s) IV Push once PRN  glucagon  Injectable 1 milliGRAM(s) IntraMuscular once PRN  hydrocortisone sodium succinate Injectable 100 milliGRAM(s) IV Push once PRN  meperidine     Injectable 25 milliGRAM(s) IV Push once PRN  meperidine     Injectable 25 milliGRAM(s) IV Push once PRN      REVIEW OF SYSTEMS  --------------------------------------------------------------------------------  Gen: nad  Respiratory: mild dyspnea  CV: No chest pain  GI: No abdominal pain  MSK: + LE edema  Neuro: No dizziness  Heme: No bleeding    All other systems were reviewed and are negative, except as noted.    VITALS/PHYSICAL EXAM  --------------------------------------------------------------------------------  T(C): 37.2 (08-23-19 @ 10:38), Max: 37.2 (08-23-19 @ 10:38)  HR: 75 (08-23-19 @ 10:38) (75 - 92)  BP: 119/72 (08-23-19 @ 10:38) (105/64 - 125/84)  RR: 18 (08-23-19 @ 10:38) (17 - 19)  SpO2: 93% (08-23-19 @ 10:38) (93% - 97%)  Wt(kg): --    Weight (kg): 64.501 (08-22-19 @ 09:55)      Physical Exam:  	Gen: awake  	HEENT: supple  no LAD  	Pulm: minimal increased work of breathing, lungs CTA  	CV:  S1S2, no murmurs  	Abd: +BS, soft   	Ext: No B/L Lower ext edema  	Neuro: awake, responsive to commands  	Skin: warm and dry    LABS/STUDIES  --------------------------------------------------------------------------------              10.0   38.01 >-----------<  341      [08-23-19 @ 11:10]              32.6     138  |  92  |  33  ----------------------------<  116      [08-23-19 @ 07:39]  4.1   |  31  |  1.20        Ca     9.4     [08-23-19 @ 07:39]      Mg     1.6     [08-23-19 @ 07:39]      Phos  4.1     [08-23-19 @ 07:39]    TPro  6.4  /  Alb  3.5  /  TBili  0.6  /  DBili  x   /  AST  24  /  ALT  23  /  AlkPhos  73  [08-23-19 @ 07:39]    PT/INR: PT 24.1 , INR 2.12       [08-23-19 @ 07:39]  PTT: 41.9       [08-23-19 @ 07:39]      Creatinine Trend:  SCr 1.20 [08-23 @ 07:39]  SCr 1.01 [08-22 @ 06:14]  SCr 0.82 [08-21 @ 06:44]  SCr 0.84 [08-20 @ 06:00]  SCr 1.06 [08-19 @ 05:39]            Free Light Chains: kappa 2.67, lambda 2.76, ratio = --      [08-22 @ 06:14]

## 2019-08-24 NOTE — CHART NOTE - NSCHARTNOTEFT_GEN_A_CORE
Patient found of floor by nursing staff after bed alarm went off. Patient states she got up at the side of the bed and fell down and hit her head. Only complaining of headache now, no double vision, one sided paralysis, chest pain prior to episode, no hip or back pain.     Vital signs are normal currently.     Exam: Neuro A&Ox3, 4/5 strenght in b/l upper and lower extremities without deficits, EMOI b/l, 3mm PERRL b/l, all other cranial nerves intact and normal.     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   - CT Head to r/o ICH  - Fall precautions    I Spoke to hospitalist and reached out to Attending to notify.     -Sherif HORNE NP Service PGY5 [facility] Pager#[phone] Patient found of floor by nursing staff after patient found on floor and presumed to fall. Fall unwitness. Patient states she got up at the side of the bed and fell down and hit her head after coming back from the bathroom. Only complaining of headache now, no double vision, one sided paralysis, chest pain prior to episode, no hip or back pain.     Vital signs are normal currently.     Exam: Neuro A&Ox3, 4/5 strength in b/l upper and lower extremities without deficits, EMOI b/l, 3mm PERRL b/l, all other cranial nerves intact and normal.     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   - CT Head to r/o ICH  - Fall precautions    I Spoke to hospitalist and reached out to Attending to notify.     -Sherif HORNE NP Service PGY5 [facility] Pager#[phone]

## 2019-08-24 NOTE — PROGRESS NOTE ADULT - PROBLEM SELECTOR PLAN 5
Initially Improved and at baseline now worsening on todays labs. Now likely from poor PO and lasix and hyperglycemia. 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  - Will follow-up pending rheum/heme w/u labs  - Avoid ACEis, ARBS, NSAIDs, and other nephrotoxic drugs  - Will renally dose all meds  - heme consulted and ordered Immunoglobulin levels and Urine Immunofixation; SPEP and UPEP normal; Serum immunofixation with light M band. Pending remaining labs  - BP control. For HTN pt w/ low norm BPs. Have been titrating down meds. Decreased clonidine to 0.1mg BID. No more hydralazine. C/w current regimen for now but can likely titrate meds down further  -steroids and rituxan as stated above  -decrease lasix to 20mg qd

## 2019-08-24 NOTE — PROGRESS NOTE ADULT - SUBJECTIVE AND OBJECTIVE BOX
Patient is a 84y old  Female who presents with a chief complaint of MICU transfer - hypoxic respiratory failure (23 Aug 2019 16:33)      SUBJECTIVE / OVERNIGHT EVENTS: Pt w/ unwitness fall this am. She states that she was trying to get up out of bed and lost balance, slipped hitting her head on the ground. She denies preceding CP, palpitations, SOB, dizziness or LOC. Currently she denies visual changes, new focal deficits. She does report some pain where she hit her head.     Review of Systems:     MEDICATIONS  (STANDING):  atorvastatin 10 milliGRAM(s) Oral at bedtime  cloNIDine 0.1 milliGRAM(s) Oral two times a day  dextrose 5%. 1000 milliLiter(s) (50 mL/Hr) IV Continuous <Continuous>  dextrose 50% Injectable 12.5 Gram(s) IV Push once  dextrose 50% Injectable 25 Gram(s) IV Push once  dextrose 50% Injectable 25 Gram(s) IV Push once  entecavir 0.5 milliGRAM(s) Oral daily  furosemide    Tablet 20 milliGRAM(s) Oral two times a day  heparin  Injectable 5000 Unit(s) SubCutaneous every 12 hours  insulin lispro (HumaLOG) corrective regimen sliding scale   SubCutaneous three times a day before meals  insulin lispro (HumaLOG) corrective regimen sliding scale   SubCutaneous at bedtime  insulin lispro Injectable (HumaLOG) 8 Unit(s) SubCutaneous three times a day before meals  metoprolol tartrate 50 milliGRAM(s) Oral two times a day  montelukast 10 milliGRAM(s) Oral daily  pantoprazole    Tablet 40 milliGRAM(s) Oral before breakfast  predniSONE   Tablet 60 milliGRAM(s) Oral daily  sertraline 25 milliGRAM(s) Oral daily  trimethoprim  160 mG/sulfamethoxazole 800 mG 1 Tablet(s) Oral <User Schedule>    MEDICATIONS  (PRN):  ALBUTerol    90 MICROgram(s) HFA Inhaler 2 Puff(s) Inhalation every 6 hours PRN Shortness of Breath and/or Wheezing  ALPRAZolam 0.5 milliGRAM(s) Oral two times a day PRN Anxiety and Insomnia  dextrose 40% Gel 15 Gram(s) Oral once PRN Blood Glucose LESS THAN 70 milliGRAM(s)/deciliter  diphenhydrAMINE   Injectable 50 milliGRAM(s) IV Push once PRN PRN Chemotherapy Reaction; 8/23/19  glucagon  Injectable 1 milliGRAM(s) IntraMuscular once PRN Glucose LESS THAN 70 milligrams/deciliter  hydrocortisone sodium succinate Injectable 100 milliGRAM(s) IV Push once PRN PRN Chemotherapy Reaction; 8/23/19  meperidine     Injectable 25 milliGRAM(s) IV Push once PRN PRN Chemotherapy Reaction;(Rigors) 8/23/19  meperidine     Injectable 25 milliGRAM(s) IV Push once PRN PRN Chemotherapy Reaction; (Rigors- Repeat Dose) 8/23/19      PHYSICAL EXAM:    I&O's Summary    23 Aug 2019 07:01  -  24 Aug 2019 07:00  --------------------------------------------------------  IN: 0 mL / OUT: 0 mL / NET: 0 mL    Vital Signs Last 24 Hrs  T(C): 36.7 (24 Aug 2019 09:24), Max: 37.1 (24 Aug 2019 02:00)  T(F): 98 (24 Aug 2019 09:24), Max: 98.8 (24 Aug 2019 02:00)  HR: 91 (24 Aug 2019 10:24) (81 - 91)  BP: 129/89 (24 Aug 2019 10:24) (112/73 - 140/95)  BP(mean): --  RR: 17 (24 Aug 2019 09:24) (17 - 18)  SpO2: 100% (24 Aug 2019 09:24) (97% - 100%)  GENERAL: NAD, sitting in chair  HEAD:  Atraumatic, Normocephalic  EYES: EOMI, conjunctiva and sclera clear  NECK: Supple,  CHEST/LUNG: CTA b/l  HEART:; No murmurs, rubs, or gallops; No LE edema  ABDOMEN: Soft, Nontender, Nondistended; Bowel sounds present  EXTREMITIES:  2+ Peripheral Pulses, No clubbing, cyanosis, or edema  PSYCH:anxious  NEUROLOGY: non-focal, strength intact b/l no facial droop, mental status at baseline       LABS:  CAPILLARY BLOOD GLUCOSE      POCT Blood Glucose.: 409 mg/dL (24 Aug 2019 12:52)  POCT Blood Glucose.: 428 mg/dL (24 Aug 2019 12:43)  POCT Blood Glucose.: 140 mg/dL (24 Aug 2019 08:52)  POCT Blood Glucose.: 139 mg/dL (23 Aug 2019 21:44)  POCT Blood Glucose.: 104 mg/dL (23 Aug 2019 17:53)                          9.4    23.38 )-----------( 293      ( 24 Aug 2019 07:40 )             30.5     08-24    136  |  95<L>  |  37<H>  ----------------------------<  120<H>  4.1   |  32<H>  |  1.37<H>    Ca    8.6      24 Aug 2019 07:40  Phos  4.7     08-24  Mg     1.8     08-24    TPro  5.7<L>  /  Alb  2.9<L>  /  TBili  0.4  /  DBili  x   /  AST  16  /  ALT  19  /  AlkPhos  59  08-24    PT/INR - ( 24 Aug 2019 07:40 )   PT: 36.7 SEC;   INR: 3.11          PTT - ( 24 Aug 2019 07:40 )  PTT:45.9 SEC          RADIOLOGY & ADDITIONAL TESTS:    Imaging Personally Reviewed:    Consultant(s) Notes Reviewed:      Care Discussed with Consultants/Other Providers:

## 2019-08-24 NOTE — PROGRESS NOTE ADULT - PROBLEM SELECTOR PLAN 2
Likely retaining fluid due to steroids. Volume status improved.   - Currently in PO lasix 20mg BID   - Continue to monitor o2 sat and resp status. She has been sating well on RA with improved resp status  - Will continue to appreciate with pulm and renal recs

## 2019-08-24 NOTE — PROVIDER CONTACT NOTE (OTHER) - ASSESSMENT
Patient 88% on RA, RR 22, labored breathing, HOB elevated, deep breathing encouraged, patient denies feeling SOB at this time. Pt put back onto oxygen at 2L NC, oxygen saturation went up to 95%-100% on O2.
Pt. shows no s/s of hyperglycemia
No other complaints or clear distress noted. Provider notified and assessed patient. Patient sent for CT scan.

## 2019-08-24 NOTE — PROVIDER CONTACT NOTE (OTHER) - BACKGROUND
Patient screamed. RN found Patient screamed. RN found patient on floor, appear to slip on own urine while walking back from bathroom. Patient assessed. Vitals taken. Blood glucose level taken. Patient compliant of headache.

## 2019-08-24 NOTE — PROGRESS NOTE ADULT - PROBLEM SELECTOR PLAN 6
INR supratherapeutic today   - Monitor INR daily  -given fall today, hold todays coumadin dose then resume home dose of 3mg tomorrow based on INR level   -C/W metoprolol

## 2019-08-24 NOTE — PROGRESS NOTE ADULT - PROBLEM SELECTOR PLAN 8
Likely in the setting of steroids. A1c 5.6 on 8/13.   - will increase humalog 8U qac to 10U qac   -c/w ISS  -am FS today ok.

## 2019-08-24 NOTE — PROVIDER CONTACT NOTE (OTHER) - RECOMMENDATIONS
Follow sliding scale and standing insulin dose administer
recommended order for 2L NC
Patient return to bed. Bed alarm continue to be in place. Patient monitored and family at the bedside.

## 2019-08-24 NOTE — CONSULT NOTE ADULT - SUBJECTIVE AND OBJECTIVE BOX
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 Hematology was consulted to assess the possibility of paraneoplastic vasculitis.     She was diagnosed with lymphoma in 2018 and since then she has never been on treatment. Her outpatient hematologist was Dr. Jeffries but will be changing to Dr. Shine at [facility] scheduled on 9/6.    The patient is a poor historian and cannot provide any information. Her son and daughter are fair historian but gave me some additional information.     PAST MEDICAL & SURGICAL HISTORY:  COPD (chronic obstructive pulmonary disease)  Peripheral vascular disease  Pulmonary hypertension  Rheumatoid arthritis  Osteoarthritis  Mitral regurgitation  H/O lymphoma  Hyperlipidemia  Chronic GERD  Chronic kidney disease: proteinuria  AF (atrial fibrillation)  Anemia in CKD (chronic kidney disease)  CHF (congestive heart failure)  HTN (hypertension)  History of total hip replacement, left  History of total knee replacement, bilateral      FAMILY HISTORY:  Family history of inclusion body myositis      MEDICATIONS  (STANDING):  atorvastatin 10 milliGRAM(s) Oral at bedtime  cloNIDine 0.1 milliGRAM(s) Oral two times a day  dextrose 5%. 1000 milliLiter(s) (50 mL/Hr) IV Continuous <Continuous>  dextrose 50% Injectable 12.5 Gram(s) IV Push once  dextrose 50% Injectable 25 Gram(s) IV Push once  dextrose 50% Injectable 25 Gram(s) IV Push once  entecavir 0.5 milliGRAM(s) Oral daily  furosemide    Tablet 20 milliGRAM(s) Oral daily  heparin  Injectable 5000 Unit(s) SubCutaneous every 12 hours  insulin lispro (HumaLOG) corrective regimen sliding scale   SubCutaneous three times a day before meals  insulin lispro (HumaLOG) corrective regimen sliding scale   SubCutaneous at bedtime  insulin lispro Injectable (HumaLOG) 10 Unit(s) SubCutaneous three times a day before meals  metoprolol tartrate 50 milliGRAM(s) Oral two times a day  montelukast 10 milliGRAM(s) Oral daily  pantoprazole    Tablet 40 milliGRAM(s) Oral before breakfast  predniSONE   Tablet 60 milliGRAM(s) Oral daily  sertraline 25 milliGRAM(s) Oral daily  trimethoprim  160 mG/sulfamethoxazole 800 mG 1 Tablet(s) Oral <User Schedule>    MEDICATIONS  (PRN):  ALBUTerol    90 MICROgram(s) HFA Inhaler 2 Puff(s) Inhalation every 6 hours PRN Shortness of Breath and/or Wheezing  ALPRAZolam 0.5 milliGRAM(s) Oral two times a day PRN Anxiety and Insomnia  dextrose 40% Gel 15 Gram(s) Oral once PRN Blood Glucose LESS THAN 70 milliGRAM(s)/deciliter  diphenhydrAMINE   Injectable 50 milliGRAM(s) IV Push once PRN PRN Chemotherapy Reaction; 8/23/19  glucagon  Injectable 1 milliGRAM(s) IntraMuscular once PRN Glucose LESS THAN 70 milligrams/deciliter  hydrocortisone sodium succinate Injectable 100 milliGRAM(s) IV Push once PRN PRN Chemotherapy Reaction; 8/23/19  meperidine     Injectable 25 milliGRAM(s) IV Push once PRN PRN Chemotherapy Reaction;(Rigors) 8/23/19  meperidine     Injectable 25 milliGRAM(s) IV Push once PRN PRN Chemotherapy Reaction; (Rigors- Repeat Dose) 8/23/19      Allergies    Keflex (Unknown)  penicillin (Rash)    Intolerances        SOCIAL HISTORY:   EtOH:  Tobacco:    REVIEW OF SYSTEMS:  General: Negative for weakness, fatigue, malaise, chills, fever, night sweats, unintentional weight change.  Head: Negative for HA  Eyes: Negative for vision changes, diplopia, impaired vision.  ENT: Negative for tinnitus, vertigo, hearing impairment, postnasal drip, sore throat.  Respiratory: Negative for shortness of breath, cugh, sputum.  Cardiovascular: Negative for chest pain, dyspnea on exertion, orthopnea, PND, palpations, edema.  Gastrointestinal: Negative for dysphagia, nausea, vomiting, hematemesis, abdominal pain, diarrhea, constipation, hematochezia, tarry stool.  MSK: Negative for myalgia, joint pain, neck stiffness, weakness, back pain.  Neuro: Negative for weakness, loss of balance.   Skin: Negative for rash, erythema.  Psych: Negative for anxiety, depression, sleep disturbance.    VITAL SIGNS:    T(F): 98.4 (08-24-19 @ 18:00), Max: 98.8 (08-24-19 @ 02:00)  HR: 84 (08-24-19 @ 18:00)  BP: 120/91 (08-24-19 @ 18:00)  RR: 18 (08-24-19 @ 18:00)  SpO2: 100% (08-24-19 @ 18:00)  Wt(kg): --    PHYSICAL EXAMINATION:  Constitutional: NAD   Eyes: PERRLA, EOMI, sclera non-icteric, conjunctiva non-anemia  Neck: supple,  Mouth: No mucosisit, no exudate, no ulcer.   Respiratory: CTA b/l  Cardiovascular: Normal rate regular rhythm.  Gastrointestinal: soft and flat, no tenderness to palpation  Extremities:  No c/c/e  MSK: No joint swelling, erythema, or tenderness   Neurological: AOx3, Cranial nervess II-XII grossly intact  Skin: No rash  Psych: Normal affect    LABS:                        9.4    23.38 )-----------( 293      ( 24 Aug 2019 07:40 )             30.5     08-24    136  |  95<L>  |  37<H>  ----------------------------<  120<H>  4.1   |  32<H>  |  1.37<H>    Ca    8.6      24 Aug 2019 07:40  Phos  4.7     08-24  Mg     1.8     08-24    TPro  5.7<L>  /  Alb  2.9<L>  /  TBili  0.4  /  DBili  x   /  AST  16  /  ALT  19  /  AlkPhos  59  08-24    PT/INR - ( 24 Aug 2019 07:40 )   PT: 36.7 SEC;   INR: 3.11          PTT - ( 24 Aug 2019 07:40 )  PTT:45.9 SEC Phosphorus Level, Serum: 4.7 mg/dL (08-24 @ 07:40)  Magnesium, Serum: 1.8 mg/dL (08-24 @ 07:40)

## 2019-08-24 NOTE — CONSULT NOTE ADULT - ATTENDING COMMENTS
Agree with above. Pt evaluated at bedside.   84 F w/ complicated past medical history, ?low grade lymphoma noted on BM in 2018, MGUS a/w nephrotic range proteinuria, SBO, noted to have worsening anemia.   Recommend:   1. Repeat multiple myeloma work up: SPEP, TOMY urine and serum, free light chains, serum Ig   2. check CT A/P to eval for lymphadenopathy and splenomegaly   3. Pt to f/u with Dr. Levin as an out pt at [facility] [place]
I discussed the case and saw the patient with the fellow and agree with the above.     Mireya Mendosa MD  Medical Oncology
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 Associated symptoms include generalized malaise, nausea, poor PO with associated weight loss of 10lb over the last two months. Patient has demonstrated marked improvement in respiratory status on Solumedrol and Doxycycline with quick improvement of her oxygenation, currently on 2 L nasal cannula during interview. Notable labs include Blood cx NGTD, HIV, legionella -, crypto ag -, P-ANCA positive1:1280, IVAN positive 1:640, C3 76, C4 10, elevated CRP and ESR. RMSF IgM positive.     Repeat CXR shows improvement of underlying lung pathology and the patient is currently on 2L NC. Will evaluate tomorrow for possible bronchoscopy but at this time I am concerned that given high dose steroids over the last several days has decreased the effective yield of a bronchoscopy. If the patient is off NC by tomorrow, unfortunately the benefit would not outweigh the risk given her rapid improvement in respiratory status but will make that clinical decision tomorrow.     Would still send off anti-histone for possible drug induced lupus. Follow up Rheumatology as I feel this may be a pulmonary/renal syndrome such as vasculitis. Check PPD and/or QFG for her previous indeterminate QFG.     Thank you for your consult. We will continue to follow with you.
I have seen and evaluated the patient. I agree with findings and plan as discussed.
patient seen and examined by me personally  agree with above = case discussed with renal and MICU, pulm
Agree with above.  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  Suspect renal pathology in this case is small vessel vasculitis but given dsDNA and low complements this could also be an immunocomplex mediated GN with endocapillary proliferation.  Patient had 500 mg of solumedrol X 3.  would give a gram today.  If there is pulmonary hemorrhage on bronchoscopy would strongly recommend consideration of plasma exchange.  Case discussed with pulmonary.  Will need to consider a renal biopsy when the patient is stable... INR currently elevated.

## 2019-08-24 NOTE — CONSULT NOTE ADULT - ASSESSMENT
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 Hematology was consulted to assess the possibility of paraneoplastic vasculitis.     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 Son and Daughter (Karlee [phone]) provided some additional but not a good historian either. She was diagnosed in 2018 and never been on treatment. Her hematologist was Dr. Jeffries but will be changing to Dr. Shine scheduled on 9/5. At this momenet, we do not have any recent imaging or current disease status.  - Will get record from outpatient hematology, Fara Jeffries on Monday    # vasculitis  rituximab was started on 8/23 per rheumatology. We need more information about her disease to assess paraneoplastic vasculitis.  - Will get record from her outpatient hematology on Monday    # Leukocytosis  This is most likely combination of inflammation and systemic steroid use. We will get more information but hematology malignanyc related is less likely. 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 Hematology was consulted to assess the possibility of paraneoplastic vasculitis.     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 Son and Daughter (Karlee [phone]) provided some additional but not a good historian either. She was diagnosed in 2018 and never been on treatment. Her hematologist was Dr. Jeffries but will be changing to Dr. Shine scheduled on 9/5. At this momenet, we do not have any recent imaging or current disease status.  - Will get record from outpatient hematology, Fara Jeffries on Monday    # vasculitis  rituximab was started on 8/23 per rheumatology. We need more information about her disease to assess paraneoplastic vasculitis.  - Will get record from her outpatient hematology on Monday    # Leukocytosis  This is most likely combination of inflammation and systemic steroid use. We will get more information but hematology malignancy related is less likely.

## 2019-08-24 NOTE — PROGRESS NOTE ADULT - PROBLEM SELECTOR PLAN 1
Unwitnessed. Pt describes it as mechanical w/ no LOC or preceding cardiax symptoms. Now s/p CT personally reviewed and w/o acute path  -monitor mental status  -obtain orthostatics  -PT to reassess pt gait and mobility

## 2019-08-24 NOTE — PROVIDER CONTACT NOTE (OTHER) - SITUATION
Patient bed alarmed. Bed alarm went off to go to bathroom. Assisted patient to bathroom. Educated and instructed patient to call when complete. RN outside room receiving rest of report for day shift.

## 2019-08-24 NOTE — PROVIDER CONTACT NOTE (OTHER) - ACTION/TREATMENT ORDERED:
Follow sliding scale and standing insulin dose administer and reassess in 15
will order oxygen via NC, continue to monitor patient
Patient return to bed. Bed alarm continue to be in place. Patient monitored and family at the bedside. Debrief with staff. AND Tracee Hopper notified and spoke with ZENOBIA Fields to further debrief.

## 2019-08-24 NOTE — CONSULT NOTE ADULT - REASON FOR ADMISSION
MICU transfer - hypoxic respiratory failure

## 2019-08-24 NOTE — CONSULT NOTE ADULT - CONSULT REASON
Anemia
Hypoxic respiratory failure
Marginal B cell lymphoma
Rash on lower extremities
concern for RMSF
rash, hypoxic respiratory failure, KANIKA
Elevated serum Creatinine

## 2019-08-24 NOTE — CONSULT NOTE ADULT - CONSULT REQUESTED DATE/TIME
14-Aug-2019 12:40
14-Aug-2019 14:53
14-Aug-2019 16:00
14-Aug-2019 16:08
20-Aug-2019 17:37
24-Aug-2019
14-Aug-2019 15:02

## 2019-08-24 NOTE — PROGRESS NOTE ADULT - NSHPATTENDINGPLANDISCUSS_GEN_ALL_CORE
Pt, pts son, and ACP
fellow
team
Dr. Lovett
Rheum, Medicine and hem/onc
fellow
team
team
Medicine team
Medicine team
Pt, pts son, and ACP
NP
NP
HS, NP
HS, NP
Dr. Nicole

## 2019-08-25 NOTE — PROGRESS NOTE ADULT - REASON FOR ADMISSION
MICU transfer - hypoxic respiratory failure

## 2019-08-25 NOTE — PROGRESS NOTE ADULT - PROBLEM SELECTOR PLAN 9
- C/W mechanical soft diet
DVT ppx- on coumadin. Now INR therapeutic. Will d/c HSQ today     Dispo- 8/22 PT rec home with no needs. Pending repeat labs and rheum recs for steroids.
Will continue HSQ today until therapeutic on coumadin    Dispo- PT rec home with home PT once medically optimized but will need re-eval
- C/W mechanical soft diet
- C/W mechanical soft diet
- Will continue HSQ today until therapeutic on coumadin  - Restart diet today and advance to regular with a sodium restriction to avoid fluid retention  - PT rec home with home PT once medically optimized.   - Encouraged out of bed to chair and increased ambulation to prevent deconditioning     -Sherif Nicole PGY5 EMIM Pager#[phone]

## 2019-08-25 NOTE — PROGRESS NOTE ADULT - PROBLEM SELECTOR PROBLEM 2
Vasculitis
Hypervolemia
KANIKA (acute kidney injury)
Hypervolemia
Respiratory failure with hypoxia
KANIKA (acute kidney injury)
R/O Volume overload
Respiratory failure with hypoxia
R/O Volume overload
Vasculitis

## 2019-08-25 NOTE — PROGRESS NOTE ADULT - PROBLEM SELECTOR PLAN 2
Volume status improved.   - C/w lasix 20mg qd for now   - Continue to monitor o2 sat and resp status. She has been sating well on RA with improved resp status  - Will continue to appreciate with pulm and renal recs

## 2019-08-25 NOTE — PROGRESS NOTE ADULT - PROBLEM SELECTOR PLAN 5
Initially Improved and at baseline now worsened yesterday 8/24. And was likely from poor PO and lasix and hyperglycemia. 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  - Will follow-up pending rheum/heme w/u labs  - Avoid ACEis, ARBS, NSAIDs, and other nephrotoxic drugs  - Will renally dose all meds  - heme consulted and ordered Immunoglobulin levels and Urine Immunofixation; SPEP and UPEP normal; Serum immunofixation with light M band. Pending remaining labs  - BP control. For HTN pt w/ low norm BPs. Have been titrating down meds. Decreased clonidine to 0.1mg BID. No more hydralazine. C/w current regimen for now but can likely titrate meds down further  -steroids and rituxan as stated above  -c/w decreased dose of lasix to 20mg qd

## 2019-08-25 NOTE — PROGRESS NOTE ADULT - PROBLEM SELECTOR PROBLEM 3
Alkalosis, metabolic
Supratherapeutic INR
Alkalosis, metabolic
KANIKA (acute kidney injury)
Supratherapeutic INR
Vasculitis
KANIKA (acute kidney injury)
Vasculitis
Alkalosis, metabolic

## 2019-08-25 NOTE — PROGRESS NOTE ADULT - PROBLEM SELECTOR PLAN 1
S/p Unwitnessed fall on 8/24.  Pt describes it as mechanical w/ no LOC or preceding cardiac symptoms but stated she hit her head. Now s/p CT personally reviewed and w/o acute path  -monitor mental status  -PT to reassessed pt and rec home w/ rolling walker

## 2019-08-25 NOTE — DISCHARGE NOTE NURSING/CASE MANAGEMENT/SOCIAL WORK - NSSCTYPOFSERV_GEN_ALL_CORE
Nurse will visit day after discharge// Discharge Plan and Medications faxed to [facility] fax# [phone] / phone [phone]

## 2019-08-25 NOTE — PROGRESS NOTE ADULT - SUBJECTIVE AND OBJECTIVE BOX
Patient is a 84y old  Female who presents with a chief complaint of MICU transfer - hypoxic respiratory failure (24 Aug 2019 19:07)      SUBJECTIVE / OVERNIGHT EVENTS:    Review of Systems:     MEDICATIONS  (STANDING):  atorvastatin 10 milliGRAM(s) Oral at bedtime  cloNIDine 0.1 milliGRAM(s) Oral two times a day  dextrose 5%. 1000 milliLiter(s) (50 mL/Hr) IV Continuous <Continuous>  dextrose 50% Injectable 12.5 Gram(s) IV Push once  dextrose 50% Injectable 25 Gram(s) IV Push once  dextrose 50% Injectable 25 Gram(s) IV Push once  entecavir 0.5 milliGRAM(s) Oral daily  furosemide    Tablet 20 milliGRAM(s) Oral daily  heparin  Injectable 5000 Unit(s) SubCutaneous every 12 hours  insulin lispro (HumaLOG) corrective regimen sliding scale   SubCutaneous three times a day before meals  insulin lispro (HumaLOG) corrective regimen sliding scale   SubCutaneous at bedtime  insulin lispro Injectable (HumaLOG) 10 Unit(s) SubCutaneous three times a day before meals  metoprolol tartrate 50 milliGRAM(s) Oral two times a day  montelukast 10 milliGRAM(s) Oral daily  pantoprazole    Tablet 40 milliGRAM(s) Oral before breakfast  predniSONE   Tablet 60 milliGRAM(s) Oral daily  sertraline 25 milliGRAM(s) Oral daily  trimethoprim  160 mG/sulfamethoxazole 800 mG 1 Tablet(s) Oral <User Schedule>    MEDICATIONS  (PRN):  ALBUTerol    90 MICROgram(s) HFA Inhaler 2 Puff(s) Inhalation every 6 hours PRN Shortness of Breath and/or Wheezing  ALPRAZolam 0.5 milliGRAM(s) Oral two times a day PRN Anxiety and Insomnia  dextrose 40% Gel 15 Gram(s) Oral once PRN Blood Glucose LESS THAN 70 milliGRAM(s)/deciliter  diphenhydrAMINE   Injectable 50 milliGRAM(s) IV Push once PRN PRN Chemotherapy Reaction; 8/23/19  glucagon  Injectable 1 milliGRAM(s) IntraMuscular once PRN Glucose LESS THAN 70 milligrams/deciliter  hydrocortisone sodium succinate Injectable 100 milliGRAM(s) IV Push once PRN PRN Chemotherapy Reaction; 8/23/19  meperidine     Injectable 25 milliGRAM(s) IV Push once PRN PRN Chemotherapy Reaction;(Rigors) 8/23/19  meperidine     Injectable 25 milliGRAM(s) IV Push once PRN PRN Chemotherapy Reaction; (Rigors- Repeat Dose) 8/23/19      PHYSICAL EXAM:    I&O's Summary    24 Aug 2019 07:01  -  25 Aug 2019 07:00  --------------------------------------------------------  IN: 800 mL / OUT: 1100 mL / NET: -300 mL  Vital Signs Last 24 Hrs  T(C): 36.4 (25 Aug 2019 05:16), Max: 37.1 (24 Aug 2019 21:40)  T(F): 97.6 (25 Aug 2019 05:16), Max: 98.8 (24 Aug 2019 21:40)  HR: 79 (25 Aug 2019 05:16) (75 - 91)  BP: 145/85 (25 Aug 2019 05:16) (115/77 - 145/85)  BP(mean): --  RR: 16 (25 Aug 2019 05:16) (16 - 18)  SpO2: 99% (25 Aug 2019 05:16) (99% - 100%)    LABS:  CAPILLARY BLOOD GLUCOSE      POCT Blood Glucose.: 248 mg/dL (24 Aug 2019 21:36)  POCT Blood Glucose.: 256 mg/dL (24 Aug 2019 18:11)  POCT Blood Glucose.: 409 mg/dL (24 Aug 2019 12:52)  POCT Blood Glucose.: 428 mg/dL (24 Aug 2019 12:43)  POCT Blood Glucose.: 140 mg/dL (24 Aug 2019 08:52)                          9.2    23.86 )-----------( 273      ( 25 Aug 2019 05:35 )             30.1     08-25    138  |  95<L>  |  41<H>  ----------------------------<  80  3.9   |  30  |  1.28    Ca    8.5      25 Aug 2019 05:35  Phos  3.4     08-25  Mg     1.7     08-25    TPro  5.7<L>  /  Alb  2.8<L>  /  TBili  0.3  /  DBili  x   /  AST  14  /  ALT  18  /  AlkPhos  61  08-25    PT/INR - ( 25 Aug 2019 05:35 )   PT: 42.1 SEC;   INR: 3.64          PTT - ( 25 Aug 2019 05:35 )  PTT:46.5 SEC          RADIOLOGY & ADDITIONAL TESTS:    Imaging Personally Reviewed:    Consultant(s) Notes Reviewed:      Care Discussed with Consultants/Other Providers: Patient is a 84y old  Female who presents with a chief complaint of MICU transfer - hypoxic respiratory failure (24 Aug 2019 19:07)      SUBJECTIVE / OVERNIGHT EVENTS: Pt denies SOB, H/A, n/v. Headache now resolved     Review of Systems:     MEDICATIONS  (STANDING):  atorvastatin 10 milliGRAM(s) Oral at bedtime  cloNIDine 0.1 milliGRAM(s) Oral two times a day  dextrose 5%. 1000 milliLiter(s) (50 mL/Hr) IV Continuous <Continuous>  dextrose 50% Injectable 12.5 Gram(s) IV Push once  dextrose 50% Injectable 25 Gram(s) IV Push once  dextrose 50% Injectable 25 Gram(s) IV Push once  entecavir 0.5 milliGRAM(s) Oral daily  furosemide    Tablet 20 milliGRAM(s) Oral daily  heparin  Injectable 5000 Unit(s) SubCutaneous every 12 hours  insulin lispro (HumaLOG) corrective regimen sliding scale   SubCutaneous three times a day before meals  insulin lispro (HumaLOG) corrective regimen sliding scale   SubCutaneous at bedtime  insulin lispro Injectable (HumaLOG) 10 Unit(s) SubCutaneous three times a day before meals  metoprolol tartrate 50 milliGRAM(s) Oral two times a day  montelukast 10 milliGRAM(s) Oral daily  pantoprazole    Tablet 40 milliGRAM(s) Oral before breakfast  predniSONE   Tablet 60 milliGRAM(s) Oral daily  sertraline 25 milliGRAM(s) Oral daily  trimethoprim  160 mG/sulfamethoxazole 800 mG 1 Tablet(s) Oral <User Schedule>    MEDICATIONS  (PRN):  ALBUTerol    90 MICROgram(s) HFA Inhaler 2 Puff(s) Inhalation every 6 hours PRN Shortness of Breath and/or Wheezing  ALPRAZolam 0.5 milliGRAM(s) Oral two times a day PRN Anxiety and Insomnia  dextrose 40% Gel 15 Gram(s) Oral once PRN Blood Glucose LESS THAN 70 milliGRAM(s)/deciliter  diphenhydrAMINE   Injectable 50 milliGRAM(s) IV Push once PRN PRN Chemotherapy Reaction; 8/23/19  glucagon  Injectable 1 milliGRAM(s) IntraMuscular once PRN Glucose LESS THAN 70 milligrams/deciliter  hydrocortisone sodium succinate Injectable 100 milliGRAM(s) IV Push once PRN PRN Chemotherapy Reaction; 8/23/19  meperidine     Injectable 25 milliGRAM(s) IV Push once PRN PRN Chemotherapy Reaction;(Rigors) 8/23/19  meperidine     Injectable 25 milliGRAM(s) IV Push once PRN PRN Chemotherapy Reaction; (Rigors- Repeat Dose) 8/23/19      PHYSICAL EXAM:    I&O's Summary    24 Aug 2019 07:01  -  25 Aug 2019 07:00  --------------------------------------------------------  IN: 800 mL / OUT: 1100 mL / NET: -300 mL  Vital Signs Last 24 Hrs  T(C): 36.4 (25 Aug 2019 05:16), Max: 37.1 (24 Aug 2019 21:40)  T(F): 97.6 (25 Aug 2019 05:16), Max: 98.8 (24 Aug 2019 21:40)  HR: 79 (25 Aug 2019 05:16) (75 - 91)  BP: 145/85 (25 Aug 2019 05:16) (115/77 - 145/85)  BP(mean): --  RR: 16 (25 Aug 2019 05:16) (16 - 18)  SpO2: 99% (25 Aug 2019 05:16) (99% - 100%)  GENERAL: NAD, sitting in chair  HEAD:  Atraumatic, Normocephalic  EYES: EOMI, conjunctiva and sclera clear  NECK: Supple,  CHEST/LUNG: CTA b/l  HEART:; No murmurs, rubs, or gallops; No LE edema  ABDOMEN: Soft, Nontender, Nondistended; Bowel sounds present  EXTREMITIES:  2+ Peripheral Pulses, No clubbing, cyanosis, or edema  PSYCH:anxious  NEUROLOGY: non-focal, strength intact b/l no facial droop, mental status at baseline  LABS:  CAPILLARY BLOOD GLUCOSE      POCT Blood Glucose.: 248 mg/dL (24 Aug 2019 21:36)  POCT Blood Glucose.: 256 mg/dL (24 Aug 2019 18:11)  POCT Blood Glucose.: 409 mg/dL (24 Aug 2019 12:52)  POCT Blood Glucose.: 428 mg/dL (24 Aug 2019 12:43)  POCT Blood Glucose.: 140 mg/dL (24 Aug 2019 08:52)                          9.2    23.86 )-----------( 273      ( 25 Aug 2019 05:35 )             30.1     08-25    138  |  95<L>  |  41<H>  ----------------------------<  80  3.9   |  30  |  1.28    Ca    8.5      25 Aug 2019 05:35  Phos  3.4     08-25  Mg     1.7     08-25    TPro  5.7<L>  /  Alb  2.8<L>  /  TBili  0.3  /  DBili  x   /  AST  14  /  ALT  18  /  AlkPhos  61  08-25    PT/INR - ( 25 Aug 2019 05:35 )   PT: 42.1 SEC;   INR: 3.64          PTT - ( 25 Aug 2019 05:35 )  PTT:46.5 SEC          RADIOLOGY & ADDITIONAL TESTS:    Imaging Personally Reviewed:    Consultant(s) Notes Reviewed:      Care Discussed with Consultants/Other Providers:

## 2019-08-25 NOTE — PROGRESS NOTE ADULT - PROBLEM SELECTOR PLAN 8
Likely in the setting of steroids. A1c 5.6 on 8/13.   - will increase humalog 10U qac to 12U qac   -c/w ISS  -am FS today ok.

## 2019-08-25 NOTE — PROGRESS NOTE ADULT - PROBLEM SELECTOR PLAN 6
INR supratherapeutic today   - Monitor INR daily  -given fall on 8/24, hold todays coumadin dose  -C/W metoprolol

## 2019-08-25 NOTE — PROGRESS NOTE ADULT - PROBLEM/PLAN-9
Called pt with ascus -us result and recommendations to repeat pap in 1 year  Print result and pair with pap brochure to send to pt  
DISPLAY PLAN FREE TEXT

## 2019-08-25 NOTE — PROGRESS NOTE ADULT - PROBLEM SELECTOR PROBLEM 4
KANIKA (acute kidney injury)
KANIKA (acute kidney injury)
Anemia
Anemia
KANIKA (acute kidney injury)
Supratherapeutic INR
Alkalosis, metabolic
Supratherapeutic INR
Alkalosis, metabolic

## 2019-08-25 NOTE — DISCHARGE NOTE NURSING/CASE MANAGEMENT/SOCIAL WORK - NSDCDPATPORTLINK_GEN_ALL_CORE
You can access the [facility] Patient Portal, offered by [facility], by registering with the following website: [email or web address]

## 2019-08-25 NOTE — PROGRESS NOTE ADULT - PROBLEM SELECTOR PLAN 3
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  - was on IV methylpred 70mg daily- now transitioned to PO 60mg prednisone qd on today 8/24,  - Continue Bactrim for PCP PPx given cost and dosing but if significant side effects for patient will consider changing to atovaquone  -c/w entacavir for hep b core antibody positive neg hep s ag and on rituxin now per ID  - AVOID Hydralazine  - Quant gold results pending   -awaiting remaining rheumatological/hematological w/u labs   -Pt s/p rituxan 8/23 per rheum, with plan for  3 additional doses qweekly as OP   - Will continue to Appreciate renal, pulm, rheum, and ID recs

## 2019-08-25 NOTE — PROGRESS NOTE ADULT - PROBLEM SELECTOR PLAN 10
DVT ppx- on coumadin    Dispo- 8/22 PT rec home with no needs.  However pt w/ fall today. Will have PT reassess.
DVT ppx- on coumadin    Dispo- D/c planning for today. 8/24 PT rec home with rolling walker. Pt will need to leave on insulin for steroid induced hyperglycemia- pts son to get training today. Assessing cost of insulin w/ patients pharmacy. Family informed pt will need INR check  in 1-2 days post discharge and will arrange to f/u w/ outpt provider. Will d/w SW/CM to assist. Spent 37 min in discharge planning and coordination
- Will continue HSQ today until therapeutic on coumadin  - Continue with current diet  - PT rec home with home PT once medically optimized but will need re-eval  - Encouraged out of bed to chair and increased ambulation to prevent deconditioning     -Sherif Nicole PGY5 EMIM Pager#[phone]

## 2019-08-25 NOTE — PROGRESS NOTE ADULT - ASSESSMENT
Pt is an 83 y/o F w/ a PMHx of lymphoma/MGUS, A fib on coumadin, CHF, HLD, HTN, COPD, anxiety, depression who p/w weakness and shortness of breath in the setting of hypoxic respiratory failure likely 2/2 pulmonary renal syndrome of rheumatologic etiology of vasculitis vs immune complex disease with lower suspicion for infectious etiology  now improved on IV steroids with normalization of renal function. Not planned for bronch given low yield now s/p renal Bx 8/20/19 showing active crescentic glomerulonephritis, pauci-immune suspected to be 2/2 hydralazine .

## 2019-08-25 NOTE — PROGRESS NOTE ADULT - PROVIDER SPECIALTY LIST ADULT
Hospitalist
Infectious Disease
Internal Medicine
MICU
Nephrology
Pulmonology
Rheumatology
Pulmonology
Rheumatology
Infectious Disease
Hospitalist
Nephrology
Nephrology
Pulmonology
Nephrology
Internal Medicine
Hospitalist

## 2019-08-25 NOTE — PROGRESS NOTE ADULT - PROBLEM SELECTOR PROBLEM 9
Nutritional assessment
Prophylactic measure
Prophylactic measure
Nutritional assessment
Nutritional assessment
Prophylactic measure

## 2019-08-26 PROBLEM — R73.9 HYPERGLYCEMIA: Status: ACTIVE | Noted: 2019-01-01

## 2019-09-04 PROBLEM — I27.20 PULMONARY HYPERTENSION: Status: ACTIVE | Noted: 2018-02-21

## 2019-09-04 PROBLEM — I77.6 ANCA-ASSOCIATED VASCULITIS: Status: ACTIVE | Noted: 2019-01-01

## 2019-09-04 PROBLEM — N18.9 CHRONIC RENAL INSUFFICIENCY: Status: ACTIVE | Noted: 2017-12-04

## 2019-09-04 PROBLEM — D47.2 MGUS (MONOCLONAL GAMMOPATHY OF UNKNOWN SIGNIFICANCE): Status: ACTIVE | Noted: 2018-05-24

## 2019-09-04 PROBLEM — I50.9 CHF (CONGESTIVE HEART FAILURE): Status: ACTIVE | Noted: 2018-07-23

## 2019-09-04 NOTE — HEALTH RISK ASSESSMENT
[Change in mental status noted] : No change in mental status noted [Language] : denies difficulty with language [Behavior] : denies difficulty with behavior [Learning/Retaining New Information] : denies difficulty learning/retaining new information [Handling Complex Tasks] : denies difficulty handling complex tasks [Reasoning] : denies difficulty with reasoning [None] : None [Spatial Ability and Orientation] : denies difficulty with spatial ability and orientation [With Family] : lives with family [Less Than High School] : less than high school [] :  [Sexually Active] : not sexually active [Feels Safe at Home] : Feels safe at home [High Risk Behavior] : no high risk behavior [Some assistance needed] : walking [Independent] : using telephone [Full assistance needed] : managing finances [Reviewed no changes] : Reviewed no changes [Designated Healthcare Proxy] : Designated healthcare proxy [Relationship: ___] : Relationship: [unfilled] [Name: ___] : Health Care Proxy's Name: [unfilled]  [Aggressive treatment] : aggressive treatment [AdvancecareDate] : 08/19 [No] : In the past 12 months have you used drugs other than those required for medical reasons? No [No falls in past year] : Patient reported no falls in the past year [0] : 2) Feeling down, depressed, or hopeless: Not at all (0) [] : No [Audit-CScore] : 0 [[id number]] : 0

## 2019-09-04 NOTE — HISTORY OF PRESENT ILLNESS
[Admitted on: ___] : The patient was admitted on [unfilled] [Discharged on ___] : discharged on [unfilled] [Discharge Summary] : discharge summary [Pertinent Labs] : pertinent labs [Radiology Findings] : radiology findings [Discharge Med List] : discharge medication list [Other: ____] : [unfilled] [Med Reconciliation] : medication reconciliation has been completed [Patient Contacted By: ____] : and contacted by [unfilled] [FreeTextEntry2] : Patient is an 84-year-old female, who presents status post discharge. Patient was admitted to [facility] on August 10 for shortness of breath and respiratory insufficiency subsequently transferred to [facility] on August 13 and septally discharge on August 25. Diagnosis congestive heart failure with hypoxic respiratory during her hospitalization. Patient had serology, which was consistent with vasculitis subsequently, kidney biopsy was taken and it appears that the patient has active crescentic glomerulonephritis with global glomerulosclerosis. Patient has multiple medical problems, which include diastolic congestive heart failure, pulmonary hypertension, atrial fibrillation, chronic obstructive pulmonary disease MGUS anemia, rheumatoid arthritis. Patient is followed by multiple subspecialists, which include cardiology, nephrology. Rheumatology and pulmonology [Post-hospitalization from ___ Hospital] : Post-hospitalization from [unfilled] Hospital

## 2019-09-04 NOTE — HEALTH RISK ASSESSMENT
[Change in mental status noted] : No change in mental status noted [Language] : denies difficulty with language [Behavior] : denies difficulty with behavior [Learning/Retaining New Information] : denies difficulty learning/retaining new information [Handling Complex Tasks] : denies difficulty handling complex tasks [Reasoning] : denies difficulty with reasoning [Spatial Ability and Orientation] : denies difficulty with spatial ability and orientation [None] : None [With Family] : lives with family [] :  [Less Than High School] : less than high school [Sexually Active] : not sexually active [High Risk Behavior] : no high risk behavior [Feels Safe at Home] : Feels safe at home [Some assistance needed] : walking [Independent] : using telephone [Full assistance needed] : managing finances [Designated Healthcare Proxy] : Designated healthcare proxy [Reviewed no changes] : Reviewed no changes [Relationship: ___] : Relationship: [unfilled] [Name: ___] : Health Care Proxy's Name: [unfilled]  [AdvancecareDate] : 08/19 [Aggressive treatment] : aggressive treatment [No falls in past year] : Patient reported no falls in the past year [No] : In the past 12 months have you used drugs other than those required for medical reasons? No [0] : 2) Feeling down, depressed, or hopeless: Not at all (0) [] : No [[id number]] : 0 [Audit-CScore] : 0

## 2019-09-04 NOTE — HISTORY OF PRESENT ILLNESS
[Admitted on: ___] : The patient was admitted on [unfilled] [Discharged on ___] : discharged on [unfilled] [Discharge Summary] : discharge summary [Pertinent Labs] : pertinent labs [Radiology Findings] : radiology findings [Discharge Med List] : discharge medication list [Other: ____] : [unfilled] [Med Reconciliation] : medication reconciliation has been completed [Patient Contacted By: ____] : and contacted by [unfilled] [FreeTextEntry2] : Patient is an 84-year-old female, who presents status post discharge. Patient was admitted to [facility] on August 10 for shortness of breath and respiratory insufficiency subsequently transferred to [facility] on August 13 and septally discharge on August 25. Diagnosis congestive heart failure with hypoxic respiratory during her hospitalization. Patient had serology, which was consistent with vasculitis subsequently, kidney biopsy was taken and it appears that the patient has active crescentic glomerulonephritis with global glomerulosclerosis. Patient has multiple medical problems, which include diastolic congestive heart failure, pulmonary hypertension, atrial fibrillation, chronic obstructive pulmonary disease MGUS anemia, rheumatoid arthritis. Patient is followed by multiple subspecialists, which include cardiology, nephrology. Rheumatology and pulmonology [Post-hospitalization from ___ Hospital] : Post-hospitalization from [unfilled] Hospital

## 2019-09-04 NOTE — ASSESSMENT
[FreeTextEntry1] : Patient is on polypharmacy, and she has multiple medical problems. We'll continue present medical management. Patient's blood sugar is running low. She is on NovoLog 12 units before each meal might be a little bit high for the patient. She has not been eating well. I understand that she is on prednisone, but if the blood sugar is running low. I recommend not to give insulin

## 2019-09-04 NOTE — COUNSELING
[Fall prevention counseling provided] : Fall prevention counseling provided [Adequate lighting] : Adequate lighting [No throw rugs] : No throw rugs [Use proper foot wear] : Use proper foot wear [Use recommended devices] : Use recommended devices [Behavioral health counseling provided] : Behavioral health counseling provided [Sleep ___ hours/day] : Sleep [unfilled] hours/day [Engage in a relaxing activity] : Engage in a relaxing activity [Plan in advance] : Plan in advance [AUDIT-C Screening administered and reviewed] : AUDIT-C Screening administered and reviewed

## 2019-09-04 NOTE — REVIEW OF SYSTEMS
[Fatigue] : fatigue [Hearing Loss] : hearing loss [Palpitations] : palpitations [Lower Ext Edema] : lower extremity edema [Shortness Of Breath] : shortness of breath [Dyspnea on Exertion] : dyspnea on exertion [Dysuria] : dysuria [Nocturia] : nocturia [Frequency] : frequency [Joint Pain] : joint pain [Joint Stiffness] : joint stiffness [Muscle Weakness] : muscle weakness [Muscle Pain] : muscle pain [Back Pain] : back pain [Anxiety] : anxiety [Insomnia] : insomnia [Depression] : depression [Negative] : Integumentary [Earache] : no earache [Nosebleed] : no nosebleeds [Hoarseness] : no hoarseness [Nasal Discharge] : no nasal discharge [Sore Throat] : no sore throat [Wheezing] : no wheezing [Cough] : no cough [Incontinence] : no incontinence [Hematuria] : no hematuria [Vaginal Discharge] : no vaginal discharge [Joint Swelling] : no joint swelling [Headache] : no headache [Fainting] : no fainting [Confusion] : no confusion [Memory Loss] : no memory loss [Suicidal] : not suicidal [Easy Bleeding] : no easy bleeding [Easy Bruising] : no easy bruising [Swollen Glands] : no swollen glands

## 2019-09-04 NOTE — PHYSICAL EXAM
[Ill-Appearing] : ill-appearing [Normal] : the outer ears and nose were normal in appearance and the oropharynx was normal [Soft] : abdomen soft [Non Tender] : non-tender [Normal Supraclavicular Nodes] : no supraclavicular lymphadenopathy [Normal Posterior Cervical Nodes] : no posterior cervical lymphadenopathy [Normal Anterior Cervical Nodes] : no anterior cervical lymphadenopathy [Speech Grossly Normal] : speech grossly normal [de-identified] : distended neck veins [de-identified] : crackles at bases [de-identified] : IRR/IRR [de-identified] : +2 Edema [de-identified] : Multiple ecchymoses [de-identified] : Unsteady gait [de-identified] : Forgetful

## 2019-09-04 NOTE — REVIEW OF SYSTEMS
[Fatigue] : fatigue [Hearing Loss] : hearing loss [Palpitations] : palpitations [Lower Ext Edema] : lower extremity edema [Shortness Of Breath] : shortness of breath [Dyspnea on Exertion] : dyspnea on exertion [Dysuria] : dysuria [Nocturia] : nocturia [Frequency] : frequency [Joint Pain] : joint pain [Joint Stiffness] : joint stiffness [Muscle Pain] : muscle pain [Muscle Weakness] : muscle weakness [Back Pain] : back pain [Anxiety] : anxiety [Insomnia] : insomnia [Depression] : depression [Negative] : Integumentary [Earache] : no earache [Nosebleed] : no nosebleeds [Hoarseness] : no hoarseness [Nasal Discharge] : no nasal discharge [Sore Throat] : no sore throat [Wheezing] : no wheezing [Cough] : no cough [Incontinence] : no incontinence [Hematuria] : no hematuria [Vaginal Discharge] : no vaginal discharge [Joint Swelling] : no joint swelling [Headache] : no headache [Fainting] : no fainting [Confusion] : no confusion [Memory Loss] : no memory loss [Suicidal] : not suicidal [Easy Bleeding] : no easy bleeding [Easy Bruising] : no easy bruising [Swollen Glands] : no swollen glands

## 2019-09-04 NOTE — HEALTH RISK ASSESSMENT
[Change in mental status noted] : No change in mental status noted [Language] : denies difficulty with language [Learning/Retaining New Information] : denies difficulty learning/retaining new information [Behavior] : denies difficulty with behavior [Handling Complex Tasks] : denies difficulty handling complex tasks [Reasoning] : denies difficulty with reasoning [None] : None [Spatial Ability and Orientation] : denies difficulty with spatial ability and orientation [With Family] : lives with family [Less Than High School] : less than high school [] :  [Sexually Active] : not sexually active [High Risk Behavior] : no high risk behavior [Feels Safe at Home] : Feels safe at home [Some assistance needed] : walking [Independent] : using telephone [Full assistance needed] : managing finances [Designated Healthcare Proxy] : Designated healthcare proxy [Reviewed no changes] : Reviewed no changes [Name: ___] : Health Care Proxy's Name: [unfilled]  [Relationship: ___] : Relationship: [unfilled] [AdvancecareDate] : 08/19 [Aggressive treatment] : aggressive treatment [No] : In the past 12 months have you used drugs other than those required for medical reasons? No [No falls in past year] : Patient reported no falls in the past year [0] : 2) Feeling down, depressed, or hopeless: Not at all (0) [] : No [[id number]] : 0 [Audit-CScore] : 0

## 2019-09-04 NOTE — PHYSICAL EXAM
[Ill-Appearing] : ill-appearing [Normal] : the outer ears and nose were normal in appearance and the oropharynx was normal [Soft] : abdomen soft [Non Tender] : non-tender [Normal Supraclavicular Nodes] : no supraclavicular lymphadenopathy [Normal Posterior Cervical Nodes] : no posterior cervical lymphadenopathy [Normal Anterior Cervical Nodes] : no anterior cervical lymphadenopathy [Speech Grossly Normal] : speech grossly normal [de-identified] : distended neck veins [de-identified] : crackles at bases [de-identified] : IRR/IRR [de-identified] : +2 Edema [de-identified] : Multiple ecchymoses [de-identified] : Forgetful [de-identified] : Unsteady gait

## 2019-09-04 NOTE — HISTORY OF PRESENT ILLNESS
[Admitted on: ___] : The patient was admitted on [unfilled] [Discharged on ___] : discharged on [unfilled] [Discharge Summary] : discharge summary [Pertinent Labs] : pertinent labs [Radiology Findings] : radiology findings [Discharge Med List] : discharge medication list [Other: ____] : [unfilled] [Med Reconciliation] : medication reconciliation has been completed [Patient Contacted By: ____] : and contacted by [unfilled] [FreeTextEntry2] : Patient is an 84-year-old female, who presents status post discharge. Patient was admitted to [facility] on August 10 for shortness of breath and respiratory insufficiency subsequently transferred to [facility] on August 13 and septally discharge on August 25. Diagnosis congestive heart failure with hypoxic respiratory during her hospitalization. Patient had serology, which was consistent with vasculitis subsequently, kidney biopsy was taken and it appears that the patient has active crescentic glomerulonephritis with global glomerulosclerosis. Patient has multiple medical problems, which include diastolic congestive heart failure, pulmonary hypertension, atrial fibrillation, chronic obstructive pulmonary disease MGUS anemia, rheumatoid arthritis. Patient is followed by multiple subspecialists, which include cardiology, nephrology. Rheumatology and pulmonology [Post-hospitalization from ___ Hospital] : Post-hospitalization from [unfilled] Hospital

## 2019-09-04 NOTE — COUNSELING
[Fall prevention counseling provided] : Fall prevention counseling provided [Adequate lighting] : Adequate lighting [No throw rugs] : No throw rugs [Use proper foot wear] : Use proper foot wear [Behavioral health counseling provided] : Behavioral health counseling provided [Use recommended devices] : Use recommended devices [Sleep ___ hours/day] : Sleep [unfilled] hours/day [Engage in a relaxing activity] : Engage in a relaxing activity [AUDIT-C Screening administered and reviewed] : AUDIT-C Screening administered and reviewed [Plan in advance] : Plan in advance

## 2019-09-04 NOTE — REVIEW OF SYSTEMS
[Fatigue] : fatigue [Hearing Loss] : hearing loss [Palpitations] : palpitations [Lower Ext Edema] : lower extremity edema [Shortness Of Breath] : shortness of breath [Dyspnea on Exertion] : dyspnea on exertion [Dysuria] : dysuria [Nocturia] : nocturia [Frequency] : frequency [Joint Pain] : joint pain [Joint Stiffness] : joint stiffness [Muscle Weakness] : muscle weakness [Muscle Pain] : muscle pain [Back Pain] : back pain [Insomnia] : insomnia [Anxiety] : anxiety [Depression] : depression [Negative] : Integumentary [Earache] : no earache [Nosebleed] : no nosebleeds [Hoarseness] : no hoarseness [Nasal Discharge] : no nasal discharge [Sore Throat] : no sore throat [Wheezing] : no wheezing [Incontinence] : no incontinence [Cough] : no cough [Hematuria] : no hematuria [Vaginal Discharge] : no vaginal discharge [Joint Swelling] : no joint swelling [Headache] : no headache [Fainting] : no fainting [Confusion] : no confusion [Memory Loss] : no memory loss [Suicidal] : not suicidal [Easy Bleeding] : no easy bleeding [Easy Bruising] : no easy bruising [Swollen Glands] : no swollen glands

## 2019-09-04 NOTE — PHYSICAL EXAM
[Ill-Appearing] : ill-appearing [Normal] : the outer ears and nose were normal in appearance and the oropharynx was normal [Soft] : abdomen soft [Non Tender] : non-tender [Normal Supraclavicular Nodes] : no supraclavicular lymphadenopathy [Normal Posterior Cervical Nodes] : no posterior cervical lymphadenopathy [Normal Anterior Cervical Nodes] : no anterior cervical lymphadenopathy [Speech Grossly Normal] : speech grossly normal [de-identified] : distended neck veins [de-identified] : crackles at bases [de-identified] : IRR/IRR [de-identified] : +2 Edema [de-identified] : Multiple ecchymoses [de-identified] : Forgetful [de-identified] : Unsteady gait

## 2019-09-06 NOTE — CONSULT NOTE ADULT - SUBJECTIVE AND OBJECTIVE BOX
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 Discharged on steroids. 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 Now on rituxan. Presents now to [facility] after a fall from standing. Found to have left RP bleed in the setting of coumadin use and supratheraputic INR. History obtained from Son at the bedside. He states they were getting out the car today when she fell and landed on her left side and hit her head. She did not loose consciousness or black out. Patient right now unable to provide ROS as she is slightly altered. Per son this mental status change has been presents since her last hospital stay about 2 weeks ago.     In the Er CT scans of head, abdomen and pelvis were done. CT head and neck without acute pathology. CT pelvis with small left RP bleed. INR 4. hemoglobin 8.3 down from 9.6 about 2 weeks ago. Hemodynamically stable. Patient alert.     PAST MEDICAL & SURGICAL HISTORY:  COPD (chronic obstructive pulmonary disease)  Peripheral vascular disease  Pulmonary hypertension  Rheumatoid arthritis  Osteoarthritis  Mitral regurgitation  H/O lymphoma  Hyperlipidemia  Chronic GERD  Chronic kidney disease: proteinuria  AF (atrial fibrillation)  Anemia in CKD (chronic kidney disease)  CHF (congestive heart failure)  HTN (hypertension)  History of total hip replacement, left  History of total knee replacement, bilateral      SOCIAL HISTORY:    Admitted from:  home    Substance abuse history:              Tobacco hx:                  Alcohol hx:              Home Opioid hx:  Moravian:                                    Preferred Language:    Son:  Sim            Phone#: [phone]       Daughter :  Saadia    [phone]    FAMILY HISTORY:  Family history of inclusion body myositis    Baseline ADLs (prior to admission):    Allergies    Keflex (Unknown)  penicillin (Rash)    Intolerances      Present Symptoms:   Dyspnea: mild  Nausea/Vomiting: no  Anxiety: yes  Depressed yes  Fatigue: yes  Loss of appetite: yes  Pain:            yes                    location:        L side  Review of Systems: see HPI  MEDICATIONS  (STANDING):  atorvastatin 10 milliGRAM(s) Oral at bedtime  chlorhexidine 4% Liquid 1 Application(s) Topical <User Schedule>  dextrose 5%. 1000 milliLiter(s) (50 mL/Hr) IV Continuous <Continuous>  dextrose 50% Injectable 12.5 Gram(s) IV Push once  dextrose 50% Injectable 25 Gram(s) IV Push once  dextrose 50% Injectable 25 Gram(s) IV Push once  entecavir 0.5 milliGRAM(s) Oral daily  furosemide    Tablet 20 milliGRAM(s) Oral daily  insulin lispro (HumaLOG) corrective regimen sliding scale   SubCutaneous three times a day before meals  insulin lispro (HumaLOG) corrective regimen sliding scale   SubCutaneous at bedtime  metoprolol tartrate 50 milliGRAM(s) Oral two times a day  montelukast 10 milliGRAM(s) Oral daily  pantoprazole    Tablet 40 milliGRAM(s) Oral before breakfast  predniSONE   Tablet 60 milliGRAM(s) Oral daily  sertraline 50 milliGRAM(s) Oral daily  trimethoprim  160 mG/sulfamethoxazole 800 mG 1 Tablet(s) Oral <User Schedule>    MEDICATIONS  (PRN):  acetaminophen   Tablet .. 650 milliGRAM(s) Oral every 6 hours PRN Temp greater or equal to 38C (100.4F), Mild Pain (1 - 3)  ALPRAZolam 0.5 milliGRAM(s) Oral two times a day PRN Anxiety and Insomnia  dextrose 40% Gel 15 Gram(s) Oral once PRN Blood Glucose LESS THAN 70 milliGRAM(s)/deciliter  glucagon  Injectable 1 milliGRAM(s) IntraMuscular once PRN Glucose LESS THAN 70 milligrams/deciliter      PHYSICAL EXAM:    Vital Signs Last 24 Hrs  T(C): 36.9 (06 Sep 2019 08:00), Max: 36.9 (06 Sep 2019 00:52)  T(F): 98.4 (06 Sep 2019 08:00), Max: 98.4 (06 Sep 2019 00:52)  HR: 109 (06 Sep 2019 13:00) (77 - 109)  BP: 127/82 (06 Sep 2019 12:00) (111/80 - 179/107)  BP(mean): 93 (06 Sep 2019 12:00) (90 - 121)  RR: 24 (06 Sep 2019 13:00) (17 - 41)  SpO2: 99% (06 Sep 2019 13:00) (90% - 100%)    General: alert  oriented x _2-3___ mildly distressed/anxious   Palliative Performance Status Version2: 50    %  HEENT: n/c, a/t   dry mouth    Lungs: clear, mild labored breathing    CV: s1s2   tachy     GI: sl distended , soft , sl tender + bs   : normal   Musculoskeletal: normal w/ weakness  +2 bilat le edema   Skin: warm dry    Neuro: alert, oriented 3   Oral intake ability: minimal -moderate  capability  Diet: reg as emeka     LABS:                        8.4    22.98 )-----------( 286      ( 06 Sep 2019 12:40 )             26.1     09-06    137  |  102  |  37<H>  ----------------------------<  152<H>  3.6   |  29  |  1.71<H>    Ca    8.1<L>      06 Sep 2019 12:40  Mg     1.6     09-06    TPro  5.3<L>  /  Alb  2.4<L>  /  TBili  0.4  /  DBili  x   /  AST  17  /  ALT  22  /  AlkPhos  64  09-06          FINDINGS:    Head CT:  There is motion artifact present which degrades image quality.    There is no gross acute intracranial hemorrhage, mass effect from   vasogenic edema or evidence for acute large vascular territory infarct.   Grossly stable mild chronic microvessel changes.    The ventricles, sulci and cisternal spaces are stable in size and   configuration and unremarkable for age.  There is no gross midline shift   shift or abnormal extra-axial fluid collection.    No grossly displaced calvarial fracture. The paranasal sinuses and   mastoid air cells are free of acute disease.    Cervical spine CT:  There is motion artifact present which degrades image quality.    Slight anterior listhesis of C3 on C4. No grossly displaced cervical   spine fracture or evidence for traumatic malalignment. The vertebral body   heights are maintained. Multilevel facet alignment is preserved. The   atlanto-dental interval is within normal limits and the craniocervical   junction is unremarkable. There are no  suspicious osteoblastic or lytic   lesions.    There is no evidence of prevertebral soft tissue swelling or epidural   hematoma.    Stable multilevel degenerative changes including intervertebral disc   space narrowing, small disc osteophyte complexes and facet arthropathy   which contribute to mild multilevel canal stenosis and varying degrees of   neural foraminal narrowing.  < from: CT Abdomen and Pelvis No Cont (09.06.19 @ 01:40) >  EXAM:  CT ABDOMEN AND PELVIS      PROCEDURE DATE:  09/06/2019        INTERPRETATION:  CLINICAL HISTORY: Trauma. Back pain. On Coumadin.   Evaluate for retroperitoneal hematoma.    Exam: CT of the abdomen and pelvis is performed with axial 3.75mm images   without the administration of IV contrast.  Oral contrast was withheld.   Sagittal and coronal reformations are provided.     COMPARISON: CT of the abdomen and pelvis from 11/8/2018.    FINDINGS:   There is motion artifact present which degrades image quality.    The heart is again enlarged. There is no pericardial effusion. The lung   bases are clear.    Evaluation of the solid organs and vasculature is limited without the   administration of intravenous contrast. Suboptimal assessment of the   bowel without oral contrast.    The unenhanced appearance of the liver, gallbladder, spleen, pancreas,   adrenal glands, and kidneys is unremarkable.    There is no ascites or pneumoperitoneum the.  There is no bowel   obstruction or wall thickening. The appendix is normal.     There is no abdominal or pelvic lymphadenopathy. The abdominal aorta is   normal in caliber. There is asymmetric enlargement of the left iliopsoas   muscle which is new from the prior study. Slight increased density to the   muscles also present. This is compatible with a subtle small left-sided   retroperitoneal hematoma.    The urinary bladder, uterus and adnexal structures are unremarkable.    There are no acute osseous abnormalities, particularly no acute   fractures. Degenerative change with vertebral body height loss involving   T12 is unchanged as is a nonspecific sclerotic focus in the S1 vertebral   body. Status post fixation of prior left femoral fracture. There is   generalized anasarca.    IMPRESSION:  Asymmetric enlargement of the left iliopsoas muscle compatible with small   retroperitoneal hematoma.                      ADVANCE DIRECTIVES: full code   Advanced Care Planning discussion total time spent:

## 2019-09-06 NOTE — H&P ADULT - ASSESSMENT
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 Discharged on steroids. 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 Now on rituxan. Presents now to [facility] after a fall from standing. Found to have left RP bleed in the setting of coumadin use and supratheraputic INR. Will admit to ICU for further management and observation given RP bleed on coumadin and supratheraputic INR.     Plan discussed with E-ICU attending Dr. Connors     Problem List:  1) RP hematoma  2) acute blood loss anemia   4) A-fib on coumadin/supratheraputic INR  5) HFpEF  6) COPD  7) Anxiety   8) glomerulonephritis     NEURO: mental status poor, however per son has been this way since last admission about 2 weeks ago. Will continue her xanax PRN and sertraline    CV: Hemodynamically stable at this time. Will continue with antihypertensives (metoprolol, lasix). HFpEF continue lasix, hold ASA given bleed. No evidence for exacerbation at this time. Hold coumadin in setting of bleed. Metoprolol for rate control of A-fib    PULM: stable on room air. Montelukast per home meds    GI: NPO for now incase needs invasive procedure    RENAL: KANIKA, possibly from dehydration, however recently with glomerulonephritis and vasculitis. Will continue prednisone 60mg daily per home record. Bactrim for PCP prophylaxis and entecavir. Will give gentle hydration with LR.     ID: no evidence for infection at this time    HEME: Supratheraputic INR, ED gave 5mg vit K PO, will give additional 5mg IVPB. Check PT/INR, CBC @ 06:00. Trend q4-6 hours. Get repeat CT scan in 12 hours. Hold dvt-P and ASA.     ENDO: insulin sliding scale for glucose management     DISPO: To go to ICU for monitoring of bleed.

## 2019-09-06 NOTE — H&P ADULT - RS GEN PE MLT RESP DETAILS PC
airway patent/breath sounds equal/no rhonchi/good air movement/no wheezes/no rales/respirations non-labored

## 2019-09-06 NOTE — CONSULT NOTE ADULT - ASSESSMENT
small retroperitoneal hematoma-not signifcantly large    mgt as per ICU-need to weigh risks/benefits of holding anticoagulation vs continuing anticoagulation    does not need to be npo as there is very low risk that she will need IR intervention.          thank you for consult      dr kris edward  cell# [phone]

## 2019-09-06 NOTE — ED PROVIDER NOTE - DISCUSSED CASE WITH MULTISELECT OPTIONS
10/24/2018    Susan Camacho MD  [address]  [facility] [address]    RE: Monse Peoples       Dear Colleague,    I had the pleasure of seeing Monse Peoples in the [facility].    HPI and Plan:   See dictation    Orders Placed This Encounter   Procedures     Basic metabolic panel     Lipid Profile     ALT     Follow-Up with Cardiologist       No orders of the defined types were placed in this encounter.      There are no discontinued medications.      Encounter Diagnoses   Name Primary?     Coronary artery disease involving native coronary artery of native heart without angina pectoris      Benign essential hypertension        CURRENT MEDICATIONS:  Current Outpatient Prescriptions   Medication Sig Dispense Refill     acetaminophen (TYLENOL) 325 MG tablet Take 650 mg by mouth every morning  100 tablet 0     aspirin 81 MG tablet Take 81 mg by mouth daily       atenolol (TENORMIN) 50 MG tablet Take 1 tablet (50 mg) by mouth daily 30 tablet 11     atorvastatin (LIPITOR) 20 MG tablet Take 1 tablet (20 mg) by mouth daily 90 tablet 3     buPROPion (WELLBUTRIN SR) 150 MG 12 hr tablet Take 1 tablet (150 mg) by mouth 2 times daily 180 tablet 0     calcium carbonate-vitamin D 600-400 MG-UNIT CHEW Take 2 tablets by mouth every morning       Coenzyme Q10 (CO Q 10 PO) Take 100 mg by mouth every evening        CYANOCOBALAMIN SL Place 1,000 mcg under the tongue daily       DULoxetine (CYMBALTA) 60 MG EC capsule Take 1 capsule (60 mg) by mouth daily 90 capsule 0     FIBER SELECT GUMMIES CHEW Take 1 chew tab by mouth daily        glycopyrrolate (ROBINUL) 1 MG tablet Take 1 tablet daily. If needed increase to 1 tablet twice daily. 60 tablet 1     hydrALAZINE (APRESOLINE) 25 MG tablet Take 3 tablets (75 mg) by mouth 3 times daily (you can use up the 50 mg tabs by taking 1.5 tabs 3 times a day first) 810 tablet 3     hydrochlorothiazide (HYDRODIURIL) 25 MG tablet Take 1 tablet (25 mg) by mouth  "daily 90 tablet 3     lisinopril (PRINIVIL/ZESTRIL) 40 MG tablet Take 1 tablet (40 mg) by mouth daily 90 tablet 1     LORazepam (ATIVAN) 1 MG tablet Take 1 tablet with high anxiety, 10 tablets monthly 30 tablet 0     multivitamin, therapeutic with minerals (MULTI-VITAMIN) TABS Take 1 tablet by mouth daily       nitroGLYcerin (NITROSTAT) 0.4 MG sublingual tablet For chest pain place 1 tablet under the tongue every 5 minutes for 3 doses. If symptoms persist 5 minutes after 1st dose call 911. 25 tablet 0     Omega-3 Fatty Acids (OMEGA-3 FISH OIL PO) Take 1.2 g by mouth daily        omeprazole (PRILOSEC) 40 MG capsule Take 1 capsule (40 mg) by mouth daily Take 30-60 minutes before a meal. 90 capsule 1     traZODone (DESYREL) 100 MG tablet Take 2 tablets (200 mg) by mouth At Bedtime 180 tablet 3       ALLERGIES     Allergies   Allergen Reactions     Codeine Sulfate      \"hyper\"       PAST MEDICAL HISTORY:  Past Medical History:   Diagnosis Date     Anxiety 4/2/2015     Asthma     no inhaler for 2-3 years     Benign essential hypertension 8/2/2016     Chest pain, unspecified type 8/27/2017     Coronary artery disease      Coronary artery disease involving native coronary artery of native heart without angina pectoris 8/2/2016     Depression      Esophageal reflux 3/19/2015     Former smoker - has had pulmonary nodules in the past 3/29/2017     History of blood transfusion      Hyperlipidemia LDL goal <100 3/19/2015     Osteoarthritis      Osteopenia      Pulmonary nodules 4/22/2015    Noted 4/14/15, f/u scan 03/2016 showed minimal change, needs repeat scan 03/2017        PAST SURGICAL HISTORY:  Past Surgical History:   Procedure Laterality Date     ANGIOGRAM  6/15/05     ARTHROPLASTY HIP  9/23/2013    Procedure: ARTHROPLASTY HIP;  Right total hip arthroplasty       ARTHROPLASTY HIP Left 5/19/2015    Procedure: ARTHROPLASTY HIP;  Surgeon: Tyrese Howell MD;  Location: [facility] OR     ARTHROPLASTY KNEE Left 8/22/2016    " "Procedure: ARTHROPLASTY KNEE;  Surgeon: Tyrese Howell MD;  Location: [facility] OR     CHOLECYSTECTOMY       COLONOSCOPY       COLONOSCOPY Left 4/19/2016    Procedure: COLONOSCOPY;  Surgeon: Moe Haney MD;  Location:  GI     GASTRIC BYPASS      2011 tiff en y     GI SURGERY      fistulotomy     ORTHOPEDIC SURGERY      left knee \"green procedure\"     TONSILLECTOMY       TUBAL LIGATION         FAMILY HISTORY:  Family History   Problem Relation Age of Onset     Coronary Artery Disease Father      Hypertension Father      Coronary Artery Disease Sister      Hypertension Sister      Coronary Artery Disease Brother      Hypertension Brother      HEART DISEASE Nephew      Other Cancer Maternal Half-Sister        SOCIAL HISTORY:  Social History     Social History     Marital status:      Spouse name: N/A     Number of children: N/A     Years of education: N/A     Social History Main Topics     Smoking status: Former Smoker     Packs/day: 0.50     Years: 50.00     Types: Cigarettes     Quit date: 1/19/2013     Smokeless tobacco: Never Used     Alcohol use 0.0 oz/week     0 Standard drinks or equivalent per week      Comment: 5 drinks a week     Drug use: No     Sexual activity: Yes     Partners: Male     Other Topics Concern     Parent/Sibling W/ Cabg, Mi Or Angioplasty Before 65f 55m? Yes     brother age 31     Caffeine Concern Yes     1 cup of coffee      Sleep Concern Yes     lately it has been an issue     Special Diet No     watching what she eats      Exercise No     Seat Belt Yes     Social History Narrative       Review of Systems:  Skin:  Positive for bruising;rash     Eyes:  Negative for      ENT:  Positive for hearing loss blood when blowing nose  Respiratory:  Positive for dyspnea on exertion;shortness of breath     Cardiovascular:    chest pain;Positive for;fatigue    Gastroenterology:   heartburn    Genitourinary:  Positive for urinary frequency    Musculoskeletal:  Positive for arthritis both hips " "have been replaced, L TKR, right knee; feet pain - flat feet  Neurologic:  Negative for      Psychiatric:  Negative for      Heme/Lymph/Imm:  Negative   alot of sweating  Endocrine:  Negative        Physical Exam:  Vitals: /58  Pulse 70  Ht 1.638 m (5' 4.5\")  Wt 83.9 kg (185 lb)  BMI 31.26 kg/m2    Constitutional:  cooperative, alert and oriented, well developed, well nourished, in no acute distress overweight mildly anxious    Skin:  warm and dry to the touch, no apparent skin lesions or masses noted     warm and dry    Head:  normocephalic, no masses or lesions   atraumatic    Eyes:  pupils equal and round;conjunctivae and lids unremarkable;sclera white   EOMI, pupils round and equal    Lymph:      ENT:  no pallor or cyanosis, dentition good   speech normal, tongue midline    Neck:  no carotid bruit;JVP normal   supple    Respiratory:  normal breath sounds, clear to auscultation, normal A-P diameter, normal symmetry, normal respiratory excursion, no use of accessory muscles    clear without rales/wheezing    Cardiac: regular rhythm, normal S1/S2, no S3 or S4, apical impulse not displaced, no murmurs, gallops or rubs occasional premature beats           RRR without murmur/lift/thrill  pulses full and equal                                   UE pulses full and equal    GI:  not assessed this visit   non-tender    Extremities and Muscular Skeletal:  no deformities, clubbing, cyanosis, erythema observed;no edema         no edema    Neurological:  no gross motor deficits;affect appropriate   motor grossly intact    Psych:  Alert and Oriented x 3 Anxious      CC  Susan Camacho MD  [address] [place] [address]                Service Date: 10/24/2018      CARDIOLOGY FOLLOWUP VISIT      REFERRING PHYSICIAN:  Dr. Susan Camacho      HISTORY OF PRESENT ILLNESS:  It is my pleasure to see your patient, Monse Peoples.  Monse Peoples is a 71-year-old patient with history of mild coronary artery " disease based upon a coronary angiogram that was performed in [facility] in .  She also has a history of hypertension, hyperlipidemia and gastric bypass surgery.  Since I last saw her, she has been doing well.  She is exercising a lot more than she has previously.  Her blood pressure is well controlled at 112/58 today.  Her lipids are excellent with an LDL of 38, HDL of 77 and triglycerides of 129 and a total cholesterol of 141.  Her ALT is normal at 46.  She gets occasional very brief episodes of discomfort in her chest which all occur at rest.  She did have a stress echocardiogram performed on 2017 which is just over a year and 2 months ago and this was entirely normal with no evidence of ischemia on the stress EKG or stress echocardiogram.  Her electrolytes are normal with a potassium of 3.9, sodium 139, BUN of 13 and a creatinine of 0.79.      IMPRESSION:   1.  Coronary artery disease.  The patient is asymptomatic with respect to coronary artery disease.   2.  Excellent lipid profile.   3.  Essential hypertension.  Her blood pressure is well controlled.      PLAN:  We will continue the patient on her good present medications and we will see her back again in 1 year's time but as always, she has been told to contact us if she has any questions or any concerns.      cc:   [place] [address]         SOTERO ZELAYA MD, [facility]             D: 10/24/2018   T: 10/24/2018   MT: JUANITA      Name:     AMINTA GOEL   MRN:      [id number]        Account:      [id number]   :      1946           Service Date: 10/24/2018      Document: [id number]       Thank you for allowing me to participate in the care of your patient.      Sincerely,     Sotero Garza MD, MD     [facility]    cc:   Susan Camacho MD  [address] [place] [address]         Consultant

## 2019-09-06 NOTE — CONSULT NOTE ADULT - SUBJECTIVE AND OBJECTIVE BOX
HISTORY OF PRESENT ILLNESS:  History obtained from son by bedside and from previous records  TONIA YOUNG is a 84 year old FEMALE PMHx lymphoma/MGUS, AFib on Coumadin, CHF, HLD, HTN, COPD, anxiety, depression, presented to [facility] ED s/p fall, found to have a left RP bleed on Coumadin with a supratherapeutic INR of 4; hemoglobin 8.3 down from 9.6; was hemodynamically stable. Son states pt was getting out of the car when she fell, landed on her left side and hit her left head. Pt did not have LOC. Per son, there is no acute change in her mental status however, pt has become progressively altered since her hospitalization 3 weeks ago. 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 Renal bx with active crescentic glomerulonephritis, pauci-immune suspected to be 2/2 hydralazine. Pt was d/c'd on steroids and was started on rituxan.     At [facility] ICU, pt received 1 unit PRBC, FFP, and vitamin K. Hb initially responded to the 1 unit however, dropped again, leading to a repeat CT, which showed worsening of RP bleed w/ displacement of kidney.   Prior to transfer, pt received Kcentra, additional FFP, additional 1unit pRBC, and vitamin K. A R femoral TLC was placed.    Pt transferred to [facility] SICU for increased size of RP, hemodynamic monitoring, and possible IR intervention.     PAST MEDICAL HISTORY: COPD (chronic obstructive pulmonary disease)  Peripheral vascular disease  Pulmonary hypertension  Rheumatoid arthritis  Osteoarthritis  Mitral regurgitation  H/O lymphoma  Hyperlipidemia  Chronic GERD  Chronic kidney disease  AF (atrial fibrillation)  Anemia in CKD (chronic kidney disease)  CHF (congestive heart failure)  HTN (hypertension)  No pertinent past medical history      PAST SURGICAL HISTORY: History of total hip replacement, left  History of total knee replacement, bilateral      FAMILY HISTORY: Family history of inclusion body myositis  Family history unknown      SOCIAL HISTORY:    CODE STATUS: FULL CODE    HOME MEDICATIONS:   Alprazolam 0.5 mg PO BID  Atorvastatin 10mg PO qd  Clonidine 0.1 mg PO BID  Entecavir 0.5 mg PO qd  Fluticasone proprionate 55 mcg/inh powder q12h PRN  Furosemide 20 mg PO  Ipratropium-albuterol 0.5 mg-2.5mg/3 mL inhalation solution q6h PRN  Metoprolol tartrate 50 mg PO BID  Montelukast 10 mg PO qd  NovoLOG 100 units/mL injectable solution TID before meals  Pantoprazole 40 mg PO qd  Prednisone 20 mg TID  Sertraline 50 mg PO  Bactrim 800 mg-160 mg PO TID on MWF    ALLERGIES: Keflex (Unknown)  penicillin (Rash)      VITAL SIGNS:  ICU Vital Signs Last 24 Hrs  T(C): 36.4 (07 Sep 2019 03:00), Max: 37.4 (06 Sep 2019 21:35)  T(F): 97.6 (07 Sep 2019 03:00), Max: 99.4 (06 Sep 2019 21:35)  HR: 90 (07 Sep 2019 03:30) (77 - 129)  BP: 157/84 (07 Sep 2019 03:30) (85/58 - 157/84)  BP(mean): 113 (07 Sep 2019 03:30) (68 - 125)  ABP: --  ABP(mean): --  RR: 20 (07 Sep 2019 03:30) (13 - 41)  SpO2: 95% (07 Sep 2019 03:30) (85% - 100%)      NEURO  Exam: A&Ox3    RESPIRATORY    Exam: CTAB  ALBUTerol/ipratropium for Nebulization. 3 milliLiter(s) Nebulizer every 6 hours PRN Shortness of Breath and/or Wheezing      CARDIOVASCULAR  VBG - ( 07 Sep 2019 00:33 )  pH: 7.39  /  pCO2: 46    /  pO2: 35    / HCO3: 27    / Base Excess: 2.7   /  SaO2: 60     Lactate: 2.1      Exam: Regular rate and rhythm  metoprolol tartrate Injectable 5 milliGRAM(s) IV Push every 6 hours      GI/NUTRITION  Exam: soft, TTP LLQ; no rebound or guarding  Diet: NPO    GENITOURINARY/RENAL  multiple electrolytes Injection Type 1 1000 milliLiter(s) IV Continuous <Continuous>    09-06 @ 07:01  -  09-07 @ 03:36  --------------------------------------------------------  IN:    multiple electrolytes Injection Type 1: 375 mL  Total IN: 375 mL    OUT:    Indwelling Catheter - Urethral: 325 mL  Total OUT: 325 mL    Total NET: 50 mL    Weight (kg): 67.7 (09-06 @ 21:35)  09-07    138  |  101  |  46<H>  ----------------------------<  127<H>  3.9   |  23  |  1.76<H>    Ca    8.1<L>      07 Sep 2019 00:33  Phos  4.8     09-07  Mg     1.9     09-07    TPro  5.3<L>  /  Alb  2.4<L>  /  TBili  0.4  /  DBili  x   /  AST  17  /  ALT  22  /  AlkPhos  64  09-06    [X] Chan catheter, indication: urine output monitoring in critically ill patient    HEMATOLOGIC  [ ] VTE Prophylaxis:                          7.4    18.6  )-----------( 274      ( 07 Sep 2019 00:33 )             22.7     PT/INR - ( 07 Sep 2019 00:33 )   PT: 13.0 sec;   INR: 1.13 ratio         PTT - ( 07 Sep 2019 00:33 )  PTT:32.5 sec  Transfusion: [ ] PRBC	[ ] Platelets	[ ] FFP	[ ] Cryoprecipitate      INFECTIOUS DISEASES    RECENT CULTURES:      ENDOCRINE  insulin lispro (HumaLOG) corrective regimen sliding scale   SubCutaneous every 6 hours  methylPREDNISolone sodium succinate Injectable 48 milliGRAM(s) IV Push daily    CAPILLARY BLOOD GLUCOSE      POCT Blood Glucose.: 216 mg/dL (06 Sep 2019 17:07)  POCT Blood Glucose.: 147 mg/dL (06 Sep 2019 12:10)  POCT Blood Glucose.: 87 mg/dL (06 Sep 2019 07:50)      PATIENT CARE ACCESS DEVICES:  [ ] Peripheral IV  [X] Central Venous Line	[X] R	[ ] L	[ ] IJ	[X] Fem	[ ] SC	Placed: 9/6/19  [ ] Arterial Line		[ ] R	[ ] L	[ ] Fem	[ ] Rad	[ ] Ax	Placed:   [ ] PICC:					[ ] Mediport  [ ] Urinary Catheter, Date Placed:   [x] Necessity of urinary, arterial, and venous catheters discussed    OTHER MEDICATIONS: chlorhexidine 2% Cloths 1 Application(s) Topical daily      IMAGING STUDIES:    EXAM:  CT ABDOMEN AND PELVIS      PROCEDURE DATE:  09/06/2019        INTERPRETATION:  CLINICAL HISTORY:  Left iliopsoas hematoma; follow-up   assessment.    Multiple axial images of the abdomen and pelvis were obtained from the   lung bases throughpubic symphysis without the administration of oral or   intravascular contrast. Reformatted coronal and sagittal images are   submitted.    COMPARISON: 9/6/2019 at 1:30 AM    FINDINGS: Since prior evaluation significant interval change is   identified. The previously described left retroperitoneal iliopsoas   hemorrhage is significantly increased in size now resulting in   displacement of the left kidney anteriorly. There is significant   stranding of the left perirenal fat and left perirenal fluid is   identified. There is thickening of the left lateral abdominal wall   musculature. Fluid/hemorrhage tracks into the left pelvic retroperitoneal   space. There is new stranding surrounding the proximal sigmoid colon,   indeterminate etiology andclinical significance. New perihepatic and   perisplenic ascites is identified. New small left pleural effusion.    The liver is normal size. No focal hepatic masses are identified. There   is no evidence for intrahepatic or extrahepatic biliary dilatation. No   gallstones, gallbladder wall thickening or pericholecystic fluid   identified.    The spleen is unremarkable in size. No space-occupying lesions of the   splenic parenchyma identified. The pancreas and adrenal glands appear   unremarkable.    There is no evidence for right-sided hydronephrosis. Cystic foci are   identified within the right kidney measuring up to 3.3 cm. There is mild   prominence of the left intrarenal collecting system, stable. No definite   space-occupying lesions of the left kidney are identified. The abdominal   aorta is normal in course and caliber. No abnormally enlarged   retroperitoneal or pelvic lymphadenopathy is noted.    Abundant material is identified within the rectum. There is no evidence   for mechanical bowel obstruction. No colonic wall thickening is   identified. There is no evidence for free intraperitoneal air. The   appendix is not visualized.    The uterus and urinary bladder appear unremarkable.     Imaging of the lung bases demonstrates cardiomegaly. Coronary arterial   calcifications noted. Degenerative changes are noted within the thoracic   and lumbar spine. A nonspecific sclerotic focus is identified within the   sacrum. The patient is status post left femoral neck pinning with  placement of a proximal left femoral intramedullary silvino.    Diffuse anasarca is noted.    IMPRESSION:  Since prior evaluation significant interval change is   identified. The previously described left retroperitoneal iliopsoas   hemorrhage is significantly increased in size now resulting in   displacement of the left kidney anteriorly. There is significant   stranding of the left perirenal fat and left perirenal fluid is   identified. There is thickening of the left lateral abdominal wall   musculature. Fluid/hemorrhage tracks into the left pelvic retroperitoneal   space. There is new stranding surrounding the proximal sigmoid colon,   indeterminate etiology and clinical significance. New perihepatic and   perisplenic ascites is identified.New small left pleural effusion.

## 2019-09-06 NOTE — CONSULT NOTE ADULT - ASSESSMENT
83 YO woman with PMHx of MGUS and Marginal Zone lymphoma. Last seen on May, 2019, by Dr Danielle.   Patient has been stable. The anticoagulation has been monitored by her cardiologist for A fib. Denies history of blood clots.   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  Hematology consult requested to determine need for anticoagulation; I defer the decision to anticoagulate to cardiology.     I suggest checking anticardiolipin antibodies IgG, IgM, B2 glycoprotein IgG, IgM, these can sometimes be seen in rheumatological conditions and may help the rest of the tem decide on anticoagulation in this elderly patient.

## 2019-09-06 NOTE — CONSULT NOTE ADULT - ASSESSMENT
A/P 83 y/o F w/ a PMHx of lymphoma/MGUS, A fib on coumadin, CHF, HLD, HTN, COPD, anxiety, depression, recently admitted for shortness of breath in the setting of hypoxic respiratory failure. Now on rituxan. Presents now to [facility] after a fall from standing. Found to have left side RP bleed in the setting of coumadin use and supratheraputic INR. admitted for observation given RP bleed on coumadin and supratheraputic INR.   Mental status fluctuates /fair-poor , however per son has been this way since last admission about 2 weeks ago.      KANIKA, possibly from dehydration, however recently with glomerulonephritis and vasculitis. Continue prednisone 60mg daily per home record. Bactrim for PCP prophylaxis and entecavir.   Hemodynamically stable at this time. Will continue with antihypertensives (metoprolol, lasix). HFpEF continue lasix, hold ASA given bleed.   Hold coumadin in setting of bleed.  S/p transfusion 1U Prbc today   Metoprolol for rate control a-fib   Pt in bed, awake, alert, converses, mostly English , son at bedside. Pt appears mildly anxious, is oriented to self, . Does c/o pain to her L side .  Son explained how pt has been in hospital just recently and was only home one week before she fell yesterday.  Pt lives at home with son and daughter and 12 hour aides. Asked son about advanced directives.  Son said his sister is the HCP and she has paperwork. He said his sister will be here this afternoon, and asked that I speak to her.    pt anxiety - would cont prn xanax   pain - has PRN tylenol , will d/w CCU team to inc pain meds.   Plan to review goc w/ daughter .

## 2019-09-06 NOTE — ED ADULT NURSE REASSESSMENT NOTE - NS ED NURSE REASSESS COMMENT FT1
pt is admitted to ICU for mild bleeding on left abdominal muscle s/p fall today. Due medication given as ordered. VSS on her baseline. Will continue to monitor. pt is admitted to ICU for mild bleeding on left abdominal muscle s/p fall today. No visible bleeding on her left back noted at this time. Due medication given as ordered. VSS on her baseline. Will continue to monitor.

## 2019-09-06 NOTE — ED PROVIDER NOTE - OBJECTIVE STATEMENT
pt s/p mechanical fall today while getting out of her car with family, well onto her back and hit the back of her head, pt c/o mild headache and low back pain.  pt denies any LOC, translation from son who is at bedside.

## 2019-09-06 NOTE — ED ADULT NURSE REASSESSMENT NOTE - NS ED NURSE REASSESS COMMENT FT1
pt left ED in stable condition accompanied by RN, EDT, and her son toward ICU. EKG done in ED as ordered by ALEKSANDRA Owen (ICU) but not singed by the PA in ED. ICU RN made aware. Report was given by ZENOBIA Jackson to ICU RN Huber. pt left ED in stable condition accompanied by RN, EDT, and her son toward ICU. EKG done in ED as ordered by ALEKSANDRA Owen (ICU) but not singed by the PA in ED. ICU RN made aware.

## 2019-09-06 NOTE — PROVIDER CONTACT NOTE (EICU) - RECOMMENDATIONS
- Serial CBCs, repeat imaging to determine interval change  - vitamin K, hold Coumadin  - Monitor in ICU

## 2019-09-06 NOTE — CHART NOTE - NSCHARTNOTEFT_GEN_A_CORE
84F afib on coumadin, S/P mechanical fall, arrived with belly pain, found to have RP bleed/hematoma.   -received PRBC, FFP and vitamin K (INR was >4 on arrival) Hb initially with response to PRBC, however dipped down again over course of day and is now 7.6. This prompeted repeat iamging of belly which showed worsening of RP bleed with displacement of kidney.    -have discussed case with eICU attending. We feel she will be better served at a tertiary care facility with MARY JANE navas.  -Dr. Wong discussed  case with [facility] and patient has been accepted under a Dr. Rigo Montero.    -in the meantime, until patient leaves [facility], we will give Kcentra, additional FFP, PRBC and vitamin K.  -also will evaluate racheletsage for better IV access, as right now she only has a right hand/thumb PIV.  -have updated patietn and son who is at bedside of these changes, and thy consent/agree to transfer.    CRITICAL CARE TIME SPENT:  35 minutes of critical care time spent providing medical care for patient's acute illness/conditions that impairs at least one vital organ system and/or poses a high risk of imminent or life threatening deterioration in the patient's condition. It includes time spent evaluating and treating the patient's acute illness as well as time spent reviewing labs, radiology, discussing goals of care with patient and/or patient's family, and discussing the case with a multidisciplinary team, including the eICU, in an effort to prevent further life threatening deterioration or end organ damage. This time is independent of any procedures performed.

## 2019-09-06 NOTE — ED PROVIDER NOTE - PHYSICAL EXAMINATION
Gen:  alert, awake, no acute distress  HEENT:  posterior scalp hematoma, airway clear, pupils equal and round  CV:  rrr, nl S1, S2, no m/r/g  Pulm:  lungs CTA b/l  Abd: s/nt/nd, +BS  Ext:  moving all extremities  Neuro:  grossly intact, no focal deficits  Skin:  clear, dry, intact  Psych: AOx2, normal affect, no apparent risk to self or others

## 2019-09-06 NOTE — ED ADULT NURSE NOTE - INTERVENTIONS DEFINITIONS
[place] to call system/Non-slip footwear when patient is off stretcher/Physically safe environment: no spills, clutter or unnecessary equipment/Provide visual clues: red socks/Provide visual cue, wrist band, yellow gown, etc./Room bathroom lighting operational/Stretcher in lowest position, wheels locked, appropriate side rails in place/Reinforce activity limits and safety measures with patient and family

## 2019-09-06 NOTE — CONSULT NOTE ADULT - SUBJECTIVE AND OBJECTIVE BOX
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 Discharged on steroids. 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 Now on rituxan. Presents now to [facility] after a fall from standing. Found to have left RP bleed in the setting of coumadin use and supratheraputic INR. History obtained from Son at the bedside. He states they were getting out the car today when she fell and landed on her left side and hit her head. She did not loose consciousness or black out. Patient right now unable to provide ROS as she is slightly altered. Per son this mental status change has been presents since her last hospital stay about 2 weeks ago.     In the Er CT scans of head, abdomen and pelvis were done. CT head and neck without acute pathology. CT pelvis with small left RP bleed. INR 4. hemoglobin 8.3 down from 9.6 about 2 weeks ago. Hemodynamically stable. Patient alert.          patient examined in ICU with ALEKSANDRA Dotson    vitals stable, afebrile    Heent-sclera anicteric  lungs-clear to p and a  back-no Salcido Turners sign    abdomen-soft, non distended, non tender    extrem-no compartment syndrome    labs:  Complete Blood Count (09.06.19 @ 06:40)    Nucleated RBC: 0 /100 WBCs    WBC Count: 16.38 K/uL    RBC Count: 2.72 M/uL    Hemoglobin: 7.2 g/dL    Hematocrit: 23.0 %    Mean Cell Volume: 84.6 fl    Mean Cell Hemoglobin: 26.5 pg    Mean Cell Hemoglobin Conc: 31.3 gm/dL    Red Cell Distrib Width: 20.1 %    Platelet Count - Automated: 303 K/uL    coags:  Prothrombin Time and INR, Plasma (09.06.19 @ 06:40)    Prothrombin Time, Plasma: 39.2: Effective October 30th, 2018 the reference range for PT has changed. sec    INR: 3.37: RECOMMENDED RANGES FOR THERAPEUTIC INR:    2.0-3.0 for most medical and surgical thromboembolic states    2.0-3.0 for atrial fibrillation    2.0-3.0 for bileaflet mechanical valve in aortic position    2.5-3.5 for mechanical heart valves   Chest 2004;126:S021-150  The presence of direct thrombin inhibitors (argatroban, refludan)  may falsely increase results. ratio        Comprehensive Metabolic Panel (09.06.19 @ 06:40)    Sodium, Serum: 140 mmol/L    Potassium, Serum: 4.1 mmol/L    Chloride, Serum: 105 mmol/L    Carbon Dioxide, Serum: 28 mmol/L    Anion Gap, Serum: 7 mmol/L    Blood Urea Nitrogen, Serum: 47 mg/dL    Creatinine, Serum: 1.69 mg/dL    Glucose, Serum: 82 mg/dL    Calcium, Total Serum: 7.6 mg/dL    Protein Total, Serum: 5.3 g/dL    Albumin, Serum: 2.4 g/dL    Bilirubin Total, Serum: 0.4 mg/dL    Alkaline Phosphatase, Serum: 64 U/L    Aspartate Aminotransferase (AST/SGOT): 17 U/L    Alanine Aminotransferase (ALT/SGPT): 22 U/L      ct scan(I personally reviewed images)    small left(iliopsoas hematoma)

## 2019-09-06 NOTE — PROGRESS NOTE ADULT - SUBJECTIVE AND OBJECTIVE BOX
Follow-up Critical Care Progress Note  Chief Complaint : Nontraumatic hematoma of soft tissue    Difficult to obtain history. States she fell getting out of the car. Currently endorses back pain.     Allergies :Keflex (Unknown)  penicillin (Rash)    PAST MEDICAL & SURGICAL HISTORY:  COPD (chronic obstructive pulmonary disease)  Peripheral vascular disease  Pulmonary hypertension  Rheumatoid arthritis  Osteoarthritis  Mitral regurgitation  H/O lymphoma  Hyperlipidemia  Chronic GERD  Chronic kidney disease: proteinuria  AF (atrial fibrillation)  Anemia in CKD (chronic kidney disease)  CHF (congestive heart failure)  HTN (hypertension)  History of total hip replacement, left  History of total knee replacement, bilateral    Medications:  MEDICATIONS  (STANDING):  atorvastatin 10 milliGRAM(s) Oral at bedtime  chlorhexidine 4% Liquid 1 Application(s) Topical <User Schedule>  dextrose 5%. 1000 milliLiter(s) (50 mL/Hr) IV Continuous <Continuous>  dextrose 50% Injectable 12.5 Gram(s) IV Push once  dextrose 50% Injectable 25 Gram(s) IV Push once  dextrose 50% Injectable 25 Gram(s) IV Push once  entecavir 0.5 milliGRAM(s) Oral daily  furosemide    Tablet 20 milliGRAM(s) Oral daily  insulin lispro (HumaLOG) corrective regimen sliding scale   SubCutaneous three times a day before meals  insulin lispro (HumaLOG) corrective regimen sliding scale   SubCutaneous at bedtime  lactated ringers. 1000 milliLiter(s) (100 mL/Hr) IV Continuous <Continuous>  metoprolol tartrate 50 milliGRAM(s) Oral two times a day  montelukast 10 milliGRAM(s) Oral daily  pantoprazole    Tablet 40 milliGRAM(s) Oral before breakfast  predniSONE   Tablet 60 milliGRAM(s) Oral daily  sertraline 50 milliGRAM(s) Oral daily  trimethoprim  160 mG/sulfamethoxazole 800 mG 1 Tablet(s) Oral <User Schedule>    MEDICATIONS  (PRN):  acetaminophen   Tablet .. 650 milliGRAM(s) Oral every 6 hours PRN Temp greater or equal to 38C (100.4F), Mild Pain (1 - 3)  ALPRAZolam 0.5 milliGRAM(s) Oral two times a day PRN Anxiety and Insomnia  dextrose 40% Gel 15 Gram(s) Oral once PRN Blood Glucose LESS THAN 70 milliGRAM(s)/deciliter  glucagon  Injectable 1 milliGRAM(s) IntraMuscular once PRN Glucose LESS THAN 70 milligrams/deciliter    LABS:                      7.2    16.38 )-----------( 303      ( 06 Sep 2019 06:40 )             23.0     09-06  140  |  105  |  47<H>  ----------------------------<  82  4.1   |  28  |  1.69<H>    Ca    7.6<L>      06 Sep 2019 06:40  Mg     1.9     09-06    TPro  5.3<L>  /  Alb  2.4<L>  /  TBili  0.4  /  DBili  x   /  AST  17  /  ALT  22  /  AlkPhos  64  09-06    IVAN -- 08-15 @ 06:45  Anti SS-1 <0.2  Anti SS-2 <0.2  Anti RNP --  RF -- 08-15 @ 06:45    Atypical ANCA -- 08-15 @ 06:45  c-ANCA titer -- 08-15 @ 06:45  c-ANCA -- 08-15 @ 06:45  p-ANCA -- 08-15 @ 06:45    PT/INR - ( 06 Sep 2019 06:40 )   PT: 39.2 sec;   INR: 3.37 ratio       PTT - ( 06 Sep 2019 01:15 )  PTT:43.5 sec    VITALS:  T(C): 36.9 (09-06-19 @ 08:00), Max: 36.9 (09-06-19 @ 00:52)  T(F): 98.4 (09-06-19 @ 08:00), Max: 98.4 (09-06-19 @ 00:52)  HR: 107 (09-06-19 @ 12:00) (77 - 107)  BP: 127/82 (09-06-19 @ 12:00) (111/80 - 179/107)  BP(mean): 93 (09-06-19 @ 12:00) (90 - 121)  ABP: --  ABP(mean): --  RR: 41 (09-06-19 @ 12:00) (17 - 41)  SpO2: 97% (09-06-19 @ 12:00) (90% - 100%)  CVP(mm Hg): --  CVP(cm H2O): --    Ins and Outs     09-05-19 @ 07:01  -  09-06-19 @ 07:00  --------------------------------------------------------  IN: 350 mL / OUT: 0 mL / NET: 350 mL    09-06-19 @ 07:01  -  09-06-19 @ 12:36  --------------------------------------------------------  IN: 100 mL / OUT: 0 mL / NET: 100 mL        Height (cm): 162.56 (09-06-19 @ 00:52)  Weight (kg): 68 (09-06-19 @ 00:52)  BMI (kg/m2): 25.7 (09-06-19 @ 00:52)        I&O's Detail    05 Sep 2019 07:01  -  06 Sep 2019 07:00  --------------------------------------------------------  IN:    IV PiggyBack: 50 mL    lactated ringers.: 300 mL  Total IN: 350 mL    OUT:  Total OUT: 0 mL    Total NET: 350 mL      06 Sep 2019 07:01  -  06 Sep 2019 12:36  --------------------------------------------------------  IN:    lactated ringers.: 100 mL  Total IN: 100 mL    OUT:  Total OUT: 0 mL    Total NET: 100 mL

## 2019-09-06 NOTE — PROVIDER CONTACT NOTE (EICU) - BACKGROUND
Case D/W ALEKSANDRA Sylvester  Illiopsoas hematoma is worsening on CT, hemodynamics are more concerning, transfuse another unit PRBC stat and KCENTRA 1500 U. Good motor and sensory exam of B/L LE. May need IR or trauma service.  I called [facility] transfer center and D/w Dr Rigo Alex SICU /trauma attending. She is accepted to [facility] S[facility], lights and sirens.
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  CT scans negative for trauma, but notes small L sided RP bleed with drop in Hgb from baseline.  INR supratherapeutic.

## 2019-09-06 NOTE — CONSULT NOTE ADULT - SUBJECTIVE AND OBJECTIVE BOX
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 Discharged on steroids. 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 Now on rituxan. Presents now to [facility] after a fall from standing. Found to have left RP bleed in the setting of coumadin use and supratheraputic INR. Patient had a fall  getting out the car today when she fell and landed on her left side and hit her head. She did not loose consciousness or black out.    In the Er CT scans of head, abdomen and pelvis were done. CT head and neck without acute pathology. CT pelvis with small left RP bleed. INR 4. hemoglobin 8.3 down from 9.6 about 2 weeks ago.     Patient is a patient of Dr Danielle, been followed for MGUS and Marginal Zone lymphoma. Last seen on May, 2019,  Patient has been stable. The anticoagulation has been monitored by her cardiologist for A fib. Denies history of blood clots.     .    Patient History:    Past Medical, Past Surgical, and Family History:  PAST MEDICAL HISTORY:  AF (atrial fibrillation)     Anemia in CKD (chronic kidney disease)     CHF (congestive heart failure)     Chronic GERD     Chronic kidney disease proteinuria    COPD (chronic obstructive pulmonary disease)     H/O lymphoma     HTN (hypertension)     Hyperlipidemia     Mitral regurgitation     Osteoarthritis       Peripheral vascular disease     Pulmonary hypertension     Rheumatoid arthritis.     PAST SURGICAL HISTORY:  History of total hip replacement, left     History of total knee replacement, bilateral.     FAMILY HISTORY:  Family history of inclusion body myositis.     Social History:  Social History (marital status, living situation, occupation, tobacco use, alcohol and drug use, and sexual history): non-smoker, no alcohol use, no drug use	         ICU Vital Signs Last 24 Hrs  T(C): 36.9 (06 Sep 2019 08:00), Max: 36.9 (06 Sep 2019 00:52)  T(F): 98.4 (06 Sep 2019 08:00), Max: 98.4 (06 Sep 2019 00:52)  HR: 109 (06 Sep 2019 13:00) (77 - 109)  BP: 127/82 (06 Sep 2019 12:00) (111/80 - 179/107)  BP(mean): 93 (06 Sep 2019 12:00) (90 - 121)  ABP: --  ABP(mean): --  RR: 24 (06 Sep 2019 13:00) (17 - 41)  SpO2: 99% (06 Sep 2019 13:00) (90% - 100%)      General: alert  oriented x _2-3  HEENT: n/c, a/t   dry mouth    Lungs: clear, mild labored breathing    CV: s1s2   tachy     GI: sl distended , soft , sl tender + bs   : normal   Musculoskeletal: normal w/ weakness  +2 bilat le edema   Skin: warm dry    Neuro: alert, oriented 3   Oral intake ability: minimal -moderate  capability  Diet: reg as emeka       MEDICATIONS  (STANDING):  atorvastatin 10 milliGRAM(s) Oral at bedtime  chlorhexidine 4% Liquid 1 Application(s) Topical <User Schedule>  dextrose 5%. 1000 milliLiter(s) (50 mL/Hr) IV Continuous <Continuous>  dextrose 50% Injectable 12.5 Gram(s) IV Push once  dextrose 50% Injectable 25 Gram(s) IV Push once  dextrose 50% Injectable 25 Gram(s) IV Push once  entecavir 0.5 milliGRAM(s) Oral daily  furosemide    Tablet 20 milliGRAM(s) Oral daily  insulin lispro (HumaLOG) corrective regimen sliding scale   SubCutaneous three times a day before meals  insulin lispro (HumaLOG) corrective regimen sliding scale   SubCutaneous at bedtime  metoprolol tartrate 50 milliGRAM(s) Oral two times a day  montelukast 10 milliGRAM(s) Oral daily  pantoprazole    Tablet 40 milliGRAM(s) Oral before breakfast  predniSONE   Tablet 60 milliGRAM(s) Oral daily  sertraline 50 milliGRAM(s) Oral daily  trimethoprim  160 mG/sulfamethoxazole 800 mG 1 Tablet(s) Oral <User Schedule>    MEDICATIONS  (PRN):  acetaminophen   Tablet .. 650 milliGRAM(s) Oral every 6 hours PRN Temp greater or equal to 38C (100.4F), Mild Pain (1 - 3)  ALPRAZolam 0.5 milliGRAM(s) Oral two times a day PRN Anxiety and Insomnia  dextrose 40% Gel 15 Gram(s) Oral once PRN Blood Glucose LESS THAN 70 milliGRAM(s)/deciliter  glucagon  Injectable 1 milliGRAM(s) IntraMuscular once PRN Glucose LESS THAN 70 milligrams/deciliter      Activated Partial Thromboplastin Time in AM (09.06.19 @ 12:40)    Activated Partial Thromboplastin Time: 38.4: The recommended therapeutic heparin range (full dose) is 58-99 seconds.  Recommended therapeutic Argatroban range is 1.5 to 3.0 times the baseline  APTT value, not to exceed 100 seconds. Recommended therapeutic Refludan  range is 1.5 to 2.5 times thebaseline APTT.  Effective October 30, 2018 the reference range has changed. sec    Prothrombin Time and INR, Plasma in AM (09.06.19 @ 12:40)    Prothrombin Time, Plasma: 19.7: Effective October 30th, 2018 the reference range for PT has changed. sec    INR: 1.73: RECOMMENDED RANGES FOR THERAPEUTIC INR:    2.0-3.0 for most medical and surgical thromboembolic states    2.0-3.0 for atrial fibrillation    2.0-3.0 for bileaflet mechanical valve in aortic position    2.5-3.5 for mechanical heart valves   Chest 2004;126:T421-289  The presence of direct thrombin inhibitors (argatroban, refludan)  may falsely increase results. ratio      Complete Blood Count (09.06.19 @ 12:40)    Nucleated RBC: 0 /100 WBCs    WBC Count: 22.98 K/uL    RBC Count: 3.05 M/uL    Hemoglobin: 8.4 g/dL    Hematocrit: 26.1 %    Mean Cell Volume: 85.6 fl    Mean Cell Hemoglobin: 27.5 pg    Mean Cell Hemoglobin Conc: 32.2 gm/dL    Red Cell Distrib Width: 19.1 %    Platelet Count - Automated: 286 K/uL    Basic Metabolic Panel (09.06.19 @ 12:40)    Sodium, Serum: 137 mmol/L    Potassium, Serum: 3.6 mmol/L    Chloride, Serum: 102 mmol/L    Carbon Dioxide, Serum: 29 mmol/L    Anion Gap, Serum: 6 mmol/L    Blood Urea Nitrogen, Serum: 37 mg/dL    Creatinine, Serum: 1.71 mg/dL    Glucose, Serum: 152 mg/dL    Calcium, Total Serum: 8.1 mg/dL    eGFR if Non : 27: Interpretative comment  The units for eGFR are mL/min/1.73M2 (normalized body surface area). The  eGFR is calculated from a serum creatinine using the CKD-EPI equation.  Other variables required for calculation are race, age and sex. Among  patients with chronic kidney disease (CKD), the eGFR is useful in  determining the stage of disease according to KDOQI CKD classification.  All eGFR results are reported numerically with the following  interpretation.          GFR                    With                 Without     (ml/min/1.73 m2)    Kidney Damage       Kidney Damage        >= 90                    Stage 1                     Normal        60-89                    Stage 2                     Decreased GFR        30-59     Stage 3                     Stage 3        15-29                    Stage 4                     Stage 4        < 15                      Stage 5                     Stage 5  Each stage of CKD assumes that the associated GFR level has been in  effect for at least 3 months. Determination of stages one and two (with  eGFR > 59 ml/min/m2) requires estimation of kidney damage for at least 3  months as defined by structural or functional abnormalities.  Limitations: All estimates of GFR will be less accurate for patients at  extremes of muscle mass (including but not limited to frail elderly,  critically ill, or cancer patients), those with unusual diets, and those  with conditions associated with reduced secretion or extrarenal  elimination of creatinine. The eGFR equation is not recommended for use  in patients with unstable creatinine levels. mL/min/1.73M2    eGFR if African American: 31 mL/min/1.73M2

## 2019-09-06 NOTE — CONSULT NOTE ADULT - ASSESSMENT
Assessment:  85 y/o F PMHx lymphoma/MGUS, AFib on Coumadin, CHF, HLD, HTN, COPD, anxiety, depression, presented to [facility] ED s/p fall with left RP bleed on Coumadin with a supratherapeutic INR. Repeat CT showing worsening of hematoma. Pt transferred to [facility] SICU for increased size of L RP, hemodynamic monitoring, and possible IR intervention.    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 Repeat CT showing worsening of hematoma. Pt transferred to [facility] SICU for increased size of L RP, hemodynamic monitoring, and possible IR intervention.    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 KANIKA  -monitor Cr  -garza  -strict I/Os  -plasmalyte 75cc/hr  -solumedrol 48 mg IV qd    Heme:  -hemorrhagic watch protocol  -f/u CBCs  -mechanical dvt ppx  -L RP bleed possible for IR embolization    Infectious disease:  -monitor fever, WBC    Endocrine  -ISS  -monitor blood glucose    Dispo  SICU Full code

## 2019-09-06 NOTE — PROGRESS NOTE ADULT - ASSESSMENT
Physical Examination:  GENERAL:               Awake and alert. Oriented to self   HEENT:                    Pupils equal, reactive to light.  Symmetric. No JVD, Moist MM, No scleral icterus   PULM:                     Bilateral air entry, No Rales, No Rhonchi, No Wheezing  CVS:                         S1, S2,  + Grade 2 ejection murmur  ABD:                        Soft, nondistended, nontender, normoactive bowel sounds,   EXT:                         No edema, nontender, No Cyanosis or Clubbing   Vascular:                  Warm Extremities, Normal Capillary refill, Normal Distal Pulses  SKIN:                       Warm and well perfused, no rashes noted.   NEURO:                  Alert, oriented, interactive, nonfocal, follows commands, pain with left leg movement  PSYC:                      Calm, + Insight.      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 S/p fall now with left illiopsoas hematoma     Assessment:  1. Acute blood loss anemia  2. Retroperitoneal hematoma  3. Fall   4. Grade 3 Diastolic heart dysfunction  5. Pulmonary arterial hypertension   6. 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 B-Cell Lymphoma/MGUS     - Will continue to monitor for signs of bleeding  - Transfuse to hgb > 7  - Surgery recs appreciated  - Cont. lasix for now and inbetween blood transfusion  - Hold IV fluids  - Cont. Prednisone for now  - Repeat CT scan of the abdomen  - Hematology consult requested  - Hold all anticoagulation given acute bleeding   - Cont. Home prophylaxis medications  - SCD for DVT prophylaxis   - Over all prognosis is guarded, will need discussion about continuing anticoagulation given patient with fall  - Palliative care consulted  - Discussed with Son and Daughter

## 2019-09-06 NOTE — H&P ADULT - HISTORY OF PRESENT ILLNESS
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 Discharged on steroids. 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 Now on rituxan. Presents now to [facility] after a fall from standing. Found to have left RP bleed in the setting of coumadin use and supratheraputic INR. History obtained from Son at the bedside. He states they were getting out the car today when she fell and landed on her left side and hit her head. She did not loose consciousness or black out. Patient right now unable to provide ROS as she is slightly altered. Per son this mental status change has been presents since her last hospital stay about 2 weeks ago.     In the Er CT scans of head, abdomen and pelvis were done. CT head and neck without acute pathology. CT pelvis with small left RP bleed. INR 4. hemoglobin 8.3 down from 9.6 about 2 weeks ago. Hemodynamically stable. Patient alert.

## 2019-09-06 NOTE — CONSULT NOTE ADULT - REASON FOR ADMISSION
Fall, found to have left RP bleed on coumadin

## 2019-09-06 NOTE — ED ADULT NURSE NOTE - OBJECTIVE STATEMENT
pt AO x3, ambulatory with cane on her baseline, presents for mechanical fall tonight. As per pt, when she fell she hit her head and back but denies LOC. C/o mild back and head pain at this time. pt states she is taking coumadin for irregular heart beat. Also both hands and arms have blues spot s/p coumadin (not due to this incident). Denies chest pain, dizziness, SOB or n/v at this time. Evaluated by provider. Blood will be obtained and sent to LAB as ordered. CT on schedule. Will continue to monitor. Awaiting further study. pt AO x2(baseline), ambulatory with cane (baseline), presents for mechanical fall tonight. As per pt, when she fell she hit her head and back but denies LOC. C/o mild back and head pain at this time. pt states she is taking coumadin for irregular heart beat. Also both hands and arms have blues spot s/p coumadin (not due to this incident). Denies chest pain, dizziness, SOB or n/v at this time. Evaluated by provider. Blood will be obtained and sent to LAB as ordered. CT on schedule. Will continue to monitor. Awaiting further study.

## 2019-09-06 NOTE — PROCEDURE NOTE - ADDITIONAL PROCEDURE DETAILS
Time spent on procedure independent of Critical Care time spent at the bedside  Indication for procedure: Retroperitoneal Bleed  Dx Code: K66.1

## 2019-09-07 NOTE — PROGRESS NOTE ADULT - SUBJECTIVE AND OBJECTIVE BOX
84 year old woman with extensive PMHx (CHF, HTN, HLD, A.fib on coumadin, lymphoma) being worked up as outpatient for glomerulonephritis (had left renal biopsy on 8/20/19) originally admitted to [facility] s/p fall and left psoas bleeding found on CT imaging (in setting of INR=4).  While at [place], repeat CT imaging showing dramatic increase in psoas / retroperitoneal bleeding.  Transferred to [facility] SICU for management.    On arrival to [facility] SICU -    Awake, alert, mildly confused  Oxygenating well  Hemodynamically stable  abdominal exam benign  hematocrit = 22.7, INR=1.13

## 2019-09-07 NOTE — H&P ADULT - NSHPPHYSICALEXAM_GEN_ALL_CORE
-NAD, sleeping/sedated in bed  -PERRL; EOMI  -i-rregular rate and rhythm  -soft; nontender; no distention; no masses palpable  -ecchymoses on arms and legs b/l

## 2019-09-07 NOTE — PROGRESS NOTE ADULT - ASSESSMENT
Assessment:  83 y/o F PMHx lymphoma/MGUS, AFib on Coumadin, CHF, HLD, HTN, COPD, anxiety, depression, presented to [facility] ED s/p fall with left RP bleed on Coumadin with a supratherapeutic INR. Repeat CT showing worsening of hematoma. Pt transferred to [facility] SICU for increased size of L RP, hemodynamic monitoring, and possible IR intervention.    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 ED s/p fall with left RP bleed on Coumadin with a supratherapeutic INR. Repeat CT showing worsening of hematoma. Pt transferred to [facility] SICU for increased size of L RP, hemodynamic monitoring, and possible IR intervention.    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

## 2019-09-07 NOTE — H&P ADULT - NSHPLABSRESULTS_GEN_ALL_CORE
8.7    19.0  )-----------( 251      ( 07 Sep 2019 05:44 )             27.3       09-07    138  |  101  |  46<H>  ----------------------------<  127<H>  3.9   |  23  |  1.76<H>    Ca    8.1<L>      07 Sep 2019 00:33  Phos  4.8     09-07  Mg     1.9     09-07    TPro  5.3<L>  /  Alb  2.4<L>  /  TBili  0.4  /  DBili  x   /  AST  17  /  ALT  22  /  AlkPhos  64  09-06

## 2019-09-07 NOTE — PROGRESS NOTE ADULT - SUBJECTIVE AND OBJECTIVE BOX
HISTORY  TONIA HOWELL is a 84y Female PMHx lymphoma/MGUS, AFib on Coumadin, CHF, HLD, HTN, COPD, anxiety, depression, presented to [facility] ED s/p fall, found to have a left RP bleed on Coumadin with a supratherapeutic INR of 4; hemoglobin 8.3 down from 9.6; was hemodynamically stable. Son states pt was getting out of the car when she fell, landed on her left side and hit her left head. Pt did not have LOC. Per son, there is no acute change in her mental status however, pt has become progressively altered since her hospitalization 3 weeks ago. 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 Renal bx with active crescentic glomerulonephritis, pauci-immune suspected to be 2/2 hydralazine. Pt was d/c'd on steroids and was started on rituxan.     At [facility] ICU, pt received 1 unit PRBC, FFP, and vitamin K. Hb initially responded to the 1 unit however, dropped again, leading to a repeat CT, which showed worsening of RP bleed w/ displacement of kidney.   Prior to transfer, pt received Kcentra, additional FFP, additional 1unit pRBC, and vitamin K. A R femoral TLC was placed.    Pt transferred to [facility] SICU for increased size of RP, hemodynamic monitoring, and possible IR intervention.     24 HOUR EVENTS:  -transfused 1unit pRBC with repeat H&H 8.4/26.5    SUBJECTIVE/ROS:  [X] A ten-point review of systems was otherwise negative except as noted.  [ ] Due to altered mental status/intubation, subjective information were not able to be obtained from the patient. History was obtained, to the extent possible, from review of the chart and collateral sources of information.      NEURO  Exam: awake, alert, oriented x3  Meds:   [x] Adequacy of sedation and pain control has been assessed and adjusted      RESPIRATORY  RR: 19 (09-07-19 @ 04:00) (13 - 41)  SpO2: 95% (09-07-19 @ 04:00) (85% - 100%)  Wt(kg): --  Exam: unlabored, clear to auscultation bilaterally  Mechanical Ventilation:     [N/A] Extubation Readiness Assessed  Meds: ALBUTerol/ipratropium for Nebulization. 3 milliLiter(s) Nebulizer every 6 hours PRN Shortness of Breath and/or Wheezing    CARDIOVASCULAR  HR: 88 (09-07-19 @ 04:00) (78 - 129)  BP: 145/103 (09-07-19 @ 04:00) (85/58 - 157/84)  BP(mean): 119 (09-07-19 @ 04:00) (68 - 125)  ABP: --  ABP(mean): --  Wt(kg): --  CVP(cm H2O): --  VBG - ( 07 Sep 2019 03:36 )  pH: 7.40  /  pCO2: 46    /  pO2: 33    / HCO3: 28    / Base Excess: 3.1   /  SaO2: 58     Lactate: 1.6      Exam: regular rate and rhythm  Cardiac Rhythm: sinus  Perfusion     [x]Adequate   [ ]Inadequate  Mentation   [x]Normal       [ ]Reduced  Extremities  [x]Warm         [ ]Cool  Volume Status [ ]Hypervolemic [x]Euvolemic [ ]Hypovolemic  Meds: metoprolol tartrate Injectable 5 milliGRAM(s) IV Push every 6 hours    GI/NUTRITION  Exam: soft, nontender, nondistended, incision C/D/I  Diet:  Meds:     GENITOURINARY  I&O's Detail    09-06 @ 07:01  -  09-07 @ 04:11  --------------------------------------------------------  IN:    multiple electrolytes Injection Type 1: 375 mL  Total IN: 375 mL    OUT:    Indwelling Catheter - Urethral: 325 mL  Total OUT: 325 mL    Total NET: 50 mL        Weight (kg): 67.7 (09-06 @ 21:35)  09-07    138  |  101  |  46<H>  ----------------------------<  127<H>  3.9   |  23  |  1.76<H>    Ca    8.1<L>      07 Sep 2019 00:33  Phos  4.8     09-07  Mg     1.9     09-07    TPro  5.3<L>  /  Alb  2.4<L>  /  TBili  0.4  /  DBili  x   /  AST  17  /  ALT  22  /  AlkPhos  64  09-06    [X] Chan catheter, indication: urine output monitoring in critically ill patient  Meds: multiple electrolytes Injection Type 1 1000 milliLiter(s) IV Continuous <Continuous>    HEMATOLOGIC  Meds:   [x] VTE Prophylaxis: mechanical                        8.4    18.3  )-----------( 242      ( 07 Sep 2019 03:43 )             26.5     PT/INR - ( 07 Sep 2019 00:33 )   PT: 13.0 sec;   INR: 1.13 ratio         PTT - ( 07 Sep 2019 00:33 )  PTT:32.5 sec  Transfusion     [ ] PRBC   [ ] Platelets   [ ] FFP   [ ] Cryoprecipitate      INFECTIOUS DISEASES  WBC Count: 18.3 K/uL (09-07 @ 03:43)  WBC Count: 18.6 K/uL (09-07 @ 00:33)  WBC Count: 16.83 K/uL (09-06 @ 17:50)  WBC Count: 22.98 K/uL (09-06 @ 12:40)  WBC Count: 16.38 K/uL (09-06 @ 06:40)    RECENT CULTURES:    Meds:       ENDOCRINE  CAPILLARY BLOOD GLUCOSE      POCT Blood Glucose.: 216 mg/dL (06 Sep 2019 17:07)  POCT Blood Glucose.: 147 mg/dL (06 Sep 2019 12:10)  POCT Blood Glucose.: 87 mg/dL (06 Sep 2019 07:50)    Meds: insulin lispro (HumaLOG) corrective regimen sliding scale   SubCutaneous every 6 hours  methylPREDNISolone sodium succinate Injectable 48 milliGRAM(s) IV Push daily        ACCESS DEVICES:  [ ] Peripheral IV  [X] Central Venous Line	[X] R	[ ] L	[ ] IJ	[X] Fem	[ ] SC	Placed: 9/6/19  [ ] Arterial Line		[ ] R	[ ] L	[ ] Fem	[ ] Rad	[ ] Ax	Placed:   [ ] PICC:					[ ] Mediport  [ ] Urinary Catheter, Date Placed:   [x] Necessity of urinary, arterial, and venous catheters discussed    OTHER MEDICATIONS:  chlorhexidine 2% Cloths 1 Application(s) Topical daily      CODE STATUS: FULL CODE      IMAGING: EXAM:  CT ABDOMEN AND PELVIS      PROCEDURE DATE:  09/06/2019        INTERPRETATION:  CLINICAL HISTORY:  Left iliopsoas hematoma; follow-up   assessment.    Multiple axial images of the abdomen and pelvis were obtained from the   lung bases throughpubic symphysis without the administration of oral or   intravascular contrast. Reformatted coronal and sagittal images are   submitted.    COMPARISON: 9/6/2019 at 1:30 AM    FINDINGS: Since prior evaluation significant interval change is   identified. The previously described left retroperitoneal iliopsoas   hemorrhage is significantly increased in size now resulting in   displacement of the left kidney anteriorly. There is significant   stranding of the left perirenal fat and left perirenal fluid is   identified. There is thickening of the left lateral abdominal wall   musculature. Fluid/hemorrhage tracks into the left pelvic retroperitoneal   space. There is new stranding surrounding the proximal sigmoid colon,   indeterminate etiology andclinical significance. New perihepatic and   perisplenic ascites is identified. New small left pleural effusion.    The liver is normal size. No focal hepatic masses are identified. There   is no evidence for intrahepatic or extrahepatic biliary dilatation. No   gallstones, gallbladder wall thickening or pericholecystic fluid   identified.    The spleen is unremarkable in size. No space-occupying lesions of the   splenic parenchyma identified. The pancreas and adrenal glands appear   unremarkable.    There is no evidence for right-sided hydronephrosis. Cystic foci are   identified within the right kidney measuring up to 3.3 cm. There is mild   prominence of the left intrarenal collecting system, stable. No definite   space-occupying lesions of the left kidney are identified. The abdominal   aorta is normal in course and caliber. No abnormally enlarged   retroperitoneal or pelvic lymphadenopathy is noted.    Abundant material is identified within the rectum. There is no evidence   for mechanical bowel obstruction. No colonic wall thickening is   identified. There is no evidence for free intraperitoneal air. The   appendix is not visualized.    The uterus and urinary bladder appear unremarkable.     Imaging of the lung bases demonstrates cardiomegaly. Coronary arterial   calcifications noted. Degenerative changes are noted within the thoracic   and lumbar spine. A nonspecific sclerotic focus is identified within the   sacrum. The patient is status post left femoral neck pinning with  placement of a proximal left femoral intramedullary silvino.    Diffuse anasarca is noted.    IMPRESSION:  Since prior evaluation significant interval change is   identified. The previously described left retroperitoneal iliopsoas   hemorrhage is significantly increased in size now resulting in   displacement of the left kidney anteriorly. There is significant   stranding of the left perirenal fat and left perirenal fluid is   identified. There is thickening of the left lateral abdominal wall   musculature. Fluid/hemorrhage tracks into the left pelvic retroperitoneal   space. There is new stranding surrounding the proximal sigmoid colon,   indeterminate etiology and clinical significance. New perihepatic and   perisplenic ascites is identified.New small left pleural effusion.    Results discussed with ALEKSANDRA Mendieta in the ICU prior to this dictation.

## 2019-09-07 NOTE — H&P ADULT - NSHPREVIEWOFSYSTEMS_GEN_ALL_CORE
Unable to obtain ROS as patient is slightly altered and cannot answer questions appropriately Unable to obtain ROS as patient is slightly altered and cannot answer questions appropriately.

## 2019-09-07 NOTE — CHART NOTE - NSCHARTNOTEFT_GEN_A_CORE
overnight patient had drop in hgb  repeat imaging showed increase in RP bleed.    ICU transferred to [place] under care of Chief of Trauma Surgery Dr. Valentin Alex.

## 2019-09-07 NOTE — H&P ADULT - ASSESSMENT
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 Discharged on steroids. 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 Now on rituxan. Presents now to [facility] after a fall from standing. Found to have left RP bleed in the setting of coumadin use and supratherapeutic INR. Will admit to ICU for further management and observation given RP bleed on coumadin and supratheraputic INR.       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 Discharged on steroids. 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 Now on rituxan. Presents now to [facility] after a fall from standing. Found to have left RP bleed in the setting of coumadin use and supratherapeutic INR. Will admit to ICU for further management and observation given RP bleed on coumadin and supratheraputic INR.     Transferred for OSH for left RP bleeding, on A/C , HD table C/O mild pain, received 1 unit PRBC, 1 unit FFP, Kcentra, 10 vitamin K before transfer    pain control PRN meds  O2 sat stable >95 on R/A, no SOB  HD stable, in a fib with off and on HR in 110s, MAP >70, no pressors.  HCT 22.7 from 23 after 1 unit PRBC, 1 FFP at OSH , will transfuse 1 unit  Follow hemorrhage watch protocol after the 1 unit to R/O continue bleeding  Pt on BB at [facility] will resume.  Strict IS/OS  NPO for now  Mechanical DVT prophylaxis  GI prophylaxis  resume steroids  Will need hematology, rheumatology evaluation to monitor base line autoimmune disorder she was being worked up for before fall  Hold A/C   Bed rest for now  Monitor blood sugar  IR is aware , if pt not responding to resuscitation may need IR intervention.

## 2019-09-07 NOTE — PROGRESS NOTE ADULT - ATTENDING COMMENTS
Pt seen and examined transferred for OSH for left RP bleeding, on A/C , HD table C/O mild pain, received 1 unit PRBC, 1 unit FFP, Kcentra, 10 vitamin K before transfer  pain control PRN meds  O2 sat stable >95 on R/A, no SOB  HD stable, in a fib with off and on HR in 110s, MAP >70, no pressors.  HCT 22.7 from 23 after 1 unit PRBC, 1 FFP at OSH , will transfuse 1 unit  Follow hemorrhage watch protocol after the 1 unit to R/O continue bleeding  Pt on BB at [facility] will resume.  Strict IS/OS  NPO for now  Mechanical DVT prophylaxis  GI prophylaxis  resume steroids  Will need hematology, rheumatology evaluation to monitor base line autoimmune disorder she was being worked up for before fall  Hold A/C   Bed rest for now  Monitor blood sugar  IR is aware , if pt not responding to resuscitation may need IR intervention

## 2019-09-07 NOTE — CONSULT NOTE ADULT - SUBJECTIVE AND OBJECTIVE BOX
TONIA [facility]  [id number]    HISTORY OF PRESENT ILLNESS:        PAST MEDICAL & SURGICAL HISTORY:  COPD (chronic obstructive pulmonary disease)  Peripheral vascular disease  Pulmonary hypertension  Rheumatoid arthritis  Osteoarthritis  Mitral regurgitation  H/O lymphoma  Hyperlipidemia  Chronic GERD  Chronic kidney disease: proteinuria  AF (atrial fibrillation)  Anemia in CKD (chronic kidney disease)  CHF (congestive heart failure)  HTN (hypertension)  History of total hip replacement, left  History of total knee replacement, bilateral      Review of Systems:  Gen:  No fevers/chills, weight loss  HEENT: No blurry vision, no difficulty swallowing, no oral or nasal ulcers  CVS: No chest pain/palpitations  Resp: No SOB/wheezing  GI: No N/V/C/D/abdominal pain  MSK:  Skin: No new rashes  Neuro: No headaches    MEDICATIONS  (STANDING):  atorvastatin 10 milliGRAM(s) Oral at bedtime  chlorhexidine 2% Cloths 1 Application(s) Topical <User Schedule>  entecavir 0.5 milliGRAM(s) Oral daily  insulin lispro (HumaLOG) corrective regimen sliding scale   SubCutaneous three times a day before meals  insulin lispro (HumaLOG) corrective regimen sliding scale   SubCutaneous at bedtime  metoprolol tartrate 50 milliGRAM(s) Oral every 12 hours  montelukast 10 milliGRAM(s) Oral daily  pantoprazole    Tablet 40 milliGRAM(s) Oral before breakfast  sertraline 50 milliGRAM(s) Oral daily  trimethoprim  160 mG/sulfamethoxazole 800 mG 1 Tablet(s) Oral <User Schedule>    MEDICATIONS  (PRN):  ALBUTerol/ipratropium for Nebulization. 3 milliLiter(s) Nebulizer every 6 hours PRN Shortness of Breath and/or Wheezing      Allergies    Keflex (Unknown)  penicillin (Rash)    Intolerances        PERTINENT MEDICATION HISTORY:    SOCIAL HISTORY:  OCCUPATION:  TRAVEL HISTORY:    FAMILY HISTORY:  Family history of inclusion body myositis      Vital Signs Last 24 Hrs  T(C): 37.6 (07 Sep 2019 15:00), Max: 37.6 (07 Sep 2019 15:00)  T(F): 99.6 (07 Sep 2019 15:00), Max: 99.6 (07 Sep 2019 15:00)  HR: 101 (07 Sep 2019 19:00) (87 - 133)  BP: 140/94 (07 Sep 2019 19:00) (102/60 - 182/93)  BP(mean): 113 (07 Sep 2019 19:00) (73 - 139)  RR: 27 (07 Sep 2019 19:00) (8 - 38)  SpO2: 97% (07 Sep 2019 19:00) (85% - 98%)    Physical Exam:  General: No apparent distress  HEENT: EOMI, MMM  CVS: +S1/S2, RRR, no murmurs/rubs/gallops  Resp: CTA b/l. No crackles/wheezing  GI: Soft, NT/ND +BS  MSK:  Neuro: AAOx3  Skin: no visible rashes    LABS:                        9.5    18.3  )-----------( 259      ( 07 Sep 2019 15:13 )             28.9     09-    141  |  100  |  42<H>  ----------------------------<  147<H>  4.1   |  25  |  1.74<H>    Ca    8.6      07 Sep 2019 10:34  Phos  4.4     -  Mg     2.3         TPro  5.3<L>  /  Alb  2.4<L>  /  TBili  0.4  /  DBili  x   /  AST  17  /  ALT  22  /  AlkPhos  64  09-06    PT/INR - ( 07 Sep 2019 00:33 )   PT: 13.0 sec;   INR: 1.13 ratio         PTT - ( 07 Sep 2019 00:33 )  PTT:32.5 sec  Urinalysis Basic - ( 07 Sep 2019 00:37 )    Color: Light Yellow / Appearance: Clear / S.012 / pH: x  Gluc: x / Ketone: Negative  / Bili: Negative / Urobili: Negative   Blood: x / Protein: 30 mg/dL / Nitrite: Negative   Leuk Esterase: Small / RBC: 51 /hpf / WBC 6 /HPF   Sq Epi: x / Non Sq Epi: 0 /hpf / Bacteria: Negative        RADIOLOGY & ADDITIONAL STUDIES: TONIA [facility]  [id number]    HISTORY OF PRESENT ILLNESS:    84yoF with PMHx of diastolic CHF, mod pulm HTN, A fib on coumadin, HLD, 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  While at [place], repeat CT imaging showing dramatic increase in psoas / retroperitoneal bleeding. Transferred to [facility] SICU for management.    Rheumatologic History:  -Recent admission 2019 for LE purpuric rash, anemia, KANIKA, UA with active sediment, and multilobar bronchopneumonia. s/p renal bx on 19 showing active crescentic glomerulonephritis, pauci-immune. Improved with steroids and rituxan 600mg IV induction on 2019.  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  -Positive serologies + include IVAN, P-ANCA, MPO, hypocomplementemia, prior positive dsDNA, beta-2 glycoprotein IgA, ACl IgM, +Hep B core Ab total  -Negative serologies - Cryoglobulin, rest of hepatitis panel, anti-GBM    PAST MEDICAL & SURGICAL HISTORY:  COPD (chronic obstructive pulmonary disease)  Peripheral vascular disease  Pulmonary hypertension  Rheumatoid arthritis  Osteoarthritis  Mitral regurgitation  H/O lymphoma  Hyperlipidemia  Chronic GERD  Chronic kidney disease: proteinuria  AF (atrial fibrillation)  Anemia in CKD (chronic kidney disease)  CHF (congestive heart failure)  HTN (hypertension)  History of total hip replacement, left  History of total knee replacement, bilateral      MEDICATIONS  (STANDING):  atorvastatin 10 milliGRAM(s) Oral at bedtime  chlorhexidine 2% Cloths 1 Application(s) Topical <User Schedule>  entecavir 0.5 milliGRAM(s) Oral daily  insulin lispro (HumaLOG) corrective regimen sliding scale   SubCutaneous three times a day before meals  insulin lispro (HumaLOG) corrective regimen sliding scale   SubCutaneous at bedtime  metoprolol tartrate 50 milliGRAM(s) Oral every 12 hours  montelukast 10 milliGRAM(s) Oral daily  pantoprazole    Tablet 40 milliGRAM(s) Oral before breakfast  sertraline 50 milliGRAM(s) Oral daily  trimethoprim  160 mG/sulfamethoxazole 800 mG 1 Tablet(s) Oral <User Schedule>    MEDICATIONS  (PRN):  ALBUTerol/ipratropium for Nebulization. 3 milliLiter(s) Nebulizer every 6 hours PRN Shortness of Breath and/or Wheezing    Allergies  Keflex (Unknown)  penicillin (Rash)  Intolerances    Vital Signs Last 24 Hrs  T(C): 37.6 (07 Sep 2019 15:00), Max: 37.6 (07 Sep 2019 15:00)  T(F): 99.6 (07 Sep 2019 15:00), Max: 99.6 (07 Sep 2019 15:00)  HR: 101 (07 Sep 2019 19:00) (87 - 133)  BP: 140/94 (07 Sep 2019 19:00) (102/60 - 182/93)  BP(mean): 113 (07 Sep 2019 19:00) (73 - 139)  RR: 27 (07 Sep 2019 19:00) (8 - 38)  SpO2: 97% (07 Sep 2019 19:00) (85% - 98%)    Physical Exam:  General: No apparent distress  HEENT: MMM  CVS: +S1/S2, RRR, no murmurs/rubs/gallops  Resp: Bibasilar crackles    Skin: no visible rashes    LABS:                        9.5    18.3  )-----------( 259      ( 07 Sep 2019 15:13 )             28.9         141  |  100  |  42<H>  ----------------------------<  147<H>  4.1   |  25  |  1.74<H>    Ca    8.6      07 Sep 2019 10:34  Phos  4.4       Mg     2.3         TPro  5.3<L>  /  Alb  2.4<L>  /  TBili  0.4  /  DBili  x   /  AST  17  /  ALT  22  /  AlkPhos  64  09-06    PT/INR - ( 07 Sep 2019 00:33 )   PT: 13.0 sec;   INR: 1.13 ratio         PTT - ( 07 Sep 2019 00:33 )  PTT:32.5 sec  Urinalysis Basic - ( 07 Sep 2019 00:37 )    Color: Light Yellow / Appearance: Clear / S.012 / pH: x  Gluc: x / Ketone: Negative  / Bili: Negative / Urobili: Negative   Blood: x / Protein: 30 mg/dL / Nitrite: Negative   Leuk Esterase: Small / RBC: 51 /hpf / WBC 6 /HPF   Sq Epi: x / Non Sq Epi: 0 /hpf / Bacteria: Negative    RADIOLOGY & ADDITIONAL STUDIES:      EXAM:  CT ABDOMEN AND PELVIS      PROCEDURE DATE:  2019        INTERPRETATION:  CLINICAL HISTORY:  Left iliopsoas hematoma; follow-up   assessment.    Multiple axial images of the abdomen and pelvis were obtained from the   lung bases throughpubic symphysis without the administration of oral or   intravascular contrast. Reformatted coronal and sagittal images are   submitted.    COMPARISON: 2019 at 1:30 AM    FINDINGS: Since prior evaluation significant interval change is   identified. The previously described left retroperitoneal iliopsoas   hemorrhage is significantly increased in size now resulting in   displacement of the left kidney anteriorly. There is significant   stranding of the left perirenal fat and left perirenal fluid is   identified. There is thickening of the left lateral abdominal wall   musculature. Fluid/hemorrhage tracks into the left pelvic retroperitoneal   space. There is new stranding surrounding the proximal sigmoid colon,   indeterminate etiology andclinical significance. New perihepatic and   perisplenic ascites is identified. New small left pleural effusion.    The liver is normal size. No focal hepatic masses are identified. There   is no evidence for intrahepatic or extrahepatic biliary dilatation. No   gallstones, gallbladder wall thickening or pericholecystic fluid   identified.    The spleen is unremarkable in size. No space-occupying lesions of the   splenic parenchyma identified. The pancreas and adrenal glands appear   unremarkable.    There is no evidence for right-sided hydronephrosis. Cystic foci are   identified within the right kidney measuring up to 3.3 cm. There is mild   prominence of the left intrarenal collecting system, stable. No definite   space-occupying lesions of the left kidney are identified. The abdominal   aorta is normal in course and caliber. No abnormally enlarged   retroperitoneal or pelvic lymphadenopathy is noted.    Abundant material is identified within the rectum. There is no evidence   for mechanical bowel obstruction. No colonic wall thickening is   identified. There is no evidence for free intraperitoneal air. The   appendix is not visualized.    The uterus and urinary bladder appear unremarkable.     Imaging of the lung bases demonstrates cardiomegaly. Coronary arterial   calcifications noted. Degenerative changes are noted within the thoracic   and lumbar spine. A nonspecific sclerotic focus is identified within the   sacrum. The patient is status post left femoral neck pinning with  placement of a proximal left femoral intramedullary silvino.    Diffuse anasarca is noted.    IMPRESSION:  Since prior evaluation significant interval change is   identified. The previously described left retroperitoneal iliopsoas   hemorrhage is significantly increased in size now resulting in   displacement of the left kidney anteriorly. There is significant   stranding of the left perirenal fat and left perirenal fluid is   identified. There is thickening of the left lateral abdominal wall   musculature. Fluid/hemorrhage tracks into the left pelvic retroperitoneal   space. There is new stranding surrounding the proximal sigmoid colon,   indeterminate etiology and clinical significance. New perihepatic and   perisplenic ascites is identified.New small left pleural effusion.    Results discussed with ALEKSANDRA Mendieta in the ICU prior to this dictation.          CAROL TAYLOR M.D., ATTENDING RADIOLOGIST  This document has been electronically signed. Sep  6 2019  3:01PM

## 2019-09-07 NOTE — CONSULT NOTE ADULT - ASSESSMENT
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 Rheumatology consulted as pt is due for rituxan on Mon 9/9    Recommendations:   -Will hold off on rituxan dose until patient is stabilized    Wendy Pacheco MD  Rheumatology Fellow, PGY4

## 2019-09-07 NOTE — H&P ADULT - HISTORY OF PRESENT ILLNESS
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 Discharged on steroids. 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 Now on rituxan. Presents now to [facility] after a fall from standing. Found to have left RP bleed in the setting of coumadin use and supratheraputic INR. History obtained from Son at the bedside. He states they were getting out the car today when she fell and landed on her left side and hit her head. She did not loose consciousness or black out. Patient right now unable to provide ROS as she is slightly altered. Per son this mental status change has been presents since her last hospital stay about 2 weeks ago.     In the Er CT scans of head, abdomen and pelvis were done. CT head and neck without acute pathology. CT pelvis with small left RP bleed. INR 4. hemoglobin 8.3 down from 9.6 about 2 weeks ago. Hemodynamically stable. Patient alert. 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 Discharged on steroids. 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 Now on rituxan. Presents now to [facility] after a fall from standing. Found to have left RP bleed in the setting of coumadin use and supratheraputic INR. History obtained from Son at the bedside. He states they were getting out the car today when she fell and landed on her left side and hit her head. She did not loose consciousness or black out. Patient right now unable to provide ROS as she is slightly altered. Per son this mental status change has been presents since her last hospital stay about 2 weeks ago.     In the Er CT scans of head, abdomen and pelvis were done. CT head and neck without acute pathology. CT pelvis with small left RP bleed. INR 4. hemoglobin 8.3 down from 9.6 about 2 weeks ago. Hemodynamically stable. Patient alert.      At [facility] ICU, pt received 1 unit PRBC, FFP, and vitamin K. Hb initially responded to the 1 unit however, dropped again, leading to a repeat CT, which showed worsening of RP bleed w/ displacement of kidney.   Prior to transfer, pt received Kcentra, additional FFP, additional 1unit pRBC, and vitamin K. A R femoral TLC was placed.    Pt transferred to [facility] SICU for increased size of RP, hemodynamic monitoring, and possible IR intervention.   Transfused 1unit pRBC with repeat H&H 8.4/26.5

## 2019-09-07 NOTE — PROGRESS NOTE ADULT - ATTENDING COMMENTS
Hemorrhage watch    Hemorrhage watch completed. H/H x 3 stable.  Will continue to monitor with CBC q 4 hours for retroperitoneal hematoma.

## 2019-09-08 NOTE — PROGRESS NOTE ADULT - SUBJECTIVE AND OBJECTIVE BOX
SICU DAILY PROGRESS NOTE    TONIA HOWELL is a 84y Female PMHx lymphoma/MGUS, AFib on Coumadin, CHF, HLD, HTN, COPD, anxiety, depression, presented to [facility] ED s/p fall, found to have a left RP bleed on Coumadin with a supratherapeutic INR of 4; hemoglobin 8.3 down from 9.6; was hemodynamically stable. Son states pt was getting out of the car when she fell, landed on her left side and hit her left head. Pt did not have LOC. Per son, there is no acute change in her mental status however, pt has become progressively altered since her hospitalization 3 weeks ago. 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 Renal bx with active crescentic glomerulonephritis, pauci-immune suspected to be 2/2 hydralazine. Pt was d/c'd on steroids and was started on rituxan.     At [facility] ICU, pt received 1 unit PRBC, FFP, and vitamin K. Hb initially responded to the 1 unit however, dropped again, leading to a repeat CT, which showed worsening of RP bleed w/ displacement of kidney.   Prior to transfer, pt received Kcentra, additional FFP, additional 1unit pRBC, and vitamin K. A R femoral TLC was placed.    Pt transferred to [facility] SICU for increased size of RP, hemodynamic monitoring, and possible IR intervention.     24 HOUR EVENTS:  - Cleared from hemorrhage watch, advanced to clears  - Rheumatology, hematology consults placed    SUBJECTIVE/ROS:  [ ] A ten-point review of systems was otherwise negative except as noted.  [ ] Due to altered mental status/intubation, subjective information were not able to be obtained from the patient. History was obtained, to the extent possible, from review of the chart and collateral sources of information.      NEURO  RASS:     GCS: 15    CAM ICU:  Exam: awake, alert, oriented  Meds: sertraline 50 milliGRAM(s) Oral daily    [x] Adequacy of sedation and pain control has been assessed and adjusted      RESPIRATORY  RR: 32 (09-08-19 @ 00:00) (8 - 34)  SpO2: 95% (09-08-19 @ 00:00) (90% - 97%)  Wt(kg): --  Exam: unlabored, clear to auscultation bilaterally  Mechanical Ventilation:     [N/A] Extubation Readiness Assessed  Meds: ALBUTerol/ipratropium for Nebulization. 3 milliLiter(s) Nebulizer every 6 hours PRN Shortness of Breath and/or Wheezing  montelukast 10 milliGRAM(s) Oral daily        CARDIOVASCULAR  HR: 86 (09-08-19 @ 00:00) (86 - 133)  BP: 159/85 (09-08-19 @ 00:00) (112/55 - 182/93)  BP(mean): 113 (09-08-19 @ 00:00) (79 - 139)  ABP: --  ABP(mean): --  Wt(kg): --  CVP(cm H2O): --  VBG - ( 07 Sep 2019 05:29 )  pH: 7.40  /  pCO2: 46    /  pO2: 34    / HCO3: 28    / Base Excess: 3.5   /  SaO2: 61     Lactate: 1.2          Exam: tachycardic  Cardiac Rhythm: irregularly irregular  Perfusion     [x]Adequate   [ ]Inadequate  Mentation   [x]Normal       [ ]Reduced  Extremities  [x]Warm         [ ]Cool  Volume Status [ ]Hypervolemic [x]Euvolemic [ ]Hypovolemic  Meds: furosemide    Tablet 20 milliGRAM(s) Oral daily  metoprolol tartrate 50 milliGRAM(s) Oral every 12 hours    GI/NUTRITION  Exam: soft, nontender, nondistended  Diet: clears  Meds: pantoprazole    Tablet 40 milliGRAM(s) Oral before breakfast      GENITOURINARY  I&O's Detail    09-06 @ 07:01  -  09-07 @ 07:00  --------------------------------------------------------  IN:    multiple electrolytes Injection Type 1multiple electrolytes Injection Type 1: 600 mL    Packed Red Blood Cells: 350 mL  Total IN: 950 mL    OUT:    Indwelling Catheter - Urethral: 650 mL  Total OUT: 650 mL    Total NET: 300 mL      09-07 @ 07:01  -  09-08 @ 01:01  --------------------------------------------------------  IN:    multiple electrolytes Injection Type 1multiple electrolytes Injection Type 1: 900 mL    Oral Fluid: 550 mL  Total IN: 1450 mL    OUT:    Indwelling Catheter - Urethral: 450 mL    Voided: 150 mL  Total OUT: 600 mL    Total NET: 850 mL          09-07    141  |  100  |  41<H>  ----------------------------<  170<H>  4.0   |  24  |  1.76<H>    Ca    8.2<L>      07 Sep 2019 20:51  Phos  4.2     09-07  Mg     2.4     09-07    TPro  5.3<L>  /  Alb  2.4<L>  /  TBili  0.4  /  DBili  x   /  AST  17  /  ALT  22  /  AlkPhos  64  09-06    [ ] Chan catheter, indication: N/A  Meds:       HEMATOLOGIC  Meds:   [x] VTE Prophylaxis                        8.7    19.8  )-----------( 253      ( 07 Sep 2019 20:51 )             27.9     PT/INR - ( 07 Sep 2019 00:33 )   PT: 13.0 sec;   INR: 1.13 ratio         PTT - ( 07 Sep 2019 00:33 )  PTT:32.5 sec  Transfusion     [ ] PRBC   [ ] Platelets   [ ] FFP   [ ] Cryoprecipitate      INFECTIOUS DISEASES  WBC Count: 19.8 K/uL (09-07 @ 20:51)  WBC Count: 18.3 K/uL (09-07 @ 15:13)  WBC Count: 20.0 K/uL (09-07 @ 10:34)  WBC Count: 19.0 K/uL (09-07 @ 05:44)  WBC Count: 18.5 K/uL (09-07 @ 04:38)  WBC Count: 18.3 K/uL (09-07 @ 03:43)    RECENT CULTURES:    Meds: entecavir 0.5 milliGRAM(s) Oral daily  trimethoprim  160 mG/sulfamethoxazole 800 mG 1 Tablet(s) Oral <User Schedule>        ENDOCRINE  CAPILLARY BLOOD GLUCOSE      POCT Blood Glucose.: 167 mg/dL (07 Sep 2019 17:36)  POCT Blood Glucose.: 151 mg/dL (07 Sep 2019 13:48)  POCT Blood Glucose.: 115 mg/dL (07 Sep 2019 05:14)    Meds: atorvastatin 10 milliGRAM(s) Oral at bedtime  insulin lispro (HumaLOG) corrective regimen sliding scale   SubCutaneous three times a day before meals  insulin lispro (HumaLOG) corrective regimen sliding scale   SubCutaneous at bedtime  predniSONE   Tablet 60 milliGRAM(s) Oral every 24 hours        ACCESS DEVICES:  [ ] Peripheral IV  [ ] Central Venous Line	[ ] R	[ ] L	[ ] IJ	[ ] Fem	[ ] SC	Placed:   [ ] Arterial Line		[ ] R	[ ] L	[ ] Fem	[ ] Rad	[ ] Ax	Placed:   [ ] PICC:					[ ] Mediport  [ ] Urinary Catheter, Date Placed:   [x] Necessity of urinary, arterial, and venous catheters discussed    OTHER MEDICATIONS:  chlorhexidine 2% Cloths 1 Application(s) Topical <User Schedule>      CODE STATUS:      IMAGING: SICU DAILY PROGRESS NOTE    TONIA HOWELL is a 84y Female PMHx lymphoma/MGUS, AFib on Coumadin, CHF, HLD, HTN, COPD, anxiety, depression, presented to [facility] ED s/p fall, found to have a left RP bleed on Coumadin with a supratherapeutic INR of 4; hemoglobin 8.3 down from 9.6; was hemodynamically stable. Son states pt was getting out of the car when she fell, landed on her left side and hit her left head. Pt did not have LOC. Per son, there is no acute change in her mental status however, pt has become progressively altered since her hospitalization 3 weeks ago. 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 Renal bx with active crescentic glomerulonephritis, pauci-immune suspected to be 2/2 hydralazine. Pt was d/c'd on steroids and was started on rituxan.     At [facility] ICU, pt received 1 unit PRBC, FFP, and vitamin K. Hb initially responded to the 1 unit however, dropped again, leading to a repeat CT, which showed worsening of RP bleed w/ displacement of kidney.   Prior to transfer, pt received Kcentra, additional FFP, additional 1unit pRBC, and vitamin K. A R femoral TLC was placed.    Pt transferred to [facility] SICU for increased size of RP, hemodynamic monitoring, and possible IR intervention.     24 HOUR EVENTS:  - Cleared from hemorrhage watch, advanced to clears  - Rheumatology, hematology consults placed  - Restarted on po home meds    SUBJECTIVE/ROS:  [ ] A ten-point review of systems was otherwise negative except as noted.  [ ] Due to altered mental status/intubation, subjective information were not able to be obtained from the patient. History was obtained, to the extent possible, from review of the chart and collateral sources of information.      NEURO  RASS:     GCS: 15    CAM ICU:  Exam: awake, alert, oriented  Meds: sertraline 50 milliGRAM(s) Oral daily    [x] Adequacy of sedation and pain control has been assessed and adjusted      RESPIRATORY  RR: 32 (09-08-19 @ 00:00) (8 - 34)  SpO2: 95% (09-08-19 @ 00:00) (90% - 97%)  Wt(kg): --  Exam: unlabored, clear to auscultation bilaterally  Mechanical Ventilation:     [N/A] Extubation Readiness Assessed  Meds: ALBUTerol/ipratropium for Nebulization. 3 milliLiter(s) Nebulizer every 6 hours PRN Shortness of Breath and/or Wheezing  montelukast 10 milliGRAM(s) Oral daily        CARDIOVASCULAR  HR: 86 (09-08-19 @ 00:00) (86 - 133)  BP: 159/85 (09-08-19 @ 00:00) (112/55 - 182/93)  BP(mean): 113 (09-08-19 @ 00:00) (79 - 139)  ABP: --  ABP(mean): --  Wt(kg): --  CVP(cm H2O): --  VBG - ( 07 Sep 2019 05:29 )  pH: 7.40  /  pCO2: 46    /  pO2: 34    / HCO3: 28    / Base Excess: 3.5   /  SaO2: 61     Lactate: 1.2          Exam: tachycardic  Cardiac Rhythm: irregularly irregular  Perfusion     [x]Adequate   [ ]Inadequate  Mentation   [x]Normal       [ ]Reduced  Extremities  [x]Warm         [ ]Cool  Volume Status [ ]Hypervolemic [x]Euvolemic [ ]Hypovolemic  Meds: furosemide    Tablet 20 milliGRAM(s) Oral daily  metoprolol tartrate 50 milliGRAM(s) Oral every 12 hours    GI/NUTRITION  Exam: soft, nontender, nondistended  Diet: clears  Meds: pantoprazole    Tablet 40 milliGRAM(s) Oral before breakfast      GENITOURINARY  I&O's Detail    09-06 @ 07:01  -  09-07 @ 07:00  --------------------------------------------------------  IN:    multiple electrolytes Injection Type 1multiple electrolytes Injection Type 1: 600 mL    Packed Red Blood Cells: 350 mL  Total IN: 950 mL    OUT:    Indwelling Catheter - Urethral: 650 mL  Total OUT: 650 mL    Total NET: 300 mL      09-07 @ 07:01  -  09-08 @ 01:01  --------------------------------------------------------  IN:    multiple electrolytes Injection Type 1multiple electrolytes Injection Type 1: 900 mL    Oral Fluid: 550 mL  Total IN: 1450 mL    OUT:    Indwelling Catheter - Urethral: 450 mL    Voided: 150 mL  Total OUT: 600 mL    Total NET: 850 mL          09-07    141  |  100  |  41<H>  ----------------------------<  170<H>  4.0   |  24  |  1.76<H>    Ca    8.2<L>      07 Sep 2019 20:51  Phos  4.2     09-07  Mg     2.4     09-07    TPro  5.3<L>  /  Alb  2.4<L>  /  TBili  0.4  /  DBili  x   /  AST  17  /  ALT  22  /  AlkPhos  64  09-06    [ ] Chan catheter, indication: N/A  Meds:       HEMATOLOGIC  Meds:   [x] VTE Prophylaxis                        8.7    19.8  )-----------( 253      ( 07 Sep 2019 20:51 )             27.9     PT/INR - ( 07 Sep 2019 00:33 )   PT: 13.0 sec;   INR: 1.13 ratio         PTT - ( 07 Sep 2019 00:33 )  PTT:32.5 sec  Transfusion     [ ] PRBC   [ ] Platelets   [ ] FFP   [ ] Cryoprecipitate      INFECTIOUS DISEASES  WBC Count: 19.8 K/uL (09-07 @ 20:51)  WBC Count: 18.3 K/uL (09-07 @ 15:13)  WBC Count: 20.0 K/uL (09-07 @ 10:34)  WBC Count: 19.0 K/uL (09-07 @ 05:44)  WBC Count: 18.5 K/uL (09-07 @ 04:38)  WBC Count: 18.3 K/uL (09-07 @ 03:43)    RECENT CULTURES:    Meds: entecavir 0.5 milliGRAM(s) Oral daily  trimethoprim  160 mG/sulfamethoxazole 800 mG 1 Tablet(s) Oral <User Schedule>        ENDOCRINE  CAPILLARY BLOOD GLUCOSE      POCT Blood Glucose.: 167 mg/dL (07 Sep 2019 17:36)  POCT Blood Glucose.: 151 mg/dL (07 Sep 2019 13:48)  POCT Blood Glucose.: 115 mg/dL (07 Sep 2019 05:14)    Meds: atorvastatin 10 milliGRAM(s) Oral at bedtime  insulin lispro (HumaLOG) corrective regimen sliding scale   SubCutaneous three times a day before meals  insulin lispro (HumaLOG) corrective regimen sliding scale   SubCutaneous at bedtime  predniSONE   Tablet 60 milliGRAM(s) Oral every 24 hours        ACCESS DEVICES:  [ ] Peripheral IV  [ ] Central Venous Line	[ ] R	[ ] L	[ ] IJ	[ ] Fem	[ ] SC	Placed:   [ ] Arterial Line		[ ] R	[ ] L	[ ] Fem	[ ] Rad	[ ] Ax	Placed:   [ ] PICC:					[ ] Mediport  [ ] Urinary Catheter, Date Placed:   [x] Necessity of urinary, arterial, and venous catheters discussed    OTHER MEDICATIONS:  chlorhexidine 2% Cloths 1 Application(s) Topical <User Schedule>      CODE STATUS:      IMAGING:

## 2019-09-08 NOTE — PROGRESS NOTE ADULT - ATTENDING COMMENTS
Pt seen and examined transferred for OSH for left RP bleeding, on A/C , HD table C/O mild pain, received 1 unit PRBC, 1 unit FFP, Kcentra, 10 vitamin K before transfer  pain control PRN meds  O2 sat stable >95 on R/A, no SOB  HD stable, in a fib with off and on HR in 110s, MAP >70, no pressors.  HCT 22.7 from 23 after 1 unit PRBC, 1 FFP at OSH , will transfuse 1 unit  Follow hemorrhage watch protocol after the 1 unit to R/O continue bleeding  Pt on BB at [facility] will resume.  Strict IS/OS  NPO for now  Mechanical DVT prophylaxis  GI prophylaxis  resume steroids  Will need hematology, rheumatology evaluation to monitor base line autoimmune disorder she was being worked up for before fall  Hold A/C   Bed rest for now  Monitor blood sugar  IR is aware , if pt not responding to resuscitation may need IR intervention Pt seen and examined transferred for OSH for left RP bleeding, on A/C , HD table C/O mild pain, received 1 unit PRBC, 1 unit FFP, Kcentra, 10 vitamin K before transfer  pain control PRN meds  O2 sat stable >95 on R/A, no SOB  HD stable, in a fib with off and on HR in 107-110s, MAP >70  H/H stable at this time 28-->27--> 27  -If next CBC is stable will start DVT chemoprophylaxis  Atrial fibrillation - will start diltiazem with goal HR < 120  Strict IS/OS  Improving  Acute kidney injury- will monitor urine output   Will advance diet  OOB to chair

## 2019-09-08 NOTE — PROGRESS NOTE ADULT - ASSESSMENT
Assessment:  85 y/o F PMHx lymphoma/MGUS, AFib on Coumadin, CHF, HLD, HTN, COPD, anxiety, depression, presented to [facility] ED s/p fall with left RP bleed on Coumadin with a supratherapeutic INR. Repeat CT showing worsening of hematoma. Pt transferred to [facility] SICU for increased size of L RP, hemodynamic monitoring, and possible IR intervention.    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 Repeat CT showing worsening of hematoma. Pt transferred to [facility] SICU for increased size of L RP, hemodynamic monitoring, and possible IR intervention.    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

## 2019-09-08 NOTE — PROGRESS NOTE ADULT - ASSESSMENT
A/P: 84 year old woman with extensive PMHx (CHF, HTN, HLD, A.fib on coumadin, lymphoma) being worked up as outpatient for glomerulonephritis (had left renal biopsy on 8/20/19) originally admitted to [facility] s/p fall and left psoas bleeding found on CT imaging (in setting of INR=4).  While at [place], repeat CT imaging showing dramatic increase in psoas / retroperitoneal bleeding.  Transferred to [facility] SICU for management.    -Care per SICU  -CLD  -PPI  -Entecavir 0.5 mg daily   -rheumatology, hematology and nephrology recs  -holding rituxan   -Left RP bleed currently stable, IR embolization if clinically deteriorates   -bactrim three times weekly for PJP ppx     ACS [id number] A/P: 84 year old woman with extensive PMHx (CHF, HTN, HLD, A.fib on coumadin, lymphoma) being worked up as outpatient for glomerulonephritis (had left renal biopsy on 8/20/19) originally admitted to [facility] s/p fall and left psoas bleeding found on CT imaging (in setting of INR=4).  While at [place], repeat CT imaging showing dramatic increase in psoas / retroperitoneal bleeding.  Transferred to [facility] SICU for management.    -Care per SICU  -CLD  -PPI  -Entecavir 0.5 mg daily   -rheumatology, hematology and nephrology recs  -holding rituxan   -Left RP bleed currently stable, IR embolization if clinically deteriorates   -bactrim three times weekly for PJP ppx  -f/u cbc   -start dvt ppx      ACS [id number]

## 2019-09-08 NOTE — CONSULT NOTE ADULT - ASSESSMENT
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        Nephrology consulted given KANIKA - on admission SCr 1.74 (prior SCr on d/c 08/2019 was 1.28) likely 2/2 hemodynamically meditated and anemia (hgb 8.3, baseline 9.0-9.5).  SCr improved w/ IVF and PRBC.  Today SCr 1.68. 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    Nephrology consulted given KANIKA - on admission SCr 1.74 (prior SCr on d/c 08/2019 was 1.28) likely 2/2 hemodynamically meditated and anemia (hgb 8.3, baseline 9.0-9.5).  SCr improved w/ IVF and PRBC.  Today SCr 1.68.

## 2019-09-08 NOTE — CHART NOTE - NSCHARTNOTEFT_GEN_A_CORE
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 Discharged on steroids. 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 Now on rituxan. Presents now to [facility] after a fall from standing. Found to have left RP bleed in the setting of coumadin use and supratheraputic INR. History obtained from Son at the bedside. He states they were getting out the car today when she fell and landed on her left side and hit her head. She did not loose consciousness or black out. Patient right now unable to provide ROS as she is slightly altered. Per son this mental status change has been presents since her last hospital stay about 2 weeks ago.     In the Er CT scans of head, abdomen and pelvis were done. CT head and neck without acute pathology. CT pelvis with small left RP bleed. INR 4. hemoglobin 8.3 down from 9.6 about 2 weeks ago. Hemodynamically stable. Patient alert.      At [facility] ICU, pt received 1 unit PRBC, FFP, and vitamin K. Hb initially responded to the 1 unit however, dropped again, leading to a repeat CT, which showed worsening of RP bleed w/ displacement of kidney.   Prior to transfer, pt received Kcentra, additional FFP, additional 1unit pRBC, and vitamin K. A R femoral TLC was placed.    Pt transferred to [facility] S[facility] for increased size of RP, hemodynamic monitoring, and possible IR intervention.   Transfused 1unit pRBC with repeat H&H 8.4/26.5 (07 Sep 2019 03:46)      INTERVAL EVENTS: ***    PAST MEDICAL & SURGICAL HISTORY:  COPD (chronic obstructive pulmonary disease)  Peripheral vascular disease  Pulmonary hypertension  Rheumatoid arthritis  Osteoarthritis  Mitral regurgitation  H/O lymphoma  Hyperlipidemia  Chronic GERD  Chronic kidney disease: proteinuria  AF (atrial fibrillation)  Anemia in CKD (chronic kidney disease)  CHF (congestive heart failure)  HTN (hypertension)  History of total hip replacement, left  History of total knee replacement, bilateral    [] No significant past history as reviewed with the patient and family    FAMILY HISTORY:  Family history of inclusion body myositis    [] Family history not pertinent as reviewed with the patient and family    SOCIAL HISTORY: ***    ALLERGIES: Keflex (Unknown)  penicillin (Rash)      HOME MEDICATIONS: ***    CURRENT MEDICATIONS  ALBUTerol/ipratropium for Nebulization. 3 milliLiter(s) Nebulizer every 6 hours PRN  ALPRAZolam 0.5 milliGRAM(s) Oral two times a day PRN  atorvastatin 10 milliGRAM(s) Oral at bedtime  chlorhexidine 2% Cloths 1 Application(s) Topical <User Schedule>  diltiazem    Tablet 30 milliGRAM(s) Oral every 8 hours  enoxaparin Injectable 30 milliGRAM(s) SubCutaneous daily  entecavir 0.5 milliGRAM(s) Oral daily  insulin lispro (HumaLOG) corrective regimen sliding scale   SubCutaneous three times a day before meals  insulin lispro (HumaLOG) corrective regimen sliding scale   SubCutaneous at bedtime  insulin lispro Injectable (HumaLOG) 6 Unit(s) SubCutaneous three times a day before meals  metoprolol tartrate 50 milliGRAM(s) Oral every 12 hours  montelukast 10 milliGRAM(s) Oral daily  pantoprazole    Tablet 40 milliGRAM(s) Oral before breakfast  predniSONE   Tablet 60 milliGRAM(s) Oral every 24 hours  sertraline 50 milliGRAM(s) Oral daily  trimethoprim  160 mG/sulfamethoxazole 800 mG 1 Tablet(s) Oral <User Schedule>    -----------------------------------------------------------------------------------    VITAL SIGNS:  T(C): 36.8 (09-09-19 @ 03:00), Max: 37.6 (09-08-19 @ 07:00)  HR: 140 (09-09-19 @ 03:00) (91 - 140)  BP: 153/76 (09-09-19 @ 03:00) (103/66 - 157/84)  RR: 15 (09-09-19 @ 03:00) (15 - 35)  SpO2: 93% (09-09-19 @ 03:00) (87% - 100%)          09-07-19 @ 07:01  -  09-08-19 @ 07:00  --------------------------------------------------------  IN: 1800 mL / OUT: 1050 mL / NET: 750 mL    09-08-19 @ 07:01  -  09-09-19 @ 04:01  --------------------------------------------------------  IN: 1050 mL / OUT: 800 mL / NET: 250 mL        PHYSICAL EXAM:  ***  General: NAD, Sitting in bed comfortably, not irratable   HEENT: NC/AT, EOMI, PERRL, MMM,   Neck: Soft, supple. Full ROM w/o pain  Cardio: RRR, nml S1/S2. No m/r/g. No LE edema appreciated  Resp: Good effort, CTA b/l  Thorax: No chest wall tenderness  Breast: No lesions/masses, no drainage  GI/Abd: Soft, NT/ND, no rebound/guarding, no masses palpated  Vascular: Extremities warm, brisk cap refill, B/l radial pulses palpable, b/l DP/PT palpable, no palpable abdominal pulsatile mass.  Sensation grossly intact to light touch and equal b/l in UEe and LE  Skin: Intact, no breakdown  Lymphatic/Nodes: No palpable lymphadenopathy  Musculoskeletal: All 4 extremities moving spontaneously, no limitations    LABS:  Sodium, Serum: 137 (09-09-19 @ 01:11)  Potassium, Serum: 4.3 (09-09-19 @ 01:11)  Chloride, Serum: 98 (09-09-19 @ 01:11)  Carbon Dioxide, Serum: 27 (09-09-19 @ 01:11)  Blood Urea Nitrogen, Serum: 43 <H> (09-09-19 @ 01:11)  Creatinine, Serum: 2.16 <H> (09-09-19 @ 01:11)  WBC Count: 15.4 <H> (09-09-19 @ 01:11)  Hemoglobin: 7.9 <L> (09-09-19 @ 01:11)  Hematocrit: 24.2 <L> (09-09-19 @ 01:11)  Platelet Count - Automated: 195 (09-09-19 @ 01:11)  WBC Count: 22.8 <H> (09-08-19 @ 14:19)  Hemoglobin: 8.5 <L> (09-08-19 @ 14:19)  Hematocrit: 25.1 <L> (09-08-19 @ 14:19)  Platelet Count - Automated: 222 (09-08-19 @ 14:19)  Sodium, Serum: 136 (09-08-19 @ 14:19)  Potassium, Serum: 4.4 (09-08-19 @ 14:19)  Chloride, Serum: 98 (09-08-19 @ 14:19)  Carbon Dioxide, Serum: 23 (09-08-19 @ 14:19)  Blood Urea Nitrogen, Serum: 42 <H> (09-08-19 @ 14:19)  Creatinine, Serum: 1.89 <H> (09-08-19 @ 14:19)        ------------------------------------------------------------------------------------------  RADIOLOGICAL FINDINGS REVIEW:            List Injuries Identified to Date:  ***  KANIKA (acute kidney injury): KANIKA (acute kidney injury)        Consults (Date):  [] Neurosurgery   [] Orthopedic Surgery  [] Spine Surgery  [] Plastic Surgery  [] ENT  [] Urology  [] PM&R  [] Social Work    INTERPRETATION/ASSESSMENT:   84y girl arrived as level ____ trauma activation p/w _____. Pt now stable and recovering appropriately from her injuries.     PLAN:   -   -

## 2019-09-08 NOTE — CONSULT NOTE ADULT - ATTENDING COMMENTS
as above
well- known to our service. 83 yo lady recently admitted to [facility] for pulm-renal syndrome with positive oxana, anca sec to hydralazine. renal biopsy showed pauci-immune crescentic GN> Received iv steroids+ rituxan and discharged on prednisone 60 mg daily. at home, she had a fall and found to have an RP bleed ( on coumadin). Creat on discharge from [facility] on 8/25 was 1.2> admitted here on 9/6 - 1.9> remaining between 1.7-1.9.     KANIKA likely sec to acute anemia/ relative hypotension in the background of diuretic use     - hold Lasix   - prbcs as needed   - urinalysis with microscopy  - continue pred 60 mg daily   - rheum plans to hold second dose of Rituxan till more stable.
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 Pt now presents s/p a fall from standing found to have left RP bleed in the setting of coumadin use and supratheraputic INR at [facility] (INR was 4.03 on 9/6). Hematology consulted as patient is due for Rituxan on 9/9. Would hold Rituxan in the setting of acute illness and hospitalization. Please follow up with pt's nephrologist regarding resuming rituxan, likely to be done in outpatient setting once pt is discharged. Pt can continue to follow up with Dr. Viera at [facility] on discharge.   D/w the pt and daughter at bedside.

## 2019-09-08 NOTE — CONSULT NOTE ADULT - SUBJECTIVE AND OBJECTIVE BOX
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      INTERVAL HISTORY:      Allergies    Keflex (Unknown)  penicillin (Rash)    Intolerances        MEDICATIONS  (STANDING):  atorvastatin 10 milliGRAM(s) Oral at bedtime  chlorhexidine 2% Cloths 1 Application(s) Topical <User Schedule>  entecavir 0.5 milliGRAM(s) Oral daily  furosemide    Tablet 20 milliGRAM(s) Oral daily  insulin lispro (HumaLOG) corrective regimen sliding scale   SubCutaneous three times a day before meals  insulin lispro (HumaLOG) corrective regimen sliding scale   SubCutaneous at bedtime  metoprolol tartrate 50 milliGRAM(s) Oral every 12 hours  montelukast 10 milliGRAM(s) Oral daily  ondansetron Injectable 4 milliGRAM(s) IV Push once  pantoprazole    Tablet 40 milliGRAM(s) Oral before breakfast  predniSONE   Tablet 60 milliGRAM(s) Oral every 24 hours  sertraline 50 milliGRAM(s) Oral daily  trimethoprim  160 mG/sulfamethoxazole 800 mG 1 Tablet(s) Oral <User Schedule>    MEDICATIONS  (PRN):  ALBUTerol/ipratropium for Nebulization. 3 milliLiter(s) Nebulizer every 6 hours PRN Shortness of Breath and/or Wheezing      Vital Signs Last 24 Hrs  T(C): 37.6 (08 Sep 2019 07:00), Max: 37.6 (07 Sep 2019 15:00)  T(F): 99.6 (08 Sep 2019 07:00), Max: 99.6 (07 Sep 2019 15:00)  HR: 98 (08 Sep 2019 07:00) (86 - 133)  BP: 157/84 (08 Sep 2019 07:00) (131/81 - 172/119)  BP(mean): 111 (08 Sep 2019 07:00) (98 - 139)  RR: 24 (08 Sep 2019 07:00) (8 - 36)  SpO2: 94% (08 Sep 2019 07:00) (90% - 97%)    PHYSICAL EXAM:    General: AOx3, no acute distress  EYES: EOMI, PERRLA, conjunctiva and sclera clear  CHEST/LUNG: Clear to auscultation bilaterally; no wheezes, crackles or rales  HEART: Regular rate and rhythm; no murmurs  ABDOMEN: Soft, Nontender, Nondistended; BS+  LYMPH: No lymphadenopathy noted.  Extremities: No peripheral edema    LABS:                        8.9    22.7  )-----------( 250      ( 08 Sep 2019 02:18 )             26.7         140  |  100  |  40<H>  ----------------------------<  117<H>  3.9   |  25  |  1.68<H>    Ca    8.5      08 Sep 2019 02:18  Phos  3.8       Mg     2.4           PT/INR - ( 08 Sep 2019 02:18 )   PT: 12.4 sec;   INR: 1.08 ratio         PTT - ( 08 Sep 2019 02:18 )  PTT:32.7 sec  Urinalysis Basic - ( 07 Sep 2019 00:37 )    Color: Light Yellow / Appearance: Clear / S.012 / pH: x  Gluc: x / Ketone: Negative  / Bili: Negative / Urobili: Negative   Blood: x / Protein: 30 mg/dL / Nitrite: Negative   Leuk Esterase: Small / RBC: 51 /hpf / WBC 6 /HPF   Sq Epi: x / Non Sq Epi: 0 /hpf / Bacteria: Negative            RADIOLOGY & ADDITIONAL STUDIES:    PATHOLOGY: 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 pt now presents s/p a fall from standing found to have left RP bleed in the setting of coumadin use and supratheraputic INR at [facility] (INR was 4.03 on ). Hematology consulted as patient is due for Rituxan on .     Spoke with patient using [employer] . Per pt she has not acute complaints except that she wants to go home. She has no abdominal pain or venu bleeding.       Allergies    Keflex (Unknown)  penicillin (Rash)    Intolerances        MEDICATIONS  (STANDING):  atorvastatin 10 milliGRAM(s) Oral at bedtime  chlorhexidine 2% Cloths 1 Application(s) Topical <User Schedule>  entecavir 0.5 milliGRAM(s) Oral daily  furosemide    Tablet 20 milliGRAM(s) Oral daily  insulin lispro (HumaLOG) corrective regimen sliding scale   SubCutaneous three times a day before meals  insulin lispro (HumaLOG) corrective regimen sliding scale   SubCutaneous at bedtime  metoprolol tartrate 50 milliGRAM(s) Oral every 12 hours  montelukast 10 milliGRAM(s) Oral daily  ondansetron Injectable 4 milliGRAM(s) IV Push once  pantoprazole    Tablet 40 milliGRAM(s) Oral before breakfast  predniSONE   Tablet 60 milliGRAM(s) Oral every 24 hours  sertraline 50 milliGRAM(s) Oral daily  trimethoprim  160 mG/sulfamethoxazole 800 mG 1 Tablet(s) Oral <User Schedule>    MEDICATIONS  (PRN):  ALBUTerol/ipratropium for Nebulization. 3 milliLiter(s) Nebulizer every 6 hours PRN Shortness of Breath and/or Wheezing      Vital Signs Last 24 Hrs  T(C): 37.6 (08 Sep 2019 07:00), Max: 37.6 (07 Sep 2019 15:00)  T(F): 99.6 (08 Sep 2019 07:00), Max: 99.6 (07 Sep 2019 15:00)  HR: 98 (08 Sep 2019 07:00) (86 - 133)  BP: 157/84 (08 Sep 2019 07:00) (131/81 - 172/119)  BP(mean): 111 (08 Sep 2019 07:00) (98 - 139)  RR: 24 (08 Sep 2019 07:00) (8 - 36)  SpO2: 94% (08 Sep 2019 07:00) (90% - 97%)    PHYSICAL EXAM:    General: AOx3, appears chronically ill   EYES: EOMI, PERRLA, conjunctiva and sclera clear  CHEST/LUNG: Clear to auscultation bilaterally; no wheezes, crackles or rales  HEART: Regular rate and rhythm; no murmurs  ABDOMEN: Soft, Nontender, Nondistended; BS+  LYMPH: No lymphadenopathy noted.  Extremities: No peripheral edema, ecchymosis on skin bilateral upper extremities   LABS:                        8.9    22.7  )-----------( 250      ( 08 Sep 2019 02:18 )             26.7     09-08    140  |  100  |  40<H>  ----------------------------<  117<H>  3.9   |  25  |  1.68<H>    Ca    8.5      08 Sep 2019 02:18  Phos  3.8     09-08  Mg     2.4     09-08      PT/INR - ( 08 Sep 2019 02:18 )   PT: 12.4 sec;   INR: 1.08 ratio         PTT - ( 08 Sep 2019 02:18 )  PTT:32.7 sec  Urinalysis Basic - ( 07 Sep 2019 00:37 )    Color: Light Yellow / Appearance: Clear / S.012 / pH: x  Gluc: x / Ketone: Negative  / Bili: Negative / Urobili: Negative   Blood: x / Protein: 30 mg/dL / Nitrite: Negative   Leuk Esterase: Small / RBC: 51 /hpf / WBC 6 /HPF   Sq Epi: x / Non Sq Epi: 0 /hpf / Bacteria: Negative            RADIOLOGY & ADDITIONAL STUDIES:    PATHOLOGY: 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 pt now presents s/p a fall from standing found to have left RP bleed in the setting of coumadin use and supratheraputic INR at [facility] (INR was 4.03 on ). Hematology consulted as patient is due for Rituxan on .     Spoke with patient using [employer] . Per pt she has not acute complaints except that she wants to go home. She has no abdominal pain or venu bleeding.       Allergies    Keflex (Unknown)  penicillin (Rash)    Intolerances        MEDICATIONS  (STANDING):  atorvastatin 10 milliGRAM(s) Oral at bedtime  chlorhexidine 2% Cloths 1 Application(s) Topical <User Schedule>  entecavir 0.5 milliGRAM(s) Oral daily  furosemide    Tablet 20 milliGRAM(s) Oral daily  insulin lispro (HumaLOG) corrective regimen sliding scale   SubCutaneous three times a day before meals  insulin lispro (HumaLOG) corrective regimen sliding scale   SubCutaneous at bedtime  metoprolol tartrate 50 milliGRAM(s) Oral every 12 hours  montelukast 10 milliGRAM(s) Oral daily  ondansetron Injectable 4 milliGRAM(s) IV Push once  pantoprazole    Tablet 40 milliGRAM(s) Oral before breakfast  predniSONE   Tablet 60 milliGRAM(s) Oral every 24 hours  sertraline 50 milliGRAM(s) Oral daily  trimethoprim  160 mG/sulfamethoxazole 800 mG 1 Tablet(s) Oral <User Schedule>    MEDICATIONS  (PRN):  ALBUTerol/ipratropium for Nebulization. 3 milliLiter(s) Nebulizer every 6 hours PRN Shortness of Breath and/or Wheezing      Vital Signs Last 24 Hrs  T(C): 37.6 (08 Sep 2019 07:00), Max: 37.6 (07 Sep 2019 15:00)  T(F): 99.6 (08 Sep 2019 07:00), Max: 99.6 (07 Sep 2019 15:00)  HR: 98 (08 Sep 2019 07:00) (86 - 133)  BP: 157/84 (08 Sep 2019 07:00) (131/81 - 172/119)  BP(mean): 111 (08 Sep 2019 07:00) (98 - 139)  RR: 24 (08 Sep 2019 07:00) (8 - 36)  SpO2: 94% (08 Sep 2019 07:00) (90% - 97%)    PHYSICAL EXAM:    General: AOx3, appears chronically ill   EYES: EOMI, PERRLA, conjunctiva and sclera clear  CHEST/LUNG: Clear to auscultation bilaterally; no wheezes, crackles or rales  HEART: Regular rate and rhythm; no murmurs  ABDOMEN: Soft, Nontender, Nondistended; BS+  LYMPH: No lymphadenopathy noted.  Extremities: No peripheral edema, ecchymosis on skin bilateral upper extremities   LABS:                        8.9    22.7  )-----------( 250      ( 08 Sep 2019 02:18 )             26.7     09-08    140  |  100  |  40<H>  ----------------------------<  117<H>  3.9   |  25  |  1.68<H>    Ca    8.5      08 Sep 2019 02:18  Phos  3.8     09-08  Mg     2.4     09-08      PT/INR - ( 08 Sep 2019 02:18 )   PT: 12.4 sec;   INR: 1.08 ratio         PTT - ( 08 Sep 2019 02:18 )  PTT:32.7 sec  Urinalysis Basic - ( 07 Sep 2019 00:37 )    Color: Light Yellow / Appearance: Clear / S.012 / pH: x  Gluc: x / Ketone: Negative  / Bili: Negative / Urobili: Negative   Blood: x / Protein: 30 mg/dL / Nitrite: Negative   Leuk Esterase: Small / RBC: 51 /hpf / WBC 6 /HPF   Sq Epi: x / Non Sq Epi: 0 /hpf / Bacteria: Negative            RADIOLOGY & ADDITIONAL STUDIES:    < from: CT Abdomen and Pelvis No Cont (19 @ 12:56) >    EXAM:  CT ABDOMEN AND PELVIS      PROCEDURE DATE:  2019        INTERPRETATION:  CLINICAL HISTORY:  Left iliopsoas hematoma; follow-up   assessment.    Multiple axial images of the abdomen and pelvis were obtained from the   lung bases throughpubic symphysis without the administration of oral or   intravascular contrast. Reformatted coronal and sagittal images are   submitted.    COMPARISON: 2019 at 1:30 AM    FINDINGS: Since prior evaluation significant interval change is   identified. The previously described left retroperitoneal iliopsoas   hemorrhage is significantly increased in size now resulting in   displacement of the left kidney anteriorly. There is significant   stranding of the left perirenal fat and left perirenal fluid is   identified. There is thickening of the left lateral abdominal wall   musculature. Fluid/hemorrhage tracks into the left pelvic retroperitoneal   space. There is new stranding surrounding the proximal sigmoid colon,   indeterminate etiology andclinical significance. New perihepatic and   perisplenic ascites is identified. New small left pleural effusion.    The liver is normal size. No focal hepatic masses are identified. There   is no evidence for intrahepatic or extrahepatic biliary dilatation. No   gallstones, gallbladder wall thickening or pericholecystic fluid   identified.    The spleen is unremarkable in size. No space-occupying lesions of the   splenic parenchyma identified. The pancreas and adrenal glands appear   unremarkable.    There is no evidence for right-sided hydronephrosis. Cystic foci are   identified within the right kidney measuring up to 3.3 cm. There is mild   prominence of the left intrarenal collecting system, stable. No definite   space-occupying lesions of the left kidney are identified. The abdominal   aorta is normal in course and caliber. No abnormally enlarged   retroperitoneal or pelvic lymphadenopathy is noted.    Abundant material is identified within the rectum. There is no evidence   for mechanical bowel obstruction. No colonic wall thickening is   identified. There is no evidence for free intraperitoneal air. The   appendix is not visualized.    The uterus and urinary bladder appear unremarkable.     Imaging of the lung bases demonstrates cardiomegaly. Coronary arterial   calcifications noted. Degenerative changes are noted within the thoracic   and lumbar spine. A nonspecific sclerotic focus is identified within the   sacrum. The patient is status post left femoral neck pinning with  placement of a proximal left femoral intramedullary silvino.    Diffuse anasarca is noted.    IMPRESSION:  Since prior evaluation significant interval change is   identified. The previously described left retroperitoneal iliopsoas   hemorrhage is significantly increased in size now resulting in   displacement of the left kidney anteriorly. There is significant   stranding of the left perirenal fat and left perirenal fluid is   identified. There is thickening of the left lateral abdominal wall   musculature. Fluid/hemorrhage tracks into the left pelvic retroperitoneal   space. There is new stranding surrounding the proximal sigmoid colon,   indeterminate etiology and clinical significance. New perihepatic and   perisplenic ascites is identified.New small left pleural effusion.    Results discussed with ALEKSANDRA Mendieta in the ICU prior to this dictation.    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 pt now presents s/p a fall from standing found to have left RP bleed in the setting of coumadin use and supratheraputic INR at [facility] (INR was 4.03 on ). Hematology consulted as patient is due for Rituxan on .     Spoke with patient using [employer] . Per pt she has not acute complaints except that she wants to go home. She has no abdominal pain or venu bleeding.     REVIEW OF SYSTEMS:    CONSTITUTIONAL: No weakness, fevers or chills  EYES/ENT: No visual changes;  No vertigo or throat pain   NECK: No pain or stiffness  RESPIRATORY: No cough, wheezing, hemoptysis; No shortness of breath  CARDIOVASCULAR: No chest pain or palpitations  GASTROINTESTINAL: No abdominal or epigastric pain. No nausea, vomiting, or hematemesis; No diarrhea or constipation. No melena or hematochezia.  GENITOURINARY: No dysuria, frequency or hematuria  NEUROLOGICAL: No numbness or weakness  SKIN: No itching, burning, rashes, or lesions   All other review of systems is negative unless indicated above.    PAST MEDICAL HISTORY:  AF (atrial fibrillation)     Anemia in CKD (chronic kidney disease)     CHF (congestive heart failure)     Chronic GERD     Chronic kidney disease proteinuria    COPD (chronic obstructive pulmonary disease)     H/O lymphoma     HTN (hypertension)     Hyperlipidemia     Mitral regurgitation     Osteoarthritis     Peripheral vascular disease     Pulmonary hypertension     Rheumatoid arthritis.    PAST SURGICAL HISTORY:  History of total hip replacement, left     History of total knee replacement, bilateral.    FAMILY HISTORY:  Family history of inclusion body myositis.No malignancies in first degree relatives     Allergies    Keflex (Unknown)  penicillin (Rash)    Intolerances        MEDICATIONS  (STANDING):  atorvastatin 10 milliGRAM(s) Oral at bedtime  chlorhexidine 2% Cloths 1 Application(s) Topical <User Schedule>  entecavir 0.5 milliGRAM(s) Oral daily  furosemide    Tablet 20 milliGRAM(s) Oral daily  insulin lispro (HumaLOG) corrective regimen sliding scale   SubCutaneous three times a day before meals  insulin lispro (HumaLOG) corrective regimen sliding scale   SubCutaneous at bedtime  metoprolol tartrate 50 milliGRAM(s) Oral every 12 hours  montelukast 10 milliGRAM(s) Oral daily  ondansetron Injectable 4 milliGRAM(s) IV Push once  pantoprazole    Tablet 40 milliGRAM(s) Oral before breakfast  predniSONE   Tablet 60 milliGRAM(s) Oral every 24 hours  sertraline 50 milliGRAM(s) Oral daily  trimethoprim  160 mG/sulfamethoxazole 800 mG 1 Tablet(s) Oral <User Schedule>    MEDICATIONS  (PRN):  ALBUTerol/ipratropium for Nebulization. 3 milliLiter(s) Nebulizer every 6 hours PRN Shortness of Breath and/or Wheezing      Vital Signs Last 24 Hrs  T(C): 37.6 (08 Sep 2019 07:00), Max: 37.6 (07 Sep 2019 15:00)  T(F): 99.6 (08 Sep 2019 07:00), Max: 99.6 (07 Sep 2019 15:00)  HR: 98 (08 Sep 2019 07:00) (86 - 133)  BP: 157/84 (08 Sep 2019 07:00) (131/81 - 172/119)  BP(mean): 111 (08 Sep 2019 07:00) (98 - 139)  RR: 24 (08 Sep 2019 07:00) (8 - 36)  SpO2: 94% (08 Sep 2019 07:00) (90% - 97%)    PHYSICAL EXAM:    General: AOx3, appears chronically ill   EYES: EOMI, PERRLA, conjunctiva and sclera clear  CHEST/LUNG: Clear to auscultation bilaterally; no wheezes, crackles or rales  HEART: Regular rate and rhythm; no murmurs  ABDOMEN: Soft, Nontender, Nondistended; BS+  LYMPH: No lymphadenopathy noted.  Extremities: No peripheral edema  Skin: ecchymosis on skin bilateral upper extremities   Psych: Normal mental status, calm     LABS:                        8.9    22.7  )-----------( 250      ( 08 Sep 2019 02:18 )             26.7     09-08    140  |  100  |  40<H>  ----------------------------<  117<H>  3.9   |  25  |  1.68<H>    Ca    8.5      08 Sep 2019 02:18  Phos  3.8       Mg     2.4           PT/INR - ( 08 Sep 2019 02:18 )   PT: 12.4 sec;   INR: 1.08 ratio         PTT - ( 08 Sep 2019 02:18 )  PTT:32.7 sec  Urinalysis Basic - ( 07 Sep 2019 00:37 )    Color: Light Yellow / Appearance: Clear / S.012 / pH: x  Gluc: x / Ketone: Negative  / Bili: Negative / Urobili: Negative   Blood: x / Protein: 30 mg/dL / Nitrite: Negative   Leuk Esterase: Small / RBC: 51 /hpf / WBC 6 /HPF   Sq Epi: x / Non Sq Epi: 0 /hpf / Bacteria: Negative            RADIOLOGY & ADDITIONAL STUDIES:    < from: CT Abdomen and Pelvis No Cont (19 @ 12:56) >    EXAM:  CT ABDOMEN AND PELVIS      PROCEDURE DATE:  2019        INTERPRETATION:  CLINICAL HISTORY:  Left iliopsoas hematoma; follow-up   assessment.    Multiple axial images of the abdomen and pelvis were obtained from the   lung bases throughpubic symphysis without the administration of oral or   intravascular contrast. Reformatted coronal and sagittal images are   submitted.    COMPARISON: 2019 at 1:30 AM    FINDINGS: Since prior evaluation significant interval change is   identified. The previously described left retroperitoneal iliopsoas   hemorrhage is significantly increased in size now resulting in   displacement of the left kidney anteriorly. There is significant   stranding of the left perirenal fat and left perirenal fluid is   identified. There is thickening of the left lateral abdominal wall   musculature. Fluid/hemorrhage tracks into the left pelvic retroperitoneal   space. There is new stranding surrounding the proximal sigmoid colon,   indeterminate etiology andclinical significance. New perihepatic and   perisplenic ascites is identified. New small left pleural effusion.    The liver is normal size. No focal hepatic masses are identified. There   is no evidence for intrahepatic or extrahepatic biliary dilatation. No   gallstones, gallbladder wall thickening or pericholecystic fluid   identified.    The spleen is unremarkable in size. No space-occupying lesions of the   splenic parenchyma identified. The pancreas and adrenal glands appear   unremarkable.    There is no evidence for right-sided hydronephrosis. Cystic foci are   identified within the right kidney measuring up to 3.3 cm. There is mild   prominence of the left intrarenal collecting system, stable. No definite   space-occupying lesions of the left kidney are identified. The abdominal   aorta is normal in course and caliber. No abnormally enlarged   retroperitoneal or pelvic lymphadenopathy is noted.    Abundant material is identified within the rectum. There is no evidence   for mechanical bowel obstruction. No colonic wall thickening is   identified. There is no evidence for free intraperitoneal air. The   appendix is not visualized.    The uterus and urinary bladder appear unremarkable.     Imaging of the lung bases demonstrates cardiomegaly. Coronary arterial   calcifications noted. Degenerative changes are noted within the thoracic   and lumbar spine. A nonspecific sclerotic focus is identified within the   sacrum. The patient is status post left femoral neck pinning with  placement of a proximal left femoral intramedullary silvino.    Diffuse anasarca is noted.    IMPRESSION:  Since prior evaluation significant interval change is   identified. The previously described left retroperitoneal iliopsoas   hemorrhage is significantly increased in size now resulting in   displacement of the left kidney anteriorly. There is significant   stranding of the left perirenal fat and left perirenal fluid is   identified. There is thickening of the left lateral abdominal wall   musculature. Fluid/hemorrhage tracks into the left pelvic retroperitoneal   space. There is new stranding surrounding the proximal sigmoid colon,   indeterminate etiology and clinical significance. New perihepatic and   perisplenic ascites is identified.New small left pleural effusion.    Results discussed with ALEKSANDRA Mendieta in the ICU prior to this dictation.    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 pt now presents s/p a fall from standing found to have left RP bleed in the setting of coumadin use and supratheraputic INR at [facility] (INR was 4.03 on ). Hematology consulted as patient is due for Rituxan on .     Spoke with patient using [employer] . Per pt she has not acute complaints except that she wants to go home. She has no abdominal pain or venu bleeding.     REVIEW OF SYSTEMS:    CONSTITUTIONAL: No weakness, fevers or chills  EYES/ENT: No visual changes;  No vertigo or throat pain   NECK: No pain or stiffness  RESPIRATORY: No cough, wheezing, hemoptysis; No shortness of breath  CARDIOVASCULAR: No chest pain or palpitations  GASTROINTESTINAL: No abdominal or epigastric pain. No nausea, vomiting, or hematemesis; No diarrhea or constipation. No melena or hematochezia.  GENITOURINARY: No dysuria, frequency or hematuria  NEUROLOGICAL: No numbness or weakness  SKIN: No itching, burning, rashes, or lesions   All other review of systems is negative unless indicated above.    PAST MEDICAL HISTORY:  AF (atrial fibrillation)     Anemia in CKD (chronic kidney disease)     CHF (congestive heart failure)     Chronic GERD     Chronic kidney disease proteinuria    COPD (chronic obstructive pulmonary disease)     H/O lymphoma     HTN (hypertension)     Hyperlipidemia     Mitral regurgitation     Osteoarthritis     Peripheral vascular disease     Pulmonary hypertension     Rheumatoid arthritis.    PAST SURGICAL HISTORY:  History of total hip replacement, left     History of total knee replacement, bilateral.    FAMILY HISTORY:  Family history of inclusion body myositis. No malignancies in first degree relatives     SOCIAL HISTORY:  Denies smoking or any other toxic habits.     Allergies    Keflex (Unknown)  penicillin (Rash)    Intolerances        MEDICATIONS  (STANDING):  atorvastatin 10 milliGRAM(s) Oral at bedtime  chlorhexidine 2% Cloths 1 Application(s) Topical <User Schedule>  entecavir 0.5 milliGRAM(s) Oral daily  furosemide    Tablet 20 milliGRAM(s) Oral daily  insulin lispro (HumaLOG) corrective regimen sliding scale   SubCutaneous three times a day before meals  insulin lispro (HumaLOG) corrective regimen sliding scale   SubCutaneous at bedtime  metoprolol tartrate 50 milliGRAM(s) Oral every 12 hours  montelukast 10 milliGRAM(s) Oral daily  ondansetron Injectable 4 milliGRAM(s) IV Push once  pantoprazole    Tablet 40 milliGRAM(s) Oral before breakfast  predniSONE   Tablet 60 milliGRAM(s) Oral every 24 hours  sertraline 50 milliGRAM(s) Oral daily  trimethoprim  160 mG/sulfamethoxazole 800 mG 1 Tablet(s) Oral <User Schedule>    MEDICATIONS  (PRN):  ALBUTerol/ipratropium for Nebulization. 3 milliLiter(s) Nebulizer every 6 hours PRN Shortness of Breath and/or Wheezing      Vital Signs Last 24 Hrs  T(C): 37.6 (08 Sep 2019 07:00), Max: 37.6 (07 Sep 2019 15:00)  T(F): 99.6 (08 Sep 2019 07:00), Max: 99.6 (07 Sep 2019 15:00)  HR: 98 (08 Sep 2019 07:00) (86 - 133)  BP: 157/84 (08 Sep 2019 07:00) (131/81 - 172/119)  BP(mean): 111 (08 Sep 2019 07:00) (98 - 139)  RR: 24 (08 Sep 2019 07:00) (8 - 36)  SpO2: 94% (08 Sep 2019 07:00) (90% - 97%)    PHYSICAL EXAM:    General: AOx3, appears chronically ill   EYES: EOMI, PERRLA, conjunctiva and sclera clear  CHEST/LUNG: Clear to auscultation bilaterally; no wheezes, crackles or rales  HEART: Regular rate and rhythm; no murmurs  ABDOMEN: Soft, Nontender, Nondistended; BS+  LYMPH: No lymphadenopathy noted.  Extremities: No peripheral edema  Skin: ecchymosis on skin bilateral upper extremities   Psych: Normal mental status, calm     LABS:                        8.9    22.7  )-----------( 250      ( 08 Sep 2019 02:18 )             26.7     09-08    140  |  100  |  40<H>  ----------------------------<  117<H>  3.9   |  25  |  1.68<H>    Ca    8.5      08 Sep 2019 02:18  Phos  3.8       Mg     2.4           PT/INR - ( 08 Sep 2019 02:18 )   PT: 12.4 sec;   INR: 1.08 ratio         PTT - ( 08 Sep 2019 02:18 )  PTT:32.7 sec  Urinalysis Basic - ( 07 Sep 2019 00:37 )    Color: Light Yellow / Appearance: Clear / S.012 / pH: x  Gluc: x / Ketone: Negative  / Bili: Negative / Urobili: Negative   Blood: x / Protein: 30 mg/dL / Nitrite: Negative   Leuk Esterase: Small / RBC: 51 /hpf / WBC 6 /HPF   Sq Epi: x / Non Sq Epi: 0 /hpf / Bacteria: Negative            RADIOLOGY & ADDITIONAL STUDIES:    < from: CT Abdomen and Pelvis No Cont (19 @ 12:56) >    EXAM:  CT ABDOMEN AND PELVIS      PROCEDURE DATE:  2019        INTERPRETATION:  CLINICAL HISTORY:  Left iliopsoas hematoma; follow-up   assessment.    Multiple axial images of the abdomen and pelvis were obtained from the   lung bases throughpubic symphysis without the administration of oral or   intravascular contrast. Reformatted coronal and sagittal images are   submitted.    COMPARISON: 2019 at 1:30 AM    FINDINGS: Since prior evaluation significant interval change is   identified. The previously described left retroperitoneal iliopsoas   hemorrhage is significantly increased in size now resulting in   displacement of the left kidney anteriorly. There is significant   stranding of the left perirenal fat and left perirenal fluid is   identified. There is thickening of the left lateral abdominal wall   musculature. Fluid/hemorrhage tracks into the left pelvic retroperitoneal   space. There is new stranding surrounding the proximal sigmoid colon,   indeterminate etiology andclinical significance. New perihepatic and   perisplenic ascites is identified. New small left pleural effusion.    The liver is normal size. No focal hepatic masses are identified. There   is no evidence for intrahepatic or extrahepatic biliary dilatation. No   gallstones, gallbladder wall thickening or pericholecystic fluid   identified.    The spleen is unremarkable in size. No space-occupying lesions of the   splenic parenchyma identified. The pancreas and adrenal glands appear   unremarkable.    There is no evidence for right-sided hydronephrosis. Cystic foci are   identified within the right kidney measuring up to 3.3 cm. There is mild   prominence of the left intrarenal collecting system, stable. No definite   space-occupying lesions of the left kidney are identified. The abdominal   aorta is normal in course and caliber. No abnormally enlarged   retroperitoneal or pelvic lymphadenopathy is noted.    Abundant material is identified within the rectum. There is no evidence   for mechanical bowel obstruction. No colonic wall thickening is   identified. There is no evidence for free intraperitoneal air. The   appendix is not visualized.    The uterus and urinary bladder appear unremarkable.     Imaging of the lung bases demonstrates cardiomegaly. Coronary arterial   calcifications noted. Degenerative changes are noted within the thoracic   and lumbar spine. A nonspecific sclerotic focus is identified within the   sacrum. The patient is status post left femoral neck pinning with  placement of a proximal left femoral intramedullary silvino.    Diffuse anasarca is noted.    IMPRESSION:  Since prior evaluation significant interval change is   identified. The previously described left retroperitoneal iliopsoas   hemorrhage is significantly increased in size now resulting in   displacement of the left kidney anteriorly. There is significant   stranding of the left perirenal fat and left perirenal fluid is   identified. There is thickening of the left lateral abdominal wall   musculature. Fluid/hemorrhage tracks into the left pelvic retroperitoneal   space. There is new stranding surrounding the proximal sigmoid colon,   indeterminate etiology and clinical significance. New perihepatic and   perisplenic ascites is identified.New small left pleural effusion.    Results discussed with ALEKSANDRA Mendieta in the ICU prior to this dictation.    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 pt now presents s/p a fall from standing found to have left RP bleed in the setting of coumadin use and supratheraputic INR at [facility] (INR was 4.03 on ). Hematology consulted as patient is due for Rituxan on .     Spoke with patient using [employer] . Per pt she has not acute complaints except that she wants to go home. She has no abdominal pain or venu bleeding.     REVIEW OF SYSTEMS:    CONSTITUTIONAL: No weakness, fevers or chills  EYES/ENT: No visual changes;  No vertigo or throat pain   NECK: No pain or stiffness  RESPIRATORY: No cough, wheezing, hemoptysis; No shortness of breath  CARDIOVASCULAR: No chest pain or palpitations  GASTROINTESTINAL: No abdominal or epigastric pain. No nausea, vomiting, or hematemesis; No diarrhea or constipation. No melena or hematochezia.  GENITOURINARY: No dysuria, frequency or hematuria  NEUROLOGICAL: No numbness or weakness  SKIN: No itching, burning, rashes, or lesions   All other review of systems is negative unless indicated above.    PAST MEDICAL HISTORY:  AF (atrial fibrillation)     Anemia in CKD (chronic kidney disease)     CHF (congestive heart failure)     Chronic GERD     Chronic kidney disease proteinuria    COPD (chronic obstructive pulmonary disease)     H/O lymphoma     HTN (hypertension)     Hyperlipidemia     Mitral regurgitation     Osteoarthritis     Peripheral vascular disease     Pulmonary hypertension     Rheumatoid arthritis.    PAST SURGICAL HISTORY:  History of total hip replacement, left     History of total knee replacement, bilateral.    FAMILY HISTORY:  Family history of inclusion body myositis. No malignancies in first degree relatives     SOCIAL HISTORY:  Denies smoking or any other toxic habits.     Allergies    Keflex (Unknown)  penicillin (Rash)    Intolerances        MEDICATIONS  (STANDING):  atorvastatin 10 milliGRAM(s) Oral at bedtime  chlorhexidine 2% Cloths 1 Application(s) Topical <User Schedule>  entecavir 0.5 milliGRAM(s) Oral daily  furosemide    Tablet 20 milliGRAM(s) Oral daily  insulin lispro (HumaLOG) corrective regimen sliding scale   SubCutaneous three times a day before meals  insulin lispro (HumaLOG) corrective regimen sliding scale   SubCutaneous at bedtime  metoprolol tartrate 50 milliGRAM(s) Oral every 12 hours  montelukast 10 milliGRAM(s) Oral daily  ondansetron Injectable 4 milliGRAM(s) IV Push once  pantoprazole    Tablet 40 milliGRAM(s) Oral before breakfast  predniSONE   Tablet 60 milliGRAM(s) Oral every 24 hours  sertraline 50 milliGRAM(s) Oral daily  trimethoprim  160 mG/sulfamethoxazole 800 mG 1 Tablet(s) Oral <User Schedule>    MEDICATIONS  (PRN):  ALBUTerol/ipratropium for Nebulization. 3 milliLiter(s) Nebulizer every 6 hours PRN Shortness of Breath and/or Wheezing      Vital Signs Last 24 Hrs  T(C): 37.6 (08 Sep 2019 07:00), Max: 37.6 (07 Sep 2019 15:00)  T(F): 99.6 (08 Sep 2019 07:00), Max: 99.6 (07 Sep 2019 15:00)  HR: 98 (08 Sep 2019 07:00) (86 - 133)  BP: 157/84 (08 Sep 2019 07:00) (131/81 - 172/119)  BP(mean): 111 (08 Sep 2019 07:00) (98 - 139)  RR: 24 (08 Sep 2019 07:00) (8 - 36)  SpO2: 94% (08 Sep 2019 07:00) (90% - 97%)    PHYSICAL EXAM:    General: AOx3, appears chronically ill   EYES: EOMI, PERRLA, conjunctiva and sclera clear  CHEST/LUNG: Clear to auscultation bilaterally; no wheezes, crackles or rales  HEART: Regular rate and rhythm; no murmurs  ABDOMEN: Soft, Nontender, Nondistended; BS+  LYMPH: No lymphadenopathy noted.  Extremities: No peripheral edema  Skin: ecchymosis on skin bilateral upper extremities   Psych: Normal mental status, calm   MSK: Limited ROM LLE due to pain      LABS:                        8.9    22.7  )-----------( 250      ( 08 Sep 2019 02:18 )             26.7     09-08    140  |  100  |  40<H>  ----------------------------<  117<H>  3.9   |  25  |  1.68<H>    Ca    8.5      08 Sep 2019 02:18  Phos  3.8       Mg     2.4     -      PT/INR - ( 08 Sep 2019 02:18 )   PT: 12.4 sec;   INR: 1.08 ratio         PTT - ( 08 Sep 2019 02:18 )  PTT:32.7 sec  Urinalysis Basic - ( 07 Sep 2019 00:37 )    Color: Light Yellow / Appearance: Clear / S.012 / pH: x  Gluc: x / Ketone: Negative  / Bili: Negative / Urobili: Negative   Blood: x / Protein: 30 mg/dL / Nitrite: Negative   Leuk Esterase: Small / RBC: 51 /hpf / WBC 6 /HPF   Sq Epi: x / Non Sq Epi: 0 /hpf / Bacteria: Negative            RADIOLOGY & ADDITIONAL STUDIES:    < from: CT Abdomen and Pelvis No Cont (19 @ 12:56) >    EXAM:  CT ABDOMEN AND PELVIS      PROCEDURE DATE:  2019        INTERPRETATION:  CLINICAL HISTORY:  Left iliopsoas hematoma; follow-up   assessment.    Multiple axial images of the abdomen and pelvis were obtained from the   lung bases throughpubic symphysis without the administration of oral or   intravascular contrast. Reformatted coronal and sagittal images are   submitted.    COMPARISON: 2019 at 1:30 AM    FINDINGS: Since prior evaluation significant interval change is   identified. The previously described left retroperitoneal iliopsoas   hemorrhage is significantly increased in size now resulting in   displacement of the left kidney anteriorly. There is significant   stranding of the left perirenal fat and left perirenal fluid is   identified. There is thickening of the left lateral abdominal wall   musculature. Fluid/hemorrhage tracks into the left pelvic retroperitoneal   space. There is new stranding surrounding the proximal sigmoid colon,   indeterminate etiology andclinical significance. New perihepatic and   perisplenic ascites is identified. New small left pleural effusion.    The liver is normal size. No focal hepatic masses are identified. There   is no evidence for intrahepatic or extrahepatic biliary dilatation. No   gallstones, gallbladder wall thickening or pericholecystic fluid   identified.    The spleen is unremarkable in size. No space-occupying lesions of the   splenic parenchyma identified. The pancreas and adrenal glands appear   unremarkable.    There is no evidence for right-sided hydronephrosis. Cystic foci are   identified within the right kidney measuring up to 3.3 cm. There is mild   prominence of the left intrarenal collecting system, stable. No definite   space-occupying lesions of the left kidney are identified. The abdominal   aorta is normal in course and caliber. No abnormally enlarged   retroperitoneal or pelvic lymphadenopathy is noted.    Abundant material is identified within the rectum. There is no evidence   for mechanical bowel obstruction. No colonic wall thickening is   identified. There is no evidence for free intraperitoneal air. The   appendix is not visualized.    The uterus and urinary bladder appear unremarkable.     Imaging of the lung bases demonstrates cardiomegaly. Coronary arterial   calcifications noted. Degenerative changes are noted within the thoracic   and lumbar spine. A nonspecific sclerotic focus is identified within the   sacrum. The patient is status post left femoral neck pinning with  placement of a proximal left femoral intramedullary silvino.    Diffuse anasarca is noted.    IMPRESSION:  Since prior evaluation significant interval change is   identified. The previously described left retroperitoneal iliopsoas   hemorrhage is significantly increased in size now resulting in   displacement of the left kidney anteriorly. There is significant   stranding of the left perirenal fat and left perirenal fluid is   identified. There is thickening of the left lateral abdominal wall   musculature. Fluid/hemorrhage tracks into the left pelvic retroperitoneal   space. There is new stranding surrounding the proximal sigmoid colon,   indeterminate etiology and clinical significance. New perihepatic and   perisplenic ascites is identified.New small left pleural effusion.    Results discussed with ALEKSANDRA Mendieta in the ICU prior to this dictation.    < end of copied text >      PATHOLOGY:

## 2019-09-08 NOTE — PROGRESS NOTE ADULT - SUBJECTIVE AND OBJECTIVE BOX
Trauma Surgery Progress note  SUBJECTIVE: Pt seen and examined at bedside.  Past 24 hours: cleared hemorrhage watch, advanced to clears. Rheum and heme consulted. Restarted PO home meds.     MEDICATIONS  (STANDING):  atorvastatin 10 milliGRAM(s) Oral at bedtime  chlorhexidine 2% Cloths 1 Application(s) Topical <User Schedule>  entecavir 0.5 milliGRAM(s) Oral daily  furosemide    Tablet 20 milliGRAM(s) Oral daily  insulin lispro (HumaLOG) corrective regimen sliding scale   SubCutaneous three times a day before meals  insulin lispro (HumaLOG) corrective regimen sliding scale   SubCutaneous at bedtime  metoprolol tartrate 50 milliGRAM(s) Oral every 12 hours  montelukast 10 milliGRAM(s) Oral daily  pantoprazole    Tablet 40 milliGRAM(s) Oral before breakfast  potassium chloride    Tablet ER 20 milliEquivalent(s) Oral once  predniSONE   Tablet 60 milliGRAM(s) Oral every 24 hours  sertraline 50 milliGRAM(s) Oral daily  trimethoprim  160 mG/sulfamethoxazole 800 mG 1 Tablet(s) Oral <User Schedule>    MEDICATIONS  (PRN):  ALBUTerol/ipratropium for Nebulization. 3 milliLiter(s) Nebulizer every 6 hours PRN Shortness of Breath and/or Wheezing      OBJECTIVE:    Vital Signs Last 24 Hrs  T(C): 36.7 (08 Sep 2019 03:00), Max: 37.6 (07 Sep 2019 15:00)  T(F): 98.1 (08 Sep 2019 03:00), Max: 99.6 (07 Sep 2019 15:00)  HR: 118 (08 Sep 2019 03:00) (86 - 133)  BP: 160/84 (08 Sep 2019 03:00) (131/81 - 182/93)  BP(mean): 115 (08 Sep 2019 03:00) (98 - 139)  RR: 28 (08 Sep 2019 03:00) (8 - 34)  SpO2: 92% (08 Sep 2019 03:00) (90% - 97%)    General Appearance: NAD, awake and alert  Neck: Supple  Chest: non-labored breathing, no respiratory distress  CV: Pulse regular presently, tachcardia  Abdomen: Soft, non-tender, non-distended  Extremities: warm and well perfused    I&O's Summary    06 Sep 2019 07:01  -  07 Sep 2019 07:00  --------------------------------------------------------  IN: 950 mL / OUT: 650 mL / NET: 300 mL    07 Sep 2019 07:  -  08 Sep 2019 03:48  --------------------------------------------------------  IN: 1800 mL / OUT: 600 mL / NET: 1200 mL      I&O's Detail    06 Sep 2019 07:  -  07 Sep 2019 07:00  --------------------------------------------------------  IN:    multiple electrolytes Injection Type 1multiple electrolytes Injection Type 1: 600 mL    Packed Red Blood Cells: 350 mL  Total IN: 950 mL    OUT:    Indwelling Catheter - Urethral: 650 mL  Total OUT: 650 mL    Total NET: 300 mL      07 Sep 2019 07:  -  08 Sep 2019 03:48  --------------------------------------------------------  IN:    IV PiggyBack: 100 mL    multiple electrolytes Injection Type 1multiple electrolytes Injection Type 1: 900 mL    Oral Fluid: 800 mL  Total IN: 1800 mL    OUT:    Indwelling Catheter - Urethral: 450 mL    Voided: 150 mL  Total OUT: 600 mL    Total NET: 1200 mL            LABS:                        8.9    22.7  )-----------( 250      ( 08 Sep 2019 02:18 )             26.7     -08    140  |  100  |  40<H>  ----------------------------<  117<H>  3.9   |  25  |  1.68<H>    Ca    8.5      08 Sep 2019 02:18  Phos  3.8     09-  Mg     2.4     09-08    TPro  5.3<L>  /  Alb  2.4<L>  /  TBili  0.4  /  DBili  x   /  AST  17  /  ALT  22  /  AlkPhos  64  -06    PT/INR - ( 08 Sep 2019 02:18 )   PT: 12.4 sec;   INR: 1.08 ratio         PTT - ( 08 Sep 2019 02:18 )  PTT:32.7 sec  Urinalysis Basic - ( 07 Sep 2019 00:37 )    Color: Light Yellow / Appearance: Clear / S.012 / pH: x  Gluc: x / Ketone: Negative  / Bili: Negative / Urobili: Negative   Blood: x / Protein: 30 mg/dL / Nitrite: Negative   Leuk Esterase: Small / RBC: 51 /hpf / WBC 6 /HPF   Sq Epi: x / Non Sq Epi: 0 /hpf / Bacteria: Negative        RADIOLOGY & ADDITIONAL STUDIES:

## 2019-09-08 NOTE — CONSULT NOTE ADULT - SUBJECTIVE AND OBJECTIVE BOX
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  Repeat Of note, patient recently admitted [facility] for shortness of breath in the setting of hypoxic respiratory failure likely 2/2 pulmonary renal syndrome of rheumatologic etiology of vasculitis vs immune complex disease (discharge on prednisone).        Nephrology consulted given KANIKA - on admission SCr 1.74 (prior SCr on d/c 08/2019 was 1.28) likely 2/2 hemodynamically meditated and anemia (hgb 8.3, baseline 9.0-9.5).  SCr improved w/ IVF and PRBC.  Today SCr 1.68.  Patient denies any hematuria, foamy urine, rashes, change in medications, NSAIDS use.        PAST HISTORY  --------------------------------------------------------------------------------  PAST MEDICAL & SURGICAL HISTORY:  COPD (chronic obstructive pulmonary disease)  Peripheral vascular disease  Pulmonary hypertension  Rheumatoid arthritis  Osteoarthritis  Mitral regurgitation  H/O lymphoma  Hyperlipidemia  Chronic GERD  Chronic kidney disease: proteinuria  AF (atrial fibrillation)  Anemia in CKD (chronic kidney disease)  CHF (congestive heart failure)  HTN (hypertension)  History of total hip replacement, left  History of total knee replacement, bilateral    FAMILY HISTORY:  Family history of inclusion body myositis    PAST SOCIAL HISTORY:    ALLERGIES & MEDICATIONS  --------------------------------------------------------------------------------  Allergies    Keflex (Unknown)  penicillin (Rash)    Intolerances      Standing Inpatient Medications  atorvastatin 10 milliGRAM(s) Oral at bedtime  chlorhexidine 2% Cloths 1 Application(s) Topical <User Schedule>  diltiazem    Tablet 30 milliGRAM(s) Oral every 8 hours  entecavir 0.5 milliGRAM(s) Oral daily  furosemide    Tablet 20 milliGRAM(s) Oral daily  insulin lispro (HumaLOG) corrective regimen sliding scale   SubCutaneous three times a day before meals  insulin lispro (HumaLOG) corrective regimen sliding scale   SubCutaneous at bedtime  metoprolol tartrate 50 milliGRAM(s) Oral every 12 hours  montelukast 10 milliGRAM(s) Oral daily  pantoprazole    Tablet 40 milliGRAM(s) Oral before breakfast  predniSONE   Tablet 60 milliGRAM(s) Oral every 24 hours  sertraline 50 milliGRAM(s) Oral daily  trimethoprim  160 mG/sulfamethoxazole 800 mG 1 Tablet(s) Oral <User Schedule>    PRN Inpatient Medications  ALBUTerol/ipratropium for Nebulization. 3 milliLiter(s) Nebulizer every 6 hours PRN  ALPRAZolam 0.5 milliGRAM(s) Oral two times a day PRN      REVIEW OF SYSTEMS  --------------------------------------------------------------------------------  Gen: No fatigue, fevers/chills, weakness  Skin: No rashes  Respiratory: No dyspnea, cough, wheezing, hemoptysis  CV: No chest pain  GI: + nausea.  No abdominal pain, diarrhea, constipation, vomiting, melena, hematochezia  : No increased frequency, dysuria, hematuria  MSK: + edema  Neuro: No dizziness/lightheadedness, weakness      All other systems were reviewed and are negative, except as noted.    VITALS/PHYSICAL EXAM  --------------------------------------------------------------------------------  T(C): 37.6 (09-08-19 @ 07:00), Max: 37.6 (09-07-19 @ 15:00)  HR: 126 (09-08-19 @ 09:00) (86 - 133)  BP: 121/87 (09-08-19 @ 09:00) (121/87 - 168/102)  RR: 28 (09-08-19 @ 09:00) (8 - 36)  SpO2: 92% (09-08-19 @ 09:00) (92% - 97%)  Wt(kg): --  Height (cm): 152.4 (09-06-19 @ 21:35)  Weight (kg): 67.7 (09-06-19 @ 21:35)  BMI (kg/m2): 29.1 (09-06-19 @ 21:35)  BSA (m2): 1.65 (09-06-19 @ 21:35)      09-07-19 @ 07:01  -  09-08-19 @ 07:00  --------------------------------------------------------  IN: 1800 mL / OUT: 1050 mL / NET: 750 mL    09-08-19 @ 07:01  -  09-08-19 @ 14:26  --------------------------------------------------------  IN: 0 mL / OUT: 400 mL / NET: -400 mL      Physical Exam:  	Gen: NAD, well-appearing  	HEENT: PERRL, supple neck, clear oropharynx  	Pulm: CTA B/L, no crackles/wheezing  	CV: irregular irregular, S1S2; no murmur  	Abd: +BS, soft, nontender/nondistended  	LE: Warm, intact strength; no edema  	Skin: Warm, without rashes    LABS/STUDIES  --------------------------------------------------------------------------------              8.9    22.7  >-----------<  250      [09-08-19 @ 02:18]              26.7     140  |  100  |  40  ----------------------------<  117      [09-08-19 @ 02:18]  3.9   |  25  |  1.68        Ca     8.5     [09-08-19 @ 02:18]      Mg     2.4     [09-08-19 @ 02:18]      Phos  3.8     [09-08-19 @ 02:18]      PT/INR: PT 12.4 , INR 1.08       [09-08-19 @ 02:18]  PTT: 32.7       [09-08-19 @ 02:18]      Creatinine Trend:  SCr 1.68 [09-08 @ 02:18]  SCr 1.76 [09-07 @ 20:51]  SCr 1.74 [09-07 @ 10:34]  SCr 1.76 [09-07 @ 00:33]  SCr 1.71 [09-06 @ 12:40]    Urinalysis - [09-07-19 @ 00:37]      Color Light Yellow / Appearance Clear / SG 1.012 / pH 5.5      Gluc Negative / Ketone Negative  / Bili Negative / Urobili Negative       Blood Large / Protein 30 mg/dL / Leuk Est Small / Nitrite Negative      RBC 51 / WBC 6 / Hyaline 3 / Gran  / Sq Epi  / Non Sq Epi 0 / Bacteria Negative      Iron 25, TIBC 302, %sat 8      [08-13-19 @ 05:30]  Ferritin 687      [08-13-19 @ 05:30]  Vitamin D (25OH) 23.3      [08-16-19 @ 06:50]  HbA1c 5.6      [08-13-19 @ 05:30]    HBsAg Nonreactive      [08-15-19 @ 06:45]  HBcAb Reactive      [08-15-19 @ 06:45]  HCV 0.28, Nonreactive Hepatitis C AB  S/CO Ratio                        Interpretation  < 1.00                                   Non-Reactive  1.00 - 4.99                         Weakly-Reactive  >= 5.00                                Reactive  Non-Reactive: Aperson with a non-reactive HCV antibody  result is considered uninfected.  No further action is  needed unless recent infection is suspected.  In these  cases, consider repeat testing later to detect  seroconversion..  Weakly-Reactive: HCV antibody test is abnormal, HCV RNA  Qualitative test will follow.  Reactive: HCV antibody test is abnormal, HCV RNA  Qualitative test will follow.  Note: HCV antibody testing is performed on the Abbott   system.      [08-15-19 @ 06:45]  HIV Nonreact      [08-11-19 @ 16:15]    IVAN: titer 1:640, pattern Homogeneous      [08-11-19 @ 16:15]  dsDNA 435      [08-15-19 @ 06:45]  C3 Complement 76      [08-12-19 @ 11:00]  C4 Complement 10      [08-12-19 @ 11:00]  ANCA: cANCA Negative, pANCA >1:1280, atypical ANCA Negative      [08-11-19 @ 16:15]  MPO-ANCA 96.2, interpretation: Positive      [08-11-19 @ 16:15]  PR3-ANCA 5.1, interpretation: Negative      [08-11-19 @ 16:15]  anti-GBM <1.0      [08-15-19 @ 06:45]  Free Light Chains: kappa 2.67, lambda 2.76, ratio = 0.97      [08-22 @ 06:14]  Immunofixation Serum:   Weak IgM Lambda Band Identified    Reference Range: None Detected      [08-13-19 @ 05:30]  SPEP Interpretation: Weak Gamma-Migrating Paraprotein Identified      [08-13-19 @ 05:30]  Cryoglobulin: 0      [08-21-19 @ 06:44] [facility] DIVISION OF KIDNEY DISEASES AND HYPERTENSION -- INITIAL CONSULT NOTE  --------------------------------------------------------------------------------  HPI:  84F PMHx of lymphoma/MGUS, A fib on coumadin, CHF, 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  Repeat Of note, patient recently admitted [facility] for shortness of breath in the setting of hypoxic respiratory failure likely 2/2 pulmonary renal syndrome of rheumatologic etiology of vasculitis vs immune complex disease - serology was low C3/4, + p-ANCA, elevated ESR/CRP, anti- dsDNA), pauci-immune possibly 2/2 chronic hydralazine s/p Rituxan 8/23/19) was discharge on 8/24/19 on prednisone 60 mg po.       Nephrology consulted given KANIKA - on admission SCr 1.74 (prior SCr on d/c 08/2019 was 1.28) likely 2/2 hemodynamically meditated and anemia (hgb 8.3, baseline 9.0-9.5).  SCr improved w/ IVF and PRBC.  Today SCr 1.68.  Patient denies any hematuria, foamy urine, rashes, change in medications, NSAIDS use.        PAST HISTORY  --------------------------------------------------------------------------------  PAST MEDICAL & SURGICAL HISTORY:  COPD (chronic obstructive pulmonary disease)  Peripheral vascular disease  Pulmonary hypertension  Rheumatoid arthritis  Osteoarthritis  Mitral regurgitation  H/O lymphoma  Hyperlipidemia  Chronic GERD  Chronic kidney disease: proteinuria  AF (atrial fibrillation)  Anemia in CKD (chronic kidney disease)  CHF (congestive heart failure)  HTN (hypertension)  History of total hip replacement, left  History of total knee replacement, bilateral    FAMILY HISTORY:  Family history of inclusion body myositis    PAST SOCIAL HISTORY:    ALLERGIES & MEDICATIONS  --------------------------------------------------------------------------------  Allergies    Keflex (Unknown)  penicillin (Rash)    Intolerances      Standing Inpatient Medications  atorvastatin 10 milliGRAM(s) Oral at bedtime  chlorhexidine 2% Cloths 1 Application(s) Topical <User Schedule>  diltiazem    Tablet 30 milliGRAM(s) Oral every 8 hours  entecavir 0.5 milliGRAM(s) Oral daily  furosemide    Tablet 20 milliGRAM(s) Oral daily  insulin lispro (HumaLOG) corrective regimen sliding scale   SubCutaneous three times a day before meals  insulin lispro (HumaLOG) corrective regimen sliding scale   SubCutaneous at bedtime  metoprolol tartrate 50 milliGRAM(s) Oral every 12 hours  montelukast 10 milliGRAM(s) Oral daily  pantoprazole    Tablet 40 milliGRAM(s) Oral before breakfast  predniSONE   Tablet 60 milliGRAM(s) Oral every 24 hours  sertraline 50 milliGRAM(s) Oral daily  trimethoprim  160 mG/sulfamethoxazole 800 mG 1 Tablet(s) Oral <User Schedule>    PRN Inpatient Medications  ALBUTerol/ipratropium for Nebulization. 3 milliLiter(s) Nebulizer every 6 hours PRN  ALPRAZolam 0.5 milliGRAM(s) Oral two times a day PRN      REVIEW OF SYSTEMS  --------------------------------------------------------------------------------  Gen: No fatigue, fevers/chills, weakness  Skin: No rashes  Respiratory: No dyspnea, cough, wheezing, hemoptysis  CV: No chest pain  GI: + nausea.  No abdominal pain, diarrhea, constipation, vomiting, melena, hematochezia  : No increased frequency, dysuria, hematuria  MSK: + edema  Neuro: No dizziness/lightheadedness, weakness      All other systems were reviewed and are negative, except as noted.    VITALS/PHYSICAL EXAM  --------------------------------------------------------------------------------  T(C): 37.6 (09-08-19 @ 07:00), Max: 37.6 (09-07-19 @ 15:00)  HR: 126 (09-08-19 @ 09:00) (86 - 133)  BP: 121/87 (09-08-19 @ 09:00) (121/87 - 168/102)  RR: 28 (09-08-19 @ 09:00) (8 - 36)  SpO2: 92% (09-08-19 @ 09:00) (92% - 97%)  Wt(kg): --  Height (cm): 152.4 (09-06-19 @ 21:35)  Weight (kg): 67.7 (09-06-19 @ 21:35)  BMI (kg/m2): 29.1 (09-06-19 @ 21:35)  BSA (m2): 1.65 (09-06-19 @ 21:35)      09-07-19 @ 07:01  -  09-08-19 @ 07:00  --------------------------------------------------------  IN: 1800 mL / OUT: 1050 mL / NET: 750 mL    09-08-19 @ 07:01  -  09-08-19 @ 14:26  --------------------------------------------------------  IN: 0 mL / OUT: 400 mL / NET: -400 mL      Physical Exam:  	Gen: NAD, well-appearing  	HEENT: PERRL, supple neck, clear oropharynx  	Pulm: CTA B/L, no crackles/wheezing  	CV: irregular irregular, S1S2; no murmur  	Abd: +BS, soft, nontender/nondistended  	LE: Warm, intact strength; no edema  	Skin: Warm, without rashes    LABS/STUDIES  --------------------------------------------------------------------------------              8.9    22.7  >-----------<  250      [09-08-19 @ 02:18]              26.7     140  |  100  |  40  ----------------------------<  117      [09-08-19 @ 02:18]  3.9   |  25  |  1.68        Ca     8.5     [09-08-19 @ 02:18]      Mg     2.4     [09-08-19 @ 02:18]      Phos  3.8     [09-08-19 @ 02:18]      PT/INR: PT 12.4 , INR 1.08       [09-08-19 @ 02:18]  PTT: 32.7       [09-08-19 @ 02:18]      Creatinine Trend:  SCr 1.68 [09-08 @ 02:18]  SCr 1.76 [09-07 @ 20:51]  SCr 1.74 [09-07 @ 10:34]  SCr 1.76 [09-07 @ 00:33]  SCr 1.71 [09-06 @ 12:40]    Urinalysis - [09-07-19 @ 00:37]      Color Light Yellow / Appearance Clear / SG 1.012 / pH 5.5      Gluc Negative / Ketone Negative  / Bili Negative / Urobili Negative       Blood Large / Protein 30 mg/dL / Leuk Est Small / Nitrite Negative      RBC 51 / WBC 6 / Hyaline 3 / Gran  / Sq Epi  / Non Sq Epi 0 / Bacteria Negative      Iron 25, TIBC 302, %sat 8      [08-13-19 @ 05:30]  Ferritin 687      [08-13-19 @ 05:30]  Vitamin D (25OH) 23.3      [08-16-19 @ 06:50]  HbA1c 5.6      [08-13-19 @ 05:30]    HBsAg Nonreactive      [08-15-19 @ 06:45]  HBcAb Reactive      [08-15-19 @ 06:45]  HCV 0.28, Nonreactive Hepatitis C AB  S/CO Ratio                        Interpretation  < 1.00                                   Non-Reactive  1.00 - 4.99                         Weakly-Reactive  >= 5.00                                Reactive  Non-Reactive: Aperson with a non-reactive HCV antibody  result is considered uninfected.  No further action is  needed unless recent infection is suspected.  In these  cases, consider repeat testing later to detect  seroconversion..  Weakly-Reactive: HCV antibody test is abnormal, HCV RNA  Qualitative test will follow.  Reactive: HCV antibody test is abnormal, HCV RNA  Qualitative test will follow.  Note: HCV antibody testing is performed on the Abbott   system.      [08-15-19 @ 06:45]  HIV Nonreact      [08-11-19 @ 16:15]    IVAN: titer 1:640, pattern Homogeneous      [08-11-19 @ 16:15]  dsDNA 435      [08-15-19 @ 06:45]  C3 Complement 76      [08-12-19 @ 11:00]  C4 Complement 10      [08-12-19 @ 11:00]  ANCA: cANCA Negative, pANCA >1:1280, atypical ANCA Negative      [08-11-19 @ 16:15]  MPO-ANCA 96.2, interpretation: Positive      [08-11-19 @ 16:15]  PR3-ANCA 5.1, interpretation: Negative      [08-11-19 @ 16:15]  anti-GBM <1.0      [08-15-19 @ 06:45]  Free Light Chains: kappa 2.67, lambda 2.76, ratio = 0.97      [08-22 @ 06:14]  Immunofixation Serum:   Weak IgM Lambda Band Identified    Reference Range: None Detected      [08-13-19 @ 05:30]  SPEP Interpretation: Weak Gamma-Migrating Paraprotein Identified      [08-13-19 @ 05:30]  Cryoglobulin: 0      [08-21-19 @ 06:44]

## 2019-09-09 NOTE — PROGRESS NOTE ADULT - SUBJECTIVE AND OBJECTIVE BOX
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  While at [place], repeat CT imaging showing dramatic increase in psoas / retroperitoneal bleeding. Transferred to [facility] SICU for management.    Rheumatologic History:  -Recent admission 2019 for LE purpuric rash, anemia, KANIKA, UA with active sediment, and multilobar bronchopneumonia. s/p renal bx on 19 showing active crescentic glomerulonephritis, pauci-immune. Improved with steroids and rituxan 600mg IV induction on 2019.  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  -Positive serologies + include IVAN, P-ANCA, MPO, hypocomplementemia, prior positive dsDNA, beta-2 glycoprotein IgA, ACl IgM, +Hep B core Ab total  -Negative serologies - Cryoglobulin, rest of hepatitis panel, anti-GBM    PAST MEDICAL & SURGICAL HISTORY:  COPD (chronic obstructive pulmonary disease)  Peripheral vascular disease  Pulmonary hypertension  Rheumatoid arthritis  Osteoarthritis  Mitral regurgitation  H/O lymphoma  Hyperlipidemia  Chronic GERD  Chronic kidney disease: proteinuria  AF (atrial fibrillation)  Anemia in CKD (chronic kidney disease)  CHF (congestive heart failure)  HTN (hypertension)  History of total hip replacement, left  History of total knee replacement, bilateral      MEDICATIONS  (STANDING):  atorvastatin 10 milliGRAM(s) Oral at bedtime  chlorhexidine 2% Cloths 1 Application(s) Topical <User Schedule>  entecavir 0.5 milliGRAM(s) Oral daily  insulin lispro (HumaLOG) corrective regimen sliding scale   SubCutaneous three times a day before meals  insulin lispro (HumaLOG) corrective regimen sliding scale   SubCutaneous at bedtime  metoprolol tartrate 50 milliGRAM(s) Oral every 12 hours  montelukast 10 milliGRAM(s) Oral daily  pantoprazole    Tablet 40 milliGRAM(s) Oral before breakfast  sertraline 50 milliGRAM(s) Oral daily  trimethoprim  160 mG/sulfamethoxazole 800 mG 1 Tablet(s) Oral <User Schedule>    MEDICATIONS  (PRN):  ALBUTerol/ipratropium for Nebulization. 3 milliLiter(s) Nebulizer every 6 hours PRN Shortness of Breath and/or Wheezing    Allergies  Keflex (Unknown)  penicillin (Rash)  Intolerances    Vital Signs Last 24 Hrs  T(C): 37.6 (07 Sep 2019 15:00), Max: 37.6 (07 Sep 2019 15:00)  T(F): 99.6 (07 Sep 2019 15:00), Max: 99.6 (07 Sep 2019 15:00)  HR: 101 (07 Sep 2019 19:00) (87 - 133)  BP: 140/94 (07 Sep 2019 19:00) (102/60 - 182/93)  BP(mean): 113 (07 Sep 2019 19:00) (73 - 139)  RR: 27 (07 Sep 2019 19:00) (8 - 38)  SpO2: 97% (07 Sep 2019 19:00) (85% - 98%)    Physical Exam:  General: No apparent distress  HEENT: MMM  CVS: +S1/S2, RRR, no murmurs/rubs/gallops  Resp: Bibasilar crackles    Skin: no visible rashes    LABS:                        9.5    18.3  )-----------( 259      ( 07 Sep 2019 15:13 )             28.9         141  |  100  |  42<H>  ----------------------------<  147<H>  4.1   |  25  |  1.74<H>    Ca    8.6      07 Sep 2019 10:34  Phos  4.4       Mg     2.3         TPro  5.3<L>  /  Alb  2.4<L>  /  TBili  0.4  /  DBili  x   /  AST  17  /  ALT  22  /  AlkPhos  64  09-06    PT/INR - ( 07 Sep 2019 00:33 )   PT: 13.0 sec;   INR: 1.13 ratio         PTT - ( 07 Sep 2019 00:33 )  PTT:32.5 sec  Urinalysis Basic - ( 07 Sep 2019 00:37 )    Color: Light Yellow / Appearance: Clear / S.012 / pH: x  Gluc: x / Ketone: Negative  / Bili: Negative / Urobili: Negative   Blood: x / Protein: 30 mg/dL / Nitrite: Negative   Leuk Esterase: Small / RBC: 51 /hpf / WBC 6 /HPF   Sq Epi: x / Non Sq Epi: 0 /hpf / Bacteria: Negative    RADIOLOGY & ADDITIONAL STUDIES:      EXAM:  CT ABDOMEN AND PELVIS      PROCEDURE DATE:  2019        INTERPRETATION:  CLINICAL HISTORY:  Left iliopsoas hematoma; follow-up   assessment.    Multiple axial images of the abdomen and pelvis were obtained from the   lung bases throughpubic symphysis without the administration of oral or   intravascular contrast. Reformatted coronal and sagittal images are   submitted.    COMPARISON: 2019 at 1:30 AM    FINDINGS: Since prior evaluation significant interval change is   identified. The previously described left retroperitoneal iliopsoas   hemorrhage is significantly increased in size now resulting in   displacement of the left kidney anteriorly. There is significant   stranding of the left perirenal fat and left perirenal fluid is   identified. There is thickening of the left lateral abdominal wall   musculature. Fluid/hemorrhage tracks into the left pelvic retroperitoneal   space. There is new stranding surrounding the proximal sigmoid colon,   indeterminate etiology andclinical significance. New perihepatic and   perisplenic ascites is identified. New small left pleural effusion.    The liver is normal size. No focal hepatic masses are identified. There   is no evidence for intrahepatic or extrahepatic biliary dilatation. No   gallstones, gallbladder wall thickening or pericholecystic fluid   identified.    The spleen is unremarkable in size. No space-occupying lesions of the   splenic parenchyma identified. The pancreas and adrenal glands appear   unremarkable.    There is no evidence for right-sided hydronephrosis. Cystic foci are   identified within the right kidney measuring up to 3.3 cm. There is mild   prominence of the left intrarenal collecting system, stable. No definite   space-occupying lesions of the left kidney are identified. The abdominal   aorta is normal in course and caliber. No abnormally enlarged   retroperitoneal or pelvic lymphadenopathy is noted.    Abundant material is identified within the rectum. There is no evidence   for mechanical bowel obstruction. No colonic wall thickening is   identified. There is no evidence for free intraperitoneal air. The   appendix is not visualized.    The uterus and urinary bladder appear unremarkable.     Imaging of the lung bases demonstrates cardiomegaly. Coronary arterial   calcifications noted. Degenerative changes are noted within the thoracic   and lumbar spine. A nonspecific sclerotic focus is identified within the   sacrum. The patient is status post left femoral neck pinning with  placement of a proximal left femoral intramedullary silvino.    Diffuse anasarca is noted.    IMPRESSION:  Since prior evaluation significant interval change is   identified. The previously described left retroperitoneal iliopsoas   hemorrhage is significantly increased in size now resulting in   displacement of the left kidney anteriorly. There is significant   stranding of the left perirenal fat and left perirenal fluid is   identified. There is thickening of the left lateral abdominal wall   musculature. Fluid/hemorrhage tracks into the left pelvic retroperitoneal   space. There is new stranding surrounding the proximal sigmoid colon,   indeterminate etiology and clinical significance. New perihepatic and   perisplenic ascites is identified.New small left pleural effusion.    Results discussed with ALEKSANDRA Mendieta in the ICU prior to this dictation.          CAROL TAYLOR M.D., ATTENDING RADIOLOGIST  This document has been electronically signed. Sep  6 2019  3:01PM TONIA [facility]  [id number]    HISTORY OF PRESENT ILLNESS:    pt was seen and examined at the bedside, reports her back pain improved and feeling better. was getting 1 unit PRBC.       MEDICATIONS  (STANDING):  atorvastatin 10 milliGRAM(s) Oral at bedtime  chlorhexidine 2% Cloths 1 Application(s) Topical <User Schedule>  diltiazem    Tablet 30 milliGRAM(s) Oral every 8 hours  enoxaparin Injectable 30 milliGRAM(s) SubCutaneous daily  entecavir 0.5 milliGRAM(s) Oral daily  insulin lispro (HumaLOG) corrective regimen sliding scale   SubCutaneous three times a day before meals  insulin lispro (HumaLOG) corrective regimen sliding scale   SubCutaneous at bedtime  insulin lispro Injectable (HumaLOG) 6 Unit(s) SubCutaneous three times a day before meals  metoprolol tartrate 50 milliGRAM(s) Oral every 12 hours  montelukast 10 milliGRAM(s) Oral daily  pantoprazole    Tablet 40 milliGRAM(s) Oral before breakfast  predniSONE   Tablet 60 milliGRAM(s) Oral every 24 hours  sertraline 50 milliGRAM(s) Oral daily  trimethoprim  160 mG/sulfamethoxazole 800 mG 1 Tablet(s) Oral <User Schedule>    MEDICATIONS  (PRN):  ALBUTerol/ipratropium for Nebulization 3 milliLiter(s) Nebulizer every 6 hours PRN Shortness of Breath and/or Wheezing  ALPRAZolam 0.5 milliGRAM(s) Oral two times a day PRN anxiety    Vital Signs Last 24 Hrs  T(C): 36.7 (09 Sep 2019 11:00), Max: 37.3 (09 Sep 2019 07:00)  T(F): 98.1 (09 Sep 2019 11:00), Max: 99.2 (09 Sep 2019 07:00)  HR: 93 (09 Sep 2019 14:00) (82 - 140)  BP: 153/96 (09 Sep 2019 14:00) (103/66 - 166/83)  BP(mean): 119 (09 Sep 2019 14:00) (80 - 122)  RR: 22 (09 Sep 2019 14:00) (8 - 35)  SpO2: 94% (09 Sep 2019 14:00) (92% - 100%)    Physical Exam:  General: No apparent distress  HEENT: MMM  CVS: +S1/S2, RRR, no murmurs/rubs/gallops  Resp: Bibasilar crackles  Skin: no visible rashes    LABS:                 7.9    15.4  )-----------( 195      ( 09 Sep 2019 01:11 )             24.2         137  |  98  |  43<H>  ----------------------------<  202<H>  4.3   |  27  |  2.16<H>    Ca    8.3<L>      09 Sep 2019 01:11  Phos  3.5       Mg     2.2           PT/INR - ( 09 Sep 2019 01:11 )   PT: 12.9 sec;   INR: 1.12 ratio         PTT - ( 09 Sep 2019 01:11 )  PTT:35.6 sec  Urinalysis Basic - ( 08 Sep 2019 23:07 )    Color: Yellow / Appearance: Slightly Turbid / S.014 / pH: x  Gluc: x / Ketone: Negative  / Bili: Negative / Urobili: Negative   Blood: x / Protein: 100 mg/dL / Nitrite: Negative   Leuk Esterase: Large / RBC: 22 /hpf / WBC 39 /HPF   Sq Epi: x / Non Sq Epi: 0 /hpf / Bacteria: Many

## 2019-09-09 NOTE — PROGRESS NOTE ADULT - ATTENDING COMMENTS
Pt seen and examined transferred for OSH for left RP bleeding, on A/C , HD table C/O mild pain, received 1 unit PRBC, 1 unit FFP, Kcentra, 10 vitamin K before transfer  pain control PRN meds  O2 sat stable >95 on R/A, no SOB  HD stable, in a fib with off and on HR in 95, MAP >70  H/H stable at this time 24 which is a slow drift down.  will give one unit of PRBC and monitor response  Atrial fibrillation - started diltiazem yesterday with good effect  Strict IS/OS  Acute kidney injury- creatinine 2.16  OOB to chair

## 2019-09-09 NOTE — PROGRESS NOTE ADULT - ASSESSMENT
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  Repeat Of note, patient recently admitted [facility] for shortness of breath in the setting of hypoxic respiratory failure likely 2/2 pulmonary renal syndrome of rheumatologic etiology of vasculitis vs immune complex disease - serology was low C3/4, + p-ANCA, elevated ESR/CRP, anti- dsDNA), pauci-immune possibly 2/2 chronic hydralazine s/p Rituxan 8/23/19) was discharge on 8/24/19 on prednisone 60 mg po.    Nephrology consulted given KANIKA - on admission SCr 1.74 (prior SCr on d/c 08/2019 was 1.28) likely 2/2 hemodynamically meditated and anemia (hgb 8.3, baseline 9.0-9.5).  SCr improved w/ IVF and PRBC.  Today SCr 1.68.

## 2019-09-09 NOTE — PROGRESS NOTE ADULT - SUBJECTIVE AND OBJECTIVE BOX
TRAUMA SURGERY DAILY PROGRESS NOTE:     Interval:   Serial H/H stable, advanced to regular diet  DVT ppx started, Lovenox 30mg   Diltiazem and xanax restarted  Lasix dc'd per renal  Pre-meal insulin restarted at 6 units, on 12units at home  Restarted on po home meds  Lopressor 5mg x1     SUBJECTIVE:     Patient feels well. Pain well controlled. Complains of left hip pain.  Denies chest pain, shortness of breath, nausea, vomiting, fever chills.     OBJECTIVE:    MEDICATIONS  (STANDING):  atorvastatin 10 milliGRAM(s) Oral at bedtime  chlorhexidine 2% Cloths 1 Application(s) Topical <User Schedule>  diltiazem    Tablet 30 milliGRAM(s) Oral every 8 hours  enoxaparin Injectable 30 milliGRAM(s) SubCutaneous daily  entecavir 0.5 milliGRAM(s) Oral daily  insulin lispro (HumaLOG) corrective regimen sliding scale   SubCutaneous three times a day before meals  insulin lispro (HumaLOG) corrective regimen sliding scale   SubCutaneous at bedtime  insulin lispro Injectable (HumaLOG) 6 Unit(s) SubCutaneous three times a day before meals  metoprolol tartrate 50 milliGRAM(s) Oral every 12 hours  montelukast 10 milliGRAM(s) Oral daily  pantoprazole    Tablet 40 milliGRAM(s) Oral before breakfast  predniSONE   Tablet 60 milliGRAM(s) Oral every 24 hours  sertraline 50 milliGRAM(s) Oral daily  trimethoprim  160 mG/sulfamethoxazole 800 mG 1 Tablet(s) Oral <User Schedule>    MEDICATIONS  (PRN):  ALBUTerol/ipratropium for Nebulization 3 milliLiter(s) Nebulizer every 6 hours PRN Shortness of Breath and/or Wheezing  ALPRAZolam 0.5 milliGRAM(s) Oral two times a day PRN anxiety      Vital Signs Last 24 Hrs  T(C): 36.7 (09 Sep 2019 11:00), Max: 37.3 (09 Sep 2019 07:00)  T(F): 98.1 (09 Sep 2019 11:00), Max: 99.2 (09 Sep 2019 07:00)  HR: 93 (09 Sep 2019 14:00) (82 - 140)  BP: 153/96 (09 Sep 2019 14:00) (103/66 - 166/83)  BP(mean): 119 (09 Sep 2019 14:00) (80 - 122)  RR: 22 (09 Sep 2019 14:00) (8 - 35)  SpO2: 94% (09 Sep 2019 14:00) (92% - 100%)      I&O's Detail    08 Sep 2019 07:01  -  09 Sep 2019 07:00  --------------------------------------------------------  IN:    Oral Fluid: 1050 mL  Total IN: 1050 mL    OUT:    Voided: 1000 mL  Total OUT: 1000 mL    Total NET: 50 mL      09 Sep 2019 07:01  -  09 Sep 2019 15:00  --------------------------------------------------------  IN:    Oral Fluid: 850 mL    Packed Red Blood Cells: 350 mL  Total IN: 1200 mL    OUT:    Voided: 200 mL  Total OUT: 200 mL    Total NET: 1000 mL            Daily     Daily Weight in k.5 (09 Sep 2019 01:00)          LABS:                        7.9    15.4  )-----------( 195      ( 09 Sep 2019 01:11 )             24.2         137  |  98  |  43<H>  ----------------------------<  202<H>  4.3   |  27  |  2.16<H>    Ca    8.3<L>      09 Sep 2019 01:11  Phos  3.5       Mg     2.2           PT/INR - ( 09 Sep 2019 01:11 )   PT: 12.9 sec;   INR: 1.12 ratio         PTT - ( 09 Sep 2019 01:11 )  PTT:35.6 sec  Urinalysis Basic - ( 08 Sep 2019 23:07 )    Color: Yellow / Appearance: Slightly Turbid / S.014 / pH: x  Gluc: x / Ketone: Negative  / Bili: Negative / Urobili: Negative   Blood: x / Protein: 100 mg/dL / Nitrite: Negative   Leuk Esterase: Large / RBC: 22 /hpf / WBC 39 /HPF   Sq Epi: x / Non Sq Epi: 0 /hpf / Bacteria: Many          PHYSICAL EXAM:  Constitutional: well developed, well nourished, NAD  ENMT: normal facies, symmetric  Respiratory: Normal respiratory effort   Gastrointestinal: abdomen soft, nondistended. Left flank mildly tender to palpation.  Musculoskeletal: equal strength bilateral upper and lower extremities. Left hip/proximal thigh tender to palpation.   Psychiatric: oriented x 3; appropriate

## 2019-09-09 NOTE — PROGRESS NOTE ADULT - PROBLEM SELECTOR PLAN 1
KANIKA likely hemodynamically meditated in setting anemia RP bleed w/ contributing factor diuretic lasix. 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    PLAN:   -Scr trending up, please do serial bladder scan to r/o High PVR. Non oliguric. No electrolytes abnormalities, no need for HD.   - 2nd Rituxan dose due 9/9, hematology following (holding until hemodynamic stable)  - c/w prednisone 60 mg po daily for Crescentic GN  - c/w hemodynamic support, PRBC maintain Hgb>7  - avoid nephrotoxic agents (ACEI/ARB, NSAIDS), renally dose medications  - trend SCr and UOP. KANIKA likely hemodynamically meditated in setting anemia RP bleed w/ contributing factor diuretic lasix which she received yesterday. 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    PLAN:   -Scr trending up, please do serial bladder scan to r/o High PVR and also check a renal sonogram given the displaced left kidney. Non oliguric. No electrolytes abnormalities, no need for HD.   - 2nd Rituxan dose due 9/9, hematology following (holding until hemodynamic stable)  - c/w prednisone 60 mg po daily for Crescentic GN  - c/w hemodynamic support, PRBC maintain Hgb>7  - avoid nephrotoxic agents (ACEI/ARB, NSAIDS), renally dose medications  - trend SCr and UOP.  -Change entecavir to Q72

## 2019-09-09 NOTE — PROGRESS NOTE ADULT - SUBJECTIVE AND OBJECTIVE BOX
[facility] DIVISION OF KIDNEY DISEASES AND HYPERTENSION -- FOLLOW UP NOTE  --------------------------------------------------------------------------------  Chief Complaint:  Fall    24 hour events/subjective:  Examined at bed side, awake not in distress, non oliguric, bp stable. Scr trending up.       PAST HISTORY  --------------------------------------------------------------------------------  No significant changes to PMH, PSH, FHx, SHx, unless otherwise noted    ALLERGIES & MEDICATIONS  --------------------------------------------------------------------------------  Allergies    Keflex (Unknown)  penicillin (Rash)    Intolerances      Standing Inpatient Medications  atorvastatin 10 milliGRAM(s) Oral at bedtime  chlorhexidine 2% Cloths 1 Application(s) Topical <User Schedule>  diltiazem    Tablet 30 milliGRAM(s) Oral every 8 hours  enoxaparin Injectable 30 milliGRAM(s) SubCutaneous daily  entecavir 0.5 milliGRAM(s) Oral daily  insulin lispro (HumaLOG) corrective regimen sliding scale   SubCutaneous three times a day before meals  insulin lispro (HumaLOG) corrective regimen sliding scale   SubCutaneous at bedtime  insulin lispro Injectable (HumaLOG) 6 Unit(s) SubCutaneous three times a day before meals  metoprolol tartrate 50 milliGRAM(s) Oral every 12 hours  montelukast 10 milliGRAM(s) Oral daily  pantoprazole    Tablet 40 milliGRAM(s) Oral before breakfast  predniSONE   Tablet 60 milliGRAM(s) Oral every 24 hours  sertraline 50 milliGRAM(s) Oral daily  trimethoprim  160 mG/sulfamethoxazole 800 mG 1 Tablet(s) Oral <User Schedule>    PRN Inpatient Medications  ALBUTerol/ipratropium for Nebulization 3 milliLiter(s) Nebulizer every 6 hours PRN  ALPRAZolam 0.5 milliGRAM(s) Oral two times a day PRN      REVIEW OF SYSTEMS  --------------------------------------------------------------------------------  Gen: No fatigue, fevers/chills, weakness  Skin: No rashes  Respiratory: No dyspnea, cough, wheezing, hemoptysis  CV: No chest pain  GI: + nausea.  No abdominal pain, diarrhea, constipation, vomiting, melena, hematochezia  : No increased frequency, dysuria, hematuria  MSK: + edema  Neuro: No dizziness/lightheadedness, weakness    VITALS/PHYSICAL EXAM  --------------------------------------------------------------------------------  T(C): 37.3 (09-09-19 @ 07:00), Max: 37.4 (09-08-19 @ 11:00)  HR: 105 (09-09-19 @ 08:00) (89 - 140)  BP: 135/102 (09-09-19 @ 08:00) (103/66 - 153/76)  RR: 23 (09-09-19 @ 08:00) (8 - 35)  SpO2: 93% (09-09-19 @ 08:00) (87% - 100%)  Wt(kg): --        09-08-19 @ 07:01  -  09-09-19 @ 07:00  --------------------------------------------------------  IN: 1050 mL / OUT: 1000 mL / NET: 50 mL    09-09-19 @ 07:01  -  09-09-19 @ 09:04  --------------------------------------------------------  IN: 300 mL / OUT: 0 mL / NET: 300 mL      Physical Exam:  	Gen: NAD, well-appearing  	HEENT: PERRL, supple neck, clear oropharynx  	Pulm:: Crackles.   	CV: irregular irregular, S1S2; no murmur  	Abd: +BS, soft, nontender/nondistended  	LE: Warm, intact strength; no edema  	Skin: Warm, without rashes      LABS/STUDIES  --------------------------------------------------------------------------------              7.9    15.4  >-----------<  195      [09-09-19 @ 01:11]              24.2     137  |  98  |  43  ----------------------------<  202      [09-09-19 @ 01:11]  4.3   |  27  |  2.16        Ca     8.3     [09-09-19 @ 01:11]      Mg     2.2     [09-09-19 @ 01:11]      Phos  3.5     [09-09-19 @ 01:11]      PT/INR: PT 12.9 , INR 1.12       [09-09-19 @ 01:11]  PTT: 35.6       [09-09-19 @ 01:11]      Creatinine Trend:  SCr 2.16 [09-09 @ 01:11]  SCr 1.89 [09-08 @ 14:19]  SCr 1.68 [09-08 @ 02:18]  SCr 1.76 [09-07 @ 20:51]  SCr 1.74 [09-07 @ 10:34]    Urinalysis - [09-08-19 @ 23:07]      Color Yellow / Appearance Slightly Turbid / SG 1.014 / pH 5.5      Gluc Negative / Ketone Negative  / Bili Negative / Urobili Negative       Blood Large / Protein 100 mg/dL / Leuk Est Large / Nitrite Negative      RBC 22 / WBC 39 / Hyaline 3 / Gran  / Sq Epi  / Non Sq Epi 0 / Bacteria Many      Iron 25, TIBC 302, %sat 8      [08-13-19 @ 05:30]  Ferritin 687      [08-13-19 @ 05:30]  Vitamin D (25OH) 23.3      [08-16-19 @ 06:50]  HbA1c 5.6      [08-13-19 @ 05:30]    HBsAg Nonreactive      [08-15-19 @ 06:45]  HBcAb Reactive      [08-15-19 @ 06:45]  HCV 0.28, Nonreactive Hepatitis C AB  S/CO Ratio                        Interpretation  < 1.00                                   Non-Reactive  1.00 - 4.99                         Weakly-Reactive  >= 5.00                                Reactive  Non-Reactive: Aperson with a non-reactive HCV antibody  result is considered uninfected.  No further action is  needed unless recent infection is suspected.  In these  cases, consider repeat testing later to detect  seroconversion..  Weakly-Reactive: HCV antibody test is abnormal, HCV RNA  Qualitative test will follow.  Reactive: HCV antibody test is abnormal, HCV RNA  Qualitative test will follow.  Note: HCV antibody testing is performed on the [employer]   system.      [08-15-19 @ 06:45]  HIV Nonreact      [08-11-19 @ 16:15]    IVAN: titer 1:640, pattern Homogeneous      [08-11-19 @ 16:15]  dsDNA 435      [08-15-19 @ 06:45]  C3 Complement 76      [08-12-19 @ 11:00]  C4 Complement 10      [08-12-19 @ 11:00]  ANCA: cANCA Negative, pANCA >1:1280, atypical ANCA Negative      [08-11-19 @ 16:15]  MPO-ANCA 96.2, interpretation: Positive      [08-11-19 @ 16:15]  PR3-ANCA 5.1, interpretation: Negative      [08-11-19 @ 16:15]  anti-GBM <1.0      [08-15-19 @ 06:45]  Free Light Chains: kappa 2.67, lambda 2.76, ratio = 0.97      [08-22 @ 06:14]  Immunofixation Serum:   Weak IgM Lambda Band Identified    Reference Range: None Detected      [08-13-19 @ 05:30]  SPEP Interpretation: Weak Gamma-Migrating Paraprotein Identified      [08-13-19 @ 05:30]  Cryoglobulin: 0      [08-21-19 @ 06:44]

## 2019-09-09 NOTE — PROGRESS NOTE ADULT - ASSESSMENT
2 RN skin check    Bruising present throughout body with large bruises on face, buttocks, back, side, and hip. Pt states they are from her falls.   Sacrum is red but blanching.   No open wounds noted.    84 year old woman with extensive PMHx (CHF, HTN, HLD, A.fib on coumadin, lymphoma) being worked up as outpatient for glomerulonephritis (had left renal biopsy on 8/20/19) originally admitted to [facility] s/p fall and left psoas bleeding found on CT imaging (in setting of INR=4).  While at [place], repeat CT imaging showing dramatic increase in psoas / retroperitoneal bleeding.  Transferred to [facility] SICU for management.    -Care per SICU  -Regular diet   -dvt ppx   -obtain L. hip and femur xrays   -PPI  -Entecavir 0.5 mg daily   -rheumatology, hematology and nephrology recs  -Left RP bleed currently stable, IR embolization if clinically deteriorates   -bactrim three times weekly for PJP ppx  -f/u cbc     ACS [id number]

## 2019-09-09 NOTE — PROGRESS NOTE ADULT - SUBJECTIVE AND OBJECTIVE BOX
HISTORY  TONIA HOWELL is a 84y Female PMHx lymphoma/MGUS, AFib on Coumadin, CHF, HLD, HTN, COPD, anxiety, depression, presented to [facility] ED s/p fall, found to have a left RP bleed on Coumadin with a supratherapeutic INR of 4; hemoglobin 8.3 down from 9.6; was hemodynamically stable. Son states pt was getting out of the car when she fell, landed on her left side and hit her left head. Pt did not have LOC. Per son, there is no acute change in her mental status however, pt has become progressively altered since her hospitalization 3 weeks ago. 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 Renal bx with active crescentic glomerulonephritis, pauci-immune suspected to be 2/2 hydralazine. Pt was d/c'd on steroids and was started on rituxan. At [facility] ICU, pt received 1 unit PRBC, FFP, and vitamin K. Hb initially responded to the 1 unit however, dropped again, leading to a repeat CT, which showed worsening of RP bleed w/ displacement of kidney.   Prior to transfer, pt received Kcentra, additional FFP, additional 1unit pRBC, and vitamin K. A R femoral TLC was placed. Pt transferred to [facility] SICU for increased size of RP, hemodynamic monitoring, and possible IR intervention.     24 HOUR EVENTS:  - H/H stable   - CLD advanced to regular diet  - DVT ppx started, Lovenox 30mg   - Diltiazem and xanax restarted  - Lasix dc'd per renal  - Pre-meal insulin restarted at 6 units, on 12units at home  - Restarted on po home meds    24 HOUR EVENTS:    SUBJECTIVE/ROS:  [x ] A ten-point review of systems was otherwise negative except as noted.  [ ] Due to altered mental status/intubation, subjective information were not able to be obtained from the patient. History was obtained, to the extent possible, from review of the chart and collateral sources of information.      NEURO  Exam: Awake, alert, oriented and interactive   Meds: ALPRAZolam 0.5 milliGRAM(s) Oral two times a day PRN anxiety  sertraline 50 milliGRAM(s) Oral daily    [x] Adequacy of sedation and pain control has been assessed and adjusted      RESPIRATORY  RR: 15 (09-09-19 @ 02:00) (15 - 36)  SpO2: 94% (09-09-19 @ 02:00) (87% - 100%)  Wt(kg): --  Exam: On 2L NC, no accessory muscle use  Mechanical Ventilation: NA    [ ] Extubation Readiness Assessed  Meds: ALBUTerol/ipratropium for Nebulization. 3 milliLiter(s) Nebulizer every 6 hours PRN Shortness of Breath and/or Wheezing  montelukast 10 milliGRAM(s) Oral daily        CARDIOVASCULAR  HR: 95 (09-09-19 @ 02:00) (91 - 126)  BP: 136/86 (09-09-19 @ 02:00) (103/66 - 160/84)  BP(mean): 105 (09-09-19 @ 02:00) (80 - 116)  VBG - ( 07 Sep 2019 05:29 )  pH: 7.40  /  pCO2: 46    /  pO2: 34    / HCO3: 28    / Base Excess: 3.5   /  SaO2: 61     Lactate: 1.2        Cardiac Rhythm: afib   Perfusion     [ x]Adequate   [ ]Inadequate  Mentation   [ x]Normal       [ ]Reduced  Extremities  [x ]Warm         [ ]Cool  Volume Status [ ]Hypervolemic [ ]Euvolemic [ ]Hypovolemic  Meds: diltiazem    Tablet 30 milliGRAM(s) Oral every 8 hours  metoprolol tartrate 50 milliGRAM(s) Oral every 12 hours        GI/NUTRITION  Exam:   Diet: Regular  Meds: pantoprazole    Tablet 40 milliGRAM(s) Oral before breakfast      GENITOURINARY  I&O's Detail    09-07 @ 07:01  -  09-08 @ 07:00  --------------------------------------------------------  IN:    IV PiggyBack: 100 mL    multiple electrolytes Injection Type 1multiple electrolytes Injection Type 1: 900 mL    Oral Fluid: 800 mL  Total IN: 1800 mL    OUT:    Indwelling Catheter - Urethral: 450 mL    Voided: 600 mL  Total OUT: 1050 mL    Total NET: 750 mL      09-08 @ 07:01  -  09-09 @ 02:25  --------------------------------------------------------  IN:    Oral Fluid: 1050 mL  Total IN: 1050 mL    OUT:    Voided: 800 mL  Total OUT: 800 mL    Total NET: 250 mL          09-09    137  |  98  |  43<H>  ----------------------------<  202<H>  4.3   |  27  |  2.16<H>    Ca    8.3<L>      09 Sep 2019 01:11  Phos  3.5     09-09  Mg     2.2     09-09      [ ] Chan catheter, indication:   Meds:       HEMATOLOGIC  Meds: enoxaparin Injectable 30 milliGRAM(s) SubCutaneous daily    [x] VTE Prophylaxis                        7.9    15.4  )-----------( 195      ( 09 Sep 2019 01:11 )             24.2     PT/INR - ( 09 Sep 2019 01:11 )   PT: 12.9 sec;   INR: 1.12 ratio         PTT - ( 09 Sep 2019 01:11 )  PTT:35.6 sec  Transfusion     [ ] PRBC   [ ] Platelets   [ ] FFP   [ ] Cryoprecipitate      INFECTIOUS DISEASES  T(C): 36.9 (09-08-19 @ 23:00), Max: 37.6 (09-08-19 @ 07:00)  Wt(kg): --  WBC Count: 15.4 K/uL (09-09 @ 01:11)  WBC Count: 22.8 K/uL (09-08 @ 14:19)    Recent Cultures:    Meds: entecavir 0.5 milliGRAM(s) Oral daily  trimethoprim  160 mG/sulfamethoxazole 800 mG 1 Tablet(s) Oral <User Schedule>        ENDOCRINE  Capillary Blood Glucose    Meds: atorvastatin 10 milliGRAM(s) Oral at bedtime  insulin lispro (HumaLOG) corrective regimen sliding scale   SubCutaneous three times a day before meals  insulin lispro (HumaLOG) corrective regimen sliding scale   SubCutaneous at bedtime  insulin lispro Injectable (HumaLOG) 6 Unit(s) SubCutaneous three times a day before meals  predniSONE   Tablet 60 milliGRAM(s) Oral every 24 hours        ACCESS DEVICES:  [ ] Peripheral IV  [ ] Central Venous Line	[ ] R	[ ] L	[ ] IJ	[ ] Fem	[ ] SC	Placed:   [ ] Arterial Line		[ ] R	[ ] L	[ ] Fem	[ ] Rad	[ ] Ax	Placed:   [ ] PICC:					[ ] Mediport  [ ] Urinary Catheter, Date Placed:   [ ] Necessity of urinary, arterial, and venous catheters discussed    OTHER MEDICATIONS:  chlorhexidine 2% Cloths 1 Application(s) Topical <User Schedule>      CODE STATUS:     IMAGING: HISTORY  TONIA HOWELL is a 84y Female PMHx lymphoma/MGUS, AFib on Coumadin, CHF, HLD, HTN, COPD, anxiety, depression, presented to [facility] ED s/p fall, found to have a left RP bleed on Coumadin with a supratherapeutic INR of 4; hemoglobin 8.3 down from 9.6; was hemodynamically stable. Son states pt was getting out of the car when she fell, landed on her left side and hit her left head. Pt did not have LOC. Per son, there is no acute change in her mental status however, pt has become progressively altered since her hospitalization 3 weeks ago. 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 Renal bx with active crescentic glomerulonephritis, pauci-immune suspected to be 2/2 hydralazine. Pt was d/c'd on steroids and was started on rituxan. At [facility] ICU, pt received 1 unit PRBC, FFP, and vitamin K. Hb initially responded to the 1 unit however, dropped again, leading to a repeat CT, which showed worsening of RP bleed w/ displacement of kidney.   Prior to transfer, pt received Kcentra, additional FFP, additional 1unit pRBC, and vitamin K. A R femoral TLC was placed. Pt transferred to [facility] SICU for increased size of RP, hemodynamic monitoring, and possible IR intervention.     24 HOUR EVENTS:  - H/H stable   - CLD advanced to regular diet  - DVT ppx started, Lovenox 30mg   - Diltiazem and xanax restarted  - Lasix dc'd per renal  - Pre-meal insulin restarted at 6 units, on 12units at home  - Restarted on po home meds  - Lopressor 5mg x1 o/n     24 HOUR EVENTS:    SUBJECTIVE/ROS:  [x ] A ten-point review of systems was otherwise negative except as noted.  [ ] Due to altered mental status/intubation, subjective information were not able to be obtained from the patient. History was obtained, to the extent possible, from review of the chart and collateral sources of information.      NEURO  Exam: Awake, alert, oriented and interactive   Meds: ALPRAZolam 0.5 milliGRAM(s) Oral two times a day PRN anxiety  sertraline 50 milliGRAM(s) Oral daily    [x] Adequacy of sedation and pain control has been assessed and adjusted      RESPIRATORY  RR: 15 (09-09-19 @ 02:00) (15 - 36)  SpO2: 94% (09-09-19 @ 02:00) (87% - 100%)  Wt(kg): --  Exam: On 2L NC, no accessory muscle use, unlabored respirations   Mechanical Ventilation: NA    [ ] Extubation Readiness Assessed  Meds: ALBUTerol/ipratropium for Nebulization. 3 milliLiter(s) Nebulizer every 6 hours PRN Shortness of Breath and/or Wheezing  montelukast 10 milliGRAM(s) Oral daily        CARDIOVASCULAR  HR: 95 (09-09-19 @ 02:00) (91 - 126)  BP: 136/86 (09-09-19 @ 02:00) (103/66 - 160/84)  BP(mean): 105 (09-09-19 @ 02:00) (80 - 116)  VBG - ( 07 Sep 2019 05:29 )  pH: 7.40  /  pCO2: 46    /  pO2: 34    / HCO3: 28    / Base Excess: 3.5   /  SaO2: 61     Lactate: 1.2        Cardiac Rhythm: afib   Perfusion     [ x]Adequate   [ ]Inadequate  Mentation   [ x]Normal       [ ]Reduced  Extremities  [x ]Warm         [ ]Cool  Volume Status [ ]Hypervolemic [x ]Euvolemic [ ]Hypovolemic  Meds: diltiazem    Tablet 30 milliGRAM(s) Oral every 8 hours  metoprolol tartrate 50 milliGRAM(s) Oral every 12 hours        GI/NUTRITION  Exam:   Diet: Regular  Meds: pantoprazole    Tablet 40 milliGRAM(s) Oral before breakfast      GENITOURINARY  I&O's Detail    09-07 @ 07:01  -  09-08 @ 07:00  --------------------------------------------------------  IN:    IV PiggyBack: 100 mL    multiple electrolytes Injection Type 1multiple electrolytes Injection Type 1: 900 mL    Oral Fluid: 800 mL  Total IN: 1800 mL    OUT:    Indwelling Catheter - Urethral: 450 mL    Voided: 600 mL  Total OUT: 1050 mL    Total NET: 750 mL      09-08 @ 07:01  -  09-09 @ 02:25  --------------------------------------------------------  IN:    Oral Fluid: 1050 mL  Total IN: 1050 mL    OUT:    Voided: 800 mL  Total OUT: 800 mL    Total NET: 250 mL          09-09    137  |  98  |  43<H>  ----------------------------<  202<H>  4.3   |  27  |  2.16<H>    Ca    8.3<L>      09 Sep 2019 01:11  Phos  3.5     09-09  Mg     2.2     09-09      [ ] Chan catheter, indication: NA  Meds:       HEMATOLOGIC  Meds: enoxaparin Injectable 30 milliGRAM(s) SubCutaneous daily    [x] VTE Prophylaxis                        7.9    15.4  )-----------( 195      ( 09 Sep 2019 01:11 )             24.2     PT/INR - ( 09 Sep 2019 01:11 )   PT: 12.9 sec;   INR: 1.12 ratio         PTT - ( 09 Sep 2019 01:11 )  PTT:35.6 sec  Transfusion     [ ] PRBC   [ ] Platelets   [ ] FFP   [ ] Cryoprecipitate      INFECTIOUS DISEASES  T(C): 36.9 (09-08-19 @ 23:00), Max: 37.6 (09-08-19 @ 07:00)  Wt(kg): --  WBC Count: 15.4 K/uL (09-09 @ 01:11)  WBC Count: 22.8 K/uL (09-08 @ 14:19)    Recent Cultures:    Meds: entecavir 0.5 milliGRAM(s) Oral daily  trimethoprim  160 mG/sulfamethoxazole 800 mG 1 Tablet(s) Oral <User Schedule>        ENDOCRINE  Capillary Blood Glucose    Meds: atorvastatin 10 milliGRAM(s) Oral at bedtime  insulin lispro (HumaLOG) corrective regimen sliding scale   SubCutaneous three times a day before meals  insulin lispro (HumaLOG) corrective regimen sliding scale   SubCutaneous at bedtime  insulin lispro Injectable (HumaLOG) 6 Unit(s) SubCutaneous three times a day before meals  predniSONE   Tablet 60 milliGRAM(s) Oral every 24 hours        ACCESS DEVICES:  [x ] Peripheral IV  [ ] Central Venous Line	[ ] R	[ ] L	[ ] IJ	[ ] Fem	[ ] SC	Placed:   [ ] Arterial Line		[ ] R	[ ] L	[ ] Fem	[ ] Rad	[ ] Ax	Placed:   [ ] PICC:					[ ] Mediport  [ ] Urinary Catheter, Date Placed:   [ ] Necessity of urinary, arterial, and venous catheters discussed    OTHER MEDICATIONS:  chlorhexidine 2% Cloths 1 Application(s) Topical <User Schedule>      CODE STATUS: Full    IMAGING: HISTORY  TONIA HOWELL is a 84y Female PMHx lymphoma/MGUS, AFib on Coumadin, CHF, HLD, HTN, COPD, anxiety, depression, presented to [facility] ED s/p fall, found to have a left RP bleed on Coumadin with a supratherapeutic INR of 4; hemoglobin 8.3 down from 9.6; was hemodynamically stable. Son states pt was getting out of the car when she fell, landed on her left side and hit her left head. Pt did not have LOC. Per son, there is no acute change in her mental status however, pt has become progressively altered since her hospitalization 3 weeks ago. 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 Renal bx with active crescentic glomerulonephritis, pauci-immune suspected to be 2/2 hydralazine. Pt was d/c'd on steroids and was started on rituxan. At [facility] ICU, pt received 1 unit PRBC, FFP, and vitamin K. Hb initially responded to the 1 unit however, dropped again, leading to a repeat CT, which showed worsening of RP bleed w/ displacement of kidney.   Prior to transfer, pt received Kcentra, additional FFP, additional 1unit pRBC, and vitamin K. A R femoral TLC was placed. Pt transferred to [facility] SICU for increased size of RP, hemodynamic monitoring, and possible IR intervention.     24 HOUR EVENTS:  - H/H stable   - CLD advanced to regular diet  - DVT ppx started, Lovenox 30mg   - Diltiazem and xanax restarted  - Lasix dc'd per renal  - Pre-meal insulin restarted at 6 units, on 12units at home  - Restarted on po home meds  - Lopressor 5mg x1 o/n     24 HOUR EVENTS:    SUBJECTIVE/ROS:  [x ] A ten-point review of systems was otherwise negative except as noted.  [ ] Due to altered mental status/intubation, subjective information were not able to be obtained from the patient. History was obtained, to the extent possible, from review of the chart and collateral sources of information.      NEURO  Exam: Awake, alert, oriented and interactive   Meds: ALPRAZolam 0.5 milliGRAM(s) Oral two times a day PRN anxiety  sertraline 50 milliGRAM(s) Oral daily    [x] Adequacy of sedation and pain control has been assessed and adjusted      RESPIRATORY  RR: 15 (09-09-19 @ 02:00) (15 - 36)  SpO2: 94% (09-09-19 @ 02:00) (87% - 100%)  Wt(kg): --  Exam: On 2L NC, no accessory muscle use, unlabored respirations   Mechanical Ventilation: NA    [ ] Extubation Readiness Assessed  Meds: ALBUTerol/ipratropium for Nebulization. 3 milliLiter(s) Nebulizer every 6 hours PRN Shortness of Breath and/or Wheezing  montelukast 10 milliGRAM(s) Oral daily        CARDIOVASCULAR  HR: 95 (09-09-19 @ 02:00) (91 - 126)  BP: 136/86 (09-09-19 @ 02:00) (103/66 - 160/84)  BP(mean): 105 (09-09-19 @ 02:00) (80 - 116)  VBG - ( 07 Sep 2019 05:29 )  pH: 7.40  /  pCO2: 46    /  pO2: 34    / HCO3: 28    / Base Excess: 3.5   /  SaO2: 61     Lactate: 1.2        Cardiac Rhythm: afib   Perfusion     [ x]Adequate   [ ]Inadequate  Mentation   [ x]Normal       [ ]Reduced  Extremities  [x ]Warm         [ ]Cool  Volume Status [ ]Hypervolemic [x ]Euvolemic [ ]Hypovolemic  Meds: diltiazem    Tablet 30 milliGRAM(s) Oral every 8 hours  metoprolol tartrate 50 milliGRAM(s) Oral every 12 hours        GI/NUTRITION  Exam: minimally distended, minimally tender to palpation on right flank, no rebounding or guarding.   Diet: Regular  Meds: pantoprazole    Tablet 40 milliGRAM(s) Oral before breakfast      GENITOURINARY  I&O's Detail    09-07 @ 07:01  -  09-08 @ 07:00  --------------------------------------------------------  IN:    IV PiggyBack: 100 mL    multiple electrolytes Injection Type 1multiple electrolytes Injection Type 1: 900 mL    Oral Fluid: 800 mL  Total IN: 1800 mL    OUT:    Indwelling Catheter - Urethral: 450 mL    Voided: 600 mL  Total OUT: 1050 mL    Total NET: 750 mL      09-08 @ 07:01  -  09-09 @ 02:25  --------------------------------------------------------  IN:    Oral Fluid: 1050 mL  Total IN: 1050 mL    OUT:    Voided: 800 mL  Total OUT: 800 mL    Total NET: 250 mL          09-09    137  |  98  |  43<H>  ----------------------------<  202<H>  4.3   |  27  |  2.16<H>    Ca    8.3<L>      09 Sep 2019 01:11  Phos  3.5     09-09  Mg     2.2     09-09      [ ] Chan catheter, indication: NA  Meds:       HEMATOLOGIC  Meds: enoxaparin Injectable 30 milliGRAM(s) SubCutaneous daily    [x] VTE Prophylaxis                        7.9    15.4  )-----------( 195      ( 09 Sep 2019 01:11 )             24.2     PT/INR - ( 09 Sep 2019 01:11 )   PT: 12.9 sec;   INR: 1.12 ratio         PTT - ( 09 Sep 2019 01:11 )  PTT:35.6 sec  Transfusion     [ ] PRBC   [ ] Platelets   [ ] FFP   [ ] Cryoprecipitate      INFECTIOUS DISEASES  T(C): 36.9 (09-08-19 @ 23:00), Max: 37.6 (09-08-19 @ 07:00)  WBC Count: 15.4 K/uL (09-09 @ 01:11)  WBC Count: 22.8 K/uL (09-08 @ 14:19)    Recent Cultures:    Meds: entecavir 0.5 milliGRAM(s) Oral daily  trimethoprim  160 mG/sulfamethoxazole 800 mG 1 Tablet(s) Oral <User Schedule>        ENDOCRINE  Capillary Blood Glucose    Meds: atorvastatin 10 milliGRAM(s) Oral at bedtime  insulin lispro (HumaLOG) corrective regimen sliding scale   SubCutaneous three times a day before meals  insulin lispro (HumaLOG) corrective regimen sliding scale   SubCutaneous at bedtime  insulin lispro Injectable (HumaLOG) 6 Unit(s) SubCutaneous three times a day before meals  predniSONE   Tablet 60 milliGRAM(s) Oral every 24 hours        ACCESS DEVICES:  [x ] Peripheral IV  [ ] Central Venous Line	[ ] R	[ ] L	[ ] IJ	[x ] Fem	[ ] SC	Placed:   [ ] Arterial Line		[ ] R	[ ] L	[ ] Fem	[ ] Rad	[ ] Ax	Placed:   [ ] PICC:					[ ] Mediport  [ ] Urinary Catheter, Date Placed:   [ ] Necessity of urinary, arterial, and venous catheters discussed    OTHER MEDICATIONS:  chlorhexidine 2% Cloths 1 Application(s) Topical <User Schedule>      CODE STATUS: Full    IMAGING:

## 2019-09-09 NOTE — PROGRESS NOTE ADULT - ASSESSMENT
Assessment:  83 y/o F PMHx lymphoma/MGUS, AFib on Coumadin, CHF, HLD, HTN, COPD, anxiety, depression, presented to [facility] ED s/p fall with left RP bleed on Coumadin with a supratherapeutic INR. Repeat CT showing worsening of hematoma. Pt transferred to [facility] SICU for increased size of L RP, hemodynamic monitoring, and possible IR intervention.    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  - f/u rhuematology, hematology, nephrology recs  - Per rheumatology, holding off on Rituxan for now  - Prednisone 60 mg daily    Heme:  - Trend H/H  - Mechanical DVT ppx, consider initiating chemical DVT ppx  - L RP bleed currently stable, IR embolization if clinically deteriorates    Infectious disease:  - Bactrim three times weekly for PJP ppx  - Trend WBC, monitor fevers    Endocrine  - Moderate sliding scale    SHANNON Brunner, PGY-2  SICU [id number] Assessment:  83 y/o F PMHx lymphoma/MGUS, AFib on Coumadin, CHF, HLD, HTN, COPD, anxiety, depression, presented to [facility] ED s/p fall with left RP bleed on Coumadin with a supratherapeutic INR. Repeat CT showing worsening of hematoma. Pt transferred to [facility] SICU for increased size of L RP, hemodynamic monitoring, and possible IR intervention.    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  - f/u rhuematology, hematology, nephrology recs  - Per rheumatology, holding off on Rituxan for now  - Prednisone 60 mg daily    Heme:  - Trend H/H  - Mechanical DVT ppx, consider initiating chemical DVT ppx  - L RP bleed currently stable, IR embolization if clinically deteriorates    Infectious disease:  - Bactrim three times weekly for PJP ppx  - Trend WBC, monitor fevers    Endocrine  - Moderate sliding scale    SHANNON Brunner, PGY-2  SICU [id number] Assessment:  85 y/o F PMHx lymphoma/MGUS, AFib on Coumadin, CHF, HLD, HTN, COPD, anxiety, depression, presented to [facility] ED s/p fall with left RP bleed on Coumadin with a supratherapeutic INR. Repeat CT showing worsening of hematoma. Pt transferred to [facility] SICU for increased size of L RP, hemodynamic monitoring, and possible IR intervention.    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

## 2019-09-09 NOTE — PROGRESS NOTE ADULT - ASSESSMENT
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 Rheumatology consulted as pt was due for rituxan for today and we recommended to hold off on rituxan dose until patient is stabilized. Pt was transferred to floor today after received 1 PRBC for anemia.     - please c/t hold on Rituxan for now    Rheum will follow  Case was seen and discussed with Dr. 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 Rheumatology consulted as pt was due for rituxan for today and we recommended to hold off on rituxan dose until patient is stabilized. Pt was transferred to floor today after received 1 PRBC for anemia.     - please c/t hold on Rituxan for now  - please check Hep B DNA PCR , would benefit from Hep B vaccination?   - C/w entecavir    Rheum will follow  Case was seen and discussed with Dr. 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 Rheumatology consulted as pt was due for rituxan for today and we recommended to hold off on rituxan dose until patient is stabilized. Pt was transferred to floor today after received 1 PRBC for anemia.     - please c/t hold on Rituxan for now  - C/w entecavir in light of positive Hep B core Ab  - please check Hep B DNA PCR ,? benefit from Hep B vaccination       Rheum will follow  Case was seen and discussed with Dr. Chano Childress MD  Rheum fellow PGY 4   Pager [phone]

## 2019-09-09 NOTE — PROGRESS NOTE ADULT - ATTENDING COMMENTS
patient seen briefly while en route to her new room  she is known to me from intial presentation at [facility]  case and events reviewed  will reassess in AM for possible rituxan doing   see above regarding hepB

## 2019-09-09 NOTE — CHART NOTE - NSCHARTNOTEFT_GEN_A_CORE
SICU Transfer Note    Transfer from: SICU  Transfer to: 2 [facility]   Accepting physican: Dr. Powell       SICU COURSE:  9/6: presented to [facility] ED s/p fall, found to have left RP bleed on CT on Coumadin w/ a supratherapeutic INR of 4. Hb 8.3 from most recent 9.6. At [facility] ICU, received total 2 units pRBC, 2 FFP, and vitamin K. Repeat CT showed worsening of RP bleed. A R femoral TLC was placed and pt was transferred to [facility] SICU for management of increased size of RP, hemodynamic monitoring, and possible IR intervention  9/7: Cleared from hemorrhage watch, advanced to clears. Rheumatology, hematology consults placed. Restarted on po home meds.  9/8: CLD advanced to regular diet, DVT ppx started, Lovenox 30mg, Diltiazem and xanax restarted, Lasix dc'd per renal, Pre-meal insulin restarted at 6 units, on 12units at home, Restarted on po home meds, Lopressor 5mg x1 o/n       PHYSICAL EXAM:  Constitutional: well developed, well nourished, NAD  ENMT: normal facies, symmetric  Respiratory: Normal respiratory effort   Gastrointestinal: abdomen soft, nondistended. Left flank mildly tender to palpation.  Musculoskeletal: equal strength bilateral upper and lower extremities. Left hip/proximal thigh tender to palpation.         ASSESSMENT & PLAN:   84 year old woman with extensive PMHx (CHF, HTN, HLD, A.fib on coumadin, lymphoma) being worked up as outpatient for glomerulonephritis (had left renal biopsy on 8/20/19) originally admitted to [facility] s/p fall and left psoas bleeding found on CT imaging (in setting of INR=4).  While at [place], repeat CT imaging showing dramatic increase in psoas / retroperitoneal bleeding.  Transferred to [facility] SICU for management.    -Care per SICU  -Regular diet   -dvt ppx   -obtain L. hip and femur xrays   -PPI  -Entecavir 0.5 mg daily   -rheumatology, hematology and nephrology recs  -Left RP bleed currently stable, IR embolization if clinically deteriorates   -bactrim three times weekly for PJP ppx  -f/u cbc         For Follow-Up:  F/u post void residual   f/u am creatinine - renal US if worse   f/u rheum recs       Vital Signs Last 24 Hrs  T(C): 36.9 (09 Sep 2019 18:14), Max: 37.3 (09 Sep 2019 07:00)  T(F): 98.4 (09 Sep 2019 18:14), Max: 99.2 (09 Sep 2019 07:00)  HR: 100 (09 Sep 2019 18:14) (82 - 140)  BP: 156/97 (09 Sep 2019 18:14) (125/75 - 166/100)  BP(mean): 125 (09 Sep 2019 17:00) (94 - 125)  RR: 18 (09 Sep 2019 18:14) (8 - 35)  SpO2: 95% (09 Sep 2019 18:14) (92% - 100%)  I&O's Summary    08 Sep 2019 07:01  -  09 Sep 2019 07:00  --------------------------------------------------------  IN: 1050 mL / OUT: 1000 mL / NET: 50 mL    09 Sep 2019 07:01  -  09 Sep 2019 19:05  --------------------------------------------------------  IN: 1200 mL / OUT: 200 mL / NET: 1000 mL          MEDICATIONS  (STANDING):  atorvastatin 10 milliGRAM(s) Oral at bedtime  diltiazem    Tablet 30 milliGRAM(s) Oral every 8 hours  enoxaparin Injectable 30 milliGRAM(s) SubCutaneous daily  entecavir 0.5 milliGRAM(s) Oral every 72 hours  insulin lispro (HumaLOG) corrective regimen sliding scale   SubCutaneous three times a day before meals  insulin lispro (HumaLOG) corrective regimen sliding scale   SubCutaneous at bedtime  insulin lispro Injectable (HumaLOG) 6 Unit(s) SubCutaneous three times a day before meals  metoprolol tartrate 50 milliGRAM(s) Oral every 12 hours  montelukast 10 milliGRAM(s) Oral daily  pantoprazole    Tablet 40 milliGRAM(s) Oral before breakfast  predniSONE   Tablet 60 milliGRAM(s) Oral every 24 hours  sertraline 50 milliGRAM(s) Oral daily  trimethoprim  160 mG/sulfamethoxazole 800 mG 1 Tablet(s) Oral <User Schedule>    MEDICATIONS  (PRN):  ALBUTerol/ipratropium for Nebulization 3 milliLiter(s) Nebulizer every 6 hours PRN Shortness of Breath and/or Wheezing  ALPRAZolam 0.5 milliGRAM(s) Oral two times a day PRN anxiety        LABS                                            10.1                  Neurophils% (auto):   x      (09-09 @ 15:11):    15.1 )-----------(217          Lymphocytes% (auto):  x                                             31.6                   Eosinphils% (auto):   x        Manual%: Neutrophils x    ; Lymphocytes x    ; Eosinophils x    ; Bands%: x    ; Blasts x                                    137    |  98     |  43                  Calcium: 8.3   / iCa: x      (09-09 @ 01:11)    ----------------------------<  202       Magnesium: 2.2                              4.3     |  27     |  2.16             Phosphorous: 3.5        ( 09-09 @ 01:11 )   PT: 12.9 sec;   INR: 1.12 ratio  aPTT: 35.6 sec

## 2019-09-10 NOTE — PROVIDER CONTACT NOTE (OTHER) - ACTION/TREATMENT ORDERED:
EKG showed pt in AFib at present time. PA at bedside. awaiting further orders will cont to monitor. Pt resting comfortably in bed at present time.

## 2019-09-10 NOTE — CONSULT NOTE ADULT - PROBLEM SELECTOR RECOMMENDATION 9
Trend h/h   Sx follow up
KANIKA likely hemodynamically meditated in setting anemia RP bleed w/ contributing factor diuretic lasix. 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    PLAN:   - discontinue LASIX for now ***  - 2nd Rituxan dose due 9/9, hematology following (holding until hemodynamic stable)  - c/w prednisone 60 mg po daily for Crescentic GN  - c/w hemodynamic support, PRBC maintain Hgb>7  - avoid nephrotoxic agents (ACEI/ARB, NSAIDS), renally dose medications  - trend SCr and UOP

## 2019-09-10 NOTE — PROVIDER CONTACT NOTE (OTHER) - ACTION/TREATMENT ORDERED:
Cont to monitor VSS post meds. Pa aware EKG done as well as stat blood this shift. Pt resting comfortably in bed call bell within reach will cont to monitor

## 2019-09-10 NOTE — PROGRESS NOTE ADULT - SUBJECTIVE AND OBJECTIVE BOX
[facility]  [id number]    HISTORY OF PRESENT ILLNESS:    pt was seen and examined at the bedside, she was in acute distress due to abdominal pian, reports had a severe abdominal pain right after went to bathroom which has improved now to 4/10.       MEDICATIONS  (STANDING):  atorvastatin 10 milliGRAM(s) Oral at bedtime  chlorhexidine 2% Cloths 1 Application(s) Topical <User Schedule>  diltiazem    Tablet 30 milliGRAM(s) Oral every 8 hours  enoxaparin Injectable 30 milliGRAM(s) SubCutaneous daily  entecavir 0.5 milliGRAM(s) Oral daily  insulin lispro (HumaLOG) corrective regimen sliding scale   SubCutaneous three times a day before meals  insulin lispro (HumaLOG) corrective regimen sliding scale   SubCutaneous at bedtime  insulin lispro Injectable (HumaLOG) 6 Unit(s) SubCutaneous three times a day before meals  metoprolol tartrate 50 milliGRAM(s) Oral every 12 hours  montelukast 10 milliGRAM(s) Oral daily  pantoprazole    Tablet 40 milliGRAM(s) Oral before breakfast  predniSONE   Tablet 60 milliGRAM(s) Oral every 24 hours  sertraline 50 milliGRAM(s) Oral daily  trimethoprim  160 mG/sulfamethoxazole 800 mG 1 Tablet(s) Oral <User Schedule>    MEDICATIONS  (PRN):  ALBUTerol/ipratropium for Nebulization 3 milliLiter(s) Nebulizer every 6 hours PRN Shortness of Breath and/or Wheezing  ALPRAZolam 0.5 milliGRAM(s) Oral two times a day PRN anxiety    Vital Signs Last 24 Hrs  T(C): 36.6 (09-10-19 @ 01:35), Max: 36.9 (19 @ 18:14)  HR: 98 (09-10-19 @ 08:25) (82 - 122)  BP: 147/85 (09-10-19 @ 08:25) (144/73 - 174/113)  RR: 16 (09-10-19 @ 06:54) (16 - 24)  SpO2: 97% (09-10-19 @ 06:54) (94% - 97%)  Wt(kg): --  Physical Exam:  General: No apparent distress  HEENT: MMM  CVS: +S1/S2, RRR, no murmurs/rubs/gallops  Resp: Bibasilar crackles  Skin: no visible rashes    LABS:                9.8    15.7  )-----------( 210      ( 10 Sep 2019 03:07 )             30.2     09-10    134<L>  |  97  |  46<H>  ----------------------------<  183<H>  4.4   |  25  |  1.95<H>    Ca    9.1      10 Sep 2019 09:10  Phos  3.2     09-10  Mg     2.0     09-10      PT/INR - ( 09 Sep 2019 01:11 )   PT: 12.9 sec;   INR: 1.12 ratio         PTT - ( 09 Sep 2019 01:11 )  PTT:35.6 sec  Urinalysis Basic - ( 08 Sep 2019 23:07 )    Color: Yellow / Appearance: Slightly Turbid / S.014 / pH: x  Gluc: x / Ketone: Negative  / Bili: Negative / Urobili: Negative   Blood: x / Protein: 100 mg/dL / Nitrite: Negative   Leuk Esterase: Large / RBC: 22 /hpf / WBC 39 /HPF   Sq Epi: x / Non Sq Epi: 0 /hpf / Bacteria: Many    PHYSICAL EXAM:  Constitutional: well developed, well nourished, NAD  ENMT: normal facies, symmetric  Respiratory: Normal respiratory effort   Gastrointestinal: abdomen soft, nondistended. Left flank mildly tender to palpation.  Musculoskeletal: equal strength bilateral upper and lower extremities. Left hip/proximal thigh tender to palpation.   Psychiatric: oriented x 3; appropriate [facility]  [id number]    HISTORY OF PRESENT ILLNESS:    pt was seen and examined at the bedside, she was in acute distress due to abdominal pian, reports had a severe abdominal pain right after went to bathroom which has improved now to 4/10.       MEDICATIONS  (STANDING):  atorvastatin 10 milliGRAM(s) Oral at bedtime  chlorhexidine 2% Cloths 1 Application(s) Topical <User Schedule>  diltiazem    Tablet 30 milliGRAM(s) Oral every 8 hours  enoxaparin Injectable 30 milliGRAM(s) SubCutaneous daily  entecavir 0.5 milliGRAM(s) Oral daily  insulin lispro (HumaLOG) corrective regimen sliding scale   SubCutaneous three times a day before meals  insulin lispro (HumaLOG) corrective regimen sliding scale   SubCutaneous at bedtime  insulin lispro Injectable (HumaLOG) 6 Unit(s) SubCutaneous three times a day before meals  metoprolol tartrate 50 milliGRAM(s) Oral every 12 hours  montelukast 10 milliGRAM(s) Oral daily  pantoprazole    Tablet 40 milliGRAM(s) Oral before breakfast  predniSONE   Tablet 60 milliGRAM(s) Oral every 24 hours  sertraline 50 milliGRAM(s) Oral daily  trimethoprim  160 mG/sulfamethoxazole 800 mG 1 Tablet(s) Oral <User Schedule>    MEDICATIONS  (PRN):  ALBUTerol/ipratropium for Nebulization 3 milliLiter(s) Nebulizer every 6 hours PRN Shortness of Breath and/or Wheezing  ALPRAZolam 0.5 milliGRAM(s) Oral two times a day PRN anxiety    Vital Signs Last 24 Hrs  T(C): 36.6 (09-10-19 @ 01:35), Max: 36.9 (19 @ 18:14)  HR: 98 (09-10-19 @ 08:25) (82 - 122)  BP: 147/85 (09-10-19 @ 08:25) (144/73 - 174/113)  RR: 16 (09-10-19 @ 06:54) (16 - 24)  SpO2: 97% (09-10-19 @ 06:54) (94% - 97%)  Wt(kg): --  Physical Exam:  General: No apparent distress  HEENT: MMM  CVS: +S1/S2, RRR, no murmurs/rubs/gallops  Resp: Bibasilar crackles  Skin: no visible rashes    LABS:                9.8    15.7  )-----------( 210      ( 10 Sep 2019 03:07 )             30.2     09-10    134<L>  |  97  |  46<H>  ----------------------------<  183<H>  4.4   |  25  |  1.95<H>    Ca    9.1      10 Sep 2019 09:10  Phos  3.2     09-10  Mg     2.0     09-10      PT/INR - ( 09 Sep 2019 01:11 )   PT: 12.9 sec;   INR: 1.12 ratio         PTT - ( 09 Sep 2019 01:11 )  PTT:35.6 sec  Urinalysis Basic - ( 08 Sep 2019 23:07 )    Color: Yellow / Appearance: Slightly Turbid / S.014 / pH: x  Gluc: x / Ketone: Negative  / Bili: Negative / Urobili: Negative   Blood: x / Protein: 100 mg/dL / Nitrite: Negative   Leuk Esterase: Large / RBC: 22 /hpf / WBC 39 /HPF   Sq Epi: x / Non Sq Epi: 0 /hpf / Bacteria: Many    PHYSICAL EXAM:  Constitutional: well developed, well nourished, NAD  ENMT: normal facies, symmetric  Respiratory: Normal respiratory effort   Gastrointestinal: abdomen soft, nondistended. Left flank mildly tender to palpation.  Musculoskeletal: equal strength bilateral upper and lower extremities. Left hip/proximal thigh tender to palpation.   Psychiatric: oriented x 3; appropriate [facility]  [id number]    HISTORY OF PRESENT ILLNESS:    pt was seen and examined at the bedside, she was in acute distress due to abdominal pian, reports had a severe abdominal pain right after went to bathroom which has improved now to 4/10.       MEDICATIONS  (STANDING):  atorvastatin 10 milliGRAM(s) Oral at bedtime  chlorhexidine 2% Cloths 1 Application(s) Topical <User Schedule>  diltiazem    Tablet 30 milliGRAM(s) Oral every 8 hours  enoxaparin Injectable 30 milliGRAM(s) SubCutaneous daily  entecavir 0.5 milliGRAM(s) Oral daily  insulin lispro (HumaLOG) corrective regimen sliding scale   SubCutaneous three times a day before meals  insulin lispro (HumaLOG) corrective regimen sliding scale   SubCutaneous at bedtime  insulin lispro Injectable (HumaLOG) 6 Unit(s) SubCutaneous three times a day before meals  metoprolol tartrate 50 milliGRAM(s) Oral every 12 hours  montelukast 10 milliGRAM(s) Oral daily  pantoprazole    Tablet 40 milliGRAM(s) Oral before breakfast  predniSONE   Tablet 60 milliGRAM(s) Oral every 24 hours  sertraline 50 milliGRAM(s) Oral daily  trimethoprim  160 mG/sulfamethoxazole 800 mG 1 Tablet(s) Oral <User Schedule>    MEDICATIONS  (PRN):  ALBUTerol/ipratropium for Nebulization 3 milliLiter(s) Nebulizer every 6 hours PRN Shortness of Breath and/or Wheezing  ALPRAZolam 0.5 milliGRAM(s) Oral two times a day PRN anxiety    Vital Signs Last 24 Hrs  T(C): 36.6 (09-10-19 @ 01:35), Max: 36.9 (19 @ 18:14)  HR: 98 (09-10-19 @ 08:25) (82 - 122)  BP: 147/85 (09-10-19 @ 08:25) (144/73 - 174/113)  RR: 16 (09-10-19 @ 06:54) (16 - 24)  SpO2: 97% (09-10-19 @ 06:54) (94% - 97%)  Wt(kg): --  Physical Exam:  General: in mild distress due to pain   HEENT: MMM  CVS: +S1/S2  Resp: clear anteriorly   Skin: no visible rashes    LABS:                9.8    15.7  )-----------( 210      ( 10 Sep 2019 03:07 )             30.2     09-10    134<L>  |  97  |  46<H>  ----------------------------<  183<H>  4.4   |  25  |  1.95<H>    Ca    9.1      10 Sep 2019 09:10  Phos  3.2     10  Mg     2.0     09-10      PT/INR - ( 09 Sep 2019 01:11 )   PT: 12.9 sec;   INR: 1.12 ratio         PTT - ( 09 Sep 2019 01:11 )  PTT:35.6 sec  Urinalysis Basic - ( 08 Sep 2019 23:07 )    Color: Yellow / Appearance: Slightly Turbid / S.014 / pH: x  Gluc: x / Ketone: Negative  / Bili: Negative / Urobili: Negative   Blood: x / Protein: 100 mg/dL / Nitrite: Negative   Leuk Esterase: Large / RBC: 22 /hpf / WBC 39 /HPF   Sq Epi: x / Non Sq Epi: 0 /hpf / Bacteria: Many    PHYSICAL EXAM:  Constitutional: well developed, well nourished, NAD  ENMT: normal facies, symmetric  Respiratory: Normal respiratory effort   Gastrointestinal: abdomen soft, nondistended. Left flank mildly tender to palpation.  Musculoskeletal: equal strength bilateral upper and lower extremities. Left hip/proximal thigh tender to palpation.   Psychiatric: oriented x 3; appropriate

## 2019-09-10 NOTE — PROGRESS NOTE ADULT - ATTENDING COMMENTS
patient seen and examined by me personally   agree with above detailed plan  patient and daughter are aware of above

## 2019-09-10 NOTE — PROGRESS NOTE ADULT - ASSESSMENT
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 Rheumatology consulted as pt was due for rituxan. was in distress early in the afternoon due to abdominal pain    - *****  - C/w entecavir in light of positive Hep B core Ab  - please check Hep B DNA PCR ,? benefit from Hep B vaccination       Rheum will follow  Case was seen and discussed with Dr. 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 Rheumatology consulted as pt was due for rituxan. was in distress early in the afternoon due to abdominal pain    - Will arrange for 3rd dose of Rituxan for tomorrow if pt remains stable   - C/w entecavir in light of positive Hep B core Ab  - please check Hep B DNA PCR ,? benefit from Hep B vaccination       Rheum will follow  Case was seen and discussed with Dr. Chano Childress MD  Rheum fellow PGY 4   Pager [phone]

## 2019-09-10 NOTE — DISCHARGE NOTE PROVIDER - HOSPITAL COURSE
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 Presented to [facility] after a fall from standing. Found to have left RP bleed in the setting of coumadin use and supratheraputic INR. CT scans of head, abdomen and pelvis were done. CT head and neck without acute pathology. CT pelvis with small left RP bleed.         At [facility] ICU, pt received 1 unit PRBC, FFP, and vitamin K. Hb initially responded to the 1 unit however, dropped again, leading to a repeat CT, which showed worsening of RP bleed w/ displacement of kidney.     Prior to transfer, pt received Kcentra, additional FFP, additional 1unit pRBC, and vitamin K. A R femoral TLC was placed.        Pt transferred to [facility] SICU for increased size of RP, hemodynamic monitoring, and possible IR intervention.

## 2019-09-10 NOTE — DISCHARGE NOTE PROVIDER - NSDCCPCAREPLAN_GEN_ALL_CORE_FT
PRINCIPAL DISCHARGE DIAGNOSIS  Diagnosis: Retroperitoneal hematoma  Assessment and Plan of Treatment: S/p Fall with supratherapeutic INR. Coagulaopathy was reversed. But given enlarging hematoma and continued bleeding patient to be transferered for evaluation by trauma surgery with IR on standby at [facility].

## 2019-09-10 NOTE — CONSULT NOTE ADULT - PROBLEM SELECTOR RECOMMENDATION 5
Known history of a fib, well controlled with atenolol and coumadin   hold anticoagulant in setting of RP bleed, will restart once bleeding is stabilized  C/w metoprolol to 100 bid   Continue diltiazem

## 2019-09-10 NOTE — CONSULT NOTE ADULT - SUBJECTIVE AND OBJECTIVE BOX
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  Repeat Of note, patient recently admitted [facility] for shortness of breath in the setting of hypoxic respiratory failure likely 2/2 pulmonary renal syndrome of rheumatologic etiology of vasculitis vs immune complex disease - serology was low C3/4, + p-ANCA, elevated ESR/CRP, anti- dsDNA), pauci-immune possibly 2/2 chronic hydralazine s/p Rituxan 19) was discharge on 19 on prednisone 60 mg po. Pt now stable on the floor. Sitting in chair, family at bedside, translating         PAST MEDICAL & SURGICAL HISTORY:  COPD (chronic obstructive pulmonary disease)  Peripheral vascular disease  Pulmonary hypertension  Rheumatoid arthritis  Osteoarthritis  Mitral regurgitation  H/O lymphoma  Hyperlipidemia  Chronic GERD  Chronic kidney disease: proteinuria  AF (atrial fibrillation)  Anemia in CKD (chronic kidney disease)  CHF (congestive heart failure)  HTN (hypertension)  History of total hip replacement, left  History of total knee replacement, bilateral      Review of Systems:   CONSTITUTIONAL: No fever  EYES: No eye pain  ENMT:  No difficulty hearing  NECK: No pain or stiffness  RESPIRATORY: No shortness of breath  CARDIOVASCULAR: No chest pain  GASTROINTESTINAL: No abdominal or epigastric pain.   GENITOURINARY: No dysuria,  NEUROLOGICAL: No headaches  SKIN: No itching  LYMPH NODES: No enlarged glands  MUSCULOSKELETAL: mild back pain   PSYCHIATRIC: No depression  ALLERY AND IMMUNOLOGIC: No hives or eczema    Allergies    Keflex (Unknown)  penicillin (Rash)    Intolerances        Social History: no hx of smoking or etoh use     FAMILY HISTORY:  Family history of inclusion body myositis      MEDICATIONS  (STANDING):  atorvastatin 10 milliGRAM(s) Oral at bedtime  cloNIDine 0.1 milliGRAM(s) Oral two times a day  diltiazem    Tablet 30 milliGRAM(s) Oral every 8 hours  enoxaparin Injectable 30 milliGRAM(s) SubCutaneous daily  entecavir 0.5 milliGRAM(s) Oral every 72 hours  insulin lispro (HumaLOG) corrective regimen sliding scale   SubCutaneous three times a day before meals  insulin lispro (HumaLOG) corrective regimen sliding scale   SubCutaneous at bedtime  insulin lispro Injectable (HumaLOG) 6 Unit(s) SubCutaneous three times a day before meals  metoprolol tartrate 100 milliGRAM(s) Oral two times a day  montelukast 10 milliGRAM(s) Oral daily  pantoprazole    Tablet 40 milliGRAM(s) Oral before breakfast  predniSONE   Tablet 60 milliGRAM(s) Oral every 24 hours  sertraline 50 milliGRAM(s) Oral daily  trimethoprim  160 mG/sulfamethoxazole 800 mG 1 Tablet(s) Oral <User Schedule>    MEDICATIONS  (PRN):  acetaminophen   Tablet .. 650 milliGRAM(s) Oral every 6 hours PRN Mild Pain (1 - 3)  ALBUTerol/ipratropium for Nebulization 3 milliLiter(s) Nebulizer every 6 hours PRN Shortness of Breath and/or Wheezing  ALPRAZolam 0.5 milliGRAM(s) Oral two times a day PRN anxiety        CAPILLARY BLOOD GLUCOSE      POCT Blood Glucose.: 252 mg/dL (10 Sep 2019 14:02)  POCT Blood Glucose.: 177 mg/dL (10 Sep 2019 09:36)  POCT Blood Glucose.: 194 mg/dL (09 Sep 2019 22:39)  POCT Blood Glucose.: 146 mg/dL (09 Sep 2019 18:03)    I&O's Summary    09 Sep 2019 07:  -  10 Sep 2019 07:00  --------------------------------------------------------  IN: 1200 mL / OUT: 600 mL / NET: 600 mL    10 Sep 2019 07:01  -  10 Sep 2019 16:05  --------------------------------------------------------  IN: 440 mL / OUT: 350 mL / NET: 90 mL        PHYSICAL EXAM:  GENERAL: NAD, frail   HEAD:  Atraumatic, Normocephalic  EYES: conjunctiva and sclera clear  NECK:  No JVD  CHEST/LUNG: crackels at bases   HEART: Regular rate and rhythm; S1S2  ABDOMEN: Soft, Nontender, Nondistended; Bowel sounds present  EXTREMITIES:  +2 LE edema b/l   PSYCH: AAOx3    LABS:                        10.3   19.7  )-----------( 233      ( 10 Sep 2019 14:15 )             31.0     09-10    134<L>  |  97  |  46<H>  ----------------------------<  183<H>  4.4   |  25  |  1.95<H>    Ca    9.1      10 Sep 2019 09:10  Phos  3.2     09-10  Mg     2.0     09-10      PT/INR - ( 10 Sep 2019 14:15 )   PT: 13.5 sec;   INR: 1.17 ratio         PTT - ( 10 Sep 2019 14:15 )  PTT:34.9 sec      Urinalysis Basic - ( 08 Sep 2019 23:07 )    Color: Yellow / Appearance: Slightly Turbid / S.014 / pH: x  Gluc: x / Ketone: Negative  / Bili: Negative / Urobili: Negative   Blood: x / Protein: 100 mg/dL / Nitrite: Negative   Leuk Esterase: Large / RBC: 22 /hpf / WBC 39 /HPF   Sq Epi: x / Non Sq Epi: 0 /hpf / Bacteria: Many        RADIOLOGY & ADDITIONAL TESTS:    Imaging Personally Reviewed:    Consultant(s) Notes Reviewed:  sx    Care Discussed with Consultants/Other Providers: sx

## 2019-09-10 NOTE — PROGRESS NOTE ADULT - ASSESSMENT
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  Repeat Of note, patient recently admitted [facility] for shortness of breath in the setting of hypoxic respiratory failure likely 2/2 pulmonary renal syndrome of rheumatologic etiology of vasculitis vs immune complex disease - serology was low C3/4, + p-ANCA, elevated ESR/CRP, anti- dsDNA), pauci-immune possibly 2/2 chronic hydralazine s/p Rituxan 8/23/19) was discharge on 8/24/19 on prednisone 60 mg po.    Nephrology consulted given KANIKA - on admission SCr 1.74 (prior SCr on d/c 08/2019 was 1.28) likely 2/2 hemodynamically meditated and anemia (hgb 8.3, baseline 9.0-9.5).  SCr improved w/ IVF and PRBC.  Today SCr 1.68. 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    Nephrology consulted given KANIKA - on admission SCr 1.74 (prior SCr on d/c 08/2019 was 1.28) likely 2/2 hemodynamically meditated and anemia (hgb 8.3, baseline 9.0-9.5).  SCr improved w/ IVF and PRBC.

## 2019-09-10 NOTE — CONSULT NOTE ADULT - PROBLEM SELECTOR RECOMMENDATION 3
KANIKA likely hemodynamically meditated in setting anemia , RP bleed w/ contributing factor   Check renal sono  c/w prednisone 60 mg po daily for Crescentic GN  avoid nephrotoxic agents (ACEI/ARB, NSAIDS), renally dose medications  -rend SCr

## 2019-09-10 NOTE — PROGRESS NOTE ADULT - PROBLEM SELECTOR PLAN 1
KANIKA likely hemodynamically meditated in setting anemia RP bleed w/ contributing factor diuretic lasix which she received yesterday. 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    PLAN:   -Scr stable, suggest to monitor scr, would need rituximab on  Thursday. Please contact [facility] charge nurse and schedule for thursday.  - c/w prednisone 60 mg po daily for Crescentic GN  - c/w hemodynamic support, PRBC maintain Hgb>7  - avoid nephrotoxic agents (ACEI/ARB, NSAIDS), renally dose medications  - trend SCr and UOP.  -Change entecavir to Q72  -PLEASE AVOID HYDRALAZINE THOUGHT TO BE THE CAUSE OF ANCA VASCULITIS. KANIKA likely hemodynamically meditated in setting anemia , RP bleed w/ contributing factor diuretic lasix  .  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    PLAN:   -Please repeat renal sonogram  -Scr stable, suggest to monitor scr, would need rituximab. Will d/w Rheum     - c/w prednisone 60 mg po daily for Crescentic GN  - c/w hemodynamic support, PRBC maintain Hgb>7  - avoid nephrotoxic agents (ACEI/ARB, NSAIDS), renally dose medications  - trend SCr and UOP.  -Maintain on  entecavir at Q72  -PLEASE AVOID HYDRALAZINE THOUGHT TO BE THE CAUSE OF ANCA VASCULITIS.

## 2019-09-10 NOTE — CHART NOTE - NSCHARTNOTEFT_GEN_A_CORE
Patient seen and examined at the bedside. Patient sitting in chair with lunch in front of her. Complains of abdominal after eating soup and jello. The pain is epigastric, non-radiating. She says the pain is a 4 out of 10. Her last bowel movement was yesterday. She has been tolerating a regular diet since 9/8. + flatus. Denies chest pain, jaw pain, left arm pain, SOB, nausea, vomiting, diarrhea, or fever.    Vital Signs: BP:133/88 HR:96 Temp:97.7 RR: 18 SpO2 96       Exam:  Gen: NAD, sitting in chair holding her abdomen  Lungs: equal chest rise, non-labored breathing  Chest: regular rate, irregular rhythm   Abd: soft, non-distended, tender to palpation in the epigastric region, no guarding or rebound tenderness    Plan:  - tylenol prn for pain  - maalox for dyspesia/ epigastric pain after eating  - discussed with ACS team Patient seen and examined at the bedside. Patient sitting in chair with lunch in front of her. Complains of abdominal after eating soup and jello. The pain is epigastric, non-radiating. She says the pain is a 4 out of 10. Her last bowel movement was yesterday. She has been tolerating a regular diet since 9/8. + flatus. Denies chest pain, jaw pain, left arm pain, SOB, nausea, vomiting, diarrhea, or fever.    Vital Signs: BP:133/88 HR:96 Temp:97.7 RR: 18 SpO2 96       Exam:  Gen: NAD, sitting in chair holding her abdomen  Lungs: equal chest rise, non-labored breathing  Chest: regular rate, irregular rhythm   Abd: soft, non-distended, tender to palpation in the epigastric region, no guarding or rebound tenderness    Plan:  - tylenol prn for pain  - discussed with ACS team

## 2019-09-10 NOTE — DISCHARGE NOTE PROVIDER - NSDCFUSCHEDAPPT_GEN_ALL_CORE_FT
[facility] ; 09/11/2019 ; NPP Cardio [facility] ; 09/23/2019 ; NPP [place] [facility] ; 09/24/2019 ; NPP Rheum [facility] ; 09/30/2019 ; NPP Med Pulm [facility] ; 10/10/2019 ; NPP Cardio [facility] ; 10/16/2019 ; NPP FamilyMed [facility] ; 11/14/2019 ; NPP HemOnc [facility]

## 2019-09-10 NOTE — PROGRESS NOTE ADULT - SUBJECTIVE AND OBJECTIVE BOX
TRAUMA SURGERY DAILY PROGRESS NOTE:       Interval: Transferred to floor. Afib with RVR overnight, did not resolved with 5mg IVP metopx2. Given morning  cadizem/lopressor -> repeat vitals with HR WNL. Left hip and femur xrays with now     SUBJECTIVE:   Patient feels well. Pain well controlled. Complains of left hip pain, improved from yesterday.  Denies chest pain, shortness of breath, HA, nausea, vomiting, fever chills.    OBJECTIVE:    MEDICATIONS  (STANDING):  atorvastatin 10 milliGRAM(s) Oral at bedtime  cloNIDine 0.1 milliGRAM(s) Oral two times a day  diltiazem    Tablet 30 milliGRAM(s) Oral every 8 hours  enoxaparin Injectable 30 milliGRAM(s) SubCutaneous daily  entecavir 0.5 milliGRAM(s) Oral every 72 hours  insulin lispro (HumaLOG) corrective regimen sliding scale   SubCutaneous three times a day before meals  insulin lispro (HumaLOG) corrective regimen sliding scale   SubCutaneous at bedtime  insulin lispro Injectable (HumaLOG) 6 Unit(s) SubCutaneous three times a day before meals  metoprolol tartrate 50 milliGRAM(s) Oral every 12 hours  montelukast 10 milliGRAM(s) Oral daily  pantoprazole    Tablet 40 milliGRAM(s) Oral before breakfast  predniSONE   Tablet 60 milliGRAM(s) Oral every 24 hours  sertraline 50 milliGRAM(s) Oral daily  trimethoprim  160 mG/sulfamethoxazole 800 mG 1 Tablet(s) Oral <User Schedule>    MEDICATIONS  (PRN):  ALBUTerol/ipratropium for Nebulization 3 milliLiter(s) Nebulizer every 6 hours PRN Shortness of Breath and/or Wheezing  ALPRAZolam 0.5 milliGRAM(s) Oral two times a day PRN anxiety      Vital Signs Last 24 Hrs  T(C): 37.1 (10 Sep 2019 09:40), Max: 37.1 (10 Sep 2019 09:40)  T(F): 98.7 (10 Sep 2019 09:40), Max: 98.7 (10 Sep 2019 09:40)  HR: 86 (10 Sep 2019 09:40) (82 - 122)  BP: 133/88 (10 Sep 2019 09:40) (133/88 - 174/113)  BP(mean): 125 (09 Sep 2019 17:00) (108 - 125)  RR: 18 (10 Sep 2019 09:40) (16 - 24)  SpO2: 95% (10 Sep 2019 09:40) (94% - 97%)      I&O's Detail    09 Sep 2019 07:01  -  10 Sep 2019 07:00  --------------------------------------------------------  IN:    Oral Fluid: 850 mL    Packed Red Blood Cells: 350 mL  Total IN: 1200 mL    OUT:    Voided: 600 mL  Total OUT: 600 mL    Total NET: 600 mL      10 Sep 2019 07:01  -  10 Sep 2019 10:44  --------------------------------------------------------  IN:    Oral Fluid: 240 mL  Total IN: 240 mL    OUT:    Voided: 100 mL  Total OUT: 100 mL    Total NET: 140 mL      LABS:                        9.8    15.7  )-----------( 210      ( 10 Sep 2019 03:07 )             30.2     09-10    134<L>  |  97  |  46<H>  ----------------------------<  183<H>  4.4   |  25  |  1.95<H>    Ca    9.1      10 Sep 2019 09:10  Phos  3.2     09-10  Mg     2.0     -10      PT/INR - ( 09 Sep 2019 01:11 )   PT: 12.9 sec;   INR: 1.12 ratio         PTT - ( 09 Sep 2019 01:11 )  PTT:35.6 sec  Urinalysis Basic - ( 08 Sep 2019 23:07 )    Color: Yellow / Appearance: Slightly Turbid / S.014 / pH: x  Gluc: x / Ketone: Negative  / Bili: Negative / Urobili: Negative   Blood: x / Protein: 100 mg/dL / Nitrite: Negative   Leuk Esterase: Large / RBC: 22 /hpf / WBC 39 /HPF   Sq Epi: x / Non Sq Epi: 0 /hpf / Bacteria: Many    PHYSICAL EXAM:  Constitutional: well developed, well nourished, NAD  ENMT: normal facies, symmetric  Respiratory: Normal respiratory effort   Gastrointestinal: abdomen soft, nondistended. Left flank mildly tender to palpation.  Musculoskeletal: equal strength bilateral upper and lower extremities. Left hip/proximal thigh tender to palpation.   Psychiatric: oriented x 3; appropriate

## 2019-09-10 NOTE — CONSULT NOTE ADULT - NSHPATTENDINGPLANDISCUSS_GEN_ALL_CORE
Patient seen and examined; discussed with cardiology student and fellow. Hx., exam and labs as above.  I agree with the assessment and recommendations.
SICU team

## 2019-09-10 NOTE — CONSULT NOTE ADULT - ASSESSMENT
84 year old F with a PMH of lymphoma/MGUS, A fib on coumadin, CHF, HLD, HTN, COPD, anxiety, depression, admitted for retroperitoneal hemorrhage following a mechanical fall, cardiology consulted for management of high blood pressures and a fib.     # hypertension  Hypertension overnight to 178/113, previously on atenolol, clonidine, hydralazine and diltiazem   - avoid hydralazine due to concern for SLE-like syndrome    - Restart clonidine, BP coming down to baseline   - Increase metoprolol to 100 bid   - Continue diltiazem     #atrial fib  known history of a fib, well controlled with atenolol and coumadin   - hold anticoagulant in setting of RP bleed, will restart once bleeding is stabilized  -Currently rate controlled, HR 86, continue metoprolol     #pulmonary hypertension   Unlikely group II pulmonary hypertension as LV is good  -Continue Lasix once KANIKA resolves and as per renal recs.   - Consider RHC when optimized, can be done outpatient     Plan d/w fellow 84 year old F with a PMH of lymphoma/MGUS, A fib on coumadin, CHF, HLD, HTN, COPD, anxiety, depression, admitted for retroperitoneal hemorrhage following a mechanical fall, cardiology consulted for management of high blood pressures and a fib.     # hypertension  Hypertension overnight to 178/113, previously on clonidine and hydralazine   - avoid hydralazine due to concern for SLE-like syndrome    - Restart clonidine, BP coming down to baseline       #atrial fib  Known history of a fib, well controlled with atenolol and coumadin   - hold anticoagulant in setting of RP bleed, will restart once bleeding is stabilized  -Currently rate controlled, HR 86   - Increase metoprolol to 100 bid   - Continue diltiazem     #pulmonary hypertension   Unlikely group II pulmonary hypertension as LV function normal   -Continue Lasix once KANIKA resolves and as per renal recs.   - Consider RHC when optimized, can be done outpatient     Plan d/w fellow 84 year old F with a PMH of lymphoma/MGUS, A fib on coumadin, CHF, HLD, HTN, COPD, anxiety, depression.  Admitted for retroperitoneal hemorrhage following a mechanical fall.  Cardiology consulted for management of high blood pressure and a fib.       REC:  #1.  Hypertension.  Prior TTE c/w hypertensive heart disease with diastolic LV dyfunction.  Hypertension overnight to 178/113, previously on clonidine and hydralazine   - avoid hydralazine due to concern for SLE-like syndrome    - Restart clonidine  - BP currently improving to baseline.     #2. Chronic atrial fib, rate had been well controlled with atenolol  - hold anticoagulant in setting of RP bleed, will restart once bleeding is stabilized  - Currently rate controlled, HR 86; can increase metoprolol to 100 bid   - Continue diltiazem     #3.  Pulmonary hypertension   LV systolic function normal, although hypertensive dz./diastolic dysfunction could be a contributing factor.   - Continue Lasix once KANIKA resolves and as per renal recs.   - Consider RHC when optimized, can be done outpatient     #4.  Retroperitoneal bleed  - continue to hold A/C  - will contact Dr. Dobbins, patient's cardiologist, re ongoing A/C; ?consider NOAC?      Jenny Rodriguez  Medical Student - 4th Year    Terry Mackay M.D.  Cardiology Attending, Consult Service  [phone]

## 2019-09-10 NOTE — CONSULT NOTE ADULT - ASSESSMENT
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  Repeat Of note, patient recently admitted [facility] for shortness of breath in the setting of hypoxic respiratory failure likely 2/2 pulmonary renal syndrome of rheumatologic etiology of vasculitis vs immune complex disease - serology was low C3/4, + p-ANCA, elevated ESR/CRP, anti- dsDNA), pauci-immune possibly 2/2 chronic hydralazine s/p Rituxan 8/23/19) was discharge on 8/24/19 on prednisone 60 mg po. Pt now stable on the floor.

## 2019-09-10 NOTE — PROGRESS NOTE ADULT - ASSESSMENT
84 year old woman with extensive PMHx (CHF, HTN, HLD, A.fib on coumadin, lymphoma) being worked up as outpatient for glomerulonephritis (had left renal biopsy on 8/20/19) originally admitted to [facility] s/p fall and left psoas bleeding found on CT imaging (in setting of INR=4).  While at [facility], repeat CT imaging showing dramatic increase in psoas / retroperitoneal bleeding.  Transferred to [facility] SICU for management, now on floor.     -Regular diet   -dvt ppx   -PPI  -consult cardiology   -restart home clonidine   -Entecavir 0.5 mg daily   -rheumatology, hematology and nephrology recs  -Left RP bleed currently stable, IR embolization if clinically deteriorates   -bactrim three times weekly for PJP ppx  -f/u afternoon CBC   -f/u pTT/INR   -holding coumadin     ACS [id number]

## 2019-09-10 NOTE — CONSULT NOTE ADULT - SUBJECTIVE AND OBJECTIVE BOX
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 Presents now to [facility] after a fall from standing. Found to have left RP bleed in the setting of coumadin use and supratheraputic INR. History obtained from Son at the bedside. He states they were getting out the car today when she fell and landed on her left side and hit her head. She did not lose consciousness or black out.    In the Er CT scans of head, abdomen and pelvis were done. CT head and neck without acute pathology. CT pelvis with small left RP bleed. INR 4. hemoglobin 8.3 down from 9.6 about 2 weeks ago. Hemodynamically stable. Patient alert.      At [facility] ICU, pt received 1 unit PRBC, FFP, and vitamin K. Hb initially responded to the 1 unit however, dropped again, leading to a repeat CT, which showed worsening of RP bleed w/ displacement of kidney.   Prior to transfer, pt received Kcentra, additional FFP, additional 1unit pRBC, and vitamin K. A R femoral TLC was placed.    Pt transferred to [facility] SICU for increased size of RP, hemodynamic monitoring, and possible IR intervention.   Transfused 1unit pRBC with repeat H&H 8.4/26.5 (07 Sep 2019 03:46)    She was stabilized at [facility] and transferred from SICU to the floors. Blood pressures remained stable until last night when her blood pressure increased to about 174/113. She was given one dose of hydralazine this morning. Patient denies any headache or change in vision. She states that she was sleeping when the nurses woke her up to take her blood pressure. She did not have any symptoms during this time. The patient was previously on clonidine which was tapered and the hydralazine was stopped due to concern for SLE two weeks prior to current admission. Her son states that he stopped giving her diltiazem about two weeks ago after last discharge.       PMHx:   COPD (chronic obstructive pulmonary disease)  Peripheral vascular disease  Pulmonary hypertension  Rheumatoid arthritis  Osteoarthritis  Mitral regurgitation  H/O lymphoma  Hyperlipidemia  Chronic GERD  Chronic kidney disease  AF (atrial fibrillation)  Anemia in CKD (chronic kidney disease)  CHF (congestive heart failure)  HTN (hypertension)  No pertinent past medical history      PSHx:   History of total hip replacement, left  History of total knee replacement, bilateral      Allergies:  Keflex (Unknown)  penicillin (Rash)      Home Meds:   atorvastatin 10 mg oral tablet: 1 tab(s) orally once a day (13 Aug 2019 19:10)  cloNIDine 0.1 mg oral tablet: 1 tab(s) orally 2 times a day (25 Aug 2019 12:04)  fluticasone propionate 55 mcg/inh inhalation powder: 1 puff(s) inhaled every 12 hours, As Needed (13 Aug 2019 19:10)  furosemide 20 mg oral tablet: 1 tab(s) orally once a day (25 Aug 2019 12:04)  ipratropium-albuterol 0.5 mg-2.5 mg/3 mLinhalation solution: 3 milliliter(s) inhaled 4 times a day, As Needed (13 Aug 2019 19:10)  montelukast 10 mg oral tablet: 1 tab(s) orally once a day (13 Aug 2019 19:10)  pantoprazole 40 mg oral delayed release tablet: 1 tab(s) orally once a day (before a meal) (25 Aug 2019 12:04)  sertraline 50 mg oral tablet: 1 tab(s) orally once a day (13 Aug 2019 19:10)      Current Medications:   ALBUTerol/ipratropium for Nebulization 3 milliLiter(s) Nebulizer every 6 hours PRN  ALPRAZolam 0.5 milliGRAM(s) Oral two times a day PRN  atorvastatin 10 milliGRAM(s) Oral at bedtime  cloNIDine 0.1 milliGRAM(s) Oral two times a day  diltiazem    Tablet 30 milliGRAM(s) Oral every 8 hours  enoxaparin Injectable 30 milliGRAM(s) SubCutaneous daily  entecavir 0.5 milliGRAM(s) Oral every 72 hours  insulin lispro (HumaLOG) corrective regimen sliding scale   SubCutaneous three times a day before meals  insulin lispro (HumaLOG) corrective regimen sliding scale   SubCutaneous at bedtime  insulin lispro Injectable (HumaLOG) 6 Unit(s) SubCutaneous three times a day before meals  metoprolol tartrate 50 milliGRAM(s) Oral every 12 hours  montelukast 10 milliGRAM(s) Oral daily  pantoprazole    Tablet 40 milliGRAM(s) Oral before breakfast  predniSONE   Tablet 60 milliGRAM(s) Oral every 24 hours  sertraline 50 milliGRAM(s) Oral daily  trimethoprim  160 mG/sulfamethoxazole 800 mG 1 Tablet(s) Oral <User Schedule>      FAMILY HISTORY:  Family history of inclusion body myositis      Social History:  Smoking History: denies  Alcohol Use: denies  Drug Use: denies    REVIEW OF SYSTEMS:  CONSTITUTIONAL: No weakness, fevers or chills  EYES/ENT: No visual changes;  No dysphagia  NECK: No pain or stiffness  RESPIRATORY: No cough, wheezing, hemoptysis; No shortness of breath  CARDIOVASCULAR: No chest pain or palpitations; No lower extremity edema  GASTROINTESTINAL: No abdominal or epigastric pain. No nausea, vomiting, or hematemesis; No diarrhea or constipation. No melena or hematochezia.  BACK: No back pain  GENITOURINARY: No dysuria, frequency or hematuria  NEUROLOGICAL: No numbness or weakness  SKIN: No itching, burning, rashes, or lesions   All other review of systems is negative unless indicated above.    Physical Exam:  T(F): 98.7 (09-10), Max: 98.7 (09-10)  HR: 86 (09-10) (82 - 122)  BP: 133/88 (09-10) (133/88 - 174/113)  RR: 18 (09-10)  SpO2: 95% (09-10)  GENERAL: No acute distress, well-developed  HEAD:  Atraumatic, Normocephalic  ENT: EOMI, PERRLA, conjunctiva and sclera clear, Neck supple, No JVD, moist mucosa  CHEST/LUNG: Clear to auscultation bilaterally; No wheeze, equal breath sounds bilaterally   BACK: No spinal tenderness  HEART: Irregular rhythm, rate is controlled and remains wnl ; No murmurs, rubs, or gallops  ABDOMEN: Soft, Nontender, Nondistended; Bowel sounds present  EXTREMITIES:  noticeable swelling in arms bl  PSYCH: Nl behavior, nl affect  NEUROLOGY: AAOx3, non-focal, cranial nerves intact  SKIN: Normal color, multiple bruises noted on the arms   LINES:      ECG: Personally reviewed: < from: 12 Lead ECG (09.06.19 @ 05:02) >  Ventricular Rate 95 BPM    Atrial Rate 102 BPM    QRS Duration 98 ms    Q-T Interval 360 ms    QTC Calculation(Bezet) 452 ms    R Axis 28 degrees    T Axis -53 degrees    Diagnosis Line Atrial fibrillation  Incomplete right bundle branch block  Nonspecific ST and T wave abnormality  Abnormal ECG    < end of copied text >      Echo: Personally reviewed:     <  TTE Echo w/Cont Complete (08.11.19 @ 07:48) >   1. Normal global left ventricular systolic function.   2. Spectral Doppler shows restrictive pattern of left ventricular   myocardial filling (Grade III diastolic dysfunction).   3. Mild mitral annular calcification.   4. Mild thickening of the anterior and posterior mitral valve leaflets.   5. Moderate-severe tricuspid regurgitation.   6. Mild aortic regurgitation.   7. Estimated pulmonary artery systolic pressure is 72.0 mmHg assuming a   right atrial pressure of 10 mmHg, which is consistent with severe   pulmonary hypertension.   8. LA volume Index is 67.2 ml/m² ml/m2.   9. Peak transaortic gradient equals 27.9 mmHg, mean transaortic gradient   equals 15.2 mmHg, the calculated aortic valve area equals 1.50 cm² by the   continuity equation consistent with mild aortic stenosis.    < end of copied text >      CXR: Personally reviewed < from: Xray Chest 1 View- PORTABLE-Routine (09.08.19 @ 06:45) >  The heart is slightly enlarged. The lungs appear to be clear. Calcified   aortic knob. Degenerative changes of the thoracic spine.    < end of copied text >      Labs: Personally reviewed                        9.8    15.7  )-----------( 210      ( 10 Sep 2019 03:07 )             30.2     09-10    134<L>  |  97  |  46<H>  ----------------------------<  183<H>  4.4   |  25  |  1.95<H>    Ca    9.1      10 Sep 2019 09:10  Phos  3.2     09-10  Mg     2.0     09-10      PT/INR - ( 09 Sep 2019 01:11 )   PT: 12.9 sec;   INR: 1.12 ratio         PTT - ( 09 Sep 2019 01:11 )  PTT:35.6 sec All Cardiology service information can be found 24/7 on [email or web address], password: [email or web address]        Patient seen and evaluated at bedside    Chief Complaint: unwitnessed fall    HPI:  83 y/o F w/ a PMHx of lymphoma/MGUS, A fib on Coumadin, CHF, HLD, HTN, COPD.  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     Presented to [facility] after a fall, and found to have left RP bleed in the setting of Coumadin use and supra-theraputic INR.   Son stated she was getting out the car when she fell and landed on her left side and hit her head. She did not lose consciousness or black out.  In the ED, CT scans of head, abdomen and pelvis were done. CT head and neck without acute pathology. CT pelvis with small left RP bleed. INR was 4, hemoglobin was 8.3, down from 9.6 about 2 weeks ago.  Patient was hemodynamically stable & alert in ED.  Admitted to ICU at [place]; received 1 unit PRBC, FFP, and vitamin K. Hb initially responded to the 1 unit however, dropped again, leading to a repeat CT, which showed worsening of RP bleed w/ displacement of kidney.  Pt received Kcentra, additional FFP, additional 1unit pRBC, and vitamin K and was transferred to [facility] SICU for possible IR intervention.       She stabilized at [facility] and was recently transferred from SICU to the floor. Blood pressures remained stable until last night, when her blood pressure increased to about 174/113. She was given one dose of hydralazine this morning. Patient denies any headache or change in vision. She states that she was sleeping when the nurses woke her up to take her blood pressure. The patient was previously on clonidine which was tapered and the hydralazine was stopped due to concern for SLE, two weeks prior to current admission. Her son states that he stopped giving her diltiazem about two weeks ago after last discharge.       PMHx:   COPD (chronic obstructive pulmonary disease)  Peripheral vascular disease  Pulmonary hypertension  Rheumatoid arthritis  Osteoarthritis  Mitral regurgitation  H/O lymphoma  Hyperlipidemia  Chronic GERD  Chronic kidney disease  AF (atrial fibrillation)  Anemia in CKD (chronic kidney disease)  CHF (congestive heart failure)  HTN (hypertension)    PSHx:   History of total hip replacement, left  History of total knee replacement, bilateral    Allergies:  Keflex (Unknown)  penicillin (Rash)    Home Meds:   atorvastatin 10 mg oral tablet: 1 tab(s) orally once a day (13 Aug 2019 19:10)  cloNIDine 0.1 mg oral tablet: 1 tab(s) orally 2 times a day (25 Aug 2019 12:04)  fluticasone propionate 55 mcg/inh inhalation powder: 1 puff(s) inhaled every 12 hours, As Needed (13 Aug 2019 19:10)  furosemide 20 mg oral tablet: 1 tab(s) orally once a day (25 Aug 2019 12:04)  ipratropium-albuterol 0.5 mg-2.5 mg/3 mLinhalation solution: 3 milliliter(s) inhaled 4 times a day, As Needed (13 Aug 2019 19:10)  montelukast 10 mg oral tablet: 1 tab(s) orally once a day (13 Aug 2019 19:10)  pantoprazole 40 mg oral delayed release tablet: 1 tab(s) orally once a day (before a meal) (25 Aug 2019 12:04)  sertraline 50 mg oral tablet: 1 tab(s) orally once a day (13 Aug 2019 19:10)      Current Medications:   ALBUTerol/ipratropium for Nebulization 3 milliLiter(s) Nebulizer every 6 hours PRN  ALPRAZolam 0.5 milliGRAM(s) Oral two times a day PRN  atorvastatin 10 milliGRAM(s) Oral at bedtime  cloNIDine 0.1 milliGRAM(s) Oral two times a day  diltiazem    Tablet 30 milliGRAM(s) Oral every 8 hours  enoxaparin Injectable 30 milliGRAM(s) SubCutaneous daily  entecavir 0.5 milliGRAM(s) Oral every 72 hours  insulin lispro (HumaLOG) corrective regimen sliding scale   SubCutaneous three times a day before meals  insulin lispro (HumaLOG) corrective regimen sliding scale   SubCutaneous at bedtime  insulin lispro Injectable (HumaLOG) 6 Unit(s) SubCutaneous three times a day before meals  metoprolol tartrate 50 milliGRAM(s) Oral every 12 hours  montelukast 10 milliGRAM(s) Oral daily  pantoprazole    Tablet 40 milliGRAM(s) Oral before breakfast  predniSONE   Tablet 60 milliGRAM(s) Oral every 24 hours  sertraline 50 milliGRAM(s) Oral daily  trimethoprim  160 mG/sulfamethoxazole 800 mG 1 Tablet(s) Oral <User Schedule>      FAMILY HISTORY:  Family history of inclusion body myositis      Social History:  Smoking History: denies  Alcohol Use: denies  Drug Use: denies    REVIEW OF SYSTEMS:  CONSTITUTIONAL: No weakness, fevers or chills  EYES/ENT: No visual changes;  No dysphagia  NECK: No pain or stiffness  RESPIRATORY: No cough, wheezing, hemoptysis; No shortness of breath  CARDIOVASCULAR: No chest pain or palpitations; No lower extremity edema  GASTROINTESTINAL: No abdominal or epigastric pain. No nausea, vomiting, or hematemesis; No diarrhea or constipation. No melena or hematochezia.  BACK: No back pain  GENITOURINARY: No dysuria, frequency or hematuria  NEUROLOGICAL: No numbness or weakness  SKIN: No itching, burning, rashes, or lesions   All other review of systems is negative unless indicated above.    Physical Exam:  T(F): 98.7 (09-10), Max: 98.7 (09-10)  HR: 86 (09-10) (82 - 122)  BP: 133/88 (09-10) (133/88 - 174/113)  RR: 18 (09-10)  SpO2: 95% (09-10)  GENERAL: No acute distress, well-developed  HEAD:  Atraumatic, Normocephalic  ENT: EOMI, PERRLA, conjunctiva and sclera clear, Neck supple, No JVD, moist mucosa  CHEST/LUNG: Clear to auscultation bilaterally; No wheeze, equal breath sounds bilaterally   BACK: No spinal tenderness  HEART: Irregular rhythm, rate is controlled and remains wnl ; No murmurs, rubs, or gallops  ABDOMEN: Soft, Nontender, Nondistended; Bowel sounds present  EXTREMITIES:  noticeable swelling in arms bl  PSYCH: Nl behavior, nl affect  NEUROLOGY: AAOx3, non-focal, cranial nerves intact  SKIN: Normal color, multiple bruises noted on the arms       12 Lead ECG (09.06.19 @ 05:02)   A. fib with ventricular Rate 95 BPM   QRS Duration 98 ms, Q-T Interval 360 ms   Axis 28 degrees   Incomplete right bundle branch block, non-specific ST and T wave abnormality      TTE Echo w/Cont Complete (08.11.19 @ 07:48) >   1. Normal global left ventricular systolic function.   2. Spectral Doppler shows restrictive pattern of left ventricular myocardial filling (Grade III diastolic dysfunction).   3. Mild mitral annular calcification.   4. Mild thickening of the anterior and posterior mitral valve leaflets.   5. Moderate-severe tricuspid regurgitation.   6. Mild aortic regurgitation.   7. Estimated pulmonary artery systolic pressure is 72.0 mmHg assuming a right atrial pressure of 10 mmHg, which is consistent with severe pulmonary hypertension.   8. LA volume Index is 67.2 ml/m² ml/m2.   9. Peak transaortic gradient equals 27.9 mmHg, mean transaortic gradient  equals 15.2 mmHg, the calculated aortic valve area equals 1.50 cm² by the  continuity equation consistent with mild aortic stenosis.      Xray Chest 1 View- PORTABLE-Routine (09.08.19 @ 06:45)   The heart is slightly enlarged. The lungs appear to be clear. Calcified  aortic knob. Degenerative changes of the thoracic spine.      Labs:                       9.8    15.7  )-----------( 210      ( 10 Sep 2019 03:07 )             30.2     09-10  134<L>  |  97  |  46<H>  ----------------------------<  183<H>  4.4   |  25  |  1.95<H>    Ca    9.1      10 Sep 2019 09:10  Phos  3.2     09-10  Mg     2.0     09-10      PT/INR - ( 09 Sep 2019 01:11 )   PT: 12.9 sec;   INR: 1.12 ratio    PTT - ( 09 Sep 2019 01:11 )  PTT:35.6 sec

## 2019-09-10 NOTE — CONSULT NOTE ADULT - PROBLEM SELECTOR RECOMMENDATION 2
Hypertension overnight to 178/113, previously on clonidine and hydralazine  likely in the setting of steroids    avoid hydralazine due to concern for SLE-like syndrome  resume clonidine   Cw cardizem 30mg q8 and lopressor 100mg q12

## 2019-09-10 NOTE — CHART NOTE - NSCHARTNOTEFT_GEN_A_CORE
Nurse called about tachycardia    pt seen and examined at bedside. c/o palpitation. denies dizziness, SOB, CP    Vitals  HR 80's-120's. /101, O2sat 95% on RA. RR 18    PE  Gen: NAD, alert  Heart: irregular rhythm, tachycardic  Pulm: lungs are clear  abd: soft, ND/NT    ECG: A fib with RVR    Plan   Chronic A fib with RVR: metoprolol 5 mg IVP, HR persistently 110's. /98. will repeat 2nd dose of metoprolol 5 mg IVP. continue monitor vitals.   sent CBC/BMP/Mag/phos, will f/u results.     d/w Dr. Cody Rea PA-C

## 2019-09-10 NOTE — PROGRESS NOTE ADULT - SUBJECTIVE AND OBJECTIVE BOX
[facility] DIVISION OF KIDNEY DISEASES AND HYPERTENSION -- FOLLOW UP NOTE  --------------------------------------------------------------------------------  Chief Complaint:  Fall    24 hour events/subjective:  Examined at bed side, awake not in distress, bp elevated overnight, 600cc of UOP. Received 10 mg of metoprolol and 10 mg of hydralazine IV. Denied cp/sob/n/v/abdominal pain.     PAST HISTORY  --------------------------------------------------------------------------------  No significant changes to PMH, PSH, FHx, SHx, unless otherwise noted    ALLERGIES & MEDICATIONS  --------------------------------------------------------------------------------  Allergies    Keflex (Unknown)  penicillin (Rash)    Intolerances      Standing Inpatient Medications  atorvastatin 10 milliGRAM(s) Oral at bedtime  cloNIDine 0.1 milliGRAM(s) Oral two times a day  diltiazem    Tablet 30 milliGRAM(s) Oral every 8 hours  enoxaparin Injectable 30 milliGRAM(s) SubCutaneous daily  entecavir 0.5 milliGRAM(s) Oral every 72 hours  insulin lispro (HumaLOG) corrective regimen sliding scale   SubCutaneous three times a day before meals  insulin lispro (HumaLOG) corrective regimen sliding scale   SubCutaneous at bedtime  insulin lispro Injectable (HumaLOG) 6 Unit(s) SubCutaneous three times a day before meals  metoprolol tartrate 50 milliGRAM(s) Oral every 12 hours  montelukast 10 milliGRAM(s) Oral daily  pantoprazole    Tablet 40 milliGRAM(s) Oral before breakfast  predniSONE   Tablet 60 milliGRAM(s) Oral every 24 hours  sertraline 50 milliGRAM(s) Oral daily  trimethoprim  160 mG/sulfamethoxazole 800 mG 1 Tablet(s) Oral <User Schedule>    PRN Inpatient Medications  ALBUTerol/ipratropium for Nebulization 3 milliLiter(s) Nebulizer every 6 hours PRN  ALPRAZolam 0.5 milliGRAM(s) Oral two times a day PRN      REVIEW OF SYSTEMS  --------------------------------------------------------------------------------  Gen: No fatigue, fevers/chills, weakness  Skin: No rashes  Respiratory: No dyspnea, cough, wheezing, hemoptysis  CV: No chest pain  GI: + nausea.  No abdominal pain, diarrhea, constipation, vomiting, melena, hematochezia  : No increased frequency, dysuria, hematuria  MSK: + edema  Neuro: No dizziness/lightheadedness, weakness  VITALS/PHYSICAL EXAM  --------------------------------------------------------------------------------  T(C): 36.6 (09-10-19 @ 01:35), Max: 36.9 (09-09-19 @ 18:14)  HR: 98 (09-10-19 @ 08:25) (82 - 122)  BP: 147/85 (09-10-19 @ 08:25) (144/73 - 174/113)  RR: 16 (09-10-19 @ 06:54) (16 - 24)  SpO2: 97% (09-10-19 @ 06:54) (94% - 97%)  Wt(kg): --        09-09-19 @ 07:01  -  09-10-19 @ 07:00  --------------------------------------------------------  IN: 1200 mL / OUT: 600 mL / NET: 600 mL    09-10-19 @ 07:01  -  09-10-19 @ 09:47  --------------------------------------------------------  IN: 0 mL / OUT: 100 mL / NET: -100 mL      Physical Exam:  	Gen: NAD, well-appearing  	HEENTsupple neck, clear oropharynx  	Pulm:: Crackles.   	CV: irregular irregular, S1S2; no murmur  	Abd: +BS, soft, nontender/nondistended  	LE: Warm, intact strength; no edema  	Skin: Warm, without rashes      LABS/STUDIES  --------------------------------------------------------------------------------              9.8    15.7  >-----------<  210      [09-10-19 @ 03:07]              30.2     134  |  97  |  46  ----------------------------<  183      [09-10-19 @ 09:10]  4.4   |  25  |  1.95        Ca     9.1     [09-10-19 @ 09:10]      Mg     2.0     [09-10-19 @ 09:10]      Phos  3.2     [09-10-19 @ 09:10]      PT/INR: PT 12.9 , INR 1.12       [09-09-19 @ 01:11]  PTT: 35.6       [09-09-19 @ 01:11]      Creatinine Trend:  SCr 1.95 [09-10 @ 09:10]  SCr 2.07 [09-10 @ 03:07]  SCr 2.16 [09-09 @ 01:11]  SCr 1.89 [09-08 @ 14:19]  SCr 1.68 [09-08 @ 02:18]    Urinalysis - [09-08-19 @ 23:07]      Color Yellow / Appearance Slightly Turbid / SG 1.014 / pH 5.5      Gluc Negative / Ketone Negative  / Bili Negative / Urobili Negative       Blood Large / Protein 100 mg/dL / Leuk Est Large / Nitrite Negative      RBC 22 / WBC 39 / Hyaline 3 / Gran  / Sq Epi  / Non Sq Epi 0 / Bacteria Many      Iron 25, TIBC 302, %sat 8      [08-13-19 @ 05:30]  Ferritin 687      [08-13-19 @ 05:30]  Vitamin D (25OH) 23.3      [08-16-19 @ 06:50]  HbA1c 5.6      [08-13-19 @ 05:30]    HBsAg Nonreactive      [08-15-19 @ 06:45]  HBcAb Reactive      [08-15-19 @ 06:45]  HCV 0.28, Nonreactive Hepatitis C AB  S/CO Ratio                        Interpretation  < 1.00                                   Non-Reactive  1.00 - 4.99                         Weakly-Reactive  >= 5.00                                Reactive  Non-Reactive: Aperson with a non-reactive HCV antibody  result is considered uninfected.  No further action is  needed unless recent infection is suspected.  In these  cases, consider repeat testing later to detect  seroconversion..  Weakly-Reactive: HCV antibody test is abnormal, HCV RNA  Qualitative test will follow.  Reactive: HCV antibody test is abnormal, HCV RNA  Qualitative test will follow.  Note: HCV antibody testing is performed on the [employer]   system.      [08-15-19 @ 06:45]  HIV Nonreact      [08-11-19 @ 16:15]    IVAN: titer 1:640, pattern Homogeneous      [08-11-19 @ 16:15]  dsDNA 435      [08-15-19 @ 06:45]  C3 Complement 76      [08-12-19 @ 11:00]  C4 Complement 10      [08-12-19 @ 11:00]  ANCA: cANCA Negative, pANCA >1:1280, atypical ANCA Negative      [08-11-19 @ 16:15]  MPO-ANCA 96.2, interpretation: Positive      [08-11-19 @ 16:15]  PR3-ANCA 5.1, interpretation: Negative      [08-11-19 @ 16:15]  anti-GBM <1.0      [08-15-19 @ 06:45]  Free Light Chains: kappa 2.67, lambda 2.76, ratio = 0.97      [08-22 @ 06:14]  Immunofixation Serum:   Weak IgM Lambda Band Identified    Reference Range: None Detected      [08-13-19 @ 05:30]  SPEP Interpretation: Weak Gamma-Migrating Paraprotein Identified      [08-13-19 @ 05:30]  Cryoglobulin: 0      [08-21-19 @ 06:44]

## 2019-09-11 NOTE — PROGRESS NOTE ADULT - PROBLEM SELECTOR PLAN 7
c/w prednisone   d/w rheum plan for rituxan ?today vs outpatient follow up, pending final recs c/w prednisone   d/w rheum hold rituxan in the setting of above infectious workup

## 2019-09-11 NOTE — PROGRESS NOTE ADULT - ASSESSMENT
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 Pt now stable on the floor.

## 2019-09-11 NOTE — PROVIDER CONTACT NOTE (OTHER) - ASSESSMENT
Pt rigors. Temperature reads 99.2F orally, rectally same temperature 99.2 All other VSS. Pending UA.

## 2019-09-11 NOTE — PROGRESS NOTE ADULT - ATTENDING COMMENTS
D/w son at bedside updated on full plan of care D/w son at bedside updated on full plan of care  Transfer to medicine D/w son at bedside updated on full plan of care  Transfer to medicine  Endorsed to NP to follow labs, imaging and consults

## 2019-09-11 NOTE — CONSULT NOTE ADULT - REASON FOR ADMISSION
Unwitnessed fall

## 2019-09-11 NOTE — PROGRESS NOTE ADULT - NSHPATTENDINGPLANDISCUSS_GEN_ALL_CORE
Plan discussed with cardiology fellow, Dr. Thomason; patient seen and examined.       I was physically present for the key portions of the evaluation and management (E/M) service provided.    I agree with the above history, physical, and plan which I have reviewed and edited where appropriate.

## 2019-09-11 NOTE — PROGRESS NOTE ADULT - ASSESSMENT
Assessment and Plan:   · Assessment		  84 year old woman with extensive PMHx (CHF, HTN, HLD, A.fib on coumadin, lymphoma) being worked up as outpatient for glomerulonephritis (had left renal biopsy on 8/20/19) originally admitted to [facility] s/p fall and left psoas bleeding found on CT imaging (in setting of INR=4).  While at [place], repeat CT imaging showing dramatic increase in psoas / retroperitoneal bleeding.  Transferred to [facility] SICU for management, now on floor.     -f/u am labs   -Regular diet   -dvt ppx   -PPI  -f/u cards recs  -Entecavir 0.5 mg daily   -rheumatology, hematology and nephrology recs  - per rheum, will administer rituximab   -bactrim three times weekly for PJP ppx  -f/u afternoon CBC   -f/u pTT/INR   -holding coumadin  -avoid hydralizine    -holding home lasix, trending cr     ACS [id number]

## 2019-09-11 NOTE — PROGRESS NOTE ADULT - ATTENDING COMMENTS
patient seen and examined by me personally; agree with above   case d/w Hospitalist  we are concerned about infection   hold rituxan for now   will follow

## 2019-09-11 NOTE — PROGRESS NOTE ADULT - SUBJECTIVE AND OBJECTIVE BOX
ACS SURGERY DAILY PROGRESS NOTE:       Interval: Yesterday afib with RVR, cardiology consulted, metoprolol increased from 50mg BID to 100mg BID. WBC 15-19.7, cxr and ua pending. Afebrile. Restarted home clonidine. Rheumatology planning rituximab today if pt stable.      SUBJECTIVE:     Patient feels well. Pain well controlled.  Denies chest pain, shortness of breath, nausea, vomiting, fever chills.     OBJECTIVE:    MEDICATIONS  (STANDING):  atorvastatin 10 milliGRAM(s) Oral at bedtime  cloNIDine 0.1 milliGRAM(s) Oral two times a day  diltiazem    Tablet 30 milliGRAM(s) Oral every 8 hours  enoxaparin Injectable 30 milliGRAM(s) SubCutaneous daily  entecavir 0.5 milliGRAM(s) Oral every 72 hours  insulin lispro (HumaLOG) corrective regimen sliding scale   SubCutaneous three times a day before meals  insulin lispro (HumaLOG) corrective regimen sliding scale   SubCutaneous at bedtime  insulin lispro Injectable (HumaLOG) 6 Unit(s) SubCutaneous three times a day before meals  metoprolol tartrate 100 milliGRAM(s) Oral two times a day  montelukast 10 milliGRAM(s) Oral daily  pantoprazole    Tablet 40 milliGRAM(s) Oral before breakfast  predniSONE   Tablet 60 milliGRAM(s) Oral every 24 hours  senna 2 Tablet(s) Oral at bedtime  sertraline 50 milliGRAM(s) Oral daily  trimethoprim  160 mG/sulfamethoxazole 800 mG 1 Tablet(s) Oral <User Schedule>    MEDICATIONS  (PRN):  acetaminophen   Tablet .. 650 milliGRAM(s) Oral every 6 hours PRN Mild Pain (1 - 3)  ALBUTerol/ipratropium for Nebulization 3 milliLiter(s) Nebulizer every 6 hours PRN Shortness of Breath and/or Wheezing  ALPRAZolam 0.5 milliGRAM(s) Oral two times a day PRN anxiety      Vital Signs Last 24 Hrs  T(C): 36.8 (11 Sep 2019 04:25), Max: 37.1 (10 Sep 2019 09:40)  T(F): 98.2 (11 Sep 2019 04:25), Max: 98.8 (10 Sep 2019 16:59)  HR: 81 (11 Sep 2019 04:25) (76 - 98)  BP: 124/86 (11 Sep 2019 04:25) (124/86 - 147/93)  BP(mean): --  RR: 18 (11 Sep 2019 04:25) (18 - 18)  SpO2: 94% (11 Sep 2019 04:25) (94% - 98%)      I&O's Detail    10 Sep 2019 07:01  -  11 Sep 2019 07:00  --------------------------------------------------------  IN:    Oral Fluid: 800 mL  Total IN: 800 mL    OUT:    Voided: 1050 mL  Total OUT: 1050 mL    Total NET: -250 mL        LABS:                        10.3   19.7  )-----------( 233      ( 10 Sep 2019 14:15 )             31.0     09-10    134<L>  |  97  |  46<H>  ----------------------------<  183<H>  4.4   |  25  |  1.95<H>    Ca    9.1      10 Sep 2019 09:10  Phos  3.2     09-10  Mg     2.0     09-10      PT/INR - ( 10 Sep 2019 14:15 )   PT: 13.5 sec;   INR: 1.17 ratio         PTT - ( 10 Sep 2019 14:15 )  PTT:34.9 sec    PHYSICAL EXAM:  Constitutional: well developed, well nourished, NAD  ENMT: normal facies, symmetric  Respiratory: Normal respiratory effort   Gastrointestinal: abdomen soft, nondistended. Left flank not TTP, improved from yesterday.   Musculoskeletal: equal strength bilateral upper and lower extremities. Left hip/proximal thigh minimally tender to palpation, improved from prior exam.    Psychiatric: oriented x 3; appropriate

## 2019-09-11 NOTE — PROGRESS NOTE ADULT - ASSESSMENT
84 year old F with a PMH of lymphoma/MGUS, A fib on coumadin, CHF, HLD, HTN, COPD, anxiety, depression.  Admitted for retroperitoneal hemorrhage following a mechanical fall.  Cardiology consulted for management of hypertension and a fib.       REC:  #1.  Hypertension.    - BP improved, c/w clonidine. Can uptitrate as needed   - avoid hydralazine due to concern for SLE-like syndrome        #2. Chronic atrial fib  - Rates improved, c/w metoprolol 100mg BID. Can transition diltiazem to 120mg QD starting tomorrow.   - hold anticoagulant in setting of RP bleed, will d/w pt's outpatient cardiologist and determine if pt can be switched to NOAC once deemed safe by surgery to resume a/c.     #3.  Pulmonary hypertension   LV systolic function normal, although hypertensive dz./diastolic dysfunction could be a contributing factor.   - Continue Lasix once KANIKA resolves and as per renal recs.   - Consider RHC when optimized, can be done outpatient     #4.  Retroperitoneal bleed  - continue to hold A/C  - will contact Dr. Dobbins, patient's cardiologist, re ongoing A/C; ?consider NOAC as above.    Aydin Thomason PGY4  [phone]  All Cardiology service information can be found 24/7 on [email or web address], password: [email or web address] 84 year old F with a PMH of lymphoma/MGUS, A fib on coumadin, CHF, HLD, HTN, COPD, anxiety, depression.  Admitted for retroperitoneal hemorrhage following a mechanical fall.  Cardiology consulted for management of hypertension and a fib.       REC:  #1.  Hypertension; hypertensive heart disease.    - BP improved, c/w clonidine. Can uptitrate as needed   - avoid hydralazine due to concern for SLE-like syndrome      #2. Chronic atrial fib  - Rates improved, c/w metoprolol 100mg BID. Can transition diltiazem to 120mg QD starting tomorrow.   - hold anticoagulant in setting of RP bleed.  - Spoke to Dr. Hesham Dobbins, pt's outpatient cardiologist; he has no objection to starting NOAC such as Eliquis, once deemed safe by surgery to resume a/c.     #3.  Pulmonary hypertension   LV systolic function normal, although hypertensive dz./diastolic dysfunction could be a contributing factor.   - Continue Lasix once KANIKA resolves and as per renal recs.   - Consider RHC when optimized, can be done outpatient     #4.  Retroperitoneal bleed  - continue to hold A/C      Aydin Paddy PGY4  [phone]  All Cardiology service information can be found 24/7 on [email or web address], password: [email or web address] M.D.  Cardiology Attending, Consult Service  [phone]

## 2019-09-11 NOTE — CONSULT NOTE ADULT - SUBJECTIVE AND OBJECTIVE BOX
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 Discharged on steroids. 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 Now on rituxan. Presents now to [facility] after a fall from standing. Found to have left RP bleed in the setting of coumadin use and supratheraputic INR. History obtained from Son at the bedside. He states they were getting out the car today when she fell and landed on her left side and hit her head. She did not loose consciousness or black out. Patient right now unable to provide ROS as she is slightly altered. Per son this mental status change has been presents since her last hospital stay about 2 weeks ago.     In the Er CT scans of head, abdomen and pelvis were done. CT head and neck without acute pathology. CT pelvis with small left RP bleed. INR 4. hemoglobin 8.3 down from 9.6 about 2 weeks ago. Hemodynamically stable. Patient alert.      At [facility] ICU, pt received 1 unit PRBC, FFP, and vitamin K. Hb initially responded to the 1 unit however, dropped again, leading to a repeat CT, which showed worsening of RP bleed w/ displacement of kidney.   Prior to transfer, pt received Kcentra, additional FFP, additional 1unit pRBC, and vitamin K. A R femoral TLC was placed.    Pt transferred to [facility] SICU for increased size of RP, hemodynamic monitoring, and possible IR intervention.   Transfused 1unit pRBC with repeat H&H 8.4/26.5 (07 Sep 2019 03:46)"    Above reviewed. Saw/spoke to patient. Recent admission to [facility] where was found to have suspected episode of vasculitis. Since then has been on Rituxan. Patient now returns with episode of AMS and shortness of breath. Some anxiety. Today, patient appears slightly short of breath, mild anxiety. No CP. Mild cough. No abd pain, no diarrhea, no dysuria. Feels fatigued. No wounds. Patient found to have elevated WBC. ID called for further eval.    PAST MEDICAL & SURGICAL HISTORY:  COPD (chronic obstructive pulmonary disease)  Peripheral vascular disease  Pulmonary hypertension  Rheumatoid arthritis  Osteoarthritis  Mitral regurgitation  H/O lymphoma  Hyperlipidemia  Chronic GERD  Chronic kidney disease: proteinuria  AF (atrial fibrillation)  Anemia in CKD (chronic kidney disease)  CHF (congestive heart failure)  HTN (hypertension)  History of total hip replacement, left  History of total knee replacement, bilateral    Allergies    Keflex (Unknown)  penicillin (Rash)--family denies PCN allergy    ANTIMICROBIALS:  aztreonam  IVPB 500 once  aztreonam  IVPB    aztreonam  IVPB 500 every 8 hours  entecavir 0.5 every 72 hours  trimethoprim  160 mG/sulfamethoxazole 800 mG 1 <User Schedule>    OTHER MEDS:  acetaminophen   Tablet .. 650 milliGRAM(s) Oral every 6 hours PRN  ALBUTerol/ipratropium for Nebulization 3 milliLiter(s) Nebulizer every 6 hours PRN  ALPRAZolam 0.5 milliGRAM(s) Oral two times a day PRN  atorvastatin 10 milliGRAM(s) Oral at bedtime  cloNIDine 0.1 milliGRAM(s) Oral two times a day  diltiazem    Tablet 30 milliGRAM(s) Oral every 8 hours  enoxaparin Injectable 30 milliGRAM(s) SubCutaneous daily  insulin lispro (HumaLOG) corrective regimen sliding scale   SubCutaneous three times a day before meals  insulin lispro (HumaLOG) corrective regimen sliding scale   SubCutaneous at bedtime  insulin lispro Injectable (HumaLOG) 6 Unit(s) SubCutaneous three times a day before meals  metoprolol tartrate 100 milliGRAM(s) Oral two times a day  montelukast 10 milliGRAM(s) Oral daily  pantoprazole    Tablet 40 milliGRAM(s) Oral before breakfast  predniSONE   Tablet 60 milliGRAM(s) Oral every 24 hours  senna 2 Tablet(s) Oral at bedtime  sertraline 50 milliGRAM(s) Oral daily    SOCIAL HISTORY: No tobacco, no alcohol, no illicit drugs    FAMILY HISTORY:  Family history of inclusion body myositis    Drug Dosing Weight  Height (cm): 152.4 (06 Sep 2019 21:35)  Weight (kg): 67.7 (06 Sep 2019 21:35)  BMI (kg/m2): 29.1 (06 Sep 2019 21:35)  BSA (m2): 1.65 (06 Sep 2019 21:35)    PE:    Vital Signs Last 24 Hrs  T(C): 36.8 (11 Sep 2019 13:30), Max: 37.1 (10 Sep 2019 16:59)  T(F): 98.3 (11 Sep 2019 13:30), Max: 98.8 (10 Sep 2019 16:59)  HR: 91 (11 Sep 2019 13:30) (76 - 98)  BP: 128/88 (11 Sep 2019 13:30) (124/86 - 147/93)  RR: 18 (11 Sep 2019 13:30) (18 - 18)  SpO2: 94% (11 Sep 2019 13:30) (94% - 98%)    Gen: AOx2-3, fatigued  CV: S1+S2 normal, nontachycardic  Resp: Clear bilat, no resp distress, no crackles/wheezes  Abd: Soft, nontender, +BS  Ext: No LE edema, no wounds  : No Chan, no suprapubic tenderness  IV/Skin: No thrombophlebitis, no rash  Msk: No low back pain, no arthralgias, no joint swelling  Neuro: No sensory deficits, no motor deficits    LABS:                        9.4    28.11 )-----------( 207      ( 11 Sep 2019 09:24 )             30.2     09-11    135  |  99  |  52<H>  ----------------------------<  122<H>  5.3   |  20<L>  |  1.73<H>    Ca    8.8      11 Sep 2019 07:20  Phos  2.8     -11  Mg     1.9     -11    Urinalysis Basic - ( 11 Sep 2019 12:37 )    Color: Yellow / Appearance: Slightly Turbid / S.016 / pH: x  Gluc: x / Ketone: Negative  / Bili: Negative / Urobili: Negative   Blood: x / Protein: 300 mg/dL / Nitrite: Negative   Leuk Esterase: Moderate / RBC: 36 /hpf / WBC 37 /HPF   Sq Epi: x / Non Sq Epi: 0 /hpf / Bacteria: Many    MICROBIOLOGY:    8/10 BCX x2 NGTD    RADIOLOGY:     CT:    IMPRESSION:  Since prior evaluation significant interval change is   identified. The previously described left retroperitoneal iliopsoas   hemorrhage is significantly increased in size now resulting in   displacement of the left kidney anteriorly. There is significant   stranding of the left perirenal fat and left perirenal fluid is   identified. There is thickening of the left lateral abdominal wall   musculature. Fluid/hemorrhage tracks into the left pelvic retroperitoneal   space. There is new stranding surrounding the proximal sigmoid colon,   indeterminate etiology and clinical significance. New perihepatic and   perisplenic ascites is identified. New small left pleural effusion.

## 2019-09-11 NOTE — CONSULT NOTE ADULT - ASSESSMENT
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  Leukocytosis, no fevers  EMR states has PCN allergy, but appears to have tolerated julissa, Zosyn at [facility]--patient family states never allergic reaction while hospitalized; family states no history PCN allergy  UA positive  CXR clear  Seems to been on stable steroid dose (would not expect rise in WBC count to be from longstanding steroids); alternatively WBC ? related to RP hematoma, occult infection?  Evaluate for ? sepsis, start Julissa (tolerated at OSH)  Overall, leukocytosis, immunocompromised, AMS, shortness of breath  - Meropenem 1g q 12 (monitor on first dose)  - Check, UA, UCX  - Check BCX x 2  - Immunosuppressives per rheumatology  - Discussed with surgery team  - On chart review note that private ID group saw this patient on prior visit, will discuss with them in AM    Mauri Kim MD  Pager [phone]  After 5pm and on weekends call [phone]

## 2019-09-11 NOTE — PROGRESS NOTE ADULT - ATTENDING COMMENTS
Pt seen and examined.  Chart reviewed.  Resident note confirmed.   Pt is an 84 year old female with a medical history significant for CHF, HTN, HLD, Afib (on coumadin), lymphoma) s/p renal biopsy who presented to [facility] with a RP hematoma and an INR 4. Coumadin was reversed.  Pt was monitored in the SICU.  There was no further bleeding.  She continues on steroids for the glomerulonephritis. Rising leukocytosis secondary to steroids noted.  Tx with retuxin in planned.  No acute events overnight.    Continue medical mgmt of lymphoma and glomerulonephritis  Continue to hold coumadin  Evaluation for transfer to the medical service in progress.  Will follow with you.

## 2019-09-11 NOTE — CONSULT NOTE ADULT - CONSULT REQUESTED DATE/TIME
06-Sep-2019 22:45
07-Sep-2019 19:57
08-Sep-2019 08:04
10-Sep-2019 10:08
10-Sep-2019 16:04
11-Sep-2019 16:52
08-Sep-2019 08:00

## 2019-09-11 NOTE — PROGRESS NOTE ADULT - ASSESSMENT
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 Rheumatology consulted as pt was due for rituxan. was in distress early in the afternoon due to abdominal pain    - Will arrange for 3rd dose of Rituxan for tomorrow if pt remains stable   - C/w entecavir in light of positive Hep B core Ab  - please check Hep B DNA PCR ,? benefit from Hep B vaccination       Rheum will follow  Case was seen and discussed with Dr. 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 Rheumatology consulted as pt was due for rituxan. pt has new leukocytosis to 28 k , and reported chills early this afternoon. UA is positive for Mod leukocyte esterase and 39 WBC    - Plan was to give Rituximab today however pt ***********  - C/w entecavir in light of positive Hep B core Ab  - please check Hep B DNA PCR ,? benefit from Hep B vaccination       Rheum will follow  Case was seen and discussed with Dr. 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 Rheumatology consulted as pt was due for rituxan. Today pt complaining of RUQ pain. pt has new leukocytosis to 28 k , and reported chills early this afternoon. UA is positive for Mod leukocyte esterase and 39 WBC    - Plan was to give Rituximab today however will hold on that in light of new infection   - obtain complete abdominal US   - please transfer pt to medicine for further sepsis w/u and management , case was discussed with Dr Odom   - C/w entecavir in light of positive Hep B core Ab  - please check Hep B DNA PCR ,? benefit from Hep B vaccination       Rheum will follow  Case was seen and discussed with Dr. 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 SICU s/p fall and with increase in psoas / retroperitoneal bleeding in setting of supratherapeutic INR. Rheumatology consulted as pt was due for rituxan. Today pt complaining of RUQ pain. pt has new leukocytosis to 28 k , and reported chills early this afternoon. UA is positive for Mod leukocyte esterase and 39 WBC    - Plan was to give Rituximab today however will hold on that in light of new infection   - obtain complete abdominal US   - please transfer pt to medicine for further sepsis w/u and management , case was discussed with Dr Odom   - C/w entecavir in light of positive Hep B core Ab  - pending Hep B DNA PCR ,? benefit from Hep B vaccination       Rheum will follow  Case was seen and discussed with Dr. Chano Childress MD  Rheum fellow PGY 4   Pager [phone]

## 2019-09-11 NOTE — PHYSICAL THERAPY INITIAL EVALUATION ADULT - DISCHARGE DISPOSITION, PT EVAL
Anticipate subacute rehab. *IF pt and family elect to go home recommend home PT and assist with ALL ADLs and functional mobility. Granddaughter believes pt already owns a wheelchair and shower chair. GERARDO mcdonald

## 2019-09-11 NOTE — PROGRESS NOTE ADULT - PROBLEM SELECTOR PLAN 2
Pt with labile pressures now improved  likely in the setting of steroids    avoid hydralazine due to concern for SLE-like syndrome  c/w clonidine   Cw cardizem 30mg q8 and lopressor 100mg q12.

## 2019-09-11 NOTE — PROVIDER CONTACT NOTE (OTHER) - ACTION/TREATMENT ORDERED:
MD notified and aware, came to bedside to assess pt. Straight cath to obtain UA to determine if UTI or not. Tylenol and xanax given w/relief. Continue to monitor and f/u w/UA results.

## 2019-09-11 NOTE — PROGRESS NOTE ADULT - SUBJECTIVE AND OBJECTIVE BOX
Aydin Thomason  [phone]  All Cardiology service information can be found 24/7 on [email or web address], password: [email or web address]    Patient seen and examined at bedside.    Overnight Events: No events overnight. Pt denies any chest pain, sob, or palpitations.     CONSTITUTIONAL:  No fevers or chills  HEENT: No rhinorrhea   SKIN:  No rash or itching.  CARDIOVASCULAR:  No chest pain, chest pressure or chest discomfort.  RESPIRATORY:  No shortness of breath, cough or sputum.  GASTROINTESTINAL:  No nausea, vomiting or diarrhea. No abdominal pain.   GENITOURINARY:  Denies hematuria, dysuria.   NEUROLOGICAL:  No headache, dizziness, syncope.   MUSCULOSKELETAL:  +back pain  HEMATOLOGIC:  No bleeding or bruising.          Current Meds:  acetaminophen   Tablet .. 650 milliGRAM(s) Oral every 6 hours PRN  ALBUTerol/ipratropium for Nebulization 3 milliLiter(s) Nebulizer every 6 hours PRN  ALPRAZolam 0.5 milliGRAM(s) Oral two times a day PRN  atorvastatin 10 milliGRAM(s) Oral at bedtime  cloNIDine 0.1 milliGRAM(s) Oral two times a day  diltiazem    Tablet 30 milliGRAM(s) Oral every 8 hours  enoxaparin Injectable 30 milliGRAM(s) SubCutaneous daily  entecavir 0.5 milliGRAM(s) Oral every 72 hours  insulin lispro (HumaLOG) corrective regimen sliding scale   SubCutaneous three times a day before meals  insulin lispro (HumaLOG) corrective regimen sliding scale   SubCutaneous at bedtime  insulin lispro Injectable (HumaLOG) 6 Unit(s) SubCutaneous three times a day before meals  metoprolol tartrate 100 milliGRAM(s) Oral two times a day  montelukast 10 milliGRAM(s) Oral daily  pantoprazole    Tablet 40 milliGRAM(s) Oral before breakfast  predniSONE   Tablet 60 milliGRAM(s) Oral every 24 hours  senna 2 Tablet(s) Oral at bedtime  sertraline 50 milliGRAM(s) Oral daily  trimethoprim  160 mG/sulfamethoxazole 800 mG 1 Tablet(s) Oral <User Schedule>      Vitals:  T(F): 98.2 (09-11), Max: 98.8 (09-10)  HR: 81 (09-11) (76 - 98)  BP: 124/86 (09-11) (124/86 - 147/93)  RR: 18 (09-11)  SpO2: 94% (09-11)  I&O's Summary    10 Sep 2019 07:01  -  11 Sep 2019 07:00  --------------------------------------------------------  IN: 800 mL / OUT: 1050 mL / NET: -250 mL        Physical Exam:  Appearance: No acute distress  Eyes: PERRL, EOMI, pink conjunctiva  HEENT: Normal oral mucosa  Cardiovascular: Irregular, S1/S2  Respiratory: Clear to auscultation bilaterally  Gastrointestinal: soft, non-tender, non-distended with normal bowel sounds  Musculoskeletal: No clubbing; no joint deformity   Neurologic: Non-focal  Lymphatic: No lymphadenopathy  Psychiatry: mood & affect appropriate  Skin: No rashes, ecchymoses, or cyanosis                          10.3   19.7  )-----------( 233      ( 10 Sep 2019 14:15 )             31.0     09-10    134<L>  |  97  |  46<H>  ----------------------------<  183<H>  4.4   |  25  |  1.95<H>    Ca    9.1      10 Sep 2019 09:10  Phos  3.2     09-10  Mg     2.0     09-10      PT/INR - ( 10 Sep 2019 14:15 )   PT: 13.5 sec;   INR: 1.17 ratio         PTT - ( 10 Sep 2019 14:15 )  PTT:34.9 sec              New ECG(s): Personally reviewed    Echo:    < from: TTE Echo w/Cont Complete (08.11.19 @ 07:48) >  Summary:   1. Normal global left ventricular systolic function.   2. Spectral Doppler shows restrictive pattern of left ventricular   myocardial filling (Grade III diastolic dysfunction).   3. Mild mitral annular calcification.   4. Mild thickening of the anterior and posterior mitral valve leaflets.   5. Moderate-severe tricuspid regurgitation.   6. Mild aortic regurgitation.   7. Estimated pulmonary artery systolic pressure is 72.0 mmHg assuming a   right atrial pressure of 10 mmHg, which is consistent with severe   pulmonary hypertension.   8. LA volume Index is 67.2 ml/m² ml/m2.   9. Peak transaortic gradient equals 27.9 mmHg, mean transaortic gradient   equals 15.2 mmHg, the calculated aortic valve area equals 1.50 cm² by the   continuity equation consistent with mild aortic stenosis.    < end of copied text > Aydin Thomason  [phone]  All Cardiology service information can be found 24/7 on [email or web address], password: [email or web address]    Patient seen and examined at bedside.      Overnight Events: No events overnight. Pt denies any chest pain, sob, or palpitations.     CONSTITUTIONAL:  No fevers or chills  HEENT: No rhinorrhea   SKIN:  No rash or itching.  CARDIOVASCULAR:  No chest pain, chest pressure or chest discomfort.  RESPIRATORY:  No shortness of breath, cough or sputum.  GASTROINTESTINAL:  No nausea, vomiting or diarrhea. No abdominal pain.   GENITOURINARY:  Denies hematuria, dysuria.   NEUROLOGICAL:  No headache, dizziness, syncope.   MUSCULOSKELETAL:  +back pain  HEMATOLOGIC:  No bleeding or bruising.        Current Meds:  acetaminophen   Tablet .. 650 milliGRAM(s) Oral every 6 hours PRN  ALBUTerol/ipratropium for Nebulization 3 milliLiter(s) Nebulizer every 6 hours PRN  ALPRAZolam 0.5 milliGRAM(s) Oral two times a day PRN  atorvastatin 10 milliGRAM(s) Oral at bedtime  cloNIDine 0.1 milliGRAM(s) Oral two times a day  diltiazem    Tablet 30 milliGRAM(s) Oral every 8 hours  enoxaparin Injectable 30 milliGRAM(s) SubCutaneous daily  entecavir 0.5 milliGRAM(s) Oral every 72 hours  insulin lispro (HumaLOG) corrective regimen sliding scale   SubCutaneous three times a day before meals  insulin lispro (HumaLOG) corrective regimen sliding scale   SubCutaneous at bedtime  insulin lispro Injectable (HumaLOG) 6 Unit(s) SubCutaneous three times a day before meals  metoprolol tartrate 100 milliGRAM(s) Oral two times a day  montelukast 10 milliGRAM(s) Oral daily  pantoprazole    Tablet 40 milliGRAM(s) Oral before breakfast  predniSONE   Tablet 60 milliGRAM(s) Oral every 24 hours  senna 2 Tablet(s) Oral at bedtime  sertraline 50 milliGRAM(s) Oral daily  trimethoprim  160 mG/sulfamethoxazole 800 mG 1 Tablet(s) Oral <User Schedule>      Vitals:  T(F): 98.2 (09-11), Max: 98.8 (09-10)  HR: 81 (09-11) (76 - 98)  BP: 124/86 (09-11) (124/86 - 147/93)  RR: 18 (09-11)  SpO2: 94% (09-11)    Physical Exam:  Appearance: No acute distress  Eyes: PERRL, EOMI, pink conjunctiva  HEENT: Normal oral mucosa  Cardiovascular: Irregular, S1/S2  Respiratory: Clear to auscultation bilaterally  Gastrointestinal: soft, non-tender, non-distended with normal bowel sounds  Musculoskeletal: No clubbing; no joint deformity   Neurologic: Non-focal  Lymphatic: No lymphadenopathy  Psychiatry: mood & affect appropriate  Skin: No rashes, ecchymoses, or cyanosis      I&O's Summary    10 Sep 2019 07:01  -  11 Sep 2019 07:00  --------------------------------------------------------  IN: 800 mL / OUT: 1050 mL / NET: -250 mL      LABS:                      10.3   19.7  )-----------( 233      ( 10 Sep 2019 14:15 )             31.0     09-10  134<L>  |  97  |  46<H>  ----------------------------<  183<H>  4.4   |  25  |  1.95<H>    Ca    9.1      10 Sep 2019 09:10  Phos  3.2     09-10  Mg     2.0     09-10      PT/INR - ( 10 Sep 2019 14:15 )   PT: 13.5 sec;   INR: 1.17 ratio    PTT - ( 10 Sep 2019 14:15 )  PTT:34.9 sec      TTE Echo w/Cont Complete (08.11.19 @ 07:48) >  Summary:   1. Normal global left ventricular systolic function.   2. Spectral Doppler shows restrictive pattern of left ventricular myocardial filling (Grade III diastolic dysfunction).   3. Mild mitral annular calcification.   4. Mild thickening of the anterior and posterior mitral valve leaflets.   5. Moderate-severe tricuspid regurgitation.   6. Mild aortic regurgitation.   7. Estimated pulmonary artery systolic pressure is 72.0 mmHg assuming a right atrial pressure of 10 mmHg, which is consistent with severe  pulmonary hypertension.   8. LA volume Index is 67.2 ml/m² ml/m2.   9. Peak transaortic gradient equals 27.9 mmHg, mean transaortic gradient equals 15.2 mmHg, the calculated aortic valve area equals 1.50 cm² by the continuity equation consistent with mild aortic stenosis.

## 2019-09-11 NOTE — PROGRESS NOTE ADULT - PROBLEM SELECTOR PLAN 1
Pt s/p fall found to have left ilopsoas hematoma   Trend h/h   Serial abdominal exams   Sx follow up.

## 2019-09-11 NOTE — PROGRESS NOTE ADULT - SUBJECTIVE AND OBJECTIVE BOX
TONIA [facility]  [id number]    HISTORY OF PRESENT ILLNESS:    pt was seen and examined at the bedside, she complains of RUQ pain however denies any fever chills N/V or loss of appetite.       MEDICATIONS  (STANDING):  atorvastatin 10 milliGRAM(s) Oral at bedtime  chlorhexidine 2% Cloths 1 Application(s) Topical <User Schedule>  diltiazem    Tablet 30 milliGRAM(s) Oral every 8 hours  enoxaparin Injectable 30 milliGRAM(s) SubCutaneous daily  entecavir 0.5 milliGRAM(s) Oral daily  insulin lispro (HumaLOG) corrective regimen sliding scale   SubCutaneous three times a day before meals  insulin lispro (HumaLOG) corrective regimen sliding scale   SubCutaneous at bedtime  insulin lispro Injectable (HumaLOG) 6 Unit(s) SubCutaneous three times a day before meals  metoprolol tartrate 50 milliGRAM(s) Oral every 12 hours  montelukast 10 milliGRAM(s) Oral daily  pantoprazole    Tablet 40 milliGRAM(s) Oral before breakfast  predniSONE   Tablet 60 milliGRAM(s) Oral every 24 hours  sertraline 50 milliGRAM(s) Oral daily  trimethoprim  160 mG/sulfamethoxazole 800 mG 1 Tablet(s) Oral <User Schedule>    MEDICATIONS  (PRN):  ALBUTerol/ipratropium for Nebulization 3 milliLiter(s) Nebulizer every 6 hours PRN Shortness of Breath and/or Wheezing  ALPRAZolam 0.5 milliGRAM(s) Oral two times a day PRN anxiety    Vital Signs Last 24 Hrs  T(C): 36.6 (09-10-19 @ 01:35), Max: 36.9 (19 @ 18:14)  HR: 98 (09-10-19 @ 08:25) (82 - 122)  BP: 147/85 (09-10-19 @ 08:25) (144/73 - 174/113)  RR: 16 (09-10-19 @ 06:54) (16 - 24)  SpO2: 97% (09-10-19 @ 06:54) (94% - 97%)  Wt(kg): --  Physical Exam:  General: in mild distress due to pain   HEENT: MMM  CVS: +S1/S2  Resp: clear anteriorly   Skin: no visible rashes    LABS:                9.8    15.7  )-----------( 210      ( 10 Sep 2019 03:07 )             30.2     09-10    134<L>  |  97  |  46<H>  ----------------------------<  183<H>  4.4   |  25  |  1.95<H>    Ca    9.1      10 Sep 2019 09:10  Phos  3.2     09-10  Mg     2.0     10      PT/INR - ( 09 Sep 2019 01:11 )   PT: 12.9 sec;   INR: 1.12 ratio         PTT - ( 09 Sep 2019 01:11 )  PTT:35.6 sec  Urinalysis Basic - ( 08 Sep 2019 23:07 )    Color: Yellow / Appearance: Slightly Turbid / S.014 / pH: x  Gluc: x / Ketone: Negative  / Bili: Negative / Urobili: Negative   Blood: x / Protein: 100 mg/dL / Nitrite: Negative   Leuk Esterase: Large / RBC: 22 /hpf / WBC 39 /HPF   Sq Epi: x / Non Sq Epi: 0 /hpf / Bacteria: Many    PHYSICAL EXAM:  Constitutional: well developed, well nourished, NAD  ENMT: normal facies, symmetric  Respiratory: Normal respiratory effort   Gastrointestinal: abdomen soft, nondistended. Left flank mildly tender to palpation.  Musculoskeletal: equal strength bilateral upper and lower extremities. Left hip/proximal thigh tender to palpation.   Psychiatric: oriented x 3; appropriate TONIA YOUNG  [id number]    HISTORY OF PRESENT ILLNESS:    pt was seen and examined at the bedside, she complains of RUQ pain however denies any N/V or loss of appetite. As per nursing staff had chills with temp of .      MEDICATIONS  (STANDING):  atorvastatin 10 milliGRAM(s) Oral at bedtime  chlorhexidine 2% Cloths 1 Application(s) Topical <User Schedule>  diltiazem    Tablet 30 milliGRAM(s) Oral every 8 hours  enoxaparin Injectable 30 milliGRAM(s) SubCutaneous daily  entecavir 0.5 milliGRAM(s) Oral daily  insulin lispro (HumaLOG) corrective regimen sliding scale   SubCutaneous three times a day before meals  insulin lispro (HumaLOG) corrective regimen sliding scale   SubCutaneous at bedtime  insulin lispro Injectable (HumaLOG) 6 Unit(s) SubCutaneous three times a day before meals  metoprolol tartrate 50 milliGRAM(s) Oral every 12 hours  montelukast 10 milliGRAM(s) Oral daily  pantoprazole    Tablet 40 milliGRAM(s) Oral before breakfast  predniSONE   Tablet 60 milliGRAM(s) Oral every 24 hours  sertraline 50 milliGRAM(s) Oral daily  trimethoprim  160 mG/sulfamethoxazole 800 mG 1 Tablet(s) Oral <User Schedule>    MEDICATIONS  (PRN):  ALBUTerol/ipratropium for Nebulization 3 milliLiter(s) Nebulizer every 6 hours PRN Shortness of Breath and/or Wheezing  ALPRAZolam 0.5 milliGRAM(s) Oral two times a day PRN anxiety    Vital Signs Last 24 Hrs        Wt(kg): --  Physical Exam:  General: ill appearing fragile female   HEENT: MMM  CVS: +S1/S2  Resp: bibasilar crackles   Skin: no visible rashes    LABS:                      9.4    28.11 )-----------( 207      ( 11 Sep 2019 09:24 )             30.2     09-11    135  |  99  |  52<H>  ----------------------------<  122<H>  5.3   |  20<L>  |  1.73<H>    Ca    8.8      11 Sep 2019 07:20  Phos  2.8     09-11  Mg     1.9     09-11      PT/INR - ( 11 Sep 2019 09:22 )   PT: 13.0 sec;   INR: 1.13 ratio         PTT - ( 10 Sep 2019 14:15 )  PTT:34.9 sec          PHYSICAL EXAM:  Constitutional: well developed, well nourished, NAD  ENMT: normal facies, symmetric  Respiratory: Normal respiratory effort   Gastrointestinal: abdomen soft, nondistended. Left flank mildly tender to palpation.  Musculoskeletal: equal strength bilateral upper and lower extremities. Left hip/proximal thigh tender to palpation.   Psychiatric: oriented x 3; appropriate TONIA [facility]  [id number]    HISTORY OF PRESENT ILLNESS:    pt was seen and examined at the bedside, she complains of RUQ pain however denies any N/V or loss of appetite. As per nursing staff had chills early this afternoon.       MEDICATIONS  (STANDING):  atorvastatin 10 milliGRAM(s) Oral at bedtime  chlorhexidine 2% Cloths 1 Application(s) Topical <User Schedule>  diltiazem    Tablet 30 milliGRAM(s) Oral every 8 hours  enoxaparin Injectable 30 milliGRAM(s) SubCutaneous daily  entecavir 0.5 milliGRAM(s) Oral daily  insulin lispro (HumaLOG) corrective regimen sliding scale   SubCutaneous three times a day before meals  insulin lispro (HumaLOG) corrective regimen sliding scale   SubCutaneous at bedtime  insulin lispro Injectable (HumaLOG) 6 Unit(s) SubCutaneous three times a day before meals  metoprolol tartrate 50 milliGRAM(s) Oral every 12 hours  montelukast 10 milliGRAM(s) Oral daily  pantoprazole    Tablet 40 milliGRAM(s) Oral before breakfast  predniSONE   Tablet 60 milliGRAM(s) Oral every 24 hours  sertraline 50 milliGRAM(s) Oral daily  trimethoprim  160 mG/sulfamethoxazole 800 mG 1 Tablet(s) Oral <User Schedule>    MEDICATIONS  (PRN):  ALBUTerol/ipratropium for Nebulization 3 milliLiter(s) Nebulizer every 6 hours PRN Shortness of Breath and/or Wheezing  ALPRAZolam 0.5 milliGRAM(s) Oral two times a day PRN anxiety    Vital Signs Last 24 Hrs  Vital Signs Last 24 Hrs  T(C): 36.8 (11 Sep 2019 13:30), Max: 37.1 (10 Sep 2019 16:59)  T(F): 98.3 (11 Sep 2019 13:30), Max: 98.8 (10 Sep 2019 16:59)  HR: 91 (11 Sep 2019 13:30) (76 - 98)  BP: 128/88 (11 Sep 2019 13:30) (124/86 - 147/93)  BP(mean): --  RR: 18 (11 Sep 2019 13:30) (18 - 18)  SpO2: 94% (11 Sep 2019 13:30) (94% - 98%)      Wt(kg): --  Physical Exam:  General: ill appearing fragile female   HEENT: MMM  CVS: +S1/S2  Resp: bibasilar crackles   Skin: no visible rashes    LABS:                      9.4    28.11 )-----------( 207      ( 11 Sep 2019 09:24 )             30.2     09-11    135  |  99  |  52<H>  ----------------------------<  122<H>  5.3   |  20<L>  |  1.73<H>    Ca    8.8      11 Sep 2019 07:20  Phos  2.8     09-11  Mg     1.9     09-11      PT/INR - ( 11 Sep 2019 09:22 )   PT: 13.0 sec;   INR: 1.13 ratio         PTT - ( 10 Sep 2019 14:15 )  PTT:34.9 sec TONIA [facility]  [id number]    HISTORY OF PRESENT ILLNESS:    pt was seen and examined at the bedside, she complains of RUQ pain however denies any N/V or loss of appetite. As per nursing staff, she had chills early this afternoon.       MEDICATIONS  (STANDING):  atorvastatin 10 milliGRAM(s) Oral at bedtime  chlorhexidine 2% Cloths 1 Application(s) Topical <User Schedule>  diltiazem    Tablet 30 milliGRAM(s) Oral every 8 hours  enoxaparin Injectable 30 milliGRAM(s) SubCutaneous daily  entecavir 0.5 milliGRAM(s) Oral daily  insulin lispro (HumaLOG) corrective regimen sliding scale   SubCutaneous three times a day before meals  insulin lispro (HumaLOG) corrective regimen sliding scale   SubCutaneous at bedtime  insulin lispro Injectable (HumaLOG) 6 Unit(s) SubCutaneous three times a day before meals  metoprolol tartrate 50 milliGRAM(s) Oral every 12 hours  montelukast 10 milliGRAM(s) Oral daily  pantoprazole    Tablet 40 milliGRAM(s) Oral before breakfast  predniSONE   Tablet 60 milliGRAM(s) Oral every 24 hours  sertraline 50 milliGRAM(s) Oral daily  trimethoprim  160 mG/sulfamethoxazole 800 mG 1 Tablet(s) Oral <User Schedule>    MEDICATIONS  (PRN):  ALBUTerol/ipratropium for Nebulization 3 milliLiter(s) Nebulizer every 6 hours PRN Shortness of Breath and/or Wheezing  ALPRAZolam 0.5 milliGRAM(s) Oral two times a day PRN anxiety    Vital Signs Last 24 Hrs  Vital Signs Last 24 Hrs  T(C): 36.8 (11 Sep 2019 13:30), Max: 37.1 (10 Sep 2019 16:59)  T(F): 98.3 (11 Sep 2019 13:30), Max: 98.8 (10 Sep 2019 16:59)  HR: 91 (11 Sep 2019 13:30) (76 - 98)  BP: 128/88 (11 Sep 2019 13:30) (124/86 - 147/93)  BP(mean): --  RR: 18 (11 Sep 2019 13:30) (18 - 18)  SpO2: 94% (11 Sep 2019 13:30) (94% - 98%)      Wt(kg): --  Physical Exam: ill appearing elderly female   General: ill appearing fragile female   HEENT: MMM  CVS: +S1/S2  Resp: bibasilar crackles   Skin: no visible rashes    LABS:                      9.4    28.11 )-----------( 207      ( 11 Sep 2019 09:24 )             30.2     09-11    135  |  99  |  52<H>  ----------------------------<  122<H>  5.3   |  20<L>  |  1.73<H>    Ca    8.8      11 Sep 2019 07:20  Phos  2.8     09-11  Mg     1.9     09-11      PT/INR - ( 11 Sep 2019 09:22 )   PT: 13.0 sec;   INR: 1.13 ratio         PTT - ( 10 Sep 2019 14:15 )  PTT:34.9 sec

## 2019-09-11 NOTE — PHYSICAL THERAPY INITIAL EVALUATION ADULT - PERTINENT HX OF CURRENT PROBLEM, REHAB EVAL
83 y/o F with PMH lymphoma/MGUS, Afib on Coumadin, CHF, HLD, HTN, COPD, anxiety, depression, initially presented to [facility] ED s/p fall, transferred to [facility] ICU for further care. Pt a/w L RP bleed (supratherapeutic INR, on Coumadin) and L iliopsoas hemaomta as seen on CT in [facility]. Son states pt was getting out of the car when she fell, landed on her L side and hit L side of head.

## 2019-09-11 NOTE — PROGRESS NOTE ADULT - PROBLEM SELECTOR PLAN 4
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  d/w renal today, ok with following up with pt as outpatient

## 2019-09-11 NOTE — PHYSICAL THERAPY INITIAL EVALUATION ADULT - PRECAUTIONS/LIMITATIONS, REHAB EVAL
fall precautions/HISTORY AND RESULTS CONTINUED: Per son, there is no acute change in her mental status, however, pt has become progressively altered since her hospitalization 3 weeks ago. Rheumatology planning rituximab today if stable. Xray L hip/pelvis: h/o L femur ORIF and L TKR, no acute fx seen. CT Head 9/6: no acute changes.

## 2019-09-11 NOTE — PROGRESS NOTE ADULT - SUBJECTIVE AND OBJECTIVE BOX
Patient is a 84y old  Female who presents with a chief complaint of Unwitnessed fall (11 Sep 2019 07:15)      SUBJECTIVE / OVERNIGHT EVENTS: Son at bedside, translating, reports some right sided hip pain, had bm, no n/v, no cp, sob, appears comfortable     MEDICATIONS  (STANDING):  atorvastatin 10 milliGRAM(s) Oral at bedtime  cloNIDine 0.1 milliGRAM(s) Oral two times a day  diltiazem    Tablet 30 milliGRAM(s) Oral every 8 hours  enoxaparin Injectable 30 milliGRAM(s) SubCutaneous daily  entecavir 0.5 milliGRAM(s) Oral every 72 hours  insulin lispro (HumaLOG) corrective regimen sliding scale   SubCutaneous three times a day before meals  insulin lispro (HumaLOG) corrective regimen sliding scale   SubCutaneous at bedtime  insulin lispro Injectable (HumaLOG) 6 Unit(s) SubCutaneous three times a day before meals  metoprolol tartrate 100 milliGRAM(s) Oral two times a day  montelukast 10 milliGRAM(s) Oral daily  pantoprazole    Tablet 40 milliGRAM(s) Oral before breakfast  predniSONE   Tablet 60 milliGRAM(s) Oral every 24 hours  senna 2 Tablet(s) Oral at bedtime  sertraline 50 milliGRAM(s) Oral daily  trimethoprim  160 mG/sulfamethoxazole 800 mG 1 Tablet(s) Oral <User Schedule>    MEDICATIONS  (PRN):  acetaminophen   Tablet .. 650 milliGRAM(s) Oral every 6 hours PRN Mild Pain (1 - 3)  ALBUTerol/ipratropium for Nebulization 3 milliLiter(s) Nebulizer every 6 hours PRN Shortness of Breath and/or Wheezing  ALPRAZolam 0.5 milliGRAM(s) Oral two times a day PRN anxiety        CAPILLARY BLOOD GLUCOSE      POCT Blood Glucose.: 146 mg/dL (11 Sep 2019 09:27)  POCT Blood Glucose.: 124 mg/dL (10 Sep 2019 21:55)  POCT Blood Glucose.: 189 mg/dL (10 Sep 2019 17:39)  POCT Blood Glucose.: 252 mg/dL (10 Sep 2019 14:02)    I&O's Summary    10 Sep 2019 07:01  -  11 Sep 2019 07:00  --------------------------------------------------------  IN: 800 mL / OUT: 1050 mL / NET: -250 mL    11 Sep 2019 07:01  -  11 Sep 2019 11:34  --------------------------------------------------------  IN: 280 mL / OUT: 450 mL / NET: -170 mL        PHYSICAL EXAM:  GENERAL: NAD, frail  HEAD:  Atraumatic, Normocephalic  EYES:  conjunctiva and sclera clear  NECK: No JVD  CHEST/LUNG: Clear to auscultation bilaterally; No wheeze  HEART: Regular rate and rhythm; S1S2  ABDOMEN: Soft, Nontender, Nondistended; Bowel sounds present  EXTREMITIES:  +2 LE edema bl, ecchymosis UE bl  PSYCH: AAOx3    LABS:                        9.4    28.11 )-----------( 207      ( 11 Sep 2019 09:24 )             30.2     09-11    135  |  99  |  52<H>  ----------------------------<  122<H>  5.3   |  20<L>  |  1.73<H>    Ca    8.8      11 Sep 2019 07:20  Phos  2.8     09-11  Mg     1.9     09-11      PT/INR - ( 11 Sep 2019 09:22 )   PT: 13.0 sec;   INR: 1.13 ratio         PTT - ( 10 Sep 2019 14:15 )  PTT:34.9 sec          RADIOLOGY & ADDITIONAL TESTS:    Imaging Personally Reviewed:    Consultant(s) Notes Reviewed:  sx    Care Discussed with Consultants/Other Providers: sx, rheum , renal

## 2019-09-11 NOTE — PHYSICAL THERAPY INITIAL EVALUATION ADULT - ADDITIONAL COMMENTS
Per chart review. Pt lives with son in a private home with a ramp to enter. Pt was ambulatory with a RW prior to admission. All needs met on first floor per granddaughter. Pt with [facility] 12 hours per day/7 days a week. Granddaughter believes pt owns a shower chair and wheelchair.

## 2019-09-11 NOTE — PHYSICAL THERAPY INITIAL EVALUATION ADULT - GENERAL OBSERVATIONS, REHAB EVAL
Pt emeka 35 min initial eval fair. Pt flagged by MD Lopez [id number] for DC. Pt rec'd in bed, peripheral IV, NAD. Pt A&Ox2, reports feeling "shaky" today. Pt appeared generally tired, started with bed therex. PCA recently took vitals. Pt speaking basic English.

## 2019-09-11 NOTE — PROGRESS NOTE ADULT - PROBLEM SELECTOR PLAN 3
likely in the setting of steroids   afebrile   monitor off abx likely in the setting of steroids vs ?UTI  on bactrim for pcp ppx  afebrile   ID eval likely in the setting of steroids vs ?UTI  on bactrim for pcp ppx  afebrile   Give dose of azactam as pt alirio allergic  ID eval

## 2019-09-12 NOTE — CHART NOTE - NSCHARTNOTEFT_GEN_A_CORE
MEDICINE NP    TONIA YOUNG  84y Female    Patient is a 84y old  Female who presents with a chief complaint of Unwitnessed fall (11 Sep 2019 16:51)       > Event Summary:  Notified by RN, Patient with 's AFib        -Vital Signs Last 24 Hrs  T(C): 37 (12 Sep 2019 05:44), Max: 37.5 (11 Sep 2019 22:14)  T(F): 98.6 (12 Sep 2019 05:44), Max: 99.5 (11 Sep 2019 22:14)  HR: 133 (12 Sep 2019 05:44) (76 - 133)  BP: 124/87 (12 Sep 2019 05:44) (122/84 - 129/88)  BP(mean): --  RR: 16 (12 Sep 2019 05:44) (16 - 18)  SpO2: 94% (12 Sep 2019 05:44) (92% - 95%)    > Physical Assessment:  General: Awake, No acute distress.   Neurology: A&Ox3, nonfocal  CV: +S1S2, RRR.  No peripheral edema  Respiratory: Even, unlabored.  CTA B/L.    Abdomen:  +BS.  Soft, NT, ND.  No palpable mass  MSK: GUIDO x4.   Skin: Warm and Dry         > Assessment & Plan:  - HPI:     -Plan:               JANNETH Mancuso-BC  Medicine Department MEDICINE NP    TONIA YOUNG  84y Female    Patient is a 84y old  Female who presents with a chief complaint of Unwitnessed fall (11 Sep 2019 16:51)       > Event Summary:  Notified by RN, Patient with 's AFib.   Patient seen at bedside in room.  Patient awake and alert oriented x2-3, reports generalized discomfort.      -Vital Signs Last 24 Hrs  T(C): 37 (12 Sep 2019 05:44), Max: 37.5 (11 Sep 2019 22:14)  T(F): 98.6 (12 Sep 2019 05:44), Max: 99.5 (11 Sep 2019 22:14)  HR: 133 (12 Sep 2019 05:44) (76 - 133)  BP: 124/87 (12 Sep 2019 05:44) (122/84 - 129/88)  RR: 16 (12 Sep 2019 05:44) (16 - 18)  SpO2: 94% (12 Sep 2019 05:44) (92% - 95%)    > Physical Assessment:  General: Awake, No acute distress.   Neurology: A&Ox 2-3, nonfocal  CV: +S1S2, IRRR.  +Tachycardia HR 's.  No peripheral edema  Respiratory: Even, unlabored.  CTA B/L.    Abdomen:  +BS.  Soft, NT, ND.  No palpable mass  MSK: GUIDO x4.   Skin: Warm and Dry         > Assessment & Plan:  - HPI: 84 year old F with a PMH of Lymphoma/MGUS, A fib on coumadin, CHF, HLD, HTN, COPD, Anxiety, Depression.  Admitted for retroperitoneal hemorrhage following a mechanical fall; now stable.  Now with AFib with RVR.  -ECG : AFib w/ .   -Patient noted with HR from 90's  - 130's, non-sustaining, will place on Tele  -AM Medications Lopressor and Cardizem PO given  -Transferred to Tele  -Cardiology appreciated, Cardizem CD ordered  -Endorsed to day provider  -Attending to follow         JANNETH Mancuso-BC  Medicine Department  #[id number]

## 2019-09-12 NOTE — PROGRESS NOTE ADULT - PROBLEM SELECTOR PLAN 1
Increasing WBC with Ecoli bacteremia now.   Possible source may be urine. UA+.   Awaiting UCx and BlCx results.   Cont with emperic abx on meropenem started yesterday  ID following.

## 2019-09-12 NOTE — PROGRESS NOTE ADULT - PROBLEM SELECTOR PLAN 7
Pt with labile pressures now improved  likely in the setting of steroids    avoid hydralazine due to concern for SLE-like syndrome  c/w clonidine   Cw cardizem CD 120mg qd and lopressor 100mg q12.

## 2019-09-12 NOTE — PROGRESS NOTE ADULT - PROBLEM SELECTOR PLAN 4
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   Will cont with prednisone 60mg daily as per rheum/renal.   Plan for rituxan on hold due to now infection

## 2019-09-12 NOTE — PROGRESS NOTE ADULT - SUBJECTIVE AND OBJECTIVE BOX
Aydin Thomason  [phone]  All Cardiology service information can be found 24/7 on [email or web address], password: [email or web address]    Patient seen and examined at bedside.    Overnight Events: No events overnight. Pt denies any chest pain, sob, or palpitations.    CONSTITUTIONAL:  No fevers or chills  HEENT: No rhinorrhea   SKIN:  No rash or itching.  CARDIOVASCULAR:  No chest pain, chest pressure or chest discomfort. No palpitations or edema.  RESPIRATORY:  No shortness of breath, cough or sputum.  GASTROINTESTINAL:  No nausea, vomiting or diarrhea. No abdominal pain.   GENITOURINARY:  Denies hematuria, dysuria.   NEUROLOGICAL:  No headache, dizziness, syncope.   MUSCULOSKELETAL:  No muscle, joint pain or stiffness.  HEMATOLOGIC:  No bleeding or bruising.          Current Meds:  acetaminophen   Tablet .. 650 milliGRAM(s) Oral every 6 hours PRN  ALBUTerol/ipratropium for Nebulization 3 milliLiter(s) Nebulizer every 6 hours PRN  ALPRAZolam 0.5 milliGRAM(s) Oral two times a day PRN  atorvastatin 10 milliGRAM(s) Oral at bedtime  cloNIDine 0.1 milliGRAM(s) Oral two times a day  diltiazem    Tablet 30 milliGRAM(s) Oral every 8 hours  enoxaparin Injectable 30 milliGRAM(s) SubCutaneous daily  entecavir 0.5 milliGRAM(s) Oral every 72 hours  insulin lispro (HumaLOG) corrective regimen sliding scale   SubCutaneous three times a day before meals  insulin lispro (HumaLOG) corrective regimen sliding scale   SubCutaneous at bedtime  insulin lispro Injectable (HumaLOG) 6 Unit(s) SubCutaneous three times a day before meals  meropenem  IVPB 1000 milliGRAM(s) IV Intermittent every 12 hours  metoprolol tartrate 100 milliGRAM(s) Oral two times a day  montelukast 10 milliGRAM(s) Oral daily  pantoprazole    Tablet 40 milliGRAM(s) Oral before breakfast  predniSONE   Tablet 60 milliGRAM(s) Oral every 24 hours  senna 2 Tablet(s) Oral at bedtime  sertraline 50 milliGRAM(s) Oral daily  trimethoprim  160 mG/sulfamethoxazole 800 mG 1 Tablet(s) Oral <User Schedule>      Vitals:  T(F): 99.3 (09-12), Max: 99.5 (09-11)  HR: 93 (09-12) (76 - 133)  BP: 108/81 (09-12) (108/81 - 129/88)  RR: 18 (09-12)  SpO2: 94% (09-12)  I&O's Summary    11 Sep 2019 07:01  -  12 Sep 2019 07:00  --------------------------------------------------------  IN: 1210 mL / OUT: 1525 mL / NET: -315 mL        Physical Exam:  Appearance: No acute distress  Eyes: PERRL, EOMI, pink conjunctiva  HEENT: Normal oral mucosa  Cardiovascular: Irregular, S1/S2  Respiratory: Clear to auscultation bilaterally  Gastrointestinal: soft, non-tender, non-distended with normal bowel sounds  Musculoskeletal: No clubbing; no joint deformity   Neurologic: Non-focal  Lymphatic: No lymphadenopathy  Psychiatry: mood & affect appropriate  Skin: Clean, dry, intact                           9.4    28.11 )-----------( 207      ( 11 Sep 2019 09:24 )             30.2     09-11    135  |  99  |  52<H>  ----------------------------<  122<H>  5.3   |  20<L>  |  1.73<H>    Ca    8.8      11 Sep 2019 07:20  Phos  2.8     09-11  Mg     1.9     09-11      PT/INR - ( 11 Sep 2019 09:22 )   PT: 13.0 sec;   INR: 1.13 ratio         PTT - ( 10 Sep 2019 14:15 )  PTT:34.9 sec              New ECG(s): Personally reviewed    Echo:    < from: TTE Echo w/Cont Complete (08.11.19 @ 07:48) >  Summary:   1. Normal global left ventricular systolic function.   2. Spectral Doppler shows restrictive pattern of left ventricular   myocardial filling (Grade III diastolic dysfunction).   3. Mild mitral annular calcification.   4. Mild thickening of the anterior and posterior mitral valve leaflets.   5. Moderate-severe tricuspid regurgitation.   6. Mild aortic regurgitation.   7. Estimated pulmonary artery systolic pressure is 72.0 mmHg assuming a   right atrial pressure of 10 mmHg, which is consistent with severe   pulmonary hypertension.   8. LA volume Index is 67.2 ml/m² ml/m2.   9. Peak transaortic gradient equals 27.9 mmHg, mean transaortic gradient   equals 15.2 mmHg, the calculated aortic valve area equals 1.50 cm² by the   continuity equation consistent with mild aortic stenosis.    < end of copied text > Aydin Thomason  [phone]  All Cardiology service information can be found 24/7 on [email or web address], password: [email or web address]    Patient seen and examined at bedside.      Overnight Events: No events overnight. Pt denies any chest pain, sob, or palpitations.    CONSTITUTIONAL:  No fevers or chills  HEENT: No rhinorrhea   SKIN:  No rash or itching.  CARDIOVASCULAR:  No chest pain, chest pressure or chest discomfort. No palpitations or edema.  RESPIRATORY:  No shortness of breath, cough or sputum.  GASTROINTESTINAL:  No nausea, vomiting or diarrhea. No abdominal pain.   GENITOURINARY:  Denies hematuria, dysuria.   NEUROLOGICAL:  No headache, dizziness, syncope.   MUSCULOSKELETAL:  No muscle, joint pain or stiffness.  HEMATOLOGIC:  No bleeding or bruising.          Current Meds:  acetaminophen   Tablet .. 650 milliGRAM(s) Oral every 6 hours PRN  ALBUTerol/ipratropium for Nebulization 3 milliLiter(s) Nebulizer every 6 hours PRN  ALPRAZolam 0.5 milliGRAM(s) Oral two times a day PRN  atorvastatin 10 milliGRAM(s) Oral at bedtime  cloNIDine 0.1 milliGRAM(s) Oral two times a day  diltiazem    Tablet 30 milliGRAM(s) Oral every 8 hours  enoxaparin Injectable 30 milliGRAM(s) SubCutaneous daily  entecavir 0.5 milliGRAM(s) Oral every 72 hours  insulin lispro (HumaLOG) corrective regimen sliding scale   SubCutaneous three times a day before meals  insulin lispro (HumaLOG) corrective regimen sliding scale   SubCutaneous at bedtime  insulin lispro Injectable (HumaLOG) 6 Unit(s) SubCutaneous three times a day before meals  meropenem  IVPB 1000 milliGRAM(s) IV Intermittent every 12 hours  metoprolol tartrate 100 milliGRAM(s) Oral two times a day  montelukast 10 milliGRAM(s) Oral daily  pantoprazole    Tablet 40 milliGRAM(s) Oral before breakfast  predniSONE   Tablet 60 milliGRAM(s) Oral every 24 hours  senna 2 Tablet(s) Oral at bedtime  sertraline 50 milliGRAM(s) Oral daily  trimethoprim  160 mG/sulfamethoxazole 800 mG 1 Tablet(s) Oral <User Schedule>      Vitals:  T(F): 99.3 (09-12), Max: 99.5 (09-11)  HR: 93 (09-12) (76 - 133)  BP: 108/81 (09-12) (108/81 - 129/88)  RR: 18 (09-12)  SpO2: 94% (09-12)    Physical Exam:  Appearance: No acute distress  Eyes: PERRL, EOMI, pink conjunctiva  HEENT: Normal oral mucosa  Cardiovascular: Irregular, S1/S2  Respiratory: Clear to auscultation bilaterally  Gastrointestinal: soft, non-tender, non-distended with normal bowel sounds  Musculoskeletal: No clubbing; no joint deformity   Neurologic: Non-focal  Lymphatic: No lymphadenopathy  Psychiatry: mood & affect appropriate  Skin: Clean, dry, intact       I&O's Summary    11 Sep 2019 07:01  -  12 Sep 2019 07:00  --------------------------------------------------------  IN: 1210 mL / OUT: 1525 mL / NET: -315 mL      LABS:                      9.4    28.11 )-----------( 207      ( 11 Sep 2019 09:24 )             30.2     09-11  135  |  99  |  52<H>  ----------------------------<  122<H>  5.3   |  20<L>  |  1.73<H>    Ca    8.8      11 Sep 2019 07:20  Phos  2.8     09-11  Mg     1.9     09-11    PT/INR - ( 11 Sep 2019 09:22 )   PT: 13.0 sec;   INR: 1.13 ratio    PTT - ( 10 Sep 2019 14:15 )  PTT:34.9 sec      TTE Echo w/Cont Complete (08.11.19 @ 07:48) >  Summary:   1. Normal global left ventricular systolic function.   2. Spectral Doppler shows restrictive pattern of left ventricular myocardial filling (Grade III diastolic dysfunction).   3. Mild mitral annular calcification.   4. Mild thickening of the anterior and posterior mitral valve leaflets.   5. Moderate-severe tricuspid regurgitation.   6. Mild aortic regurgitation.   7. Estimated pulmonary artery systolic pressure is 72.0 mmHg assuming a right atrial pressure of 10 mmHg, which is consistent with severe pulmonary hypertension.   8. LA volume Index is 67.2 ml/m² ml/m2.   9. Peak transaortic gradient equals 27.9 mmHg, mean transaortic gradient equals 15.2 mmHg, the calculated aortic valve area equals 1.50 cm² by the continuity equation consistent with mild aortic stenosis.

## 2019-09-12 NOTE — PROGRESS NOTE ADULT - SUBJECTIVE AND OBJECTIVE BOX
Milli Mckoy MD  Attending Physician (Hospitalist)  pager: [phone]    Patient is a 84y old  Female who presents with a chief complaint of Unwitnessed fall (12 Sep 2019 10:03)        SUBJECTIVE / OVERNIGHT EVENTS:  seems anxious and SOB. Per son at bedside reports she is anxious which is causing SOB.   No pain or acute complaints otherwise. overnight had an episode of rigors.   Afib RVR on tele.     MEDICATIONS  (STANDING):  atorvastatin 10 milliGRAM(s) Oral at bedtime  cloNIDine 0.1 milliGRAM(s) Oral two times a day  diltiazem    milliGRAM(s) Oral daily  docusate sodium 100 milliGRAM(s) Oral three times a day  enoxaparin Injectable 30 milliGRAM(s) SubCutaneous daily  entecavir 0.5 milliGRAM(s) Oral every 72 hours  insulin lispro (HumaLOG) corrective regimen sliding scale   SubCutaneous three times a day before meals  insulin lispro (HumaLOG) corrective regimen sliding scale   SubCutaneous at bedtime  insulin lispro Injectable (HumaLOG) 6 Unit(s) SubCutaneous three times a day before meals  meropenem  IVPB 1000 milliGRAM(s) IV Intermittent every 12 hours  metoprolol tartrate 100 milliGRAM(s) Oral two times a day  montelukast 10 milliGRAM(s) Oral daily  pantoprazole    Tablet 40 milliGRAM(s) Oral before breakfast  predniSONE   Tablet 60 milliGRAM(s) Oral every 24 hours  senna 2 Tablet(s) Oral at bedtime  sertraline 50 milliGRAM(s) Oral daily  trimethoprim  160 mG/sulfamethoxazole 800 mG 1 Tablet(s) Oral <User Schedule>    MEDICATIONS  (PRN):  acetaminophen   Tablet .. 650 milliGRAM(s) Oral every 6 hours PRN Mild Pain (1 - 3)  ALBUTerol/ipratropium for Nebulization 3 milliLiter(s) Nebulizer every 6 hours PRN Shortness of Breath and/or Wheezing  ALPRAZolam 0.5 milliGRAM(s) Oral three times a day PRN anxiety  polyethylene glycol 3350 17 Gram(s) Oral daily PRN Constiapation      Vital Signs Last 24 Hrs  T(C): 36.8 (12 Sep 2019 09:00), Max: 37.5 (11 Sep 2019 22:14)  T(F): 98.3 (12 Sep 2019 09:00), Max: 99.5 (11 Sep 2019 22:14)  HR: 96 (12 Sep 2019 09:00) (76 - 133)  BP: 105/74 (12 Sep 2019 09:00) (105/74 - 132/92)  BP(mean): --  RR: 18 (12 Sep 2019 09:00) (16 - 18)  SpO2: 93% (12 Sep 2019 09:) (92% - 95%)  CAPILLARY BLOOD GLUCOSE      POCT Blood Glucose.: 249 mg/dL (12 Sep 2019 11:56)  POCT Blood Glucose.: 136 mg/dL (12 Sep 2019 07:59)  POCT Blood Glucose.: 164 mg/dL (11 Sep 2019 22:04)  POCT Blood Glucose.: 210 mg/dL (11 Sep 2019 18:29)  POCT Blood Glucose.: 158 mg/dL (11 Sep 2019 16:50)    I&O's Summary    11 Sep 2019 07:01  -  12 Sep 2019 07:00  --------------------------------------------------------  IN: 1210 mL / OUT: 1525 mL / NET: -315 mL          PHYSICAL EXAM  GENERAL: anxious, well-developed  HEAD:  Atraumatic, Normocephalic  EYES: EOMI, conjunctiva and sclera clear  NECK: Supple, No JVD  CHEST/LUNG: bilateral basilar crackles.   HEART: tachycardic, irregular  ABDOMEN: Soft, Nontender, Nondistended; Bowel sounds present  EXTREMITIES:  2+ Peripheral Pulses, trace LE edema  PSYCH: AAOx3  SKIN: No rashes or lesions    LABS:                        10.6   28.9  )-----------( 139      ( 12 Sep 2019 06:39 )             34.2     09-12    138  |  100  |  53<H>  ----------------------------<  120<H>  4.9   |  25  |  1.74<H>    Ca    8.8      12 Sep 2019 06:39  Phos  3.1     12  Mg     2.0     -12      PT/INR - ( 12 Sep 2019 06:39 )   PT: 13.0 sec;   INR: 1.14 ratio               Urinalysis Basic - ( 11 Sep 2019 12:37 )    Color: Yellow / Appearance: Slightly Turbid / S.016 / pH: x  Gluc: x / Ketone: Negative  / Bili: Negative / Urobili: Negative   Blood: x / Protein: 300 mg/dL / Nitrite: Negative   Leuk Esterase: Moderate / RBC: 36 /hpf / WBC 37 /HPF   Sq Epi: x / Non Sq Epi: 0 /hpf / Bacteria: Many        Culture - Blood (collected 11 Sep 2019 22:07)  Source: .Blood  Gram Stain (12 Sep 2019 08:46):    Growth in anaerobic bottle: Gram Negative Rods    Growth in aerobic bottle: Gram Negative Rods  Preliminary Report (12 Sep 2019 08:47):    Growth in anaerobic bottle: Gram Negative Rods    Growth in aerobic bottle: Gram Negative Rods    "Due to technical problems, Proteus sp. will Not be reported as part of    the BCID panel until further notice"    ***Blood Panel PCR results on this specimenare available    approximately 3 hours after the Gram stain result.***    Gram stain, PCR, and/or culture results may not always    correspond due to difference in methodologies.    ************************************************************    This PCR assaywas performed using [employer].    The following targets are tested for: Enterococcus,    vancomycin resistant enterococci, Listeria monocytogenes,    coagulase negative staphylococci, S. aureus,    methicillin resistant S. aureus, Streptococcus agalactiae    (Group B), S. pneumoniae, S. pyogenes (Group A),    Acinetobacter baumannii, Enterobacter cloacae, E. coli,    Klebsiella oxytoca, K. pneumoniae, Proteus sp.,    Serratia marcescens, Haemophilus influenzae,    Neisseria meningitidis, Pseudomonas aeruginosa, Candida    albicans, C. glabrata, C krusei, C parapsilosis,    C. tropicalis and the KPC resistance gene.  Organism: Blood Culture PCR (12 Sep 2019 08:17)  Organism: Blood Culture PCR (12 Sep 2019 08:17)        RADIOLOGY & ADDITIONAL TESTS:    Imaging Personally Reviewed:  Consultant(s) Notes Reviewed:    Care Discussed with Consultants/Other Providers:

## 2019-09-12 NOTE — PROGRESS NOTE ADULT - ASSESSMENT
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  Repeat Of note, patient recently admitted [facility] for shortness of breath in the setting of hypoxic respiratory failure likely 2/2 pulmonary renal syndrome of rheumatologic etiology of vasculitis vs immune complex disease - serology was low C3/4, + p-ANCA, elevated ESR/CRP, anti- dsDNA), pauci-immune possibly 2/2 chronic hydralazine s/p Rituxan 8/23/19) was discharge on 8/24/19 on prednisone 60 mg po.    Nephrology consulted given KANIKA - on admission SCr 1.74 (prior SCr on d/c 08/2019 was 1.28) likely 2/2 hemodynamically meditated and anemia (hgb 8.3, baseline 9.0-9.5).  SCr improved w/ IVF and PRBC. 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  Repeat Of note, patient recently admitted [facility] for shortness of breath in the setting of hypoxic respiratory failure likely 2/2 pulmonary renal syndrome of rheumatologic etiology of vasculitis vs immune complex disease - serology was low C3/4, + p-ANCA, elevated ESR/CRP, anti- dsDNA), pauci-immune possibly 2/2 chronic hydralazine s/p Rituxan 8/23/19) was discharge on 8/24/19 on prednisone 60 mg po.    Nephrology consulted given KANIKA - on admission SCr 1.74 (prior SCr on d/c 08/2019 was 1.28) likely 2/2 hemodynamically meditated and anemia (hgb 8.3, baseline 9.0-9.5).  SCr improved w/ IVF and PRBC. Hospital course complicated by GNR bacteremia, possible source infected RP hematoma. Due for next dose of rituximab, currently on hold in setting of active infection.

## 2019-09-12 NOTE — PROGRESS NOTE ADULT - ATTENDING COMMENTS
Renal function unchanged and essentially stable since admission  bedside sono done : not suggestive of volume overload  Would hold diuretics for now  Maintain on antibiotics

## 2019-09-12 NOTE — PROGRESS NOTE ADULT - ASSESSMENT
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  Repeat Of note, patient recently admitted [facility] for shortness of breath in the setting of hypoxic respiratory failure likely 2/2 pulmonary renal syndrome of rheumatologic etiology of vasculitis vs immune complex disease - serology was low C3/4, + p-ANCA, elevated ESR/CRP, anti- dsDNA), pauci-immune possibly 2/2 chronic hydralazine s/p Rituxan 8/23/19) was discharge on 8/24/19 on prednisone 60 mg po. Pt now stable on the floor.

## 2019-09-12 NOTE — PROGRESS NOTE ADULT - ASSESSMENT
84 year old F with a PMH of lymphoma/MGUS, A fib on coumadin, CHF, HLD, HTN, COPD, anxiety, depression.  Admitted for retroperitoneal hemorrhage following a mechanical fall.  Cardiology consulted for management of hypertension and a fib.       REC:  #1.  Hypertension; hypertensive heart disease.    - stable, c/w clonidine.   - avoid hydralazine due to concern for SLE-like syndrome      #2. Chronic atrial fib  - C/w metoprolol 100mg BID. Can transition diltiazem to 120mg QD.   - Spoke to Dr. Hesham Dobbins, pt's outpatient cardiologist; he has no objection to starting NOAC such as Eliquis. H/H downtrending again yesterday. No emergency in resuming a/c, can be initiated as an outpatient when pt follows-up.    #3.  Pulmonary hypertension / HFpEF  LV systolic function normal, although hypertensive dz./diastolic dysfunction could be a contributing factor.   - Continue Lasix once KANIKA resolves and as per renal recs.   - Consider RHC when optimized, can be done outpatient     #4.  Retroperitoneal bleed  - continue to hold A/C for now      Aydin Thomason PGY4  [phone]  All Cardiology service information can be found 24/7 on [email or web address], password: [email or web address] 84 year old F with a PMH of lymphoma/MGUS, A fib on coumadin, CHF, HLD, HTN, COPD, anxiety, depression.  Admitted for retroperitoneal hemorrhage following a mechanical fall.  Cardiology consulted for management of hypertension and a fib.       REC:  #1.  Hypertension; hypertensive heart disease.    - stable, c/w clonidine.   - avoid hydralazine due to concern for SLE-like syndrome      #2. Chronic atrial fib  - C/w metoprolol 100mg BID. Can transition diltiazem to 120mg QD.   - Would hold on resuming a/c; can be initiated as an outpatient when pt follows-up.    #3.  Pulmonary hypertension / HFpEF  LV systolic function normal, although hypertensive dz./diastolic dysfunction could be a contributing factor.   - Continue Lasix once KANIKA resolves and as per renal recs.   - Consider RHC when optimized, can be done outpatient     #4.  Retroperitoneal bleed  - continue to hold A/C for now      Aydin Thomason PGY4  [phone]  All Cardiology service information can be found 24/7 on [email or web address], password: [email or web address] M.D.  Cardiology Attending, Consult Service  [phone]

## 2019-09-12 NOTE — PROGRESS NOTE ADULT - PROBLEM SELECTOR PLAN 3
Known history of a fib, well controlled with atenolol and coumadin   hold anticoagulant in setting of RP bleed  Possible switch from coumadin to NOAC on d/c if cleared  C/w metoprolol 100 bid   Continue diltiazem.

## 2019-09-12 NOTE — PROGRESS NOTE ADULT - PROBLEM SELECTOR PLAN 2
Pt s/p fall found to have left ilopsoas hematoma   H/H remains stable. Sx follow up.  Will cont to hold coumadin for now until stable.

## 2019-09-12 NOTE — PROVIDER CONTACT NOTE (OTHER) - ACTION/TREATMENT ORDERED:
Transfer to teley floor. Pt resting comfortably in bed at present time Call bell within reach will cont to monitor

## 2019-09-12 NOTE — PROGRESS NOTE ADULT - SUBJECTIVE AND OBJECTIVE BOX
Nephrology progress note      ON events/Subjective: As per son, patient had confusion overnight greater than baseline.     Allergies:  Keflex (Unknown)  penicillin (Rash)    Hospital Medications:   MEDICATIONS  (STANDING):  atorvastatin 10 milliGRAM(s) Oral at bedtime  cloNIDine 0.1 milliGRAM(s) Oral two times a day  diltiazem    milliGRAM(s) Oral daily  docusate sodium 100 milliGRAM(s) Oral three times a day  enoxaparin Injectable 30 milliGRAM(s) SubCutaneous daily  entecavir 0.5 milliGRAM(s) Oral every 72 hours  glycerin Suppository - Adult 1 Suppository(s) Rectal once  insulin lispro (HumaLOG) corrective regimen sliding scale   SubCutaneous three times a day before meals  insulin lispro (HumaLOG) corrective regimen sliding scale   SubCutaneous at bedtime  insulin lispro Injectable (HumaLOG) 6 Unit(s) SubCutaneous three times a day before meals  meropenem  IVPB 1000 milliGRAM(s) IV Intermittent every 12 hours  metoprolol tartrate 100 milliGRAM(s) Oral two times a day  montelukast 10 milliGRAM(s) Oral daily  pantoprazole    Tablet 40 milliGRAM(s) Oral before breakfast  predniSONE   Tablet 60 milliGRAM(s) Oral every 24 hours  senna 2 Tablet(s) Oral at bedtime  sertraline 50 milliGRAM(s) Oral daily  trimethoprim  160 mG/sulfamethoxazole 800 mG 1 Tablet(s) Oral <User Schedule>    REVIEW OF SYSTEMS:  Unable to obtain    VITALS:  T(F): 98.3 (19 @ 09:00), Max: 99.5 (19 @ 22:14)  HR: 96 (19 @ 09:00)  BP: 105/74 (19 @ 09:00)  RR: 18 (19 @ 09:00)  SpO2: 93% (19 @ 09:00)  Wt(kg): --    09-10 @ 07:01  -   @ 07:00  --------------------------------------------------------  IN: 800 mL / OUT: 1050 mL / NET: -250 mL     @ 07:01  -   @ 07:00  --------------------------------------------------------  IN: 1210 mL / OUT: 1525 mL / NET: -315 mL        PHYSICAL EXAM:  Constitutional: Nad  HEENT: anicteric sclera, oropharynx clear, MMM  Neck: No JVD  Respiratory: CTAB, no wheezes, rales or rhonchi  Cardiovascular: S1, S2, RRR  Gastrointestinal: BS+, soft, NT/ND  Extremities: No cyanosis or clubbing. No peripheral edema  Neurological: A/O x 2, no focal deficits  Psychiatric: Normal mood, normal affect  : No CVA tenderness. No garza.   Skin: No rashes      LABS:      138  |  100  |  53<H>  ----------------------------<  120<H>  4.9   |  25  |  1.74<H>    Ca    8.8      12 Sep 2019 06:39  Phos  3.1       Mg     2.0                                 10.6   28.9  )-----------( 139      ( 12 Sep 2019 06:39 )             34.2       Urine Studies:  Creatinine Trend: 1.74<--, 1.73<--, 1.95<--, 2.07<--, 2.16<--, 1.89<--  Urinalysis Basic - ( 11 Sep 2019 12:37 )    Color: Yellow / Appearance: Slightly Turbid / S.016 / pH:   Gluc:  / Ketone: Negative  / Bili: Negative / Urobili: Negative   Blood:  / Protein: 300 mg/dL / Nitrite: Negative   Leuk Esterase: Moderate / RBC: 36 /hpf / WBC 37 /HPF   Sq Epi:  / Non Sq Epi: 0 /hpf / Bacteria: Many

## 2019-09-12 NOTE — PROGRESS NOTE ADULT - ASSESSMENT
ASSESSMENT  84F hx CHF, HTN, HLD, Afib (on coumadin), lymphoma) s/p renal biopsy for glomerulonephritis work up as outpt, who presented to [facility] with a RP hematoma and an INR 4, s/p reversal of A/C. She was initially monitoring in the SICU under trauma surgery. She had no further bleeding and H/H has been stable. Rising leukocytosis in setting of steroids.  Tx with retuxin in planned.  No acute events overnight.    PLAN  - Continue medical management of lymphoma and glomerulonephritis  - Continue to hold coumadin  - Cardiology following for Afib  - Rheumatology, hematology and nephrology following  - Will follow      ACS [id number] ASSESSMENT  84F hx CHF, HTN, HLD, Afib (on coumadin), lymphoma) s/p renal biopsy for glomerulonephritis work up as outpt, who presented to [facility] with a RP hematoma and an INR 4, s/p reversal of A/C. She was initially monitoring in the SICU under trauma surgery. She had no further bleeding and H/H has been stable. Rising leukocytosis in setting of steroids.  Tx with retuxin in planned.  No acute events overnight.    PLAN  - Tertiary survey had been performed earlier in the admission, upon re-evaluation of all imaging and physical exam this morning, no new traumatic injuries identified  - H/H stable and INR corrected  - Continue medical management of lymphoma and glomerulonephritis  - Continue to hold coumadin  - Cardiology following for Afib  - Rheumatology, hematology and nephrology following  - please reconsult as needed by paging the number below    ACS [id number] ASSESSMENT  84F hx CHF, HTN, HLD, Afib (on coumadin), lymphoma) s/p renal biopsy for glomerulonephritis work up as outpt, who presented to [facility] with a RP hematoma and an INR 4, s/p reversal of A/C. She was initially monitoring in the SICU under trauma surgery. She had no further bleeding and H/H has been stable. Rising leukocytosis in setting of steroids.  Tx with retuxin in planned.  No acute events overnight.    PLAN  - Tertiary survey had been performed earlier in the admission, upon re-evaluation of all imaging and physical exam this morning, no new traumatic injuries identified  - H/H stable and INR corrected  - Continue medical management of lymphoma and glomerulonephritis  - Continue to hold coumadin  - Cardiology following for Afib  - Rheumatology, hematology and nephrology following  - please reconsult as needed by paging the number below  - Discussed with Dr. Figueroa    [facility] [id number]

## 2019-09-12 NOTE — CHART NOTE - NSCHARTNOTEFT_GEN_A_CORE
Hematology fellow note    History of marginal zone lymphoma/MGUS, needs follow up with Dr. Viera at [facility] after discharge.    Rituximab being managed by rheumatology/nephrology.    We will sign off, please call/reconsult us with any issues and if we can be of further help in patient's case    Madelaine López,   Hematology/Oncology Fellow  Pager [phone]  Weekdays after 5PM or weekends page hematology/oncology fellow on call Hematology fellow note    History of marginal zone lymphoma/MGUS, needs follow up with Dr. Viera at [facility] after discharge.    Rituximab being managed by rheumatology/nephrology.    We will sign off, please call/reconsult us with any issues and if we can be of further help in patient's case. discussed with primary team.    Madelaine López DO  Hematology/Oncology Fellow  Pager [phone]  Weekdays after 5PM or weekends page hematology/oncology fellow on call

## 2019-09-12 NOTE — PROGRESS NOTE ADULT - PROBLEM SELECTOR PLAN 1
KANIKA likely hemodynamically meditated in setting anemia , RP bleed w/ contributing factor diuretic lasix  .  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    PLAN:   - recommend holding rituximab in setting of GNR bacteremia  - c/w hemodynamic support, PRBC maintain Hgb>7  - avoid nephrotoxic agents (ACEI/ARB, NSAIDS), renally dose medications  - trend SCr and UOP.  - Maintain on entecavir at Q72  - PLEASE AVOID HYDRALAZINE THOUGHT TO BE THE CAUSE OF ANCA VASCULITIS.

## 2019-09-12 NOTE — PROGRESS NOTE ADULT - SUBJECTIVE AND OBJECTIVE BOX
Trauma/ACS Progress Note    Interval: Was in rapid Afib again overnight, blood pressure has been stable. Transferred to telemetry. Feeling well. Has no acute complaints, and denies any chest pain, palpitations, dizziness, or shortness of breath. Stat labs sent and H/H remains stable.    VITALS  T(C): 36.9 (09-12-19 @ 07:04), Max: 37.5 (09-11-19 @ 22:14)  HR: 111 (09-12-19 @ 07:04) (76 - 133)  BP: 132/92 (09-12-19 @ 07:04) (108/81 - 132/92)  RR: 16 (09-12-19 @ 07:04) (16 - 18)  SpO2: 92% (09-12-19 @ 07:04) (92% - 95%)  CAPILLARY BLOOD GLUCOSE      POCT Blood Glucose.: 136 mg/dL (12 Sep 2019 07:59)  POCT Blood Glucose.: 164 mg/dL (11 Sep 2019 22:04)  POCT Blood Glucose.: 210 mg/dL (11 Sep 2019 18:29)  POCT Blood Glucose.: 158 mg/dL (11 Sep 2019 16:50)  POCT Blood Glucose.: 191 mg/dL (11 Sep 2019 14:13)  POCT Blood Glucose.: 146 mg/dL (11 Sep 2019 09:27)    Is/Os    09-11 @ 07:01  -  09-12 @ 07:00  --------------------------------------------------------  IN:    Oral Fluid: 1160 mL    Solution: 50 mL  Total IN: 1210 mL    OUT:    Intermittent Catheterization - Urethral: 675 mL    Voided: 850 mL  Total OUT: 1525 mL    Total NET: -315 mL      PHYSICAL EXAM:   General: NAD, Lying in bed comfortably, no acute distress, following commands  Pulm: Non-labored breathing on RA  GI/Abd: Soft, NT/ND, no rebound/guarding    LABS  CBC (09-12 @ 06:39)                              10.6<L>                         28.9<H>  )----------------(  139<L>     87.0<H>% Neutrophils, 4.0<L>% Lymphocytes, ANC: 25.6<H>                              34.2<L>  CBC (09-11 @ 09:24)                              9.4<L>                         28.11<H>  )----------------(  207        94.0<H>% Neutrophils, 0.8<L>% Lymphocytes, ANC: 26.42<H>                              30.2<L>    BMP (09-12 @ 06:39)             138     |  100     |  53<H> 		Ca++ --      Ca 8.8                ---------------------------------( 120<H>		Mg 2.0                4.9     |  25      |  1.74<H>			Ph 3.1     BMP (09-11 @ 07:20)             135     |  99      |  52<H> 		Ca++ --      Ca 8.8                ---------------------------------( 122<H>		Mg 1.9                5.3     |  20<L>   |  1.73<H>			Ph 2.8         Coags (09-12 @ 06:39)  aPTT -- / INR 1.14 / PT 13.0<H>  Coags (09-11 @ 09:22)  aPTT -- / INR 1.13 / PT 13.0 Trauma/ACS Progress Note    Interval: Was in rapid Afib again overnight, blood pressure has been stable. Transferred to telemetry. Feeling well. Has no acute complaints, and denies any chest pain, palpitations, dizziness, or shortness of breath. Stat labs sent and H/H remains stable. She denies any new pain or weakness.     VITALS  T(C): 36.9 (09-12-19 @ 07:04), Max: 37.5 (09-11-19 @ 22:14)  HR: 111 (09-12-19 @ 07:04) (76 - 133)  BP: 132/92 (09-12-19 @ 07:04) (108/81 - 132/92)  RR: 16 (09-12-19 @ 07:04) (16 - 18)  SpO2: 92% (09-12-19 @ 07:04) (92% - 95%)  CAPILLARY BLOOD GLUCOSE      POCT Blood Glucose.: 136 mg/dL (12 Sep 2019 07:59)  POCT Blood Glucose.: 164 mg/dL (11 Sep 2019 22:04)  POCT Blood Glucose.: 210 mg/dL (11 Sep 2019 18:29)  POCT Blood Glucose.: 158 mg/dL (11 Sep 2019 16:50)  POCT Blood Glucose.: 191 mg/dL (11 Sep 2019 14:13)  POCT Blood Glucose.: 146 mg/dL (11 Sep 2019 09:27)    Is/Os    09-11 @ 07:01  -  09-12 @ 07:00  --------------------------------------------------------  IN:    Oral Fluid: 1160 mL    Solution: 50 mL  Total IN: 1210 mL    OUT:    Intermittent Catheterization - Urethral: 675 mL    Voided: 850 mL  Total OUT: 1525 mL    Total NET: -315 mL      PHYSICAL EXAM:   General: NAD, Lying in bed comfortably, no acute distress, following commands  Pulm: Non-labored breathing on RA  GI/Abd: Soft, NT/ND, no rebound/guarding    LABS  CBC (09-12 @ 06:39)                              10.6<L>                         28.9<H>  )----------------(  139<L>     87.0<H>% Neutrophils, 4.0<L>% Lymphocytes, ANC: 25.6<H>                              34.2<L>  CBC (09-11 @ 09:24)                              9.4<L>                         28.11<H>  )----------------(  207        94.0<H>% Neutrophils, 0.8<L>% Lymphocytes, ANC: 26.42<H>                              30.2<L>    BMP (09-12 @ 06:39)             138     |  100     |  53<H> 		Ca++ --      Ca 8.8                ---------------------------------( 120<H>		Mg 2.0                4.9     |  25      |  1.74<H>			Ph 3.1     BMP (09-11 @ 07:20)             135     |  99      |  52<H> 		Ca++ --      Ca 8.8                ---------------------------------( 122<H>		Mg 1.9                5.3     |  20<L>   |  1.73<H>			Ph 2.8         Coags (09-12 @ 06:39)  aPTT -- / INR 1.14 / PT 13.0<H>  Coags (09-11 @ 09:22)  aPTT -- / INR 1.13 / PT 13.0

## 2019-09-12 NOTE — PROGRESS NOTE ADULT - SUBJECTIVE AND OBJECTIVE BOX
CC: F/U Bacteremia    Saw/spoke to patient. No fevers, no chills. No new complaints. Appears improved today.     Allergies  Keflex (Unknown)  penicillin (Rash)    ANTIMICROBIALS:  entecavir 0.5 every 72 hours  meropenem  IVPB 1000 every 12 hours  trimethoprim  160 mG/sulfamethoxazole 800 mG 1 <User Schedule>    PE:    Vital Signs Last 24 Hrs  T(C): 36.8 (12 Sep 2019 09:00), Max: 37.5 (11 Sep 2019 22:14)  T(F): 98.3 (12 Sep 2019 09:00), Max: 99.5 (11 Sep 2019 22:14)  HR: 96 (12 Sep 2019 09:00) (76 - 133)  BP: 105/74 (12 Sep 2019 09:00) (105/74 - 132/92)  RR: 18 (12 Sep 2019 09:00) (16 - 18)  SpO2: 93% (12 Sep 2019 09:00) (92% - 95%)    Gen: AOx3, NAD, non-toxic, pleasant  CV: S1+S2 normal, nontachycardic  Resp: Clear bilat, no resp distress, no crackles/wheezes  Abd: Soft, nontender, +BS  Ext: No LE edema, no wounds    LABS:                        10.6   28.9  )-----------( 139      ( 12 Sep 2019 06:39 )             34.2     12    138  |  100  |  53<H>  ----------------------------<  120<H>  4.9   |  25  |  1.74<H>    Ca    8.8      12 Sep 2019 06:39  Phos  3.1       Mg     2.0         Urinalysis Basic - ( 11 Sep 2019 12:37 )    Color: Yellow / Appearance: Slightly Turbid / S.016 / pH: x  Gluc: x / Ketone: Negative  / Bili: Negative / Urobili: Negative   Blood: x / Protein: 300 mg/dL / Nitrite: Negative   Leuk Esterase: Moderate / RBC: 36 /hpf / WBC 37 /HPF   Sq Epi: x / Non Sq Epi: 0 /hpf / Bacteria: Many    MICROBIOLOGY:    .Blood  19   Growth in anaerobic bottle: Gram Negative Rods  Growth in aerobic bottle: Gram Negative Rods    RADIOLOGY:    9/10 XR:    FINDINGS:   The heart size cannot be accurately assessed on this projection.  Clear lungs. There are no pleural effusions or pneumothorax.  The bones are unremarkable.    IMPRESSION:   Clear lungs.

## 2019-09-12 NOTE — PROGRESS NOTE ADULT - ASSESSMENT
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  Leukocytosis, no fevers  Tolerating [place]  UA positive  CXR clear  BCX now with E coli  Source: UTI? Infected RP hematoma?  Overall, leukocytosis, immunocompromised, AMS, shortness of breath  - Meropenem 1g q 12  - F/U pending cultures  - Repeat BCX  - Immunosuppressives per rheumatology  - Consider repeat CT A/P to further eval intraabd/RP source if UCX not E coli  - I spoke to Dr. Linares of [facility], she states is okay for [facility] to continue to follow patient    Mauri Kim MD  Pager [phone]  After 5pm and on weekends call [phone]

## 2019-09-13 NOTE — PROGRESS NOTE ADULT - PROBLEM SELECTOR PLAN 7
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

## 2019-09-13 NOTE — PROGRESS NOTE ADULT - ASSESSMENT
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  Repeat Of note, patient recently admitted [facility] for shortness of breath in the setting of hypoxic respiratory failure likely 2/2 pulmonary renal syndrome of rheumatologic etiology of vasculitis vs immune complex disease - serology was low C3/4, + p-ANCA, elevated ESR/CRP, anti- dsDNA), pauci-immune possibly 2/2 chronic hydralazine s/p Rituxan 8/23/19) was discharge on 8/24/19 on prednisone 60 mg po.    Nephrology consulted given KANIKA - on admission SCr 1.74 (prior SCr on d/c 08/2019 was 1.28) likely 2/2 hemodynamically meditated and anemia (hgb 8.3, baseline 9.0-9.5).  SCr improved w/ IVF and PRBC. Hospital course complicated by GNR bacteremia, possible source infected RP hematoma. Due for next dose of rituximab, currently on hold in setting of active infection.

## 2019-09-13 NOTE — CHART NOTE - NSCHARTNOTEFT_GEN_A_CORE
Pt's BP and HR stable on current regimen. A/C can be resumed when pt follows up with her cardiologist as an outpatient. Cardiology will sign off for now, please re-consult as needed.    Aydin Thomason PGY4  [phone]  All Cardiology service information can be found 24/7 on [email or web address], password: [email or web address]

## 2019-09-13 NOTE — PROGRESS NOTE ADULT - PROBLEM SELECTOR PLAN 3
HR better controlled now. likely was uncontrolled in setting of infection  Known history of a fib, well controlled with atenolol and coumadin   holding anticoagulant in setting of RP bleed  Possible switch from coumadin to NOAC on d/c if cleared  C/w metoprolol 100 bid   Continue diltiazem.

## 2019-09-13 NOTE — PROGRESS NOTE ADULT - SUBJECTIVE AND OBJECTIVE BOX
Milli Mckoy MD  Attending Physician (Hospitalist)  pager: [phone]    Patient is a 84y old  Female who presents with a chief complaint of Unwitnessed fall (13 Sep 2019 10:30)        SUBJECTIVE / OVERNIGHT EVENTS:  Feels alittle better today. afebrile overnight. tele afib 70-100s.       MEDICATIONS  (STANDING):  ALBUTerol/ipratropium for Nebulization 3 milliLiter(s) Nebulizer every 6 hours  atorvastatin 10 milliGRAM(s) Oral at bedtime  cloNIDine 0.1 milliGRAM(s) Oral two times a day  diltiazem    milliGRAM(s) Oral daily  docusate sodium 100 milliGRAM(s) Oral three times a day  enoxaparin Injectable 30 milliGRAM(s) SubCutaneous daily  entecavir 0.5 milliGRAM(s) Oral every 72 hours  insulin lispro (HumaLOG) corrective regimen sliding scale   SubCutaneous three times a day before meals  insulin lispro (HumaLOG) corrective regimen sliding scale   SubCutaneous at bedtime  insulin lispro Injectable (HumaLOG) 6 Unit(s) SubCutaneous three times a day before meals  meropenem  IVPB 1000 milliGRAM(s) IV Intermittent every 12 hours  metoprolol tartrate 100 milliGRAM(s) Oral two times a day  montelukast 10 milliGRAM(s) Oral daily  pantoprazole    Tablet 40 milliGRAM(s) Oral before breakfast  predniSONE   Tablet 60 milliGRAM(s) Oral every 24 hours  senna 2 Tablet(s) Oral at bedtime  sertraline 50 milliGRAM(s) Oral daily  trimethoprim  160 mG/sulfamethoxazole 800 mG 1 Tablet(s) Oral <User Schedule>    MEDICATIONS  (PRN):  acetaminophen   Tablet .. 650 milliGRAM(s) Oral every 6 hours PRN Mild Pain (1 - 3)  ALPRAZolam 0.5 milliGRAM(s) Oral three times a day PRN anxiety  polyethylene glycol 3350 17 Gram(s) Oral daily PRN Constiapation      Vital Signs Last 24 Hrs  T(C): 36.5 (13 Sep 2019 12:07), Max: 36.8 (12 Sep 2019 18:00)  T(F): 97.7 (13 Sep 2019 12:07), Max: 98.2 (12 Sep 2019 18:00)  HR: 76 (13 Sep 2019 12:07) (73 - 102)  BP: 96/63 (13 Sep 2019 12:07) (96/63 - 122/87)  BP(mean): --  RR: 18 (13 Sep 2019 12:07) (18 - 18)  SpO2: 97% (13 Sep 2019 12:07) (94% - 98%)  CAPILLARY BLOOD GLUCOSE      POCT Blood Glucose.: 222 mg/dL (13 Sep 2019 11:55)  POCT Blood Glucose.: 112 mg/dL (13 Sep 2019 07:57)  POCT Blood Glucose.: 104 mg/dL (12 Sep 2019 22:07)  POCT Blood Glucose.: 209 mg/dL (12 Sep 2019 16:45)    I&O's Summary    12 Sep 2019 07:01  -  13 Sep 2019 07:00  --------------------------------------------------------  IN: 240 mL / OUT: 1000 mL / NET: -760 mL    13 Sep 2019 07:01  -  13 Sep 2019 13:37  --------------------------------------------------------  IN: 180 mL / OUT: 0 mL / NET: 180 mL          PHYSICAL EXAM  GENERAL: less anxious, well-developed  HEAD:  Atraumatic, Normocephalic  EYES: EOMI, conjunctiva and sclera clear  NECK: Supple, No JVD  CHEST/LUNG: bilateral basilar crackles.   HEART: irregular. no murmur  ABDOMEN: Soft, Nontender, Nondistended; Bowel sounds present  EXTREMITIES:  2+ Peripheral Pulses, trace LE edema  PSYCH: AAOx3  SKIN: No rashes or lesions    LABS:                        9.9    22.78 )-----------( 137      ( 13 Sep 2019 08:54 )             31.9     09-13    138  |  100  |  54<H>  ----------------------------<  113<H>  4.8   |  24  |  1.81<H>    Ca    8.9      13 Sep 2019 05:58  Phos  3.1     09-12  Mg     2.0     09-12      PT/INR - ( 12 Sep 2019 06:39 )   PT: 13.0 sec;   INR: 1.14 ratio                   Culture - Blood (collected 11 Sep 2019 22:07)  Source: .Blood  Gram Stain (12 Sep 2019 08:46):    Growth in anaerobic bottle: Gram Negative Rods    Growth in aerobic bottle: Gram Negative Rods  Preliminary Report (12 Sep 2019 08:47):    Growth in anaerobic bottle: Gram Negative Rods    Growth in aerobic bottle: Gram Negative Rods    "Due to technical problems, Proteus sp. will Not be reported as part of    the BCID panel until further notice"    ***Blood Panel PCR results on this specimenare available    approximately 3 hours after the Gram stain result.***    Gram stain, PCR, and/or culture results may not always    correspond due to difference in methodologies.    ************************************************************    This PCR assaywas performed using [employer].    The following targets are tested for: Enterococcus,    vancomycin resistant enterococci, Listeria monocytogenes,    coagulase negative staphylococci, S. aureus,    methicillin resistant S. aureus, Streptococcus agalactiae    (Group B), S. pneumoniae, S. pyogenes (Group A),    Acinetobacter baumannii, Enterobacter cloacae, E. coli,    Klebsiella oxytoca, K. pneumoniae, Proteus sp.,    Serratia marcescens, Haemophilus influenzae,    Neisseria meningitidis, Pseudomonas aeruginosa, Candida    albicans, C. glabrata, C krusei, C parapsilosis,    C. tropicalis and the KPC resistance gene.  Organism: Blood Culture PCR (12 Sep 2019 08:17)  Organism: Blood Culture PCR (12 Sep 2019 08:17)    Culture - Urine (collected 11 Sep 2019 19:09)  Source: .Urine  Preliminary Report (13 Sep 2019 01:07):    >100,000 CFU/ml Gram Negative Rods        RADIOLOGY & ADDITIONAL TESTS:    Imaging Personally Reviewed:  Consultant(s) Notes Reviewed:    Care Discussed with Consultants/Other Providers:

## 2019-09-13 NOTE — PROGRESS NOTE ADULT - SUBJECTIVE AND OBJECTIVE BOX
CC: F/U Bacteremia    Saw/spoke to patient. No fevers, no chills. No new complaints. Improved but fatigued.    Allergies  Keflex (Unknown)  penicillin (Rash)    ANTIMICROBIALS:  entecavir 0.5 every 72 hours  meropenem  IVPB 1000 every 12 hours  trimethoprim  160 mG/sulfamethoxazole 800 mG 1 <User Schedule>    PE:    Vital Signs Last 24 Hrs  T(C): 36.5 (13 Sep 2019 12:07), Max: 36.8 (12 Sep 2019 18:00)  T(F): 97.7 (13 Sep 2019 12:07), Max: 98.2 (12 Sep 2019 18:00)  HR: 76 (13 Sep 2019 12:07) (73 - 102)  BP: 96/63 (13 Sep 2019 12:07) (96/63 - 122/87)  RR: 18 (13 Sep 2019 12:07) (18 - 18)  SpO2: 97% (13 Sep 2019 12:07) (94% - 98%)    Gen: AOx3, NAD, non-toxic, pleasant  CV: S1+S2 normal, nontachycardic  Resp: Clear bilat, no resp distress, no crackles/wheezes  Abd: Soft, nontender, +BS  Ext: No LE edema, no wounds    LABS:                        9.9    22.78 )-----------( 137      ( 13 Sep 2019 08:54 )             31.9     09-13    138  |  100  |  54<H>  ----------------------------<  113<H>  4.8   |  24  |  1.81<H>    Ca    8.9      13 Sep 2019 05:58  Phos  3.1     09-12  Mg     2.0     09-12    MICROBIOLOGY:    .Blood  09-11-19   Growth in anaerobic bottle: Gram Negative Rods  Growth in aerobic bottle: Gram Negative Rods    .Urine  09-11-19   >100,000 CFU/ml Gram Negative Rods     RADIOLOGY:    9/12 CXR:    IMPRESSION:    1.  Cardiomegaly.  2.  Likely pulmonary edema.

## 2019-09-13 NOTE — PROGRESS NOTE ADULT - PROBLEM SELECTOR PLAN 1
BlCx with E.coli. suspect likely urine source so far GNR in urine.   WBC down trending, afebrile. no additional rigors or chills  Awaiting final UCx and BlCx results.   If different organisms will need CT A/p  Cont with emperic abx on meropenem  ID following.

## 2019-09-13 NOTE — PROGRESS NOTE ADULT - SUBJECTIVE AND OBJECTIVE BOX
[facility] DIVISION OF KIDNEY DISEASES AND HYPERTENSION -- FOLLOW UP NOTE  --------------------------------------------------------------------------------  Chief Complaint:  Fall    24 hour events/subjective:  Examined at bed side, more awake, as per son was lethargic most likely due to xanax, no other events.     PAST HISTORY  --------------------------------------------------------------------------------  No significant changes to PMH, PSH, FHx, SHx, unless otherwise noted    ALLERGIES & MEDICATIONS  --------------------------------------------------------------------------------  Allergies    Keflex (Unknown)  penicillin (Rash)    Intolerances      Standing Inpatient Medications  ALBUTerol/ipratropium for Nebulization 3 milliLiter(s) Nebulizer every 6 hours  atorvastatin 10 milliGRAM(s) Oral at bedtime  cloNIDine 0.1 milliGRAM(s) Oral two times a day  diltiazem    milliGRAM(s) Oral daily  docusate sodium 100 milliGRAM(s) Oral three times a day  enoxaparin Injectable 30 milliGRAM(s) SubCutaneous daily  entecavir 0.5 milliGRAM(s) Oral every 72 hours  insulin lispro (HumaLOG) corrective regimen sliding scale   SubCutaneous three times a day before meals  insulin lispro (HumaLOG) corrective regimen sliding scale   SubCutaneous at bedtime  insulin lispro Injectable (HumaLOG) 6 Unit(s) SubCutaneous three times a day before meals  meropenem  IVPB 1000 milliGRAM(s) IV Intermittent every 12 hours  metoprolol tartrate 100 milliGRAM(s) Oral two times a day  montelukast 10 milliGRAM(s) Oral daily  pantoprazole    Tablet 40 milliGRAM(s) Oral before breakfast  predniSONE   Tablet 60 milliGRAM(s) Oral every 24 hours  senna 2 Tablet(s) Oral at bedtime  sertraline 50 milliGRAM(s) Oral daily  trimethoprim  160 mG/sulfamethoxazole 800 mG 1 Tablet(s) Oral <User Schedule>    PRN Inpatient Medications  acetaminophen   Tablet .. 650 milliGRAM(s) Oral every 6 hours PRN  ALPRAZolam 0.5 milliGRAM(s) Oral three times a day PRN  polyethylene glycol 3350 17 Gram(s) Oral daily PRN      REVIEW OF SYSTEMS  --------------------------------------------------------------------------------  Gen: No weight changes, fatigue, fevers/chills, weakness  Respiratory: No dyspnea, cough, wheezing, hemoptysis  CV: No chest pain, PND, orthopnea  GI: No abdominal pain, diarrhea, constipation, nausea, vomiting, melena, hematochezia  MSK: ; no edema  Neuro: No dizziness/lightheadedness,  All other systems were reviewed and are negative, except as noted.    VITALS/PHYSICAL EXAM  --------------------------------------------------------------------------------  T(C): 36.3 (09-13-19 @ 04:35), Max: 36.8 (09-12-19 @ 18:00)  HR: 73 (09-13-19 @ 04:35) (73 - 102)  BP: 118/76 (09-13-19 @ 04:35) (112/72 - 122/87)  RR: 18 (09-13-19 @ 04:35) (18 - 18)  SpO2: 98% (09-13-19 @ 04:35) (94% - 98%)  Wt(kg): --        09-12-19 @ 07:01  -  09-13-19 @ 07:00  --------------------------------------------------------  IN: 240 mL / OUT: 1000 mL / NET: -760 mL    09-13-19 @ 07:01  -  09-13-19 @ 10:30  --------------------------------------------------------  IN: 180 mL / OUT: 0 mL / NET: 180 mL      Physical Exam:  Constitutional: Nad  HEENT: anicteric sclera, oropharynx clear, MMM  Neck: No JVD  Respiratory: CTAB, no wheezes, rales or rhonchi  Cardiovascular: S1, S2, RRR  Gastrointestinal: BS+, soft, NT/ND  Extremities: No cyanosis or clubbing. No peripheral edema  Neurological: A/O x 2, no focal deficits  : No CVA tenderness. No garza.     LABS/STUDIES  --------------------------------------------------------------------------------              9.9    22.78 >-----------<  137      [09-13-19 @ 08:54]              31.9     138  |  100  |  54  ----------------------------<  113      [09-13-19 @ 05:58]  4.8   |  24  |  1.81        Ca     8.9     [09-13-19 @ 05:58]      Mg     2.0     [09-12-19 @ 06:39]      Phos  3.1     [09-12-19 @ 06:39]      PT/INR: PT 13.0 , INR 1.14       [09-12-19 @ 06:39]      Creatinine Trend:  SCr 1.81 [09-13 @ 05:58]  SCr 1.74 [09-12 @ 06:39]  SCr 1.73 [09-11 @ 07:20]  SCr 1.95 [09-10 @ 09:10]  SCr 2.07 [09-10 @ 03:07]    Urinalysis - [09-11-19 @ 12:37]      Color Yellow / Appearance Slightly Turbid / SG 1.016 / pH 6.0      Gluc 100 mg/dL / Ketone Negative  / Bili Negative / Urobili Negative       Blood Moderate / Protein 300 mg/dL / Leuk Est Moderate / Nitrite Negative      RBC 36 / WBC 37 / Hyaline 1 / Gran  / Sq Epi  / Non Sq Epi 0 / Bacteria Many

## 2019-09-13 NOTE — PROGRESS NOTE ADULT - ASSESSMENT
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  Repeat Of note, patient recently admitted [facility] for shortness of breath in the setting of hypoxic respiratory failure likely 2/2 pulmonary renal syndrome of rheumatologic etiology of vasculitis vs immune complex disease - serology was low C3/4, + p-ANCA, elevated ESR/CRP, anti- dsDNA), pauci-immune possibly 2/2 chronic hydralazine s/p Rituxan 8/23/19) was discharge on 8/24/19 on prednisone 60 mg po. Pt now stable on the floor.

## 2019-09-13 NOTE — PROGRESS NOTE ADULT - ASSESSMENT
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  Leukocytosis, no fevers  Tolerating [place]  UA positive, UCX GNR (? E coli)  CXR clear  BCX now with E coli  Source: UTI? Infected RP hematoma?  Overall, leukocytosis, immunocompromised, AMS, shortness of breath  - Meropenem 500mg q 12  - F/U pending cultures  - Repeat BCX to clearance  - Immunosuppressives per rheumatology  - Consider repeat CT A/P to further eval intraabd/RP source if UCX not E coli  - Trend WBC, monitor for signs sepsis    Mauri Kim MD  Pager [phone]  After 5pm and on weekends call [phone] 83 yo F w/ a PMHx of lymphoma/MGUS, A fib on coumadin, CHF, HLD, HTN, COPD, anxiety, depression, recently admitted for shortness of breath in the setting of hypoxic respiratory failure likely 2/2 pulmonary renal syndrome of rheumatologic etiology of vasculitis vs immune complex disease, re-presenting with episode of SOB, altered mental status, fatigue.  Rituxan/Steroids  Leukocytosis, no fevers  Tolerating [place]  UA positive, UCX GNR (? E coli)  CXR clear  BCX now with E coli  Source: UTI? Infected RP hematoma?  Overall, leukocytosis, immunocompromised, AMS, shortness of breath  - Meropenem 500mg q 12  - F/U pending cultures  - Repeat BCX to clearance  - Immunosuppressives per rheumatology  - Consider repeat CT A/P to further eval intraabd/RP source if UCX not E coli  - Trend WBC, monitor for signs sepsis    Mauri Kim MD  Pager [phone]  After 5pm and on weekends call [phone]

## 2019-09-14 NOTE — PROGRESS NOTE ADULT - PROBLEM SELECTOR PLAN 3
rate controlled  Known history of a fib, well controlled with atenolol and coumadin   holding anticoagulant in setting of RP bleed  Possible switch from coumadin to NOAC on d/c if cleared  C/w metoprolol 100 bid   Continue diltiazem.

## 2019-09-14 NOTE — PROGRESS NOTE ADULT - SUBJECTIVE AND OBJECTIVE BOX
Patient is a 84y old  Female who presents with a chief complaint of Unwitnessed fall (14 Sep 2019 11:02)      SUBJECTIVE / OVERNIGHT EVENTS:    No acute overnight events.  feeling better today.  daughter at bedside provided translation    ROS: ( - ) Fever, ( - )Chills,  ( - )Nausea/Vomiting, ( - ) Cough, ( - )Shortness of breath, ( - )Chest Pain    MEDICATIONS  (STANDING):  ALBUTerol/ipratropium for Nebulization 3 milliLiter(s) Nebulizer every 6 hours  atorvastatin 10 milliGRAM(s) Oral at bedtime  cloNIDine 0.1 milliGRAM(s) Oral two times a day  diltiazem    milliGRAM(s) Oral daily  docusate sodium 100 milliGRAM(s) Oral three times a day  enoxaparin Injectable 30 milliGRAM(s) SubCutaneous daily  entecavir 0.5 milliGRAM(s) Oral every 72 hours  ertapenem  IVPB 500 milliGRAM(s) IV Intermittent every 24 hours  insulin lispro (HumaLOG) corrective regimen sliding scale   SubCutaneous three times a day before meals  insulin lispro (HumaLOG) corrective regimen sliding scale   SubCutaneous at bedtime  insulin lispro Injectable (HumaLOG) 6 Unit(s) SubCutaneous three times a day before meals  metoprolol tartrate 100 milliGRAM(s) Oral two times a day  montelukast 10 milliGRAM(s) Oral daily  pantoprazole    Tablet 40 milliGRAM(s) Oral before breakfast  predniSONE   Tablet 60 milliGRAM(s) Oral every 24 hours  senna 2 Tablet(s) Oral at bedtime  sertraline 50 milliGRAM(s) Oral daily  trimethoprim  160 mG/sulfamethoxazole 800 mG 1 Tablet(s) Oral <User Schedule>    MEDICATIONS  (PRN):  acetaminophen   Tablet .. 650 milliGRAM(s) Oral every 6 hours PRN Mild Pain (1 - 3)  ALPRAZolam 0.5 milliGRAM(s) Oral three times a day PRN anxiety  polyethylene glycol 3350 17 Gram(s) Oral daily PRN Constipation      T(C): 36.7 (09-14 @ 13:00), Max: 37.1 (09-13 @ 20:52)   HR: 76   BP: 154/89   RR: 18   SpO2: 96%    PHYSICAL EXAM  GENERAL: less anxious, well-developed  EYES: EOMI, conjunctiva and sclera clear  NECK: Supple, No JVD  CHEST/LUNG: CTA, no Rales, no Rhonchi  HEART: irregular. no murmur  ABDOMEN: Soft, Nontender, Nondistended; Bowel sounds present  EXTREMITIES:  2+ Peripheral Pulses, trace LE edema  PSYCH: AAOx3    LABS:                        9.8    21.41 )-----------( 173      ( 14 Sep 2019 11:09 )             31.8      09-14    138  |  101  |  59<H>  ----------------------------<  182<H>  4.8   |  24  |  1.82<H>    Ca    8.7      14 Sep 2019 06:31         CAPILLARY BLOOD GLUCOSE      POCT Blood Glucose.: 194 mg/dL (14 Sep 2019 17:26)  POCT Blood Glucose.: 275 mg/dL (14 Sep 2019 13:34)  POCT Blood Glucose.: 249 mg/dL (14 Sep 2019 12:04)  POCT Blood Glucose.: 156 mg/dL (14 Sep 2019 07:53)  POCT Blood Glucose.: 189 mg/dL (13 Sep 2019 21:32)      RADIOLOGY & ADDITIONAL TESTS:    Imaging Personally Reviewed:  Consultant(s) Notes Reviewed:    Care Discussed with Consultants/Other Providers:

## 2019-09-14 NOTE — PROGRESS NOTE ADULT - PROBLEM SELECTOR PLAN 1
presumed from UTI causing Bacteremia  BlCx  and Ucx with E.coli.  Change abx to Ertapenem given sensitives  awaiting clearance of blood cultures.  ID following.

## 2019-09-14 NOTE — PROGRESS NOTE ADULT - SUBJECTIVE AND OBJECTIVE BOX
CC: F/U Bacteremia    Saw/spoke to patient. No fevers, no chills. No new complaints. Improved today. Daughter at bedside.      Allergies  Keflex (Unknown)  penicillin (Rash)    ANTIMICROBIALS:    entecavir 0.5 every 72 hours  meropenem  IVPB 500 every 12 hours  trimethoprim  160 mG/sulfamethoxazole 800 mG 1 <User Schedule>      PE:    Vital Signs Last 24 Hrs  T(F): 98.3 (09-14-19 @ 04:25), Max: 98.7 (09-13-19 @ 20:52)  HR: 111 (09-14-19 @ 04:25)  BP: 124/88 (09-14-19 @ 04:25)  RR: 18 (09-14-19 @ 04:25)  SpO2: 96% (09-14-19 @ 04:25) (95% - 98%)  Wt(kg): --    Gen: AOx3, NAD, non-toxic, pleasant  CV: S1+S2 normal, nontachycardic  Resp: Clear bilat, no resp distress, no crackles/wheezes  Abd: Soft, nontender, +BS  Ext: No LE edema, no wounds    LABS:                                   9.9    22.78 )-----------( 137      ( 13 Sep 2019 08:54 )             31.9 09-14    138  |  101  |  59  ----------------------------<  182  4.8   |  24  |  1.82  Ca    8.7      14 Sep 2019 06:31          MICROBIOLOGY:    Culture - Blood in AM (09.13.19 @ 10:06)    Specimen Source: .Blood    Culture Results:   No growth to date.    Culture - Blood (09.11.19 @ 22:07)    Gram Stain:   Growth in anaerobic bottle: Gram Negative Rods  Growth in aerobic bottle: Gram Negative Rods    -  Escherichia coli: Detec    -  Ciprofloxacin: R >2    -  Ertapenem: S <=0.5    -  Ceftriaxone: S <=1 Enterobacter, Citrobacter, and Serratia may develop resistance during prolonged therapy    -  Cefoxitin: S <=8    -  Cefepime: S <=2    -  Cefazolin: S <=2 Enterobacter, Citrobacter, and Serratia may develop resistance during prolonged therapy (3-4 days)    -  Aztreonam: S <=4    -  Ampicillin/Sulbactam: S <=4/2 Enterobacter, Citrobacter, and Serratia may develop resistance during prolonged therapy (3-4 days)    -  Ampicillin: S <=8 These ampicillin results predict results for amoxicillin    -  Amikacin: S <=16    -  Piperacillin/Tazobactam: S <=8    -  Meropenem: S <=1    -  Imipenem: S <=1    -  Levofloxacin: R >4    -  Gentamicin: S <=2    -  Trimethoprim/Sulfamethoxazole: R >2/38    -  Tobramycin: S <=2    Specimen Source: .Blood    Organism: Blood Culture PCR    Organism: Escherichia coli    Culture Results:   Growth in aerobic and anaerobic bottles: Escherichia coli    Culture - Urine (09.11.19 @ 19:09)    -  Tigecycline: S <=2    -  Tobramycin: S <=4    -  Trimethoprim/Sulfamethoxazole: R >2/38    -  Gentamicin: S <=4    -  Imipenem: S <=1    -  Levofloxacin: R >4    -  Meropenem: S <=1    -  Nitrofurantoin: S <=32 Should not be used to treat pyelonephritis    -  Piperacillin/Tazobactam: S <=16    -  Amikacin: S <=16    -  Ampicillin: I 16 These ampicillin results predict results for amoxicillin    -  Ampicillin/Sulbactam: S <=8/4 Enterobacter, Citrobacter, and Serratia may develop resistance during prolonged therapy (3-4 days)    -  Aztreonam: S <=4    -  Cefazolin: S <=8 (MIC_CL_COM_ENTERIC_CEFAZU) For uncomplicated UTI with K. pneumoniae, E. coli, or P. mirablis: HEATH <=16 is sensitive and HEATH >=32 is resistant. This also predicts results for oral agents cefaclor, cefdinir, cefpodoxime, cefprozil, cefuroxime axetil, cephalexin and locarbef for uncomplicated UTI. Note that some isolates may be susceptible to these agents while testing resistant to cefazolin.    -  Cefepime: S <=4    -  Cefoxitin: S <=8    -  Ceftriaxone: S <=1 Enterobacter, Citrobacter, and Serratia may develop resistance during prolonged therapy    -  Ciprofloxacin: R >2    Specimen Source: .Urine    Culture Results:   >100,000 CFU/ml Escherichia coli    Organism Identification: Escherichia coli    Organism: Escherichia coli    Method Type: HEATH          RADIOLOGY:    9/12 CXR:    IMPRESSION:    1.  Cardiomegaly.  2.  Likely pulmonary edema.

## 2019-09-14 NOTE — PROGRESS NOTE ADULT - ASSESSMENT
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  Repeat Of note, patient recently admitted [facility] for shortness of breath in the setting of hypoxic respiratory failure likely 2/2 pulmonary renal syndrome of rheumatologic etiology of vasculitis vs immune complex disease - serology was low C3/4, + p-ANCA, elevated ESR/CRP, anti- dsDNA), pauci-immune possibly 2/2 chronic hydralazine s/p Rituxan 8/23/19) was discharge on 8/24/19 on prednisone 60 mg po. Pt now stable on the floor.

## 2019-09-14 NOTE — PROGRESS NOTE ADULT - ATTENDING COMMENTS
Andre Reyes, M.D.  [facility] Medicine Attending  Office: [phone]   Pager: [phone]

## 2019-09-14 NOTE — PROGRESS NOTE ADULT - ASSESSMENT
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  Rituxan/Steroids  Leukocytosis, no fevers  Tolerating Meropenem - keflex/PCN allergy  UA positive, UCX with E coli  CXR clear  BCX now with E coli  Source: UTI? Infected RP hematoma?  Overall, leukocytosis, immunocompromised, AMS, shortness of breath  - can change to ertapenem 500 mg IV daily - isolate sensitive  - F/U pending cultures  - Repeat BCX to clearance NGTD  - Immunosuppressives per rheumatology  - will hold off on repeat CT A/P to further eval intraabd/RP as urine and blood both with E coli  - Trend WBC, monitor for signs sepsis    Marla Bah MD  [phone] (pager)  [phone] (office)

## 2019-09-15 NOTE — PROGRESS NOTE ADULT - PROBLEM SELECTOR PLAN 1
presumed from UTI causing Bacteremia  BlCx  and Ucx with E.coli.  c/w Ertapenem given sensitives  awaiting clearance of blood cultures, 9/13 cultures with NGTD  ID following.

## 2019-09-15 NOTE — PROGRESS NOTE ADULT - ATTENDING COMMENTS
Andre Reyes, M.D.  [facility] Medicine Attending  Office: [phone]   Pager: [phone]

## 2019-09-15 NOTE — PROGRESS NOTE ADULT - ASSESSMENT
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

## 2019-09-15 NOTE — PROGRESS NOTE ADULT - PROBLEM SELECTOR PLAN 4
Cr continues to downtrend.  renal following closely.  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    Pt also with medication induced hyperglycemia (from steroids)  will increase humalog to 8 units AC and will check an A1C in the AM.

## 2019-09-15 NOTE — PROGRESS NOTE ADULT - PROBLEM SELECTOR PLAN 2
pt with RVR overnight and still elevated HR this morning   will increase diltiazem to 180mg from 120mg  C/w metoprolol 100 bid   Known history of a fib, well controlled with atenolol and coumadin   holding anticoagulant in setting of RP bleed  Possible switch from coumadin to NOAC on d/c if cleared

## 2019-09-15 NOTE — PROGRESS NOTE ADULT - SUBJECTIVE AND OBJECTIVE BOX
Patient is a 84y old  Female who presents with a chief complaint of Unwitnessed fall (14 Sep 2019 19:39)      SUBJECTIVE / OVERNIGHT EVENTS:    overnight pt was having episode of afib with RVR requiring Lopressor 5mg IV x1 dose and Cardizem 15mg x 1 dose  per son at bedside, pt was complaining of feeling more anxious this morning.  pt denies any palpitations or chest pain or shortness of breath.     ROS: ( - ) Fever, ( - )Chills,  ( - )Nausea/Vomiting, ( - ) Cough, ( - )Shortness of breath, ( - )Chest Pain    MEDICATIONS  (STANDING):  ALBUTerol/ipratropium for Nebulization 3 milliLiter(s) Nebulizer every 6 hours  atorvastatin 10 milliGRAM(s) Oral at bedtime  cloNIDine 0.1 milliGRAM(s) Oral two times a day  diltiazem    milliGRAM(s) Oral daily  docusate sodium 100 milliGRAM(s) Oral three times a day  enoxaparin Injectable 30 milliGRAM(s) SubCutaneous daily  entecavir 0.5 milliGRAM(s) Oral every 72 hours  ertapenem  IVPB 500 milliGRAM(s) IV Intermittent every 24 hours  furosemide   Injectable 40 milliGRAM(s) IV Push every 24 hours  insulin lispro (HumaLOG) corrective regimen sliding scale   SubCutaneous three times a day before meals  insulin lispro (HumaLOG) corrective regimen sliding scale   SubCutaneous at bedtime  insulin lispro Injectable (HumaLOG) 8 Unit(s) SubCutaneous three times a day before meals  metoprolol tartrate 100 milliGRAM(s) Oral two times a day  montelukast 10 milliGRAM(s) Oral daily  pantoprazole    Tablet 40 milliGRAM(s) Oral before breakfast  predniSONE   Tablet 60 milliGRAM(s) Oral every 24 hours  senna 2 Tablet(s) Oral at bedtime  sertraline 50 milliGRAM(s) Oral daily  trimethoprim  160 mG/sulfamethoxazole 800 mG 1 Tablet(s) Oral <User Schedule>    MEDICATIONS  (PRN):  acetaminophen   Tablet .. 650 milliGRAM(s) Oral every 6 hours PRN Mild Pain (1 - 3)  ALPRAZolam 0.5 milliGRAM(s) Oral three times a day PRN anxiety  polyethylene glycol 3350 17 Gram(s) Oral daily PRN Constiapation      T(C): 36.7 (09-15 @ 11:45), Max: 36.7 (09-14 @ 20:51)   HR: 99   BP: 120/80   RR: 18   SpO2: 92%    PHYSICAL EXAM  GENERAL: mildly anxious, well-developed  NECK: Supple, mild JVD  CHEST/LUNG: +tachypnea, +bibasilar Rales, no Rhonchi, no wheezing  HEART: tachy, irregularly irregular  ABDOMEN: Soft, Nontender, Nondistended; Bowel sounds present  EXTREMITIES:  2+ Peripheral Pulses, 1+ Bilateral LE edema  PSYCH: AAOx3    LABS:                        9.8    22.06 )-----------( 173      ( 15 Sep 2019 08:56 )             32.0      09-15    139  |  100  |  53<H>  ----------------------------<  128<H>  4.6   |  25  |  1.51<H>    Ca    8.7      15 Sep 2019 05:42  Mg     2.1     09-15         CAPILLARY BLOOD GLUCOSE      POCT Blood Glucose.: 204 mg/dL (15 Sep 2019 11:48)  POCT Blood Glucose.: 121 mg/dL (15 Sep 2019 07:54)  POCT Blood Glucose.: 165 mg/dL (14 Sep 2019 21:41)  POCT Blood Glucose.: 194 mg/dL (14 Sep 2019 17:26)      RADIOLOGY & ADDITIONAL TESTS:    Imaging Personally Reviewed:  Consultant(s) Notes Reviewed:    Care Discussed with Consultants/Other Providers:

## 2019-09-15 NOTE — PROGRESS NOTE ADULT - PROBLEM SELECTOR PLAN 7
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

## 2019-09-15 NOTE — CHART NOTE - NSCHARTNOTEFT_GEN_A_CORE
TONIA YOUNG    Notified by RN patient with Afib at 's on cardiac monitor, no c/o cp or sob.      Interventions taken   Additional Lopressor 25mg po x1  cont assess and monitor.  f/u with am team.                      Duarte Martinez ANP-BC  Spectralink #[id number] TONIA YOUNG    Notified by RN patient with Afib at -140's on cardiac monitor, no c/o cp or sob.      Interventions taken   Lopressor 5mg IV x1 and Cardizem 15mg iv x1 with good responses, HR down to 90's   cont assess and monitor.  f/u with am team.                      Duarte Martinez ANP-BC  Spectralink #[id number]

## 2019-09-15 NOTE — PROVIDER CONTACT NOTE (OTHER) - ASSESSMENT
Pt asleep during event, currently asymptomatic upon assessment. Denies any chest pain, SOB or palpitations. VSS, otherwise. Pt currently on Cardizem PO and Lopressor PO for rate control.

## 2019-09-15 NOTE — PROGRESS NOTE ADULT - PROBLEM SELECTOR PLAN 3
Diuresis was being held in the setting of KANIKA but pt now with worsening tachypnea, rales, JVD and LE edema  will restart lasix 40mg IV daily.

## 2019-09-16 NOTE — PROVIDER CONTACT NOTE (CRITICAL VALUE NOTIFICATION) - BACKGROUND
83 y/o Female PMHx lymphoma,AFib on Coumadin, CHF, HLD, HTN, COPD admitted s/p fall. with RP Bleed sec to supratherapeutic INR s/p 1 ubnit PRBC, KCentra FFP, Vit K          Ecoli Bacteremia - sec to UTI - Meropenem

## 2019-09-16 NOTE — PROGRESS NOTE ADULT - ASSESSMENT
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  Repeat Of note, patient recently admitted [facility] for shortness of breath in the setting of hypoxic respiratory failure likely 2/2 pulmonary renal syndrome of rheumatologic etiology of vasculitis vs immune complex disease - serology was low C3/4, + p-ANCA, elevated ESR/CRP, anti- dsDNA), pauci-immune possibly 2/2 chronic hydralazine s/p Rituxan 8/23/19) was discharge on 8/24/19 on prednisone 60 mg po. Pt now stable on the floor.

## 2019-09-16 NOTE — PROVIDER CONTACT NOTE (CRITICAL VALUE NOTIFICATION) - SITUATION
Patient's bllod cultures from 9/14/19 came back growth in anaerobic bottle- gram positive cocci in clusters

## 2019-09-16 NOTE — PROGRESS NOTE ADULT - SUBJECTIVE AND OBJECTIVE BOX
CC: F/U for Bacteremia    Saw/spoke to patient. No fevers, no chills. No new complaints. Unchanged.    Allergies  Keflex (Unknown)  penicillin (Rash)    ANTIMICROBIALS:  entecavir 0.5 every 72 hours  ertapenem  IVPB 500 every 24 hours  trimethoprim  160 mG/sulfamethoxazole 800 mG 1 <User Schedule>    PE:    Vital Signs Last 24 Hrs  T(C): 36.9 (16 Sep 2019 04:28), Max: 36.9 (16 Sep 2019 04:28)  T(F): 98.5 (16 Sep 2019 04:28), Max: 98.5 (16 Sep 2019 04:28)  HR: 85 (16 Sep 2019 04:28) (74 - 98)  BP: 148/78 (16 Sep 2019 04:28) (123/82 - 148/78)  RR: 18 (16 Sep 2019 04:28) (18 - 18)  SpO2: 100% (16 Sep 2019 04:28) (96% - 100%)    Gen: AOx3, NAD, non-toxic, pleasant  CV: S1+S2 normal, nontachycardic  Resp: Clear bilat, no resp distress, no crackles/wheezes  Abd: Soft, nontender, +BS  Ext: No LE edema, no wounds    LABS:                        9.4    16.77 )-----------( 174      ( 16 Sep 2019 08:04 )             30.5     09-16    139  |  98  |  51<H>  ----------------------------<  137<H>  4.3   |  29  |  1.48<H>    Ca    8.9      16 Sep 2019 05:04  Mg     1.8     09-16    MICROBIOLOGY:    .Blood  09-14-19 No growth to date.  --  Blood Culture PCR CONS    .Blood  09-13-19   No growth to date.     .Blood  09-11-19   Growth in aerobic and anaerobic bottles: Escherichia coli    .Urine  09-11-19   >100,000 CFU/ml Escherichia coli  --  Escherichia coli    (otherwise reviewed)    RADIOLOGY:    9/12 CXR:    IMPRESSION:    1.  Cardiomegaly.  2.  Likely pulmonary edema.

## 2019-09-16 NOTE — PROGRESS NOTE ADULT - PROBLEM SELECTOR PLAN 1
BlCx UCx with E.coli. suspect likely UTI with bacteremia  WBC down trending, afebrile. no additional rigors or chills  D/w Dr. Kim (ID) likely to de-escalate further to different abx. will follow.   BlCx from 9/13 NGTD but 9/14 with 1/4 CNS likely contaminant. repeat.

## 2019-09-16 NOTE — PROGRESS NOTE ADULT - ASSESSMENT
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  Repeat Of note, patient recently admitted [facility] for shortness of breath in the setting of hypoxic respiratory failure likely 2/2 pulmonary renal syndrome of rheumatologic etiology of vasculitis vs immune complex disease - serology was low C3/4, + p-ANCA, elevated ESR/CRP, anti- dsDNA), pauci-immune possibly 2/2 chronic hydralazine s/p Rituxan 8/23/19) was discharge on 8/24/19 on prednisone 60 mg po.    Nephrology consulted given KANIKA - on admission SCr 1.74 (prior SCr on d/c 08/2019 was 1.28) likely 2/2 hemodynamically meditated and anemia (hgb 8.3, baseline 9.0-9.5).  SCr improved w/ IVF and PRBC. Hospital course complicated by GNR bacteremia, possible source infected RP hematoma. Due for next dose of rituximab, currently on hold in setting of active infection.

## 2019-09-16 NOTE — PROGRESS NOTE ADULT - PROBLEM SELECTOR PLAN 1
KANIKA likely hemodynamically meditated in setting anemia , RP bleed w/ contributing factor diuretic lasix  .  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    PLAN:   - recommend holding rituximab in setting of GNR bacteremia  - c/w hemodynamic support, PRBC maintain Hgb>7  - avoid nephrotoxic agents (ACEI/ARB, NSAIDS), renally dose medications  - trend SCr and UOP.  - Maintain on entecavir at Q72  - PLEASE AVOID HYDRALAZINE THOUGHT TO BE THE CAUSE OF ANCA VASCULITIS. KANIKA likely hemodynamically meditated in setting anemia , RP bleed w/ contributing factor diuretic lasix  .  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    PLAN:   - recommend holding rituximab  for now. continue to hold diuretics for now.   - c/w hemodynamic support, PRBC maintain Hgb>7  - avoid nephrotoxic agents (ACEI/ARB, NSAIDS), renally dose medications  - trend SCr and UOP.  - Maintain on entecavir at Q72  - PLEASE AVOID HYDRALAZINE THOUGHT TO BE THE CAUSE OF ANCA VASCULITIS.

## 2019-09-16 NOTE — PROGRESS NOTE ADULT - SUBJECTIVE AND OBJECTIVE BOX
[facility] DIVISION OF KIDNEY DISEASES AND HYPERTENSION -- FOLLOW UP NOTE  --------------------------------------------------------------------------------  Chief Complaint:  Fall    24 hour events/subjective:  Examined at bed side, more awake, as per son was lethargic most likely due to xanax, no other      PAST HISTORY  --------------------------------------------------------------------------------  No significant changes to PMH, PSH, FHx, SHx, unless otherwise noted    ALLERGIES & MEDICATIONS  --------------------------------------------------------------------------------  Allergies    Keflex (Unknown)  penicillin (Rash)    Intolerances      Standing Inpatient Medications  ALBUTerol/ipratropium for Nebulization 3 milliLiter(s) Nebulizer every 6 hours  atorvastatin 10 milliGRAM(s) Oral at bedtime  cloNIDine 0.1 milliGRAM(s) Oral two times a day  diltiazem    milliGRAM(s) Oral daily  docusate sodium 100 milliGRAM(s) Oral three times a day  enoxaparin Injectable 30 milliGRAM(s) SubCutaneous daily  entecavir 0.5 milliGRAM(s) Oral every 72 hours  ertapenem  IVPB 500 milliGRAM(s) IV Intermittent every 24 hours  furosemide   Injectable 40 milliGRAM(s) IV Push every 24 hours  insulin lispro (HumaLOG) corrective regimen sliding scale   SubCutaneous three times a day before meals  insulin lispro (HumaLOG) corrective regimen sliding scale   SubCutaneous at bedtime  insulin lispro Injectable (HumaLOG) 8 Unit(s) SubCutaneous three times a day before meals  metoprolol tartrate 100 milliGRAM(s) Oral two times a day  montelukast 10 milliGRAM(s) Oral daily  pantoprazole    Tablet 40 milliGRAM(s) Oral before breakfast  predniSONE   Tablet 60 milliGRAM(s) Oral every 24 hours  senna 2 Tablet(s) Oral at bedtime  sertraline 50 milliGRAM(s) Oral daily  trimethoprim  160 mG/sulfamethoxazole 800 mG 1 Tablet(s) Oral <User Schedule>    PRN Inpatient Medications  acetaminophen   Tablet .. 650 milliGRAM(s) Oral every 6 hours PRN  ALPRAZolam 0.5 milliGRAM(s) Oral three times a day PRN  polyethylene glycol 3350 17 Gram(s) Oral daily PRN      REVIEW OF SYSTEMS  --------------------------------------------------------------------------------  Gen: No weight changes, fatigue, fevers/chills, weakness  Respiratory: No dyspnea, cough, wheezing, hemoptysis  CV: No chest pain, PND, orthopnea  GI: No abdominal pain, diarrhea, constipation, nausea, vomiting, melena, hematochezia  MSK: ; no edema  Neuro: No dizziness/lightheadedness,  All other systems were reviewed and are negative, except as noted.      VITALS/PHYSICAL EXAM  --------------------------------------------------------------------------------  T(C): 36.9 (09-16-19 @ 04:28), Max: 36.9 (09-16-19 @ 04:28)  HR: 85 (09-16-19 @ 04:28) (74 - 99)  BP: 148/78 (09-16-19 @ 04:28) (120/80 - 148/78)  RR: 18 (09-16-19 @ 04:28) (18 - 18)  SpO2: 100% (09-16-19 @ 04:28) (96% - 100%)  Wt(kg): --        09-15-19 @ 07:01  -  09-16-19 @ 07:00  --------------------------------------------------------  IN: 880 mL / OUT: 1000 mL / NET: -120 mL      Physical Exam:    Constitutional: Nad  HEENT:  oropharynx clear, MMM  Neck: No JVD  Respiratory: CTAB, no wheezes, rales or rhonchi  Cardiovascular: S1, S2, RRR  Gastrointestinal: BS+, soft, NT/ND  Extremities: No cyanosis or clubbing. No peripheral edema  Neurological: A/O x 2, no focal deficits  : No CVA tenderness. No garza.     LABS/STUDIES  --------------------------------------------------------------------------------              9.4    16.77 >-----------<  174      [09-16-19 @ 08:04]              30.5     139  |  98  |  51  ----------------------------<  137      [09-16-19 @ 05:04]  4.3   |  29  |  1.48        Ca     8.9     [09-16-19 @ 05:04]      Mg     1.8     [09-16-19 @ 05:04]    Creatinine Trend:  SCr 1.48 [09-16 @ 05:04]  SCr 1.51 [09-15 @ 05:42]  SCr 1.82 [09-14 @ 06:31]  SCr 1.81 [09-13 @ 05:58]  SCr 1.74 [09-12 @ 06:39] [facility] DIVISION OF KIDNEY DISEASES AND HYPERTENSION -- FOLLOW UP NOTE  --------------------------------------------------------------------------------  Chief Complaint:  Fall    24 hour events/subjective:  Examined at bed side, awake, NAD, feeling better.       PAST HISTORY  --------------------------------------------------------------------------------  No significant changes to PMH, PSH, FHx, SHx, unless otherwise noted    ALLERGIES & MEDICATIONS  --------------------------------------------------------------------------------  Allergies    Keflex (Unknown)  penicillin (Rash)    Intolerances      Standing Inpatient Medications  ALBUTerol/ipratropium for Nebulization 3 milliLiter(s) Nebulizer every 6 hours  atorvastatin 10 milliGRAM(s) Oral at bedtime  cloNIDine 0.1 milliGRAM(s) Oral two times a day  diltiazem    milliGRAM(s) Oral daily  docusate sodium 100 milliGRAM(s) Oral three times a day  enoxaparin Injectable 30 milliGRAM(s) SubCutaneous daily  entecavir 0.5 milliGRAM(s) Oral every 72 hours  ertapenem  IVPB 500 milliGRAM(s) IV Intermittent every 24 hours  furosemide   Injectable 40 milliGRAM(s) IV Push every 24 hours  insulin lispro (HumaLOG) corrective regimen sliding scale   SubCutaneous three times a day before meals  insulin lispro (HumaLOG) corrective regimen sliding scale   SubCutaneous at bedtime  insulin lispro Injectable (HumaLOG) 8 Unit(s) SubCutaneous three times a day before meals  metoprolol tartrate 100 milliGRAM(s) Oral two times a day  montelukast 10 milliGRAM(s) Oral daily  pantoprazole    Tablet 40 milliGRAM(s) Oral before breakfast  predniSONE   Tablet 60 milliGRAM(s) Oral every 24 hours  senna 2 Tablet(s) Oral at bedtime  sertraline 50 milliGRAM(s) Oral daily  trimethoprim  160 mG/sulfamethoxazole 800 mG 1 Tablet(s) Oral <User Schedule>    PRN Inpatient Medications  acetaminophen   Tablet .. 650 milliGRAM(s) Oral every 6 hours PRN  ALPRAZolam 0.5 milliGRAM(s) Oral three times a day PRN  polyethylene glycol 3350 17 Gram(s) Oral daily PRN      REVIEW OF SYSTEMS  --------------------------------------------------------------------------------  Gen: No weight changes, fatigue, fevers/chills, weakness  Respiratory: No dyspnea, cough, wheezing, hemoptysis  CV: No chest pain, PND, orthopnea  GI: No abdominal pain, diarrhea, constipation, nausea, vomiting, melena, hematochezia  MSK: ; no edema  Neuro: No dizziness/lightheadedness,  All other systems were reviewed and are negative, except as noted.      VITALS/PHYSICAL EXAM  --------------------------------------------------------------------------------  T(C): 36.9 (09-16-19 @ 04:28), Max: 36.9 (09-16-19 @ 04:28)  HR: 85 (09-16-19 @ 04:28) (74 - 99)  BP: 148/78 (09-16-19 @ 04:28) (120/80 - 148/78)  RR: 18 (09-16-19 @ 04:28) (18 - 18)  SpO2: 100% (09-16-19 @ 04:28) (96% - 100%)  Wt(kg): --        09-15-19 @ 07:01  -  09-16-19 @ 07:00  --------------------------------------------------------  IN: 880 mL / OUT: 1000 mL / NET: -120 mL      Physical Exam:    Constitutional: Nad  HEENT:  oropharynx clear, MMM  Neck: No JVD  Respiratory: CTAB, no wheezes, rales or rhonchi  Cardiovascular: S1, S2, RRR  Gastrointestinal: BS+, soft, NT/ND  Extremities: No cyanosis or clubbing. No peripheral edema  Neurological: A/O x 2, no focal deficits  : No CVA tenderness. No garza.     LABS/STUDIES  --------------------------------------------------------------------------------              9.4    16.77 >-----------<  174      [09-16-19 @ 08:04]              30.5     139  |  98  |  51  ----------------------------<  137      [09-16-19 @ 05:04]  4.3   |  29  |  1.48        Ca     8.9     [09-16-19 @ 05:04]      Mg     1.8     [09-16-19 @ 05:04]    Creatinine Trend:  SCr 1.48 [09-16 @ 05:04]  SCr 1.51 [09-15 @ 05:42]  SCr 1.82 [09-14 @ 06:31]  SCr 1.81 [09-13 @ 05:58]  SCr 1.74 [09-12 @ 06:39]

## 2019-09-16 NOTE — PROGRESS NOTE ADULT - ATTENDING COMMENTS
Awake, feeling improved  1.  ARF--no HD indications.  Hemodynamic optimization  2.  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  Steroids, rituxin (no major immunosuppression with recent GN infection  3.  Hypertension--NO hydralazine.  Clonidine vs labetolol decent choice  4.  Edema--Na restrict.  Loop diuretic as needed but trend Mg, K

## 2019-09-16 NOTE — PROGRESS NOTE ADULT - ASSESSMENT
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  Rituxan/Steroids  Leukocytosis, no fevers  Tolerating [place]  UA positive, UCX GNR (? E coli)  CXR clear  BCX now with E coli; suspect 2/2 UTI  Note CONS in blood, likely contam, repeat  Overall, leukocytosis, immunocompromised, AMS, shortness of breath  - Ertapenem 500mg q 24 (CrCl on borderline, monitor closely)--continue as patient has ? allergy to Keflex  - Repeat BCX to clearance (monitoring for CoNS)  - Immunosuppressives per rheumatology  - Trend WBC, monitor for signs sepsis  - Discussed with medicine team    Mauri Kim MD  Pager [phone]  After 5pm and on weekends call [phone]

## 2019-09-16 NOTE — CHART NOTE - NSCHARTNOTEFT_GEN_A_CORE
TONIA YOUNG    Notified by RN patient with critical lab result: blood culture positive for G+ cocci in clusters on 9/14/19 specimen.        Interventions taken   Pt already on triple antibiotics and c/w current Tx.  cont assess and monitor.  f/u with am team and ID.                         Duarte Martinez ANP-[facility] #[id number]

## 2019-09-16 NOTE — PROGRESS NOTE ADULT - PROBLEM SELECTOR PLAN 7
avoid hydralazine due to concern for SLE-like syndrome  c/w clonidine   Cw cardizem CD 180mg qd and lopressor 100mg q12.

## 2019-09-16 NOTE — PROGRESS NOTE ADULT - PROBLEM SELECTOR PLAN 2
Pt s/p fall found to have left ilopsoas hematoma in setting of fall/coumadin on admission.   H/H remains stable.   Coumadin remains on hold.   Plan to resume back AC if ok from surg standpoint.

## 2019-09-16 NOTE — PROGRESS NOTE ADULT - PROBLEM SELECTOR PLAN 3
started on lasix 40mg IV daily sec to clinical evidence of overload.   Will cont for now and monitor renal function closely. strict I+Os  Diuretics previous held due to overall dehydration.  Renal following. Card signed off.

## 2019-09-16 NOTE — PROGRESS NOTE ADULT - PROBLEM SELECTOR PLAN 4
Cardizem CD increased to 180mg given RVR. will cont and monitor.  Known history of a fib, well controlled with atenolol and coumadin   holding anticoagulant in setting of RP bleed  Possible switch from coumadin to NOAC on d/c if cleared  C/w metoprolol 100 bid

## 2019-09-17 NOTE — DIETITIAN INITIAL EVALUATION ADULT. - PERTINENT LABORATORY DATA
Na 136 [135 - 145], K+ 4.1 [3.5 - 5.3], BUN 48 [7 - 23], Cr 1.44 [0.50 - 1.30],  [70 - 99], Phos 4.2 [2.5 - 4.5], Alk Phos --, AST --, ALT --, Mg 1.6 [1.6 - 2.6], Ca 8.5 [8.4 - 10.5], HbA1c 6%; POCT blood glucose (9/15-17) 119-234

## 2019-09-17 NOTE — DIETITIAN INITIAL EVALUATION ADULT. - PERTINENT MEDS FT
MEDICATIONS  (STANDING):  ALBUTerol/ipratropium for Nebulization 3 milliLiter(s) Nebulizer every 6 hours  atorvastatin 10 milliGRAM(s) Oral at bedtime  cloNIDine 0.1 milliGRAM(s) Oral two times a day  diltiazem    milliGRAM(s) Oral daily  docusate sodium 100 milliGRAM(s) Oral three times a day  enoxaparin Injectable 30 milliGRAM(s) SubCutaneous daily  entecavir 0.5 milliGRAM(s) Oral every 72 hours  ertapenem  IVPB 500 milliGRAM(s) IV Intermittent every 24 hours  furosemide   Injectable 40 milliGRAM(s) IV Push every 24 hours  guaiFENesin    Syrup 200 milliGRAM(s) Oral at bedtime  insulin lispro (HumaLOG) corrective regimen sliding scale   SubCutaneous three times a day before meals  insulin lispro (HumaLOG) corrective regimen sliding scale   SubCutaneous at bedtime  insulin lispro Injectable (HumaLOG) 8 Unit(s) SubCutaneous three times a day before meals  metoprolol tartrate 100 milliGRAM(s) Oral two times a day  montelukast 10 milliGRAM(s) Oral daily  pantoprazole    Tablet 40 milliGRAM(s) Oral before breakfast  predniSONE   Tablet 60 milliGRAM(s) Oral every 24 hours  senna 2 Tablet(s) Oral at bedtime  sertraline 50 milliGRAM(s) Oral daily  trimethoprim  160 mG/sulfamethoxazole 800 mG 1 Tablet(s) Oral <User Schedule>    MEDICATIONS  (PRN):  acetaminophen   Tablet .. 650 milliGRAM(s) Oral every 6 hours PRN Mild Pain (1 - 3)  ALPRAZolam 0.5 milliGRAM(s) Oral three times a day PRN anxiety  guaiFENesin    Syrup 200 milliGRAM(s) Oral every 6 hours PRN Cough  polyethylene glycol 3350 17 Gram(s) Oral daily PRN Constiapation

## 2019-09-17 NOTE — DIETITIAN INITIAL EVALUATION ADULT. - OTHER INFO
Per son, pt with good appetite and breakfast is her best meal of the day. Reports % po intakes of meals, confirmed by nursing flowsheet. Pt denies GI distress, +BM today. Pt wears dentures, but does not use them for eating, reports she did not like the mechanical soft consistency from [facility] and request to remain on Regular consistency. NKFA. PTA lived at home and cooked for self, reports good po intakes at home. Per son, pt does not have diabetes, but remains on Consistent Carbohydrate diet for taking steroids.

## 2019-09-17 NOTE — DIETITIAN INITIAL EVALUATION ADULT. - ADD RECOMMEND
1) Continue to monitor weight, lab values, and diet tolerance. 2) Son states pt has been on Coumadin 15-20 years, aware of dietary needs while taking Coumadin, declines diet education at this time. RD remains available.

## 2019-09-17 NOTE — PROGRESS NOTE ADULT - PROBLEM SELECTOR PLAN 3
started on lasix 40mg IV daily sec to clinical evidence of overload. tolerating.   Will cont for now and monitor renal function closely. strict I+Os  Diuretics previous held due to overall dehydration.  Renal following. Card signed off.

## 2019-09-17 NOTE — PROGRESS NOTE ADULT - ASSESSMENT
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  Rituxan/Steroids  Leukocytosis, no fevers  UA positive, UCX E coli  CXR clear  BCX now with E coli; suspect 2/2 UTI  Note CONS in blood, likely contam, repeat  Overall, leukocytosis, immunocompromised, AMS, shortness of breath  - Ertapenem 1g q 24 (CrCl on borderline, monitor closely)  - F/U BCX to clearance (monitoring for CoNS)  - Immunosuppressives per rheumatology  - Trend WBC, monitor for signs sepsis    Mauri Kim MD  Pager [phone]  After 5pm and on weekends call [phone]

## 2019-09-17 NOTE — PROGRESS NOTE ADULT - ASSESSMENT
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  Repeat Of note, patient recently admitted [facility] for shortness of breath in the setting of hypoxic respiratory failure likely 2/2 pulmonary renal syndrome of rheumatologic etiology of vasculitis vs immune complex disease - serology was low C3/4, + p-ANCA, elevated ESR/CRP, anti- dsDNA), pauci-immune possibly 2/2 chronic hydralazine s/p Rituxan 8/23/19) was discharge on 8/24/19 on prednisone 60 mg po. Pt now stable on the floor.

## 2019-09-17 NOTE — PROGRESS NOTE ADULT - PROBLEM SELECTOR PLAN 4
Cardizem CD increased to 180mg given RVR. will cont and monitor.  Known history of a fib, well controlled with atenolol and coumadin   holding anticoagulant in setting of RP bleed but will resume   Possible switch from coumadin to NOAC on d/c if stable.   C/w metoprolol 100 bid

## 2019-09-17 NOTE — PROGRESS NOTE ADULT - PROBLEM SELECTOR PLAN 1
BlCx UCx with E.coli. suspect likely UTI with bacteremia in setting of immunosuppression.   WBC down trending, afebrile. no additional rigors or chills  Will cont with current abx ertapenem likely this week.   BlCx from 9/13 NGTD but 9/14 with 1/4 CNS likely contaminant. repeat sent on 9/16.

## 2019-09-17 NOTE — PROGRESS NOTE ADULT - SUBJECTIVE AND OBJECTIVE BOX
Milli Mckoy MD  Attending Physician (Hospitalist)  pager: [phone]    Patient is a 85y old  Female who presents with a chief complaint of Unwitnessed fall (16 Sep 2019 12:04)        SUBJECTIVE / OVERNIGHT EVENTS:  Feels ok. no acute issues overnight. cough improved.  afebrile tele afib  high briefly 130s.       MEDICATIONS  (STANDING):  ALBUTerol/ipratropium for Nebulization 3 milliLiter(s) Nebulizer every 6 hours  atorvastatin 10 milliGRAM(s) Oral at bedtime  cloNIDine 0.1 milliGRAM(s) Oral two times a day  diltiazem    milliGRAM(s) Oral daily  docusate sodium 100 milliGRAM(s) Oral three times a day  enoxaparin Injectable 30 milliGRAM(s) SubCutaneous daily  entecavir 0.5 milliGRAM(s) Oral every 72 hours  ertapenem  IVPB 500 milliGRAM(s) IV Intermittent every 24 hours  furosemide   Injectable 40 milliGRAM(s) IV Push every 24 hours  guaiFENesin    Syrup 200 milliGRAM(s) Oral at bedtime  insulin lispro (HumaLOG) corrective regimen sliding scale   SubCutaneous three times a day before meals  insulin lispro (HumaLOG) corrective regimen sliding scale   SubCutaneous at bedtime  insulin lispro Injectable (HumaLOG) 8 Unit(s) SubCutaneous three times a day before meals  metoprolol tartrate 100 milliGRAM(s) Oral two times a day  montelukast 10 milliGRAM(s) Oral daily  pantoprazole    Tablet 40 milliGRAM(s) Oral before breakfast  predniSONE   Tablet 60 milliGRAM(s) Oral every 24 hours  senna 2 Tablet(s) Oral at bedtime  sertraline 50 milliGRAM(s) Oral daily  trimethoprim  160 mG/sulfamethoxazole 800 mG 1 Tablet(s) Oral <User Schedule>    MEDICATIONS  (PRN):  acetaminophen   Tablet .. 650 milliGRAM(s) Oral every 6 hours PRN Mild Pain (1 - 3)  ALPRAZolam 0.5 milliGRAM(s) Oral three times a day PRN anxiety  guaiFENesin    Syrup 200 milliGRAM(s) Oral every 6 hours PRN Cough  polyethylene glycol 3350 17 Gram(s) Oral daily PRN Constiapation      Vital Signs Last 24 Hrs  T(C): 36.8 (17 Sep 2019 11:43), Max: 36.8 (17 Sep 2019 11:43)  T(F): 98.2 (17 Sep 2019 11:43), Max: 98.2 (17 Sep 2019 11:43)  HR: 96 (17 Sep 2019 11:43) (76 - 100)  BP: 105/72 (17 Sep 2019 11:43) (105/72 - 133/82)  BP(mean): --  RR: 18 (17 Sep 2019 11:43) (17 - 18)  SpO2: 93% (17 Sep 2019 11:43) (93% - 98%)  CAPILLARY BLOOD GLUCOSE      POCT Blood Glucose.: 166 mg/dL (17 Sep 2019 12:14)  POCT Blood Glucose.: 119 mg/dL (17 Sep 2019 07:52)  POCT Blood Glucose.: 163 mg/dL (16 Sep 2019 22:18)  POCT Blood Glucose.: 206 mg/dL (16 Sep 2019 16:42)    I&O's Summary    16 Sep 2019 07:01  -  17 Sep 2019 07:00  --------------------------------------------------------  IN: 400 mL / OUT: 1200 mL / NET: -800 mL    17 Sep 2019 07:01  -  17 Sep 2019 13:33  --------------------------------------------------------  IN: 240 mL / OUT: 0 mL / NET: 240 mL          PHYSICAL EXAM  GENERAL: NAD, thin  HEAD:  Atraumatic, Normocephalic  EYES: EOMI, conjunctiva and sclera clear  NECK: Supple, No JVD  CHEST/LUNG: Clear to auscultation bilaterally; No wheeze  HEART: irregular, no murmur  ABDOMEN: Soft, Nontender, Nondistended; Bowel sounds present  EXTREMITIES:  2+ Peripheral Pulses, trace LE edema  PSYCH: AAOx3  SKIN: No rashes or lesions    LABS:                        9.6    16.59 )-----------( 193      ( 17 Sep 2019 08:35 )             31.2     09-17    136  |  96  |  48<H>  ----------------------------<  146<H>  4.1   |  27  |  1.44<H>    Ca    8.5      17 Sep 2019 06:00  Phos  4.2     09-17  Mg     1.6     09-17      PT/INR - ( 17 Sep 2019 08:19 )   PT: 10.9 sec;   INR: 0.95 ratio                   Culture - Blood (collected 14 Sep 2019 16:49)  Source: .Blood  Gram Stain (15 Sep 2019 22:50):    Growth in anaerobic bottle: Gram Positive Cocci in Clusters  Preliminary Report (15 Sep 2019 22:50):    Growth in anaerobic bottle: Gram Positive Cocci in Clusters    "Due to technical problems, Proteus sp. will Not be reported as part of    the BCID panel until further notice"    ***Blood Panel PCR results on this specimen are available    approximately 3 hours after the Gram stain result.***    Gram stain, PCR, and/or culture results may not always    correspond due to difference in methodologies.    ************************************************************    This PCR assay was performed using [employer].    The following targets are tested for: Enterococcus,    vancomycin resistant enterococci, Listeria monocytogenes,    coagulase negative staphylococci, S. aureus,    methicillin resistant S. aureus, Streptococcus agalactiae    (Group B), S. pneumoniae, S. pyogenes (Group A),    Acinetobacter baumannii, Enterobacter cloacae, E. coli,    Klebsiella oxytoca, K. pneumoniae, Proteus sp.,    Serratia marcescens, Haemophilus influenzae,    Neisseria meningitidis, Pseudomonas aeruginosa, Candida    albicans, C. glabrata, C krusei, C parapsilosis,    C. tropicalis and the KPC resistance gene.  Organism: Blood Culture PCR (15 Sep 2019 22:52)  Organism: Blood Culture PCR (15 Sep 2019 22:52)    Culture - Blood (collected 14 Sep 2019 16:49)  Source: .Blood  Preliminary Report (15 Sep 2019 17:01):    No growth to date.        RADIOLOGY & ADDITIONAL TESTS:    Imaging Personally Reviewed:  Consultant(s) Notes Reviewed:    Care Discussed with Consultants/Other Providers:

## 2019-09-17 NOTE — PROGRESS NOTE ADULT - PROBLEM SELECTOR PLAN 2
Pt s/p fall found to have left ilopsoas hematoma in setting of fall/coumadin on admission.   H/H remains stable.   d/w surg. no contraindication from surg standpoint to resume AC.  Will likely start on hep gtt/lovenox prior to starting coumadin or NOAC.

## 2019-09-17 NOTE — DIETITIAN INITIAL EVALUATION ADULT. - ENERGY NEEDS
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 +2 bilateral legs and kneew edema, no pressure ulcers noted at this time.

## 2019-09-17 NOTE — PROGRESS NOTE ADULT - SUBJECTIVE AND OBJECTIVE BOX
CC: F/U Bacteremia    Saw/spoke to patient. No fevers, no chills. No new complaints. Unchanged.    Allergies  Keflex (Unknown)  penicillin (Rash)    ANTIMICROBIALS:  entecavir 0.5 every 72 hours  ertapenem  IVPB 500 every 24 hours  trimethoprim  160 mG/sulfamethoxazole 800 mG 1 <User Schedule>    PE:    Vital Signs Last 24 Hrs  T(C): 36.8 (17 Sep 2019 11:43), Max: 36.8 (17 Sep 2019 11:43)  T(F): 98.2 (17 Sep 2019 11:43), Max: 98.2 (17 Sep 2019 11:43)  HR: 96 (17 Sep 2019 11:43) (79 - 100)  BP: 105/72 (17 Sep 2019 11:43) (105/72 - 122/83)  RR: 18 (17 Sep 2019 11:43) (18 - 18)  SpO2: 93% (17 Sep 2019 11:43) (93% - 97%)    Gen: AOx3, NAD, non-toxic, pleasant  CV: S1+S2 normal, nontachycardic  Resp: Clear bilat, no resp distress, no crackles/wheezes  Abd: Soft, nontender, +BS  Ext: No LE edema, no wounds    LABS:                        9.6    16.59 )-----------( 193      ( 17 Sep 2019 08:35 )             31.2     09-17    136  |  96  |  48<H>  ----------------------------<  146<H>  4.1   |  27  |  1.44<H>    Ca    8.5      17 Sep 2019 06:00  Phos  4.2     09-17  Mg     1.6     09-17    MICROBIOLOGY:    .Blood  09-14-19   No growth to date.  --  Blood Culture PCR CoNS    .Blood  09-13-19   No growth to date    .Blood  09-11-19   Growth in aerobic and anaerobic bottles: Escherichia coli    .Urine  09-11-19   >100,000 CFU/ml Escherichia coli  --  Escherichia coli    (otherwise reviewed)    RADIOLOGY:    9/12 CXR:    IMPRESSION:    1.  Cardiomegaly.  2.  Likely pulmonary edema.

## 2019-09-18 NOTE — PROGRESS NOTE ADULT - ASSESSMENT
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    Nephrology consulted given KANIKA - on admission SCr 1.74 (prior SCr on d/c 08/2019 was 1.28) likely 2/2 hemodynamically meditated and anemia (hgb 8.3, baseline 9.0-9.5).  SCr improved w/ IVF and PRBC. Hospital course complicated by GNR bacteremia, possible source infected RP hematoma. Due for next dose of rituximab, currently on hold in setting of active infection.

## 2019-09-18 NOTE — PROGRESS NOTE ADULT - ASSESSMENT
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 SICU s/p fall and with increase in psoas / retroperitoneal bleeding in setting of supratherapeutic INR. Rheumatology consulted as pt was due for Rituxan. During hospital course pt developed urosepsis and started on Ertapenem, WBC initially trended down today elevated 20, 000, has been afebrile.       Recommend:   -Would resume Rituxan when infection cleared and ID has no objection.   - C/w Ab as per ID      Rheum will follow  Case was seen and discussed with Dr. Delmar Childress MD  Rheum fellow PGY 4   Pager [phone]

## 2019-09-18 NOTE — PROGRESS NOTE ADULT - SUBJECTIVE AND OBJECTIVE BOX
TON[facility]  [id number]    HISTORY OF PRESENT ILLNESS:    pt was seen and examined at the bedside****      MEDICATIONS  (STANDING):  atorvastatin 10 milliGRAM(s) Oral at bedtime  chlorhexidine 2% Cloths 1 Application(s) Topical <User Schedule>  diltiazem    Tablet 30 milliGRAM(s) Oral every 8 hours  enoxaparin Injectable 30 milliGRAM(s) SubCutaneous daily  entecavir 0.5 milliGRAM(s) Oral daily  insulin lispro (HumaLOG) corrective regimen sliding scale   SubCutaneous three times a day before meals  insulin lispro (HumaLOG) corrective regimen sliding scale   SubCutaneous at bedtime  insulin lispro Injectable (HumaLOG) 6 Unit(s) SubCutaneous three times a day before meals  metoprolol tartrate 50 milliGRAM(s) Oral every 12 hours  montelukast 10 milliGRAM(s) Oral daily  pantoprazole    Tablet 40 milliGRAM(s) Oral before breakfast  predniSONE   Tablet 60 milliGRAM(s) Oral every 24 hours  sertraline 50 milliGRAM(s) Oral daily  trimethoprim  160 mG/sulfamethoxazole 800 mG 1 Tablet(s) Oral <User Schedule>    MEDICATIONS  (PRN):  ALBUTerol/ipratropium for Nebulization 3 milliLiter(s) Nebulizer every 6 hours PRN Shortness of Breath and/or Wheezing  ALPRAZolam 0.5 milliGRAM(s) Oral two times a day PRN anxiety    Vital Signs Last 24 Hrs  Vital Signs Last 24 Hrs  T(C): 36.8 (11 Sep 2019 13:30), Max: 37.1 (10 Sep 2019 16:59)  T(F): 98.3 (11 Sep 2019 13:30), Max: 98.8 (10 Sep 2019 16:59)  HR: 91 (11 Sep 2019 13:30) (76 - 98)  BP: 128/88 (11 Sep 2019 13:30) (124/86 - 147/93)  BP(mean): --  RR: 18 (11 Sep 2019 13:30) (18 - 18)  SpO2: 94% (11 Sep 2019 13:30) (94% - 98%)      Wt(kg): --  Physical Exam: ill appearing elderly female   General: ill appearing fragile female   HEENT: MMM  CVS: +S1/S2  Resp: bibasilar crackles   Skin: no visible rashes    LABS:                          9.4    20.20 )-----------( 200      ( 18 Sep 2019 09:31 )             30.0       09-18    137  |  95<L>  |  50<H>  ----------------------------<  143<H>  4.1   |  27  |  1.40<H>    Ca    8.4      18 Sep 2019 06:57  Phos  4.2     09-17  Mg     1.6     09-17      PT/INR - ( 11 Sep 2019 09:22 )   PT: 13.0 sec;   INR: 1.13 ratio         PTT - ( 10 Sep 2019 14:15 )  PTT:34.9 sec      Culture - Blood (09.16.19 @ 19:09)    Specimen Source: .Blood    Culture Results:   No growth to date.      Culture - Blood in AM (09.14.19 @ 16:49)    -  Coagulase negative Staphylococcus: Detec

## 2019-09-18 NOTE — PROGRESS NOTE ADULT - SUBJECTIVE AND OBJECTIVE BOX
CC: F/U for Bacteremia    Saw/spoke to patient. No fevers, no chills. No new complaints. Unchanged.    Allergies  Keflex (Unknown)  penicillin (Rash)    ANTIMICROBIALS:  entecavir 0.5 every 72 hours  ertapenem  IVPB 1000 every 24 hours  trimethoprim  160 mG/sulfamethoxazole 800 mG 1 <User Schedule>    PE:    Vital Signs Last 24 Hrs  T(C): 36.7 (18 Sep 2019 04:35), Max: 36.7 (18 Sep 2019 04:35)  T(F): 98 (18 Sep 2019 04:35), Max: 98 (18 Sep 2019 04:35)  HR: 111 (18 Sep 2019 04:35) (93 - 111)  BP: 130/84 (18 Sep 2019 04:35) (120/77 - 133/88)  RR: 18 (18 Sep 2019 04:35) (18 - 18)  SpO2: 96% (18 Sep 2019 04:35) (95% - 96%)    Gen: AOx3, NAD, non-toxic  CV: S1+S2 normal, tachycardic  Resp: Clear bilat, no resp distress, no crackles/wheezes  Abd: Soft, nontender, +BS  Ext: No LE edema, no wounds    LABS:                        9.4    20.20 )-----------( 200      ( 18 Sep 2019 09:31 )             30.0     09-18    137  |  95<L>  |  50<H>  ----------------------------<  143<H>  4.1   |  27  |  1.40<H>    Ca    8.4      18 Sep 2019 06:57  Phos  4.2     09-17  Mg     1.6     09-17    MICROBIOLOGY:    .Blood  09-16-19   No growth to date.    .Blood  09-14-19   No growth to date.  --  Blood Culture PCR CoNS    .Blood  09-11-19   Growth in aerobic and anaerobic bottles: Escherichia coli    .Urine  09-11-19   >100,000 CFU/ml Escherichia coli  --  Escherichia coli    (otherwise reviewed)    RADIOLOGY:    9/12 CXR:    IMPRESSION:    1.  Cardiomegaly.  2.  Likely pulmonary edema.

## 2019-09-18 NOTE — PROGRESS NOTE ADULT - ASSESSMENT
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 Pt now stable on the floor.

## 2019-09-18 NOTE — PROGRESS NOTE ADULT - ASSESSMENT
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  Rituxan/Steroids  Leukocytosis, no fevers  UA positive, UCX E coli  CXR clear  BCX now with E coli; suspect 2/2 UTI  Note CONS in blood, likely contam, repeat  Overall, leukocytosis, immunocompromised, AMS, shortness of breath  - Ertapenem 1g q 24 (CrCl on borderline, monitor closely), immunocompromised, treat for full 14 day course  - F/U BCX  - Immunosuppressives per rheumatology  - Trend WBC, monitor for signs sepsis  - If DC planning can place PICC as long as BCXs remain negative, no signs sepsis    Mauri Kim MD  Pager [phone]  After 5pm and on weekends call [phone]

## 2019-09-18 NOTE — PROGRESS NOTE ADULT - ATTENDING COMMENTS
Feeling improved  1.  ARF--immunotherapy indicated but rituxan on hold given bacteremia.  Renal function overall improved and no RRT indicated at this time  2.  Hypertension--med optimization

## 2019-09-18 NOTE — PROGRESS NOTE ADULT - PROBLEM SELECTOR PLAN 1
BlCx UCx with E.coli. suspect likely UTI with bacteremia in setting of immunosuppression.   WBC slightly back up today. afebrile.   Will cont with current abx ertapenem likely this week.   BlCx from 9/13 NGTD but 9/14 with 1/4 CNS likely contaminant. repeat sent on 9/16 NGTD

## 2019-09-18 NOTE — PROGRESS NOTE ADULT - SUBJECTIVE AND OBJECTIVE BOX
Milli Mckoy MD  Attending Physician (Hospitalist)  pager: [phone]    Patient is a 85y old  Female who presents with a chief complaint of Unwitnessed fall (18 Sep 2019 10:00)        SUBJECTIVE / OVERNIGHT EVENTS:  afebrile. feeling ok. no acute issues overnight.        MEDICATIONS  (STANDING):  ALBUTerol/ipratropium for Nebulization 3 milliLiter(s) Nebulizer every 6 hours  atorvastatin 10 milliGRAM(s) Oral at bedtime  cloNIDine 0.1 milliGRAM(s) Oral two times a day  diltiazem    milliGRAM(s) Oral daily  docusate sodium 100 milliGRAM(s) Oral three times a day  enoxaparin Injectable 30 milliGRAM(s) SubCutaneous daily  enoxaparin Injectable 70 milliGRAM(s) SubCutaneous two times a day  entecavir 0.5 milliGRAM(s) Oral every 72 hours  ertapenem  IVPB 1000 milliGRAM(s) IV Intermittent every 24 hours  furosemide    Tablet 40 milliGRAM(s) Oral daily  guaiFENesin    Syrup 200 milliGRAM(s) Oral at bedtime  insulin lispro (HumaLOG) corrective regimen sliding scale   SubCutaneous three times a day before meals  insulin lispro (HumaLOG) corrective regimen sliding scale   SubCutaneous at bedtime  insulin lispro Injectable (HumaLOG) 8 Unit(s) SubCutaneous three times a day before meals  metoprolol tartrate 100 milliGRAM(s) Oral two times a day  montelukast 10 milliGRAM(s) Oral daily  pantoprazole    Tablet 40 milliGRAM(s) Oral before breakfast  predniSONE   Tablet 60 milliGRAM(s) Oral every 24 hours  senna 2 Tablet(s) Oral at bedtime  sertraline 50 milliGRAM(s) Oral daily  trimethoprim  160 mG/sulfamethoxazole 800 mG 1 Tablet(s) Oral <User Schedule>    MEDICATIONS  (PRN):  acetaminophen   Tablet .. 650 milliGRAM(s) Oral every 6 hours PRN Mild Pain (1 - 3)  ALPRAZolam 0.5 milliGRAM(s) Oral three times a day PRN anxiety  guaiFENesin    Syrup 200 milliGRAM(s) Oral every 6 hours PRN Cough  polyethylene glycol 3350 17 Gram(s) Oral daily PRN Constiapation      Vital Signs Last 24 Hrs  T(C): 36.9 (18 Sep 2019 12:28), Max: 36.9 (18 Sep 2019 12:28)  T(F): 98.4 (18 Sep 2019 12:28), Max: 98.4 (18 Sep 2019 12:28)  HR: 82 (18 Sep 2019 12:28) (82 - 111)  BP: 117/84 (18 Sep 2019 12:28) (117/84 - 133/88)  BP(mean): --  RR: 18 (18 Sep 2019 12:28) (18 - 18)  SpO2: 95% (18 Sep 2019 12:28) (95% - 96%)  CAPILLARY BLOOD GLUCOSE      POCT Blood Glucose.: 167 mg/dL (18 Sep 2019 12:27)  POCT Blood Glucose.: 133 mg/dL (18 Sep 2019 07:59)  POCT Blood Glucose.: 223 mg/dL (17 Sep 2019 21:54)  POCT Blood Glucose.: 183 mg/dL (17 Sep 2019 16:38)    I&O's Summary    17 Sep 2019 07:01  -  18 Sep 2019 07:00  --------------------------------------------------------  IN: 360 mL / OUT: 1250 mL / NET: -890 mL          PHYSICAL EXAM  GENERAL: NAD, thin  HEAD:  Atraumatic, Normocephalic  EYES: EOMI, conjunctiva and sclera clear  NECK: Supple, No JVD  CHEST/LUNG: Clear to auscultation bilaterally; No wheeze  HEART: irregular, no murmur  ABDOMEN: Soft, Nontender, Nondistended; Bowel sounds present  EXTREMITIES:  2+ Peripheral Pulses, trace LE edema  PSYCH: AAOx3  SKIN: No rashes or lesions    LABS:                        9.4    20.20 )-----------( 200      ( 18 Sep 2019 09:31 )             30.0     09-18    137  |  95<L>  |  50<H>  ----------------------------<  143<H>  4.1   |  27  |  1.40<H>    Ca    8.4      18 Sep 2019 06:57  Phos  4.2     09-17  Mg     1.6     09-17      PT/INR - ( 18 Sep 2019 09:16 )   PT: 11.2 sec;   INR: 0.98 ratio         PTT - ( 18 Sep 2019 09:16 )  PTT:29.5 sec          Culture - Blood (collected 16 Sep 2019 19:09)  Source: .Blood  Preliminary Report (17 Sep 2019 20:00):    No growth to date.        RADIOLOGY & ADDITIONAL TESTS:    Imaging Personally Reviewed:  Consultant(s) Notes Reviewed:    Care Discussed with Consultants/Other Providers:

## 2019-09-18 NOTE — PROGRESS NOTE ADULT - SUBJECTIVE AND OBJECTIVE BOX
[facility] DIVISION OF KIDNEY DISEASES AND HYPERTENSION -- FOLLOW UP NOTE  --------------------------------------------------------------------------------  Chief Complaint:  Fall    24 hour events/subjective:  Examined at bed side, awake, NAD, feeling better.     PAST HISTORY  --------------------------------------------------------------------------------  No significant changes to PMH, PSH, FHx, SHx, unless otherwise noted    ALLERGIES & MEDICATIONS  --------------------------------------------------------------------------------  Allergies    Keflex (Unknown)  penicillin (Rash)    Intolerances      Standing Inpatient Medications  ALBUTerol/ipratropium for Nebulization 3 milliLiter(s) Nebulizer every 6 hours  atorvastatin 10 milliGRAM(s) Oral at bedtime  cloNIDine 0.1 milliGRAM(s) Oral two times a day  diltiazem    milliGRAM(s) Oral daily  docusate sodium 100 milliGRAM(s) Oral three times a day  enoxaparin Injectable 30 milliGRAM(s) SubCutaneous daily  entecavir 0.5 milliGRAM(s) Oral every 72 hours  ertapenem  IVPB 1000 milliGRAM(s) IV Intermittent every 24 hours  furosemide   Injectable 40 milliGRAM(s) IV Push every 24 hours  guaiFENesin    Syrup 200 milliGRAM(s) Oral at bedtime  insulin lispro (HumaLOG) corrective regimen sliding scale   SubCutaneous three times a day before meals  insulin lispro (HumaLOG) corrective regimen sliding scale   SubCutaneous at bedtime  insulin lispro Injectable (HumaLOG) 8 Unit(s) SubCutaneous three times a day before meals  metoprolol tartrate 100 milliGRAM(s) Oral two times a day  montelukast 10 milliGRAM(s) Oral daily  pantoprazole    Tablet 40 milliGRAM(s) Oral before breakfast  predniSONE   Tablet 60 milliGRAM(s) Oral every 24 hours  senna 2 Tablet(s) Oral at bedtime  sertraline 50 milliGRAM(s) Oral daily  trimethoprim  160 mG/sulfamethoxazole 800 mG 1 Tablet(s) Oral <User Schedule>    PRN Inpatient Medications  acetaminophen   Tablet .. 650 milliGRAM(s) Oral every 6 hours PRN  ALPRAZolam 0.5 milliGRAM(s) Oral three times a day PRN  guaiFENesin    Syrup 200 milliGRAM(s) Oral every 6 hours PRN  polyethylene glycol 3350 17 Gram(s) Oral daily PRN      REVIEW OF SYSTEMS  --------------------------------------------------------------------------------  Gen: No weight changes, fatigue, fevers/chills, weakness  Respiratory: No dyspnea, cough, wheezing, hemoptysis  CV: No chest pain, PND, orthopnea  GI: No abdominal pain, diarrhea, constipation, nausea, vomiting, melena, hematochezia  MSK: ; no edema  Neuro: No dizziness/lightheadedness,  All other systems were reviewed and are negative, except as noted.  VITALS/PHYSICAL EXAM  --------------------------------------------------------------------------------  T(C): 36.7 (09-18-19 @ 04:35), Max: 36.8 (09-17-19 @ 11:43)  HR: 111 (09-18-19 @ 04:35) (93 - 111)  BP: 130/84 (09-18-19 @ 04:35) (105/72 - 133/88)  RR: 18 (09-18-19 @ 04:35) (18 - 18)  SpO2: 96% (09-18-19 @ 04:35) (93% - 96%)  Wt(kg): --        09-17-19 @ 07:01  -  09-18-19 @ 07:00  --------------------------------------------------------  IN: 360 mL / OUT: 1250 mL / NET: -890 mL      Physical Exam:  Constitutional: Nad  HEENT:  oropharynx clear, MMM  Neck: No JVD  Respiratory: CTAB, no wheezes, rales or rhonchi  Cardiovascular: S1, S2, RRR  Gastrointestinal: BS+, soft, NT/ND  Extremities: +edema.   Neurological: A/O x 2, no focal deficits  : No CVA tenderness. No garza.     LABS/STUDIES  --------------------------------------------------------------------------------              9.4    20.20 >-----------<  200      [09-18-19 @ 09:31]              30.0     137  |  95  |  50  ----------------------------<  143      [09-18-19 @ 06:57]  4.1   |  27  |  1.40        Ca     8.4     [09-18-19 @ 06:57]      Mg     1.6     [09-17-19 @ 06:00]      Phos  4.2     [09-17-19 @ 06:00]      PT/INR: PT 11.2 , INR 0.98       [09-18-19 @ 09:16]  PTT: 29.5       [09-18-19 @ 09:16]      Creatinine Trend:  SCr 1.40 [09-18 @ 06:57]  SCr 1.44 [09-17 @ 06:00]  SCr 1.48 [09-16 @ 05:04]  SCr 1.51 [09-15 @ 05:42]  SCr 1.82 [09-14 @ 06:31]

## 2019-09-18 NOTE — PROGRESS NOTE ADULT - PROBLEM SELECTOR PLAN 3
switch lasix to 40mg PO daily now.   monitor renal function closely. strict I+Os  Diuretics previous held due to overall dehydration.  Renal following. Card signed off.

## 2019-09-18 NOTE — PROGRESS NOTE ADULT - PROBLEM SELECTOR PLAN 1
KANIKA likely hemodynamically meditated in setting anemia , RP bleed w/ contributing factor diuretic lasix  .  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    PLAN:   - recommend holding rituximab for now.   -No diuretics for now, scr stable, has some edema LE but not sob. Non oliguric   - c/w hemodynamic support, PRBC maintain Hgb>7  - avoid nephrotoxic agents (ACEI/ARB, NSAIDS), renally dose medications  - trend SCr and UOP.  - Maintain on entecavir at Q72  - PLEASE AVOID HYDRALAZINE THOUGHT TO BE THE CAUSE OF ANCA VASCULITIS. KANIKA likely hemodynamically meditated in setting anemia , RP bleed w/ contributing factor diuretic lasix  .  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    PLAN:   - recommend holding rituximab for now.   - c/w hemodynamic support, PRBC maintain Hgb>7  - avoid nephrotoxic agents (ACEI/ARB, NSAIDS), renally dose medications  - trend SCr and UOP.  - Maintain on entecavir at Q72  - PLEASE AVOID HYDRALAZINE THOUGHT TO BE THE CAUSE OF ANCA VASCULITIS.

## 2019-09-18 NOTE — PROGRESS NOTE ADULT - PROBLEM SELECTOR PLAN 3
monitor  has some LE edema, if scr stable by tomorrow would start torsemide 10 mg PO in am. monitor  has some LE edema, on lasix 40 suggest to change to torsemide 20 mg daily

## 2019-09-19 NOTE — PROGRESS NOTE ADULT - SUBJECTIVE AND OBJECTIVE BOX
[facility]  [id number]    HISTORY OF PRESENT ILLNESS:    pt was seen and examined at the bedside, reports feeling well.       MEDICATIONS  (STANDING):  atorvastatin 10 milliGRAM(s) Oral at bedtime  chlorhexidine 2% Cloths 1 Application(s) Topical <User Schedule>  diltiazem    Tablet 30 milliGRAM(s) Oral every 8 hours  enoxaparin Injectable 30 milliGRAM(s) SubCutaneous daily  entecavir 0.5 milliGRAM(s) Oral daily  insulin lispro (HumaLOG) corrective regimen sliding scale   SubCutaneous three times a day before meals  insulin lispro (HumaLOG) corrective regimen sliding scale   SubCutaneous at bedtime  insulin lispro Injectable (HumaLOG) 6 Unit(s) SubCutaneous three times a day before meals  metoprolol tartrate 50 milliGRAM(s) Oral every 12 hours  montelukast 10 milliGRAM(s) Oral daily  pantoprazole    Tablet 40 milliGRAM(s) Oral before breakfast  predniSONE   Tablet 60 milliGRAM(s) Oral every 24 hours  sertraline 50 milliGRAM(s) Oral daily  trimethoprim  160 mG/sulfamethoxazole 800 mG 1 Tablet(s) Oral <User Schedule>    MEDICATIONS  (PRN):  ALBUTerol/ipratropium for Nebulization 3 milliLiter(s) Nebulizer every 6 hours PRN Shortness of Breath and/or Wheezing  ALPRAZolam 0.5 milliGRAM(s) Oral two times a day PRN anxiety    Vital Signs Last 24 Hrs  Vital Signs Last 24 Hrs  T(C): 36.8 (11 Sep 2019 13:30), Max: 37.1 (10 Sep 2019 16:59)  T(F): 98.3 (11 Sep 2019 13:30), Max: 98.8 (10 Sep 2019 16:59)  HR: 91 (11 Sep 2019 13:30) (76 - 98)  BP: 128/88 (11 Sep 2019 13:30) (124/86 - 147/93)  BP(mean): --  RR: 18 (11 Sep 2019 13:30) (18 - 18)  SpO2: 94% (11 Sep 2019 13:30) (94% - 98%)      Wt(kg): --  Physical Exam:   General: fragile elderly female   HEENT: MMM  CVS: +S1/S2  Resp: bibasilar crackles   Skin: no visible rashes    LABS:                          9.8    16.13 )-----------( 206      ( 19 Sep 2019 08:35 )             31.5       09-19    138  |  95<L>  |  47<H>  ----------------------------<  119<H>  3.6   |  29  |  1.39<H>    Ca    8.7      19 Sep 2019 06:02  Phos  4.0     09-19  Mg     1.5     09-19      Culture - Blood (09.16.19 @ 19:09)    Specimen Source: .Blood    Culture Results:   No growth to date.

## 2019-09-19 NOTE — PROGRESS NOTE ADULT - ASSESSMENT
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 Rheumatology consulted as pt was due for Rituxan. During hospital course pt developed urosepsis and started on Ertapenem, WBC trending now. ID following.       Recommend:   -Please follow with ID for clearance prior to Next Rituxan dose  - C/w Ab as per ID       Rheum will follow  Case was seen and discussed with Dr. 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 Rheumatology consulted as pt was due for Rituxan. During hospital course pt developed urosepsis and started on Ertapenem, WBC trending now. ID following.       Recommend:   -Pt to get next Rituxan dose after completion of antibiotics as per ID.  -Outpatient follow-up with Dr. Diaz.  - C/w Ab as per ID       Rheum will follow  Case was seen and discussed with Dr. Delmar Childress MD  Rheum fellow PGY 4   Pager [phone]

## 2019-09-19 NOTE — PROGRESS NOTE ADULT - PROBLEM SELECTOR PLAN 2
Pt s/p fall found to have left ilopsoas hematoma in setting of fall/coumadin on admission.   H/H remains stable.   d/w surg. no contraindication from surg standpoint to resume AC.  start on renal dose lovenox daily and if H/H stable can start NOAC after PICC in place.

## 2019-09-19 NOTE — PROGRESS NOTE ADULT - ASSESSMENT
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  Rituxan/Steroids  Leukocytosis, no fevers  UA positive, UCX E coli  CXR clear  BCX now with E coli; suspect 2/2 UTI  Note CONS in blood, likely contam, repeat  Overall, leukocytosis, immunocompromised, AMS, shortness of breath  - Ertapenem 1g q 24 (CrCl on borderline, monitor closely), immunocompromised, treat for full 14 day course from negative blood culture  - F/U BCX  - Immunosuppressives per rheumatology  - Trend WBC, monitor for signs sepsis  - If DC planning can place PICC as long as BCXs remain negative, no signs sepsis    Mauri Kim MD  Pager [phone]  After 5pm and on weekends call [phone]

## 2019-09-19 NOTE — PROGRESS NOTE ADULT - ASSESSMENT
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  Repeat Of note, patient recently admitted [facility] for shortness of breath in the setting of hypoxic respiratory failure likely 2/2 pulmonary renal syndrome of rheumatologic etiology of vasculitis vs immune complex disease - serology was low C3/4, + p-ANCA, elevated ESR/CRP, anti- dsDNA), pauci-immune possibly 2/2 chronic hydralazine s/p Rituxan 8/23/19) was discharge on 8/24/19 on prednisone 60 mg po. Pt now stable on the floor.

## 2019-09-19 NOTE — PROGRESS NOTE ADULT - PROBLEM SELECTOR PLAN 1
BlCx UCx with E.coli. suspect likely UTI with bacteremia in setting of immunosuppression.   WBC slightly back up today. afebrile.   Cont ertapenem 14 days total, likely late next week.   PICC line placement in anticipation for early d/c next week.   BlCx from 9/13 NGTD but 9/14 with 1/4 CNS likely contaminant. repeat sent on 9/16 NGTD

## 2019-09-19 NOTE — PROGRESS NOTE ADULT - SUBJECTIVE AND OBJECTIVE BOX
CC: F/U for Bacteremia    Saw/spoke to patient. No fevers, no chills. No new complaints. Doing well.    Allergies  Keflex (Unknown)  penicillin (Rash)    ANTIMICROBIALS:  entecavir 0.5 every 72 hours  ertapenem  IVPB 1000 every 24 hours  trimethoprim  160 mG/sulfamethoxazole 800 mG 1 <User Schedule>    PE:    Vital Signs Last 24 Hrs  T(C): 36.8 (19 Sep 2019 11:09), Max: 36.8 (19 Sep 2019 11:09)  T(F): 98.2 (19 Sep 2019 11:09), Max: 98.2 (19 Sep 2019 11:09)  HR: 94 (19 Sep 2019 11:09) (84 - 94)  BP: 104/77 (19 Sep 2019 11:09) (104/77 - 149/95)  RR: 18 (19 Sep 2019 11:09) (18 - 18)  SpO2: 94% (19 Sep 2019 11:09) (94% - 98%)    Gen: AOx3, NAD, non-toxic, pleasant  CV: S1+S2 normal, nontachycardic  Resp: Clear bilat, no resp distress, no crackles/wheezes  Abd: Soft, nontender, +BS  Ext: No LE edema, no wounds  : Female external cath    LABS:                        9.8    16.13 )-----------( 206      ( 19 Sep 2019 08:35 )             31.5     09-19    138  |  95<L>  |  47<H>  ----------------------------<  119<H>  3.6   |  29  |  1.39<H>    Ca    8.7      19 Sep 2019 06:02  Phos  4.0     09-19  Mg     1.5     09-19    MICROBIOLOGY:    .Blood  09-16-19   No growth to date.     .Blood  09-14-19   No growth to date.  --  Blood Culture PCR    .Blood  09-13-19   No growth at 5 days.    .Blood  09-11-19   Growth in aerobic and anaerobic bottles: Escherichia coli    09-11-19   >100,000 CFU/ml Escherichia coli  --  Escherichia coli    (otherwise reviewed)    RADIOLOGY:    9/12 CXR:    IMPRESSION:    1.  Cardiomegaly.  2.  Likely pulmonary edema.

## 2019-09-19 NOTE — CHART NOTE - NSCHARTNOTEFT_GEN_A_CORE
PA Medicine Chart Note    Confirmed with pharmacy renal dosing for Lovenox 70 mg once a day for Cr Cl <30  Order changed (from BID)  Dr. Mckoy made aware.    Gissell Figueroa PA-C  Dept of Medicine  #[id number]

## 2019-09-19 NOTE — PROGRESS NOTE ADULT - SUBJECTIVE AND OBJECTIVE BOX
Milli Mckoy MD  Attending Physician (Hospitalist)  pager: [phone]    Patient is a 85y old  Female who presents with a chief complaint of Unwitnessed fall (18 Sep 2019 16:05)        SUBJECTIVE / OVERNIGHT EVENTS:  afebrile no acute issues overnight.   denies any complaints.     MEDICATIONS  (STANDING):  ALBUTerol/ipratropium for Nebulization 3 milliLiter(s) Nebulizer every 6 hours  atorvastatin 10 milliGRAM(s) Oral at bedtime  cloNIDine 0.1 milliGRAM(s) Oral two times a day  diltiazem    milliGRAM(s) Oral daily  docusate sodium 100 milliGRAM(s) Oral three times a day  enoxaparin Injectable 70 milliGRAM(s) SubCutaneous daily  entecavir 0.5 milliGRAM(s) Oral every 72 hours  ertapenem  IVPB 1000 milliGRAM(s) IV Intermittent every 24 hours  furosemide    Tablet 40 milliGRAM(s) Oral daily  guaiFENesin    Syrup 200 milliGRAM(s) Oral at bedtime  insulin lispro (HumaLOG) corrective regimen sliding scale   SubCutaneous three times a day before meals  insulin lispro (HumaLOG) corrective regimen sliding scale   SubCutaneous at bedtime  insulin lispro Injectable (HumaLOG) 8 Unit(s) SubCutaneous three times a day before meals  melatonin 3 milliGRAM(s) Oral at bedtime  metoprolol tartrate 100 milliGRAM(s) Oral two times a day  montelukast 10 milliGRAM(s) Oral daily  pantoprazole    Tablet 40 milliGRAM(s) Oral before breakfast  predniSONE   Tablet 60 milliGRAM(s) Oral every 24 hours  senna 2 Tablet(s) Oral at bedtime  sertraline 50 milliGRAM(s) Oral daily  trimethoprim  160 mG/sulfamethoxazole 800 mG 1 Tablet(s) Oral <User Schedule>    MEDICATIONS  (PRN):  acetaminophen   Tablet .. 650 milliGRAM(s) Oral every 6 hours PRN Mild Pain (1 - 3)  ALPRAZolam 0.5 milliGRAM(s) Oral three times a day PRN anxiety  guaiFENesin    Syrup 200 milliGRAM(s) Oral every 6 hours PRN Cough  polyethylene glycol 3350 17 Gram(s) Oral daily PRN Constiapation      Vital Signs Last 24 Hrs  T(C): 36.8 (19 Sep 2019 11:09), Max: 36.8 (19 Sep 2019 11:09)  T(F): 98.2 (19 Sep 2019 11:09), Max: 98.2 (19 Sep 2019 11:09)  HR: 94 (19 Sep 2019 11:09) (84 - 94)  BP: 104/77 (19 Sep 2019 11:09) (104/77 - 149/95)  BP(mean): --  RR: 18 (19 Sep 2019 11:09) (18 - 18)  SpO2: 94% (19 Sep 2019 11:09) (94% - 98%)  CAPILLARY BLOOD GLUCOSE      POCT Blood Glucose.: 250 mg/dL (19 Sep 2019 12:23)  POCT Blood Glucose.: 136 mg/dL (19 Sep 2019 08:01)  POCT Blood Glucose.: 175 mg/dL (18 Sep 2019 21:36)  POCT Blood Glucose.: 148 mg/dL (18 Sep 2019 16:35)    I&O's Summary    18 Sep 2019 07:01  -  19 Sep 2019 07:00  --------------------------------------------------------  IN: 850 mL / OUT: 1100 mL / NET: -250 mL    19 Sep 2019 07:01  -  19 Sep 2019 15:49  --------------------------------------------------------  IN: 540 mL / OUT: 600 mL / NET: -60 mL          PHYSICAL EXAM  GENERAL: NAD, thin  HEAD:  Atraumatic, Normocephalic  EYES: EOMI, conjunctiva and sclera clear  NECK: Supple, No JVD  CHEST/LUNG: Clear to auscultation bilaterally; No wheeze  HEART: irregular, no murmur  ABDOMEN: Soft, Nontender, Nondistended; Bowel sounds present  EXTREMITIES:  2+ Peripheral Pulses, trace LE edema  PSYCH: AAOx3  SKIN: No rashes or lesions    LABS:                        9.8    16.13 )-----------( 206      ( 19 Sep 2019 08:35 )             31.5     09-19    138  |  95<L>  |  47<H>  ----------------------------<  119<H>  3.6   |  29  |  1.39<H>    Ca    8.7      19 Sep 2019 06:02  Phos  4.0     09-19  Mg     1.5     09-19      PT/INR - ( 18 Sep 2019 09:16 )   PT: 11.2 sec;   INR: 0.98 ratio         PTT - ( 18 Sep 2019 09:16 )  PTT:29.5 sec          Culture - Blood (collected 16 Sep 2019 19:09)  Source: .Blood  Preliminary Report (17 Sep 2019 20:00):    No growth to date.        RADIOLOGY & ADDITIONAL TESTS:    Imaging Personally Reviewed:  Consultant(s) Notes Reviewed:    Care Discussed with Consultants/Other Providers:

## 2019-09-19 NOTE — PROGRESS NOTE ADULT - PROBLEM SELECTOR PLAN 4
Cardizem CD increased to 180mg given RVR. will cont and monitor.  Known history of a fib, well controlled with atenolol and coumadin   started on Lovenox renal dose and start NOAC once PICC line in place and H/H stable.   C/w metoprolol 100 bid

## 2019-09-20 NOTE — PROGRESS NOTE ADULT - ASSESSMENT
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  Rituxan/Steroids  Leukocytosis, no fevers  UA positive, UCX E coli  CXR clear  BCX now with E coli; suspect 2/2 UTI  Note CONS in blood, likely contam, repeat  Overall, leukocytosis, immunocompromised, AMS, shortness of breath  - Ertapenem 1g q 24 through 9/26/19  - If DC planning, can place PICC to complete course as long as no new signs sepsis  - F/U BCX  - Immunosuppressives per rheumatology--they plan to give new dose of Rituxan--from ID perspective can give after antibiotic course completed as long as no further signs lingering infection  - Trend WBC, monitor for signs sepsis    Mauri Kim MD  Pager [phone]  After 5pm and on weekends call [phone]

## 2019-09-20 NOTE — PROGRESS NOTE ADULT - ASSESSMENT
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  Repeat Of note, patient recently admitted [facility] for shortness of breath in the setting of hypoxic respiratory failure likely 2/2 pulmonary renal syndrome of rheumatologic etiology of vasculitis vs immune complex disease - serology was low C3/4, + p-ANCA, elevated ESR/CRP, anti- dsDNA), pauci-immune possibly 2/2 chronic hydralazine s/p Rituxan 8/23/19) was discharge on 8/24/19 on prednisone 60 mg po.    Nephrology consulted given KANIKA - on admission SCr 1.74 (prior SCr on d/c 08/2019 was 1.28) likely 2/2 hemodynamically meditated and anemia (hgb 8.3, baseline 9.0-9.5).  SCr improved w/ IVF and PRBC. Hospital course complicated by GNR bacteremia, possible source infected RP hematoma. Due for next dose of rituximab, currently on hold in setting of active infection.

## 2019-09-20 NOTE — PROGRESS NOTE ADULT - PROBLEM SELECTOR PLAN 1
KANIKA likely hemodynamically meditated in setting anemia , RP bleed w/ contributing factor diuretic lasix  .  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    PLAN:   - recommend holding rituximab for now, will need IV abx as per ID. Pt needs PICC line, suggest to place on dominant arm, scr improving pt unlikely to require HD in the future.   - c/w hemodynamic support, PRBC maintain Hgb>7  - avoid nephrotoxic agents (ACEI/ARB, NSAIDS), renally dose medications  - trend SCr and UOP.  - Maintain on entecavir at Q72  - PLEASE AVOID HYDRALAZINE THOUGHT TO BE THE CAUSE OF ANCA VASCULITIS.

## 2019-09-20 NOTE — PROGRESS NOTE ADULT - SUBJECTIVE AND OBJECTIVE BOX
CC: F/U for Bacteremia    Saw/spoke to patient. No fevers, no chills. No new complaints. Patient well.    Allergies  Keflex (Unknown)  penicillin (Rash)    ANTIMICROBIALS:  entecavir 0.5 every 72 hours  ertapenem  IVPB 1000 every 24 hours  trimethoprim  160 mG/sulfamethoxazole 800 mG 1 <User Schedule>    PE:    Vital Signs Last 24 Hrs  T(C): 37.2 (20 Sep 2019 11:02), Max: 37.2 (20 Sep 2019 11:02)  T(F): 98.9 (20 Sep 2019 11:02), Max: 98.9 (20 Sep 2019 11:02)  HR: 81 (20 Sep 2019 11:02) (79 - 88)  BP: 110/74 (20 Sep 2019 11:02) (109/70 - 134/98)  RR: 17 (20 Sep 2019 11:02) (17 - 18)  SpO2: 96% (20 Sep 2019 11:02) (96% - 97%)    Gen: AOx3, NAD, non-toxic, pleasant  CV: S1+S2 normal, nontachycardic  Resp: Clear bilat, no resp distress, no crackles/wheezes  Abd: Soft, nontender, +BS  Ext: No LE edema, no wounds    LABS:                        11.1   21.22 )-----------( 247      ( 20 Sep 2019 08:58 )             35.8     09-20    137  |  94<L>  |  43<H>  ----------------------------<  108<H>  4.2   |  31  |  1.31<H>    Ca    8.7      20 Sep 2019 06:17  Phos  3.6     09-20  Mg     1.8     09-20    MICROBIOLOGY:    .Blood  09-16-19   No growth to date.     .Blood  09-14-19   No growth at 5 days.  --  Blood Culture PCR CONS    .Blood  09-11-19   Growth in aerobic and anaerobic bottles: Escherichia coli    .Urine  09-11-19   >100,000 CFU/ml Escherichia coli  --  Escherichia coli    (otherwise reviewed)    RADIOLOGY:    9/12 CXR:    IMPRESSION:    1.  Cardiomegaly.  2.  Likely pulmonary edema.

## 2019-09-20 NOTE — PROGRESS NOTE ADULT - ATTENDING COMMENTS
No new complaints.  Antibiotics continued  1.  ARF--improved.  Vasculitis rx but additional immunosuppression rx risk large in setting of infection.  Trend renal function, no HD required at present  2.  Hypertension--med optimization

## 2019-09-20 NOTE — PROGRESS NOTE ADULT - SUBJECTIVE AND OBJECTIVE BOX
[facility] DIVISION OF KIDNEY DISEASES AND HYPERTENSION -- FOLLOW UP NOTE  --------------------------------------------------------------------------------  Chief Complaint:  Fall    24 hour events/subjective:  Examined at bed side, awake, NAD, feeling better.     PAST HISTORY  --------------------------------------------------------------------------------  No significant changes to PMH, PSH, FHx, SHx, unless otherwise noted    ALLERGIES & MEDICATIONS  --------------------------------------------------------------------------------  Allergies    Keflex (Unknown)  penicillin (Rash)    Intolerances      Standing Inpatient Medications  ALBUTerol/ipratropium for Nebulization 3 milliLiter(s) Nebulizer every 6 hours  atorvastatin 10 milliGRAM(s) Oral at bedtime  cloNIDine 0.1 milliGRAM(s) Oral two times a day  diltiazem    milliGRAM(s) Oral daily  docusate sodium 100 milliGRAM(s) Oral three times a day  enoxaparin Injectable 70 milliGRAM(s) SubCutaneous daily  entecavir 0.5 milliGRAM(s) Oral every 72 hours  ertapenem  IVPB 1000 milliGRAM(s) IV Intermittent every 24 hours  furosemide    Tablet 40 milliGRAM(s) Oral daily  guaiFENesin    Syrup 200 milliGRAM(s) Oral at bedtime  insulin lispro (HumaLOG) corrective regimen sliding scale   SubCutaneous three times a day before meals  insulin lispro (HumaLOG) corrective regimen sliding scale   SubCutaneous at bedtime  insulin lispro Injectable (HumaLOG) 8 Unit(s) SubCutaneous three times a day before meals  melatonin 3 milliGRAM(s) Oral at bedtime  metoprolol tartrate 100 milliGRAM(s) Oral two times a day  montelukast 10 milliGRAM(s) Oral daily  pantoprazole    Tablet 40 milliGRAM(s) Oral before breakfast  predniSONE   Tablet 60 milliGRAM(s) Oral every 24 hours  senna 2 Tablet(s) Oral at bedtime  sertraline 50 milliGRAM(s) Oral daily  trimethoprim  160 mG/sulfamethoxazole 800 mG 1 Tablet(s) Oral <User Schedule>    PRN Inpatient Medications  acetaminophen   Tablet .. 650 milliGRAM(s) Oral every 6 hours PRN  ALPRAZolam 0.5 milliGRAM(s) Oral three times a day PRN  guaiFENesin    Syrup 200 milliGRAM(s) Oral every 6 hours PRN  polyethylene glycol 3350 17 Gram(s) Oral daily PRN  zolpidem 5 milliGRAM(s) Oral at bedtime PRN      REVIEW OF SYSTEMS  --------------------------------------------------------------------------------  Gen: No weight changes,+ fatigue, fevers/chills,+ weakness  Respiratory: No dyspnea, cough, wheezing, hemoptysis  CV: No chest pain, PND, orthopnea  GI: No abdominal pain, diarrhea, constipation, nausea, vomiting, melena, hematochezia  MSK: ; no edema  Neuro: No dizziness/lightheadedness,  All other systems were reviewed and are negative, except as noted.    VITALS/PHYSICAL EXAM  --------------------------------------------------------------------------------  T(C): 36.8 (09-20-19 @ 04:10), Max: 37.1 (09-19-19 @ 21:50)  HR: 88 (09-20-19 @ 04:10) (79 - 94)  BP: 134/98 (09-20-19 @ 04:10) (104/77 - 134/98)  RR: 18 (09-20-19 @ 04:10) (18 - 18)  SpO2: 97% (09-20-19 @ 04:10) (94% - 97%)  Wt(kg): --        09-19-19 @ 07:01  -  09-20-19 @ 07:00  --------------------------------------------------------  IN: 540 mL / OUT: 1450 mL / NET: -910 mL    09-20-19 @ 07:01  -  09-20-19 @ 10:25  --------------------------------------------------------  IN: 240 mL / OUT: 0 mL / NET: 240 mL      Physical Exam:  Constitutional: Nad  HEENT:  oropharynx clear, MMM  Neck: No JVD  Respiratory: CTAB, no wheezes, rales or rhonchi  Cardiovascular: S1, S2, RRR  Gastrointestinal: BS+, soft, NT/ND  Extremities: +edema.   Neurological: A/O x 2, no focal deficits  : No CVA tenderness. No garza.       LABS/STUDIES  --------------------------------------------------------------------------------              11.1   21.22 >-----------<  247      [09-20-19 @ 08:58]              35.8     137  |  94  |  43  ----------------------------<  108      [09-20-19 @ 06:17]  4.2   |  31  |  1.31        Ca     8.7     [09-20-19 @ 06:17]      Mg     1.8     [09-20-19 @ 06:17]      Phos  3.6     [09-20-19 @ 06:17]      PT/INR: PT 11.2 , INR 0.99       [09-20-19 @ 09:33]      Creatinine Trend:  SCr 1.31 [09-20 @ 06:17]  SCr 1.39 [09-19 @ 06:02]  SCr 1.40 [09-18 @ 06:57]  SCr 1.44 [09-17 @ 06:00]  SCr 1.48 [09-16 @ 05:04]

## 2019-09-20 NOTE — PROGRESS NOTE ADULT - PROBLEM SELECTOR PLAN 3
CW lasix to 40mg PO daily now.   monitor renal function closely. strict I+Os  Diuretics previous held due to overall dehydration.  Renal following. Card signed off.

## 2019-09-20 NOTE — PROGRESS NOTE ADULT - PROBLEM SELECTOR PLAN 2
Pt s/p fall found to have left ilopsoas hematoma in setting of fall/coumadin on admission.   H/H remains stable.   Case was reported discussed with surg. no contraindication from surg standpoint to resume AC.  CW renal dose lovenox daily and if H/H stable can start NOAC after PICC in place.

## 2019-09-20 NOTE — PROGRESS NOTE ADULT - PROBLEM SELECTOR PLAN 1
BlCx UCx with E.coli. suspect likely UTI with bacteremia in setting of immunosuppression.   WBC slightly back up today. afebrile.   Cont ertapenem 14 days total, likely late next week.   PICC line placement done today / f/up CXR for confirmation   BlCx from 9/13 NGTD but 9/14 with 1/4 CNS likely contaminant. repeat sent on 9/16 NGTD

## 2019-09-20 NOTE — PROGRESS NOTE ADULT - PROBLEM SELECTOR PLAN 4
Cardizem CD increased to 180mg given RVR. will cont and monitor.  Known history of a fib, well controlled with atenolol and coumadin   started on Lovenox renal dose and start NOAC in AM if H/H are stable   C/w metoprolol 100 bid

## 2019-09-20 NOTE — PROGRESS NOTE ADULT - ASSESSMENT
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 Pt now stable on the floor.

## 2019-09-20 NOTE — PROGRESS NOTE ADULT - SUBJECTIVE AND OBJECTIVE BOX
Patient is a 85y old  Female who presents with a chief complaint of Unwitnessed fall (20 Sep 2019 10:24)      INTERVAL HPI/OVERNIGHT EVENTS:    Medications:MEDICATIONS  (STANDING):  ALBUTerol/ipratropium for Nebulization 3 milliLiter(s) Nebulizer every 6 hours  atorvastatin 10 milliGRAM(s) Oral at bedtime  cloNIDine 0.1 milliGRAM(s) Oral two times a day  diltiazem    milliGRAM(s) Oral daily  docusate sodium 100 milliGRAM(s) Oral three times a day  enoxaparin Injectable 70 milliGRAM(s) SubCutaneous daily  entecavir 0.5 milliGRAM(s) Oral every 72 hours  ertapenem  IVPB 1000 milliGRAM(s) IV Intermittent every 24 hours  furosemide    Tablet 40 milliGRAM(s) Oral daily  guaiFENesin    Syrup 200 milliGRAM(s) Oral at bedtime  insulin lispro (HumaLOG) corrective regimen sliding scale   SubCutaneous three times a day before meals  insulin lispro (HumaLOG) corrective regimen sliding scale   SubCutaneous at bedtime  insulin lispro Injectable (HumaLOG) 8 Unit(s) SubCutaneous three times a day before meals  melatonin 3 milliGRAM(s) Oral at bedtime  metoprolol tartrate 100 milliGRAM(s) Oral two times a day  montelukast 10 milliGRAM(s) Oral daily  pantoprazole    Tablet 40 milliGRAM(s) Oral before breakfast  predniSONE   Tablet 60 milliGRAM(s) Oral every 24 hours  senna 2 Tablet(s) Oral at bedtime  sertraline 50 milliGRAM(s) Oral daily  trimethoprim  160 mG/sulfamethoxazole 800 mG 1 Tablet(s) Oral <User Schedule>    MEDICATIONS  (PRN):  acetaminophen   Tablet .. 650 milliGRAM(s) Oral every 6 hours PRN Mild Pain (1 - 3)  ALPRAZolam 0.5 milliGRAM(s) Oral three times a day PRN anxiety  guaiFENesin    Syrup 200 milliGRAM(s) Oral every 6 hours PRN Cough  polyethylene glycol 3350 17 Gram(s) Oral daily PRN Constiapation  zolpidem 5 milliGRAM(s) Oral at bedtime PRN Insomnia      Allergies: Allergies    Keflex (Unknown)  penicillin (Rash)    Intolerances        T(C): 37.2 (09-20-19 @ 11:02), Max: 37.2 (09-20-19 @ 11:02)  HR: 81 (09-20-19 @ 11:02) (79 - 88)  BP: 110/74 (09-20-19 @ 11:02) (109/70 - 134/98)  RR: 17 (09-20-19 @ 11:02) (17 - 18)  SpO2: 96% (09-20-19 @ 11:02) (96% - 97%)  Wt(kg): --Vital Signs Last 24 Hrs  T(C): 37.2 (20 Sep 2019 11:02), Max: 37.2 (20 Sep 2019 11:02)  T(F): 98.9 (20 Sep 2019 11:02), Max: 98.9 (20 Sep 2019 11:02)  HR: 81 (20 Sep 2019 11:02) (79 - 88)  BP: 110/74 (20 Sep 2019 11:02) (109/70 - 134/98)  BP(mean): --  RR: 17 (20 Sep 2019 11:02) (17 - 18)  SpO2: 96% (20 Sep 2019 11:02) (96% - 97%)  I&O's Summary    19 Sep 2019 07:01  -  20 Sep 2019 07:00  --------------------------------------------------------  IN: 540 mL / OUT: 1450 mL / NET: -910 mL    20 Sep 2019 07:01  -  20 Sep 2019 15:50  --------------------------------------------------------  IN: 540 mL / OUT: 0 mL / NET: 540 mL      Last Menstrual Period      PHYSICAL EXAM:  GENERAL: NAD, well-groomed, well-developed  HEAD:  Atraumatic, Normocephalic  EYES: EOMI, PERRLA, conjunctiva and sclera clear  ENMT: No tonsillar erythema, exudates, or enlargement; Moist mucous membranes, Good dentition, No lesions  NECK: Supple, No JVD, Normal thyroid  NERVOUS SYSTEM:  Alert & Oriented X3, Good concentration; Motor Strength 5/5 B/L upper and lower extremities; DTRs 2+ intact and symmetric  CHEST/LUNG: Clear to percussion bilaterally; No rales, rhonchi, wheezing, or rubs  HEART: Regular rate and rhythm; No murmurs, rubs, or gallops  ABDOMEN: Soft, Nontender, Nondistended; Bowel sounds present  EXTREMITIES:  2+ Peripheral Pulses, No clubbing, cyanosis, or edema  LYMPH: No lymphadenopathy noted  SKIN: No rashes or lesions    Consultant(s) Notes Reviewed:  [x ] YES  [ ] NO  Care Discussed with Consultants/Other Providers [ x] YES  [ ] NO  Name of Consultant  LABS:                        11.1   21.22 )-----------( 247      ( 20 Sep 2019 08:58 )             35.8     09-20    137  |  94<L>  |  43<H>  ----------------------------<  108<H>  4.2   |  31  |  1.31<H>    Ca    8.7      20 Sep 2019 06:17  Phos  3.6     09-20  Mg     1.8     09-20      PT/INR - ( 20 Sep 2019 09:33 )   PT: 11.2 sec;   INR: 0.99 ratio             CAPILLARY BLOOD GLUCOSE      POCT Blood Glucose.: 273 mg/dL (20 Sep 2019 12:03)  POCT Blood Glucose.: 123 mg/dL (20 Sep 2019 07:53)  POCT Blood Glucose.: 187 mg/dL (19 Sep 2019 21:53)  POCT Blood Glucose.: 100 mg/dL (19 Sep 2019 16:44)            RADIOLOGY & ADDITIONAL TESTS:  EKG :     Imaging Personally Reviewed:  [ ] YES  [ ] NO  HEALTH ISSUES - PROBLEM Dx:  Anxiety: Anxiety  Chronic obstructive pulmonary disease, unspecified COPD type: Chronic obstructive pulmonary disease, unspecified COPD type  Acute on chronic diastolic congestive heart failure: Acute on chronic diastolic congestive heart failure  Vasculitis: Vasculitis  Sepsis due to Escherichia coli: Sepsis due to Escherichia coli  Glomerulonephritis: Glomerulonephritis  Leukocytosis: Leukocytosis  Hepatitis B: Hepatitis B  MGUS (monoclonal gammopathy of unknown significance): MGUS (monoclonal gammopathy of unknown significance)  Afib: Afib  Hyperglycemia: Hyperglycemia  Retroperitoneal bleeding: Retroperitoneal bleeding  Edema: Edema  Hypertension: Hypertension  KANIKA (acute kidney injury): KANIKA (acute kidney injury) Patient is a 85y old  Female who presents with a chief complaint of Unwitnessed fall (20 Sep 2019 10:24)      INTERVAL HPI/OVERNIGHT EVENTS:    NO fever, no diarrhea .  Pt is s/p Rt PICC Line placement       Medications:MEDICATIONS  (STANDING):  ALBUTerol/ipratropium for Nebulization 3 milliLiter(s) Nebulizer every 6 hours  atorvastatin 10 milliGRAM(s) Oral at bedtime  cloNIDine 0.1 milliGRAM(s) Oral two times a day  diltiazem    milliGRAM(s) Oral daily  docusate sodium 100 milliGRAM(s) Oral three times a day  enoxaparin Injectable 70 milliGRAM(s) SubCutaneous daily  entecavir 0.5 milliGRAM(s) Oral every 72 hours  ertapenem  IVPB 1000 milliGRAM(s) IV Intermittent every 24 hours  furosemide    Tablet 40 milliGRAM(s) Oral daily  guaiFENesin    Syrup 200 milliGRAM(s) Oral at bedtime  insulin lispro (HumaLOG) corrective regimen sliding scale   SubCutaneous three times a day before meals  insulin lispro (HumaLOG) corrective regimen sliding scale   SubCutaneous at bedtime  insulin lispro Injectable (HumaLOG) 8 Unit(s) SubCutaneous three times a day before meals  melatonin 3 milliGRAM(s) Oral at bedtime  metoprolol tartrate 100 milliGRAM(s) Oral two times a day  montelukast 10 milliGRAM(s) Oral daily  pantoprazole    Tablet 40 milliGRAM(s) Oral before breakfast  predniSONE   Tablet 60 milliGRAM(s) Oral every 24 hours  senna 2 Tablet(s) Oral at bedtime  sertraline 50 milliGRAM(s) Oral daily  trimethoprim  160 mG/sulfamethoxazole 800 mG 1 Tablet(s) Oral <User Schedule>    MEDICATIONS  (PRN):  acetaminophen   Tablet .. 650 milliGRAM(s) Oral every 6 hours PRN Mild Pain (1 - 3)  ALPRAZolam 0.5 milliGRAM(s) Oral three times a day PRN anxiety  guaiFENesin    Syrup 200 milliGRAM(s) Oral every 6 hours PRN Cough  polyethylene glycol 3350 17 Gram(s) Oral daily PRN Constiapation  zolpidem 5 milliGRAM(s) Oral at bedtime PRN Insomnia      Allergies: Allergies    Keflex (Unknown)  penicillin (Rash)        T(C): 37.2 (09-20-19 @ 11:02), Max: 37.2 (09-20-19 @ 11:02)  HR: 81 (09-20-19 @ 11:02) (79 - 88)  BP: 110/74 (09-20-19 @ 11:02) (109/70 - 134/98)  RR: 17 (09-20-19 @ 11:02) (17 - 18)  SpO2: 96% (09-20-19 @ 11:02) (96% - 97%)  Wt(kg): --Vital Signs Last 24 Hrs  T(C): 37.2 (20 Sep 2019 11:02), Max: 37.2 (20 Sep 2019 11:02)  T(F): 98.9 (20 Sep 2019 11:02), Max: 98.9 (20 Sep 2019 11:02)  HR: 81 (20 Sep 2019 11:02) (79 - 88)  BP: 110/74 (20 Sep 2019 11:02) (109/70 - 134/98)  BP(mean): --  RR: 17 (20 Sep 2019 11:02) (17 - 18)  SpO2: 96% (20 Sep 2019 11:02) (96% - 97%)  I&O's Summary    19 Sep 2019 07:01  -  20 Sep 2019 07:00  --------------------------------------------------------  IN: 540 mL / OUT: 1450 mL / NET: -910 mL    20 Sep 2019 07:01  -  20 Sep 2019 15:50  --------------------------------------------------------  IN: 540 mL / OUT: 0 mL / NET: 540 mL          PHYSICAL EXAM:  GENERAL: NAD, well-groomed, well-developed  HEAD:  Atraumatic, Normocephalic  NECK: Supple, No JVD, Normal thyroid  NERVOUS SYSTEM:  Alert & awake   CHEST/LUNG: Clear to percussion bilaterally; No rales, rhonchi, wheezing, or rubs  HEART: Regular rate and rhythm; No murmurs, rubs, or gallops  ABDOMEN: Soft, Nontender, Nondistended; Bowel sounds present  EXTREMITIES:  2+ Peripheral Pulses, No clubbing, cyanosis, or edema      Consultant(s) Notes Reviewed:  [x ] YES  [ ] NO  Care Discussed with Consultants/Other Providers [ x] YES  [ ] NO  Name of Consultant  LABS:                        11.1   21.22 )-----------( 247      ( 20 Sep 2019 08:58 )             35.8     09-20    137  |  94<L>  |  43<H>  ----------------------------<  108<H>  4.2   |  31  |  1.31<H>    Ca    8.7      20 Sep 2019 06:17  Phos  3.6     09-20  Mg     1.8     09-20      PT/INR - ( 20 Sep 2019 09:33 )   PT: 11.2 sec;   INR: 0.99 ratio             CAPILLARY BLOOD GLUCOSE      POCT Blood Glucose.: 273 mg/dL (20 Sep 2019 12:03)  POCT Blood Glucose.: 123 mg/dL (20 Sep 2019 07:53)  POCT Blood Glucose.: 187 mg/dL (19 Sep 2019 21:53)  POCT Blood Glucose.: 100 mg/dL (19 Sep 2019 16:44)            RADIOLOGY & ADDITIONAL TESTS:  EKG :     Imaging Personally Reviewed:  [ ] YES  [ ] NO  HEALTH ISSUES - PROBLEM Dx:  Anxiety: Anxiety  Chronic obstructive pulmonary disease, unspecified COPD type: Chronic obstructive pulmonary disease, unspecified COPD type  Acute on chronic diastolic congestive heart failure: Acute on chronic diastolic congestive heart failure  Vasculitis: Vasculitis  Sepsis due to Escherichia coli: Sepsis due to Escherichia coli  Glomerulonephritis: Glomerulonephritis  Leukocytosis: Leukocytosis  Hepatitis B: Hepatitis B  MGUS (monoclonal gammopathy of unknown significance): MGUS (monoclonal gammopathy of unknown significance)  Afib: Afib  Hyperglycemia: Hyperglycemia  Retroperitoneal bleeding: Retroperitoneal bleeding  Edema: Edema  Hypertension: Hypertension  KANIKA (acute kidney injury): KANIKA (acute kidney injury)

## 2019-09-21 NOTE — PROGRESS NOTE ADULT - ASSESSMENT
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 Pt now stable on the floor.

## 2019-09-21 NOTE — PROGRESS NOTE ADULT - SUBJECTIVE AND OBJECTIVE BOX
Patient is a 85y old  Female who presents with a chief complaint of Unwitnessed fall (20 Sep 2019 15:50)      INTERVAL HPI/OVERNIGHT EVENTS:    Medications:MEDICATIONS  (STANDING):  ALBUTerol/ipratropium for Nebulization 3 milliLiter(s) Nebulizer every 6 hours  atorvastatin 10 milliGRAM(s) Oral at bedtime  cloNIDine 0.1 milliGRAM(s) Oral two times a day  diltiazem    milliGRAM(s) Oral daily  docusate sodium 100 milliGRAM(s) Oral three times a day  enoxaparin Injectable 70 milliGRAM(s) SubCutaneous daily  entecavir 0.5 milliGRAM(s) Oral every 72 hours  ertapenem  IVPB 1000 milliGRAM(s) IV Intermittent every 24 hours  furosemide    Tablet 40 milliGRAM(s) Oral daily  guaiFENesin    Syrup 200 milliGRAM(s) Oral at bedtime  insulin lispro (HumaLOG) corrective regimen sliding scale   SubCutaneous three times a day before meals  insulin lispro (HumaLOG) corrective regimen sliding scale   SubCutaneous at bedtime  insulin lispro Injectable (HumaLOG) 8 Unit(s) SubCutaneous three times a day before meals  melatonin 3 milliGRAM(s) Oral at bedtime  metoprolol tartrate 100 milliGRAM(s) Oral two times a day  montelukast 10 milliGRAM(s) Oral daily  pantoprazole    Tablet 40 milliGRAM(s) Oral before breakfast  predniSONE   Tablet 60 milliGRAM(s) Oral every 24 hours  senna 2 Tablet(s) Oral at bedtime  sertraline 50 milliGRAM(s) Oral daily  trimethoprim  160 mG/sulfamethoxazole 800 mG 1 Tablet(s) Oral <User Schedule>    MEDICATIONS  (PRN):  acetaminophen   Tablet .. 650 milliGRAM(s) Oral every 6 hours PRN Mild Pain (1 - 3)  ALPRAZolam 0.5 milliGRAM(s) Oral three times a day PRN anxiety  guaiFENesin    Syrup 200 milliGRAM(s) Oral every 6 hours PRN Cough  polyethylene glycol 3350 17 Gram(s) Oral daily PRN Constiapation  zolpidem 5 milliGRAM(s) Oral at bedtime PRN Insomnia      Allergies: Allergies    Keflex (Unknown)  penicillin (Rash)    Intolerances        REVIEW OF SYSTEMS:    T(C): 36.8 (09-21-19 @ 12:01), Max: 36.8 (09-21-19 @ 12:01)  HR: 82 (09-21-19 @ 12:01) (82 - 91)  BP: 107/75 (09-21-19 @ 12:01) (107/75 - 138/92)  RR: 18 (09-21-19 @ 12:01) (18 - 18)  SpO2: 90% (09-21-19 @ 12:01) (90% - 98%)  Wt(kg): --Vital Signs Last 24 Hrs  T(C): 36.8 (21 Sep 2019 12:01), Max: 36.8 (21 Sep 2019 12:01)  T(F): 98.2 (21 Sep 2019 12:01), Max: 98.2 (21 Sep 2019 12:01)  HR: 82 (21 Sep 2019 12:01) (82 - 91)  BP: 107/75 (21 Sep 2019 12:01) (107/75 - 138/92)  BP(mean): --  RR: 18 (21 Sep 2019 12:01) (18 - 18)  SpO2: 90% (21 Sep 2019 12:01) (90% - 98%)  I&O's Summary    20 Sep 2019 07:01  -  21 Sep 2019 07:00  --------------------------------------------------------  IN: 540 mL / OUT: 800 mL / NET: -260 mL    21 Sep 2019 07:01  -  21 Sep 2019 13:15  --------------------------------------------------------  IN: 120 mL / OUT: 0 mL / NET: 120 mL        PHYSICAL EXAM:      Consultant(s) Notes Reviewed:  [x ] YES  [ ] NO  Care Discussed with Consultants/Other Providers [ x] YES  [ ] NO  Name of Consultant  LABS:                        11.1   21.22 )-----------( 247      ( 20 Sep 2019 08:58 )             35.8     09-20    137  |  94<L>  |  43<H>  ----------------------------<  108<H>  4.2   |  31  |  1.31<H>    Ca    8.7      20 Sep 2019 06:17  Phos  3.6     09-20  Mg     1.8     09-20      PT/INR - ( 20 Sep 2019 09:33 )   PT: 11.2 sec;   INR: 0.99 ratio             CAPILLARY BLOOD GLUCOSE      POCT Blood Glucose.: 221 mg/dL (21 Sep 2019 11:54)  POCT Blood Glucose.: 95 mg/dL (21 Sep 2019 07:59)  POCT Blood Glucose.: 143 mg/dL (20 Sep 2019 21:45)  POCT Blood Glucose.: 236 mg/dL (20 Sep 2019 16:40)            RADIOLOGY & ADDITIONAL TESTS:  EKG :     Imaging Personally Reviewed:  [ ] YES  [ ] NO  HEALTH ISSUES - PROBLEM Dx:  Anxiety: Anxiety  Chronic obstructive pulmonary disease, unspecified COPD type: Chronic obstructive pulmonary disease, unspecified COPD type  Acute on chronic diastolic congestive heart failure: Acute on chronic diastolic congestive heart failure  Vasculitis: Vasculitis  Sepsis due to Escherichia coli: Sepsis due to Escherichia coli  Glomerulonephritis: Glomerulonephritis  Leukocytosis: Leukocytosis  Hepatitis B: Hepatitis B  MGUS (monoclonal gammopathy of unknown significance): MGUS (monoclonal gammopathy of unknown significance)  Afib: Afib  Hyperglycemia: Hyperglycemia  Retroperitoneal bleeding: Retroperitoneal bleeding  Edema: Edema  Hypertension: Hypertension  KANIKA (acute kidney injury): KANIKA (acute kidney injury) Patient is a 85y old  Female who presents with a chief complaint of Unwitnessed fall (20 Sep 2019 15:50)      INTERVAL HPI/OVERNIGHT EVENTS:  NO issues presented, no fever , no chest pain , no palpitations, no SOB     Medications:MEDICATIONS  (STANDING):  ALBUTerol/ipratropium for Nebulization 3 milliLiter(s) Nebulizer every 6 hours  atorvastatin 10 milliGRAM(s) Oral at bedtime  cloNIDine 0.1 milliGRAM(s) Oral two times a day  diltiazem    milliGRAM(s) Oral daily  docusate sodium 100 milliGRAM(s) Oral three times a day  enoxaparin Injectable 70 milliGRAM(s) SubCutaneous daily  entecavir 0.5 milliGRAM(s) Oral every 72 hours  ertapenem  IVPB 1000 milliGRAM(s) IV Intermittent every 24 hours  furosemide    Tablet 40 milliGRAM(s) Oral daily  guaiFENesin    Syrup 200 milliGRAM(s) Oral at bedtime  insulin lispro (HumaLOG) corrective regimen sliding scale   SubCutaneous three times a day before meals  insulin lispro (HumaLOG) corrective regimen sliding scale   SubCutaneous at bedtime  insulin lispro Injectable (HumaLOG) 8 Unit(s) SubCutaneous three times a day before meals  melatonin 3 milliGRAM(s) Oral at bedtime  metoprolol tartrate 100 milliGRAM(s) Oral two times a day  montelukast 10 milliGRAM(s) Oral daily  pantoprazole    Tablet 40 milliGRAM(s) Oral before breakfast  predniSONE   Tablet 60 milliGRAM(s) Oral every 24 hours  senna 2 Tablet(s) Oral at bedtime  sertraline 50 milliGRAM(s) Oral daily  trimethoprim  160 mG/sulfamethoxazole 800 mG 1 Tablet(s) Oral <User Schedule>    MEDICATIONS  (PRN):  acetaminophen   Tablet .. 650 milliGRAM(s) Oral every 6 hours PRN Mild Pain (1 - 3)  ALPRAZolam 0.5 milliGRAM(s) Oral three times a day PRN anxiety  guaiFENesin    Syrup 200 milliGRAM(s) Oral every 6 hours PRN Cough  polyethylene glycol 3350 17 Gram(s) Oral daily PRN Constiapation  zolpidem 5 milliGRAM(s) Oral at bedtime PRN Insomnia      Allergies: Allergies    Keflex (Unknown)  penicillin (Rash)    T(C): 36.8 (09-21-19 @ 12:01), Max: 36.8 (09-21-19 @ 12:01)  HR: 82 (09-21-19 @ 12:01) (82 - 91)  BP: 107/75 (09-21-19 @ 12:01) (107/75 - 138/92)  RR: 18 (09-21-19 @ 12:01) (18 - 18)  SpO2: 90% (09-21-19 @ 12:01) (90% - 98%)  Wt(kg): --Vital Signs Last 24 Hrs  T(C): 36.8 (21 Sep 2019 12:01), Max: 36.8 (21 Sep 2019 12:01)  T(F): 98.2 (21 Sep 2019 12:01), Max: 98.2 (21 Sep 2019 12:01)  HR: 82 (21 Sep 2019 12:01) (82 - 91)  BP: 107/75 (21 Sep 2019 12:01) (107/75 - 138/92)  BP(mean): --  RR: 18 (21 Sep 2019 12:01) (18 - 18)  SpO2: 90% (21 Sep 2019 12:01) (90% - 98%)  I&O's Summary    20 Sep 2019 07:01  -  21 Sep 2019 07:00  --------------------------------------------------------  IN: 540 mL / OUT: 800 mL / NET: -260 mL    21 Sep 2019 07:01  -  21 Sep 2019 13:15  --------------------------------------------------------  IN: 120 mL / OUT: 0 mL / NET: 120 mL        PHYSICAL EXAM:  GENERAL: NAD, well-groomed, well-developed  HEAD:  Atraumatic, Normocephalic  NECK: Supple, No JVD, Normal thyroid  NERVOUS SYSTEM:  Alert & awake   CHEST/LUNG: Clear to percussion bilaterally; No rales, rhonchi, wheezing, or rubs  HEART: Regular rate and rhythm; No murmurs, rubs, or gallops  ABDOMEN: Soft, Nontender, Nondistended; Bowel sounds present  EXTREMITIES:  2+ Peripheral Pulses, No clubbing, cyanosis, or edema    RT PICC Line in place     Consultant(s) Notes Reviewed:  [x ] YES  [ ] NO  Care Discussed with Consultants/Other Providers [ x] YES  [ ] NO  Name of Consultant  LABS:                        11.1   21.22 )-----------( 247      ( 20 Sep 2019 08:58 )             35.8     09-20    137  |  94<L>  |  43<H>  ----------------------------<  108<H>  4.2   |  31  |  1.31<H>    Ca    8.7      20 Sep 2019 06:17  Phos  3.6     09-20  Mg     1.8     09-20      PT/INR - ( 20 Sep 2019 09:33 )   PT: 11.2 sec;   INR: 0.99 ratio             CAPILLARY BLOOD GLUCOSE      POCT Blood Glucose.: 221 mg/dL (21 Sep 2019 11:54)  POCT Blood Glucose.: 95 mg/dL (21 Sep 2019 07:59)  POCT Blood Glucose.: 143 mg/dL (20 Sep 2019 21:45)  POCT Blood Glucose.: 236 mg/dL (20 Sep 2019 16:40)            RADIOLOGY & ADDITIONAL TESTS:  EKG :     Imaging Personally Reviewed:  [ ] YES  [ ] NO  HEALTH ISSUES - PROBLEM Dx:  Anxiety: Anxiety  Chronic obstructive pulmonary disease, unspecified COPD type: Chronic obstructive pulmonary disease, unspecified COPD type  Acute on chronic diastolic congestive heart failure: Acute on chronic diastolic congestive heart failure  Vasculitis: Vasculitis  Sepsis due to Escherichia coli: Sepsis due to Escherichia coli  Glomerulonephritis: Glomerulonephritis  Leukocytosis: Leukocytosis  Hepatitis B: Hepatitis B  MGUS (monoclonal gammopathy of unknown significance): MGUS (monoclonal gammopathy of unknown significance)  Afib: Afib  Hyperglycemia: Hyperglycemia  Retroperitoneal bleeding: Retroperitoneal bleeding  Edema: Edema  Hypertension: Hypertension  KANIKA (acute kidney injury): KANIKA (acute kidney injury)

## 2019-09-22 NOTE — PROGRESS NOTE ADULT - PROBLEM SELECTOR PLAN 4
Cardizem CD increased to 180mg given RVR. will cont and monitor.  Known history of a fib, well controlled with atenolol and coumadin   started on Lovenox renal dose and start NOAC in AM if H/H are stable   C/w metoprolol 100 bid Cardizem CD increased to 180mg given RVR. will cont and monitor.  Known history of a fib, well controlled with atenolol and coumadin   Eliquis started / monitor for bleed   C/w metoprolol 100 bid

## 2019-09-22 NOTE — PROGRESS NOTE ADULT - ASSESSMENT
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 Pt now stable on the floor.

## 2019-09-22 NOTE — PROGRESS NOTE ADULT - PROBLEM SELECTOR PLAN 2
Pt s/p fall found to have left ilopsoas hematoma in setting of fall/coumadin on admission.   H/H remains stable.   Case was reported discussed with surg. no contraindication from surg standpoint to resume AC.  CW renal dose lovenox daily and if H/H stable can start NOAC after PICC in place. Pt s/p fall found to have left ilopsoas hematoma in setting of fall/coumadin on admission.   H/H remains stable.   Case was reported discussed with surg. no contraindication from surg standpoint to resume AC.  Start Eliquis 2.5 mg oral two times a day and if no bleed may increase to 5 mg oral two times a day

## 2019-09-22 NOTE — PROGRESS NOTE ADULT - SUBJECTIVE AND OBJECTIVE BOX
Patient is a 85y old  Female who presents with a chief complaint of Unwitnessed fall (21 Sep 2019 13:14)      INTERVAL HPI/OVERNIGHT EVENTS:    Medications:MEDICATIONS  (STANDING):  ALBUTerol/ipratropium for Nebulization 3 milliLiter(s) Nebulizer every 6 hours  atorvastatin 10 milliGRAM(s) Oral at bedtime  cloNIDine 0.1 milliGRAM(s) Oral two times a day  diltiazem    milliGRAM(s) Oral daily  docusate sodium 100 milliGRAM(s) Oral three times a day  enoxaparin Injectable 70 milliGRAM(s) SubCutaneous daily  entecavir 0.5 milliGRAM(s) Oral every 72 hours  ertapenem  IVPB 1000 milliGRAM(s) IV Intermittent every 24 hours  furosemide    Tablet 40 milliGRAM(s) Oral daily  guaiFENesin    Syrup 200 milliGRAM(s) Oral at bedtime  insulin lispro (HumaLOG) corrective regimen sliding scale   SubCutaneous three times a day before meals  insulin lispro (HumaLOG) corrective regimen sliding scale   SubCutaneous at bedtime  insulin lispro Injectable (HumaLOG) 8 Unit(s) SubCutaneous three times a day before meals  melatonin 3 milliGRAM(s) Oral at bedtime  metoprolol tartrate 100 milliGRAM(s) Oral two times a day  montelukast 10 milliGRAM(s) Oral daily  pantoprazole    Tablet 40 milliGRAM(s) Oral before breakfast  predniSONE   Tablet 60 milliGRAM(s) Oral every 24 hours  senna 2 Tablet(s) Oral at bedtime  sertraline 50 milliGRAM(s) Oral daily  trimethoprim  160 mG/sulfamethoxazole 800 mG 1 Tablet(s) Oral <User Schedule>    MEDICATIONS  (PRN):  acetaminophen   Tablet .. 650 milliGRAM(s) Oral every 6 hours PRN Mild Pain (1 - 3)  ALPRAZolam 0.5 milliGRAM(s) Oral three times a day PRN anxiety  guaiFENesin    Syrup 200 milliGRAM(s) Oral every 6 hours PRN Cough  polyethylene glycol 3350 17 Gram(s) Oral daily PRN Constiapation  zolpidem 5 milliGRAM(s) Oral at bedtime PRN Insomnia      Allergies: Allergies    Keflex (Unknown)  penicillin (Rash)    Intolerances        REVIEW OF SYSTEMS:  CONSTITUTIONAL: No fever, weight loss, or fatigue  EYES: No eye pain, visual disturbances, or discharge  ENMT:  No difficulty hearing, tinnitus, vertigo; No sinus or throat pain  NECK: No pain or stiffness  BREASTS: No pain, masses, or nipple discharge  RESPIRATORY: No cough, wheezing, chills or hemoptysis; No shortness of breath  CARDIOVASCULAR: No chest pain, palpitations, dizziness, or leg swelling  GASTROINTESTINAL: No abdominal or epigastric pain. No nausea, vomiting, or hematemesis; No diarrhea or constipation. No melena or hematochezia.  GENITOURINARY: No dysuria, frequency, hematuria, or incontinence  NEUROLOGICAL: No headaches, memory loss, loss of strength, numbness, or tremors  SKIN: No itching, burning, rashes, or lesions   LYMPH NODES: No enlarged glands  ENDOCRINE: No heat or cold intolerance; No hair loss  MUSCULOSKELETAL: No joint pain or swelling; No muscle, back, or extremity pain  PSYCHIATRIC: No depression, anxiety, mood swings, or difficulty sleeping  HEME/LYMPH: No easy bruising, or bleeding gums  ALLERY AND IMMUNOLOGIC: No hives or eczema    T(C): 36.5 (09-22-19 @ 04:25), Max: 37.2 (09-22-19 @ 00:46)  HR: 85 (09-22-19 @ 04:25) (80 - 85)  BP: 134/90 (09-22-19 @ 04:25) (104/73 - 134/90)  RR: 19 (09-22-19 @ 04:25) (18 - 19)  SpO2: 93% (09-22-19 @ 04:25) (93% - 96%)  Wt(kg): --Vital Signs Last 24 Hrs  T(C): 36.5 (22 Sep 2019 04:25), Max: 37.2 (22 Sep 2019 00:46)  T(F): 97.7 (22 Sep 2019 04:25), Max: 98.9 (22 Sep 2019 00:46)  HR: 85 (22 Sep 2019 04:25) (80 - 85)  BP: 134/90 (22 Sep 2019 04:25) (104/73 - 134/90)  BP(mean): --  RR: 19 (22 Sep 2019 04:25) (18 - 19)  SpO2: 93% (22 Sep 2019 04:25) (93% - 96%)  I&O's Summary    21 Sep 2019 07:01  -  22 Sep 2019 07:00  --------------------------------------------------------  IN: 720 mL / OUT: 1450 mL / NET: -730 mL    22 Sep 2019 07:01  -  22 Sep 2019 12:36  --------------------------------------------------------  IN: 120 mL / OUT: 0 mL / NET: 120 mL      Last Menstrual Period      PHYSICAL EXAM:  GENERAL: NAD, well-groomed, well-developed  HEAD:  Atraumatic, Normocephalic  EYES: EOMI, PERRLA, conjunctiva and sclera clear  ENMT: No tonsillar erythema, exudates, or enlargement; Moist mucous membranes, Good dentition, No lesions  NECK: Supple, No JVD, Normal thyroid  NERVOUS SYSTEM:  Alert & Oriented X3, Good concentration; Motor Strength 5/5 B/L upper and lower extremities; DTRs 2+ intact and symmetric  CHEST/LUNG: Clear to percussion bilaterally; No rales, rhonchi, wheezing, or rubs  HEART: Regular rate and rhythm; No murmurs, rubs, or gallops  ABDOMEN: Soft, Nontender, Nondistended; Bowel sounds present  EXTREMITIES:  2+ Peripheral Pulses, No clubbing, cyanosis, or edema  LYMPH: No lymphadenopathy noted  SKIN: No rashes or lesions    Consultant(s) Notes Reviewed:  [x ] YES  [ ] NO  Care Discussed with Consultants/Other Providers [ x] YES  [ ] NO  Name of Consultant  LABS:                        10.2   17.93 )-----------( 251      ( 22 Sep 2019 09:36 )             33.4     09-22    135  |  93<L>  |  40<H>  ----------------------------<  121<H>  3.8   |  33<H>  |  1.31<H>    Ca    8.3<L>      22 Sep 2019 07:03  Mg     1.6     09-22          CAPILLARY BLOOD GLUCOSE      POCT Blood Glucose.: 242 mg/dL (22 Sep 2019 12:13)  POCT Blood Glucose.: 138 mg/dL (22 Sep 2019 07:39)  POCT Blood Glucose.: 118 mg/dL (21 Sep 2019 21:41)  POCT Blood Glucose.: 144 mg/dL (21 Sep 2019 16:38)            RADIOLOGY & ADDITIONAL TESTS:  EKG :     Imaging Personally Reviewed:  [ ] YES  [ ] NO  HEALTH ISSUES - PROBLEM Dx:  Anxiety: Anxiety  Chronic obstructive pulmonary disease, unspecified COPD type: Chronic obstructive pulmonary disease, unspecified COPD type  Acute on chronic diastolic congestive heart failure: Acute on chronic diastolic congestive heart failure  Vasculitis: Vasculitis  Sepsis due to Escherichia coli: Sepsis due to Escherichia coli  Glomerulonephritis: Glomerulonephritis  Leukocytosis: Leukocytosis  Hepatitis B: Hepatitis B  MGUS (monoclonal gammopathy of unknown significance): MGUS (monoclonal gammopathy of unknown significance)  Afib: Afib  Hyperglycemia: Hyperglycemia  Retroperitoneal bleeding: Retroperitoneal bleeding  Edema: Edema  Hypertension: Hypertension  KANIKA (acute kidney injury): KANIKA (acute kidney injury) Patient is a 85y old  Female who presents with a chief complaint of Unwitnessed fall (21 Sep 2019 13:14)      INTERVAL HPI/OVERNIGHT EVENTS:   NO events reported , daughter states that her mother is weak.  NO other issues       Medications:MEDICATIONS  (STANDING):  ALBUTerol/ipratropium for Nebulization 3 milliLiter(s) Nebulizer every 6 hours  atorvastatin 10 milliGRAM(s) Oral at bedtime  cloNIDine 0.1 milliGRAM(s) Oral two times a day  diltiazem    milliGRAM(s) Oral daily  docusate sodium 100 milliGRAM(s) Oral three times a day  enoxaparin Injectable 70 milliGRAM(s) SubCutaneous daily  entecavir 0.5 milliGRAM(s) Oral every 72 hours  ertapenem  IVPB 1000 milliGRAM(s) IV Intermittent every 24 hours  furosemide    Tablet 40 milliGRAM(s) Oral daily  guaiFENesin    Syrup 200 milliGRAM(s) Oral at bedtime  insulin lispro (HumaLOG) corrective regimen sliding scale   SubCutaneous three times a day before meals  insulin lispro (HumaLOG) corrective regimen sliding scale   SubCutaneous at bedtime  insulin lispro Injectable (HumaLOG) 8 Unit(s) SubCutaneous three times a day before meals  melatonin 3 milliGRAM(s) Oral at bedtime  metoprolol tartrate 100 milliGRAM(s) Oral two times a day  montelukast 10 milliGRAM(s) Oral daily  pantoprazole    Tablet 40 milliGRAM(s) Oral before breakfast  predniSONE   Tablet 60 milliGRAM(s) Oral every 24 hours  senna 2 Tablet(s) Oral at bedtime  sertraline 50 milliGRAM(s) Oral daily  trimethoprim  160 mG/sulfamethoxazole 800 mG 1 Tablet(s) Oral <User Schedule>    MEDICATIONS  (PRN):  acetaminophen   Tablet .. 650 milliGRAM(s) Oral every 6 hours PRN Mild Pain (1 - 3)  ALPRAZolam 0.5 milliGRAM(s) Oral three times a day PRN anxiety  guaiFENesin    Syrup 200 milliGRAM(s) Oral every 6 hours PRN Cough  polyethylene glycol 3350 17 Gram(s) Oral daily PRN Constiapation  zolpidem 5 milliGRAM(s) Oral at bedtime PRN Insomnia      Allergies: Allergies    Keflex (Unknown)  penicillin (Rash)      REVIEW OF SYSTEMS:  CONSTITUTIONAL: No fever  ENMT: No sinus or throat pain  NECK: No pain or stiffness  RESPIRATORY: No cough,  No shortness of breath  CARDIOVASCULAR: No chest pain  GASTROINTESTINAL: No abdominal  pain. No diarrhea   GENITOURINARY: No dysuria  NEUROLOGICAL: No headaches      T(C): 36.5 (09-22-19 @ 04:25), Max: 37.2 (09-22-19 @ 00:46)  HR: 85 (09-22-19 @ 04:25) (80 - 85)  BP: 134/90 (09-22-19 @ 04:25) (104/73 - 134/90)  RR: 19 (09-22-19 @ 04:25) (18 - 19)  SpO2: 93% (09-22-19 @ 04:25) (93% - 96%)  Wt(kg): --Vital Signs Last 24 Hrs  T(C): 36.5 (22 Sep 2019 04:25), Max: 37.2 (22 Sep 2019 00:46)  T(F): 97.7 (22 Sep 2019 04:25), Max: 98.9 (22 Sep 2019 00:46)  HR: 85 (22 Sep 2019 04:25) (80 - 85)  BP: 134/90 (22 Sep 2019 04:25) (104/73 - 134/90)  BP(mean): --  RR: 19 (22 Sep 2019 04:25) (18 - 19)  SpO2: 93% (22 Sep 2019 04:25) (93% - 96%)  I&O's Summary    21 Sep 2019 07:01  -  22 Sep 2019 07:00  --------------------------------------------------------  IN: 720 mL / OUT: 1450 mL / NET: -730 mL    22 Sep 2019 07:01  -  22 Sep 2019 12:36  --------------------------------------------------------  IN: 120 mL / OUT: 0 mL / NET: 120 mL          PHYSICAL EXAM:  GENERAL: NAD, well-groomed, well-developed  HEAD:  Atraumatic, Normocephalic  NECK: Supple, No JVD, Normal thyroid  NERVOUS SYSTEM:  Alert & awake   CHEST/LUNG: Clear to percussion bilaterally; No rales, rhonchi, wheezing, or rubs  HEART: Regular rate and rhythm; No murmurs, rubs, or gallops  ABDOMEN: Soft, Nontender, Nondistended; Bowel sounds present  EXTREMITIES:  2+ Peripheral Pulses, No clubbing, cyanosis, or edema    RT PICC Line in place     Consultant(s) Notes Reviewed:  [x ] YES  [ ] NO  Care Discussed with Consultants/Other Providers [ x] YES  [ ] NO  Name of Consultant  LABS:                        10.2   17.93 )-----------( 251      ( 22 Sep 2019 09:36 )             33.4     09-22    135  |  93<L>  |  40<H>  ----------------------------<  121<H>  3.8   |  33<H>  |  1.31<H>    Ca    8.3<L>      22 Sep 2019 07:03  Mg     1.6     09-22          CAPILLARY BLOOD GLUCOSE      POCT Blood Glucose.: 242 mg/dL (22 Sep 2019 12:13)  POCT Blood Glucose.: 138 mg/dL (22 Sep 2019 07:39)  POCT Blood Glucose.: 118 mg/dL (21 Sep 2019 21:41)  POCT Blood Glucose.: 144 mg/dL (21 Sep 2019 16:38)            RADIOLOGY & ADDITIONAL TESTS:  EKG :     Imaging Personally Reviewed:  [ ] YES  [ ] NO  HEALTH ISSUES - PROBLEM Dx:  Anxiety: Anxiety  Chronic obstructive pulmonary disease, unspecified COPD type: Chronic obstructive pulmonary disease, unspecified COPD type  Acute on chronic diastolic congestive heart failure: Acute on chronic diastolic congestive heart failure  Vasculitis: Vasculitis  Sepsis due to Escherichia coli: Sepsis due to Escherichia coli  Glomerulonephritis: Glomerulonephritis  Leukocytosis: Leukocytosis  Hepatitis B: Hepatitis B  MGUS (monoclonal gammopathy of unknown significance): MGUS (monoclonal gammopathy of unknown significance)  Afib: Afib  Hyperglycemia: Hyperglycemia  Retroperitoneal bleeding: Retroperitoneal bleeding  Edema: Edema  Hypertension: Hypertension  KANIKA (acute kidney injury): KANIKA (acute kidney injury)

## 2019-09-22 NOTE — PROGRESS NOTE ADULT - PROBLEM SELECTOR PLAN 1
BlCx UCx with E.coli. suspect likely UTI with bacteremia in setting of immunosuppression.   WBC slightly back up today. afebrile.   Cont ertapenem 14 days total, likely late next week.   PICC line placement done today / f/up CXR for confirmation   BlCx from 9/13 NGTD but 9/14 with 1/4 CNS likely contaminant. repeat sent on 9/16 NGTD BlCx UCx with E.coli. suspect likely UTI with bacteremia in setting of immunosuppression.   WBC is fluctuating   Cont ertapenem 14 days total, likely late next week ( Antibiotics started on 9/11 , was on [place] -->Ertap.   PICC line placement on 9/20   BlCx from 9/13 NGTD but 9/14 with 1/4 CNS likely contaminant. repeat sent on 9/16 NGTD

## 2019-09-23 NOTE — PROGRESS NOTE ADULT - SUBJECTIVE AND OBJECTIVE BOX
CC: F/U for Bacteremia    Saw/spoke to patient. No fevers, no chills. No new complaints. Unchanged.    Allergies  Keflex (Unknown)  penicillin (Rash)    ANTIMICROBIALS:  entecavir 0.5 every 72 hours  ertapenem  IVPB 1000 every 24 hours  trimethoprim  160 mG/sulfamethoxazole 800 mG 1 <User Schedule>    PE:    Vital Signs Last 24 Hrs  T(C): 36.9 (23 Sep 2019 11:37), Max: 37.1 (22 Sep 2019 21:23)  T(F): 98.4 (23 Sep 2019 11:37), Max: 98.7 (22 Sep 2019 21:23)  HR: 76 (23 Sep 2019 11:37) (73 - 90)  BP: 103/75 (23 Sep 2019 11:37) (103/75 - 136/84)  RR: 18 (23 Sep 2019 11:37) (18 - 18)  SpO2: 92% (23 Sep 2019 11:37) (92% - 95%)    Gen: AOx3, NAD, non-toxic, pleasant  CV: S1+S2 normal, nontachycardic  Resp: Clear bilat, no resp distress, no crackles/wheezes  Abd: Soft, nontender, +BS  Ext: No LE edema, no wounds    LABS:                        10.8   19.83 )-----------( 275      ( 23 Sep 2019 08:33 )             35.2     09-23    138  |  96  |  38<H>  ----------------------------<  134<H>  3.7   |  32<H>  |  1.14    Ca    8.4      23 Sep 2019 06:38  Mg     1.6     09-22    MICROBIOLOGY:    .Blood  09-16-19   No growth at 5 days.     .Blood  09-14-19   No growth at 5 days.  --  Blood Culture PCR    .Blood  09-11-19   Growth in aerobic and anaerobic bottles: Escherichia coli    .Urine  09-11-19   >100,000 CFU/ml Escherichia coli  --  Escherichia coli    (otherwise reviewed)    RADIOLOGY:    9/12 CXR:    IMPRESSION:    1.  Cardiomegaly.  2.  Likely pulmonary edema.

## 2019-09-23 NOTE — PROGRESS NOTE ADULT - SUBJECTIVE AND OBJECTIVE BOX
Milli Mckoy MD  Attending Physician (Hospitalist)  pager: [phone]    Patient is a 85y old  Female who presents with a chief complaint of Unwitnessed fall (22 Sep 2019 12:36)        SUBJECTIVE / OVERNIGHT EVENTS:  No acute issues overnight. afebrile.   tele afib . denies any complaints.     MEDICATIONS  (STANDING):  ALBUTerol/ipratropium for Nebulization 3 milliLiter(s) Nebulizer every 6 hours  apixaban 2.5 milliGRAM(s) Oral two times a day  atorvastatin 10 milliGRAM(s) Oral at bedtime  cloNIDine 0.1 milliGRAM(s) Oral two times a day  diltiazem    milliGRAM(s) Oral daily  docusate sodium 100 milliGRAM(s) Oral three times a day  entecavir 0.5 milliGRAM(s) Oral every 72 hours  ertapenem  IVPB 1000 milliGRAM(s) IV Intermittent every 24 hours  furosemide    Tablet 40 milliGRAM(s) Oral daily  guaiFENesin    Syrup 200 milliGRAM(s) Oral at bedtime  insulin lispro (HumaLOG) corrective regimen sliding scale   SubCutaneous three times a day before meals  insulin lispro (HumaLOG) corrective regimen sliding scale   SubCutaneous at bedtime  insulin lispro Injectable (HumaLOG) 8 Unit(s) SubCutaneous three times a day before meals  melatonin 3 milliGRAM(s) Oral at bedtime  metoprolol tartrate 100 milliGRAM(s) Oral two times a day  montelukast 10 milliGRAM(s) Oral daily  pantoprazole    Tablet 40 milliGRAM(s) Oral before breakfast  predniSONE   Tablet 60 milliGRAM(s) Oral every 24 hours  senna 2 Tablet(s) Oral at bedtime  sertraline 50 milliGRAM(s) Oral daily  trimethoprim  160 mG/sulfamethoxazole 800 mG 1 Tablet(s) Oral <User Schedule>    MEDICATIONS  (PRN):  acetaminophen   Tablet .. 650 milliGRAM(s) Oral every 6 hours PRN Mild Pain (1 - 3)  ALPRAZolam 0.5 milliGRAM(s) Oral three times a day PRN anxiety  guaiFENesin    Syrup 200 milliGRAM(s) Oral every 6 hours PRN Cough  polyethylene glycol 3350 17 Gram(s) Oral daily PRN Constiapation  zolpidem 5 milliGRAM(s) Oral at bedtime PRN Insomnia      Vital Signs Last 24 Hrs  T(C): 36.9 (23 Sep 2019 11:37), Max: 37.1 (22 Sep 2019 21:23)  T(F): 98.4 (23 Sep 2019 11:37), Max: 98.7 (22 Sep 2019 21:23)  HR: 76 (23 Sep 2019 11:37) (73 - 90)  BP: 103/75 (23 Sep 2019 11:37) (103/75 - 136/84)  BP(mean): --  RR: 18 (23 Sep 2019 11:37) (18 - 18)  SpO2: 92% (23 Sep 2019 11:37) (92% - 95%)  CAPILLARY BLOOD GLUCOSE      POCT Blood Glucose.: 228 mg/dL (23 Sep 2019 11:39)  POCT Blood Glucose.: 142 mg/dL (23 Sep 2019 08:02)  POCT Blood Glucose.: 170 mg/dL (22 Sep 2019 21:39)  POCT Blood Glucose.: 275 mg/dL (22 Sep 2019 16:49)    I&O's Summary    22 Sep 2019 07:01  -  23 Sep 2019 07:00  --------------------------------------------------------  IN: 240 mL / OUT: 450 mL / NET: -210 mL    23 Sep 2019 07:01  -  23 Sep 2019 15:24  --------------------------------------------------------  IN: 420 mL / OUT: 800 mL / NET: -380 mL          PHYSICAL EXAM  GENERAL: NAD, thin  HEAD:  Atraumatic, Normocephalic  EYES: EOMI, conjunctiva and sclera clear  NECK: Supple, No JVD  CHEST/LUNG: Clear to auscultation bilaterally; No wheeze  HEART: irregular, no murmur  ABDOMEN: Soft, Nontender, Nondistended; Bowel sounds present  EXTREMITIES:  2+ Peripheral Pulses, trace LE edema  PSYCH: AAOx3  SKIN: No rashes or lesions    LABS:                        10.8   19.83 )-----------( 275      ( 23 Sep 2019 08:33 )             35.2     09-23    138  |  96  |  38<H>  ----------------------------<  134<H>  3.7   |  32<H>  |  1.14    Ca    8.4      23 Sep 2019 06:38  Mg     1.6     09-22                  RADIOLOGY & ADDITIONAL TESTS:    Imaging Personally Reviewed:  Consultant(s) Notes Reviewed:    Care Discussed with Consultants/Other Providers:

## 2019-09-23 NOTE — PROGRESS NOTE ADULT - ASSESSMENT
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 Pt now stable on the floor.

## 2019-09-23 NOTE — PROGRESS NOTE ADULT - ASSESSMENT
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  Rituxan/Steroids  Leukocytosis, no fevers  UA positive, UCX E coli  CXR clear  BCX now with E coli; suspect 2/2 UTI  Note CONS in blood, likely contam, repeat BCX neg  Overall, leukocytosis, immunocompromised, AMS, shortness of breath  - Ertapenem 1g q 24 through 9/26/19  - F/U BCX  - Immunosuppressives per rheumatology--they plan to give new dose of Rituxan--from ID perspective can give after antibiotic course completed as long as no further signs lingering infection  - Trend WBC, monitor for signs sepsis  - Will sign off. Please call with further questions or change in status.    Mauri Kim MD  Pager [phone]  After 5pm and on weekends call [phone]

## 2019-09-23 NOTE — PROGRESS NOTE ADULT - PROBLEM SELECTOR PLAN 4
Cardizem CD increased to 180mg given RVR. will cont and monitor.  Known history of a fib, well controlled with atenolol and coumadin   Eliquis started / monitor for bleed   C/w metoprolol 100 bid

## 2019-09-23 NOTE — PROGRESS NOTE ADULT - PROBLEM SELECTOR PLAN 1
BlCx UCx with E.coli. suspect likely UTI with bacteremia in setting of immunosuppression.   Cont ertapenem 14 days total last day 9/26  Midline placement on 9/20

## 2019-09-23 NOTE — PROGRESS NOTE ADULT - PROBLEM SELECTOR PLAN 2
Pt s/p fall found to have left ilopsoas hematoma in setting of fall/coumadin on admission.   H/H remains stable.   Case was reported discussed with surg. no contraindication from surg standpoint to resume AC.  Start Eliquis 2.5 mg oral two times a day and if no bleed may increase to 5 mg oral two times a day

## 2019-09-24 NOTE — PROGRESS NOTE ADULT - ATTENDING COMMENTS
d/c planning to [facility].   Will have to coordinate with paolo re: Rituxan infusion (freq/duration) as outpt at rehab vs home.    aware.

## 2019-09-24 NOTE — PROGRESS NOTE ADULT - PROBLEM SELECTOR PLAN 2
Pt s/p fall found to have left ilopsoas hematoma in setting of fall/coumadin on admission.   H/H remains stable.   Case was reported discussed with surg. no contraindication from surg standpoint to resume AC.  Cont with Eliquis 2.5mg BID given age >80 and Wg<60 kg.

## 2019-09-24 NOTE — PROGRESS NOTE ADULT - PROBLEM SELECTOR PLAN 3
CW lasix to 40mg PO daily now.   monitor renal function closely. strict I+Os  Diuretics previous held due to overall dehydration.

## 2019-09-24 NOTE — PROGRESS NOTE ADULT - ASSESSMENT
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 Pt now stable on the floor.

## 2019-09-24 NOTE — PROGRESS NOTE ADULT - PROBLEM SELECTOR PLAN 4
Rate controlled. Cont on Cardizem, metoprolol  Known history of a fib, well controlled with atenolol and coumadin   Eliquis started / monitor for bleed

## 2019-09-24 NOTE — PROGRESS NOTE ADULT - SUBJECTIVE AND OBJECTIVE BOX
Milli Mckoy MD  Attending Physician (Hospitalist)  pager: [phone]    Patient is a 85y old  Female who presents with a chief complaint of Unwitnessed fall (23 Sep 2019 10:00)        SUBJECTIVE / OVERNIGHT EVENTS:  feeling well. no acute issues overnight. afebrile.   denies any complaints.     MEDICATIONS  (STANDING):  ALBUTerol/ipratropium for Nebulization 3 milliLiter(s) Nebulizer every 6 hours  apixaban 2.5 milliGRAM(s) Oral two times a day  atorvastatin 10 milliGRAM(s) Oral at bedtime  chlorhexidine 4% Liquid 1 Application(s) Topical every 24 hours  cloNIDine 0.1 milliGRAM(s) Oral two times a day  diltiazem    milliGRAM(s) Oral daily  docusate sodium 100 milliGRAM(s) Oral three times a day  entecavir 0.5 milliGRAM(s) Oral every 72 hours  ertapenem  IVPB 1000 milliGRAM(s) IV Intermittent every 24 hours  furosemide    Tablet 40 milliGRAM(s) Oral daily  guaiFENesin    Syrup 200 milliGRAM(s) Oral at bedtime  insulin lispro (HumaLOG) corrective regimen sliding scale   SubCutaneous three times a day before meals  insulin lispro (HumaLOG) corrective regimen sliding scale   SubCutaneous at bedtime  insulin lispro Injectable (HumaLOG) 8 Unit(s) SubCutaneous three times a day before meals  melatonin 3 milliGRAM(s) Oral at bedtime  metoprolol tartrate 100 milliGRAM(s) Oral two times a day  montelukast 10 milliGRAM(s) Oral daily  pantoprazole    Tablet 40 milliGRAM(s) Oral before breakfast  predniSONE   Tablet 60 milliGRAM(s) Oral every 24 hours  senna 2 Tablet(s) Oral at bedtime  sertraline 50 milliGRAM(s) Oral daily  trimethoprim  160 mG/sulfamethoxazole 800 mG 1 Tablet(s) Oral <User Schedule>    MEDICATIONS  (PRN):  acetaminophen   Tablet .. 650 milliGRAM(s) Oral every 6 hours PRN Mild Pain (1 - 3)  ALPRAZolam 0.5 milliGRAM(s) Oral three times a day PRN anxiety  guaiFENesin    Syrup 200 milliGRAM(s) Oral every 6 hours PRN Cough  polyethylene glycol 3350 17 Gram(s) Oral daily PRN Constiapation  zolpidem 5 milliGRAM(s) Oral at bedtime PRN Insomnia      Vital Signs Last 24 Hrs  T(C): 36.8 (24 Sep 2019 12:06), Max: 37.6 (23 Sep 2019 21:06)  T(F): 98.3 (24 Sep 2019 12:06), Max: 99.6 (23 Sep 2019 21:06)  HR: 82 (24 Sep 2019 12:06) (65 - 85)  BP: 98/62 (24 Sep 2019 12:06) (98/62 - 136/89)  BP(mean): --  RR: 18 (24 Sep 2019 12:06) (18 - 18)  SpO2: 96% (24 Sep 2019 12:06) (93% - 96%)  CAPILLARY BLOOD GLUCOSE      POCT Blood Glucose.: 163 mg/dL (24 Sep 2019 12:01)  POCT Blood Glucose.: 115 mg/dL (24 Sep 2019 08:07)  POCT Blood Glucose.: 133 mg/dL (23 Sep 2019 21:35)  POCT Blood Glucose.: 176 mg/dL (23 Sep 2019 16:41)    I&O's Summary    23 Sep 2019 07:01  -  24 Sep 2019 07:00  --------------------------------------------------------  IN: 660 mL / OUT: 1100 mL / NET: -440 mL          PHYSICAL EXAM  GENERAL: NAD, thin  HEAD:  Atraumatic, Normocephalic  EYES: EOMI, conjunctiva and sclera clear  NECK: Supple, No JVD  CHEST/LUNG: Clear to auscultation bilaterally; No wheeze  HEART: irregular, no murmur  ABDOMEN: Soft, Nontender, Nondistended; Bowel sounds present  EXTREMITIES:  2+ Peripheral Pulses, LE edema  PSYCH: AAOx3  SKIN: No rashes or lesions    LABS:                        10.6   16.0  )-----------( 271      ( 24 Sep 2019 06:09 )             36.0     09-24    136  |  93<L>  |  36<H>  ----------------------------<  93  3.8   |  31  |  1.11    Ca    8.4      24 Sep 2019 06:09                  RADIOLOGY & ADDITIONAL TESTS:    Imaging Personally Reviewed:  Consultant(s) Notes Reviewed:    Care Discussed with Consultants/Other Providers:

## 2019-09-25 NOTE — PROGRESS NOTE ADULT - SUBJECTIVE AND OBJECTIVE BOX
Milli Mckoy MD  Attending Physician (Hospitalist)  pager: [phone]    Patient is a 85y old  Female who presents with a chief complaint of Unwitnessed fall (24 Sep 2019 10:00)      SUBJECTIVE / OVERNIGHT EVENTS:  afebrile. no acute issues overnight. denies any specific complaints.  afib rate controlled on tele.     MEDICATIONS  (STANDING):  ALBUTerol/ipratropium for Nebulization 3 milliLiter(s) Nebulizer every 6 hours  apixaban 2.5 milliGRAM(s) Oral two times a day  atorvastatin 10 milliGRAM(s) Oral at bedtime  chlorhexidine 4% Liquid 1 Application(s) Topical every 24 hours  cloNIDine 0.1 milliGRAM(s) Oral two times a day  diltiazem    milliGRAM(s) Oral daily  docusate sodium 100 milliGRAM(s) Oral three times a day  entecavir 0.5 milliGRAM(s) Oral every 72 hours  ertapenem  IVPB 1000 milliGRAM(s) IV Intermittent every 24 hours  furosemide    Tablet 40 milliGRAM(s) Oral daily  guaiFENesin    Syrup 200 milliGRAM(s) Oral at bedtime  insulin lispro (HumaLOG) corrective regimen sliding scale   SubCutaneous three times a day before meals  insulin lispro (HumaLOG) corrective regimen sliding scale   SubCutaneous at bedtime  insulin lispro Injectable (HumaLOG) 8 Unit(s) SubCutaneous three times a day before meals  melatonin 3 milliGRAM(s) Oral at bedtime  metoprolol tartrate 100 milliGRAM(s) Oral two times a day  montelukast 10 milliGRAM(s) Oral daily  pantoprazole    Tablet 40 milliGRAM(s) Oral before breakfast  predniSONE   Tablet 60 milliGRAM(s) Oral every 24 hours  senna 2 Tablet(s) Oral at bedtime  sertraline 50 milliGRAM(s) Oral daily  trimethoprim  160 mG/sulfamethoxazole 800 mG 1 Tablet(s) Oral <User Schedule>    MEDICATIONS  (PRN):  acetaminophen   Tablet .. 650 milliGRAM(s) Oral every 6 hours PRN Mild Pain (1 - 3)  ALPRAZolam 0.5 milliGRAM(s) Oral three times a day PRN anxiety  guaiFENesin    Syrup 200 milliGRAM(s) Oral every 6 hours PRN Cough  polyethylene glycol 3350 17 Gram(s) Oral daily PRN Constiapation  zolpidem 5 milliGRAM(s) Oral at bedtime PRN Insomnia      Vital Signs Last 24 Hrs  T(C): 36.9 (25 Sep 2019 11:15), Max: 36.9 (25 Sep 2019 11:15)  T(F): 98.5 (25 Sep 2019 11:15), Max: 98.5 (25 Sep 2019 11:15)  HR: 80 (25 Sep 2019 11:15) (77 - 90)  BP: 102/68 (25 Sep 2019 11:15) (96/69 - 115/85)  BP(mean): --  RR: 17 (25 Sep 2019 11:15) (17 - 18)  SpO2: 94% (25 Sep 2019 11:15) (94% - 97%)  CAPILLARY BLOOD GLUCOSE      POCT Blood Glucose.: 202 mg/dL (25 Sep 2019 11:35)  POCT Blood Glucose.: 468 mg/dL (25 Sep 2019 08:41)  POCT Blood Glucose.: 131 mg/dL (24 Sep 2019 21:09)  POCT Blood Glucose.: 135 mg/dL (24 Sep 2019 16:59)    I&O's Summary    24 Sep 2019 07:01  -  25 Sep 2019 07:00  --------------------------------------------------------  IN: 240 mL / OUT: 250 mL / NET: -10 mL    25 Sep 2019 07:01  -  25 Sep 2019 14:44  --------------------------------------------------------  IN: 540 mL / OUT: 900 mL / NET: -360 mL          PHYSICAL EXAM  GENERAL: NAD, thin  HEAD:  Atraumatic, Normocephalic  EYES: EOMI, conjunctiva and sclera clear  NECK: Supple, No JVD  CHEST/LUNG: Clear to auscultation bilaterally; No wheeze  HEART: irregular, no murmur  ABDOMEN: Soft, Nontender, Nondistended; Bowel sounds present  EXTREMITIES:  2+ Peripheral Pulses, LE edema  PSYCH: AAOx3  SKIN: No rashes or lesions      LABS:                        10.6   16.0  )-----------( 271      ( 24 Sep 2019 06:09 )             36.0     09-25    135  |  92<L>  |  40<H>  ----------------------------<  128<H>  3.8   |  31  |  1.15    Ca    8.1<L>      25 Sep 2019 05:57                  RADIOLOGY & ADDITIONAL TESTS:    Imaging Personally Reviewed:  Consultant(s) Notes Reviewed:    Care Discussed with Consultants/Other Providers:

## 2019-09-26 NOTE — PROGRESS NOTE ADULT - SUBJECTIVE AND OBJECTIVE BOX
Milli Mckoy MD  Attending Physician (Hospitalist)  pager: [phone]    Patient is a 85y old  Female who presents with a chief complaint of Unwitnessed fall (26 Sep 2019 09:46)        SUBJECTIVE / OVERNIGHT EVENTS:  feeling well. denies any complaints. afebrile.   no acute issues overnight.     MEDICATIONS  (STANDING):  ALBUTerol/ipratropium for Nebulization 3 milliLiter(s) Nebulizer every 6 hours  apixaban 2.5 milliGRAM(s) Oral two times a day  atorvastatin 10 milliGRAM(s) Oral at bedtime  chlorhexidine 4% Liquid 1 Application(s) Topical every 24 hours  cloNIDine 0.1 milliGRAM(s) Oral two times a day  diltiazem    milliGRAM(s) Oral daily  docusate sodium 100 milliGRAM(s) Oral three times a day  entecavir 0.5 milliGRAM(s) Oral every 72 hours  ertapenem  IVPB 1000 milliGRAM(s) IV Intermittent every 24 hours  furosemide    Tablet 40 milliGRAM(s) Oral daily  guaiFENesin    Syrup 200 milliGRAM(s) Oral at bedtime  insulin lispro (HumaLOG) corrective regimen sliding scale   SubCutaneous three times a day before meals  insulin lispro (HumaLOG) corrective regimen sliding scale   SubCutaneous at bedtime  insulin lispro Injectable (HumaLOG) 8 Unit(s) SubCutaneous three times a day before meals  melatonin 3 milliGRAM(s) Oral at bedtime  metoprolol tartrate 100 milliGRAM(s) Oral two times a day  montelukast 10 milliGRAM(s) Oral daily  pantoprazole    Tablet 40 milliGRAM(s) Oral before breakfast  predniSONE   Tablet 60 milliGRAM(s) Oral every 24 hours  senna 2 Tablet(s) Oral at bedtime  sertraline 50 milliGRAM(s) Oral daily  trimethoprim  160 mG/sulfamethoxazole 800 mG 1 Tablet(s) Oral <User Schedule>    MEDICATIONS  (PRN):  acetaminophen   Tablet .. 650 milliGRAM(s) Oral every 6 hours PRN Mild Pain (1 - 3)  ALPRAZolam 0.5 milliGRAM(s) Oral three times a day PRN anxiety  guaiFENesin    Syrup 200 milliGRAM(s) Oral every 6 hours PRN Cough  polyethylene glycol 3350 17 Gram(s) Oral daily PRN Constiapation  zolpidem 5 milliGRAM(s) Oral at bedtime PRN Insomnia      Vital Signs Last 24 Hrs  T(C): 36.5 (26 Sep 2019 11:25), Max: 36.7 (26 Sep 2019 04:02)  T(F): 97.7 (26 Sep 2019 11:25), Max: 98 (26 Sep 2019 04:02)  HR: 82 (26 Sep 2019 11:25) (76 - 84)  BP: 95/65 (26 Sep 2019 11:25) (95/65 - 133/87)  BP(mean): --  RR: 17 (26 Sep 2019 11:25) (17 - 18)  SpO2: 97% (26 Sep 2019 11:25) (97% - 99%)  CAPILLARY BLOOD GLUCOSE      POCT Blood Glucose.: 219 mg/dL (26 Sep 2019 12:01)  POCT Blood Glucose.: 123 mg/dL (26 Sep 2019 07:29)  POCT Blood Glucose.: 162 mg/dL (25 Sep 2019 22:11)  POCT Blood Glucose.: 127 mg/dL (25 Sep 2019 16:42)    I&O's Summary    25 Sep 2019 07:01  -  26 Sep 2019 07:00  --------------------------------------------------------  IN: 660 mL / OUT: 1150 mL / NET: -490 mL    26 Sep 2019 07:01  -  26 Sep 2019 14:58  --------------------------------------------------------  IN: 590 mL / OUT: 800 mL / NET: -210 mL          PHYSICAL EXAM  GENERAL: NAD, thin  HEAD:  Atraumatic, Normocephalic  EYES: EOMI, conjunctiva and sclera clear  NECK: Supple, No JVD  CHEST/LUNG: Clear to auscultation bilaterally; No wheeze  HEART: irregular, no murmur  ABDOMEN: Soft, Nontender, Nondistended; Bowel sounds present  EXTREMITIES:  2+ Peripheral Pulses, LE edema  PSYCH: AAOx3  SKIN: No rashes or lesions    LABS:    09-25    135  |  92<L>  |  40<H>  ----------------------------<  128<H>  3.8   |  31  |  1.15    Ca    8.1<L>      25 Sep 2019 05:57                  RADIOLOGY & ADDITIONAL TESTS:    Imaging Personally Reviewed:  Consultant(s) Notes Reviewed:    Care Discussed with Consultants/Other Providers:

## 2019-09-26 NOTE — PROGRESS NOTE ADULT - ASSESSMENT
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    Nephrology consulted given KANIKA - on admission SCr 1.74 (prior SCr on d/c 08/2019 was 1.28) likely 2/2 hemodynamically meditated and anemia (hgb 8.3, baseline 9.0-9.5).  SCr improved w/ IVF and PRBC. Hospital course complicated by GNR bacteremia, possible source infected RP hematoma. Due for next dose of rituximab, currently on hold in setting of active infection.

## 2019-09-26 NOTE — PROGRESS NOTE ADULT - SUBJECTIVE AND OBJECTIVE BOX
[facility] DIVISION OF KIDNEY DISEASES AND HYPERTENSION -- FOLLOW UP NOTE  --------------------------------------------------------------------------------  Chief Complaint:  Fall    24 hour events/subjective:  Examined at bed side, awake, NAD, feeling better.       PAST HISTORY  --------------------------------------------------------------------------------  No significant changes to PMH, PSH, FHx, SHx, unless otherwise noted    ALLERGIES & MEDICATIONS  --------------------------------------------------------------------------------  Allergies    Keflex (Unknown)  penicillin (Rash)    Intolerances      Standing Inpatient Medications  ALBUTerol/ipratropium for Nebulization 3 milliLiter(s) Nebulizer every 6 hours  apixaban 2.5 milliGRAM(s) Oral two times a day  atorvastatin 10 milliGRAM(s) Oral at bedtime  chlorhexidine 4% Liquid 1 Application(s) Topical every 24 hours  cloNIDine 0.1 milliGRAM(s) Oral two times a day  diltiazem    milliGRAM(s) Oral daily  docusate sodium 100 milliGRAM(s) Oral three times a day  entecavir 0.5 milliGRAM(s) Oral every 72 hours  ertapenem  IVPB 1000 milliGRAM(s) IV Intermittent every 24 hours  furosemide    Tablet 40 milliGRAM(s) Oral daily  guaiFENesin    Syrup 200 milliGRAM(s) Oral at bedtime  insulin lispro (HumaLOG) corrective regimen sliding scale   SubCutaneous three times a day before meals  insulin lispro (HumaLOG) corrective regimen sliding scale   SubCutaneous at bedtime  insulin lispro Injectable (HumaLOG) 8 Unit(s) SubCutaneous three times a day before meals  melatonin 3 milliGRAM(s) Oral at bedtime  metoprolol tartrate 100 milliGRAM(s) Oral two times a day  montelukast 10 milliGRAM(s) Oral daily  pantoprazole    Tablet 40 milliGRAM(s) Oral before breakfast  predniSONE   Tablet 60 milliGRAM(s) Oral every 24 hours  senna 2 Tablet(s) Oral at bedtime  sertraline 50 milliGRAM(s) Oral daily  trimethoprim  160 mG/sulfamethoxazole 800 mG 1 Tablet(s) Oral <User Schedule>    PRN Inpatient Medications  acetaminophen   Tablet .. 650 milliGRAM(s) Oral every 6 hours PRN  ALPRAZolam 0.5 milliGRAM(s) Oral three times a day PRN  guaiFENesin    Syrup 200 milliGRAM(s) Oral every 6 hours PRN  polyethylene glycol 3350 17 Gram(s) Oral daily PRN  zolpidem 5 milliGRAM(s) Oral at bedtime PRN      REVIEW OF SYSTEMS  --------------------------------------------------------------------------------  Gen: No weight changes,+ fatigue, fevers/chills,+ weakness  Respiratory: No dyspnea, cough, wheezing, hemoptysis  CV: No chest pain, PND, orthopnea  GI: No abdominal pain, diarrhea, constipation, nausea, vomiting, melena, hematochezia  MSK: ; no edema  Neuro: No dizziness/lightheadedness,  All other systems were reviewed and are negative, except as noted.      VITALS/PHYSICAL EXAM  --------------------------------------------------------------------------------  T(C): 36.7 (09-26-19 @ 04:02), Max: 36.9 (09-25-19 @ 11:15)  HR: 77 (09-26-19 @ 04:02) (76 - 84)  BP: 133/87 (09-26-19 @ 04:02) (102/68 - 133/87)  RR: 18 (09-26-19 @ 04:02) (17 - 18)  SpO2: 98% (09-26-19 @ 04:02) (94% - 99%)  Wt(kg): --        09-25-19 @ 07:01  -  09-26-19 @ 07:00  --------------------------------------------------------  IN: 660 mL / OUT: 1150 mL / NET: -490 mL      Physical Exam:  Constitutional: Nad  HEENT:  oropharynx clear, MMM  Neck: No JVD  Respiratory: CTAB, no wheezes, rales or rhonchi  Cardiovascular: S1, S2, RRR  Gastrointestinal: BS+, soft, NT/ND  Extremities: +edema.   Neurological: A/O x 2, no focal deficits  : No CVA tenderness. No garza.     LABS/STUDIES  --------------------------------------------------------------------------------    135  |  92  |  40  ----------------------------<  128      [09-25-19 @ 05:57]  3.8   |  31  |  1.15        Ca     8.1     [09-25-19 @ 05:57]    Creatinine Trend:  SCr 1.15 [09-25 @ 05:57]  SCr 1.11 [09-24 @ 06:09]  SCr 1.14 [09-23 @ 06:38]  SCr 1.31 [09-22 @ 07:03]  SCr 1.31 [09-20 @ 06:17]

## 2019-09-26 NOTE — PROGRESS NOTE ADULT - ATTENDING COMMENTS
d/w Dr. Diaz rheum outpt.   Would prefer to monitor for few days after completion of abx prior to resuming Rituxan.   PMR consult called for placement to Acute rehab to be able to receive Rituxan.  D/w family at length

## 2019-09-26 NOTE — PROGRESS NOTE ADULT - ASSESSMENT
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  Repeat Of note, patient recently admitted [facility] for shortness of breath in the setting of hypoxic respiratory failure likely 2/2 pulmonary renal syndrome of rheumatologic etiology of vasculitis vs immune complex disease - serology was low C3/4, + p-ANCA, elevated ESR/CRP, anti- dsDNA), pauci-immune possibly 2/2 chronic hydralazine s/p Rituxan 8/23/19) was discharge on 8/24/19 on prednisone 60 mg po. Pt now stable on the floor.

## 2019-09-26 NOTE — PROGRESS NOTE ADULT - PROBLEM SELECTOR PLAN 1
KANIKA likely hemodynamically meditated in setting anemia , RP bleed w/ contributing factor diuretic lasix  .  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    PLAN:   - scr improved significnatly, today last dose of abx. Rituximab as per rheumatology  - c/w hemodynamic support, PRBC maintain Hgb>7  - avoid nephrotoxic agents (ACEI/ARB, NSAIDS), renally dose medications  - trend SCr and UOP.  - Can change entecavir regular dose.   - PLEASE AVOID HYDRALAZINE THOUGHT TO BE THE CAUSE OF ANCA VASCULITIS.

## 2019-09-26 NOTE — PROGRESS NOTE ADULT - ATTENDING COMMENTS
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  ARF--improved, non oliguric, no HD.  Unclear if will need additional immunosuppression.  Trend

## 2019-09-26 NOTE — PROGRESS NOTE ADULT - PROBLEM SELECTOR PLAN 1
ertapenem 14 days total last day 9/26 today. monitor off after today  BlCx UCx with E.coli.   suspect likely UTI with bacteremia in setting of immunosuppression.   Midline placement on 9/20

## 2019-09-27 NOTE — DISCHARGE NOTE PROVIDER - CARE PROVIDER_API CALL
Lee Mandujano)  Clinical Informatics; Critical Care Medicine; Internal Medicine; Nephrology  [address] [place] [address]  Phone: [phone]  Fax: [phone]  Follow Up Time:     Luciana Diaz)  Internal Medicine; Rheumatology  [address] [place] [address]  Phone: [phone]  Fax: [phone]  Follow Up Time: Lee Mandujano)  Clinical Informatics; Critical Care Medicine; Internal Medicine; Nephrology  [address] [place] [address]  Phone: [phone]  Fax: [phone]  Follow Up Time:     Luciana Diaz)  Internal Medicine; Rheumatology  [address] [place] [address]  Phone: [phone]  Fax: [phone]  Follow Up Time:     Jeana Yañez)  Internal Medicine; Medical Oncology  [address] [facility], [place] [address]  Phone: [phone]  Fax: [phone]  Follow Up Time:     Etienne Dobbins)  [facility] Cardiology  [address] [place] [address]  Phone: [phone]  Fax: [phone]  Follow Up Time:     Jonathan Salazar)  Medicine  [address]  [place] [address]  Phone: [phone]  Fax: [phone]  Follow Up Time: Lee Mandujano)  Clinical Informatics; Critical Care Medicine; Internal Medicine; Nephrology  [address] [place] [address]  Phone: [phone]  Fax: [phone]  Follow Up Time: 1-3 days    Luciana Diaz)  Internal Medicine; Rheumatology  [address] [place] [address]  Phone: [phone]  Fax: [phone]  Follow Up Time: 1-3 days    Jeana Yañez)  Internal Medicine; Medical Oncology  [address] [facility], [place] [address]  Phone: [phone]  Fax: [phone]  Follow Up Time: 1-3 days    Etienne Dobbins)  [facility] Cardiology  [address] [facility], [address]  [place] [address]  Phone: [phone]  Fax: [phone]  Follow Up Time: 1-3 days    Jonathan Salazar)  Medicine  [address], [facility] [address]  [place] [address]  Phone: [phone]  Fax: [phone]  Follow Up Time: 1-3 days

## 2019-09-27 NOTE — PROGRESS NOTE ADULT - PROBLEM SELECTOR PROBLEM 5
KANIKA (acute kidney injury)
Retroperitoneal bleeding
KANIKA (acute kidney injury)
Hyperglycemia
KANIKA (acute kidney injury)

## 2019-09-27 NOTE — DISCHARGE NOTE NURSING/CASE MANAGEMENT/SOCIAL WORK - PATIENT PORTAL LINK FT
You can access the FollowMyHealth Patient Portal offered by [facility] by registering at the following website: [email or web address]. By joining [facility]’s FollowMyHealth portal, you will also be able to view your health information using other applications (apps) compatible with our system.

## 2019-09-27 NOTE — PROGRESS NOTE ADULT - PROBLEM SELECTOR PROBLEM 3
Acute on chronic diastolic congestive heart failure
Edema
Edema
Acute on chronic diastolic congestive heart failure
Afib
Afib
Edema
Leukocytosis
Acute on chronic diastolic congestive heart failure
Afib
Acute on chronic diastolic congestive heart failure
Acute on chronic diastolic congestive heart failure

## 2019-09-27 NOTE — PROGRESS NOTE ADULT - PROBLEM SELECTOR PLAN 5
Renal improving slowly now back on diuretic. strict I+Os  c/w prednisone 60 mg po daily for Crescentic GN  avoid nephrotoxic agents (ACEI/ARB, NSAIDS), renally dose medications  trend SCr.  Renal following.
Pt s/p fall found to have left ilopsoas hematoma   H/H remains stable. Sx follow up.  Will cont to hold coumadin for now until stable. (?DOAC upon discharge)
Renal improving slowly now back on diuretic. strict I+Os  c/w prednisone 60 mg po daily for Crescentic GN  avoid nephrotoxic agents (ACEI/ARB, NSAIDS), renally dose medications  trend SCr.  Renal following.
Renal function much improved.   c/w prednisone 60 mg po daily for Crescentic GN, Rituxan on hold  avoid nephrotoxic agents (ACEI/ARB, NSAIDS), renally dose medications  trend SCr.  Renal following.
Renal function much improved.   c/w prednisone 60 mg po daily for Crescentic GN, Rituxan on hold  avoid nephrotoxic agents (ACEI/ARB, NSAIDS), renally dose medications  trend SCr.  Renal following.
Renal function not back to baseline.   s/p 1 dose of lasix yesterday. will hold off additional. renal following closely.  c/w prednisone 60 mg po daily for Crescentic GN  avoid nephrotoxic agents (ACEI/ARB, NSAIDS), renally dose medications  trend SCr.  possible resume diuretics soon.
Renal function not back to baseline.   s/p 1 dose of lasix yesterday. will hold off additional. renal following closely.  c/w prednisone 60 mg po daily for Crescentic GN  avoid nephrotoxic agents (ACEI/ARB, NSAIDS), renally dose medications  trend SCr.  possible resume diuretics soon.
Renal improving slowly now back on diuretic. strict I+Os  c/w prednisone 60 mg po daily for Crescentic GN  avoid nephrotoxic agents (ACEI/ARB, NSAIDS), renally dose medications  trend SCr.  Renal following.
steroid induced hyperglycemia   C/w lantus 6u   SS  monitor FS.
Renal function much improved.   c/w prednisone 60 mg po daily for Crescentic GN, Rituxan on hold  avoid nephrotoxic agents (ACEI/ARB, NSAIDS), renally dose medications  trend SCr.  Renal following.
Renal improving slowly now back on diuretic. strict I+Os  c/w prednisone 60 mg po daily for Crescentic GN  avoid nephrotoxic agents (ACEI/ARB, NSAIDS), renally dose medications  trend SCr.  Renal following.
Renal improving slowly now back on diuretic. strict I+Os  c/w prednisone 60 mg po daily for Crescentic GN  avoid nephrotoxic agents (ACEI/ARB, NSAIDS), renally dose medications  trend SCr.  Renal following.
Renal function not back to baseline.   c/w prednisone 60 mg po daily for Crescentic GN  avoid nephrotoxic agents (ACEI/ARB, NSAIDS), renally dose medications  trend SCr.  possible resume diuretics soon.
Renal improving slowly now back on diuretic. strict I+Os  c/w prednisone 60 mg po daily for Crescentic GN  avoid nephrotoxic agents (ACEI/ARB, NSAIDS), renally dose medications  trend SCr.  Renal following.
Renal function much improved.   c/w prednisone 60 mg po daily for Crescentic GN, Rituxan on hold  avoid nephrotoxic agents (ACEI/ARB, NSAIDS), renally dose medications  trend SCr.  Renal following.

## 2019-09-27 NOTE — PROGRESS NOTE ADULT - PROBLEM SELECTOR PROBLEM 7
Hypertension
Glomerulonephritis
Hypertension

## 2019-09-27 NOTE — PROGRESS NOTE ADULT - PROBLEM SELECTOR PROBLEM 8
Hepatitis B

## 2019-09-27 NOTE — DISCHARGE NOTE PROVIDER - HOSPITAL COURSE
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  Repeat Of note, patient recently admitted [facility] for shortness of breath in the setting of hypoxic respiratory failure likely 2/2 pulmonary renal syndrome of rheumatologic etiology of vasculitis vs immune complex disease - serology was low C3/4, + p-ANCA, elevated ESR/CRP, anti- dsDNA), pauci-immune possibly 2/2 chronic hydralazine s/p Rituxan 8/23/19) was discharge on 8/24/19 on prednisone 60 mg po. Pt now stable on the floor.    - For Sepsis due to Escherichia coli. Treated with ertapenem 14 days total last day 9/26.    Blood and urine cultures  with E.coli.     suspect likely UTI with bacteremia in setting of immunosuppression.     Midline placement on 9/20.     - for Retroperitoneal bleeding; Pt s/p fall found to have left ilopsoas hematoma in setting of fall/coumadin on admission.     H/H remains stable. Case was reported discussed with surg. no contraindication from surg standpoint to resume AC.    Cont with Eliquis 2.5mg BID given age >80 and Wg<60 kg.     -For acute on chronic diastolic congestive heart failure; Continue lasix to 40mg PO daily now. Monitor renal function closely. strict I+Os    Diuretics previous held due to overall dehydration.     -For Afib; Rate controlled. Cont on Cardizem, metoprolol    Known history of a fib, well controlled with atenolol and coumadin     Eliquis started / monitor for bleed.     -For KANIKA (acute kidney injury); Renal function much improved. Continue prednisone 60 mg po daily for Crescentic GN, Rituxan on hold    avoid nephrotoxic agents (ACEI/ARB, NSAIDS), renally dose medications; trend SCr. Renal following.     -For Vasculitis; Recent hospitalization for pulm-renal syndrome with ANCA vasculitis possibly related to hydralazine.     Will cont with prednisone 60mg daily as per rheum/renal.     -For Hypertension.  Plan: avoid hydralazine due to concern for SLE-like syndrome; c/w clonidine     Cw cardizem CD 180mg qd and lopressor 100mg q12.     - For  hepatitis B; continue entecavir    -For Chronic obstructive pulmonary disease, unspecified COPD type.  Plan: duoneb q6hrs standing.     on prednisone 60mg daily for vasculitis.     -For Anxiety. Plan; CW xanax to 0.5mg TID PRN.        Per rheumatology, monitor for few days after completion of abx prior to resuming Rituxan.         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 Pt now stable on the floor.    - For Sepsis due to Escherichia coli. Treated with ertapenem 14 days total last day 9/26.Blood and urine cultures  with E.coli, suspect likely UTI with bacteremia in setting of immunosuppression.     Midline placement on 9/20.     - for Retroperitoneal bleeding; Pt s/p fall found to have left ilopsoas hematoma in setting of fall/coumadin on admission.     H/H remains stable. Case was reported discussed with surg. no contraindication from surg standpoint to resume AC.    Cont with Eliquis 2.5mg BID given age >80 and Wg<60 kg.     -For acute on chronic diastolic congestive heart failure; Continue lasix to 40mg PO daily now. Monitor renal function closely. strict I+Os    Diuretics previous held due to overall dehydration.     -For Afib; Rate controlled. Cont on Cardizem, metoprolol    Known history of a fib, well controlled with atenolol and coumadin     Eliquis started / monitor for bleed.     -For KANIKA (acute kidney injury); Renal function much improved. Continue prednisone 60 mg po daily for Crescentic GN, Rituxan on hold    avoid nephrotoxic agents (ACEI/ARB, NSAIDS), renally dose medications; trend SCr. Renal following.     -For Vasculitis; Recent hospitalization for pulm-renal syndrome with ANCA vasculitis possibly related to hydralazine.     Will cont with prednisone 60mg daily as per rheum/renal.     -For Hypertension.  Plan: avoid hydralazine due to concern for SLE-like syndrome; c/w clonidine     Cw cardizem CD 180mg qd and lopressor 100mg q12.     - For  hepatitis B; continue entecavir    -For Chronic obstructive pulmonary disease, unspecified COPD type.  Plan: duoneb q6hrs standing.     on prednisone 60mg daily for vasculitis.     -For Anxiety. Plan; CW xanax to 0.5mg TID PRN.        Per rheumatology, monitor for few days after completion of abx prior to resuming Rituxan.         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  Repeat Of note, patient recently admitted [facility] for shortness of breath in the setting of hypoxic respiratory failure likely 2/2 pulmonary renal syndrome of rheumatologic etiology of vasculitis vs immune complex disease - serology was low C3/4, + p-ANCA, elevated ESR/CRP, anti- dsDNA), pauci-immune possibly 2/2 chronic hydralazine s/p Rituxan 8/23/19) was discharge on 8/24/19 on prednisone 60 mg po. Pt now stable on the floor.    - For Sepsis due to Escherichia coli. Treated with ertapenem 14 days total last day 9/26.Blood and urine cultures  with E.coli, suspect likely UTI with bacteremia in setting of immunosuppression.     Midline placement on 9/20.     - for Retroperitoneal bleeding; Pt s/p fall found to have left ilopsoas hematoma in setting of fall/coumadin on admission.     H/H remains stable. Case was reported discussed with surg. no contraindication from surg standpoint to resume AC.    Cont with Eliquis 2.5mg BID given age >80 and Wg<60 kg.     -For acute on chronic diastolic congestive heart failure; Continue lasix to 40mg PO daily now. Monitor renal function closely. strict I+Os    Diuretics previous held due to overall dehydration.     -For Afib; Rate controlled. Cont on Cardizem, metoprolol    Known history of a fib, well controlled with atenolol and coumadin     Eliquis started / monitor for bleed.     -For KANIKA (acute kidney injury); Renal function much improved. Continue prednisone 60 mg po daily for Crescentic GN, Rituxan on hold    avoid nephrotoxic agents (ACEI/ARB, NSAIDS), renally dose medications; trend SCr. Renal following.     -For Vasculitis; Recent hospitalization for pulm-renal syndrome with ANCA vasculitis possibly related to hydralazine.     Will cont with prednisone 60mg daily as per rheum/renal.     -For Hypertension.  Plan: avoid hydralazine due to concern for SLE-like syndrome; c/w clonidine     Cw cardizem CD 180mg qd and lopressor 100mg q12.     - For  hepatitis B; continue entecavir    -For Chronic obstructive pulmonary disease, unspecified COPD type.  Plan: duoneb q6hrs standing.     on prednisone 60mg daily for vasculitis.     -For Anxiety. Plan; CW xanax to 0.5mg TID PRN.        Per rheumatology, monitor for few days after completion of abx prior to resuming Rituxan.         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  Repeat Of note, patient recently admitted [facility] for shortness of breath in the setting of hypoxic respiratory failure likely 2/2 pulmonary renal syndrome of rheumatologic etiology of vasculitis vs immune complex disease - serology was low C3/4, + p-ANCA, elevated ESR/CRP, anti- dsDNA), pauci-immune possibly 2/2 chronic hydralazine s/p Rituxan 8/23/19) was discharge on 8/24/19 on prednisone 60 mg po. Pt now stable on the floor.  For Sepsis due to Escherichia coli. Treated with ertapenem 14 days total last day 9/26.Blood and urine cultures  with E.coli, suspect likely UTI with bacteremia in setting of immunosuppression.     - for Retroperitoneal bleeding; Pt s/p fall found to have left ilopsoas hematoma in setting of fall/coumadin on admission. H/H remains stable. Case was reported discussed with surg. no contraindication from surg standpoint to resume AC. Cont with Eliquis 2.5mg BID. For acute on chronic diastolic congestive heart failure; Continue lasix to 40mg PO daily . For Afib; Rate controlled. Cont on Cardizem, metoprolol    -For KANIKA (acute kidney injury); Renal function much improved. Continue prednisone 60 mg po daily for Crescentic GN, Rituxan on hold    -For Vasculitis;  Will cont with prednisone 60mg daily as per rheum/renal. Plan for oupt follow up for next Rituxan dose.     -For Hypertension.  Plan: avoid hydralazine due to concern for SLE-like syndrome; c/w clonidine Cw cardizem CD 180mg qd and lopressor 100mg q12.     - For  hepatitis B; continue entecavir    -For Chronic obstructive pulmonary disease, unspecified COPD type.  Plan: duoneb q6hrs standing.     on prednisone 60mg daily for vasculitis.     -For Anxiety. Plan; CW xanax to 0.5mg TID PRN.        Per rheumatology, monitor for few days after completion of abx prior to resuming Rituxan.         Patient stable for discharge home with services on 9/27/19

## 2019-09-27 NOTE — CHART NOTE - NSCHARTNOTEFT_GEN_A_CORE
[facility] Medicine Event Note    Patient medically cleared for discharge today home per Dr. Mckoy.  Reviewed medications for discharge with attending.  R arm Midline removed with no complications, minimal bleeding. Pressure applied to area and dressing applied.  Site was monitored after removal: No active bleeding, hematoma, ecchymosis noted.  Discussed follow up instructions and medications with patient and daughter.  Discharged with home services and home PT    Gissell Figueroa PA-C  Dept of Medicine  #[phone]

## 2019-09-27 NOTE — DISCHARGE NOTE PROVIDER - PROVIDER TOKENS
PROVIDER:[TOKEN:[[id number]]],PROVIDER:[TOKEN:[[id number]]] PROVIDER:[TOKEN:[[id number]]],PROVIDER:[TOKEN:[[id number]]],PROVIDER:[TOKEN:[[id number]]],PROVIDER:[TOKEN:[[id number]]],PROVIDER:[TOKEN:[[id number]]] PROVIDER:[TOKEN:[[id number]],FOLLOWUP:[1-3 days]],PROVIDER:[TOKEN:[[id number]],FOLLOWUP:[1-3 days]],PROVIDER:[TOKEN:[[id number]],FOLLOWUP:[1-3 days]],PROVIDER:[TOKEN:[[id number]],FOLLOWUP:[1-3 days]],PROVIDER:[TOKEN:[[id number]],FOLLOWUP:[1-3 days]]

## 2019-09-27 NOTE — DISCHARGE NOTE PROVIDER - NSDCFUSCHEDAPPT_GEN_ALL_CORE_FT
TONIA YOUNG ; 09/30/2019 ; NPP Med Pulm 410 [facility]  TONIA YOUNG ; 10/10/2019 ; NPP Cardio 70 [facility]  TONIA YOUNG ; 10/16/2019 ; NPP FamilyMed 480 [facility]  TONIA YOUNG ; 11/14/2019 ; NPP HemOnc [address] TONIA YOUNG ; 09/30/2019 ; NPP Med Pulm 410 [facility]  TONIA YOUNG ; 10/10/2019 ; NPP Cardio 70 [facility]  TONIA YOUNG ; 10/16/2019 ; NPP FamilyMed 480 [facility]  TONIA YOUNG ; 11/14/2019 ; NPP HemOnc [address] TONIA YOUNG ; 09/30/2019 ; NPP Med Pulm 410 [facility]  TONIA YOUNG ; 10/10/2019 ; NPP Cardio 70 [facility]  TONIA YOUNG ; 10/16/2019 ; NPP FamilyMed 480 [facility]  TONIA YOUNG ; 11/14/2019 ; NPP HemOnc [address] TONIA YOUNG ; 09/30/2019 ; NPP Med Pulm 410 [facility]  TONIA YOUNG ; 10/10/2019 ; NPP Cardio 70 [facility]  TONIA YOUNG ; 10/16/2019 ; NPP FamilyMed 480 [facility]  TONIA YOUNG ; 11/14/2019 ; NPP HemOnc [address] TONIA YOUNG ; 09/30/2019 ; NPP Med Pulm 410 [facility]  TONIA YOUNG ; 10/10/2019 ; NPP Cardio 70 [facility]  TONIA YOUNG ; 10/16/2019 ; NPP FamilyMed 480 [facility]  TONIA YOUNG ; 11/14/2019 ; NPP HemOnc [address] [facility] ; 09/30/2019 ; NPP Med Pulm 410 [facility] ; 10/01/2019 ; NPP Rheum 480 [facility] ; 10/10/2019 ; NPP Cardio 70 [facility] ; 10/16/2019 ; NPP FamilyMed 480 [facility] ; 11/14/2019 ; NPP HemOnc 755 [facility] ; 09/30/2019 ; NPP Med Pulm 410 [facility] ; 10/01/2019 ; NPP Rheum 480 [facility] ; 10/10/2019 ; NPP Cardio 70 [facility] ; 10/16/2019 ; NPP FamilyMed 480 [facility] ; 11/14/2019 ; NPP HemOnc 755 [facility] ; 09/30/2019 ; NPP Med Pulm 410 [facility] ; 10/01/2019 ; NPP Rheum 480 [facility] ; 10/10/2019 ; NPP Cardio 70 [facility] ; 10/16/2019 ; NPP FamilyMed 480 [facility] ; 11/14/2019 ; NPP HemOnc 755 [facility] ; 09/30/2019 ; NPP Med Pulm 410 [facility] ; 10/01/2019 ; NPP Rheum 480 [facility] ; 10/10/2019 ; NPP Cardio 70 [facility] ; 10/16/2019 ; NPP FamilyMed 480 [facility] ; 11/14/2019 ; NPP HemOnc 755 [facility] TONIA YOUNG ; 10/16/2019 ; NPP FamilyMed [address] [place] TONIA Katz ; 10/21/2019 ; NPP Med Nephro [address] TONIA Sanders ; 11/14/2019 ; NPP HemOnc [address] TONIA Katz ; 12/16/2019 ; NPP Med Pulm 410 [facility]

## 2019-09-27 NOTE — DISCHARGE NOTE PROVIDER - NSDCCPCAREPLAN_GEN_ALL_CORE_FT
PRINCIPAL DISCHARGE DIAGNOSIS  Diagnosis: Sepsis due to Escherichia coli (E. coli)  Assessment and Plan of Treatment: ertapenem 14 days total last day 9/26 today. monitor off after today  Blood and urine cultures with E.coli.   suspect likely UTI with bacteremia in setting of immunosuppression.   Midline was placement on 9/20.   Call you Health care provider upon arrival home to make a one week follow up appointment.  If you develop fever, chills, malaise, or change in mental status call your Health Care Provider or go to the Emergency Department.  Nutrition is important, eat small frequent meals to help ensure you get adequate calories.        SECONDARY DISCHARGE DIAGNOSES  Diagnosis: Anxiety  Assessment and Plan of Treatment: CW xanax prn for anxiety  Follow up with outpatient providers for further management  Anxiety  SEEK IMMEDIATE MEDICAL CARE IF YOU HAVE ANY OF THE FOLLOWING SYMPTOMS: thoughts about hurting killing yourself, thoughts about hurting or killing somebody else, hallucinations, or worsening depression.      Diagnosis: COPD (chronic obstructive pulmonary disease)  Assessment and Plan of Treatment: on prednisone 60mg daily for vasculitis.   Call your Health Care provider upon arrival home to make a follow up appointment within one week.  Take all inhalers as prescribed by your Health Care Provider.  Take steroids as prescribed by your Health Care Provider.  If your cough increases infrequency and severity and/or you have shortness of breath or increased shortness of breath call your Health Care Provider.  If you develop fever, chills, night sweats, malaise, and/or change in mental status call your Health care Provider.  Nutrition is very important.  Eat small frequent meals.  Increase your activity as tolerated.  Do not stay in bed all day  Pt. verbalized an understanding of all instructions.      Diagnosis: Hepatitis B  Assessment and Plan of Treatment: continue entecavir  Follow up with your primary care provider for management      Diagnosis: Hypertension  Assessment and Plan of Treatment: AVOID hydralazine due to concern for SLE-like syndrome  Continue clonidine   Continue cardizem CD 180mg daily and lopressor 100mg every 12 hours  Low salt diet  Activity as tolerated.  Take all medication as prescribed.  Follow up with your medical doctor for routine blood pressure monitoring at your next visit.  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   - cContinue with prednisone 60mg daily as per rheumatology and nephrolog  -As per rheumatology, monitor for few days after completion of antibiotics prior to resuming Rituxan.   -FOLLOW UP WITH rheumatology for next rituxan dosing      Diagnosis: KANIKA (acute kidney injury)  Assessment and Plan of Treatment: Renal function much improved.   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 Most of the time, this is due to decreased oral intake and dehydration. Drinking fluids often throughout the day to stay hydrated is recommended. Your kidney function has since resolved and returned to normal. Lab tests that monitor your kidney function include BUN/creatinine. You may need to monitor these levels with your primary doctor upon discharge. For now, it is recommended that you avoid taking any nephrotoxic agents (medications that can harm the kidneys and further decrease kidney function. These medications include NSAIDs (anti-inflammatories) including (ex: Ibuprofen, Advil, Motrin, Celebrex, Naprosyn/Aleve), Intravenous contrast for diagnostic testing, certain antibiotics, certain chemotherapeutic agents, or any combination cold medications. Ask your doctor if it is okay to resume taking these medications once you are discharged and follow up in the office.   Have all medications adjusted for your renal function by your Health Care Provider if necessary.      Diagnosis: Atrial fibrillation  Assessment and Plan of Treatment: Rate controlled. Continue on Cardizem, metoprolol  Eliquis started  Atrial fibrillation is the most common heart rhythm problem & has the risk of stroke & heart attack  It helps if you control your blood pressure, not drink more than 1-2 alcohol drinks per day, cut down on caffeine, getting treatment for over active thyroid gland, & getting exercise  Call your doctor if you feel your heart racing or beating unusually, chest tightness or pain, lightheaded, faint, shortness of breath especially with exercise  It is important to take your heart medication as prescribed  You may be on anticoagulation which is very important to take as directed - you may need blood work to monitor drug levels      Diagnosis: Acute on chronic diastolic heart failure  Assessment and Plan of Treatment: Continue lasix to 40mg PO daily now.   monitor renal function closely. strict I+Os  Diuretics were previous held due to overall dehydration.   Weigh yourself daily.  If you gain 3lbs in 3 days, or 5lbs in a week call your Health Care Provider.  Do not eat or drink foods containing more than 2000mg of salt (sodium) in your diet every day.  Call your Health Care Provider if you have any swelling or increased swelling in your feet, ankles, and/or stomach.  The Pt was provided with CHF diet instruction (low sodium diet, daily weights, label reading, Heart Healthy Cooking Tips & Heart Healthy shopping Tips).  Take all of your medication as directed.  If you become dizzy call your Health Care Provider.      Diagnosis: Retroperitoneal bleeding  Assessment and Plan of Treatment: Status post fall; found to have left ilopsoas hematoma in setting of fall/coumadin on admission.   hemoglobin and hemocrit levels remain stable.   Case was reported discussed with surgery team; no contraindication from surgery standpoint to resume AC.  Continue with Eliquis 2.5mg BID given age >80 and Wg<60 kg PRINCIPAL DISCHARGE DIAGNOSIS  Diagnosis: Sepsis due to Escherichia coli (E. coli)  Assessment and Plan of Treatment: ertapenem 14 days total last day 9/26 today. monitor off after today  Blood and urine cultures with E.coli.   suspect likely UTI with bacteremia in setting of immunosuppression.   Midline was placement on 9/20.   Call you Health care provider upon arrival home to make a one week follow up appointment.  If you develop fever, chills, malaise, or change in mental status call your Health Care Provider or go to the Emergency Department.  Nutrition is important, eat small frequent meals to help ensure you get adequate calories.        SECONDARY DISCHARGE DIAGNOSES  Diagnosis: Anxiety  Assessment and Plan of Treatment: Continue xanax prn for anxiety  Follow up with outpatient providers for further management  Anxiety  SEEK IMMEDIATE MEDICAL CARE IF YOU HAVE ANY OF THE FOLLOWING SYMPTOMS: thoughts about hurting killing yourself, thoughts about hurting or killing somebody else, hallucinations, or worsening depression.      Diagnosis: COPD (chronic obstructive pulmonary disease)  Assessment and Plan of Treatment: on prednisone 60mg daily for vasculitis.   Call your Health Care provider upon arrival home to make a follow up appointment within one week.  Take all inhalers as prescribed by your Health Care Provider.  Take steroids as prescribed by your Health Care Provider.  If your cough increases infrequency and severity and/or you have shortness of breath or increased shortness of breath call your Health Care Provider.  If you develop fever, chills, night sweats, malaise, and/or change in mental status call your Health care Provider.  Nutrition is very important.  Eat small frequent meals.  Increase your activity as tolerated.  Do not stay in bed all day  Pt. verbalized an understanding of all instructions.      Diagnosis: Hepatitis B  Assessment and Plan of Treatment: continue entecavir  Follow up with your primary care provider for management      Diagnosis: Hypertension  Assessment and Plan of Treatment: AVOID hydralazine due to concern for SLE-like syndrome  Continue clonidine   Continue cardizem CD 180mg daily and lopressor 100mg every 12 hours  Low salt diet  Follow up with cardiology  Activity as tolerated.  Take all medication as prescribed.  Follow up with your medical doctor for routine blood pressure monitoring at your next visit.  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   - Continue with prednisone 60mg daily as per rheumatology and nephrology  -As per rheumatology, monitor for few days after completion of antibiotics prior to resuming Rituxan.   -FOLLOW UP WITH rheumatology for next rituxan dosing      Diagnosis: KANIKA (acute kidney injury)  Assessment and Plan of Treatment: Renal function much improved.   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 Most of the time, this is due to decreased oral intake and dehydration. Drinking fluids often throughout the day to stay hydrated is recommended. Your kidney function has since resolved and returned to normal. Lab tests that monitor your kidney function include BUN/creatinine. You may need to monitor these levels with your primary doctor upon discharge. For now, it is recommended that you avoid taking any nephrotoxic agents (medications that can harm the kidneys and further decrease kidney function. These medications include NSAIDs (anti-inflammatories) including (ex: Ibuprofen, Advil, Motrin, Celebrex, Naprosyn/Aleve), Intravenous contrast for diagnostic testing, certain antibiotics, certain chemotherapeutic agents, or any combination cold medications. Ask your doctor if it is okay to resume taking these medications once you are discharged and follow up in the office.   Have all medications adjusted for your renal function by your Health Care Provider if necessary.      Diagnosis: Atrial fibrillation  Assessment and Plan of Treatment: Rate controlled. Continue on Cardizem, metoprolol  Eliquis started  Follow up with cardiology  Atrial fibrillation is the most common heart rhythm problem & has the risk of stroke & heart attack  It helps if you control your blood pressure, not drink more than 1-2 alcohol drinks per day, cut down on caffeine, getting treatment for over active thyroid gland, & getting exercise  Call your doctor if you feel your heart racing or beating unusually, chest tightness or pain, lightheaded, faint, shortness of breath especially with exercise  It is important to take your heart medication as prescribed  You may be on anticoagulation which is very important to take as directed - you may need blood work to monitor drug levels      Diagnosis: Acute on chronic diastolic heart failure  Assessment and Plan of Treatment: Continue lasix to 40mg PO daily now.   monitor renal function closely. strict I+Os  Diuretics were previous held due to overall dehydration.   Follow up with cardiology  Weigh yourself daily.  If you gain 3lbs in 3 days, or 5lbs in a week call your Health Care Provider.  Do not eat or drink foods containing more than 2000mg of salt (sodium) in your diet every day.  Call your Health Care Provider if you have any swelling or increased swelling in your feet, ankles, and/or stomach.  The Pt was provided with CHF diet instruction (low sodium diet, daily weights, label reading, Heart Healthy Cooking Tips & Heart Healthy shopping Tips).  Take all of your medication as directed.  If you become dizzy call your Health Care Provider.      Diagnosis: Retroperitoneal bleeding  Assessment and Plan of Treatment: Status post fall; found to have left ilopsoas hematoma in setting of fall/coumadin on admission.   hemoglobin and hemocrit levels remain stable.   Case was reported discussed with surgery team; no contraindication from surgery standpoint to resume AC.  Continue with Eliquis 2.5mg BID given age >80 and Wg<60 kg PRINCIPAL DISCHARGE DIAGNOSIS  Diagnosis: Sepsis due to Escherichia coli (E. coli)  Assessment and Plan of Treatment: ertapenem 14 days total last day 9/26 today. monitor off after today  Blood and urine cultures with E.coli.   suspect likely UTI with bacteremia in setting of immunosuppression.   Midline was placement on 9/20.   Follow up with your primary care provider for follow up  Call you Health care provider upon arrival home to make a one week follow up appointment.  If you develop fever, chills, malaise, or change in mental status call your Health Care Provider or go to the Emergency Department.  Nutrition is important, eat small frequent meals to help ensure you get adequate calories.        SECONDARY DISCHARGE DIAGNOSES  Diagnosis: Anxiety  Assessment and Plan of Treatment: Continue xanax prn for anxiety  Follow up with outpatient providers for further management  Anxiety  SEEK IMMEDIATE MEDICAL CARE IF YOU HAVE ANY OF THE FOLLOWING SYMPTOMS: thoughts about hurting killing yourself, thoughts about hurting or killing somebody else, hallucinations, or worsening depression.      Diagnosis: COPD (chronic obstructive pulmonary disease)  Assessment and Plan of Treatment: on prednisone 60mg daily for vasculitis.   Call your Health Care provider upon arrival home to make a follow up appointment within one week.  Take all inhalers as prescribed by your Health Care Provider.  Take steroids as prescribed by your Health Care Provider.  If your cough increases infrequency and severity and/or you have shortness of breath or increased shortness of breath call your Health Care Provider.  If you develop fever, chills, night sweats, malaise, and/or change in mental status call your Health care Provider.  Nutrition is very important.  Eat small frequent meals.  Increase your activity as tolerated.  Do not stay in bed all day  Pt. verbalized an understanding of all instructions.      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  Take all medication as prescribed.  Follow up with your medical doctor for routine blood pressure monitoring at your next visit.  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   - Continue with prednisone 60mg daily as per rheumatology and nephrology  -As per rheumatology, monitor for few days after completion of antibiotics prior to resuming Rituxan.   -FOLLOW UP WITH rheumatology for next rituxan dosing      Diagnosis: KANIKA (acute kidney injury)  Assessment and Plan of Treatment: Renal function much improved.   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 Most of the time, this is due to decreased oral intake and dehydration. Drinking fluids often throughout the day to stay hydrated is recommended. Your kidney function has since resolved and returned to normal. Lab tests that monitor your kidney function include BUN/creatinine. You may need to monitor these levels with your primary doctor upon discharge. For now, it is recommended that you avoid taking any nephrotoxic agents (medications that can harm the kidneys and further decrease kidney function. These medications include NSAIDs (anti-inflammatories) including (ex: Ibuprofen, Advil, Motrin, Celebrex, Naprosyn/Aleve), Intravenous contrast for diagnostic testing, certain antibiotics, certain chemotherapeutic agents, or any combination cold medications. Ask your doctor if it is okay to resume taking these medications once you are discharged and follow up in the office.   Have all medications adjusted for your renal function by your Health Care Provider if necessary.      Diagnosis: Atrial fibrillation  Assessment and Plan of Treatment: -Rate controlled. Continue on Cardizem, metoprolol  -Eliquis started  -Follow up with cardiology  Atrial fibrillation is the most common heart rhythm problem & has the risk of stroke & heart attack  It helps if you control your blood pressure, not drink more than 1-2 alcohol drinks per day, cut down on caffeine, getting treatment for over active thyroid gland, & getting exercise  Call your doctor if you feel your heart racing or beating unusually, chest tightness or pain, lightheaded, faint, shortness of breath especially with exercise  It is important to take your heart medication as prescribed  You may be on anticoagulation which is very important to take as directed - you may need blood work to monitor drug levels      Diagnosis: Acute on chronic diastolic heart failure  Assessment and Plan of Treatment: -Continue lasix to 40mg PO daily now.   monitor renal function closely. strict I+Os  -Diuretics were previous held due to overall dehydration.   -Follow up with cardiology  Weigh yourself daily.  If you gain 3lbs in 3 days, or 5lbs in a week call your Health Care Provider.  Do not eat or drink foods containing more than 2000mg of salt (sodium) in your diet every day.  Call your Health Care Provider if you have any swelling or increased swelling in your feet, ankles, and/or stomach.  The Pt was provided with CHF diet instruction (low sodium diet, daily weights, label reading, Heart Healthy Cooking Tips & Heart Healthy shopping Tips).  Take all of your medication as directed.  If you become dizzy call your Health Care Provider.      Diagnosis: Retroperitoneal bleeding  Assessment and Plan of Treatment: Status post fall; found to have left ilopsoas hematoma in setting of fall/coumadin on admission.   hemoglobin and hemocrit levels remain stable.   Case was reported discussed with surgery team; no contraindication from surgery standpoint to resume AC.  Continue with Eliquis 2.5mg BID given age >80 and Wg<60 kg PRINCIPAL DISCHARGE DIAGNOSIS  Diagnosis: Sepsis due to Escherichia coli (E. coli)  Assessment and Plan of Treatment: ertapenem 14 days total last day 9/26 today. monitor off after today  Blood and urine cultures with E.coli.   suspect likely UTI with bacteremia in setting of immunosuppression.   Midline was placement on 9/20.   Follow up with your primary care provider for follow up  Call you Health care provider upon arrival home to make a one week follow up appointment.  If you develop fever, chills, malaise, or change in mental status call your Health Care Provider or go to the Emergency Department.  Nutrition is important, eat small frequent meals to help ensure you get adequate calories.        SECONDARY DISCHARGE DIAGNOSES  Diagnosis: Diabetes mellitus  Assessment and Plan of Treatment: HgA1C this admission: 6.0  Take insulin as prescribed. Follow up with your primary care provider for further management.  Make sure you get your HgA1c checked every three months.  If you take insulin, check your blood glucose before meals and at bedtime.  It's important not to skip any meals.  Keep a log of your blood glucose results and always take it with you to your doctor appointments.  Keep a list of your current medications including injectables and over the counter medications and bring this medication list with you to all your doctor appointments.  If you have not seen your ophthalmologist this year call for appointment.  Check your feet daily for redness, sores, or openings. Do not self treat. If no improvement in two days call your primary care physician for an appointment.  Low blood sugar (hypoglycemia) is a blood sugar below 70mg/dl. Check your blood sugar if you feel signs/symptoms of hypoglycemia. If your blood sugar is below 70 take 15 grams of carbohydrates (ex 4 oz of apple juice, 3-4 glucose tablets, or 4-6 oz of regular soda) wait 15 minutes and repeat blood sugar to make sure it comes up above 70.  If your blood sugar is above 70 and you are due for a meal, have a meal.  If you are not due for a meal have a snack.  This snack helps keeps your blood sugar at a safe range.      Diagnosis: Anxiety  Assessment and Plan of Treatment: Continue xanax prn for anxiety  Follow up with outpatient providers for further management  Anxiety  SEEK IMMEDIATE MEDICAL CARE IF YOU HAVE ANY OF THE FOLLOWING SYMPTOMS: thoughts about hurting killing yourself, thoughts about hurting or killing somebody else, hallucinations, or worsening depression.      Diagnosis: COPD (chronic obstructive pulmonary disease)  Assessment and Plan of Treatment: on prednisone 60mg daily for vasculitis.   Call your Health Care provider upon arrival home to make a follow up appointment within one week.  Take all inhalers as prescribed by your Health Care Provider.  Take steroids as prescribed by your Health Care Provider.  If your cough increases infrequency and severity and/or you have shortness of breath or increased shortness of breath call your Health Care Provider.  If you develop fever, chills, night sweats, malaise, and/or change in mental status call your Health care Provider.  Nutrition is very important.  Eat small frequent meals.  Increase your activity as tolerated.  Do not stay in bed all day  Pt. verbalized an understanding of all instructions.      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  Take all medication as prescribed.  Follow up with your medical doctor for routine blood pressure monitoring at your next visit.  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   - Continue with prednisone 60mg daily as per rheumatology and nephrology  -FOLLOW UP WITH rheumatology for rituxan treatment.      Diagnosis: KANIKA (acute kidney injury)  Assessment and Plan of Treatment: Renal function much improved.   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 Most of the time, this is due to decreased oral intake and dehydration. Drinking fluids often throughout the day to stay hydrated is recommended. Your kidney function has since resolved and returned to normal. Lab tests that monitor your kidney function include BUN/creatinine. You may need to monitor these levels with your primary doctor upon discharge. For now, it is recommended that you avoid taking any nephrotoxic agents (medications that can harm the kidneys and further decrease kidney function. These medications include NSAIDs (anti-inflammatories) including (ex: Ibuprofen, Advil, Motrin, Celebrex, Naprosyn/Aleve), Intravenous contrast for diagnostic testing, certain antibiotics, certain chemotherapeutic agents, or any combination cold medications. Ask your doctor if it is okay to resume taking these medications once you are discharged and follow up in the office.   Have all medications adjusted for your renal function by your Health Care Provider if necessary.      Diagnosis: Atrial fibrillation  Assessment and Plan of Treatment: -Rate controlled. Continue on Cardizem, metoprolol  -Eliquis started  -Follow up with cardiology  Atrial fibrillation is the most common heart rhythm problem & has the risk of stroke & heart attack  It helps if you control your blood pressure, not drink more than 1-2 alcohol drinks per day, cut down on caffeine, getting treatment for over active thyroid gland, & getting exercise  Call your doctor if you feel your heart racing or beating unusually, chest tightness or pain, lightheaded, faint, shortness of breath especially with exercise  It is important to take your heart medication as prescribed  You may be on anticoagulation which is very important to take as directed - you may need blood work to monitor drug levels      Diagnosis: Acute on chronic diastolic heart failure  Assessment and Plan of Treatment: -Continue lasix to 40mg PO daily now.   monitor renal function closely. strict I+Os  -Diuretics were previous held due to overall dehydration.   -Follow up with cardiology  Weigh yourself daily.  If you gain 3lbs in 3 days, or 5lbs in a week call your Health Care Provider.  Do not eat or drink foods containing more than 2000mg of salt (sodium) in your diet every day.  Call your Health Care Provider if you have any swelling or increased swelling in your feet, ankles, and/or stomach.  The Pt was provided with CHF diet instruction (low sodium diet, daily weights, label reading, Heart Healthy Cooking Tips & Heart Healthy shopping Tips).  Take all of your medication as directed.  If you become dizzy call your Health Care Provider.      Diagnosis: Retroperitoneal bleeding  Assessment and Plan of Treatment: Status post fall; found to have left ilopsoas hematoma in setting of fall/coumadin on admission.   hemoglobin and hemocrit levels remain stable.   Case was reported discussed with surgery team; no contraindication from surgery standpoint to resume AC.  Continue with Eliquis 2.5mg BID given age >80 and Wg<60 kg PRINCIPAL DISCHARGE DIAGNOSIS  Diagnosis: Sepsis due to Escherichia coli (E. coli)  Assessment and Plan of Treatment: ertapenem 14 days total last day 9/26 today. monitor off after today  Blood and urine cultures with E.coli.   suspect likely UTI with bacteremia in setting of immunosuppression.   Midline was placement on 9/20 and removed on 9/27  Follow up with your primary care provider for follow up  Call you Health care provider upon arrival home to make a one week follow up appointment.  If you develop fever, chills, malaise, or change in mental status call your Health Care Provider or go to the Emergency Department.  Nutrition is important, eat small frequent meals to help ensure you get adequate calories.        SECONDARY DISCHARGE DIAGNOSES  Diagnosis: Diabetes mellitus  Assessment and Plan of Treatment: HgA1C this admission: 6.0  Take insulin as prescribed. Follow up with your primary care provider for further management.  Make sure you get your HgA1c checked every three months.  If you take insulin, check your blood glucose before meals and at bedtime.  It's important not to skip any meals.  Keep a log of your blood glucose results and always take it with you to your doctor appointments.  Keep a list of your current medications including injectables and over the counter medications and bring this medication list with you to all your doctor appointments.  If you have not seen your ophthalmologist this year call for appointment.  Check your feet daily for redness, sores, or openings. Do not self treat. If no improvement in two days call your primary care physician for an appointment.  Low blood sugar (hypoglycemia) is a blood sugar below 70mg/dl. Check your blood sugar if you feel signs/symptoms of hypoglycemia. If your blood sugar is below 70 take 15 grams of carbohydrates (ex 4 oz of apple juice, 3-4 glucose tablets, or 4-6 oz of regular soda) wait 15 minutes and repeat blood sugar to make sure it comes up above 70.  If your blood sugar is above 70 and you are due for a meal, have a meal.  If you are not due for a meal have a snack.  This snack helps keeps your blood sugar at a safe range.      Diagnosis: Anxiety  Assessment and Plan of Treatment: Continue xanax prn for anxiety  Follow up with outpatient providers for further management  Anxiety  SEEK IMMEDIATE MEDICAL CARE IF YOU HAVE ANY OF THE FOLLOWING SYMPTOMS: thoughts about hurting killing yourself, thoughts about hurting or killing somebody else, hallucinations, or worsening depression.      Diagnosis: COPD (chronic obstructive pulmonary disease)  Assessment and Plan of Treatment: on prednisone 60mg daily for vasculitis.   Call your Health Care provider upon arrival home to make a follow up appointment within one week.  Take all inhalers as prescribed by your Health Care Provider.  Take steroids as prescribed by your Health Care Provider.  If your cough increases infrequency and severity and/or you have shortness of breath or increased shortness of breath call your Health Care Provider.  If you develop fever, chills, night sweats, malaise, and/or change in mental status call your Health care Provider.  Nutrition is very important.  Eat small frequent meals.  Increase your activity as tolerated.  Do not stay in bed all day  Pt. verbalized an understanding of all instructions.      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  Take all medication as prescribed.  Follow up with your medical doctor for routine blood pressure monitoring at your next visit.  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   - Continue with prednisone 60mg daily as per rheumatology and nephrology  -FOLLOW UP WITH rheumatology for rituxan treatment.      Diagnosis: KANIKA (acute kidney injury)  Assessment and Plan of Treatment: Renal function much improved.   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 Most of the time, this is due to decreased oral intake and dehydration. Drinking fluids often throughout the day to stay hydrated is recommended. Your kidney function has since resolved and returned to normal. Lab tests that monitor your kidney function include BUN/creatinine. You may need to monitor these levels with your primary doctor upon discharge. For now, it is recommended that you avoid taking any nephrotoxic agents (medications that can harm the kidneys and further decrease kidney function. These medications include NSAIDs (anti-inflammatories) including (ex: Ibuprofen, Advil, Motrin, Celebrex, Naprosyn/Aleve), Intravenous contrast for diagnostic testing, certain antibiotics, certain chemotherapeutic agents, or any combination cold medications. Ask your doctor if it is okay to resume taking these medications once you are discharged and follow up in the office.   Have all medications adjusted for your renal function by your Health Care Provider if necessary.      Diagnosis: Atrial fibrillation  Assessment and Plan of Treatment: -Rate controlled. Continue on Cardizem, metoprolol  -Eliquis started  -Follow up with cardiology  Atrial fibrillation is the most common heart rhythm problem & has the risk of stroke & heart attack  It helps if you control your blood pressure, not drink more than 1-2 alcohol drinks per day, cut down on caffeine, getting treatment for over active thyroid gland, & getting exercise  Call your doctor if you feel your heart racing or beating unusually, chest tightness or pain, lightheaded, faint, shortness of breath especially with exercise  It is important to take your heart medication as prescribed  You may be on anticoagulation which is very important to take as directed - you may need blood work to monitor drug levels      Diagnosis: Acute on chronic diastolic heart failure  Assessment and Plan of Treatment: -Continue lasix to 40mg PO daily now.   monitor renal function closely. strict I+Os  -Diuretics were previous held due to overall dehydration.   -Follow up with cardiology  Weigh yourself daily.  If you gain 3lbs in 3 days, or 5lbs in a week call your Health Care Provider.  Do not eat or drink foods containing more than 2000mg of salt (sodium) in your diet every day.  Call your Health Care Provider if you have any swelling or increased swelling in your feet, ankles, and/or stomach.  The Pt was provided with CHF diet instruction (low sodium diet, daily weights, label reading, Heart Healthy Cooking Tips & Heart Healthy shopping Tips).  Take all of your medication as directed.  If you become dizzy call your Health Care Provider.      Diagnosis: Retroperitoneal bleeding  Assessment and Plan of Treatment: Status post fall; found to have left ilopsoas hematoma in setting of fall/coumadin on admission.   hemoglobin and hemocrit levels remain stable.   Case was reported discussed with surgery team; no contraindication from surgery standpoint to resume AC.  Continue with Eliquis 2.5mg BID given age >80 and Wg<60 kg

## 2019-09-27 NOTE — PROGRESS NOTE ADULT - SUBJECTIVE AND OBJECTIVE BOX
Milli Mckoy MD  Attending Physician (Hospitalist)  pager: [phone]    Patient is a 85y old  Female who presents with a chief complaint of Unwitnessed fall (27 Sep 2019 06:12)        SUBJECTIVE / OVERNIGHT EVENTS:  feeling well. denies any complaints.     MEDICATIONS  (STANDING):  ALBUTerol/ipratropium for Nebulization 3 milliLiter(s) Nebulizer every 6 hours  apixaban 2.5 milliGRAM(s) Oral two times a day  atorvastatin 10 milliGRAM(s) Oral at bedtime  chlorhexidine 4% Liquid 1 Application(s) Topical every 24 hours  cloNIDine 0.1 milliGRAM(s) Oral two times a day  diltiazem    milliGRAM(s) Oral daily  docusate sodium 100 milliGRAM(s) Oral three times a day  entecavir 0.5 milliGRAM(s) Oral every 72 hours  furosemide    Tablet 40 milliGRAM(s) Oral daily  guaiFENesin    Syrup 200 milliGRAM(s) Oral at bedtime  insulin lispro (HumaLOG) corrective regimen sliding scale   SubCutaneous three times a day before meals  insulin lispro (HumaLOG) corrective regimen sliding scale   SubCutaneous at bedtime  insulin lispro Injectable (HumaLOG) 8 Unit(s) SubCutaneous three times a day before meals  melatonin 3 milliGRAM(s) Oral at bedtime  metoprolol tartrate 100 milliGRAM(s) Oral two times a day  montelukast 10 milliGRAM(s) Oral daily  pantoprazole    Tablet 40 milliGRAM(s) Oral before breakfast  predniSONE   Tablet 60 milliGRAM(s) Oral every 24 hours  senna 2 Tablet(s) Oral at bedtime  sertraline 50 milliGRAM(s) Oral daily  trimethoprim  160 mG/sulfamethoxazole 800 mG 1 Tablet(s) Oral <User Schedule>    MEDICATIONS  (PRN):  acetaminophen   Tablet .. 650 milliGRAM(s) Oral every 6 hours PRN Mild Pain (1 - 3)  ALPRAZolam 0.5 milliGRAM(s) Oral three times a day PRN anxiety  guaiFENesin    Syrup 200 milliGRAM(s) Oral every 6 hours PRN Cough  polyethylene glycol 3350 17 Gram(s) Oral daily PRN Constiapation  zolpidem 5 milliGRAM(s) Oral at bedtime PRN Insomnia      Vital Signs Last 24 Hrs  T(C): 36.9 (27 Sep 2019 11:05), Max: 36.9 (27 Sep 2019 11:05)  T(F): 98.5 (27 Sep 2019 11:05), Max: 98.5 (27 Sep 2019 11:05)  HR: 76 (27 Sep 2019 11:05) (76 - 80)  BP: 102/73 (27 Sep 2019 11:05) (102/67 - 120/78)  BP(mean): --  RR: 17 (27 Sep 2019 11:05) (17 - 18)  SpO2: 95% (27 Sep 2019 11:05) (95% - 97%)  CAPILLARY BLOOD GLUCOSE      POCT Blood Glucose.: 200 mg/dL (27 Sep 2019 12:05)  POCT Blood Glucose.: 124 mg/dL (27 Sep 2019 07:51)  POCT Blood Glucose.: 110 mg/dL (26 Sep 2019 21:22)  POCT Blood Glucose.: 233 mg/dL (26 Sep 2019 16:53)    I&O's Summary    26 Sep 2019 07:01  -  27 Sep 2019 07:00  --------------------------------------------------------  IN: 590 mL / OUT: 1150 mL / NET: -560 mL    27 Sep 2019 07:01  -  27 Sep 2019 14:07  --------------------------------------------------------  IN: 480 mL / OUT: 0 mL / NET: 480 mL          PHYSICAL EXAM  GENERAL: NAD, thin  HEAD:  Atraumatic, Normocephalic  EYES: EOMI, conjunctiva and sclera clear  NECK: Supple, No JVD  CHEST/LUNG: Clear to auscultation bilaterally; No wheeze  HEART: irregular, no murmur  ABDOMEN: Soft, Nontender, Nondistended; Bowel sounds present  EXTREMITIES:  2+ Peripheral Pulses, LE edema  PSYCH: AAOx3  SKIN: No rashes or lesions    LABS:                      RADIOLOGY & ADDITIONAL TESTS:    Imaging Personally Reviewed:  Consultant(s) Notes Reviewed:    Care Discussed with Consultants/Other Providers:

## 2019-09-27 NOTE — DISCHARGE NOTE PROVIDER - NSDCFUADDINST_GEN_ALL_CORE_FT
- For history of marginal zone lymphoma/MGUS, needs follow up with Dr. Viera at [facility] after discharge.  -FOLLOW UP with rheumatology  -Follow up with cardiology  -Follow up with nephrology  -Follow up with your PCP  -Bring discharge paperwork to appointments

## 2019-09-27 NOTE — DISCHARGE NOTE PROVIDER - NSDCCAREPROVSEEN_GEN_ALL_CORE_FT
Milli Mckoy Milli Mckoy  [facility], Team  [facility] Cardiology, Team  [facility] Trauma Surgery, Team  [facility] Hematology, Team

## 2019-09-27 NOTE — PATIENT PROFILE ADULT. - AS [facility]
GENERAL: Patient lying in bed comfortably. In no acute distress. Answering questions appropriately.   HEENT: ecchymosis around left eye obit, PERRL, EOMI b/l, conjunctiva noninjected and anicteric,  moist mucous membranes  NECK: paravertebral tenderness, trachea midline  LUNG: CTAB, no w/r/r appreciated, good respiratory effort  CV: RRR, no m/r/g appreciated  ABDOMEN: Soft, NTND, no rebound or guarding, no appreciable organomegaly  MSK: Mild edema in LEs, no visible deformities, full range of motion UE/LE b/l, 5/5 muscle strength UE/LE b/l  NEURO: AAOx4 (to person, place, time, event), CN II-XII grossly intact, sensation grossly intact, finger-to-nose smooth and rapid, no pronator drift, steady gait,  -Cranial Nerves:  --CN II: PERRLA  --CN III, IV, VI: EOMI b/l  --CN V1-3: Facial sensation intact to touch  --CN VII: No facial asymmetry or droop  --CN VIII: Hearing intact to rubbing fingers b/l  --CN IX, X: Palate elevates symmetrically. Normal phonation  --CN XI: Heading turning and shoulder shrug intact b/l  --CN XII: Tongue midline with normal movements  SKIN: Warm, dry, well perfused, no evidence of rash  PSYCH: Normal mood and affect
(3) slightly limited

## 2019-09-27 NOTE — PROGRESS NOTE ADULT - PROBLEM SELECTOR PLAN 6
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   Will cont with prednisone 60mg daily as per rheum/renal.   Plan for rituxan on hold due to now infection
Known history of a fib, well controlled with atenolol and coumadin   hold anticoagulant in setting of RP bleed  outpt follow up with cardiology for resumption of coumadin/ noac  C/w metoprolol 100 bid   Continue diltiazem.
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   Will cont with prednisone 60mg daily as per rheum/renal.   Follow up with rheum re: Rituxan outpt once off abx and out of rehab.
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   Will cont with prednisone 60mg daily as per rheum/renal.   Will follow up with rheum re: Rituxan course.  D/w ID Dr. Kim. If Rituxan infusion is urgent can be given post completion of abx on 9/26 immediately on 9/27.
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   Will cont with prednisone 60mg daily as per rheum/renal.   Will follow up with rheum re: Rituxan course.  D/w ID Dr. Kim. If Rituxan infusion is urgent can be given post completion of abx on 9/26 immediately on 9/27.
s/p 1 dose of IV lasix 40mg yesterday. symptomatically better.   CXR with edema and elevated BNP.  D/w Dr. Gómez renal yesterday.   Will monitor closely off further diuretics.
s/p 1 dose of IV lasix 40mg yesterday. symptomatically better.   CXR with edema and elevated BNP.  Will monitor closely off further diuretics.
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   Will cont with prednisone 60mg daily as per rheum/renal.   Will follow up with rheum re: Rituxan course.  D/w ID Dr. Kim. If Rituxan infusion is urgent can be given post completion of abx on 9/26 immediately on 9/27.
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   Will cont with prednisone 60mg daily as per rheum/renal.   Plan for rituxan on hold due to now infection
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   Will cont with prednisone 60mg daily as per rheum/renal.   Plan for rituxan on hold due to now infection
Clinical exam somewhat concerning for fluid overload.   +Crackles and edema. SOB.   Will check CXR, BNP.   Will d/w renal re: restarting diuretics.
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   Will cont with prednisone 60mg daily as per rheum/renal.   Plan for rituxan on hold due to now infection
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   Will cont with prednisone 60mg daily as per rheum/renal.   outpt follow up with Dr. Diaz for Rituxan infusion

## 2019-09-27 NOTE — PROGRESS NOTE ADULT - PROBLEM SELECTOR PROBLEM 2
Retroperitoneal bleeding
Afib
Hypertension
Hypertension
Retroperitoneal bleeding
Hypertension
Retroperitoneal bleeding

## 2019-09-27 NOTE — PROGRESS NOTE ADULT - PROBLEM SELECTOR PROBLEM 9
Chronic obstructive pulmonary disease, unspecified COPD type

## 2019-09-27 NOTE — PROGRESS NOTE ADULT - PROBLEM SELECTOR PROBLEM 4
Afib
KANIKA (acute kidney injury)
Afib
KANIKA (acute kidney injury)
Vasculitis
Vasculitis
Afib
Vasculitis
Afib

## 2019-09-27 NOTE — PROGRESS NOTE ADULT - PROBLEM SELECTOR PROBLEM 1
Sepsis due to Escherichia coli
KANIKA (acute kidney injury)
KANIKA (acute kidney injury)
Sepsis due to Escherichia coli
KANIKA (acute kidney injury)
Retroperitoneal bleeding
Sepsis due to Escherichia coli

## 2019-09-27 NOTE — PROGRESS NOTE ADULT - PROBLEM SELECTOR PLAN 1
ertapenem 14 days total last day 9/26.monitor off   BlCx UCx with E.coli. cleared  suspect likely UTI with bacteremia in setting of immunosuppression.

## 2019-09-27 NOTE — DISCHARGE NOTE NURSING/CASE MANAGEMENT/SOCIAL WORK - NSDCPETBCESMAN_GEN_ALL_CORE
If you are a smoker, it is important for your health to stop smoking. Please be aware that second hand smoke is also harmful. no

## 2019-09-27 NOTE — DISCHARGE NOTE PROVIDER - CARE PROVIDERS DIRECT ADDRESSES
,[email or web address]

## 2019-09-27 NOTE — PROGRESS NOTE ADULT - PROBLEM SELECTOR PROBLEM 10
Anxiety

## 2019-09-27 NOTE — PROGRESS NOTE ADULT - PROVIDER SPECIALTY LIST ADULT
Cardiology
Cardiology
Hospitalist
Infectious Disease
Internal Medicine
Nephrology
Rheumatology
SICU
Surgery
Surgery
Trauma Surgery
Rheumatology
SICU
Internal Medicine
Nephrology
Nephrology
Internal Medicine
Hospitalist
Hospitalist
Internal Medicine
Internal Medicine

## 2019-09-27 NOTE — PROGRESS NOTE ADULT - PROBLEM SELECTOR PLAN 9
duoneb q6hrs standing.   on prednisone 60mg daily for vasculitis.
will make duoneb q6hrs standing.   on prednisone 60mg daily for vasculitis.

## 2019-09-27 NOTE — PROGRESS NOTE ADULT - REASON FOR ADMISSION
Unwitnessed fall
retroperitoneal bleed
L RP bleed
Unwitnessed fall
Sepsis, Ecoli Bacteremia & UTI
Unwitnessed fall

## 2019-09-30 PROBLEM — N05.9 GLOMERULONEPHRITIS: Status: ACTIVE | Noted: 2019-01-01

## 2019-09-30 PROBLEM — R60.0 BILATERAL LEG EDEMA: Status: ACTIVE | Noted: 2019-01-01

## 2019-09-30 PROBLEM — I50.33 ACUTE ON CHRONIC DIASTOLIC CONGESTIVE HEART FAILURE: Status: ACTIVE | Noted: 2018-07-16

## 2019-10-01 NOTE — ED PROVIDER NOTE - OBJECTIVE STATEMENT
86 yo F with hx of osteoporosis, recent fall 3 weeks ago, on steroids for pulmonary disease on aoixaban presents with R shoulder pain s/p mechanical fall 2 hours ago. Patient fell to a wooden floor while walking to the restroom. Had left sided head trauma with left frontal bruising and nose bleeding  now controlled. Reports severe right shoulder pain. 10/10, worse with movement or palpation. no allevating factors. Denies headache or neurological deficits.

## 2019-10-01 NOTE — ED PROVIDER NOTE - PHYSICAL EXAMINATION
Gen: AAOx3, non-toxic, son at bedside translating.   Head: NCAT  HEENT: EOMI, oral mucosa moist, normal conjunctiva. Left nostril fresh blood. No periorbital echymosis.  Lung: CTAB, no respiratory distress, no wheezes/rhonchi/rales B/L, speaking in full sentences  CV: RRR, no murmurs, rubs or gallops  Abd: soft, NTND, no guarding, no CVA tenderness  MSK: right shoulder TTP, no palpable fractures, no clavicular TTP. Limited ROM. Normal distal pulses and sensation.   Neuro: No focal sensory or motor deficits, normal CN exam   Skin: Warm, well perfused, no rash. Multiple chronic ecchymosis in arms   Psych: normal affect.

## 2019-10-01 NOTE — CHART NOTE - NSCHARTNOTEFT_GEN_A_CORE
EMERGENCY : AMPARO consulted by medical team for assistance with discharge planning. LMSW met with patient and patients son at bedside. LMSW introduced self and role to which the patient and her son verbalized understanding. Patient's son requesting assistance contacting patient's home health agency to make them aware that patient is being discharged and will need to resume her home health aide services. Patient's son states that patient has 12 hours a day, 7 days a week of home health aide services through [facility]. Patient's  is Lyssa (P: [phone] ext [phone]). Patient's son stating that he is also privately hiring a home health aide to supplement the other 12 hours a day that the patient does not have care to assist her until her arm fracture heals. Patient's son stating that supplemental hired care will begin tonight as soon as patient is discharged. Patient's son states that he has a hospital bed in the home as well as two wheelchairs and a handicapped van to transport patient to all her doctors appointments. LMSW contacted patient's  with [facility] to make her aware that patient needs her services re-instated. Lyssa stated that patient receives services from 7AM-7PM and if patient is discharged after 5PM that agency may not be able to provide an aide this evening. Patient's son confirmed with LMSW that he has patient's care covered for this evening. Home health aide services with agency confirmed for tomorrow with [facility]. LMSW provided patient and agency with contact information and ensured ongoing availability.

## 2019-10-01 NOTE — ED ADULT NURSE NOTE - OBJECTIVE STATEMENT
86 y/o female with PMH afib on warfarin, HTN, DM BIBEMS presenting to ED for right arm injury SP unwitnessed fall out of bed. Upon exam pt A&Ox2, lungs cta bilaterally, no difficulty speaking in complete sentences, s1s2 heart sounds heard, pulses x 4, pain noted to right arm, pt arrived with sling to right arm, +crepitus felt to right shoulder, +radial pulses, cap refill <2 seconds, moves all other extremities without difficulty, laceration noted to nose, 2mm PERRLA, family not at bedside, unable to obtain baseline, Pt unable to clarify events leading to fall, pt unsure if LOC occurred. Pt denies chest pain, sob, ha, n/v/d, abdominal pain, f/c, urinary symptoms, hematuria

## 2019-10-01 NOTE — ED PROVIDER NOTE - CLINICAL SUMMARY MEDICAL DECISION MAKING FREE TEXT BOX
Alyse: 85 year old female with afib on ac s/p mechanical fall while getting up from bed and attempting tog et pants. fell onto right side.no loc, fell on right shoulder. + ecchymosis to left upper forehead, + mild swelling to nose. + ttp right shoulder, limited ROM. no obvious deformity. will get labs, ct head/c-spine/max-facial. pain control, reassess. patient lives with family at home. recent admission for fall. Alyse: 85 year old female with afib on ac s/p mechanical fall while getting up from bed and attempting tog et pants. fell onto right side.no loc, fell on right shoulder. + ecchymosis to left upper forehead, + mild swelling to nose. + ttp right shoulder, limited ROM. no obvious deformity. will get labs, ct head/c-spine/max-facial. pain control, reassess. patient lives with family at home. recent admission for fall.  PGY1 86 yo with osteoporosis, on steroids and blood thinners with right shoulder pain after mechanical fall. VS WNL. Fresh blood in left nostril, no signs of CSF leakage,  no cervical spine TTP. Will get head CT no evaluate for subdural heamtomas given patient's age and med hx. Will get R shoulder X-ray. pain conttrol and re-evaluate.     H/O lymphoma

## 2019-10-01 NOTE — ED PROVIDER NOTE - CHIEF COMPLAINT
The patient is a 85y Female complaining of The patient is a 85y Female complaining of right shoulder pain

## 2019-10-01 NOTE — CONSULT NOTE ADULT - SUBJECTIVE AND OBJECTIVE BOX
84 yo F with hx of osteoporosis, recent fall 3 weeks ago, on steroids for pulmonary disease presents with R shoulder pain s/p mechanical fall 2 hours ago. Patient fell to a wooden floor while walking to the restroom. Had left sided head trauma with left frontal bruising and nose bleeding  now controlled. Reports severe right shoulder pain. 10/10, worse with movement or palpation. No alleviating factors. Denies headache or neurological deficits. No other bone complaints.     PAST MEDICAL & SURGICAL HISTORY:  COPD (chronic obstructive pulmonary disease)  Peripheral vascular disease  Pulmonary hypertension  Rheumatoid arthritis  Osteoarthritis  Mitral regurgitation  H/O lymphoma  Hyperlipidemia  Chronic GERD  Chronic kidney disease: proteinuria  AF (atrial fibrillation)  Anemia in CKD (chronic kidney disease)  CHF (congestive heart failure)  HTN (hypertension)  History of total hip replacement, left  History of total knee replacement, bilateral    MEDICATIONS  (STANDING):    Allergies    Keflex (Unknown)  penicillin (Rash)    Intolerances                            11.2   17.3  )-----------( 303      ( 01 Oct 2019 09:15 )             36.4     01 Oct 2019 09:15    141    |  98     |  36     ----------------------------<  117    4.3     |  34     |  1.07     Ca    8.8        01 Oct 2019 09:15    TPro  5.4    /  Alb  3.1    /  TBili  0.5    /  DBili  x      /  AST  19     /  ALT  28     /  AlkPhos  92     01 Oct 2019 09:15    PT/INR - ( 01 Oct 2019 09:15 )   PT: 10.6 sec;   INR: 0.92 ratio         PTT - ( 01 Oct 2019 09:15 )  PTT:28.8 sec    Imaging: XR personally reviewed and demonstrates R Proximal humerus fracture with diaphyseal extension. No dislocation    Vital Signs Last 24 Hrs  T(C): --  T(F): --  HR: 81 (10-01-19 @ 06:36) (81 - 81)  BP: 152/98 (10-01-19 @ 06:36) (152/98 - 152/98)  BP(mean): --  RR: 18 (10-01-19 @ 06:36) (18 - 18)  SpO2: 95% (10-01-19 @ 06:36) (95% - 95%)    Gen: NAD  Skin intact, visible deformity of arm  + soft tissue swelling about arm, +TTP over lateral arm  AIN/PIN/U intact, +wrist flexion/extension  SILT M/U/R  2+ rad    Secondary Survey: Full ROM of unaffected extremities, SILT globally, compartments soft, no bony TTP over bony prominences      A/P: 85y Female w R proximal humerus fracture with diaphyseal extension  Pain control  NWB RUE in [facility] to be provided by orthotist  keep splint clean/dry/intact  encourage active finger motion to help with swelling  no acute orthopedic intervention  Active movement of fingers/wrist/elbow encouraged  Follow up with Dr. Coelho within 1 week, call office for appointment 84 yo F with hx of osteoporosis, recent fall 3 weeks ago, on steroids for pulmonary disease presents with R shoulder pain s/p mechanical fall 2 hours ago. Patient fell to a wooden floor while walking to the restroom. Had left sided head trauma with left frontal bruising and nose bleeding  now controlled. Reports severe right shoulder pain. 10/10, worse with movement or palpation. No alleviating factors. Denies headache or neurological deficits. No other bone complaints.     PAST MEDICAL & SURGICAL HISTORY:  COPD (chronic obstructive pulmonary disease)  Peripheral vascular disease  Pulmonary hypertension  Rheumatoid arthritis  Osteoarthritis  Mitral regurgitation  H/O lymphoma  Hyperlipidemia  Chronic GERD  Chronic kidney disease: proteinuria  AF (atrial fibrillation)  Anemia in CKD (chronic kidney disease)  CHF (congestive heart failure)  HTN (hypertension)  History of total hip replacement, left  History of total knee replacement, bilateral    MEDICATIONS  (STANDING):    Allergies    Keflex (Unknown)  penicillin (Rash)    Intolerances                            11.2   17.3  )-----------( 303      ( 01 Oct 2019 09:15 )             36.4     01 Oct 2019 09:15    141    |  98     |  36     ----------------------------<  117    4.3     |  34     |  1.07     Ca    8.8        01 Oct 2019 09:15    TPro  5.4    /  Alb  3.1    /  TBili  0.5    /  DBili  x      /  AST  19     /  ALT  28     /  AlkPhos  92     01 Oct 2019 09:15    PT/INR - ( 01 Oct 2019 09:15 )   PT: 10.6 sec;   INR: 0.92 ratio         PTT - ( 01 Oct 2019 09:15 )  PTT:28.8 sec    Imaging: XR personally reviewed and demonstrates R Proximal humerus fracture with diaphyseal extension. No dislocation    Vital Signs Last 24 Hrs  T(C): --  T(F): --  HR: 81 (10-01-19 @ 06:36) (81 - 81)  BP: 152/98 (10-01-19 @ 06:36) (152/98 - 152/98)  BP(mean): --  RR: 18 (10-01-19 @ 06:36) (18 - 18)  SpO2: 95% (10-01-19 @ 06:36) (95% - 95%)    Gen: NAD  Skin intact, visible deformity of arm  + soft tissue swelling about arm, +TTP over lateral arm  AIN/PIN/U intact, +wrist flexion/extension  SILT M/U/R  2+ rad    Secondary Survey: Full ROM of unaffected extremities, SILT globally, compartments soft, no bony TTP over bony prominences    Procedure: Patient placed in martin with Orthotist. Xrays post-martin show acceptable alignment    A/P: 85y Female w R proximal humerus fracture with diaphyseal extension  Pain control  NWB RUE in [place]   keep splint clean/dry/intact  encourage active finger motion to help with swelling  no acute orthopedic intervention  Active movement of fingers/wrist/elbow encouraged  Follow up with Dr. Coelho within 1 week, call office for appointment 84 yo F with hx of osteoporosis, recent fall 3 weeks ago, on steroids for pulmonary disease presents with R shoulder pain s/p mechanical fall 2 hours ago. Patient fell to a wooden floor while walking to the restroom. Had left sided head trauma with left frontal bruising and nose bleeding  now controlled. Reports severe right shoulder pain. 10/10, worse with movement or palpation. No alleviating factors. Denies headache or neurological deficits. No other bone complaints.     PAST MEDICAL & SURGICAL HISTORY:  COPD (chronic obstructive pulmonary disease)  Peripheral vascular disease  Pulmonary hypertension  Rheumatoid arthritis  Osteoarthritis  Mitral regurgitation  H/O lymphoma  Hyperlipidemia  Chronic GERD  Chronic kidney disease: proteinuria  AF (atrial fibrillation)  Anemia in CKD (chronic kidney disease)  CHF (congestive heart failure)  HTN (hypertension)  History of total hip replacement, left  History of total knee replacement, bilateral    MEDICATIONS  (STANDING):    Allergies    Keflex (Unknown)  penicillin (Rash)    Intolerances                            11.2   17.3  )-----------( 303      ( 01 Oct 2019 09:15 )             36.4     01 Oct 2019 09:15    141    |  98     |  36     ----------------------------<  117    4.3     |  34     |  1.07     Ca    8.8        01 Oct 2019 09:15    TPro  5.4    /  Alb  3.1    /  TBili  0.5    /  DBili  x      /  AST  19     /  ALT  28     /  AlkPhos  92     01 Oct 2019 09:15    PT/INR - ( 01 Oct 2019 09:15 )   PT: 10.6 sec;   INR: 0.92 ratio         PTT - ( 01 Oct 2019 09:15 )  PTT:28.8 sec    Imaging: XR personally reviewed and demonstrates R Proximal humerus fracture with diaphyseal extension. No dislocation    Vital Signs Last 24 Hrs  T(C): --  T(F): --  HR: 81 (10-01-19 @ 06:36) (81 - 81)  BP: 152/98 (10-01-19 @ 06:36) (152/98 - 152/98)  BP(mean): --  RR: 18 (10-01-19 @ 06:36) (18 - 18)  SpO2: 95% (10-01-19 @ 06:36) (95% - 95%)    Gen: NAD  Skin intact, visible deformity of arm  + soft tissue swelling about arm, +TTP over lateral arm  AIN/PIN/U intact, +wrist flexion/extension  SILT M/U/R  2+ rad    Secondary Survey: Full ROM of unaffected extremities, SILT globally, compartments soft, no bony TTP over bony prominences    Procedure: Patient placed in martin with Orthotist. Xrays post-martin show acceptable alignment    A/P: 85y Female w R proximal humerus fracture with diaphyseal extension  Pain control  NWB RUE in [place]in   keep splint clean/dry/intact  encourage active finger motion to help with swelling  no acute orthopedic intervention  Active movement of fingers/wrist/elbow encouraged  Follow up with Dr. Coelho within 1 week, call office for appointment     *Sling also to be provided on discharge

## 2019-10-01 NOTE — ED PROVIDER NOTE - NS ED ROS FT
GENERAL: No fever or chills  EYES: no change in vision  HEENT: see hpi  CARDIAC: no chest pain or palpiations   PULMONARY: no cough or SOB  GI: no abdominal pain, nausea, vomiting, diarrhea, or constipation   : No changes in urination  SKIN: no rashes  NEURO: no headache, numbness, or weakness.  MSK: see hpi

## 2019-10-01 NOTE — ED ADULT NURSE NOTE - NSIMPLEMENTINTERV_GEN_ALL_ED
Implemented All Fall with Harm Risk Interventions:  [place] to call system. Call bell, personal items and telephone within reach. Instruct patient to call for assistance. Room bathroom lighting operational. Non-slip footwear when patient is off stretcher. Physically safe environment: no spills, clutter or unnecessary equipment. Stretcher in lowest position, wheels locked, appropriate side rails in place. Provide visual cue, wrist band, yellow gown, etc. Monitor gait and stability. Monitor for mental status changes and reorient to person, place, and time. Review medications for side effects contributing to fall risk. Reinforce activity limits and safety measures with patient and family. Provide visual clues: red socks.

## 2019-10-01 NOTE — ED PROVIDER NOTE - PROGRESS NOTE DETAILS
s/p ortho placement of right shoulder place for humerus fracture. Pain improved but still significant pain.

## 2019-10-01 NOTE — ED PROVIDER NOTE - PATIENT PORTAL LINK FT
You can access the FollowMyHealth Patient Portal offered by [facility] by registering at the following website: [email or web address]. By joining [employer]’s FollowMyHealth portal, you will also be able to view your health information using other applications (apps) compatible with our system.

## 2019-10-01 NOTE — ED PROVIDER NOTE - CARE PROVIDER_API CALL
Luis E Coelho)  Orthopaedic Surgery  [address]  [place] [address]  Phone: [phone]  Fax: [phone]  Follow Up Time:

## 2019-10-01 NOTE — ED PROVIDER NOTE - NSFOLLOWUPINSTRUCTIONS_ED_ALL_ED_FT
You were seen in the ED for right shoulder pain after fall.  The following labs/imaging were obtained: see attached  Take Percocet for pain  Return to the ED if you develop worsening pain, fever or any new concerning symptom  Follow up with your primary care in 2-3 days. Follow up with Orthopedics in1 week.   Discussed with pt results of work up, strict return precautions, and need for follow up.  Pt expressed understanding and agrees with plan. You were seen in the ED for right shoulder pain after fall.  The following labs/imaging were obtained: see attached  Take Percocet for pain each 6 hours as needed  Return to the ED if you develop worsening pain, fever or any new concerning symptom  Follow up with your primary care in 2-3 days. Follow up with Orthopedics within week.   Discussed with pt results of work up, strict return precautions, and need for follow up.  Pt expressed understanding and agrees with plan.

## 2019-10-01 NOTE — ED ADULT NURSE REASSESSMENT NOTE - NS ED NURSE REASSESS COMMENT FT1
Received report from Aleena FREGOSO. Patient resting comfortably in stretcher. A&Ox4. Vital signs are stable. Patient reports 8/10 right shoulder pain currently. MD Yann Sapp made aware. Patient in no acute distress. Patient pending blood tests, X-ray, and CT scans. Plan of care discussed. Safety and comfort measures maintained. Will continue to monitor.

## 2019-10-03 NOTE — ED ADULT NURSE NOTE - ED STAT RN HANDOFF TIME
14:35 I personally performed the service described in the documentation recorded by the scribe in my presence, and it accurately and completely records my words and actions.

## 2019-10-03 NOTE — H&P ADULT - NSHPLABSRESULTS_GEN_ALL_CORE
9.7    11.63 )-----------( 207      ( 03 Oct 2019 14:10 )             32.0     Hemoglobin: 9.7 g/dL (10-03 @ 14:10)  Hemoglobin: 10.0 g/dL (10-01 @ 15:58)  Hemoglobin: 11.2 g/dL (10-01 @ 09:15)    CBC Full  -  ( 03 Oct 2019 14:10 )  WBC Count : 11.63 K/uL  RBC Count : 3.52 M/uL  Hemoglobin : 9.7 g/dL  Hematocrit : 32.0 %  Platelet Count - Automated : 207 K/uL  Mean Cell Volume : 90.9 fl  Mean Cell Hemoglobin : 27.6 pg  Mean Cell Hemoglobin Concentration : 30.3 gm/dL  Auto Neutrophil # : 10.58 K/uL  Auto Lymphocyte # : 0.58 K/uL  Auto Monocyte # : 0.35 K/uL  Auto Eosinophil # : 0.12 K/uL  Auto Basophil # : 0.00 K/uL  Auto Neutrophil % : 65.0 %  Auto Lymphocyte % : 5.0 %  Auto Monocyte % : 3.0 %  Auto Eosinophil % : 1.0 %  Auto Basophil % : 0.0 %    10-03    138  |  95<L>  |  37<H>  ----------------------------<  120<H>  4.1   |  32<H>  |  1.20    Ca    8.2<L>      03 Oct 2019 14:11    TPro  5.3<L>  /  Alb  2.7<L>  /  TBili  0.6  /  DBili  x   /  AST  13  /  ALT  22  /  AlkPhos  82  10-03    Creatinine Trend: 1.20<--, 1.07<--, 1.15<--, 1.11<--, 1.14<--, 1.31<--  LIVER FUNCTIONS - ( 03 Oct 2019 14:11 )  Alb: 2.7 g/dL / Pro: 5.3 g/dL / ALK PHOS: 82 U/L / ALT: 22 U/L / AST: 13 U/L / GGT: x           PT/INR - ( 03 Oct 2019 14:11 )   PT: 12.3 sec;   INR: 1.08 ratio         PTT - ( 03 Oct 2019 14:11 )  PTT:29.3 sec    hs Troponin:        14:01 - VBG - pH: 7.28  | pCO2: 76    | pO2: 26    | Lactate: 3.2        Urinalysis Basic - ( 03 Oct 2019 14:10 )    Color: Yellow / Appearance: Clear / S.014 / pH: x  Gluc: x / Ketone: Negative  / Bili: Negative / Urobili: Negative   Blood: x / Protein: 100 / Nitrite: Negative   Leuk Esterase: Negative / RBC: 18 /hpf / WBC 2 /HPF   Sq Epi: x / Non Sq Epi: 0 /hpf / Bacteria: Negative

## 2019-10-03 NOTE — H&P ADULT - NSHPREVIEWOFSYSTEMS_GEN_ALL_CORE
CONSTITUTIONAL: +weakness, +fevers, +chills  EYES/ENT: No visual changes;  No dysphagia  NECK: No pain or stiffness  RESPIRATORY: +cough, no wheezing, hemoptysis; +SOB  CARDIOVASCULAR: No chest pain or palpitations; +edema  GASTROINTESTINAL: No abdominal or epigastric pain. No nausea, vomiting, or hematemesis; No diarrhea or constipation. No melena or hematochezia.  GENITOURINARY: No dysuria, frequency or hematuria  HEMATOLOGY: +hematoma on L hip  All other review of systems is negative unless indicated above.

## 2019-10-03 NOTE — MEDICAL STUDENT ADULT H&P (EDUCATION) - NS MD HP STUD HX OF PRESENT ILLNESS FT
This is an 84 yo female with a PMH of CHF, HTN, Afib, DM2, COPD, and chronic lymphoma who was brought in for 102.6 fever. One month ago the patient had worsening SOB on exertion and b/l rash on her lower legs and was diagnosed with vasculitis and lupus and was given 2 doses of rituximab. Per the patients daughter and home aid, the pt has deteriorated since receiving the rituximab. 3 weeks ago the patient fell out of bed on L hip and was admitted to [facility] for retroperitoneal bleeding. Hospital course was complicated by e coli bacteremia. Pt was d/alexi 5 days ago on     CONSTITUTIONAL: +weakness, +fevers, +chills  EYES/ENT: No visual changes;  No dysphagia  NECK: No pain or stiffness  RESPIRATORY: +Dry cough, wheezing, hemoptysis; No shortness of breath  CARDIOVASCULAR: No chest pain or palpitations; No lower extremity edema  GASTROINTESTINAL: No abdominal or epigastric pain. No nausea, vomiting, or hematemesis; No diarrhea or constipation. No melena or hematochezia.  GENITOURINARY: No dysuria, frequency or hematuria  NEUROLOGICAL: No numbness or weakness  HEMATOLOGY: No easy bleeding, no lymphadenopathy  SKIN: No itching, burning, rashes, or lesions   All other review of systems is negative unless indicated above. This is an 84 yo female with a PMH of CHF, HTN, Afib, DM2, COPD, and chronic lymphoma who was brought in for 102.6 fever. One month ago the patient had worsening SOB on exertion and b/l rash on her lower legs and was diagnosed with vasculitis and lupus and given 2 doses of rituximab. Per the patients daughter and home aid, the pt has deteriorated since receiving rituximab. 3 weeks ago the patient fell out of bed on L hip and was admitted to [facility] for retroperitoneal bleeding. Hospital course was complicated by e coli bacteremia treated with ertapenem. 2 days ago pt fell and fractured R humerus. Family reports pt has been acting her normal self, taking oxycodone for pain. Reports SOB on exertion and a cough for 2 weeks. Denies headache, dizziness, syncope, chest pain, dysuria, urinary frequency. Leg swelling is at baseline per home aid.     CONSTITUTIONAL: +weakness, +fever, +chills  EYES/ENT: No visual changes;  No dysphagia  NECK: No pain or stiffness  RESPIRATORY: +Dry cough for 2 weeks, shortness of breath on exertion. No wheezing   CARDIOVASCULAR: No chest pain or palpitations; B/l LE edema  GASTROINTESTINAL: No abdominal or epigastric pain. No nausea, vomiting, or hematemesis; No diarrhea or constipation. No melena or hematochezia.  GENITOURINARY: No dysuria, frequency or hematuria  NEUROLOGICAL: No numbness or weakness  HEMATOLOGY: Large erythematous hematoma on L hip/buttocks where pt fell 3 weeks ago. Hematomas found on face and R arm due to recent fall. Pt is on apixaban- easy bleeding. No lymphadenopathy  SKIN: No itching, burning, rashes, or lesions   All other review of systems is negative unless indicated above.

## 2019-10-03 NOTE — ED PROVIDER NOTE - ATTENDING CONTRIBUTION TO CARE
Private Physician Martin [phone]   85y male pmh Afib on eliquis, Anemia, CKD, Gerd, Lymphoma, HTN, HLD, Mitral Regur, PVD, Pulmonary Hypertension,RA, SP Left hip/left thr and josi TKR, Pt comes to ed complains of fever onset this am. Pt was recently dc from [facility] for complains retroperitoneal bleeding complicated by ecoli bactremia treated with ertapenem. Seen two days ago for fall and dc. PE WDWN elderly female looking mildly acutely ill normocephalic atraumatic neck supple chest clear anterior & posterior abd soft +bs rt shoulder in sling/shoulder splint. Distal forearm mild edema. Neruo moves all extr  Oh Membreno MD, Facep

## 2019-10-03 NOTE — ED PROVIDER NOTE - OBJECTIVE STATEMENT
85F, pmh htn, chf, dm, AFIB, lymphoma, brought in for fever. patient was recently discharged from the hospital 5 days ago for E coli bacteremia being treated with eratapenem. had fever to 102.6 at home today. family reports patient acting her normal self. no chest pain, difficulty breathing, abdominal pain, pain or burning with urination, no increased swelling in her legs.

## 2019-10-03 NOTE — ED ADULT NURSE NOTE - OBJECTIVE STATEMENT
1230 85 yr old WF brought to ER via ambulance on stretcher for further eval and tx of fever and cough. S/p discharged home from hospital 6 days ago. S/P fall 2 wks ago. Eval in ER 2 days ago with humerus fx after fall. A&Ox2. Fall risk precautions maintained. Hx through daughter and home health aide. Wearing diaper. Redness and ecchymoses noted at hips, thighs, and buttocks. Loose cough audible. +rhonchi. Droplet and contact isolation maintained. Dr kelly pt. Pt wearing sling and splint right shoulder. Severe pain when moving around on stretcher

## 2019-10-03 NOTE — H&P ADULT - ASSESSMENT
Ms. Slater is a 86 yo female with a PMH of CHF, HTN, Afib, DM2, COPD, and chronic lymphoma who was brought in for 102.6 fever. Pt is groaning in pain, unable to answer questions. history obtained via chart review and through pt's daughter and home health aide at bedside.

## 2019-10-03 NOTE — H&P ADULT - NSHPPHYSICALEXAM_GEN_ALL_CORE
Vital Signs Last 24 Hrs  T(C): 37.1 (03 Oct 2019 17:53), Max: 39.6 (03 Oct 2019 12:40)  T(F): 98.8 (03 Oct 2019 17:53), Max: 103.3 (03 Oct 2019 12:40)  HR: 119 (03 Oct 2019 17:53) (113 - 120)  BP: 93/67 (03 Oct 2019 17:53) (93/66 - 123/99)  BP(mean): --  RR: 20 (03 Oct 2019 17:53) (16 - 26)  SpO2: 97% (03 Oct 2019 17:53) (92% - 100%)    GENERAL: elderly woman lying in bed, uncomfortable, groaning in pain  HEAD:  +significant ecchymoses throughout face from prior fall  ENT: PERRL, conjunctiva and sclera clear, neck supple, no JVD, moist mucosa, posterior oropharynx clear  CHEST/LUNG: (difficult to auscultate since pt persistently moaning). lungs CTAB, poor inspiratory effort  HEART: Regular rate and rhythm; No murmurs, rubs, or gallops  ABDOMEN: Soft, nontender, nondistended; Bowel sounds present, no organomegaly  BACK: no spinal tenderness, no CVA tenderness  EXTREMITIES: 2+ pitting edema to thighs bilaterally, +large 15cm x 20cm hematoma on R hip, +fracture in R arm with sling  NEUROLOGY: AAOx2 (knows name and location, does not know year or president)

## 2019-10-03 NOTE — H&P ADULT - PROBLEM SELECTOR PLAN 4
-holding A/C in setting of hematoma and fall  -holding rate control meds in setting of soft BP (metop and diltiazem), can restart as tolerated   -monitoring on tele

## 2019-10-03 NOTE — ED ADULT NURSE NOTE - NSIMPLEMENTINTERV_GEN_ALL_ED
Implemented All Fall with Harm Risk Interventions:  [place] to call system. Call bell, personal items and telephone within reach. Instruct patient to call for assistance. Room bathroom lighting operational. Non-slip footwear when patient is off stretcher. Physically safe environment: no spills, clutter or unnecessary equipment. Stretcher in lowest position, wheels locked, appropriate side rails in place. Provide visual cue, wrist band, yellow gown, etc. Monitor gait and stability. Monitor for mental status changes and reorient to person, place, and time. Review medications for side effects contributing to fall risk. Reinforce activity limits and safety measures with patient and family. Provide visual clues: red socks.

## 2019-10-03 NOTE — MEDICAL STUDENT ADULT H&P (EDUCATION) - NS MD HP STUD RESULTS RAD FT
< from: Xray Chest 1 View AP/PA (10.03.19 @ 13:23) >    EXAM:  XR CHEST AP OR PA 1V                            PROCEDURE DATE:  10/03/2019      < end of copied text >    < from: Xray Chest 1 View AP/PA (10.03.19 @ 13:23) >    Poor inspiratory effort. The heart is normal in size. The lungs are   clear. Intra-aortic knob. Degenerative changes of the thoracic spine.    < end of copied text >

## 2019-10-03 NOTE — ED PROVIDER NOTE - PHYSICAL EXAMINATION
general: uncomfortable appearing female, no acute distress   heent: left facial ecchymosis   respiratory: normal work of breathing, lungs clear to auscultation bilaterally   cardiac: tachycardic, irregular rhythm   abdomen: soft, non-tender   msk: bilateral 2+ pitting edema   skin: warm, dry   neuro: awake, alert

## 2019-10-03 NOTE — PROVIDER CONTACT NOTE (OTHER) - ACTION/TREATMENT ORDERED:
Dr. Gaviria will contact team to see if she can give bolus. Dr. Gaviria will contact team to see if she can give bolus.  ml ordered at 200 ml/hr IVF

## 2019-10-03 NOTE — H&P ADULT - PROBLEM SELECTOR PLAN 1
-RVP positive, possible source of fever  -continuing home duonebs  -trending CBC and lactate  -monitoring off Abx

## 2019-10-03 NOTE — PATIENT PROFILE ADULT - FALL HARM RISK
tre of fall/coagulation(Bleeding disorder R/T clinical cond/anti-coags)/other/age(85 years old or older)

## 2019-10-03 NOTE — HISTORY OF PRESENT ILLNESS
[FreeTextEntry1] : This letter  is regarding your patient  who  attended pulmonary out patient office today.  I have reviewed  patient's  past history, social history, family history and medication list. I also  reviewed nurse practitioners/ and fellows  notes and assessment and agree with it.  \par The patient was referred by , 85 yo w/CHF- requiring recent admission. c/o CHILEL and edema of extremities (on diuretics), CHF [ BIVENTRICULAR FAILURE] , HTN,  afib- on coumadin- managed by Dr Dobbins. 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 Renal dys/elevated urine protein follows renal. PMH HTN, Anemia\par \par ------No history of , fever, chills , rigors, chest pain, or hemoptysis. Questionable history of Raynaud's phenomenon. No h/o significant weight loss in last few months. No history of liver dysfunction , collagen vascular disorder or chronic thromboembolic disease. I would classify the patient's dyspnea as WHO  FUNCTIONAL CLASS II--------NON SMOKER\par \par ----Echo  date------july 2018\par Summary:  1. Left ventricular ejection fraction, by visual estimation, is 60 to  65%.  2. Normal global left ventricular systolic function.  3. Spectral Doppler shows restrictive pattern of left ventricular  myocardial filling (Grade III diastolic dysfunction).  4. Moderately enlarged right ventricle.  5. Mild mitral annular calcification.  6. Thickening and calcification of the anterior and posterior mitral  valve leaflets.  7. Moderate tricuspid regurgitation.  8. Mild aortic regurgitation.  9. Sclerotic aortic valve with normal opening. 10. Estimated pulmonary artery systolic pressure is 58.4 mmHg assuming a  right atrial pressure of 10 mmHg, which is consistent with moderate  pulmonary hypertension. 11. LA volume Index is 66.0 ml/m? ml/m2. 12. The aortic valve mean gradient is 7.6 mmHg consistent with normally  opening aortic valve.\par \par \par ----JAN 2019-------DEXA SCAN----Findings are osteopenia in the lumbar spine and osteopenia in the right hip and osteoporosis in the left forearm. \par \par ----Pft date---MAR 2019------- FVC 63%, FEV 1 69, TLC 75, DLCO 69---- [RESTRICTIVE DEFECT ]\par \par ---cxr c/w cardiomegaly----\par \par CT CHEST 2018   CLEAR LUNGS \par ---\par echo 8/2019 Summary:\par  1. Normal global left ventricular systolic function.\par  2. Spectral Doppler shows restrictive pattern of left ventricular \par myocardial filling (Grade III diastolic dysfunction).\par  3. Mild mitral annular calcification.\par  4. Mild thickening of the anterior and posterior mitral valve leaflets.\par  5. Moderate-severe tricuspid regurgitation.\par  6. Mild aortic regurgitation.\par  7. Estimated pulmonary artery systolic pressure is 72.0 mmHg assuming a \par right atrial pressure of 10 mmHg, which is consistent with severe \par pulmonary hypertension.\par  8. LA volume Index is 67.2 ml/m² ml/m2.\par  9. Peak transaortic gradient equals 27.9 mmHg, mean transaortic gradient \par equals 15.2 mmHg, the calculated aortic valve area equals 1.50 cm² by the \par continuity equation consistent with mild aortic stenosis.\par \par \par cxr 8/2019 FINDINGS/\par IMPRESSION:\par Increased interstitial lung markings without significant change. 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 She was diagnosed in August and during her initial admission she received a first rituximab infusion. \par \par

## 2019-10-03 NOTE — PHYSICAL EXAM
[General Appearance - Well Developed] : well developed [Normal Appearance] : normal appearance [Well Groomed] : well groomed [General Appearance - Well Nourished] : well nourished [No Deformities] : no deformities [Normal Conjunctiva] : the conjunctiva exhibited no abnormalities [General Appearance - In No Acute Distress] : no acute distress [Eyelids - No Xanthelasma] : the eyelids demonstrated no xanthelasmas [Normal Oropharynx] : normal oropharynx [Neck Appearance] : the appearance of the neck was normal [Neck Cervical Mass (___cm)] : no neck mass was observed [Jugular Venous Distention Increased] : there was no jugular-venous distention [Thyroid Diffuse Enlargement] : the thyroid was not enlarged [Thyroid Nodule] : there were no palpable thyroid nodules [Heart Sounds] : normal S1 and S2 [Heart Rate And Rhythm] : heart rate and rhythm were normal [Murmurs] : no murmurs present [Respiration, Rhythm And Depth] : normal respiratory rhythm and effort [Exaggerated Use Of Accessory Muscles For Inspiration] : no accessory muscle use [Tenderness: ____] : tenderness [unfilled] [Abdomen Tenderness] : non-tender [Abdomen Soft] : soft [Abdomen Mass (___ Cm)] : no abdominal mass palpated [Cyanosis, Localized] : no localized cyanosis [1+ Pitting] : 1+  pitting [Deep Tendon Reflexes (DTR)] : deep tendon reflexes were 2+ and symmetric [Sensation] : the sensory exam was normal to light touch and pinprick [No Focal Deficits] : no focal deficits [Impaired Insight] : insight and judgment were intact [Oriented To Time, Place, And Person] : oriented to person, place, and time [Affect] : the affect was normal [Skin Turgor] : normal skin turgor [Skin Color & Pigmentation] : normal skin color and pigmentation [] : no rash [No Venous Stasis] : no venous stasis [Skin Lesions] : no skin lesions [No Skin Ulcers] : no skin ulcer [No Xanthoma] : no  xanthoma was observed [FreeTextEntry1] : uses cane [FreeTextEntry2] : greater left LE

## 2019-10-03 NOTE — MEDICAL STUDENT ADULT H&P (EDUCATION) - NS MD HP STUD RESULTS LAB FT
Color: Yellow / Appearance: Clear / S.014 / pH: x  Gluc: x / Ketone: Negative  / Bili: Negative / Urobili: Negative   Blood: x / Protein: 100 / Nitrite: Negative   Leuk Esterase: Negative / RBC: 18 /hpf / WBC 2 /HPF   Sq Epi: x / Non Sq Epi: 0 /hpf / Bacteria: Negative

## 2019-10-03 NOTE — MEDICAL STUDENT ADULT H&P (EDUCATION) - NS MD HP STUD PE VITALS FT
ICU Vital Signs Last 24 Hrs  T(C): 38.1 (03 Oct 2019 15:40), Max: 39.6 (03 Oct 2019 12:40)  T(F): 100.6 (03 Oct 2019 15:40), Max: 103.3 (03 Oct 2019 12:40)  HR: 120 (03 Oct 2019 15:40) (113 - 120)  BP: 123/99 (03 Oct 2019 15:40) (93/66 - 123/99)  BP(mean): --  ABP: --  ABP(mean): --  RR: 22 (03 Oct 2019 15:40) (16 - 26)  SpO2: 96% (03 Oct 2019 15:40) (92% - 100%)

## 2019-10-03 NOTE — ED PROVIDER NOTE - CLINICAL SUMMARY MEDICAL DECISION MAKING FREE TEXT BOX
85F presenting with fever. recent  hospital admission for ecoli bactermia. code sepsis. plan for labs, cxr, antibiotics. likely admission. will hold fluids until cxr 2/2 chf.

## 2019-10-03 NOTE — DISCUSSION/SUMMARY
[FreeTextEntry1] : ---Assessment plan----------The patient has been referred here for further opinion regarding pulmonary problem, -----  84 yo w/CHF- biventricular failure- . c/o CHILEL and edema of extremities (on diuretics), CHF [ BIVENTRICULAR FAILURE] , HTN,  afib- on coumadin- managed by Dr Dobbins. 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 She was diagnosed in August and during her initial admission she received a first rituximab infusion. \par \par 1) active crescentic glomerulonephritis pauci-immune. She was diagnosed in August and during her initial admission she received a first rituximab infusion. Follows rheum\par \par 2)----pulmonary hypertension secondary to her left heart dysfunction--[ DIASTOLIC DYSFUNCTION, A FIB] -- ---WE CAN CERTAINLY DO RIGHT HEART CATH  BUT  I FEEL SHE IS  NOT A CANDIDATE FOR ANY SELECTIVE PULMONARY VASODILATOR TREATMENT  IN VIEW OF HER LEFT HEART DISEASE ]--\par \par 3 ] DYSPNEA  MUTIFACTORIAL DUE TO UNDERLYING CARDIAC CONDITION AND ANEMIA\par --PT ALREADY F/U WITH CARDIOLOGY AND HEMATOLOGY. Will need to use oxygen therapy given her symptoms\par \par 4) pedal edema- increase diuretics lasix 40mg  daily to lasix 60mg every mon wed and friday. Labs checked today- check creat. Add kcl given diuretic therapy\par \par 5) cxr was c/w some pulm edema- will get CT chest non contrast\par \par 6)    MARGINAL ZONE  B CELL LYMPHOMA --  F/U ONCOLOGY\par \par -f/u in 2 months-----------\par \par Thanks for allowing  me to participate  in the care of this patient.  Patient at this time  will follow  the above mentioned recommendations and return back for follow up visit. If you have any questions  I can be reached  at #[phone] (beeper)  or  [phone] (office).\par \par Lian Whaley MD, FCCP \par Director, Pulmonary Hypertension Program \par [facility] \par Division of Pulmonary, Critical Care and Sleep Medicine \par  Professor of Medicine \par [facility]\par

## 2019-10-03 NOTE — H&P ADULT - HISTORY OF PRESENT ILLNESS
Ms. Slater is a 84 yo female with a PMH of CHF, HTN, Afib, DM2, COPD, and chronic lymphoma who was brought in for 102.6 fever. Pt is groaning in pain, unable to answer questions. history obtained via chart review and through pt's daughter and home health aide at bedside.   One month ago the patient had worsening SOB on exertion and b/l rash on her lower legs and was diagnosed with vasculitis and lupus and was given 2 doses of rituximab. Per the patients daughter and home aid, the pt has deteriorated since receiving the rituximab. 3 weeks ago the patient fell out of bed on L hip and was admitted to [facility] for retroperitoneal bleeding. Hospital course was complicated by E coli bacteremia, was being treated with TMP-SMX. Pt was discharged on Friday 9/27, then at home on Tuesday 10/01 had another fall, this time on her R side and fractured her R arm. Now s/p sling and cast. This morning, home nurse for pt noticed a fever of 102.6, and family brought pt to ER.     In ER, pt had RVP which was positive for parainfluenza. Pt has weakness, fevers, chills, hip pain, arm pain, intermittent cough (per baseline), LE edema.  Does not have nausea, vomiting, diarrhea, abdominal pain, hematochezia, melena, hematuria, dysuria.

## 2019-10-03 NOTE — MEDICAL STUDENT ADULT H&P (EDUCATION) - NS MD HP STUD SOCIAL HX FT
Pt lives at home with daily aid. Never drinker or smoker. Pt has a hx of depression/anxiety and takes xanax. Pt has taken ambien to sleep for many years.

## 2019-10-03 NOTE — H&P ADULT - PROBLEM SELECTOR PLAN 2
-likely 2/2 parainfluenza infection  -holding IVF in setting of significant edema  -trending CBC and lactate -likely 2/2 parainfluenza infection  -holding IVF in setting of significant edema  -trending CBC and lactate  -f/u blood cultures and urine cultures

## 2019-10-04 NOTE — DISCHARGE NOTE NURSING/CASE MANAGEMENT/SOCIAL WORK - NSDCPEWEB_GEN_ALL_CORE
[place] website --- [email or web address] for Tobacco Control website --- [email or web address]

## 2019-10-04 NOTE — OCCUPATIONAL THERAPY INITIAL EVALUATION ADULT - DIAGNOSIS, OT EVAL
Patient presents with decreased balance, strength, bed mobility, functional transfers and endurance impacting ability to perform ADLs and functional mobility

## 2019-10-04 NOTE — CONSULT NOTE ADULT - SUBJECTIVE AND OBJECTIVE BOX
[facility]  [id number]    HISTORY OF PRESENT ILLNESS:      PAST MEDICAL & SURGICAL HISTORY:  COPD (chronic obstructive pulmonary disease)  Peripheral vascular disease  Pulmonary hypertension  Rheumatoid arthritis  Osteoarthritis  Mitral regurgitation  H/O lymphoma  Hyperlipidemia  Chronic GERD  Chronic kidney disease: proteinuria  AF (atrial fibrillation)  Anemia in CKD (chronic kidney disease)  CHF (congestive heart failure)  HTN (hypertension)  History of total hip replacement, left  History of total knee replacement, bilateral      Review of Systems:  Gen:  No fevers/chills, weight loss  HEENT: No blurry vision, no difficulty swallowing  CVS: No chest pain/palpitations  Resp: No SOB/wheezing  GI: No N/V/C/D/abdominal pain  MSK:  Skin: No new rashes  Neuro: No headaches    MEDICATIONS  (STANDING):  ALBUTerol    90 MICROgram(s) HFA Inhaler 2 Puff(s) Inhalation every 6 hours  ALPRAZolam 0.5 milliGRAM(s) Oral two times a day  atorvastatin 10 milliGRAM(s) Oral at bedtime  dextrose 5%. 1000 milliLiter(s) (50 mL/Hr) IV Continuous <Continuous>  dextrose 50% Injectable 12.5 Gram(s) IV Push once  docusate sodium 100 milliGRAM(s) Oral three times a day  entecavir 0.5 milliGRAM(s) Oral daily  insulin lispro (HumaLOG) corrective regimen sliding scale   SubCutaneous three times a day before meals  metoprolol tartrate 50 milliGRAM(s) Oral two times a day  montelukast 10 milliGRAM(s) Oral daily  pantoprazole    Tablet 40 milliGRAM(s) Oral before breakfast  predniSONE   Tablet 20 milliGRAM(s) Oral three times a day  senna 2 Tablet(s) Oral at bedtime  sertraline 50 milliGRAM(s) Oral daily  tiotropium 18 MICROgram(s) Capsule 1 Capsule(s) Inhalation daily    MEDICATIONS  (PRN):  acetaminophen   Tablet .. 650 milliGRAM(s) Oral every 6 hours PRN Mild Pain (1 - 3)  dextrose 40% Gel 15 Gram(s) Oral once PRN Blood Glucose LESS THAN 70 milliGRAM(s)/deciliter  glucagon  Injectable 1 milliGRAM(s) IntraMuscular once PRN Glucose LESS THAN 70 milligrams/deciliter  guaiFENesin   Syrup  (Sugar-Free) 100 milliGRAM(s) Oral every 6 hours PRN Cough  oxyCODONE    5 mG/acetaminophen 325 mG 1 Tablet(s) Oral every 6 hours PRN Severe Pain (7 - 10)  polyethylene glycol 3350 17 Gram(s) Oral daily PRN Constiapation      Pertinent Medication history:      Allergies    Keflex (Unknown)  penicillin (Rash)    Intolerances            SOCIAL HISTORY:      FAMILY HISTORY:  Family history of inclusion body myositis      Vital Signs Last 24 Hrs  T(C): 36.4 (04 Oct 2019 03:58), Max: 39.6 (03 Oct 2019 12:40)  T(F): 97.5 (04 Oct 2019 03:58), Max: 103.3 (03 Oct 2019 12:40)  HR: 146 (04 Oct 2019 10:54) (86 - 146)  BP: 95/69 (04 Oct 2019 10:54) (75/50 - 123/99)  BP(mean): --  RR: 19 (04 Oct 2019 10:50) (16 - 26)  SpO2: 97% (04 Oct 2019 10:54) (92% - 100%)    Daily     Daily Weight in k.6 (04 Oct 2019 08:04)    Physical Exam:  General: No apparent distress  HEENT: EOMI, MMM  CVS: +S1/S2, RRR  Resp: CTA b/l  GI: Soft, NT/ND  MSK:    Neuro: AAOx3  Skin: no  rashes    LABS:                        8.9    18.35 )-----------( 151      ( 04 Oct 2019 08:25 )             30.0     10-04    137  |  96  |  38<H>  ----------------------------<  155<H>  4.7   |  27  |  1.18    Ca    8.1<L>      04 Oct 2019 05:44  Phos  5.5     10-04  Mg     1.6     10-04    TPro  5.3<L>  /  Alb  2.7<L>  /  TBili  0.6  /  DBili  x   /  AST  13  /  ALT  22  /  AlkPhos  82  10-03    PT/INR - ( 03 Oct 2019 14:11 )   PT: 12.3 sec;   INR: 1.08 ratio         PTT - ( 03 Oct 2019 14:11 )  PTT:29.3 sec  Urinalysis Basic - ( 03 Oct 2019 14:10 )    Color: Yellow / Appearance: Clear / S.014 / pH: x  Gluc: x / Ketone: Negative  / Bili: Negative / Urobili: Negative   Blood: x / Protein: 100 / Nitrite: Negative   Leuk Esterase: Negative / RBC: 18 /hpf / WBC 2 /HPF   Sq Epi: x / Non Sq Epi: 0 /hpf / Bacteria: Negative        RADIOLOGY & ADDITIONAL STUDIES:

## 2019-10-04 NOTE — OCCUPATIONAL THERAPY INITIAL EVALUATION ADULT - PERTINENT HX OF CURRENT PROBLEM, REHAB EVAL
Ms. Slater is a 84 yo female with a PMH of CHF, ANCA vasculitis, HTN, Afib, DM2, COPD, and chronic lymphoma who was brought in for 102.6 fever, now with +parainfluenza and +GNR in blood culture.

## 2019-10-04 NOTE — PROGRESS NOTE ADULT - PROBLEM SELECTOR PLAN 3
-continuing home prednisone   -rheum consult, appreciate recs s/p recent rituximab  -rheum consulted, rec pending  -c/w home prednisone 60mg daily, if becomes hypotensive would change to IV stress dose steroids hydrocortisone 100mg q8 s/p recent rituximab  -rheum consulted, rec pending  -hold home po prednisone 60mg, change to IV stress dose steroids hydrocortisone 100mg q8 given sepsis with borderline BPs

## 2019-10-04 NOTE — PROVIDER CONTACT NOTE (OTHER) - ASSESSMENT
Pt lethargic but arousable, pt had not urinated since 22:00 and it was very little. Pt had 500 ml LR bolus, in ER had 1L LR bolus.

## 2019-10-04 NOTE — DISCHARGE NOTE PROVIDER - HOSPITAL COURSE
Ms. Slater is a 84 yo female with a PMH of CHF, HTN, Afib, DM2, COPD, and chronic lymphoma who was brought in for 102.6 fever. Pt is groaning in pain, unable to answer questions. history obtained via chart review and through pt's daughter and home health aide at bedside.     One month ago the patient had worsening SOB on exertion and b/l rash on her lower legs and was diagnosed with vasculitis and lupus and was given 2 doses of rituximab. Per the patients daughter and home aid, the pt has deteriorated since receiving the rituximab. 3 weeks ago the patient fell out of bed on L hip and was admitted to [facility] for retroperitoneal bleeding. Hospital course was complicated by E coli bacteremia, was being treated with TMP-SMX. Pt was discharged on Friday 9/27, then at home on Tuesday 10/01 had another fall, this time on her R side and fractured her R arm. Now s/p sling and cast. This morning, home nurse for pt noticed a fever of 102.6, and family brought pt to ER.         In ER, pt had RVP which was positive for parainfluenza. Pt has weakness, fevers, chills, hip pain, arm pain, intermittent cough (per baseline), LE edema.    Does not have nausea, vomiting, diarrhea, abdominal pain, hematochezia, melena, hematuria, dysuria.     Her blood cultures grew Gram Negative Rods and she was started on Meropenem to cover for pseudomonas.

## 2019-10-04 NOTE — OCCUPATIONAL THERAPY INITIAL EVALUATION ADULT - ANTICIPATED DISCHARGE DISPOSITION, OT EVAL
tbd pending functional assesment. Awaiting clearance of left hip and stable pulse rehabilitation facility/subacute rehab

## 2019-10-04 NOTE — PROVIDER CONTACT NOTE (CRITICAL VALUE NOTIFICATION) - BACKGROUND
Pt was admitted with fever. Pt has hx of COPD, Afib.HLD, CKD Pt is s/p fall  fall at home. Pt has right arm cast.

## 2019-10-04 NOTE — CONSULT NOTE ADULT - SUBJECTIVE AND OBJECTIVE BOX
CHIEF COMPLAINT:     HPI:  Ms. Slater is a 86 yo female with a PMH of CHF, HTN, Afib, DM2, COPD, and chronic lymphoma who was brought in for 102.6 fever. Pt is groaning in pain, unable to answer questions. history obtained via chart review and through pt's daughter and home health aide at bedside.   One month ago the patient had worsening SOB on exertion and b/l rash on her lower legs and was diagnosed with vasculitis and lupus and was given 2 doses of rituximab. Per the patients daughter and home aid, the pt has deteriorated since receiving the rituximab. 3 weeks ago the patient fell out of bed on L hip and was admitted to [facility] for retroperitoneal bleeding. Hospital course was complicated by E coli bacteremia, was being treated with TMP-SMX. Pt was discharged on , then at home on Tuesday 10/01 had another fall, this time on her R side and fractured her R arm. Now s/p sling and cast. This morning, home nurse for pt noticed a fever of 102.6, and family brought pt to ER.     In ER, pt had RVP which was positive for parainfluenza. Pt has weakness, fevers, chills, hip pain, arm pain, intermittent cough (per baseline), LE edema.  Does not have nausea, vomiting, diarrhea, abdominal pain, hematochezia, melena, hematuria, dysuria. (03 Oct 2019 18:30)    Pt seen and examined at bedside.       FAMILY HISTORY:  Family history of inclusion body myositis      SOCIAL HISTORY:  Smoking: __ packs x ___ years  EtOH Use:  Marital Status:  Occupation:  Recent Travel:  Country of Birth:  Advance Directives:    Allergies    Keflex (Unknown)  penicillin (Rash)    Intolerances        HOME MEDICATIONS:    REVIEW OF SYSTEMS:  Constitutional:   Eyes:  ENT:  CV:  Resp:  GI:  :  MSK:  Integumentary:  Neurological:  Psychiatric:  Endocrine:  Hematologic/Lymphatic:  Allergic/Immunologic:  [ ] All other systems negative  [ ] Unable to assess ROS because ________    OBJECTIVE:  ICU Vital Signs Last 24 Hrs  T(C): 36.7 (04 Oct 2019 13:57), Max: 37.1 (03 Oct 2019 17:53)  T(F): 98 (04 Oct 2019 13:57), Max: 98.8 (03 Oct 2019 17:53)  HR: 93 (04 Oct 2019 13:57) (86 - 146)  BP: 90/63 (04 Oct 2019 13:57) (75/50 - 114/75)  BP(mean): --  ABP: --  ABP(mean): --  RR: 18 (04 Oct 2019 13:57) (18 - 20)  SpO2: 95% (04 Oct 2019 13:57) (95% - 100%)        10-03 @ 07:01  -  10-04 @ 07:00  --------------------------------------------------------  IN: 550 mL / OUT: 550 mL / NET: 0 mL    10-04 @ 07:  -  10-04 @ 17:00  --------------------------------------------------------  IN: 480 mL / OUT: 150 mL / NET: 330 mL      CAPILLARY BLOOD GLUCOSE      POCT Blood Glucose.: 262 mg/dL (04 Oct 2019 12:18)      PHYSICAL EXAM:  General:   HEENT:   Lymph Nodes:  Neck:   Respiratory:   Cardiovascular:   Abdomen:   Extremities:   Skin:   Neurological:  Psychiatry:    HOSPITAL MEDICATIONS:  MEDICATIONS  (STANDING):  ALBUTerol    90 MICROgram(s) HFA Inhaler 2 Puff(s) Inhalation every 6 hours  ALPRAZolam 0.5 milliGRAM(s) Oral two times a day  atorvastatin 10 milliGRAM(s) Oral at bedtime  dextrose 5%. 1000 milliLiter(s) (50 mL/Hr) IV Continuous <Continuous>  dextrose 50% Injectable 12.5 Gram(s) IV Push once  entecavir 0.5 milliGRAM(s) Oral daily  hydrocortisone sodium succinate Injectable 100 milliGRAM(s) IV Push every 8 hours  insulin lispro (HumaLOG) corrective regimen sliding scale   SubCutaneous three times a day before meals  meropenem  IVPB 1000 milliGRAM(s) IV Intermittent every 12 hours  metoprolol tartrate 100 milliGRAM(s) Oral two times a day  montelukast 10 milliGRAM(s) Oral daily  pantoprazole    Tablet 40 milliGRAM(s) Oral before breakfast  sertraline 50 milliGRAM(s) Oral daily  tiotropium 18 MICROgram(s) Capsule 1 Capsule(s) Inhalation daily    MEDICATIONS  (PRN):  acetaminophen   Tablet .. 650 milliGRAM(s) Oral every 6 hours PRN Mild Pain (1 - 3)  dextrose 40% Gel 15 Gram(s) Oral once PRN Blood Glucose LESS THAN 70 milliGRAM(s)/deciliter  docusate sodium 100 milliGRAM(s) Oral three times a day PRN Constipation  glucagon  Injectable 1 milliGRAM(s) IntraMuscular once PRN Glucose LESS THAN 70 milligrams/deciliter  guaiFENesin   Syrup  (Sugar-Free) 100 milliGRAM(s) Oral every 6 hours PRN Cough  oxyCODONE    5 mG/acetaminophen 325 mG 1 Tablet(s) Oral every 6 hours PRN Severe Pain (7 - 10)  polyethylene glycol 3350 17 Gram(s) Oral daily PRN Constiapation  senna 2 Tablet(s) Oral at bedtime PRN Constipation      LABS:                        8.9    18.35 )-----------( 151      ( 04 Oct 2019 08:25 )             30.0     10-04    137  |  96  |  38<H>  ----------------------------<  155<H>  4.7   |  27  |  1.18    Ca    8.1<L>      04 Oct 2019 05:44  Phos  5.5     10-04  Mg     1.6     10-    TPro  5.3<L>  /  Alb  2.7<L>  /  TBili  0.6  /  DBili  x   /  AST  13  /  ALT  22  /  AlkPhos  82  10-03    PT/INR - ( 03 Oct 2019 14:11 )   PT: 12.3 sec;   INR: 1.08 ratio         PTT - ( 03 Oct 2019 14:11 )  PTT:29.3 sec  Urinalysis Basic - ( 03 Oct 2019 14:10 )    Color: Yellow / Appearance: Clear / S.014 / pH: x  Gluc: x / Ketone: Negative  / Bili: Negative / Urobili: Negative   Blood: x / Protein: 100 / Nitrite: Negative   Leuk Esterase: Negative / RBC: 18 /hpf / WBC 2 /HPF   Sq Epi: x / Non Sq Epi: 0 /hpf / Bacteria: Negative        Venous Blood Gas:  10-04 @ 15:53  --/--/--/--/--  VBG Lactate: 4.2  Venous Blood Gas:  10-04 @ 05:49  7.30/66/163/32/99  VBG Lactate: 3.4  Venous Blood Gas:  10-03 @ 14:01  7.28/76/26/35/38  VBG Lactate: 3.2      MICROBIOLOGY:     RADIOLOGY:  [ ] Reviewed and interpreted by me    EKG: CHIEF COMPLAINT: Sepsis, hypotension    HPI:  Ms. Slater is a 84 yo female with a PMH of CHF, HTN, Afib, DM2, COPD, and chronic lymphoma who was brought in for 102.6 fever. Pt is groaning in pain, unable to answer questions. history obtained via chart review and through pt's daughter and home health aide at bedside.   One month ago the patient had worsening SOB on exertion and b/l rash on her lower legs and was diagnosed with vasculitis and lupus and was given 2 doses of rituximab. Per the patients daughter and home aid, the pt has deteriorated since receiving the rituximab. 3 weeks ago the patient fell out of bed on L hip and was admitted to [facility] for retroperitoneal bleeding. Hospital course was complicated by E coli bacteremia, was being treated with TMP-SMX. Pt was discharged on , then at home on Tuesday 10/01 had another fall, this time on her R side and fractured her R arm. Now s/p sling and cast. This morning, home nurse for pt noticed a fever of 102.6, and family brought pt to ER.     In ER, pt had RVP which was positive for parainfluenza. Pt has weakness, fevers, chills, hip pain, arm pain, intermittent cough (per baseline), LE edema.  Does not have nausea, vomiting, diarrhea, abdominal pain, hematochezia, melena, hematuria, dysuria. (03 Oct 2019 18:30)    Pt seen and examined at bedside. Family reporting she recently fell, which is when the redness of her L hip began. Warm, growing in size. Concerning for underlying infection. Denying fevers, chills currently. Endorsing hip pain. Pt with E. coli bacteremia (on meropenum), unknown source. Also RVP + for paraflu. Bacteremia could be 2/2 infected hardware in hip. Pt also with runs of rapid afib for which she was given lopressor. Now on lopressor 100mg BID with better control,  on exam. Hypotension to 85/60s on admission, with fluctuations between mid 110s and low 90s systolic. She had received a 500cc NS x1 for systolics in 90s, increased appropriately. Also being maintained on hydrocortisone 100mg IVP q8h in setting of hypotension and chronic steroid use.      FAMILY HISTORY:  Family history of inclusion body myositis      SOCIAL HISTORY:  Smoking: none  EtOH Use: none  Recent Travel: none  Advance Directives: DNR/DNI    Allergies    Keflex (Unknown)  penicillin (Rash)    Intolerances        HOME MEDICATIONS:    REVIEW OF SYSTEMS:  CONSTITUTIONAL: +generalized pain, +Weakness. Denies Weight loss, fever, chills  HEENT: no rhinorrhea, congestion  SKIN:  +erythema of L hip  CARDIOVASCULAR:  No chest pain, chest pressure or chest discomfort. No palpitations or edema.  RESPIRATORY:  No shortness of breath, cough or sputum.  GASTROINTESTINAL:  No anorexia, nausea, vomiting or diarrhea. No abdominal pain or blood.  GENITOURINARY:  Denies hematuria, dysuria.   NEUROLOGICAL:  No headache, dizziness, syncope, paralysis, ataxia, numbness or tingling in the extremities. No change in bowel or bladder control.  MUSCULOSKELETAL:  +L hip pain    OBJECTIVE:  ICU Vital Signs Last 24 Hrs  T(C): 36.7 (04 Oct 2019 13:57), Max: 37.1 (03 Oct 2019 17:53)  T(F): 98 (04 Oct 2019 13:57), Max: 98.8 (03 Oct 2019 17:53)  HR: 93 (04 Oct 2019 13:57) (86 - 146)  BP: 90/63 (04 Oct 2019 13:57) (75/50 - 114/75)  BP(mean): --  ABP: --  ABP(mean): --  RR: 18 (04 Oct 2019 13:57) (18 - 20)  SpO2: 95% (04 Oct 2019 13:57) (95% - 100%)        10-03 @ :01  -  10-04 @ 07:00  --------------------------------------------------------  IN: 550 mL / OUT: 550 mL / NET: 0 mL    10-04 @ :01  -  10-04 @ 17:00  --------------------------------------------------------  IN: 480 mL / OUT: 150 mL / NET: 330 mL      CAPILLARY BLOOD GLUCOSE      POCT Blood Glucose.: 262 mg/dL (04 Oct 2019 12:18)      PHYSICAL EXAM:    GENERAL: NAD   HEAD:  Normocephalic, atraumatic  EYES: EOMI, PERRLA  HEENT: Moist mucous membranes  NECK: Supple, No JVD  NERVOUS SYSTEM:  Alert & Oriented X3, Motor Strength 4+ b/l upper and lower extremities  CHEST/LUNG: Clear to auscultation bilaterally  HEART: tachycardic to 107, irregular rhythm  ABDOMEN: Soft, Nontender, Nondistended, Bowel sounds present  EXTREMITIES:   No clubbing, cyanosis, or edema  MUSCULOSKELETAL: No muscle tenderness, tenderness to palpation of L hip  SKIN:  erythematous discoloration of L hip and surrounding region without fluctuance       HOSPITAL MEDICATIONS:  MEDICATIONS  (STANDING):  ALBUTerol    90 MICROgram(s) HFA Inhaler 2 Puff(s) Inhalation every 6 hours  ALPRAZolam 0.5 milliGRAM(s) Oral two times a day  atorvastatin 10 milliGRAM(s) Oral at bedtime  dextrose 5%. 1000 milliLiter(s) (50 mL/Hr) IV Continuous <Continuous>  dextrose 50% Injectable 12.5 Gram(s) IV Push once  entecavir 0.5 milliGRAM(s) Oral daily  hydrocortisone sodium succinate Injectable 100 milliGRAM(s) IV Push every 8 hours  insulin lispro (HumaLOG) corrective regimen sliding scale   SubCutaneous three times a day before meals  meropenem  IVPB 1000 milliGRAM(s) IV Intermittent every 12 hours  metoprolol tartrate 100 milliGRAM(s) Oral two times a day  montelukast 10 milliGRAM(s) Oral daily  pantoprazole    Tablet 40 milliGRAM(s) Oral before breakfast  sertraline 50 milliGRAM(s) Oral daily  tiotropium 18 MICROgram(s) Capsule 1 Capsule(s) Inhalation daily    MEDICATIONS  (PRN):  acetaminophen   Tablet .. 650 milliGRAM(s) Oral every 6 hours PRN Mild Pain (1 - 3)  dextrose 40% Gel 15 Gram(s) Oral once PRN Blood Glucose LESS THAN 70 milliGRAM(s)/deciliter  docusate sodium 100 milliGRAM(s) Oral three times a day PRN Constipation  glucagon  Injectable 1 milliGRAM(s) IntraMuscular once PRN Glucose LESS THAN 70 milligrams/deciliter  guaiFENesin   Syrup  (Sugar-Free) 100 milliGRAM(s) Oral every 6 hours PRN Cough  oxyCODONE    5 mG/acetaminophen 325 mG 1 Tablet(s) Oral every 6 hours PRN Severe Pain (7 - 10)  polyethylene glycol 3350 17 Gram(s) Oral daily PRN Constiapation  senna 2 Tablet(s) Oral at bedtime PRN Constipation      LABS:                        8.9    18.35 )-----------( 151      ( 04 Oct 2019 08:25 )             30.0     10    137  |  96  |  38<H>  ----------------------------<  155<H>  4.7   |  27  |  1.18    Ca    8.1<L>      04 Oct 2019 05:44  Phos  5.5     10  Mg     1.6     10-04    TPro  5.3<L>  /  Alb  2.7<L>  /  TBili  0.6  /  DBili  x   /  AST  13  /  ALT  22  /  AlkPhos  82  10    PT/INR - ( 03 Oct 2019 14:11 )   PT: 12.3 sec;   INR: 1.08 ratio         PTT - ( 03 Oct 2019 14:11 )  PTT:29.3 sec  Urinalysis Basic - ( 03 Oct 2019 14:10 )    Color: Yellow / Appearance: Clear / S.014 / pH: x  Gluc: x / Ketone: Negative  / Bili: Negative / Urobili: Negative   Blood: x / Protein: 100 / Nitrite: Negative   Leuk Esterase: Negative / RBC: 18 /hpf / WBC 2 /HPF   Sq Epi: x / Non Sq Epi: 0 /hpf / Bacteria: Negative        Venous Blood Gas:  10-04 @ 15:53  --/--/--/--/--  VBG Lactate: 4.2  Venous Blood Gas:  10-04 @ 05:49  7.30/66/163/32/99  VBG Lactate: 3.4  Venous Blood Gas:  10-03 @ 14:01  7.28/76/26/35/38  VBG Lactate: 3.2      MICROBIOLOGY:   Blood cx 10/3: E. coli  Urine cx 10/3: neg            RADIOLOGY:  [ x] Reviewed and interpreted by me  < from: Xray Chest 1 View AP/PA (10.01.19 @ 08:43) >    EXAM:  XR CHEST AP OR PA 1V                            PROCEDURE DATE:  10/01/2019            INTERPRETATION:  Indication: Status post fall.    Technique: Single AP view of the chest.    Comparison: 2019    Findings: The heart size cannot be adequately assessed on this single   view. There are no focal consolidations or pleural effusions. There is   calcification of the aortic knob. Otherwise the hilar mediastinal   structures appear unremarkable. There is a partially visualized right   humerus fracture.    Impression: Clear lungs.    < end of copied text >

## 2019-10-04 NOTE — OCCUPATIONAL THERAPY INITIAL EVALUATION ADULT - GENERAL OBSERVATIONS, REHAB EVAL
Pt recv'd semi supine, +o2 via nc, +bilat le venodynes, +brace Right humerus, +PIVL LUE, +external female cath, +red hot swelling over right hip. son at b/s assisting with  language Pt presents semi-supine in NAD +IV, +RUE proximal splint, +external female catheter, +SCDs, +supplemental O2, +daughter at bedside.

## 2019-10-04 NOTE — OCCUPATIONAL THERAPY INITIAL EVALUATION ADULT - RANGE OF MOTION EXAMINATION, LOWER EXTREMITY
Left hip rom deferred sec to extreme redness and pain noted/Right LE Active Assistive ROM was WFL  (within functional limits)

## 2019-10-04 NOTE — PHARMACOTHERAPY INTERVENTION NOTE - COMMENTS
Pharmacy student Madelaine Mesa confirmed home medications with pharmacy and patient's family member. Discrepancies communicated with team resident: patient was last discharged on as needed inhalers but does not currently take at home; on prednisone 60 mg once a day at home (currently 20 mg TID), not on docusate/senna at home, and takes Bactrim DS three times a week at home.    Nicole Juarez, PharmD   [phone] Pharmacy student Madelaine Mesa confirmed home medications with pharmacy and patient's family member, updated in Outpatient Medication Review. Discrepancies communicated with team resident: patient was last discharged on as needed inhalers but does not currently take at home; on prednisone 60 mg once a day at home (currently 20 mg TID), not on docusate/senna at home, and takes Bactrim DS three times a week at home.    Nicole Juarez, PharmD   [phone]

## 2019-10-04 NOTE — OCCUPATIONAL THERAPY INITIAL EVALUATION ADULT - PLANNED THERAPY INTERVENTIONS, OT EVAL
bed mobility training/cognitive, visual perceptual/transfer training/ADL retraining/balance training

## 2019-10-04 NOTE — PROGRESS NOTE ADULT - ASSESSMENT
Ms. Slater is a 84 yo female with a PMH of CHF, ANCA vasculitis, HTN, Afib, DM2, COPD, and chronic lymphoma who was brought in for 102.6 fever, now with +parainfluenza and +GNR in blood culture. 86 yo female with a PMH of CHF, ANCA vasculitis, HTN, Afib, DM2, COPD, and chronic lymphoma who was brought in for 102.6 fever, now with  sepsis secondary to ecoli bacteremia, parainfluenza virus c/b afib with rvr.

## 2019-10-04 NOTE — DISCHARGE NOTE NURSING/CASE MANAGEMENT/SOCIAL WORK - PATIENT PORTAL LINK FT
You can access the FollowMyHealth Patient Portal offered by [facility] by registering at the following website: [email or web address]. By joining [facility]’s FollowMyHealth portal, you will also be able to view your health information using other applications (apps) compatible with our system.

## 2019-10-04 NOTE — OCCUPATIONAL THERAPY INITIAL EVALUATION ADULT - PHYSICAL ASSIST/NONPHYSICAL ASSIST: SUPINE/SIT, REHAB EVAL
2 person assist/verbal cues/nonverbal cues (demo/gestures) verbal cues/nonverbal cues (demo/gestures)

## 2019-10-04 NOTE — PROGRESS NOTE ADULT - PROBLEM SELECTOR PLAN 1
-likely 2/2 GNR infection in blood. source still unclear but likely urinary given pt history  -vital signs WNL, will check q4H  -started meropenem to cover pseudomonas  -trending CBC and lactate  -f/u blood cultures and urine cultures for speciation  -ID consult -likely 2/2 GNR infection in blood. source still unclear but likely urinary given pt history  -concern for hip source, possible cellulitis per ID. going to obtain CT-Abd-Pelvis which also visualizes possible femur  -vital signs WNL, will check q4H  -started meropenem to cover pseudomonas. per ID give 1g q12H  -trending CBC and lactate  -f/u blood cultures and urine cultures for speciation secondary to recurrent ecoli bacteremia due to unknown etiology and parainfluenza virus  -possible cellulitis of infected left hip joint vs hematoma vs intraabdominal infection given recent UTI  -get CT-Abd-Pelvis  -If hip joint appears infected will need to be aspirated  -repeat Bcx  -c/w meropenem  1g q12 per ID  -vitals q4  -lactate 3.4, repeat now, s/p 1.5L of IVF, consider additional IVF as clinically warranted  -f/u blood cultures and urine cultures, trend wbc  -on home prednisone 60mg daily, if becomes hypotensive would change to IV stress dose steroids hydrocortisone 100mg q8  -if becomes hypotensive, call MICU for pressor support if in line with GOC secondary to recurrent ecoli bacteremia due to unknown etiology and parainfluenza virus  -possible cellulitis of infected left hip joint vs hematoma vs intraabdominal infection given recent UTI  -get CT-Abd-Pelvis  -If hip joint appears infected will need to be aspirated  -repeat Bcx  -c/w meropenem  1g q12 per ID  -vitals q4  -lactate 3.4, repeat now, s/p 1.5L of IVF, consider additional IVF as clinically warranted  -f/u blood cultures and urine cultures, trend wbc  -hold home po prednisone 60mg, change to IV stress dose steroids hydrocortisone 100mg q8 given sepsis with borderline BPs  -if becomes hypotensive, call MICU for pressor support if in line with GOC

## 2019-10-04 NOTE — DISCHARGE NOTE PROVIDER - NSDCFUSCHEDAPPT_GEN_ALL_CORE_FT
[facility] ; 10/07/2019 ; [facility] ; 10/10/2019 ; [facility] ; 10/10/2019 ; NPP FamilyMed [address] [facility]  [facility]ANNA[facility] ; 10/16/2019 ; [facility] ; 10/21/2019 ; NPP Med Nephro [address] [place] [facility] ; 11/14/2019 ; NPP HemOnc [address] [facility]  [facility]ANNAH [facility] ; 12/16/2019 ; NPP Med Pulm [address] [facility] ; 10/07/2019 ; [facility] ; 10/10/2019 ; [facility] ; 10/10/2019 ; NPP FamilyMed [address] [facility]  [facility]ANNA[facility] ; 10/16/2019 ; [facility] ; 10/21/2019 ; NPP Med Nephro [address] [place] ; 11/14/2019 ; NPP HemOnc [address] [facility]  [facility]ANNA[facility] ; 12/16/2019 ; NPP Med Pulm [address] [facility]

## 2019-10-04 NOTE — PROGRESS NOTE ADULT - PROBLEM SELECTOR PLAN 2
-RVP positive, possible source of fever  -continuing home duonebs  -trending CBC and lactate  -monitoring off Abx -RVP positive, CXR negative for pna  -continuing home duonebs  -trending CBC and lactate  -monitoring off Abx -RVP positive, CXR negative for pna  -continuing home duonebs

## 2019-10-04 NOTE — OCCUPATIONAL THERAPY INITIAL EVALUATION ADULT - RANGE OF MOTION EXAMINATION, UPPER EXTREMITY
Left UE Active ROM was WFL (within functional limits)/Right shoulder: toleraes minimal prom, Right elbow wrist and digits +prom wfl's, minimal arom

## 2019-10-04 NOTE — PROGRESS NOTE ADULT - PROBLEM SELECTOR PLAN 4
-holding A/C in setting of hematoma and fall  -holding rate control meds in setting of soft BP (metop and diltiazem), can restart as tolerated   -monitoring on tele -holding A/C in setting of hematoma and fall  -restarted metoprolol 50mg BID  -holding diltiazem, can restart as tolerated   -not on continuous tele for now, will reassess as needed -holding A/C in setting of hematoma and fall, awaiting CT abd pelvis r/o RP bleed  -afib rvr this AM but now improved after holding po home meds, c/w metoprolol 100mg bid with improvement in RVR  -if HR become uncontrolled again, transfer to tele iso room  - -holding A/C in setting of hematoma and fall, awaiting CT abd pelvis r/o RP bleed  -afib rvr this AM but now improved after holding po home meds, c/w metoprolol 100mg bid with improvement in RVR  -holding home dilt given sepsis  -if HR become uncontrolled again, transfer to tele iso room

## 2019-10-04 NOTE — PROGRESS NOTE ADULT - PROBLEM SELECTOR PLAN 7
-DVT: SCD's only  -Diet: Soft regular  -Dispo: pending management of fever  -PT and OT consults -DVT: SCD's only  very poor prognosis, DNR/DNI

## 2019-10-04 NOTE — DISCHARGE NOTE PROVIDER - NSDCHC_MEDRECSTATUS_GEN_ALL_CORE
Admission Reconciliation is Completed  Discharge Reconciliation is Not Complete Admission Reconciliation is Not Complete  Discharge Reconciliation is Not Complete

## 2019-10-04 NOTE — OCCUPATIONAL THERAPY INITIAL EVALUATION ADULT - PHYSICAL ASSIST/NONPHYSICAL ASSIST: SIT/SUPINE, REHAB EVAL
2 person assist/nonverbal cues (demo/gestures)/verbal cues verbal cues/nonverbal cues (demo/gestures)

## 2019-10-04 NOTE — CONSULT NOTE ADULT - ASSESSMENT
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    Recurrent E coli bacteremia  -On previous admission pt had E Coli bacteremia R to quinolones and bactrim but sensitive to cepalosporins/carbapenems, completed 14 day course of meropenem/Ertapenem with clearance of subsequent bcx. Presumed urinary source as Ua also growing E coli at the time with same resistance pattern.  -Now with recurrent E coli bacteremia, concern for source control. Ua unremarkable, follow up Ucx. Given physical exam findings of erythematous, swollen, and tender hip, c/f septic joint as source of bacteremia in hip with hardware. While E coli would not be common source of septic joint, pt could have seeded hip from bacteremia on previous admission  -Other possible sources including intraabdominal source, possible infected hematoma from previous RP bleed.   -Recommend CT imaging of the A/P and Hip, preferably with contrast if possible to elucidate source of infection  -If hip joint appears infected will need to be aspirated  -repeat Bcx for clearance  -can c/w meropenem for now, 1g q12 based on renal function  - of not, pt has PCN and keflex allergies listed however on previous admission family denied PCN allergy per chart review, and pt tolerated carbapenems.     Discussed recommendations with primary team

## 2019-10-04 NOTE — CONSULT NOTE ADULT - SUBJECTIVE AND OBJECTIVE BOX
Patient is a 85y old  Female who presents with a chief complaint of Fever (04 Oct 2019 08:40)    HPI:  Ms. Slater is a 84 yo female with a PMH of CHF, HTN, Afib, DM2, COPD, and chronic lymphoma who was brought in for 102.6 fever. Pt is groaning in pain, unable to answer questions. history obtained via chart review and through pt's daughter and home health aide at bedside.   One month ago the patient had worsening SOB on exertion and b/l rash on her lower legs and was diagnosed with vasculitis and lupus and was given 2 doses of rituximab. Per the patients daughter and home aid, the pt has deteriorated since receiving the rituximab. 3 weeks ago the patient fell out of bed on L hip and was admitted to [facility] for retroperitoneal bleeding. Hospital course was complicated by E coli bacteremia Pt was discharged on , then at home on Tuesday 10/01 had another fall, this time on her R side and fractured her R arm. Now s/p sling and cast. This morning, home nurse for pt noticed a fever of 102.6, and family brought pt to ER.     In ER, pt had RVP which was positive for parainfluenza. Pt has weakness, fevers, chills, hip pain, arm pain, intermittent cough (per baseline), LE edema.  Does not have nausea, vomiting, diarrhea, abdominal pain, hematochezia, melena, hematuria, dysuria. (03 Oct 2019 18:30)    HPI and Previous Hospital course reviewed. 86 y/o F PMHx of Lymphoma/MGUS, Afib ( AC held d/t falls and RP bleed), recent admission with hypoxic respiratory failure 2/2 pulmonary renal syndrome thought 2/2 underlying vasculitis for which patient received two doses of rituxan, recent fall with RP bleed, recent admission with E coli bacteremia thought 2/2 urinary source treated with 14 day course of ertapenem, was brought from home for fever to 102.6. History provided with help of son at bedside, pt speaks Farsi and some english, is oriented to place and time but not able to recount all of history. Pt's last admission ended on  when she was discharged after completing treatment for E coli bacteremia thought 2/2 urinary source, subsequent bcx cleared. On returning home     prior hospital charts reviewed [  ]  primary team notes reviewed [  ]  other consultant notes reviewed [  ]  PAST MEDICAL & SURGICAL HISTORY:  COPD (chronic obstructive pulmonary disease)  Peripheral vascular disease  Pulmonary hypertension  Rheumatoid arthritis  Osteoarthritis  Mitral regurgitation  H/O lymphoma  Hyperlipidemia  Chronic GERD  Chronic kidney disease: proteinuria  AF (atrial fibrillation)  Anemia in CKD (chronic kidney disease)  CHF (congestive heart failure)  HTN (hypertension)  History of total hip replacement, left  History of total knee replacement, bilateral    Allergies  Keflex (Unknown)  penicillin (Rash)    ANTIMICROBIALS (past 90 days)  MEDICATIONS  (STANDING):  entecavir   0.5 milliGRAM(s) Oral (10-04-19 @ 06:10)    ertapenem  IVPB   120 mL/Hr IV Intermittent (10-03-19 @ 14:15)    meropenem  IVPB   100 mL/Hr IV Intermittent (10-04-19 @ 01:05)      ANTIMICROBIALS:    entecavir 0.5 daily    OTHER MEDS: MEDICATIONS  (STANDING):  acetaminophen   Tablet .. 650 every 6 hours PRN  ALBUTerol    90 MICROgram(s) HFA Inhaler 2 every 6 hours  ALPRAZolam 0.5 two times a day PRN  atorvastatin 10 at bedtime  dextrose 40% Gel 15 once PRN  dextrose 50% Injectable 12.5 once  docusate sodium 100 three times a day  glucagon  Injectable 1 once PRN  insulin lispro (HumaLOG) corrective regimen sliding scale  three times a day before meals  montelukast 10 daily  oxyCODONE    5 mG/acetaminophen 325 mG 1 every 6 hours PRN  pantoprazole    Tablet 40 before breakfast  polyethylene glycol 3350 17 daily PRN  predniSONE   Tablet 20 three times a day  senna 2 at bedtime  sertraline 50 daily  tiotropium 18 MICROgram(s) Capsule 1 daily    SOCIAL HISTORY:   hx smoking  non-smoker    FAMILY HISTORY:  Family history of inclusion body myositis    REVIEW OF SYSTEMS  [  ] ROS unobtainable because:    [  ] All other systems negative except as noted below:	    Constitutional:  [ ] fever [ ] chills  [ ] weight loss  [ ] weakness  Skin:  [ ] rash [ ] phlebitis	  Eyes: [ ] icterus [ ] pain  [ ] discharge	  ENMT: [ ] sore throat  [ ] thrush [ ] ulcers [ ] exudates  Respiratory: [ ] dyspnea [ ] hemoptysis [ ] cough [ ] sputum	  Cardiovascular:  [ ] chest pain [ ] palpitations [ ] edema	  Gastrointestinal:  [ ] nausea [ ] vomiting [ ] diarrhea [ ] constipation [ ] pain	  Genitourinary:  [ ] dysuria [ ] frequency [ ] hematuria [ ] discharge [ ] flank pain  [ ] incontinence  Musculoskeletal:  [ ] myalgias [ ] arthralgias [ ] arthritis  [ ] back pain  Neurological:  [ ] headache [ ] seizures  [ ] confusion/altered mental status  Psychiatric:  [ ] anxiety [ ] depression	  Hematology/Lymphatics:  [ ] lymphadenopathy  Endocrine:  [ ] adrenal [ ] thyroid  Allergic/Immunologic:	 [ ] transplant [ ] seasonal    Vital Signs Last 24 Hrs  T(F): 97.5 (10-04-19 @ 03:58), Max: 103.3 (10-03-19 @ 12:40)  Vital Signs Last 24 Hrs  HR: 93 (10-04-19 @ 03:58) (86 - 120)  BP: 112/72 (10-04-19 @ 03:58) (75/50 - 123/99)  RR: 19 (10-04-19 @ 03:58)  SpO2: 100% (10-04-19 @ 03:58) (92% - 100%)  Wt(kg): --    PHYSICAL EXAM:  General: non-toxic  HEAD/EYES: anicteric, PERRL  ENT:  supple  Cardiovascular:   S1, S2  Respiratory:  clear bilaterally  GI:  soft, non-tender, normal bowel sounds  :  no CVA tenderness   Musculoskeletal:  no synovitis  Neurologic:  grossly non-focal  Skin:  no rash  Lymph: no lymphadenopathy  Psychiatric:  appropriate affect  Vascular:  no phlebitis                            8.9    18.35 )-----------( 151      ( 04 Oct 2019 08:25 )             30.0     10-04    137  |  96  |  38<H>  ----------------------------<  155<H>  4.7   |  27  |  1.18    Ca    8.1<L>      04 Oct 2019 05:44  Phos  5.5     10-04  Mg     1.6     10-04    TPro  5.3<L>  /  Alb  2.7<L>  /  TBili  0.6  /  DBili  x   /  AST  13  /  ALT  22  /  AlkPhos  82  10-03    Urinalysis Basic - ( 03 Oct 2019 14:10 )    Color: Yellow / Appearance: Clear / S.014 / pH: x  Gluc: x / Ketone: Negative  / Bili: Negative / Urobili: Negative   Blood: x / Protein: 100 / Nitrite: Negative   Leuk Esterase: Negative / RBC: 18 /hpf / WBC 2 /HPF   Sq Epi: x / Non Sq Epi: 0 /hpf / Bacteria: Negative    MICROBIOLOGY:  Culture - Blood (collected 03 Oct 2019 17:22)  Source: .Blood  Gram Stain (04 Oct 2019 00:37):    Growth in aerobic bottle: Gram Negative Rods    Growth in anaerobic bottle: Gram Negative Rods  Preliminary Report (04 Oct 2019 00:38):    Growth in anaerobic bottle: Gram Negative Rods    Growth in aerobic bottle: Gram Negative Rods    "Due to technical problems, Proteus sp. will Not be reported as part of    the BCID panel until further notice"    ***Blood Panel PCR results on this specimenare available    approximately 3 hours after the Gram stain result.***    Gram stain, PCR, and/or culture results may not always    correspond due to difference in methodologies.    ************************************************************    This PCR assaywas performed using [employer].    The following targets are tested for: Enterococcus,    vancomycin resistant enterococci, Listeria monocytogenes,    coagulase negative staphylococci, S. aureus,    methicillin resistant S. aureus, Streptococcus agalactiae    (Group B), S. pneumoniae, S. pyogenes (Group A),    Acinetobacter baumannii, Enterobacter cloacae, E. coli,    Klebsiella oxytoca, K. pneumoniae, Proteus sp.,    Serratia marcescens, Haemophilus influenzae,    Neisseria meningitidis, Pseudomonas aeruginosa, Candida    albicans, C. glabrata, C krusei, C parapsilosis,    C. tropicalis and the KPC resistance gene.  Organism: Blood Culture PCR (04 Oct 2019 01:48)  Organism: Blood Culture PCR (04 Oct 2019 01:48)      -  Escherichia coli: Detec      Method Type: PCR    Culture - Blood (collected 03 Oct 2019 17:22)  Source: .Blood  Gram Stain (04 Oct 2019 00:18):    Growth in aerobic bottle: Gram Negative Rods    Growth in anaerobic bottle: Gram Negative Rods  Preliminary Report (04 Oct 2019 00:18):    Growth in aerobic bottle: Gram Negative Rods    Growth in anaerobic bottle: Gram Negative Rods              Rapid RVP Result: Detected (10-03 @ 14:12)      RADIOLOGY:  imaging below personally reviewed Patient is a 85y old  Female who presents with a chief complaint of Fever (04 Oct 2019 08:40)    HPI:  Ms. Slater is a 86 yo female with a PMH of CHF, HTN, Afib, DM2, COPD, and chronic lymphoma who was brought in for 102.6 fever. Pt is groaning in pain, unable to answer questions. history obtained via chart review and through pt's daughter and home health aide at bedside.   One month ago the patient had worsening SOB on exertion and b/l rash on her lower legs and was diagnosed with vasculitis and lupus and was given 2 doses of rituximab. Per the patients daughter and home aid, the pt has deteriorated since receiving the rituximab. 3 weeks ago the patient fell out of bed on L hip and was admitted to [facility] for retroperitoneal bleeding. Hospital course was complicated by E coli bacteremia Pt was discharged on , then at home on Tuesday 10/01 had another fall, this time on her R side and fractured her R arm. Now s/p sling and cast. This morning, home nurse for pt noticed a fever of 102.6, and family brought pt to ER.     In ER, pt had RVP which was positive for parainfluenza. Pt has weakness, fevers, chills, hip pain, arm pain, intermittent cough (per baseline), LE edema.  Does not have nausea, vomiting, diarrhea, abdominal pain, hematochezia, melena, hematuria, dysuria. (03 Oct 2019 18:30)    HPI and Previous Hospital course reviewed. 84 y/o F PMHx of Lymphoma/MGUS, Afib ( AC held d/t falls and RP bleed), recent admission with hypoxic respiratory failure 2/2 pulmonary renal syndrome thought 2/2 underlying vasculitis for which patient received two doses of rituxan, recent fall with RP bleed, recent admission with E coli bacteremia thought 2/2 urinary source treated with 14 day course of ertapenem, was brought from home for fever to 102.6. History provided with help of son at bedside, pt speaks Farsi and some english, is oriented to place and time but not able to recount all of history. Pt's last admission ended on  when she was discharged after completing treatment for E coli bacteremia thought 2/2 urinary source, subsequent bcx cleared. On returning home was feeling better, had fall on 10/1 resulting in fractures right forearm noted on visit to ER. Yesterday, 10/3, pt spiked temperature to 102.6. Denied significant change in symptoms, with cough at baseline, non-productive, no dysuria, frequency, diarrhea.    In the ED, pt with mild leukocytosis, febrile to 103.3, RVP positive for parainfluenza which was thought to be source of infection. Pt was monitored off antibiotics, however bcx returned positive 4 of 4 bottles for E coli by PCR, for which pt was started on meropenem. Pt's left Hip was additionally noticed to be inflamed, erythematous, warm, and painful. ID consulted for further management.     prior hospital charts reviewed [ x ]  primary team notes reviewed [ x ]  other consultant notes reviewed [x  ]  PAST MEDICAL & SURGICAL HISTORY:  COPD (chronic obstructive pulmonary disease)  Peripheral vascular disease  Pulmonary hypertension  Rheumatoid arthritis  Osteoarthritis  Mitral regurgitation  H/O lymphoma  Hyperlipidemia  Chronic GERD  Chronic kidney disease: proteinuria  AF (atrial fibrillation)  Anemia in CKD (chronic kidney disease)  CHF (congestive heart failure)  HTN (hypertension)  History of total hip replacement, left  History of total knee replacement, bilateral    Allergies  Keflex (Unknown)  penicillin (Rash)    ANTIMICROBIALS (past 90 days)  MEDICATIONS  (STANDING):  entecavir   0.5 milliGRAM(s) Oral (10-04-19 @ 06:10)    ertapenem  IVPB   120 mL/Hr IV Intermittent (10-03-19 @ 14:15)    meropenem  IVPB   100 mL/Hr IV Intermittent (10-04-19 @ 01:05)      ANTIMICROBIALS:    entecavir 0.5 daily    OTHER MEDS: MEDICATIONS  (STANDING):  acetaminophen   Tablet .. 650 every 6 hours PRN  ALBUTerol    90 MICROgram(s) HFA Inhaler 2 every 6 hours  ALPRAZolam 0.5 two times a day PRN  atorvastatin 10 at bedtime  dextrose 40% Gel 15 once PRN  dextrose 50% Injectable 12.5 once  docusate sodium 100 three times a day  glucagon  Injectable 1 once PRN  insulin lispro (HumaLOG) corrective regimen sliding scale  three times a day before meals  montelukast 10 daily  oxyCODONE    5 mG/acetaminophen 325 mG 1 every 6 hours PRN  pantoprazole    Tablet 40 before breakfast  polyethylene glycol 3350 17 daily PRN  predniSONE   Tablet 20 three times a day  senna 2 at bedtime  sertraline 50 daily  tiotropium 18 MICROgram(s) Capsule 1 daily    SOCIAL HISTORY:   non smoker, no alcohol or drug use    FAMILY HISTORY:  Family history of inclusion body myositis    REVIEW OF SYSTEMS  [  ] ROS unobtainable because:    [  x] All other systems negative except as noted below:	    Constitutional:  [ ] fever [ ] chills  [ ] weight loss  [ ] weakness  Skin:  [ ] rash [ ] phlebitis	  Eyes: [ ] icterus [ ] pain  [ ] discharge	  ENMT: [ ] sore throat  [ ] thrush [ ] ulcers [ ] exudates  Respiratory: [ ] dyspnea [ ] hemoptysis [ ] cough [ ] sputum	  Cardiovascular:  [ ] chest pain [ ] palpitations [ ] edema	  Gastrointestinal:  [ ] nausea [ ] vomiting [ ] diarrhea [ ] constipation [ ] pain	  Genitourinary:  [ ] dysuria [ ] frequency [ ] hematuria [ ] discharge [ ] flank pain  [ ] incontinence  Musculoskeletal:  [ ] myalgias [ ] arthralgias [ ] arthritis  [ ] back pain [x]extremity pain, hip pain  Neurological:  [ ] headache [ ] seizures  [ ] confusion/altered mental status  Psychiatric:  [ ] anxiety [ ] depression	  Hematology/Lymphatics:  [ ] lymphadenopathy  Endocrine:  [ ] adrenal [ ] thyroid  Allergic/Immunologic:	 [ ] transplant [ ] seasonal    Vital Signs Last 24 Hrs  T(F): 97.5 (10-04-19 @ 03:58), Max: 103.3 (10-03-19 @ 12:40)  Vital Signs Last 24 Hrs  HR: 93 (10-04-19 @ 03:58) (86 - 120)  BP: 112/72 (10-04-19 @ 03:58) (75/50 - 123/99)  RR: 19 (10-04-19 @ 03:58)  SpO2: 100% (10-04-19 @ 03:58) (92% - 100%)  Wt(kg): --    PHYSICAL EXAM:  General: non-toxic, uncomfortable appearing  HEAD/EYES: anicteric, PERRL  ENT:  supple  Cardiovascular:   S1, S2  Respiratory:  course throughout  GI:  soft, non-tender, normal bowel sounds  :  no CVA tenderness   Musculoskeletal:  left hip erythematous, swollen, TTP, erythema confluent and tracking up to left flank  Neurologic:  grossly non-focal  Skin:  no rash  Lymph: no lymphadenopathy  Psychiatric:  appropriate affect  Vascular:  no phlebitis                            8.9    18.35 )-----------( 151      ( 04 Oct 2019 08:25 )             30.0     10-04    137  |  96  |  38<H>  ----------------------------<  155<H>  4.7   |  27  |  1.18    Ca    8.1<L>      04 Oct 2019 05:44  Phos  5.5     10-  Mg     1.6     10-    TPro  5.3<L>  /  Alb  2.7<L>  /  TBili  0.6  /  DBili  x   /  AST  13  /  ALT  22  /  AlkPhos  82  10-    Urinalysis Basic - ( 03 Oct 2019 14:10 )    Color: Yellow / Appearance: Clear / S.014 / pH: x  Gluc: x / Ketone: Negative  / Bili: Negative / Urobili: Negative   Blood: x / Protein: 100 / Nitrite: Negative   Leuk Esterase: Negative / RBC: 18 /hpf / WBC 2 /HPF   Sq Epi: x / Non Sq Epi: 0 /hpf / Bacteria: Negative    MICROBIOLOGY:  Culture - Blood (collected 03 Oct 2019 17:22)  Source: .Blood  Gram Stain (04 Oct 2019 00:37):    Growth in aerobic bottle: Gram Negative Rods    Growth in anaerobic bottle: Gram Negative Rods  Preliminary Report (04 Oct 2019 00:38):    Growth in anaerobic bottle: Gram Negative Rods    Growth in aerobic bottle: Gram Negative Rods    "Due to technical problems, Proteus sp. will Not be reported as part of    the BCID panel until further notice"    ***Blood Panel PCR results on this specimenare available    approximately 3 hours after the Gram stain result.***    Gram stain, PCR, and/or culture results may not always    correspond due to difference in methodologies.    ************************************************************    This PCR assaywas performed using [employer].    The following targets are tested for: Enterococcus,    vancomycin resistant enterococci, Listeria monocytogenes,    coagulase negative staphylococci, S. aureus,    methicillin resistant S. aureus, Streptococcus agalactiae    (Group B), S. pneumoniae, S. pyogenes (Group A),    Acinetobacter baumannii, Enterobacter cloacae, E. coli,    Klebsiella oxytoca, K. pneumoniae, Proteus sp.,    Serratia marcescens, Haemophilus influenzae,    Neisseria meningitidis, Pseudomonas aeruginosa, Candida    albicans, C. glabrata, C krusei, C parapsilosis,    C. tropicalis and the KPC resistance gene.  Organism: Blood Culture PCR (04 Oct 2019 01:48)  Organism: Blood Culture PCR (04 Oct 2019 01:48)      -  Escherichia coli: Detec      Method Type: PCR    Culture - Blood (collected 03 Oct 2019 17:22)  Source: .Blood  Gram Stain (04 Oct 2019 00:18):    Growth in aerobic bottle: Gram Negative Rods    Growth in anaerobic bottle: Gram Negative Rods  Preliminary Report (04 Oct 2019 00:18):    Growth in aerobic bottle: Gram Negative Rods    Growth in anaerobic bottle: Gram Negative Rods              Rapid RVP Result: Detected (10-03 @ 14:12)      RADIOLOGY:  < from: Xray Chest 1 View AP/PA (10.03.19 @ 13:23) >  Impression:    Poor inspiratory effort. The heart is normal in size. The lungs are   clear. Intra-aortic knob. Degenerative changes of the thoracic spine.      < end of copied text >    < from: Xray Pelvis AP only (10.01.19 @ 08:43) >  Findings: Surgical hardware is present inthe left hip. The surgical   hardware appears intact. No acute fracture or dislocation is seen in the   pelvis. There is mild joint space narrowing of the bilateral hips. A   linear radiodense focus overlies the left inferior pubic ramus and may be   external to the patient.    Impression: No acute fracture or dislocation of the pelvis.    < end of copied text >  < from: CT Abdomen and Pelvis No Cont (19 @ 12:56) >  IMPRESSION:  Since prior evaluation significant interval change is   identified. The previously described left retroperitoneal iliopsoas   hemorrhage is significantly increased in size now resulting in   displacement of the left kidney anteriorly. There is significant   stranding of the left perirenal fat and left perirenal fluid is   identified. There is thickening of the left lateral abdominal wall   musculature. Fluid/hemorrhage tracks into the left pelvic retroperitoneal   space. There is new stranding surrounding the proximal sigmoid colon,   indeterminate etiology and clinical significance. New perihepatic and   perisplenic ascites is identified.New small left pleural effusion.    < end of copied text >    imaging below personally reviewed

## 2019-10-04 NOTE — PROGRESS NOTE ADULT - PROBLEM SELECTOR PLAN 6
-MOLST Form signed and in chart, DNR/DNI -MOLST Form signed and in chart, DNR/DNI  -try to limit opoid/benzo use given age however patient has been on it at home and requiring currently

## 2019-10-04 NOTE — OCCUPATIONAL THERAPY INITIAL EVALUATION ADULT - ADDITIONAL COMMENTS
As per son since most recent admission pt was d/c'd with [facility] 12 x7dyas,. Pt required assist with adl's and transfers. Pt lives with son in a prvt home with a ramp. first floor set up. + shower with shower chair and grab bars. +raised toilet, +commode, +hospital bed, +w/c, RW, and cane. Prior to most recent hospitalization pt was ambulating with rw/and cane As per son since most recent admission pt was d/c'd with [facility] 12 7days. Pt required assist with adl's and transfers. Pt lives with son in a prvt home with a ramp. first floor set up. + shower with shower chair and grab bars. +raised toilet, +commode, +hospital bed, +w/c, RW, and cane. Prior to most recent hospitalization pt was ambulating with rw/and cane

## 2019-10-04 NOTE — CONSULT NOTE ADULT - ASSESSMENT
Pt is an 85 year old woman with PMHx DHF (EF 63% 9/2019), HTN, Afib, DM2, COPD, ANCA vasculitis (chronic prednisone), and chronic lymphoma presenting with sepsis 2/2 E. coli bacteremia c/b +paraflu and afib with RVR. MICU consulted for hypotension in setting of CHF and rapid afib.    #Hypotension: pressures ranging from 90s-110s systolic in setting of bacteremia  -Pt's blood pressures are responsive to IVFs at this time. Recommend continuing boluses PRN for MAP <65. Lung exam clear, trace LE edema, seems euvolemic at this time.   -Hold all antihypertensives.  -Would consider switching lopressor to digoxin and start load given low pressures.  -If pt enters RVR again, would consider using diltiazem for rate control given hypotension.  -Lactate increased likely in setting of rapid afib, relatively decreased flow state. Would benefit from gentle rehydration.  -Continue antibiotics. Agree w/ imaging of L hip.    Pt not a MICU candidate at this time. Reconsult PRN.    Valentin Bermudez PGY2  MICU Pt is an 85 year old woman with PMHx DHF (EF 63% 9/2019), HTN, Afib, DM2, COPD, ANCA vasculitis (chronic prednisone), and chronic lymphoma presenting with sepsis 2/2 E. coli bacteremia c/b +paraflu and afib with RVR. MICU consulted for hypotension in setting of CHF and rapid afib.    #Hypotension: pressures ranging from 90s-110s systolic in setting of bacteremia  -Pt's blood pressures are responsive to IVFs at this time. Recommend continuing boluses PRN for MAP <65. Lung exam clear, trace LE edema, seems euvolemic at this time.   -Hold all antihypertensives.  -Would consider switching lopressor to digoxin and start load given low pressures.  -If pt enters RVR again, would consider using diltiazem for rate control given hypotension.  -Lactate increased likely in setting of rapid afib, relatively decreased flow state. Would benefit from gentle rehydration.  -Would recommend titrating off benzos. Can use PRN stabilizer such as zyprexa/seroquel.   -Continue antibiotics. Agree w/ imaging of L hip.    Pt not a MICU candidate at this time. Reconsult PRN.    Valentin Bermudez PGY2  MICU Pt is an 85 year old woman with PMHx DHF (EF 63% 9/2019), HTN, Afib, DM2, COPD, ANCA vasculitis (chronic prednisone), and chronic lymphoma presenting with sepsis 2/2 E. coli bacteremia c/b +paraflu and afib with RVR. MICU consulted for hypotension in setting of CHF and rapid afib.    #Hypotension: pressures ranging from 90s-110s systolic in setting of bacteremia  -Pt's blood pressures are responsive to IVFs at this time. Recommend continuing boluses PRN for MAP <65. Lung exam clear, trace LE edema, seems euvolemic at this time.   -Hold all antihypertensives.  -Would consider switching lopressor to digoxin and start load given low pressures.  -If pt enters RVR again, would consider using diltiazem for rate control given hypotension.  -Lactate increased likely in setting of bacteremia and rapid afib, relatively decreased flow state. Would benefit from gentle rehydration.  -Would recommend titrating off benzos. Can use PRN stabilizer such as zyprexa/seroquel.   -Continue antibiotics. Agree w/ imaging of L hip.    Pt not a MICU candidate at this time. Reconsult PRN.    Valentin Bermudez PGY2  MICU Pt is an 85 year old woman with PMHx DHF (EF 63% 9/2019), HTN, Afib, DM2, COPD, ANCA vasculitis (chronic prednisone), and chronic lymphoma presenting with sepsis 2/2 E. coli bacteremia c/b +paraflu and afib with RVR. MICU consulted for hypotension in setting of CHF and rapid afib.    #Hypotension: pressures ranging from 90s-110s systolic in setting of bacteremia  -Pt's blood pressures are responsive to IVFs at this time. Recommend continuing boluses PRN for MAP <65. Lung exam clear, trace LE edema, seems euvolemic at this time.   -Hold all antihypertensives.  -Can consider starting midodrine 10mg TID and uptitrate if BP remains persistently in systolics 90s.  -Would consider switching lopressor to digoxin and start load given low pressures.  -If pt enters RVR again, would consider using diltiazem for rate control given hypotension.  -Lactate increased likely in setting of bacteremia and rapid afib, relatively decreased flow state. Would benefit from gentle rehydration.  -Would recommend titrating off benzos. Can use PRN stabilizer such as zyprexa/seroquel.   -Continue antibiotics. Agree w/ imaging of L hip.    Pt not a MICU candidate at this time. Reconsult PRN.    Valentin Bermudez PGY2  MICU Pt is an 85 year old woman with PMHx DHF (EF 63% 9/2019), HTN, Afib, DM2, COPD, ANCA vasculitis (chronic prednisone), and chronic lymphoma presenting with sepsis 2/2 E. coli bacteremia c/b +paraflu and afib with RVR. MICU consulted for hypotension in setting of CHF and rapid afib.    #Hypotension: pressures ranging from 90s-110s systolic in setting of bacteremia  -Pt's blood pressures are responsive to IVFs at this time. Recommend continuing boluses PRN for MAP <65. Lung exam clear, trace LE edema, seems euvolemic at this time.   -Hold all antihypertensives.  -Can consider starting midodrine 10mg TID and uptitrate if BP remains persistently in systolics 90s.  -Would consider switching lopressor to digoxin and start load given low pressures.  -Lactate increased likely in setting of bacteremia and rapid afib, relatively decreased flow state. Would benefit from gentle rehydration.  -Hemodynamically stable.    Pt not a MICU candidate at this time. Reconsult PRN.    Valentin Bermudez PGY2  MICU

## 2019-10-04 NOTE — OCCUPATIONAL THERAPY INITIAL EVALUATION ADULT - PATIENT/FAMILY/SIGNIFICANT OTHER GOALS STATEMENT, OT EVAL
Significant history: One month ago the patient had worsening SOB on exertion and b/l rash on her lower legs and was diagnosed with vasculitis and lupus and given 2 doses of rituximab. Per the patients daughter and home aid, the pt has deteriorated since receiving rituximab. 3 weeks ago the patient fell out of bed on L hip and was admitted to [facility] for retroperitoneal bleeding. Hospital course was complicated by e coli bacteremia treated with ertapenem. 2 days ago pt fell and fractured R humerus.

## 2019-10-04 NOTE — PROGRESS NOTE ADULT - SUBJECTIVE AND OBJECTIVE BOX
Internal Medicine Progress Note    Roberth Edmondson  PGY-1 Internal Medicine  [id number]    Patient is a 85y old  Female who presents with a chief complaint of Fever (03 Oct 2019 18:30)      SUBJECTIVE / OVERNIGHT EVENTS:  -blood cx positive for Gram Negative Rods, started on Meropenem (for pseudomonas coverage)  -was hypotensive 70's/50s overnight, given 500mL bolus, then BP corrected to 110s  -still with ongoing cough, SOB, arm pain per baseline  -no chest pain, abdominal pain, headache, visual changes    MEDICATIONS  (STANDING):  ALBUTerol    90 MICROgram(s) HFA Inhaler 2 Puff(s) Inhalation every 6 hours  atorvastatin 10 milliGRAM(s) Oral at bedtime  dextrose 5%. 1000 milliLiter(s) (50 mL/Hr) IV Continuous <Continuous>  dextrose 50% Injectable 12.5 Gram(s) IV Push once  docusate sodium 100 milliGRAM(s) Oral three times a day  entecavir 0.5 milliGRAM(s) Oral daily  insulin lispro (HumaLOG) corrective regimen sliding scale   SubCutaneous three times a day before meals  montelukast 10 milliGRAM(s) Oral daily  pantoprazole    Tablet 40 milliGRAM(s) Oral before breakfast  predniSONE   Tablet 20 milliGRAM(s) Oral three times a day  senna 2 Tablet(s) Oral at bedtime  sertraline 50 milliGRAM(s) Oral daily  tiotropium 18 MICROgram(s) Capsule 1 Capsule(s) Inhalation daily    MEDICATIONS  (PRN):  acetaminophen   Tablet .. 650 milliGRAM(s) Oral every 6 hours PRN Mild Pain (1 - 3)  ALPRAZolam 0.5 milliGRAM(s) Oral two times a day PRN Anxiety and Insomnia  dextrose 40% Gel 15 Gram(s) Oral once PRN Blood Glucose LESS THAN 70 milliGRAM(s)/deciliter  glucagon  Injectable 1 milliGRAM(s) IntraMuscular once PRN Glucose LESS THAN 70 milligrams/deciliter  oxyCODONE    5 mG/acetaminophen 325 mG 1 Tablet(s) Oral every 6 hours PRN Severe Pain (7 - 10)  polyethylene glycol 3350 17 Gram(s) Oral daily PRN Constiapation    Vital Signs Last 24 Hrs  T(C): 36.4 (04 Oct 2019 03:58), Max: 39.6 (03 Oct 2019 12:40)  T(F): 97.5 (04 Oct 2019 03:58), Max: 103.3 (03 Oct 2019 12:40)  HR: 93 (04 Oct 2019 03:58) (86 - 120)  BP: 112/72 (04 Oct 2019 03:58) (75/50 - 123/99)  BP(mean): --  RR: 19 (04 Oct 2019 03:58) (16 - 26)  SpO2: 100% (04 Oct 2019 03:58) (92% - 100%)    CAPILLARY BLOOD GLUCOSE      POCT Blood Glucose.: 118 mg/dL (03 Oct 2019 21:01)    I&O's Summary    03 Oct 2019 07:01  -  04 Oct 2019 07:00  --------------------------------------------------------  IN: 500 mL / OUT: 0 mL / NET: 500 mL        PHYSICAL EXAM  GENERAL: elderly woman lying in bed, uncomfortable, groaning in pain  	HEAD:  +significant ecchymoses throughout face from prior fall  	ENT: PERRL, conjunctiva and sclera clear, neck supple, no JVD, moist mucosa, posterior oropharynx clear  	CHEST/LUNG: (difficult to auscultate since pt persistently moaning). lungs CTAB, poor inspiratory effort  	HEART: Regular rate and rhythm; No murmurs, rubs, or gallops  	ABDOMEN: Soft, nontender, nondistended; Bowel sounds present, no organomegaly  	BACK: no spinal tenderness, no CVA tenderness  	EXTREMITIES: 2+ pitting edema to thighs bilaterally, +large 15cm x 20cm hematoma on R hip, +fracture in R arm with sling  NEUROLOGY: AAOx2 (knows name and location, does not know year or president    LABS:                        9.7    11.63 )-----------( 207      ( 03 Oct 2019 14:10 )             32.0     Hemoglobin: 9.7 g/dL (10-03 @ 14:10)  Hemoglobin: 10.0 g/dL (10-01 @ 15:58)  Hemoglobin: 11.2 g/dL (10-01 @ 09:15)    CBC Full  -  ( 03 Oct 2019 14:10 )  WBC Count : 11.63 K/uL  RBC Count : 3.52 M/uL  Hemoglobin : 9.7 g/dL  Hematocrit : 32.0 %  Platelet Count - Automated : 207 K/uL  Mean Cell Volume : 90.9 fl  Mean Cell Hemoglobin : 27.6 pg  Mean Cell Hemoglobin Concentration : 30.3 gm/dL  Auto Neutrophil # : 10.58 K/uL  Auto Lymphocyte # : 0.58 K/uL  Auto Monocyte # : 0.35 K/uL  Auto Eosinophil # : 0.12 K/uL  Auto Basophil # : 0.00 K/uL  Auto Neutrophil % : 65.0 %  Auto Lymphocyte % : 5.0 %  Auto Monocyte % : 3.0 %  Auto Eosinophil % : 1.0 %  Auto Basophil % : 0.0 %    10-04    137  |  96  |  38<H>  ----------------------------<  155<H>  4.7   |  27  |  1.18    Ca    8.1<L>      04 Oct 2019 05:44  Phos  5.5     10  Mg     1.6     10-04    TPro  5.3<L>  /  Alb  2.7<L>  /  TBili  0.6  /  DBili  x   /  AST  13  /  ALT  22  /  AlkPhos  82  10    Creatinine Trend: 1.18<--, 1.20<--, 1.07<--, 1.15<--, 1.11<--, 1.14<--  LIVER FUNCTIONS - ( 03 Oct 2019 14:11 )  Alb: 2.7 g/dL / Pro: 5.3 g/dL / ALK PHOS: 82 U/L / ALT: 22 U/L / AST: 13 U/L / GGT: x           PT/INR - ( 03 Oct 2019 14:11 )   PT: 12.3 sec;   INR: 1.08 ratio         PTT - ( 03 Oct 2019 14:11 )  PTT:29.3 sec    hs Troponin:        05:49 - VBG - pH: 7.30  | pCO2: 66    | pO2: 163   | Lactate: 3.4    14:01 - VBG - pH: 7.28  | pCO2: 76    | pO2: 26    | Lactate: 3.2        Urinalysis Basic - ( 03 Oct 2019 14:10 )    Color: Yellow / Appearance: Clear / S.014 / pH: x  Gluc: x / Ketone: Negative  / Bili: Negative / Urobili: Negative   Blood: x / Protein: 100 / Nitrite: Negative   Leuk Esterase: Negative / RBC: 18 /hpf / WBC 2 /HPF   Sq Epi: x / Non Sq Epi: 0 /hpf / Bacteria: Negative      CSF:                      EKG:   MICROBIOLOGY:    Culture - Blood (collected 03 Oct 2019 17:22)  Source: .Blood  Gram Stain (04 Oct 2019 00:37):    Growth in aerobic bottle: Gram Negative Rods    Growth in anaerobic bottle: Gram Negative Rods  Preliminary Report (04 Oct 2019 00:38):    Growth in anaerobic bottle: Gram Negative Rods    Growth in aerobic bottle: Gram Negative Rods    "Due to technical problems, Proteus sp. will Not be reported as part of    the BCID panel until further notice"    ***Blood Panel PCR results on this specimenare available    approximately 3 hours after the Gram stain result.***    Gram stain, PCR, and/or culture results may not always    correspond due to difference in methodologies.    ************************************************************    This PCR assaywas performed using "[employer].    The following targets are tested for: Enterococcus,    vancomycin resistant enterococci, Listeria monocytogenes,    coagulase negative staphylococci, S. aureus,    methicillin resistant S. aureus, Streptococcus agalactiae    (Group B), S. pneumoniae, S. pyogenes (Group A),    Acinetobacter baumannii, Enterobacter cloacae, E. coli,    Klebsiella oxytoca, K. pneumoniae, Proteus sp.,    Serratia marcescens, Haemophilus influenzae,    Neisseria meningitidis, Pseudomonas aeruginosa, Candida    albicans, C. glabrata, C krusei, C parapsilosis,    C. tropicalis and the KPC resistance gene.  Organism: Blood Culture PCR (04 Oct 2019 01:48)  Organism: Blood Culture PCR (04 Oct 2019 01:48)    Culture - Blood (collected 03 Oct 2019 17:22)  Source: .Blood  Gram Stain (04 Oct 2019 00:18):    Growth in aerobic bottle: Gram Negative Rods    Growth in anaerobic bottle: Gram Negative Rods  Preliminary Report (04 Oct 2019 00:18):    Growth in aerobic bottle: Gram Negative Rods    Growth in anaerobic bottle: Gram Negative Rods      IMAGING:      Labs, imaging, EKG personally reviewed     CONSULTS: Internal Medicine Progress Note    Roberthluci Edmondson  PGY-1 Internal Medicine  [id number]    Patient is a 85y old  Female who presents with a chief complaint of Fever (03 Oct 2019 18:30)      SUBJECTIVE / OVERNIGHT EVENTS:  -blood cx positive for Gram Negative Rods, started on Meropenem (for pseudomonas coverage)  -was hypotensive 70's/50s overnight, given 500mL bolus, then BP corrected to 110s  -still with ongoing cough, SOB, arm pain per baseline  -no chest pain, abdominal pain, headache, visual changes    At 11AM, team paged about tachycardia. EKG obtained showing Afib, rates 110-140. Restarted home metoprolol 50 BID, also gave robitussin for cough, alprazolam standing order for anxiety.     MEDICATIONS  (STANDING):  ALBUTerol    90 MICROgram(s) HFA Inhaler 2 Puff(s) Inhalation every 6 hours  atorvastatin 10 milliGRAM(s) Oral at bedtime  dextrose 5%. 1000 milliLiter(s) (50 mL/Hr) IV Continuous <Continuous>  dextrose 50% Injectable 12.5 Gram(s) IV Push once  docusate sodium 100 milliGRAM(s) Oral three times a day  entecavir 0.5 milliGRAM(s) Oral daily  insulin lispro (HumaLOG) corrective regimen sliding scale   SubCutaneous three times a day before meals  montelukast 10 milliGRAM(s) Oral daily  pantoprazole    Tablet 40 milliGRAM(s) Oral before breakfast  predniSONE   Tablet 20 milliGRAM(s) Oral three times a day  senna 2 Tablet(s) Oral at bedtime  sertraline 50 milliGRAM(s) Oral daily  tiotropium 18 MICROgram(s) Capsule 1 Capsule(s) Inhalation daily    MEDICATIONS  (PRN):  acetaminophen   Tablet .. 650 milliGRAM(s) Oral every 6 hours PRN Mild Pain (1 - 3)  ALPRAZolam 0.5 milliGRAM(s) Oral two times a day PRN Anxiety and Insomnia  dextrose 40% Gel 15 Gram(s) Oral once PRN Blood Glucose LESS THAN 70 milliGRAM(s)/deciliter  glucagon  Injectable 1 milliGRAM(s) IntraMuscular once PRN Glucose LESS THAN 70 milligrams/deciliter  oxyCODONE    5 mG/acetaminophen 325 mG 1 Tablet(s) Oral every 6 hours PRN Severe Pain (7 - 10)  polyethylene glycol 3350 17 Gram(s) Oral daily PRN Constiapation    Vital Signs Last 24 Hrs  T(C): 36.4 (04 Oct 2019 03:58), Max: 39.6 (03 Oct 2019 12:40)  T(F): 97.5 (04 Oct 2019 03:58), Max: 103.3 (03 Oct 2019 12:40)  HR: 93 (04 Oct 2019 03:58) (86 - 120)  BP: 112/72 (04 Oct 2019 03:58) (75/50 - 123/99)  BP(mean): --  RR: 19 (04 Oct 2019 03:58) (16 - 26)  SpO2: 100% (04 Oct 2019 03:58) (92% - 100%)    CAPILLARY BLOOD GLUCOSE      POCT Blood Glucose.: 118 mg/dL (03 Oct 2019 21:01)    I&O's Summary    03 Oct 2019 07:01  -  04 Oct 2019 07:00  --------------------------------------------------------  IN: 500 mL / OUT: 0 mL / NET: 500 mL        PHYSICAL EXAM  GENERAL: elderly woman lying in bed, uncomfortable, groaning in pain  	HEAD:  +significant ecchymoses throughout face from prior fall  	ENT: PERRL, conjunctiva and sclera clear, neck supple, no JVD, moist mucosa, posterior oropharynx clear  	CHEST/LUNG: (difficult to auscultate since pt persistently moaning). lungs CTAB, poor inspiratory effort  	HEART: Regular rate and rhythm; No murmurs, rubs, or gallops  	ABDOMEN: Soft, nontender, nondistended; Bowel sounds present, no organomegaly  	BACK: no spinal tenderness, no CVA tenderness  	EXTREMITIES: 2+ pitting edema to thighs bilaterally, +large 15cm x 20cm hematoma on R hip, +fracture in R arm with sling  NEUROLOGY: AAOx2 (knows name and location, does not know year or president    LABS:                        9.7    11.63 )-----------( 207      ( 03 Oct 2019 14:10 )             32.0     Hemoglobin: 9.7 g/dL (10-03 @ 14:10)  Hemoglobin: 10.0 g/dL (10-01 @ 15:58)  Hemoglobin: 11.2 g/dL (10-01 @ 09:15)    CBC Full  -  ( 03 Oct 2019 14:10 )  WBC Count : 11.63 K/uL  RBC Count : 3.52 M/uL  Hemoglobin : 9.7 g/dL  Hematocrit : 32.0 %  Platelet Count - Automated : 207 K/uL  Mean Cell Volume : 90.9 fl  Mean Cell Hemoglobin : 27.6 pg  Mean Cell Hemoglobin Concentration : 30.3 gm/dL  Auto Neutrophil # : 10.58 K/uL  Auto Lymphocyte # : 0.58 K/uL  Auto Monocyte # : 0.35 K/uL  Auto Eosinophil # : 0.12 K/uL  Auto Basophil # : 0.00 K/uL  Auto Neutrophil % : 65.0 %  Auto Lymphocyte % : 5.0 %  Auto Monocyte % : 3.0 %  Auto Eosinophil % : 1.0 %  Auto Basophil % : 0.0 %    10-04    137  |  96  |  38<H>  ----------------------------<  155<H>  4.7   |  27  |  1.18    Ca    8.1<L>      04 Oct 2019 05:44  Phos  5.5     10-  Mg     1.6     10-04    TPro  5.3<L>  /  Alb  2.7<L>  /  TBili  0.6  /  DBili  x   /  AST  13  /  ALT  22  /  AlkPhos  82  10-03    Creatinine Trend: 1.18<--, 1.20<--, 1.07<--, 1.15<--, 1.11<--, 1.14<--  LIVER FUNCTIONS - ( 03 Oct 2019 14:11 )  Alb: 2.7 g/dL / Pro: 5.3 g/dL / ALK PHOS: 82 U/L / ALT: 22 U/L / AST: 13 U/L / GGT: x           PT/INR - ( 03 Oct 2019 14:11 )   PT: 12.3 sec;   INR: 1.08 ratio         PTT - ( 03 Oct 2019 14:11 )  PTT:29.3 sec    hs Troponin:        05:49 - VBG - pH: 7.30  | pCO2: 66    | pO2: 163   | Lactate: 3.4    14:01 - VBG - pH: 7.28  | pCO2: 76    | pO2: 26    | Lactate: 3.2        Urinalysis Basic - ( 03 Oct 2019 14:10 )    Color: Yellow / Appearance: Clear / S.014 / pH: x  Gluc: x / Ketone: Negative  / Bili: Negative / Urobili: Negative   Blood: x / Protein: 100 / Nitrite: Negative   Leuk Esterase: Negative / RBC: 18 /hpf / WBC 2 /HPF   Sq Epi: x / Non Sq Epi: 0 /hpf / Bacteria: Negative      CSF:                      EKG:   MICROBIOLOGY:    Culture - Blood (collected 03 Oct 2019 17:22)  Source: .Blood  Gram Stain (04 Oct 2019 00:37):    Growth in aerobic bottle: Gram Negative Rods    Growth in anaerobic bottle: Gram Negative Rods  Preliminary Report (04 Oct 2019 00:38):    Growth in anaerobic bottle: Gram Negative Rods    Growth in aerobic bottle: Gram Negative Rods    "Due to technical problems, Proteus sp. will Not be reported as part of    the BCID panel until further notice"    ***Blood Panel PCR results on this specimenare available    approximately 3 hours after the Gram stain result.***    Gram stain, PCR, and/or culture results may not always    correspond due to difference in methodologies.    ************************************************************    This PCR assaywas performed using [employer].    The following targets are tested for: Enterococcus,    vancomycin resistant enterococci, Listeria monocytogenes,    coagulase negative staphylococci, S. aureus,    methicillin resistant S. aureus, Streptococcus agalactiae    (Group B), S. pneumoniae, S. pyogenes (Group A),    Acinetobacter baumannii, Enterobacter cloacae, E. coli,    Klebsiella oxytoca, K. pneumoniae, Proteus sp.,    Serratia marcescens, Haemophilus influenzae,    Neisseria meningitidis, Pseudomonas aeruginosa, Candida    albicans, C. glabrata, C krusei, C parapsilosis,    C. tropicalis and the KPC resistance gene.  Organism: Blood Culture PCR (04 Oct 2019 01:48)  Organism: Blood Culture PCR (04 Oct 2019 01:48)    Culture - Blood (collected 03 Oct 2019 17:22)  Source: .Blood  Gram Stain (04 Oct 2019 00:18):    Growth in aerobic bottle: Gram Negative Rods    Growth in anaerobic bottle: Gram Negative Rods  Preliminary Report (04 Oct 2019 00:18):    Growth in aerobic bottle: Gram Negative Rods    Growth in anaerobic bottle: Gram Negative Rods      IMAGING:      Labs, imaging, EKG personally reviewed     CONSULTS: Internal Medicine Progress Note    Roberth Delvis  PGY-1 Internal Medicine  [phone]    Patient is a 85y old  Female who presents with a chief complaint of Fever (03 Oct 2019 18:30)      SUBJECTIVE / OVERNIGHT EVENTS:  -blood cx positive for ecoli started on Meropenem   -was hypotensive 70's/50s overnight, given 500mL bolus, then BP corrected to 110s  -also developed afib rvr today but improved with po metoprolol  -still with ongoing cough, SOB, arm pain per baseline  -no chest pain, abdominal pain, headache, visual changes    At 11AM, team paged about tachycardia. EKG obtained showing Afib, rates 110-140. Restarted home metoprolol 100 BID, also gave robitussin for cough, alprazolam standing order for anxiety.     MEDICATIONS  (STANDING):  ALBUTerol    90 MICROgram(s) HFA Inhaler 2 Puff(s) Inhalation every 6 hours  atorvastatin 10 milliGRAM(s) Oral at bedtime  dextrose 5%. 1000 milliLiter(s) (50 mL/Hr) IV Continuous <Continuous>  dextrose 50% Injectable 12.5 Gram(s) IV Push once  docusate sodium 100 milliGRAM(s) Oral three times a day  entecavir 0.5 milliGRAM(s) Oral daily  insulin lispro (HumaLOG) corrective regimen sliding scale   SubCutaneous three times a day before meals  montelukast 10 milliGRAM(s) Oral daily  pantoprazole    Tablet 40 milliGRAM(s) Oral before breakfast  predniSONE   Tablet 20 milliGRAM(s) Oral three times a day  senna 2 Tablet(s) Oral at bedtime  sertraline 50 milliGRAM(s) Oral daily  tiotropium 18 MICROgram(s) Capsule 1 Capsule(s) Inhalation daily    MEDICATIONS  (PRN):  acetaminophen   Tablet .. 650 milliGRAM(s) Oral every 6 hours PRN Mild Pain (1 - 3)  ALPRAZolam 0.5 milliGRAM(s) Oral two times a day PRN Anxiety and Insomnia  dextrose 40% Gel 15 Gram(s) Oral once PRN Blood Glucose LESS THAN 70 milliGRAM(s)/deciliter  glucagon  Injectable 1 milliGRAM(s) IntraMuscular once PRN Glucose LESS THAN 70 milligrams/deciliter  oxyCODONE    5 mG/acetaminophen 325 mG 1 Tablet(s) Oral every 6 hours PRN Severe Pain (7 - 10)  polyethylene glycol 3350 17 Gram(s) Oral daily PRN Constiapation    Vital Signs Last 24 Hrs  T(C): 36.4 (04 Oct 2019 03:58), Max: 39.6 (03 Oct 2019 12:40)  T(F): 97.5 (04 Oct 2019 03:58), Max: 103.3 (03 Oct 2019 12:40)  HR: 93 (04 Oct 2019 03:58) (86 - 120)  BP: 112/72 (04 Oct 2019 03:58) (75/50 - 123/99)  BP(mean): --  RR: 19 (04 Oct 2019 03:58) (16 - 26)  SpO2: 100% (04 Oct 2019 03:58) (92% - 100%)    CAPILLARY BLOOD GLUCOSE      POCT Blood Glucose.: 118 mg/dL (03 Oct 2019 21:01)    I&O's Summary    03 Oct 2019 07:01  -  04 Oct 2019 07:00  --------------------------------------------------------  IN: 500 mL / OUT: 0 mL / NET: 500 mL        PHYSICAL EXAM  GENERAL: elderly woman lying in bed, uncomfortable, groaning in pain  	HEAD:  +mild ecchymoses throughout face from prior fall  	ENT: PERRL, conjunctiva and sclera clear, neck supple, no JVD, moist mucosa, posterior oropharynx clear  	CHEST/LUNG: (difficult to auscultate since pt persistently moaning). lungs CTAB, poor inspiratory effort  	HEART: Regular rate and rhythm; No murmurs, rubs, or gallops  	ABDOMEN: Soft, nontender, nondistended; Bowel sounds present, no organomegaly  	BACK: no spinal tenderness, no CVA tenderness  	EXTREMITIES: trace edema to thighs bilaterally, +fracture in R arm with sling  NEUROLOGY: AAOx2 (knows name and location, does not know year or president  Skin: erythema of left hip moving up pelvis    LABS:                        9.7    11.63 )-----------( 207      ( 03 Oct 2019 14:10 )             32.0     Hemoglobin: 9.7 g/dL (10 @ 14:10)  Hemoglobin: 10.0 g/dL (10-01 @ 15:58)  Hemoglobin: 11.2 g/dL (10-01 @ 09:15)    CBC Full  -  ( 03 Oct 2019 14:10 )  WBC Count : 11.63 K/uL  RBC Count : 3.52 M/uL  Hemoglobin : 9.7 g/dL  Hematocrit : 32.0 %  Platelet Count - Automated : 207 K/uL  Mean Cell Volume : 90.9 fl  Mean Cell Hemoglobin : 27.6 pg  Mean Cell Hemoglobin Concentration : 30.3 gm/dL  Auto Neutrophil # : 10.58 K/uL  Auto Lymphocyte # : 0.58 K/uL  Auto Monocyte # : 0.35 K/uL  Auto Eosinophil # : 0.12 K/uL  Auto Basophil # : 0.00 K/uL  Auto Neutrophil % : 65.0 %  Auto Lymphocyte % : 5.0 %  Auto Monocyte % : 3.0 %  Auto Eosinophil % : 1.0 %  Auto Basophil % : 0.0 %    10-04    137  |  96  |  38<H>  ----------------------------<  155<H>  4.7   |  27  |  1.18    Ca    8.1<L>      04 Oct 2019 05:44  Phos  5.5     10-  Mg     1.6     10-04    TPro  5.3<L>  /  Alb  2.7<L>  /  TBili  0.6  /  DBili  x   /  AST  13  /  ALT  22  /  AlkPhos  82  10-03    Creatinine Trend: 1.18<--, 1.20<--, 1.07<--, 1.15<--, 1.11<--, 1.14<--  LIVER FUNCTIONS - ( 03 Oct 2019 14:11 )  Alb: 2.7 g/dL / Pro: 5.3 g/dL / ALK PHOS: 82 U/L / ALT: 22 U/L / AST: 13 U/L / GGT: x           PT/INR - ( 03 Oct 2019 14:11 )   PT: 12.3 sec;   INR: 1.08 ratio         PTT - ( 03 Oct 2019 14:11 )  PTT:29.3 sec    hs Troponin:        05:49 - VBG - pH: 7.30  | pCO2: 66    | pO2: 163   | Lactate: 3.4    14:01 - VBG - pH: 7.28  | pCO2: 76    | pO2: 26    | Lactate: 3.2        Urinalysis Basic - ( 03 Oct 2019 14:10 )    Color: Yellow / Appearance: Clear / S.014 / pH: x  Gluc: x / Ketone: Negative  / Bili: Negative / Urobili: Negative   Blood: x / Protein: 100 / Nitrite: Negative   Leuk Esterase: Negative / RBC: 18 /hpf / WBC 2 /HPF   Sq Epi: x / Non Sq Epi: 0 /hpf / Bacteria: Negative      CSF:                      EKG:   MICROBIOLOGY:    Culture - Blood (collected 03 Oct 2019 17:22)  Source: .Blood  Gram Stain (04 Oct 2019 00:37):    Growth in aerobic bottle: Gram Negative Rods    Growth in anaerobic bottle: Gram Negative Rods  Preliminary Report (04 Oct 2019 00:38):    Growth in anaerobic bottle: Gram Negative Rods    Growth in aerobic bottle: Gram Negative Rods    "Due to technical problems, Proteus sp. will Not be reported as part of    the BCID panel until further notice"    ***Blood Panel PCR results on this specimenare available    approximately 3 hours after the Gram stain result.***    Gram stain, PCR, and/or culture results may not always    correspond due to difference in methodologies.    ************************************************************    This PCR assaywas performed using [employer].    The following targets are tested for: Enterococcus,    vancomycin resistant enterococci, Listeria monocytogenes,    coagulase negative staphylococci, S. aureus,    methicillin resistant S. aureus, Streptococcus agalactiae    (Group B), S. pneumoniae, S. pyogenes (Group A),    Acinetobacter baumannii, Enterobacter cloacae, E. coli,    Klebsiella oxytoca, K. pneumoniae, Proteus sp.,    Serratia marcescens, Haemophilus influenzae,    Neisseria meningitidis, Pseudomonas aeruginosa, Candida    albicans, C. glabrata, C krusei, C parapsilosis,    C. tropicalis and the KPC resistance gene.  Organism: Blood Culture PCR (04 Oct 2019 01:48)  Organism: Blood Culture PCR (04 Oct 2019 01:48)    Culture - Blood (collected 03 Oct 2019 17:22)  Source: .Blood  Gram Stain (04 Oct 2019 00:18):    Growth in aerobic bottle: Gram Negative Rods    Growth in anaerobic bottle: Gram Negative Rods  Preliminary Report (04 Oct 2019 00:18):    Growth in aerobic bottle: Gram Negative Rods    Growth in anaerobic bottle: Gram Negative Rods      IMAGING:      Labs, imaging, EKG personally reviewed     CONSULTS:

## 2019-10-04 NOTE — PROVIDER CONTACT NOTE (CRITICAL VALUE NOTIFICATION) - SITUATION
Pt admitted for fever, positive for parainfluenza, droplet precautions Pt admitted for fever, positive for parainfluenza, droplet precautions, fall, broken hip, dislocated shoulder

## 2019-10-05 NOTE — PROGRESS NOTE ADULT - PROBLEM SELECTOR PLAN 1
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 Recommend continuing boluses PRN for MAP <65. Lung exam clear, trace LE edema, seems euvolemic at this time.   -Hold all antihypertensives.  -started midodrine 10mg TID and uptitrate if BP remains persistently in systolics 90s.  -switched lopressor to digoxin given low pressures.  -Lactate increased likely in setting of bacteremia and rapid afib, relatively decreased flow state. Would benefit from gentle rehydration.  -Hemodynamically stable.  -c/w meropenem  1g q12 per ID  -vitals q4  -lactate 3.4, repeat now, s/p 1.5L of IVF, consider additional IVF as clinically warranted  -hold home po prednisone 60mg, change to IV stress dose steroids hydrocortisone 100mg q8 given sepsis with borderline BPs secondary to recurrent ecoli bacteremia due to unknown etiology and parainfluenza virus  -possible cellulitis of infected left hip joint vs hematoma vs intraabdominal infection given recent UTI  -get CT-Abd-Pelvis  -If hip joint appears infected will need to be aspirated  -repeat Bcx  -Pt's blood pressures are responsive to IVFs at this time. Recommend continuing boluses PRN for MAP <65. Lung exam clear, trace LE edema, seems euvolemic at this time.   -Hold all antihypertensives.  -started midodrine 10mg TID and uptitrate if BP remains persistently in systolics 90s.  -switched lopressor to digoxin given low pressures.  -Lactate increased likely in setting of bacteremia and rapid afib, relatively decreased flow state. Would benefit from gentle rehydration.  -Hemodynamically stable.  -c/w meropenem  1g q12 per ID  -vitals q4  -lactate 3.4, repeat now, s/p 1.5L of IVF, consider additional IVF as clinically warranted  -hold home po prednisone 60mg, change to IV stress dose steroids hydrocortisone 100mg q8 given sepsis with borderline BPs  -ortho consult to tap joint secondary to recurrent ecoli bacteremia due to unknown etiology and parainfluenza virus  -possible cellulitis of infected left hip joint vs hematoma vs intraabdominal infection given recent UTI  -get CT-Abd-Pelvis  -repeat Bcx  -Pt's blood pressures are responsive to IVFs at this time. Recommend continuing boluses PRN for MAP <65. Lung exam clear, trace LE edema, seems euvolemic at this time.   -Hold all antihypertensives.  -started midodrine 10mg TID and uptitrate if BP remains persistently in systolics 90s.  -switched lopressor to digoxin given low pressures.  -Lactate increased likely in setting of bacteremia and rapid afib, relatively decreased flow state. Would benefit from gentle rehydration.  -Hemodynamically stable.  -c/w meropenem  1g q12 per ID  -vitals q4  -lactate 3.4, repeat now, s/p 1.5L of IVF, consider additional IVF as clinically warranted  -hold home po prednisone 60mg, change to IV stress dose steroids hydrocortisone 100mg q8 given sepsis with borderline BPs  -per ortho over phone, does not appear that hip is source of infection. likely retroperitoneal. will discuss with gen surg secondary to recurrent ecoli bacteremia due to unknown etiology and parainfluenza virus  -possible cellulitis of infected left hip joint vs hematoma vs intraabdominal infection given recent UTI  -get CT-Abd-Pelvis  -repeat Bcx  -Pt's blood pressures are responsive to IVFs at this time. Recommend continuing boluses PRN for MAP <65. Lung exam clear, trace LE edema, seems euvolemic at this time.   -Hold all antihypertensives.  -started midodrine 10mg TID and uptitrate if BP remains persistently in systolics 90s.  -switched lopressor to digoxin given low pressures.  -Lactate increased likely in setting of bacteremia and rapid afib, relatively decreased flow state. Would benefit from gentle rehydration.  -Hemodynamically stable.  -c/w meropenem  1g q12 per ID  -vitals q4  -will repeat lactate and CBC stat. Transfuse as needed  -hold home po prednisone 60mg, change to IV stress dose steroids hydrocortisone 100mg q8 given sepsis with borderline BPs  -per ortho over phone, does not appear that hip is source of infection. likely retroperitoneal.  -gen surg consulted secondary to recurrent ecoli bacteremia due to unknown etiology and parainfluenza virus  -possible cellulitis of infected left hip joint vs hematoma vs intraabdominal infection given recent UTI  -CT-Abd-Pelvis read noted.  -repeat Bcx pending  -Pt's blood pressures are responsive to IVFs at this time. Recommend continuing boluses PRN for MAP <65. Lung exam clear, trace LE edema, seems euvolemic at this time.   -Hold all antihypertensives.  -started midodrine 10mg TID and uptitrate if BP remains persistently in systolics 90s.  -switched lopressor to digoxin given low pressures.  -Lactate q4 hrs.   -Hemodynamically stable.  -c/w meropenem  1g q12 per ID  -vitals q4  -will repeat lactate and CBC stat. Transfuse as needed  -hold home po prednisone 60mg, change to IV stress dose steroids hydrocortisone 100mg q8 given sepsis with borderline BPs  -per ortho over phone, does not appear that hip is source of infection. likely retroperitoneal.  -gen surg consulted

## 2019-10-05 NOTE — PROGRESS NOTE ADULT - ATTENDING COMMENTS
Patient seen and examined today. I agree with Dr. Edmondson's findings, assessment, and plan with the following additions and exceptions:     Orthostatics negative.   Consult General Surgery   Trend lactate q4 hrs till clearance. Downtrending however if repeat is the same or up-trending would give isotonic crystalloid and monitor for volume overload. Suspect more from sepsis than acute blood loss anemia.   Monitor cbc closely. Mildly downtrend however vital stable as above.   With steroid induced hyperglycemia now. Start moderate sliding scale insulin for now. Will need insulin dosing tonight.   ID, MICU and General surgery contribution to care greatly appreciated.   Low threshold for reconsulting MICU.   Rest of plan as above    Dr. Rafi Staley, DO  Division of Hospital Medicine  [facility]  Pager:  [phone] Patient seen and examined today. I agree with Dr. Edmondson's findings, assessment, and plan with the following additions and exceptions:     Orthostatics negative.   Consult General Surgery   Trend lactate q4 hrs till clearance. Downtrending however if repeat is the same or up-trending would give isotonic crystalloid and monitor for volume overload. Suspect more from sepsis than acute blood loss anemia.   Monitor cbc closely. Mildly downtrend however vital stable as above.   T&S sent. Consented for blood product transfusion.   With steroid induced hyperglycemia now. Start moderate sliding scale insulin for now. Will need insulin dosing tonight.   ID, MICU and General surgery contribution to care greatly appreciated.   Low threshold for reconsulting MICU.   Rest of plan as above    Dr. Rafi Staley, DO  Division of Hospital Medicine  [facility]  Pager:  [phone]

## 2019-10-05 NOTE — DIETITIAN INITIAL EVALUATION ADULT. - PROBLEM SELECTOR PLAN 2
-likely 2/2 parainfluenza infection  -holding IVF in setting of significant edema  -trending CBC and lactate  -f/u blood cultures and urine cultures

## 2019-10-05 NOTE — DIETITIAN INITIAL EVALUATION ADULT. - OTHER INFO
Reason for admission :  Fever  Diet PTA : cereal, bread, butter, tea, coffee, skips lunch, chicken, rice vegetables for dinner. snacks on cookies (low sodium, low sugar due to steroids)  Intake : 50-75%  son Denies nausea/vomit :  son Denies difficulty chewing /swallow : she does need soft diet  Last BM : today  NKFA  IBW +/- 10%=125pounds  Ht:65"  Weight PTA:150pounds  BMI: 28.2  Education Provided : son refused  skin: stage 2 sacrum  edema:+3 right arm, left leg, right leg

## 2019-10-05 NOTE — PROGRESS NOTE ADULT - SUBJECTIVE AND OBJECTIVE BOX
-doing well overnight, cough has improved   -CT scan showed hematoma vs. superinfection  -not MICU candidate at this time, but recs appreciated Internal Medicine Progress Note    Roberth Edmondson  PGY-1 Internal Medicine  [id number]    Patient is a 85y old  Female who presents with a chief complaint of Fever (05 Oct 2019 07:09)      SUBJECTIVE / OVERNIGHT EVENTS:  -doing well overnight, cough has improved   -CT scan showed hematoma vs. superinfection  -not MICU candidate at this time, but recs appreciated  -pt is hard stick, AM labs usually delayed    MEDICATIONS  (STANDING):  ALBUTerol    90 MICROgram(s) HFA Inhaler 2 Puff(s) Inhalation every 6 hours  ALPRAZolam 0.5 milliGRAM(s) Oral two times a day  atorvastatin 10 milliGRAM(s) Oral at bedtime  dextrose 5%. 1000 milliLiter(s) (50 mL/Hr) IV Continuous <Continuous>  dextrose 50% Injectable 12.5 Gram(s) IV Push once  entecavir 0.5 milliGRAM(s) Oral daily  hydrocortisone sodium succinate Injectable 100 milliGRAM(s) IV Push every 8 hours  insulin lispro (HumaLOG) corrective regimen sliding scale   SubCutaneous three times a day before meals  melatonin 5 milliGRAM(s) Oral at bedtime  meropenem  IVPB 1000 milliGRAM(s) IV Intermittent every 12 hours  midodrine. 10 milliGRAM(s) Oral three times a day  montelukast 10 milliGRAM(s) Oral daily  pantoprazole    Tablet 40 milliGRAM(s) Oral before breakfast  sertraline 50 milliGRAM(s) Oral daily  tiotropium 18 MICROgram(s) Capsule 1 Capsule(s) Inhalation daily    MEDICATIONS  (PRN):  acetaminophen   Tablet .. 650 milliGRAM(s) Oral every 6 hours PRN Mild Pain (1 - 3)  dextrose 40% Gel 15 Gram(s) Oral once PRN Blood Glucose LESS THAN 70 milliGRAM(s)/deciliter  docusate sodium 100 milliGRAM(s) Oral three times a day PRN Constipation  glucagon  Injectable 1 milliGRAM(s) IntraMuscular once PRN Glucose LESS THAN 70 milligrams/deciliter  guaiFENesin   Syrup  (Sugar-Free) 100 milliGRAM(s) Oral every 6 hours PRN Cough  oxyCODONE    5 mG/acetaminophen 325 mG 1 Tablet(s) Oral every 6 hours PRN Severe Pain (7 - 10)  polyethylene glycol 3350 17 Gram(s) Oral daily PRN Constiapation  senna 2 Tablet(s) Oral at bedtime PRN Constipation    Vital Signs Last 24 Hrs  T(C): 36.7 (05 Oct 2019 04:35), Max: 36.8 (05 Oct 2019 01:03)  T(F): 98 (05 Oct 2019 04:35), Max: 98.2 (05 Oct 2019 01:03)  HR: 109 (05 Oct 2019 04:35) (93 - 146)  BP: 104/75 (05 Oct 2019 04:35) (90/63 - 122/79)  BP(mean): --  RR: 18 (05 Oct 2019 04:35) (18 - 19)  SpO2: 98% (05 Oct 2019 04:35) (95% - 100%)    CAPILLARY BLOOD GLUCOSE      POCT Blood Glucose.: 249 mg/dL (04 Oct 2019 21:10)  POCT Blood Glucose.: 263 mg/dL (04 Oct 2019 17:32)  POCT Blood Glucose.: 262 mg/dL (04 Oct 2019 12:18)  POCT Blood Glucose.: 168 mg/dL (04 Oct 2019 08:38)    I&O's Summary    04 Oct 2019 07:01  -  05 Oct 2019 07:00  --------------------------------------------------------  IN: 700 mL / OUT: 600 mL / NET: 100 mL        PHYSICAL EXAM  GENERAL: elderly woman lying in bed, uncomfortable, groaning in pain  	HEAD:  +mild ecchymoses throughout face from prior fall  	ENT: PERRL, conjunctiva and sclera clear, neck supple, no JVD, moist mucosa, posterior oropharynx clear  	CHEST/LUNG: (difficult to auscultate since pt persistently moaning). lungs CTAB, poor inspiratory effort  	HEART: Regular rate and rhythm; No murmurs, rubs, or gallops  	ABDOMEN: Soft, nontender, nondistended; Bowel sounds present, no organomegaly  	BACK: no spinal tenderness, no CVA tenderness  	EXTREMITIES: trace edema to thighs bilaterally, +fracture in R arm with sling  NEUROLOGY: AAOx2 (knows name and location, does not know year or president  Skin: erythema of left hip moving up pelvis      LABS:                        8.9    18.35 )-----------( 151      ( 04 Oct 2019 08:25 )             30.0     Hemoglobin: 8.9 g/dL (10-04 @ 08:25)  Hemoglobin: 9.7 g/dL (10-03 @ 14:10)  Hemoglobin: 10.0 g/dL (10-01 @ 15:58)  Hemoglobin: 11.2 g/dL (10-01 @ 09:15)    CBC Full  -  ( 04 Oct 2019 08:25 )  WBC Count : 18.35 K/uL  RBC Count : 3.23 M/uL  Hemoglobin : 8.9 g/dL  Hematocrit : 30.0 %  Platelet Count - Automated : 151 K/uL  Mean Cell Volume : 92.9 fl  Mean Cell Hemoglobin : 27.6 pg  Mean Cell Hemoglobin Concentration : 29.7 gm/dL  Auto Neutrophil # : x  Auto Lymphocyte # : x  Auto Monocyte # : x  Auto Eosinophil # : x  Auto Basophil # : x  Auto Neutrophil % : x  Auto Lymphocyte % : x  Auto Monocyte % : x  Auto Eosinophil % : x  Auto Basophil % : x    10-04    137  |  96  |  38<H>  ----------------------------<  155<H>  4.7   |  27  |  1.18    Ca    8.1<L>      04 Oct 2019 05:44  Phos  5.5     10  Mg     1.6     10-04    TPro  5.3<L>  /  Alb  2.7<L>  /  TBili  0.6  /  DBili  x   /  AST  13  /  ALT  22  /  AlkPhos  82  10-03    Creatinine Trend: 1.18<--, 1.20<--, 1.07<--, 1.15<--, 1.11<--, 1.14<--  LIVER FUNCTIONS - ( 03 Oct 2019 14:11 )  Alb: 2.7 g/dL / Pro: 5.3 g/dL / ALK PHOS: 82 U/L / ALT: 22 U/L / AST: 13 U/L / GGT: x           PT/INR - ( 03 Oct 2019 14:11 )   PT: 12.3 sec;   INR: 1.08 ratio         PTT - ( 03 Oct 2019 14:11 )  PTT:29.3 sec    hs Troponin:        15:53 - VBG - pH:       | pCO2:       | pO2:       | Lactate: 4.2        Urinalysis Basic - ( 03 Oct 2019 14:10 )    Color: Yellow / Appearance: Clear / S.014 / pH: x  Gluc: x / Ketone: Negative  / Bili: Negative / Urobili: Negative   Blood: x / Protein: 100 / Nitrite: Negative   Leuk Esterase: Negative / RBC: 18 /hpf / WBC 2 /HPF   Sq Epi: x / Non Sq Epi: 0 /hpf / Bacteria: Negative      CSF:                      EKG:   MICROBIOLOGY:    Culture - Blood (collected 03 Oct 2019 17:22)  Source: .Blood  Gram Stain (04 Oct 2019 00:37):    Growth in aerobic bottle: Gram Negative Rods    Growth in anaerobic bottle: Gram Negative Rods  Preliminary Report (04 Oct 2019 19:32):    Growth in anaerobic bottle: Escherichia coli    Growth in aerobic bottle: Gram Negative Rods    "Due to technical problems, Proteus sp. will Not be reported as part of    the BCID panel until further notice"    ***Blood Panel PCR results on this specimen are available    approximately 3 hours after the Gram stain result.***    Gram stain, PCR, and/or culture results may not always    correspond due to difference in methodologies.    ************************************************************    This PCR assay was performed using [employer].    The following targets are tested for: Enterococcus,    vancomycin resistant enterococci, Listeria monocytogenes,    coagulase negative staphylococci, S. aureus,    methicillin resistant S. aureus, Streptococcus agalactiae    (Group B), S. pneumoniae, S. pyogenes (Group A),    Acinetobacter baumannii, Enterobacter cloacae, E. coli,    Klebsiella oxytoca, K. pneumoniae, Proteus sp.,    Serratia marcescens, Haemophilus influenzae,    Neisseria meningitidis, Pseudomonas aeruginosa, Candida    albicans, C. glabrata, C krusei, C parapsilosis,    C. tropicalis and the KPC resistance gene.  Organism: Blood Culture PCR (04 Oct 2019 01:48)  Organism: Blood Culture PCR (04 Oct 2019 01:48)    Culture - Blood (collected 03 Oct 2019 17:22)  Source: .Blood  Gram Stain (04 Oct 2019 00:18):    Growth in aerobic bottle: Gram Negative Rods    Growth in anaerobic bottle: Gram Negative Rods  Preliminary Report (04 Oct 2019 19:39):    Growth in aerobic and anaerobic bottles: Escherichia coli    See previous culture [id number]    Culture - Urine (collected 03 Oct 2019 17:20)  Source: .Urine  Final Report (04 Oct 2019 16:50):    No growth      IMAGING:      Labs, imaging, EKG personally reviewed     CONSULTS: Internal Medicine Progress Note    Roberth Edmondson  PGY-1 Internal Medicine  [id number]    Patient is a 85y old  Female who presents with a chief complaint of Fever (05 Oct 2019 07:09)      SUBJECTIVE / OVERNIGHT EVENTS:  -doing well overnight, cough has improved   -CT scan showed hematoma vs. superinfection  -not MICU candidate at this time, but recs appreciated  -pt is hard stick, AM labs usually delayed    MEDICATIONS  (STANDING):  ALBUTerol    90 MICROgram(s) HFA Inhaler 2 Puff(s) Inhalation every 6 hours  ALPRAZolam 0.5 milliGRAM(s) Oral two times a day  atorvastatin 10 milliGRAM(s) Oral at bedtime  dextrose 5%. 1000 milliLiter(s) (50 mL/Hr) IV Continuous <Continuous>  dextrose 50% Injectable 12.5 Gram(s) IV Push once  entecavir 0.5 milliGRAM(s) Oral daily  hydrocortisone sodium succinate Injectable 100 milliGRAM(s) IV Push every 8 hours  insulin lispro (HumaLOG) corrective regimen sliding scale   SubCutaneous three times a day before meals  melatonin 5 milliGRAM(s) Oral at bedtime  meropenem  IVPB 1000 milliGRAM(s) IV Intermittent every 12 hours  midodrine. 10 milliGRAM(s) Oral three times a day  montelukast 10 milliGRAM(s) Oral daily  pantoprazole    Tablet 40 milliGRAM(s) Oral before breakfast  sertraline 50 milliGRAM(s) Oral daily  tiotropium 18 MICROgram(s) Capsule 1 Capsule(s) Inhalation daily    MEDICATIONS  (PRN):  acetaminophen   Tablet .. 650 milliGRAM(s) Oral every 6 hours PRN Mild Pain (1 - 3)  dextrose 40% Gel 15 Gram(s) Oral once PRN Blood Glucose LESS THAN 70 milliGRAM(s)/deciliter  docusate sodium 100 milliGRAM(s) Oral three times a day PRN Constipation  glucagon  Injectable 1 milliGRAM(s) IntraMuscular once PRN Glucose LESS THAN 70 milligrams/deciliter  guaiFENesin   Syrup  (Sugar-Free) 100 milliGRAM(s) Oral every 6 hours PRN Cough  oxyCODONE    5 mG/acetaminophen 325 mG 1 Tablet(s) Oral every 6 hours PRN Severe Pain (7 - 10)  polyethylene glycol 3350 17 Gram(s) Oral daily PRN Constiapation  senna 2 Tablet(s) Oral at bedtime PRN Constipation    Vital Signs Last 24 Hrs  T(C): 36.7 (05 Oct 2019 04:35), Max: 36.8 (05 Oct 2019 01:03)  T(F): 98 (05 Oct 2019 04:35), Max: 98.2 (05 Oct 2019 01:03)  HR: 109 (05 Oct 2019 04:35) (93 - 146)  BP: 104/75 (05 Oct 2019 04:35) (90/63 - 122/79)  BP(mean): --  RR: 18 (05 Oct 2019 04:35) (18 - 19)  SpO2: 98% (05 Oct 2019 04:35) (95% - 100%)    CAPILLARY BLOOD GLUCOSE      POCT Blood Glucose.: 249 mg/dL (04 Oct 2019 21:10)  POCT Blood Glucose.: 263 mg/dL (04 Oct 2019 17:32)  POCT Blood Glucose.: 262 mg/dL (04 Oct 2019 12:18)  POCT Blood Glucose.: 168 mg/dL (04 Oct 2019 08:38)    I&O's Summary    04 Oct 2019 07:01  -  05 Oct 2019 07:00  --------------------------------------------------------  IN: 700 mL / OUT: 600 mL / NET: 100 mL        PHYSICAL EXAM  GENERAL: elderly woman lying in bed, uncomfortable, groaning in pain  	HEAD:  +mild ecchymoses throughout face from prior fall  	ENT: PERRL, conjunctiva and sclera clear, neck supple, no JVD, moist mucosa, posterior oropharynx clear  	CHEST/LUNG: (difficult to auscultate since pt persistently moaning). lungs CTAB, poor inspiratory effort  	HEART: Regular rate and rhythm; No murmurs, rubs, or gallops  	ABDOMEN: Soft, nontender, nondistended; Bowel sounds present, no organomegaly  	BACK: no spinal tenderness, no CVA tenderness  	EXTREMITIES: trace edema to thighs bilaterally, +fracture in R arm with sling  NEUROLOGY: AAOx2 (knows name and location, does not know year or president  Skin: erythema of left hip moving up pelvis      LABS:                        8.9    18.35 )-----------( 151      ( 04 Oct 2019 08:25 )             30.0     Hemoglobin: 8.9 g/dL (10-04 @ 08:25)  Hemoglobin: 9.7 g/dL (10-03 @ 14:10)  Hemoglobin: 10.0 g/dL (10-01 @ 15:58)  Hemoglobin: 11.2 g/dL (10-01 @ 09:15)    CBC Full  -  ( 04 Oct 2019 08:25 )  WBC Count : 18.35 K/uL  RBC Count : 3.23 M/uL  Hemoglobin : 8.9 g/dL  Hematocrit : 30.0 %  Platelet Count - Automated : 151 K/uL  Mean Cell Volume : 92.9 fl  Mean Cell Hemoglobin : 27.6 pg  Mean Cell Hemoglobin Concentration : 29.7 gm/dL  Auto Neutrophil # : x  Auto Lymphocyte # : x  Auto Monocyte # : x  Auto Eosinophil # : x  Auto Basophil # : x  Auto Neutrophil % : x  Auto Lymphocyte % : x  Auto Monocyte % : x  Auto Eosinophil % : x  Auto Basophil % : x    10-04    137  |  96  |  38<H>  ----------------------------<  155<H>  4.7   |  27  |  1.18    Ca    8.1<L>      04 Oct 2019 05:44  Phos  5.5     10  Mg     1.6     10-04    TPro  5.3<L>  /  Alb  2.7<L>  /  TBili  0.6  /  DBili  x   /  AST  13  /  ALT  22  /  AlkPhos  82  10-03    Creatinine Trend: 1.18<--, 1.20<--, 1.07<--, 1.15<--, 1.11<--, 1.14<--  LIVER FUNCTIONS - ( 03 Oct 2019 14:11 )  Alb: 2.7 g/dL / Pro: 5.3 g/dL / ALK PHOS: 82 U/L / ALT: 22 U/L / AST: 13 U/L / GGT: x           PT/INR - ( 03 Oct 2019 14:11 )   PT: 12.3 sec;   INR: 1.08 ratio         PTT - ( 03 Oct 2019 14:11 )  PTT:29.3 sec    hs Troponin:        15:53 - VBG - pH:       | pCO2:       | pO2:       | Lactate: 4.2        Urinalysis Basic - ( 03 Oct 2019 14:10 )    Color: Yellow / Appearance: Clear / S.014 / pH: x  Gluc: x / Ketone: Negative  / Bili: Negative / Urobili: Negative   Blood: x / Protein: 100 / Nitrite: Negative   Leuk Esterase: Negative / RBC: 18 /hpf / WBC 2 /HPF   Sq Epi: x / Non Sq Epi: 0 /hpf / Bacteria: Negative      CSF:                      EKG:   MICROBIOLOGY:    Culture - Blood (collected 03 Oct 2019 17:22)  Source: .Blood  Gram Stain (04 Oct 2019 00:37):    Growth in aerobic bottle: Gram Negative Rods    Growth in anaerobic bottle: Gram Negative Rods  Preliminary Report (04 Oct 2019 19:32):    Growth in anaerobic bottle: Escherichia coli    Growth in aerobic bottle: Gram Negative Rods    "Due to technical problems, Proteus sp. will Not be reported as part of    the BCID panel until further notice"    ***Blood Panel PCR results on this specimen are available    approximately 3 hours after the Gram stain result.***    Gram stain, PCR, and/or culture results may not always    correspond due to difference in methodologies.    ************************************************************    This PCR assay was performed using [employer].    The following targets are tested for: Enterococcus,    vancomycin resistant enterococci, Listeria monocytogenes,    coagulase negative staphylococci, S. aureus,    methicillin resistant S. aureus, Streptococcus agalactiae    (Group B), S. pneumoniae, S. pyogenes (Group A),    Acinetobacter baumannii, Enterobacter cloacae, E. coli,    Klebsiella oxytoca, K. pneumoniae, Proteus sp.,    Serratia marcescens, Haemophilus influenzae,    Neisseria meningitidis, Pseudomonas aeruginosa, Candida    albicans, C. glabrata, C krusei, C parapsilosis,    C. tropicalis and the KPC resistance gene.  Organism: Blood Culture PCR (04 Oct 2019 01:48)  Organism: Blood Culture PCR (04 Oct 2019 01:48)    Culture - Blood (collected 03 Oct 2019 17:22)  Source: .Blood  Gram Stain (04 Oct 2019 00:18):    Growth in aerobic bottle: Gram Negative Rods    Growth in anaerobic bottle: Gram Negative Rods  Preliminary Report (04 Oct 2019 19:39):    Growth in aerobic and anaerobic bottles: Escherichia coli    See previous culture [id number]    Culture - Urine (collected 03 Oct 2019 17:20)  Source: .Urine  Final Report (04 Oct 2019 16:50):    No growth      IMAGING:  < from: CT Abdomen and Pelvis w/ Oral Cont and w/ IV Cont (10.04.19 @ 19:10) >    INTERPRETATION:  8.5 x 8.7 x 12.2 cm left retroperitoneal hematoma   appearing continuous with the ipsilateral psoas muscle, appearing to have   progressed since 2019. Superinfection of hematoma is not excluded.    Intramedually hip screw fixation of the left proximal femur. No   significant hip joint effusion or abscess noted.    Partially visualized right humeral diaphyseal fracture.    < end of copied text >    Labs, imaging, EKG personally reviewed     CONSULTS: Internal Medicine Progress Note    Roberth Edmondson  PGY-1 Internal Medicine  [phone]    Patient is a 85y old  Female who presents with a chief complaint of Fever (05 Oct 2019 07:09)      SUBJECTIVE / OVERNIGHT EVENTS:  -doing well overnight, cough has improved   -CT scan showed hematoma vs. superinfection  -not MICU candidate at this time, but recs appreciated  -pt is hard stick, AM labs usually delayed    MEDICATIONS  (STANDING):  ALBUTerol    90 MICROgram(s) HFA Inhaler 2 Puff(s) Inhalation every 6 hours  ALPRAZolam 0.5 milliGRAM(s) Oral two times a day  atorvastatin 10 milliGRAM(s) Oral at bedtime  dextrose 5%. 1000 milliLiter(s) (50 mL/Hr) IV Continuous <Continuous>  dextrose 50% Injectable 12.5 Gram(s) IV Push once  entecavir 0.5 milliGRAM(s) Oral daily  hydrocortisone sodium succinate Injectable 100 milliGRAM(s) IV Push every 8 hours  insulin lispro (HumaLOG) corrective regimen sliding scale   SubCutaneous three times a day before meals  melatonin 5 milliGRAM(s) Oral at bedtime  meropenem  IVPB 1000 milliGRAM(s) IV Intermittent every 12 hours  midodrine. 10 milliGRAM(s) Oral three times a day  montelukast 10 milliGRAM(s) Oral daily  pantoprazole    Tablet 40 milliGRAM(s) Oral before breakfast  sertraline 50 milliGRAM(s) Oral daily  tiotropium 18 MICROgram(s) Capsule 1 Capsule(s) Inhalation daily    MEDICATIONS  (PRN):  acetaminophen   Tablet .. 650 milliGRAM(s) Oral every 6 hours PRN Mild Pain (1 - 3)  dextrose 40% Gel 15 Gram(s) Oral once PRN Blood Glucose LESS THAN 70 milliGRAM(s)/deciliter  docusate sodium 100 milliGRAM(s) Oral three times a day PRN Constipation  glucagon  Injectable 1 milliGRAM(s) IntraMuscular once PRN Glucose LESS THAN 70 milligrams/deciliter  guaiFENesin   Syrup  (Sugar-Free) 100 milliGRAM(s) Oral every 6 hours PRN Cough  oxyCODONE    5 mG/acetaminophen 325 mG 1 Tablet(s) Oral every 6 hours PRN Severe Pain (7 - 10)  polyethylene glycol 3350 17 Gram(s) Oral daily PRN Constiapation  senna 2 Tablet(s) Oral at bedtime PRN Constipation    Vital Signs Last 24 Hrs  T(C): 36.7 (05 Oct 2019 04:35), Max: 36.8 (05 Oct 2019 01:03)  T(F): 98 (05 Oct 2019 04:35), Max: 98.2 (05 Oct 2019 01:03)  HR: 109 (05 Oct 2019 04:35) (93 - 146)  BP: 104/75 (05 Oct 2019 04:35) (90/63 - 122/79)  BP(mean): --  RR: 18 (05 Oct 2019 04:35) (18 - 19)  SpO2: 98% (05 Oct 2019 04:35) (95% - 100%)    CAPILLARY BLOOD GLUCOSE      POCT Blood Glucose.: 249 mg/dL (04 Oct 2019 21:10)  POCT Blood Glucose.: 263 mg/dL (04 Oct 2019 17:32)  POCT Blood Glucose.: 262 mg/dL (04 Oct 2019 12:18)  POCT Blood Glucose.: 168 mg/dL (04 Oct 2019 08:38)    I&O's Summary    04 Oct 2019 07:01  -  05 Oct 2019 07:00  --------------------------------------------------------  IN: 700 mL / OUT: 600 mL / NET: 100 mL        PHYSICAL EXAM  GENERAL: elderly woman lying in bed, uncomfortable, groaning in pain  	HEAD:  +mild ecchymoses throughout face from prior fall  	ENT: PERRL, conjunctiva and sclera clear, neck supple, no JVD, moist mucosa, posterior oropharynx clear  	CHEST/LUNG: (difficult to auscultate since pt persistently moaning). lungs CTAB, poor inspiratory effort  	HEART: Regular rate and rhythm; No murmurs, rubs, or gallops  	ABDOMEN: Soft, nontender, nondistended; Bowel sounds present, no organomegaly  	BACK: no spinal tenderness, no CVA tenderness  	EXTREMITIES: trace edema to thighs bilaterally, +fracture in R arm with sling  NEUROLOGY: AAOx2 (knows name and location, does not know year or president  Skin: erythema of left hip moving up pelvis      LABS:                        8.9    18.35 )-----------( 151      ( 04 Oct 2019 08:25 )             30.0     Hemoglobin: 8.9 g/dL (10-04 @ 08:25)  Hemoglobin: 9.7 g/dL (10-03 @ 14:10)  Hemoglobin: 10.0 g/dL (10-01 @ 15:58)  Hemoglobin: 11.2 g/dL (10-01 @ 09:15)    CBC Full  -  ( 04 Oct 2019 08:25 )  WBC Count : 18.35 K/uL  RBC Count : 3.23 M/uL  Hemoglobin : 8.9 g/dL  Hematocrit : 30.0 %  Platelet Count - Automated : 151 K/uL  Mean Cell Volume : 92.9 fl  Mean Cell Hemoglobin : 27.6 pg  Mean Cell Hemoglobin Concentration : 29.7 gm/dL  Auto Neutrophil # : x  Auto Lymphocyte # : x  Auto Monocyte # : x  Auto Eosinophil # : x  Auto Basophil # : x  Auto Neutrophil % : x  Auto Lymphocyte % : x  Auto Monocyte % : x  Auto Eosinophil % : x  Auto Basophil % : x    10-04    137  |  96  |  38<H>  ----------------------------<  155<H>  4.7   |  27  |  1.18    Ca    8.1<L>      04 Oct 2019 05:44  Phos  5.5     10  Mg     1.6     10-04    TPro  5.3<L>  /  Alb  2.7<L>  /  TBili  0.6  /  DBili  x   /  AST  13  /  ALT  22  /  AlkPhos  82  10-03    Creatinine Trend: 1.18<--, 1.20<--, 1.07<--, 1.15<--, 1.11<--, 1.14<--  LIVER FUNCTIONS - ( 03 Oct 2019 14:11 )  Alb: 2.7 g/dL / Pro: 5.3 g/dL / ALK PHOS: 82 U/L / ALT: 22 U/L / AST: 13 U/L / GGT: x           PT/INR - ( 03 Oct 2019 14:11 )   PT: 12.3 sec;   INR: 1.08 ratio         PTT - ( 03 Oct 2019 14:11 )  PTT:29.3 sec    hs Troponin:        15:53 - VBG - pH:       | pCO2:       | pO2:       | Lactate: 4.2        Urinalysis Basic - ( 03 Oct 2019 14:10 )    Color: Yellow / Appearance: Clear / S.014 / pH: x  Gluc: x / Ketone: Negative  / Bili: Negative / Urobili: Negative   Blood: x / Protein: 100 / Nitrite: Negative   Leuk Esterase: Negative / RBC: 18 /hpf / WBC 2 /HPF   Sq Epi: x / Non Sq Epi: 0 /hpf / Bacteria: Negative      CSF:                      EKG:   MICROBIOLOGY:    Culture - Blood (collected 03 Oct 2019 17:22)  Source: .Blood  Gram Stain (04 Oct 2019 00:37):    Growth in aerobic bottle: Gram Negative Rods    Growth in anaerobic bottle: Gram Negative Rods  Preliminary Report (04 Oct 2019 19:32):    Growth in anaerobic bottle: Escherichia coli    Growth in aerobic bottle: Gram Negative Rods    "Due to technical problems, Proteus sp. will Not be reported as part of    the BCID panel until further notice"    ***Blood Panel PCR results on this specimen are available    approximately 3 hours after the Gram stain result.***    Gram stain, PCR, and/or culture results may not always    correspond due to difference in methodologies.    ************************************************************    This PCR assay was performed using [employer].    The following targets are tested for: Enterococcus,    vancomycin resistant enterococci, Listeria monocytogenes,    coagulase negative staphylococci, S. aureus,    methicillin resistant S. aureus, Streptococcus agalactiae    (Group B), S. pneumoniae, S. pyogenes (Group A),    Acinetobacter baumannii, Enterobacter cloacae, E. coli,    Klebsiella oxytoca, K. pneumoniae, Proteus sp.,    Serratia marcescens, Haemophilus influenzae,    Neisseria meningitidis, Pseudomonas aeruginosa, Candida    albicans, C. glabrata, C krusei, C parapsilosis,    C. tropicalis and the KPC resistance gene.  Organism: Blood Culture PCR (04 Oct 2019 01:48)  Organism: Blood Culture PCR (04 Oct 2019 01:48)    Culture - Blood (collected 03 Oct 2019 17:22)  Source: .Blood  Gram Stain (04 Oct 2019 00:18):    Growth in aerobic bottle: Gram Negative Rods    Growth in anaerobic bottle: Gram Negative Rods  Preliminary Report (04 Oct 2019 19:39):    Growth in aerobic and anaerobic bottles: Escherichia coli    See previous culture [id number]    Culture - Urine (collected 03 Oct 2019 17:20)  Source: .Urine  Final Report (04 Oct 2019 16:50):    No growth      IMAGING:    < from: CT Abdomen and Pelvis w/ Oral Cont and w/ IV Cont (10.04.19 @ 19:10) >  FINDINGS:    LOWER CHEST: Cardiomegaly. Coronary arterial calcification. Small   bilateral pleural effusions, left greater than right. Bibasilar linear   atelectasis.    LIVER: Within normal limits.  BILE DUCTS: Normal caliber.  GALLBLADDER: Not well visualized. Dependent high attenuation may be   secondary to small stones and/or sludge.  SPLEEN: Within normal limits.  PANCREAS: Diffusely atrophic.  ADRENALS: Within normal limits.  KIDNEYS/URETERS: No hydronephrosis. A 4.9 cm cyst projects from the upper   pole of the right kidney. Additional subcentimeter hypodense foci in the   kidneys bilaterally are too small to characterize.    BLADDER: Within normal limits.  REPRODUCTIVE ORGANS: Uterus and adnexa within normal limits.    BOWEL: No bowel obstruction.   PERITONEUM: Small volume pelvic ascites.  VESSELS: Atherosclerotic calcification.  RETROPERITONEUM/LYMPH NODES: No lymphadenopathy. Large left   retroperitoneal hematoma, measuring 8.7 x 8.5 cm, and extending over a   length of 12.2 cm.  ABDOMINAL WALL: Diffuse anasarca. Status post right inguinal hernia   repair.  BONES: Status post ORIF of the left femur. Degenerative changes in the   spine. Compression fracture of the T12 vertebral body, unchanged.   Sclerotic focus in the sacrum, unchanged since 2016. Partially   imaged right humeral fracture.    IMPRESSION:     Large left retroperitoneal hematoma, increased in size since 2019.   Superimposed infection cannot be excluded..    Findings were discussed with Dr. Gaviria on 10/4/2019 at 8:56 PM by Dr. Max with read back confirmation.    < end of copied text >    Labs, imaging, EKG personally reviewed     CONSULTS: Internal Medicine Progress Note    Roberth Delvis  PGY-1 Internal Medicine  [id number]    Patient is a 85y old  Female who presents with a chief complaint of Fever (05 Oct 2019 07:09)      SUBJECTIVE / OVERNIGHT EVENTS:  -doing well overnight, cough has improved   -CT scan showed hematoma vs. superinfection  -not MICU candidate at this time, but recs appreciated  -pt is hard stick, AM labs usually delayed    ROS  Limited secondary to acute delirium.     MEDICATIONS  (STANDING):  ALBUTerol    90 MICROgram(s) HFA Inhaler 2 Puff(s) Inhalation every 6 hours  ALPRAZolam 0.5 milliGRAM(s) Oral two times a day  atorvastatin 10 milliGRAM(s) Oral at bedtime  dextrose 5%. 1000 milliLiter(s) (50 mL/Hr) IV Continuous <Continuous>  dextrose 50% Injectable 12.5 Gram(s) IV Push once  entecavir 0.5 milliGRAM(s) Oral daily  hydrocortisone sodium succinate Injectable 100 milliGRAM(s) IV Push every 8 hours  insulin lispro (HumaLOG) corrective regimen sliding scale   SubCutaneous three times a day before meals  melatonin 5 milliGRAM(s) Oral at bedtime  meropenem  IVPB 1000 milliGRAM(s) IV Intermittent every 12 hours  midodrine. 10 milliGRAM(s) Oral three times a day  montelukast 10 milliGRAM(s) Oral daily  pantoprazole    Tablet 40 milliGRAM(s) Oral before breakfast  sertraline 50 milliGRAM(s) Oral daily  tiotropium 18 MICROgram(s) Capsule 1 Capsule(s) Inhalation daily    MEDICATIONS  (PRN):  acetaminophen   Tablet .. 650 milliGRAM(s) Oral every 6 hours PRN Mild Pain (1 - 3)  dextrose 40% Gel 15 Gram(s) Oral once PRN Blood Glucose LESS THAN 70 milliGRAM(s)/deciliter  docusate sodium 100 milliGRAM(s) Oral three times a day PRN Constipation  glucagon  Injectable 1 milliGRAM(s) IntraMuscular once PRN Glucose LESS THAN 70 milligrams/deciliter  guaiFENesin   Syrup  (Sugar-Free) 100 milliGRAM(s) Oral every 6 hours PRN Cough  oxyCODONE    5 mG/acetaminophen 325 mG 1 Tablet(s) Oral every 6 hours PRN Severe Pain (7 - 10)  polyethylene glycol 3350 17 Gram(s) Oral daily PRN Constiapation  senna 2 Tablet(s) Oral at bedtime PRN Constipation    Vital Signs Last 24 Hrs  T(C): 36.7 (05 Oct 2019 04:35), Max: 36.8 (05 Oct 2019 01:03)  T(F): 98 (05 Oct 2019 04:35), Max: 98.2 (05 Oct 2019 01:03)  HR: 109 (05 Oct 2019 04:35) (93 - 146)  BP: 104/75 (05 Oct 2019 04:35) (90/63 - 122/79)  BP(mean): --  RR: 18 (05 Oct 2019 04:35) (18 - 19)  SpO2: 98% (05 Oct 2019 04:35) (95% - 100%)    CAPILLARY BLOOD GLUCOSE      POCT Blood Glucose.: 249 mg/dL (04 Oct 2019 21:10)  POCT Blood Glucose.: 263 mg/dL (04 Oct 2019 17:32)  POCT Blood Glucose.: 262 mg/dL (04 Oct 2019 12:18)  POCT Blood Glucose.: 168 mg/dL (04 Oct 2019 08:38)    I&O's Summary    04 Oct 2019 07:01  -  05 Oct 2019 07:00  --------------------------------------------------------  IN: 700 mL / OUT: 600 mL / NET: 100 mL        PHYSICAL EXAM  GENERAL: elderly woman lying in bed, uncomfortable, groaning in pain  	HEAD:  +mild ecchymoses throughout face from prior fall  	ENT: PERRL, conjunctiva and sclera clear, neck supple, no JVD, moist mucosa, posterior oropharynx clear  	CHEST/LUNG: (difficult to auscultate since pt persistently moaning). lungs CTAB, poor inspiratory effort  	HEART: Regular rate and rhythm; No murmurs, rubs, or gallops. Extremities warm and well perfused. Radial and DP pulses 2+ B/L.   	ABDOMEN: Soft, nontender, nondistended; Bowel sounds present, no organomegaly  	BACK: no spinal tenderness, no CVA tenderness  	EXTREMITIES: trace edema to thighs bilaterally, +fracture in R arm with sling. Muscle compartments soft without signs of compartment syndrome.   NEUROLOGY: AAOx2 (knows name and location, does not know year or president  Skin: erythema of left hip moving up pelvis      LABS:                        8.9    18.35 )-----------( 151      ( 04 Oct 2019 08:25 )             30.0     Hemoglobin: 8.9 g/dL (10-04 @ 08:25)  Hemoglobin: 9.7 g/dL (10-03 @ 14:10)  Hemoglobin: 10.0 g/dL (10-01 @ 15:58)  Hemoglobin: 11.2 g/dL (10-01 @ 09:15)    CBC Full  -  ( 04 Oct 2019 08:25 )  WBC Count : 18.35 K/uL  RBC Count : 3.23 M/uL  Hemoglobin : 8.9 g/dL  Hematocrit : 30.0 %  Platelet Count - Automated : 151 K/uL  Mean Cell Volume : 92.9 fl  Mean Cell Hemoglobin : 27.6 pg  Mean Cell Hemoglobin Concentration : 29.7 gm/dL  Auto Neutrophil # : x  Auto Lymphocyte # : x  Auto Monocyte # : x  Auto Eosinophil # : x  Auto Basophil # : x  Auto Neutrophil % : x  Auto Lymphocyte % : x  Auto Monocyte % : x  Auto Eosinophil % : x  Auto Basophil % : x    10    137  |  96  |  38<H>  ----------------------------<  155<H>  4.7   |  27  |  1.18    Ca    8.1<L>      04 Oct 2019 05:44  Phos  5.5     10-  Mg     1.6     10-    TPro  5.3<L>  /  Alb  2.7<L>  /  TBili  0.6  /  DBili  x   /  AST  13  /  ALT  22  /  AlkPhos  82  10    Creatinine Trend: 1.18<--, 1.20<--, 1.07<--, 1.15<--, 1.11<--, 1.14<--  LIVER FUNCTIONS - ( 03 Oct 2019 14:11 )  Alb: 2.7 g/dL / Pro: 5.3 g/dL / ALK PHOS: 82 U/L / ALT: 22 U/L / AST: 13 U/L / GGT: x           PT/INR - ( 03 Oct 2019 14:11 )   PT: 12.3 sec;   INR: 1.08 ratio         PTT - ( 03 Oct 2019 14:11 )  PTT:29.3 sec    hs Troponin:        15:53 - VBG - pH:       | pCO2:       | pO2:       | Lactate: 4.2        Urinalysis Basic - ( 03 Oct 2019 14:10 )    Color: Yellow / Appearance: Clear / S.014 / pH: x  Gluc: x / Ketone: Negative  / Bili: Negative / Urobili: Negative   Blood: x / Protein: 100 / Nitrite: Negative   Leuk Esterase: Negative / RBC: 18 /hpf / WBC 2 /HPF   Sq Epi: x / Non Sq Epi: 0 /hpf / Bacteria: Negative      CSF:                      EKG:   MICROBIOLOGY:    Culture - Blood (collected 03 Oct 2019 17:22)  Source: .Blood  Gram Stain (04 Oct 2019 00:37):    Growth in aerobic bottle: Gram Negative Rods    Growth in anaerobic bottle: Gram Negative Rods  Preliminary Report (04 Oct 2019 19:32):    Growth in anaerobic bottle: Escherichia coli    Growth in aerobic bottle: Gram Negative Rods    "Due to technical problems, Proteus sp. will Not be reported as part of    the BCID panel until further notice"    ***Blood Panel PCR results on this specimen are available    approximately 3 hours after the Gram stain result.***    Gram stain, PCR, and/or culture results may not always    correspond due to difference in methodologies.    ************************************************************    This PCR assay was performed using [employer].    The following targets are tested for: Enterococcus,    vancomycin resistant enterococci, Listeria monocytogenes,    coagulase negative staphylococci, S. aureus,    methicillin resistant S. aureus, Streptococcus agalactiae    (Group B), S. pneumoniae, S. pyogenes (Group A),    Acinetobacter baumannii, Enterobacter cloacae, E. coli,    Klebsiella oxytoca, K. pneumoniae, Proteus sp.,    Serratia marcescens, Haemophilus influenzae,    Neisseria meningitidis, Pseudomonas aeruginosa, Candida    albicans, C. glabrata, C krusei, C parapsilosis,    C. tropicalis and the KPC resistance gene.  Organism: Blood Culture PCR (04 Oct 2019 01:48)  Organism: Blood Culture PCR (04 Oct 2019 01:48)    Culture - Blood (collected 03 Oct 2019 17:22)  Source: .Blood  Gram Stain (04 Oct 2019 00:18):    Growth in aerobic bottle: Gram Negative Rods    Growth in anaerobic bottle: Gram Negative Rods  Preliminary Report (04 Oct 2019 19:39):    Growth in aerobic and anaerobic bottles: Escherichia coli    See previous culture [id number]    Culture - Urine (collected 03 Oct 2019 17:20)  Source: .Urine  Final Report (04 Oct 2019 16:50):    No growth      IMAGING:    < from: CT Abdomen and Pelvis w/ Oral Cont and w/ IV Cont (10.04.19 @ 19:10) >  FINDINGS:    LOWER CHEST: Cardiomegaly. Coronary arterial calcification. Small   bilateral pleural effusions, left greater than right. Bibasilar linear   atelectasis.    LIVER: Within normal limits.  BILE DUCTS: Normal caliber.  GALLBLADDER: Not well visualized. Dependent high attenuation may be   secondary to small stones and/or sludge.  SPLEEN: Within normal limits.  PANCREAS: Diffusely atrophic.  ADRENALS: Within normal limits.  KIDNEYS/URETERS: No hydronephrosis. A 4.9 cm cyst projects from the upper   pole of the right kidney. Additional subcentimeter hypodense foci in the   kidneys bilaterally are too small to characterize.    BLADDER: Within normal limits.  REPRODUCTIVE ORGANS: Uterus and adnexa within normal limits.    BOWEL: No bowel obstruction.   PERITONEUM: Small volume pelvic ascites.  VESSELS: Atherosclerotic calcification.  RETROPERITONEUM/LYMPH NODES: No lymphadenopathy. Large left   retroperitoneal hematoma, measuring 8.7 x 8.5 cm, and extending over a   length of 12.2 cm.  ABDOMINAL WALL: Diffuse anasarca. Status post right inguinal hernia   repair.  BONES: Status post ORIF of the left femur. Degenerative changes in the   spine. Compression fracture of the T12 vertebral body, unchanged.   Sclerotic focus in the sacrum, unchanged since 2016. Partially   imaged right humeral fracture.    IMPRESSION:     Large left retroperitoneal hematoma, increased in size since 2019.   Superimposed infection cannot be excluded..    Findings were discussed with Dr. Gaviria on 10/4/2019 at 8:56 PM by Dr. Max with read back confirmation.    < end of copied text >    Labs, imaging, EKG personally reviewed     CONSULTS:

## 2019-10-05 NOTE — CONSULT NOTE ADULT - ASSESSMENT
Melissa Slater is a 85 year old F with fevers, hypotension and altered mentation. She was recently diagnosed and currently being treated for a UTI, however, blood cultures now positive for the same causal organism. Patient likely has urosepsis as opposed to an infected hematoma.    PLAN:  - No surgical intervention warranted at this time  - Should the suspicion remain high for an infected hematoma, consider consultation with IR. However, this is unlikely.  - All cares per primary team  - Plan to be discussed with Attending, Dr. Epi Le    Please contact Trauma & Acute Care Surgery ([phone]) with any questions or concerns.

## 2019-10-05 NOTE — DIETITIAN INITIAL EVALUATION ADULT. - PERTINENT LABORATORY DATA
10/4, PHOS 5.5  CAPILLARY BLOOD GLUCOSE  POCT Blood Glucose.: 274 mg/dL (05 Oct 2019 08:36)  POCT Blood Glucose.: 249 mg/dL (04 Oct 2019 21:10)  POCT Blood Glucose.: 263 mg/dL (04 Oct 2019 17:32)  POCT Blood Glucose.: 262 mg/dL (04 Oct 2019 12:18)    Hemoglobin A1C, Whole Blood: 6.0 % (09-16 @ 08:04)  Hemoglobin A1C, Whole Blood: 5.6 % (08-13 @ 05:30)

## 2019-10-05 NOTE — PROGRESS NOTE ADULT - PROBLEM SELECTOR PLAN 6
-MOLST Form signed and in chart, DNR/DNI  -try to limit opoid/benzo use given age however patient has been on it at home and requiring currently

## 2019-10-05 NOTE — PROGRESS NOTE ADULT - SUBJECTIVE AND OBJECTIVE BOX
Patient is a 85y old  Female who presents with a chief complaint of Fever (05 Oct 2019 07:09)    Being followed by ID for E coli bacteremia    Interval history:  Remains afebrile  No acute events      ROS:  Moaning, diffuse pain  No cough, SOB, CP  No N/V/D.  No other complaints      Antimicrobials:    entecavir 0.5 milliGRAM(s) Oral daily  meropenem  IVPB 1000 milliGRAM(s) IV Intermittent every 12 hours      Vital Signs Last 24 Hrs  T(C): 36.8 (10-05-19 @ 08:45), Max: 36.8 (10-05-19 @ 01:03)  T(F): 98.2 (10-05-19 @ 08:45), Max: 98.2 (10-05-19 @ 01:03)  HR: 102 (10-05-19 @ 08:45) (93 - 146)  BP: 117/81 (10-05-19 @ 08:45) (90/63 - 122/79)  BP(mean): --  RR: 18 (10-05-19 @ 08:45) (18 - 19)  SpO2: 93% (10-05-19 @ 08:45) (93% - 100%)    Physical Exam:    Constitutional uncomfortable    HEENT EOMI, No pallor or icterus    Chest clear to auscultation    CVS S1 S2 WNl No murmur or rub or gallop    Abd BS normal No masses    Ext No cyanosis clubbing, right arm in sling    IV site no erythema tenderness or discharge    Joints no swelling or LOM    CNS AAO X 2 non focal    Lab Data:                          8.9    18.35 )-----------( 151      ( 04 Oct 2019 08:25 )             30.0       10-04    137  |  96  |  38<H>  ----------------------------<  155<H>  4.7   |  27  |  1.18    Ca    8.1<L>      04 Oct 2019 05:44  Phos  5.5     10-04  Mg     1.6     10-04    TPro  5.3<L>  /  Alb  2.7<L>  /  TBili  0.6  /  DBili  x   /  AST  13  /  ALT  22  /  AlkPhos  82  10-03        .Blood  10-03-19   Growth in aerobic and anaerobic bottles: Escherichia coli  See previous culture [id number]  --  Blood Culture PCR      .Urine  10-03-19   No growth  --  --    < from: CT Abdomen and Pelvis w/ Oral Cont and w/ IV Cont (10.04.19 @ 19:10) >  EXAM:  CT ABDOMEN AND PELVIS OC IC                            PROCEDURE DATE:  10/04/2019      ******PRELIMINARY REPORT******    ******PRELIMINARY REPORT******              INTERPRETATION:  8.5 x 8.7 x 12.2 cm left retroperitoneal hematoma   appearing continuous with the ipsilateral psoas muscle, appearing to have   progressed since 9/6/2019. Superinfection of hematoma is not excluded.    Intramedually hip screw fixation of the left proximal femur. No   significant hip joint effusion or abscess noted.    Partially visualized right humeral diaphyseal fracture.    d/w     < end of copied text >

## 2019-10-05 NOTE — PROGRESS NOTE ADULT - PROBLEM SELECTOR PLAN 3
s/p recent rituximab  -hold home po prednisone 60mg, change to IV stress dose steroids hydrocortisone 100mg q8 given sepsis with borderline BPs

## 2019-10-05 NOTE — DIETITIAN INITIAL EVALUATION ADULT. - PERTINENT MEDS FT
acetaminophen   Tablet .. 650 PRN  ALBUTerol    90 MICROgram(s) HFA Inhaler 2  ALPRAZolam 0.5  atorvastatin 10  dextrose 40% Gel 15 PRN  dextrose 5%. 1000  dextrose 50% Injectable 12.5  docusate sodium 100 PRN  entecavir 0.5  glucagon  Injectable 1 PRN  guaiFENesin   Syrup  (Sugar-Free) 100 PRN  hydrocortisone sodium succinate Injectable 100  insulin lispro (HumaLOG) corrective regimen sliding scale   melatonin 5  meropenem  IVPB 1000  midodrine. 10  montelukast 10  oxyCODONE    5 mG/acetaminophen 325 mG 1 PRN  pantoprazole    Tablet 40  polyethylene glycol 3350 17 PRN  senna 2 PRN  sertraline 50  tiotropium 18 MICROgram(s) Capsule 1

## 2019-10-05 NOTE — CONSULT NOTE ADULT - SUBJECTIVE AND OBJECTIVE BOX
GENERAL SURGERY CONSULT NOTE  --------------------------------------------------------------------------------------------  HPI:  Ms. Slater is a 84 yo female with a PMH of CHF, HTN, Afib, DM2, COPD, and chronic lymphoma who was brought in for 102.6 fever. Pt is groaning in pain, unable to answer questions. history obtained via chart review and through pt's daughter and home health aide at bedside.   One month ago the patient had worsening SOB on exertion and b/l rash on her lower legs and was diagnosed with vasculitis and lupus and was given 2 doses of rituximab. Per the patients daughter and home aid, the pt has deteriorated since receiving the rituximab. 3 weeks ago the patient fell out of bed on L hip and was admitted to [facility] for retroperitoneal bleeding. Hospital course was complicated by E coli bacteremia, was being treated with TMP-SMX. Pt was discharged on , then at home on Tuesday 10/01 had another fall, this time on her R side and fractured her R arm. Now s/p sling and cast. This morning, home nurse for pt noticed a fever of 102.6 F, and family brought pt to ER. Patient is currently positive for parainfluenza and E. Coli likely from urinary tract origin. Pt has weakness, fevers, chills, hip pain, arm pain, intermittent cough (per baseline), LE edema. Denies not have nausea, vomiting, diarrhea, abdominal pain, hematochezia, melena, hematuria, dysuria.     CT revealing for a large retroperitoneal hematoma with foci of heterogeneity concerning for superimposed infection. Surgery consulted for possible surgical management to achieve source control.    PAST MEDICAL & SURGICAL HISTORY:  COPD (chronic obstructive pulmonary disease)  Peripheral vascular disease  Pulmonary hypertension  Rheumatoid arthritis  Osteoarthritis  Mitral regurgitation  H/O lymphoma  Hyperlipidemia  Chronic GERD  Chronic kidney disease: proteinuria  AF (atrial fibrillation)  Anemia in CKD (chronic kidney disease)  CHF (congestive heart failure)  HTN (hypertension)  History of total hip replacement, left  History of total knee replacement, bilateral    FAMILY HISTORY:  Family history of inclusion body myositis    ALLERGIES: Keflex (Unknown)  penicillin (Rash)    CURRENT MEDICATIONS  MEDICATIONS (STANDING): ALBUTerol    90 MICROgram(s) HFA Inhaler 2 Puff(s) Inhalation every 6 hours  ALPRAZolam 0.5 milliGRAM(s) Oral two times a day  ascorbic acid 500 milliGRAM(s) Oral daily  atorvastatin 10 milliGRAM(s) Oral at bedtime  dextrose 5%. 1000 milliLiter(s) IV Continuous <Continuous>  dextrose 50% Injectable 12.5 Gram(s) IV Push once  entecavir 0.5 milliGRAM(s) Oral daily  hydrocortisone sodium succinate Injectable 100 milliGRAM(s) IV Push every 8 hours  insulin glargine Injectable (LANTUS) 5 Unit(s) SubCutaneous at bedtime  insulin lispro (HumaLOG) corrective regimen sliding scale   SubCutaneous three times a day before meals  melatonin 5 milliGRAM(s) Oral at bedtime  meropenem  IVPB 1000 milliGRAM(s) IV Intermittent every 12 hours  midodrine. 10 milliGRAM(s) Oral three times a day  montelukast 10 milliGRAM(s) Oral daily  Nephro-bebeto 1 Tablet(s) Oral daily  pantoprazole    Tablet 40 milliGRAM(s) Oral before breakfast  sertraline 50 milliGRAM(s) Oral daily  tiotropium 18 MICROgram(s) Capsule 1 Capsule(s) Inhalation daily    MEDICATIONS (PRN):acetaminophen   Tablet .. 650 milliGRAM(s) Oral every 6 hours PRN Mild Pain (1 - 3)  dextrose 40% Gel 15 Gram(s) Oral once PRN Blood Glucose LESS THAN 70 milliGRAM(s)/deciliter  docusate sodium 100 milliGRAM(s) Oral three times a day PRN Constipation  glucagon  Injectable 1 milliGRAM(s) IntraMuscular once PRN Glucose LESS THAN 70 milligrams/deciliter  guaiFENesin   Syrup  (Sugar-Free) 100 milliGRAM(s) Oral every 6 hours PRN Cough  oxyCODONE    5 mG/acetaminophen 325 mG 1 Tablet(s) Oral every 6 hours PRN Severe Pain (7 - 10)  polyethylene glycol 3350 17 Gram(s) Oral daily PRN Constiapation  senna 2 Tablet(s) Oral at bedtime PRN Constipation    --------------------------------------------------------------------------------------------    Vitals:   T(C): 36.6 (10-05-19 @ 14:31), Max: 36.8 (10-05-19 @ 01:03)  HR: 112 (10-05-19 @ 14:31) (99 - 128)  BP: 131/81 (10-05-19 @ 14:31) (104/75 - 131/81)  RR: 18 (10-05-19 @ 14:) (18 - 18)  SpO2: 100% (10-05-19 @ 14:) (93% - 100%)  CAPILLARY BLOOD GLUCOSE      POCT Blood Glucose.: 254 mg/dL (05 Oct 2019 17:43)  POCT Blood Glucose.: 348 mg/dL (05 Oct 2019 12:28)  POCT Blood Glucose.: 274 mg/dL (05 Oct 2019 08:36)  POCT Blood Glucose.: 249 mg/dL (04 Oct 2019 21:10)      10-04 @ 07:  -  10-05 @ 07:00  --------------------------------------------------------  IN:    IV PiggyBack: 100 mL    Oral Fluid: 600 mL  Total IN: 700 mL    OUT:    Voided: 600 mL  Total OUT: 600 mL    Total NET: 100 mL      10-05 @ 07:01  -  10-05 @ 19:17  --------------------------------------------------------  IN:  Total IN: 0 mL    OUT:    Voided: 800 mL  Total OUT: 800 mL  Total NET: -800 mL    Weight (kg): 68.9 (10-03 @ 12:18)      PHYSICAL EXAM:   General: Moderately distressed, alert and responsive, lethargic and somnolent  HEENT: NC/AT, EOMI, anicteric sclerae, nasal canula in place  Resp: Good effort, CTABL, tachypneic without respiratory distress  GI/Abd: Soft, NT/ND, no rebound/guarding, no masses palpated. Large ecchymosis on left hip extending to gluteal region, non-blanching, non-tender.  Skin: Intact, no breakdown    --------------------------------------------------------------------------------------------    LABS  CBC (10-05 @ 12:08)                              8.4<L>                         21.47<H>  )----------------(  156        --    % Neutrophils, --    % Lymphocytes, ANC: --                                  26.5<L>  CBC (10-04 @ 08:25)                              8.9<L>                         18.35<H>  )----------------(  151        --    % Neutrophils, --    % Lymphocytes, ANC: --                                  30.0<L>    BMP (10-05 @ 12:07)             131<L>  |  93<L>   |  47<H> 		Ca++ --      Ca 8.3<L>             ---------------------------------( 324<H>		Mg 1.8                4.8     |  30      |  1.28  			Ph 4.1       VBG (10-05 @ 18:52)     -- / -- / -- / -- / -- / --%     Lactate: 2.0  VBG (10-05 @ 12:10)     -- / -- / -- / -- / -- / --%     Lactate: 3.0<H>    --------------------------------------------------------------------------------------------    MICROBIOLOGY  Urinalysis (10-03 @ 14:10):     Color: Yellow / Appearance: Clear / S.014 / pH: 6.0 / Gluc: Negative / Ketones: Negative / Bili: Negative / Urobili: Negative / Protein :100 / Nitrites: Negative / Leuk.Est: Negative / RBC: 18<H> / WBC: 2 / Sq Epi:  / Non Sq Epi: 0 / Bacteria Negative       -> .Blood Culture (10-03 @ 17:22)       Growth in aerobic bottle: Gram Negative Rods  Growth in anaerobic bottle: Gram Negative Rods    Blood Culture PCR  Escherichia coli    Growth in aerobic and anaerobic bottles: Escherichia coli  See previous culture [id number]    -> .Urine Culture (10-03 @ 17:20)     NG    NG    No growth      --------------------------------------------------------------------------------------------    IMAGING  < from: CT Abdomen and Pelvis w/ Oral Cont and w/ IV Cont (10.04.19 @ 19:10) >  FINDINGS:    LOWER CHEST: Cardiomegaly. Coronary arterial calcification. Small   bilateral pleural effusions, left greater than right. Bibasilar linear   atelectasis.    LIVER: Within normal limits.  BILE DUCTS: Normal caliber.  GALLBLADDER: Not well visualized. Dependent high attenuation may be   secondary to small stones and/or sludge.  SPLEEN: Within normal limits.  PANCREAS: Diffusely atrophic.  ADRENALS: Within normal limits.  KIDNEYS/URETERS: No hydronephrosis. A 4.9 cm cyst projects from the upper   pole of the right kidney. Additional subcentimeter hypodense foci in the   kidneys bilaterally are too small to characterize.  BLADDER: Within normal limits.  REPRODUCTIVE ORGANS: Uterus and adnexa within normal limits.  BOWEL: No bowel obstruction.   PERITONEUM: Small volume pelvic ascites.  VESSELS: Atherosclerotic calcification.  RETROPERITONEUM/LYMPH NODES: No lymphadenopathy. Large left   retroperitoneal hematoma, measuring 8.7 x 8.5 cm, and extending over a   length of 12.2 cm.  ABDOMINAL WALL: Diffuse anasarca. Status post right inguinal hernia   repair.  BONES: Status post ORIF of the left femur. Degenerative changes in the   spine. Compression fracture of the T12 vertebral body, unchanged.   Sclerotic focus in the sacrum, unchanged since 2016. Partially   imaged right humeral fracture.    IMPRESSION:   Large left retroperitoneal hematoma, increased in size since 2019.   Superimposed infection cannot be excluded..    < end of copied text >    --------------------------------------------------------------------------------------------

## 2019-10-05 NOTE — PROGRESS NOTE ADULT - ASSESSMENT
84 yo female with a PMH of CHF, ANCA vasculitis, HTN, Afib, DM2, COPD, and chronic lymphoma who was brought in for 102.6 fever, now with  sepsis secondary to ecoli bacteremia, parainfluenza virus c/b afib with rvr. 84 yo female with a PMH of CHF, ANCA vasculitis, HTN, Afib, DM2, COPD, and chronic lymphoma who was brought in for 102.6 fever, now with  sepsis secondary to ecoli bacteremia, parainfluenza virus c/b afib. pt currently awaiting telemetry bed since 3PM on 10/4.

## 2019-10-05 NOTE — PROGRESS NOTE ADULT - PROBLEM SELECTOR PLAN 4
-holding A/C in setting of hematoma and fall, awaiting CT abd pelvis r/o RP bleed  -afib rvr this AM but now improved after holding po home meds, c/w metoprolol 100mg bid with improvement in RVR  -holding home dilt given sepsis  -if HR become uncontrolled again, transfer to tele iso room -holding A/C in setting of hematoma and fall, awaiting CT abd pelvis r/o RP bleed  -afib rvr this AM but now improved after holding po home meds, c/w metoprolol 100mg bid with improvement in RVR  -holding home dilt given sepsis  -pending telemetry bed

## 2019-10-05 NOTE — PROGRESS NOTE ADULT - ASSESSMENT
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    Recurrent E coli bacteremia  -On previous admission pt had E Coli bacteremia R to quinolones and bactrim but sensitive to cepalosporins/carbapenems, completed 14 day course of meropenem/Ertapenem with clearance of subsequent bcx. Presumed urinary source as Ua also growing E coli at the time with same resistance pattern.  -Now with recurrent E coli bacteremia, concern for source control. Ua unremarkable, follow up Ucx. Given physical exam findings of erythematous, swollen, and tender hip, c/f septic joint as source of bacteremia in hip with hardware. While E coli would not be common source of septic joint, pt could have seeded hip from bacteremia on previous admission  -Other possible sources including intraabdominal source, possible infected hematoma from previous RP bleed.   -F/U final reading of CT imaging of the A/P and Hip to elucidate source of infection  -If hip joint appears infected will need to be aspirated  -repeat Bcx for clearance  -can c/w meropenem for now, 1g q12 based on renal function  - of note, pt has PCN and keflex allergies listed however on previous admission family denied PCN allergy per chart review, and pt tolerated carbapenems.     ID is available over the weekend at [phone]    Hesham Henry MD  pager [phone]  office [phone]

## 2019-10-06 NOTE — PHYSICAL THERAPY INITIAL EVALUATION ADULT - RANGE OF MOTION, PT EVAL
GOAL: pt will demonstrate AROM WNL's in order to complete all functional mobility with contact guard assist within 4 weeks

## 2019-10-06 NOTE — PHYSICAL THERAPY INITIAL EVALUATION ADULT - ADDITIONAL COMMENTS
Pt lives with her son (whom works full time) in a private home, + ramp access, pt stays on main level once inside. Pt has home health aide 12 hours/day x 7 days/week & son assist when aide is not present. Prior to admission pt required assistance for all functional mobility including household ambulation with rolling walker. Pt also has cane, commode, shower chair, & wheelchair. + home PT prior to admission. Goal of therapy: improve functional mobility.

## 2019-10-06 NOTE — PROGRESS NOTE ADULT - PROBLEM SELECTOR PLAN 4
-holding A/C in setting of hematoma and fall, awaiting CT abd pelvis r/o RP bleed  -afib rvr this AM but now improved after holding po home meds, c/w metoprolol 100mg bid with improvement in RVR  -holding home dilt given sepsis  -pending telemetry bed s/p recent rituximab  -hold home po prednisone 60mg, change to IV stress dose steroids hydrocortisone 100mg q8 given sepsis with borderline BPs

## 2019-10-06 NOTE — PHYSICAL THERAPY INITIAL EVALUATION ADULT - PERTINENT HX OF CURRENT PROBLEM, REHAB EVAL
Pt is an 84yo Farsi-speaking F admitted 2/2 fever. Work-up revealed sepsis 2/2 ecoli bacteremia & parainfluenza virus. 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 Pt awaiting tx to telemetry. Palliative care consult pending.

## 2019-10-06 NOTE — PHYSICAL THERAPY INITIAL EVALUATION ADULT - BED MOBILITY TRAINING, PT EVAL
GOAL: pt will be able to perform bed mobility with contact guard assist within 4 weeks (TRINA WHITE)

## 2019-10-06 NOTE — PHYSICAL THERAPY INITIAL EVALUATION ADULT - IMPAIRMENTS FOUND, PT EVAL
muscle strength/ROM/aerobic capacity/endurance/gait, locomotion, and balance/bed mobility, transfers

## 2019-10-06 NOTE — PROGRESS NOTE ADULT - ATTENDING COMMENTS
Patient seen and examined today. I agree with Dr. Gerardo's findings, assessment, and plan with the following additions and exceptions:     Atrial fibrillation with rapid ventricular response secondary to respiratory failure secondary to pleural effusions from volume overload. Patient has been off diuretics in setting of sepsis and acute blood loss anemia. S/p lasix 20 ivp. Voided 1500. Goal net negative at least 1L for today. Monitor is and os. Digoxin 0.25 mg ivp given with improvement in HR. Digoxin level in AM. BP stable. Will initiate lopressor 12.5 mg po BID as HR still 120s for improved diastolic filling. Monitor hemodynamics closely. Repeat CXR in AM.  Hgb stable. Lactate cleared.   Has been consented for blood product transfusion.   ID, MICU and General surgery contribution to care greatly appreciated.   Low threshold for reconsulting MICU.   Rest of plan as above    Dr. Rafi Staley DO  Division of Hospital Medicine  [facility]  Pager:  [phone] .

## 2019-10-06 NOTE — CONSULT NOTE ADULT - ASSESSMENT
HPI:  Ms. Slater is a 86 yo female with a PMH of CHF, HTN, Afib, DM2, COPD, and chronic lymphoma who was brought in for 102.6 fever consulted for assistance with medical decision making in the context of a patient with advanced illness

## 2019-10-06 NOTE — PHYSICAL THERAPY INITIAL EVALUATION ADULT - DIAGNOSIS, PT EVAL
Decreased strength, decreased endurance, decreased ROM, impaired balance, impaired functional mobility

## 2019-10-06 NOTE — PHYSICAL THERAPY INITIAL EVALUATION ADULT - TRANSFER TRAINING, PT EVAL
GOAL: pt will be able to perform transfers with contact guard assist & use of lc walker (RUE NWB) within 4 weeks

## 2019-10-06 NOTE — PHYSICAL THERAPY INITIAL EVALUATION ADULT - BALANCE TRAINING, PT EVAL
GOAL: pt will be able to independently sit at edge of bed x 10 minutes without loss of balance in order to assist with ADL's within 4 weeks. pt will be able to ambulate 50ft with contact guard assist & use of lc walker (TRINA WHITE) within 4 weeks

## 2019-10-06 NOTE — PHYSICAL THERAPY INITIAL EVALUATION ADULT - PRECAUTIONS/LIMITATIONS, REHAB EVAL
2L O2 via nasal cannula. droplet & contact precautions/cardiac precautions/fall precautions/isolation precautions/oxygen therapy device and L/min

## 2019-10-06 NOTE — PROGRESS NOTE ADULT - PROBLEM SELECTOR PLAN 3
s/p recent rituximab  -hold home po prednisone 60mg, change to IV stress dose steroids hydrocortisone 100mg q8 given sepsis with borderline BPs -RVP positive, CXR negative for pna  -continuing home duonebs  -continuing robitussin for cough

## 2019-10-06 NOTE — CONSULT NOTE ADULT - CONSULT REASON
consulted for assistance with medical decision making in the context of a patient with advanced illness

## 2019-10-06 NOTE — PROGRESS NOTE ADULT - PROBLEM SELECTOR PLAN 2
-RVP positive, CXR negative for pna  -continuing home duonebs  -continuing robitussin for cough secondary to recurrent ecoli bacteremia due to unknown etiology and parainfluenza virus  -possible cellulitis of infected left hip joint vs hematoma vs intraabdominal infection given recent UTI  -CT-Abd-Pelvis read noted.  -repeat Bcx pending  -Pt's blood pressures are responsive to IVFs at this time. Recommend continuing boluses PRN for MAP <65. Lung exam clear, trace LE edema, seems euvolemic at this time.   -Hold all antihypertensives.  -started midodrine 10mg TID and uptitrate if BP remains persistently in systolics 90s.  -switched lopressor to digoxin given low pressures.  -Lactate q4 hrs.   -Hemodynamically stable.  -c/w meropenem  1g q12 per ID  -vitals q4  -will repeat lactate and CBC stat. Transfuse as needed  -hold home po prednisone 60mg, change to IV stress dose steroids hydrocortisone 100mg q8 given sepsis with borderline BPs  -per ortho over phone, does not appear that hip is source of infection. likely retroperitoneal.  -gen surg consulted

## 2019-10-06 NOTE — PROGRESS NOTE ADULT - SUBJECTIVE AND OBJECTIVE BOX
Patient is a 85y old  Female who presents with a chief complaint of Fever (05 Oct 2019 19:16)      INTERVAL HPI/OVERNIGHT EVENTS:    MEDICATIONS  (STANDING):  ALBUTerol    90 MICROgram(s) HFA Inhaler 2 Puff(s) Inhalation every 6 hours  ALPRAZolam 0.5 milliGRAM(s) Oral two times a day  ascorbic acid 500 milliGRAM(s) Oral daily  atorvastatin 10 milliGRAM(s) Oral at bedtime  dextrose 5%. 1000 milliLiter(s) (50 mL/Hr) IV Continuous <Continuous>  dextrose 50% Injectable 12.5 Gram(s) IV Push once  digoxin     Tablet 0.0625 milliGRAM(s) Oral daily  entecavir 0.5 milliGRAM(s) Oral daily  hydrocortisone sodium succinate Injectable 100 milliGRAM(s) IV Push every 8 hours  insulin glargine Injectable (LANTUS) 5 Unit(s) SubCutaneous at bedtime  insulin lispro (HumaLOG) corrective regimen sliding scale   SubCutaneous three times a day before meals  melatonin 5 milliGRAM(s) Oral at bedtime  meropenem  IVPB 1000 milliGRAM(s) IV Intermittent every 12 hours  midodrine. 10 milliGRAM(s) Oral three times a day  montelukast 10 milliGRAM(s) Oral daily  Nephro-bebeto 1 Tablet(s) Oral daily  pantoprazole    Tablet 40 milliGRAM(s) Oral before breakfast  sertraline 50 milliGRAM(s) Oral daily  tiotropium 18 MICROgram(s) Capsule 1 Capsule(s) Inhalation daily    MEDICATIONS  (PRN):  acetaminophen   Tablet .. 650 milliGRAM(s) Oral every 6 hours PRN Mild Pain (1 - 3)  dextrose 40% Gel 15 Gram(s) Oral once PRN Blood Glucose LESS THAN 70 milliGRAM(s)/deciliter  docusate sodium 100 milliGRAM(s) Oral three times a day PRN Constipation  glucagon  Injectable 1 milliGRAM(s) IntraMuscular once PRN Glucose LESS THAN 70 milligrams/deciliter  guaiFENesin   Syrup  (Sugar-Free) 100 milliGRAM(s) Oral every 6 hours PRN Cough  oxyCODONE    5 mG/acetaminophen 325 mG 1 Tablet(s) Oral every 6 hours PRN Severe Pain (7 - 10)  polyethylene glycol 3350 17 Gram(s) Oral daily PRN Constiapation  senna 2 Tablet(s) Oral at bedtime PRN Constipation      Allergies    Keflex (Unknown)  penicillin (Rash)    Intolerances        REVIEW OF SYSTEMS:  Please see interval HPI:    Vital Signs Last 24 Hrs  T(C): 36.9 (06 Oct 2019 04:06), Max: 36.9 (05 Oct 2019 20:59)  T(F): 98.5 (06 Oct 2019 04:06), Max: 98.5 (06 Oct 2019 04:06)  HR: 110 (06 Oct 2019 04:06) (93 - 112)  BP: 133/84 (06 Oct 2019 04:06) (117/81 - 136/88)  BP(mean): --  RR: 18 (06 Oct 2019 04:06) (18 - 18)  SpO2: 100% (06 Oct 2019 04:06) (93% - 100%)  I&O's Detail    05 Oct 2019 07:01  -  06 Oct 2019 07:00  --------------------------------------------------------  IN:    Oral Fluid: 240 mL  Total IN: 240 mL    OUT:    Voided: 1500 mL  Total OUT: 1500 mL    Total NET: -1260 mL              PHYSICAL EXAM  GENERAL: elderly woman lying in bed, uncomfortable, groaning in pain  	HEAD:  +mild ecchymoses throughout face from prior fall  	ENT: PERRL, conjunctiva and sclera clear, neck supple, no JVD, moist mucosa, posterior oropharynx clear  	CHEST/LUNG: (difficult to auscultate since pt persistently moaning). lungs CTAB, poor inspiratory effort  	HEART: irregulary irregular; No murmurs, rubs, or gallops.   	ABDOMEN: Soft, nontender, nondistended; Bowel sounds present, no organomegaly  	BACK: no spinal tenderness, no CVA tenderness  	EXTREMITIES: trace edema to thighs bilaterally, +fracture in R arm with sling. Muscle compartments soft without signs of compartment syndrome.   NEUROLOGY: AAOx2 (knows name and location, does not know year or president  Skin: erythema of left hip moving up pelvis    LABS:                        8.2    22.53 )-----------( 161      ( 05 Oct 2019 23:28 )             27.0     06 Oct 2019 05:32    139    |  98     |  44     ----------------------------<  196    5.1     |  31     |  1.23     Ca    8.2        06 Oct 2019 05:32  Phos  4.4       06 Oct 2019 05:32  Mg     2.1       06 Oct 2019 05:32        CAPILLARY BLOOD GLUCOSE      POCT Blood Glucose.: 247 mg/dL (05 Oct 2019 21:50)  POCT Blood Glucose.: 254 mg/dL (05 Oct 2019 17:43)  POCT Blood Glucose.: 348 mg/dL (05 Oct 2019 12:28)  POCT Blood Glucose.: 274 mg/dL (05 Oct 2019 08:36)    BLOOD CULTURE  10-04 @ 22:29   No growth to date.  --  --  10-03 @ 17:22   Growth in aerobic and anaerobic bottles: Escherichia coli  See previous culture [id number]  --  Blood Culture PCR  10-03 @ 17:20   No growth  --  --    RADIOLOGY & ADDITIONAL TESTS:    Imaging Personally Reviewed:  [x] YES; CT abd/pelvis; CXR     Consultant(s) Notes Reviewed:  ID, Surg     Care Discussed with Consultants/Other Providers:   ALAYNA MUNOZ MD  INTERNAL MEDICINE PGY3  [id number] Patient is a 85y old  Female who presents with a chief complaint of Fever (05 Oct 2019 19:16)      INTERVAL HPI/OVERNIGHT EVENTS:  patient seen and examinted in AM, overnight w/ wheezing s/p cxr and duonebs. denies pain currently, eating breakfast w/ good appetite. denies nausea/vomting. does not appear to be in respiratory destress.   MEDICATIONS  (STANDING):  ALBUTerol    90 MICROgram(s) HFA Inhaler 2 Puff(s) Inhalation every 6 hours  ALPRAZolam 0.5 milliGRAM(s) Oral two times a day  ascorbic acid 500 milliGRAM(s) Oral daily  atorvastatin 10 milliGRAM(s) Oral at bedtime  dextrose 5%. 1000 milliLiter(s) (50 mL/Hr) IV Continuous <Continuous>  dextrose 50% Injectable 12.5 Gram(s) IV Push once  digoxin     Tablet 0.0625 milliGRAM(s) Oral daily  entecavir 0.5 milliGRAM(s) Oral daily  hydrocortisone sodium succinate Injectable 100 milliGRAM(s) IV Push every 8 hours  insulin glargine Injectable (LANTUS) 5 Unit(s) SubCutaneous at bedtime  insulin lispro (HumaLOG) corrective regimen sliding scale   SubCutaneous three times a day before meals  melatonin 5 milliGRAM(s) Oral at bedtime  meropenem  IVPB 1000 milliGRAM(s) IV Intermittent every 12 hours  midodrine. 10 milliGRAM(s) Oral three times a day  montelukast 10 milliGRAM(s) Oral daily  Nephro-bebeto 1 Tablet(s) Oral daily  pantoprazole    Tablet 40 milliGRAM(s) Oral before breakfast  sertraline 50 milliGRAM(s) Oral daily  tiotropium 18 MICROgram(s) Capsule 1 Capsule(s) Inhalation daily    MEDICATIONS  (PRN):  acetaminophen   Tablet .. 650 milliGRAM(s) Oral every 6 hours PRN Mild Pain (1 - 3)  dextrose 40% Gel 15 Gram(s) Oral once PRN Blood Glucose LESS THAN 70 milliGRAM(s)/deciliter  docusate sodium 100 milliGRAM(s) Oral three times a day PRN Constipation  glucagon  Injectable 1 milliGRAM(s) IntraMuscular once PRN Glucose LESS THAN 70 milligrams/deciliter  guaiFENesin   Syrup  (Sugar-Free) 100 milliGRAM(s) Oral every 6 hours PRN Cough  oxyCODONE    5 mG/acetaminophen 325 mG 1 Tablet(s) Oral every 6 hours PRN Severe Pain (7 - 10)  polyethylene glycol 3350 17 Gram(s) Oral daily PRN Constiapation  senna 2 Tablet(s) Oral at bedtime PRN Constipation      Allergies    Keflex (Unknown)  penicillin (Rash)    Intolerances        REVIEW OF SYSTEMS:  Please see interval HPI:    Vital Signs Last 24 Hrs  T(C): 36.9 (06 Oct 2019 04:06), Max: 36.9 (05 Oct 2019 20:59)  T(F): 98.5 (06 Oct 2019 04:06), Max: 98.5 (06 Oct 2019 04:06)  HR: 110 (06 Oct 2019 04:06) (93 - 112)  BP: 133/84 (06 Oct 2019 04:06) (117/81 - 136/88)  BP(mean): --  RR: 18 (06 Oct 2019 04:06) (18 - 18)  SpO2: 100% (06 Oct 2019 04:06) (93% - 100%)  I&O's Detail    05 Oct 2019 07:01  -  06 Oct 2019 07:00  --------------------------------------------------------  IN:    Oral Fluid: 240 mL  Total IN: 240 mL    OUT:    Voided: 1500 mL  Total OUT: 1500 mL    Total NET: -1260 mL              PHYSICAL EXAM  GENERAL: elderly woman lying in bed, uncomfortable, groaning in pain  	HEAD:  +mild ecchymoses throughout face from prior fall  	ENT: PERRL, conjunctiva and sclera clear, neck supple, no JVD, moist mucosa, posterior oropharynx clear  	CHEST/LUNG: (difficult to auscultate since pt persistently moaning).crackles heard in b/l lung fields.   	HEART: irregulary irregular; No murmurs, rubs, or gallops.   	ABDOMEN: Soft, nontender, nondistended; Bowel sounds present, no organomegaly  	BACK: no spinal tenderness, no CVA tenderness  	EXTREMITIES: trace edema to thighs bilaterally, +fracture in R arm with sling. Muscle compartments soft without signs of compartment syndrome.   NEUROLOGY: AAOx2 (knows name and location, does not know year or president  Skin: erythema of left hip moving up pelvis    LABS:                        8.2    22.53 )-----------( 161      ( 05 Oct 2019 23:28 )             27.0     06 Oct 2019 05:32    139    |  98     |  44     ----------------------------<  196    5.1     |  31     |  1.23     Ca    8.2        06 Oct 2019 05:32  Phos  4.4       06 Oct 2019 05:32  Mg     2.1       06 Oct 2019 05:32        CAPILLARY BLOOD GLUCOSE      POCT Blood Glucose.: 247 mg/dL (05 Oct 2019 21:50)  POCT Blood Glucose.: 254 mg/dL (05 Oct 2019 17:43)  POCT Blood Glucose.: 348 mg/dL (05 Oct 2019 12:28)  POCT Blood Glucose.: 274 mg/dL (05 Oct 2019 08:36)    BLOOD CULTURE  10-04 @ 22:29   No growth to date.  --  --  10-03 @ 17:22   Growth in aerobic and anaerobic bottles: Escherichia coli  See previous culture [id number]  --  Blood Culture PCR  10-03 @ 17:20   No growth  --  --    RADIOLOGY & ADDITIONAL TESTS:    Imaging Personally Reviewed:  [x] YES; CT abd/pelvis; CXR     Consultant(s) Notes Reviewed:  ID, Surg     Care Discussed with Consultants/Other Providers:   ALAYNA MUNOZ MD  INTERNAL MEDICINE PGY3  [id number] Patient is a 85y old  Female who presents with a chief complaint of Fever (05 Oct 2019 19:16)      INTERVAL HPI/OVERNIGHT EVENTS:  patient seen and examinted in AM, overnight w/ wheezing s/p cxr and duonebs. Reported shortness of breath.     ROS  Limited secondary to acute delirium.     MEDICATIONS  (STANDING):  ALBUTerol    90 MICROgram(s) HFA Inhaler 2 Puff(s) Inhalation every 6 hours  ALPRAZolam 0.5 milliGRAM(s) Oral two times a day  ascorbic acid 500 milliGRAM(s) Oral daily  atorvastatin 10 milliGRAM(s) Oral at bedtime  dextrose 5%. 1000 milliLiter(s) (50 mL/Hr) IV Continuous <Continuous>  dextrose 50% Injectable 12.5 Gram(s) IV Push once  digoxin     Tablet 0.0625 milliGRAM(s) Oral daily  entecavir 0.5 milliGRAM(s) Oral daily  hydrocortisone sodium succinate Injectable 100 milliGRAM(s) IV Push every 8 hours  insulin glargine Injectable (LANTUS) 5 Unit(s) SubCutaneous at bedtime  insulin lispro (HumaLOG) corrective regimen sliding scale   SubCutaneous three times a day before meals  melatonin 5 milliGRAM(s) Oral at bedtime  meropenem  IVPB 1000 milliGRAM(s) IV Intermittent every 12 hours  midodrine. 10 milliGRAM(s) Oral three times a day  montelukast 10 milliGRAM(s) Oral daily  Nephro-bebeto 1 Tablet(s) Oral daily  pantoprazole    Tablet 40 milliGRAM(s) Oral before breakfast  sertraline 50 milliGRAM(s) Oral daily  tiotropium 18 MICROgram(s) Capsule 1 Capsule(s) Inhalation daily    MEDICATIONS  (PRN):  acetaminophen   Tablet .. 650 milliGRAM(s) Oral every 6 hours PRN Mild Pain (1 - 3)  dextrose 40% Gel 15 Gram(s) Oral once PRN Blood Glucose LESS THAN 70 milliGRAM(s)/deciliter  docusate sodium 100 milliGRAM(s) Oral three times a day PRN Constipation  glucagon  Injectable 1 milliGRAM(s) IntraMuscular once PRN Glucose LESS THAN 70 milligrams/deciliter  guaiFENesin   Syrup  (Sugar-Free) 100 milliGRAM(s) Oral every 6 hours PRN Cough  oxyCODONE    5 mG/acetaminophen 325 mG 1 Tablet(s) Oral every 6 hours PRN Severe Pain (7 - 10)  polyethylene glycol 3350 17 Gram(s) Oral daily PRN Constiapation  senna 2 Tablet(s) Oral at bedtime PRN Constipation      Allergies    Keflex (Unknown)  penicillin (Rash)    Intolerances        REVIEW OF SYSTEMS:  Please see interval HPI:    Vital Signs Last 24 Hrs  T(C): 36.9 (06 Oct 2019 04:06), Max: 36.9 (05 Oct 2019 20:59)  T(F): 98.5 (06 Oct 2019 04:06), Max: 98.5 (06 Oct 2019 04:06)  HR: 110 (06 Oct 2019 04:06) (93 - 112)  BP: 133/84 (06 Oct 2019 04:06) (117/81 - 136/88)  BP(mean): --  RR: 18 (06 Oct 2019 04:06) (18 - 18)  SpO2: 100% (06 Oct 2019 04:06) (93% - 100%)  I&O's Detail    05 Oct 2019 07:01  -  06 Oct 2019 07:00  --------------------------------------------------------  IN:    Oral Fluid: 240 mL  Total IN: 240 mL    OUT:    Voided: 1500 mL  Total OUT: 1500 mL    Total NET: -1260 mL      PHYSICAL EXAM  GENERAL: elderly woman lying in bed, uncomfortable, groaning in pain  	HEAD:  +mild ecchymoses throughout face from prior fall  	ENT: PERRL, conjunctiva and sclera clear, neck supple, no JVD, moist mucosa, posterior oropharynx clear  	CHEST/LUNG: (difficult to auscultate since pt persistently moaning).crackles heard in b/l lung fields. Extremities warm and well perfused.   	HEART: irregulary irregular; tachcyardic. No murmurs, rubs, or gallops.   	ABDOMEN: Soft, nontender, nondistended; Bowel sounds present, no organomegaly  	BACK: no spinal tenderness, no CVA tenderness  	EXTREMITIES: trace edema to thighs bilaterally, +fracture in R arm with sling. Muscle compartments soft without signs of compartment syndrome.   NEUROLOGY: AAOx2 (knows name and location, does not know year or president. Following commands.   Skin: erythema of left hip moving up pelvis    LABS:                        8.2    22.53 )-----------( 161      ( 05 Oct 2019 23:28 )             27.0     06 Oct 2019 05:32    139    |  98     |  44     ----------------------------<  196    5.1     |  31     |  1.23     Ca    8.2        06 Oct 2019 05:32  Phos  4.4       06 Oct 2019 05:32  Mg     2.1       06 Oct 2019 05:32        CAPILLARY BLOOD GLUCOSE      POCT Blood Glucose.: 247 mg/dL (05 Oct 2019 21:50)  POCT Blood Glucose.: 254 mg/dL (05 Oct 2019 17:43)  POCT Blood Glucose.: 348 mg/dL (05 Oct 2019 12:28)  POCT Blood Glucose.: 274 mg/dL (05 Oct 2019 08:36)    BLOOD CULTURE  10-04 @ 22:29   No growth to date.  --  --  10-03 @ 17:22   Growth in aerobic and anaerobic bottles: Escherichia coli  See previous culture [id number]  --  Blood Culture PCR  10-03 @ 17:20   No growth  --  --    RADIOLOGY & ADDITIONAL TESTS:    Imaging Personally Reviewed:  [x] YES; CT abd/pelvis; CXR     Consultant(s) Notes Reviewed:  ID, Surg     Care Discussed with Consultants/Other Providers:   ALAYNA MUNOZ MD  INTERNAL MEDICINE PGY3  [id number]

## 2019-10-06 NOTE — PHYSICAL THERAPY INITIAL EVALUATION ADULT - IMPAIRMENTS CONTRIBUTING IMPAIRED BED MOBILITY, REHAB EVAL
decreased endurance/decreased ROM/decreased strength/impaired balance/pain/impaired postural control

## 2019-10-06 NOTE — CONSULT NOTE ADULT - SUBJECTIVE AND OBJECTIVE BOX
HPI:  Ms. Slater is a 84 yo female with a PMH of CHF, HTN, Afib, DM2, COPD, and chronic lymphoma who was brought in for 102.6 fever. Pt is groaning in pain, unable to answer questions. history obtained via chart review and through pt's daughter and home health aide at bedside.   One month ago the patient had worsening SOB on exertion and b/l rash on her lower legs and was diagnosed with vasculitis and lupus and was given 2 doses of rituximab. Per the patients daughter and home aid, the pt has deteriorated since receiving the rituximab. 3 weeks ago the patient fell out of bed on L hip and was admitted to [facility] for retroperitoneal bleeding. Hospital course was complicated by E coli bacteremia, was being treated with TMP-SMX. Pt was discharged on Friday 9/27, then at home on Tuesday 10/01 had another fall, this time on her R side and fractured her R arm. Now s/p sling and cast. This morning, home nurse for pt noticed a fever of 102.6, and family brought pt to ER.     In ER, pt had RVP which was positive for parainfluenza. Pt has weakness, fevers, chills, hip pain, arm pain, intermittent cough (per baseline), LE edema.  Does not have nausea, vomiting, diarrhea, abdominal pain, hematochezia, melena, hematuria, dysuria. (03 Oct 2019 18:30)    Speaking broken English  No distress        PERTINENT PM/SXH:   COPD (chronic obstructive pulmonary disease)  Peripheral vascular disease  Pulmonary hypertension  Rheumatoid arthritis  Osteoarthritis  Mitral regurgitation  H/O lymphoma  Hyperlipidemia  Chronic GERD  Chronic kidney disease  AF (atrial fibrillation)  Anemia in CKD (chronic kidney disease)  CHF (congestive heart failure)  HTN (hypertension)  No pertinent past medical history    History of total hip replacement, left  History of total knee replacement, bilateral    FAMILY HISTORY:  Family history of inclusion body myositis    ITEMS NOT CHECKED ARE NOT PRESENT    SOCIAL HISTORY:   Significant other/partner:  [  ]  Children:  [x ]  Jew/Spirituality:  Substance hx:  [ ]   Tobacco hx:  [ ]   Alcohol hx: [ ]   Home Opioid hx:  [ ] I-Stop Reference No:  Living Situation: [x ]Home  [ ]Long term care  [ ]Rehab [ ]Other    ADVANCE DIRECTIVES:    DNR  Yes  MOLST  [ ]  Living Will  [ ]   DECISION MAKER(s):  [ ] Health Care Proxy(s)  [ ] Surrogate(s)  [ ] Guardian           Name(s):   Phone Number(s):    BASELINE (I)ADL(s) (prior to admission):  [place]: [ ]Total  [ ] Moderate [ ]Dependent    Allergies    Keflex (Unknown)  penicillin (Rash)    Intolerances      MEDICATIONS  (STANDING):  ALBUTerol    90 MICROgram(s) HFA Inhaler 2 Puff(s) Inhalation every 6 hours  ALPRAZolam 0.5 milliGRAM(s) Oral two times a day  ascorbic acid 500 milliGRAM(s) Oral daily  atorvastatin 10 milliGRAM(s) Oral at bedtime  dextrose 5%. 1000 milliLiter(s) (50 mL/Hr) IV Continuous <Continuous>  dextrose 50% Injectable 12.5 Gram(s) IV Push once  digoxin     Tablet 0.0625 milliGRAM(s) Oral daily  entecavir 0.5 milliGRAM(s) Oral daily  hydrocortisone sodium succinate Injectable 100 milliGRAM(s) IV Push every 8 hours  insulin glargine Injectable (LANTUS) 5 Unit(s) SubCutaneous at bedtime  insulin lispro (HumaLOG) corrective regimen sliding scale   SubCutaneous three times a day before meals  melatonin 5 milliGRAM(s) Oral at bedtime  meropenem  IVPB 1000 milliGRAM(s) IV Intermittent every 12 hours  midodrine. 10 milliGRAM(s) Oral three times a day  montelukast 10 milliGRAM(s) Oral daily  Nephro-bebeto 1 Tablet(s) Oral daily  pantoprazole    Tablet 40 milliGRAM(s) Oral before breakfast  sertraline 50 milliGRAM(s) Oral daily  tiotropium 18 MICROgram(s) Capsule 1 Capsule(s) Inhalation daily    MEDICATIONS  (PRN):  acetaminophen   Tablet .. 650 milliGRAM(s) Oral every 6 hours PRN Mild Pain (1 - 3)  dextrose 40% Gel 15 Gram(s) Oral once PRN Blood Glucose LESS THAN 70 milliGRAM(s)/deciliter  docusate sodium 100 milliGRAM(s) Oral three times a day PRN Constipation  glucagon  Injectable 1 milliGRAM(s) IntraMuscular once PRN Glucose LESS THAN 70 milligrams/deciliter  guaiFENesin   Syrup  (Sugar-Free) 100 milliGRAM(s) Oral every 6 hours PRN Cough  oxyCODONE    5 mG/acetaminophen 325 mG 1 Tablet(s) Oral every 6 hours PRN Severe Pain (7 - 10)  polyethylene glycol 3350 17 Gram(s) Oral daily PRN Constiapation  senna 2 Tablet(s) Oral at bedtime PRN Constipation    PRESENT SYMPTOMS: [ x]Unable to obtain due to poor mentation   Source if other than patient:  [ ]Family   [ ]Team  [ ] Staff [ ] Inferred    Pain: [ ] yes [x ] no  QOL impact -   Location -                    Aggravating factors -  Quality -  Radiation -  Timing-  Severity (0-10 scale):  Minimal acceptable level (0-10 scale):       Pain Assessment (scores are out of 10)    O  L  D  C  A  R  T  S    Pain Now:  Pain average over 24 hours:  Pain maximum:  Onset of prn (minutes):  Pain minimum:  Time to pain minimum (minutes):  Personalized pain goal:  Duration of prn (hours):  Ascent of pain:  Pain score at which prn is requested:    Does the pain have a negative impact on the following domains  An "X" denotes yes    General activity                        []  Walking ability                          []  Mood                                          []  Sleep                                           []  Normal Work                            []  Relations with other people  []  Enjoyment of life                     []  Cognitive Tasks                        []      PAIN AD Score: 0    http://geriatrictoolkit.[facility]/cog/painad.pdf (press ctrl +  left click to view)          Dyspnea:                           [ ]Mild [ ]Moderate [ ]Severe  Anxiety:                             [ ]Mild [ ]Moderate [ ]Severe  Fatigue:                             [ ]Mild [ ]Moderate [ ]Severe  Nausea:                             [ ]Mild [ ]Moderate [ ]Severe  Loss of appetite:              [ ]Mild [ ]Moderate [ ]Severe  Constipation:                    [ ]Mild [ ]Moderate [ ]Severe    Other Symptoms:  [x ]All other review of systems negative     Karnofsky Performance Score/Palliative Performance Status Version 2:      50   %      http://npcrc.org/files/news/palliative_performance_scale_ppsv2.pdf      PHYSICAL EXAM:  Vital Signs Last 24 Hrs  T(C): 36.9 (06 Oct 2019 04:06), Max: 36.9 (05 Oct 2019 20:59)  T(F): 98.5 (06 Oct 2019 04:06), Max: 98.5 (06 Oct 2019 04:06)  HR: 110 (06 Oct 2019 04:06) (93 - 112)  BP: 133/84 (06 Oct 2019 04:06) (125/82 - 136/88)  BP(mean): --  RR: 18 (06 Oct 2019 04:06) (18 - 18)  SpO2: 100% (06 Oct 2019 04:06) (99% - 100%) I&O's Summary    05 Oct 2019 07:01  -  06 Oct 2019 07:00  --------------------------------------------------------  IN: 240 mL / OUT: 1500 mL / NET: -1260 mL      GENERAL:  [ x]Alert  [ ]Oriented x   [ ]Lethargic  [ ]Cachexia  [ ]Unarousable  [x ]Verbal  [ ]Non-Verbal [  x ] No distress   [   ] Gaunt  Behavioral:   [ ] Anxiety  [ ] Delirium [ ] Agitation  [ x  ]  Calm [ ] Other  HEENT:  [x ]Normal   [ ]Dry mouth   [ ]ET Tube/Trach  [ ]Oral lesions  [ ] Temporal Wasting  [ ]  Mucositis [ ]  Grade   PULMONARY:   [  ]Clear [ ]Tachypnea  [ ]Audible excessive secretions   [ ]Rhonchi        [ ]Right [ ]Left [ ]Bilateral  [ ]Crackles        [ ]Right [ ]Left [ ]Bilateral  [ ]Wheezing     [ ]Right [ ]Left [ ]Bilateral  [x ] Diminished  [ ] Right  [  ] Left   [ ] Bilaterally  CARDIOVASCULAR:    [  ]Regular [ ]Irregular [ x]Tachy  [ ]Ismael [ ]Murmur [  ]   Edema  [ ]Other  GASTROINTESTINAL:  [ x]Soft  [ ]Distended   [ ]+BS  [ x]Non tender [ ]Tender  [ ]PEG [ ]OGT/ NGT    Last BM:       GENITOURINARY:  [x ]Normal [ ] Incontinent   [ ]Oliguria/Anuria   [ ]Chan [   ] Suprapubic tenderness  MUSCULOSKELETAL:   [  ]Normal   [x ]Weakness  [ ]Bed/Wheelchair bound [ ]Edema [  ] amputation  [  ] contraction  NEUROLOGIC:   [ x]No focal deficits  [ ] Cognitive impairment  [ ] Dysphagia [ ]Dysarthria [ ] Paresis [  ] Aphasia  [ ]Other   SKIN:   [  ]Normal   [ ]Pressure ulcer(s)  [ ]Rash [   ]  Wound    [  ] hyperpigmentation    CRITICAL CARE:  [ ] Shock Present  [ ]Septic [ ]Cardiogenic [ ]Neurologic [ ]Hypovolemic  [ ]  Vasopressors [ ]  Inotropes   [ ] Respiratory failure present [ ] mechanical ventilation [ ] non-invasive ventilatory support [ ] High flow  [ ] Acute  [ ] Chronic [ ] Hypoxic  [ ] Hypercarbic [ ] Other  [ ] Other organ failure       LABS:                        8.4    20.87 )-----------( 153      ( 06 Oct 2019 09:14 )             27.7   10-06    139  |  98  |  44<H>  ----------------------------<  196<H>  5.1   |  31  |  1.23    Ca    8.2<L>      06 Oct 2019 05:32  Phos  4.4     10-06  Mg     2.1     10-06                  RADIOLOGY & ADDITIONAL STUDIES:    PROTEIN CALORIE MALNUTRITION PRESENT: [ ] Yes [ ] No  [ ] PPSV2 < or = to 30% [ ] significant weight loss  [ ] poor nutritional intake [ ] catabolic state [ ] anasarca     Albumin, Serum: 2.7 g/dL (10-03-19 @ 14:11)  Artificial Nutrition [ ]     REFERRALS:   [ ]Chaplaincy  [ ] Hospice  [ ]Child Life  [ ]Social Work  [ ]Case management [ ]Holistic Therapy               Care Coordination Assessment [C. Provider] (10-05-19 @ 11:58)   Progress Notes - Care Coordination [C. Provider] (10-05-19 @ 11:54)

## 2019-10-06 NOTE — PROGRESS NOTE ADULT - ASSESSMENT
84 yo female with a PMH of CHF, ANCA vasculitis, HTN, Afib, DM2, COPD, and chronic lymphoma who was brought in for 102.6 fever, now with  sepsis secondary to ecoli bacteremia, parainfluenza virus c/b afib found to have increasing RP hematoma concern for possible source of infection. pt currently awaiting telemetry bed since 3PM on 10/4. 86 yo female with a PMH of CHF, ANCA vasculitis, HTN, Afib, DM2, COPD, and chronic lymphoma who was brought in for 102.6 fever, now with  sepsis secondary to ecoli bacteremia, parainfluenza virus c/b afib found to have increasing RP hematoma concern for possible source of infection. pt currently awaiting telemetry bed

## 2019-10-06 NOTE — PHYSICAL THERAPY INITIAL EVALUATION ADULT - GENERAL OBSERVATIONS, REHAB EVAL
Pt encountered semi-supine in bed + O2 via nasal cannula at 2L/min, IV lock, + martin brace to danni MENA, pneumatic loreta smith B/L, daughter Karlee at bedside

## 2019-10-06 NOTE — CHART NOTE - NSCHARTNOTEFT_GEN_A_CORE
TONIA YOUNG  85y  MRN: [id number]    Subjective:    Patient is a 85y old  Female who presents with a chief complaint of Fever (05 Oct 2019 19:16)    MD alerted by nurse at 6:00 AM that patient appearing to be more lethargic than usual. Patient seen and examined at bedside.     Dr. Charlette Owens, PGY1   NF [address] TONIA YOUNG  85y  MRN: [id number]    Subjective:    Patient is a 85y old  Female who presents with a chief complaint of Fever (05 Oct 2019 19:16)    MD alerted by nurse at 6:00 AM that patient appearing to be more lethargic than usual. Patient seen and examined at bedside.     On exam     Dr. Charlette Owens, PGY1   NF 0224 TONIA YOUNG  85y  MRN: [id number]    Subjective:    Patient is a 85y old  Female who presents with a chief complaint of Fever (05 Oct 2019 19:16)        MD alerted by nurse at 6:00 AM that patient appearing to be more lethargic than usual. Patient seen and examined at bedside.     On exam     Dr. Charlette Owens, PGY1   NF 75[address] TONIA YOUNG  85y  MRN: [id number]    Subjective:    Patient is a 85y old  Female who presents with a chief complaint of Fever (05 Oct 2019 19:16)    84 yo female with a PMHx of CHF (EF 61%), ANCA vasculitis, HTN, Afib, T2DM, COPD, and chronic lymphoma who was brought in for 102.6 fever, now with sepsis secondary to Ecoli bacteremia, parainfluenza virus c/b afib with course also complicated by retroperitoneal bleed.     MD alerted by nurse at 6:00 AM that patient appearing to be more lethargic than usual. Patient seen and examined at bedside.     On exam, vitals:  (ranges bt 100s-110s), Temp 98.5, RR 18, SPO2 100% 3L NC, /84. Patient lethargic but easily awoken, A&O x 3., following all commands. Denied cp, SOB. Tachycardic on exam. Holocystolic murmur throughout precordium. No JVD/JVP. No pedal edema. Lungs with extensive wheezing bl, no crackles or rhonchi. Breathing unlabored. Right upper extremity with multiple purpura and shoulder sling in place with significant edema of distal right upper extremity.    Plan:  - stat CXR ordered, noted for increasing opacities over left lung (pending official read)  - altered mentation likely 2/2 increasing secretions from RVP  - stat duonebs ordered x 2  - chest physiotherapy TID  - incentive spirometry   - recommend r/o DVT of RUE as per day team   - patient DNR    Dr. Charlette Owens, PGY1   NF 48[address] TONIA YOUNG  85y  MRN: [id number]    Subjective:    Patient is a 85y old  Female who presents with a chief complaint of Fever (05 Oct 2019 19:16)    84 yo female with a PMHx of CHF (EF 61%), ANCA vasculitis, HTN, Afib, T2DM, COPD, and chronic lymphoma who was brought in for 102.6 fever, now with sepsis secondary to Ecoli bacteremia, parainfluenza virus c/b afib with course also complicated by retroperitoneal bleed.     MD alerted by nurse at 6:00 AM that patient appearing to be more lethargic than usual. Patient seen and examined at bedside.     On exam, vitals:  (ranges bt 100s-110s), Temp 98.5, RR 18, SPO2 100% 3L NC, /84. Patient lethargic but easily awoken, A&O x 3., following all commands. Denied cp, SOB. Tachycardic on exam. Holocystolic murmur throughout precordium. No JVD/JVP. No pedal edema. Lungs with extensive wheezing bl, no crackles or rhonchi. Breathing unlabored. Right upper extremity with multiple purpura and shoulder sling in place with significant edema of distal right upper extremity.    Plan:  - stat CXR ordered, noted for increasing opacities over left lung (pending official read)  - altered mentation likely 2/2 increasing secretions from parainfluenza  - stat duonebs ordered x 2  - chest physiotherapy TID  - incentive spirometry   - recommend r/o DVT of RUE as per day team   - patient DNR    Dr. Charlette Owens, PGY1   NF [id number]

## 2019-10-06 NOTE — PROGRESS NOTE ADULT - PROBLEM SELECTOR PLAN 1
secondary to recurrent ecoli bacteremia due to unknown etiology and parainfluenza virus  -possible cellulitis of infected left hip joint vs hematoma vs intraabdominal infection given recent UTI  -CT-Abd-Pelvis read noted.  -repeat Bcx pending  -Pt's blood pressures are responsive to IVFs at this time. Recommend continuing boluses PRN for MAP <65. Lung exam clear, trace LE edema, seems euvolemic at this time.   -Hold all antihypertensives.  -started midodrine 10mg TID and uptitrate if BP remains persistently in systolics 90s.  -switched lopressor to digoxin given low pressures.  -Lactate q4 hrs.   -Hemodynamically stable.  -c/w meropenem  1g q12 per ID  -vitals q4  -will repeat lactate and CBC stat. Transfuse as needed  -hold home po prednisone 60mg, change to IV stress dose steroids hydrocortisone 100mg q8 given sepsis with borderline BPs  -per ortho over phone, does not appear that hip is source of infection. likely retroperitoneal.  -gen surg consulted -holding A/C in setting of hematoma and fall, awaiting CT abd pelvis r/o RP bleed  -was loaded w/ digoxin yesterday, this AM w/ fib w/ RVR in setting of fluid overload. will given additional .25mg digoxin IV and IV Lasix.   -holding home dilt given sepsis  -pending telemetry bed

## 2019-10-06 NOTE — PHYSICAL THERAPY INITIAL EVALUATION ADULT - GAIT TRAINING, PT EVAL
GOAL: pt will be able to ambulate 50ft with contact guard assist & use of lc walker (TRINA WHITE) within 4 weeks

## 2019-10-07 NOTE — PROGRESS NOTE ADULT - PROBLEM SELECTOR PLAN 1
-holding A/C in setting of hematoma and fall, awaiting CT abd pelvis r/o RP bleed  -was loaded w/ digoxin yesterday, this AM w/ fib w/ RVR in setting of fluid overload. will given additional .25mg digoxin IV and IV Lasix.   -holding home dilt given sepsis  -pending telemetry bed -holding A/C in setting of hematoma and fall   -was loaded w/ digoxin yesterday, this AM w/ fib w/ RVR in setting of fluid overload. will given additional .25mg digoxin IV and IV Lasix.   -On BB  -pending telemetry bed - Likely triggered due to fluid overload; s/p lasix  - C/w digoxin   - AC held due to hematoma/fall    - C/w metoprolol  - Pending telemetry bed

## 2019-10-07 NOTE — MEDICAL STUDENT PROGRESS NOTE(EDUCATION) - NS MD HP STUD ASPLAN PLAN FT
Problem/Plan - 1:  ·  Problem: Atrial fibrillation.  Plan: -holding A/C in setting of hematoma and fall, awaiting CT abd pelvis r/o RP bleed  -Pt on digoxin w/ level at 1.9  -holding home dilt given sepsis  -On telemetry floor- pt in aflutter at morning examination      Problem/Plan - 2:  ·  Problem: Sepsis.  Plan: secondary to recurrent ecoli bacteremia due to unknown etiology and parainfluenza virus  -possible cellulitis of infected left hip joint vs hematoma vs intraabdominal infection given recent UTI  -CT-Abd-Pelvis read noted.  -repeat Bcx pending  -Pt's blood pressures are responsive to IVFs at this time. Recommend continuing boluses PRN for MAP <65. Lung exam clear, trace LE edema, seems euvolemic at this time.   -Hold all antihypertensives.  -started midodrine 10mg TID and uptitrate if BP remains persistently in systolics 90s.  -switched lopressor to digoxin given low pressures.  -Lactate q4 hrs.   -Hemodynamically stable.  -c/w meropenem  1g q12 per ID  -vitals q4  -will repeat lactate and CBC stat. Transfuse as needed  -hold home po prednisone 60mg, change to IV stress dose steroids hydrocortisone 100mg q8 given sepsis with borderline BPs  -per ortho over phone, does not appear that hip is source of infection. likely retroperitoneal.  -gen surg consulted.      Problem/Plan - 3:  ·  Problem: Parainfluenza.  Plan: -RVP positive, CXR negative for pna  -continuing home duonebs  -continuing robitussin for cough.      Problem/Plan - 4:  ·  Problem: Vasculitis.  Plan: s/p recent rituximab  -hold home po prednisone 60mg, change to IV stress dose steroids hydrocortisone 100mg q8 given sepsis with borderline BPs.      Problem/Plan - 5:  ·  Problem: Diabetes.  Plan: monitor FS, c/w SANTOSH.      Problem/Plan - 6:  Problem: Goals of care, counseling/discussion. Plan: -MOLST Form signed and in chart, DNR/DNI  -try to limit opoid/benzo use given age however patient has been on it at home and requiring currently.     Problem/Plan - 7:  ·  Problem: Prophylactic measure.  Plan: -DVT: SCD's only  very poor prognosis, DNR/DNI  -palliative care consulted. Problem/Plan - 1:  ·  Problem: Atrial fibrillation.  Plan: -holding A/C in setting of hematoma and fall, awaiting CT abd pelvis r/o RP bleed  -Pt in Afib w/ RVR in the setting of fluid overload. Gave 20mg lasix and .25 digoxin yesterday.  -Pt now on maintenance digoxin 0.0625 w/ level (10/7) at 1.9. Continue, with monitoring of K, Ca, and Mg for dig interactions.  -holding home dilt given sepsis  -Increase metoprolol to 25mg BID for rate control.   -On telemetry floor- pt in aflutter during morning examination with short bursts of rates 130-160, but nothing sustained.     Problem/Plan - 2:  ·  Problem: Sepsis.  Plan: secondary to recurrent ecoli bacteremia due to unknown etiology and parainfluenza virus  -possible cellulitis of infected left hip joint vs hematoma vs intraabdominal infection given recent UTI  -CT-Abd-Pelvis read noted.  -repeat Bcx from 10/4 show no growth to date.   -started midodrine 10mg TID, am BP was 154/97- start tapering off midodrine.  -Lactate stable at 1.8. WNL.  -Hemodynamically stable.  -c/w meropenem  1g q12 per ID  -vitals q4  -hold home po prednisone 60mg, change to IV stress dose steroids hydrocortisone 100mg q8 given sepsis with borderline BPs  -per ortho over phone, does not appear that hip is source of infection. likely retroperitoneal.  -gen surg consulted.      Problem/Plan - 3:  ·  Problem: Parainfluenza.  Plan: -RVP positive, CXR negative for pna  -continuing home duonebs  -continuing robitussin for cough.   -Repeat CXR  -Rhonchi on exam, ordered acapella valve q1hr to help clear secretions  -resp PT with spirometry     Problem/Plan - 4:  ·  Problem: Vasculitis.  Plan: s/p recent rituximab  -hold home po prednisone 60mg, change to IV stress dose steroids hydrocortisone 100mg q8     Problem/Plan - 5:  ·  Problem: Diabetes.  Plan: monitor FS, c/w SANTOSH.      Problem/Plan - 6:  Problem: Goals of care, counseling/discussion. Plan: -MOLST Form signed and in chart, DNR/DNI  -try to limit opoid/benzo use given age however patient has been on it at home and requiring currently.     Problem/Plan - 7:  ·  Problem: Prophylactic measure.  Plan: -DVT: SCD's only  very poor prognosis, DNR/DNI  -palliative care consulted.

## 2019-10-07 NOTE — PROGRESS NOTE ADULT - SUBJECTIVE AND OBJECTIVE BOX
HPI: Ms. Slater is a 86 yo female with a PMH of CHF, HTN, Afib, DM2, COPD, and chronic lymphoma who was brought in for 102.6 fever. Pt is groaning in pain, unable to answer questions. history obtained via chart review and through pt's daughter and home health aide at bedside.  One month ago the patient had worsening SOB on exertion and b/l rash on her lower legs and was diagnosed with vasculitis and lupus and was given 2 doses of rituximab. Per the patients daughter and home aid, the pt has deteriorated since receiving the rituximab. 3 weeks ago the patient fell out of bed on L hip and was admitted to [facility] for retroperitoneal bleeding. Hospital course was complicated by E coli bacteremia, was being treated with TMP-SMX. Pt was discharged on Friday 9/27, then at home on Tuesday 10/01 had another fall, this time on her R side and fractured her R arm. Now s/p sling and cast. This morning, home nurse for pt noticed a fever of 102.6, and family brought pt to ER.  In ER, pt had RVP which was positive for parainfluenza. Pt has weakness, fevers, chills, hip pain, arm pain, intermittent cough (per baseline), LE edema. Does not have nausea, vomiting, diarrhea, abdominal pain, hematochezia, melena, hematuria, dysuria. (03 Oct 2019 18:30)    INTERVAL EVENTS:  10/7: states having slight abdominal pain, no apparent distress    ADVANCE DIRECTIVES:    DNR  Yes  MOLST  [ ]  Living Will  [ ]   DECISION MAKER(s):  [ ] Health Care Proxy(s)  [ ] Surrogate(s)  [ ] Guardian           Name(s):   Phone Number(s):    BASELINE (I)ADL(s) (prior to admission):  [place]: [ ]Total  [ ] Moderate [ ]Dependent    Allergies    Keflex (Unknown)  penicillin (Rash)    Intolerances      MEDICATIONS  (STANDING):  ALBUTerol    90 MICROgram(s) HFA Inhaler 2 Puff(s) Inhalation every 6 hours  ALPRAZolam 0.5 milliGRAM(s) Oral two times a day  ascorbic acid 500 milliGRAM(s) Oral daily  atorvastatin 10 milliGRAM(s) Oral at bedtime  dextrose 5%. 1000 milliLiter(s) (50 mL/Hr) IV Continuous <Continuous>  dextrose 50% Injectable 12.5 Gram(s) IV Push once  digoxin     Tablet 0.0625 milliGRAM(s) Oral daily  entecavir 0.5 milliGRAM(s) Oral daily  hydrocortisone sodium succinate Injectable 75 milliGRAM(s) IV Push three times a day  insulin glargine Injectable (LANTUS) 5 Unit(s) SubCutaneous at bedtime  insulin lispro (HumaLOG) corrective regimen sliding scale   SubCutaneous three times a day before meals  insulin lispro (HumaLOG) corrective regimen sliding scale   SubCutaneous at bedtime  melatonin 5 milliGRAM(s) Oral at bedtime  meropenem  IVPB 1000 milliGRAM(s) IV Intermittent every 12 hours  metoprolol tartrate 25 milliGRAM(s) Oral two times a day  midodrine. 10 milliGRAM(s) Oral three times a day  montelukast 10 milliGRAM(s) Oral daily  Nephro-bebeto 1 Tablet(s) Oral daily  pantoprazole    Tablet 40 milliGRAM(s) Oral before breakfast  sertraline 50 milliGRAM(s) Oral daily  sodium zirconium cyclosilicate 10 Gram(s) Oral once  tiotropium 18 MICROgram(s) Capsule 1 Capsule(s) Inhalation daily    MEDICATIONS  (PRN):  acetaminophen   Tablet .. 650 milliGRAM(s) Oral every 6 hours PRN Mild Pain (1 - 3)  dextrose 40% Gel 15 Gram(s) Oral once PRN Blood Glucose LESS THAN 70 milliGRAM(s)/deciliter  docusate sodium 100 milliGRAM(s) Oral three times a day PRN Constipation  glucagon  Injectable 1 milliGRAM(s) IntraMuscular once PRN Glucose LESS THAN 70 milligrams/deciliter  guaiFENesin   Syrup  (Sugar-Free) 100 milliGRAM(s) Oral every 6 hours PRN Cough  oxyCODONE    5 mG/acetaminophen 325 mG 1 Tablet(s) Oral every 6 hours PRN Severe Pain (7 - 10)  polyethylene glycol 3350 17 Gram(s) Oral daily PRN Constiapation  senna 2 Tablet(s) Oral at bedtime PRN Constipation    PRESENT SYMPTOMS: [ x]Unable to obtain due to poor mentation   Source if other than patient:  [ ]Family   [ ]Team  [ ] Staff [ ] Inferred    Pain: [x ] yes [ ] no  QOL impact -   Location -     abdomen               Aggravating factors -  Quality -  Radiation -  Timing-  Severity (0-10 scale):  Minimal acceptable level (0-10 scale):   Difficult to fully ascertain the above from patient    PAIN AD Score: 0    [email or web address] (press ctrl +  left click to view)          Dyspnea:                           [ ]Mild [ ]Moderate [ ]Severe  Anxiety:                             [ ]Mild [ ]Moderate [ ]Severe  Fatigue:                             [ ]Mild [ ]Moderate [ ]Severe  Nausea:                             [ ]Mild [ ]Moderate [ ]Severe  Loss of appetite:              [ ]Mild [ ]Moderate [ ]Severe  Constipation:                    [ ]Mild [ ]Moderate [ ]Severe    Other Symptoms:  [x ]All other review of systems negative     Karnofsky Performance Score/Palliative Performance Status Version 2:      50   %      http://npcrc.org/files/news/palliative_performance_scale_[email or web address].pdf      PHYSICAL EXAM:  Vital Signs Last 24 Hrs  T(C): 36.6 (07 Oct 2019 11:22), Max: 36.7 (06 Oct 2019 20:54)  T(F): 97.9 (07 Oct 2019 11:22), Max: 98.1 (06 Oct 2019 20:54)  HR: 94 (07 Oct 2019 11:22) (91 - 99)  BP: 162/97 (07 Oct 2019 11:22) (142/91 - 162/97)  BP(mean): --  RR: 18 (07 Oct 2019 11:22) (18 - 20)  SpO2: 100% (07 Oct 2019 11:22) (96% - 100%)      GENERAL:  [ x]Alert  [ ]Oriented x   [ ]Lethargic  [ ]Cachexia  [ ]Unarousable  [x ]Verbal  [ ]Non-Verbal [  x ] No distress   [   ] Gaunt  Behavioral:   [ ] Anxiety  [ ] Delirium [ ] Agitation  [ x  ]  Calm [ ] Other  HEENT:  [x ]Normal   [ ]Dry mouth   [ ]ET Tube/Trach  [ ]Oral lesions  [ ] Temporal Wasting  [ ]  Mucositis [ ]  Grade   PULMONARY:   [x  ]Clear [ ]Tachypnea  [ ]Audible excessive secretions   [ ]Rhonchi        [ ]Right [ ]Left [ ]Bilateral  [ ]Crackles        [ ]Right [ ]Left [ ]Bilateral  [ ]Wheezing     [ ]Right [ ]Left [ ]Bilateral  [ ] Diminished  [ ] Right  [  ] Left   [ ] Bilaterally  CARDIOVASCULAR:    [ x ]Regular [ ]Irregular [  ]Tachy  [ ]Ismael [ ]Murmur [  ]   Edema  [ ]Other  GASTROINTESTINAL:  [ x]Soft  [ ]Distended   [ ]+BS  [ ]Non tender [ x]Tender mildly lower quadrants [ ]PEG [ ]OGT/ NGT    Last BM:   GENITOURINARY:  [x ]Normal [ ] Incontinent   [ ]Oliguria/Anuria   [ ]Chan [   ] Suprapubic tenderness  MUSCULOSKELETAL:   [  ]Normal   [x ]Weakness  [ ]Bed/Wheelchair bound [ ]Edema [  ] amputation  [  ] contraction  NEUROLOGIC:   [ x]No focal deficits  [ ] Cognitive impairment  [ ] Dysphagia [ ]Dysarthria [ ] Paresis [  ] Aphasia  [ ]Other   SKIN:   [  ]Normal   [ ]Pressure ulcer(s)  [ ]Rash [   ]  Wound    [  ] hyperpigmentation    CRITICAL CARE:  [ ] Shock Present  [ ]Septic [ ]Cardiogenic [ ]Neurologic [ ]Hypovolemic  [ ]  Vasopressors [ ]  Inotropes   [ ] Respiratory failure present [ ] mechanical ventilation [ ] non-invasive ventilatory support [ ] High flow  [ ] Acute  [ ] Chronic [ ] Hypoxic  [ ] Hypercarbic [ ] Other  [ ] Other organ failure       LABS:                                   8.8    20.60 )-----------( 163      ( 07 Oct 2019 08:48 )             29.6     10-07    134<L>  |  97  |  48<H>  ----------------------------<  179<H>  5.2   |  27  |  1.18    Ca    8.4      07 Oct 2019 06:26  Phos  4.5     10-07  Mg     2.1     10-07        RADIOLOGY & ADDITIONAL STUDIES:    PROTEIN CALORIE MALNUTRITION PRESENT: [ ] Yes [ ] No  [ ] PPSV2 < or = to 30% [ ] significant weight loss  [ ] poor nutritional intake [ ] catabolic state [ ] anasarca     Albumin, Serum: 2.7 g/dL (10-03-19 @ 14:11)  Artificial Nutrition [ ]     REFERRALS:   [ ]Chaplaincy  [ ] Hospice  [ ]Child Life  [ ]Social Work  [ ]Case management [ ]Holistic Therapy               Care Coordination Assessment [C. Provider] (10-05-19 @ 11:58)   Progress Notes - Care Coordination [C. Provider] (10-05-19 @ 11:54)

## 2019-10-07 NOTE — MEDICAL STUDENT PROGRESS NOTE(EDUCATION) - SUBJECTIVE AND OBJECTIVE BOX
Internal Medicine Progress Note      Patient is a 85y old  Female who presents with a chief complaint of Fever (07 Oct 2019 07:08)    SUBJECTIVE / OVERNIGHT EVENTS:  Pt was in afib and aflutter overnight with brief periods of 130s-160s, but nothing sustained. Pt is groaning and does not feel well. Reports continued SOB and cough. L arm pain is well controlled.        MEDICATIONS  (STANDING):  ALBUTerol    90 MICROgram(s) HFA Inhaler 2 Puff(s) Inhalation every 6 hours  ALPRAZolam 0.5 milliGRAM(s) Oral two times a day  ascorbic acid 500 milliGRAM(s) Oral daily  atorvastatin 10 milliGRAM(s) Oral at bedtime  dextrose 5%. 1000 milliLiter(s) (50 mL/Hr) IV Continuous <Continuous>  dextrose 50% Injectable 12.5 Gram(s) IV Push once  digoxin     Tablet 0.0625 milliGRAM(s) Oral daily  entecavir 0.5 milliGRAM(s) Oral daily  hydrocortisone sodium succinate Injectable 75 milliGRAM(s) IV Push three times a day  insulin glargine Injectable (LANTUS) 5 Unit(s) SubCutaneous at bedtime  insulin lispro (HumaLOG) corrective regimen sliding scale   SubCutaneous three times a day before meals  insulin lispro (HumaLOG) corrective regimen sliding scale   SubCutaneous at bedtime  melatonin 5 milliGRAM(s) Oral at bedtime  meropenem  IVPB 1000 milliGRAM(s) IV Intermittent every 12 hours  metoprolol tartrate 12.5 milliGRAM(s) Oral two times a day  midodrine. 10 milliGRAM(s) Oral three times a day  montelukast 10 milliGRAM(s) Oral daily  Nephro-bebeto 1 Tablet(s) Oral daily  pantoprazole    Tablet 40 milliGRAM(s) Oral before breakfast  sertraline 50 milliGRAM(s) Oral daily  tiotropium 18 MICROgram(s) Capsule 1 Capsule(s) Inhalation daily    MEDICATIONS  (PRN):  acetaminophen   Tablet .. 650 milliGRAM(s) Oral every 6 hours PRN Mild Pain (1 - 3)  dextrose 40% Gel 15 Gram(s) Oral once PRN Blood Glucose LESS THAN 70 milliGRAM(s)/deciliter  docusate sodium 100 milliGRAM(s) Oral three times a day PRN Constipation  glucagon  Injectable 1 milliGRAM(s) IntraMuscular once PRN Glucose LESS THAN 70 milligrams/deciliter  guaiFENesin   Syrup  (Sugar-Free) 100 milliGRAM(s) Oral every 6 hours PRN Cough  oxyCODONE    5 mG/acetaminophen 325 mG 1 Tablet(s) Oral every 6 hours PRN Severe Pain (7 - 10)  polyethylene glycol 3350 17 Gram(s) Oral daily PRN Constiapation  senna 2 Tablet(s) Oral at bedtime PRN Constipation    Vital Signs Last 24 Hrs  T(C): 36.7 (07 Oct 2019 05:21), Max: 36.7 (06 Oct 2019 20:54)  T(F): 98 (07 Oct 2019 05:21), Max: 98.1 (06 Oct 2019 20:54)  HR: 98 (07 Oct 2019 06:01) (91 - 144)  BP: 154/97 (07 Oct 2019 06:01) (128/92 - 160/94)  BP(mean): --  RR: 20 (07 Oct 2019 06:01) (18 - 20)  SpO2: 98% (07 Oct 2019 06:01) (94% - 99%)    CAPILLARY BLOOD GLUCOSE      POCT Blood Glucose.: 193 mg/dL (07 Oct 2019 08:11)  POCT Blood Glucose.: 240 mg/dL (07 Oct 2019 00:00)  POCT Blood Glucose.: 222 mg/dL (06 Oct 2019 22:34)  POCT Blood Glucose.: 266 mg/dL (06 Oct 2019 17:23)  POCT Blood Glucose.: 280 mg/dL (06 Oct 2019 12:09)  POCT Blood Glucose.: 197 mg/dL (06 Oct 2019 08:57)    I&O's Summary    06 Oct 2019 07:01  -  07 Oct 2019 07:00  --------------------------------------------------------  IN: 120 mL / OUT: 150 mL / NET: -30 mL        PHYSICAL EXAM  GENERAL: Elderly woman lying in bed uncomfortably, groaning in pain  HEAD:  +mild ecchymoses on forehead from recent fall  EENT: PERRLA, conjunctiva and sclera clear, neck supple, no JVD  CHEST/LUNG: Difficult to auscultate bc patient was groaning. B/l crackles  HEART: Irregular, tachycardic. No murmurs, rubs, or gallops  ABDOMEN: Soft, Nontender, Nondistended; Bowel sounds present  EXTREMITIES:  2+ Peripheral Pulses. Trace edema to b/l LE and thighs. Fracture to R humerus w/ sling. Swelling of R hand with stiffness and limited movement of fingers. All extremities warm and well perfused.   NEURO/PSYCH: AAOX2  SKIN: Erythema of the L hip moving up pelvis and L flank.      LABS:                        8.4    20.87 )-----------( 153      ( 06 Oct 2019 09:14 )             27.7     Hemoglobin: 8.4 g/dL (10-06 @ 09:14)  Hemoglobin: 8.2 g/dL (10-05 @ 23:28)  Hemoglobin: 8.1 g/dL (10-05 @ 22:22)  Hemoglobin: 8.4 g/dL (10-05 @ 12:08)  Hemoglobin: 8.9 g/dL (10-04 @ 08:25)    CBC Full  -  ( 06 Oct 2019 09:14 )  WBC Count : 20.87 K/uL  RBC Count : 3.03 M/uL  Hemoglobin : 8.4 g/dL  Hematocrit : 27.7 %  Platelet Count - Automated : 153 K/uL  Mean Cell Volume : 91.4 fl  Mean Cell Hemoglobin : 27.7 pg  Mean Cell Hemoglobin Concentration : 30.3 gm/dL  Auto Neutrophil # : x  Auto Lymphocyte # : x  Auto Monocyte # : x  Auto Eosinophil # : x  Auto Basophil # : x  Auto Neutrophil % : x  Auto Lymphocyte % : x  Auto Monocyte % : x  Auto Eosinophil % : x  Auto Basophil % : x    10-07    134<L>  |  97  |  48<H>  ----------------------------<  179<H>  5.2   |  27  |  1.18    Ca    8.4      07 Oct 2019 06:26  Phos  4.5     10-07  Mg     2.1     10-07    TPro  5.3<L>  /  Alb  1.8<L>  /  TBili  0.5  /  DBili  x   /  AST  14  /  ALT  18  /  AlkPhos  104  10-07    Creatinine Trend: 1.18<--, 1.23<--, 1.28<--, 1.18<--, 1.20<--, 1.07<--  LIVER FUNCTIONS - ( 07 Oct 2019 06:26 )  Alb: 1.8 g/dL / Pro: 5.3 g/dL / ALK PHOS: 104 U/L / ALT: 18 U/L / AST: 14 U/L / GGT: x           Lactate Trend: 1.8 <-- 1.8<-- 2.0<--3.0 <-- 4.2<-- 3.4<-- 3.2    Digoxin level: 1.9      MICROBIOLOGY:    Culture - Blood (collected 04 Oct 2019 22:29)  Source: .Blood  Preliminary Report (05 Oct 2019 23:01):    No growth to date.      IMAGING:  EXAM:  XR CHEST PORTABLE URGENT 1V                        PROCEDURE DATE:  10/06/2019   IMPRESSION:   1. Since 10/3/19, new trace bilateral pleural effusions.  2. New bibasilar atelectasis and/or pneumonia, as above.     EXAM:  CT ABDOMEN AND PELVIS OC IC                        PROCEDURE DATE:  10/04/2019    COMPARISON: Prior CT dated 9/6/2019.  IMPRESSION:     Large left retroperitoneal hematoma, increased in size since 9/6/2019.   Superimposed infection cannot be excluded..    Findings were discussed with Dr. Gaviria on 10/4/2019 at 8:56 PM by Dr. Max with read back confirmation.      Labs, imaging, EKG personally reviewed     CONSULTS: ID, Surgery, Palliative Internal Medicine Progress Note      Patient is a 85y old  Female who presents with a chief complaint of Fever (07 Oct 2019 07:08)    SUBJECTIVE / OVERNIGHT EVENTS:  Pt was in afib and aflutter overnight with brief periods of 130s-160s, but nothing sustained. Pt is groaning and does not feel well. Reports continued SOB and cough. L arm pain is well controlled.        MEDICATIONS  (STANDING):  ALBUTerol    90 MICROgram(s) HFA Inhaler 2 Puff(s) Inhalation every 6 hours  ALPRAZolam 0.5 milliGRAM(s) Oral two times a day  ascorbic acid 500 milliGRAM(s) Oral daily  atorvastatin 10 milliGRAM(s) Oral at bedtime  dextrose 5%. 1000 milliLiter(s) (50 mL/Hr) IV Continuous <Continuous>  dextrose 50% Injectable 12.5 Gram(s) IV Push once  digoxin     Tablet 0.0625 milliGRAM(s) Oral daily  entecavir 0.5 milliGRAM(s) Oral daily  hydrocortisone sodium succinate Injectable 75 milliGRAM(s) IV Push three times a day  insulin glargine Injectable (LANTUS) 5 Unit(s) SubCutaneous at bedtime  insulin lispro (HumaLOG) corrective regimen sliding scale   SubCutaneous three times a day before meals  insulin lispro (HumaLOG) corrective regimen sliding scale   SubCutaneous at bedtime  melatonin 5 milliGRAM(s) Oral at bedtime  meropenem  IVPB 1000 milliGRAM(s) IV Intermittent every 12 hours  metoprolol tartrate 12.5 milliGRAM(s) Oral two times a day  midodrine. 10 milliGRAM(s) Oral three times a day  montelukast 10 milliGRAM(s) Oral daily  Nephro-bebeto 1 Tablet(s) Oral daily  pantoprazole    Tablet 40 milliGRAM(s) Oral before breakfast  sertraline 50 milliGRAM(s) Oral daily  tiotropium 18 MICROgram(s) Capsule 1 Capsule(s) Inhalation daily    MEDICATIONS  (PRN):  acetaminophen   Tablet .. 650 milliGRAM(s) Oral every 6 hours PRN Mild Pain (1 - 3)  dextrose 40% Gel 15 Gram(s) Oral once PRN Blood Glucose LESS THAN 70 milliGRAM(s)/deciliter  docusate sodium 100 milliGRAM(s) Oral three times a day PRN Constipation  glucagon  Injectable 1 milliGRAM(s) IntraMuscular once PRN Glucose LESS THAN 70 milligrams/deciliter  guaiFENesin   Syrup  (Sugar-Free) 100 milliGRAM(s) Oral every 6 hours PRN Cough  oxyCODONE    5 mG/acetaminophen 325 mG 1 Tablet(s) Oral every 6 hours PRN Severe Pain (7 - 10)  polyethylene glycol 3350 17 Gram(s) Oral daily PRN Constiapation  senna 2 Tablet(s) Oral at bedtime PRN Constipation    Vital Signs Last 24 Hrs  T(C): 36.7 (07 Oct 2019 05:21), Max: 36.7 (06 Oct 2019 20:54)  T(F): 98 (07 Oct 2019 05:21), Max: 98.1 (06 Oct 2019 20:54)  HR: 98 (07 Oct 2019 06:01) (91 - 144)  BP: 154/97 (07 Oct 2019 06:01) (128/92 - 160/94)  BP(mean): --  RR: 20 (07 Oct 2019 06:01) (18 - 20)  SpO2: 98% (07 Oct 2019 06:01) (94% - 99%)    CAPILLARY BLOOD GLUCOSE      POCT Blood Glucose.: 193 mg/dL (07 Oct 2019 08:11)  POCT Blood Glucose.: 240 mg/dL (07 Oct 2019 00:00)  POCT Blood Glucose.: 222 mg/dL (06 Oct 2019 22:34)  POCT Blood Glucose.: 266 mg/dL (06 Oct 2019 17:23)  POCT Blood Glucose.: 280 mg/dL (06 Oct 2019 12:09)  POCT Blood Glucose.: 197 mg/dL (06 Oct 2019 08:57)    I&O's Summary    06 Oct 2019 07:01  -  07 Oct 2019 07:00  --------------------------------------------------------  IN: 120 mL / OUT: 150 mL / NET: -30 mL        PHYSICAL EXAM  GENERAL: Elderly woman lying in bed uncomfortably, groaning in pain  HEAD:  +mild ecchymoses on forehead from recent fall  EENT: PERRLA, conjunctiva and sclera clear, neck supple, no JVD  CHEST/LUNG: Difficult to auscultate because patient was groaning. B/l rhonchi heard  HEART: Irregular, tachycardic. No murmurs, rubs, or gallops  ABDOMEN: Soft, Nontender, Nondistended; Bowel sounds present  EXTREMITIES:  2+ Peripheral Pulses. +edema to b/l thighs and arms. Fracture to R humerus w/ sling. Swelling of R hand with stiffness and limited movement of fingers. All extremities warm and well perfused.   NEURO/PSYCH: AAOX2  SKIN: Erythema of the L hip moving up pelvis and L flank.      LABS:                        8.4    20.87 )-----------( 153      ( 06 Oct 2019 09:14 )             27.7     Hemoglobin: 8.4 g/dL (10-06 @ 09:14)  Hemoglobin: 8.2 g/dL (10-05 @ 23:28)  Hemoglobin: 8.1 g/dL (10-05 @ 22:22)  Hemoglobin: 8.4 g/dL (10-05 @ 12:08)  Hemoglobin: 8.9 g/dL (10-04 @ 08:25)    CBC Full  -  ( 06 Oct 2019 09:14 )  WBC Count : 20.87 K/uL  RBC Count : 3.03 M/uL  Hemoglobin : 8.4 g/dL  Hematocrit : 27.7 %  Platelet Count - Automated : 153 K/uL  Mean Cell Volume : 91.4 fl  Mean Cell Hemoglobin : 27.7 pg  Mean Cell Hemoglobin Concentration : 30.3 gm/dL  Auto Neutrophil # : x  Auto Lymphocyte # : x  Auto Monocyte # : x  Auto Eosinophil # : x  Auto Basophil # : x  Auto Neutrophil % : x  Auto Lymphocyte % : x  Auto Monocyte % : x  Auto Eosinophil % : x  Auto Basophil % : x    10-07    134<L>  |  97  |  48<H>  ----------------------------<  179<H>  5.2   |  27  |  1.18    Ca    8.4      07 Oct 2019 06:26  Phos  4.5     10-07  Mg     2.1     10-07    TPro  5.3<L>  /  Alb  1.8<L>  /  TBili  0.5  /  DBili  x   /  AST  14  /  ALT  18  /  AlkPhos  104  10-07    Creatinine Trend: 1.18<--, 1.23<--, 1.28<--, 1.18<--, 1.20<--, 1.07<--  LIVER FUNCTIONS - ( 07 Oct 2019 06:26 )  Alb: 1.8 g/dL / Pro: 5.3 g/dL / ALK PHOS: 104 U/L / ALT: 18 U/L / AST: 14 U/L / GGT: x           Lactate Trend: 1.8 <-- 1.8<-- 2.0<--3.0 <-- 4.2<-- 3.4<-- 3.2    Digoxin level: 1.9      MICROBIOLOGY:    Culture - Blood (collected 04 Oct 2019 22:29)  Source: .Blood  Preliminary Report (05 Oct 2019 23:01):    No growth to date.      IMAGING:  EXAM:  XR CHEST PORTABLE URGENT 1V                        PROCEDURE DATE:  10/06/2019   IMPRESSION:   1. Since 10/3/19, new trace bilateral pleural effusions.  2. New bibasilar atelectasis and/or pneumonia, as above.     EXAM:  CT ABDOMEN AND PELVIS OC IC                        PROCEDURE DATE:  10/04/2019    COMPARISON: Prior CT dated 9/6/2019.  IMPRESSION:     Large left retroperitoneal hematoma, increased in size since 9/6/2019.   Superimposed infection cannot be excluded..    Findings were discussed with Dr. Gaviria on 10/4/2019 at 8:56 PM by Dr. Max with read back confirmation.      Labs, imaging, EKG personally reviewed     CONSULTS: ID, Surgery, Palliative

## 2019-10-07 NOTE — PROGRESS NOTE ADULT - PROBLEM SELECTOR PLAN 2
secondary to recurrent ecoli bacteremia due to unknown etiology and parainfluenza virus  -possible cellulitis of infected left hip joint vs hematoma vs intraabdominal infection given recent UTI  -CT-Abd-Pelvis read noted.  -repeat Bcx pending  -Pt's blood pressures are responsive to IVFs at this time. Recommend continuing boluses PRN for MAP <65. Lung exam clear, trace LE edema, seems euvolemic at this time.   -Hold all antihypertensives.  -started midodrine 10mg TID and uptitrate if BP remains persistently in systolics 90s.  -switched lopressor to digoxin given low pressures.  -Lactate q4 hrs.   -Hemodynamically stable.  -c/w meropenem  1g q12 per ID  -vitals q4  -will repeat lactate and CBC stat. Transfuse as needed  -hold home po prednisone 60mg, change to IV stress dose steroids hydrocortisone 100mg q8 given sepsis with borderline BPs  -per ortho over phone, does not appear that hip is source of infection. likely retroperitoneal.  -gen surg consulted secondary to recurrent ecoli bacteremia due to unknown etiology and parainfluenza virus  -possible cellulitis of infected left hip joint vs hematoma vs intraabdominal infection given recent UTI  -CT-Abd-Pelvis read noted.  -repeat Bcx sent. NGTD.   -started midodrine 10mg TID and uptitrate if BP remains persistently in systolics 90s.  -Lactate q4 hrs.   -Hemodynamically stable.  -c/w meropenem  1g q12 per ID  -vitals q4  -hold home po prednisone 60mg, change to IV stress dose steroids. Titrate off steroids as tolerated.   -per ortho over phone, does not appear that hip is source of infection. likely retroperitoneal.  -gen surg consulted - E. coli bacteremia and parainfluenza virus  - Source possibly cellulitis of infected left hip joint vs hematoma vs intraabdominal infection given recent UTI  -CT-Abd-Pelvis read noted.  - Bcx NGTD   - Aim to wean off of midodrine 10 TID  - Hemodynamically stable  - C/w meropenem 1g q12 per ID  - Monitor vitals  - C/w steroids, to transition to home dose

## 2019-10-07 NOTE — PROGRESS NOTE ADULT - PROBLEM SELECTOR PLAN 6
-MOLST Form signed and in chart, DNR/DNI  -try to limit opoid/benzo use given age however patient has been on it at home and requiring currently - MOLST Form signed and in chart, DNR/DNI  - To hold family meeting this week to discuss GOC  - Pain meds as needed to maintain comfort

## 2019-10-07 NOTE — PROGRESS NOTE ADULT - ASSESSMENT
imp/rx:  The patient has an elevated wbc count.  e. coli bacteremia.  The UC are negative and I think that it is less likely that the patient now has a UTI as the primary focus of infection.  I am concerned that the collection is infected.    I think the abx she is on are appropriate.  She will likely need a prolonged course of abx.  I think that it would be helpful for management to know if this collection is infected.  I would favor an attempt to assess the collection for infection--- Perhaps IR can aspirate?    continue the meropenem.

## 2019-10-07 NOTE — PROGRESS NOTE ADULT - ATTENDING COMMENTS
Patient seen and examined today. I agree with Dr. Nicholas's findings, assessment, and plan with the following additions and exceptions:     Atrial fibrillation with rapid ventricular response secondary to respiratory failure secondary to pleural effusions from volume overload. Patient improved today from respiratory standpoint. Hold lasix today given uptrend in BUN. Increase dose of lopressor for improved diastolic filling.   Digoxin level within reference range.   Acapella valve for airway clearance.   Incentive spirometer  Will begin attempts to titrate midodrine and hydrocortisone down. Patient is on chronic steroids at home.   Hgb stable. Lactate cleared.   Has been consented for blood product transfusion.   ID requesting IR evaluation for possible sampling of hematoma to evaluate for infection. Continue meropenem iv.   ID, MICU and General surgery contribution to care greatly appreciated.   Low threshold for reconsulting MICU.   Rest of plan as above    Dr. Rafi Staley, DO  Division of Hospital Medicine  [facility]  Pager:  [phone] .

## 2019-10-07 NOTE — CHART NOTE - NSCHARTNOTEFT_GEN_A_CORE
SUBJECTIVE:  Was paged about patient experiencing SOB and tachycardia. Patient was seen and examined at bedside. Patient stated she felt short of breath with a cough, but had no other complaints.       OBJECTIVE:  Vital Signs Last 24 Hrs  T(C): 36.6 (10-07-19 @ 23:20), Max: 36.7 (10-07-19 @ 05:21)  T(F): 97.9 (10-07-19 @ 23:20), Max: 98 (10-07-19 @ 05:21)  HR: 90 (10-07-19 @ 23:20) (90 - 101)  BP: 180/91 (10-07-19 @ 23:20) (148/111 - 180/91)  BP(mean): --  RR: 20 (10-07-19 @ 23:20) (18 - 22)  SpO2: 98% (10-07-19 @ 23:20) (96% - 100%)    PHYSICAL EXAM:  GENERAL: Elderly, anxious-appearing woman. Receiving O2 via NC  HEAD:  Atraumatic, Normocephalic  EYES: EOMI, conjunctiva and sclera clear  CHEST/LUNG: Lungs sounded congested with b/l expiratory wheezes. Breathing unlabored, but coughs frequently; No crackles, rales, rhonchi  HEART: Irregularly irregular, but non-tachycardic with systolic murmur heard throughout; No rubs or gallops  ABDOMEN: Soft, Nontender, Nondistended; Bowel sounds present  EXTREMITIES: Trace b/l LE edema  SKIN: Warm and dry  NERVOUS SYSTEM:  Alert & Oriented X3      PLAN:  - portable CXR; will give Lasix 20mg IV if pulmonary edema  - Duoneb treatment  - will continue to monitor

## 2019-10-07 NOTE — PROGRESS NOTE ADULT - ASSESSMENT
Ms. Slater is a 84 yo female with a PMH of CHF, HTN, Afib, DM2, COPD, and chronic lymphoma who was brought in for 102.6 fever consulted for assistance with medical decision making in the context of a patient with advanced illness

## 2019-10-07 NOTE — PROGRESS NOTE ADULT - ASSESSMENT
86 yo female with a PMH of CHF, ANCA vasculitis, HTN, Afib, DM2, COPD, and chronic lymphoma who was brought in for 102.6 fever, now with  sepsis secondary to ecoli bacteremia, parainfluenza virus c/b afib found to have increasing RP hematoma concern for possible source of infection. pt currently awaiting telemetry bed

## 2019-10-07 NOTE — PROGRESS NOTE ADULT - PROBLEM SELECTOR PLAN 3
-RVP positive, CXR negative for pna  -continuing home duonebs  -continuing robitussin for cough - RVP positive, CXR negative  - C/w duonebs  - Supportive management

## 2019-10-07 NOTE — PROGRESS NOTE ADULT - SUBJECTIVE AND OBJECTIVE BOX
Patient is a 85y old  Female who presents with a chief complaint of Fever (06 Oct 2019 09:59)      SUBJECTIVE / OVERNIGHT EVENTS:    MEDICATIONS  (STANDING):  ALBUTerol    90 MICROgram(s) HFA Inhaler 2 Puff(s) Inhalation every 6 hours  ALPRAZolam 0.5 milliGRAM(s) Oral two times a day  ascorbic acid 500 milliGRAM(s) Oral daily  atorvastatin 10 milliGRAM(s) Oral at bedtime  dextrose 5%. 1000 milliLiter(s) (50 mL/Hr) IV Continuous <Continuous>  dextrose 50% Injectable 12.5 Gram(s) IV Push once  digoxin     Tablet 0.0625 milliGRAM(s) Oral daily  entecavir 0.5 milliGRAM(s) Oral daily  hydrocortisone sodium succinate Injectable 75 milliGRAM(s) IV Push three times a day  insulin glargine Injectable (LANTUS) 5 Unit(s) SubCutaneous at bedtime  insulin lispro (HumaLOG) corrective regimen sliding scale   SubCutaneous three times a day before meals  insulin lispro (HumaLOG) corrective regimen sliding scale   SubCutaneous at bedtime  melatonin 5 milliGRAM(s) Oral at bedtime  meropenem  IVPB 1000 milliGRAM(s) IV Intermittent every 12 hours  metoprolol tartrate 12.5 milliGRAM(s) Oral two times a day  midodrine. 10 milliGRAM(s) Oral three times a day  montelukast 10 milliGRAM(s) Oral daily  Nephro-bebeto 1 Tablet(s) Oral daily  pantoprazole    Tablet 40 milliGRAM(s) Oral before breakfast  sertraline 50 milliGRAM(s) Oral daily  tiotropium 18 MICROgram(s) Capsule 1 Capsule(s) Inhalation daily    MEDICATIONS  (PRN):  acetaminophen   Tablet .. 650 milliGRAM(s) Oral every 6 hours PRN Mild Pain (1 - 3)  dextrose 40% Gel 15 Gram(s) Oral once PRN Blood Glucose LESS THAN 70 milliGRAM(s)/deciliter  docusate sodium 100 milliGRAM(s) Oral three times a day PRN Constipation  glucagon  Injectable 1 milliGRAM(s) IntraMuscular once PRN Glucose LESS THAN 70 milligrams/deciliter  guaiFENesin   Syrup  (Sugar-Free) 100 milliGRAM(s) Oral every 6 hours PRN Cough  oxyCODONE    5 mG/acetaminophen 325 mG 1 Tablet(s) Oral every 6 hours PRN Severe Pain (7 - 10)  polyethylene glycol 3350 17 Gram(s) Oral daily PRN Constiapation  senna 2 Tablet(s) Oral at bedtime PRN Constipation      Vital Signs Last 24 Hrs  T(C): 36.7 (07 Oct 2019 05:21), Max: 36.7 (06 Oct 2019 20:54)  T(F): 98 (07 Oct 2019 05:21), Max: 98.1 (06 Oct 2019 20:54)  HR: 98 (07 Oct 2019 06:01) (91 - 144)  BP: 154/97 (07 Oct 2019 06:01) (128/92 - 160/94)  BP(mean): --  RR: 20 (07 Oct 2019 06:01) (18 - 20)  SpO2: 98% (07 Oct 2019 06:01) (94% - 99%)  CAPILLARY BLOOD GLUCOSE      POCT Blood Glucose.: 240 mg/dL (07 Oct 2019 00:00)  POCT Blood Glucose.: 222 mg/dL (06 Oct 2019 22:34)  POCT Blood Glucose.: 266 mg/dL (06 Oct 2019 17:23)  POCT Blood Glucose.: 280 mg/dL (06 Oct 2019 12:09)  POCT Blood Glucose.: 197 mg/dL (06 Oct 2019 08:57)    I&O's Summary    06 Oct 2019 07:01  -  07 Oct 2019 07:00  --------------------------------------------------------  IN: 120 mL / OUT: 150 mL / NET: -30 mL        PHYSICAL EXAM:  Vital Signs Last 24 Hrs  T(C): 36.7 (10-07-19 @ 05:21)  T(F): 98 (10-07-19 @ 05:21), Max: 98.1 (10-06-19 @ 20:54)  HR: 98 (10-07-19 @ 06:01) (91 - 144)  BP: 154/97 (10-07-19 @ 06:01)  BP(mean): --  RR: 20 (10-07-19 @ 06:01) (18 - 20)  SpO2: 98% (10-07-19 @ 06:01) (94% - 99%)  Wt(kg): --    10-06 @ 07:01  -  10-07 @ 07:00  --------------------------------------------------------  IN: 120 mL / OUT: 150 mL / NET: -30 mL      Constitutional: NAD, awake and alert  EYES: EOMI  ENT:  Normal Hearing, no tonsillar exudates   Neck: Soft and supple , no thyromegaly   Respiratory: Breath sounds are clear bilaterally, No wheezing, rales or rhonchi  Cardiovascular: S1 and S2, regular rate and rhythm, no Murmurs, gallops or rubs, no JVD,    Gastrointestinal: Bowel Sounds present, soft, nontender, nondistended, no guarding, no rebound  Extremities: No cyanosis or clubbing; warm to touch  Vascular: 2+ peripheral pulses lower ex  Neurological: No focal deficits, CN II-XII intact bilaterally, sensation to light touch intact in all extremities, gait intact. Pupils are equally reactive to light and symmetrical in size.   Musculoskeletal: 5/5 strength b/l upper and lower extremities; no joint swelling.  Skin: No rashes  Psych: no depression or anhedonia, AAOx3  HEME: no bruises, no nose bleeds      LABS:                        8.4    20.87 )-----------( 153      ( 06 Oct 2019 09:14 )             27.7     10-07    134<L>  |  97  |  48<H>  ----------------------------<  179<H>  5.2   |  27  |  1.18    Ca    8.4      07 Oct 2019 06:26  Phos  4.5     10-07  Mg     2.1     10-07    TPro  5.3<L>  /  Alb  1.8<L>  /  TBili  0.5  /  DBili  x   /  AST  14  /  ALT  18  /  AlkPhos  104  10-07              RADIOLOGY & ADDITIONAL TESTS:    Imaging Personally Reviewed:    Consultant(s) Notes Reviewed:      Care Discussed with Consultants/Other Providers: Patient is a 85y old  Female who presents with a chief complaint of Fever (06 Oct 2019 09:59)      SUBJECTIVE / OVERNIGHT EVENTS:  Reports improvement in breathing. Some cough noted.   Left sided flank pain.     REVIEW OF SYSTEMS  CONSTITUTIONAL: No fevers or chills  EYES/ENT: No visual changes. No discharge from eyes. No vertigo. No throat pain. No dysphagia.  NECK: No pain or stiffness or rigidity.  RESPIRATORY: +cough. No wheezing, or hemoptysis. No shortness of breath.  CARDIOVASCULAR: No chest pain or palpitations.   GASTROINTESTINAL: +flank pain. No abdominal pain. No nausea, vomiting, or hematemesis; No diarrhea or constipation. No melena or hematochezia.  GENITOURINARY: No dysuria, hesitancy, frequency or hematuria.  NEUROLOGICAL: No numbness or weakness. No change in speech. No fecal or urinary incontinence.   MSK: No joint swelling or erythema. No back pain.  SKIN: No itching or rashes.   PSYCH: Normal affect. Normal mood.    Review of systems negative except for items noted above.      MEDICATIONS  (STANDING):  ALBUTerol    90 MICROgram(s) HFA Inhaler 2 Puff(s) Inhalation every 6 hours  ALPRAZolam 0.5 milliGRAM(s) Oral two times a day  ascorbic acid 500 milliGRAM(s) Oral daily  atorvastatin 10 milliGRAM(s) Oral at bedtime  dextrose 5%. 1000 milliLiter(s) (50 mL/Hr) IV Continuous <Continuous>  dextrose 50% Injectable 12.5 Gram(s) IV Push once  digoxin     Tablet 0.0625 milliGRAM(s) Oral daily  entecavir 0.5 milliGRAM(s) Oral daily  hydrocortisone sodium succinate Injectable 75 milliGRAM(s) IV Push three times a day  insulin glargine Injectable (LANTUS) 5 Unit(s) SubCutaneous at bedtime  insulin lispro (HumaLOG) corrective regimen sliding scale   SubCutaneous three times a day before meals  insulin lispro (HumaLOG) corrective regimen sliding scale   SubCutaneous at bedtime  melatonin 5 milliGRAM(s) Oral at bedtime  meropenem  IVPB 1000 milliGRAM(s) IV Intermittent every 12 hours  metoprolol tartrate 12.5 milliGRAM(s) Oral two times a day  midodrine. 10 milliGRAM(s) Oral three times a day  montelukast 10 milliGRAM(s) Oral daily  Nephro-bebeto 1 Tablet(s) Oral daily  pantoprazole    Tablet 40 milliGRAM(s) Oral before breakfast  sertraline 50 milliGRAM(s) Oral daily  tiotropium 18 MICROgram(s) Capsule 1 Capsule(s) Inhalation daily    MEDICATIONS  (PRN):  acetaminophen   Tablet .. 650 milliGRAM(s) Oral every 6 hours PRN Mild Pain (1 - 3)  dextrose 40% Gel 15 Gram(s) Oral once PRN Blood Glucose LESS THAN 70 milliGRAM(s)/deciliter  docusate sodium 100 milliGRAM(s) Oral three times a day PRN Constipation  glucagon  Injectable 1 milliGRAM(s) IntraMuscular once PRN Glucose LESS THAN 70 milligrams/deciliter  guaiFENesin   Syrup  (Sugar-Free) 100 milliGRAM(s) Oral every 6 hours PRN Cough  oxyCODONE    5 mG/acetaminophen 325 mG 1 Tablet(s) Oral every 6 hours PRN Severe Pain (7 - 10)  polyethylene glycol 3350 17 Gram(s) Oral daily PRN Constiapation  senna 2 Tablet(s) Oral at bedtime PRN Constipation      Vital Signs Last 24 Hrs  T(C): 36.7 (07 Oct 2019 05:21), Max: 36.7 (06 Oct 2019 20:54)  T(F): 98 (07 Oct 2019 05:21), Max: 98.1 (06 Oct 2019 20:54)  HR: 98 (07 Oct 2019 06:01) (91 - 144)  BP: 154/97 (07 Oct 2019 06:01) (128/92 - 160/94)  BP(mean): --  RR: 20 (07 Oct 2019 06:01) (18 - 20)  SpO2: 98% (07 Oct 2019 06:01) (94% - 99%)  CAPILLARY BLOOD GLUCOSE      POCT Blood Glucose.: 240 mg/dL (07 Oct 2019 00:00)  POCT Blood Glucose.: 222 mg/dL (06 Oct 2019 22:34)  POCT Blood Glucose.: 266 mg/dL (06 Oct 2019 17:23)  POCT Blood Glucose.: 280 mg/dL (06 Oct 2019 12:09)  POCT Blood Glucose.: 197 mg/dL (06 Oct 2019 08:57)    I&O's Summary    06 Oct 2019 07:01  -  07 Oct 2019 07:00  --------------------------------------------------------  IN: 120 mL / OUT: 150 mL / NET: -30 mL        PHYSICAL EXAM:  Vital Signs Last 24 Hrs  T(C): 36.7 (10-07-19 @ 05:21)  T(F): 98 (10-07-19 @ 05:21), Max: 98.1 (10-06-19 @ 20:54)  HR: 98 (10-07-19 @ 06:01) (91 - 144)  BP: 154/97 (10-07-19 @ 06:01)  BP(mean): --  RR: 20 (10-07-19 @ 06:01) (18 - 20)  SpO2: 98% (10-07-19 @ 06:01) (94% - 99%)  Wt(kg): --    10-06 @ 07:01  -  10-07 @ 07:00  --------------------------------------------------------  IN: 120 mL / OUT: 150 mL / NET: -30 mL      PHYSICAL EXAM  GENERAL: elderly woman lying in bed, uncomfortable, groaning in pain  	HEAD:  +mild ecchymoses throughout face from prior fall  	ENT: PERRL, conjunctiva and sclera clear, neck supple, no JVD, moist mucosa, posterior oropharynx clear  	CHEST/LUNG: (difficult to auscultate since pt persistently moaning).crackles heard in b/l lung fields. Extremities warm and well perfused.   	HEART: irregulary irregular; tachcyardic. No murmurs, rubs, or gallops.   	ABDOMEN: Soft, nontender, nondistended; Bowel sounds present, no organomegaly  	BACK: no spinal tenderness, no CVA tenderness  	EXTREMITIES: 1+ edema to thighs bilaterally, +fracture in R arm with sling. Muscle compartments soft without signs of compartment syndrome.   NEUROLOGY: AAOx2 (knows name and location, does not know year or president. Following commands.   Skin: erythema of left hip moving up pelvis to back.       LABS:                        8.4    20.87 )-----------( 153      ( 06 Oct 2019 09:14 )             27.7     10-07    134<L>  |  97  |  48<H>  ----------------------------<  179<H>  5.2   |  27  |  1.18    Ca    8.4      07 Oct 2019 06:26  Phos  4.5     10-07  Mg     2.1     10-07    TPro  5.3<L>  /  Alb  1.8<L>  /  TBili  0.5  /  DBili  x   /  AST  14  /  ALT  18  /  AlkPhos  104  10-07              RADIOLOGY & ADDITIONAL TESTS:    Imaging Personally Reviewed:    Consultant(s) Notes Reviewed:      Care Discussed with Consultants/Other Providers: Patient is a 85y old  Female who presents with a chief complaint of Fever (06 Oct 2019 09:59)      SUBJECTIVE / OVERNIGHT EVENTS: Patient evaluated at bedside. Patient states that her breathing has improved slightly though she is still demonstrating a cough. She also states that she has some left flank pain. No other complaints.     REVIEW OF SYSTEMS  CONSTITUTIONAL: No fevers or chills  EYES/ENT: No visual changes. No discharge from eyes. No vertigo. No throat pain. No dysphagia.  NECK: No pain or stiffness or rigidity.  RESPIRATORY: +cough. No wheezing, or hemoptysis. No shortness of breath.  CARDIOVASCULAR: No chest pain or palpitations.   GASTROINTESTINAL: +flank pain. No abdominal pain. No nausea, vomiting, or hematemesis; No diarrhea or constipation. No melena or hematochezia.  GENITOURINARY: No dysuria, hesitancy, frequency or hematuria.  NEUROLOGICAL: No numbness or weakness. No change in speech. No fecal or urinary incontinence.   MSK: No joint swelling or erythema. No back pain.  SKIN: No itching or rashes.   PSYCH: Normal affect. Normal mood.    Review of systems negative except for items noted above.      MEDICATIONS  (STANDING):  ALBUTerol    90 MICROgram(s) HFA Inhaler 2 Puff(s) Inhalation every 6 hours  ALPRAZolam 0.5 milliGRAM(s) Oral two times a day  ascorbic acid 500 milliGRAM(s) Oral daily  atorvastatin 10 milliGRAM(s) Oral at bedtime  dextrose 5%. 1000 milliLiter(s) (50 mL/Hr) IV Continuous <Continuous>  dextrose 50% Injectable 12.5 Gram(s) IV Push once  digoxin     Tablet 0.0625 milliGRAM(s) Oral daily  entecavir 0.5 milliGRAM(s) Oral daily  hydrocortisone sodium succinate Injectable 75 milliGRAM(s) IV Push three times a day  insulin glargine Injectable (LANTUS) 5 Unit(s) SubCutaneous at bedtime  insulin lispro (HumaLOG) corrective regimen sliding scale   SubCutaneous three times a day before meals  insulin lispro (HumaLOG) corrective regimen sliding scale   SubCutaneous at bedtime  melatonin 5 milliGRAM(s) Oral at bedtime  meropenem  IVPB 1000 milliGRAM(s) IV Intermittent every 12 hours  metoprolol tartrate 12.5 milliGRAM(s) Oral two times a day  midodrine. 10 milliGRAM(s) Oral three times a day  montelukast 10 milliGRAM(s) Oral daily  Nephro-bebeto 1 Tablet(s) Oral daily  pantoprazole    Tablet 40 milliGRAM(s) Oral before breakfast  sertraline 50 milliGRAM(s) Oral daily  tiotropium 18 MICROgram(s) Capsule 1 Capsule(s) Inhalation daily    MEDICATIONS  (PRN):  acetaminophen   Tablet .. 650 milliGRAM(s) Oral every 6 hours PRN Mild Pain (1 - 3)  dextrose 40% Gel 15 Gram(s) Oral once PRN Blood Glucose LESS THAN 70 milliGRAM(s)/deciliter  docusate sodium 100 milliGRAM(s) Oral three times a day PRN Constipation  glucagon  Injectable 1 milliGRAM(s) IntraMuscular once PRN Glucose LESS THAN 70 milligrams/deciliter  guaiFENesin   Syrup  (Sugar-Free) 100 milliGRAM(s) Oral every 6 hours PRN Cough  oxyCODONE    5 mG/acetaminophen 325 mG 1 Tablet(s) Oral every 6 hours PRN Severe Pain (7 - 10)  polyethylene glycol 3350 17 Gram(s) Oral daily PRN Constiapation  senna 2 Tablet(s) Oral at bedtime PRN Constipation      Vital Signs Last 24 Hrs  T(C): 36.7 (07 Oct 2019 05:21), Max: 36.7 (06 Oct 2019 20:54)  T(F): 98 (07 Oct 2019 05:21), Max: 98.1 (06 Oct 2019 20:54)  HR: 98 (07 Oct 2019 06:01) (91 - 144)  BP: 154/97 (07 Oct 2019 06:01) (128/92 - 160/94)  BP(mean): --  RR: 20 (07 Oct 2019 06:01) (18 - 20)  SpO2: 98% (07 Oct 2019 06:01) (94% - 99%)  CAPILLARY BLOOD GLUCOSE      POCT Blood Glucose.: 240 mg/dL (07 Oct 2019 00:00)  POCT Blood Glucose.: 222 mg/dL (06 Oct 2019 22:34)  POCT Blood Glucose.: 266 mg/dL (06 Oct 2019 17:23)  POCT Blood Glucose.: 280 mg/dL (06 Oct 2019 12:09)  POCT Blood Glucose.: 197 mg/dL (06 Oct 2019 08:57)    I&O's Summary    06 Oct 2019 07:01  -  07 Oct 2019 07:00  --------------------------------------------------------  IN: 120 mL / OUT: 150 mL / NET: -30 mL        PHYSICAL EXAM:  Vital Signs Last 24 Hrs  T(C): 36.7 (10-07-19 @ 05:21)  T(F): 98 (10-07-19 @ 05:21), Max: 98.1 (10-06-19 @ 20:54)  HR: 98 (10-07-19 @ 06:01) (91 - 144)  BP: 154/97 (10-07-19 @ 06:01)  BP(mean): --  RR: 20 (10-07-19 @ 06:01) (18 - 20)  SpO2: 98% (10-07-19 @ 06:01) (94% - 99%)  Wt(kg): --    10-06 @ 07:01  -  10-07 @ 07:00  --------------------------------------------------------  IN: 120 mL / OUT: 150 mL / NET: -30 mL      PHYSICAL EXAM  GENERAL: elderly woman lying in bed, uncomfortable, groaning in pain  	HEAD:  +mild ecchymoses throughout face from prior fall  	ENT: PERRL, conjunctiva and sclera clear, neck supple, no JVD, moist mucosa, posterior oropharynx clear  	CHEST/LUNG: (difficult to auscultate since pt persistently moaning).crackles heard in b/l lung fields. Extremities warm and well perfused.   	HEART: irregulary irregular; tachcyardic. No murmurs, rubs, or gallops.   	ABDOMEN: Soft, nontender, nondistended; Bowel sounds present, no organomegaly  	BACK: no spinal tenderness, no CVA tenderness  	EXTREMITIES: 1+ edema to thighs bilaterally, +fracture in R arm with sling. Muscle compartments soft without signs of compartment syndrome.   NEUROLOGY: AAOx2 (knows name and location, does not know year or president. Following commands.   Skin: erythema of left hip moving up pelvis to back.       LABS:                        8.4    20.87 )-----------( 153      ( 06 Oct 2019 09:14 )             27.7     10-07    134<L>  |  97  |  48<H>  ----------------------------<  179<H>  5.2   |  27  |  1.18    Ca    8.4      07 Oct 2019 06:26  Phos  4.5     10-07  Mg     2.1     10-07    TPro  5.3<L>  /  Alb  1.8<L>  /  TBili  0.5  /  DBili  x   /  AST  14  /  ALT  18  /  AlkPhos  104  10-07              RADIOLOGY & ADDITIONAL TESTS:    Imaging Personally Reviewed:    Consultant(s) Notes Reviewed:      Care Discussed with Consultants/Other Providers:

## 2019-10-07 NOTE — PROGRESS NOTE ADULT - SUBJECTIVE AND OBJECTIVE BOX
INFECTIOUS DISEASES FOLLOW UP--Ad Espinal MD  Pager [phone]    This is a follow up note for this  85y Female with  Fever, e. coli bacteremia and a large L RP collection.  pt eating now.    Further ROS:  CARDIOVASCULAR:  No chest pain or palpitations  RESPIRATORY:  No dyspnea  GASTROINTESTINAL:  No nausea, vomiting, diarrhea, or abdominal pain  GENITOURINARY:  No dysuria  NEUROLOGIC:  No headache,     Allergies  Keflex (Unknown)  penicillin (Rash)    ANTIBIOTICS/RELEVANT:  antimicrobials  entecavir 0.5 milliGRAM(s) Oral daily  meropenem  IVPB 1000 milliGRAM(s) IV Intermittent every 12 hours    immunologic:    OTHER:  acetaminophen   Tablet .. 650 milliGRAM(s) Oral every 6 hours PRN  ALBUTerol    90 MICROgram(s) HFA Inhaler 2 Puff(s) Inhalation every 6 hours  ALPRAZolam 0.5 milliGRAM(s) Oral two times a day  ascorbic acid 500 milliGRAM(s) Oral daily  atorvastatin 10 milliGRAM(s) Oral at bedtime  dextrose 40% Gel 15 Gram(s) Oral once PRN  dextrose 5%. 1000 milliLiter(s) IV Continuous <Continuous>  dextrose 50% Injectable 12.5 Gram(s) IV Push once  digoxin     Tablet 0.0625 milliGRAM(s) Oral daily  docusate sodium 100 milliGRAM(s) Oral three times a day PRN  glucagon  Injectable 1 milliGRAM(s) IntraMuscular once PRN  guaiFENesin   Syrup  (Sugar-Free) 100 milliGRAM(s) Oral every 6 hours PRN  hydrocortisone sodium succinate Injectable 75 milliGRAM(s) IV Push three times a day  insulin glargine Injectable (LANTUS) 5 Unit(s) SubCutaneous at bedtime  insulin lispro (HumaLOG) corrective regimen sliding scale   SubCutaneous three times a day before meals  insulin lispro (HumaLOG) corrective regimen sliding scale   SubCutaneous at bedtime  melatonin 5 milliGRAM(s) Oral at bedtime  metoprolol tartrate 12.5 milliGRAM(s) Oral two times a day  midodrine. 10 milliGRAM(s) Oral three times a day  montelukast 10 milliGRAM(s) Oral daily  Nephro-bebeto 1 Tablet(s) Oral daily  oxyCODONE    5 mG/acetaminophen 325 mG 1 Tablet(s) Oral every 6 hours PRN  pantoprazole    Tablet 40 milliGRAM(s) Oral before breakfast  polyethylene glycol 3350 17 Gram(s) Oral daily PRN  senna 2 Tablet(s) Oral at bedtime PRN  sertraline 50 milliGRAM(s) Oral daily  tiotropium 18 MICROgram(s) Capsule 1 Capsule(s) Inhalation daily      Objective:  Vital Signs Last 24 Hrs  T(C): 36.6 (07 Oct 2019 11:22), Max: 36.7 (06 Oct 2019 20:54)  T(F): 97.9 (07 Oct 2019 11:22), Max: 98.1 (06 Oct 2019 20:54)  HR: 94 (07 Oct 2019 11:22) (91 - 129)  BP: 162/97 (07 Oct 2019 11:22) (142/91 - 162/97)  BP(mean): --  RR: 18 (07 Oct 2019 11:22) (18 - 20)  SpO2: 100% (07 Oct 2019 11:22) (94% - 100%)    PHYSICAL EXAM:  Constitutional:no acute distress  Ear/Nose/Throat: no oral lesions, 	  Respiratory: clear BL  Cardiovascular: S1S2  Gastrointestinal:soft, (+) BS, no tenderness  L thigh redness ( a bit less than 3 days prior)  Extremities:no e/e/c  No Lymphadenopathy  IV sites not inflammed.    LABS:                        8.8    20.60 )-----------( 163      ( 07 Oct 2019 08:48 )             29.6     10-07    134<L>  |  97  |  48<H>  ----------------------------<  179<H>  5.2   |  27  |  1.18    Ca    8.4      07 Oct 2019 06:26  Phos  4.5     10-07  Mg     2.1     10-07    TPro  5.3<L>  /  Alb  1.8<L>  /  TBili  0.5  /  DBili  x   /  AST  14  /  ALT  18  /  AlkPhos  104  10-07    MICROBIOLOGY:  initial bc e. coli    RADIOLOGY & ADDITIONAL STUDIES:  < from: CT Abdomen and Pelvis w/ Oral Cont and w/ IV Cont (10.04.19 @ 19:10) >    KIDNEYS/URETERS: No hydronephrosis. A 4.9 cm cyst projects from the upper   pole of the right kidney. Additional subcentimeter hypodense foci in the   kidneys bilaterally are too small to characterize.    BLADDER: Within normal limits.  REPRODUCTIVE ORGANS: Uterus and adnexa within normal limits.    BOWEL: No bowel obstruction.   PERITONEUM: Small volume pelvic ascites.  VESSELS: Atherosclerotic calcification.  RETROPERITONEUM/LYMPH NODES: No lymphadenopathy. Large left   retroperitoneal hematoma, measuring 8.7 x 8.5 cm, and extending over a   length of 12.2 cm.  ABDOMINAL WALL: Diffuse anasarca. Status post right inguinal hernia   repair.  BONES: Status post ORIF of the left femur. Degenerative changes in the   spine. Compression fracture of the T12 vertebral body, unchanged.   Sclerotic focus in the sacrum, unchanged since 9/26/2016. Partially   imaged right humeral fracture.    IMPRESSION:     Large left retroperitoneal hematoma, increased in size since 9/6/2019.   Superimposed infection cannot be excluded..      < end of copied text >

## 2019-10-07 NOTE — PROGRESS NOTE ADULT - PROBLEM SELECTOR PLAN 4
s/p recent rituximab  -hold home po prednisone 60mg, change to IV stress dose steroids hydrocortisone 100mg q8 given sepsis with borderline BPs s/p recent rituximab  -hold home po prednisone 60mg, change to IV stress dose steroids - S/p rituximab  -hold home po prednisone 60mg, change to IV stress dose steroids

## 2019-10-08 NOTE — PROGRESS NOTE ADULT - SUBJECTIVE AND OBJECTIVE BOX
Patient is a 85y old  Female who presents with a chief complaint of Fever (07 Oct 2019 16:19)      SUBJECTIVE / OVERNIGHT EVENTS: Overnight, patient complaining of SOB, given a duoneb treatment which helped. Hypertensive, midodrine held, metoprolol 25 given. This AM,    CONSTITUTIONAL: No weakness, fevers or chills  EYES/ENT: No visual changes;  No vertigo or throat pain   NECK: No pain or stiffness  RESPIRATORY: No cough, wheezing, hemoptysis; No shortness of breath  CARDIOVASCULAR: No chest pain or palpitations  GASTROINTESTINAL: No abdominal or epigastric pain. No nausea, vomiting, or hematemesis; No diarrhea or constipation. No melena or hematochezia.  GENITOURINARY: No dysuria, frequency or hematuria  NEUROLOGICAL: No numbness or weakness  SKIN: No itching, burning, rashes, or lesions   All other review of systems is negative unless indicated above.    MEDICATIONS  (STANDING):  ALBUTerol    90 MICROgram(s) HFA Inhaler 2 Puff(s) Inhalation every 6 hours  ALPRAZolam 0.5 milliGRAM(s) Oral two times a day  ascorbic acid 500 milliGRAM(s) Oral daily  atorvastatin 10 milliGRAM(s) Oral at bedtime  dextrose 5%. 1000 milliLiter(s) (50 mL/Hr) IV Continuous <Continuous>  dextrose 50% Injectable 12.5 Gram(s) IV Push once  digoxin     Tablet 0.0625 milliGRAM(s) Oral daily  entecavir 0.5 milliGRAM(s) Oral daily  hydrocortisone sodium succinate Injectable 75 milliGRAM(s) IV Push three times a day  insulin glargine Injectable (LANTUS) 5 Unit(s) SubCutaneous at bedtime  insulin lispro (HumaLOG) corrective regimen sliding scale   SubCutaneous three times a day before meals  insulin lispro (HumaLOG) corrective regimen sliding scale   SubCutaneous at bedtime  melatonin 5 milliGRAM(s) Oral at bedtime  meropenem  IVPB 1000 milliGRAM(s) IV Intermittent every 12 hours  metoprolol tartrate 25 milliGRAM(s) Oral two times a day  midodrine. 10 milliGRAM(s) Oral three times a day  montelukast 10 milliGRAM(s) Oral daily  Nephro-bebeto 1 Tablet(s) Oral daily  pantoprazole    Tablet 40 milliGRAM(s) Oral before breakfast  sertraline 50 milliGRAM(s) Oral daily  tiotropium 18 MICROgram(s) Capsule 1 Capsule(s) Inhalation daily    MEDICATIONS  (PRN):  acetaminophen   Tablet .. 650 milliGRAM(s) Oral every 6 hours PRN Mild Pain (1 - 3)  dextrose 40% Gel 15 Gram(s) Oral once PRN Blood Glucose LESS THAN 70 milliGRAM(s)/deciliter  docusate sodium 100 milliGRAM(s) Oral three times a day PRN Constipation  glucagon  Injectable 1 milliGRAM(s) IntraMuscular once PRN Glucose LESS THAN 70 milligrams/deciliter  guaiFENesin   Syrup  (Sugar-Free) 100 milliGRAM(s) Oral every 6 hours PRN Cough  oxyCODONE    5 mG/acetaminophen 325 mG 1 Tablet(s) Oral every 6 hours PRN Severe Pain (7 - 10)  polyethylene glycol 3350 17 Gram(s) Oral daily PRN Constiapation  senna 2 Tablet(s) Oral at bedtime PRN Constipation      Vital Signs Last 24 Hrs  T(C): 36.6 (08 Oct 2019 05:23), Max: 36.6 (07 Oct 2019 11:22)  T(F): 97.8 (08 Oct 2019 05:23), Max: 97.9 (07 Oct 2019 11:22)  HR: 99 (08 Oct 2019 06:45) (90 - 105)  BP: 173/100 (08 Oct 2019 06:45) (153/100 - 190/102)  BP(mean): --  RR: 20 (08 Oct 2019 05:23) (18 - 22)  SpO2: 97% (08 Oct 2019 05:23) (97% - 100%)  CAPILLARY BLOOD GLUCOSE      POCT Blood Glucose.: 253 mg/dL (07 Oct 2019 21:53)  POCT Blood Glucose.: 202 mg/dL (07 Oct 2019 17:42)  POCT Blood Glucose.: 236 mg/dL (07 Oct 2019 12:26)  POCT Blood Glucose.: 193 mg/dL (07 Oct 2019 08:11)    I&O's Summary    06 Oct 2019 07:01  -  07 Oct 2019 07:00  --------------------------------------------------------  IN: 120 mL / OUT: 150 mL / NET: -30 mL    07 Oct 2019 07:01  -  08 Oct 2019 06:55  --------------------------------------------------------  IN: 0 mL / OUT: 950 mL / NET: -950 mL        PHYSICAL EXAM:  Vital Signs Last 24 Hrs  T(C): 36.6 (10-08-19 @ 05:23)  T(F): 97.8 (10-08-19 @ 05:23), Max: 97.9 (10-07-19 @ 11:22)  HR: 99 (10-08-19 @ 06:45) (90 - 105)  BP: 173/100 (10-08-19 @ 06:45)  BP(mean): --  RR: 20 (10-08-19 @ 05:23) (18 - 22)  SpO2: 97% (10-08-19 @ 05:23) (97% - 100%)  Wt(kg): --    10-06 @ 07:01  -  10-07 @ 07:00  --------------------------------------------------------  IN: 120 mL / OUT: 150 mL / NET: -30 mL    10-07 @ 07:01  -  10-08 @ 06:55  --------------------------------------------------------  IN: 0 mL / OUT: 950 mL / NET: -950 mL      Constitutional: NAD, awake and alert  EYES: EOMI  ENT:  Normal Hearing, no tonsillar exudates   Neck: Soft and supple , no thyromegaly   Respiratory: Breath sounds are clear bilaterally, No wheezing, rales or rhonchi  Cardiovascular: S1 and S2, regular rate and rhythm, no Murmurs, gallops or rubs, no JVD,    Gastrointestinal: Bowel Sounds present, soft, nontender, nondistended, no guarding, no rebound  Extremities: No cyanosis or clubbing; warm to touch  Vascular: 2+ peripheral pulses lower ex  Neurological: No focal deficits, CN II-XII intact bilaterally, sensation to light touch intact in all extremities, gait intact. Pupils are equally reactive to light and symmetrical in size.   Musculoskeletal: 5/5 strength b/l upper and lower extremities; no joint swelling.  Skin: No rashes  Psych: no depression or anhedonia, AAOx3  HEME: no bruises, no nose bleeds      LABS:                        8.8    20.60 )-----------( 163      ( 07 Oct 2019 08:48 )             29.6     10-07    134<L>  |  97  |  48<H>  ----------------------------<  179<H>  5.2   |  27  |  1.18    Ca    8.4      07 Oct 2019 06:26  Phos  4.5     10-07  Mg     2.1     10-07    TPro  5.3<L>  /  Alb  1.8<L>  /  TBili  0.5  /  DBili  x   /  AST  14  /  ALT  18  /  AlkPhos  104  10-07              RADIOLOGY & ADDITIONAL TESTS:    Imaging Personally Reviewed:    Consultant(s) Notes Reviewed:      Care Discussed with Consultants/Other Providers: Patient is a 85y old  Female who presents with a chief complaint of Fever (07 Oct 2019 16:19)      SUBJECTIVE / OVERNIGHT EVENTS: Overnight, patient complaining of SOB, given a duoneb treatment which helped. Hypertensive, midodrine held, metoprolol 25 given.    The patient reports some cough this AM. She reports breathing is mildly improved.     CONSTITUTIONAL: No weakness, fevers or chills  EYES/ENT: No visual changes;  No vertigo or throat pain   NECK: No pain or stiffness  RESPIRATORY: +coug. No wheezing, No hemoptysis; +shortness of breath  CARDIOVASCULAR: No chest pain or palpitations  GASTROINTESTINAL: No abdominal or epigastric pain. No nausea, vomiting, or hematemesis; No diarrhea or constipation. No melena or hematochezia.  GENITOURINARY: No dysuria, frequency or hematuria  NEUROLOGICAL: No numbness or weakness  SKIN: No itching, burning, rashes, or lesions   All other review of systems is negative unless indicated above.    MEDICATIONS  (STANDING):  ALBUTerol    90 MICROgram(s) HFA Inhaler 2 Puff(s) Inhalation every 6 hours  ALPRAZolam 0.5 milliGRAM(s) Oral two times a day  ascorbic acid 500 milliGRAM(s) Oral daily  atorvastatin 10 milliGRAM(s) Oral at bedtime  dextrose 5%. 1000 milliLiter(s) (50 mL/Hr) IV Continuous <Continuous>  dextrose 50% Injectable 12.5 Gram(s) IV Push once  digoxin     Tablet 0.0625 milliGRAM(s) Oral daily  entecavir 0.5 milliGRAM(s) Oral daily  hydrocortisone sodium succinate Injectable 75 milliGRAM(s) IV Push three times a day  insulin glargine Injectable (LANTUS) 5 Unit(s) SubCutaneous at bedtime  insulin lispro (HumaLOG) corrective regimen sliding scale   SubCutaneous three times a day before meals  insulin lispro (HumaLOG) corrective regimen sliding scale   SubCutaneous at bedtime  melatonin 5 milliGRAM(s) Oral at bedtime  meropenem  IVPB 1000 milliGRAM(s) IV Intermittent every 12 hours  metoprolol tartrate 25 milliGRAM(s) Oral two times a day  midodrine. 10 milliGRAM(s) Oral three times a day  montelukast 10 milliGRAM(s) Oral daily  Nephro-bebeto 1 Tablet(s) Oral daily  pantoprazole    Tablet 40 milliGRAM(s) Oral before breakfast  sertraline 50 milliGRAM(s) Oral daily  tiotropium 18 MICROgram(s) Capsule 1 Capsule(s) Inhalation daily    MEDICATIONS  (PRN):  acetaminophen   Tablet .. 650 milliGRAM(s) Oral every 6 hours PRN Mild Pain (1 - 3)  dextrose 40% Gel 15 Gram(s) Oral once PRN Blood Glucose LESS THAN 70 milliGRAM(s)/deciliter  docusate sodium 100 milliGRAM(s) Oral three times a day PRN Constipation  glucagon  Injectable 1 milliGRAM(s) IntraMuscular once PRN Glucose LESS THAN 70 milligrams/deciliter  guaiFENesin   Syrup  (Sugar-Free) 100 milliGRAM(s) Oral every 6 hours PRN Cough  oxyCODONE    5 mG/acetaminophen 325 mG 1 Tablet(s) Oral every 6 hours PRN Severe Pain (7 - 10)  polyethylene glycol 3350 17 Gram(s) Oral daily PRN Constiapation  senna 2 Tablet(s) Oral at bedtime PRN Constipation      Vital Signs Last 24 Hrs  T(C): 36.6 (08 Oct 2019 05:23), Max: 36.6 (07 Oct 2019 11:22)  T(F): 97.8 (08 Oct 2019 05:23), Max: 97.9 (07 Oct 2019 11:22)  HR: 99 (08 Oct 2019 06:45) (90 - 105)  BP: 173/100 (08 Oct 2019 06:45) (153/100 - 190/102)  BP(mean): --  RR: 20 (08 Oct 2019 05:23) (18 - 22)  SpO2: 97% (08 Oct 2019 05:23) (97% - 100%)  CAPILLARY BLOOD GLUCOSE      POCT Blood Glucose.: 253 mg/dL (07 Oct 2019 21:53)  POCT Blood Glucose.: 202 mg/dL (07 Oct 2019 17:42)  POCT Blood Glucose.: 236 mg/dL (07 Oct 2019 12:26)  POCT Blood Glucose.: 193 mg/dL (07 Oct 2019 08:11)    I&O's Summary    06 Oct 2019 07:01  -  07 Oct 2019 07:00  --------------------------------------------------------  IN: 120 mL / OUT: 150 mL / NET: -30 mL    07 Oct 2019 07:01  -  08 Oct 2019 06:55  --------------------------------------------------------  IN: 0 mL / OUT: 950 mL / NET: -950 mL        PHYSICAL EXAM:  Vital Signs Last 24 Hrs  T(C): 36.6 (10-08-19 @ 05:23)  T(F): 97.8 (10-08-19 @ 05:23), Max: 97.9 (10-07-19 @ 11:22)  HR: 99 (10-08-19 @ 06:45) (90 - 105)  BP: 173/100 (10-08-19 @ 06:45)  BP(mean): --  RR: 20 (10-08-19 @ 05:23) (18 - 22)  SpO2: 97% (10-08-19 @ 05:23) (97% - 100%)  Wt(kg): --    10-06 @ 07:01  -  10-07 @ 07:00  --------------------------------------------------------  IN: 120 mL / OUT: 150 mL / NET: -30 mL    10-07 @ 07:01  -  10-08 @ 06:55  --------------------------------------------------------  IN: 0 mL / OUT: 950 mL / NET: -950 mL      PHYSICAL EXAM  GENERAL: elderly woman lying in bed, uncomfortable, groaning in pain  	HEAD:  +mild ecchymoses throughout face from prior fall  	ENT: PERRL, conjunctiva and sclera clear, neck supple, no JVD, moist mucosa, posterior oropharynx clear  	CHEST/LUNG: Diffuse rhonchi b/l. Decreased breath sounds at the lung bases. Trace rales bibasilar as well. No wheezing. Nasotracheal suction with scant clear sputum. NO hemoptysis  	HEART: irregulary irregular. HR at goal. No murmurs, rubs, or gallops.   	ABDOMEN: Soft, nontender, nondistended; Bowel sounds present, no organomegaly  	BACK: no spinal tenderness, no CVA tenderness  	EXTREMITIES: 1+ edema to thighs bilaterally, +fracture in R arm with sling. Muscle compartments soft without signs of compartment syndrome.   NEUROLOGY: AAOx2 (knows name and location, does not know year or president. Following commands.   Skin: erythema of left hip moving up pelvis to back.     LABS:                        8.8    20.60 )-----------( 163      ( 07 Oct 2019 08:48 )             29.6     10-07    134<L>  |  97  |  48<H>  ----------------------------<  179<H>  5.2   |  27  |  1.18    Ca    8.4      07 Oct 2019 06:26  Phos  4.5     10-07  Mg     2.1     10-07    TPro  5.3<L>  /  Alb  1.8<L>  /  TBili  0.5  /  DBili  x   /  AST  14  /  ALT  18  /  AlkPhos  104  10-07              RADIOLOGY & ADDITIONAL TESTS:    Imaging Personally Reviewed:    Consultant(s) Notes Reviewed:      Care Discussed with Consultants/Other Providers:

## 2019-10-08 NOTE — PROGRESS NOTE ADULT - PROBLEM SELECTOR PLAN 4
- S/p rituximab  -hold home po prednisone 60mg, change to IV stress dose steroids - RVP positive, CXR negative  - C/w duonebs  - Supportive management

## 2019-10-08 NOTE — PROVIDER CONTACT NOTE (OTHER) - ASSESSMENT
Pt a&ox4. Family at bedside. Pt tachypneic with RR in low 20's, some accessory muscle use present. Lung sounds course. Though O2 saturation maintained above 96% on 3L NC. Pt BP elevated, 180/90s, midodrine was held today for elevated BP, metoprolol given @1800.

## 2019-10-08 NOTE — PROGRESS NOTE ADULT - PROBLEM SELECTOR PLAN 2
- E. coli bacteremia and parainfluenza virus  - Source possibly cellulitis of infected left hip joint vs hematoma vs intraabdominal infection given recent UTI  -CT-Abd-Pelvis read noted.  - Bcx NGTD   - Aim to wean off of midodrine 10 TID  - Hemodynamically stable  - C/w meropenem 1g q12 per ID  - Monitor vitals  - C/w steroids, to transition to home dose - E. coli bacteremia and parainfluenza virus  - Source possibly cellulitis of infected left hip joint vs hematoma vs intraabdominal infection given recent UTI  -CT-Abd-Pelvis read noted.  - Bcx NGTD   - Aim to wean off of midodrine 10 TID  - Hemodynamically stable  - [place] to ertapenem per ID.   - Monitor vitals  - C/w steroids, to transition to home dose

## 2019-10-08 NOTE — PROGRESS NOTE ADULT - PROBLEM SELECTOR PLAN 7
-DVT: SCD's only  very poor prognosis, DNR/DNI  -palliative care consulted - MOLST Form signed and in chart, DNR/DNI  - To hold family meeting this Thursday to discuss GOC  - Pain meds as needed to maintain comfort

## 2019-10-08 NOTE — PROGRESS NOTE ADULT - PROBLEM SELECTOR PLAN 5
- Monitor FS, c/w SANTOSH - S/p rituximab  -Patient back on home prednisone 60 mg po daily. No clear stop dated reported. Will need to clarify.

## 2019-10-08 NOTE — MEDICAL STUDENT PROGRESS NOTE(EDUCATION) - SUBJECTIVE AND OBJECTIVE BOX
Patient is a 85y old  Female who presents with a chief complaint of Fever (07 Oct 2019 16:19)      SUBJECTIVE / OVERNIGHT EVENTS: Overnight, patient complaining of SOB, given a duoneb treatment which helped. Hypertensive, midodrine held, metoprolol 25 given. This AM, patient is still groaning and coughing. Ate breakfast with assistance and was showered.      CONSTITUTIONAL: No weakness, fevers or chills  EYES/ENT: No visual changes;  No vertigo or throat pain   NECK: No pain or stiffness  RESPIRATORY: +Cough, No hemoptysis; No shortness of breath  CARDIOVASCULAR: No chest pain or palpitations  GASTROINTESTINAL: No abdominal or epigastric pain. No nausea, vomiting, or hematemesis; No diarrhea or constipation. No melena or hematochezia.  GENITOURINARY: No dysuria, frequency or hematuria.   NEUROLOGICAL: No numbness or weakness  SKIN: No itching, burning, rashes. Large, painful hematomas around R shoulder and beneath R breast.    All other review of systems is negative unless indicated above.    MEDICATIONS  (STANDING):  ALBUTerol    90 MICROgram(s) HFA Inhaler 2 Puff(s) Inhalation every 6 hours  ALPRAZolam 0.5 milliGRAM(s) Oral two times a day  ascorbic acid 500 milliGRAM(s) Oral daily  atorvastatin 10 milliGRAM(s) Oral at bedtime  dextrose 5%. 1000 milliLiter(s) (50 mL/Hr) IV Continuous <Continuous>  dextrose 50% Injectable 12.5 Gram(s) IV Push once  digoxin     Tablet 0.0625 milliGRAM(s) Oral daily  entecavir 0.5 milliGRAM(s) Oral daily  hydrocortisone sodium succinate Injectable 75 milliGRAM(s) IV Push three times a day  insulin glargine Injectable (LANTUS) 5 Unit(s) SubCutaneous at bedtime  insulin lispro (HumaLOG) corrective regimen sliding scale   SubCutaneous three times a day before meals  insulin lispro (HumaLOG) corrective regimen sliding scale   SubCutaneous at bedtime  melatonin 5 milliGRAM(s) Oral at bedtime  meropenem  IVPB 1000 milliGRAM(s) IV Intermittent every 12 hours  metoprolol tartrate 25 milliGRAM(s) Oral two times a day  midodrine. 10 milliGRAM(s) Oral three times a day  montelukast 10 milliGRAM(s) Oral daily  Nephro-bebeto 1 Tablet(s) Oral daily  pantoprazole    Tablet 40 milliGRAM(s) Oral before breakfast  sertraline 50 milliGRAM(s) Oral daily  tiotropium 18 MICROgram(s) Capsule 1 Capsule(s) Inhalation daily    MEDICATIONS  (PRN):  acetaminophen   Tablet .. 650 milliGRAM(s) Oral every 6 hours PRN Mild Pain (1 - 3)  dextrose 40% Gel 15 Gram(s) Oral once PRN Blood Glucose LESS THAN 70 milliGRAM(s)/deciliter  docusate sodium 100 milliGRAM(s) Oral three times a day PRN Constipation  glucagon  Injectable 1 milliGRAM(s) IntraMuscular once PRN Glucose LESS THAN 70 milligrams/deciliter  guaiFENesin   Syrup  (Sugar-Free) 100 milliGRAM(s) Oral every 6 hours PRN Cough  oxyCODONE    5 mG/acetaminophen 325 mG 1 Tablet(s) Oral every 6 hours PRN Severe Pain (7 - 10)  polyethylene glycol 3350 17 Gram(s) Oral daily PRN Constiapation  senna 2 Tablet(s) Oral at bedtime PRN Constipation      Vital Signs Last 24 Hrs  T(C): 36.6 (08 Oct 2019 05:23), Max: 36.6 (07 Oct 2019 11:22)  T(F): 97.8 (08 Oct 2019 05:23), Max: 97.9 (07 Oct 2019 11:22)  HR: 99 (08 Oct 2019 06:45) (90 - 105)  BP: 173/100 (08 Oct 2019 06:45) (153/100 - 190/102)  BP(mean): --  RR: 20 (08 Oct 2019 05:23) (18 - 22)  SpO2: 97% (08 Oct 2019 05:23) (97% - 100%)  CAPILLARY BLOOD GLUCOSE      POCT Blood Glucose.: 253 mg/dL (07 Oct 2019 21:53)  POCT Blood Glucose.: 202 mg/dL (07 Oct 2019 17:42)  POCT Blood Glucose.: 236 mg/dL (07 Oct 2019 12:26)  POCT Blood Glucose.: 193 mg/dL (07 Oct 2019 08:11)    I&O's Summary    06 Oct 2019 07:01  -  07 Oct 2019 07:00  --------------------------------------------------------  IN: 120 mL / OUT: 150 mL / NET: -30 mL    07 Oct 2019 07:01  -  08 Oct 2019 06:55  --------------------------------------------------------  IN: 0 mL / OUT: 950 mL / NET: -950 mL        PHYSICAL EXAM:  Vital Signs Last 24 Hrs  T(C): 36.6 (10-08-19 @ 05:23)  T(F): 97.8 (10-08-19 @ 05:23), Max: 97.9 (10-07-19 @ 11:22)  HR: 99 (10-08-19 @ 06:45) (90 - 105)  BP: 173/100 (10-08-19 @ 06:45)  BP(mean): --  RR: 20 (10-08-19 @ 05:23) (18 - 22)  SpO2: 97% (10-08-19 @ 05:23) (97% - 100%)  Wt(kg): --    10-06 @ 07:01  -  10-07 @ 07:00  --------------------------------------------------------  IN: 120 mL / OUT: 150 mL / NET: -30 mL    10-07 @ 07:01  -  10-08 @ 06:55  --------------------------------------------------------  IN: 0 mL / OUT: 950 mL / NET: -950 mL      Constitutional: NAD, awake and alert  EYES: EOMI  ENT:  Normal Hearing, no tonsillar exudates   Neck: Soft and supple , no thyromegaly   Respiratory: Breath sounds are clear bilaterally, No wheezing, rales or rhonchi  Cardiovascular: S1 and S2, regular rate and rhythm, no Murmurs, gallops or rubs, no JVD,    Gastrointestinal: Bowel Sounds present, soft, nontender, nondistended, no guarding, no rebound  Extremities: No cyanosis or clubbing; warm to touch  Vascular: 2+ peripheral pulses lower ex  Neurological: No focal deficits, CN II-XII intact bilaterally, sensation to light touch intact in all extremities, gait intact. Pupils are equally reactive to light and symmetrical in size.   Musculoskeletal: 5/5 strength b/l upper and lower extremities; no joint swelling.  Skin: No rashes  Psych: no depression or anhedonia, AAOx3  HEME: no bruises, no nose bleeds      LABS:                        8.8    20.60 )-----------( 163      ( 07 Oct 2019 08:48 )             29.6     10-07    134<L>  |  97  |  48<H>  ----------------------------<  179<H>  5.2   |  27  |  1.18    Ca    8.4      07 Oct 2019 06:26  Phos  4.5     10-07  Mg     2.1     10-07    TPro  5.3<L>  /  Alb  1.8<L>  /  TBili  0.5  /  DBili  x   /  AST  14  /  ALT  18  /  AlkPhos  104  10-07              RADIOLOGY & ADDITIONAL TESTS:    Imaging Personally Reviewed:    Consultant(s) Notes Reviewed:      Care Discussed with Consultants/Other Providers: Patient is a 85y old  Female who presents with a chief complaint of Fever (07 Oct 2019 16:19)      SUBJECTIVE / OVERNIGHT EVENTS: Overnight, patient complaining of SOB, given a duoneb treatment which helped. Hypertensive, midodrine held, metoprolol 25 given. This AM, patient is still groaning and coughing. Ate breakfast with assistance and was showered.      CONSTITUTIONAL: No weakness, fevers or chills. In pain from humerus fracture and hematomas.   EYES/ENT: No visual changes;  No vertigo or throat pain.   NECK: No pain or stiffness  RESPIRATORY: +Cough, No hemoptysis  CARDIOVASCULAR: No chest pain or palpitations  GASTROINTESTINAL: No abdominal or epigastric pain. No nausea, vomiting, or hematemesis; No diarrhea or constipation. No melena or hematochezia.  GENITOURINARY: No dysuria, frequency or hematuria.   NEUROLOGICAL: +weakness, No numbness  SKIN: No itching, burning, rashes. Large, painful hematomas around R shoulder and beneath R breast.    All other review of systems is negative unless indicated above.    MEDICATIONS  (STANDING):  ALBUTerol    90 MICROgram(s) HFA Inhaler 2 Puff(s) Inhalation every 6 hours  ALPRAZolam 0.5 milliGRAM(s) Oral two times a day  ascorbic acid 500 milliGRAM(s) Oral daily  atorvastatin 10 milliGRAM(s) Oral at bedtime  dextrose 5%. 1000 milliLiter(s) (50 mL/Hr) IV Continuous <Continuous>  dextrose 50% Injectable 12.5 Gram(s) IV Push once  digoxin     Tablet 0.0625 milliGRAM(s) Oral daily  entecavir 0.5 milliGRAM(s) Oral daily  hydrocortisone sodium succinate Injectable 75 milliGRAM(s) IV Push three times a day  insulin glargine Injectable (LANTUS) 5 Unit(s) SubCutaneous at bedtime  insulin lispro (HumaLOG) corrective regimen sliding scale   SubCutaneous three times a day before meals  insulin lispro (HumaLOG) corrective regimen sliding scale   SubCutaneous at bedtime  melatonin 5 milliGRAM(s) Oral at bedtime  meropenem  IVPB 1000 milliGRAM(s) IV Intermittent every 12 hours  metoprolol tartrate 25 milliGRAM(s) Oral two times a day  midodrine. 10 milliGRAM(s) Oral three times a day  montelukast 10 milliGRAM(s) Oral daily  Nephro-bebeto 1 Tablet(s) Oral daily  pantoprazole    Tablet 40 milliGRAM(s) Oral before breakfast  sertraline 50 milliGRAM(s) Oral daily  tiotropium 18 MICROgram(s) Capsule 1 Capsule(s) Inhalation daily    MEDICATIONS  (PRN):  acetaminophen   Tablet .. 650 milliGRAM(s) Oral every 6 hours PRN Mild Pain (1 - 3)  dextrose 40% Gel 15 Gram(s) Oral once PRN Blood Glucose LESS THAN 70 milliGRAM(s)/deciliter  docusate sodium 100 milliGRAM(s) Oral three times a day PRN Constipation  glucagon  Injectable 1 milliGRAM(s) IntraMuscular once PRN Glucose LESS THAN 70 milligrams/deciliter  guaiFENesin   Syrup  (Sugar-Free) 100 milliGRAM(s) Oral every 6 hours PRN Cough  oxyCODONE    5 mG/acetaminophen 325 mG 1 Tablet(s) Oral every 6 hours PRN Severe Pain (7 - 10)  polyethylene glycol 3350 17 Gram(s) Oral daily PRN Constiapation  senna 2 Tablet(s) Oral at bedtime PRN Constipation      Vital Signs Last 24 Hrs  T(C): 36.6 (08 Oct 2019 05:23), Max: 36.6 (07 Oct 2019 11:22)  T(F): 97.8 (08 Oct 2019 05:23), Max: 97.9 (07 Oct 2019 11:22)  HR: 99 (08 Oct 2019 06:45) (90 - 105)  BP: 173/100 (08 Oct 2019 06:45) (153/100 - 190/102)  BP(mean): --  RR: 20 (08 Oct 2019 05:23) (18 - 22)  SpO2: 97% (08 Oct 2019 05:23) (97% - 100%)  CAPILLARY BLOOD GLUCOSE      POCT Blood Glucose.: 253 mg/dL (07 Oct 2019 21:53)  POCT Blood Glucose.: 202 mg/dL (07 Oct 2019 17:42)  POCT Blood Glucose.: 236 mg/dL (07 Oct 2019 12:26)  POCT Blood Glucose.: 193 mg/dL (07 Oct 2019 08:11)    I&O's Summary    06 Oct 2019 07:01  -  07 Oct 2019 07:00  --------------------------------------------------------  IN: 120 mL / OUT: 150 mL / NET: -30 mL    07 Oct 2019 07:01  -  08 Oct 2019 06:55  --------------------------------------------------------  IN: 0 mL / OUT: 950 mL / NET: -950 mL        PHYSICAL EXAM:  Vital Signs Last 24 Hrs  T(C): 36.6 (10-08-19 @ 05:23)  T(F): 97.8 (10-08-19 @ 05:23), Max: 97.9 (10-07-19 @ 11:22)  HR: 99 (10-08-19 @ 06:45) (90 - 105)  BP: 173/100 (10-08-19 @ 06:45)  BP(mean): --  RR: 20 (10-08-19 @ 05:23) (18 - 22)  SpO2: 97% (10-08-19 @ 05:23) (97% - 100%)  Wt(kg): --    10-06 @ 07:01  -  10-07 @ 07:00  --------------------------------------------------------  IN: 120 mL / OUT: 150 mL / NET: -30 mL    10-07 @ 07:01  -  10-08 @ 06:55  --------------------------------------------------------  IN: 0 mL / OUT: 950 mL / NET: -950 mL      Constitutional: Elderly woman lying uncomfortably in bed groaning and coughing.   EYES: EOMI, healing hematomas superior to L eyebrow  ENT:  Lesions and ulcerations on tongue. Dry oral mucosa.   Neck: Soft and supple , no thyromegaly   Respiratory: Coarse rhonchi bilaterally, breathing is labored due to secretions but at a normal rate   Cardiovascular: S1 and S2, irregular rhythm, no Murmurs, gallops or rubs, no JVD,    Gastrointestinal: Bowel Sounds present, abdomen is soft, nontender, nondistended, no guarding, no rebound  Extremities: No cyanosis or clubbing; warm to touch, significant edema and anasarca. R humerus in sling, with surrounding hematomas. Hematoma on L hip/flank is resolving.  Vascular: 2+ peripheral pulses lower ex  Neurological: No focal deficits, CN II-XII intact bilaterally, sensation to light touch intact in all extremities, gait intact. Pupils are equally reactive to light and symmetrical in size.   Musculoskeletal: 5/5 strength b/l upper and lower extremities; no joint swelling.  Skin: No rashes  Psych: no depression or anhedonia, AAOx2    LABS:                        8.8    20.60 )-----------( 163      ( 07 Oct 2019 08:48 )             29.6     10-07    134<L>  |  97  |  48<H>  ----------------------------<  179<H>  5.2   |  27  |  1.18    Ca    8.4      07 Oct 2019 06:26  Phos  4.5     10-07  Mg     2.1     10-07    TPro  5.3<L>  /  Alb  1.8<L>  /  TBili  0.5  /  DBili  x   /  AST  14  /  ALT  18  /  AlkPhos  104  10-07              RADIOLOGY & ADDITIONAL TESTS:    Imaging Personally Reviewed:    Consultant(s) Notes Reviewed:  ID      Care Discussed with Consultants/Other Providers:

## 2019-10-08 NOTE — PROGRESS NOTE ADULT - PROBLEM SELECTOR PLAN 3
- RVP positive, CXR negative  - C/w duonebs  - Supportive management From hematoma seen on CT  Hgb stable. Lactate cleared.   Has been consented for blood product transfusion.   General surgery on board.

## 2019-10-08 NOTE — MEDICAL STUDENT PROGRESS NOTE(EDUCATION) - NS MD HP STUD ASPLAN PLAN FT
Problem/Plan - 1:  ·  Problem: Atrial fibrillation.  Plan: - Likely triggered due to fluid overload; s/p lasix  - C/w digoxin , monitor K  - AC held due to hematoma/fall    - Uptitrate metoprolol due to persistent HTN       Problem/Plan - 2:  ·  Problem: Sepsis.  Plan: - E. coli bacteremia and parainfluenza virus  - Source possibly cellulitis of infected left hip joint vs hematoma vs intraabdominal infection given recent UTI  -CT-Abd-Pelvis read noted. ID reccs appreciated.   - Bcx NGTD   - Off midodrine 10 TID due to HTN  - C/w meropenem 1g q12 per ID  - Monitor vitals  - C/w steroids, to transition to home dose 60mg PO.      Problem/Plan - 3:  ·  Problem: Parainfluenza.  Plan: - RVP positive, CXR negative  - C/w duonebs  - Attempted suction today with little success. Patient is coughing up mucus frequently.   - Supportive management     Problem/Plan - 4:  ·  Problem: Vasculitis.  Plan: - S/p rituximab  -Transition to home po prednisone 60mg     Problem/Plan - 5:  ·  Problem: Diabetes.  Plan: - Monitor FS, c/w SANTOSH.      Problem/Plan - 6:  Problem: Goals of care, counseling/discussion. Plan: - MOLST Form signed and in chart, DNR/DNI  - To hold family meeting Thursday to discuss GOC  - Pain meds as needed to maintain comfort.     Problem/Plan - 7:  ·  Problem: Prophylactic measure.  Plan: -DVT: SCD's only  very poor prognosis, DNR/DNI  -palliative care consulted. Problem/Plan - 1:  ·  Problem: Atrial fibrillation.  Plan: - Likely triggered due to fluid overload; s/p lasix  - C/w digoxin , monitor K  - AC held due to hematoma/fall    - Uptitrate metoprolol due to persistent HTN       Problem/Plan - 2:  ·  Problem: Sepsis.  Plan: - E. coli bacteremia and parainfluenza virus  - Source possibly cellulitis of infected left hip joint vs hematoma vs intraabdominal infection given recent UTI  -CT-Abd-Pelvis read noted. ID reccs appreciated.   - Bcx NGTD   - Off midodrine 10 TID due to HTN  - C/w meropenem 1g q12 per ID  - Monitor vitals  - C/w steroids, to transition to home dose 60mg PO.      Problem/Plan - 3:  ·  Problem: Parainfluenza.  Plan: - RVP positive, CXR negative  - C/w duonebs  - Attempted suction today with little success. Patient is coughing up mucus frequently.   - CXR tomorrow to monitor L pleural effusion  - Supportive management     Problem/Plan - 4:  ·  Problem: Vasculitis.  Plan: - S/p rituximab  -Transition to home po prednisone 60mg     Problem/Plan - 5:  ·  Problem: Diabetes.  Plan: - Monitor FS, c/w SANTOSH.      Problem/Plan - 6:  Problem: Goals of care, counseling/discussion. Plan: - MOLST Form signed and in chart, DNR/DNI  - To hold family meeting Thursday to discuss GOC  - Pain meds as needed to maintain comfort.     Problem/Plan - 7:  ·  Problem: Prophylactic measure.  Plan: -DVT: SCD's only  very poor prognosis, DNR/DNI  -palliative care consulted.

## 2019-10-08 NOTE — PROGRESS NOTE ADULT - SUBJECTIVE AND OBJECTIVE BOX
INFECTIOUS DISEASES FOLLOW UP--Ad Espinal MD  Pager [phone]    This is a follow up note for this  85y Female with  paraflu  e. coli sepsis  possible infected L RP hematoma---not ruled out.    Further ROS: limited    Allergies    Keflex (Unknown)  penicillin (Rash)    ANTIBIOTICS/RELEVANT:  antimicrobials  entecavir 0.5 milliGRAM(s) Oral daily  meropenem  IVPB 1000 milliGRAM(s) IV Intermittent every 12 hours    OTHER:  acetaminophen   Tablet .. 650 milliGRAM(s) Oral every 6 hours PRN  ALBUTerol    90 MICROgram(s) HFA Inhaler 2 Puff(s) Inhalation every 6 hours  ALPRAZolam 0.5 milliGRAM(s) Oral two times a day  ascorbic acid 500 milliGRAM(s) Oral daily  atorvastatin 10 milliGRAM(s) Oral at bedtime  dextrose 40% Gel 15 Gram(s) Oral once PRN  dextrose 5%. 1000 milliLiter(s) IV Continuous <Continuous>  dextrose 50% Injectable 12.5 Gram(s) IV Push once  digoxin     Tablet 0.0625 milliGRAM(s) Oral daily  docusate sodium 100 milliGRAM(s) Oral three times a day PRN  glucagon  Injectable 1 milliGRAM(s) IntraMuscular once PRN  guaiFENesin   Syrup  (Sugar-Free) 100 milliGRAM(s) Oral every 6 hours PRN  hydrocortisone sodium succinate Injectable 75 milliGRAM(s) IV Push three times a day  insulin glargine Injectable (LANTUS) 5 Unit(s) SubCutaneous at bedtime  insulin lispro (HumaLOG) corrective regimen sliding scale   SubCutaneous three times a day before meals  insulin lispro (HumaLOG) corrective regimen sliding scale   SubCutaneous at bedtime  melatonin 5 milliGRAM(s) Oral at bedtime  metoprolol tartrate 25 milliGRAM(s) Oral two times a day  midodrine. 10 milliGRAM(s) Oral three times a day  montelukast 10 milliGRAM(s) Oral daily  Nephro-bebeto 1 Tablet(s) Oral daily  oxyCODONE    5 mG/acetaminophen 325 mG 1 Tablet(s) Oral every 6 hours PRN  pantoprazole    Tablet 40 milliGRAM(s) Oral before breakfast  polyethylene glycol 3350 17 Gram(s) Oral daily PRN  senna 2 Tablet(s) Oral at bedtime PRN  sertraline 50 milliGRAM(s) Oral daily  tiotropium 18 MICROgram(s) Capsule 1 Capsule(s) Inhalation daily    Objective:  Vital Signs Last 24 Hrs  T(C): 36.6 (08 Oct 2019 05:23), Max: 36.6 (07 Oct 2019 11:22)  T(F): 97.8 (08 Oct 2019 05:23), Max: 97.9 (07 Oct 2019 11:22)  HR: 99 (08 Oct 2019 06:45) (90 - 105)  BP: 173/100 (08 Oct 2019 06:45) (153/100 - 190/102)  BP(mean): --  RR: 20 (08 Oct 2019 05:23) (18 - 22)  SpO2: 97% (08 Oct 2019 05:23) (97% - 100%)    PHYSICAL EXAM: moaning, frail  Ear/Nose/Throat: no oral lesions, 	  Respiratory: coarse BL  Cardiovascular: S1S2  Gastrointestinal:soft, (+) BS,soft distension....full at L flank and thigh  No Lymphadenopathy  IV sites not inflammed.    LABS:                        8.8    20.60 )-----------( 163      ( 07 Oct 2019 08:48 )             29.6     10-07    134<L>  |  97  |  48<H>  ----------------------------<  179<H>  5.2   |  27  |  1.18    Ca    8.4      07 Oct 2019 06:26  Phos  4.5     10-07  Mg     2.1     10-07    TPro  5.3<L>  /  Alb  1.8<L>  /  TBili  0.5  /  DBili  x   /  AST  14  /  ALT  18  /  AlkPhos  104  10-07    imp/rx:  uncertain sig of the paraflu.  main issue is e. coli sepsis and possible infected L RP collection.  would try to get this aspirated.  please change meropenem to ceftriaxone 2 grams daily iv.  id covers tomorrow.  no evidence of uti

## 2019-10-08 NOTE — PROVIDER CONTACT NOTE (OTHER) - BACKGROUND
Admitted for fever, recently discharged from the hospital 5 days ago, hx of copd, cxray 10/7 shows L pleural effusion

## 2019-10-08 NOTE — PROGRESS NOTE ADULT - PROBLEM SELECTOR PLAN 1
- Likely triggered due to fluid overload; s/p lasix  - C/w digoxin   - AC held due to hematoma/fall    - C/w metoprolol  - Pending telemetry bed Caused by hypoxic respiratory failure from fluid overload.   - C/w digoxin   - AC held due to hematoma/fall    - C/w metoprolol  - Pending telemetry bed

## 2019-10-08 NOTE — PROGRESS NOTE ADULT - ATTENDING COMMENTS
Patient seen and examined today. I agree with Dr. Pierson's findings, assessment, and plan with the following additions and exceptions:     Better rate controlled today. Continue to uptitrate BB and will soon discontinue digoxin.   Additional lasix 20 mg ivp given today. Crackles worse than yesterday. Monitor UOP. Repeat CXR in AM.   Nasotracheal suction x2 today with removal of very small amount of clear mucus.   Acapella valve for airway clearance.    Incentive spirometer  D/c midodrine. Have been held for several doses given hypertension.   Back on home dose of prednisone for vasculitis. Will need collateral regarding stop date.   ID, MICU and General surgery contribution to care greatly appreciated.   Prognosis poor. GOC meeting planned for Thursday. Palliative care assistance appreciated. Will hold off on consulting IR for possible hematoma sample (as requested by ID) until GOC conversation.  Low threshold for reconsulting MICU.   Rest of plan as above    Dr. Rafi Staley, DO  Division of Hospital Medicine  Health system  Pager:  [phone] .

## 2019-10-08 NOTE — PROVIDER CONTACT NOTE (OTHER) - BACKGROUND
Admitted for fever 2/2 to ecoli bacteremia. ON IV ABX. Afib on tele. A&O2-3. Blood pressure elevated throughout 10/7 despite receiving blood pressure medications. Held midodrine 10/7.

## 2019-10-08 NOTE — PROVIDER CONTACT NOTE (OTHER) - SITUATION
Pt blood pressure elevated 170-190s/100. Pt denies discomfort, cp, but remains short of breath with exertion. On 3L NC, received metoprolol 10/7 @1800.

## 2019-10-08 NOTE — PROVIDER CONTACT NOTE (OTHER) - ASSESSMENT
Pt blood pressure remains elevated over baseline. Has metoprolol due at 6am. Team 1 to consider adjusting/adding additional blood pressure medications.

## 2019-10-08 NOTE — PROGRESS NOTE ADULT - PROBLEM SELECTOR PLAN 6
- MOLST Form signed and in chart, DNR/DNI  - To hold family meeting this week to discuss GOC  - Pain meds as needed to maintain comfort - Monitor FS, c/w SANTOSH

## 2019-10-09 NOTE — PROVIDER CONTACT NOTE (CRITICAL VALUE NOTIFICATION) - ACTION/TREATMENT ORDERED:
Dr. Pierson to repeat A- stick this evening.   Will cont to monitor.
Pt is on meropenem. Blood culture sent.
Ordered 1 G meropenem IVPB q12h

## 2019-10-09 NOTE — PROGRESS NOTE ADULT - SUBJECTIVE AND OBJECTIVE BOX
Patient is a 85y old  Female who presents with a chief complaint of Fever (08 Oct 2019 07:15)      SUBJECTIVE / OVERNIGHT EVENTS:    CONSTITUTIONAL: No weakness, fevers or chills  EYES/ENT: No visual changes;  No vertigo or throat pain   NECK: No pain or stiffness  RESPIRATORY: No cough, wheezing, hemoptysis; No shortness of breath  CARDIOVASCULAR: No chest pain or palpitations  GASTROINTESTINAL: No abdominal or epigastric pain. No nausea, vomiting, or hematemesis; No diarrhea or constipation. No melena or hematochezia.  GENITOURINARY: No dysuria, frequency or hematuria  NEUROLOGICAL: No numbness or weakness  SKIN: No itching, burning, rashes, or lesions   All other review of systems is negative unless indicated above.    MEDICATIONS  (STANDING):  ALBUTerol    90 MICROgram(s) HFA Inhaler 2 Puff(s) Inhalation every 6 hours  ALPRAZolam 0.5 milliGRAM(s) Oral two times a day  ascorbic acid 500 milliGRAM(s) Oral daily  atorvastatin 10 milliGRAM(s) Oral at bedtime  dextrose 5%. 1000 milliLiter(s) (50 mL/Hr) IV Continuous <Continuous>  dextrose 50% Injectable 12.5 Gram(s) IV Push once  digoxin     Tablet 0.0625 milliGRAM(s) Oral daily  entecavir 0.5 milliGRAM(s) Oral daily  ertapenem  IVPB 1000 milliGRAM(s) IV Intermittent every 24 hours  insulin glargine Injectable (LANTUS) 5 Unit(s) SubCutaneous at bedtime  insulin lispro (HumaLOG) corrective regimen sliding scale   SubCutaneous three times a day before meals  insulin lispro (HumaLOG) corrective regimen sliding scale   SubCutaneous at bedtime  melatonin 5 milliGRAM(s) Oral at bedtime  metoprolol tartrate 37.5 milliGRAM(s) Oral two times a day  montelukast 10 milliGRAM(s) Oral daily  Nephro-bebeto 1 Tablet(s) Oral daily  pantoprazole    Tablet 40 milliGRAM(s) Oral before breakfast  predniSONE   Tablet 60 milliGRAM(s) Oral daily  sertraline 50 milliGRAM(s) Oral daily  tiotropium 18 MICROgram(s) Capsule 1 Capsule(s) Inhalation daily    MEDICATIONS  (PRN):  acetaminophen   Tablet .. 650 milliGRAM(s) Oral every 6 hours PRN Mild Pain (1 - 3)  dextrose 40% Gel 15 Gram(s) Oral once PRN Blood Glucose LESS THAN 70 milliGRAM(s)/deciliter  docusate sodium 100 milliGRAM(s) Oral three times a day PRN Constipation  glucagon  Injectable 1 milliGRAM(s) IntraMuscular once PRN Glucose LESS THAN 70 milligrams/deciliter  guaiFENesin   Syrup  (Sugar-Free) 100 milliGRAM(s) Oral every 6 hours PRN Cough  oxyCODONE    5 mG/acetaminophen 325 mG 1 Tablet(s) Oral every 6 hours PRN Severe Pain (7 - 10)  polyethylene glycol 3350 17 Gram(s) Oral daily PRN Constiapation  senna 2 Tablet(s) Oral at bedtime PRN Constipation      Vital Signs Last 24 Hrs  T(C): 36.7 (09 Oct 2019 05:40), Max: 36.7 (09 Oct 2019 05:40)  T(F): 98 (09 Oct 2019 05:40), Max: 98 (09 Oct 2019 05:40)  HR: 99 (09 Oct 2019 05:40) (90 - 99)  BP: 160/100 (09 Oct 2019 05:40) (160/100 - 181/106)  BP(mean): --  RR: 20 (09 Oct 2019 05:40) (20 - 20)  SpO2: 100% (09 Oct 2019 05:40) (100% - 100%)  CAPILLARY BLOOD GLUCOSE      POCT Blood Glucose.: 239 mg/dL (08 Oct 2019 22:52)  POCT Blood Glucose.: 146 mg/dL (08 Oct 2019 17:25)  POCT Blood Glucose.: 169 mg/dL (08 Oct 2019 12:27)  POCT Blood Glucose.: 196 mg/dL (08 Oct 2019 07:52)    I&O's Summary    07 Oct 2019 07:01  -  08 Oct 2019 07:00  --------------------------------------------------------  IN: 0 mL / OUT: 1100 mL / NET: -1100 mL    08 Oct 2019 07:01  -  09 Oct 2019 06:58  --------------------------------------------------------  IN: 150 mL / OUT: 550 mL / NET: -400 mL        PHYSICAL EXAM:  Vital Signs Last 24 Hrs  T(C): 36.7 (10-09-19 @ 05:40)  T(F): 98 (10-09-19 @ 05:40), Max: 98 (10-09-19 @ 05:40)  HR: 99 (10-09-19 @ 05:40) (90 - 99)  BP: 160/100 (10-09-19 @ 05:40)  BP(mean): --  RR: 20 (10-09-19 @ 05:40) (20 - 20)  SpO2: 100% (10-09-19 @ 05:40) (100% - 100%)  Wt(kg): --    10-07 @ 07:01  -  10-08 @ 07:00  --------------------------------------------------------  IN: 0 mL / OUT: 1100 mL / NET: -1100 mL    10-08 @ 07:01  -  10-09 @ 06:58  --------------------------------------------------------  IN: 150 mL / OUT: 550 mL / NET: -400 mL      Constitutional: NAD, awake and alert  EYES: EOMI  ENT:  Normal Hearing, no tonsillar exudates   Neck: Soft and supple , no thyromegaly   Respiratory: Breath sounds are clear bilaterally, No wheezing, rales or rhonchi  Cardiovascular: S1 and S2, regular rate and rhythm, no Murmurs, gallops or rubs, no JVD,    Gastrointestinal: Bowel Sounds present, soft, nontender, nondistended, no guarding, no rebound  Extremities: No cyanosis or clubbing; warm to touch  Vascular: 2+ peripheral pulses lower ex  Neurological: No focal deficits, CN II-XII intact bilaterally, sensation to light touch intact in all extremities, gait intact. Pupils are equally reactive to light and symmetrical in size.   Musculoskeletal: 5/5 strength b/l upper and lower extremities; no joint swelling.  Skin: No rashes  Psych: no depression or anhedonia, AAOx3  HEME: no bruises, no nose bleeds      LABS:                        9.1    16.06 )-----------( 192      ( 08 Oct 2019 16:19 )             30.4     10-08    140  |  97  |  50<H>  ----------------------------<  168<H>  5.4<H>   |  32<H>  |  1.05    Ca    9.0      08 Oct 2019 12:46  Phos  5.0     10-08  Mg     2.2     10-08    TPro  5.7<L>  /  Alb  2.5<L>  /  TBili  0.5  /  DBili  x   /  AST  32  /  ALT  25  /  AlkPhos  105  10-08              RADIOLOGY & ADDITIONAL TESTS:    Imaging Personally Reviewed:    Consultant(s) Notes Reviewed:      Care Discussed with Consultants/Other Providers: Patient is a 85y old  Female who presents with a chief complaint of Fever (08 Oct 2019 07:15)      SUBJECTIVE / OVERNIGHT EVENTS: Patient evaluated at bedside. ANDREW. Patient appeared to have noticeable effort with breathing, unable to comprehensively communicate via speech. Did not verbalize any complaints.     ROS limited by lack of verbal communication.    MEDICATIONS  (STANDING):  ALBUTerol    90 MICROgram(s) HFA Inhaler 2 Puff(s) Inhalation every 6 hours  ALPRAZolam 0.5 milliGRAM(s) Oral two times a day  ascorbic acid 500 milliGRAM(s) Oral daily  atorvastatin 10 milliGRAM(s) Oral at bedtime  dextrose 5%. 1000 milliLiter(s) (50 mL/Hr) IV Continuous <Continuous>  dextrose 50% Injectable 12.5 Gram(s) IV Push once  digoxin     Tablet 0.0625 milliGRAM(s) Oral daily  entecavir 0.5 milliGRAM(s) Oral daily  ertapenem  IVPB 1000 milliGRAM(s) IV Intermittent every 24 hours  insulin glargine Injectable (LANTUS) 5 Unit(s) SubCutaneous at bedtime  insulin lispro (HumaLOG) corrective regimen sliding scale   SubCutaneous three times a day before meals  insulin lispro (HumaLOG) corrective regimen sliding scale   SubCutaneous at bedtime  melatonin 5 milliGRAM(s) Oral at bedtime  metoprolol tartrate 37.5 milliGRAM(s) Oral two times a day  montelukast 10 milliGRAM(s) Oral daily  Nephro-bebeto 1 Tablet(s) Oral daily  pantoprazole    Tablet 40 milliGRAM(s) Oral before breakfast  predniSONE   Tablet 60 milliGRAM(s) Oral daily  sertraline 50 milliGRAM(s) Oral daily  tiotropium 18 MICROgram(s) Capsule 1 Capsule(s) Inhalation daily    MEDICATIONS  (PRN):  acetaminophen   Tablet .. 650 milliGRAM(s) Oral every 6 hours PRN Mild Pain (1 - 3)  dextrose 40% Gel 15 Gram(s) Oral once PRN Blood Glucose LESS THAN 70 milliGRAM(s)/deciliter  docusate sodium 100 milliGRAM(s) Oral three times a day PRN Constipation  glucagon  Injectable 1 milliGRAM(s) IntraMuscular once PRN Glucose LESS THAN 70 milligrams/deciliter  guaiFENesin   Syrup  (Sugar-Free) 100 milliGRAM(s) Oral every 6 hours PRN Cough  oxyCODONE    5 mG/acetaminophen 325 mG 1 Tablet(s) Oral every 6 hours PRN Severe Pain (7 - 10)  polyethylene glycol 3350 17 Gram(s) Oral daily PRN Constiapation  senna 2 Tablet(s) Oral at bedtime PRN Constipation      Vital Signs Last 24 Hrs  T(C): 36.7 (09 Oct 2019 05:40), Max: 36.7 (09 Oct 2019 05:40)  T(F): 98 (09 Oct 2019 05:40), Max: 98 (09 Oct 2019 05:40)  HR: 99 (09 Oct 2019 05:40) (90 - 99)  BP: 160/100 (09 Oct 2019 05:40) (160/100 - 181/106)  BP(mean): --  RR: 20 (09 Oct 2019 05:40) (20 - 20)  SpO2: 100% (09 Oct 2019 05:40) (100% - 100%)  CAPILLARY BLOOD GLUCOSE      POCT Blood Glucose.: 239 mg/dL (08 Oct 2019 22:52)  POCT Blood Glucose.: 146 mg/dL (08 Oct 2019 17:25)  POCT Blood Glucose.: 169 mg/dL (08 Oct 2019 12:27)  POCT Blood Glucose.: 196 mg/dL (08 Oct 2019 07:52)    I&O's Summary    07 Oct 2019 07:01  -  08 Oct 2019 07:00  --------------------------------------------------------  IN: 0 mL / OUT: 1100 mL / NET: -1100 mL    08 Oct 2019 07:01  -  09 Oct 2019 06:58  --------------------------------------------------------  IN: 150 mL / OUT: 550 mL / NET: -400 mL        PHYSICAL EXAM:  Vital Signs Last 24 Hrs  T(C): 36.7 (10-09-19 @ 05:40)  T(F): 98 (10-09-19 @ 05:40), Max: 98 (10-09-19 @ 05:40)  HR: 99 (10-09-19 @ 05:40) (90 - 99)  BP: 160/100 (10-09-19 @ 05:40)  BP(mean): --  RR: 20 (10-09-19 @ 05:40) (20 - 20)  SpO2: 100% (10-09-19 @ 05:40) (100% - 100%)  Wt(kg): --    10-07 @ 07:01  -  10-08 @ 07:00  --------------------------------------------------------  IN: 0 mL / OUT: 1100 mL / NET: -1100 mL    10-08 @ 07:01  -  10-09 @ 06:58  --------------------------------------------------------  IN: 150 mL / OUT: 550 mL / NET: -400 mL      GENERAL: Somnolent but arousable; noticeable respiratory effort  HEAD:  +Mild ecchymoses throughout face from prior fall  EYES: +PERRL, no scleral icterus  ENT: Dry oral mucosa with whitish tongue lesions not removed by scraping  RESPIRATORY: Rhonchi throughout lung fields. Increased respiratory effort evident.  HEART: Irregularly irregular. On exam HR 88; telemetry 80-100S over past 24 hours. No murmurs, rubs, or gallops.   ABDOMEN: Soft, nontender, nondistended; Bowel sounds present, no organomegaly  BACK: no spinal tenderness, no CVA tenderness  EXTREMITIES: 1+ edema to thighs bilaterally, +fracture in R arm with sling. Muscle compartments soft without signs of compartment syndrome.   NEUROLOGY: Unable to assess 2/2 increased respiratory effort, difficulty speaking  Skin: Erythema of left hip extending to back      LABS:                        9.1    16.06 )-----------( 192      ( 08 Oct 2019 16:19 )             30.4     10-08    140  |  97  |  50<H>  ----------------------------<  168<H>  5.4<H>   |  32<H>  |  1.05    Ca    9.0      08 Oct 2019 12:46  Phos  5.0     10-08  Mg     2.2     10-08    TPro  5.7<L>  /  Alb  2.5<L>  /  TBili  0.5  /  DBili  x   /  AST  32  /  ALT  25  /  AlkPhos  105  10-08              RADIOLOGY & ADDITIONAL TESTS:    Imaging Personally Reviewed:    Consultant(s) Notes Reviewed:      Care Discussed with Consultants/Other Providers: Patient is a 85y old  Female who presents with a chief complaint of Fever (08 Oct 2019 07:15)      SUBJECTIVE / OVERNIGHT EVENTS: Patient evaluated at bedside. ANDREW. Patient appeared to have noticeable effort with breathing, unable to comprehensively communicate via speech. Did not verbalize any complaints.     ROS limited by lack of verbal communication.    MEDICATIONS  (STANDING):  ALBUTerol    90 MICROgram(s) HFA Inhaler 2 Puff(s) Inhalation every 6 hours  ALPRAZolam 0.5 milliGRAM(s) Oral two times a day  ascorbic acid 500 milliGRAM(s) Oral daily  atorvastatin 10 milliGRAM(s) Oral at bedtime  dextrose 5%. 1000 milliLiter(s) (50 mL/Hr) IV Continuous <Continuous>  dextrose 50% Injectable 12.5 Gram(s) IV Push once  digoxin     Tablet 0.0625 milliGRAM(s) Oral daily  entecavir 0.5 milliGRAM(s) Oral daily  ertapenem  IVPB 1000 milliGRAM(s) IV Intermittent every 24 hours  insulin glargine Injectable (LANTUS) 5 Unit(s) SubCutaneous at bedtime  insulin lispro (HumaLOG) corrective regimen sliding scale   SubCutaneous three times a day before meals  insulin lispro (HumaLOG) corrective regimen sliding scale   SubCutaneous at bedtime  melatonin 5 milliGRAM(s) Oral at bedtime  metoprolol tartrate 37.5 milliGRAM(s) Oral two times a day  montelukast 10 milliGRAM(s) Oral daily  Nephro-bebeto 1 Tablet(s) Oral daily  pantoprazole    Tablet 40 milliGRAM(s) Oral before breakfast  predniSONE   Tablet 60 milliGRAM(s) Oral daily  sertraline 50 milliGRAM(s) Oral daily  tiotropium 18 MICROgram(s) Capsule 1 Capsule(s) Inhalation daily    MEDICATIONS  (PRN):  acetaminophen   Tablet .. 650 milliGRAM(s) Oral every 6 hours PRN Mild Pain (1 - 3)  dextrose 40% Gel 15 Gram(s) Oral once PRN Blood Glucose LESS THAN 70 milliGRAM(s)/deciliter  docusate sodium 100 milliGRAM(s) Oral three times a day PRN Constipation  glucagon  Injectable 1 milliGRAM(s) IntraMuscular once PRN Glucose LESS THAN 70 milligrams/deciliter  guaiFENesin   Syrup  (Sugar-Free) 100 milliGRAM(s) Oral every 6 hours PRN Cough  oxyCODONE    5 mG/acetaminophen 325 mG 1 Tablet(s) Oral every 6 hours PRN Severe Pain (7 - 10)  polyethylene glycol 3350 17 Gram(s) Oral daily PRN Constiapation  senna 2 Tablet(s) Oral at bedtime PRN Constipation      Vital Signs Last 24 Hrs  T(C): 36.7 (09 Oct 2019 05:40), Max: 36.7 (09 Oct 2019 05:40)  T(F): 98 (09 Oct 2019 05:40), Max: 98 (09 Oct 2019 05:40)  HR: 99 (09 Oct 2019 05:40) (90 - 99)  BP: 160/100 (09 Oct 2019 05:40) (160/100 - 181/106)  BP(mean): --  RR: 20 (09 Oct 2019 05:40) (20 - 20)  SpO2: 100% (09 Oct 2019 05:40) (100% - 100%)  CAPILLARY BLOOD GLUCOSE      POCT Blood Glucose.: 239 mg/dL (08 Oct 2019 22:52)  POCT Blood Glucose.: 146 mg/dL (08 Oct 2019 17:25)  POCT Blood Glucose.: 169 mg/dL (08 Oct 2019 12:27)  POCT Blood Glucose.: 196 mg/dL (08 Oct 2019 07:52)    I&O's Summary    07 Oct 2019 07:01  -  08 Oct 2019 07:00  --------------------------------------------------------  IN: 0 mL / OUT: 1100 mL / NET: -1100 mL    08 Oct 2019 07:01  -  09 Oct 2019 06:58  --------------------------------------------------------  IN: 150 mL / OUT: 550 mL / NET: -400 mL        PHYSICAL EXAM:  Vital Signs Last 24 Hrs  T(C): 36.7 (10-09-19 @ 05:40)  T(F): 98 (10-09-19 @ 05:40), Max: 98 (10-09-19 @ 05:40)  HR: 99 (10-09-19 @ 05:40) (90 - 99)  BP: 160/100 (10-09-19 @ 05:40)  BP(mean): --  RR: 20 (10-09-19 @ 05:40) (20 - 20)  SpO2: 100% (10-09-19 @ 05:40) (100% - 100%)  Wt(kg): --    10-07 @ 07:01  -  10-08 @ 07:00  --------------------------------------------------------  IN: 0 mL / OUT: 1100 mL / NET: -1100 mL    10-08 @ 07:01  -  10-09 @ 06:58  --------------------------------------------------------  IN: 150 mL / OUT: 550 mL / NET: -400 mL      GENERAL: Somnolent but arousable; noticeable respiratory effort  HEAD:  +Mild ecchymoses throughout face from prior fall  EYES: +PERRL, no scleral icterus  ENT: Dry oral mucosa with whitish tongue lesions not removed by scraping  RESPIRATORY: Rhonchi throughout lung fields. Dec bibasilar breath sounds. No wheezing. No accessory muscle use.   HEART: Irregularly irregular. On exam HR 88; telemetry 80-100S over past 24 hours. No murmurs, rubs, or gallops.   ABDOMEN: Soft, nontender, nondistended; Bowel sounds present, no organomegaly  BACK: no spinal tenderness, no CVA tenderness  EXTREMITIES: 1+ edema to thighs bilaterally, +fracture in R arm with sling. Muscle compartments soft without signs of compartment syndrome.   NEUROLOGY: Follows commands. Mildly lethargic likely from acute delirium.   Skin: Erythema of left hip extending to back      LABS:                        9.1    16.06 )-----------( 192      ( 08 Oct 2019 16:19 )             30.4     10-08    140  |  97  |  50<H>  ----------------------------<  168<H>  5.4<H>   |  32<H>  |  1.05    Ca    9.0      08 Oct 2019 12:46  Phos  5.0     10-08  Mg     2.2     10-08    TPro  5.7<L>  /  Alb  2.5<L>  /  TBili  0.5  /  DBili  x   /  AST  32  /  ALT  25  /  AlkPhos  105  10-08              RADIOLOGY & ADDITIONAL TESTS:    Imaging Personally Reviewed:    Consultant(s) Notes Reviewed:      Care Discussed with Consultants/Other Providers:

## 2019-10-09 NOTE — PROVIDER CONTACT NOTE (CRITICAL VALUE NOTIFICATION) - ASSESSMENT
Pt asymptomatic  No obvious signs of distress noted.
Pt is A/Ox3.  VS are stable. Pt is afebrile.
Pt lethargic but arousable, bp 99/70, no fever, rec'vd 1 dose of ertepenem in ER

## 2019-10-09 NOTE — PROGRESS NOTE ADULT - PROBLEM SELECTOR PLAN 7
- MOLST Form signed and in chart, DNR/DNI  - To hold family meeting this Thursday to discuss GOC  - Pain meds as needed to maintain comfort

## 2019-10-09 NOTE — PROGRESS NOTE ADULT - ASSESSMENT
84 yo female with a PMH of CHF, ANCA vasculitis, HTN, Afib, DM2, COPD, and chronic lymphoma who was brought in for 102.6 fever, now with  sepsis secondary to ecoli bacteremia, parainfluenza virus c/b afib found to have increasing RP hematoma concern for possible source of infection. pt currently awaiting telemetry bed 86 yo female with a PMH of CHF, ANCA vasculitis, HTN, Afib, DM2, COPD, and chronic lymphoma who was brought in for 102.6 fever, now with  sepsis secondary to ecoli bacteremia, parainfluenza virus c/b afib found to have increasing RP hematoma concern for possible source of infection. GOC meeting on Thursday 10AM. F/u with family regarding ID recs to remove L RP hematoma. 84 yo female with a PMH of CHF, ANCA vasculitis, HTN, Afib, DM2, COPD, and chronic lymphoma who was brought in for 102.6 fever, now with  sepsis secondary to ecoli bacteremia, parainfluenza virus c/b afib found to have increasing RP hematoma concern for possible source of infection. GOC meeting on Thursday 10AM. Family notes that they do not want to pursue L RP hematoma drainage

## 2019-10-09 NOTE — PROGRESS NOTE ADULT - SUBJECTIVE AND OBJECTIVE BOX
Follow Up:  E coli Bacteremia, RP Hematoma    Interval History: No chills or fevers. No N/V/D.     REVIEW OF SYSTEMS  [x  ] ROS unobtainable because:  limited due to mental status.   [  ] All other systems negative except as noted below    Constitutional:  [ ] fever [ ] chills  [ ] weight loss  [ ] weakness  Skin:  [ ] rash [ ] phlebitis	  Eyes: [ ] icterus [ ] pain  [ ] discharge	  ENMT: [ ] sore throat  [ ] thrush [ ] ulcers [ ] exudates  Respiratory: [ ] dyspnea [ ] hemoptysis [ ] cough [ ] sputum	  Cardiovascular:  [ ] chest pain [ ] palpitations [ ] edema	  Gastrointestinal:  [ ] nausea [ ] vomiting [ ] diarrhea [ ] constipation [ ] pain	  Genitourinary:  [ ] dysuria [ ] frequency [ ] hematuria [ ] discharge [ ] flank pain  [ ] incontinence  Musculoskeletal:  [ ] myalgias [ ] arthralgias [ ] arthritis  [ ] back pain  Neurological:  [ ] headache [ ] seizures  [ ] confusion/altered mental status    Allergies  Keflex (Unknown)  penicillin (Rash)        ANTIMICROBIALS:  entecavir 0.5 daily  ertapenem  IVPB 1000 every 24 hours      OTHER MEDS:  MEDICATIONS  (STANDING):  acetaminophen   Tablet .. 650 every 6 hours PRN  ALBUTerol    90 MICROgram(s) HFA Inhaler 2 every 6 hours  ALPRAZolam 0.5 two times a day  atorvastatin 10 at bedtime  dextrose 40% Gel 15 once PRN  dextrose 50% Injectable 12.5 once  docusate sodium 100 three times a day PRN  glucagon  Injectable 1 once PRN  guaiFENesin   Syrup  (Sugar-Free) 100 every 6 hours PRN  insulin glargine Injectable (LANTUS) 5 at bedtime  insulin lispro (HumaLOG) corrective regimen sliding scale  three times a day before meals  melatonin 5 at bedtime  metoprolol tartrate 50 two times a day  montelukast 10 daily  NIFEdipine XL 30 daily  oxyCODONE    5 mG/acetaminophen 325 mG 1 every 6 hours PRN  pantoprazole    Tablet 40 before breakfast  polyethylene glycol 3350 17 daily PRN  predniSONE   Tablet 60 daily  senna 2 at bedtime PRN  sertraline 50 daily  tiotropium 18 MICROgram(s) Capsule 1 daily      Vital Signs Last 24 Hrs  T(C): 36.6 (09 Oct 2019 13:37), Max: 36.7 (09 Oct 2019 05:40)  T(F): 97.9 (09 Oct 2019 13:37), Max: 98 (09 Oct 2019 05:40)  HR: 91 (09 Oct 2019 13:37) (90 - 99)  BP: 179/105 (09 Oct 2019 13:37) (160/100 - 181/106)  BP(mean): --  RR: 20 (09 Oct 2019 13:37) (20 - 20)  SpO2: 97% (09 Oct 2019 13:37) (97% - 100%)    PHYSICAL EXAMINATION:  General: Alert and Awake, NAD  HEENT: PERRL, EOMI  Neck: Supple  Cardiac: RRR, No M/R/G  Resp: CTAB, No Wh/Rh/Ra  Abdomen: NBS, mild discomfort in the , No HSM, No rigidity or guarding  MSK: No LE edema. No Calf tenderness  : No garza  Skin: No rashes or lesions. Skin is warm and dry to the touch.   Neuro: Alert and Awake. CN 2-12 Grossly intact. Moves all four extremities spontaneously.  Psych: Calm, Pleasant, Cooperative                          9.1    16.06 )-----------( 192      ( 08 Oct 2019 16:19 )             30.4       10-09    138  |  99  |  50<H>  ----------------------------<  176<H>  6.6<HH>   |  28  |  0.93    Ca    9.2      09 Oct 2019 12:23  Phos  5.9     10-09  Mg     2.1     10-09    TPro  5.6<L>  /  Alb  2.1<L>  /  TBili  0.5  /  DBili  x   /  AST  59<H>  /  ALT  24  /  AlkPhos  93  10-09          MICROBIOLOGY:  v  .Blood  10-04-19   No growth to date.  --  --      .Blood  10-03-19   Growth in aerobic and anaerobic bottles: Escherichia coli  See previous culture [id number]  --  Blood Culture PCR  Escherichia coli      .Urine  10-03-19   No growth  --  --      .Blood  09-16-19   No growth at 5 days.  --  --      .Blood  09-14-19   No growth at 5 days.  --  Blood Culture PCR      .Blood  09-13-19   No growth at 5 days.  --  --      .Blood  09-11-19   Growth in aerobic and anaerobic bottles: Escherichia coli  "Due to technical problems, Proteus sp. will Not be reported as part of  the BCID panel until further notice"  ***Blood Panel PCR results on this specimen are available  approximately 3 hours after the Gram stain result.***  Gram stain, PCR, and/or culture results may not always  correspond due to difference in methodologies.  ************************************************************  This PCR assay was performed using [employer].  The following targets are tested for: Enterococcus,  vancomycin resistant enterococci, Listeria monocytogenes,  coagulase negative staphylococci, S. aureus,  methicillin resistant S. aureus, Streptococcus agalactiae  (Group B), S. pneumoniae, S.pyogenes (Group A),  Acinetobacter baumannii, Enterobacter cloacae, E. coli,  Klebsiella oxytoca, K. pneumoniae, Proteus sp.,  Serratia marcescens, Haemophilus influenzae,  Neisseria meningitidis, Pseudomonas aeruginosa, Candida  albicans, C. glabrata, C krusei, C parapsilosis,  C. tropicalis and the KPC resistance gene.  --  Blood Culture PCR  Escherichia coli      .Urine  09-11-19   >100,000 CFU/ml Escherichia coli  --  Escherichia coli          Rapid RVP Result: Detected (10-03 @ 14:12)        RADIOLOGY:    No new imaging for review at time of assessment. Follow Up:  E coli Bacteremia, RP Hematoma    Interval History: No chills or fevers. No N/V/D.     REVIEW OF SYSTEMS  [x  ] ROS unobtainable because:  limited due to mental status.   [  ] All other systems negative except as noted below    Constitutional:  [ ] fever [ ] chills  [ ] weight loss  [ ] weakness  Skin:  [ ] rash [ ] phlebitis	  Eyes: [ ] icterus [ ] pain  [ ] discharge	  ENMT: [ ] sore throat  [ ] thrush [ ] ulcers [ ] exudates  Respiratory: [ ] dyspnea [ ] hemoptysis [ ] cough [ ] sputum	  Cardiovascular:  [ ] chest pain [ ] palpitations [ ] edema	  Gastrointestinal:  [ ] nausea [ ] vomiting [ ] diarrhea [ ] constipation [ ] pain	  Genitourinary:  [ ] dysuria [ ] frequency [ ] hematuria [ ] discharge [ ] flank pain  [ ] incontinence  Musculoskeletal:  [ ] myalgias [ ] arthralgias [ ] arthritis  [ ] back pain  Neurological:  [ ] headache [ ] seizures  [ ] confusion/altered mental status    Allergies  Keflex (Unknown)  penicillin (Rash)        ANTIMICROBIALS:  entecavir 0.5 daily  ertapenem  IVPB 1000 every 24 hours      OTHER MEDS:  MEDICATIONS  (STANDING):  acetaminophen   Tablet .. 650 every 6 hours PRN  ALBUTerol    90 MICROgram(s) HFA Inhaler 2 every 6 hours  ALPRAZolam 0.5 two times a day  atorvastatin 10 at bedtime  dextrose 40% Gel 15 once PRN  dextrose 50% Injectable 12.5 once  docusate sodium 100 three times a day PRN  glucagon  Injectable 1 once PRN  guaiFENesin   Syrup  (Sugar-Free) 100 every 6 hours PRN  insulin glargine Injectable (LANTUS) 5 at bedtime  insulin lispro (HumaLOG) corrective regimen sliding scale  three times a day before meals  melatonin 5 at bedtime  metoprolol tartrate 50 two times a day  montelukast 10 daily  NIFEdipine XL 30 daily  oxyCODONE    5 mG/acetaminophen 325 mG 1 every 6 hours PRN  pantoprazole    Tablet 40 before breakfast  polyethylene glycol 3350 17 daily PRN  predniSONE   Tablet 60 daily  senna 2 at bedtime PRN  sertraline 50 daily  tiotropium 18 MICROgram(s) Capsule 1 daily      Vital Signs Last 24 Hrs  T(C): 36.6 (09 Oct 2019 13:37), Max: 36.7 (09 Oct 2019 05:40)  T(F): 97.9 (09 Oct 2019 13:37), Max: 98 (09 Oct 2019 05:40)  HR: 91 (09 Oct 2019 13:37) (90 - 99)  BP: 179/105 (09 Oct 2019 13:37) (160/100 - 181/106)  BP(mean): --  RR: 20 (09 Oct 2019 13:37) (20 - 20)  SpO2: 97% (09 Oct 2019 13:37) (97% - 100%)    PHYSICAL EXAMINATION:  General: Awake and intermittently alert, NAD  HEENT: PERRL, EOMI  Neck: Supple  Cardiac: RRR, No M/R/G  Resp: CTAB, No Wh/Rh/Ra  Abdomen: NBS, mild discomfort in the LLQ, No HSM, No rigidity or guarding  MSK: No LE edema. No Calf tenderness  : No garza  Skin: No rashes or lesions. Skin is warm and dry to the touch.   Neuro: Awake and intermittently alert. CN 2-12 Grossly intact. Moves all four extremities spontaneously.  Psych: Calm, Pleasant, Cooperative                          9.1    16.06 )-----------( 192      ( 08 Oct 2019 16:19 )             30.4       10-09    138  |  99  |  50<H>  ----------------------------<  176<H>  6.6<HH>   |  28  |  0.93    Ca    9.2      09 Oct 2019 12:23  Phos  5.9     10-09  Mg     2.1     10-09    TPro  5.6<L>  /  Alb  2.1<L>  /  TBili  0.5  /  DBili  x   /  AST  59<H>  /  ALT  24  /  AlkPhos  93  10-09          MICROBIOLOGY:  v  .Blood  10-04-19   No growth to date.  --  --      .Blood  10-03-19   Growth in aerobic and anaerobic bottles: Escherichia coli  See previous culture [id number]  --  Blood Culture PCR  Escherichia coli      .Urine  10-03-19   No growth  --  --      .Blood  09-16-19   No growth at 5 days.  --  --      .Blood  09-14-19   No growth at 5 days.  --  Blood Culture PCR      .Blood  09-13-19   No growth at 5 days.  --  --      .Blood  09-11-19   Growth in aerobic and anaerobic bottles: Escherichia coli  "Due to technical problems, Proteus sp. will Not be reported as part of  the BCID panel until further notice"  ***Blood Panel PCR results on this specimen are available  approximately 3 hours after the Gram stain result.***  Gram stain, PCR, and/or culture results may not always  correspond due to difference in methodologies.  ************************************************************  This PCR assay was performed using [employer].  The following targets are tested for: Enterococcus,  vancomycin resistant enterococci, Listeria monocytogenes,  coagulase negative staphylococci, S. aureus,  methicillin resistant S. aureus, Streptococcus agalactiae  (Group B), S. pneumoniae, S.pyogenes (Group A),  Acinetobacter baumannii, Enterobacter cloacae, E. coli,  Klebsiella oxytoca, K. pneumoniae, Proteus sp.,  Serratia marcescens, Haemophilus influenzae,  Neisseria meningitidis, Pseudomonas aeruginosa, Candida  albicans, C. glabrata, C krusei, C parapsilosis,  C. tropicalis and the KPC resistance gene.  --  Blood Culture PCR  Escherichia coli      .Urine  09-11-19   >100,000 CFU/ml Escherichia coli  --  Escherichia coli          Rapid RVP Result: Detected (10-03 @ 14:12)        RADIOLOGY:    EXAM:  CT ABDOMEN AND PELVIS OC IC                        PROCEDURE DATE:  10/04/2019    Large left retroperitoneal hematoma, increased in size since 9/6/2019.   Superimposed infection cannot be excluded..    EXAM:  XR CHEST PORTABLE ROUTINE 1V                        PROCEDURE DATE:  10/09/2019    Left pleural effusion, similar to prior.

## 2019-10-09 NOTE — PROGRESS NOTE ADULT - ATTENDING COMMENTS
Patient seen and examined today. I agree with Dr. Pierson's findings, assessment, and plan with the following additions and exceptions:     CXR with worsening left sided pleural effusion. Lasix 40 mg ivp now if BP can tolerate. Patient must get OOBTC with assistance today. Fall precautions. Patient not cooperating with incentive spirometer or acapella valve. Nasotracheal suction x2 yesterday with removal of very small amount of clear mucus. Repeat CXR for tomorrow.   Better rate controlled today. Continue to uptitrate BB . Hold digoxin as rates better controlled and patient to get additional diuretics which can have effect on electrolytes and renal function.   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 May consult rheumatology for taper. However we will have GOC conversation tomorrow first.   Prognosis poor. The patient's critical status was discussed at length with patient's son Alicia. He reaffirms DNR/DNI status and would not want "excessively invasive things done" unless absolutely necessary.  GOC meeting planned for Thursday. Palliative care assistance appreciated. Will hold off on consulting IR for possible hematoma sample (as requested by ID) until GOC conversation.  Low threshold for reconsulting MICU.   Rest of plan as above    Dr. Rafi Staley DO  Division of Hospital Medicine  [facility]  Pager:  [phone] . Patient seen and examined today. I agree with Dr. Pierson's findings, assessment, and plan with the following additions and exceptions:     CXR with worsening left sided pleural effusion. Lasix 40 mg ivp now if BP can tolerate. Patient must get OOBTC with assistance today. Fall precautions. Patient not cooperating with incentive spirometer or acapella valve. Nasotracheal suction x2 yesterday with removal of very small amount of clear mucus. Repeat CXR for tomorrow.   Better rate controlled today. Continue to uptitrate BB . Hold digoxin as rates better controlled and patient to get additional diuretics which can have effect on electrolytes and renal function.   Start nifedipine for afterload reduction. Apparently patient had adverse reaction to hydral.   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 May consult rheumatology for taper. However we will have GOC conversation tomorrow first.   Prognosis poor. The patient's critical status was discussed at length with patient's son Alicia. He reaffirms DNR/DNI status and would not want "excessively invasive things done" unless absolutely necessary.  GOC meeting planned for Thursday. Palliative care assistance appreciated. Will hold off on consulting IR for possible hematoma sample (as requested by ID) until GOC conversation.  Low threshold for reconsulting MICU.   Rest of plan as above    Dr. Rafi Staley DO  Division of Hospital Medicine  [facility]  Pager:  [phone] . Patient seen and examined today. I agree with Dr. Pierson's findings, assessment, and plan with the following additions and exceptions:     CXR with worsening left sided pleural effusion. Lasix 40 mg ivp now. Goal net negative 1L overnight. Patient must get OOBTC with assistance today. Fall precautions. Patient not cooperating with incentive spirometer or acapella valve. Nasotracheal suction x2 yesterday with removal of very small amount of clear mucus. Repeat CXR for tomorrow.   Better rate controlled today. Continue to uptitrate BB . Hold digoxin as rates better controlled and patient to get additional diuretics which can have effect on electrolytes and renal function.   Start nifedipine for afterload reduction. Apparently patient had adverse reaction to hydral.   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 May consult rheumatology for taper. However we will have GOC conversation tomorrow first.   Prognosis poor. The patient's critical status was discussed at length with patient's son lAicia. He reaffirms DNR/DNI status and would not want "excessively invasive things done" unless absolutely necessary.  GOC meeting planned for Thursday. Palliative care assistance appreciated. Will hold off on consulting IR for possible hematoma sample (as requested by ID) until GOC conversation.  Low threshold for reconsulting MICU.   Rest of plan as above    Dr. Rafi Staley DO  Division of Hospital Medicine  [facility]  Pager:  [phone] .

## 2019-10-09 NOTE — MEDICAL STUDENT PROGRESS NOTE(EDUCATION) - NS MD HP STUD ASPLAN PLAN FT
Problem/Plan - 1:  ·  Problem: Atrial fibrillation.  Plan: Caused by hypoxic respiratory failure from fluid overload.   -On telemetry floor   -Continue to uptitrate metoprolol and d/c digoxin  - AC held due to hematoma/fall         Problem/Plan - 2:  ·  Problem: Sepsis.  Plan: - E. coli bacteremia and parainfluenza virus  - Source possibly cellulitis of infected left hip joint vs hematoma vs intraabdominal infection given recent UTI  -CT-Abd-Pelvis read noted.  - Bcx NGTD   - d/c midodrine   - Hemodynamically stable  - [place] to ertapenem per ID.   - Monitor vitals  - C/w steroids, to transition to home dose.     Problem/Plan - 3:  ·  Problem: Anemia due to blood loss. Plan: From hematoma seen on CT  Hgb stable. Lactate cleared.   Has been consented for blood product transfusion.   General surgery on board.     Problem/Plan - 4:  ·  Problem: Parainfluenza. Plan: - RVP positive, CXR negative  - C/w duonebs  - Supportive management.     Problem/Plan - 5:  ·  Problem: Vasculitis. Plan: - S/p rituximab  -Patient back on home prednisone 60 mg po daily. No clear stop dated reported. Will need to clarify.     Problem/Plan - 6:  Problem: Diabetes.Plan: - Monitor FS, c/w SANTOSH.     Problem/Plan - 7:  ·  Problem: Goals of care, counseling/discussion. Plan: - MOLST Form signed and in chart, DNR/DNI  - To hold family meeting this Thursday to discuss GOC  - Pain meds as needed to maintain comfort.     Problem/Plan - 8:  ·  Problem: Prophylactic measure.  Plan: -DVT: SCD's only  very poor prognosis, DNR/DNI  -palliative care consulted.

## 2019-10-09 NOTE — PROGRESS NOTE ADULT - PROBLEM SELECTOR PLAN 3
From hematoma seen on CT  Hgb stable. Lactate cleared.   Has been consented for blood product transfusion.   General surgery on board. - Elicited via CT From hematoma seen on CT  - Hgb stable  - Consent for transfusion if necessary  - General surgery aware - Elicited via CT From hematoma seen on CT  - Hgb stable  - Consent for transfusion if necessary  - No surgical intervention as per surgery  - Per family, no desire to drain  - Monitor CBC

## 2019-10-09 NOTE — PROGRESS NOTE ADULT - ASSESSMENT
84 yo female with a PMH of CHF, ANCA vasculitis, HTN, Afib, DM2, COPD, and chronic lymphoma who was brought in for 102.6 fever    Uncertain significance of the parainfluenza.  main issue is e. coli sepsis and possible infected L RP collection.  Family opting to treat medically and wishing to defer drainage of L RP collection    Overall, Ecoli Bacteremia, RP hematoma, Leukocytosis, Parainfluenza Infection    --Continue Ertapenem 1g IV Q24H  --Continue to follow CBC with diff  --Continue to follow temperature curve    Dr. Espinal to resume care of patient tomorrow.    Jeffrey Dunbar M.D.  [facility] Division of Infectious Disease  8AM-5PM: Pager Number [phone]  After Hours (or if no response): Please contact the Infectious Diseases Office at [phone]

## 2019-10-09 NOTE — MEDICAL STUDENT PROGRESS NOTE(EDUCATION) - SUBJECTIVE AND OBJECTIVE BOX
Internal Medicine Progress Note    Patient is a 85y old  Female who presents with a chief complaint of Fever (09 Oct 2019 06:58)      SUBJECTIVE / OVERNIGHT EVENTS: No events overnight    CONSTITUTIONAL: No weakness, fevers or chills  EYES/ENT: No visual changes;  No dysphagia, +mild ecchymoses on forehead from prior fall   NECK: No pain or stiffness  RESPIRATORY: +cough, +shortness of breath. No wheezing, no hemoptysis  CARDIOVASCULAR: No chest pain or palpitations  GASTROINTESTINAL: No abdominal or epigastric pain. No nausea, vomiting, or hematemesis; No diarrhea or constipation. No melena or hematochezia.  GENITOURINARY: No dysuria, frequency or hematuria  NEUROLOGICAL: No numbness or weakness  HEMATOLOGY: No easy bleeding, no lymphadenopathy  SKIN: No itching, burning, rashes. Large hematomas around L arm fracture   All other review of systems is negative unless indicated above.    MEDICATIONS  (STANDING):  ALBUTerol    90 MICROgram(s) HFA Inhaler 2 Puff(s) Inhalation every 6 hours  ALPRAZolam 0.5 milliGRAM(s) Oral two times a day  ascorbic acid 500 milliGRAM(s) Oral daily  atorvastatin 10 milliGRAM(s) Oral at bedtime  dextrose 5%. 1000 milliLiter(s) (50 mL/Hr) IV Continuous <Continuous>  dextrose 50% Injectable 12.5 Gram(s) IV Push once  digoxin     Tablet 0.0625 milliGRAM(s) Oral daily  entecavir 0.5 milliGRAM(s) Oral daily  ertapenem  IVPB 1000 milliGRAM(s) IV Intermittent every 24 hours  insulin glargine Injectable (LANTUS) 5 Unit(s) SubCutaneous at bedtime  insulin lispro (HumaLOG) corrective regimen sliding scale   SubCutaneous three times a day before meals  insulin lispro (HumaLOG) corrective regimen sliding scale   SubCutaneous at bedtime  melatonin 5 milliGRAM(s) Oral at bedtime  metoprolol tartrate 37.5 milliGRAM(s) Oral two times a day  montelukast 10 milliGRAM(s) Oral daily  Nephro-bebeto 1 Tablet(s) Oral daily  pantoprazole    Tablet 40 milliGRAM(s) Oral before breakfast  predniSONE   Tablet 60 milliGRAM(s) Oral daily  sertraline 50 milliGRAM(s) Oral daily  tiotropium 18 MICROgram(s) Capsule 1 Capsule(s) Inhalation daily    MEDICATIONS  (PRN):  acetaminophen   Tablet .. 650 milliGRAM(s) Oral every 6 hours PRN Mild Pain (1 - 3)  dextrose 40% Gel 15 Gram(s) Oral once PRN Blood Glucose LESS THAN 70 milliGRAM(s)/deciliter  docusate sodium 100 milliGRAM(s) Oral three times a day PRN Constipation  glucagon  Injectable 1 milliGRAM(s) IntraMuscular once PRN Glucose LESS THAN 70 milligrams/deciliter  guaiFENesin   Syrup  (Sugar-Free) 100 milliGRAM(s) Oral every 6 hours PRN Cough  oxyCODONE    5 mG/acetaminophen 325 mG 1 Tablet(s) Oral every 6 hours PRN Severe Pain (7 - 10)  polyethylene glycol 3350 17 Gram(s) Oral daily PRN Constiapation  senna 2 Tablet(s) Oral at bedtime PRN Constipation    Vital Signs Last 24 Hrs  T(C): 36.7 (09 Oct 2019 05:40), Max: 36.7 (09 Oct 2019 05:40)  T(F): 98 (09 Oct 2019 05:40), Max: 98 (09 Oct 2019 05:40)  HR: 99 (09 Oct 2019 05:40) (90 - 99)  BP: 160/100 (09 Oct 2019 05:40) (160/100 - 181/106)  BP(mean): --  RR: 20 (09 Oct 2019 05:40) (20 - 20)  SpO2: 100% (09 Oct 2019 05:40) (100% - 100%)    CAPILLARY BLOOD GLUCOSE      POCT Blood Glucose.: 239 mg/dL (08 Oct 2019 22:52)  POCT Blood Glucose.: 146 mg/dL (08 Oct 2019 17:25)  POCT Blood Glucose.: 169 mg/dL (08 Oct 2019 12:27)  POCT Blood Glucose.: 196 mg/dL (08 Oct 2019 07:52)    I&O's Summary    08 Oct 2019 07:01  -  09 Oct 2019 07:00  --------------------------------------------------------  IN: 150 mL / OUT: 550 mL / NET: -400 mL        PHYSICAL EXAM  GENERAL: elderly woman lying in bed, uncomfortable, groaning in pain and coughing  HEAD:  +mild ecchymoses from recent fall  EYES: EOMI, healing hematomas superior to L eyebrow  NECK: Supple, No JVD  CHEST/LUNG: Diffuse rhonchi b/l. Decreased breath sounds at the lung bases. Trace rales bibasilar as well. No wheezing. NO hemoptysis   HEART: irregulary irregular. HR at goal. No murmurs, rubs, or gallops  ABDOMEN: Soft, Nontender, Nondistended; Bowel sounds present  BACK: no spinal tenderness, no CVA tenderness  EXTREMITIES:  1+ edema to thighs bilaterally, +fracture in R arm with sling. Muscle compartments soft without signs of compartment syndrome. Hematoma on L hip/flank is resolving.  NEURO/PSYCH: AAOx2, nonfocal  SKIN: No rashes      LABS:                        9.1    16.06 )-----------( 192      ( 08 Oct 2019 16:19 )             30.4     Hemoglobin: 9.1 g/dL (10-08 @ 16:19)  Hemoglobin: 8.8 g/dL (10-07 @ 08:48)  Hemoglobin: 8.4 g/dL (10-06 @ 09:14)  Hemoglobin: 8.2 g/dL (10-05 @ 23:28)  Hemoglobin: 8.1 g/dL (10-05 @ 22:22)    CBC Full  -  ( 08 Oct 2019 16:19 )  WBC Count : 16.06 K/uL  RBC Count : 3.33 M/uL  Hemoglobin : 9.1 g/dL  Hematocrit : 30.4 %  Platelet Count - Automated : 192 K/uL  Mean Cell Volume : 91.3 fl  Mean Cell Hemoglobin : 27.3 pg  Mean Cell Hemoglobin Concentration : 29.9 gm/dL  Auto Neutrophil # : x  Auto Lymphocyte # : x  Auto Monocyte # : x  Auto Eosinophil # : x  Auto Basophil # : x  Auto Neutrophil % : x  Auto Lymphocyte % : x  Auto Monocyte % : x  Auto Eosinophil % : x  Auto Basophil % : x    10-08    140  |  97  |  50<H>  ----------------------------<  168<H>  5.4<H>   |  32<H>  |  1.05    Ca    9.0      08 Oct 2019 12:46  Phos  5.0     10-08  Mg     2.2     10-08    TPro  5.7<L>  /  Alb  2.5<L>  /  TBili  0.5  /  DBili  x   /  AST  32  /  ALT  25  /  AlkPhos  105  10-08    Creatinine Trend: 1.05<--, 1.18<--, 1.23<--, 1.28<--, 1.18<--, 1.20<--  LIVER FUNCTIONS - ( 08 Oct 2019 12:46 )  Alb: 2.5 g/dL / Pro: 5.7 g/dL / ALK PHOS: 105 U/L / ALT: 25 U/L / AST: 32 U/L / GGT: x               Labs, imaging, EKG personally reviewed     CONSULTS: ID, palliative Internal Medicine Progress Note    Patient is a 85y old  Female who presents with a chief complaint of Fever (09 Oct 2019 06:58)      SUBJECTIVE / OVERNIGHT EVENTS: Patient reported pain earlier this morning and was given tylenol with relief.     CONSTITUTIONAL: No weakness, fevers or chills  EYES/ENT: No visual changes;  No dysphagia, +mild ecchymoses on forehead from prior fall   NECK: No pain or stiffness  RESPIRATORY: +cough, +shortness of breath. No wheezing, no hemoptysis  CARDIOVASCULAR: No chest pain or palpitations  GASTROINTESTINAL: No abdominal or epigastric pain. No nausea, vomiting, or hematemesis; No diarrhea or constipation. No melena or hematochezia.  GENITOURINARY: No dysuria, frequency or hematuria  NEUROLOGICAL: No numbness or weakness  HEMATOLOGY: No lymphadenopathy  SKIN: No itching, burning, rashes. Large hematomas around L arm fracture   All other review of systems is negative unless indicated above.    MEDICATIONS  (STANDING):  ALBUTerol    90 MICROgram(s) HFA Inhaler 2 Puff(s) Inhalation every 6 hours  ALPRAZolam 0.5 milliGRAM(s) Oral two times a day  ascorbic acid 500 milliGRAM(s) Oral daily  atorvastatin 10 milliGRAM(s) Oral at bedtime  dextrose 5%. 1000 milliLiter(s) (50 mL/Hr) IV Continuous <Continuous>  dextrose 50% Injectable 12.5 Gram(s) IV Push once  digoxin     Tablet 0.0625 milliGRAM(s) Oral daily  entecavir 0.5 milliGRAM(s) Oral daily  ertapenem  IVPB 1000 milliGRAM(s) IV Intermittent every 24 hours  insulin glargine Injectable (LANTUS) 5 Unit(s) SubCutaneous at bedtime  insulin lispro (HumaLOG) corrective regimen sliding scale   SubCutaneous three times a day before meals  insulin lispro (HumaLOG) corrective regimen sliding scale   SubCutaneous at bedtime  melatonin 5 milliGRAM(s) Oral at bedtime  metoprolol tartrate 37.5 milliGRAM(s) Oral two times a day  montelukast 10 milliGRAM(s) Oral daily  Nephro-bebeto 1 Tablet(s) Oral daily  pantoprazole    Tablet 40 milliGRAM(s) Oral before breakfast  predniSONE   Tablet 60 milliGRAM(s) Oral daily  sertraline 50 milliGRAM(s) Oral daily  tiotropium 18 MICROgram(s) Capsule 1 Capsule(s) Inhalation daily    MEDICATIONS  (PRN):  acetaminophen   Tablet .. 650 milliGRAM(s) Oral every 6 hours PRN Mild Pain (1 - 3)  dextrose 40% Gel 15 Gram(s) Oral once PRN Blood Glucose LESS THAN 70 milliGRAM(s)/deciliter  docusate sodium 100 milliGRAM(s) Oral three times a day PRN Constipation  glucagon  Injectable 1 milliGRAM(s) IntraMuscular once PRN Glucose LESS THAN 70 milligrams/deciliter  guaiFENesin   Syrup  (Sugar-Free) 100 milliGRAM(s) Oral every 6 hours PRN Cough  oxyCODONE    5 mG/acetaminophen 325 mG 1 Tablet(s) Oral every 6 hours PRN Severe Pain (7 - 10)  polyethylene glycol 3350 17 Gram(s) Oral daily PRN Constiapation  senna 2 Tablet(s) Oral at bedtime PRN Constipation    Vital Signs Last 24 Hrs  T(C): 36.7 (09 Oct 2019 05:40), Max: 36.7 (09 Oct 2019 05:40)  T(F): 98 (09 Oct 2019 05:40), Max: 98 (09 Oct 2019 05:40)  HR: 99 (09 Oct 2019 05:40) (90 - 99)  BP: 160/100 (09 Oct 2019 05:40) (160/100 - 181/106)  BP(mean): --  RR: 20 (09 Oct 2019 05:40) (20 - 20)  SpO2: 100% (09 Oct 2019 05:40) (100% - 100%)    CAPILLARY BLOOD GLUCOSE      POCT Blood Glucose.: 239 mg/dL (08 Oct 2019 22:52)  POCT Blood Glucose.: 146 mg/dL (08 Oct 2019 17:25)  POCT Blood Glucose.: 169 mg/dL (08 Oct 2019 12:27)  POCT Blood Glucose.: 196 mg/dL (08 Oct 2019 07:52)    I&O's Summary    08 Oct 2019 07:01  -  09 Oct 2019 07:00  --------------------------------------------------------  IN: 150 mL / OUT: 550 mL / NET: -400 mL        PHYSICAL EXAM  GENERAL: elderly woman lying in bed, uncomfortable, coughing  HEAD:  +mild ecchymoses from recent fall, dry oral mucosa   EYES: EOMI, healing hematomas superior to L eyebrow  NECK: Supple, No JVD  CHEST/LUNG: Diffuse rhonchi b/l. Decreased breath sounds at the lung bases. Trace rales bibasilar as well. No wheezing. NO hemoptysis   HEART: irregulary irregular. HR at goal. No murmurs, rubs, or gallops  ABDOMEN: Soft, Nontender, Nondistended; Bowel sounds present  BACK: no spinal tenderness, no CVA tenderness  EXTREMITIES:  1+ edema to thighs bilaterally, +fracture in R arm with sling. Hematoma on L hip/flank is resolving.  NEURO/PSYCH: AAOx2, nonfocal  SKIN: No rashes      LABS:                        9.1    16.06 )-----------( 192      ( 08 Oct 2019 16:19 )             30.4     Hemoglobin: 9.1 g/dL (10-08 @ 16:19)  Hemoglobin: 8.8 g/dL (10-07 @ 08:48)  Hemoglobin: 8.4 g/dL (10-06 @ 09:14)  Hemoglobin: 8.2 g/dL (10-05 @ 23:28)  Hemoglobin: 8.1 g/dL (10-05 @ 22:22)    CBC Full  -  ( 08 Oct 2019 16:19 )  WBC Count : 16.06 K/uL  RBC Count : 3.33 M/uL  Hemoglobin : 9.1 g/dL  Hematocrit : 30.4 %  Platelet Count - Automated : 192 K/uL  Mean Cell Volume : 91.3 fl  Mean Cell Hemoglobin : 27.3 pg  Mean Cell Hemoglobin Concentration : 29.9 gm/dL  Auto Neutrophil # : x  Auto Lymphocyte # : x  Auto Monocyte # : x  Auto Eosinophil # : x  Auto Basophil # : x  Auto Neutrophil % : x  Auto Lymphocyte % : x  Auto Monocyte % : x  Auto Eosinophil % : x  Auto Basophil % : x    10-08    140  |  97  |  50<H>  ----------------------------<  168<H>  5.4<H>   |  32<H>  |  1.05    Ca    9.0      08 Oct 2019 12:46  Phos  5.0     10-08  Mg     2.2     10-08    TPro  5.7<L>  /  Alb  2.5<L>  /  TBili  0.5  /  DBili  x   /  AST  32  /  ALT  25  /  AlkPhos  105  10-08    Creatinine Trend: 1.05<--, 1.18<--, 1.23<--, 1.28<--, 1.18<--, 1.20<--  LIVER FUNCTIONS - ( 08 Oct 2019 12:46 )  Alb: 2.5 g/dL / Pro: 5.7 g/dL / ALK PHOS: 105 U/L / ALT: 25 U/L / AST: 32 U/L / GGT: x               Labs, imaging, EKG personally reviewed     CONSULTS: ID, palliative

## 2019-10-09 NOTE — PROGRESS NOTE ADULT - PROBLEM SELECTOR PLAN 1
Caused by hypoxic respiratory failure from fluid overload.   - C/w digoxin   - AC held due to hematoma/fall    - C/w metoprolol  - Pending telemetry bed Caused by hypoxic respiratory failure from fluid overload.   - AC held due to hematoma/fall    - C/w metoprolol  - Off digoxin.   - Pending telemetry bed - Due to hypoxic respiratory failure from fluid overload  - AC held due to hematoma/fall  - Metoprolol increased to 50  - Digoxin discontinued  - Monitor HR

## 2019-10-09 NOTE — PROGRESS NOTE ADULT - PROBLEM SELECTOR PLAN 2
- E. coli bacteremia and parainfluenza virus  - Source possibly cellulitis of infected left hip joint vs hematoma vs intraabdominal infection given recent UTI  -CT-Abd-Pelvis read noted.  - Bcx NGTD   - Aim to wean off of midodrine 10 TID  - Hemodynamically stable  - [place] to ertapenem per ID.   - Monitor vitals  - C/w steroids, to transition to home dose - E. coli bacteremia and parainfluenza virus  - Source possibly cellulitis of infected left hip joint vs hematoma vs intraabdominal infection given recent UTI  - Bcx NGTD   - No longer hypotensive  - C/w ertapenem   - Monitor vitals  - Aim to taper steroids

## 2019-10-09 NOTE — PROGRESS NOTE ADULT - PROBLEM SELECTOR PLAN 5
- S/p rituximab  -Patient back on home prednisone 60 mg po daily. No clear stop dated reported. Will need to clarify. - S/p rituximab  - Prednisone course clarified, aim to taper prednisone

## 2019-10-10 NOTE — PROGRESS NOTE ADULT - PROBLEM SELECTOR PLAN 4
- Elicited via CT From hematoma seen on CT  - Hgb stable  - Consent for transfusion if necessary  - No surgical intervention as per surgery  - No plan to drain L RP hematoma as per family wishes  - Monitor CBC

## 2019-10-10 NOTE — PROGRESS NOTE ADULT - PROBLEM SELECTOR PLAN 5
- Blood pressure elevated, likely due to recovery from septic shock  - C/w nifedipine  - C/w lasix (as BP and electrolytes permit)

## 2019-10-10 NOTE — PROGRESS NOTE ADULT - PROBLEM SELECTOR PLAN 9
- MOLST Form signed and in chart, DNR/DNI  - To hold family meeting this Thursday to discuss GOC  - Pain meds as needed to maintain comfort - MOLST Form signed and in chart, DNR/DNI  - Pain meds as needed to maintain comfort  - Family wishes to continue care through the weekend and meet Palliative care on Monday to discuss further management with comfort as the priority. - MOLST Form signed and in chart, DNR/DNI  - Pain meds as needed to maintain comfort  - Family wishes to continue care through the weekend and meet Palliative care on Monday to discuss further management with comfort as the priority

## 2019-10-10 NOTE — PROGRESS NOTE ADULT - PROBLEM SELECTOR PLAN 4
- Elicited via CT From hematoma seen on CT  - Hgb stable  - Consent for transfusion if necessary  - No surgical intervention as per surgery  - No plan to drain L RP hematoma as per family wishes  - Monitor CBC  - surg recs appreciated.

## 2019-10-10 NOTE — PROGRESS NOTE ADULT - SUBJECTIVE AND OBJECTIVE BOX
HPI: Ms. Slater is a 86 yo female with a PMH of CHF, HTN, Afib, DM2, COPD, and chronic lymphoma who was brought in for 102.6 fever. Pt is groaning in pain, unable to answer questions. history obtained via chart review and through pt's daughter and home health aide at bedside.  One month ago the patient had worsening SOB on exertion and b/l rash on her lower legs and was diagnosed with vasculitis and lupus and was given 2 doses of rituximab. Per the patients daughter and home aid, the pt has deteriorated since receiving the rituximab. 3 weeks ago the patient fell out of bed on L hip and was admitted to [facility] for retroperitoneal bleeding. Hospital course was complicated by E coli bacteremia, was being treated with TMP-SMX. Pt was discharged on Friday 9/27, then at home on Tuesday 10/01 had another fall, this time on her R side and fractured her R arm. Now s/p sling and cast. This morning, home nurse for pt noticed a fever of 102.6, and family brought pt to ER.  In ER, pt had RVP which was positive for parainfluenza. Pt has weakness, fevers, chills, hip pain, arm pain, intermittent cough (per baseline), LE edema. Does not have nausea, vomiting, diarrhea, abdominal pain, hematochezia, melena, hematuria, dysuria. (03 Oct 2019 18:30)    INTERVAL EVENTS:  10/7: states having slight abdominal pain, no apparent distress  10/10: more lethargic today    ADVANCE DIRECTIVES:    DNR  Yes  MOLST  [ ]  Living Will  [ ]   DECISION MAKER(s):  [ ] Health Care Proxy(s)  [ ] Surrogate(s)  [ ] Guardian           Name(s):   Phone Number(s):    BASELINE (I)ADL(s) (prior to admission):  [place]: [ ]Total  [ ] Moderate [ ]Dependent    Allergies    Keflex (Unknown)  penicillin (Rash)    Intolerances      MEDICATIONS  (STANDING):  ALBUTerol    90 MICROgram(s) HFA Inhaler 2 Puff(s) Inhalation every 6 hours  ALPRAZolam 0.5 milliGRAM(s) Oral two times a day  ascorbic acid 500 milliGRAM(s) Oral daily  atorvastatin 10 milliGRAM(s) Oral at bedtime  dextrose 5%. 1000 milliLiter(s) (50 mL/Hr) IV Continuous <Continuous>  dextrose 50% Injectable 12.5 Gram(s) IV Push once  entecavir 0.5 milliGRAM(s) Oral daily  ertapenem  IVPB 1000 milliGRAM(s) IV Intermittent every 24 hours  furosemide   Injectable 80 milliGRAM(s) IV Push once  insulin glargine Injectable (LANTUS) 5 Unit(s) SubCutaneous at bedtime  insulin lispro (HumaLOG) corrective regimen sliding scale   SubCutaneous three times a day before meals  melatonin 5 milliGRAM(s) Oral at bedtime  metoprolol tartrate 50 milliGRAM(s) Oral two times a day  montelukast 10 milliGRAM(s) Oral daily  Nephro-bebeto 1 Tablet(s) Oral daily  NIFEdipine XL 30 milliGRAM(s) Oral daily  pantoprazole    Tablet 40 milliGRAM(s) Oral before breakfast  predniSONE   Tablet 60 milliGRAM(s) Oral daily  sertraline 50 milliGRAM(s) Oral daily  tiotropium 18 MICROgram(s) Capsule 1 Capsule(s) Inhalation daily    MEDICATIONS  (PRN):  acetaminophen   Tablet .. 650 milliGRAM(s) Oral every 6 hours PRN Mild Pain (1 - 3)  dextrose 40% Gel 15 Gram(s) Oral once PRN Blood Glucose LESS THAN 70 milliGRAM(s)/deciliter  docusate sodium 100 milliGRAM(s) Oral three times a day PRN Constipation  glucagon  Injectable 1 milliGRAM(s) IntraMuscular once PRN Glucose LESS THAN 70 milligrams/deciliter  guaiFENesin   Syrup  (Sugar-Free) 100 milliGRAM(s) Oral every 6 hours PRN Cough  oxyCODONE    5 mG/acetaminophen 325 mG 1 Tablet(s) Oral every 6 hours PRN Severe Pain (7 - 10)  polyethylene glycol 3350 17 Gram(s) Oral daily PRN Constiapation  senna 2 Tablet(s) Oral at bedtime PRN Constipation      PRESENT SYMPTOMS: [ x]Unable to obtain due to poor mentation   Source if other than patient:  [ ]Family   [ ]Team  [ ] Staff [ ] Inferred    Pain: [x ] yes [ ] no  QOL impact -   Location -     abdomen               Aggravating factors -  Quality -  Radiation -  Timing-  Severity (0-10 scale):  Minimal acceptable level (0-10 scale):   Difficult to fully ascertain the above from patient    PAIN AD Score: 0    [email or web address] (press ctrl +  left click to view)          Dyspnea:                           [ ]Mild [ ]Moderate [ ]Severe  Anxiety:                             [ ]Mild [ ]Moderate [ ]Severe  Fatigue:                             [ ]Mild [ ]Moderate [ ]Severe  Nausea:                             [ ]Mild [ ]Moderate [ ]Severe  Loss of appetite:              [ ]Mild [ ]Moderate [ ]Severe  Constipation:                    [ ]Mild [ ]Moderate [ ]Severe    Other Symptoms:  [x ]All other review of systems negative     Karnofsky Performance Score/Palliative Performance Status Version 2:      50   %      http://npcrc.org/files/news/palliative_performance_scale_[email or web address].pdf      PHYSICAL EXAM:  Vital Signs Last 24 Hrs  T(C): 36.4 (10 Oct 2019 13:30), Max: 36.4 (10 Oct 2019 04:12)  T(F): 97.5 (10 Oct 2019 13:30), Max: 97.6 (10 Oct 2019 04:12)  HR: 82 (10 Oct 2019 13:30) (80 - 94)  BP: 124/78 (10 Oct 2019 13:30) (122/76 - 155/85)  BP(mean): --  RR: 18 (10 Oct 2019 13:30) (18 - 18)  SpO2: 95% (10 Oct 2019 13:30) (95% - 98%)    Limited exam for patient comfort  GENERAL:  [ ]Alert  [ ]Oriented x   [x ]Lethargic  [ ]Cachexia  [ ]Unarousable  [x ]Verbal  [ ]Non-Verbal [  x ] No distress   [   ] Gaunt  Behavioral:   [ ] Anxiety  [ ] Delirium [ ] Agitation  [ x  ]  Calm [ ] Other  HEENT:  [x ]Normal   [ ]Dry mouth   [ ]ET Tube/Trach  [ ]Oral lesions  [ ] Temporal Wasting  [ ]  Mucositis [ ]  Grade   PULMONARY:   [  ]Clear [ ]Tachypnea  [ ]Audible excessive secretions   [ ]Rhonchi        [ ]Right [ ]Left [ ]Bilateral  [ ]Crackles        [ ]Right [ ]Left [ ]Bilateral  [ ]Wheezing     [ ]Right [ ]Left [ ]Bilateral  [ ] Diminished  [ ] Right  [  ] Left   [ ] Bilaterally  CARDIOVASCULAR:    [  ]Regular [ ]Irregular [  ]Tachy  [ ]Ismael [ ]Murmur [  ]   Edema  [ ]Other  GASTROINTESTINAL:  [ ]Soft  [ ]Distended   [ ]+BS  [ ]Non tender [ x]Tender mildly lower quadrants [ ]PEG [ ]OGT/ NGT    Last BM:   GENITOURINARY:  [x ]Normal [ ] Incontinent   [ ]Oliguria/Anuria   [ ]Chan [   ] Suprapubic tenderness  MUSCULOSKELETAL:   [  ]Normal   [x ]Weakness  [ ]Bed/Wheelchair bound [ ]Edema [  ] amputation  [  ] contraction  NEUROLOGIC:   [ x]No focal deficits  [ ] Cognitive impairment  [ ] Dysphagia [ ]Dysarthria [ ] Paresis [  ] Aphasia  [ ]Other   SKIN:   [  ]Normal   [ ]Pressure ulcer(s)  [ ]Rash [   ]  Wound    [  ] hyperpigmentation    CRITICAL CARE:  [ ] Shock Present  [ ]Septic [ ]Cardiogenic [ ]Neurologic [ ]Hypovolemic  [ ]  Vasopressors [ ]  Inotropes   [ ] Respiratory failure present [ ] mechanical ventilation [ ] non-invasive ventilatory support [ ] High flow  [ ] Acute  [ ] Chronic [ ] Hypoxic  [ ] Hypercarbic [ ] Other  [ ] Other organ failure       LABS:                                   9.6    23.25 )-----------( 266      ( 10 Oct 2019 11:51 )             31.3     10-10    143  |  94<L>  |  52<H>  ----------------------------<  216<H>  4.0   |  37<H>  |  0.88    Ca    9.1      10 Oct 2019 11:51  Phos  4.7     10-10  Mg     1.9     10-10        RADIOLOGY & ADDITIONAL STUDIES:    PROTEIN CALORIE MALNUTRITION PRESENT: [ ] Yes [ ] No  [ ] PPSV2 < or = to 30% [ ] significant weight loss  [ ] poor nutritional intake [ ] catabolic state [ ] anasarca     Albumin, Serum: 2.7 g/dL (10-03-19 @ 14:11)  Artificial Nutrition [ ]     REFERRALS:   [ ]Chaplaincy  [ ] Hospice  [ ]Child Life  [ ]Social Work  [ ]Case management [ ]Holistic Therapy               Care Coordination Assessment [C. Provider] (10-05-19 @ 11:58)   Progress Notes - Care Coordination [C. Provider] (10-05-19 @ 11:54)

## 2019-10-10 NOTE — PROGRESS NOTE ADULT - PROBLEM SELECTOR PLAN 2
- Due to hypoxic respiratory failure from fluid overload  - AC held due to hematoma/fall  - C/w metoprolol, uptitrate as necessary  - Digoxin discontinued  - Monitor HR - Due to hypoxic respiratory failure from fluid overload  - AC held due to hematoma/fall  - C/w metoprolol 50, uptitrate as necessary  - Digoxin discontinued  - Monitor HR

## 2019-10-10 NOTE — CONSULT NOTE ADULT - ASSESSMENT
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 GOC has been addressed with pt and her family and they do not want to continue with Rituximab treatment. Rheumatology consulted for management of steroid.     Recommend          Rheum will follow  Case was seen and discussed with Dr. 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 GOC has been addressed with pt and her family and they do not want to continue with Rituximab treatment ( 3rd dose due was on 9/9 which was held due to infection). Rheumatology consulted for management of steroid.     Recommend          Rheum will follow  Case was seen and discussed with Dr. 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 GOC has been addressed by primary team with pt and her family and they do not want to continue with aggressive treatment. Pt had received half of the Rituxan dose for induction treatment  ( 3rd dose due was on 9/9 which was held due to infection). She also received Steroid pulse and was on Solumedrol IV 60 mg (1 mg/kg) since 8/16 which was switched to prednisone po on 8/23. Dose was tapered to 50 mg daily after last discharge. Rheumatology consulted for management of steroid. We had a lengthy discussion with family at the bedside and we explained that pt has been on high dose steroid which needs to be tapered, we explained that by tapering steroid she will be at higher risk of flare and we may need to reconsider to complete the induction treatment with Rituxan, family verbalized understanding and agrees with tapering steroid and monitor clinically.    Recommend:   - Taper Prednisone to 40 mg po daily x 1 week, then 30 mg po daily x1 week and then 20 mg po daily, further adjustment will be made on her clinical situation at the time  - Monitor renal and pulmonary status   - Hold on Rituxan for now as per family request  - C/w antibiotic as per ID     Rheum will follow  Case was seen and discussed with Dr. 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 GOC has been addressed by primary team with pt and her family and they do not want to continue with aggressive treatment. Pt had received half of the Rituxan dose for induction treatment  ( 3rd dose due was on 9/9 which was held due to infection). She also received Steroid pulse and was on Solumedrol IV 60 mg (1 mg/kg) since 8/16 which was switched to prednisone po on 8/23. Dose was tapered to 50 mg daily after last discharge. Rheumatology consulted for management of steroid. We had a lengthy discussion with family at the bedside and we explained that pt has been on high dose steroid which needs to be tapered, we explained that by tapering steroid she will be at higher risk of flare and we may need to reconsider to complete the induction treatment with Rituxan, family verbalized understanding and agrees with tapering steroid and monitor clinically.    Recommend:   - Taper Prednisone to 40 mg po daily x 1 week, then 30 mg po daily x1 week and then 20 mg po daily, further adjustment will be made on her clinical situation at the time  - Monitor renal and pulmonary status   - Hold off Rituxan for now as per family request  - C/w antibiotic as per ID     Rheum will follow  Case was seen and discussed with Dr. Anna Childress MD  Rheum fellow PGY 4   Pager [phone]

## 2019-10-10 NOTE — PROGRESS NOTE ADULT - PROBLEM SELECTOR PLAN 1
- Due to fluid overload likely 2/2 sepsis; PMHx diastolic heart dysfunction as per TTE  - CXR demonstrated increased pleural effusions  - Received lasix 40 and 80 IV yesterday, net negative 1.2L  - Goal is to keep patient net negative at least 1L daily  - F/u AM CXR  - C/w lasix, check BMP and BP before administration - Due to fluid overload likely 2/2 sepsis; PMHx diastolic heart dysfunction as per TTE  - CXR demonstrated pleural effusions similar to yesterday  - Received lasix 40 and 80 IV yesterday, net negative 1.2L  - Goal is to keep patient net negative at least 1L daily  - F/u AM CXR  - C/w lasix, check BMP and BP before administration

## 2019-10-10 NOTE — PROGRESS NOTE ADULT - SUBJECTIVE AND OBJECTIVE BOX
HOSPITALIST NOTE    Dr. Rafi Staley, DO  Division of Hospital Medicine  [facility]  Pager:  [phone]    SUBJECTIVE  Patient evaluated at bedside. ANDREW.   reports improvement in breathing. Still has cough. No purulence. No back pain.     ROS limited by acute delirium.     REVIEW OF SYSTEMS  CONSTITUTIONAL: No fevers or chills  EYES/ENT: No visual changes. No discharge from eyes. No vertigo. No throat pain. No dysphagia.  NECK: No pain or stiffness or rigidity.  RESPIRATORY: +cough. No wheezing, or hemoptysis. +shortness of breath.  CARDIOVASCULAR: No chest pain or palpitations.   GASTROINTESTINAL: No abdominal pain. No nausea, vomiting, or hematemesis; No diarrhea or constipation. No melena or hematochezia.  GENITOURINARY: No dysuria, hesitancy, frequency or hematuria.  NEUROLOGICAL: No numbness or weakness. No change in speech. No fecal or urinary incontinence.   MSK: No joint swelling or erythema. No back pain.  SKIN: No itching or rashes.   PSYCH: Normal affect. Normal mood.    Review of systems negative except for items noted above.      PAST MEDICAL & SURGICAL HISTORY:  COPD (chronic obstructive pulmonary disease)  Peripheral vascular disease  Pulmonary hypertension  Rheumatoid arthritis  Osteoarthritis  Mitral regurgitation  H/O lymphoma  Hyperlipidemia  Chronic GERD  Chronic kidney disease: proteinuria  AF (atrial fibrillation)  Anemia in CKD (chronic kidney disease)  CHF (congestive heart failure)  HTN (hypertension)  History of total hip replacement, left  History of total knee replacement, bilateral      MEDICATIONS  (STANDING):  ALBUTerol    90 MICROgram(s) HFA Inhaler 2 Puff(s) Inhalation every 6 hours  ALPRAZolam 0.5 milliGRAM(s) Oral two times a day  ascorbic acid 500 milliGRAM(s) Oral daily  atorvastatin 10 milliGRAM(s) Oral at bedtime  dextrose 5%. 1000 milliLiter(s) (50 mL/Hr) IV Continuous <Continuous>  dextrose 50% Injectable 12.5 Gram(s) IV Push once  entecavir 0.5 milliGRAM(s) Oral daily  ertapenem  IVPB 1000 milliGRAM(s) IV Intermittent every 24 hours  furosemide   Injectable 80 milliGRAM(s) IV Push once  insulin glargine Injectable (LANTUS) 5 Unit(s) SubCutaneous at bedtime  insulin lispro (HumaLOG) corrective regimen sliding scale   SubCutaneous three times a day before meals  melatonin 5 milliGRAM(s) Oral at bedtime  metoprolol tartrate 50 milliGRAM(s) Oral two times a day  montelukast 10 milliGRAM(s) Oral daily  Nephro-bebeto 1 Tablet(s) Oral daily  NIFEdipine XL 30 milliGRAM(s) Oral daily  pantoprazole    Tablet 40 milliGRAM(s) Oral before breakfast  predniSONE   Tablet 60 milliGRAM(s) Oral daily  sertraline 50 milliGRAM(s) Oral daily  tiotropium 18 MICROgram(s) Capsule 1 Capsule(s) Inhalation daily    MEDICATIONS  (PRN):  acetaminophen   Tablet .. 650 milliGRAM(s) Oral every 6 hours PRN Mild Pain (1 - 3)  dextrose 40% Gel 15 Gram(s) Oral once PRN Blood Glucose LESS THAN 70 milliGRAM(s)/deciliter  docusate sodium 100 milliGRAM(s) Oral three times a day PRN Constipation  glucagon  Injectable 1 milliGRAM(s) IntraMuscular once PRN Glucose LESS THAN 70 milligrams/deciliter  guaiFENesin   Syrup  (Sugar-Free) 100 milliGRAM(s) Oral every 6 hours PRN Cough  oxyCODONE    5 mG/acetaminophen 325 mG 1 Tablet(s) Oral every 6 hours PRN Severe Pain (7 - 10)  polyethylene glycol 3350 17 Gram(s) Oral daily PRN Constiapation  senna 2 Tablet(s) Oral at bedtime PRN Constipation      Allergies    hydrALAZINE (Unknown)  Keflex (Unknown)  penicillin (Rash)    Intolerances        T(C): 36.4 (10-10-19 @ 13:30), Max: 36.4 (10-10-19 @ 04:12)  T(F): 97.5 (10-10-19 @ 13:30), Max: 97.6 (10-10-19 @ 04:12)  HR: 82 (10-10-19 @ 13:30) (80 - 94)  BP: 124/78 (10-10-19 @ 13:30) (122/76 - 155/85)  ABP: --  ABP(mean): --  RR: 18 (10-10-19 @ 13:30) (18 - 18)  SpO2: 95% (10-10-19 @ 13:30) (95% - 98%)      GENERAL: mildly lethargic. Mildly tachypneic.   HEAD:  +Mild ecchymoses throughout face from prior fall  EYES: +PERRL, no scleral icterus  ENT: Dry oral mucosa with whitish tongue lesions not removed by scraping  RESPIRATORY: Rhonchi throughout lung fields. Dec bibasilar breath sounds. No wheezing. No accessory muscle use.   HEART: Irregularly irregular. On exam HR wnl; No murmurs, rubs, or gallops.   ABDOMEN: Soft, nontender, nondistended; Bowel sounds present, no organomegaly  BACK: no spinal tenderness, no CVA tenderness  EXTREMITIES: 1+ edema to thighs bilaterally, +fracture in R arm with sling. Muscle compartments soft without signs of compartment syndrome.   NEUROLOGY: Follows commands. Mildly lethargic likely from acute delirium.   Skin: Erythema of left hip extending to back    LABS                        9.6    23.25 )-----------( 266      ( 10 Oct 2019 11:51 )             31.3     10-10    143  |  94<L>  |  52<H>  ----------------------------<  216<H>  4.0   |  37<H>  |  0.88    Ca    9.1      10 Oct 2019 11:51  Phos  4.7     10-10  Mg     1.9     10-10    TPro  5.6<L>  /  Alb  2.8<L>  /  TBili  0.7  /  DBili  x   /  AST  14  /  ALT  18  /  AlkPhos  106  10-10          ADDITIONAL STUDIES PERSONALLY REVIEWED

## 2019-10-10 NOTE — PROGRESS NOTE ADULT - SUBJECTIVE AND OBJECTIVE BOX
INFECTIOUS DISEASES FOLLOW UP--Ad Espinal MD  Pager [phone]    This is a follow up note for this  85y Female with  e. coli bacteremia and probable infected L RP collection.  events noted with regards to goals of care.    Further ROS:  CONSTITUTIONAL:  No fever    Allergies  hydrALAZINE (Unknown)  Keflex (Unknown)  penicillin (Rash)    ANTIBIOTICS/RELEVANT:  antimicrobials  entecavir 0.5 milliGRAM(s) Oral daily  ertapenem  IVPB 1000 milliGRAM(s) IV Intermittent every 24 hours    OTHER:  acetaminophen   Tablet .. 650 milliGRAM(s) Oral every 6 hours PRN  ALBUTerol    90 MICROgram(s) HFA Inhaler 2 Puff(s) Inhalation every 6 hours  ALPRAZolam 0.5 milliGRAM(s) Oral two times a day  ascorbic acid 500 milliGRAM(s) Oral daily  atorvastatin 10 milliGRAM(s) Oral at bedtime  dextrose 40% Gel 15 Gram(s) Oral once PRN  dextrose 5%. 1000 milliLiter(s) IV Continuous <Continuous>  dextrose 50% Injectable 12.5 Gram(s) IV Push once  docusate sodium 100 milliGRAM(s) Oral three times a day PRN  glucagon  Injectable 1 milliGRAM(s) IntraMuscular once PRN  guaiFENesin   Syrup  (Sugar-Free) 100 milliGRAM(s) Oral every 6 hours PRN  insulin glargine Injectable (LANTUS) 5 Unit(s) SubCutaneous at bedtime  insulin lispro (HumaLOG) corrective regimen sliding scale   SubCutaneous three times a day before meals  melatonin 5 milliGRAM(s) Oral at bedtime  metoprolol tartrate 50 milliGRAM(s) Oral two times a day  montelukast 10 milliGRAM(s) Oral daily  Nephro-bebeto 1 Tablet(s) Oral daily  NIFEdipine XL 30 milliGRAM(s) Oral daily  oxyCODONE    5 mG/acetaminophen 325 mG 1 Tablet(s) Oral every 6 hours PRN  pantoprazole    Tablet 40 milliGRAM(s) Oral before breakfast  polyethylene glycol 3350 17 Gram(s) Oral daily PRN  predniSONE   Tablet 60 milliGRAM(s) Oral daily  senna 2 Tablet(s) Oral at bedtime PRN  sertraline 50 milliGRAM(s) Oral daily  tiotropium 18 MICROgram(s) Capsule 1 Capsule(s) Inhalation daily    Objective:  Vital Signs Last 24 Hrs  T(C): 36.4 (10 Oct 2019 04:12), Max: 36.6 (09 Oct 2019 13:37)  T(F): 97.6 (10 Oct 2019 04:12), Max: 97.9 (09 Oct 2019 13:37)  HR: 94 (10 Oct 2019 04:12) (80 - 94)  BP: 140/85 (10 Oct 2019 04:12) (122/76 - 179/105)  BP(mean): --  RR: 18 (10 Oct 2019 04:12) (18 - 20)  SpO2: 97% (10 Oct 2019 04:12) (95% - 98%)    PHYSICAL EXAM:  Constitutional:no acute distress  Eyes:YVETTE, EOMI  Ear/Nose/Throat: no oral lesions, 	  Respiratory: clear BL  Cardiovascular: S1S2  Gastrointestinal:soft,L thigh/flank edema  Extremities:no e/e/c  No Lymphadenopathy  IV sites not inflammed.    LABS:                        9.0    22.05 )-----------( 234      ( 09 Oct 2019 23:22 )             29.9     10-09    141  |  97  |  54<H>  ----------------------------<  204<H>  4.5   |  34<H>  |  1.10    Ca    9.2      09 Oct 2019 20:13  Phos  5.3     10-09  Mg     2.0     10-09    TPro  5.6<L>  /  Alb  2.1<L>  /  TBili  0.5  /  DBili  x   /  AST  59<H>  /  ALT  24  /  AlkPhos  93  10-09    RADIOLOGY & ADDITIONAL STUDIES:    < from: Xray Chest 1 View- PORTABLE-Routine (10.09.19 @ 08:58) >    IMPRESSION: Left pleural effusion, similar to prior.    < end of copied text >

## 2019-10-10 NOTE — PROGRESS NOTE ADULT - PROBLEM SELECTOR PLAN 1
- Due to fluid overload likely 2/2 sepsis; PMHx diastolic heart dysfunction as per TTE  - CXR demonstrated pleural effusions similar to yesterday  - Received lasix 40 and 80 IV yesterday, net negative 1.2L  - Goal is to keep patient net negative at least 1-1.5L daily  - F/u AM CXR  - C/w lasix 80 mg ivp daily as neeed;, check BMP and BP before administration

## 2019-10-10 NOTE — PROGRESS NOTE ADULT - SUBJECTIVE AND OBJECTIVE BOX
Patient is a 85y old  Female who presents with a chief complaint of Fever (09 Oct 2019 16:24)      SUBJECTIVE / OVERNIGHT EVENTS:    CONSTITUTIONAL: No weakness, fevers or chills  EYES/ENT: No visual changes;  No vertigo or throat pain   NECK: No pain or stiffness  RESPIRATORY: No cough, wheezing, hemoptysis; No shortness of breath  CARDIOVASCULAR: No chest pain or palpitations  GASTROINTESTINAL: No abdominal or epigastric pain. No nausea, vomiting, or hematemesis; No diarrhea or constipation. No melena or hematochezia.  GENITOURINARY: No dysuria, frequency or hematuria  NEUROLOGICAL: No numbness or weakness  SKIN: No itching, burning, rashes, or lesions   All other review of systems is negative unless indicated above.    MEDICATIONS  (STANDING):  ALBUTerol    90 MICROgram(s) HFA Inhaler 2 Puff(s) Inhalation every 6 hours  ALPRAZolam 0.5 milliGRAM(s) Oral two times a day  ascorbic acid 500 milliGRAM(s) Oral daily  atorvastatin 10 milliGRAM(s) Oral at bedtime  dextrose 5%. 1000 milliLiter(s) (50 mL/Hr) IV Continuous <Continuous>  dextrose 50% Injectable 12.5 Gram(s) IV Push once  entecavir 0.5 milliGRAM(s) Oral daily  ertapenem  IVPB 1000 milliGRAM(s) IV Intermittent every 24 hours  insulin glargine Injectable (LANTUS) 5 Unit(s) SubCutaneous at bedtime  insulin lispro (HumaLOG) corrective regimen sliding scale   SubCutaneous three times a day before meals  melatonin 5 milliGRAM(s) Oral at bedtime  metoprolol tartrate 50 milliGRAM(s) Oral two times a day  montelukast 10 milliGRAM(s) Oral daily  Nephro-bebeto 1 Tablet(s) Oral daily  NIFEdipine XL 30 milliGRAM(s) Oral daily  pantoprazole    Tablet 40 milliGRAM(s) Oral before breakfast  predniSONE   Tablet 60 milliGRAM(s) Oral daily  sertraline 50 milliGRAM(s) Oral daily  tiotropium 18 MICROgram(s) Capsule 1 Capsule(s) Inhalation daily    MEDICATIONS  (PRN):  acetaminophen   Tablet .. 650 milliGRAM(s) Oral every 6 hours PRN Mild Pain (1 - 3)  dextrose 40% Gel 15 Gram(s) Oral once PRN Blood Glucose LESS THAN 70 milliGRAM(s)/deciliter  docusate sodium 100 milliGRAM(s) Oral three times a day PRN Constipation  glucagon  Injectable 1 milliGRAM(s) IntraMuscular once PRN Glucose LESS THAN 70 milligrams/deciliter  guaiFENesin   Syrup  (Sugar-Free) 100 milliGRAM(s) Oral every 6 hours PRN Cough  oxyCODONE    5 mG/acetaminophen 325 mG 1 Tablet(s) Oral every 6 hours PRN Severe Pain (7 - 10)  polyethylene glycol 3350 17 Gram(s) Oral daily PRN Constiapation  senna 2 Tablet(s) Oral at bedtime PRN Constipation      Vital Signs Last 24 Hrs  T(C): 36.4 (10 Oct 2019 04:12), Max: 36.6 (09 Oct 2019 13:37)  T(F): 97.6 (10 Oct 2019 04:12), Max: 97.9 (09 Oct 2019 13:37)  HR: 94 (10 Oct 2019 04:12) (80 - 94)  BP: 140/85 (10 Oct 2019 04:12) (122/76 - 179/105)  BP(mean): --  RR: 18 (10 Oct 2019 04:12) (18 - 20)  SpO2: 97% (10 Oct 2019 04:12) (95% - 98%)  CAPILLARY BLOOD GLUCOSE      POCT Blood Glucose.: 178 mg/dL (09 Oct 2019 21:42)  POCT Blood Glucose.: 225 mg/dL (09 Oct 2019 17:36)  POCT Blood Glucose.: 176 mg/dL (09 Oct 2019 12:17)  POCT Blood Glucose.: 168 mg/dL (09 Oct 2019 08:47)    I&O's Summary    08 Oct 2019 07:01  -  09 Oct 2019 07:00  --------------------------------------------------------  IN: 150 mL / OUT: 550 mL / NET: -400 mL    09 Oct 2019 07:01  -  10 Oct 2019 06:52  --------------------------------------------------------  IN: 240 mL / OUT: 1650 mL / NET: -1410 mL        PHYSICAL EXAM:  Vital Signs Last 24 Hrs  T(C): 36.4 (10-10-19 @ 04:12)  T(F): 97.6 (10-10-19 @ 04:12), Max: 97.9 (10-09-19 @ 13:37)  HR: 94 (10-10-19 @ 04:12) (80 - 94)  BP: 140/85 (10-10-19 @ 04:12)  BP(mean): --  RR: 18 (10-10-19 @ 04:12) (18 - 20)  SpO2: 97% (10-10-19 @ 04:12) (95% - 98%)  Wt(kg): --    10-08 @ 07:01  -  10-09 @ 07:00  --------------------------------------------------------  IN: 150 mL / OUT: 550 mL / NET: -400 mL    10-09 @ 07:01  -  10-10 @ 06:52  --------------------------------------------------------  IN: 240 mL / OUT: 1650 mL / NET: -1410 mL      Constitutional: NAD, awake and alert  EYES: EOMI  ENT:  Normal Hearing, no tonsillar exudates   Neck: Soft and supple , no thyromegaly   Respiratory: Breath sounds are clear bilaterally, No wheezing, rales or rhonchi  Cardiovascular: S1 and S2, regular rate and rhythm, no Murmurs, gallops or rubs, no JVD,    Gastrointestinal: Bowel Sounds present, soft, nontender, nondistended, no guarding, no rebound  Extremities: No cyanosis or clubbing; warm to touch  Vascular: 2+ peripheral pulses lower ex  Neurological: No focal deficits, CN II-XII intact bilaterally, sensation to light touch intact in all extremities, gait intact. Pupils are equally reactive to light and symmetrical in size.   Musculoskeletal: 5/5 strength b/l upper and lower extremities; no joint swelling.  Skin: No rashes  Psych: no depression or anhedonia, AAOx3  HEME: no bruises, no nose bleeds      LABS:                        9.0    22.05 )-----------( 234      ( 09 Oct 2019 23:22 )             29.9     10-09    141  |  97  |  54<H>  ----------------------------<  204<H>  4.5   |  34<H>  |  1.10    Ca    9.2      09 Oct 2019 20:13  Phos  5.3     10-09  Mg     2.0     10-09    TPro  5.6<L>  /  Alb  2.1<L>  /  TBili  0.5  /  DBili  x   /  AST  59<H>  /  ALT  24  /  AlkPhos  93  10-09              RADIOLOGY & ADDITIONAL TESTS:    Imaging Personally Reviewed:    Consultant(s) Notes Reviewed:      Care Discussed with Consultants/Other Providers: Patient is a 85y old  Female who presents with a chief complaint of Fever (09 Oct 2019 16:24)      SUBJECTIVE / OVERNIGHT EVENTS: Patient was given another 80mg IV lasix over night. Patient evaluated at bedside this morning. Patient had labored breathing and reported continued difficulty breathing and discomfort. Requested to be washed and changed. Was pulling off EKG probes.     CONSTITUTIONAL: No weakness, fevers or chills  EYES/ENT: No visual changes;  No vertigo.  NECK: +pain while pointing to neck  RESPIRATORY: +Cough, + SOB. No hemoptysis, no wheezing  CARDIOVASCULAR: No chest pain or palpitations  GASTROINTESTINAL: No abdominal or epigastric pain. No nausea, vomiting, or hematemesis; No diarrhea or constipation. No melena or hematochezia.  GENITOURINARY: No dysuria, frequency or hematuria  NEUROLOGICAL: No numbness or weakness  MSK: +Pain around R arm fracture.  All other review of systems is negative unless indicated above.    MEDICATIONS  (STANDING):  ALBUTerol    90 MICROgram(s) HFA Inhaler 2 Puff(s) Inhalation every 6 hours  ALPRAZolam 0.5 milliGRAM(s) Oral two times a day  ascorbic acid 500 milliGRAM(s) Oral daily  atorvastatin 10 milliGRAM(s) Oral at bedtime  dextrose 5%. 1000 milliLiter(s) (50 mL/Hr) IV Continuous <Continuous>  dextrose 50% Injectable 12.5 Gram(s) IV Push once  entecavir 0.5 milliGRAM(s) Oral daily  ertapenem  IVPB 1000 milliGRAM(s) IV Intermittent every 24 hours  insulin glargine Injectable (LANTUS) 5 Unit(s) SubCutaneous at bedtime  insulin lispro (HumaLOG) corrective regimen sliding scale   SubCutaneous three times a day before meals  melatonin 5 milliGRAM(s) Oral at bedtime  metoprolol tartrate 50 milliGRAM(s) Oral two times a day  montelukast 10 milliGRAM(s) Oral daily  Nephro-bebeto 1 Tablet(s) Oral daily  NIFEdipine XL 30 milliGRAM(s) Oral daily  pantoprazole    Tablet 40 milliGRAM(s) Oral before breakfast  predniSONE   Tablet 60 milliGRAM(s) Oral daily  sertraline 50 milliGRAM(s) Oral daily  tiotropium 18 MICROgram(s) Capsule 1 Capsule(s) Inhalation daily    MEDICATIONS  (PRN):  acetaminophen   Tablet .. 650 milliGRAM(s) Oral every 6 hours PRN Mild Pain (1 - 3)  dextrose 40% Gel 15 Gram(s) Oral once PRN Blood Glucose LESS THAN 70 milliGRAM(s)/deciliter  docusate sodium 100 milliGRAM(s) Oral three times a day PRN Constipation  glucagon  Injectable 1 milliGRAM(s) IntraMuscular once PRN Glucose LESS THAN 70 milligrams/deciliter  guaiFENesin   Syrup  (Sugar-Free) 100 milliGRAM(s) Oral every 6 hours PRN Cough  oxyCODONE    5 mG/acetaminophen 325 mG 1 Tablet(s) Oral every 6 hours PRN Severe Pain (7 - 10)  polyethylene glycol 3350 17 Gram(s) Oral daily PRN Constiapation  senna 2 Tablet(s) Oral at bedtime PRN Constipation      Vital Signs Last 24 Hrs  T(C): 36.4 (10 Oct 2019 04:12), Max: 36.6 (09 Oct 2019 13:37)  T(F): 97.6 (10 Oct 2019 04:12), Max: 97.9 (09 Oct 2019 13:37)  HR: 94 (10 Oct 2019 04:12) (80 - 94)  BP: 140/85 (10 Oct 2019 04:12) (122/76 - 179/105)  BP(mean): --  RR: 18 (10 Oct 2019 04:12) (18 - 20)  SpO2: 97% (10 Oct 2019 04:12) (95% - 98%)  CAPILLARY BLOOD GLUCOSE      POCT Blood Glucose.: 178 mg/dL (09 Oct 2019 21:42)  POCT Blood Glucose.: 225 mg/dL (09 Oct 2019 17:36)  POCT Blood Glucose.: 176 mg/dL (09 Oct 2019 12:17)  POCT Blood Glucose.: 168 mg/dL (09 Oct 2019 08:47)    I&O's Summary    08 Oct 2019 07:01  -  09 Oct 2019 07:00  --------------------------------------------------------  IN: 150 mL / OUT: 550 mL / NET: -400 mL    09 Oct 2019 07:01  -  10 Oct 2019 06:52  --------------------------------------------------------  IN: 240 mL / OUT: 1650 mL / NET: -1410 mL        PHYSICAL EXAM:  Vital Signs Last 24 Hrs  T(C): 36.4 (10-10-19 @ 04:12)  T(F): 97.6 (10-10-19 @ 04:12), Max: 97.9 (10-09-19 @ 13:37)  HR: 94 (10-10-19 @ 04:12) (80 - 94)  BP: 140/85 (10-10-19 @ 04:12)  BP(mean): --  RR: 18 (10-10-19 @ 04:12) (18 - 20)  SpO2: 97% (10-10-19 @ 04:12) (95% - 98%)  Wt(kg): --    10-08 @ 07:01  -  10-09 @ 07:00  --------------------------------------------------------  IN: 150 mL / OUT: 550 mL / NET: -400 mL    10-09 @ 07:01  -  10-10 @ 06:52  --------------------------------------------------------  IN: 240 mL / OUT: 1650 mL / NET: -1410 mL      Constitutional: Elderly woman, uncomfortable in bed, groaning and pulling off EKG leads.   HEAD:  +Mild ecchymoses throughout face from prior fall  EYES: +PERRL, no scleral icterusI  ENT:  Dry oral mucosa  Neck: Soft and supple , no thyromegaly   RESPIRATORY: Breath sounds are clear bilaterally in anterior lung fields. Breath sounds are dulled in the posterior fields. No wheezes or rhonchi.  HEART: S1 and S2, irregularly irregular rhythm. Normal rate. No Murmurs, gallops or rubs, no JVD,    Gastrointestinal: Bowel Sounds present, soft, nontender, nondistended, no guarding, no rebound  ABDOMEN: Soft, nontender, nondistended; Bowel sounds present, no organomegaly  BACK: no spinal tenderness, no CVA tenderness  EXTREMITIES: 1+ edema to thighs bilaterally, +fracture in R arm with sling. Muscle compartments soft without signs of compartment syndrome.   NEUROLOGY: Follows commands. Mildly lethargic  Skin: Erythema of left hip extending to back improved      LABS:                        9.0    22.05 )-----------( 234      ( 09 Oct 2019 23:22 )             29.9     10-09    141  |  97  |  54<H>  ----------------------------<  204<H>  4.5   |  34<H>  |  1.10    Ca    9.2      09 Oct 2019 20:13  Phos  5.3     10-09  Mg     2.0     10-09    TPro  5.6<L>  /  Alb  2.1<L>  /  TBili  0.5  /  DBili  x   /  AST  59<H>  /  ALT  24  /  AlkPhos  93  10-09              RADIOLOGY & ADDITIONAL TESTS:    Imaging Personally Reviewed:    Consultant(s) Notes Reviewed:      Care Discussed with Consultants/Other Providers: Patient is a 85y old  Female who presents with a chief complaint of Fever (09 Oct 2019 16:24)      SUBJECTIVE / OVERNIGHT EVENTS: Patient was given another 80mg IV lasix over night. Patient evaluated at bedside this morning. Patient had labored breathing and continued to report difficulty breathing and discomfort. Requested to be washed and changed. Was pulling off EKG probes.     CONSTITUTIONAL: No weakness, fevers or chills  EYES/ENT: No visual changes;  No vertigo.  NECK: +pain while pointing to neck  RESPIRATORY: +Cough, + SOB. No hemoptysis, no wheezing  CARDIOVASCULAR: No chest pain or palpitations  GASTROINTESTINAL: No abdominal or epigastric pain. No nausea, vomiting, or hematemesis; No diarrhea or constipation. No melena or hematochezia.  GENITOURINARY: No dysuria, frequency or hematuria  NEUROLOGICAL: No numbness or weakness  MSK: +Pain around R arm fracture.  All other review of systems is negative unless indicated above.    MEDICATIONS  (STANDING):  ALBUTerol    90 MICROgram(s) HFA Inhaler 2 Puff(s) Inhalation every 6 hours  ALPRAZolam 0.5 milliGRAM(s) Oral two times a day  ascorbic acid 500 milliGRAM(s) Oral daily  atorvastatin 10 milliGRAM(s) Oral at bedtime  dextrose 5%. 1000 milliLiter(s) (50 mL/Hr) IV Continuous <Continuous>  dextrose 50% Injectable 12.5 Gram(s) IV Push once  entecavir 0.5 milliGRAM(s) Oral daily  ertapenem  IVPB 1000 milliGRAM(s) IV Intermittent every 24 hours  insulin glargine Injectable (LANTUS) 5 Unit(s) SubCutaneous at bedtime  insulin lispro (HumaLOG) corrective regimen sliding scale   SubCutaneous three times a day before meals  melatonin 5 milliGRAM(s) Oral at bedtime  metoprolol tartrate 50 milliGRAM(s) Oral two times a day  montelukast 10 milliGRAM(s) Oral daily  Nephro-bebeto 1 Tablet(s) Oral daily  NIFEdipine XL 30 milliGRAM(s) Oral daily  pantoprazole    Tablet 40 milliGRAM(s) Oral before breakfast  predniSONE   Tablet 60 milliGRAM(s) Oral daily  sertraline 50 milliGRAM(s) Oral daily  tiotropium 18 MICROgram(s) Capsule 1 Capsule(s) Inhalation daily    MEDICATIONS  (PRN):  acetaminophen   Tablet .. 650 milliGRAM(s) Oral every 6 hours PRN Mild Pain (1 - 3)  dextrose 40% Gel 15 Gram(s) Oral once PRN Blood Glucose LESS THAN 70 milliGRAM(s)/deciliter  docusate sodium 100 milliGRAM(s) Oral three times a day PRN Constipation  glucagon  Injectable 1 milliGRAM(s) IntraMuscular once PRN Glucose LESS THAN 70 milligrams/deciliter  guaiFENesin   Syrup  (Sugar-Free) 100 milliGRAM(s) Oral every 6 hours PRN Cough  oxyCODONE    5 mG/acetaminophen 325 mG 1 Tablet(s) Oral every 6 hours PRN Severe Pain (7 - 10)  polyethylene glycol 3350 17 Gram(s) Oral daily PRN Constiapation  senna 2 Tablet(s) Oral at bedtime PRN Constipation      Vital Signs Last 24 Hrs  T(C): 36.4 (10 Oct 2019 04:12), Max: 36.6 (09 Oct 2019 13:37)  T(F): 97.6 (10 Oct 2019 04:12), Max: 97.9 (09 Oct 2019 13:37)  HR: 94 (10 Oct 2019 04:12) (80 - 94)  BP: 140/85 (10 Oct 2019 04:12) (122/76 - 179/105)  BP(mean): --  RR: 18 (10 Oct 2019 04:12) (18 - 20)  SpO2: 97% (10 Oct 2019 04:12) (95% - 98%)  CAPILLARY BLOOD GLUCOSE      POCT Blood Glucose.: 178 mg/dL (09 Oct 2019 21:42)  POCT Blood Glucose.: 225 mg/dL (09 Oct 2019 17:36)  POCT Blood Glucose.: 176 mg/dL (09 Oct 2019 12:17)  POCT Blood Glucose.: 168 mg/dL (09 Oct 2019 08:47)    I&O's Summary    08 Oct 2019 07:01  -  09 Oct 2019 07:00  --------------------------------------------------------  IN: 150 mL / OUT: 550 mL / NET: -400 mL    09 Oct 2019 07:01  -  10 Oct 2019 06:52  --------------------------------------------------------  IN: 240 mL / OUT: 1650 mL / NET: -1410 mL        PHYSICAL EXAM:  Vital Signs Last 24 Hrs  T(C): 36.4 (10-10-19 @ 04:12)  T(F): 97.6 (10-10-19 @ 04:12), Max: 97.9 (10-09-19 @ 13:37)  HR: 94 (10-10-19 @ 04:12) (80 - 94)  BP: 140/85 (10-10-19 @ 04:12)  BP(mean): --  RR: 18 (10-10-19 @ 04:12) (18 - 20)  SpO2: 97% (10-10-19 @ 04:12) (95% - 98%)  Wt(kg): --    10-08 @ 07:01  -  10-09 @ 07:00  --------------------------------------------------------  IN: 150 mL / OUT: 550 mL / NET: -400 mL    10-09 @ 07:01  -  10-10 @ 06:52  --------------------------------------------------------  IN: 240 mL / OUT: 1650 mL / NET: -1410 mL      Constitutional: Elderly woman, uncomfortable in bed, groaning and pulling off EKG leads.   HEAD:  +Mild ecchymoses throughout face from prior fall  EYES: +PERRL, no scleral icterusI  ENT:  Dry oral mucosa  Neck: Soft and supple , no thyromegaly   RESPIRATORY: Breath sounds are clear bilaterally in anterior lung fields. Breath sounds are dulled in the posterior fields. No wheezes or rhonchi.  HEART: S1 and S2, irregularly irregular rhythm. Normal rate. No Murmurs, gallops or rubs, no JVD,    Gastrointestinal: Bowel Sounds present, soft, nontender, nondistended, no guarding, no rebound  ABDOMEN: Soft, nontender, nondistended; Bowel sounds present, no organomegaly  BACK: no spinal tenderness, no CVA tenderness  EXTREMITIES: 1+ edema to thighs bilaterally, +fracture in R arm with sling. Muscle compartments soft without signs of compartment syndrome.   NEUROLOGY: Follows commands. Mildly lethargic  Skin: Erythema of left hip extending to back improved      LABS:                        9.0    22.05 )-----------( 234      ( 09 Oct 2019 23:22 )             29.9     10-09    141  |  97  |  54<H>  ----------------------------<  204<H>  4.5   |  34<H>  |  1.10    Ca    9.2      09 Oct 2019 20:13  Phos  5.3     10-09  Mg     2.0     10-09    TPro  5.6<L>  /  Alb  2.1<L>  /  TBili  0.5  /  DBili  x   /  AST  59<H>  /  ALT  24  /  AlkPhos  93  10-09              RADIOLOGY & ADDITIONAL TESTS:    Imaging Personally Reviewed:    Consultant(s) Notes Reviewed:      Care Discussed with Consultants/Other Providers: Patient is a 85y old  Female who presents with a chief complaint of Fever (09 Oct 2019 16:24)      SUBJECTIVE / OVERNIGHT EVENTS: Patient was given another 80mg IV lasix over night. Patient evaluated at bedside this morning. Patient had labored breathing and continued to report difficulty breathing and discomfort. Requested to be washed and changed. Was pulling off EKG probes.     CONSTITUTIONAL: No weakness, fevers or chills  EYES/ENT: No visual changes;  No vertigo.  NECK: +pain while pointing to neck  RESPIRATORY: +Cough, + SOB. No hemoptysis, no wheezing  CARDIOVASCULAR: No chest pain or palpitations  GASTROINTESTINAL: No abdominal or epigastric pain. No nausea, vomiting, or hematemesis; No diarrhea or constipation. No melena or hematochezia.  GENITOURINARY: No dysuria, frequency or hematuria  NEUROLOGICAL: No numbness or weakness  MSK: +Pain around R arm fracture.  All other review of systems is negative unless indicated above.    MEDICATIONS  (STANDING):  ALBUTerol    90 MICROgram(s) HFA Inhaler 2 Puff(s) Inhalation every 6 hours  ALPRAZolam 0.5 milliGRAM(s) Oral two times a day  ascorbic acid 500 milliGRAM(s) Oral daily  atorvastatin 10 milliGRAM(s) Oral at bedtime  dextrose 5%. 1000 milliLiter(s) (50 mL/Hr) IV Continuous <Continuous>  dextrose 50% Injectable 12.5 Gram(s) IV Push once  entecavir 0.5 milliGRAM(s) Oral daily  ertapenem  IVPB 1000 milliGRAM(s) IV Intermittent every 24 hours  insulin glargine Injectable (LANTUS) 5 Unit(s) SubCutaneous at bedtime  insulin lispro (HumaLOG) corrective regimen sliding scale   SubCutaneous three times a day before meals  melatonin 5 milliGRAM(s) Oral at bedtime  metoprolol tartrate 50 milliGRAM(s) Oral two times a day  montelukast 10 milliGRAM(s) Oral daily  Nephro-bebeto 1 Tablet(s) Oral daily  NIFEdipine XL 30 milliGRAM(s) Oral daily  pantoprazole    Tablet 40 milliGRAM(s) Oral before breakfast  predniSONE   Tablet 60 milliGRAM(s) Oral daily  sertraline 50 milliGRAM(s) Oral daily  tiotropium 18 MICROgram(s) Capsule 1 Capsule(s) Inhalation daily    MEDICATIONS  (PRN):  acetaminophen   Tablet .. 650 milliGRAM(s) Oral every 6 hours PRN Mild Pain (1 - 3)  dextrose 40% Gel 15 Gram(s) Oral once PRN Blood Glucose LESS THAN 70 milliGRAM(s)/deciliter  docusate sodium 100 milliGRAM(s) Oral three times a day PRN Constipation  glucagon  Injectable 1 milliGRAM(s) IntraMuscular once PRN Glucose LESS THAN 70 milligrams/deciliter  guaiFENesin   Syrup  (Sugar-Free) 100 milliGRAM(s) Oral every 6 hours PRN Cough  oxyCODONE    5 mG/acetaminophen 325 mG 1 Tablet(s) Oral every 6 hours PRN Severe Pain (7 - 10)  polyethylene glycol 3350 17 Gram(s) Oral daily PRN Constiapation  senna 2 Tablet(s) Oral at bedtime PRN Constipation      Vital Signs Last 24 Hrs  T(C): 36.4 (10 Oct 2019 04:12), Max: 36.6 (09 Oct 2019 13:37)  T(F): 97.6 (10 Oct 2019 04:12), Max: 97.9 (09 Oct 2019 13:37)  HR: 94 (10 Oct 2019 04:12) (80 - 94)  BP: 140/85 (10 Oct 2019 04:12) (122/76 - 179/105)  BP(mean): --  RR: 18 (10 Oct 2019 04:12) (18 - 20)  SpO2: 97% (10 Oct 2019 04:12) (95% - 98%)  CAPILLARY BLOOD GLUCOSE      POCT Blood Glucose.: 178 mg/dL (09 Oct 2019 21:42)  POCT Blood Glucose.: 225 mg/dL (09 Oct 2019 17:36)  POCT Blood Glucose.: 176 mg/dL (09 Oct 2019 12:17)  POCT Blood Glucose.: 168 mg/dL (09 Oct 2019 08:47)    I&O's Summary    08 Oct 2019 07:01  -  09 Oct 2019 07:00  --------------------------------------------------------  IN: 150 mL / OUT: 550 mL / NET: -400 mL    09 Oct 2019 07:01  -  10 Oct 2019 06:52  --------------------------------------------------------  IN: 240 mL / OUT: 1650 mL / NET: -1410 mL        PHYSICAL EXAM:  Vital Signs Last 24 Hrs  T(C): 36.4 (10-10-19 @ 04:12)  T(F): 97.6 (10-10-19 @ 04:12), Max: 97.9 (10-09-19 @ 13:37)  HR: 94 (10-10-19 @ 04:12) (80 - 94)  BP: 140/85 (10-10-19 @ 04:12)  BP(mean): --  RR: 18 (10-10-19 @ 04:12) (18 - 20)  SpO2: 97% (10-10-19 @ 04:12) (95% - 98%)  Wt(kg): --    10-08 @ 07:01  -  10-09 @ 07:00  --------------------------------------------------------  IN: 150 mL / OUT: 550 mL / NET: -400 mL    10-09 @ 07:01  -  10-10 @ 06:52  --------------------------------------------------------  IN: 240 mL / OUT: 1650 mL / NET: -1410 mL      Constitutional: Elderly woman, uncomfortable in bed, groaning and pulling off EKG leads.   HEAD: +Mild ecchymoses throughout face from prior fall  EYES: +PERRL, no scleral icterus  ENT:  Dry oral mucosa  Neck: Soft and supple , no thyromegaly   RESPIRATORY: Breath sounds are clear bilaterally in anterior lung fields. Breath sounds are dulled in the posterior fields. No wheezes or rhonchi.  HEART: S1 and S2, irregularly irregular rhythm. Normal rate. No Murmurs, gallops or rubs, no JVD,    Gastrointestinal: Bowel Sounds present, soft, nontender, nondistended, no guarding, no rebound  ABDOMEN: Soft, nontender, nondistended; Bowel sounds present, no organomegaly  BACK: no spinal tenderness, no CVA tenderness  EXTREMITIES: 1+ edema to thighs bilaterally, +fracture in R arm with sling. Muscle compartments soft without signs of compartment syndrome.   NEUROLOGY: Follows commands. Mildly lethargic  Skin: Erythema of left hip extending to back improved      LABS:                        9.0    22.05 )-----------( 234      ( 09 Oct 2019 23:22 )             29.9     10-09    141  |  97  |  54<H>  ----------------------------<  204<H>  4.5   |  34<H>  |  1.10    Ca    9.2      09 Oct 2019 20:13  Phos  5.3     10-09  Mg     2.0     10-09    TPro  5.6<L>  /  Alb  2.1<L>  /  TBili  0.5  /  DBili  x   /  AST  59<H>  /  ALT  24  /  AlkPhos  93  10-09              RADIOLOGY & ADDITIONAL TESTS:    Imaging Personally Reviewed:    Consultant(s) Notes Reviewed:      Care Discussed with Consultants/Other Providers: Patient is a 85y old  Female who presents with a chief complaint of Fever (09 Oct 2019 16:24)      SUBJECTIVE / OVERNIGHT EVENTS: Patient was given another 80mg IV lasix over night. Patient evaluated at bedside this morning. Patient had labored breathing and continued to report difficulty breathing and discomfort. Requested to be washed and changed. Was pulling off EKG probes.     CONSTITUTIONAL: No weakness, fevers or chills  EYES/ENT: No visual changes;  No vertigo.  NECK: +pain while pointing to neck  RESPIRATORY: +Cough, + SOB. No hemoptysis, no wheezing  CARDIOVASCULAR: No chest pain or palpitations  GASTROINTESTINAL: No abdominal or epigastric pain. No nausea, vomiting, or hematemesis; No diarrhea or constipation. No melena or hematochezia.  GENITOURINARY: No dysuria, frequency or hematuria  NEUROLOGICAL: No numbness or weakness  MSK: +Pain around R arm fracture.  All other review of systems is negative unless indicated above.    MEDICATIONS  (STANDING):  ALBUTerol    90 MICROgram(s) HFA Inhaler 2 Puff(s) Inhalation every 6 hours  ALPRAZolam 0.5 milliGRAM(s) Oral two times a day  ascorbic acid 500 milliGRAM(s) Oral daily  atorvastatin 10 milliGRAM(s) Oral at bedtime  dextrose 5%. 1000 milliLiter(s) (50 mL/Hr) IV Continuous <Continuous>  dextrose 50% Injectable 12.5 Gram(s) IV Push once  entecavir 0.5 milliGRAM(s) Oral daily  ertapenem  IVPB 1000 milliGRAM(s) IV Intermittent every 24 hours  insulin glargine Injectable (LANTUS) 5 Unit(s) SubCutaneous at bedtime  insulin lispro (HumaLOG) corrective regimen sliding scale   SubCutaneous three times a day before meals  melatonin 5 milliGRAM(s) Oral at bedtime  metoprolol tartrate 50 milliGRAM(s) Oral two times a day  montelukast 10 milliGRAM(s) Oral daily  Nephro-bebeto 1 Tablet(s) Oral daily  NIFEdipine XL 30 milliGRAM(s) Oral daily  pantoprazole    Tablet 40 milliGRAM(s) Oral before breakfast  predniSONE   Tablet 60 milliGRAM(s) Oral daily  sertraline 50 milliGRAM(s) Oral daily  tiotropium 18 MICROgram(s) Capsule 1 Capsule(s) Inhalation daily    MEDICATIONS  (PRN):  acetaminophen   Tablet .. 650 milliGRAM(s) Oral every 6 hours PRN Mild Pain (1 - 3)  dextrose 40% Gel 15 Gram(s) Oral once PRN Blood Glucose LESS THAN 70 milliGRAM(s)/deciliter  docusate sodium 100 milliGRAM(s) Oral three times a day PRN Constipation  glucagon  Injectable 1 milliGRAM(s) IntraMuscular once PRN Glucose LESS THAN 70 milligrams/deciliter  guaiFENesin   Syrup  (Sugar-Free) 100 milliGRAM(s) Oral every 6 hours PRN Cough  oxyCODONE    5 mG/acetaminophen 325 mG 1 Tablet(s) Oral every 6 hours PRN Severe Pain (7 - 10)  polyethylene glycol 3350 17 Gram(s) Oral daily PRN Constiapation  senna 2 Tablet(s) Oral at bedtime PRN Constipation      Vital Signs Last 24 Hrs  T(C): 36.4 (10 Oct 2019 04:12), Max: 36.6 (09 Oct 2019 13:37)  T(F): 97.6 (10 Oct 2019 04:12), Max: 97.9 (09 Oct 2019 13:37)  HR: 94 (10 Oct 2019 04:12) (80 - 94)  BP: 140/85 (10 Oct 2019 04:12) (122/76 - 179/105)  BP(mean): --  RR: 18 (10 Oct 2019 04:12) (18 - 20)  SpO2: 97% (10 Oct 2019 04:12) (95% - 98%)  CAPILLARY BLOOD GLUCOSE      POCT Blood Glucose.: 178 mg/dL (09 Oct 2019 21:42)  POCT Blood Glucose.: 225 mg/dL (09 Oct 2019 17:36)  POCT Blood Glucose.: 176 mg/dL (09 Oct 2019 12:17)  POCT Blood Glucose.: 168 mg/dL (09 Oct 2019 08:47)    I&O's Summary    08 Oct 2019 07:01  -  09 Oct 2019 07:00  --------------------------------------------------------  IN: 150 mL / OUT: 550 mL / NET: -400 mL    09 Oct 2019 07:01  -  10 Oct 2019 06:52  --------------------------------------------------------  IN: 240 mL / OUT: 1650 mL / NET: -1410 mL        PHYSICAL EXAM:  Vital Signs Last 24 Hrs  T(C): 36.4 (10-10-19 @ 04:12)  T(F): 97.6 (10-10-19 @ 04:12), Max: 97.9 (10-09-19 @ 13:37)  HR: 94 (10-10-19 @ 04:12) (80 - 94)  BP: 140/85 (10-10-19 @ 04:12)  BP(mean): --  RR: 18 (10-10-19 @ 04:12) (18 - 20)  SpO2: 97% (10-10-19 @ 04:12) (95% - 98%)  Wt(kg): --    10-08 @ 07:01  -  10-09 @ 07:00  --------------------------------------------------------  IN: 150 mL / OUT: 550 mL / NET: -400 mL    10-09 @ 07:01  -  10-10 @ 06:52  --------------------------------------------------------  IN: 240 mL / OUT: 1650 mL / NET: -1410 mL      Constitutional: Elderly woman, uncomfortable in bed, groaning and pulling off EKG leads.   HEAD: +Mild ecchymoses throughout face from prior fall  EYES: +PERRL, no scleral icterus  ENT:  Dry oral mucosa  Neck: Soft and supple , no thyromegaly   RESPIRATORY: Breath sounds are clear bilaterally in anterior lung fields. Breath sounds are dulled in the posterior fields. No wheezes or rhonchi.  HEART: S1 and S2, irregularly irregular rhythm. Normal rate. Telemetry 80-90s + AFib. No Murmurs, gallops or rubs, no JVD,    Gastrointestinal: Soft, nontender, nondistended; +BS  EXTREMITIES: 1+edema b/l LE + LUE, 2+ edema RUE, +fracture in R arm with sling.   NEUROLOGY: Follows commands. Mildly lethargic  Skin: Erythema of left hip extending to back resolving      LABS:                        9.0    22.05 )-----------( 234      ( 09 Oct 2019 23:22 )             29.9     10-09    141  |  97  |  54<H>  ----------------------------<  204<H>  4.5   |  34<H>  |  1.10    Ca    9.2      09 Oct 2019 20:13  Phos  5.3     10-09  Mg     2.0     10-09    TPro  5.6<L>  /  Alb  2.1<L>  /  TBili  0.5  /  DBili  x   /  AST  59<H>  /  ALT  24  /  AlkPhos  93  10-09              RADIOLOGY & ADDITIONAL TESTS:    Imaging Personally Reviewed:    Consultant(s) Notes Reviewed:      Care Discussed with Consultants/Other Providers: Patient is a 85y old  Female who presents with a chief complaint of Fever (09 Oct 2019 16:24)      SUBJECTIVE / OVERNIGHT EVENTS: Patient was given another 80mg IV lasix over night. Patient evaluated at bedside this morning. Patient had labored breathing and continued to report difficulty breathing and discomfort. Requested to be washed and changed. Was pulling off EKG probes.     CONSTITUTIONAL: No weakness, fevers or chills  EYES/ENT: No visual changes;  No vertigo.  NECK: +pain while pointing to neck  RESPIRATORY: +Cough, +SOB. No hemoptysis, no wheezing  CARDIOVASCULAR: No chest pain or palpitations  GASTROINTESTINAL: No abdominal or epigastric pain. No nausea, vomiting, or hematemesis; No diarrhea or constipation. No melena or hematochezia.  GENITOURINARY: No dysuria, frequency or hematuria  NEUROLOGICAL: No numbness or weakness  MSK: +Pain around R arm fracture.  All other review of systems is negative unless indicated above.    MEDICATIONS  (STANDING):  ALBUTerol    90 MICROgram(s) HFA Inhaler 2 Puff(s) Inhalation every 6 hours  ALPRAZolam 0.5 milliGRAM(s) Oral two times a day  ascorbic acid 500 milliGRAM(s) Oral daily  atorvastatin 10 milliGRAM(s) Oral at bedtime  dextrose 5%. 1000 milliLiter(s) (50 mL/Hr) IV Continuous <Continuous>  dextrose 50% Injectable 12.5 Gram(s) IV Push once  entecavir 0.5 milliGRAM(s) Oral daily  ertapenem  IVPB 1000 milliGRAM(s) IV Intermittent every 24 hours  insulin glargine Injectable (LANTUS) 5 Unit(s) SubCutaneous at bedtime  insulin lispro (HumaLOG) corrective regimen sliding scale   SubCutaneous three times a day before meals  melatonin 5 milliGRAM(s) Oral at bedtime  metoprolol tartrate 50 milliGRAM(s) Oral two times a day  montelukast 10 milliGRAM(s) Oral daily  Nephro-bebeto 1 Tablet(s) Oral daily  NIFEdipine XL 30 milliGRAM(s) Oral daily  pantoprazole    Tablet 40 milliGRAM(s) Oral before breakfast  predniSONE   Tablet 60 milliGRAM(s) Oral daily  sertraline 50 milliGRAM(s) Oral daily  tiotropium 18 MICROgram(s) Capsule 1 Capsule(s) Inhalation daily    MEDICATIONS  (PRN):  acetaminophen   Tablet .. 650 milliGRAM(s) Oral every 6 hours PRN Mild Pain (1 - 3)  dextrose 40% Gel 15 Gram(s) Oral once PRN Blood Glucose LESS THAN 70 milliGRAM(s)/deciliter  docusate sodium 100 milliGRAM(s) Oral three times a day PRN Constipation  glucagon  Injectable 1 milliGRAM(s) IntraMuscular once PRN Glucose LESS THAN 70 milligrams/deciliter  guaiFENesin   Syrup  (Sugar-Free) 100 milliGRAM(s) Oral every 6 hours PRN Cough  oxyCODONE    5 mG/acetaminophen 325 mG 1 Tablet(s) Oral every 6 hours PRN Severe Pain (7 - 10)  polyethylene glycol 3350 17 Gram(s) Oral daily PRN Constiapation  senna 2 Tablet(s) Oral at bedtime PRN Constipation      Vital Signs Last 24 Hrs  T(C): 36.4 (10 Oct 2019 04:12), Max: 36.6 (09 Oct 2019 13:37)  T(F): 97.6 (10 Oct 2019 04:12), Max: 97.9 (09 Oct 2019 13:37)  HR: 94 (10 Oct 2019 04:12) (80 - 94)  BP: 140/85 (10 Oct 2019 04:12) (122/76 - 179/105)  BP(mean): --  RR: 18 (10 Oct 2019 04:12) (18 - 20)  SpO2: 97% (10 Oct 2019 04:12) (95% - 98%)  CAPILLARY BLOOD GLUCOSE      POCT Blood Glucose.: 178 mg/dL (09 Oct 2019 21:42)  POCT Blood Glucose.: 225 mg/dL (09 Oct 2019 17:36)  POCT Blood Glucose.: 176 mg/dL (09 Oct 2019 12:17)  POCT Blood Glucose.: 168 mg/dL (09 Oct 2019 08:47)    I&O's Summary    08 Oct 2019 07:01  -  09 Oct 2019 07:00  --------------------------------------------------------  IN: 150 mL / OUT: 550 mL / NET: -400 mL    09 Oct 2019 07:01  -  10 Oct 2019 06:52  --------------------------------------------------------  IN: 240 mL / OUT: 1650 mL / NET: -1410 mL        PHYSICAL EXAM:  Vital Signs Last 24 Hrs  T(C): 36.4 (10-10-19 @ 04:12)  T(F): 97.6 (10-10-19 @ 04:12), Max: 97.9 (10-09-19 @ 13:37)  HR: 94 (10-10-19 @ 04:12) (80 - 94)  BP: 140/85 (10-10-19 @ 04:12)  BP(mean): --  RR: 18 (10-10-19 @ 04:12) (18 - 20)  SpO2: 97% (10-10-19 @ 04:12) (95% - 98%)  Wt(kg): --    10-08 @ 07:01  -  10-09 @ 07:00  --------------------------------------------------------  IN: 150 mL / OUT: 550 mL / NET: -400 mL    10-09 @ 07:01  -  10-10 @ 06:52  --------------------------------------------------------  IN: 240 mL / OUT: 1650 mL / NET: -1410 mL      Constitutional: Elderly woman, uncomfortable in bed, groaning and pulling off EKG leads.   HEAD: +Mild ecchymoses throughout face from prior fall  EYES: +PERRL, no scleral icterus  ENT:  Dry oral mucosa  Neck: Soft and supple , no thyromegaly   RESPIRATORY: Breath sounds are clear bilaterally in anterior lung fields. Breath sounds are dulled in the posterior fields. No wheezes or rhonchi.  HEART: S1 and S2, irregularly irregular rhythm. Normal rate. Telemetry 80-90s + AFib. No Murmurs, gallops or rubs, no JVD,    Gastrointestinal: Soft, nontender, nondistended; +BS  EXTREMITIES: 1+edema b/l LE + LUE, 2+ edema RUE, +fracture in R arm with sling.   NEUROLOGY: Follows commands. Mildly lethargic  Skin: Erythema of left hip extending to back resolving      LABS:                        9.0    22.05 )-----------( 234      ( 09 Oct 2019 23:22 )             29.9     10-09    141  |  97  |  54<H>  ----------------------------<  204<H>  4.5   |  34<H>  |  1.10    Ca    9.2      09 Oct 2019 20:13  Phos  5.3     10-09  Mg     2.0     10-09    TPro  5.6<L>  /  Alb  2.1<L>  /  TBili  0.5  /  DBili  x   /  AST  59<H>  /  ALT  24  /  AlkPhos  93  10-09              RADIOLOGY & ADDITIONAL TESTS:    Imaging Personally Reviewed:    Consultant(s) Notes Reviewed:      Care Discussed with Consultants/Other Providers: Patient is a 85y old  Female who presents with a chief complaint of Fever (09 Oct 2019 16:24)      SUBJECTIVE / OVERNIGHT EVENTS: Patient was given another 80mg IV lasix over night. Patient evaluated at bedside this morning. Patient had labored breathing and continued to report difficulty breathing and discomfort. Requested to be washed and changed. Was pulling off EKG probes.     CONSTITUTIONAL: No weakness, fevers or chills  EYES/ENT: No visual changes;  No vertigo.  NECK: +pain while pointing to neck  RESPIRATORY: +Cough, +SOB. No hemoptysis, no wheezing  CARDIOVASCULAR: No chest pain or palpitations  GASTROINTESTINAL: No abdominal or epigastric pain. No nausea, vomiting, or hematemesis; No diarrhea or constipation. No melena or hematochezia.  GENITOURINARY: No dysuria, frequency or hematuria  NEUROLOGICAL: No numbness or weakness  MSK: +Pain around R arm fracture.  All other review of systems is negative unless indicated above.    MEDICATIONS  (STANDING):  ALBUTerol    90 MICROgram(s) HFA Inhaler 2 Puff(s) Inhalation every 6 hours  ALPRAZolam 0.5 milliGRAM(s) Oral two times a day  ascorbic acid 500 milliGRAM(s) Oral daily  atorvastatin 10 milliGRAM(s) Oral at bedtime  dextrose 5%. 1000 milliLiter(s) (50 mL/Hr) IV Continuous <Continuous>  dextrose 50% Injectable 12.5 Gram(s) IV Push once  entecavir 0.5 milliGRAM(s) Oral daily  ertapenem  IVPB 1000 milliGRAM(s) IV Intermittent every 24 hours  insulin glargine Injectable (LANTUS) 5 Unit(s) SubCutaneous at bedtime  insulin lispro (HumaLOG) corrective regimen sliding scale   SubCutaneous three times a day before meals  melatonin 5 milliGRAM(s) Oral at bedtime  metoprolol tartrate 50 milliGRAM(s) Oral two times a day  montelukast 10 milliGRAM(s) Oral daily  Nephro-bebeto 1 Tablet(s) Oral daily  NIFEdipine XL 30 milliGRAM(s) Oral daily  pantoprazole    Tablet 40 milliGRAM(s) Oral before breakfast  predniSONE   Tablet 60 milliGRAM(s) Oral daily  sertraline 50 milliGRAM(s) Oral daily  tiotropium 18 MICROgram(s) Capsule 1 Capsule(s) Inhalation daily    MEDICATIONS  (PRN):  acetaminophen   Tablet .. 650 milliGRAM(s) Oral every 6 hours PRN Mild Pain (1 - 3)  dextrose 40% Gel 15 Gram(s) Oral once PRN Blood Glucose LESS THAN 70 milliGRAM(s)/deciliter  docusate sodium 100 milliGRAM(s) Oral three times a day PRN Constipation  glucagon  Injectable 1 milliGRAM(s) IntraMuscular once PRN Glucose LESS THAN 70 milligrams/deciliter  guaiFENesin   Syrup  (Sugar-Free) 100 milliGRAM(s) Oral every 6 hours PRN Cough  oxyCODONE    5 mG/acetaminophen 325 mG 1 Tablet(s) Oral every 6 hours PRN Severe Pain (7 - 10)  polyethylene glycol 3350 17 Gram(s) Oral daily PRN Constiapation  senna 2 Tablet(s) Oral at bedtime PRN Constipation      Vital Signs Last 24 Hrs  T(C): 36.4 (10 Oct 2019 04:12), Max: 36.6 (09 Oct 2019 13:37)  T(F): 97.6 (10 Oct 2019 04:12), Max: 97.9 (09 Oct 2019 13:37)  HR: 94 (10 Oct 2019 04:12) (80 - 94)  BP: 140/85 (10 Oct 2019 04:12) (122/76 - 179/105)  BP(mean): --  RR: 18 (10 Oct 2019 04:12) (18 - 20)  SpO2: 97% (10 Oct 2019 04:12) (95% - 98%)  CAPILLARY BLOOD GLUCOSE      POCT Blood Glucose.: 178 mg/dL (09 Oct 2019 21:42)  POCT Blood Glucose.: 225 mg/dL (09 Oct 2019 17:36)  POCT Blood Glucose.: 176 mg/dL (09 Oct 2019 12:17)  POCT Blood Glucose.: 168 mg/dL (09 Oct 2019 08:47)    I&O's Summary    08 Oct 2019 07:01  -  09 Oct 2019 07:00  --------------------------------------------------------  IN: 150 mL / OUT: 550 mL / NET: -400 mL    09 Oct 2019 07:01  -  10 Oct 2019 06:52  --------------------------------------------------------  IN: 240 mL / OUT: 1650 mL / NET: -1410 mL        PHYSICAL EXAM:  Vital Signs Last 24 Hrs  T(C): 36.4 (10-10-19 @ 04:12)  T(F): 97.6 (10-10-19 @ 04:12), Max: 97.9 (10-09-19 @ 13:37)  HR: 94 (10-10-19 @ 04:12) (80 - 94)  BP: 140/85 (10-10-19 @ 04:12)  BP(mean): --  RR: 18 (10-10-19 @ 04:12) (18 - 20)  SpO2: 97% (10-10-19 @ 04:12) (95% - 98%)  Wt(kg): --    10-08 @ 07:01  -  10-09 @ 07:00  --------------------------------------------------------  IN: 150 mL / OUT: 550 mL / NET: -400 mL    10-09 @ 07:01  -  10-10 @ 06:52  --------------------------------------------------------  IN: 240 mL / OUT: 1650 mL / NET: -1410 mL      Constitutional: Elderly woman, uncomfortable in bed, groaning and pulling off EKG leads.   HEAD: +Mild ecchymoses throughout face from prior fall  EYES: +PERRL, no scleral icterus  ENT:  Dry oral mucosa  Neck: Soft and supple , no thyromegaly   RESPIRATORY: Breath sounds are clear bilaterally in anterior lung fields. Breath sounds are dulled in the posterior fields. No wheezes or rhonchi.  HEART: S1 and S2, irregularly irregular rhythm. Normal rate. Telemetry 80-90s + AFib. No Murmurs, gallops or rubs, no JVD,  Extremities warm and well perfused.  Gastrointestinal: Soft, nontender, nondistended; +BS  EXTREMITIES: 1+edema b/l LE + LUE, 2+ edema RUE, +fracture in R arm with sling.   NEUROLOGY: Follows commands. Mildly lethargic  Skin: Erythema of left hip extending to back resolving      LABS:                        9.0    22.05 )-----------( 234      ( 09 Oct 2019 23:22 )             29.9     10-09    141  |  97  |  54<H>  ----------------------------<  204<H>  4.5   |  34<H>  |  1.10    Ca    9.2      09 Oct 2019 20:13  Phos  5.3     10-09  Mg     2.0     10-09    TPro  5.6<L>  /  Alb  2.1<L>  /  TBili  0.5  /  DBili  x   /  AST  59<H>  /  ALT  24  /  AlkPhos  93  10-09              RADIOLOGY & ADDITIONAL TESTS:    Imaging Personally Reviewed:    Consultant(s) Notes Reviewed:      Care Discussed with Consultants/Other Providers:

## 2019-10-10 NOTE — PROGRESS NOTE ADULT - PROBLEM SELECTOR PLAN 5
- Blood pressure elevated, likely due to recovery from septic shock  - C/w nifedipine  - C/w lasix (as BP and electrolytes permit)  - on BB

## 2019-10-10 NOTE — CONSULT NOTE ADULT - SUBJECTIVE AND OBJECTIVE BOX
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 GOC has been addressed with pt and her family and they do not want to continue with Rituximab treatment. Rheumatology consulted for management of steroid.       Rheumatologic History:  -Recent admission 2019 for LE purpuric rash, anemia, KANIKA, UA with active sediment, and multilobar bronchopneumonia. s/p renal bx on 19 showing active crescentic glomerulonephritis, pauci-immune. Improved with steroids and rituxan 600mg IV induction on 2019.  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  -Positive serologies + include IVAN, P-ANCA, MPO, hypocomplementemia, prior positive dsDNA, beta-2 glycoprotein IgA, ACl IgM, +Hep B core Ab total  -Negative serologies - Cryoglobulin, rest of hepatitis panel, anti-GBM        PAST MEDICAL & SURGICAL HISTORY:  COPD (chronic obstructive pulmonary disease)  Peripheral vascular disease  Pulmonary hypertension  Rheumatoid arthritis  Osteoarthritis  Mitral regurgitation  H/O lymphoma  Hyperlipidemia  Chronic GERD  Chronic kidney disease: proteinuria  AF (atrial fibrillation)  Anemia in CKD (chronic kidney disease)  CHF (congestive heart failure)  HTN (hypertension)  History of total hip replacement, left  History of total knee replacement, bilateral      Review of Systems:  Gen:  No fevers/chills, weight loss  HEENT: No blurry vision, no difficulty swallowing  CVS: No chest pain/palpitations  Resp: No SOB/wheezing  GI: No N/V/C/D/abdominal pain  MSK:  Skin: No new rashes  Neuro: No headaches    MEDICATIONS  (STANDING):  ALBUTerol    90 MICROgram(s) HFA Inhaler 2 Puff(s) Inhalation every 6 hours  ALPRAZolam 0.5 milliGRAM(s) Oral two times a day  ascorbic acid 500 milliGRAM(s) Oral daily  atorvastatin 10 milliGRAM(s) Oral at bedtime  dextrose 5%. 1000 milliLiter(s) (50 mL/Hr) IV Continuous <Continuous>  dextrose 50% Injectable 12.5 Gram(s) IV Push once  entecavir 0.5 milliGRAM(s) Oral daily  ertapenem  IVPB 1000 milliGRAM(s) IV Intermittent every 24 hours  insulin glargine Injectable (LANTUS) 5 Unit(s) SubCutaneous at bedtime  insulin lispro (HumaLOG) corrective regimen sliding scale   SubCutaneous three times a day before meals  melatonin 5 milliGRAM(s) Oral at bedtime  metoprolol tartrate 50 milliGRAM(s) Oral two times a day  montelukast 10 milliGRAM(s) Oral daily  Nephro-bebeto 1 Tablet(s) Oral daily  NIFEdipine XL 30 milliGRAM(s) Oral daily  pantoprazole    Tablet 40 milliGRAM(s) Oral before breakfast  predniSONE   Tablet 60 milliGRAM(s) Oral daily  sertraline 50 milliGRAM(s) Oral daily  tiotropium 18 MICROgram(s) Capsule 1 Capsule(s) Inhalation daily    MEDICATIONS  (PRN):  acetaminophen   Tablet .. 650 milliGRAM(s) Oral every 6 hours PRN Mild Pain (1 - 3)  dextrose 40% Gel 15 Gram(s) Oral once PRN Blood Glucose LESS THAN 70 milliGRAM(s)/deciliter  docusate sodium 100 milliGRAM(s) Oral three times a day PRN Constipation  glucagon  Injectable 1 milliGRAM(s) IntraMuscular once PRN Glucose LESS THAN 70 milligrams/deciliter  guaiFENesin   Syrup  (Sugar-Free) 100 milliGRAM(s) Oral every 6 hours PRN Cough  oxyCODONE    5 mG/acetaminophen 325 mG 1 Tablet(s) Oral every 6 hours PRN Severe Pain (7 - 10)  polyethylene glycol 3350 17 Gram(s) Oral daily PRN Constiapation  senna 2 Tablet(s) Oral at bedtime PRN Constipation      Pertinent Medication history:      Allergies    hydrALAZINE (Unknown)  Keflex (Unknown)  penicillin (Rash)    Intolerances            SOCIAL HISTORY:      FAMILY HISTORY:  Family history of inclusion body myositis      Vital Signs Last 24 Hrs  T(C): 36.4 (10 Oct 2019 04:12), Max: 36.6 (09 Oct 2019 13:37)  T(F): 97.6 (10 Oct 2019 04:12), Max: 97.9 (09 Oct 2019 13:37)  HR: 94 (10 Oct 2019 04:12) (80 - 94)  BP: 140/85 (10 Oct 2019 04:12) (122/76 - 179/105)  BP(mean): --  RR: 18 (10 Oct 2019 04:12) (18 - 20)  SpO2: 97% (10 Oct 2019 04:12) (95% - 98%)    Daily     Daily Weight in k.2 (10 Oct 2019 07:15)    Physical Exam:  General: No apparent distress  HEENT: EOMI, MMM  CVS: +S1/S2, RRR  Resp: CTA b/l  GI: Soft, NT/ND  MSK:    Neuro: AAOx3  Skin: no  rashes    LABS:                        9.6    23.25 )-----------( 266      ( 10 Oct 2019 11:51 )             31.3     10-09    141  |  97  |  54<H>  ----------------------------<  204<H>  4.5   |  34<H>  |  1.10    Ca    9.2      09 Oct 2019 20:13  Phos  5.3     10-09  Mg     2.0     10-09    TPro  5.6<L>  /  Alb  2.1<L>  /  TBili  0.5  /  DBili  x   /  AST  59<H>  /  ALT  24  /  AlkPhos  93  10-09    Myeloperoxidase Antibody Assay (19 @ 16:15)    Myeloperoxidase Antibody Assay: 96.2 Units        RADIOLOGY & ADDITIONAL STUDIES:    < from: CT Abdomen and Pelvis w/ Oral Cont and w/ IV Cont (10.04.19 @ 19:10) >  FINDINGS:    LOWER CHEST: Cardiomegaly. Coronary arterial calcification. Small   bilateral pleural effusions, left greater than right. Bibasilar linear   atelectasis.    LIVER: Within normal limits.  BILE DUCTS: Normal caliber.  GALLBLADDER: Not well visualized. Dependent high attenuation may be   secondary to small stones and/or sludge.  SPLEEN: Within normal limits.  PANCREAS: Diffusely atrophic.  ADRENALS: Within normal limits.  KIDNEYS/URETERS: No hydronephrosis. A 4.9 cm cyst projects from the upper   pole of the right kidney. Additional subcentimeter hypodense foci in the   kidneys bilaterally are too small to characterize.    BLADDER: Within normal limits.  REPRODUCTIVE ORGANS: Uterus and adnexa within normal limits.    BOWEL: No bowel obstruction.   PERITONEUM: Small volume pelvic ascites.  VESSELS: Atherosclerotic calcification.  RETROPERITONEUM/LYMPH NODES: No lymphadenopathy. Large left   retroperitoneal hematoma, measuring 8.7 x 8.5 cm, and extending over a   length of 12.2 cm.  ABDOMINAL WALL: Diffuse anasarca. Status post right inguinal hernia   repair.  BONES: Status post ORIF of the left femur. Degenerative changes in the   spine. Compression fracture of the T12 vertebral body, unchanged.   Sclerotic focus in the sacrum, unchanged since 2016. Partially   imaged right humeral fracture.    IMPRESSION:     Large left retroperitoneal hematoma, increased in size since 2019.   Superimposed infection cannot be excluded..    Findings were discussed with Dr. Gaviria on 10/4/2019 at 8:56 PM by Dr. Max with read back confirmation.    < end of copied text >    < from: Xray Chest 1 View- PORTABLE-Routine (10.09.19 @ 08:58) >  FINDINGS:  Left pleural effusion, similar to prior.  The lungs are clear. No pneumothorax.   Cardiac size cannot accurately be assessed in this projection.     IMPRESSION: Left pleural effusion, similar to prior.    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 GOC has been addressed with pt and her family and they do not want to continue with Rituximab treatment. Rheumatology consulted for management of steroid.     As per family at the bedside, pt was doing well after previous discharge up until she had a fall and broke her right arm, was seen in urgent care and was discharged with a splint. Few days later when visiting nurse checked her Temp at home found to have fever of 102 and she was referred to ED. Report that she has been disoriented since then until today that she is more communicative. also family complains of respiratory distress over last few days which is better today after she received Lasix    Rheumatologic History:  Presented on 2019 for LE purpuric rash, anemia, KANIKA, UA with active sediment, and multilobar bronchopneumonia. S/p renal bx on 19 showing active crescentic glomerulonephritis, pauci-immune. Improved with steroids and Rituxan 600mg IV induction on 2019.  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  -Positive serologies + include IVAN, P-ANCA, MPO, hypocomplementemia, prior positive dsDNA, beta-2 glycoprotein IgA, ACl IgM, +Hep B core Ab total  -Negative serologies - Cryoglobulin, rest of hepatitis panel, anti-GBM        PAST MEDICAL & SURGICAL HISTORY:  COPD (chronic obstructive pulmonary disease)  Peripheral vascular disease  Pulmonary hypertension  Rheumatoid arthritis  Osteoarthritis  Mitral regurgitation  H/O lymphoma  Hyperlipidemia  Chronic GERD  Chronic kidney disease: proteinuria  AF (atrial fibrillation)  Anemia in CKD (chronic kidney disease)  CHF (congestive heart failure)  HTN (hypertension)  History of total hip replacement, left  History of total knee replacement, bilateral      Review of Systems:  Gen:  No fevers/chills, weight loss  HEENT: No blurry vision, no difficulty swallowing  CVS: No chest pain/palpitations  Resp: No SOB/wheezing  GI: No N/V/C/D/abdominal pain  MSK:  Skin: No new rashes  Neuro: No headaches    MEDICATIONS  (STANDING):  ALBUTerol    90 MICROgram(s) HFA Inhaler 2 Puff(s) Inhalation every 6 hours  ALPRAZolam 0.5 milliGRAM(s) Oral two times a day  ascorbic acid 500 milliGRAM(s) Oral daily  atorvastatin 10 milliGRAM(s) Oral at bedtime  dextrose 5%. 1000 milliLiter(s) (50 mL/Hr) IV Continuous <Continuous>  dextrose 50% Injectable 12.5 Gram(s) IV Push once  entecavir 0.5 milliGRAM(s) Oral daily  ertapenem  IVPB 1000 milliGRAM(s) IV Intermittent every 24 hours  insulin glargine Injectable (LANTUS) 5 Unit(s) SubCutaneous at bedtime  insulin lispro (HumaLOG) corrective regimen sliding scale   SubCutaneous three times a day before meals  melatonin 5 milliGRAM(s) Oral at bedtime  metoprolol tartrate 50 milliGRAM(s) Oral two times a day  montelukast 10 milliGRAM(s) Oral daily  Nephro-bebeto 1 Tablet(s) Oral daily  NIFEdipine XL 30 milliGRAM(s) Oral daily  pantoprazole    Tablet 40 milliGRAM(s) Oral before breakfast  predniSONE   Tablet 60 milliGRAM(s) Oral daily  sertraline 50 milliGRAM(s) Oral daily  tiotropium 18 MICROgram(s) Capsule 1 Capsule(s) Inhalation daily    MEDICATIONS  (PRN):  acetaminophen   Tablet .. 650 milliGRAM(s) Oral every 6 hours PRN Mild Pain (1 - 3)  dextrose 40% Gel 15 Gram(s) Oral once PRN Blood Glucose LESS THAN 70 milliGRAM(s)/deciliter  docusate sodium 100 milliGRAM(s) Oral three times a day PRN Constipation  glucagon  Injectable 1 milliGRAM(s) IntraMuscular once PRN Glucose LESS THAN 70 milligrams/deciliter  guaiFENesin   Syrup  (Sugar-Free) 100 milliGRAM(s) Oral every 6 hours PRN Cough  oxyCODONE    5 mG/acetaminophen 325 mG 1 Tablet(s) Oral every 6 hours PRN Severe Pain (7 - 10)  polyethylene glycol 3350 17 Gram(s) Oral daily PRN Constiapation  senna 2 Tablet(s) Oral at bedtime PRN Constipation      Pertinent Medication history:      Allergies    hydrALAZINE (Unknown)  Keflex (Unknown)  penicillin (Rash)    Intolerances            SOCIAL HISTORY:      FAMILY HISTORY:  Family history of inclusion body myositis      Vital Signs Last 24 Hrs  T(C): 36.4 (10 Oct 2019 04:12), Max: 36.6 (09 Oct 2019 13:37)  T(F): 97.6 (10 Oct 2019 04:12), Max: 97.9 (09 Oct 2019 13:37)  HR: 94 (10 Oct 2019 04:12) (80 - 94)  BP: 140/85 (10 Oct 2019 04:12) (122/76 - 179/105)  BP(mean): --  RR: 18 (10 Oct 2019 04:12) (18 - 20)  SpO2: 97% (10 Oct 2019 04:12) (95% - 98%)    Daily     Daily Weight in k.2 (10 Oct 2019 07:15)    Physical Exam:  General: No apparent distress  HEENT: EOMI, MMM  CVS: +S1/S2, RRR  Resp: CTA b/l  GI: Soft, NT/ND  MSK:    Neuro: AAOx3  Skin: no  rashes    LABS:                        9.6    23.25 )-----------( 266      ( 10 Oct 2019 11:51 )             31.3     10-09    141  |  97  |  54<H>  ----------------------------<  204<H>  4.5   |  34<H>  |  1.10    Ca    9.2      09 Oct 2019 20:13  Phos  5.3     10-09  Mg     2.0     10-09    TPro  5.6<L>  /  Alb  2.1<L>  /  TBili  0.5  /  DBili  x   /  AST  59<H>  /  ALT  24  /  AlkPhos  93  10-09    Myeloperoxidase Antibody Assay (19 @ 16:15)    Myeloperoxidase Antibody Assay: 96.2 Units        RADIOLOGY & ADDITIONAL STUDIES:    < from: CT Abdomen and Pelvis w/ Oral Cont and w/ IV Cont (10.04.19 @ 19:10) >  FINDINGS:    LOWER CHEST: Cardiomegaly. Coronary arterial calcification. Small   bilateral pleural effusions, left greater than right. Bibasilar linear   atelectasis.    LIVER: Within normal limits.  BILE DUCTS: Normal caliber.  GALLBLADDER: Not well visualized. Dependent high attenuation may be   secondary to small stones and/or sludge.  SPLEEN: Within normal limits.  PANCREAS: Diffusely atrophic.  ADRENALS: Within normal limits.  KIDNEYS/URETERS: No hydronephrosis. A 4.9 cm cyst projects from the upper   pole of the right kidney. Additional subcentimeter hypodense foci in the   kidneys bilaterally are too small to characterize.    BLADDER: Within normal limits.  REPRODUCTIVE ORGANS: Uterus and adnexa within normal limits.    BOWEL: No bowel obstruction.   PERITONEUM: Small volume pelvic ascites.  VESSELS: Atherosclerotic calcification.  RETROPERITONEUM/LYMPH NODES: No lymphadenopathy. Large left   retroperitoneal hematoma, measuring 8.7 x 8.5 cm, and extending over a   length of 12.2 cm.  ABDOMINAL WALL: Diffuse anasarca. Status post right inguinal hernia   repair.  BONES: Status post ORIF of the left femur. Degenerative changes in the   spine. Compression fracture of the T12 vertebral body, unchanged.   Sclerotic focus in the sacrum, unchanged since 2016. Partially   imaged right humeral fracture.    IMPRESSION:     Large left retroperitoneal hematoma, increased in size since 2019.   Superimposed infection cannot be excluded..    Findings were discussed with Dr. Gaviria on 10/4/2019 at 8:56 PM by Dr. Max with read back confirmation.    < end of copied text >    < from: Xray Chest 1 View- PORTABLE-Routine (10.09.19 @ 08:58) >  FINDINGS:  Left pleural effusion, similar to prior.  The lungs are clear. No pneumothorax.   Cardiac size cannot accurately be assessed in this projection.     IMPRESSION: Left pleural effusion, similar to prior.    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 GOC has been addressed with pt and her family and they do not want to continue with Rituximab treatment. Rheumatology consulted for management of steroid.     As per family at the bedside, pt was doing well after previous discharge up until she had a fall and broke her right arm, was seen in urgent care and was discharged with a splint. Few days later when visiting nurse checked her Temp at home found to have fever of 102 and she was referred to ED. Report that she has been disoriented since then until today that she is more communicative. also family complains of respiratory distress over last few days which is better today after she received Lasix    Rheumatologic History:  Presented on 2019 for LE purpuric rash, anemia, KANIKA, UA with active sediment, and multilobar bronchopneumonia. S/p renal bx on 19 showing active crescentic glomerulonephritis, pauci-immune. 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  -Positive serologies + include IVAN, P-ANCA, MPO, hypocomplementemia, prior positive dsDNA, beta-2 glycoprotein IgA, ACl IgM, +Hep B core Ab total  -Negative serologies - Cryoglobulin, rest of hepatitis panel, anti-GBM    PAST MEDICAL & SURGICAL HISTORY:  COPD (chronic obstructive pulmonary disease)  Peripheral vascular disease  Pulmonary hypertension  Rheumatoid arthritis  Osteoarthritis  Mitral regurgitation  H/O lymphoma  Hyperlipidemia  Chronic GERD  Chronic kidney disease: proteinuria  AF (atrial fibrillation)  Anemia in CKD (chronic kidney disease)  CHF (congestive heart failure)  HTN (hypertension)  History of total hip replacement, left  History of total knee replacement, bilateral      Review of Systems:  Gen:  No fevers/chills, weight loss  HEENT: No blurry vision, no difficulty swallowing  CVS: No chest pain/palpitations  Resp: No SOB/wheezing  GI: No N/V/C/D/abdominal pain  MSK:  Skin: No new rashes  Neuro: No headaches    MEDICATIONS  (STANDING):  ALBUTerol    90 MICROgram(s) HFA Inhaler 2 Puff(s) Inhalation every 6 hours  ALPRAZolam 0.5 milliGRAM(s) Oral two times a day  ascorbic acid 500 milliGRAM(s) Oral daily  atorvastatin 10 milliGRAM(s) Oral at bedtime  dextrose 5%. 1000 milliLiter(s) (50 mL/Hr) IV Continuous <Continuous>  dextrose 50% Injectable 12.5 Gram(s) IV Push once  entecavir 0.5 milliGRAM(s) Oral daily  ertapenem  IVPB 1000 milliGRAM(s) IV Intermittent every 24 hours  insulin glargine Injectable (LANTUS) 5 Unit(s) SubCutaneous at bedtime  insulin lispro (HumaLOG) corrective regimen sliding scale   SubCutaneous three times a day before meals  melatonin 5 milliGRAM(s) Oral at bedtime  metoprolol tartrate 50 milliGRAM(s) Oral two times a day  montelukast 10 milliGRAM(s) Oral daily  Nephro-bebeto 1 Tablet(s) Oral daily  NIFEdipine XL 30 milliGRAM(s) Oral daily  pantoprazole    Tablet 40 milliGRAM(s) Oral before breakfast  predniSONE   Tablet 60 milliGRAM(s) Oral daily  sertraline 50 milliGRAM(s) Oral daily  tiotropium 18 MICROgram(s) Capsule 1 Capsule(s) Inhalation daily    MEDICATIONS  (PRN):  acetaminophen   Tablet .. 650 milliGRAM(s) Oral every 6 hours PRN Mild Pain (1 - 3)  dextrose 40% Gel 15 Gram(s) Oral once PRN Blood Glucose LESS THAN 70 milliGRAM(s)/deciliter  docusate sodium 100 milliGRAM(s) Oral three times a day PRN Constipation  glucagon  Injectable 1 milliGRAM(s) IntraMuscular once PRN Glucose LESS THAN 70 milligrams/deciliter  guaiFENesin   Syrup  (Sugar-Free) 100 milliGRAM(s) Oral every 6 hours PRN Cough  oxyCODONE    5 mG/acetaminophen 325 mG 1 Tablet(s) Oral every 6 hours PRN Severe Pain (7 - 10)  polyethylene glycol 3350 17 Gram(s) Oral daily PRN Constiapation  senna 2 Tablet(s) Oral at bedtime PRN Constipation      Pertinent Medication history:      Allergies    hydrALAZINE (Unknown)  Keflex (Unknown)  penicillin (Rash)    FAMILY HISTORY:  Family history of inclusion body myositis      Vital Signs Last 24 Hrs  T(C): 36.4 (10 Oct 2019 04:12), Max: 36.6 (09 Oct 2019 13:37)  T(F): 97.6 (10 Oct 2019 04:12), Max: 97.9 (09 Oct 2019 13:37)  HR: 94 (10 Oct 2019 04:12) (80 - 94)  BP: 140/85 (10 Oct 2019 04:12) (122/76 - 179/105)  BP(mean): --  RR: 18 (10 Oct 2019 04:12) (18 - 20)  SpO2: 97% (10 Oct 2019 04:12) (95% - 98%)    Daily     Daily Weight in k.2 (10 Oct 2019 07:15)    Physical Exam:  General: elderly fragile female, on NC oxygen, generalized anasarca    HEENT: EOMI, MMM  CVS: +S1/S2, RRR  Resp: Bibasilar crackles   GI: Soft, NT/ND  MSK:   Right arm in splint  no synovitis   Neuro: AAOx3  Skin: no rashes    LABS:                        9.6    23.25 )-----------( 266      ( 10 Oct 2019 11:51 )             31.3     10-09    141  |  97  |  54<H>  ----------------------------<  204<H>  4.5   |  34<H>  |  1.10    Ca    9.2      09 Oct 2019 20:13  Phos  5.3     10-09  Mg     2.0     10-09    TPro  5.6<L>  /  Alb  2.1<L>  /  TBili  0.5  /  DBili  x   /  AST  59<H>  /  ALT  24  /  AlkPhos  93  10-09    Myeloperoxidase Antibody Assay (19 @ 16:15)    Myeloperoxidase Antibody Assay: 96.2 Units        RADIOLOGY & ADDITIONAL STUDIES:    < from: CT Abdomen and Pelvis w/ Oral Cont and w/ IV Cont (10.04.19 @ 19:10) >  FINDINGS:    LOWER CHEST: Cardiomegaly. Coronary arterial calcification. Small   bilateral pleural effusions, left greater than right. Bibasilar linear   atelectasis.    LIVER: Within normal limits.  BILE DUCTS: Normal caliber.  GALLBLADDER: Not well visualized. Dependent high attenuation may be   secondary to small stones and/or sludge.  SPLEEN: Within normal limits.  PANCREAS: Diffusely atrophic.  ADRENALS: Within normal limits.  KIDNEYS/URETERS: No hydronephrosis. A 4.9 cm cyst projects from the upper   pole of the right kidney. Additional subcentimeter hypodense foci in the   kidneys bilaterally are too small to characterize.    BLADDER: Within normal limits.  REPRODUCTIVE ORGANS: Uterus and adnexa within normal limits.    BOWEL: No bowel obstruction.   PERITONEUM: Small volume pelvic ascites.  VESSELS: Atherosclerotic calcification.  RETROPERITONEUM/LYMPH NODES: No lymphadenopathy. Large left   retroperitoneal hematoma, measuring 8.7 x 8.5 cm, and extending over a   length of 12.2 cm.  ABDOMINAL WALL: Diffuse anasarca. Status post right inguinal hernia   repair.  BONES: Status post ORIF of the left femur. Degenerative changes in the   spine. Compression fracture of the T12 vertebral body, unchanged.   Sclerotic focus in the sacrum, unchanged since 2016. Partially   imaged right humeral fracture.    IMPRESSION:     Large left retroperitoneal hematoma, increased in size since 2019.   Superimposed infection cannot be excluded..    Findings were discussed with Dr. Gaviria on 10/4/2019 at 8:56 PM by Dr. Max with read back confirmation.    < end of copied text >    < from: Xray Chest 1 View- PORTABLE-Routine (10.09.19 @ 08:58) >  FINDINGS:  Left pleural effusion, similar to prior.  The lungs are clear. No pneumothorax.   Cardiac size cannot accurately be assessed in this projection.     IMPRESSION: Left pleural effusion, similar to prior.    < end of copied text >

## 2019-10-10 NOTE — PROGRESS NOTE ADULT - PROBLEM SELECTOR PLAN 3
- 2/2 E. coli bacteremia and parainfluenza virus  - Source possibly cellulitis of infected left hip joint vs hematoma vs intraabdominal infection  - No longer hypotensive  - No plan to drain L RP hematoma as per family wishes  - C/w ertapenem   - Monitor vitals  - Aim to taper steroids  - id recs appreciated.

## 2019-10-10 NOTE — PROGRESS NOTE ADULT - PROBLEM SELECTOR PLAN 10
- DVT: SCD's only  very poor prognosis, DNR/DNI  - Palliative care consulted - DVT: SCD's only  very poor prognosis, DNR/DNI  - Palliative care consulted, [facility] meeting on 10/10, 10/14

## 2019-10-10 NOTE — PROGRESS NOTE ADULT - PROBLEM SELECTOR PLAN 3
- 2/2 E. coli bacteremia and parainfluenza virus  - Source possibly cellulitis of infected left hip joint vs hematoma vs intraabdominal infection given recent UTI  - Bcx NGTD  - No longer hypotensive  - No plan to drain L RP hematoma as per family wishes  - C/w ertapenem   - Monitor vitals  - Aim to taper steroids - 2/2 E. coli bacteremia and parainfluenza virus  - Source possibly cellulitis of infected left hip joint vs hematoma vs intraabdominal infection  - No longer hypotensive  - No plan to drain L RP hematoma as per family wishes  - C/w ertapenem   - Monitor vitals  - Aim to taper steroids

## 2019-10-10 NOTE — PROGRESS NOTE ADULT - PROBLEM SELECTOR PLAN 2
- Due to hypoxic respiratory failure from fluid overload  - AC held due to hematoma/fall  - C/w metoprolol 50, uptitrate as necessary  - Digoxin discontinued  - Monitor HR  - risk of ac outweights benefit.

## 2019-10-10 NOTE — PROGRESS NOTE ADULT - PROBLEM SELECTOR PLAN 10
- DVT: SCD's only  very poor prognosis, DNR/DNI  - Palliative care consulted, [facility] meeting on 10/10, 10/14

## 2019-10-10 NOTE — PROGRESS NOTE ADULT - ASSESSMENT
imp/rx:  L RP collection--may be infected with the same e. coli as in blood.  No evidence of uti.  no plans for intervention.  elevated wbc count either from infection, RP collection alone and/or steroids.    continue invanz--endpoint uncertain.

## 2019-10-10 NOTE — PROGRESS NOTE ADULT - ATTENDING COMMENTS
Patient seen and examined today. I agree with the above findings, assessment, and plan with the following additions and exceptions:     CXR with mildly improved left sided pleural effusion. Lasix 80 mg ivp given yesterday with good response. Continue daily. Monitor BMP and BP prior to administration. Patient must get OOBTC with assistance daily. Fall precautions. Patient not cooperating with incentive spirometer or acapella valve. Repeat CXR for tomorrow.   Continue BB. Hold digoxin as rates better controlled and patient to get additional diuretics which can have effect on electrolytes and renal function.   C/w nifedipine for afterload reduction. Apparently patient had adverse reaction to hydral.   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 Rheum for steroid taper.   Prognosis poor. The patient's critical status was discussed at length with patient's son Alicia. Per GOC, no invasive measures. Will not pursue hematoma aspiration. Family would like to see how she does till Monday before decided on hospice.   Rest of plan as above

## 2019-10-10 NOTE — PROGRESS NOTE ADULT - PROBLEM SELECTOR PLAN 6
- family meeting held with medicine team and patient's three children  - discussed current medical state, update provided by medical team  - family's goal is to focus on comfort, but they would like to see her clinical progression over the next three days  - discussed continuation of abx if needed  - RANDAL in chart reviewed  - introduced concept of hospice, family was familiar as patient's daughter was in hospice  - discussed PCU, will discuss further at next meeting  - plan to meet Monday at 10:30AM    > 16 mins spent on above

## 2019-10-10 NOTE — PROGRESS NOTE ADULT - PROBLEM SELECTOR PLAN 3
Judicious dosing of opiates if used, used no percocet in 24 hours for mild abd pain; consider dilaudid 0.2mg IV Q3 PRN mod-severe pain or dyspnea

## 2019-10-10 NOTE — CONSULT NOTE ADULT - ATTENDING COMMENTS
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  8/13-8/25/2019 - admitted to [facility] for acute respiratory failure secondary to renal-pulmonary syndrome  9/6-9/27/2019 - admitted to [facility] for E coli UTI w/ bacteremia and left psoas hematoma secondary to recurrent falls while on coumadin  10/1/2019 - seen in [facility] ER for right displaced humerus fracture and placed in a Ruth brace    admitted on 10/3 for fever and parainfluenza on RVP  denies ever having a colonoscopy    afeb  mild tachycardia  soft / NT / ND  left hip is erythematous, tender and indurated    WBC = 21  FS - 249-348  BCx (10/3 x 2) - E coli    CT abd/pelvis w/ contrast (10/4/2019) - left psoas hematoma is similar in size to 9/6 CT scan from [place], but it now seems more liquified. There is no rim enhancement to suggest abscess.    left psoas hematoma  -no indication for surgical evacuation of the left psoas hematoma  -superinfection of the left psoas hematoma seems unlikely, but if ID has concern for this, it can be aspirated or drained by IR    recurrent falls  -reconsider risks vs benefits of anticoagulation      RECONSULT SURGERY PRN
concerned at the e. coli bacteremia and significant inflammation L flank and hip.  Perhaps seeded this region now or previously.  Issue is whether there may be an infected RP hematoma or septic joint etc.  We need further imaging--suggest CT of the abd and pelvis to make further decisions.  continue meropenem for now.
85M Hx SLE, ANCA Vasculitis on chronic steroid, Lymphoma, HFpEF 60%, admitted with positive Parainfluenza Virus, E.Coli Bacteremia, A.Fib RVR and Hypotension.   - Pt received Lopressor and Norvasc for BP/HR control now in hypotension. She responded to 500cc IVB with stable HR and BP now.  - Continue empiric ABx for E. coli bacteremia and maintenance IV Fluid.   - Code status DNR/DNI confirmed.
Patient seen and examined at bedside. Agree with assessment and plan as stated above. Please call [phone] for any questions or concerns
Thank you for reaching out to the Palliative Medicine team.  The patient's symptoms are well controlled  Please schedule a family meeting to co-facilitate an encounter and address medical decision making for this patient.  Please determine who the decision maker (patient or surrogate decision maker according to the Family Health Care Decisions Act)  would like present at this meeting.  Our service has availability from Monday through Friday between the hours of 10am and 330 pm.  Once the meeting has been scheduled, please contact the Palliative Care Unit at extension [phone] so that we can assign based upon provider availability  If consultative input is required, then please have the consultants available to deliver key recommendations.  To be mindful of consultant workflow, you can consider limiting their involvement to the first ten minutes of the meeting (e.g. from 10 to 10:10 am in the event a  10-11 block is selected).    If the requisite consultants cannot be present, please be prepared to to communicate said recommendations.  Please secure a room that can accommodate the participants of this meeting.  If there are participants/stakeholders with limited English proficiency, please obtain interpretation services (telephonic or video) prior to the meeting to streamline communication during the encounter and enhance understanding.   To maximize opportunities for success, we recommend meeting with the palliative medicine team 10-15 minutes before prior to the scheduled to generate actionable objectives and determine any potential  barriers to expeditious decision making.  Thank you for reaching out to our team and we look forward to co-facilitating the meeting with you.

## 2019-10-10 NOTE — PROGRESS NOTE ADULT - PROBLEM SELECTOR PLAN 7
- S/p rituximab  - Prednisone course clarified, aim to taper prednisone - S/p rituximab  - Prednisone course clarified, aim to taper prednisone  - F/u w/Rheumatology

## 2019-10-10 NOTE — PROGRESS NOTE ADULT - ASSESSMENT
86 yo female with a PMH of CHF, ANCA vasculitis, HTN, Afib, DM2, COPD, and chronic lymphoma who was brought in for 102.6 fever, now with  sepsis secondary to ecoli bacteremia, parainfluenza virus c/b afib found to have increasing RP hematoma concern for possible source of infection. GOC meeting on Thursday 10AM. Family notes that they do not want to pursue L RP hematoma drainage. 84 yo female with a PMH of CHF, ANCA vasculitis, HTN, Afib, DM2, COPD, and chronic lymphoma who was brought in for 102.6 fever, now with  sepsis secondary to ecoli bacteremia, parainfluenza virus c/b afib found to have increasing RP hematoma concern for possible source of infection. GOC meeting on Thursday 10AM. Family notes that they do not want to pursue L RP hematoma drainage. GOC meeting today, family demonstrated that they wanted to see how patient condition progresses, reevaluation/decision on Monday regarding patient hospital course, disposition.

## 2019-10-10 NOTE — PROGRESS NOTE ADULT - ATTENDING COMMENTS
CXR with mildly improved left sided pleural effusion. Lasix 80 mg ivp given yesterday with good response. Continue daily. Monitor BMP and BP prior to administration. Patient must get OOBTC with assistance daily. Fall precautions. Patient not cooperating with incentive spirometer or acapella valve. Repeat CXR for tomorrow.   Continue BB. Hold digoxin as rates better controlled and patient to get additional diuretics which can have effect on electrolytes and renal function.   C/w nifedipine for afterload reduction. Apparently patient had adverse reaction to hydral.   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 Rheum for steroid taper.   Prognosis poor. The patient's critical status was discussed at length with patient's son Alicia. Per GOC, no invasive measures. Will not pursue hematoma aspiration. Family would like to see how she does till Monday before decided on hospice.   Rest of plan as above

## 2019-10-10 NOTE — PROGRESS NOTE ADULT - PROBLEM SELECTOR PLAN 9
- MOLST Form signed and in chart, DNR/DNI  - Pain meds as needed to maintain comfort  - Family wishes to continue care through the weekend and meet Palliative care on Monday to discuss further management with comfort as the priority

## 2019-10-10 NOTE — PROGRESS NOTE ADULT - ASSESSMENT
84 yo female with a PMH of CHF, ANCA vasculitis, HTN, Afib, DM2, COPD, and chronic lymphoma who was brought in for 102.6 fever, now with  sepsis secondary to ecoli bacteremia, parainfluenza virus c/b afib found to have increasing RP hematoma concern for possible source of infection. GOC meeting on Thursday 10AM. Family notes that they do not want to pursue L RP hematoma drainage. GOC meeting today, family demonstrated that they wanted to see how patient condition progresses, reevaluation/decision on Monday regarding patient hospital course, disposition.

## 2019-10-11 NOTE — PROGRESS NOTE ADULT - ASSESSMENT
85F PMH of CHF, ANCA vasculitis, HTN, Afib, DM2, COPD, and chronic lymphoma who was brought in for 102.6 fever, now with  sepsis secondary to ecoli bacteremia, parainfluenza virus c/b afib found to have increasing RP hematoma concern for possible source of infection. GOC meeting on Thursday 10AM. Family notes that they do not want to pursue L RP hematoma drainage. GOC meeting today, family demonstrated that they wanted to monitor patient clinical status, reevaluation/decision on Monday regarding patient hospital course, disposition.

## 2019-10-11 NOTE — PROGRESS NOTE ADULT - PROBLEM SELECTOR PLAN 3
- 2/2 E. coli bacteremia and parainfluenza virus  - Source possibly cellulitis of infected left hip joint vs hematoma vs intraabdominal infection  - No longer hypotensive  - No plan to drain L RP hematoma as per family wishes  - C/w ertapenem   - Monitor vitals  - Aim to taper steroids

## 2019-10-11 NOTE — PROGRESS NOTE ADULT - ASSESSMENT
imp/rx:  No fevers.  e. coli bacteremia controlled.  Suspect infected L RP collection but no plans to aspirate.  if in goals of care, continue the invanz.    id covers if questions

## 2019-10-11 NOTE — PROGRESS NOTE ADULT - PROBLEM SELECTOR PLAN 10
- DVT: SCD's only  - Prognosis: Very poor, DNR/DNI  - Palliative care consulted, [facility] meeting on 10/10, 10/14

## 2019-10-11 NOTE — PROGRESS NOTE ADULT - SUBJECTIVE AND OBJECTIVE BOX
INFECTIOUS DISEASES FOLLOW UP--Ad Espinal MD  Pager [phone]    This is a follow up note for this  85y Female with  e. coli sepsis.  L RP collection    goals of care being established    Further ROS: limited    Allergies    hydrALAZINE (Unknown)  Keflex (Unknown)  penicillin (Rash)    ANTIBIOTICS/RELEVANT:  antimicrobials  entecavir 0.5 milliGRAM(s) Oral daily  ertapenem  IVPB 1000 milliGRAM(s) IV Intermittent every 24 hours    immunologic:    OTHER:  acetaminophen   Tablet .. 650 milliGRAM(s) Oral every 6 hours PRN  ALBUTerol    90 MICROgram(s) HFA Inhaler 2 Puff(s) Inhalation every 6 hours  ALPRAZolam 0.5 milliGRAM(s) Oral two times a day  ascorbic acid 500 milliGRAM(s) Oral daily  atorvastatin 10 milliGRAM(s) Oral at bedtime  dextrose 40% Gel 15 Gram(s) Oral once PRN  dextrose 5%. 1000 milliLiter(s) IV Continuous <Continuous>  dextrose 50% Injectable 12.5 Gram(s) IV Push once  docusate sodium 100 milliGRAM(s) Oral three times a day PRN  glucagon  Injectable 1 milliGRAM(s) IntraMuscular once PRN  guaiFENesin   Syrup  (Sugar-Free) 100 milliGRAM(s) Oral every 6 hours PRN  insulin glargine Injectable (LANTUS) 5 Unit(s) SubCutaneous at bedtime  insulin lispro (HumaLOG) corrective regimen sliding scale   SubCutaneous three times a day before meals  melatonin 5 milliGRAM(s) Oral at bedtime  metoprolol tartrate 50 milliGRAM(s) Oral two times a day  montelukast 10 milliGRAM(s) Oral daily  Nephro-bebeto 1 Tablet(s) Oral daily  NIFEdipine XL 30 milliGRAM(s) Oral daily  oxyCODONE    5 mG/acetaminophen 325 mG 1 Tablet(s) Oral every 6 hours PRN  pantoprazole    Tablet 40 milliGRAM(s) Oral before breakfast  polyethylene glycol 3350 17 Gram(s) Oral daily PRN  predniSONE   Tablet 60 milliGRAM(s) Oral daily  senna 2 Tablet(s) Oral at bedtime PRN  sertraline 50 milliGRAM(s) Oral daily  tiotropium 18 MICROgram(s) Capsule 1 Capsule(s) Inhalation daily      Objective:  Vital Signs Last 24 Hrs  T(C): 36.7 (11 Oct 2019 04:18), Max: 36.7 (11 Oct 2019 04:18)  T(F): 98 (11 Oct 2019 04:18), Max: 98 (11 Oct 2019 04:18)  HR: 89 (11 Oct 2019 04:18) (68 - 92)  BP: 132/84 (11 Oct 2019 04:18) (114/75 - 132/84)  BP(mean): --  RR: 18 (11 Oct 2019 04:18) (18 - 18)  SpO2: 99% (11 Oct 2019 04:18) (95% - 100%)    PHYSICAL EXAM: frail  Constitutional:no acute distress  Ear/Nose/Throat: no oral lesions, 	  Respiratory: bs BL  Cardiovascular: S1S2    LABS:                        9.6    23.25 )-----------( 266      ( 10 Oct 2019 11:51 )             31.3     10-10    143  |  94<L>  |  52<H>  ----------------------------<  216<H>  4.0   |  37<H>  |  0.88    Ca    9.1      10 Oct 2019 11:51  Phos  4.7     10-10  Mg     1.9     10-10    TPro  5.6<L>  /  Alb  2.8<L>  /  TBili  0.7  /  DBili  x   /  AST  14  /  ALT  18  /  AlkPhos  106  10-10    MICROBIOLOGY: no new micro

## 2019-10-11 NOTE — PROGRESS NOTE ADULT - SUBJECTIVE AND OBJECTIVE BOX
Patient is a 85y old  Female who presents with a chief complaint of Fever (10 Oct 2019 16:35)      SUBJECTIVE / OVERNIGHT EVENTS:    CONSTITUTIONAL: No weakness, fevers or chills  EYES/ENT: No visual changes;  No vertigo or throat pain   NECK: No pain or stiffness  RESPIRATORY: No cough, wheezing, hemoptysis; No shortness of breath  CARDIOVASCULAR: No chest pain or palpitations  GASTROINTESTINAL: No abdominal or epigastric pain. No nausea, vomiting, or hematemesis; No diarrhea or constipation. No melena or hematochezia.  GENITOURINARY: No dysuria, frequency or hematuria  NEUROLOGICAL: No numbness or weakness  SKIN: No itching, burning, rashes, or lesions   All other review of systems is negative unless indicated above.    MEDICATIONS  (STANDING):  ALBUTerol    90 MICROgram(s) HFA Inhaler 2 Puff(s) Inhalation every 6 hours  ALPRAZolam 0.5 milliGRAM(s) Oral two times a day  ascorbic acid 500 milliGRAM(s) Oral daily  atorvastatin 10 milliGRAM(s) Oral at bedtime  dextrose 5%. 1000 milliLiter(s) (50 mL/Hr) IV Continuous <Continuous>  dextrose 50% Injectable 12.5 Gram(s) IV Push once  entecavir 0.5 milliGRAM(s) Oral daily  ertapenem  IVPB 1000 milliGRAM(s) IV Intermittent every 24 hours  insulin glargine Injectable (LANTUS) 5 Unit(s) SubCutaneous at bedtime  insulin lispro (HumaLOG) corrective regimen sliding scale   SubCutaneous three times a day before meals  melatonin 5 milliGRAM(s) Oral at bedtime  metoprolol tartrate 50 milliGRAM(s) Oral two times a day  montelukast 10 milliGRAM(s) Oral daily  Nephro-bebeto 1 Tablet(s) Oral daily  NIFEdipine XL 30 milliGRAM(s) Oral daily  pantoprazole    Tablet 40 milliGRAM(s) Oral before breakfast  predniSONE   Tablet 60 milliGRAM(s) Oral daily  sertraline 50 milliGRAM(s) Oral daily  tiotropium 18 MICROgram(s) Capsule 1 Capsule(s) Inhalation daily    MEDICATIONS  (PRN):  acetaminophen   Tablet .. 650 milliGRAM(s) Oral every 6 hours PRN Mild Pain (1 - 3)  dextrose 40% Gel 15 Gram(s) Oral once PRN Blood Glucose LESS THAN 70 milliGRAM(s)/deciliter  docusate sodium 100 milliGRAM(s) Oral three times a day PRN Constipation  glucagon  Injectable 1 milliGRAM(s) IntraMuscular once PRN Glucose LESS THAN 70 milligrams/deciliter  guaiFENesin   Syrup  (Sugar-Free) 100 milliGRAM(s) Oral every 6 hours PRN Cough  oxyCODONE    5 mG/acetaminophen 325 mG 1 Tablet(s) Oral every 6 hours PRN Severe Pain (7 - 10)  polyethylene glycol 3350 17 Gram(s) Oral daily PRN Constiapation  senna 2 Tablet(s) Oral at bedtime PRN Constipation      Vital Signs Last 24 Hrs  T(C): 36.7 (11 Oct 2019 04:18), Max: 36.7 (11 Oct 2019 04:18)  T(F): 98 (11 Oct 2019 04:18), Max: 98 (11 Oct 2019 04:18)  HR: 89 (11 Oct 2019 04:18) (68 - 92)  BP: 132/84 (11 Oct 2019 04:18) (114/75 - 132/84)  BP(mean): --  RR: 18 (11 Oct 2019 04:18) (18 - 18)  SpO2: 99% (11 Oct 2019 04:18) (95% - 100%)  CAPILLARY BLOOD GLUCOSE      POCT Blood Glucose.: 234 mg/dL (10 Oct 2019 21:49)  POCT Blood Glucose.: 187 mg/dL (10 Oct 2019 17:13)  POCT Blood Glucose.: 265 mg/dL (10 Oct 2019 12:48)  POCT Blood Glucose.: 194 mg/dL (10 Oct 2019 09:37)  POCT Blood Glucose.: 193 mg/dL (10 Oct 2019 07:53)    I&O's Summary    10 Oct 2019 07:01  -  11 Oct 2019 07:00  --------------------------------------------------------  IN: 170 mL / OUT: 1600 mL / NET: -1430 mL        PHYSICAL EXAM:  Vital Signs Last 24 Hrs  T(C): 36.7 (10-11-19 @ 04:18)  T(F): 98 (10-11-19 @ 04:18), Max: 98 (10-11-19 @ 04:18)  HR: 89 (10-11-19 @ 04:18) (68 - 92)  BP: 132/84 (10-11-19 @ 04:18)  BP(mean): --  RR: 18 (10-11-19 @ 04:18) (18 - 18)  SpO2: 99% (10-11-19 @ 04:18) (95% - 100%)  Wt(kg): --    10-10 @ 07:01  -  10-11 @ 07:00  --------------------------------------------------------  IN: 170 mL / OUT: 1600 mL / NET: -1430 mL      Constitutional: NAD, awake and alert  EYES: EOMI  ENT:  Normal Hearing, no tonsillar exudates   Neck: Soft and supple , no thyromegaly   Respiratory: Breath sounds are clear bilaterally, No wheezing, rales or rhonchi  Cardiovascular: S1 and S2, regular rate and rhythm, no Murmurs, gallops or rubs, no JVD,    Gastrointestinal: Bowel Sounds present, soft, nontender, nondistended, no guarding, no rebound  Extremities: No cyanosis or clubbing; warm to touch  Vascular: 2+ peripheral pulses lower ex  Neurological: No focal deficits, CN II-XII intact bilaterally, sensation to light touch intact in all extremities, gait intact. Pupils are equally reactive to light and symmetrical in size.   Musculoskeletal: 5/5 strength b/l upper and lower extremities; no joint swelling.  Skin: No rashes  Psych: no depression or anhedonia, AAOx3  HEME: no bruises, no nose bleeds      LABS:                        9.6    23.25 )-----------( 266      ( 10 Oct 2019 11:51 )             31.3     10-10    143  |  94<L>  |  52<H>  ----------------------------<  216<H>  4.0   |  37<H>  |  0.88    Ca    9.1      10 Oct 2019 11:51  Phos  4.7     10-10  Mg     1.9     10-10    TPro  5.6<L>  /  Alb  2.8<L>  /  TBili  0.7  /  DBili  x   /  AST  14  /  ALT  18  /  AlkPhos  106  10-10              RADIOLOGY & ADDITIONAL TESTS:    Imaging Personally Reviewed:    Consultant(s) Notes Reviewed:      Care Discussed with Consultants/Other Providers: Patient is a 85y old  Female who presents with a chief complaint of Fever (10 Oct 2019 16:35)      SUBJECTIVE / OVERNIGHT EVENTS:  The patient reports significant improvement in her breathing. Still has cough but improved.     CONSTITUTIONAL: No weakness, fevers or chills  EYES/ENT: No visual changes;  No vertigo or throat pain   NECK: No pain or stiffness  RESPIRATORY: No cough, wheezing, hemoptysis; +shortness of breath. +cough.   CARDIOVASCULAR: No chest pain or palpitations  GASTROINTESTINAL: No abdominal or epigastric pain. No nausea, vomiting, or hematemesis; No diarrhea or constipation. No melena or hematochezia.  GENITOURINARY: No dysuria, frequency or hematuria  NEUROLOGICAL: No numbness or weakness  SKIN: No itching, burning, rashes, or lesions   All other review of systems is negative unless indicated above.    MEDICATIONS  (STANDING):  ALBUTerol    90 MICROgram(s) HFA Inhaler 2 Puff(s) Inhalation every 6 hours  ALPRAZolam 0.5 milliGRAM(s) Oral two times a day  ascorbic acid 500 milliGRAM(s) Oral daily  atorvastatin 10 milliGRAM(s) Oral at bedtime  dextrose 5%. 1000 milliLiter(s) (50 mL/Hr) IV Continuous <Continuous>  dextrose 50% Injectable 12.5 Gram(s) IV Push once  entecavir 0.5 milliGRAM(s) Oral daily  ertapenem  IVPB 1000 milliGRAM(s) IV Intermittent every 24 hours  insulin glargine Injectable (LANTUS) 5 Unit(s) SubCutaneous at bedtime  insulin lispro (HumaLOG) corrective regimen sliding scale   SubCutaneous three times a day before meals  melatonin 5 milliGRAM(s) Oral at bedtime  metoprolol tartrate 50 milliGRAM(s) Oral two times a day  montelukast 10 milliGRAM(s) Oral daily  Nephro-bebeto 1 Tablet(s) Oral daily  NIFEdipine XL 30 milliGRAM(s) Oral daily  pantoprazole    Tablet 40 milliGRAM(s) Oral before breakfast  predniSONE   Tablet 60 milliGRAM(s) Oral daily  sertraline 50 milliGRAM(s) Oral daily  tiotropium 18 MICROgram(s) Capsule 1 Capsule(s) Inhalation daily    MEDICATIONS  (PRN):  acetaminophen   Tablet .. 650 milliGRAM(s) Oral every 6 hours PRN Mild Pain (1 - 3)  dextrose 40% Gel 15 Gram(s) Oral once PRN Blood Glucose LESS THAN 70 milliGRAM(s)/deciliter  docusate sodium 100 milliGRAM(s) Oral three times a day PRN Constipation  glucagon  Injectable 1 milliGRAM(s) IntraMuscular once PRN Glucose LESS THAN 70 milligrams/deciliter  guaiFENesin   Syrup  (Sugar-Free) 100 milliGRAM(s) Oral every 6 hours PRN Cough  oxyCODONE    5 mG/acetaminophen 325 mG 1 Tablet(s) Oral every 6 hours PRN Severe Pain (7 - 10)  polyethylene glycol 3350 17 Gram(s) Oral daily PRN Constiapation  senna 2 Tablet(s) Oral at bedtime PRN Constipation      Vital Signs Last 24 Hrs  T(C): 36.7 (11 Oct 2019 04:18), Max: 36.7 (11 Oct 2019 04:18)  T(F): 98 (11 Oct 2019 04:18), Max: 98 (11 Oct 2019 04:18)  HR: 89 (11 Oct 2019 04:18) (68 - 92)  BP: 132/84 (11 Oct 2019 04:18) (114/75 - 132/84)  BP(mean): --  RR: 18 (11 Oct 2019 04:18) (18 - 18)  SpO2: 99% (11 Oct 2019 04:18) (95% - 100%)  CAPILLARY BLOOD GLUCOSE      POCT Blood Glucose.: 234 mg/dL (10 Oct 2019 21:49)  POCT Blood Glucose.: 187 mg/dL (10 Oct 2019 17:13)  POCT Blood Glucose.: 265 mg/dL (10 Oct 2019 12:48)  POCT Blood Glucose.: 194 mg/dL (10 Oct 2019 09:37)  POCT Blood Glucose.: 193 mg/dL (10 Oct 2019 07:53)    I&O's Summary    10 Oct 2019 07:01  -  11 Oct 2019 07:00  --------------------------------------------------------  IN: 170 mL / OUT: 1600 mL / NET: -1430 mL        PHYSICAL EXAM:  Vital Signs Last 24 Hrs  T(C): 36.7 (10-11-19 @ 04:18)  T(F): 98 (10-11-19 @ 04:18), Max: 98 (10-11-19 @ 04:18)  HR: 89 (10-11-19 @ 04:18) (68 - 92)  BP: 132/84 (10-11-19 @ 04:18)  BP(mean): --  RR: 18 (10-11-19 @ 04:18) (18 - 18)  SpO2: 99% (10-11-19 @ 04:18) (95% - 100%)  Wt(kg): --    10-10 @ 07:01  -  10-11 @ 07:00  --------------------------------------------------------  IN: 170 mL / OUT: 1600 mL / NET: -1430 mL      Constitutional: Elderly woman; comfortable.   HEAD: +Mild ecchymoses throughout face from prior fall  EYES: +PERRL, no scleral icterus  ENT:  Dry oral mucosa  Neck: Soft and supple , no thyromegaly   RESPIRATORY: Breath sounds are clear bilaterally in anterior lung fields. Breath sounds are dulled in the posterior fields at bases. Rhonchi improved. No wheezing.   HEART:  irregularly irregular rhythm. Normal rate. Telemetry 80-90s + AFib. No Murmurs, gallops or rubs, no JVD,  Extremities warm and well perfused.  Gastrointestinal: Soft, nontender, nondistended; +BS  EXTREMITIES: 1+edema b/l LE + LUE, 2+ edema RUE, +fracture in R arm with sling.   NEUROLOGY: Follows commands. More awake and alert today. Oriented x 1-2.   Skin: Erythema of left hip extending to back resolving      LABS:                        9.6    23.25 )-----------( 266      ( 10 Oct 2019 11:51 )             31.3     10-10    143  |  94<L>  |  52<H>  ----------------------------<  216<H>  4.0   |  37<H>  |  0.88    Ca    9.1      10 Oct 2019 11:51  Phos  4.7     10-10  Mg     1.9     10-10    TPro  5.6<L>  /  Alb  2.8<L>  /  TBili  0.7  /  DBili  x   /  AST  14  /  ALT  18  /  AlkPhos  106  10-10      RADIOLOGY & ADDITIONAL TESTS:    Imaging Personally Reviewed: X  CXR: improved vascular congestion. Patient is a 85y old  Female who presents with a chief complaint of Fever (10 Oct 2019 16:35)      SUBJECTIVE / OVERNIGHT EVENTS: Patient evaluated at bedside. ANDREW. Patient has significantly improved respirations. Cough is persistent but improved as well. No other complaints or concerns.    CONSTITUTIONAL: No weakness, fevers or chills  EYES/ENT: No visual changes;  No vertigo or throat pain   NECK: No pain or stiffness  RESPIRATORY: No cough, wheezing, hemoptysis; +shortness of breath. +cough.   CARDIOVASCULAR: No chest pain or palpitations  GASTROINTESTINAL: No abdominal or epigastric pain. No nausea, vomiting, or hematemesis; No diarrhea or constipation. No melena or hematochezia.  GENITOURINARY: No dysuria, frequency or hematuria  NEUROLOGICAL: No numbness or weakness  SKIN: No itching, burning, rashes, or lesions   All other review of systems is negative unless indicated above.    MEDICATIONS  (STANDING):  ALBUTerol    90 MICROgram(s) HFA Inhaler 2 Puff(s) Inhalation every 6 hours  ALPRAZolam 0.5 milliGRAM(s) Oral two times a day  ascorbic acid 500 milliGRAM(s) Oral daily  atorvastatin 10 milliGRAM(s) Oral at bedtime  dextrose 5%. 1000 milliLiter(s) (50 mL/Hr) IV Continuous <Continuous>  dextrose 50% Injectable 12.5 Gram(s) IV Push once  entecavir 0.5 milliGRAM(s) Oral daily  ertapenem  IVPB 1000 milliGRAM(s) IV Intermittent every 24 hours  insulin glargine Injectable (LANTUS) 5 Unit(s) SubCutaneous at bedtime  insulin lispro (HumaLOG) corrective regimen sliding scale   SubCutaneous three times a day before meals  melatonin 5 milliGRAM(s) Oral at bedtime  metoprolol tartrate 50 milliGRAM(s) Oral two times a day  montelukast 10 milliGRAM(s) Oral daily  Nephro-bebeto 1 Tablet(s) Oral daily  NIFEdipine XL 30 milliGRAM(s) Oral daily  pantoprazole    Tablet 40 milliGRAM(s) Oral before breakfast  predniSONE   Tablet 60 milliGRAM(s) Oral daily  sertraline 50 milliGRAM(s) Oral daily  tiotropium 18 MICROgram(s) Capsule 1 Capsule(s) Inhalation daily    MEDICATIONS  (PRN):  acetaminophen   Tablet .. 650 milliGRAM(s) Oral every 6 hours PRN Mild Pain (1 - 3)  dextrose 40% Gel 15 Gram(s) Oral once PRN Blood Glucose LESS THAN 70 milliGRAM(s)/deciliter  docusate sodium 100 milliGRAM(s) Oral three times a day PRN Constipation  glucagon  Injectable 1 milliGRAM(s) IntraMuscular once PRN Glucose LESS THAN 70 milligrams/deciliter  guaiFENesin   Syrup  (Sugar-Free) 100 milliGRAM(s) Oral every 6 hours PRN Cough  oxyCODONE    5 mG/acetaminophen 325 mG 1 Tablet(s) Oral every 6 hours PRN Severe Pain (7 - 10)  polyethylene glycol 3350 17 Gram(s) Oral daily PRN Constiapation  senna 2 Tablet(s) Oral at bedtime PRN Constipation      Vital Signs Last 24 Hrs  T(C): 36.7 (11 Oct 2019 04:18), Max: 36.7 (11 Oct 2019 04:18)  T(F): 98 (11 Oct 2019 04:18), Max: 98 (11 Oct 2019 04:18)  HR: 89 (11 Oct 2019 04:18) (68 - 92)  BP: 132/84 (11 Oct 2019 04:18) (114/75 - 132/84)  BP(mean): --  RR: 18 (11 Oct 2019 04:18) (18 - 18)  SpO2: 99% (11 Oct 2019 04:18) (95% - 100%)  CAPILLARY BLOOD GLUCOSE      POCT Blood Glucose.: 234 mg/dL (10 Oct 2019 21:49)  POCT Blood Glucose.: 187 mg/dL (10 Oct 2019 17:13)  POCT Blood Glucose.: 265 mg/dL (10 Oct 2019 12:48)  POCT Blood Glucose.: 194 mg/dL (10 Oct 2019 09:37)  POCT Blood Glucose.: 193 mg/dL (10 Oct 2019 07:53)    I&O's Summary    10 Oct 2019 07:01  -  11 Oct 2019 07:00  --------------------------------------------------------  IN: 170 mL / OUT: 1600 mL / NET: -1430 mL        PHYSICAL EXAM:  Vital Signs Last 24 Hrs  T(C): 36.7 (10-11-19 @ 04:18)  T(F): 98 (10-11-19 @ 04:18), Max: 98 (10-11-19 @ 04:18)  HR: 89 (10-11-19 @ 04:18) (68 - 92)  BP: 132/84 (10-11-19 @ 04:18)  BP(mean): --  RR: 18 (10-11-19 @ 04:18) (18 - 18)  SpO2: 99% (10-11-19 @ 04:18) (95% - 100%)  Wt(kg): --    10-10 @ 07:01  -  10-11 @ 07:00  --------------------------------------------------------  IN: 170 mL / OUT: 1600 mL / NET: -1430 mL      Constitutional: Elderly woman; comfortable.   HEAD: +Mild ecchymoses throughout face from prior fall  EYES: +PERRL, no scleral icterus  ENT:  Dry oral mucosa  Neck: Soft and supple , no thyromegaly   RESPIRATORY: Breath sounds are clear bilaterally in anterior lung fields. Breath sounds are dulled in the posterior fields at bases. Rhonchi improved. No wheezing.   HEART:  irregularly irregular rhythm. Normal rate. Telemetry 80-90s + AFib. No Murmurs, gallops or rubs, no JVD,  Extremities warm and well perfused.  Gastrointestinal: Soft, nontender, nondistended; +BS  EXTREMITIES: 1+edema b/l LE + LUE, 2+ edema RUE, +fracture in R arm with sling.   NEUROLOGY: Follows commands. More awake and alert today. Oriented x 1-2.   Skin: Erythema of left hip extending to back resolving      LABS:                        9.6    23.25 )-----------( 266      ( 10 Oct 2019 11:51 )             31.3     10-10    143  |  94<L>  |  52<H>  ----------------------------<  216<H>  4.0   |  37<H>  |  0.88    Ca    9.1      10 Oct 2019 11:51  Phos  4.7     10-10  Mg     1.9     10-10    TPro  5.6<L>  /  Alb  2.8<L>  /  TBili  0.7  /  DBili  x   /  AST  14  /  ALT  18  /  AlkPhos  106  10-10      RADIOLOGY & ADDITIONAL TESTS:    Imaging Personally Reviewed: X  CXR: improved vascular congestion. Patient is a 85y old  Female who presents with a chief complaint of Fever (10 Oct 2019 16:35)      SUBJECTIVE / OVERNIGHT EVENTS: Patient evaluated at bedside. ANDREW. Patient has significantly improved respirations. Cough is persistent but improved as well. No other complaints or concerns.    CONSTITUTIONAL: No weakness, fevers or chills  EYES/ENT: No visual changes;  No vertigo or throat pain   NECK: No pain or stiffness  RESPIRATORY: No cough, wheezing, hemoptysis; +shortness of breath. +cough.   CARDIOVASCULAR: No chest pain or palpitations  GASTROINTESTINAL: No abdominal or epigastric pain. No nausea, vomiting, or hematemesis; No diarrhea or constipation. No melena or hematochezia.  GENITOURINARY: No dysuria, frequency or hematuria  NEUROLOGICAL: No numbness or weakness  SKIN: No itching, burning, rashes, or lesions   All other review of systems is negative unless indicated above.    MEDICATIONS  (STANDING):  ALBUTerol    90 MICROgram(s) HFA Inhaler 2 Puff(s) Inhalation every 6 hours  ALPRAZolam 0.5 milliGRAM(s) Oral two times a day  ascorbic acid 500 milliGRAM(s) Oral daily  atorvastatin 10 milliGRAM(s) Oral at bedtime  dextrose 5%. 1000 milliLiter(s) (50 mL/Hr) IV Continuous <Continuous>  dextrose 50% Injectable 12.5 Gram(s) IV Push once  entecavir 0.5 milliGRAM(s) Oral daily  ertapenem  IVPB 1000 milliGRAM(s) IV Intermittent every 24 hours  insulin glargine Injectable (LANTUS) 5 Unit(s) SubCutaneous at bedtime  insulin lispro (HumaLOG) corrective regimen sliding scale   SubCutaneous three times a day before meals  melatonin 5 milliGRAM(s) Oral at bedtime  metoprolol tartrate 50 milliGRAM(s) Oral two times a day  montelukast 10 milliGRAM(s) Oral daily  Nephro-bebeto 1 Tablet(s) Oral daily  NIFEdipine XL 30 milliGRAM(s) Oral daily  pantoprazole    Tablet 40 milliGRAM(s) Oral before breakfast  predniSONE   Tablet 60 milliGRAM(s) Oral daily  sertraline 50 milliGRAM(s) Oral daily  tiotropium 18 MICROgram(s) Capsule 1 Capsule(s) Inhalation daily    MEDICATIONS  (PRN):  acetaminophen   Tablet .. 650 milliGRAM(s) Oral every 6 hours PRN Mild Pain (1 - 3)  dextrose 40% Gel 15 Gram(s) Oral once PRN Blood Glucose LESS THAN 70 milliGRAM(s)/deciliter  docusate sodium 100 milliGRAM(s) Oral three times a day PRN Constipation  glucagon  Injectable 1 milliGRAM(s) IntraMuscular once PRN Glucose LESS THAN 70 milligrams/deciliter  guaiFENesin   Syrup  (Sugar-Free) 100 milliGRAM(s) Oral every 6 hours PRN Cough  oxyCODONE    5 mG/acetaminophen 325 mG 1 Tablet(s) Oral every 6 hours PRN Severe Pain (7 - 10)  polyethylene glycol 3350 17 Gram(s) Oral daily PRN Constiapation  senna 2 Tablet(s) Oral at bedtime PRN Constipation      Vital Signs Last 24 Hrs  T(C): 36.7 (11 Oct 2019 04:18), Max: 36.7 (11 Oct 2019 04:18)  T(F): 98 (11 Oct 2019 04:18), Max: 98 (11 Oct 2019 04:18)  HR: 89 (11 Oct 2019 04:18) (68 - 92)  BP: 132/84 (11 Oct 2019 04:18) (114/75 - 132/84)  BP(mean): --  RR: 18 (11 Oct 2019 04:18) (18 - 18)  SpO2: 99% (11 Oct 2019 04:18) (95% - 100%)  CAPILLARY BLOOD GLUCOSE      POCT Blood Glucose.: 234 mg/dL (10 Oct 2019 21:49)  POCT Blood Glucose.: 187 mg/dL (10 Oct 2019 17:13)  POCT Blood Glucose.: 265 mg/dL (10 Oct 2019 12:48)  POCT Blood Glucose.: 194 mg/dL (10 Oct 2019 09:37)  POCT Blood Glucose.: 193 mg/dL (10 Oct 2019 07:53)    I&O's Summary    10 Oct 2019 07:01  -  11 Oct 2019 07:00  --------------------------------------------------------  IN: 170 mL / OUT: 1600 mL / NET: -1430 mL        PHYSICAL EXAM:  Vital Signs Last 24 Hrs  T(C): 36.7 (10-11-19 @ 04:18)  T(F): 98 (10-11-19 @ 04:18), Max: 98 (10-11-19 @ 04:18)  HR: 89 (10-11-19 @ 04:18) (68 - 92)  BP: 132/84 (10-11-19 @ 04:18)  BP(mean): --  RR: 18 (10-11-19 @ 04:18) (18 - 18)  SpO2: 99% (10-11-19 @ 04:18) (95% - 100%)  Wt(kg): --    10-10 @ 07:01  -  10-11 @ 07:00  --------------------------------------------------------  IN: 170 mL / OUT: 1600 mL / NET: -1430 mL      Constitutional: Elderly woman; comfortable.   HEAD: +Mild ecchymoses throughout face from prior fall  EYES: +PERRL, no scleral icterus  ENT:  Dry oral mucosa  Neck: Soft and supple , no thyromegaly   RESPIRATORY: Breath sounds are clear bilaterally in anterior lung fields. Breath sounds are dulled in the posterior fields at bases. Rhonchi improved. No wheezing.   HEART:  irregularly irregular rhythm. Normal rate. Telemetry 80-90s + AFib. No Murmurs, gallops or rubs, no JVD,  Extremities warm and well perfused.  Gastrointestinal: Soft, nontender, nondistended; +BS  EXTREMITIES: 1+edema b/l LE + LUE, 2+ edema RUE. Tenderness to palpation of RLE. RUE in sling.   NEUROLOGY: Able to follow commands and answer questions. Oriented x 1-2.  Skin: Erythema of left hip extending to back resolving      LABS:                        9.6    23.25 )-----------( 266      ( 10 Oct 2019 11:51 )             31.3     10-10    143  |  94<L>  |  52<H>  ----------------------------<  216<H>  4.0   |  37<H>  |  0.88    Ca    9.1      10 Oct 2019 11:51  Phos  4.7     10-10  Mg     1.9     10-10    TPro  5.6<L>  /  Alb  2.8<L>  /  TBili  0.7  /  DBili  x   /  AST  14  /  ALT  18  /  AlkPhos  106  10-10      RADIOLOGY & ADDITIONAL TESTS:    Imaging Personally Reviewed: X  CXR: improved vascular congestion.

## 2019-10-11 NOTE — PROGRESS NOTE ADULT - PROBLEM SELECTOR PLAN 9
- MOLST Form signed and in chart, DNR/DNI  - Pain meds as needed to maintain comfort  - Family desires to monitor patient condition through weekend, will reevaluate on Monday to decide future hospital course/disposition

## 2019-10-11 NOTE — MEDICAL STUDENT PROGRESS NOTE(EDUCATION) - NS MD HP STUD ASPLAN PLAN FT
Problem/Plan - 1:  ·  Problem: Respiratory failure.  Plan: - Due to fluid overload likely 2/2 sepsis; PMHx diastolic heart dysfunction as per TTE  - CXR demonstrated mild improvement of pleural effusions   - Received lasix 40 and 80 IV yesterday, net negative 1.2L  - Goal is to keep patient net negative at least 1L daily  - Output was <600cc last night, received another 80 IV lasix  - F/u AM CXR  - C/w lasix, check BMP and BP before administration.   - Pt to get out of bed daily, fall precautions  - Non     Problem/Plan - 2:  ·  Problem: Atrial fibrillation.  Plan: - Due to hypoxic respiratory failure from fluid overload  - AC held due to hematoma/fall  - C/w metoprolol 50, uptitrate as necessary  - Digoxin discontinued  - Monitor HR.      Problem/Plan - 3:  ·  Problem: Sepsis.  Plan: - 2/2 E. coli bacteremia and parainfluenza virus  - Source possibly cellulitis of infected left hip joint vs hematoma vs intraabdominal infection  - No longer hypotensive  - No plan to drain L RP hematoma as per family wishes  - C/w ertapenem   - Monitor vitals  - Taper steroids as per Rheum recc.     Problem/Plan - 4:  ·  Problem: Anemia due to blood loss.  Plan: - Elicited via CT From hematoma seen on CT  - Hgb stable  - Consent for transfusion if necessary  - No surgical intervention as per surgery  - No plan to drain L RP hematoma as per family wishes  - Monitor CBC.      Problem/Plan - 5:  ·  Problem: Hypertension.  Plan: - Blood pressure elevated, likely due to recovery from septic shock  - C/w nifedipine  - C/w lasix (as BP and electrolytes permit).      Problem/Plan - 6:  Problem: Parainfluenza. Plan: - RVP positive, CXR negative  - C/w duonebs  - Supportive management.     Problem/Plan - 7:  ·  Problem: Vasculitis.  Plan: - S/p rituximab  - Prednisone course clarified, aim to taper prednisone  - Rheumatology reccs appreciated     Problem/Plan - 8:  ·  Problem: Diabetes.  Plan: - Monitor FS, c/w SANTOSH.      Problem/Plan - 9:  ·  Problem: Goals of care, counseling/discussion.  Plan: - MOLST Form signed and in chart, DNR/DNI  - Pain meds as needed to maintain comfort  - Family wishes to continue care through the weekend and meet Palliative care on Monday to discuss further management with comfort as the priority.      Problem/Plan - 10:  Problem: Prophylactic measure. Plan; - DVT: SCD's only  very poor prognosis, DNR/DNI  - Palliative care consulted, [facility] meeting on 10/10, 10/14.

## 2019-10-11 NOTE — MEDICAL STUDENT PROGRESS NOTE(EDUCATION) - SUBJECTIVE AND OBJECTIVE BOX
Internal Medicine Progress Note    Patient is a 85y old  Female who presents with a chief complaint of Fever (11 Oct 2019 07:01)      SUBJECTIVE / OVERNIGHT EVENTS: Pt put out <600cc urine. Was given 80mg IV lasix. Children at bedside report improvement in respiratory status.    CONSTITUTIONAL: No weakness, fevers or chills  EYES/ENT: No visual changes;  No dysphagia  NECK: No pain or stiffness  RESPIRATORY: No cough, wheezing, hemoptysis; No shortness of breath  CARDIOVASCULAR: No chest pain or palpitations; No lower extremity edema  GASTROINTESTINAL: No abdominal or epigastric pain. No nausea, vomiting, or hematemesis; No diarrhea or constipation. No melena or hematochezia.  GENITOURINARY: No dysuria, frequency or hematuria  NEUROLOGICAL: No numbness or weakness  HEMATOLOGY: No easy bleeding, no lymphadenopathy  SKIN: No itching, burning, rashes, or lesions   All other review of systems is negative unless indicated above.    MEDICATIONS  (STANDING):  ALBUTerol    90 MICROgram(s) HFA Inhaler 2 Puff(s) Inhalation every 6 hours  ALPRAZolam 0.5 milliGRAM(s) Oral two times a day  ascorbic acid 500 milliGRAM(s) Oral daily  atorvastatin 10 milliGRAM(s) Oral at bedtime  dextrose 5%. 1000 milliLiter(s) (50 mL/Hr) IV Continuous <Continuous>  dextrose 50% Injectable 12.5 Gram(s) IV Push once  entecavir 0.5 milliGRAM(s) Oral daily  ertapenem  IVPB 1000 milliGRAM(s) IV Intermittent every 24 hours  insulin glargine Injectable (LANTUS) 5 Unit(s) SubCutaneous at bedtime  insulin lispro (HumaLOG) corrective regimen sliding scale   SubCutaneous three times a day before meals  melatonin 5 milliGRAM(s) Oral at bedtime  metoprolol tartrate 50 milliGRAM(s) Oral two times a day  montelukast 10 milliGRAM(s) Oral daily  Nephro-bebeto 1 Tablet(s) Oral daily  NIFEdipine XL 30 milliGRAM(s) Oral daily  pantoprazole    Tablet 40 milliGRAM(s) Oral before breakfast  predniSONE   Tablet 60 milliGRAM(s) Oral daily  sertraline 50 milliGRAM(s) Oral daily  tiotropium 18 MICROgram(s) Capsule 1 Capsule(s) Inhalation daily    MEDICATIONS  (PRN):  acetaminophen   Tablet .. 650 milliGRAM(s) Oral every 6 hours PRN Mild Pain (1 - 3)  dextrose 40% Gel 15 Gram(s) Oral once PRN Blood Glucose LESS THAN 70 milliGRAM(s)/deciliter  docusate sodium 100 milliGRAM(s) Oral three times a day PRN Constipation  glucagon  Injectable 1 milliGRAM(s) IntraMuscular once PRN Glucose LESS THAN 70 milligrams/deciliter  guaiFENesin   Syrup  (Sugar-Free) 100 milliGRAM(s) Oral every 6 hours PRN Cough  oxyCODONE    5 mG/acetaminophen 325 mG 1 Tablet(s) Oral every 6 hours PRN Severe Pain (7 - 10)  polyethylene glycol 3350 17 Gram(s) Oral daily PRN Constiapation  senna 2 Tablet(s) Oral at bedtime PRN Constipation    Vital Signs Last 24 Hrs  T(C): 36.7 (11 Oct 2019 04:18), Max: 36.7 (11 Oct 2019 04:18)  T(F): 98 (11 Oct 2019 04:18), Max: 98 (11 Oct 2019 04:18)  HR: 89 (11 Oct 2019 04:18) (68 - 92)  BP: 132/84 (11 Oct 2019 04:18) (114/75 - 132/84)  BP(mean): --  RR: 18 (11 Oct 2019 04:18) (18 - 18)  SpO2: 99% (11 Oct 2019 04:18) (95% - 100%)    CAPILLARY BLOOD GLUCOSE      POCT Blood Glucose.: 147 mg/dL (11 Oct 2019 07:31)  POCT Blood Glucose.: 234 mg/dL (10 Oct 2019 21:49)  POCT Blood Glucose.: 187 mg/dL (10 Oct 2019 17:13)  POCT Blood Glucose.: 265 mg/dL (10 Oct 2019 12:48)  POCT Blood Glucose.: 194 mg/dL (10 Oct 2019 09:37)    I&O's Summary    10 Oct 2019 07:01  -  11 Oct 2019 07:00  --------------------------------------------------------  IN: 170 mL / OUT: 1600 mL / NET: -1430 mL        PHYSICAL EXAM  GENERAL: NAD  HEAD:  Atraumatic  EYES: EOMI, PERRLA, conjunctiva and sclera clear  NECK: Supple, No JVD  CHEST/LUNG: Clear to auscultation bilaterally; No wheeze  HEART: Regular rate and rhythm; No murmurs, rubs, or gallops  ABDOMEN: Soft, Nontender, Nondistended; Bowel sounds present  EXTREMITIES:  2+ Peripheral Pulses, No clubbing, cyanosis, or edema  NEURO/PSYCH: AAOx3, nonfocal  SKIN: No rashes or lesions      LABS:                        9.6    23.25 )-----------( 266      ( 10 Oct 2019 11:51 )             31.3     Hemoglobin: 9.6 g/dL (10-10 @ 11:51)  Hemoglobin: 9.0 g/dL (10-09 @ 23:22)  Hemoglobin: 9.1 g/dL (10-08 @ 16:19)  Hemoglobin: 8.8 g/dL (10-07 @ 08:48)  Hemoglobin: 8.4 g/dL (10-06 @ 09:14)    CBC Full  -  ( 10 Oct 2019 11:51 )  WBC Count : 23.25 K/uL  RBC Count : 3.55 M/uL  Hemoglobin : 9.6 g/dL  Hematocrit : 31.3 %  Platelet Count - Automated : 266 K/uL  Mean Cell Volume : 88.2 fl  Mean Cell Hemoglobin : 27.0 pg  Mean Cell Hemoglobin Concentration : 30.7 gm/dL  Auto Neutrophil # : x  Auto Lymphocyte # : x  Auto Monocyte # : x  Auto Eosinophil # : x  Auto Basophil # : x  Auto Neutrophil % : x  Auto Lymphocyte % : x  Auto Monocyte % : x  Auto Eosinophil % : x  Auto Basophil % : x    10-10    143  |  94<L>  |  52<H>  ----------------------------<  216<H>  4.0   |  37<H>  |  0.88    Ca    9.1      10 Oct 2019 11:51  Phos  4.7     10-10  Mg     1.9     10-10    TPro  5.6<L>  /  Alb  2.8<L>  /  TBili  0.7  /  DBili  x   /  AST  14  /  ALT  18  /  AlkPhos  106  10-10    Creatinine Trend: 0.88<--, 1.10<--, 0.93<--, 1.05<--, 1.18<--, 1.23<--  LIVER FUNCTIONS - ( 10 Oct 2019 11:51 )  Alb: 2.8 g/dL / Pro: 5.6 g/dL / ALK PHOS: 106 U/L / ALT: 18 U/L / AST: 14 U/L / GGT: x               hs Troponin:        11:31 - VBG - pH: 7.41  | pCO2: 66    | pO2: 32    | Lactate: 2.0          CSF:                      EKG:   MICROBIOLOGY:    IMAGING:      Labs, imaging, EKG personally reviewed     CONSULTS: Internal Medicine Progress Note    Patient is a 85y old  Female who presents with a chief complaint of Fever (11 Oct 2019 07:01)      SUBJECTIVE / OVERNIGHT EVENTS: Pt put out <600cc urine. Was given 80mg IV lasix. Children at bedside report improvement in respiratory status in the evening. Patient reports improvement in breathing this am. Still in pain due to R. humerus fracture. Complains of being thirsty.    CONSTITUTIONAL: No weakness, fevers or chills  EYES/ENT: No visual changes;  No dysphagia, +thirsty  NECK: No pain or stiffness  RESPIRATORY: +Cough, no SOB, no wheezing, no hemoptysis  CARDIOVASCULAR: No chest pain or palpitations; 1+ edema to thighs.   GASTROINTESTINAL: No abdominal or epigastric pain. No nausea, vomiting, or hematemesis; No diarrhea or constipation. No melena or hematochezia.  GENITOURINARY: No dysuria, frequency or hematuria  NEUROLOGICAL: No numbness or weakness  HEMATOLOGY: No easy bleeding, no lymphadenopathy  SKIN: No itching, burning, rashes, or lesions   All other review of systems is negative unless indicated above.    MEDICATIONS  (STANDING):  ALBUTerol    90 MICROgram(s) HFA Inhaler 2 Puff(s) Inhalation every 6 hours  ALPRAZolam 0.5 milliGRAM(s) Oral two times a day  ascorbic acid 500 milliGRAM(s) Oral daily  atorvastatin 10 milliGRAM(s) Oral at bedtime  dextrose 5%. 1000 milliLiter(s) (50 mL/Hr) IV Continuous <Continuous>  dextrose 50% Injectable 12.5 Gram(s) IV Push once  entecavir 0.5 milliGRAM(s) Oral daily  ertapenem  IVPB 1000 milliGRAM(s) IV Intermittent every 24 hours  insulin glargine Injectable (LANTUS) 5 Unit(s) SubCutaneous at bedtime  insulin lispro (HumaLOG) corrective regimen sliding scale   SubCutaneous three times a day before meals  melatonin 5 milliGRAM(s) Oral at bedtime  metoprolol tartrate 50 milliGRAM(s) Oral two times a day  montelukast 10 milliGRAM(s) Oral daily  Nephro-bebeto 1 Tablet(s) Oral daily  NIFEdipine XL 30 milliGRAM(s) Oral daily  pantoprazole    Tablet 40 milliGRAM(s) Oral before breakfast  predniSONE   Tablet 60 milliGRAM(s) Oral daily  sertraline 50 milliGRAM(s) Oral daily  tiotropium 18 MICROgram(s) Capsule 1 Capsule(s) Inhalation daily    MEDICATIONS  (PRN):  acetaminophen   Tablet .. 650 milliGRAM(s) Oral every 6 hours PRN Mild Pain (1 - 3)  dextrose 40% Gel 15 Gram(s) Oral once PRN Blood Glucose LESS THAN 70 milliGRAM(s)/deciliter  docusate sodium 100 milliGRAM(s) Oral three times a day PRN Constipation  glucagon  Injectable 1 milliGRAM(s) IntraMuscular once PRN Glucose LESS THAN 70 milligrams/deciliter  guaiFENesin   Syrup  (Sugar-Free) 100 milliGRAM(s) Oral every 6 hours PRN Cough  oxyCODONE    5 mG/acetaminophen 325 mG 1 Tablet(s) Oral every 6 hours PRN Severe Pain (7 - 10)  polyethylene glycol 3350 17 Gram(s) Oral daily PRN Constiapation  senna 2 Tablet(s) Oral at bedtime PRN Constipation    Vital Signs Last 24 Hrs  T(C): 36.7 (11 Oct 2019 04:18), Max: 36.7 (11 Oct 2019 04:18)  T(F): 98 (11 Oct 2019 04:18), Max: 98 (11 Oct 2019 04:18)  HR: 89 (11 Oct 2019 04:18) (68 - 92)  BP: 132/84 (11 Oct 2019 04:18) (114/75 - 132/84)  BP(mean): --  RR: 18 (11 Oct 2019 04:18) (18 - 18)  SpO2: 99% (11 Oct 2019 04:18) (95% - 100%)    CAPILLARY BLOOD GLUCOSE      POCT Blood Glucose.: 147 mg/dL (11 Oct 2019 07:31)  POCT Blood Glucose.: 234 mg/dL (10 Oct 2019 21:49)  POCT Blood Glucose.: 187 mg/dL (10 Oct 2019 17:13)  POCT Blood Glucose.: 265 mg/dL (10 Oct 2019 12:48)  POCT Blood Glucose.: 194 mg/dL (10 Oct 2019 09:37)    I&O's Summary    10 Oct 2019 07:01  -  11 Oct 2019 07:00  --------------------------------------------------------  IN: 170 mL / OUT: 1600 mL / NET: -1430 mL        Constitutional: Elderly woman, lying in bed  HEAD: +Mild ecchymoses throughout face from prior fall  EYES: +PERRL, no scleral icterus  ENT:  Dry oral mucosa  Neck: Soft and supple , no thyromegaly   RESPIRATORY: B/l rhonchi appreciated in the anterior lung fields bilaterally. Dulled breath sounds posteriorly. +4L O2 nasal cannula.  HEART: S1 and S2, irregularly irregular rhythm. Normal rate. Telemetry 80-90s + AFib. No Murmurs, gallops or rubs, no JVD,  Hands cold bilaterally, but +2 pulses appreciated. LE warm and well perfused b/l.  Gastrointestinal: Soft, +tenderness in ULQ, +BS  EXTREMITIES: 1+edema b/l LE, 1+ LUE, 2+ edema RUE, +fracture in R arm with sling. Pt can move all 4 extremities, sensation intact.   NEUROLOGY: Follows commands. Mildly lethargic. AAOx2  Skin: Erythema of left hip extending to back resolving. Erythema surrounding R arm fracture       LABS:                        9.6    23.25 )-----------( 266      ( 10 Oct 2019 11:51 )             31.3     Hemoglobin: 9.6 g/dL (10-10 @ 11:51)  Hemoglobin: 9.0 g/dL (10-09 @ 23:22)  Hemoglobin: 9.1 g/dL (10-08 @ 16:19)  Hemoglobin: 8.8 g/dL (10-07 @ 08:48)  Hemoglobin: 8.4 g/dL (10-06 @ 09:14)    CBC Full  -  ( 10 Oct 2019 11:51 )  WBC Count : 23.25 K/uL  RBC Count : 3.55 M/uL  Hemoglobin : 9.6 g/dL  Hematocrit : 31.3 %  Platelet Count - Automated : 266 K/uL  Mean Cell Volume : 88.2 fl  Mean Cell Hemoglobin : 27.0 pg  Mean Cell Hemoglobin Concentration : 30.7 gm/dL  Auto Neutrophil # : x  Auto Lymphocyte # : x  Auto Monocyte # : x  Auto Eosinophil # : x  Auto Basophil # : x  Auto Neutrophil % : x  Auto Lymphocyte % : x  Auto Monocyte % : x  Auto Eosinophil % : x  Auto Basophil % : x    10-10    143  |  94<L>  |  52<H>  ----------------------------<  216<H>  4.0   |  37<H>  |  0.88    Ca    9.1      10 Oct 2019 11:51  Phos  4.7     10-10  Mg     1.9     10-10    TPro  5.6<L>  /  Alb  2.8<L>  /  TBili  0.7  /  DBili  x   /  AST  14  /  ALT  18  /  AlkPhos  106  10-10    Creatinine Trend: 0.88<--, 1.10<--, 0.93<--, 1.05<--, 1.18<--, 1.23<--  LIVER FUNCTIONS - ( 10 Oct 2019 11:51 )  Alb: 2.8 g/dL / Pro: 5.6 g/dL / ALK PHOS: 106 U/L / ALT: 18 U/L / AST: 14 U/L / GGT: x               11:31 - VBG - pH: 7.41  | pCO2: 66    | pO2: 32    | Lactate: 2.0        EKG:   MICROBIOLOGY:    IMAGING:      Labs, imaging, EKG personally reviewed     CONSULTS:

## 2019-10-11 NOTE — PROGRESS NOTE ADULT - PROBLEM SELECTOR PLAN 2
- Due to hypoxic respiratory failure from fluid overload in setting of sepsis + PMHx diastolic HF  - AC held due to hematoma/fall  - HR within goal limits  - C/w metoprolol 50, uptitrate as necessary  - Monitor HR - Due to hypoxic respiratory failure from fluid overload in setting of sepsis + PMHx diastolic HF  - AC on hold  - HR within goal limits  - C/w metoprolol 50, uptitrate as necessary  - Monitor HR

## 2019-10-11 NOTE — PROGRESS NOTE ADULT - ATTENDING COMMENTS
Patient seen and examined today. I agree with the above findings, assessment, and plan with the following additions and exceptions:     Serial CXR with continued improvement of left sided pleural effusion. Anasarca improving. Lasix 80 mg ivp BID as tolerated. Monitor BP and BMP/mg/p closely. Patient must get OOBTC with assistance daily. Fall precautions. Patient utilizing acapella valve a little today. Give patient incentive spirometer. Continue PT. Repeat CXR for tomorrow.   Continue BB. Hold digoxin as rates better controlled and patient to get additional diuretics which can have effect on electrolytes and renal function.   C/w nifedipine for afterload reduction. Apparently patient had adverse reaction to hydral.   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 Rheum for steroid taper. Recs appreciated.   Will need follow up with ortho for orthopedic injuries from fall. Extremities are neurovascularly intact.   Prognosis poor and patient high risk for readmission. This was explained to the family at bedside. Per GO, no invasive measures. Family would like to see how she does till Monday before decided on hospice.   Rest of plan as above .

## 2019-10-11 NOTE — PROGRESS NOTE ADULT - PROBLEM SELECTOR PLAN 1
- Due to fluid overload likely 2/2 sepsis; PMHx diastolic heart dysfunction as per TTE  - S/p lasix, net negative 1.4L; patient demonstrating some clinical improvement  - Goal is to keep patient net negative at least 1L daily  - F/u AM CXR  - C/w lasix, check BMP and BP before administration - Due to fluid overload likely 2/2 sepsis; PMHx diastolic heart dysfunction as per TTE  - Goal is to keep patient net negative at least 1L daily  - F/u AM CXR  - C/w lasix, check BMP and BP before administration

## 2019-10-12 NOTE — PROGRESS NOTE ADULT - PROBLEM SELECTOR PLAN 10
- DVT: SCD's only  - Prognosis: Very poor, DNR/DNI  - Palliative care consulted, [facility] meeting on 10/10, 10/14

## 2019-10-12 NOTE — PROGRESS NOTE ADULT - PROBLEM SELECTOR PLAN 1
- Due to fluid overload likely 2/2 sepsis; PMHx diastolic heart dysfunction as per TTE  - S/p lasix, net negative 1.4L; patient demonstrating some clinical improvement  - Goal is to keep patient net negative at least 1L daily  - F/u AM CXR  - C/w lasix, check BMP and BP before administration

## 2019-10-12 NOTE — PROGRESS NOTE ADULT - SUBJECTIVE AND OBJECTIVE BOX
Patient is a 85y old  Female who presents with a chief complaint of Fever (11 Oct 2019 08:10)      SUBJECTIVE / OVERNIGHT EVENTS:    CONSTITUTIONAL: No weakness, fevers or chills  EYES/ENT: No visual changes;  No vertigo or throat pain   NECK: No pain or stiffness  RESPIRATORY: No cough, wheezing, hemoptysis; No shortness of breath  CARDIOVASCULAR: No chest pain or palpitations  GASTROINTESTINAL: No abdominal or epigastric pain. No nausea, vomiting, or hematemesis; No diarrhea or constipation. No melena or hematochezia.  GENITOURINARY: No dysuria, frequency or hematuria  NEUROLOGICAL: No numbness or weakness  SKIN: No itching, burning, rashes, or lesions   All other review of systems is negative unless indicated above.    MEDICATIONS  (STANDING):  ALBUTerol    90 MICROgram(s) HFA Inhaler 2 Puff(s) Inhalation every 6 hours  ALPRAZolam 0.5 milliGRAM(s) Oral two times a day  ascorbic acid 500 milliGRAM(s) Oral daily  atorvastatin 10 milliGRAM(s) Oral at bedtime  dextrose 5%. 1000 milliLiter(s) (50 mL/Hr) IV Continuous <Continuous>  dextrose 50% Injectable 12.5 Gram(s) IV Push once  entecavir 0.5 milliGRAM(s) Oral daily  ertapenem  IVPB 1000 milliGRAM(s) IV Intermittent every 24 hours  insulin glargine Injectable (LANTUS) 5 Unit(s) SubCutaneous at bedtime  insulin lispro (HumaLOG) corrective regimen sliding scale   SubCutaneous three times a day before meals  melatonin 5 milliGRAM(s) Oral at bedtime  metoprolol tartrate 50 milliGRAM(s) Oral two times a day  montelukast 10 milliGRAM(s) Oral daily  Nephro-bebeto 1 Tablet(s) Oral daily  NIFEdipine XL 30 milliGRAM(s) Oral daily  pantoprazole    Tablet 40 milliGRAM(s) Oral before breakfast  predniSONE   Tablet 40 milliGRAM(s) Oral daily  sertraline 50 milliGRAM(s) Oral daily  tiotropium 18 MICROgram(s) Capsule 1 Capsule(s) Inhalation daily    MEDICATIONS  (PRN):  acetaminophen   Tablet .. 650 milliGRAM(s) Oral every 6 hours PRN Mild Pain (1 - 3)  dextrose 40% Gel 15 Gram(s) Oral once PRN Blood Glucose LESS THAN 70 milliGRAM(s)/deciliter  docusate sodium 100 milliGRAM(s) Oral three times a day PRN Constipation  glucagon  Injectable 1 milliGRAM(s) IntraMuscular once PRN Glucose LESS THAN 70 milligrams/deciliter  guaiFENesin   Syrup  (Sugar-Free) 100 milliGRAM(s) Oral every 6 hours PRN Cough  oxyCODONE    5 mG/acetaminophen 325 mG 1 Tablet(s) Oral every 6 hours PRN Severe Pain (7 - 10)  polyethylene glycol 3350 17 Gram(s) Oral daily PRN Constiapation  senna 2 Tablet(s) Oral at bedtime PRN Constipation      Vital Signs Last 24 Hrs  T(C): 36.6 (12 Oct 2019 05:00), Max: 36.6 (11 Oct 2019 12:15)  T(F): 97.8 (12 Oct 2019 05:00), Max: 97.8 (11 Oct 2019 12:15)  HR: 83 (12 Oct 2019 05:00) (78 - 93)  BP: 161/97 (12 Oct 2019 05:00) (101/68 - 161/97)  BP(mean): --  RR: 19 (12 Oct 2019 05:00) (17 - 19)  SpO2: 99% (12 Oct 2019 05:00) (96% - 99%)  CAPILLARY BLOOD GLUCOSE      POCT Blood Glucose.: 236 mg/dL (11 Oct 2019 22:14)  POCT Blood Glucose.: 238 mg/dL (11 Oct 2019 21:53)  POCT Blood Glucose.: 221 mg/dL (11 Oct 2019 17:03)  POCT Blood Glucose.: 302 mg/dL (11 Oct 2019 12:34)  POCT Blood Glucose.: 147 mg/dL (11 Oct 2019 07:31)    I&O's Summary    11 Oct 2019 07:01  -  12 Oct 2019 07:00  --------------------------------------------------------  IN: 360 mL / OUT: 600 mL / NET: -240 mL        PHYSICAL EXAM:  Vital Signs Last 24 Hrs  T(C): 36.6 (10-12-19 @ 05:00)  T(F): 97.8 (10-12-19 @ 05:00), Max: 97.8 (10-11-19 @ 12:15)  HR: 83 (10-12-19 @ 05:00) (78 - 93)  BP: 161/97 (10-12-19 @ 05:00)  BP(mean): --  RR: 19 (10-12-19 @ 05:00) (17 - 19)  SpO2: 99% (10-12-19 @ 05:00) (96% - 99%)  Wt(kg): --    10-11 @ 07:01  -  10-12 @ 07:00  --------------------------------------------------------  IN: 360 mL / OUT: 600 mL / NET: -240 mL      Constitutional: NAD, awake and alert  EYES: EOMI  ENT:  Normal Hearing, no tonsillar exudates   Neck: Soft and supple , no thyromegaly   Respiratory: Breath sounds are clear bilaterally, No wheezing, rales or rhonchi  Cardiovascular: S1 and S2, regular rate and rhythm, no Murmurs, gallops or rubs, no JVD,    Gastrointestinal: Bowel Sounds present, soft, nontender, nondistended, no guarding, no rebound  Extremities: No cyanosis or clubbing; warm to touch  Vascular: 2+ peripheral pulses lower ex  Neurological: No focal deficits, CN II-XII intact bilaterally, sensation to light touch intact in all extremities, gait intact. Pupils are equally reactive to light and symmetrical in size.   Musculoskeletal: 5/5 strength b/l upper and lower extremities; no joint swelling.  Skin: No rashes  Psych: no depression or anhedonia, AAOx3  HEME: no bruises, no nose bleeds      LABS:                        8.9    24.95 )-----------( 263      ( 11 Oct 2019 11:58 )             29.9     10-11    143  |  94<L>  |  46<H>  ----------------------------<  167<H>  4.1   |  39<H>  |  0.68    Ca    8.5      11 Oct 2019 10:15  Phos  4.6     10-11  Mg     1.6     10-11    TPro  5.6<L>  /  Alb  2.8<L>  /  TBili  0.7  /  DBili  x   /  AST  14  /  ALT  18  /  AlkPhos  106  10-10              RADIOLOGY & ADDITIONAL TESTS:    Imaging Personally Reviewed:    Consultant(s) Notes Reviewed:      Care Discussed with Consultants/Other Providers: Patient is a 85y old  Female who presents with a chief complaint of Fever (11 Oct 2019 08:10)      SUBJECTIVE / OVERNIGHT EVENTS: Patient evaluated at bedside. ANDREW. Patient still reports SOB and cough with congestion but states her breathing is improving. Still reports pain in R arm.    CONSTITUTIONAL: No weakness, fevers or chills  EYES/ENT: No visual changes;  No vertigo or throat pain   NECK: No pain or stiffness  RESPIRATORY: +cough, +SOB. No wheezing, hemoptysis.  CARDIOVASCULAR: No chest pain or palpitations  GASTROINTESTINAL: No abdominal or epigastric pain. No nausea, vomiting, or hematemesis; No diarrhea or constipation. No melena or hematochezia.  GENITOURINARY: No dysuria, frequency or hematuria  NEUROLOGICAL: +R arm pain. No numbness or weakness  SKIN: No itching, burning, rashes, or lesions   All other review of systems is negative unless indicated above.    MEDICATIONS  (STANDING):  ALBUTerol    90 MICROgram(s) HFA Inhaler 2 Puff(s) Inhalation every 6 hours  ALPRAZolam 0.5 milliGRAM(s) Oral two times a day  ascorbic acid 500 milliGRAM(s) Oral daily  atorvastatin 10 milliGRAM(s) Oral at bedtime  dextrose 5%. 1000 milliLiter(s) (50 mL/Hr) IV Continuous <Continuous>  dextrose 50% Injectable 12.5 Gram(s) IV Push once  entecavir 0.5 milliGRAM(s) Oral daily  ertapenem  IVPB 1000 milliGRAM(s) IV Intermittent every 24 hours  insulin glargine Injectable (LANTUS) 5 Unit(s) SubCutaneous at bedtime  insulin lispro (HumaLOG) corrective regimen sliding scale   SubCutaneous three times a day before meals  melatonin 5 milliGRAM(s) Oral at bedtime  metoprolol tartrate 50 milliGRAM(s) Oral two times a day  montelukast 10 milliGRAM(s) Oral daily  Nephro-bebeto 1 Tablet(s) Oral daily  NIFEdipine XL 30 milliGRAM(s) Oral daily  pantoprazole    Tablet 40 milliGRAM(s) Oral before breakfast  predniSONE   Tablet 40 milliGRAM(s) Oral daily  sertraline 50 milliGRAM(s) Oral daily  tiotropium 18 MICROgram(s) Capsule 1 Capsule(s) Inhalation daily    MEDICATIONS  (PRN):  acetaminophen   Tablet .. 650 milliGRAM(s) Oral every 6 hours PRN Mild Pain (1 - 3)  dextrose 40% Gel 15 Gram(s) Oral once PRN Blood Glucose LESS THAN 70 milliGRAM(s)/deciliter  docusate sodium 100 milliGRAM(s) Oral three times a day PRN Constipation  glucagon  Injectable 1 milliGRAM(s) IntraMuscular once PRN Glucose LESS THAN 70 milligrams/deciliter  guaiFENesin   Syrup  (Sugar-Free) 100 milliGRAM(s) Oral every 6 hours PRN Cough  oxyCODONE    5 mG/acetaminophen 325 mG 1 Tablet(s) Oral every 6 hours PRN Severe Pain (7 - 10)  polyethylene glycol 3350 17 Gram(s) Oral daily PRN Constiapation  senna 2 Tablet(s) Oral at bedtime PRN Constipation      Vital Signs Last 24 Hrs  T(C): 36.6 (12 Oct 2019 05:00), Max: 36.6 (11 Oct 2019 12:15)  T(F): 97.8 (12 Oct 2019 05:00), Max: 97.8 (11 Oct 2019 12:15)  HR: 83 (12 Oct 2019 05:00) (78 - 93)  BP: 161/97 (12 Oct 2019 05:00) (101/68 - 161/97)  BP(mean): --  RR: 19 (12 Oct 2019 05:00) (17 - 19)  SpO2: 99% (12 Oct 2019 05:00) (96% - 99%)  CAPILLARY BLOOD GLUCOSE      POCT Blood Glucose.: 236 mg/dL (11 Oct 2019 22:14)  POCT Blood Glucose.: 238 mg/dL (11 Oct 2019 21:53)  POCT Blood Glucose.: 221 mg/dL (11 Oct 2019 17:03)  POCT Blood Glucose.: 302 mg/dL (11 Oct 2019 12:34)  POCT Blood Glucose.: 147 mg/dL (11 Oct 2019 07:31)    I&O's Summary    11 Oct 2019 07:01  -  12 Oct 2019 07:00  --------------------------------------------------------  IN: 360 mL / OUT: 600 mL / NET: -240 mL        PHYSICAL EXAM:  Vital Signs Last 24 Hrs  T(C): 36.6 (10-12-19 @ 05:00)  T(F): 97.8 (10-12-19 @ 05:00), Max: 97.8 (10-11-19 @ 12:15)  HR: 83 (10-12-19 @ 05:00) (78 - 93)  BP: 161/97 (10-12-19 @ 05:00)  BP(mean): --  RR: 19 (10-12-19 @ 05:00) (17 - 19)  SpO2: 99% (10-12-19 @ 05:00) (96% - 99%)  Wt(kg): --    10-11 @ 07:01  -  10-12 @ 07:00  --------------------------------------------------------  IN: 360 mL / OUT: 600 mL / NET: -240 mL      Constitutional: Sleeping, arousable   HEAD: +L temporal ecchymosis   EYES: No scleral icterus  ENT:  Dry tongue  RESPIRATORY: Slightly audible rhonchi in R anterior fields, clearer airflow in L anterior field. Slightly diminished in posterior fields at bases. Rhonchi improved. No wheezing.   HEART:  irregularly irregular rhythm. Normal rate. Telemetry 80-90s + AFib. No Murmurs, gallops or rubs, no JVD,  Extremities warm and well perfused.  Gastrointestinal: Soft, nontender, nondistended; +BS  EXTREMITIES: 1+edema b/l LE + LUE, 2+ edema RUE, +fracture in R arm with sling.   NEUROLOGY: Follows commands. More awake and alert today. Oriented x 1-2.   Skin: Erythema of left hip extending to back resolving      LABS:                        8.9    24.95 )-----------( 263      ( 11 Oct 2019 11:58 )             29.9     10-11    143  |  94<L>  |  46<H>  ----------------------------<  167<H>  4.1   |  39<H>  |  0.68    Ca    8.5      11 Oct 2019 10:15  Phos  4.6     10-11  Mg     1.6     10-11    TPro  5.6<L>  /  Alb  2.8<L>  /  TBili  0.7  /  DBili  x   /  AST  14  /  ALT  18  /  AlkPhos  106  10-10              RADIOLOGY & ADDITIONAL TESTS:    Imaging Personally Reviewed:    Consultant(s) Notes Reviewed:      Care Discussed with Consultants/Other Providers: Patient is a 85y old  Female who presents with a chief complaint of Fever (11 Oct 2019 08:10)      SUBJECTIVE / OVERNIGHT EVENTS: Patient evaluated at bedside. ANDREW. Patient still reports SOB and cough with congestion but states her breathing is improving. Still reports pain in R arm.    CONSTITUTIONAL: No weakness, fevers or chills  EYES/ENT: No visual changes;  No vertigo or throat pain   NECK: No pain or stiffness  RESPIRATORY: +cough, +SOB. No wheezing, hemoptysis.  CARDIOVASCULAR: No chest pain or palpitations  GASTROINTESTINAL: No abdominal or epigastric pain. No nausea, vomiting, or hematemesis; No diarrhea or constipation. No melena or hematochezia.  GENITOURINARY: No dysuria, frequency or hematuria  NEUROLOGICAL: +R arm pain. No numbness or weakness  SKIN: No itching, burning, rashes, or lesions   All other review of systems is negative unless indicated above.    MEDICATIONS  (STANDING):  ALBUTerol    90 MICROgram(s) HFA Inhaler 2 Puff(s) Inhalation every 6 hours  ALPRAZolam 0.5 milliGRAM(s) Oral two times a day  ascorbic acid 500 milliGRAM(s) Oral daily  atorvastatin 10 milliGRAM(s) Oral at bedtime  dextrose 5%. 1000 milliLiter(s) (50 mL/Hr) IV Continuous <Continuous>  dextrose 50% Injectable 12.5 Gram(s) IV Push once  entecavir 0.5 milliGRAM(s) Oral daily  ertapenem  IVPB 1000 milliGRAM(s) IV Intermittent every 24 hours  insulin glargine Injectable (LANTUS) 5 Unit(s) SubCutaneous at bedtime  insulin lispro (HumaLOG) corrective regimen sliding scale   SubCutaneous three times a day before meals  melatonin 5 milliGRAM(s) Oral at bedtime  metoprolol tartrate 50 milliGRAM(s) Oral two times a day  montelukast 10 milliGRAM(s) Oral daily  Nephro-bebeto 1 Tablet(s) Oral daily  NIFEdipine XL 30 milliGRAM(s) Oral daily  pantoprazole    Tablet 40 milliGRAM(s) Oral before breakfast  predniSONE   Tablet 40 milliGRAM(s) Oral daily  sertraline 50 milliGRAM(s) Oral daily  tiotropium 18 MICROgram(s) Capsule 1 Capsule(s) Inhalation daily    MEDICATIONS  (PRN):  acetaminophen   Tablet .. 650 milliGRAM(s) Oral every 6 hours PRN Mild Pain (1 - 3)  dextrose 40% Gel 15 Gram(s) Oral once PRN Blood Glucose LESS THAN 70 milliGRAM(s)/deciliter  docusate sodium 100 milliGRAM(s) Oral three times a day PRN Constipation  glucagon  Injectable 1 milliGRAM(s) IntraMuscular once PRN Glucose LESS THAN 70 milligrams/deciliter  guaiFENesin   Syrup  (Sugar-Free) 100 milliGRAM(s) Oral every 6 hours PRN Cough  oxyCODONE    5 mG/acetaminophen 325 mG 1 Tablet(s) Oral every 6 hours PRN Severe Pain (7 - 10)  polyethylene glycol 3350 17 Gram(s) Oral daily PRN Constiapation  senna 2 Tablet(s) Oral at bedtime PRN Constipation      Vital Signs Last 24 Hrs  T(C): 36.6 (12 Oct 2019 05:00), Max: 36.6 (11 Oct 2019 12:15)  T(F): 97.8 (12 Oct 2019 05:00), Max: 97.8 (11 Oct 2019 12:15)  HR: 83 (12 Oct 2019 05:00) (78 - 93)  BP: 161/97 (12 Oct 2019 05:00) (101/68 - 161/97)  BP(mean): --  RR: 19 (12 Oct 2019 05:00) (17 - 19)  SpO2: 99% (12 Oct 2019 05:00) (96% - 99%)  CAPILLARY BLOOD GLUCOSE      POCT Blood Glucose.: 236 mg/dL (11 Oct 2019 22:14)  POCT Blood Glucose.: 238 mg/dL (11 Oct 2019 21:53)  POCT Blood Glucose.: 221 mg/dL (11 Oct 2019 17:03)  POCT Blood Glucose.: 302 mg/dL (11 Oct 2019 12:34)  POCT Blood Glucose.: 147 mg/dL (11 Oct 2019 07:31)    I&O's Summary    11 Oct 2019 07:01  -  12 Oct 2019 07:00  --------------------------------------------------------  IN: 360 mL / OUT: 600 mL / NET: -240 mL        PHYSICAL EXAM:  Vital Signs Last 24 Hrs  T(C): 36.6 (10-12-19 @ 05:00)  T(F): 97.8 (10-12-19 @ 05:00), Max: 97.8 (10-11-19 @ 12:15)  HR: 83 (10-12-19 @ 05:00) (78 - 93)  BP: 161/97 (10-12-19 @ 05:00)  BP(mean): --  RR: 19 (10-12-19 @ 05:00) (17 - 19)  SpO2: 99% (10-12-19 @ 05:00) (96% - 99%)  Wt(kg): --    10-11 @ 07:01  -  10-12 @ 07:00  --------------------------------------------------------  IN: 360 mL / OUT: 600 mL / NET: -240 mL      Constitutional: Sleeping, arousable   HEAD: +L temporal ecchymosis   EYES: No scleral icterus  ENT:  Dry tongue  RESPIRATORY: Slightly audible rhonchi in R anterior fields, clearer airflow in L anterior field. Slightly diminished in posterior fields at bases.    HEART: +S1/S2. Irregularly irregular rhythm. AFib w/o tachycardia.  Gastrointestinal: Soft, nontender. +BS.  VASCULAR: Distal pulses x 4EXT.  EXTREMITIES: 1+edema b/l LE + LUE, 2+ edema RUE. Tenderness to palpation of RLE. RUE in sling. Warm extremities.  NEUROLOGY: Able to follow commands and answer questions. Oriented x 1-2.  Skin: Ecchymosis of LUE. Resolving hip hematoma.      LABS:                        8.9    24.95 )-----------( 263      ( 11 Oct 2019 11:58 )             29.9     10-11    143  |  94<L>  |  46<H>  ----------------------------<  167<H>  4.1   |  39<H>  |  0.68    Ca    8.5      11 Oct 2019 10:15  Phos  4.6     10-11  Mg     1.6     10-11    TPro  5.6<L>  /  Alb  2.8<L>  /  TBili  0.7  /  DBili  x   /  AST  14  /  ALT  18  /  AlkPhos  106  10-10              RADIOLOGY & ADDITIONAL TESTS:    Imaging Personally Reviewed:    Consultant(s) Notes Reviewed:      Care Discussed with Consultants/Other Providers:

## 2019-10-12 NOTE — MEDICAL STUDENT PROGRESS NOTE(EDUCATION) - SUBJECTIVE AND OBJECTIVE BOX
Internal Medicine Progress Note    Patient is a 85y old  Female who presents with a chief complaint of Fever (12 Oct 2019 07:04)      SUBJECTIVE / OVERNIGHT EVENTS: No overnight events.     CONSTITUTIONAL: No weakness, fevers or chills  EYES/ENT: No visual changes;  No dysphagia  NECK: No pain or stiffness  RESPIRATORY: No cough, wheezing, hemoptysis; No shortness of breath  CARDIOVASCULAR: No chest pain or palpitations; No lower extremity edema  GASTROINTESTINAL: No abdominal or epigastric pain. No nausea, vomiting, or hematemesis; No diarrhea or constipation. No melena or hematochezia.  GENITOURINARY: No dysuria, frequency or hematuria  NEUROLOGICAL: No numbness or weakness  HEMATOLOGY: No easy bleeding, no lymphadenopathy  SKIN: No itching, burning, rashes, or lesions   All other review of systems is negative unless indicated above.    MEDICATIONS  (STANDING):  ALBUTerol    90 MICROgram(s) HFA Inhaler 2 Puff(s) Inhalation every 6 hours  ALPRAZolam 0.5 milliGRAM(s) Oral two times a day  ascorbic acid 500 milliGRAM(s) Oral daily  atorvastatin 10 milliGRAM(s) Oral at bedtime  dextrose 5%. 1000 milliLiter(s) (50 mL/Hr) IV Continuous <Continuous>  dextrose 50% Injectable 12.5 Gram(s) IV Push once  entecavir 0.5 milliGRAM(s) Oral daily  ertapenem  IVPB 1000 milliGRAM(s) IV Intermittent every 24 hours  insulin glargine Injectable (LANTUS) 5 Unit(s) SubCutaneous at bedtime  insulin lispro (HumaLOG) corrective regimen sliding scale   SubCutaneous three times a day before meals  melatonin 5 milliGRAM(s) Oral at bedtime  metoprolol tartrate 50 milliGRAM(s) Oral two times a day  montelukast 10 milliGRAM(s) Oral daily  Nephro-bebeto 1 Tablet(s) Oral daily  NIFEdipine XL 30 milliGRAM(s) Oral daily  pantoprazole    Tablet 40 milliGRAM(s) Oral before breakfast  predniSONE   Tablet 40 milliGRAM(s) Oral daily  sertraline 50 milliGRAM(s) Oral daily  tiotropium 18 MICROgram(s) Capsule 1 Capsule(s) Inhalation daily    MEDICATIONS  (PRN):  acetaminophen   Tablet .. 650 milliGRAM(s) Oral every 6 hours PRN Mild Pain (1 - 3)  dextrose 40% Gel 15 Gram(s) Oral once PRN Blood Glucose LESS THAN 70 milliGRAM(s)/deciliter  docusate sodium 100 milliGRAM(s) Oral three times a day PRN Constipation  glucagon  Injectable 1 milliGRAM(s) IntraMuscular once PRN Glucose LESS THAN 70 milligrams/deciliter  guaiFENesin   Syrup  (Sugar-Free) 100 milliGRAM(s) Oral every 6 hours PRN Cough  oxyCODONE    5 mG/acetaminophen 325 mG 1 Tablet(s) Oral every 6 hours PRN Severe Pain (7 - 10)  polyethylene glycol 3350 17 Gram(s) Oral daily PRN Constiapation  senna 2 Tablet(s) Oral at bedtime PRN Constipation    Vital Signs Last 24 Hrs  T(C): 36.6 (12 Oct 2019 05:00), Max: 36.6 (11 Oct 2019 12:15)  T(F): 97.8 (12 Oct 2019 05:00), Max: 97.8 (11 Oct 2019 12:15)  HR: 83 (12 Oct 2019 05:00) (78 - 93)  BP: 161/97 (12 Oct 2019 05:00) (101/68 - 161/97)  BP(mean): --  RR: 19 (12 Oct 2019 05:00) (17 - 19)  SpO2: 99% (12 Oct 2019 05:00) (96% - 99%)    CAPILLARY BLOOD GLUCOSE      POCT Blood Glucose.: 236 mg/dL (11 Oct 2019 22:14)  POCT Blood Glucose.: 238 mg/dL (11 Oct 2019 21:53)  POCT Blood Glucose.: 221 mg/dL (11 Oct 2019 17:03)  POCT Blood Glucose.: 302 mg/dL (11 Oct 2019 12:34)  POCT Blood Glucose.: 147 mg/dL (11 Oct 2019 07:31)    I&O's Summary    11 Oct 2019 07:01  -  12 Oct 2019 07:00  --------------------------------------------------------  IN: 360 mL / OUT: 600 mL / NET: -240 mL        PHYSICAL EXAM  GENERAL: NAD  HEAD:  Atraumatic  EYES: EOMI, PERRLA, conjunctiva and sclera clear  NECK: Supple, No JVD  CHEST/LUNG: Clear to auscultation bilaterally; No wheeze  HEART: Regular rate and rhythm; No murmurs, rubs, or gallops  ABDOMEN: Soft, Nontender, Nondistended; Bowel sounds present  EXTREMITIES:  2+ Peripheral Pulses, No clubbing, cyanosis, or edema  NEURO/PSYCH: AAOx3, nonfocal  SKIN: No rashes or lesions      LABS:                        8.9    24.95 )-----------( 263      ( 11 Oct 2019 11:58 )             29.9     Hemoglobin: 8.9 g/dL (10-11 @ 11:58)  Hemoglobin: 9.6 g/dL (10-10 @ 11:51)  Hemoglobin: 9.0 g/dL (10-09 @ 23:22)  Hemoglobin: 9.1 g/dL (10-08 @ 16:19)  Hemoglobin: 8.8 g/dL (10-07 @ 08:48)    CBC Full  -  ( 11 Oct 2019 11:58 )  WBC Count : 24.95 K/uL  RBC Count : 3.31 M/uL  Hemoglobin : 8.9 g/dL  Hematocrit : 29.9 %  Platelet Count - Automated : 263 K/uL  Mean Cell Volume : 90.3 fl  Mean Cell Hemoglobin : 26.9 pg  Mean Cell Hemoglobin Concentration : 29.8 gm/dL  Auto Neutrophil # : x  Auto Lymphocyte # : x  Auto Monocyte # : x  Auto Eosinophil # : x  Auto Basophil # : x  Auto Neutrophil % : x  Auto Lymphocyte % : x  Auto Monocyte % : x  Auto Eosinophil % : x  Auto Basophil % : x    10-11    143  |  94<L>  |  46<H>  ----------------------------<  167<H>  4.1   |  39<H>  |  0.68    Ca    8.5      11 Oct 2019 10:15  Phos  4.6     10-11  Mg     1.6     10-11    TPro  5.6<L>  /  Alb  2.8<L>  /  TBili  0.7  /  DBili  x   /  AST  14  /  ALT  18  /  AlkPhos  106  10-10    Creatinine Trend: 0.68<--, 0.88<--, 1.10<--, 0.93<--, 1.05<--, 1.18<--  LIVER FUNCTIONS - ( 10 Oct 2019 11:51 )  Alb: 2.8 g/dL / Pro: 5.6 g/dL / ALK PHOS: 106 U/L / ALT: 18 U/L / AST: 14 U/L / GGT: x                 EKG:   MICROBIOLOGY:    IMAGING:      Labs, imaging, EKG personally reviewed     CONSULTS: Internal Medicine Progress Note    Patient is a 85y old  Female who presents with a chief complaint of Fever (12 Oct 2019 07:04)      SUBJECTIVE / OVERNIGHT EVENTS: No overnight events.     CONSTITUTIONAL: No weakness, fevers or chills  EYES/ENT: No visual changes;  No dysphagia  NECK: No pain or stiffness  RESPIRATORY: +cough, +SOB- but improving. No wheezing or hemoptysis  CARDIOVASCULAR: No chest pain or palpitations  GASTROINTESTINAL: No abdominal or epigastric pain. No nausea, vomiting, or hematemesis; No diarrhea or constipation. No melena or hematochezia.  GENITOURINARY: No dysuria, frequency or hematuria  NEUROLOGICAL: No numbness. R arm weakness due to fractured humerus.  HEMATOLOGY: No easy bleeding, no lymphadenopathy  SKIN: No itching, burning, rashes, or lesions   All other review of systems is negative unless indicated above.    MEDICATIONS  (STANDING):  ALBUTerol    90 MICROgram(s) HFA Inhaler 2 Puff(s) Inhalation every 6 hours  ALPRAZolam 0.5 milliGRAM(s) Oral two times a day  ascorbic acid 500 milliGRAM(s) Oral daily  atorvastatin 10 milliGRAM(s) Oral at bedtime  dextrose 5%. 1000 milliLiter(s) (50 mL/Hr) IV Continuous <Continuous>  dextrose 50% Injectable 12.5 Gram(s) IV Push once  entecavir 0.5 milliGRAM(s) Oral daily  ertapenem  IVPB 1000 milliGRAM(s) IV Intermittent every 24 hours  insulin glargine Injectable (LANTUS) 5 Unit(s) SubCutaneous at bedtime  insulin lispro (HumaLOG) corrective regimen sliding scale   SubCutaneous three times a day before meals  melatonin 5 milliGRAM(s) Oral at bedtime  metoprolol tartrate 50 milliGRAM(s) Oral two times a day  montelukast 10 milliGRAM(s) Oral daily  Nephro-bebeto 1 Tablet(s) Oral daily  NIFEdipine XL 30 milliGRAM(s) Oral daily  pantoprazole    Tablet 40 milliGRAM(s) Oral before breakfast  predniSONE   Tablet 40 milliGRAM(s) Oral daily  sertraline 50 milliGRAM(s) Oral daily  tiotropium 18 MICROgram(s) Capsule 1 Capsule(s) Inhalation daily    MEDICATIONS  (PRN):  acetaminophen   Tablet .. 650 milliGRAM(s) Oral every 6 hours PRN Mild Pain (1 - 3)  dextrose 40% Gel 15 Gram(s) Oral once PRN Blood Glucose LESS THAN 70 milliGRAM(s)/deciliter  docusate sodium 100 milliGRAM(s) Oral three times a day PRN Constipation  glucagon  Injectable 1 milliGRAM(s) IntraMuscular once PRN Glucose LESS THAN 70 milligrams/deciliter  guaiFENesin   Syrup  (Sugar-Free) 100 milliGRAM(s) Oral every 6 hours PRN Cough  oxyCODONE    5 mG/acetaminophen 325 mG 1 Tablet(s) Oral every 6 hours PRN Severe Pain (7 - 10)  polyethylene glycol 3350 17 Gram(s) Oral daily PRN Constiapation  senna 2 Tablet(s) Oral at bedtime PRN Constipation    Vital Signs Last 24 Hrs  T(C): 36.6 (12 Oct 2019 05:00), Max: 36.6 (11 Oct 2019 12:15)  T(F): 97.8 (12 Oct 2019 05:00), Max: 97.8 (11 Oct 2019 12:15)  HR: 83 (12 Oct 2019 05:00) (78 - 93)  BP: 161/97 (12 Oct 2019 05:00) (101/68 - 161/97)  BP(mean): --  RR: 19 (12 Oct 2019 05:00) (17 - 19)  SpO2: 99% (12 Oct 2019 05:00) (96% - 99%)    CAPILLARY BLOOD GLUCOSE      POCT Blood Glucose.: 236 mg/dL (11 Oct 2019 22:14)  POCT Blood Glucose.: 238 mg/dL (11 Oct 2019 21:53)  POCT Blood Glucose.: 221 mg/dL (11 Oct 2019 17:03)  POCT Blood Glucose.: 302 mg/dL (11 Oct 2019 12:34)  POCT Blood Glucose.: 147 mg/dL (11 Oct 2019 07:31)    I&O's Summary    11 Oct 2019 07:01  -  12 Oct 2019 07:00  --------------------------------------------------------  IN: 360 mL / OUT: 600 mL / NET: -240 mL        PHYSICAL EXAM  GENERAL: Elderly woman, comfortable in bed, somnolent   HEAD:  +healing ecchymoses due to prior fall  EENT: EOMI, PERRLA, conjunctiva and sclera clear, dry mucosa, neck supple, no JVD  CHEST/LUNG: Clear to auscultation bilaterally in anterior fields; No wheeze  HEART:  Irregularly irregular rhythm; No murmurs, rubs, or gallops- on telemetry- HR in 60s-80s, dropped to 43bpm over night.  ABDOMEN: Soft, Nontender, Nondistended; Bowel sounds present  EXTREMITIES:  2+ Peripheral Pulses, No clubbing or cyanosis. 2+pitting edema in R arm, distal to fracture. No edema in b/l LE or L arm.  NEURO/PSYCH: AAOx2, nonfocal  SKIN: No rashes or lesions      LABS:                        8.9    24.95 )-----------( 263      ( 11 Oct 2019 11:58 )             29.9     Hemoglobin: 8.9 g/dL (10-11 @ 11:58)  Hemoglobin: 9.6 g/dL (10-10 @ 11:51)  Hemoglobin: 9.0 g/dL (10-09 @ 23:22)  Hemoglobin: 9.1 g/dL (10-08 @ 16:19)  Hemoglobin: 8.8 g/dL (10-07 @ 08:48)    CBC Full  -  ( 11 Oct 2019 11:58 )  WBC Count : 24.95 K/uL  RBC Count : 3.31 M/uL  Hemoglobin : 8.9 g/dL  Hematocrit : 29.9 %  Platelet Count - Automated : 263 K/uL  Mean Cell Volume : 90.3 fl  Mean Cell Hemoglobin : 26.9 pg  Mean Cell Hemoglobin Concentration : 29.8 gm/dL  Auto Neutrophil # : x  Auto Lymphocyte # : x  Auto Monocyte # : x  Auto Eosinophil # : x  Auto Basophil # : x  Auto Neutrophil % : x  Auto Lymphocyte % : x  Auto Monocyte % : x  Auto Eosinophil % : x  Auto Basophil % : x    10-11    143  |  94<L>  |  46<H>  ----------------------------<  167<H>  4.1   |  39<H>  |  0.68    Ca    8.5      11 Oct 2019 10:15  Phos  4.6     10-11  Mg     1.6     10-11    TPro  5.6<L>  /  Alb  2.8<L>  /  TBili  0.7  /  DBili  x   /  AST  14  /  ALT  18  /  AlkPhos  106  10-10    Creatinine Trend: 0.68<--, 0.88<--, 1.10<--, 0.93<--, 1.05<--, 1.18<--  LIVER FUNCTIONS - ( 10 Oct 2019 11:51 )  Alb: 2.8 g/dL / Pro: 5.6 g/dL / ALK PHOS: 106 U/L / ALT: 18 U/L / AST: 14 U/L / GGT: x                 EKG:   MICROBIOLOGY:    IMAGING:      Labs, imaging, EKG personally reviewed     CONSULTS:

## 2019-10-12 NOTE — PROGRESS NOTE ADULT - PROBLEM SELECTOR PLAN 2
- Due to hypoxic respiratory failure from fluid overload in setting of sepsis + PMHx diastolic HF  - AC held due to hematoma/fall  - HR within goal limits  - C/w metoprolol 50, uptitrate as necessary  - Monitor HR

## 2019-10-12 NOTE — MEDICAL STUDENT PROGRESS NOTE(EDUCATION) - NS MD HP STUD ASPLAN PLAN FT
Problem/Plan - 1:  ·  Problem: Respiratory failure.  Plan: - Due to fluid overload likely 2/2 sepsis; PMHx diastolic heart dysfunction as per TTE  - S/p lasix, net negative 1.4L; patient demonstrating some clinical improvement  - Goal is to keep patient net negative at least 1L daily  - F/u AM CXR  - C/w lasix, check BMP and BP before administration.   - C/w incentive spirometry and acapella valve     Problem/Plan - 2:  ·  Problem: Atrial fibrillation.  Plan: - Due to hypoxic respiratory failure from fluid overload in setting of sepsis + PMHx diastolic HF  - AC held due to hematoma/fall  - HR within goal limits  - C/w metoprolol 50, uptitrate as necessary  - Monitor HR.      Problem/Plan - 3:  ·  Problem: Sepsis.  Plan: - 2/2 E. coli bacteremia and parainfluenza virus  - Source possibly cellulitis of infected left hip joint vs hematoma vs intraabdominal infection  - No longer hypotensive  - No plan to drain L RP hematoma as per family wishes  - C/w ertapenem   - Monitor vitals  - Aim to taper steroids.      Problem/Plan - 4:  ·  Problem: Anemia due to blood loss.  Plan: - Elicited via CT From hematoma seen on CT  - Hgb stable  - Consent for transfusion if necessary  - No surgical intervention as per surgery  - No plan to drain L RP hematoma as per family wishes  - Monitor CBC.      Problem/Plan - 5:  ·  Problem: Hypertension.  Plan: - BP normalizing  - C/w nifedipine  - C/w lasix (as BP and electrolytes permit).      Problem/Plan - 6:  Problem: Parainfluenza. Plan: - C/w duonebs  - Supportive management.     Problem/Plan - 7:  ·  Problem: Vasculitis.  Plan: - S/p rituximab  - Prednisone course clarified, aim to taper prednisone  - Per rheumatology.      Problem/Plan - 8:  ·  Problem: Diabetes.  Plan: - Monitor FS  - C/w SANTOSH, uptitrate as necessary given concomitant steroid administration.      Problem/Plan - 9:  ·  Problem: Goals of care, counseling/discussion.  Plan: - MOLST Form signed and in chart, DNR/DNI  - Pain meds as needed to maintain comfort  - Family desires to monitor patient condition through weekend, will reevaluate on Monday to decide future hospital course/disposition.      Problem/Plan - 10:  Problem: Prophylactic measure. Plan; - DVT: SCD's only  - Prognosis: Very poor, DNR/DNI  - Palliative care consulted, [facility] meeting on 10/10, 10/14. Problem/Plan - 1:  ·  Problem: Respiratory failure.  Plan: - Due to fluid overload likely 2/2 sepsis; PMHx diastolic heart dysfunction as per TTE  - S/p lasix, net negative 1.4L; patient demonstrating some clinical improvement  - Goal is to keep patient net negative at least 1L daily  - F/u AM CXR  - C/w lasix, check BMP and BP before administration.   - C/w incentive spirometry and acapella valve     Problem/Plan - 2:  ·  Problem: Atrial fibrillation.  Plan: - Due to hypoxic respiratory failure from fluid overload in setting of sepsis + PMHx diastolic HF  - AC held due to hematoma/fall  - HR within goal limits  - C/w metoprolol 50, uptitrate as necessary.   - Monitor HR.      Problem/Plan - 3:  ·  Problem: Sepsis.  Plan: - 2/2 E. coli bacteremia and parainfluenza virus  - Source possibly cellulitis of infected left hip joint vs hematoma vs intraabdominal infection  - No longer hypotensive  - No plan to drain L RP hematoma as per family wishes  - C/w ertapenem   - Monitor vitals  - Aim to taper steroids.      Problem/Plan - 4:  ·  Problem: Anemia due to blood loss.  Plan: - Elicited via CT From hematoma seen on CT  - Hgb stable  - Consent for transfusion if necessary  - No surgical intervention as per surgery  - No plan to drain L RP hematoma as per family wishes  - Monitor CBC.      Problem/Plan - 5:  ·  Problem: Hypertension.  Plan: - BP normalizing  - C/w nifedipine  - C/w lasix (as BP and electrolytes permit).      Problem/Plan - 6:  Problem: Parainfluenza. Plan: - C/w duonebs  - Supportive management.     Problem/Plan - 7:  ·  Problem: Vasculitis.  Plan: - S/p rituximab  - Prednisone course clarified, aim to taper prednisone  - Per rheumatology.      Problem/Plan - 8:  ·  Problem: Diabetes.  Plan: - Monitor FS  - C/w SANTOSH, uptitrate as necessary given concomitant steroid administration.      Problem/Plan - 9:  ·  Problem: Goals of care, counseling/discussion.  Plan: - MOLST Form signed and in chart, DNR/DNI  - Pain meds as needed to maintain comfort  - Family desires to monitor patient condition through weekend, will reevaluate on Monday to decide future hospital course/disposition.      Problem/Plan - 10:  Problem: Prophylactic measure. Plan; - DVT: SCD's only  - Prognosis: Very poor, DNR/DNI  - Palliative care consulted, [facility] meeting on 10/10, 10/14.

## 2019-10-13 NOTE — PROGRESS NOTE ADULT - PROBLEM SELECTOR PLAN 1
- Due to fluid overload likely 2/2 sepsis; PMHx diastolic heart dysfunction as per TTE  - S/p lasix, net negative >1L; patient demonstrating some clinical improvement  - Goal is to keep patient net negative at least 1L daily  - CXR improved but still l trace pleural effusion  - C/w lasix, check BMP and BP before administration

## 2019-10-13 NOTE — PROGRESS NOTE ADULT - SUBJECTIVE AND OBJECTIVE BOX
Contact info:  Yancy Yi MD  Internal Medicine, PGY3  Pager: [phone] (NS)/[phone] (J LUIS)    M-F 7AM-7PM: pager covered by primary day team  Mon-Sun 7PM-7AM: Night float page [phone] for teams 1-3, [phone] for teams 4, CMA, CMB  Sa-Sun 7AM-12PM: please see contact sheet in front of chart and/or Provider to RN, primary day team  Sa-Sun 12PM-7PM: Page [phone] for teams 1-4 (NS), primary team for CMA/CMB, [facility] please page covering resident    24 HOUR EVENTS/ROS:    MEDICATIONS:  metoprolol tartrate 50 milliGRAM(s) Oral two times a day  NIFEdipine XL 30 milliGRAM(s) Oral daily    entecavir 0.5 milliGRAM(s) Oral daily  ertapenem  IVPB 1000 milliGRAM(s) IV Intermittent every 24 hours    ALBUTerol    90 MICROgram(s) HFA Inhaler 2 Puff(s) Inhalation every 6 hours  guaiFENesin   Syrup  (Sugar-Free) 100 milliGRAM(s) Oral every 6 hours PRN  guaiFENesin  milliGRAM(s) Oral every 12 hours PRN  montelukast 10 milliGRAM(s) Oral daily  tiotropium 18 MICROgram(s) Capsule 1 Capsule(s) Inhalation daily    acetaminophen   Tablet .. 650 milliGRAM(s) Oral every 6 hours PRN  ALPRAZolam 0.5 milliGRAM(s) Oral two times a day  melatonin 5 milliGRAM(s) Oral at bedtime  oxyCODONE    5 mG/acetaminophen 325 mG 1 Tablet(s) Oral every 6 hours PRN  sertraline 50 milliGRAM(s) Oral daily    docusate sodium 100 milliGRAM(s) Oral three times a day PRN  pantoprazole    Tablet 40 milliGRAM(s) Oral before breakfast  polyethylene glycol 3350 17 Gram(s) Oral daily PRN  senna 2 Tablet(s) Oral at bedtime PRN    atorvastatin 10 milliGRAM(s) Oral at bedtime  dextrose 40% Gel 15 Gram(s) Oral once PRN  dextrose 50% Injectable 12.5 Gram(s) IV Push once  glucagon  Injectable 1 milliGRAM(s) IntraMuscular once PRN  insulin glargine Injectable (LANTUS) 5 Unit(s) SubCutaneous at bedtime  insulin lispro (HumaLOG) corrective regimen sliding scale   SubCutaneous three times a day before meals  predniSONE   Tablet 40 milliGRAM(s) Oral daily    ascorbic acid 500 milliGRAM(s) Oral daily  dextrose 5%. 1000 milliLiter(s) IV Continuous <Continuous>  Nephro-bebeto 1 Tablet(s) Oral daily      PHYSICAL EXAM:  T(C): 36.8 (10-13-19 @ 05:06), Max: 36.8 (10-13-19 @ 05:06)  HR: 84 (10-13-19 @ 05:06) (78 - 99)  BP: 126/80 (10-13-19 @ 05:06) (99/77 - 126/80)  RR: 18 (10-13-19 @ 05:06) (16 - 18)  SpO2: 93% (10-13-19 @ 05:06) (93% - 98%)  Wt(kg): --  Daily     Daily Weight in k.9 (13 Oct 2019 07:28)  I&O's Summary    12 Oct 2019 07:01  -  13 Oct 2019 07:00  --------------------------------------------------------  IN: 120 mL / OUT: 1200 mL / NET: -1080 mL      TELEMETRY:     Appearance: NAD	  HEENT:   Normal oral mucosa, PERRL, EOMI	  Lymphatic: No lymphadenopathy  Cardiovascular: Normal S1 S2, No JVD, No murmurs, No edema  Respiratory: Lungs clear to auscultation	  Neuro/psych: Grossly non-focal, CN 2-12 intact, AAOX3, mood and affect normal  Gastrointestinal:  Soft, Non-tender, + BS	  Skin: No rashes, No ecchymoses, No cyanosis	  Extremities: Normal range of motion, No clubbing, cyanosis or edema  Vascular: Peripheral pulses palpable 2+ bilaterally    LABS:	 	                        9.8    22.60 )-----------( 314      ( 12 Oct 2019 14:29 )             33.3     10    144  |  96  |  42<H>  ----------------------------<  224<H>  4.5   |  38<H>  |  0.72  10-11    143  |  94<L>  |  46<H>  ----------------------------<  167<H>  4.1   |  39<H>  |  0.68    Ca    8.5      12 Oct 2019 10:13  Ca    8.5      11 Oct 2019 10:15  Phos  3.9     10-12  Phos  4.6     10-11  Mg     1.7     10-12  Mg     1.6     10-11      proBNP:   Lipid Profile:   HgA1c:   TSH:   FS: CAPILLARY BLOOD GLUCOSE      POCT Blood Glucose.: 188 mg/dL (12 Oct 2019 21:43)  POCT Blood Glucose.: 210 mg/dL (12 Oct 2019 17:19)  POCT Blood Glucose.: 303 mg/dL (12 Oct 2019 13:18)  POCT Blood Glucose.: 191 mg/dL (12 Oct 2019 08:05)    BCX/UCX:   Coags:     CARDIAC MARKERS:            	    ECG:  	  RADIOLOGY:    Consult notes reviewed:  Care discussed with providers: Contact info:  Yancy Yi MD  Internal Medicine, PGY3  Pager: [phone] (NS)/[phone] (J LUIS)    M-F 7AM-7PM: pager covered by primary day team  Mon-Sun 7PM-7AM: Night float page [phone] for teams 1-3, [phone] for teams 4, CMA, CMB  Sa-Sun 7AM-12PM: please see contact sheet in front of chart and/or Provider to RN, primary day team  Sa-Sun 12PM-7PM: Page [phone] for teams 1-4 (NS), primary team for CMA/CMB, [facility] please page covering resident    24 HOUR EVENTS/ROS: No acute events overnight. Pt minimally verbal, but denies pain.    MEDICATIONS:  metoprolol tartrate 50 milliGRAM(s) Oral two times a day  NIFEdipine XL 30 milliGRAM(s) Oral daily  entecavir 0.5 milliGRAM(s) Oral daily  ertapenem  IVPB 1000 milliGRAM(s) IV Intermittent every 24 hours  ALBUTerol    90 MICROgram(s) HFA Inhaler 2 Puff(s) Inhalation every 6 hours  guaiFENesin   Syrup  (Sugar-Free) 100 milliGRAM(s) Oral every 6 hours PRN  guaiFENesin  milliGRAM(s) Oral every 12 hours PRN  montelukast 10 milliGRAM(s) Oral daily  tiotropium 18 MICROgram(s) Capsule 1 Capsule(s) Inhalation daily  acetaminophen   Tablet .. 650 milliGRAM(s) Oral every 6 hours PRN  ALPRAZolam 0.5 milliGRAM(s) Oral two times a day  melatonin 5 milliGRAM(s) Oral at bedtime  oxyCODONE    5 mG/acetaminophen 325 mG 1 Tablet(s) Oral every 6 hours PRN  sertraline 50 milliGRAM(s) Oral daily  docusate sodium 100 milliGRAM(s) Oral three times a day PRN  pantoprazole    Tablet 40 milliGRAM(s) Oral before breakfast  polyethylene glycol 3350 17 Gram(s) Oral daily PRN  senna 2 Tablet(s) Oral at bedtime PRN  atorvastatin 10 milliGRAM(s) Oral at bedtime  dextrose 40% Gel 15 Gram(s) Oral once PRN  dextrose 50% Injectable 12.5 Gram(s) IV Push once  glucagon  Injectable 1 milliGRAM(s) IntraMuscular once PRN  insulin glargine Injectable (LANTUS) 5 Unit(s) SubCutaneous at bedtime  insulin lispro (HumaLOG) corrective regimen sliding scale   SubCutaneous three times a day before meals  predniSONE   Tablet 40 milliGRAM(s) Oral daily  ascorbic acid 500 milliGRAM(s) Oral daily  dextrose 5%. 1000 milliLiter(s) IV Continuous <Continuous>  Nephro-bebeto 1 Tablet(s) Oral daily      PHYSICAL EXAM:  T(C): 36.8 (10-13-19 @ 05:06), Max: 36.8 (10-13-19 @ 05:06)  HR: 84 (10-13-19 @ 05:06) (78 - 99)  BP: 126/80 (10-13-19 @ 05:06) (99/77 - 126/80)  RR: 18 (10-13-19 @ 05:06) (16 - 18)  SpO2: 93% (10-13-19 @ 05:06) (93% - 98%)  Wt(kg): --  Daily     Daily Weight in k.9 (13 Oct 2019 07:28)  I&O's Summary    12 Oct 2019 07:01  -  13 Oct 2019 07:00  --------------------------------------------------------  IN: 120 mL / OUT: 1200 mL / NET: -1080 mL      TELEMETRY: Afib 80s-90s    GENERAL: Elderly woman, NAD, somnolent  HEAD:  +healing ecchymoses  EENT: EOMI, PERRLA, conjunctiva and sclera clear, dry mucosa, neck supple, no JVD  CHEST/LUNG: Clear to auscultation bilaterally in anterior fields; No wheeze  HEART:  Irregularly irregular rhythm; No murmurs, rubs, or gallops  ABDOMEN: Soft, Nontender, Nondistended; Bowel sounds present  EXTREMITIES:  2+ Peripheral Pulses, No clubbing or cyanosis. 1+pitting edema in R arm, distal to fracture. No edema in b/l LE or L arm.  NEURO/PSYCH: AAOx2, nonfocal  SKIN: No rashes or lesions      LABS:	 	                        9.8    22.60 )-----------( 314      ( 12 Oct 2019 14:29 )             33.3     10-12    144  |  96  |  42<H>  ----------------------------<  224<H>  4.5   |  38<H>  |  0.72  10-11    143  |  94<L>  |  46<H>  ----------------------------<  167<H>  4.1   |  39<H>  |  0.68    Ca    8.5      12 Oct 2019 10:13  Ca    8.5      11 Oct 2019 10:15  Phos  3.9     10-  Phos  4.6     10-11  Mg     1.7     10-12  Mg     1.6     10-11        POCT Blood Glucose.: 188 mg/dL (12 Oct 2019 21:43)  POCT Blood Glucose.: 210 mg/dL (12 Oct 2019 17:19)  POCT Blood Glucose.: 303 mg/dL (12 Oct 2019 13:18)  POCT Blood Glucose.: 191 mg/dL (12 Oct 2019 08:05)   	  RADIOLOGY:  10/13/19 CXR:  Trace left pleural effusion with adjacent atelectasis. Contact info:  Yancy Yi MD  Internal Medicine, PGY3  Pager: [phone] (NS)/[phone] (J LUIS)    M-F 7AM-7PM: pager covered by primary day team  Mon-Sun 7PM-7AM: Night float page [phone] for teams 1-3, 1446 for teams 4, CMA, CMB  Sa-Sun 7AM-12PM: please see contact sheet in front of chart and/or Provider to RN, primary day team  Sa-Sun 12PM-7PM: Page [phone] for teams 1-4 (NS), primary team for CMA/CMB, [facility] please page covering resident    24 HOUR EVENTS/ROS: No acute events overnight. Pt minimally verbal, but denies pain.    MEDICATIONS:  metoprolol tartrate 50 milliGRAM(s) Oral two times a day  NIFEdipine XL 30 milliGRAM(s) Oral daily  entecavir 0.5 milliGRAM(s) Oral daily  ertapenem  IVPB 1000 milliGRAM(s) IV Intermittent every 24 hours  ALBUTerol    90 MICROgram(s) HFA Inhaler 2 Puff(s) Inhalation every 6 hours  guaiFENesin   Syrup  (Sugar-Free) 100 milliGRAM(s) Oral every 6 hours PRN  guaiFENesin  milliGRAM(s) Oral every 12 hours PRN  montelukast 10 milliGRAM(s) Oral daily  tiotropium 18 MICROgram(s) Capsule 1 Capsule(s) Inhalation daily  acetaminophen   Tablet .. 650 milliGRAM(s) Oral every 6 hours PRN  ALPRAZolam 0.5 milliGRAM(s) Oral two times a day  melatonin 5 milliGRAM(s) Oral at bedtime  oxyCODONE    5 mG/acetaminophen 325 mG 1 Tablet(s) Oral every 6 hours PRN  sertraline 50 milliGRAM(s) Oral daily  docusate sodium 100 milliGRAM(s) Oral three times a day PRN  pantoprazole    Tablet 40 milliGRAM(s) Oral before breakfast  polyethylene glycol 3350 17 Gram(s) Oral daily PRN  senna 2 Tablet(s) Oral at bedtime PRN  atorvastatin 10 milliGRAM(s) Oral at bedtime  dextrose 40% Gel 15 Gram(s) Oral once PRN  dextrose 50% Injectable 12.5 Gram(s) IV Push once  glucagon  Injectable 1 milliGRAM(s) IntraMuscular once PRN  insulin glargine Injectable (LANTUS) 5 Unit(s) SubCutaneous at bedtime  insulin lispro (HumaLOG) corrective regimen sliding scale   SubCutaneous three times a day before meals  predniSONE   Tablet 40 milliGRAM(s) Oral daily  ascorbic acid 500 milliGRAM(s) Oral daily  dextrose 5%. 1000 milliLiter(s) IV Continuous <Continuous>  Nephro-bebeto 1 Tablet(s) Oral daily      PHYSICAL EXAM:  T(C): 36.8 (10-13-19 @ 05:06), Max: 36.8 (10-13-19 @ 05:06)  HR: 84 (10-13-19 @ 05:06) (78 - 99)  BP: 126/80 (10-13-19 @ 05:06) (99/77 - 126/80)  RR: 18 (10-13-19 @ 05:06) (16 - 18)  SpO2: 93% (10-13-19 @ 05:06) (93% - 98%)  Wt(kg): --  Daily     Daily Weight in k.9 (13 Oct 2019 07:28)  I&O's Summary    12 Oct 2019 07:01  -  13 Oct 2019 07:00  --------------------------------------------------------  IN: 120 mL / OUT: 1200 mL / NET: -1080 mL      TELEMETRY: Afib 80s-90s    GENERAL: Elderly woman, NAD, somnolent  HEAD:  +healing ecchymoses  EENT: EOMI, PERRLA, conjunctiva and sclera clear, dry mucosa, neck supple, no JVD  CHEST/LUNG: Clear to auscultation bilaterally in anterior fields; No wheeze  HEART:  Irregularly irregular rhythm; No murmurs, rubs, or gallops  ABDOMEN: Soft, Nontender, Nondistended; Bowel sounds present  EXTREMITIES:  2+ Peripheral Pulses, No clubbing or cyanosis. 1+pitting edema in R arm, distal to fracture. No edema in b/l LE or L arm.  NEURO/PSYCH: AAOx1, nonfocal  SKIN: No rashes or lesions      LABS:	 	                        9.8    22.60 )-----------( 314      ( 12 Oct 2019 14:29 )             33.3     10-12    144  |  96  |  42<H>  ----------------------------<  224<H>  4.5   |  38<H>  |  0.72  10-11    143  |  94<L>  |  46<H>  ----------------------------<  167<H>  4.1   |  39<H>  |  0.68    Ca    8.5      12 Oct 2019 10:13  Ca    8.5      11 Oct 2019 10:15  Phos  3.9     10-  Phos  4.6     10-11  Mg     1.7     10-12  Mg     1.6     10-11        POCT Blood Glucose.: 188 mg/dL (12 Oct 2019 21:43)  POCT Blood Glucose.: 210 mg/dL (12 Oct 2019 17:19)  POCT Blood Glucose.: 303 mg/dL (12 Oct 2019 13:18)  POCT Blood Glucose.: 191 mg/dL (12 Oct 2019 08:05)   	  RADIOLOGY:  10/13/19 CXR:  Trace left pleural effusion with adjacent atelectasis.

## 2019-10-13 NOTE — PROGRESS NOTE ADULT - PROBLEM SELECTOR PLAN 10
- DVT: SCD's only  - Prognosis: Very poor, DNR/DNI  - Palliative care consulted, [facility] meeting on 10/10, 10/14

## 2019-10-13 NOTE — PROGRESS NOTE ADULT - PROBLEM SELECTOR PLAN 2
- Due to hypoxic respiratory failure from fluid overload in setting of sepsis + PMHx diastolic HF  - AC held due to hematoma/fall  - HR within goal limits  - C/w metoprolol 50 BID, uptitrate as necessary  - Monitor HR

## 2019-10-13 NOTE — PROGRESS NOTE ADULT - ASSESSMENT
85F PMH of CHF, ANCA vasculitis, HTN, Afib, DM2, COPD, and chronic lymphoma who was brought in for 102.6 fever, now with  sepsis secondary to ecoli bacteremia, parainfluenza virus c/b afib found to have increasing RP hematoma concern for possible source of infection. [facility] meeting on Thursday 10AM. Family noted that they do not want to pursue L RP hematoma drainage. GOC meeting reflected that family desired reevaluation/decision on Monday regarding patient hospital course, disposition.

## 2019-10-13 NOTE — PROGRESS NOTE ADULT - PROBLEM SELECTOR PLAN 3
- 2/2 E. coli bacteremia and parainfluenza virus  - Source possibly cellulitis of infected left hip joint vs hematoma vs intraabdominal infection  - No longer hypotensive  - No plan to drain L RP hematoma as per family wishes  - C/w ertapenem (since 10/8)  - Monitor vitals  - Aim to taper steroids

## 2019-10-14 NOTE — CONSULT LETTER
[Courtesy Letter:] : I had the pleasure of seeing your patient, [unfilled], in my office today. [Dear  ___] : Dear  [unfilled], [Sincerely,] : Sincerely, [Please see my note below.] : Please see my note below. [FreeTextEntry2] : KATIE WALKER [FreeTextEntry3] : Luis E Coelho MD

## 2019-10-14 NOTE — PROGRESS NOTE ADULT - PROBLEM SELECTOR PLAN 9
- MOLST Form signed and in chart, DNR/DNI  - Pain meds as needed to maintain comfort  - Family desires to monitor patient condition through weekend, will reevaluate on Monday to decide future hospital course/disposition - [facility]OLST Form signed and in chart, DNR/DNI  - Pain meds as needed to maintain comfort  - Patient to be transferred to PCU as per family

## 2019-10-14 NOTE — PROGRESS NOTE ADULT - ATTENDING COMMENTS
Patient seen and examined today. I agree with the above findings, assessment, and plan with the following additions and exceptions:     Patient without improvement even with aggressive IV diuresis, OOBTC daily, IS, and acapella valve. More encephalopathic this AM. Prognosis continues to be poor. Per [facility] meeting today, family would like to pursue inpatient hospice service. Pending transfer to PCU unit. Palliative care consult team's assistance very much appreciated.   Would continue current anti-htn meds including Lasix 80 mg ivp as needed. This will help with the patients volume overload and improve respiratory status and comfort.   Duration of abx per ID. Will need stop date.   Would continue steroid taper to prevent adrenal crisis.   Minimize blood draws and finger sticks.   Rest of plan as above.

## 2019-10-14 NOTE — MEDICAL STUDENT PROGRESS NOTE(EDUCATION) - SUBJECTIVE AND OBJECTIVE BOX
Internal Medicine Progress Note    Patient is a 85y old  Female who presents with a chief complaint of Fever (14 Oct 2019 06:50)      SUBJECTIVE / OVERNIGHT EVENTS:       CONSTITUTIONAL: No weakness, fevers or chills  EYES/ENT: No visual changes;  No dysphagia  NECK: No pain or stiffness  RESPIRATORY: +cough, +SOB- but improving. No wheezing or hemoptysis  CARDIOVASCULAR: No chest pain or palpitations  GASTROINTESTINAL: No abdominal or epigastric pain. No nausea, vomiting, or hematemesis; No diarrhea or constipation. No melena or hematochezia.  GENITOURINARY: No dysuria, frequency or hematuria  NEUROLOGICAL: No numbness. R arm weakness due to fractured humerus.  HEMATOLOGY: No easy bleeding, no lymphadenopathy  SKIN: No itching, burning, rashes, or lesions   All other review of systems is negative unless indicated above.    MEDICATIONS  (STANDING):  ALBUTerol    90 MICROgram(s) HFA Inhaler 2 Puff(s) Inhalation every 6 hours  ALPRAZolam 0.5 milliGRAM(s) Oral two times a day  ascorbic acid 500 milliGRAM(s) Oral daily  atorvastatin 10 milliGRAM(s) Oral at bedtime  dextrose 5%. 1000 milliLiter(s) (50 mL/Hr) IV Continuous <Continuous>  dextrose 50% Injectable 12.5 Gram(s) IV Push once  entecavir 0.5 milliGRAM(s) Oral daily  ertapenem  IVPB 1000 milliGRAM(s) IV Intermittent every 24 hours  insulin glargine Injectable (LANTUS) 5 Unit(s) SubCutaneous at bedtime  insulin lispro (HumaLOG) corrective regimen sliding scale   SubCutaneous three times a day before meals  melatonin 5 milliGRAM(s) Oral at bedtime  metoprolol tartrate 50 milliGRAM(s) Oral two times a day  montelukast 10 milliGRAM(s) Oral daily  Nephro-bebeto 1 Tablet(s) Oral daily  NIFEdipine XL 30 milliGRAM(s) Oral daily  pantoprazole    Tablet 40 milliGRAM(s) Oral before breakfast  predniSONE   Tablet 40 milliGRAM(s) Oral daily  sertraline 50 milliGRAM(s) Oral daily  tiotropium 18 MICROgram(s) Capsule 1 Capsule(s) Inhalation daily    MEDICATIONS  (PRN):  acetaminophen   Tablet .. 650 milliGRAM(s) Oral every 6 hours PRN Mild Pain (1 - 3)  dextrose 40% Gel 15 Gram(s) Oral once PRN Blood Glucose LESS THAN 70 milliGRAM(s)/deciliter  docusate sodium 100 milliGRAM(s) Oral three times a day PRN Constipation  glucagon  Injectable 1 milliGRAM(s) IntraMuscular once PRN Glucose LESS THAN 70 milligrams/deciliter  guaiFENesin   Syrup  (Sugar-Free) 100 milliGRAM(s) Oral every 6 hours PRN Cough  guaiFENesin  milliGRAM(s) Oral every 12 hours PRN Nasal congestion  oxyCODONE    5 mG/acetaminophen 325 mG 1 Tablet(s) Oral every 6 hours PRN Severe Pain (7 - 10)  polyethylene glycol 3350 17 Gram(s) Oral daily PRN Constiapation  senna 2 Tablet(s) Oral at bedtime PRN Constipation    Vital Signs Last 24 Hrs  T(C): 36.4 (14 Oct 2019 04:46), Max: 36.8 (13 Oct 2019 20:59)  T(F): 97.5 (14 Oct 2019 04:46), Max: 98.2 (13 Oct 2019 20:59)  HR: 87 (14 Oct 2019 04:46) (78 - 92)  BP: 146/61 (14 Oct 2019 04:46) (111/73 - 146/61)  BP(mean): --  RR: 18 (14 Oct 2019 04:46) (18 - 19)  SpO2: 97% (14 Oct 2019 04:46) (97% - 99%)    CAPILLARY BLOOD GLUCOSE      POCT Blood Glucose.: 233 mg/dL (13 Oct 2019 21:58)  POCT Blood Glucose.: 213 mg/dL (13 Oct 2019 16:59)  POCT Blood Glucose.: 171 mg/dL (13 Oct 2019 11:08)  POCT Blood Glucose.: 162 mg/dL (13 Oct 2019 08:07)    I&O's Summary    13 Oct 2019 07:01  -  14 Oct 2019 07:00  --------------------------------------------------------  IN: 120 mL / OUT: 1350 mL / NET: -1230 mL        PHYSICAL EXAM  GENERAL: Elderly woman, comfortable in bed, somnolent   HEAD:  +healing ecchymoses due to prior fall  EENT: EOMI, PERRLA, conjunctiva and sclera clear, dry mucosa, neck supple, no JVD  CHEST/LUNG: Clear to auscultation bilaterally in anterior fields; No wheeze  HEART:  Irregularly irregular rhythm; No murmurs, rubs, or gallops- on telemetry- HR in 60s-80s, dropped to 43bpm over night.  ABDOMEN: Soft, Nontender, Nondistended; Bowel sounds present  EXTREMITIES:  2+ Peripheral Pulses, No clubbing or cyanosis. 2+pitting edema in R arm, distal to fracture. No edema in b/l LE or L arm.  NEURO/PSYCH: AAOx2, nonfocal  SKIN: No rashes or lesions      LABS:                        9.0    27.06 )-----------( 288      ( 13 Oct 2019 11:14 )             31.0     Hemoglobin: 9.0 g/dL (10-13 @ 11:14)  Hemoglobin: 9.8 g/dL (10-12 @ 14:29)  Hemoglobin: 8.9 g/dL (10-11 @ 11:58)  Hemoglobin: 9.6 g/dL (10-10 @ 11:51)  Hemoglobin: 9.0 g/dL (10-09 @ 23:22)    CBC Full  -  ( 13 Oct 2019 11:14 )  WBC Count : 27.06 K/uL  RBC Count : 3.36 M/uL  Hemoglobin : 9.0 g/dL  Hematocrit : 31.0 %  Platelet Count - Automated : 288 K/uL  Mean Cell Volume : 92.3 fl  Mean Cell Hemoglobin : 26.8 pg  Mean Cell Hemoglobin Concentration : 29.0 gm/dL  Auto Neutrophil # : x  Auto Lymphocyte # : x  Auto Monocyte # : x  Auto Eosinophil # : x  Auto Basophil # : x  Auto Neutrophil % : x  Auto Lymphocyte % : x  Auto Monocyte % : x  Auto Eosinophil % : x  Auto Basophil % : x    10-13    145  |  93<L>  |  39<H>  ----------------------------<  244<H>  3.9   |  40<H>  |  0.82    Ca    8.0<L>      13 Oct 2019 16:13  Phos  3.7     10-13  Mg     1.7     10-13      Creatinine Trend: 0.82<--, 0.70<--, 0.72<--, 0.68<--, 0.88<--, 1.10<--        hs Troponin:          IMAGING:      Labs, imaging, EKG personally reviewed     CONSULTS: Internal Medicine Progress Note    Patient is a 85y old  Female who presents with a chief complaint of Fever (14 Oct 2019 06:50)      SUBJECTIVE / OVERNIGHT EVENTS: Pt seen and examined. Patient is somnolent and minimally verbal this am. ROS unable to be obtained.          MEDICATIONS  (STANDING):  ALBUTerol    90 MICROgram(s) HFA Inhaler 2 Puff(s) Inhalation every 6 hours  ALPRAZolam 0.5 milliGRAM(s) Oral two times a day  ascorbic acid 500 milliGRAM(s) Oral daily  atorvastatin 10 milliGRAM(s) Oral at bedtime  dextrose 5%. 1000 milliLiter(s) (50 mL/Hr) IV Continuous <Continuous>  dextrose 50% Injectable 12.5 Gram(s) IV Push once  entecavir 0.5 milliGRAM(s) Oral daily  ertapenem  IVPB 1000 milliGRAM(s) IV Intermittent every 24 hours  insulin glargine Injectable (LANTUS) 5 Unit(s) SubCutaneous at bedtime  insulin lispro (HumaLOG) corrective regimen sliding scale   SubCutaneous three times a day before meals  melatonin 5 milliGRAM(s) Oral at bedtime  metoprolol tartrate 50 milliGRAM(s) Oral two times a day  montelukast 10 milliGRAM(s) Oral daily  Nephro-bebeto 1 Tablet(s) Oral daily  NIFEdipine XL 30 milliGRAM(s) Oral daily  pantoprazole    Tablet 40 milliGRAM(s) Oral before breakfast  predniSONE   Tablet 40 milliGRAM(s) Oral daily  sertraline 50 milliGRAM(s) Oral daily  tiotropium 18 MICROgram(s) Capsule 1 Capsule(s) Inhalation daily    MEDICATIONS  (PRN):  acetaminophen   Tablet .. 650 milliGRAM(s) Oral every 6 hours PRN Mild Pain (1 - 3)  dextrose 40% Gel 15 Gram(s) Oral once PRN Blood Glucose LESS THAN 70 milliGRAM(s)/deciliter  docusate sodium 100 milliGRAM(s) Oral three times a day PRN Constipation  glucagon  Injectable 1 milliGRAM(s) IntraMuscular once PRN Glucose LESS THAN 70 milligrams/deciliter  guaiFENesin   Syrup  (Sugar-Free) 100 milliGRAM(s) Oral every 6 hours PRN Cough  guaiFENesin  milliGRAM(s) Oral every 12 hours PRN Nasal congestion  oxyCODONE    5 mG/acetaminophen 325 mG 1 Tablet(s) Oral every 6 hours PRN Severe Pain (7 - 10)  polyethylene glycol 3350 17 Gram(s) Oral daily PRN Constiapation  senna 2 Tablet(s) Oral at bedtime PRN Constipation    Vital Signs Last 24 Hrs  T(C): 36.4 (14 Oct 2019 04:46), Max: 36.8 (13 Oct 2019 20:59)  T(F): 97.5 (14 Oct 2019 04:46), Max: 98.2 (13 Oct 2019 20:59)  HR: 87 (14 Oct 2019 04:46) (78 - 92)  BP: 146/61 (14 Oct 2019 04:46) (111/73 - 146/61)  BP(mean): --  RR: 18 (14 Oct 2019 04:46) (18 - 19)  SpO2: 97% (14 Oct 2019 04:46) (97% - 99%)    CAPILLARY BLOOD GLUCOSE      POCT Blood Glucose.: 233 mg/dL (13 Oct 2019 21:58)  POCT Blood Glucose.: 213 mg/dL (13 Oct 2019 16:59)  POCT Blood Glucose.: 171 mg/dL (13 Oct 2019 11:08)  POCT Blood Glucose.: 162 mg/dL (13 Oct 2019 08:07)    I&O's Summary    13 Oct 2019 07:01  -  14 Oct 2019 07:00  --------------------------------------------------------  IN: 120 mL / OUT: 1350 mL / NET: -1230 mL        PHYSICAL EXAM  GENERAL: Elderly woman, comfortable in bed, somnolent   HEAD:  +healing ecchymoses due to prior fall  EENT: EOMI, PERRLA, conjunctiva and sclera clear, dry mucosa, neck supple, no JVD  CHEST/LUNG: B/l rhonchi in anterior fields; No wheezes  HEART:  Irregularly irregular rhythm; No murmurs, rubs, or gallops- on telemetry- HR in 60s-80s, dropped to 43bpm over night.  ABDOMEN: Soft, Nontender, Nondistended; Bowel sounds present  EXTREMITIES:  2+ Peripheral Pulses, No clubbing or cyanosis. 2+pitting edema in R arm, distal to fracture. No edema in b/l LE or L arm.  NEURO/PSYCH: AAOx2, nonfocal  SKIN: No rashes or lesions      LABS:                        9.0    27.06 )-----------( 288      ( 13 Oct 2019 11:14 )             31.0     Hemoglobin: 9.0 g/dL (10-13 @ 11:14)  Hemoglobin: 9.8 g/dL (10-12 @ 14:29)  Hemoglobin: 8.9 g/dL (10-11 @ 11:58)  Hemoglobin: 9.6 g/dL (10-10 @ 11:51)  Hemoglobin: 9.0 g/dL (10-09 @ 23:22)    CBC Full  -  ( 13 Oct 2019 11:14 )  WBC Count : 27.06 K/uL  RBC Count : 3.36 M/uL  Hemoglobin : 9.0 g/dL  Hematocrit : 31.0 %  Platelet Count - Automated : 288 K/uL  Mean Cell Volume : 92.3 fl  Mean Cell Hemoglobin : 26.8 pg  Mean Cell Hemoglobin Concentration : 29.0 gm/dL  Auto Neutrophil # : x  Auto Lymphocyte # : x  Auto Monocyte # : x  Auto Eosinophil # : x  Auto Basophil # : x  Auto Neutrophil % : x  Auto Lymphocyte % : x  Auto Monocyte % : x  Auto Eosinophil % : x  Auto Basophil % : x    10-13    145  |  93<L>  |  39<H>  ----------------------------<  244<H>  3.9   |  40<H>  |  0.82    Ca    8.0<L>      13 Oct 2019 16:13  Phos  3.7     10-13  Mg     1.7     10-13      Creatinine Trend: 0.82<--, 0.70<--, 0.72<--, 0.68<--, 0.88<--, 1.10<--        hs Troponin:          IMAGING:      Labs, imaging, EKG personally reviewed     CONSULTS: Internal Medicine Progress Note    Patient is a 85y old  Female who presents with a chief complaint of Fever (14 Oct 2019 06:50)      SUBJECTIVE / OVERNIGHT EVENTS: No overnight events. Pt seen and examined. Patient is somnolent and minimally verbal this am. ROS unable to be obtained.          MEDICATIONS  (STANDING):  ALBUTerol    90 MICROgram(s) HFA Inhaler 2 Puff(s) Inhalation every 6 hours  ALPRAZolam 0.5 milliGRAM(s) Oral two times a day  ascorbic acid 500 milliGRAM(s) Oral daily  atorvastatin 10 milliGRAM(s) Oral at bedtime  dextrose 5%. 1000 milliLiter(s) (50 mL/Hr) IV Continuous <Continuous>  dextrose 50% Injectable 12.5 Gram(s) IV Push once  entecavir 0.5 milliGRAM(s) Oral daily  ertapenem  IVPB 1000 milliGRAM(s) IV Intermittent every 24 hours  insulin glargine Injectable (LANTUS) 5 Unit(s) SubCutaneous at bedtime  insulin lispro (HumaLOG) corrective regimen sliding scale   SubCutaneous three times a day before meals  melatonin 5 milliGRAM(s) Oral at bedtime  metoprolol tartrate 50 milliGRAM(s) Oral two times a day  montelukast 10 milliGRAM(s) Oral daily  Nephro-bebeto 1 Tablet(s) Oral daily  NIFEdipine XL 30 milliGRAM(s) Oral daily  pantoprazole    Tablet 40 milliGRAM(s) Oral before breakfast  predniSONE   Tablet 40 milliGRAM(s) Oral daily  sertraline 50 milliGRAM(s) Oral daily  tiotropium 18 MICROgram(s) Capsule 1 Capsule(s) Inhalation daily    MEDICATIONS  (PRN):  acetaminophen   Tablet .. 650 milliGRAM(s) Oral every 6 hours PRN Mild Pain (1 - 3)  dextrose 40% Gel 15 Gram(s) Oral once PRN Blood Glucose LESS THAN 70 milliGRAM(s)/deciliter  docusate sodium 100 milliGRAM(s) Oral three times a day PRN Constipation  glucagon  Injectable 1 milliGRAM(s) IntraMuscular once PRN Glucose LESS THAN 70 milligrams/deciliter  guaiFENesin   Syrup  (Sugar-Free) 100 milliGRAM(s) Oral every 6 hours PRN Cough  guaiFENesin  milliGRAM(s) Oral every 12 hours PRN Nasal congestion  oxyCODONE    5 mG/acetaminophen 325 mG 1 Tablet(s) Oral every 6 hours PRN Severe Pain (7 - 10)  polyethylene glycol 3350 17 Gram(s) Oral daily PRN Constiapation  senna 2 Tablet(s) Oral at bedtime PRN Constipation    Vital Signs Last 24 Hrs  T(C): 36.4 (14 Oct 2019 04:46), Max: 36.8 (13 Oct 2019 20:59)  T(F): 97.5 (14 Oct 2019 04:46), Max: 98.2 (13 Oct 2019 20:59)  HR: 87 (14 Oct 2019 04:46) (78 - 92)  BP: 146/61 (14 Oct 2019 04:46) (111/73 - 146/61)  BP(mean): --  RR: 18 (14 Oct 2019 04:46) (18 - 19)  SpO2: 97% (14 Oct 2019 04:46) (97% - 99%)    CAPILLARY BLOOD GLUCOSE      POCT Blood Glucose.: 233 mg/dL (13 Oct 2019 21:58)  POCT Blood Glucose.: 213 mg/dL (13 Oct 2019 16:59)  POCT Blood Glucose.: 171 mg/dL (13 Oct 2019 11:08)  POCT Blood Glucose.: 162 mg/dL (13 Oct 2019 08:07)    I&O's Summary    13 Oct 2019 07:01  -  14 Oct 2019 07:00  --------------------------------------------------------  IN: 120 mL / OUT: 1350 mL / NET: -1230 mL        PHYSICAL EXAM  GENERAL: Elderly woman, comfortable in bed, somnolent   HEAD:  +healing ecchymoses due to prior fall  EENT: EOMI, PERRLA, conjunctiva and sclera clear, dry mucosa, neck supple, no JVD  CHEST/LUNG: B/l rhonchi in anterior fields; No wheezes  HEART:  Irregularly irregular rhythm; No murmurs, rubs, or gallops- on telemetry- afib with HR in 80s-90s overnight.  ABDOMEN: Soft, Nontender, Nondistended; Bowel sounds present  EXTREMITIES:  2+ Peripheral Pulses, No clubbing or cyanosis. 2+pitting edema in R arm, distal to fracture. No edema in b/l LE or L arm.  NEURO/PSYCH: AAOx2, nonfocal  SKIN: No rashes or lesions      LABS:                        9.0    27.06 )-----------( 288      ( 13 Oct 2019 11:14 )             31.0     Hemoglobin: 9.0 g/dL (10-13 @ 11:14)  Hemoglobin: 9.8 g/dL (10-12 @ 14:29)  Hemoglobin: 8.9 g/dL (10-11 @ 11:58)  Hemoglobin: 9.6 g/dL (10-10 @ 11:51)  Hemoglobin: 9.0 g/dL (10-09 @ 23:22)    CBC Full  -  ( 13 Oct 2019 11:14 )  WBC Count : 27.06 K/uL  RBC Count : 3.36 M/uL  Hemoglobin : 9.0 g/dL  Hematocrit : 31.0 %  Platelet Count - Automated : 288 K/uL  Mean Cell Volume : 92.3 fl  Mean Cell Hemoglobin : 26.8 pg  Mean Cell Hemoglobin Concentration : 29.0 gm/dL  Auto Neutrophil # : x  Auto Lymphocyte # : x  Auto Monocyte # : x  Auto Eosinophil # : x  Auto Basophil # : x  Auto Neutrophil % : x  Auto Lymphocyte % : x  Auto Monocyte % : x  Auto Eosinophil % : x  Auto Basophil % : x    10-13    145  |  93<L>  |  39<H>  ----------------------------<  244<H>  3.9   |  40<H>  |  0.82    Ca    8.0<L>      13 Oct 2019 16:13  Phos  3.7     10-13  Mg     1.7     10-13      Creatinine Trend: 0.82<--, 0.70<--, 0.72<--, 0.68<--, 0.88<--, 1.10<--        hs Troponin:          IMAGING:      Labs, imaging, EKG personally reviewed     CONSULTS:

## 2019-10-14 NOTE — MEDICAL STUDENT PROGRESS NOTE(EDUCATION) - NS MD HP STUD ASPLAN PLAN FT
Problem/Plan - 1:  ·  Problem: Respiratory failure.  Plan: - Due to fluid overload likely 2/2 sepsis; PMHx diastolic heart dysfunction as per TTE  - Goal is to keep patient net negative at least 1L daily  - C/w lasix, check BMP and BP before administration  - Daily CXR.      Problem/Plan - 2:  ·  Problem: Atrial fibrillation.  Plan: - Due to hypoxic respiratory failure from fluid overload in setting of sepsis + PMHx diastolic HF  - AC held due to hematoma/fall  - HR within goal limits  - C/w metoprolol 50 BID, uptitrate as necessary  - Monitor HR, hold metoprolol if HR < 50.      Problem/Plan - 3:  ·  Problem: Sepsis.  Plan: - 2/2 E. coli bacteremia and parainfluenza virus  - Source possibly cellulitis of infected left hip joint vs hematoma vs intraabdominal infection  - No longer hypotensive  - No plan to drain L RP hematoma as per family wishes  - C/w ertapenem (since 10/8)  - Monitor vitals  - Aim to taper steroids.      Problem/Plan - 4:  ·  Problem: Anemia due to blood loss.  Plan: - Elicited via CT From hematoma seen on CT  - Hgb stable  - Consent for transfusion if necessary  - No surgical intervention as per surgery  - No plan to drain L RP hematoma as per family wishes  - Monitor CBC.      Problem/Plan - 5:  ·  Problem: Hypertension.  Plan: - BP normalizing  - C/w nifedipine  - C/w lasix (as BP and electrolytes permit).      Problem/Plan - 6:  Problem: Parainfluenza. Plan: - C/w duonebs  - Supportive management.     Problem/Plan - 7:  ·  Problem: Vasculitis.  Plan: - S/p rituximab  - Prednisone course clarified, aim to taper prednisone per rheumatology.      Problem/Plan - 8:  ·  Problem: Diabetes.  Plan: - Monitor FS  - C/w SANTOSH, uptitrate as necessary given concomitant steroid administration.      Problem/Plan - 9:  ·  Problem: Goals of care, counseling/discussion.  Plan: - MOLST Form signed and in chart, DNR/DNI  - Pain meds as needed to maintain comfort  - Family desires to monitor patient condition through weekend, will reevaluate on Monday to decide future hospital course/disposition.      Problem/Plan - 10:  Problem: Prophylactic measure. Plan; - DVT: SCD's only  - Prognosis: Very poor, DNR/DNI  - Palliative care consulted, [facility] meeting on 10/14.

## 2019-10-14 NOTE — PROGRESS NOTE ADULT - ASSESSMENT
85F PMH of CHF, ANCA vasculitis, HTN, Afib, DM2, COPD, and chronic lymphoma who was brought in for 102.6 fever, now with  sepsis secondary to ecoli bacteremia, parainfluenza virus c/b afib found to have increasing RP hematoma concern for possible source of infection. GOC meeting on Thursday 10AM. Family noted that they do not want to pursue L RP hematoma drainage. GOC meeting today to discuss patient future hospital course, disposition. 85F PMH of CHF, ANCA vasculitis, HTN, Afib, DM2, COPD, and chronic lymphoma who was brought in for 102.6 fever, now with  sepsis secondary to ecoli bacteremia, parainfluenza virus c/b afib found to have increasing RP hematoma concern for possible source of infection. GOC meeting on Thursday 10AM. Family noted that they do not want to pursue L RP hematoma drainage. GOC meeting today to discuss patient future hospital course, disposition; patient to be transferred to PCU as per family wishes.

## 2019-10-14 NOTE — PROGRESS NOTE ADULT - PROBLEM SELECTOR PLAN 1
- likely multifactorial from bacteremia, respiratory failure, etc.   - continue to monitor  - family believes she is suffering

## 2019-10-14 NOTE — PROGRESS NOTE ADULT - SUBJECTIVE AND OBJECTIVE BOX
HPI: Ms. lSater is a 86 yo female with a PMH of CHF, HTN, Afib, DM2, COPD, and chronic lymphoma who was brought in for 102.6 fever. Pt is groaning in pain, unable to answer questions. history obtained via chart review and through pt's daughter and home health aide at bedside.  One month ago the patient had worsening SOB on exertion and b/l rash on her lower legs and was diagnosed with vasculitis and lupus and was given 2 doses of rituximab. Per the patients daughter and home aid, the pt has deteriorated since receiving the rituximab. 3 weeks ago the patient fell out of bed on L hip and was admitted to [facility] for retroperitoneal bleeding. Hospital course was complicated by E coli bacteremia, was being treated with TMP-SMX. Pt was discharged on Friday 9/27, then at home on Tuesday 10/01 had another fall, this time on her R side and fractured her R arm. Now s/p sling and cast. This morning, home nurse for pt noticed a fever of 102.6, and family brought pt to ER.  In ER, pt had RVP which was positive for parainfluenza. Pt has weakness, fevers, chills, hip pain, arm pain, intermittent cough (per baseline), LE edema. Does not have nausea, vomiting, diarrhea, abdominal pain, hematochezia, melena, hematuria, dysuria. (03 Oct 2019 18:30)    INTERVAL EVENTS:  10/7: states having slight abdominal pain, no apparent distress  10/10: more lethargic today  10/14: patient improved slightly over the weekend, but subsequently worsened    ADVANCE DIRECTIVES:    DNR  Yes  MOLST  [ ]  Living Will  [ ]   DECISION MAKER(s):  [ ] Health Care Proxy(s)  [ X] Surrogate(s)  [ ] Guardian           Name(s):   Phone Number(s): son Luis [phone], daughter Karlee [phone]    BASELINE (I)ADL(s) (prior to admission):  [place]: [ ]Total  [ ] Moderate [ ]Dependent    Allergies    Keflex (Unknown)  penicillin (Rash)    Intolerances    MEDICATIONS  (STANDING):  ALBUTerol    90 MICROgram(s) HFA Inhaler 2 Puff(s) Inhalation every 6 hours  ALPRAZolam 0.5 milliGRAM(s) Oral two times a day  ascorbic acid 500 milliGRAM(s) Oral daily  atorvastatin 10 milliGRAM(s) Oral at bedtime  dextrose 5%. 1000 milliLiter(s) (50 mL/Hr) IV Continuous <Continuous>  dextrose 50% Injectable 12.5 Gram(s) IV Push once  entecavir 0.5 milliGRAM(s) Oral daily  ertapenem  IVPB 1000 milliGRAM(s) IV Intermittent every 24 hours  insulin glargine Injectable (LANTUS) 5 Unit(s) SubCutaneous at bedtime  insulin lispro (HumaLOG) corrective regimen sliding scale   SubCutaneous three times a day before meals  melatonin 5 milliGRAM(s) Oral at bedtime  metoprolol tartrate 50 milliGRAM(s) Oral two times a day  montelukast 10 milliGRAM(s) Oral daily  Nephro-bebeto 1 Tablet(s) Oral daily  NIFEdipine XL 30 milliGRAM(s) Oral daily  pantoprazole    Tablet 40 milliGRAM(s) Oral before breakfast  predniSONE   Tablet 40 milliGRAM(s) Oral daily  sertraline 50 milliGRAM(s) Oral daily  tiotropium 18 MICROgram(s) Capsule 1 Capsule(s) Inhalation daily    MEDICATIONS  (PRN):  acetaminophen   Tablet .. 650 milliGRAM(s) Oral every 6 hours PRN Mild Pain (1 - 3)  dextrose 40% Gel 15 Gram(s) Oral once PRN Blood Glucose LESS THAN 70 milliGRAM(s)/deciliter  docusate sodium 100 milliGRAM(s) Oral three times a day PRN Constipation  glucagon  Injectable 1 milliGRAM(s) IntraMuscular once PRN Glucose LESS THAN 70 milligrams/deciliter  guaiFENesin   Syrup  (Sugar-Free) 100 milliGRAM(s) Oral every 6 hours PRN Cough  guaiFENesin  milliGRAM(s) Oral every 12 hours PRN Nasal congestion  oxyCODONE    5 mG/acetaminophen 325 mG 1 Tablet(s) Oral every 6 hours PRN Severe Pain (7 - 10)  polyethylene glycol 3350 17 Gram(s) Oral daily PRN Constiapation  senna 2 Tablet(s) Oral at bedtime PRN Constipation        PRESENT SYMPTOMS: [ x]Unable to obtain due to poor mentation   Source if other than patient:  [ ]Family   [ ]Team  [ ] Staff [ ] Inferred    Pain: [x ] yes [ ] no  QOL impact -   Location -     abdomen               Aggravating factors -  Quality -  Radiation -  Timing-  Severity (0-10 scale):  Minimal acceptable level (0-10 scale):   Difficult to fully ascertain the above from patient    PAIN AD Score: 0    [email or web address] (press ctrl +  left click to view)          Dyspnea:                           [ ]Mild [ ]Moderate [ ]Severe  Anxiety:                             [ ]Mild [ ]Moderate [ ]Severe  Fatigue:                             [ ]Mild [ ]Moderate [ ]Severe  Nausea:                             [ ]Mild [ ]Moderate [ ]Severe  Loss of appetite:              [ ]Mild [ ]Moderate [ ]Severe  Constipation:                    [ ]Mild [ ]Moderate [ ]Severe    Other Symptoms:  [x ]All other review of systems negative     Karnofsky Performance Score/Palliative Performance Status Version 2:      50   %      http://npcrc.org/files/news/palliative_performance_scale_ppsv2.pdf      PHYSICAL EXAM:  Vital Signs Last 24 Hrs  T(C): 36.4 (14 Oct 2019 04:46), Max: 36.8 (13 Oct 2019 20:59)  T(F): 97.5 (14 Oct 2019 04:46), Max: 98.2 (13 Oct 2019 20:59)  HR: 87 (14 Oct 2019 04:46) (78 - 92)  BP: 146/61 (14 Oct 2019 04:46) (111/73 - 146/61)  BP(mean): --  RR: 18 (14 Oct 2019 04:46) (18 - 19)  SpO2: 97% (14 Oct 2019 04:46) (97% - 99%)    Limited exam for patient comfort  GENERAL:  [ ]Alert  [ ]Oriented x   [x ]Lethargic  [ ]Cachexia  [ ]Unarousable  [x ]Verbal  [ ]Non-Verbal [  x ] No distress   [   ] Gaunt  Behavioral:   [ ] Anxiety  [ ] Delirium [ ] Agitation  [ x  ]  Calm [ ] Other  HEENT:  [x ]Normal   [ ]Dry mouth   [ ]ET Tube/Trach  [ ]Oral lesions  [ ] Temporal Wasting  [ ]  Mucositis [ ]  Grade   PULMONARY:   [  ]Clear [ ]Tachypnea  [ ]Audible excessive secretions   [ ]Rhonchi        [ ]Right [ ]Left [ ]Bilateral  [ ]Crackles        [ ]Right [ ]Left [ ]Bilateral  [ ]Wheezing     [ ]Right [ ]Left [ ]Bilateral  [ ] Diminished  [ ] Right  [  ] Left   [ ] Bilaterally  CARDIOVASCULAR:    [  ]Regular [ ]Irregular [  ]Tachy  [ ]Ismael [ ]Murmur [  ]   Edema  [ ]Other  GASTROINTESTINAL:  [ ]Soft  [ ]Distended   [ ]+BS  [ ]Non tender [ x]Tender mildly lower quadrants [ ]PEG [ ]OGT/ NGT    Last BM:   GENITOURINARY:  [x ]Normal [ ] Incontinent   [ ]Oliguria/Anuria   [ ]Chan [   ] Suprapubic tenderness  MUSCULOSKELETAL:   [  ]Normal   [x ]Weakness  [ ]Bed/Wheelchair bound [ ]Edema [  ] amputation  [  ] contraction  NEUROLOGIC:   [ x]No focal deficits  [ ] Cognitive impairment  [ ] Dysphagia [ ]Dysarthria [ ] Paresis [  ] Aphasia  [ ]Other   SKIN:   [  ]Normal   [ ]Pressure ulcer(s)  [ ]Rash [   ]  Wound    [  ] hyperpigmentation    CRITICAL CARE:  [ ] Shock Present  [ ]Septic [ ]Cardiogenic [ ]Neurologic [ ]Hypovolemic  [ ]  Vasopressors [ ]  Inotropes   [ ] Respiratory failure present [ ] mechanical ventilation [ ] non-invasive ventilatory support [ ] High flow  [ ] Acute  [ ] Chronic [ ] Hypoxic  [ ] Hypercarbic [ ] Other  [ ] Other organ failure       LABS:                                   9.0    27.06 )-----------( 288      ( 13 Oct 2019 11:14 )             31.0     10-14    145  |  95<L>  |  33<H>  ----------------------------<  164<H>  3.8   |  43<H>  |  0.65    Ca    8.3<L>      14 Oct 2019 09:52  Phos  3.0     10-14  Mg     1.6     10-14        RADIOLOGY & ADDITIONAL STUDIES:    PROTEIN CALORIE MALNUTRITION PRESENT: [ ] Yes [ ] No  [ ] PPSV2 < or = to 30% [ ] significant weight loss  [ ] poor nutritional intake [ ] catabolic state [ ] anasarca     Albumin, Serum: 2.7 g/dL (10-03-19 @ 14:11)  Artificial Nutrition [ ]     REFERRALS:   [ ]Chaplaincy  [ ] Hospice  [ ]Child Life  [ ]Social Work  [ ]Case management [ ]Holistic Therapy               Care Coordination Assessment [C. Provider] (10-05-19 @ 11:58)   Progress Notes - Care Coordination [C. Provider] (10-05-19 @ 11:54)

## 2019-10-14 NOTE — GOALS OF CARE CONVERSATION - ADVANCED CARE PLANNING - CONVERSATION DETAILS
- family meeting held, follow-up to meeting on 10/11/19  - discussed with son and two sisters  - they feel patient is suffering, and would like to focus on symptom-directed care  - discussed PCU, they are open to transitioning her care there, and understand telemetry will not be continued  - they would like to c/w abx, diuresis, steroids, and FSG with insulin if needed  - MOLST in chart  - will transfer to PCU when bed available    Approx. 25 mins spent on above.
- family meeting held with medicine team and patient's three children  - discussed current medical state, update provided by medical team  - family's goal is to focus on comfort, but they would like to see her clinical progression over the next three days  - discussed continuation of abx if needed  - RANDAL in chart reviewed  - introduced concept of hospice, family was familiar as patient's daughter was in hospice  - discussed PCU, will discuss further at next meeting  - plan to meet Monday at 10:30AM    > 16 mins spent on above

## 2019-10-14 NOTE — PROGRESS NOTE ADULT - PROBLEM SELECTOR PLAN 7
CHIEF COMPLAINT:   Patient presents with:  Urinary Symptoms (urologic): abdominal pain X 2 days,  increasing with time to 7/10 pain, leg pain bilateral, frequency of urination      HPI:   Flavio Lawrence is a 52year old female who presents with pelvic pa Wheezing. Disp: 1 Box Rfl: 0   LORazepam 1 MG Oral Tab Take 1 mg by mouth.  Disp:  Rfl:       Past Medical History:   Diagnosis Date   • Asthma       Social History:  Social History    Tobacco Use      Smoking status: Never Smoker      Smokeless tobacco: Ne symptoms  (primary encounter diagnosis)    PLAN: Meds as listed below.   Comfort measures as described in Patient Instructions    Meds & Refills for this Visit:  Requested Prescriptions      No prescriptions requested or ordered in this encounter       Risk s/p rituxan, no further plans at this point per family  rheum following

## 2019-10-14 NOTE — PROGRESS NOTE ADULT - PROBLEM SELECTOR PLAN 7
- S/p rituximab  - Prednisone course clarified, aim to taper prednisone per rheumatology - S/p rituximab  - Prednisone course clarified, aim to taper prednisone per rheumatology; currently on 40 mg daily

## 2019-10-14 NOTE — MEDICAL STUDENT PROGRESS NOTE(EDUCATION) - NS MD HP STUD ASPLAN ASSES FT
84 yo female with a PMH of CHF, ANCA vasculitis, HTN, Afib, DM2, COPD, and chronic lymphoma who was brought in for 102.6 fever, now with  sepsis secondary to ecoli bacteremia, parainfluenza virus c/b afib found to have increasing RP hematoma concern for possible source of infection
85F PMH of CHF, ANCA vasculitis, HTN, Afib, DM2, COPD, and chronic lymphoma who was brought in for 102.6 fever, now with sepsis secondary to ecoli bacteremia, parainfluenza virus c/b afib found to have increasing RP hematoma concern for possible source of infection. GOC meeting on Thursday 10AM. Family notes that they do not want to pursue L RP hematoma drainage. GOC meeting today, family demonstrated that they wanted to monitor patient clinical status, reevaluation/decision on Monday regarding patient hospital course, disposition.
85F PMH of CHF, ANCA vasculitis, HTN, Afib, DM2, COPD, and chronic lymphoma who was brought in for 102.6 fever, with sepsis secondary to ecoli bacteremia and parainfluenza virus c/b afib and found to have increasing RP hematoma concern for possible source of infection.  Family does not want to pursue L RP hematoma drainage. [facility] meeting today (10/14) to follow up on previous conversation (10/10) and discuss patient future hospital course, disposition.
86 yo female with a PMH of CHF, ANCA vasculitis, HTN, Afib, DM2, COPD, and chronic lymphoma who was brought in for 102.6 fever, now with  sepsis secondary to ecoli bacteremia, parainfluenza virus c/b afib found to have increasing RP hematoma concern for possible source of infection.
86 yo woman with a PMH of CHF, ANCA vasculitis, HTN, Afib, DM2, COPD, and chronic lymphoma who was brought in for 102.6 fever, now with sepsis secondary to ecoli bacteremia, parainfluenza virus c/b afib found to have increasing RP hematoma concern for possible source of infection. Family notes that they do not want to pursue L RP hematoma drainage. At [facility] meeting family demonstrated that they wanted to see how patient condition progresses, reevaluation/decision on Monday regarding patient hospital course, disposition.
84 yo female with a PMH of CHF, ANCA vasculitis, HTN, Afib, DM2, COPD, and chronic lymphoma who was brought in for 102.6 fever, now with  sepsis secondary to ecoli bacteremia, parainfluenza virus c/b afib found to have increasing RP hematoma concern for possible source of infection

## 2019-10-14 NOTE — PROGRESS NOTE ADULT - ASSESSMENT
Ms. Slater is a 86 yo female with a PMH of CHF, HTN, Afib, DM2, COPD, and chronic lymphoma who was brought in for 102.6 fever consulted for assistance with medical decision making in the context of a patient with advanced illness

## 2019-10-14 NOTE — PROGRESS NOTE ADULT - SUBJECTIVE AND OBJECTIVE BOX
Contact Information:  Yu Pierson II, MD, MPH  PGY-1, Internal Medicine  Pager: [phone] ([facility]) /// [phone] ([facility])    TONIA YOUNG, MRN-[id number]    Patient is a 85y old  Female who presents with a chief complaint of Fever (13 Oct 2019 07:53)      OVERNIGHT EVENTS:    SUBJECTIVE:    CONSTITUTIONAL: No weakness. No fatigue. No fever.  HEAD: No head trauma.   EYES: No vision changes.  ENT: No hearing changes or tinnitus. No ear pain. No changes in smell. No nasal congestion or discharge. No sore throat. No voice hoarseness.   NECK: No neck pain or stiffness. No lumps.  RESPIRATORY: No cough. No SOB. No wheezing. No hemoptysis.   CARDIOVASCULAR: No chest pain. No palpitations.   GASTROINTESTINAL: No dysphagia. No ABD pain. No distension. No constipation. No diarrhea. No pain with defecation. No hematemesis. No hematochezia or melena.  BACK: No back pain.  GENITOURINARY: No dysuria. No frequency or urgency. No hesitancy. No incontinence. No urinary retention. No suprapubic pain. No hematuria.  EXTREMITY: No swelling.  MUSCULOSKELETAL: No joint pain or swelling. No fractures. No stiffness.    SKIN: No rashes. No itching. No skin, hair, or nail changes.  NEUROLOGICAL: No weakness or paralysis. No lightheadedness or dizziness. No HA. No numbness or tingling.   PSYCHIATRIC: No depression.       OBJECTIVE:  Vital Signs Last 24 Hrs  T(C): 36.4 (14 Oct 2019 04:46), Max: 36.8 (13 Oct 2019 20:59)  T(F): 97.5 (14 Oct 2019 04:46), Max: 98.2 (13 Oct 2019 20:59)  HR: 87 (14 Oct 2019 04:46) (78 - 92)  BP: 146/61 (14 Oct 2019 04:46) (111/73 - 146/61)  BP(mean): --  RR: 18 (14 Oct 2019 04:46) (18 - 19)  SpO2: 97% (14 Oct 2019 04:46) (97% - 99%)  I&O's Summary    12 Oct 2019 07:01  -  13 Oct 2019 07:00  --------------------------------------------------------  IN: 120 mL / OUT: 1200 mL / NET: -1080 mL    13 Oct 2019 07:01  -  14 Oct 2019 06:50  --------------------------------------------------------  IN: 120 mL / OUT: 1350 mL / NET: -1230 mL        MEDICATIONS  (STANDING):  ALBUTerol    90 MICROgram(s) HFA Inhaler 2 Puff(s) Inhalation every 6 hours  ALPRAZolam 0.5 milliGRAM(s) Oral two times a day  ascorbic acid 500 milliGRAM(s) Oral daily  atorvastatin 10 milliGRAM(s) Oral at bedtime  dextrose 5%. 1000 milliLiter(s) (50 mL/Hr) IV Continuous <Continuous>  dextrose 50% Injectable 12.5 Gram(s) IV Push once  entecavir 0.5 milliGRAM(s) Oral daily  ertapenem  IVPB 1000 milliGRAM(s) IV Intermittent every 24 hours  insulin glargine Injectable (LANTUS) 5 Unit(s) SubCutaneous at bedtime  insulin lispro (HumaLOG) corrective regimen sliding scale   SubCutaneous three times a day before meals  melatonin 5 milliGRAM(s) Oral at bedtime  metoprolol tartrate 50 milliGRAM(s) Oral two times a day  montelukast 10 milliGRAM(s) Oral daily  Nephro-bebeto 1 Tablet(s) Oral daily  NIFEdipine XL 30 milliGRAM(s) Oral daily  pantoprazole    Tablet 40 milliGRAM(s) Oral before breakfast  predniSONE   Tablet 40 milliGRAM(s) Oral daily  sertraline 50 milliGRAM(s) Oral daily  tiotropium 18 MICROgram(s) Capsule 1 Capsule(s) Inhalation daily    MEDICATIONS  (PRN):  acetaminophen   Tablet .. 650 milliGRAM(s) Oral every 6 hours PRN Mild Pain (1 - 3)  dextrose 40% Gel 15 Gram(s) Oral once PRN Blood Glucose LESS THAN 70 milliGRAM(s)/deciliter  docusate sodium 100 milliGRAM(s) Oral three times a day PRN Constipation  glucagon  Injectable 1 milliGRAM(s) IntraMuscular once PRN Glucose LESS THAN 70 milligrams/deciliter  guaiFENesin   Syrup  (Sugar-Free) 100 milliGRAM(s) Oral every 6 hours PRN Cough  guaiFENesin  milliGRAM(s) Oral every 12 hours PRN Nasal congestion  oxyCODONE    5 mG/acetaminophen 325 mG 1 Tablet(s) Oral every 6 hours PRN Severe Pain (7 - 10)  polyethylene glycol 3350 17 Gram(s) Oral daily PRN Constiapation  senna 2 Tablet(s) Oral at bedtime PRN Constipation    Allergies    hydrALAZINE (Unknown)  Keflex (Unknown)  penicillin (Rash)    Intolerances        CONSTITUTIONAL: No acute distress. Awake and alert.  HEAD: No evidence of trauma. Structures WNL.  EYES: +PERRL. +EOMI. No scleral icterus. No conjunctival injection.  ENT: Hearing intact b/l. Moist oral mucosa. No abnormal lesions. No erythema. No pharyngeal exudates.   NECK: Supple. Appropriate ROM. No stiffness. No masses or lymphadenopathy.  RESPIRATORY: CTAB. No wheezes, rales, or rhonchi. No accessory muscle use. No apparent respiratory distress.  CARDIOVASCULAR: +S1/S2. No audible S3/S4. Regular rate and rhythm. No murmurs, rubs, or gallops.  GASTROINTESTINAL: Soft, nontender, nondistended. +BS. No rebound or guarding.   BACK: No spinal or paraspinal tenderness. No CVA tenderness.  GENITOURINARY: Normal appearing genitalia.  VASCULAR: 2+ radial pulses x b/l UE; 2+ DP pulses x b/l LE.  EXTREMITY: No LE swelling or edema. EXTs warm to touch.  MUSCULOSKELETAL: 5/5 strength x 4EXT. Movement evident in all limbs. No tenderness on palpation.  DERMATOLOGICAL: No abnormal rashes or lesions. No evident hair, skin, or nail changes.  NEUROLOGICAL: CN 2-12 grossly intact. No focal deficits. Sensation intact x 4EXT. A&Ox3 (oriented to person, place, and time).  PSYCHIATRIC: Appropriate affect.                            9.0    27.06 )-----------( 288      ( 13 Oct 2019 11:14 )             31.0       10-13    145  |  93<L>  |  39<H>  ----------------------------<  244<H>  3.9   |  40<H>  |  0.82    Ca    8.0<L>      13 Oct 2019 16:13  Phos  3.7     10-13  Mg     1.7     10-13      CAPILLARY BLOOD GLUCOSE      POCT Blood Glucose.: 233 mg/dL (13 Oct 2019 21:58)  POCT Blood Glucose.: 213 mg/dL (13 Oct 2019 16:59)  POCT Blood Glucose.: 171 mg/dL (13 Oct 2019 11:08)  POCT Blood Glucose.: 162 mg/dL (13 Oct 2019 08:07)                  RADIOLOGY AND ADDITIONAL TESTS:    CONSULTANT NOTES REVIEWED:    CARE DISCUSSED WITH THE FOLLOWING CONSULTANTS/PROVIDERS: Contact Information:  Yu Pierson II, MD, MPH  PGY-1, Internal Medicine  Pager: [phone] ([facility]) /// [phone] ([facility])    TONIA YOUNG, MRN-[id number]    Patient is a 85y old  Female who presents with a chief complaint of Fever (13 Oct 2019 07:53)      OVERNIGHT EVENTS: No overnight events.    SUBJECTIVE: Patient evaluated at bedside. Sleeping but arousable. Unable to clearly verbalize. Interview limited.    Limited ROS.    OBJECTIVE:  Vital Signs Last 24 Hrs  T(C): 36.4 (14 Oct 2019 04:46), Max: 36.8 (13 Oct 2019 20:59)  T(F): 97.5 (14 Oct 2019 04:46), Max: 98.2 (13 Oct 2019 20:59)  HR: 87 (14 Oct 2019 04:46) (78 - 92)  BP: 146/61 (14 Oct 2019 04:46) (111/73 - 146/61)  BP(mean): --  RR: 18 (14 Oct 2019 04:46) (18 - 19)  SpO2: 97% (14 Oct 2019 04:46) (97% - 99%)  I&O's Summary    12 Oct 2019 07:01  -  13 Oct 2019 07:00  --------------------------------------------------------  IN: 120 mL / OUT: 1200 mL / NET: -1080 mL    13 Oct 2019 07:01  -  14 Oct 2019 06:50  --------------------------------------------------------  IN: 120 mL / OUT: 1350 mL / NET: -1230 mL        MEDICATIONS  (STANDING):  ALBUTerol    90 MICROgram(s) HFA Inhaler 2 Puff(s) Inhalation every 6 hours  ALPRAZolam 0.5 milliGRAM(s) Oral two times a day  ascorbic acid 500 milliGRAM(s) Oral daily  atorvastatin 10 milliGRAM(s) Oral at bedtime  dextrose 5%. 1000 milliLiter(s) (50 mL/Hr) IV Continuous <Continuous>  dextrose 50% Injectable 12.5 Gram(s) IV Push once  entecavir 0.5 milliGRAM(s) Oral daily  ertapenem  IVPB 1000 milliGRAM(s) IV Intermittent every 24 hours  insulin glargine Injectable (LANTUS) 5 Unit(s) SubCutaneous at bedtime  insulin lispro (HumaLOG) corrective regimen sliding scale   SubCutaneous three times a day before meals  melatonin 5 milliGRAM(s) Oral at bedtime  metoprolol tartrate 50 milliGRAM(s) Oral two times a day  montelukast 10 milliGRAM(s) Oral daily  Nephro-bebeto 1 Tablet(s) Oral daily  NIFEdipine XL 30 milliGRAM(s) Oral daily  pantoprazole    Tablet 40 milliGRAM(s) Oral before breakfast  predniSONE   Tablet 40 milliGRAM(s) Oral daily  sertraline 50 milliGRAM(s) Oral daily  tiotropium 18 MICROgram(s) Capsule 1 Capsule(s) Inhalation daily    MEDICATIONS  (PRN):  acetaminophen   Tablet .. 650 milliGRAM(s) Oral every 6 hours PRN Mild Pain (1 - 3)  dextrose 40% Gel 15 Gram(s) Oral once PRN Blood Glucose LESS THAN 70 milliGRAM(s)/deciliter  docusate sodium 100 milliGRAM(s) Oral three times a day PRN Constipation  glucagon  Injectable 1 milliGRAM(s) IntraMuscular once PRN Glucose LESS THAN 70 milligrams/deciliter  guaiFENesin   Syrup  (Sugar-Free) 100 milliGRAM(s) Oral every 6 hours PRN Cough  guaiFENesin  milliGRAM(s) Oral every 12 hours PRN Nasal congestion  oxyCODONE    5 mG/acetaminophen 325 mG 1 Tablet(s) Oral every 6 hours PRN Severe Pain (7 - 10)  polyethylene glycol 3350 17 Gram(s) Oral daily PRN Constiapation  senna 2 Tablet(s) Oral at bedtime PRN Constipation    Allergies    hydrALAZINE (Unknown)  Keflex (Unknown)  penicillin (Rash)    Intolerances        CONSTITUTIONAL: Somnolent but arousable; when awakened, apparent increased respiratory effort.  HEAD: L temporal ecchymosis.  EYES: +PERRL. No conjunctival injection.   ENT: Slightly dry tongue.    NECK: Supple.   RESPIRATORY: Dulled lung sounds in L anterior lung field, rhochi in R anterior lung field. Increased respiratory effort.  CARDIOVASCULAR: +S1/S2. No audible S3/S4. AFib. Per tele, 80-90s over 24 hours. No murmurs, rubs, or gallops.  GASTROINTESTINAL: Soft, nontender, nondistended. +BS.  VASCULAR: 2+ radial pulses x b/l UE; 2+ DP pulses x b/l LE.  EXTREMITY: Significant UE swelling. Resolving LE edema.  MUSCULOSKELETAL: Unable to assess strength 2/2 patient deconditioning.  DERMATOLOGICAL: Resolving L hip hematoma with skin peeling. RUE ecchymosis near elbow.  NEUROLOGICAL: Unable to adequately assess neurological status due to patient inability to respond.                              9.0    27.06 )-----------( 288      ( 13 Oct 2019 11:14 )             31.0       10-13    145  |  93<L>  |  39<H>  ----------------------------<  244<H>  3.9   |  40<H>  |  0.82    Ca    8.0<L>      13 Oct 2019 16:13  Phos  3.7     10-13  Mg     1.7     10-13      CAPILLARY BLOOD GLUCOSE      POCT Blood Glucose.: 233 mg/dL (13 Oct 2019 21:58)  POCT Blood Glucose.: 213 mg/dL (13 Oct 2019 16:59)  POCT Blood Glucose.: 171 mg/dL (13 Oct 2019 11:08)  POCT Blood Glucose.: 162 mg/dL (13 Oct 2019 08:07)                  RADIOLOGY AND ADDITIONAL TESTS:    CONSULTANT NOTES REVIEWED:    CARE DISCUSSED WITH THE FOLLOWING CONSULTANTS/PROVIDERS: Contact Information:  Yu Pierson II, MD, MPH  PGY-1, Internal Medicine  Pager: [phone] ([facility]) /// [phone] ([facility])    TONIA YOUNG, MRN-[id number]    Patient is a 85y old  Female who presents with a chief complaint of Fever (13 Oct 2019 07:53)      OVERNIGHT EVENTS: No overnight events.    SUBJECTIVE: Patient evaluated at bedside. Sleeping but arousable. Unable to clearly verbalize secondary to mental status . Interview limited.    Limited ROS secondary to mental status     OBJECTIVE:  Vital Signs Last 24 Hrs  T(C): 36.4 (14 Oct 2019 04:46), Max: 36.8 (13 Oct 2019 20:59)  T(F): 97.5 (14 Oct 2019 04:46), Max: 98.2 (13 Oct 2019 20:59)  HR: 87 (14 Oct 2019 04:46) (78 - 92)  BP: 146/61 (14 Oct 2019 04:46) (111/73 - 146/61)  BP(mean): --  RR: 18 (14 Oct 2019 04:46) (18 - 19)  SpO2: 97% (14 Oct 2019 04:46) (97% - 99%)  I&O's Summary    12 Oct 2019 07:01  -  13 Oct 2019 07:00  --------------------------------------------------------  IN: 120 mL / OUT: 1200 mL / NET: -1080 mL    13 Oct 2019 07:01  -  14 Oct 2019 06:50  --------------------------------------------------------  IN: 120 mL / OUT: 1350 mL / NET: -1230 mL        MEDICATIONS  (STANDING):  ALBUTerol    90 MICROgram(s) HFA Inhaler 2 Puff(s) Inhalation every 6 hours  ALPRAZolam 0.5 milliGRAM(s) Oral two times a day  ascorbic acid 500 milliGRAM(s) Oral daily  atorvastatin 10 milliGRAM(s) Oral at bedtime  dextrose 5%. 1000 milliLiter(s) (50 mL/Hr) IV Continuous <Continuous>  dextrose 50% Injectable 12.5 Gram(s) IV Push once  entecavir 0.5 milliGRAM(s) Oral daily  ertapenem  IVPB 1000 milliGRAM(s) IV Intermittent every 24 hours  insulin glargine Injectable (LANTUS) 5 Unit(s) SubCutaneous at bedtime  insulin lispro (HumaLOG) corrective regimen sliding scale   SubCutaneous three times a day before meals  melatonin 5 milliGRAM(s) Oral at bedtime  metoprolol tartrate 50 milliGRAM(s) Oral two times a day  montelukast 10 milliGRAM(s) Oral daily  Nephro-bebeto 1 Tablet(s) Oral daily  NIFEdipine XL 30 milliGRAM(s) Oral daily  pantoprazole    Tablet 40 milliGRAM(s) Oral before breakfast  predniSONE   Tablet 40 milliGRAM(s) Oral daily  sertraline 50 milliGRAM(s) Oral daily  tiotropium 18 MICROgram(s) Capsule 1 Capsule(s) Inhalation daily    MEDICATIONS  (PRN):  acetaminophen   Tablet .. 650 milliGRAM(s) Oral every 6 hours PRN Mild Pain (1 - 3)  dextrose 40% Gel 15 Gram(s) Oral once PRN Blood Glucose LESS THAN 70 milliGRAM(s)/deciliter  docusate sodium 100 milliGRAM(s) Oral three times a day PRN Constipation  glucagon  Injectable 1 milliGRAM(s) IntraMuscular once PRN Glucose LESS THAN 70 milligrams/deciliter  guaiFENesin   Syrup  (Sugar-Free) 100 milliGRAM(s) Oral every 6 hours PRN Cough  guaiFENesin  milliGRAM(s) Oral every 12 hours PRN Nasal congestion  oxyCODONE    5 mG/acetaminophen 325 mG 1 Tablet(s) Oral every 6 hours PRN Severe Pain (7 - 10)  polyethylene glycol 3350 17 Gram(s) Oral daily PRN Constiapation  senna 2 Tablet(s) Oral at bedtime PRN Constipation    Allergies    hydrALAZINE (Unknown)  Keflex (Unknown)  penicillin (Rash)    Intolerances        CONSTITUTIONAL: Somnolent but arousable; when awakened, apparent increased respiratory effort.  HEAD: L temporal ecchymosis.  EYES: +PERRL. No conjunctival injection.   ENT: Slightly dry tongue.    NECK: Supple.   RESPIRATORY: Dulled lung sounds in L anterior lung field, rhochi in R anterior lung field. Increased respiratory effort.  CARDIOVASCULAR: +S1/S2. No audible S3/S4. AFib. Per tele, 80-90s over 24 hours. No murmurs, rubs, or gallops. Anasarca noted of all extremities. RUE with the worst edema in setting of arm fracture. Extremities are still warm and well perfused.   GASTROINTESTINAL: Soft, nontender, nondistended. +BS.  VASCULAR: 2+ radial pulses x b/l UE; 2+ DP pulses x b/l LE.  MUSCULOSKELETAL: Unable to assess strength 2/2 patient deconditioning.  DERMATOLOGICAL: Resolving L hip hematoma with skin peeling. RUE ecchymosis near elbow.  NEUROLOGICAL: Unable to adequately assess neurological status due to patient inability to respond.                              9.0    27.06 )-----------( 288      ( 13 Oct 2019 11:14 )             31.0       10-13    145  |  93<L>  |  39<H>  ----------------------------<  244<H>  3.9   |  40<H>  |  0.82    Ca    8.0<L>      13 Oct 2019 16:13  Phos  3.7     10-13  Mg     1.7     10-13      CAPILLARY BLOOD GLUCOSE      POCT Blood Glucose.: 233 mg/dL (13 Oct 2019 21:58)  POCT Blood Glucose.: 213 mg/dL (13 Oct 2019 16:59)  POCT Blood Glucose.: 171 mg/dL (13 Oct 2019 11:08)  POCT Blood Glucose.: 162 mg/dL (13 Oct 2019 08:07)        RADIOLOGY AND ADDITIONAL TESTS:  CXR: unchanged left pleural effusion.

## 2019-10-14 NOTE — PROGRESS NOTE ADULT - PROBLEM SELECTOR PLAN 2
- c/w ertapenem, currently within goals to continue, has been on since 10/8  - f/u with ID re: duration of abx

## 2019-10-14 NOTE — PROGRESS NOTE ADULT - PROBLEM SELECTOR PLAN 10
- DVT: SCD's only  - Prognosis: Very poor, DNR/DNI  - Palliative care consulted, [facility] meeting on 10/14 - DVT: SCD's only  - Prognosis: Very poor, DNR/DNI  - Palliative care consulted, per family, patient to be transferred to PCU

## 2019-10-14 NOTE — PROGRESS NOTE ADULT - PROBLEM SELECTOR PLAN 2
- Due to hypoxic respiratory failure from fluid overload in setting of sepsis + PMHx diastolic HF  - AC held due to hematoma/fall  - HR within goal limits  - C/w metoprolol 50 BID, uptitrate as necessary  - Monitor HR - Due to hypoxic respiratory failure from fluid overload in setting of sepsis + PMHx diastolic HF  - AC held due to hematoma/fall  - HR within goal limits; per telemetry, AFib, HR 80-90  - C/w metoprolol 50 BID, uptitrate as necessary  - Monitor HR

## 2019-10-14 NOTE — PROGRESS NOTE ADULT - PROBLEM SELECTOR PLAN 1
- Due to fluid overload likely 2/2 sepsis; PMHx diastolic heart dysfunction as per TTE  - Goal is to keep patient net negative at least 1L daily  - C/w lasix, check BMP and BP before administration  - Daily CXR - Due to fluid overload likely 2/2 sepsis; PMHx diastolic heart dysfunction as per TTE  - Patient appears slightly worse today; increased rhonchi evident; CXR essentially unchanged  - Goal is to keep patient net negative at least 1L daily  - C/w lasix, check BMP and BP before administration  - Daily CXR

## 2019-10-14 NOTE — END OF VISIT
[FreeTextEntry3] : I, Luis E Coelho MD, ordering physician, have read and attest that all the information, medical decision making and discharge instructions within are true and accurate.

## 2019-10-14 NOTE — PROGRESS NOTE ADULT - SUBJECTIVE AND OBJECTIVE BOX
INFECTIOUS DISEASES FOLLOW UP--Ad Espinal MD  Pager [phone]    This is a follow up note for this  85y Female with  Fever due to unspecified condition  immunosuppressed on steroids  possible infected L RP collection.    Further ROS: limited    Allergies    hydrALAZINE (Unknown)  Keflex (Unknown)  penicillin (Rash)    ANTIBIOTICS/RELEVANT:  antimicrobials  entecavir 0.5 milliGRAM(s) Oral daily  ertapenem  IVPB 1000 milliGRAM(s) IV Intermittent every 24 hours    OTHER:  acetaminophen   Tablet .. 650 milliGRAM(s) Oral every 6 hours PRN  ALBUTerol    90 MICROgram(s) HFA Inhaler 2 Puff(s) Inhalation every 6 hours  ALPRAZolam 0.5 milliGRAM(s) Oral two times a day  ascorbic acid 500 milliGRAM(s) Oral daily  atorvastatin 10 milliGRAM(s) Oral at bedtime  dextrose 40% Gel 15 Gram(s) Oral once PRN  dextrose 5%. 1000 milliLiter(s) IV Continuous <Continuous>  dextrose 50% Injectable 12.5 Gram(s) IV Push once  docusate sodium 100 milliGRAM(s) Oral three times a day PRN  glucagon  Injectable 1 milliGRAM(s) IntraMuscular once PRN  guaiFENesin   Syrup  (Sugar-Free) 100 milliGRAM(s) Oral every 6 hours PRN  guaiFENesin  milliGRAM(s) Oral every 12 hours PRN  insulin glargine Injectable (LANTUS) 5 Unit(s) SubCutaneous at bedtime  insulin lispro (HumaLOG) corrective regimen sliding scale   SubCutaneous three times a day before meals  melatonin 5 milliGRAM(s) Oral at bedtime  metoprolol tartrate 50 milliGRAM(s) Oral two times a day  montelukast 10 milliGRAM(s) Oral daily  Nephro-bebeto 1 Tablet(s) Oral daily  NIFEdipine XL 30 milliGRAM(s) Oral daily  oxyCODONE    5 mG/acetaminophen 325 mG 1 Tablet(s) Oral every 6 hours PRN  pantoprazole    Tablet 40 milliGRAM(s) Oral before breakfast  polyethylene glycol 3350 17 Gram(s) Oral daily PRN  predniSONE   Tablet 40 milliGRAM(s) Oral daily  senna 2 Tablet(s) Oral at bedtime PRN  sertraline 50 milliGRAM(s) Oral daily  tiotropium 18 MICROgram(s) Capsule 1 Capsule(s) Inhalation daily    Objective:  Vital Signs Last 24 Hrs  T(C): 36.4 (14 Oct 2019 04:46), Max: 36.8 (13 Oct 2019 20:59)  T(F): 97.5 (14 Oct 2019 04:46), Max: 98.2 (13 Oct 2019 20:59)  HR: 87 (14 Oct 2019 04:46) (78 - 92)  BP: 146/61 (14 Oct 2019 04:46) (111/73 - 146/61)  BP(mean): --  RR: 18 (14 Oct 2019 04:46) (18 - 19)  SpO2: 97% (14 Oct 2019 04:46) (97% - 99%)    PHYSICAL EXAM: frail/weak  Ear/Nose/Throat: no oral lesions, 	  Respiratory: bs ant BL  Cardiovascular: S1S2  Extremities: edema  No Lymphadenopathy  IV sites not inflammed.    LABS:                        9.0    27.06 )-----------( 288      ( 13 Oct 2019 11:14 )             31.0     10-13    145  |  93<L>  |  39<H>  ----------------------------<  244<H>  3.9   |  40<H>  |  0.82    Ca    8.0<L>      13 Oct 2019 16:13  Phos  3.7     10-13  Mg     1.7     10-13    MICROBIOLOGY: no new micro    RADIOLOGY & ADDITIONAL STUDIES:  < from: Xray Chest 1 View- PORTABLE-Routine (10.13.19 @ 09:29) >    Trace left pleural effusion with adjacent atelectasis.    < end of copied text >

## 2019-10-14 NOTE — PROGRESS NOTE ADULT - ASSESSMENT
imp/rx:  goals of care being established.  On invanz with e. coli in blood and possible infected L RP collection.  continue invanz if it is within the goals of care to do so.    id remains available.

## 2019-10-14 NOTE — PHYSICAL EXAM
[de-identified] : Patient is WDWN, alert, and in no acute distress. Breathing is unlabored. She is grossly oriented to person, place, and time. \par \par Right Shoulder: \par Inspection/ Palpation: there is no tenderness, swelling, or deformities. \par Range of Motion: active flexion, passive flexion, abduction, external rotation is full and painless in all planes with no crepitus.\par Strength: forward elevation, internal rotation, external rotation, adduction, and abduction are 5/5. \par Stability: no joint instability on provocative testing.  [de-identified] : X-rays of the right shoulder on 10/01/2019 at [facility] revealed a comminuted and displaced fractures of the proximal shaft of the right humerus. The right humeral head is located within the glenoid. There is no evidence of a right shoulder dislocation. \par

## 2019-10-15 NOTE — PROGRESS NOTE ADULT - PROBLEM SELECTOR PLAN 2
- c/w ertapenem, currently within goals to continue, has been on since 10/8  - f/u with ID re: duration of abx - c/w ertapenem, for e.coli bacteremia

## 2019-10-15 NOTE — PROGRESS NOTE ADULT - SUBJECTIVE AND OBJECTIVE BOX
HPI: Ms. Slater is a 86 yo female with a PMH of CHF, HTN, Afib, DM2, COPD, and chronic lymphoma who was brought in for 102.6 fever. Pt is groaning in pain, unable to answer questions. history obtained via chart review and through pt's daughter and home health aide at bedside.  One month ago the patient had worsening SOB on exertion and b/l rash on her lower legs and was diagnosed with vasculitis and lupus and was given 2 doses of rituximab. Per the patients daughter and home aid, the pt has deteriorated since receiving the rituximab. 3 weeks ago the patient fell out of bed on L hip and was admitted to [facility] for retroperitoneal bleeding. Hospital course was complicated by E coli bacteremia, was being treated with TMP-SMX. Pt was discharged on Friday 9/27, then at home on Tuesday 10/01 had another fall, this time on her R side and fractured her R arm. Now s/p sling and cast. This morning, home nurse for pt noticed a fever of 102.6, and family brought pt to ER.  In ER, pt had RVP which was positive for parainfluenza. Pt has weakness, fevers, chills, hip pain, arm pain, intermittent cough (per baseline), LE edema. Does not have nausea, vomiting, diarrhea, abdominal pain, hematochezia, melena, hematuria, dysuria. (03 Oct 2019 18:30)    INTERVAL EVENTS:  10/7: states having slight abdominal pain, no apparent distress  10/10: more lethargic today  10/14: patient improved slightly over the weekend, but subsequently worsened    ADVANCE DIRECTIVES:    DNR  Yes  MOLST  [ ]  Living Will  [ ]   DECISION MAKER(s):  [ ] Health Care Proxy(s)  [ X] Surrogate(s)  [ ] Guardian           Name(s):   Phone Number(s): son Luis [phone], daughter Karlee [phone]    BASELINE (I)ADL(s) (prior to admission):  [place]: [ ]Total  [ ] Moderate [ ]Dependent    Allergies    Keflex (Unknown)  penicillin (Rash)    Intolerances    MEDICATIONS  (STANDING):  ALBUTerol    90 MICROgram(s) HFA Inhaler 2 Puff(s) Inhalation every 6 hours  ALPRAZolam 0.5 milliGRAM(s) Oral two times a day  ascorbic acid 500 milliGRAM(s) Oral daily  atorvastatin 10 milliGRAM(s) Oral at bedtime  dextrose 5%. 1000 milliLiter(s) (50 mL/Hr) IV Continuous <Continuous>  dextrose 50% Injectable 12.5 Gram(s) IV Push once  entecavir 0.5 milliGRAM(s) Oral daily  ertapenem  IVPB 1000 milliGRAM(s) IV Intermittent every 24 hours  insulin glargine Injectable (LANTUS) 5 Unit(s) SubCutaneous at bedtime  insulin lispro (HumaLOG) corrective regimen sliding scale   SubCutaneous three times a day before meals  melatonin 5 milliGRAM(s) Oral at bedtime  metoprolol tartrate 50 milliGRAM(s) Oral two times a day  montelukast 10 milliGRAM(s) Oral daily  Nephro-bebeto 1 Tablet(s) Oral daily  NIFEdipine XL 30 milliGRAM(s) Oral daily  pantoprazole    Tablet 40 milliGRAM(s) Oral before breakfast  predniSONE   Tablet 40 milliGRAM(s) Oral daily  sertraline 50 milliGRAM(s) Oral daily  tiotropium 18 MICROgram(s) Capsule 1 Capsule(s) Inhalation daily    MEDICATIONS  (PRN):  acetaminophen   Tablet .. 650 milliGRAM(s) Oral every 6 hours PRN Mild Pain (1 - 3)  dextrose 40% Gel 15 Gram(s) Oral once PRN Blood Glucose LESS THAN 70 milliGRAM(s)/deciliter  docusate sodium 100 milliGRAM(s) Oral three times a day PRN Constipation  glucagon  Injectable 1 milliGRAM(s) IntraMuscular once PRN Glucose LESS THAN 70 milligrams/deciliter  guaiFENesin   Syrup  (Sugar-Free) 100 milliGRAM(s) Oral every 6 hours PRN Cough  guaiFENesin  milliGRAM(s) Oral every 12 hours PRN Nasal congestion  oxyCODONE    5 mG/acetaminophen 325 mG 1 Tablet(s) Oral every 6 hours PRN Severe Pain (7 - 10)  polyethylene glycol 3350 17 Gram(s) Oral daily PRN Constiapation  senna 2 Tablet(s) Oral at bedtime PRN Constipation        PRESENT SYMPTOMS: [ x]Unable to obtain due to poor mentation   Source if other than patient:  [ ]Family   [ ]Team  [ ] Staff [ ] Inferred    Pain: [x ] yes [ ] no  QOL impact -   Location -     abdomen               Aggravating factors -  Quality -  Radiation -  Timing-  Severity (0-10 scale):  Minimal acceptable level (0-10 scale):   Difficult to fully ascertain the above from patient    PAIN AD Score: 0    [email or web address] (press ctrl +  left click to view)          Dyspnea:                           [ ]Mild [ ]Moderate [ ]Severe  Anxiety:                             [ ]Mild [ ]Moderate [ ]Severe  Fatigue:                             [ ]Mild [ ]Moderate [ ]Severe  Nausea:                             [ ]Mild [ ]Moderate [ ]Severe  Loss of appetite:              [ ]Mild [ ]Moderate [ ]Severe  Constipation:                    [ ]Mild [ ]Moderate [ ]Severe    Other Symptoms:  [x ]All other review of systems negative     Karnofsky Performance Score/Palliative Performance Status Version 2:      50   %      http://npcrc.org/files/news/palliative_performance_scale_[email or web address].pdf      PHYSICAL EXAM:  Vital Signs Last 24 Hrs  T(C): 36.4 (14 Oct 2019 04:46), Max: 36.8 (13 Oct 2019 20:59)  T(F): 97.5 (14 Oct 2019 04:46), Max: 98.2 (13 Oct 2019 20:59)  HR: 87 (14 Oct 2019 04:46) (78 - 92)  BP: 146/61 (14 Oct 2019 04:46) (111/73 - 146/61)  BP(mean): --  RR: 18 (14 Oct 2019 04:46) (18 - 19)  SpO2: 97% (14 Oct 2019 04:46) (97% - 99%)    Limited exam for patient comfort  GENERAL:  [ ]Alert  [ ]Oriented x   [x ]Lethargic  [ ]Cachexia  [ ]Unarousable  [x ]Verbal  [ ]Non-Verbal [  x ] No distress   [   ] Gaunt  Behavioral:   [ ] Anxiety  [ ] Delirium [ ] Agitation  [ x  ]  Calm [ ] Other  HEENT:  [x ]Normal   [ ]Dry mouth   [ ]ET Tube/Trach  [ ]Oral lesions  [ ] Temporal Wasting  [ ]  Mucositis [ ]  Grade   PULMONARY:   [  ]Clear [ ]Tachypnea  [ ]Audible excessive secretions   [ ]Rhonchi        [ ]Right [ ]Left [ ]Bilateral  [ ]Crackles        [ ]Right [ ]Left [ ]Bilateral  [ ]Wheezing     [ ]Right [ ]Left [ ]Bilateral  [ ] Diminished  [ ] Right  [  ] Left   [ ] Bilaterally  CARDIOVASCULAR:    [  ]Regular [ ]Irregular [  ]Tachy  [ ]Ismael [ ]Murmur [  ]   Edema  [ ]Other  GASTROINTESTINAL:  [ ]Soft  [ ]Distended   [ ]+BS  [ ]Non tender [ x]Tender mildly lower quadrants [ ]PEG [ ]OGT/ NGT    Last BM:   GENITOURINARY:  [x ]Normal [ ] Incontinent   [ ]Oliguria/Anuria   [ ]Chan [   ] Suprapubic tenderness  MUSCULOSKELETAL:   [  ]Normal   [x ]Weakness  [ ]Bed/Wheelchair bound [ ]Edema [  ] amputation  [  ] contraction  NEUROLOGIC:   [ x]No focal deficits  [ ] Cognitive impairment  [ ] Dysphagia [ ]Dysarthria [ ] Paresis [  ] Aphasia  [ ]Other   SKIN:   [  ]Normal   [ ]Pressure ulcer(s)  [ ]Rash [   ]  Wound    [  ] hyperpigmentation    CRITICAL CARE:  [ ] Shock Present  [ ]Septic [ ]Cardiogenic [ ]Neurologic [ ]Hypovolemic  [ ]  Vasopressors [ ]  Inotropes   [ ] Respiratory failure present [ ] mechanical ventilation [ ] non-invasive ventilatory support [ ] High flow  [ ] Acute  [ ] Chronic [ ] Hypoxic  [ ] Hypercarbic [ ] Other  [ ] Other organ failure       LABS:                                   9.0    27.06 )-----------( 288      ( 13 Oct 2019 11:14 )             31.0     10-14    145  |  95<L>  |  33<H>  ----------------------------<  164<H>  3.8   |  43<H>  |  0.65    Ca    8.3<L>      14 Oct 2019 09:52  Phos  3.0     10-14  Mg     1.6     10-14        RADIOLOGY & ADDITIONAL STUDIES:    PROTEIN CALORIE MALNUTRITION PRESENT: [ ] Yes [ ] No  [ ] PPSV2 < or = to 30% [ ] significant weight loss  [ ] poor nutritional intake [ ] catabolic state [ ] anasarca     Albumin, Serum: 2.7 g/dL (10-03-19 @ 14:11)  Artificial Nutrition [ ]     REFERRALS:   [ ]Chaplaincy  [ ] Hospice  [ ]Child Life  [ ]Social Work  [ ]Case management [ ]Holistic Therapy               Care Coordination Assessment [C. Provider] (10-05-19 @ 11:58)   Progress Notes - Care Coordination [C. Provider] (10-05-19 @ 11:54) HPI: Ms. Slater is a 84 yo female with a PMH of CHF, HTN, Afib, DM2, COPD, and chronic lymphoma who was brought in for 102.6 fever. Pt is groaning in pain, unable to answer questions. history obtained via chart review and through pt's daughter and home health aide at bedside.  One month ago the patient had worsening SOB on exertion and b/l rash on her lower legs and was diagnosed with vasculitis and lupus and was given 2 doses of rituximab. Per the patients daughter and home aid, the pt has deteriorated since receiving the rituximab. 3 weeks ago the patient fell out of bed on L hip and was admitted to [facility] for retroperitoneal bleeding. Hospital course was complicated by E coli bacteremia, was being treated with TMP-SMX. Pt was discharged on Friday 9/27, then at home on Tuesday 10/01 had another fall, this time on her R side and fractured her R arm. Now s/p sling and cast. This morning, home nurse for pt noticed a fever of 102.6, and family brought pt to ER.  In ER, pt had RVP which was positive for parainfluenza. Pt has weakness, fevers, chills, hip pain, arm pain, intermittent cough (per baseline), LE edema. Does not have nausea, vomiting, diarrhea, abdominal pain, hematochezia, melena, hematuria, dysuria. (03 Oct 2019 18:30)    INTERVAL EVENTS: Patient continues to deteriorate, less responsive.  1 prn hydromorphone,  1 prn ativan, 1 prn oxycodone  ADVANCE DIRECTIVES:    DNR  Yes  MOLST  [ ]  Living Will  [ ]   DECISION MAKER(s):  [ ] Health Care Proxy(s)  [ X] Surrogate(s)  [ ] Guardian           Name(s):   Phone Number(s): son Luis [phone], daughter Karlee [phone]    BASELINE (I)ADL(s) (prior to admission):  [place]: [ ]Total  [ ] Moderate [ ]Dependent    Allergies    Keflex (Unknown)  penicillin (Rash)    Intolerances    MEDICATIONS  (STANDING):  ALBUTerol    90 MICROgram(s) HFA Inhaler 2 Puff(s) Inhalation every 6 hours  ALPRAZolam 0.5 milliGRAM(s) Oral two times a day  ascorbic acid 500 milliGRAM(s) Oral daily  atorvastatin 10 milliGRAM(s) Oral at bedtime  dextrose 5%. 1000 milliLiter(s) (50 mL/Hr) IV Continuous <Continuous>  dextrose 50% Injectable 12.5 Gram(s) IV Push once  entecavir 0.5 milliGRAM(s) Oral daily  ertapenem  IVPB 1000 milliGRAM(s) IV Intermittent every 24 hours  insulin glargine Injectable (LANTUS) 5 Unit(s) SubCutaneous at bedtime  insulin lispro (HumaLOG) corrective regimen sliding scale   SubCutaneous three times a day before meals  melatonin 5 milliGRAM(s) Oral at bedtime  metoprolol tartrate 50 milliGRAM(s) Oral two times a day  montelukast 10 milliGRAM(s) Oral daily  Nephro-bebeto 1 Tablet(s) Oral daily  NIFEdipine XL 30 milliGRAM(s) Oral daily  pantoprazole    Tablet 40 milliGRAM(s) Oral before breakfast  predniSONE   Tablet 40 milliGRAM(s) Oral daily  sertraline 50 milliGRAM(s) Oral daily  tiotropium 18 MICROgram(s) Capsule 1 Capsule(s) Inhalation daily    MEDICATIONS  (PRN):  acetaminophen   Tablet .. 650 milliGRAM(s) Oral every 6 hours PRN Mild Pain (1 - 3)  dextrose 40% Gel 15 Gram(s) Oral once PRN Blood Glucose LESS THAN 70 milliGRAM(s)/deciliter  docusate sodium 100 milliGRAM(s) Oral three times a day PRN Constipation  glucagon  Injectable 1 milliGRAM(s) IntraMuscular once PRN Glucose LESS THAN 70 milligrams/deciliter  guaiFENesin   Syrup  (Sugar-Free) 100 milliGRAM(s) Oral every 6 hours PRN Cough  guaiFENesin  milliGRAM(s) Oral every 12 hours PRN Nasal congestion  oxyCODONE    5 mG/acetaminophen 325 mG 1 Tablet(s) Oral every 6 hours PRN Severe Pain (7 - 10)  polyethylene glycol 3350 17 Gram(s) Oral daily PRN Constiapation  senna 2 Tablet(s) Oral at bedtime PRN Constipation        PRESENT SYMPTOMS: [ x]Unable to obtain due to poor mentation   Source if other than patient:  [ ]Family   [ ]Team  [ ] Staff [ ] Inferred    Pain: [x ] yes [ ] no  QOL impact -   Location -     abdomen               Aggravating factors -  Quality -  Radiation -  Timing-  Severity (0-10 scale):  Minimal acceptable level (0-10 scale):   Difficult to fully ascertain the above from patient    PAIN AD Score  [email or web address] (press ctrl +  left click to view)    Dyspnea:                           [ ]Mild [ ]Moderate [ ]Severe  Anxiety:                             [ ]Mild [ ]Moderate [ ]Severe  Fatigue:                             [ ]Mild [x ]Moderate [ ]Severe  Nausea:                             [ ]Mild [ ]Moderate [ ]Severe  Loss of appetite:              [ ]Mild [x ]Moderate [ ]Severe  Constipation:                    [ ]Mild [ ]Moderate [ ]Severe    Other Symptoms:  [x ]All other review of systems negative     Karnofsky Performance Score/Palliative Performance Status Version 2:     30  %      http://npcrc.org/files/news/palliative_performance_scale_[email or web address].pdf      PHYSICAL EXAM:  Vital Signs Last 24 Hrs  T(C): 36.4 (14 Oct 2019 04:46), Max: 36.8 (13 Oct 2019 20:59)  T(F): 97.5 (14 Oct 2019 04:46), Max: 98.2 (13 Oct 2019 20:59)  HR: 87 (14 Oct 2019 04:46) (78 - 92)  BP: 146/61 (14 Oct 2019 04:46) (111/73 - 146/61)  BP(mean): --  RR: 18 (14 Oct 2019 04:46) (18 - 19)  SpO2: 97% (14 Oct 2019 04:46) (97% - 99%)    Limited exam for patient comfort  GENERAL:  [ ]Alert  [ ]Oriented x   [x ]Lethargic  [ ]Cachexia  [ ]Unarousable  [ ]Verbal  [s ]Non-Verbal  Behavioral:   [ ] Anxiety  [ ] Delirium [ ] Agitation [ ] Other  HEENT:  [x ]Normal   [ ]Dry mouth   [ ]ET Tube/Trach  [ ]Oral lesions     PULMONARY:   [  ]Clear [ ]Tachypnea  [s ]Audible excessive secretions   [ ]Rhonchi        [ ]Right [ ]Left [ ]Bilateral  [ ]Crackles        [ ]Right [ ]Left [ ]Bilateral  [ ]Wheezing     [ ]Right [ ]Left [ ]Bilateral  [ ] Diminished  [ ] Right  [  ] Left   [ ] Bilaterally  CARDIOVASCULAR:    [x ]Regular [ ]Irregular [  ]Tachy  [ ]Ismael [ ]Murmur  [ ]Other +S1 +S2   GASTROINTESTINAL:  [x ]Soft  [ ]Distended   [x ]+BS  [ x]Non tender [ ]Tender  [ ]PEG [ ]OGT/ NGT  Last BM: 10/12/19  GENITOURINARY:  [ ]Normal [ ] Incontinent   [ ]Oliguria/Anuria   [x ]Chan  MUSCULOSKELETAL:   [  ]Normal   [x ]Weakness  [ ]Bed/Wheelchair bound [ ]Edema   NEUROLOGIC:   [ x]No focal deficits  [ ] Cognitive impairment  [ ] Dysphagia [ ]Dysarthria [ ] Paresis [  ] Aphasia  [ ]Other   SKIN:   [  ]Normal   [x ]Pressure ulcer(s) stage II sacrum, bilateral heels [ ]Rash     CRITICAL CARE:  [ ] Shock Present  [ ]Septic [ ]Cardiogenic [ ]Neurologic [ ]Hypovolemic  [ ]  Vasopressors [ ]  Inotropes   [ ] Respiratory failure present [ ] mechanical ventilation [ ] non-invasive ventilatory support [ ] High flow  [ ] Acute  [ ] Chronic [ ] Hypoxic  [ ] Hypercarbic [ ] Other  [ ] Other organ failure       LABS: None new.                RADIOLOGY & ADDITIONAL STUDIES: None new.     PROTEIN CALORIE MALNUTRITION PRESENT: [ ] Yes [ ] No  [x ] PPSV2 < or = to 30% [ ] significant weight loss  [x ] poor nutritional intake [ ] catabolic state [ ] anasarca     Albumin, Serum: 2.7 g/dL (10-03-19 @ 14:11)  Artificial Nutrition [ ]     REFERRALS:   [ ]Chaplaincy  [ ] Hospice  [ ]Child Life  [ x]Social Work  [ ]Case management [ ]Holistic Therapy               Care Coordination Assessment [C. Provider] (10-05-19 @ 11:58)   Progress Notes - Care Coordination [C. Provider] (10-05-19 @ 11:54)

## 2019-10-15 NOTE — PROVIDER CONTACT NOTE (OTHER) - RECOMMENDATIONS
Yu PLUMMER made aware
MD Gaviria made aware, Hold midodrine dose for elevated BP this AM
MD to assess patient
Order another lab
Patient pronounced dead at 1350  daughters at bedside
Pt get IVF
Pt get straight cath order
Pt needs small bolus

## 2019-10-15 NOTE — PROGRESS NOTE ADULT - PROBLEM SELECTOR PROBLEM 8
Diabetes
Diabetes
Advanced care planning/counseling discussion
Diabetes
Prophylactic measure
Advanced care planning/counseling discussion
Prophylactic measure

## 2019-10-15 NOTE — PROGRESS NOTE ADULT - PROBLEM/PLAN-3
Reminder letter sent to home address.  
[phone].  
DISPLAY PLAN FREE TEXT

## 2019-10-15 NOTE — PROGRESS NOTE ADULT - PROBLEM SELECTOR PLAN 6
PPS 40. Family at beside, death is imminent.  They were  educated as to what to expect.  Questions answered.  Emotional support provided.

## 2019-10-15 NOTE — PROGRESS NOTE ADULT - PROBLEM SELECTOR PROBLEM 9
Palliative care encounter
Goals of care, counseling/discussion
Palliative care encounter

## 2019-10-15 NOTE — PROGRESS NOTE ADULT - PROVIDER SPECIALTY LIST ADULT
Hospitalist
Infectious Disease
Internal Medicine
Palliative Care
Internal Medicine

## 2019-10-15 NOTE — PROGRESS NOTE ADULT - PROBLEM SELECTOR PLAN 8
- family meeting held, follow-up to meeting on 10/11/19  - discussed with son and two sisters  - they feel patient is suffering, and would like to focus on symptom-directed care  - discussed PCU, they are open to transitioning her care there, and understand telemetry will not be continued  - they would like to c/w abx, diuresis, steroids, and FSG with insulin if needed  - MOLST in chart  - will transfer to PCU when bed available    Approx. 25 mins spent on above
- Monitor FS  - C/w SANTOSH, uptitrate as necessary given concomitant steroid administration
- Monitor FS, c/w SANTOSH
- Monitor FS, c/w SANTOSH
- family meeting held, follow-up to meeting on 10/11/19  - discussed with son and two sisters  - they feel patient is suffering, and would like to focus on symptom-directed care  - discussed PCU, they are open to transitioning her care there, and understand telemetry will not be continued  - they would like to c/w abx, diuresis, steroids, and FSG with insulin if needed  - MOLST in chart  - will transfer to PCU when bed available    Approx. 25 mins spent on above
-DVT: SCD's only  very poor prognosis, DNR/DNI  -palliative care consulted
-DVT: SCD's only  very poor prognosis, DNR/DNI  -palliative care consulted

## 2019-10-15 NOTE — PROGRESS NOTE ADULT - REASON FOR ADMISSION
Fever

## 2019-10-15 NOTE — DISCHARGE NOTE FOR THE EXPIRED PATIENT - HOSPITAL COURSE
Ms. Slater is a 86 yo female with a PMH of CHF, HTN, Afib, DM2, COPD, and chronic lymphoma who was brought in for 102.6 fever. Pt is groaning in pain, unable to answer questions. history obtained via chart review and through pt's daughter and home health aide at bedside.  One month ago the patient had worsening SOB on exertion and b/l rash on her lower legs and was diagnosed with vasculitis and lupus and was given 2 doses of rituximab. Per the patients daughter and home aid, the pt has deteriorated since receiving the rituximab. 3 weeks ago the patient fell out of bed on L hip and was admitted to [facility] for retroperitoneal bleeding. Hospital course was complicated by E coli bacteremia, was being treated with TMP-SMX. Pt was discharged on , then at home on Tuesday 10/01 had another fall, this time on her R side and fractured her R arm. Now s/p sling and cast. This morning, home nurse for pt noticed a fever of 102.6, and family brought pt to ER.  In ER, pt had RVP which was positive for parainfluenza. Pt has weakness, fevers, chills, hip pain, arm pain, intermittent cough (per baseline), LE edema. Does not have nausea, vomiting, diarrhea, abdominal pain, hematochezia, melena, hematuria, dysuria. (03 Oct 2019 18:30).  Despite aggressive treatment for her e coli bacteremia, she continued to deteriorate and have uncontrolled pain.  She was brought to the PCU for symptom management and end of life care.  She  on 10/15/19.  Family at bedside.

## 2019-10-15 NOTE — PROGRESS NOTE ADULT - PROBLEM SELECTOR PLAN 5
- family currently would not want HD if the situation arose Patient requires total support for all ADL's.  PPSV2 20   %.

## 2019-10-15 NOTE — PROGRESS NOTE ADULT - PROBLEM/PLAN-7
Awake/Alert
DISPLAY PLAN FREE TEXT

## 2019-10-15 NOTE — PROVIDER CONTACT NOTE (OTHER) - ACTION/TREATMENT ORDERED:
I advised patient of labs and she would like to know even though perfect still concerned and having the following symptoms.     Freezing cold  Exhausted   No energy  Mood swings  periods are now not normal. 2 days late and she is on BCP and like clock work, flow is different now too.     Would like to know if switching brands would be an option    see patient expiration note

## 2019-10-15 NOTE — PROGRESS NOTE ADULT - PROBLEM SELECTOR PLAN 1
- likely multifactorial from bacteremia, respiratory failure, etc.   - continue to monitor  - family believes she is suffering - likely multifactorial from bacteremia, respiratory failure, etc.   - continue to monitor  - family at bedside

## 2019-10-15 NOTE — PROGRESS NOTE ADULT - ASSESSMENT
Ms. Slater is a 84 yo female with a PMH of CHF, HTN, Afib, DM2, COPD, and chronic lymphoma who was brought in for 102.6 fever consulted for assistance with medical decision making in the context of a patient with advanced illness Ms. Slater is a 84 yo female with a PMH of CHF, HTN, Afib, DM2, COPD, and chronic lymphoma who was brought in for 102.6 fever consulted for assistance with medical decision making in the context of a patient with advanced illness, transferred to PCU for symptom directed care.

## 2019-10-16 ENCOUNTER — APPOINTMENT (OUTPATIENT)
Dept: FAMILY MEDICINE | Facility: CLINIC | Age: 84
End: 2019-10-16

## 2019-10-21 ENCOUNTER — INBOUND DOCUMENT (OUTPATIENT)
Age: 84
End: 2019-10-21

## 2019-10-21 ENCOUNTER — APPOINTMENT (OUTPATIENT)
Dept: NEPHROLOGY | Facility: CLINIC | Age: 84
End: 2019-10-21

## 2019-11-12 PROCEDURE — 73020 X-RAY EXAM OF SHOULDER: CPT

## 2019-11-12 PROCEDURE — 73060 X-RAY EXAM OF HUMERUS: CPT

## 2019-11-12 PROCEDURE — 93005 ELECTROCARDIOGRAM TRACING: CPT

## 2019-11-12 PROCEDURE — 87186 SC STD MICRODIL/AGAR DIL: CPT

## 2019-11-12 PROCEDURE — 86901 BLOOD TYPING SEROLOGIC RH(D): CPT

## 2019-11-12 PROCEDURE — 82435 ASSAY OF BLOOD CHLORIDE: CPT

## 2019-11-12 PROCEDURE — 70486 CT MAXILLOFACIAL W/O DYE: CPT

## 2019-11-12 PROCEDURE — 71045 X-RAY EXAM CHEST 1 VIEW: CPT

## 2019-11-12 PROCEDURE — 83036 HEMOGLOBIN GLYCOSYLATED A1C: CPT

## 2019-11-12 PROCEDURE — 85027 COMPLETE CBC AUTOMATED: CPT

## 2019-11-12 PROCEDURE — 83605 ASSAY OF LACTIC ACID: CPT

## 2019-11-12 PROCEDURE — 87150 DNA/RNA AMPLIFIED PROBE: CPT

## 2019-11-12 PROCEDURE — 84132 ASSAY OF SERUM POTASSIUM: CPT

## 2019-11-12 PROCEDURE — 80048 BASIC METABOLIC PNL TOTAL CA: CPT

## 2019-11-12 PROCEDURE — 87486 CHLMYD PNEUM DNA AMP PROBE: CPT

## 2019-11-12 PROCEDURE — 82803 BLOOD GASES ANY COMBINATION: CPT

## 2019-11-12 PROCEDURE — 82962 GLUCOSE BLOOD TEST: CPT

## 2019-11-12 PROCEDURE — 87040 BLOOD CULTURE FOR BACTERIA: CPT

## 2019-11-12 PROCEDURE — 96365 THER/PROPH/DIAG IV INF INIT: CPT

## 2019-11-12 PROCEDURE — 80162 ASSAY OF DIGOXIN TOTAL: CPT

## 2019-11-12 PROCEDURE — 70450 CT HEAD/BRAIN W/O DYE: CPT

## 2019-11-12 PROCEDURE — 99284 EMERGENCY DEPT VISIT MOD MDM: CPT | Mod: 25

## 2019-11-12 PROCEDURE — 87798 DETECT AGENT NOS DNA AMP: CPT

## 2019-11-12 PROCEDURE — 96374 THER/PROPH/DIAG INJ IV PUSH: CPT

## 2019-11-12 PROCEDURE — 87086 URINE CULTURE/COLONY COUNT: CPT

## 2019-11-12 PROCEDURE — 86900 BLOOD TYPING SEROLOGIC ABO: CPT

## 2019-11-12 PROCEDURE — 97163 PT EVAL HIGH COMPLEX 45 MIN: CPT

## 2019-11-12 PROCEDURE — 83735 ASSAY OF MAGNESIUM: CPT

## 2019-11-12 PROCEDURE — 85610 PROTHROMBIN TIME: CPT

## 2019-11-12 PROCEDURE — 72170 X-RAY EXAM OF PELVIS: CPT

## 2019-11-12 PROCEDURE — 97166 OT EVAL MOD COMPLEX 45 MIN: CPT

## 2019-11-12 PROCEDURE — 87581 M.PNEUMON DNA AMP PROBE: CPT

## 2019-11-12 PROCEDURE — 87340 HEPATITIS B SURFACE AG IA: CPT

## 2019-11-12 PROCEDURE — 86704 HEP B CORE ANTIBODY TOTAL: CPT

## 2019-11-12 PROCEDURE — 80053 COMPREHEN METABOLIC PANEL: CPT

## 2019-11-12 PROCEDURE — 86850 RBC ANTIBODY SCREEN: CPT

## 2019-11-12 PROCEDURE — 85014 HEMATOCRIT: CPT

## 2019-11-12 PROCEDURE — 85730 THROMBOPLASTIN TIME PARTIAL: CPT

## 2019-11-12 PROCEDURE — 76377 3D RENDER W/INTRP POSTPROCES: CPT

## 2019-11-12 PROCEDURE — 72125 CT NECK SPINE W/O DYE: CPT

## 2019-11-12 PROCEDURE — 97110 THERAPEUTIC EXERCISES: CPT

## 2019-11-12 PROCEDURE — 81001 URINALYSIS AUTO W/SCOPE: CPT

## 2019-11-12 PROCEDURE — 97530 THERAPEUTIC ACTIVITIES: CPT

## 2019-11-12 PROCEDURE — 82947 ASSAY GLUCOSE BLOOD QUANT: CPT

## 2019-11-12 PROCEDURE — 87633 RESP VIRUS 12-25 TARGETS: CPT

## 2019-11-12 PROCEDURE — 94640 AIRWAY INHALATION TREATMENT: CPT

## 2019-11-12 PROCEDURE — 97535 SELF CARE MNGMENT TRAINING: CPT

## 2019-11-12 PROCEDURE — 82330 ASSAY OF CALCIUM: CPT

## 2019-11-12 PROCEDURE — 74177 CT ABD & PELVIS W/CONTRAST: CPT

## 2019-11-12 PROCEDURE — 86706 HEP B SURFACE ANTIBODY: CPT

## 2019-11-12 PROCEDURE — 99285 EMERGENCY DEPT VISIT HI MDM: CPT | Mod: 25

## 2019-11-12 PROCEDURE — 84295 ASSAY OF SERUM SODIUM: CPT

## 2019-11-12 PROCEDURE — 73030 X-RAY EXAM OF SHOULDER: CPT

## 2019-11-12 PROCEDURE — 73000 X-RAY EXAM OF COLLAR BONE: CPT

## 2019-11-12 PROCEDURE — 84100 ASSAY OF PHOSPHORUS: CPT

## 2019-11-14 ENCOUNTER — APPOINTMENT (OUTPATIENT)
Age: 84
End: 2019-11-14

## 2019-12-16 ENCOUNTER — APPOINTMENT (OUTPATIENT)
Dept: PULMONOLOGY | Facility: CLINIC | Age: 84
End: 2019-12-16

## 2020-02-13 NOTE — PROGRESS NOTE ADULT - PROBLEM SELECTOR PLAN 8
END OF SHIFT NOTE: 
 
Intake/Output No intake/output data recorded. Voiding: NO 
Catheter: NO 
Drain: PEG/Gastrostomy Tube (Active) Site Assessment Clean, dry, & intact 2/13/2020  2:10 AM  
Dressing Status Clean, dry, & intact 2/13/2020  2:10 AM  
G Port Status Clamped 2/13/2020  2:10 AM  
Action Taken Other (comment) 2/12/2020 10:32 PM  
Drainage Description Green 2/12/2020 10:32 PM  
Gastric Residual (mL) 550 ml 2/12/2020 10:32 PM  
Tube Feeding/Formula Options Jevity 1.2 2/12/2020  7:09 PM  
Tube Feeding/Verify Rate (mL/hr) 35 2/12/2020  7:09 PM  
Water Flush Volume (mL) 30 mL 2/12/2020  7:09 PM  
Intake (ml) 237 ml 2/11/2020  5:23 AM  
   
Condom Catheter 02/12/20 (Active) Indications for Use Acute urinary retention/bladder outlet obstruction 2/12/2020  7:09 PM  
Status Draining 2/12/2020  7:09 PM  
Site Condition No abnormalities 2/12/2020  7:09 PM  
Drainage Tube Clipped to Bed Yes 2/12/2020  7:09 PM  
Catheter Secured to Thigh Yes 2/12/2020  7:09 PM  
Tamper Seal Intact Yes 2/12/2020  7:09 PM  
Bag Below Bladder/Not on Floor Yes 2/12/2020  7:09 PM  
Lack of Dependent Loop in Tubing Yes 2/12/2020  7:09 PM  
Drainage Bag Less Than Half Full Yes 2/12/2020  7:09 PM  
Urine Output (mL) 0 ml 2/13/2020  3:01 AM  
 
 
 
 
 
 
Stool:  0 occurrences. Emesis:  0 occurrences. VITAL SIGNS Patient Vitals for the past 12 hrs: 
 Temp Pulse Resp BP SpO2  
02/13/20 0301 98.4 °F (36.9 °C) 98 16 149/86 98 % 02/12/20 2232 97.4 °F (36.3 °C) 84 15 139/89 97 % Pain Assessment Pain 1 Pain Scale 1: Visual (02/13/20 0210) Pain Intensity 1: 0 (02/13/20 0210) Patient Stated Pain Goal: 0 (02/12/20 1508) Pain Reassessment 1: Patient resting w/respiratory rate greater than 10 (02/13/20 0210) Pain Location 1: Head (02/12/20 1508) Pain Intervention(s) 1: Medication (see MAR) (02/12/20 1508) Ambulating No 
 
Additional Information: Tube feeding stopped, see previous note.  Pt is not voiding. Still agitated and confused Shift report given to oncoming nurse at the bedside.  
 
Jr Lucero RN 
 
 
 entecavir  hep b dna pcr

## 2020-04-09 PROBLEM — M25.559 HIP PAIN: Status: RESOLVED | Noted: 2017-02-22 | Resolved: 2020-04-09

## 2020-08-06 PROBLEM — D72.810 EPISODIC LYMPHOCYTOPENIA: Status: ACTIVE | Noted: 2017-10-24

## 2020-11-03 NOTE — PHYSICAL THERAPY INITIAL EVALUATION ADULT - STANDING BALANCE: DYNAMIC, REHAB EVAL
Goal Outcome Evaluation:  Plan of Care Reviewed With: patient  Progress: no change  Outcome Summary: No acute events overnight. Patient has rested well overnight. VSS and has maintained oxygensaturations in the mid to upper 90s througout the night. No events on telemetry. Anticipated discharge today. Continue to monitor.   fair balance

## 2020-12-21 PROBLEM — Z87.440 HISTORY OF URINARY TRACT INFECTION: Status: RESOLVED | Noted: 2019-01-01 | Resolved: 2020-12-21

## 2020-12-22 NOTE — PHYSICAL EXAM
[Well Nourished] : well nourished [PERRL] : pupils equal round and reactive to light [EOMI] : extraocular movements intact [Normal Oropharynx] : the oropharynx was normal [Supple] : supple [No Respiratory Distress] : no respiratory distress  [No Accessory Muscle Use] : no accessory muscle use [Soft] : abdomen soft [Non Tender] : non-tender [Normal Anterior Cervical Nodes] : no anterior cervical lymphadenopathy [de-identified] : mild crackles left lower base [de-identified] : b/l lower extremity edema 2+, L>R, left shin tiny microvascular pin point bleeding [de-identified] : irregular rate Alternatives Discussed Intro (Do Not Add Period): I discussed alternative treatments to Mohs surgery and specifically discussed the risks and benefits of

## 2021-02-11 NOTE — ED PROVIDER NOTE - NS ED ROS FT
Except as otherwise indicated in HPI:  CONSTITUTIONAL: Neg  HEENT: neg  CV: neg  Resp: neg  GI: Neg  : Neg  SKIN: Neg  NEURO: Neg  PSYCHIATRIC: Neg  Heme/Onc: Neg
OT anterior to pt

## 2021-07-19 NOTE — CONSULT NOTE ADULT - ASSESSMENT
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 pt now presents s/p a fall from standing found to have left RP bleed in the setting of coumadin use and supratheraputic INR at [facility] (INR was 4.03 on 9/6). Hematology consulted as patient is due for Rituxan on 9/9. 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 Pt now presents s/p a fall from standing found to have left RP bleed in the setting of coumadin use and supratheraputic INR at [facility] (INR was 4.03 on 9/6). Hematology consulted as patient is due for Rituxan on 9/9.     #Crescentic glomerulonephritis: Rituxan managed by renal team for new diagnosis. Would hold Rituxan in the setting of acute illness and hospitalization. Please follow up with pt's nephrologist regarding resuming rituxan, likely to be done in outpatient setting once pt is discharged.     #Marginal B Cell Lymphoma/MGUS- Currently under surveillance outpatient. Pt can continue to follow up with Dr. Viera at [facility] on discharge.   -Support with blood transfusions as needed, goal hgb >7.    Federica Koroma MD PGY-4  Hematology Fellow  [phone] Additional Progress Note...

## 2021-08-03 NOTE — PROGRESS NOTE ADULT - PROBLEM SELECTOR PROBLEM 6
Earache     When did symptoms start? 3 days ago  Which ear is bothering the child? L  Does the child have fever? NO    Ok to schedule without triage if only symptoms are ear pain with or without fever  If any additional complaints, such as ear drainage or cough, send to a nurse      APPT GIVEN [facility] [id number]
Vasculitis
Acute on chronic diastolic congestive heart failure
Acute on chronic diastolic congestive heart failure
Afib
Vasculitis
Acute on chronic diastolic congestive heart failure
Vasculitis
Vasculitis

## 2021-09-24 NOTE — PATIENT PROFILE ADULT. - NS PRO MODE OF ARRIVAL
Called pt to let her know I contacted Dr. Zee who said the gabapentin is okay to take.  Message left on patient's voicemail.    
stretcher

## 2022-04-14 NOTE — REVIEW OF SYSTEMS
[Abdominal Pain] : abdominal pain [Vomiting] : vomiting [Negative] : Heme/Lymph Zygomaticofacial Flap Text: Given the location of the defect, shape of the defect and the proximity to free margins a zygomaticofacial flap was deemed most appropriate for repair.  Using a sterile surgical marker, the appropriate flap was drawn incorporating the defect and placing the expected incisions within the relaxed skin tension lines where possible. The area thus outlined was incised deep to adipose tissue with a #15 scalpel blade with preservation of a vascular pedicle.  The skin margins were undermined to an appropriate distance in all directions utilizing iris scissors.  The flap was then placed into the defect and anchored with interrupted buried subcutaneous sutures.

## 2022-04-26 NOTE — PROGRESS NOTE ADULT - PROBLEM SELECTOR PLAN 1
yes Patient presented with KANIKA. Patient with presented with elevated serum creatinine of 2.03 on 8/10/19, baseline 0.5.  Now currently improved. She presents with purpuric rash on admission which improved after giving IV steroids. Renal function and rash seem to be improving with steroid therapy.  Serology with low C3/C4, Positive p-ANCA, negative ANCA elevated ESR/CRP. Patient with positive IVAN and anti-dsDNA on 6/23/2018 and again elevated on this hospital stay in the setting of chronic Hydralazine use. 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 Patient to start first dose of Rituxan today 8/23/19. Continue to monitor labs and renal function. Patient presented with KANIKA. Patient with presented with elevated serum creatinine of 2.03 on 8/10/19, baseline 0.5.  Now currently improved. She presents with purpuric rash on admission which improved after giving IV steroids. Renal function and rash seem to be improving with steroid therapy.  Serology with low C3/C4, Positive p-ANCA, negative ANCA elevated ESR/CRP. Patient with positive IVAN and anti-dsDNA on 6/23/2018 and again elevated on this hospital stay in the setting of chronic Hydralazine use. 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 Patient to start first dose of Rituxan today 8/23/19. Continue to monitor labs and renal function. Switch IV Steroid to Oral steroid. Patient will need close outpatient follow-up with Nephrologist.

## 2022-06-02 NOTE — PROGRESS NOTE ADULT - MINUTES
Discharge phone call completed with patient on 6/2/2022 @ [facility]. Patient reports questions or concerns. The following questions or concerns were verbalized: Reports not feeling very well. Will be following up with PCP this afternoon. She feels as though she was discharged from hospital too soon. Patient is aware of signs and symptoms necessitating a call to her provider. Patient states she was able to get her prescriptions filled and does not have any questions about her prescriptions. Patient confirmed that she is aware of her scheduled follow up appointments.       Ruma Harding RN
50
25

## 2022-06-14 NOTE — PHYSICAL THERAPY INITIAL EVALUATION ADULT - PREDICTED DURATION OF THERAPY (DAYS/WKS), PT EVAL
Impression: Nonexudative age-related macular degeneration, bilateral, early dry stage: H35.3131. Plan: Discussed diagnosis in detail with patient. Discussed treatment options with patient. Emphasized and explained compliance pt advised to not Re/start smoking. Recommended to start PreserVision AREDS-2 PO BID. Advised patient of condition. Discussed risks and benefits and patient understands. 4 weeks

## 2022-06-24 NOTE — PATIENT PROFILE ADULT - OVER THE PAST TWO WEEKS HAVE YOU FELT DOWN, DEPRESSED OR HOPELESS?
Render Risk Assessment In Note?: no Note Text (......Xxx Chief Complaint.): This diagnosis correlates with the Detail Level: Detailed Other (Free Text): Recommend against cosmetic shave removal with risk of hypertrophic scarring. no unable to assess cognitively impaired

## 2022-08-15 NOTE — PROVIDER CONTACT NOTE (MEDICATION) - ACTION/TREATMENT ORDERED:
np spoke with family on the phone [facility]

## 2023-03-08 NOTE — PROGRESS NOTE ADULT - PROBLEM/PLAN-10
DISPLAY PLAN FREE TEXT
Detail Level: Simple
Additional Notes: Patient consent was obtained to proceed with the visit and recommended plan of care after discussion of all risks and benefits, including the risks of COVID-19 exposure.
Render Risk Assessment In Note?: no
Additional Notes: PATIENT IS STILL HAVING SMALL SHORT-LIVED BREAKOUTS
Detail Level: Generalized

## 2023-05-16 NOTE — OCCUPATIONAL THERAPY INITIAL EVALUATION ADULT - PREDICTED DURATION OF THERAPY (DAYS/WKS), OT EVAL
Size Of Lesion In Cm (Optional): 0 Procedure To Be Performed At Next Visit: Punch Excision Introduction Text (Please End With A Colon): The following procedure was deferred: Scheduling Instructions (Optional): OS submitted Detail Level: Detailed 4 weeks

## 2023-07-24 NOTE — PROGRESS NOTE ADULT - PROBLEM SELECTOR PLAN 9
"Week 37 of Your Pregnancy: Care Instructions    Most babies are born between 37 and 40 weeks.   This is a good time to pack a bag to take with you to the birth. Then it will be ready to go when you are.     Learn about breastfeeding.  For example, find out about ways to hold your baby to make breastfeeding easier. And think about learning how to pump and store milk.     Know that crying is normal.  It's common for babies to cry 1 to 3 hours a day. Some cry more, and some cry less.     Learn why babies cry.  They may be hungry; have gas; need a diaper change; or feel cold, warm, tired, lonely, or tense. Sometimes they cry for unknown reasons.     Think about what will help you stay calm when your baby cries.  Taking slow, deep breaths can help. So can taking a break. It's okay to put your baby somewhere safe (like their crib) and walk away for a few minutes.     Learn about safe sleep for your baby.  Always put your baby to sleep on their back. Place them alone in a crib or bassinet with a firm, flat surface. Keep soft items like stuffed animals out of the crib.     Learn what to expect with  poop.  Your baby will have their own bowel patterns. Some babies have several bowel movements a day. Some have fewer.     Know that  babies will often have loose, yellow bowel movements.  Formula-fed babies have more formed stools. If your baby's poop looks like pellets, your baby is constipated.   Follow-up care is a key part of your treatment and safety. Be sure to make and go to all appointments, and call your doctor if you are having problems. It's also a good idea to know your test results and keep a list of the medicines you take.  Where can you learn more?  Go to [email or web address]  Enter N257 in the search box to learn more about \"Week 37 of Your Pregnancy: Care Instructions.\"  Current as of: 2022               Content Version: 13.7    5215-2004 [employer]. " "  Care instructions adapted under license by your healthcare professional. If you have questions about a medical condition or this instruction, always ask your healthcare professional. [employer], [employer] disclaims any warranty or liability for your use of this information.    PREECLAMPSIA SIGNS AND SYMPTOMS    Preeclampsia is a dangerous condition that some women develop in the second half of pregnancy. It can also begin after the baby is born.  Preeclampsia causes high blood pressure and can cause problems with many organ systems in your body.  It can also affect the growth of your baby. The exact cause of preeclampsia is unknown, however, there are signs and symptoms to watch for:    -A bad headache that doesn't improve with Tylenol  -Visual changes such as spots, flashes of light, blurry vision  -Pain in the upper right part of your abdomen, especially under the ribs that doesn't go away  -Nausea and/or vomiting  -Feeling extremely tired  -Yellowing of the skin and/or eyes  -Feeling \"not quite right\" or that something is wrong  -An extreme amount of swelling (some swelling in pregnancy is very normal)    If your midwife feels that you are developing preeclampsia, you will have lab tests drawn and will be monitored very closely.     If you are experiencing anyof these symptoms, call the [facility] immediately at [phone].     Labor Instructions for Midwife Patients    When to call:  Both during and after office hours call  [phone]. There is a triage RN to take your calls and answer your questions 24 hours a day.  If they cannot answer your question they will page the midwife on call for you.    When to call:  Call anytime you have important concerns about you or your baby.     Call if:  You are having contractions at regular intervals about 5-6 minutes apart lasting 60 seconds and becoming increasingly more intense   You have an uncontrollable gush of fluid from your vagina or " "feel a pop and gush like your water has broken  You have HEAVY bleeding, like heavy period, blood running down your legs, or  soaking a pad.   Some bleeding after a pelvic exam, after intercourse, or in labor when your cervix is dilating is normal and is referred to as \"bloody show\"  You have severe, continuous back or abdominal pain  You feel it is time to go to the hospital  If this is your first labor, call when contractions are very intense and have been about every 3-4 minutes for about an hour  If it is your second labor or more, call when contractions are strong and about every 3-5 minutes or sooner depending on your level of discomfort.     Keep in mind we are always here for you! If you have questions, concerns please don't hesitate to call us.     What to eat/drink in labor: Drink plenty of fluid (water most importantly, juice, soda or tea without caffeine). Eat rice, pasta, soup, cereal, bread/toast, and fruit. Avoid dairy and greasy food as they are difficult to digest and you may experience some nausea during labor.    Comfort measures:  Baths and showers (ok even with ruptured membranes, it may temporarily slow contractions if you are still in the early stage of labor)  Warm/hot packs for back pain or discomfort  Back, belly, or thigh massages  Standing, rocking, walking, leaning over bed or tables, side-lying and sleeping    Miscellaneous:   Contractions are timed from the beginning of one to the beginning of the next  Try hard to sleep during the early stage of labor when you are not that uncomfortable. Timing of contractions at this point is not important  Even if you cannot sleep, resting in bed or on the couch can help you maintain your energy for labor  When you arrive at the hospital the nurse will check your baby's heartbeat, check your cervix, and will call us. The midwife on call will come in and be with you when you are in active labor  After hours you need to enter the hospital through the " emergency room    Fetal Kick Counts    It is important to know when your baby's movements occur. We often get busy with work and life and do not pay close attention to their movements.      Women typically begin feeling movement between 18-22 weeks of gestation, sometimes it can be earlier or later depending on where your placenta is     Movements usually begin feeling like popping or fluttering and as the baby grows they become more pronounced    Toward the end of pregnancy as the baby gets larger they may not move as much or make as big of movements. Babies have maturing sleep cycles as well as not as much room to move and flip. If you are ever concerned about your baby's movements or have not felt the baby move for a while, we recommend you do a fetal kick count. Prior to starting your count drink a glass of water or juice and eat a snack. Then lay down on your side and begin to count movements.     How to do a Fetal Kick Counts    There are many different ways to monitor your baby's movements. Movements can range from large jabs to small kicks, or wiggles.  Hiccups count!    Count 10 movements in 2 hours when resting and focusing  Count 10 movements in 12 hours when doing normal activity    We recommend that if movements occur but seem decreased that you should be seen in the clinic or hospital for evaluation within 12 hours. If fetal movement is absent or fetal kick counts are low please contact us right away.    If you ever have any concerns about your baby's movements DO NOT HESITATE to call us, we are here for you!    [facility]  [phone]        - MOLST Form signed and in chart, DNR/DNI  - Pain meds as needed to maintain comfort  - Family desires to monitor patient condition through weekend, will reevaluate on Monday to decide future hospital course/disposition

## 2023-08-23 NOTE — ED ADULT TRIAGE NOTE - STATUS:
Chief Complaint   Patient presents with   • Follow-up     6 month follow up    • Office Visit       HISTORY OF PRESENT ILLNESS:  Guru Kowalski is a 52 year old male who is being seen for six month follow up.     Type 2 diabetes mellitus. The patient was previously on Metformin however developed diarrhea. He was started on sulfonylurea in November 2022. He denies any symptomatic hypoglycemia. He is tolerating this without any issues.     Diarrhea. Patient was started on Metformin in 5/2021 and had a colonoscopy later in May. He reports that after having his colonoscopy, he developed diarrhea. He states he continues to have loose stools since. He reports of watery stools which starts after breakfast. He also reports of lower abdominal cramping associated with this. His Metformin was discontinued in November and he was started on Glimepiride. His diarrhea did not resolve however resolved.  Stool testing was negative.  TSH normal. He had an US done in 10/2022 which showed diffuse fatty infiltration. He takes Metamucil daily which helps.     Sleep apnea. On CPAP.      Fatty liver. Last US in 10/2022. Liver enzymes improved today.      Hypertension. He is on Enalapril and amlodipine. He is compliant. He does not monitor blood pressure at home. He denies chest pain, shortness of breath or headache.      Family history of prostate cancer in father and brother.      Hyperlipidemia with hypertriglyceridemia. Patient is on Crestor. He is tolerating this without any issues.       Patient Active Problem List   Diagnosis   • Obstructive sleep apnea on CPAP   • Benign essential hypertension   • Mixed hyperlipidemia   • Family history of prostate cancer   • Type 2 diabetes mellitus without complication, without long-term current use of insulin (CMD)   • Hepatic steatosis   • Hypertension complicating diabetes (CMD)       REVIEW OF SYSTEMS: as above     I have reviewed the patient's medications and allergies, past medical,  surgical and family history, updating these as appropriate. See histories section of the EMR for display of this information.    MEDICATIONS:  Current Outpatient Medications   Medication Sig Dispense Refill   • glipiZIDE (GLUCOTROL) 5 MG tablet Take 1 tablet by mouth daily (before breakfast). 90 tablet 0   • amLODIPine (NORVASC) 5 MG tablet TAKE ONE TABLET BY MOUTH DAILY 90 tablet 1   • enalapril (VASOTEC) 20 MG tablet Take 2 tablets by mouth daily. 180 tablet 3   • rosuvastatin (CRESTOR) 10 MG tablet TAKE ONE TABLET BY MOUTH DAILY 90 tablet 3   • blood glucose meter Test blood sugar 1 time daily as directed. Diagnosis: E11.9. Any Meter covered under insurance 1 kit 0   • blood glucose test strip Test blood sugar 1 time daily as directed. Diagnosis: E11.9. (whichever Insurance covers) 100 strip 3   • blood glucose lancets Test blood sugar 1 time daily as directed. Diagnosis: E11.9.( Whichever insurance covers) 100 each 3     No current facility-administered medications for this visit.       Medications Discontinued During This Encounter   Medication Reason   • colestipol (COLESTID) 1 g Tab Therapy Completed   • glimepiride (AMARYL) 1 MG tablet Alternate Therapy         PHYSICAL EXAMINATION:   Visit Vitals  /78   Pulse 88   Resp 16   Ht 5' 10\" (1.778 m)   Wt 93.4 kg (206 lb)   BMI 29.56 kg/m²     Appears to be of stated age. In no acute distress and in good health.  HEENT: Normocephalic, atraumatic. Neck supple and free of any masses.   LUNGS: Clear to auscultation anteriorly and posteriorly. Respiratory effort is normal.  CARDIAC: Regular rate and rhythm and without murmurs, gallops or rubs.  EXTREMITIES: Without edema.    RESULTS:  No visits with results within 1 Day(s) from this visit.   Latest known visit with results is:   Lab Services on 08/10/2023   Component Date Value Ref Range Status   • Fasting Status 08/10/2023 12  0 - 999 Hours Final   • Sodium 08/10/2023 134 (L)  135 - 145 mmol/L Final   •  Potassium 08/10/2023 4.9  3.4 - 5.1 mmol/L Final   • Chloride 08/10/2023 97  97 - 110 mmol/L Final   • Carbon Dioxide 08/10/2023 28  21 - 32 mmol/L Final   • Anion Gap 08/10/2023 14  7 - 19 mmol/L Final   • Glucose 08/10/2023 132 (H)  70 - 99 mg/dL Final   • BUN 08/10/2023 13  6 - 20 mg/dL Final   • Creatinine 08/10/2023 0.76  0.67 - 1.17 mg/dL Final   • Glomerular Filtration Rate 08/10/2023 >90  >=60 Final   • BUN/Cr 08/10/2023 17  7 - 25 Final   • Calcium 08/10/2023 9.3  8.4 - 10.2 mg/dL Final   • Bilirubin, Total 08/10/2023 0.4  0.2 - 1.0 mg/dL Final   • GOT/AST 08/10/2023 42 (H)  <=37 Units/L Final   • GPT/ALT 08/10/2023 63  <64 Units/L Final   • Alkaline Phosphatase 08/10/2023 89  45 - 117 Units/L Final   • Albumin 08/10/2023 3.8  3.6 - 5.1 g/dL Final   • Protein, Total 08/10/2023 7.3  6.4 - 8.2 g/dL Final   • Globulin 08/10/2023 3.5  2.0 - 4.0 g/dL Final   • A/G Ratio 08/10/2023 1.1  1.0 - 2.4 Final   • Cholesterol 08/10/2023 193  <=199 mg/dL Final   • Triglycerides 08/10/2023 177 (H)  <=149 mg/dL Final   • HDL 08/10/2023 77  >=40 mg/dL Final   • LDL 08/10/2023 81  <=129 mg/dL Final   • Non-HDL Cholesterol 08/10/2023 116  mg/dL Final   • Cholesterol/ HDL Ratio 08/10/2023 2.5  <=4.4 Final   • Hemoglobin A1C 08/10/2023 6.5 (H)  4.5 - 5.6 % Final   • Microalbumin, Urine 08/10/2023 2.50  mg/dL Final   • Creatinine, Urine 08/10/2023 34.60  mg/dL Final   • Microalbumin/ Creatinine Ratio 08/10/2023 72.3 (H)  <30.0 mg/g Final      Assessment/ Plan      1. Type 2 diabetes mellitus without complication, without long-term current use of insulin (CMD)  Hemoglobin A1c 6.5% and at goal.  Continue on current medications.  Weight is down 10 lb.  Mild microalbumin.  We will repeat this in 6 months.  If persistent he is already on an ACE-inhibitor.    2. Hypertension complicating diabetes (CMD)  Blood pressure today is at goal.  Continue current medication.    3. Hypercholesterolemia with hypertriglyceridemia  He is on statin  therapy.  Triglycerides are mildly elevated.    4. Obstructive sleep apnea on CPAP  Continue CPAP.    5. Hepatic steatosis  Mild elevation liver enzymes.  Encourage he continues working weight loss.      Return in about 7 months (around 3/23/2024) for physical, Fasting labs prior.    Latrice Barker, PAC      Supervising physician, Dr. Echevarria.    [employer]

## 2023-08-29 NOTE — PATIENT PROFILE ADULT - NSPROGENDIFFINTUB_GEN_A_NUR
previously intubated - no problems Humira Counseling:  I discussed with the patient the risks of adalimumab including but not limited to myelosuppression, immunosuppression, autoimmune hepatitis, demyelinating diseases, lymphoma, and serious infections.  The patient understands that monitoring is required including a PPD at baseline and must alert us or the primary physician if symptoms of infection or other concerning signs are noted.

## 2023-10-11 NOTE — PATIENT PROFILE ADULT - NSPROCHRONICPAIN_GEN_A_NUR
[facility]  Orthopaedic Surgery Consultation by Merrill Houston M.D.    Paige Torres MRN# [id number]   Age: 81 year old YOB: 1942     Requesting physician: No ref. provider found  Carlitos Tian     Background history:  DX:  Fibromyalgia  OSAS  Morbid obesity  Hypothyroidism  Hyperlipidemia.   Hypertension on aspirin 81 mg  Coronary artery disease  Pulmonary hypertension  Anemia    TREATMENTS:  2011, right Lapidus and second metatarsal shortening, Dr. Duenas           History of Present Illness:   81 year old female who presents our clinic because of chronic right knee pain.  This pain has been present for multiple decades.  No clear antecedent trauma other than one occasion where she took a misstep in her kitchen.The pain is experienced mostly on the lateral side of the knee.  It radiates down her tibia all the way to her ankle.  Patient endorses occasional effusion, crepitus and giving way.  She reports the presence of night pain, initiation stiffness and soreness.  She does not report significant hip/groin/lower back pain.  Patient can only ambulate short distances and uses a cane for support.  To mitigate the pain she is tried over-the-counter analgesics, activity modification, bracing, home exercise regimen, physical therapy and injections with both cortisone and hyaluronic acid.  The latest injection with hyaluronic acid 2 months ago started to provide relief a few weeks ago.  Currently her pain is relatively well managed.  She would like to discuss options if the injection stops providing relief.    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 No one near by.   Hobbies / Sports: writing, drawing, outdoor activities      Smoking: No  Alcohol: Yes  Illicit drug use: No         Physical Exam:     EXAMINATION pertinent findings:   PSYCH: Pleasant, healthy-appearing, alert, oriented x3, cooperative. Normal mood and affect.  VITAL SIGNS: Height  "1.499 m (4' 11.02\"), weight 88.9 kg (196 lb).  Reviewed nursing intake notes.   Body mass index is 39.57 kg/m .  RESP: non labored breathing   ABD: benign, soft, non-tender, no acute peritoneal findings  SKIN: grossly normal   LYMPHATIC: grossly normal, no adenopathy, no extremity edema  NEURO: grossly normal , no motor deficits  VASCULAR: satisfactory perfusion of all extremities   MUSCULOSKELETAL:   Alignment: Valgus alignment of right lower extremity.  Gait: Normal    The right hip exhibits a full range of motion.  No pain upon rotations.  Lasegue's test is negative.  No tenderness to palpation of the greater trochanteric region.    R knee: -0-0  . Straight leg raise +. No redness, warmth or skin changes present. Effusion  minimal . Ligamentously stable in both ML and AP direction.  +1 laxity in ML direction most likely due to substance loss.  Valgus alignment is completely correctable.  Normal PF tracking without crepitus.  Rabot is somewhat sensitive.  Apprehension -. Meniscal provocation tests are negative.  There is recognizable tenderness to palpation mostly over the lateral joint line.     Right LE:   Thigh and leg compartments soft and compressible   +Quad/TA/GSC/FHL/EHL   SILT DP/SP/Jose/Saph/Tib nerve distributions   Palpable dorsalis pedis pulse          Data:   All laboratory data reviewed  All imaging studies reviewed by me personally.    XR alignment films 10/11/2023:   My interpretation: Valgus alignment of right lower extremity.    XR knee right 8/16/2023:  My interpretation: End-stage osteoarthritic changes of lateral compartment with complete obliteration of joint space, presence of marginal osteophytes, sclerosis and subchondral cysts.  Relatively well-preserved medial compartments.  Mild to moderate osteoarthritic changes of patellofemoral joint.         Assessment and Plan:   Assessment:  81-year-old female presenting with chronic right knee pain due to end-stage osteoarthritic changes " most notably in lateral compartment.  Currently responding well to a recent hyaluronic acid injection     Plan:  I had a long discussion with the patient regarding etiology and ongoing management options.  Reviewed surgical and nonsurgical treatments.  The non-surgical options include activity modification, pain medication, PT, bracing and injection therapy. As for surgery we discussed the options of osteotomies, partial and total knee replacement surgery. We reviewed total knee replacement in detail including the procedure, the implants, the recovery process, and long-term outcomes.  Since currently patient is experiencing good relief from the hyaluronic acid injection it would be my recommendation to continue nonsurgical treatment.  Once this no longer provides relief and the time interval between injections is longer than 6 months we could repeat the injection with hyaluronic acid.  If unfortunately the time interval is shorter than 6 months one could consider proceeding with knee replacement surgery.  Patient understands and agrees to the treatment plan as set forth.  All questions were answered.  We will follow-up with her on an as-needed basis.      More information on joint replacements can be found on : [email or web address]    Thank you for your referral.    Merrill Houston MD, PhD     Adult Reconstruction  [facility] Department of Orthopaedic Surgery    This note was created using dictation software and may contain errors.  Please contact the creator for any clarifications that are needed.    DATA for DOCUMENTATION:         Past Medical History:     Patient Active Problem List   Diagnosis    Hallux valgus, acquired    Advised about management of weight    Chronic post-traumatic headache, not intractable    Chronic urticaria    Disorder of bone and cartilage    Edema    Essential hypertension    Fibromyalgia    Hiatal hernia     Hyperlipidemia    Hypothyroidism    Idiopathic peripheral neuropathy    Iron deficiency anemia, unspecified    Mild CAD    Morbid obesity (H)    Myalgia and myositis    Nocturia    Progressive pulmonary hypertension (H)    Bilateral leg edema    Anemia    Atrophic vaginitis    VIET (obstructive sleep apnea)    Mixed stress and urge urinary incontinence    Osteoarthrosis involving lower leg    Overactive bladder    Shortness of breath    Other abnormal glucose     Past Medical History:   Diagnosis Date    Anemia     Carotid artery disease (H)     Coronary artery calcification seen on CAT scan     Disease of thyroid gland     Fibromyalgia     GERD (gastroesophageal reflux disease)     Hashimoto disease     Hashimoto's disease     in her 30's    Hypertension     Iron deficiency anemia     VIET (obstructive sleep apnea)     PONV (postoperative nausea and vomiting)        Also see scanned health assessment forms.       Past Surgical History:     Past Surgical History:   Procedure Laterality Date    BUNIONECTOMY LAPIDUS WITH TARSAL METATARSAL (TMT) FUSION  10/12/2011    Procedure:BUNIONECTOMY LAPIDUS WITH TARSAL METATARSAL (TMT) FUSION; Right Lapidus and 2nd Metatarsal Shortening    ; Surgeon:ARMAND HEATH; Location:[facility]    EXTRACAPSULAR CATARACT EXTRATION WITH INTRAOCULAR LENS IMPLANT      FOOT SURGERY      FOOT SURGERY Bilateral     [facility]    HYSTERECTOMY      HYSTERECTOMY TOTAL ABDOMINAL      RHYTIDECTOMY (FACELIFT)              Social History:     Social History     Socioeconomic History    Marital status:      Spouse name: Not on file    Number of children: Not on file    Years of education: Not on file    Highest education level: Not on file   Occupational History    Not on file   Tobacco Use    Smoking status: Former    Smokeless tobacco: Never   Vaping Use    Vaping Use: Never used   Substance and Sexual Activity    Alcohol use: Yes     Alcohol/week: 0.0 - 3.0 standard drinks of alcohol      Comment: Alcoholic Drinks/day: 0-3 cocktails weekly.    Drug use: No    Sexual activity: Never     Partners: Male     Comment:  9/2015   Other Topics Concern    Not on file   Social History Narrative    She is  and lives alone with 4 dogs, 1 cat.  Has grown adult children.  Is independent in ADLs and denies PCA or home care nurse.  She is consuming 4 caffeinated beverages per day and denies tobacco, THC, or illicit substance use.  She consumes 1- 2 alcoholic beverages 4-5 times per month.       Social Determinants of Health     Financial Resource Strain: Not on file   Food Insecurity: Not on file   Transportation Needs: Not on file   Physical Activity: Not on file   Stress: Not on file   Social Connections: Not on file   Interpersonal Safety: Not on file   Housing Stability: Not on file            Family History:       Family History   Problem Relation Age of Onset    Cancer Mother     Heart Disease Maternal Grandfather             Medications:     Current Outpatient Medications   Medication Sig    acetaminophen (TYLENOL) 500 MG tablet Take 500 mg by mouth every 6 hours as needed    aspirin 81 MG tablet Take 1 tablet by mouth every other day     cholecalciferol (VITAMIN D3) 125 mcg (5000 units) capsule Take 125 mcg by mouth daily    diclofenac (VOLTAREN) 1 % topical gel Apply 2 g topically 4 times daily    estrogen conj (PREMARIN) 0.3 MG tablet Take 1 tablet (0.3 mg) by mouth three times a week    furosemide (LASIX) 20 MG tablet TAKE 1 TABLET DAILY FOR LEGSWELLING AND BLOOD PRESSURECONTROL    levothyroxine (SYNTHROID/LEVOTHROID) 200 MCG tablet TAKE 1 TABLET DAILY IN     ADDITION TO A 50 MCG       TABLET, TOTAL DOSE 250 MCG A DAY    levothyroxine (SYNTHROID/LEVOTHROID) 50 MCG tablet TAKE 1 TABLET DAILY IN     ADDITION TO A 200 MCG      TABLET, TOTAL DOSE 250 MCG A DAY    lisinopril (ZESTRIL) 5 MG tablet Take 1 tablet (5 mg) by mouth daily    omeprazole (PRILOSEC) 20 MG DR capsule Take 1 capsule (20  mg) by mouth daily     Current Facility-Administered Medications   Medication    lidocaine (PF) (XYLOCAINE) 1 % injection 4 mL    lidocaine (PF) (XYLOCAINE) 1 % injection 4 mL    sodium hyaluronate (EUFLEXXA) injection 20 mg    sodium hyaluronate (EUFLEXXA) injection 20 mg    triamcinolone (KENALOG-40) injection 40 mg    triamcinolone (KENALOG-40) injection 40 mg              Review of Systems:   A comprehensive 10 point review of systems (constitutional, ENT, cardiac, peripheral vascular, lymphatic, respiratory, GI, , Musculoskeletal, skin, Neurological) was performed and found to be negative except as described in this note.     See intake form completed by patient       no

## 2024-01-16 NOTE — ED ADULT NURSE NOTE - NS ED PATIENT SAFETY CONCERN
Can we reach out to patient via phone to schedule follow up with Dr. Henley for October 2024. Thanks      No

## 2024-01-26 NOTE — DISCHARGE NOTE PROVIDER - NSDCCPCAREPLAN_GEN_ALL_CORE_FT
PRINCIPAL DISCHARGE DIAGNOSIS  Diagnosis: Respiratory failure with hypoxia  Assessment and Plan of Treatment: Respiratory failure with hypoxia      SECONDARY DISCHARGE DIAGNOSES  Diagnosis: Heart failure  Assessment and Plan of Treatment:     Diagnosis: Anemia  Assessment and Plan of Treatment: Anemia    Diagnosis: KANIKA (acute kidney injury)  Assessment and Plan of Treatment: KANIKA (acute kidney injury)    Diagnosis: Supratherapeutic INR  Assessment and Plan of Treatment: Supratherapeutic INR    Diagnosis: AF (atrial fibrillation)  Assessment and Plan of Treatment: AF (atrial fibrillation) PRINCIPAL DISCHARGE DIAGNOSIS  Diagnosis: Respiratory failure with hypoxia  Assessment and Plan of Treatment: Respiratory failure with hypoxia      SECONDARY DISCHARGE DIAGNOSES  Diagnosis: Heart failure  Assessment and Plan of Treatment:     Diagnosis: KANIKA (acute kidney injury)  Assessment and Plan of Treatment: KANIKA (acute kidney injury)    Diagnosis: Supratherapeutic INR  Assessment and Plan of Treatment: Supratherapeutic INR    Diagnosis: Anemia  Assessment and Plan of Treatment: Anemia    Diagnosis: AF (atrial fibrillation)  Assessment and Plan of Treatment: AF (atrial fibrillation) PRINCIPAL DISCHARGE DIAGNOSIS  Diagnosis: Respiratory failure with hypoxia  Assessment and Plan of Treatment: You were admitted for resp failure likely due 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 You were noted with good response to IV steroids. You are now on oral steroids, prednisone 60 daily. Please follow up with rheumatology as outpatient for   - c/w IV methylpred 70mg daily  - +DsDNA - possible hydralazine mediated immune-complex disease vs vasculitis  - Continue Bactrim for PCP PPx given cost and only needing 3 times a day but if significant side effects for patient will consider changing to atovaquone  - AVOID Hydralazine  - If you don't have a primary care provider, please call the [facility] to make an appointment - [phone] Renal Bx path for final DMT  - Check quant gold prior to starting DMT  - Appreciated renal, pulm, rheum, and ID recs      SECONDARY DISCHARGE DIAGNOSES  Diagnosis: Heart failure  Assessment and Plan of Treatment:     Diagnosis: KANIKA (acute kidney injury)  Assessment and Plan of Treatment: KANIKA (acute kidney injury)    Diagnosis: Supratherapeutic INR  Assessment and Plan of Treatment: Supratherapeutic INR    Diagnosis: Anemia  Assessment and Plan of Treatment: Anemia    Diagnosis: AF (atrial fibrillation)  Assessment and Plan of Treatment: AF (atrial fibrillation) PRINCIPAL DISCHARGE DIAGNOSIS  Diagnosis: Respiratory failure with hypoxia  Assessment and Plan of Treatment: You were admitted for resp failure likely due 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 You underwent Rituxan induction on 8/23- will need weekly dosing for next 4 weeks. Specific details to be given to pt per Rheum as outpatient. Please follow up with rheumatology within 1 week, please call to make an appt (phone number below). You were noted with good response to IV steroids. You are now on oral steroids, prednisone 60 daily. Please follow up with rheumatology as outpatient for any dose changes and further recommendations. Please continue with bactrim for prophylaxis. PLEASE STOP HYDRALAZINE.  Please continue PO lasix 20mg daily. If you don't have a primary care provider, please call the [facility] to make an appointment - [phone].      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 Continue with oral steroids as directed, Prednisone 60 QD and follow up recommendations with rheumatology for continued dosing. Please continue Bactrim for PCP PPx. 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 {lease stop hydralazine. Please follow up quant gold and further hematological/rheumatological work up as outpatient with rheumatology and hematology. You underwent a rituxan infusion on  8/23 per rheum, with plan for  3 additional doses qweekly as OP.    Diagnosis: Fall  Assessment and Plan of Treatment: You had a fall while admitted, your CTH was negative.    Diagnosis: Heart failure  Assessment and Plan of Treatment: Continue recommended medication regimen. Monitor for signs/symptoms of fluid overload and electrolyte abnormalities, such as, shortness of breath, cough, swelling, chest discomfort, changes in heart rate, dizziness, fainting, or changes in mental status. Follow-up with your PCP/cardiologist outpatient after you've been discharged from the hospital.    Diagnosis: KANIKA (acute kidney injury)  Assessment and Plan of Treatment: In order to prevent further disease progression, continue to follow recommendations made by your primary provider/nephrologist. Continue a diet that is low in sodium and avoid foods that are concentrated in potassium and phosphorus. Continue your medications/supplementations as directed and avoid over-the-counter drugs that are harmful to kidneys, such as, Non-Steroidal Anti-Inflammatory Drugs (NSAIDs). Follow-up as outpatient to monitor your kidney function, as well as, vitamin D, Calcium, potassium, and phosphorus levels.    Diagnosis: Supratherapeutic INR  Assessment and Plan of Treatment: Please hold coumadin and Please repeat INR within 1-2 days. Please re-start coumadin when INR is between 2-3.    Diagnosis: Anemia  Assessment and Plan of Treatment: Follow-up with your outpatient provider for further care/recommendations. Monitor for signs/symptoms indicating worsening of disease, such as, easy bleeding/bruising, pale skin, fatigue, dizziness, increased heart rate, or chest pain.    Diagnosis: AF (atrial fibrillation)  Assessment and Plan of Treatment: Please hold coumadin and Please repeat INR within 1-2 days. Please re-start coumadin when INR is between 2-3. Please follow up with your primary provider/cardiologist to further manage your care. Monitor for signs/symptoms of uncontrolled atrial fibrillation, such as, increased heart rate, palpitations, chest pain, dizziness, or shortness of breath - Return to emergency room if these signs/symptoms are present. 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 You underwent Rituxan induction on 8/23- will need weekly dosing for next 4 weeks. Specific details to be given to pt per Rheum as outpatient. Please follow up with rheumatology within 1 week, please call to make an appt (phone number below). You were noted with good response to IV steroids. You are now on oral steroids, prednisone 60 daily. Please follow up with rheumatology as outpatient for any dose changes and further recommendations. Please continue with bactrim for prophylaxis. PLEASE STOP HYDRALAZINE.  Please continue PO lasix 20mg daily. If you don't have a primary care provider, please call the [facility] to make an appointment - [phone].      SECONDARY DISCHARGE DIAGNOSES  Diagnosis: Hyperglycemia, unspecified  Assessment and Plan of Treatment: You were noted with elevated finger sticks likely due to steroid usage. Please continue with sliding scale as directed. [place] monitor your FS as outpatient. Please follow up with your PCP for further adjustments with insulin dosing as needed. If you don't have a primary care provider, please call the [facility] to make an appointment - [phone].    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 Continue with oral steroids as directed, Prednisone 60 QD and follow up recommendations with rheumatology for continued dosing. Please continue Bactrim for PCP PPx. 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 {lease stop hydralazine. Please follow up quant gold and further hematological/rheumatological work up as outpatient with rheumatology and hematology. You underwent a rituxan infusion on  8/23 per rheum, with plan for  3 additional doses qweekly as OP.    Diagnosis: Fall  Assessment and Plan of Treatment: You had a fall while admitted, your CTH was negative.    Diagnosis: Heart failure  Assessment and Plan of Treatment: Continue recommended medication regimen. Monitor for signs/symptoms of fluid overload and electrolyte abnormalities, such as, shortness of breath, cough, swelling, chest discomfort, changes in heart rate, dizziness, fainting, or changes in mental status. Follow-up with your PCP/cardiologist outpatient after you've been discharged from the hospital.    Diagnosis: KANIKA (acute kidney injury)  Assessment and Plan of Treatment: In order to prevent further disease progression, continue to follow recommendations made by your primary provider/nephrologist. Continue a diet that is low in sodium and avoid foods that are concentrated in potassium and phosphorus. Continue your medications/supplementations as directed and avoid over-the-counter drugs that are harmful to kidneys, such as, Non-Steroidal Anti-Inflammatory Drugs (NSAIDs). Follow-up as outpatient to monitor your kidney function, as well as, vitamin D, Calcium, potassium, and phosphorus levels.    Diagnosis: Supratherapeutic INR  Assessment and Plan of Treatment: Please hold coumadin and Please repeat INR within 1-2 days. Please re-start coumadin when INR is between 2-3.    Diagnosis: Anemia  Assessment and Plan of Treatment: Follow-up with your outpatient provider for further care/recommendations. Monitor for signs/symptoms indicating worsening of disease, such as, easy bleeding/bruising, pale skin, fatigue, dizziness, increased heart rate, or chest pain.    Diagnosis: AF (atrial fibrillation)  Assessment and Plan of Treatment: Please hold coumadin and Please repeat INR within 1-2 days. Please re-start coumadin when INR is between 2-3. Please follow up with your primary provider/cardiologist to further manage your care. Monitor for signs/symptoms of uncontrolled atrial fibrillation, such as, increased heart rate, palpitations, chest pain, dizziness, or shortness of breath - Return to emergency room if these signs/symptoms are present. 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 You underwent Rituxan induction on 8/23- will need weekly dosing for next 4 weeks. Specific details to be given to pt per Rheum as outpatient. Please follow up with rheumatology within 1 week, please call to make an appt (phone number below). You were noted with good response to IV steroids. You are now on oral steroids, prednisone 60 daily. Please follow up with rheumatology as outpatient for any dose changes and further recommendations. Please continue with bactrim for prophylaxis. PLEASE STOP HYDRALAZINE.  Please continue PO lasix 20mg daily. If you don't have a primary care provider, please call the [facility] to make an appointment - [phone].      SECONDARY DISCHARGE DIAGNOSES  Diagnosis: Hyperglycemia, unspecified  Assessment and Plan of Treatment: You were noted with elevated finger sticks likely due to steroid usage. Please continue with insulin regimen Novolog 12 units before meals as directed. [place] monitor your FS as outpatient. Please follow up with your PCP for further adjustments with insulin dosing as needed. If you don't have a primary care provider, please call the [facility] to make an appointment - [phone].    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 Continue with oral steroids as directed, Prednisone 60 QD and follow up recommendations with rheumatology for continued dosing. Please continue Bactrim for PCP PPx. 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 {lease stop hydralazine. Please follow up quant gold and further hematological/rheumatological work up as outpatient with rheumatology and hematology. You underwent a rituxan infusion on  8/23 per rheum, with plan for  3 additional doses qweekly as OP.    Diagnosis: Fall  Assessment and Plan of Treatment: You had a fall while admitted, your CTH was negative.    Diagnosis: Heart failure  Assessment and Plan of Treatment: Continue recommended medication regimen. Monitor for signs/symptoms of fluid overload and electrolyte abnormalities, such as, shortness of breath, cough, swelling, chest discomfort, changes in heart rate, dizziness, fainting, or changes in mental status. Follow-up with your PCP/cardiologist outpatient after you've been discharged from the hospital.    Diagnosis: KANIKA (acute kidney injury)  Assessment and Plan of Treatment: In order to prevent further disease progression, continue to follow recommendations made by your primary provider/nephrologist. Continue a diet that is low in sodium and avoid foods that are concentrated in potassium and phosphorus. Continue your medications/supplementations as directed and avoid over-the-counter drugs that are harmful to kidneys, such as, Non-Steroidal Anti-Inflammatory Drugs (NSAIDs). Follow-up as outpatient to monitor your kidney function, as well as, vitamin D, Calcium, potassium, and phosphorus levels.    Diagnosis: Supratherapeutic INR  Assessment and Plan of Treatment: Please hold coumadin and Please repeat INR within 1-2 days. Please re-start coumadin when INR is between 2-3.    Diagnosis: Anemia  Assessment and Plan of Treatment: Follow-up with your outpatient provider for further care/recommendations. Monitor for signs/symptoms indicating worsening of disease, such as, easy bleeding/bruising, pale skin, fatigue, dizziness, increased heart rate, or chest pain.    Diagnosis: AF (atrial fibrillation)  Assessment and Plan of Treatment: Please hold coumadin and Please repeat INR within 1-2 days. Please re-start coumadin when INR is between 2-3. Please follow up with your primary provider/cardiologist to further manage your care. Monitor for signs/symptoms of uncontrolled atrial fibrillation, such as, increased heart rate, palpitations, chest pain, dizziness, or shortness of breath - Return to emergency room if these signs/symptoms are present. same name as above

## 2024-01-28 NOTE — ASU DISCHARGE PLAN (ADULT/PEDIATRIC). - TELE NUMBER
[phone] Patient requests all Lab, Cardiology, and Radiology Results on their Discharge Instructions

## 2024-10-31 NOTE — PATIENT PROFILE ADULT. - FUNCTIONAL SCREEN CURRENT LEVEL: SWALLOWING (IF SCORE 2 OR MORE FOR ANY ITEM, CONSULT REHAB SERVICES), MLM)
What Type Of Note Output Would You Prefer (Optional)?: Standard Output How Severe Is Your Skin Lesion?: moderate Is This A New Presentation, Or A Follow-Up?: Follow Up Skin Lesion (0) swallows foods/liquids without difficulty

## 2025-03-13 NOTE — ASSESSMENT
[FreeTextEntry1] : Patient is on polypharmacy, and she has multiple medical problems. We'll continue present medical management. Patient's blood sugar is running low. She is on NovoLog 12 units before each meal might be a little bit high for the patient. She has not been eating well. I understand that she is on prednisone, but if the blood sugar is running low. I recommend not to give insulin Yes
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
